# Patient Record
Sex: FEMALE | Race: WHITE | NOT HISPANIC OR LATINO | Employment: OTHER | URBAN - NONMETROPOLITAN AREA
[De-identification: names, ages, dates, MRNs, and addresses within clinical notes are randomized per-mention and may not be internally consistent; named-entity substitution may affect disease eponyms.]

---

## 2021-05-22 ENCOUNTER — APPOINTMENT (OUTPATIENT)
Dept: GENERAL RADIOLOGY | Facility: HOSPITAL | Age: 36
End: 2021-05-22

## 2021-05-22 ENCOUNTER — HOSPITAL ENCOUNTER (EMERGENCY)
Facility: HOSPITAL | Age: 36
Discharge: HOME | End: 2021-05-22
Attending: EMERGENCY MEDICINE | Admitting: EMERGENCY MEDICINE

## 2021-05-22 VITALS
BODY MASS INDEX: 53.92 KG/M2 | SYSTOLIC BLOOD PRESSURE: 148 MMHG | HEART RATE: 90 BPM | DIASTOLIC BLOOD PRESSURE: 105 MMHG | OXYGEN SATURATION: 97 % | WEIGHT: 293 LBS | RESPIRATION RATE: 18 BRPM | HEIGHT: 62 IN | TEMPERATURE: 98.2 F

## 2021-05-22 DIAGNOSIS — R05.9 COUGH: Primary | ICD-10-CM

## 2021-05-22 DIAGNOSIS — J06.9 ACUTE URI: ICD-10-CM

## 2021-05-22 LAB
ALBUMIN SERPL-MCNC: 3.9 G/DL (ref 3.5–5.2)
ALBUMIN/GLOB SERPL: 1.6 G/DL
ALP SERPL-CCNC: 67 U/L (ref 39–117)
ALT SERPL W P-5'-P-CCNC: 20 U/L (ref 1–33)
ANION GAP SERPL CALCULATED.3IONS-SCNC: 10.2 MMOL/L (ref 5–15)
AST SERPL-CCNC: 25 U/L (ref 1–32)
BASOPHILS # BLD AUTO: 0.03 10*3/MM3 (ref 0–0.2)
BASOPHILS NFR BLD AUTO: 0.4 % (ref 0–1.5)
BILIRUB SERPL-MCNC: 0.4 MG/DL (ref 0–1.2)
BUN SERPL-MCNC: 6 MG/DL (ref 6–20)
BUN/CREAT SERPL: 10.7 (ref 7–25)
CALCIUM SPEC-SCNC: 9.2 MG/DL (ref 8.6–10.5)
CHLORIDE SERPL-SCNC: 102 MMOL/L (ref 98–107)
CO2 SERPL-SCNC: 25.8 MMOL/L (ref 22–29)
CREAT SERPL-MCNC: 0.56 MG/DL (ref 0.57–1)
DEPRECATED RDW RBC AUTO: 50.8 FL (ref 37–54)
EOSINOPHIL # BLD AUTO: 0.19 10*3/MM3 (ref 0–0.4)
EOSINOPHIL NFR BLD AUTO: 2.3 % (ref 0.3–6.2)
ERYTHROCYTE [DISTWIDTH] IN BLOOD BY AUTOMATED COUNT: 15 % (ref 12.3–15.4)
FLUAV AG NPH QL: NEGATIVE
FLUBV AG NPH QL IA: NEGATIVE
GFR SERPL CREATININE-BSD FRML MDRD: 122 ML/MIN/1.73
GLOBULIN UR ELPH-MCNC: 2.5 GM/DL
GLUCOSE SERPL-MCNC: 113 MG/DL (ref 65–99)
HCT VFR BLD AUTO: 41.3 % (ref 34–46.6)
HGB BLD-MCNC: 13.4 G/DL (ref 12–15.9)
IMM GRANULOCYTES # BLD AUTO: 0.03 10*3/MM3 (ref 0–0.05)
IMM GRANULOCYTES NFR BLD AUTO: 0.4 % (ref 0–0.5)
LYMPHOCYTES # BLD AUTO: 2.74 10*3/MM3 (ref 0.7–3.1)
LYMPHOCYTES NFR BLD AUTO: 32.9 % (ref 19.6–45.3)
MCH RBC QN AUTO: 29.8 PG (ref 26.6–33)
MCHC RBC AUTO-ENTMCNC: 32.4 G/DL (ref 31.5–35.7)
MCV RBC AUTO: 92 FL (ref 79–97)
MONOCYTES # BLD AUTO: 0.44 10*3/MM3 (ref 0.1–0.9)
MONOCYTES NFR BLD AUTO: 5.3 % (ref 5–12)
NEUTROPHILS NFR BLD AUTO: 4.9 10*3/MM3 (ref 1.7–7)
NEUTROPHILS NFR BLD AUTO: 58.7 % (ref 42.7–76)
NRBC BLD AUTO-RTO: 0 /100 WBC (ref 0–0.2)
PLATELET # BLD AUTO: 316 10*3/MM3 (ref 140–450)
PMV BLD AUTO: 9.6 FL (ref 6–12)
POTASSIUM SERPL-SCNC: 4.2 MMOL/L (ref 3.5–5.2)
PROT SERPL-MCNC: 6.4 G/DL (ref 6–8.5)
RBC # BLD AUTO: 4.49 10*6/MM3 (ref 3.77–5.28)
SARS-COV-2 RNA PNL SPEC NAA+PROBE: NOT DETECTED
SODIUM SERPL-SCNC: 138 MMOL/L (ref 136–145)
WBC # BLD AUTO: 8.33 10*3/MM3 (ref 3.4–10.8)

## 2021-05-22 PROCEDURE — 87804 INFLUENZA ASSAY W/OPTIC: CPT | Performed by: PHYSICIAN ASSISTANT

## 2021-05-22 PROCEDURE — 85025 COMPLETE CBC W/AUTO DIFF WBC: CPT | Performed by: PHYSICIAN ASSISTANT

## 2021-05-22 PROCEDURE — 99283 EMERGENCY DEPT VISIT LOW MDM: CPT

## 2021-05-22 PROCEDURE — 87635 SARS-COV-2 COVID-19 AMP PRB: CPT | Performed by: PHYSICIAN ASSISTANT

## 2021-05-22 PROCEDURE — 71045 X-RAY EXAM CHEST 1 VIEW: CPT

## 2021-05-22 PROCEDURE — 80053 COMPREHEN METABOLIC PANEL: CPT | Performed by: PHYSICIAN ASSISTANT

## 2021-05-22 RX ORDER — DOXYCYCLINE 100 MG/1
100 CAPSULE ORAL 2 TIMES DAILY
Qty: 20 CAPSULE | Refills: 0 | Status: SHIPPED | OUTPATIENT
Start: 2021-05-22 | End: 2021-07-02

## 2021-05-22 RX ORDER — TIZANIDINE 4 MG/1
4 TABLET ORAL NIGHTLY PRN
COMMUNITY
End: 2021-07-13 | Stop reason: SDUPTHER

## 2021-05-22 RX ORDER — FAMOTIDINE 40 MG/1
40 TABLET, FILM COATED ORAL DAILY
COMMUNITY
End: 2021-11-23

## 2021-05-22 RX ORDER — LURASIDONE HYDROCHLORIDE 40 MG/1
40 TABLET, FILM COATED ORAL DAILY
COMMUNITY
End: 2021-08-12 | Stop reason: SDUPTHER

## 2021-05-22 RX ORDER — ALBUTEROL SULFATE 90 UG/1
2 AEROSOL, METERED RESPIRATORY (INHALATION) EVERY 4 HOURS PRN
Qty: 6.7 G | Refills: 0 | Status: SHIPPED | OUTPATIENT
Start: 2021-05-22 | End: 2021-09-19

## 2021-05-22 RX ORDER — DIAZEPAM 5 MG/1
5 TABLET ORAL 2 TIMES DAILY PRN
COMMUNITY
End: 2021-08-23 | Stop reason: SDUPTHER

## 2021-05-22 RX ORDER — AMITRIPTYLINE HYDROCHLORIDE 100 MG/1
100 TABLET, FILM COATED ORAL NIGHTLY
COMMUNITY
End: 2021-08-12 | Stop reason: SDUPTHER

## 2021-05-22 RX ORDER — FUROSEMIDE 20 MG/1
20 TABLET ORAL 2 TIMES DAILY
COMMUNITY
End: 2021-10-11 | Stop reason: SDUPTHER

## 2021-05-22 RX ORDER — GUAIFENESIN 200 MG/10ML
200 LIQUID ORAL EVERY 4 HOURS PRN
Qty: 240 ML | Refills: 0 | Status: SHIPPED | OUTPATIENT
Start: 2021-05-22 | End: 2021-07-02

## 2021-05-22 RX ORDER — GABAPENTIN 800 MG/1
800 TABLET ORAL 3 TIMES DAILY
COMMUNITY
End: 2021-07-13 | Stop reason: SDUPTHER

## 2021-05-22 NOTE — ED PROVIDER NOTES
Subjective   35-year-old female who presents to the emergency department chief complaint cough, congestion for the past 10 days.  Patient states she has had productive cough with green-yellow sputum.  Patient states she has had diffuse wheezing.  Patient does have history of smoking.  Denies history of asthma, COPD, hypertension, cardiac disease.      History provided by:  Patient   used: No    URI  Presenting symptoms: congestion, cough and rhinorrhea    Presenting symptoms: no fatigue    Severity:  Moderate  Duration:  10 days  Timing:  Intermittent  Progression:  Worsening  Chronicity:  New  Relieved by:  Nothing  Worsened by:  Nothing  Ineffective treatments:  None tried  Associated symptoms: no arthralgias, no headaches, no myalgias, no neck pain, no sinus pain, no sneezing, no swollen glands and no wheezing    Risk factors: not elderly, no chronic cardiac disease, no chronic kidney disease, no chronic respiratory disease, no diabetes mellitus, no immunosuppression, no recent illness, no recent travel and no sick contacts        Review of Systems   Constitutional: Negative.  Negative for activity change, chills, diaphoresis and fatigue.   HENT: Positive for congestion and rhinorrhea. Negative for drooling, ear discharge, sinus pain and sneezing.    Eyes: Negative.  Negative for photophobia, pain, discharge and redness.   Respiratory: Positive for cough. Negative for chest tightness and wheezing.    Cardiovascular: Negative.  Negative for chest pain, palpitations and leg swelling.   Gastrointestinal: Negative.  Negative for abdominal distention, abdominal pain, anal bleeding, blood in stool and diarrhea.   Endocrine: Negative.  Negative for cold intolerance, heat intolerance, polydipsia and polyphagia.   Genitourinary: Negative.  Negative for difficulty urinating, dysuria, enuresis, flank pain, frequency, hematuria, menstrual problem and pelvic pain.   Musculoskeletal: Negative.  Negative for  arthralgias, myalgias and neck pain.   Skin: Negative.  Negative for color change, pallor, rash and wound.   Neurological: Negative.  Negative for dizziness and headaches.   Hematological: Negative.  Negative for adenopathy. Does not bruise/bleed easily.   Psychiatric/Behavioral: Negative.  Negative for agitation, behavioral problems, confusion, decreased concentration, dysphoric mood and hallucinations.   All other systems reviewed and are negative.      Past Medical History:   Diagnosis Date   • Anxiety    • Bell's palsy    • Bipolar 2 disorder (CMS/HCC)    • Depression    • GERD (gastroesophageal reflux disease)    • Restless leg syndrome        Allergies   Allergen Reactions   • Codeine Anaphylaxis   • Asa [Aspirin] Hives     Wheezing     • Morphine Itching   • Penicillins Hives     Vomiting     • Compazine [Prochlorperazine] Anxiety   • Reglan [Metoclopramide] Anxiety       Past Surgical History:   Procedure Laterality Date   • CHOLECYSTECTOMY     • DENTAL PROCEDURE         History reviewed. No pertinent family history.    Social History     Socioeconomic History   • Marital status: Single     Spouse name: Not on file   • Number of children: Not on file   • Years of education: Not on file   • Highest education level: Not on file   Tobacco Use   • Smoking status: Current Every Day Smoker     Packs/day: 0.50     Types: Cigarettes   Substance and Sexual Activity   • Alcohol use: Never   • Drug use: Never   • Sexual activity: Defer           Objective   Physical Exam  Vitals and nursing note reviewed.   Constitutional:       General: She is not in acute distress.     Appearance: Normal appearance. She is normal weight. She is not ill-appearing, toxic-appearing or diaphoretic.   HENT:      Head: Normocephalic.      Right Ear: Tympanic membrane, ear canal and external ear normal. There is no impacted cerumen.      Left Ear: Tympanic membrane, ear canal and external ear normal. There is no impacted cerumen.      Nose:  Congestion and rhinorrhea present.      Mouth/Throat:      Mouth: Mucous membranes are moist.      Pharynx: Oropharynx is clear. No oropharyngeal exudate or posterior oropharyngeal erythema.   Eyes:      General: No scleral icterus.        Right eye: No discharge.         Left eye: No discharge.      Extraocular Movements: Extraocular movements intact.      Conjunctiva/sclera: Conjunctivae normal.      Pupils: Pupils are equal, round, and reactive to light.   Neck:      Vascular: No carotid bruit.   Cardiovascular:      Rate and Rhythm: Normal rate.      Pulses: Normal pulses.      Heart sounds: Normal heart sounds. No murmur heard.   No friction rub. No gallop.    Pulmonary:      Effort: Pulmonary effort is normal. No respiratory distress.      Breath sounds: Normal breath sounds. No stridor. No wheezing, rhonchi or rales.   Abdominal:      General: Abdomen is flat. Bowel sounds are normal. There is no distension.      Palpations: Abdomen is soft. There is no mass.      Tenderness: There is no abdominal tenderness. There is no right CVA tenderness, left CVA tenderness, guarding or rebound.      Hernia: No hernia is present.   Musculoskeletal:         General: No swelling, tenderness, deformity or signs of injury.      Cervical back: Normal range of motion and neck supple. No rigidity or tenderness.      Right lower leg: No edema.      Left lower leg: No edema.   Lymphadenopathy:      Cervical: No cervical adenopathy.   Skin:     General: Skin is warm and dry.      Capillary Refill: Capillary refill takes less than 2 seconds.      Coloration: Skin is not jaundiced or pale.      Findings: No bruising, erythema, lesion or rash.   Neurological:      General: No focal deficit present.      Mental Status: She is alert and oriented to person, place, and time. Mental status is at baseline.      Cranial Nerves: No cranial nerve deficit.      Sensory: No sensory deficit.      Motor: No weakness.      Coordination:  Coordination normal.      Gait: Gait normal.      Deep Tendon Reflexes: Reflexes normal.   Psychiatric:         Mood and Affect: Mood normal.         Behavior: Behavior normal.         Thought Content: Thought content normal.         Judgment: Judgment normal.         Procedures           ED Course  ED Course as of May 22 1255   Sat May 22, 2021   1253    IMPRESSION:  Increased density left lung base, suspect overlying soft  tissue. Lateral view may be helpful..    [BH]      ED Course User Index  [] Cory Gong PA-C                                           [facility]    Final diagnoses:   Cough   Acute URI       ED Disposition  ED Disposition     ED Disposition Condition Comment    Discharge Stable           [address] [place] [address]  [phone]  Call in 1 day           Medication List      New Prescriptions    albuterol sulfate  (90 Base) MCG/ACT inhaler  Commonly known as: PROVENTIL HFA;VENTOLIN HFA;PROAIR HFA  Inhale 2 puffs Every 4 (Four) Hours As Needed for Wheezing.     doxycycline 100 MG capsule  Commonly known as: MONODOX  Take 1 capsule by mouth 2 (Two) Times a Day.     guaifenesin 100 MG/5ML liquid  Commonly known as: ROBITUSSIN  Take 10 mL by mouth Every 4 (Four) Hours As Needed for Cough or Congestion.           Where to Get Your Medications      These medications were sent to [facility] [address] [place] [address] - [address] [place] [address] - [phone]  - [phone]   [address] [facility] [address]    Phone: [phone]   · albuterol sulfate  (90 Base) MCG/ACT inhaler  · doxycycline 100 MG capsule  · guaifenesin 100 MG/5ML liquid          Cory Gong PA-C  05/22/21 1254

## 2021-07-02 ENCOUNTER — OFFICE VISIT (OUTPATIENT)
Dept: FAMILY MEDICINE CLINIC | Facility: CLINIC | Age: 36
End: 2021-07-02

## 2021-07-02 VITALS
HEART RATE: 117 BPM | DIASTOLIC BLOOD PRESSURE: 90 MMHG | OXYGEN SATURATION: 94 % | WEIGHT: 292.8 LBS | TEMPERATURE: 98 F | BODY MASS INDEX: 53.88 KG/M2 | RESPIRATION RATE: 20 BRPM | HEIGHT: 62 IN | SYSTOLIC BLOOD PRESSURE: 136 MMHG

## 2021-07-02 DIAGNOSIS — F31.62 BIPOLAR DISORDER, CURRENT EPISODE MIXED, MODERATE (HCC): ICD-10-CM

## 2021-07-02 DIAGNOSIS — K21.9 GASTROESOPHAGEAL REFLUX DISEASE WITHOUT ESOPHAGITIS: ICD-10-CM

## 2021-07-02 DIAGNOSIS — G89.29 CHRONIC BILATERAL LOW BACK PAIN WITH BILATERAL SCIATICA: Primary | ICD-10-CM

## 2021-07-02 DIAGNOSIS — G51.0 BELL PALSY: ICD-10-CM

## 2021-07-02 DIAGNOSIS — R53.83 FATIGUE, UNSPECIFIED TYPE: ICD-10-CM

## 2021-07-02 DIAGNOSIS — Z13.29 SCREENING FOR ENDOCRINE DISORDER: ICD-10-CM

## 2021-07-02 DIAGNOSIS — G43.009 MIGRAINE WITHOUT AURA AND WITHOUT STATUS MIGRAINOSUS, NOT INTRACTABLE: ICD-10-CM

## 2021-07-02 DIAGNOSIS — F43.10 PTSD (POST-TRAUMATIC STRESS DISORDER): ICD-10-CM

## 2021-07-02 DIAGNOSIS — M54.41 CHRONIC BILATERAL LOW BACK PAIN WITH BILATERAL SCIATICA: Primary | ICD-10-CM

## 2021-07-02 DIAGNOSIS — M54.42 CHRONIC BILATERAL LOW BACK PAIN WITH BILATERAL SCIATICA: Primary | ICD-10-CM

## 2021-07-02 DIAGNOSIS — F41.1 GAD (GENERALIZED ANXIETY DISORDER): ICD-10-CM

## 2021-07-02 PROCEDURE — 99204 OFFICE O/P NEW MOD 45 MIN: CPT | Performed by: NURSE PRACTITIONER

## 2021-07-02 RX ORDER — PROMETHAZINE HYDROCHLORIDE 25 MG/1
25 TABLET ORAL EVERY 6 HOURS PRN
COMMUNITY
End: 2021-09-08 | Stop reason: SDUPTHER

## 2021-07-02 RX ORDER — IBUPROFEN 400 MG/1
400 TABLET ORAL EVERY 6 HOURS PRN
COMMUNITY
End: 2021-08-03
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
## 2021-07-03 LAB
25(OH)D3+25(OH)D2 SERPL-MCNC: 27.5 NG/ML (ref 30–100)
FOLATE SERPL-MCNC: 7.4 NG/ML
HBA1C MFR BLD: 6.5 % (ref 4.8–5.6)
TSH SERPL DL<=0.005 MIU/L-ACNC: 1.03 UIU/ML (ref 0.45–4.5)
VIT B12 SERPL-MCNC: 423 PG/ML (ref 232–1245)

## 2021-07-06 PROBLEM — M54.41 CHRONIC BILATERAL LOW BACK PAIN WITH BILATERAL SCIATICA: Status: ACTIVE | Noted: 2021-07-06

## 2021-07-06 PROBLEM — F41.1 GAD (GENERALIZED ANXIETY DISORDER): Status: ACTIVE | Noted: 2021-07-06

## 2021-07-06 PROBLEM — G51.0 BELL PALSY: Status: ACTIVE | Noted: 2021-07-06

## 2021-07-06 PROBLEM — M54.42 CHRONIC BILATERAL LOW BACK PAIN WITH BILATERAL SCIATICA: Status: ACTIVE | Noted: 2021-07-06

## 2021-07-06 PROBLEM — F43.10 PTSD (POST-TRAUMATIC STRESS DISORDER): Status: ACTIVE | Noted: 2021-07-06

## 2021-07-06 PROBLEM — K21.9 GASTROESOPHAGEAL REFLUX DISEASE WITHOUT ESOPHAGITIS: Status: ACTIVE | Noted: 2021-07-06

## 2021-07-06 PROBLEM — F31.62 BIPOLAR DISORDER, CURRENT EPISODE MIXED, MODERATE: Status: ACTIVE | Noted: 2021-07-06

## 2021-07-06 PROBLEM — G89.29 CHRONIC BILATERAL LOW BACK PAIN WITH BILATERAL SCIATICA: Status: ACTIVE | Noted: 2021-07-06

## 2021-07-06 PROBLEM — G43.009 MIGRAINE WITHOUT AURA AND WITHOUT STATUS MIGRAINOSUS, NOT INTRACTABLE: Status: ACTIVE | Noted: 2021-07-06

## 2021-07-06 NOTE — PROGRESS NOTES
Labs show normal thyroid.  Normal B12.  Vitamin D was borderline low.  Start 2000 mg vitamin D3 daily.  Hemoglobin A1c 6.5.  This is technically consistent with diabetes.  Has she ever been told her sugar was high in the past?  I recommend that she work on decreasing sugary and starchy foods.  Please schedule her a 2 to 3-month follow-up and we will repeat this lab at that time.  Can consider medication at that time.

## 2021-07-13 DIAGNOSIS — M54.41 CHRONIC BILATERAL LOW BACK PAIN WITH BILATERAL SCIATICA: Primary | ICD-10-CM

## 2021-07-13 DIAGNOSIS — M54.42 CHRONIC BILATERAL LOW BACK PAIN WITH BILATERAL SCIATICA: Primary | ICD-10-CM

## 2021-07-13 DIAGNOSIS — G89.29 CHRONIC BILATERAL LOW BACK PAIN WITH BILATERAL SCIATICA: Primary | ICD-10-CM

## 2021-07-13 RX ORDER — GABAPENTIN 800 MG/1
800 TABLET ORAL 3 TIMES DAILY
Qty: 90 TABLET | Refills: 1 | Status: SHIPPED | OUTPATIENT
Start: 2021-07-13 | End: 2021-08-05 | Stop reason: SDUPTHER

## 2021-07-13 RX ORDER — TIZANIDINE 4 MG/1
4 TABLET ORAL NIGHTLY PRN
Qty: 30 TABLET | Refills: 1 | Status: SHIPPED | OUTPATIENT
Start: 2021-07-13 | End: 2021-08-16

## 2021-07-13 NOTE — TELEPHONE ENCOUNTER
satish shows that she got 30 day supply of gabapentin on 6/19 therefore she should not be out. Also I do not write gabapentin for four times per day. I will send 90 and she needs to take it three times a day,.

## 2021-07-13 NOTE — TELEPHONE ENCOUNTER
Caller: Lindsay Khan    Relationship: Self    Best call back number: [phone]    Medication needed:   Requested Prescriptions     Pending Prescriptions Disp Refills   • tiZANidine (ZANAFLEX) 4 MG tablet       Sig: Take 1 tablet by mouth At Night As Needed for Muscle Spasms.   • gabapentin (NEURONTIN) 800 MG tablet       Sig: Take 1 tablet by mouth 3 (Three) Times a Day.       When do you need the refill by: 07/13/2021    What additional details did the patient provide when requesting the medication:   PATIENT IS COMPLETLEY OUT OF MEDICATION    Does the patient have less than a 3 day supply:  [x] Yes  [] No    What is the patient's preferred pharmacy: [id number] - [place], [place] - [address] [facility] [phone] [facility] [phone] FX

## 2021-08-03 ENCOUNTER — TELEPHONE (OUTPATIENT)
Dept: FAMILY MEDICINE CLINIC | Facility: CLINIC | Age: 36
End: 2021-08-03

## 2021-08-03 ENCOUNTER — OFFICE VISIT (OUTPATIENT)
Dept: FAMILY MEDICINE CLINIC | Facility: CLINIC | Age: 36
End: 2021-08-03

## 2021-08-03 VITALS
RESPIRATION RATE: 18 BRPM | WEIGHT: 292 LBS | OXYGEN SATURATION: 95 % | TEMPERATURE: 98.1 F | HEIGHT: 62 IN | SYSTOLIC BLOOD PRESSURE: 122 MMHG | DIASTOLIC BLOOD PRESSURE: 88 MMHG | HEART RATE: 101 BPM | BODY MASS INDEX: 53.73 KG/M2

## 2021-08-03 DIAGNOSIS — M79.89 LEFT LEG SWELLING: Primary | ICD-10-CM

## 2021-08-03 DIAGNOSIS — M79.662 PAIN OF LEFT CALF: ICD-10-CM

## 2021-08-03 DIAGNOSIS — M25.562 ACUTE PAIN OF LEFT KNEE: ICD-10-CM

## 2021-08-03 DIAGNOSIS — I82.432 ACUTE DEEP VEIN THROMBOSIS (DVT) OF POPLITEAL VEIN OF LEFT LOWER EXTREMITY (HCC): Primary | ICD-10-CM

## 2021-08-03 PROCEDURE — 93971 EXTREMITY STUDY: CPT | Performed by: NURSE PRACTITIONER

## 2021-08-03 PROCEDURE — 99213 OFFICE O/P EST LOW 20 MIN: CPT | Performed by: NURSE PRACTITIONER

## 2021-08-03 RX ORDER — ERGOCALCIFEROL 1.25 MG/1
CAPSULE ORAL
COMMUNITY
Start: 2021-08-02 | End: 2021-08-03 | Stop reason: SDUPTHER

## 2021-08-03 RX ORDER — ERGOCALCIFEROL 1.25 MG/1
50000 CAPSULE ORAL WEEKLY
Qty: 5 CAPSULE | Refills: 1 | Status: SHIPPED | OUTPATIENT
Start: 2021-08-03 | End: 2021-08-05 | Stop reason: SDUPTHER

## 2021-08-03 RX ORDER — OMEPRAZOLE 40 MG/1
CAPSULE, DELAYED RELEASE ORAL
COMMUNITY
Start: 2021-05-19 | End: 2021-08-03 | Stop reason: SDUPTHER

## 2021-08-03 RX ORDER — APIXABAN 5 MG (74)
5 KIT ORAL TAKE AS DIRECTED
Qty: 74 TABLET | Refills: 0 | Status: SHIPPED | OUTPATIENT
Start: 2021-08-03 | End: 2021-11-09

## 2021-08-03 RX ORDER — HYDROCODONE BITARTRATE AND ACETAMINOPHEN 5; 325 MG/1; MG/1
1 TABLET ORAL EVERY 8 HOURS PRN
Qty: 10 TABLET | Refills: 0 | Status: SHIPPED | OUTPATIENT
Start: 2021-08-03 | End: 2021-08-10 | Stop reason: SDUPTHER

## 2021-08-03 RX ORDER — CETIRIZINE HYDROCHLORIDE 10 MG/1
TABLET ORAL
COMMUNITY
Start: 2021-05-17 | End: 2021-08-10

## 2021-08-03 RX ORDER — OMEPRAZOLE 40 MG/1
40 CAPSULE, DELAYED RELEASE ORAL DAILY
Qty: 90 CAPSULE | Refills: 3 | Status: SHIPPED | OUTPATIENT
Start: 2021-08-03 | End: 2021-10-11 | Stop reason: SDUPTHER

## 2021-08-03 NOTE — PROGRESS NOTES
Subjective     Chief Complaint:    Chief Complaint   Patient presents with   • Fall     now hurting in left knee, up around groin and into back.   • Mass     boil under arm.    • Headache   • Dizziness       History of Present Illness:   Here with her mom. She notes she fell twice on Friday. She fell forward and slid down the stairs. She fell ago a few hours later. She fell backwards going up the stairs. She did hit her head at that time. No LOC. She has pain in her left knee that goes up into her left lower back. Hurts when she has to stretch out her legs. She has been taking motrin, gabapentin, zanaflex for the pain. No swelling. She is having some trouble walking and getting up and down the stairs due to pain.   She has hx of recurrent boils. She currently has a boil under her right armpit x 2-3 days.   No fever or chills.   On Friday while shopping (prior to fall) she was feeling dizzy and had a headache. Dizziness has gone but headache remains. She has hx of migraines. Pain is both sides. She says she drinks lots of water. Has upcoming appt neurology.     Review of Systems  Gen- No fevers, chills  CV- No chest pain, palpitations  Resp- No cough, dyspnea  GI- No N/V/D, abd pain  Neuro-No dizziness, headaches      I have reviewed and/or updated the patient's past medical, surgical, family, social history and problem list as appropriate.     Medications:    Current Outpatient Medications:   •  albuterol sulfate  (90 Base) MCG/ACT inhaler, Inhale 2 puffs Every 4 (Four) Hours As Needed for Wheezing., Disp: 6.7 g, Rfl: 0  •  amitriptyline (ELAVIL) 100 MG tablet, Take 100 mg by mouth Every Night., Disp: , Rfl:   •  diazePAM (VALIUM) 5 MG tablet, Take 5 mg by mouth 2 (Two) Times a Day As Needed for Anxiety., Disp: , Rfl:   •  famotidine (PEPCID) 40 MG tablet, Take 40 mg by mouth Daily., Disp: , Rfl:   •  furosemide (LASIX) 20 MG tablet, Take 20 mg by mouth 2 (Two) Times a Day., Disp: , Rfl:   •  gabapentin  "(NEURONTIN) 800 MG tablet, Take 1 tablet by mouth 3 (Three) Times a Day., Disp: 90 tablet, Rfl: 1  •  ibuprofen (IBU) 400 MG tablet, Take 400 mg by mouth Every 6 (Six) Hours As Needed for Mild Pain ., Disp: , Rfl:   •  lurasidone (LATUDA) 40 MG tablet tablet, Take 40 mg by mouth Daily., Disp: , Rfl:   •  promethazine (PHENERGAN) 25 MG tablet, Take 25 mg by mouth Every 6 (Six) Hours As Needed for Nausea or Vomiting., Disp: , Rfl:   •  tiZANidine (ZANAFLEX) 4 MG tablet, Take 1 tablet by mouth At Night As Needed for Muscle Spasms., Disp: 30 tablet, Rfl: 1  •  cetirizine (zyrTEC) 10 MG tablet, , Disp: , Rfl:   •  HYDROcodone-acetaminophen (Norco) 5-325 MG per tablet, Take 1 tablet by mouth Every 8 (Eight) Hours As Needed for Severe Pain ., Disp: 10 tablet, Rfl: 0  •  omeprazole (priLOSEC) 40 MG capsule, Take 1 capsule by mouth Daily., Disp: 90 capsule, Rfl: 3  •  vitamin D (ERGOCALCIFEROL) 1.25 MG (69920 UT) capsule capsule, Take 1 capsule by mouth 1 (One) Time Per Week., Disp: 5 capsule, Rfl: 1    Allergies:  Allergies   Allergen Reactions   • Codeine Anaphylaxis   • Flexeril [Cyclobenzaprine] Other (See Comments)     \"gives me chest pain\"   • Asa [Aspirin] Hives     Wheezing     • Latex Hives   • Morphine Itching   • Penicillins Hives     Vomiting     • Compazine [Prochlorperazine] Anxiety   • Reglan [Metoclopramide] Anxiety       Objective     Vital Signs:   Vitals:    08/03/21 1338   BP: 122/88   Pulse: 101   Resp: 18   Temp: 98.1 °F (36.7 °C)   SpO2: 95%   Weight: 132 kg (292 lb)   Height: 157.5 cm (62\")   PainSc: 10-Worst pain ever       Physical Exam:    Physical Exam  Vitals and nursing note reviewed.   Constitutional:       Appearance: Normal appearance. She is well-developed.   HENT:      Head: Normocephalic and atraumatic.   Eyes:      Pupils: Pupils are equal, round, and reactive to light.   Cardiovascular:      Rate and Rhythm: Normal rate and regular rhythm.      Heart sounds: Normal heart sounds. "   Pulmonary:      Effort: Pulmonary effort is normal.      Breath sounds: Normal breath sounds.   Abdominal:      General: Bowel sounds are normal. There is no distension.      Palpations: Abdomen is soft.      Tenderness: There is no abdominal tenderness.   Musculoskeletal:         General: Swelling present.      Cervical back: Neck supple.      Comments: Left calf is red and swollen, she has limited ROM of left knee due to pain, entire knee is swollen   Skin:     General: Skin is warm and dry.      Capillary Refill: Capillary refill takes less than 2 seconds.   Neurological:      General: No focal deficit present.      Mental Status: She is alert and oriented to person, place, and time.   Psychiatric:         Mood and Affect: Mood normal.         Behavior: Behavior normal.         Body mass index is 53.41 kg/m².    Assessment / Plan     Assessment/Plan:   Problem List Items Addressed This Visit     None      Visit Diagnoses     Left leg swelling    -  Primary    Relevant Orders    US Venous Doppler Lower Extremity Left (duplex)    Acute pain of left knee        Relevant Medications    HYDROcodone-acetaminophen (Norco) 5-325 MG per tablet    Other Relevant Orders    XR Knee 3 View Left    Pain of left calf        Relevant Orders    US Venous Doppler Lower Extremity Left (duplex)        -- check stat xray and duplex to rule out clot. Short course of norco given.   -- keep f/u with neuro    Follow up:  As needed    Electronically signed by CLAIR Leal   08/03/2021 13:47 EDT      Please note that portions of this note may have been completed with a voice recognition program. Efforts were made to edit the dictations, but occasionally words are mistranscribed.

## 2021-08-03 NOTE — TELEPHONE ENCOUNTER
Please call and speak with patient and mom as patient has some mild mental delay. Her US was positive for DVT. I have sent a prescription for Eliquis to the pharmacy. She will need to take 10mg twice daily for 7 days, then 5mg twice daily for 3 months. I am also referring her to a hematologist as I am not sure why she got a blood clot unless it was related to her fall which would be rare. I want to rule out any clotting disorders. She needs to come back and see me in 1 week for reassessment. She needs to stop ibuprofen as this will increase risk of stomach  Bleed. Tylenol is safe for pain. If she develops any chest pain or shortness of breath needs to go to ED.   Please schedule 1 week f/u.

## 2021-08-03 NOTE — TELEPHONE ENCOUNTER
Patient and mother informed of all results and instructions. I booked a same day spot next Tuesday at 1 pm so you could see her.

## 2021-08-05 DIAGNOSIS — G89.29 CHRONIC BILATERAL LOW BACK PAIN WITH BILATERAL SCIATICA: ICD-10-CM

## 2021-08-05 DIAGNOSIS — M54.42 CHRONIC BILATERAL LOW BACK PAIN WITH BILATERAL SCIATICA: ICD-10-CM

## 2021-08-05 DIAGNOSIS — M54.41 CHRONIC BILATERAL LOW BACK PAIN WITH BILATERAL SCIATICA: ICD-10-CM

## 2021-08-05 NOTE — TELEPHONE ENCOUNTER
Caller: Lindsay Amezquita    Relationship: Self    Best call back number: [phone]     Medication needed:   Requested Prescriptions     Pending Prescriptions Disp Refills   • gabapentin (NEURONTIN) 800 MG tablet 90 tablet 1     Sig: Take 1 tablet by mouth 3 (Three) Times a Day.   • vitamin D (ERGOCALCIFEROL) 1.25 MG (81905 UT) capsule capsule 5 capsule 1     Sig: Take 1 capsule by mouth 1 (One) Time Per Week.       When do you need the refill by:     What additional details did the patient provide when requesting the medication: PATIENT IS OUT OF THE VITAMIN D, BUT SHE STILL HAS ONE WEEKS WORTH OF GABAPENTIN.    Does the patient have less than a 3 day supply:  [x] Yes  [] No    What is the patient's preferred pharmacy: [facility]/PHARMACY #[id number] [place], [place] - [address] [place] [address] [phone] [facility] [phone] FX     PATIENT ALSO STATES THAT NADER PRESCRIBED ELIQUIS AND WHEN SHE TAKES IT, IT HURTS HER STOMACH.  IS THIS A SIDE EFFECT OF THIS MEDICINE OR IS IT TO BE TAIKEN WITH MEALS?

## 2021-08-06 ENCOUNTER — TELEPHONE (OUTPATIENT)
Dept: FAMILY MEDICINE CLINIC | Facility: CLINIC | Age: 36
End: 2021-08-06

## 2021-08-06 ENCOUNTER — HOSPITAL ENCOUNTER (EMERGENCY)
Facility: HOSPITAL | Age: 36
Discharge: HOME | End: 2021-08-06
Attending: EMERGENCY MEDICINE | Admitting: EMERGENCY MEDICINE

## 2021-08-06 VITALS
HEART RATE: 123 BPM | DIASTOLIC BLOOD PRESSURE: 95 MMHG | TEMPERATURE: 98.6 F | BODY MASS INDEX: 51.91 KG/M2 | WEIGHT: 293 LBS | OXYGEN SATURATION: 93 % | SYSTOLIC BLOOD PRESSURE: 163 MMHG | HEIGHT: 63 IN | RESPIRATION RATE: 18 BRPM
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
## 2021-08-06 DIAGNOSIS — M25.562 ACUTE PAIN OF LEFT KNEE: Primary | ICD-10-CM

## 2021-08-06 PROCEDURE — 99282 EMERGENCY DEPT VISIT SF MDM: CPT

## 2021-08-06 RX ORDER — ERGOCALCIFEROL 1.25 MG/1
50000 CAPSULE ORAL WEEKLY
Qty: 5 CAPSULE | Refills: 1 | Status: SHIPPED | OUTPATIENT
Start: 2021-08-06 | End: 2021-08-30

## 2021-08-06 RX ORDER — GABAPENTIN 800 MG/1
800 TABLET ORAL 3 TIMES DAILY
Qty: 90 TABLET | Refills: 1 | Status: SHIPPED | OUTPATIENT
Start: 2021-08-06 | End: 2021-09-08 | Stop reason: SDUPTHER

## 2021-08-06 NOTE — ED PROVIDER NOTES
TRIAGE CHIEF COMPLAINT:     Nursing and triage notes reviewed    Chief Complaint   Patient presents with   • Fall      HPI: Lindsay Amezquita is a 36 y.o. female who presents to the emergency department complaining of pain in her left knee.  Patient states that she accidentally fell 2 times today and landed on her left knee.  She is complaining of some pain in the anterior left knee and a small amount of swelling.  She states she was diagnosed with a DVT several days previously and has been taking Eliquis since that time.  She states that she still able to walk without difficulty and denies any pain in her upper leg or thigh.  She adamantly denies any chest pain or shortness of breath.  She states she just wants to make sure the clot did not move at all.    REVIEW OF SYSTEMS: All other systems reviewed and are negative     PAST MEDICAL HISTORY:   Past Medical History:   Diagnosis Date   • Anxiety    • Bell's palsy    • Bipolar 2 disorder (CMS/HCC)    • Depression    • GERD (gastroesophageal reflux disease)    • Restless leg syndrome         FAMILY HISTORY:   History reviewed. No pertinent family history.     SOCIAL HISTORY:   Social History     Socioeconomic History   • Marital status: Single     Spouse name: Not on file   • Number of children: Not on file   • Years of education: Not on file   • Highest education level: Not on file   Tobacco Use   • Smoking status: Current Every Day Smoker     Packs/day: 0.50     Types: Cigarettes   Vaping Use   • Vaping Use: Never used   Substance and Sexual Activity   • Alcohol use: Never   • Drug use: Never   • Sexual activity: Defer        SURGICAL HISTORY:   Past Surgical History:   Procedure Laterality Date   • CHOLECYSTECTOMY     • DENTAL PROCEDURE          CURRENT MEDICATIONS:      Medication List      ASK your doctor about these medications    albuterol sulfate  (90 Base) MCG/ACT inhaler  Commonly known as: PROVENTIL HFA;VENTOLIN HFA;PROAIR HFA  Inhale 2 puffs Every 4  (Four) Hours As Needed for Wheezing.     amitriptyline 100 MG tablet  Commonly known as: ELAVIL     cetirizine 10 MG tablet  Commonly known as: zyrTEC     diazePAM 5 MG tablet  Commonly known as: VALIUM     Eliquis DVT/PE Starter Pack tablet therapy pack  Generic drug: Apixaban Starter Pack  Take two 5 mg tablets by mouth every 12 hours for 7 days. Followed by one 5 mg tablet every 12 hours. (Dispense starter pack if available)     famotidine 40 MG tablet  Commonly known as: PEPCID     furosemide 20 MG tablet  Commonly known as: LASIX     gabapentin 800 MG tablet  Commonly known as: NEURONTIN  Take 1 tablet by mouth 3 (Three) Times a Day.     HYDROcodone-acetaminophen 5-325 MG per tablet  Commonly known as: Norco  Take 1 tablet by mouth Every 8 (Eight) Hours As Needed for Severe Pain .     lurasidone 40 MG tablet tablet  Commonly known as: LATUDA     omeprazole 40 MG capsule  Commonly known as: priLOSEC  Take 1 capsule by mouth Daily.     promethazine 25 MG tablet  Commonly known as: PHENERGAN     tiZANidine 4 MG tablet  Commonly known as: ZANAFLEX  Take 1 tablet by mouth At Night As Needed for Muscle Spasms.     vitamin D 1.25 MG (01488 UT) capsule capsule  Commonly known as: ERGOCALCIFEROL  Take 1 capsule by mouth 1 (One) Time Per Week.             ALLERGIES: Codeine, Flexeril [cyclobenzaprine], Asa [aspirin], Latex, Morphine, Penicillins, Compazine [prochlorperazine], and Reglan [metoclopramide]     PHYSICAL EXAM:   VITAL SIGNS:   Vitals:    08/06/21 1656   BP: 163/95   Pulse: (!) 123   Resp: 18   Temp: 98.6 °F (37 °C)   SpO2: 93%      CONSTITUTIONAL: Awake, oriented, appears non-toxic, appears anxious  HENT: Atraumatic, normocephalic, oral mucosa pink and moist, airway patent. Nares patent without drainage. External ears normal.   EYES: Conjunctiva clear  NECK: Trachea midline, non-tender, supple   CARDIOVASCULAR: Tachycardic with a regular rhythm, No murmurs, rubs, gallops   PULMONARY/CHEST: Clear to  auscultation, no rhonchi, wheezes, or rales. Symmetrical breath sounds.  ABDOMINAL: Non-distended, soft, non-tender - no rebound or guarding.    NEUROLOGIC: Non-focal, moving all four extremities, no gross sensory or motor deficits.   EXTREMITIES: No clubbing, cyanosis.  There is minimal edema of the left lower extremity.  Mild tenderness with compression of the patella.  There is no redness of the leg.  Distal dorsalis pedis and posterior tibial pulses are intact.  SKIN: Warm, Dry, No erythema, No rash     ED COURSE / MEDICAL DECISION MAKING:   Lindsay Amezquita is a 36 y.o. female who presents to the emergency department for evaluation of left lower extremity discomfort following a fall.  Patient with recently diagnosed popliteal DVT in the same lower extremity.  This was found on chart review.  Patient states the swelling in the back of her leg is not worse and only swelling over her patella.  I advised her that it is unlikely that the blood clot had moved if only due to some trauma of her knee.  I did offer to obtain an ultrasound to confirm that there been no expansion or movement of the DVT, however patient declined if she states that I felt like this was unlikely.  Also advised that if she had chest pain or shortness of breath or worsening swelling of the posterior part of her leg this would be much more concerning to me for movement of the DVT.  Patient also declined any x-rays in the emergency department.  She states that she did not have chest pain or shortness of breath and states that she does not want any further imaging and will follow up with her primary care physician.  Patient was comfortable with this plan and discharged in good condition.    DECISION TO DISCHARGE/ADMIT: see ED care timeline     FINAL IMPRESSION:   1 --knee pain  2 --fall  3 --     Electronically signed by: Yun Abreu MD, 8/6/2021 19:57 Yun Mccarty MD  08/06/21 2005

## 2021-08-06 NOTE — TELEPHONE ENCOUNTER
Caller: Lindsay Amezquita    Relationship: Self    Best call back number:[phone]    What is the best time to reach you: ANY TIME  Who are you requesting to speak with (clinical staff, provider,  specific staff member): NADER FARRIS    Do you know the name of the person who called: SELF    What was the call regarding: PATIENT FELL TWICE TODAY ON THE LEFT LEG THAT HAS A BLOOD CLOT IN IT AND IS IN SEVERE PAIN AND DOESN'T KNOW WHAT TO DO. PLEASE ADVISE    Do you require a callback:YES

## 2021-08-07 NOTE — DISCHARGE INSTRUCTIONS
The most concerning findings for me for possible movement of your blood clot would be if you develop shortness of breath or chest pain.  There is also concerning if you have significant worsening of the swelling of your leg.  Should these develop please return to the emergency department immediately.

## 2021-08-10 ENCOUNTER — TELEPHONE (OUTPATIENT)
Dept: FAMILY MEDICINE CLINIC | Facility: CLINIC | Age: 36
End: 2021-08-10

## 2021-08-10 ENCOUNTER — OFFICE VISIT (OUTPATIENT)
Dept: FAMILY MEDICINE CLINIC | Facility: CLINIC | Age: 36
End: 2021-08-10

## 2021-08-10 VITALS
WEIGHT: 289.8 LBS | HEIGHT: 63 IN | DIASTOLIC BLOOD PRESSURE: 96 MMHG | OXYGEN SATURATION: 96 % | HEART RATE: 104 BPM | TEMPERATURE: 97.7 F | BODY MASS INDEX: 51.35 KG/M2 | SYSTOLIC BLOOD PRESSURE: 140 MMHG

## 2021-08-10 DIAGNOSIS — I82.432 ACUTE DEEP VEIN THROMBOSIS (DVT) OF POPLITEAL VEIN OF LEFT LOWER EXTREMITY (HCC): Primary | ICD-10-CM

## 2021-08-10 DIAGNOSIS — M25.562 ACUTE PAIN OF LEFT KNEE: ICD-10-CM

## 2021-08-10 DIAGNOSIS — I82.432 ACUTE DEEP VEIN THROMBOSIS (DVT) OF POPLITEAL VEIN OF LEFT LOWER EXTREMITY (HCC): ICD-10-CM

## 2021-08-10 PROCEDURE — 99213 OFFICE O/P EST LOW 20 MIN: CPT | Performed by: NURSE PRACTITIONER

## 2021-08-10 RX ORDER — HYDROCODONE BITARTRATE AND ACETAMINOPHEN 5; 325 MG/1; MG/1
1 TABLET ORAL EVERY 8 HOURS PRN
Qty: 25 TABLET | Refills: 0 | Status: CANCELLED | OUTPATIENT
Start: 2021-08-10

## 2021-08-10 RX ORDER — HYDROCODONE BITARTRATE AND ACETAMINOPHEN 5; 325 MG/1; MG/1
1 TABLET ORAL EVERY 8 HOURS PRN
Qty: 25 TABLET | Refills: 0 | Status: SHIPPED | OUTPATIENT
Start: 2021-08-10 | End: 2021-11-09 | Stop reason: SDUPTHER

## 2021-08-10 NOTE — PROGRESS NOTES
Subjective     Chief Complaint:    Chief Complaint   Patient presents with   • Pain     Pt following up from ED, complains of left knee pain. Also states she is following up on blood clot.       History of Present Illness:   Here for f/u on DVT. Last week she was diagnosed with DVT in left leg, unprovoked. She continues to have left lower leg pain worse behind her knee. She notes over the weekend she feel chasing after her dog. She went to ED and had normal exam. She continues to take Eliquis. Denies soa, chest pain, or BRBPR    Review of Systems  Gen- No fevers, chills  CV- No chest pain, palpitations  Resp- No cough, dyspnea  GI- No N/V/D, abd pain  Neuro-No dizziness, headaches      I have reviewed and/or updated the patient's past medical, surgical, family, social history and problem list as appropriate.     Medications:    Current Outpatient Medications:   •  amitriptyline (ELAVIL) 100 MG tablet, Take 100 mg by mouth Every Night., Disp: , Rfl:   •  Apixaban Starter Pack (Eliquis DVT/PE Starter Pack) tablet therapy pack, Take two 5 mg tablets by mouth every 12 hours for 7 days. Followed by one 5 mg tablet every 12 hours. (Dispense starter pack if available), Disp: 74 tablet, Rfl: 0  •  diazePAM (VALIUM) 5 MG tablet, Take 5 mg by mouth 2 (Two) Times a Day As Needed for Anxiety., Disp: , Rfl:   •  famotidine (PEPCID) 40 MG tablet, Take 40 mg by mouth Daily., Disp: , Rfl:   •  furosemide (LASIX) 20 MG tablet, Take 20 mg by mouth 2 (Two) Times a Day., Disp: , Rfl:   •  gabapentin (NEURONTIN) 800 MG tablet, Take 1 tablet by mouth 3 (Three) Times a Day., Disp: 90 tablet, Rfl: 1  •  lurasidone (LATUDA) 40 MG tablet tablet, Take 40 mg by mouth Daily., Disp: , Rfl:   •  omeprazole (priLOSEC) 40 MG capsule, Take 1 capsule by mouth Daily., Disp: 90 capsule, Rfl: 3  •  promethazine (PHENERGAN) 25 MG tablet, Take 25 mg by mouth Every 6 (Six) Hours As Needed for Nausea or Vomiting., Disp: , Rfl:   •  tiZANidine  "(ZANAFLEX) 4 MG tablet, Take 1 tablet by mouth At Night As Needed for Muscle Spasms., Disp: 30 tablet, Rfl: 1  •  vitamin D (ERGOCALCIFEROL) 1.25 MG (49373 UT) capsule capsule, Take 1 capsule by mouth 1 (One) Time Per Week., Disp: 5 capsule, Rfl: 1  •  albuterol sulfate  (90 Base) MCG/ACT inhaler, Inhale 2 puffs Every 4 (Four) Hours As Needed for Wheezing., Disp: 6.7 g, Rfl: 0  •  apixaban (ELIQUIS) 5 MG tablet tablet, Take 1 tablet by mouth Every 12 (Twelve) Hours., Disp: 60 tablet, Rfl: 1  •  HYDROcodone-acetaminophen (Norco) 5-325 MG per tablet, Take 1 tablet by mouth Every 8 (Eight) Hours As Needed for Severe Pain ., Disp: 25 tablet, Rfl: 0    Allergies:  Allergies   Allergen Reactions   • Codeine Anaphylaxis   • Flexeril [Cyclobenzaprine] Other (See Comments)     \"gives me chest pain\"   • Asa [Aspirin] Hives     Wheezing     • Latex Hives   • Morphine Itching   • Penicillins Hives     Vomiting     • Compazine [Prochlorperazine] Anxiety   • Reglan [Metoclopramide] Anxiety       Objective     Vital Signs:   Vitals:    08/10/21 1242 08/10/21 1329   BP: 140/96    Pulse: 110 104   Temp: 97.7 °F (36.5 °C)    SpO2: 91% 96%   Weight: 131 kg (289 lb 12.8 oz)    Height: 160 cm (63\")        Physical Exam:    Physical Exam  Vitals and nursing note reviewed.   Constitutional:       Appearance: She is well-developed. She is obese.   HENT:      Head: Normocephalic and atraumatic.   Eyes:      Pupils: Pupils are equal, round, and reactive to light.   Cardiovascular:      Rate and Rhythm: Normal rate and regular rhythm.      Heart sounds: Normal heart sounds.   Pulmonary:      Effort: Pulmonary effort is normal.      Breath sounds: Normal breath sounds.   Abdominal:      General: Bowel sounds are normal. There is no distension.      Palpations: Abdomen is soft.      Tenderness: There is no abdominal tenderness.   Musculoskeletal:      Cervical back: Neck supple.      Comments: Left leg remains swollen   Skin:     " General: Skin is warm and dry.      Capillary Refill: Capillary refill takes less than 2 seconds.   Neurological:      General: No focal deficit present.      Mental Status: She is alert and oriented to person, place, and time.   Psychiatric:         Mood and Affect: Mood normal.         Behavior: Behavior normal.         Body mass index is 51.34 kg/m².    Assessment / Plan     Assessment/Plan:   Problem List Items Addressed This Visit        Coag and Thromboembolic    Acute deep vein thrombosis (DVT) of popliteal vein of left lower extremity (CMS/HCC) - Primary    Relevant Medications    Apixaban Starter Pack (Eliquis DVT/PE Starter Pack) tablet therapy pack    HYDROcodone-acetaminophen (Norco) 5-325 MG per tablet    apixaban (ELIQUIS) 5 MG tablet tablet      Other Visit Diagnoses     Acute pain of left knee        Relevant Medications    HYDROcodone-acetaminophen (Norco) 5-325 MG per tablet        -- continue eliquis, keep f/u with hematology as this clot appears to be unprovoked. Continue to avoid nsaids. Additional short course of norco given for pain    Follow up:  Return in about 2 months (around 10/10/2021).      Electronically signed by CLAIR Leal   08/10/2021 13:18 EDT      Please note that portions of this note may have been completed with a voice recognition program. Efforts were made to edit the dictations, but occasionally words are mistranscribed.

## 2021-08-10 NOTE — TELEPHONE ENCOUNTER
Caller: Lindsay Amezquita    Relationship: Self    Best call back number:     [phone]    Medication needed:   Requested Prescriptions     Pending Prescriptions Disp Refills   • HYDROcodone-acetaminophen (Norco) 5-325 MG per tablet 25 tablet 0     Sig: Take 1 tablet by mouth Every 8 (Eight) Hours As Needed for Severe Pain .     PATIENT STATED SHE PAID OUT OF POCKET FOR THE MEDICATION SINCE PHARMACY STATED THEY NEEDED A PRIOR AUTHORIZATION FOR THE MEDICATION    PHARMACY STATED [facility] WOULD REIMBURSE PATIENT FOR THE OUT OF POCKET PAYMENT    [facility] STATED THEY WOULD NOT PROVIDE A REIMBURSEMENT    When do you need the refill by:     N/A     PATIENT STATED SHE DID  THE MEDICATION AS STATED ABOVE    What additional details did the patient provide when requesting the medication:     N/A    Does the patient have less than a 3 day supply:  [] Yes  [] No    N/A    What is the patient's preferred pharmacy:      N/A    PLEASE CONTACT PHARMACY WITH PRIOR AUTHORIZATION FOR THE MEDICATION LISTED ABOVE    NADER FARRIS

## 2021-08-12 NOTE — TELEPHONE ENCOUNTER
Pt called req refill on latuda and amitriptyline to [facility]. Sts she only has about 5 or 6 days worth of meds at this time.

## 2021-08-12 NOTE — TELEPHONE ENCOUNTER
A PRIOR AUTH HAS BEEN STARTED THROUGH COVER MY MEDS FOR HYDROCODONE.    CURRENTLY WAITING ON A RESPONSE FROM THE INSURANCE.

## 2021-08-12 NOTE — TELEPHONE ENCOUNTER
Forwarding to  Clinical Pool to send to Stephanie for review - Pt stated that she got these meds from Karla when I talked to her on the phone.  She has an appt coming up with Karla on 8/23 -so maybe she was meaning she would be out of meds before the appointment with her.

## 2021-08-13 RX ORDER — LURASIDONE HYDROCHLORIDE 40 MG/1
40 TABLET, FILM COATED ORAL DAILY
Qty: 30 TABLET | Refills: 0 | Status: SHIPPED | OUTPATIENT
Start: 2021-08-13 | End: 2021-08-23 | Stop reason: SDUPTHER

## 2021-08-13 RX ORDER — AMITRIPTYLINE HYDROCHLORIDE 100 MG/1
100 TABLET, FILM COATED ORAL NIGHTLY
Qty: 30 TABLET | Refills: 0 | Status: SHIPPED | OUTPATIENT
Start: 2021-08-13 | End: 2021-08-23

## 2021-08-16 RX ORDER — TIZANIDINE 4 MG/1
4 TABLET ORAL NIGHTLY PRN
Qty: 30 TABLET | Refills: 1 | Status: SHIPPED | OUTPATIENT
Start: 2021-08-16 | End: 2021-09-29 | Stop reason: SDUPTHER

## 2021-08-19 NOTE — TELEPHONE ENCOUNTER
Received fax that PA was denied. The following rules must be met for approval:    The member has had a trial and failure, or contraindication to, at least 1 non-opioid pain medication (such as acetaminophen, NSAIDS.)    Please advise?

## 2021-08-22 ENCOUNTER — HOSPITAL ENCOUNTER (EMERGENCY)
Facility: HOSPITAL | Age: 36
Discharge: HOME | End: 2021-08-22
Attending: EMERGENCY MEDICINE | Admitting: EMERGENCY MEDICINE

## 2021-08-22 VITALS
SYSTOLIC BLOOD PRESSURE: 128 MMHG | TEMPERATURE: 98.2 F | HEART RATE: 81 BPM | RESPIRATION RATE: 16 BRPM | OXYGEN SATURATION: 94 % | WEIGHT: 282.4 LBS | DIASTOLIC BLOOD PRESSURE: 76 MMHG | BODY MASS INDEX: 50.04 KG/M2 | HEIGHT: 63 IN

## 2021-08-22 DIAGNOSIS — G43.809 OTHER MIGRAINE WITHOUT STATUS MIGRAINOSUS, NOT INTRACTABLE: Primary | ICD-10-CM

## 2021-08-22 PROCEDURE — 25010000002 DROPERIDOL PER 5 MG: Performed by: PHYSICIAN ASSISTANT

## 2021-08-22 PROCEDURE — 25010000002 DIPHENHYDRAMINE PER 50 MG: Performed by: PHYSICIAN ASSISTANT

## 2021-08-22 PROCEDURE — 96375 TX/PRO/DX INJ NEW DRUG ADDON: CPT

## 2021-08-22 PROCEDURE — 96374 THER/PROPH/DIAG INJ IV PUSH: CPT

## 2021-08-22 PROCEDURE — 99282 EMERGENCY DEPT VISIT SF MDM: CPT

## 2021-08-22 RX ORDER — SODIUM CHLORIDE 0.9 % (FLUSH) 0.9 %
10 SYRINGE (ML) INJECTION AS NEEDED
Status: DISCONTINUED | OUTPATIENT
Start: 2021-08-22 | End: 2021-08-22 | Stop reason: HOSPADM

## 2021-08-22 RX ORDER — DIPHENHYDRAMINE HYDROCHLORIDE 50 MG/ML
25 INJECTION INTRAMUSCULAR; INTRAVENOUS ONCE
Status: COMPLETED | OUTPATIENT
Start: 2021-08-22 | End: 2021-08-22

## 2021-08-22 RX ORDER — DROPERIDOL 2.5 MG/ML
1.25 INJECTION, SOLUTION INTRAMUSCULAR; INTRAVENOUS ONCE
Status: COMPLETED | OUTPATIENT
Start: 2021-08-22 | End: 2021-08-22

## 2021-08-22 RX ADMIN — SODIUM CHLORIDE 1000 ML: 9 INJECTION, SOLUTION INTRAVENOUS at 13:46

## 2021-08-22 RX ADMIN — DROPERIDOL 1.25 MG: 2.5 INJECTION, SOLUTION INTRAMUSCULAR; INTRAVENOUS at 13:46

## 2021-08-22 RX ADMIN — DIPHENHYDRAMINE HYDROCHLORIDE 25 MG: 50 INJECTION INTRAMUSCULAR; INTRAVENOUS at 13:46

## 2021-08-22 NOTE — ED PROVIDER NOTES
"Subjective   Chief Complaint: Headache  History of Present Illness: 36-year-old female comes in for evaluation of above complaint.  She states for 3 days she has had a headache consistent with past migraines to the top of her head.  Worse with bright lights, she is had nausea vomiting.  This feels similar to past migraines.  She tried Excedrin without relief.  This was gradual in onset.  Onset: Gradual in onset 3 days ago  Timing: Ongoing  Exacerbating / Alleviating factors: Bright lights and loud sounds make the headache worse, nothing makes it better including Excedrin  Associated symptoms: Nausea and vomiting      Nurses Notes reviewed and agree, including vitals, allergies, social history and prior medical history.          Review of Systems   Constitutional: Negative.    Eyes: Negative.    Respiratory: Negative.    Cardiovascular: Negative.    Gastrointestinal: Positive for nausea and vomiting.   Genitourinary: Negative.    Musculoskeletal: Negative.    Skin: Negative.    Neurological: Positive for headaches.   Psychiatric/Behavioral: Negative.        Past Medical History:   Diagnosis Date   • Anxiety    • Bell's palsy    • Bipolar 2 disorder (CMS/HCC)    • Blood clot in vein    • Depression    • GERD (gastroesophageal reflux disease)    • Restless leg syndrome        Allergies   Allergen Reactions   • Codeine Anaphylaxis   • Flexeril [Cyclobenzaprine] Other (See Comments)     \"gives me chest pain\"   • Asa [Aspirin] Hives     Wheezing     • Latex Hives   • Morphine Itching   • Penicillins Hives     Vomiting     • Compazine [Prochlorperazine] Anxiety   • Reglan [Metoclopramide] Anxiety       Past Surgical History:   Procedure Laterality Date   • CHOLECYSTECTOMY     • DENTAL PROCEDURE         History reviewed. No pertinent family history.    Social History     Socioeconomic History   • Marital status: Single     Spouse name: Not on file   • Number of children: Not on file   • Years of education: Not on file   • " Highest education level: Not on file   Tobacco Use   • Smoking status: Current Every Day Smoker     Packs/day: 0.50     Types: Cigarettes   Vaping Use   • Vaping Use: Never used   Substance and Sexual Activity   • Alcohol use: Never   • Drug use: Never   • Sexual activity: Defer           Objective   Physical Exam  Vitals and nursing note reviewed.   Constitutional:       General: She is not in acute distress.     Appearance: Normal appearance. She is obese. She is not ill-appearing, toxic-appearing or diaphoretic.   HENT:      Head: Normocephalic and atraumatic.   Eyes:      Extraocular Movements: Extraocular movements intact.   Cardiovascular:      Rate and Rhythm: Normal rate.   Pulmonary:      Effort: Pulmonary effort is normal.   Musculoskeletal:         General: Normal range of motion.      Cervical back: Normal range of motion.   Skin:     General: Skin is warm.   Neurological:      General: No focal deficit present.      Mental Status: She is alert and oriented to person, place, and time. Mental status is at baseline.   Psychiatric:         Mood and Affect: Mood normal.         Behavior: Behavior normal.         Procedures           ED Course                                           MDM    Final diagnoses:   Other migraine without status migrainosus, not intractable       ED Disposition  ED Disposition     ED Disposition Condition Comment    Discharge Stable           Stephanie Burdick, APRN  [address] [place] [address]  [phone]      As needed         Medication List      No changes were made to your prescriptions during this visit.          Cristian Frazier PA-C  08/22/21 3196

## 2021-08-23 ENCOUNTER — OFFICE VISIT (OUTPATIENT)
Dept: FAMILY MEDICINE CLINIC | Facility: CLINIC | Age: 36
End: 2021-08-23

## 2021-08-23 ENCOUNTER — TELEPHONE (OUTPATIENT)
Dept: FAMILY MEDICINE CLINIC | Facility: CLINIC | Age: 36
End: 2021-08-23

## 2021-08-23 ENCOUNTER — OFFICE VISIT (OUTPATIENT)
Dept: BEHAVIORAL HEALTH | Facility: CLINIC | Age: 36
End: 2021-08-23

## 2021-08-23 VITALS
HEIGHT: 63 IN | WEIGHT: 282 LBS | DIASTOLIC BLOOD PRESSURE: 80 MMHG | BODY MASS INDEX: 49.96 KG/M2 | SYSTOLIC BLOOD PRESSURE: 130 MMHG

## 2021-08-23 VITALS
OXYGEN SATURATION: 97 % | DIASTOLIC BLOOD PRESSURE: 80 MMHG | BODY MASS INDEX: 49.96 KG/M2 | WEIGHT: 282 LBS | HEIGHT: 63 IN | HEART RATE: 120 BPM | SYSTOLIC BLOOD PRESSURE: 130 MMHG

## 2021-08-23 DIAGNOSIS — N39.44 NOCTURNAL ENURESIS: ICD-10-CM

## 2021-08-23 DIAGNOSIS — F43.10 PTSD (POST-TRAUMATIC STRESS DISORDER): ICD-10-CM

## 2021-08-23 DIAGNOSIS — F41.1 GAD (GENERALIZED ANXIETY DISORDER): ICD-10-CM

## 2021-08-23 DIAGNOSIS — G43.809 OTHER MIGRAINE WITHOUT STATUS MIGRAINOSUS, NOT INTRACTABLE: Primary | ICD-10-CM

## 2021-08-23 DIAGNOSIS — F51.04 PSYCHOPHYSIOLOGICAL INSOMNIA: ICD-10-CM

## 2021-08-23 DIAGNOSIS — F31.62 BIPOLAR DISORDER, CURRENT EPISODE MIXED, MODERATE (HCC): Primary | ICD-10-CM

## 2021-08-23 PROCEDURE — 90792 PSYCH DIAG EVAL W/MED SRVCS: CPT | Performed by: NURSE PRACTITIONER

## 2021-08-23 PROCEDURE — 99213 OFFICE O/P EST LOW 20 MIN: CPT | Performed by: NURSE PRACTITIONER

## 2021-08-23 PROCEDURE — 96372 THER/PROPH/DIAG INJ SC/IM: CPT | Performed by: NURSE PRACTITIONER

## 2021-08-23 RX ORDER — SUMATRIPTAN 50 MG/1
TABLET, FILM COATED ORAL
Qty: 15 TABLET | Refills: 1 | Status: SHIPPED | OUTPATIENT
Start: 2021-08-23 | End: 2021-11-09

## 2021-08-23 RX ORDER — ONDANSETRON 4 MG/1
4 TABLET, ORALLY DISINTEGRATING ORAL EVERY 8 HOURS PRN
Qty: 15 TABLET | Refills: 0 | Status: SHIPPED | OUTPATIENT
Start: 2021-08-23 | End: 2021-08-31

## 2021-08-23 RX ORDER — AMITRIPTYLINE HYDROCHLORIDE 150 MG/1
150 TABLET, FILM COATED ORAL NIGHTLY
Qty: 30 TABLET | Refills: 1 | Status: SHIPPED | OUTPATIENT
Start: 2021-08-23 | End: 2021-10-18

## 2021-08-23 RX ORDER — DEXAMETHASONE SODIUM PHOSPHATE 4 MG/ML
6 INJECTION, SOLUTION INTRA-ARTICULAR; INTRALESIONAL; INTRAMUSCULAR; INTRAVENOUS; SOFT TISSUE ONCE
Status: DISCONTINUED | OUTPATIENT
Start: 2021-08-23 | End: 2021-08-23

## 2021-08-23 RX ORDER — DEXAMETHASONE SODIUM PHOSPHATE 4 MG/ML
5 INJECTION, SOLUTION INTRA-ARTICULAR; INTRALESIONAL; INTRAMUSCULAR; INTRAVENOUS; SOFT TISSUE ONCE
Status: COMPLETED | OUTPATIENT
Start: 2021-08-23 | End: 2021-08-23

## 2021-08-23 RX ORDER — LURASIDONE HYDROCHLORIDE 40 MG/1
40 TABLET, FILM COATED ORAL DAILY
Qty: 30 TABLET | Refills: 0 | Status: SHIPPED | OUTPATIENT
Start: 2021-08-23 | End: 2021-09-29

## 2021-08-23 RX ORDER — IMIPRAMINE HCL 25 MG
25 TABLET ORAL NIGHTLY
Qty: 30 TABLET | Refills: 1 | Status: SHIPPED | OUTPATIENT
Start: 2021-08-23 | End: 2021-09-08

## 2021-08-23 RX ORDER — DIAZEPAM 5 MG/1
5 TABLET ORAL 2 TIMES DAILY PRN
Qty: 60 TABLET | Refills: 0 | Status: SHIPPED | OUTPATIENT
Start: 2021-08-23 | End: 2021-09-29 | Stop reason: SDUPTHER

## 2021-08-23 RX ADMIN — DEXAMETHASONE SODIUM PHOSPHATE 5 MG: 4 INJECTION, SOLUTION INTRA-ARTICULAR; INTRALESIONAL; INTRAMUSCULAR; INTRAVENOUS; SOFT TISSUE at 15:53

## 2021-08-23 NOTE — PROGRESS NOTES
Subjective     Chief Complaint:    Chief Complaint   Patient presents with   • Headache     Pt following up from ER on migraines.       History of Present Illness:   Here for f/u on ED visit. She had migraine and was seen in the ED at [facility]. Given benadryl and it helped some but now returning. Her headaches aches both temples and radiates up to her head. LIght and sound make it worse. She has some blurry vision. She is nausated. She took phenergen and it is not helping. She is allergic to reglan and compazine.   She is vomiting due to the headaches.   She has hx of migraines. Has had them she was 12 years old. She has taken imitrex before and it helped some. Tolerated well.   She is currently on eliquis for DVT  She takes elavil nightly.   Never had brain MRI before    Review of Systems  Gen- No fevers, chills  CV- No chest pain, palpitations  Resp- No cough, dyspnea  GI- No N/V/D, abd pain  Neuro-No dizziness, headaches      I have reviewed and/or updated the patient's past medical, surgical, family, social history and problem list as appropriate.     Medications:    Current Outpatient Medications:   •  albuterol sulfate  (90 Base) MCG/ACT inhaler, Inhale 2 puffs Every 4 (Four) Hours As Needed for Wheezing., Disp: 6.7 g, Rfl: 0  •  amitriptyline (ELAVIL) 150 MG tablet, Take 1 tablet by mouth Every Night., Disp: 30 tablet, Rfl: 1  •  apixaban (ELIQUIS) 5 MG tablet tablet, Take 1 tablet by mouth Every 12 (Twelve) Hours., Disp: 60 tablet, Rfl: 1  •  Apixaban Starter Pack (Eliquis DVT/PE Starter Pack) tablet therapy pack, Take two 5 mg tablets by mouth every 12 hours for 7 days. Followed by one 5 mg tablet every 12 hours. (Dispense starter pack if available), Disp: 74 tablet, Rfl: 0  •  diazePAM (VALIUM) 5 MG tablet, Take 1 tablet by mouth 2 (Two) Times a Day As Needed for Anxiety., Disp: 60 tablet, Rfl: 0  •  famotidine (PEPCID) 40 MG tablet, Take 40 mg by mouth Daily., Disp: , Rfl:   •  furosemide (LASIX)  "20 MG tablet, Take 20 mg by mouth 2 (Two) Times a Day., Disp: , Rfl:   •  gabapentin (NEURONTIN) 800 MG tablet, Take 1 tablet by mouth 3 (Three) Times a Day., Disp: 90 tablet, Rfl: 1  •  HYDROcodone-acetaminophen (Norco) 5-325 MG per tablet, Take 1 tablet by mouth Every 8 (Eight) Hours As Needed for Severe Pain ., Disp: 25 tablet, Rfl: 0  •  imipramine (TOFRANIL) 25 MG tablet, Take 1 tablet by mouth Every Night., Disp: 30 tablet, Rfl: 1  •  lurasidone (LATUDA) 40 MG tablet tablet, Take 1 tablet by mouth Daily., Disp: 30 tablet, Rfl: 0  •  omeprazole (priLOSEC) 40 MG capsule, Take 1 capsule by mouth Daily., Disp: 90 capsule, Rfl: 3  •  promethazine (PHENERGAN) 25 MG tablet, Take 25 mg by mouth Every 6 (Six) Hours As Needed for Nausea or Vomiting., Disp: , Rfl:   •  tiZANidine (ZANAFLEX) 4 MG tablet, TAKE 1 TABLET BY MOUTH AT NIGHT AS NEEDED FOR MUSCLE SPASMS., Disp: 30 tablet, Rfl: 1  •  vitamin D (ERGOCALCIFEROL) 1.25 MG (18173 UT) capsule capsule, Take 1 capsule by mouth 1 (One) Time Per Week., Disp: 5 capsule, Rfl: 1  •  ondansetron ODT (Zofran ODT) 4 MG disintegrating tablet, Place 1 tablet on the tongue Every 8 (Eight) Hours As Needed for Nausea or Vomiting., Disp: 15 tablet, Rfl: 0  •  SUMAtriptan (Imitrex) 50 MG tablet, Take one tablet at onset of headache. May repeat dose one time in 2 hours if headache not relieved., Disp: 15 tablet, Rfl: 1    Current Facility-Administered Medications:   •  dexamethasone (DECADRON) injection 6 mg, 6 mg, Intramuscular, Once, Stephanie Burdick APRN    Allergies:  Allergies   Allergen Reactions   • Codeine Anaphylaxis   • Flexeril [Cyclobenzaprine] Other (See Comments)     \"gives me chest pain\"   • Asa [Aspirin] Hives     Wheezing     • Latex Hives   • Morphine Itching   • Penicillins Hives     Vomiting     • Compazine [Prochlorperazine] Anxiety   • Reglan [Metoclopramide] Anxiety       Objective     Vital Signs:   Vitals:    08/23/21 1521   BP: 130/80   Pulse: 120   SpO2: 97% " "  Weight: 128 kg (282 lb)   Height: 160 cm (63\")       Physical Exam:    Physical Exam  Vitals and nursing note reviewed.   Constitutional:       Appearance: She is well-developed. She is obese.   HENT:      Head: Normocephalic and atraumatic.   Eyes:      Pupils: Pupils are equal, round, and reactive to light.   Cardiovascular:      Rate and Rhythm: Normal rate and regular rhythm.      Heart sounds: Normal heart sounds.   Pulmonary:      Effort: Pulmonary effort is normal.      Breath sounds: Normal breath sounds.   Abdominal:      General: Bowel sounds are normal. There is no distension.      Palpations: Abdomen is soft.      Tenderness: There is no abdominal tenderness.   Musculoskeletal:      Cervical back: Neck supple.   Skin:     General: Skin is warm and dry.      Capillary Refill: Capillary refill takes less than 2 seconds.   Neurological:      General: No focal deficit present.      Mental Status: She is alert and oriented to person, place, and time.   Psychiatric:         Mood and Affect: Mood normal.         Behavior: Behavior normal.         Body mass index is 49.95 kg/m².    Assessment / Plan     Assessment/Plan:   Problem List Items Addressed This Visit     None      Visit Diagnoses     Other migraine without status migrainosus, not intractable    -  Primary    Relevant Medications    SUMAtriptan (Imitrex) 50 MG tablet    dexamethasone (DECADRON) injection 6 mg (Start on 8/23/2021  3:43 PM)    ondansetron ODT (Zofran ODT) 4 MG disintegrating tablet    Other Relevant Orders    MRI Brain Without Contrast        -- dex in office to break headache cycle. Start imitrex. Zofran prn. Check brain MRI. Has upcoming appt with neuro    Follow up:  As needed    Electronically signed by CLAIR Leal   08/23/2021 15:36 EDT      Please note that portions of this note may have been completed with a voice recognition program. Efforts were made to edit the dictations, but occasionally words are " mistranscribed.

## 2021-08-24 ENCOUNTER — PRIOR AUTHORIZATION (OUTPATIENT)
Dept: BEHAVIORAL HEALTH | Facility: CLINIC | Age: 36
End: 2021-08-24

## 2021-08-26 ENCOUNTER — TELEMEDICINE (OUTPATIENT)
Dept: FAMILY MEDICINE CLINIC | Facility: CLINIC | Age: 36
End: 2021-08-26

## 2021-08-26 DIAGNOSIS — R19.7 VOMITING AND DIARRHEA: Primary | ICD-10-CM

## 2021-08-26 DIAGNOSIS — R11.10 VOMITING AND DIARRHEA: Primary | ICD-10-CM

## 2021-08-26 DIAGNOSIS — I82.432 ACUTE DEEP VEIN THROMBOSIS (DVT) OF POPLITEAL VEIN OF LEFT LOWER EXTREMITY (HCC): ICD-10-CM

## 2021-08-26 PROCEDURE — 99213 OFFICE O/P EST LOW 20 MIN: CPT

## 2021-08-26 NOTE — PROGRESS NOTES
Video/Telehealth Note      Patient Name: Lindsay Amezquita  : 1985   MRN: [id number]     Chief Complaint:    Chief Complaint   Patient presents with   • Vomiting   • Diarrhea       History of Present Illness: Lindsay Amezquita is a 36 y.o. female who presents for a telephone/video visit due to the SARS-CoV-2 pandemic. You have chosen to receive care through a telehealth visit.  Do you consent to use a video/audio connection for your medical care today? Verbalized Yes     Patient reports a sudden onset vomiting and diarrhea that started last night around midnight.  3 episodes of vomiting, but none today.  Diarrhea x 7-8 since last night and continues today.  Stool is completely liquid, yellow/brown/green in color.  Is very thirsty and is unable to tolerate PO food and liquids, but was able to last night.  States she feels weak and dizzy, especially when she is in a sitting or standing position.  Denies falls.  Her vision will become blurry and will immediately resolve once she sits back down.  Reports that her face feels flushed/hot.  Pt seen in the ER earlier this week for a migraine.  Followed-up with PCP the following day and was given Imitrex as needed at home.  Migraines improve, none today.  Does report a mild headache but thinks it is from lack of sleep.  Also saw behavioral health provider this week.  Started Tofranil for nocturnal enuresis.  Reports that she hasn't wet the bed since taking it.    During video visit, asked patient to feel her pulse and to check her skin turgor.  States that her pulse felt fast, but normally has a faster heart rate.  And her skin went back immediately after pinching it.  Skin isn't excessively dry.  Denies change in skin color.   Mother in video also confirms this.      Subjective     Review of System: Review of Systems   Constitutional: Positive for appetite change and fatigue. Negative for chills and fever.   HENT: Negative for ear pain, sinus pain and sore throat.    Eyes:  Positive for visual disturbance.   Respiratory: Negative for cough and shortness of breath.    Cardiovascular: Negative for chest pain.   Gastrointestinal: Positive for abdominal pain, diarrhea, nausea and vomiting. Negative for blood in stool.   Genitourinary: Negative for dysuria.   Skin: Negative for color change.   Neurological: Positive for dizziness, light-headedness and headaches.   Psychiatric/Behavioral: Positive for sleep disturbance.      I have reviewed the ROS documented by my clinical staff, updated appropriately and I agree. CLAIR Obrien    Past Medical History:   Past Medical History:   Diagnosis Date   • Anxiety    • Bell's palsy    • Bipolar 2 disorder (CMS/HCC)    • Blood clot in vein    • Depression    • GERD (gastroesophageal reflux disease)    • Restless leg syndrome        Past Surgical History:   Past Surgical History:   Procedure Laterality Date   • CHOLECYSTECTOMY     • DENTAL PROCEDURE         Family History: History reviewed. No pertinent family history.    Social History:   Social History     Socioeconomic History   • Marital status: Single     Spouse name: Not on file   • Number of children: Not on file   • Years of education: Not on file   • Highest education level: Not on file   Tobacco Use   • Smoking status: Current Every Day Smoker     Packs/day: 0.50     Types: Cigarettes   Vaping Use   • Vaping Use: Never used   Substance and Sexual Activity   • Alcohol use: Never   • Drug use: Never   • Sexual activity: Defer       Medications:     Current Outpatient Medications:   •  albuterol sulfate  (90 Base) MCG/ACT inhaler, Inhale 2 puffs Every 4 (Four) Hours As Needed for Wheezing., Disp: 6.7 g, Rfl: 0  •  amitriptyline (ELAVIL) 150 MG tablet, Take 1 tablet by mouth Every Night., Disp: 30 tablet, Rfl: 1  •  apixaban (ELIQUIS) 5 MG tablet tablet, Take 1 tablet by mouth Every 12 (Twelve) Hours., Disp: 60 tablet, Rfl: 1  •  Apixaban Starter Pack (Eliquis DVT/PE Starter Pack)  "tablet therapy pack, Take two 5 mg tablets by mouth every 12 hours for 7 days. Followed by one 5 mg tablet every 12 hours. (Dispense starter pack if available), Disp: 74 tablet, Rfl: 0  •  diazePAM (VALIUM) 5 MG tablet, Take 1 tablet by mouth 2 (Two) Times a Day As Needed for Anxiety., Disp: 60 tablet, Rfl: 0  •  famotidine (PEPCID) 40 MG tablet, Take 40 mg by mouth Daily., Disp: , Rfl:   •  furosemide (LASIX) 20 MG tablet, Take 20 mg by mouth 2 (Two) Times a Day., Disp: , Rfl:   •  gabapentin (NEURONTIN) 800 MG tablet, Take 1 tablet by mouth 3 (Three) Times a Day., Disp: 90 tablet, Rfl: 1  •  HYDROcodone-acetaminophen (Norco) 5-325 MG per tablet, Take 1 tablet by mouth Every 8 (Eight) Hours As Needed for Severe Pain ., Disp: 25 tablet, Rfl: 0  •  imipramine (TOFRANIL) 25 MG tablet, Take 1 tablet by mouth Every Night., Disp: 30 tablet, Rfl: 1  •  lurasidone (LATUDA) 40 MG tablet tablet, Take 1 tablet by mouth Daily., Disp: 30 tablet, Rfl: 0  •  omeprazole (priLOSEC) 40 MG capsule, Take 1 capsule by mouth Daily., Disp: 90 capsule, Rfl: 3  •  ondansetron ODT (Zofran ODT) 4 MG disintegrating tablet, Place 1 tablet on the tongue Every 8 (Eight) Hours As Needed for Nausea or Vomiting., Disp: 15 tablet, Rfl: 0  •  promethazine (PHENERGAN) 25 MG tablet, Take 25 mg by mouth Every 6 (Six) Hours As Needed for Nausea or Vomiting., Disp: , Rfl:   •  SUMAtriptan (Imitrex) 50 MG tablet, Take one tablet at onset of headache. May repeat dose one time in 2 hours if headache not relieved., Disp: 15 tablet, Rfl: 1  •  tiZANidine (ZANAFLEX) 4 MG tablet, TAKE 1 TABLET BY MOUTH AT NIGHT AS NEEDED FOR MUSCLE SPASMS., Disp: 30 tablet, Rfl: 1  •  vitamin D (ERGOCALCIFEROL) 1.25 MG (82765 UT) capsule capsule, Take 1 capsule by mouth 1 (One) Time Per Week., Disp: 5 capsule, Rfl: 1    Allergies:   Allergies   Allergen Reactions   • Codeine Anaphylaxis   • Flexeril [Cyclobenzaprine] Other (See Comments)     \"gives me chest pain\"   • Asa " [Aspirin] Hives     Wheezing     • Latex Hives   • Morphine Itching   • Penicillins Hives     Vomiting     • Compazine [Prochlorperazine] Anxiety   • Reglan [Metoclopramide] Anxiety       Objective     Physical Exam:   Vital Signs: There were no vitals filed for this visit.  There is no height or weight on file to calculate BMI.     Physical Exam  Nursing note reviewed.   Constitutional:       General: She is not in acute distress.     Appearance: Normal appearance.   Neurological:      General: No focal deficit present.      Mental Status: She is alert and oriented to person, place, and time.   Psychiatric:         Mood and Affect: Mood normal.         Behavior: Behavior normal.         Assessment / Plan      Assessment/Plan:   Diagnoses and all orders for this visit:    1. Vomiting and diarrhea (Primary)         1. She has Zofran and Phenergan at home.  Was only taking the Zofran and not able to tolerate PO liquids.  She was unsure if she could take the Phenergan with her Zofran. Advised her that she could take both medications together.  2. Instructed her to take both medications with the smallest amount of water possible and wait at least 15-20 minutes for the medicine to take effect.  Then she can try clear liquids.  If able to tolerate liquids, can then try solid food but recommend bland foods and nothing spicy or greasy.  3. Educated her that if she is unable to take the Zofran and Phenergan or after taking she cannot tolerate clear liquids, that she can call the clinic or send a [employer] message to the provider.  Offered the potential use of Phenergan suppositories, but pt was reluctant.  Otherwise, she can go to the ER if she feels that she isn't getting better.  4. She is to go to the ER if she develops chest pain, SOA, injury from a fall due to the dizziness, worsening vomiting and diarrhea, and unable to tolerate any PO intake.    Plan of care was reviewed with patient at the conclusion of today's visit.  Education was provided regarding diagnoses, management, prescribed or recommended OTC products, and the importance of compliance with follow-up appointments. The patient was counseled regarding the risks, benefits, and possible side-effects of treatment. I advised the patient to keep me informed of any acute changes in their status including new, worsening, or persistent symptoms. Patient expresses understanding and agreement with the management plan.    Follow Up:   Return in about 3 years (around 8/26/2024), or if no improvement, for Recheck, Follow-up with CLAIR Brown.    I spent approximately 25 minutes providing clinical care for this patient; including review of patient's chart and provider documentation, face to face time spent with patient in examination room (obtaining history, performing physical exam, discussing diagnosis and management options), placing orders, and completing patient documentation.     CLAIR Obrien  [facility] [place] [facility]

## 2021-08-27 LAB — DRUGS UR: NORMAL

## 2021-08-27 RX ORDER — APIXABAN 5 MG/1
TABLET, FILM COATED ORAL
Qty: 74 TABLET | Refills: 0 | OUTPATIENT
Start: 2021-08-27

## 2021-08-28 ENCOUNTER — TELEPHONE (OUTPATIENT)
Dept: INTERNAL MEDICINE | Facility: CLINIC | Age: 36
End: 2021-08-28

## 2021-08-28 NOTE — TELEPHONE ENCOUNTER
Patient called on call provider today with worsenign symptoms from telehealth visit 2 days ago.  Vomiting has improved, but still nauseous.  Diarrhea has gotten worse.  Has been drinking gatorade and 2 diet cokes today.  She reports nothing is working. Denies fever, body aches rhinorrhea, congestion.  Denies any sick contacts.  She has been taking phenergan and zofran without much improvement in symptoms.  She reports the diarrhea is making her bottom sore.  Patient's speech is slurred and difficult to understand at times.  She was advised to proceed to the ED as there is concern for dehydration.  May be concern for COVID as well, though denies any other symptoms.  Patient would likely benefit from IV fluids if nothing else.

## 2021-08-30 ENCOUNTER — TELEPHONE (OUTPATIENT)
Dept: FAMILY MEDICINE CLINIC | Facility: CLINIC | Age: 36
End: 2021-08-30

## 2021-08-30 ENCOUNTER — APPOINTMENT (OUTPATIENT)
Dept: CT IMAGING | Facility: HOSPITAL | Age: 36
End: 2021-08-30

## 2021-08-30 ENCOUNTER — TELEMEDICINE (OUTPATIENT)
Dept: FAMILY MEDICINE CLINIC | Facility: CLINIC | Age: 36
End: 2021-08-30

## 2021-08-30 ENCOUNTER — HOSPITAL ENCOUNTER (EMERGENCY)
Facility: HOSPITAL | Age: 36
Discharge: HOME | End: 2021-08-30
Attending: EMERGENCY MEDICINE | Admitting: EMERGENCY MEDICINE

## 2021-08-30 VITALS
TEMPERATURE: 99.1 F | HEART RATE: 111 BPM | OXYGEN SATURATION: 94 % | SYSTOLIC BLOOD PRESSURE: 130 MMHG | BODY MASS INDEX: 49.68 KG/M2 | HEIGHT: 63 IN | DIASTOLIC BLOOD PRESSURE: 97 MMHG | RESPIRATION RATE: 18 BRPM | WEIGHT: 280.4 LBS

## 2021-08-30 DIAGNOSIS — R19.7 VOMITING AND DIARRHEA: Primary | ICD-10-CM

## 2021-08-30 DIAGNOSIS — R11.2 INTRACTABLE NAUSEA AND VOMITING: ICD-10-CM

## 2021-08-30 DIAGNOSIS — R11.10 VOMITING AND DIARRHEA: Primary | ICD-10-CM

## 2021-08-30 DIAGNOSIS — R19.7 DIARRHEA, UNSPECIFIED TYPE: ICD-10-CM

## 2021-08-30 DIAGNOSIS — R10.31 RLQ ABDOMINAL PAIN: Primary | ICD-10-CM

## 2021-08-30 LAB
ALBUMIN SERPL-MCNC: 4.2 G/DL (ref 3.5–5.2)
ALBUMIN/GLOB SERPL: 1.4 G/DL
ALP SERPL-CCNC: 100 U/L (ref 39–117)
ALT SERPL W P-5'-P-CCNC: 28 U/L (ref 1–33)
ANION GAP SERPL CALCULATED.3IONS-SCNC: 9.1 MMOL/L (ref 5–15)
AST SERPL-CCNC: 30 U/L (ref 1–32)
BASOPHILS # BLD AUTO: 0.04 10*3/MM3 (ref 0–0.2)
BASOPHILS NFR BLD AUTO: 0.4 % (ref 0–1.5)
BILIRUB SERPL-MCNC: 0.4 MG/DL (ref 0–1.2)
BILIRUB UR QL STRIP: NEGATIVE
BUN SERPL-MCNC: 4 MG/DL (ref 6–20)
BUN/CREAT SERPL: 6.7 (ref 7–25)
CALCIUM SPEC-SCNC: 9.5 MG/DL (ref 8.6–10.5)
CHLORIDE SERPL-SCNC: 99 MMOL/L (ref 98–107)
CLARITY UR: CLEAR
CO2 SERPL-SCNC: 29.9 MMOL/L (ref 22–29)
COLOR UR: YELLOW
CREAT SERPL-MCNC: 0.6 MG/DL (ref 0.57–1)
DEPRECATED RDW RBC AUTO: 42.9 FL (ref 37–54)
EOSINOPHIL # BLD AUTO: 0.12 10*3/MM3 (ref 0–0.4)
EOSINOPHIL NFR BLD AUTO: 1.3 % (ref 0.3–6.2)
ERYTHROCYTE [DISTWIDTH] IN BLOOD BY AUTOMATED COUNT: 13.2 % (ref 12.3–15.4)
GFR SERPL CREATININE-BSD FRML MDRD: 112 ML/MIN/1.73
GLOBULIN UR ELPH-MCNC: 3 GM/DL
GLUCOSE SERPL-MCNC: 94 MG/DL (ref 65–99)
GLUCOSE UR STRIP-MCNC: NEGATIVE MG/DL
HCT VFR BLD AUTO: 46.2 % (ref 34–46.6)
HGB BLD-MCNC: 15.2 G/DL (ref 12–15.9)
HGB UR QL STRIP.AUTO: NEGATIVE
HOLD SPECIMEN: NORMAL
HOLD SPECIMEN: NORMAL
IMM GRANULOCYTES # BLD AUTO: 0.03 10*3/MM3 (ref 0–0.05)
IMM GRANULOCYTES NFR BLD AUTO: 0.3 % (ref 0–0.5)
KETONES UR QL STRIP: NEGATIVE
LEUKOCYTE ESTERASE UR QL STRIP.AUTO: NEGATIVE
LIPASE SERPL-CCNC: 34 U/L (ref 13–60)
LYMPHOCYTES # BLD AUTO: 3.26 10*3/MM3 (ref 0.7–3.1)
LYMPHOCYTES NFR BLD AUTO: 35.9 % (ref 19.6–45.3)
MCH RBC QN AUTO: 29 PG (ref 26.6–33)
MCHC RBC AUTO-ENTMCNC: 32.9 G/DL (ref 31.5–35.7)
MCV RBC AUTO: 88.2 FL (ref 79–97)
MONOCYTES # BLD AUTO: 0.56 10*3/MM3 (ref 0.1–0.9)
MONOCYTES NFR BLD AUTO: 6.2 % (ref 5–12)
NEUTROPHILS NFR BLD AUTO: 5.06 10*3/MM3 (ref 1.7–7)
NEUTROPHILS NFR BLD AUTO: 55.9 % (ref 42.7–76)
NITRITE UR QL STRIP: NEGATIVE
NRBC BLD AUTO-RTO: 0 /100 WBC (ref 0–0.2)
PH UR STRIP.AUTO: 5.5 [PH] (ref 5–8)
PLATELET # BLD AUTO: 326 10*3/MM3 (ref 140–450)
PMV BLD AUTO: 10 FL (ref 6–12)
POTASSIUM SERPL-SCNC: 4 MMOL/L (ref 3.5–5.2)
PROT SERPL-MCNC: 7.2 G/DL (ref 6–8.5)
PROT UR QL STRIP: NEGATIVE
RBC # BLD AUTO: 5.24 10*6/MM3 (ref 3.77–5.28)
SODIUM SERPL-SCNC: 138 MMOL/L (ref 136–145)
SP GR UR STRIP: <=1.005 (ref 1–1.03)
UROBILINOGEN UR QL STRIP: NORMAL
WBC # BLD AUTO: 9.07 10*3/MM3 (ref 3.4–10.8)
WHOLE BLOOD HOLD SPECIMEN: NORMAL
WHOLE BLOOD HOLD SPECIMEN: NORMAL

## 2021-08-30 PROCEDURE — 99283 EMERGENCY DEPT VISIT LOW MDM: CPT

## 2021-08-30 PROCEDURE — 96361 HYDRATE IV INFUSION ADD-ON: CPT

## 2021-08-30 PROCEDURE — 81003 URINALYSIS AUTO W/O SCOPE: CPT | Performed by: EMERGENCY MEDICINE

## 2021-08-30 PROCEDURE — 25010000002 HYDROMORPHONE 1 MG/ML SOLUTION: Performed by: EMERGENCY MEDICINE

## 2021-08-30 PROCEDURE — 80053 COMPREHEN METABOLIC PANEL: CPT | Performed by: EMERGENCY MEDICINE

## 2021-08-30 PROCEDURE — 74177 CT ABD & PELVIS W/CONTRAST: CPT

## 2021-08-30 PROCEDURE — 83690 ASSAY OF LIPASE: CPT | Performed by: EMERGENCY MEDICINE

## 2021-08-30 PROCEDURE — 25010000002 ONDANSETRON PER 1 MG: Performed by: EMERGENCY MEDICINE

## 2021-08-30 PROCEDURE — 96374 THER/PROPH/DIAG INJ IV PUSH: CPT

## 2021-08-30 PROCEDURE — 25010000002 IOPAMIDOL 61 % SOLUTION: Performed by: EMERGENCY MEDICINE

## 2021-08-30 PROCEDURE — 96375 TX/PRO/DX INJ NEW DRUG ADDON: CPT

## 2021-08-30 PROCEDURE — 85025 COMPLETE CBC W/AUTO DIFF WBC: CPT | Performed by: EMERGENCY MEDICINE

## 2021-08-30 PROCEDURE — 99214 OFFICE O/P EST MOD 30 MIN: CPT | Performed by: NURSE PRACTITIONER

## 2021-08-30 RX ORDER — ERGOCALCIFEROL 1.25 MG/1
50000 CAPSULE ORAL WEEKLY
Qty: 4 CAPSULE | Refills: 2 | Status: SHIPPED | OUTPATIENT
Start: 2021-08-30 | End: 2022-01-07 | Stop reason: SDUPTHER

## 2021-08-30 RX ORDER — ONDANSETRON 2 MG/ML
4 INJECTION INTRAMUSCULAR; INTRAVENOUS ONCE
Status: COMPLETED | OUTPATIENT
Start: 2021-08-30 | End: 2021-08-30

## 2021-08-30 RX ORDER — SODIUM CHLORIDE 0.9 % (FLUSH) 0.9 %
10 SYRINGE (ML) INJECTION AS NEEDED
Status: DISCONTINUED | OUTPATIENT
Start: 2021-08-30 | End: 2021-08-30 | Stop reason: HOSPADM

## 2021-08-30 RX ADMIN — HYDROMORPHONE HYDROCHLORIDE 1 MG: 1 INJECTION, SOLUTION INTRAMUSCULAR; INTRAVENOUS; SUBCUTANEOUS at 16:07

## 2021-08-30 RX ADMIN — ONDANSETRON 4 MG: 2 INJECTION INTRAMUSCULAR; INTRAVENOUS at 16:06

## 2021-08-30 RX ADMIN — SODIUM CHLORIDE 1000 ML: 9 INJECTION, SOLUTION INTRAVENOUS at 16:01

## 2021-08-30 RX ADMIN — IOPAMIDOL 100 ML: 612 INJECTION, SOLUTION INTRAVENOUS at 17:24

## 2021-08-30 NOTE — PROGRESS NOTES
Subjective     You have chosen to receive care through a telehealth visit.  Do you consent to use a video/audio connection for your medical care today? Yes    I did not complete this video visit with the patient using [employer]. [employer] video was used.     Chief Complaint: nausea    History of Present Illness:   Reports 5-6 days of intractable N/V/D. Did telehealth visit with Gennaro last week. Has been taking zofran and phenergan. She has been trying to drink fluids as directed. She also reached out to on call MD this weekend.   Notes right side abdominal pain. Lower. She has intractable vomiting and nausea. She has vomited 5 times today. She is having diarrhea. She is not able to hold anything down. Her mouth is white. Face feels flushed. She feels clammy and sweaty.     I also spoke with her mom. Her mom notes she herself also started having diarrhea and abdominal pain. Lindsay has previously told me she had a covid vaccine but this is not in her records or immunization history.   She was recently started on tofranil.     She has skipped her period (not uncommon for her). She is a lesbian. Denies any male sexual activity.       Review of Systems  CV- No chest pain, palpitations  Resp- No cough, dyspnea  Neuro-No dizziness, headaches      I have reviewed and/or updated the patient's past medical, surgical, family, social history and problem list as appropriate.     Medications:    Current Outpatient Medications:   •  albuterol sulfate  (90 Base) MCG/ACT inhaler, Inhale 2 puffs Every 4 (Four) Hours As Needed for Wheezing., Disp: 6.7 g, Rfl: 0  •  amitriptyline (ELAVIL) 150 MG tablet, Take 1 tablet by mouth Every Night., Disp: 30 tablet, Rfl: 1  •  apixaban (ELIQUIS) 5 MG tablet tablet, Take 1 tablet by mouth Every 12 (Twelve) Hours., Disp: 60 tablet, Rfl: 1  •  Apixaban Starter Pack (Eliquis DVT/PE Starter Pack) tablet therapy pack, Take two 5 mg tablets by mouth every 12 hours for 7 days. Followed by one 5  "mg tablet every 12 hours. (Dispense starter pack if available), Disp: 74 tablet, Rfl: 0  •  diazePAM (VALIUM) 5 MG tablet, Take 1 tablet by mouth 2 (Two) Times a Day As Needed for Anxiety., Disp: 60 tablet, Rfl: 0  •  famotidine (PEPCID) 40 MG tablet, Take 40 mg by mouth Daily., Disp: , Rfl:   •  furosemide (LASIX) 20 MG tablet, Take 20 mg by mouth 2 (Two) Times a Day., Disp: , Rfl:   •  gabapentin (NEURONTIN) 800 MG tablet, Take 1 tablet by mouth 3 (Three) Times a Day., Disp: 90 tablet, Rfl: 1  •  HYDROcodone-acetaminophen (Norco) 5-325 MG per tablet, Take 1 tablet by mouth Every 8 (Eight) Hours As Needed for Severe Pain ., Disp: 25 tablet, Rfl: 0  •  imipramine (TOFRANIL) 25 MG tablet, Take 1 tablet by mouth Every Night., Disp: 30 tablet, Rfl: 1  •  lurasidone (LATUDA) 40 MG tablet tablet, Take 1 tablet by mouth Daily., Disp: 30 tablet, Rfl: 0  •  omeprazole (priLOSEC) 40 MG capsule, Take 1 capsule by mouth Daily., Disp: 90 capsule, Rfl: 3  •  ondansetron ODT (Zofran ODT) 4 MG disintegrating tablet, Place 1 tablet on the tongue Every 8 (Eight) Hours As Needed for Nausea or Vomiting., Disp: 15 tablet, Rfl: 0  •  promethazine (PHENERGAN) 25 MG tablet, Take 25 mg by mouth Every 6 (Six) Hours As Needed for Nausea or Vomiting., Disp: , Rfl:   •  SUMAtriptan (Imitrex) 50 MG tablet, Take one tablet at onset of headache. May repeat dose one time in 2 hours if headache not relieved., Disp: 15 tablet, Rfl: 1  •  tiZANidine (ZANAFLEX) 4 MG tablet, TAKE 1 TABLET BY MOUTH AT NIGHT AS NEEDED FOR MUSCLE SPASMS., Disp: 30 tablet, Rfl: 1  •  vitamin D (ERGOCALCIFEROL) 1.25 MG (33886 UT) capsule capsule, Take 1 capsule by mouth 1 (One) Time Per Week., Disp: 5 capsule, Rfl: 1    Allergies:  Allergies   Allergen Reactions   • Codeine Anaphylaxis   • Flexeril [Cyclobenzaprine] Other (See Comments)     \"gives me chest pain\"   • Asa [Aspirin] Hives     Wheezing     • Latex Hives   • Morphine Itching   • Penicillins Hives     Vomiting   "   • Compazine [Prochlorperazine] Anxiety   • Reglan [Metoclopramide] Anxiety       Objective     Vital Signs: There were no vitals filed for this visit.    Physical Exam:  Gen-no acute distress, video visit  Resp- normal WOB, on RA  Neuro-A&Ox3, face symmetrical, speech clear  Skin-no overt rashes noted  Psych-appropriate mood, cooperative         There is no height or weight on file to calculate BMI.    Assessment / Plan     Assessment/Plan:   Problem List Items Addressed This Visit     None      Visit Diagnoses     RLQ abdominal pain    -  Primary    Intractable nausea and vomiting        Diarrhea, unspecified type            -- ddx include appendicitis, colitis, AGE, COVID infection. Due to intractability I recommend she been seen in the ED for labs, CT A/P and fluids. Would also benefit from covid testing given mother has similar symptoms. She is agreeable. I called and discussed case with ED nurse.   -- encouraged her to stop tofranil as this is a new medication. Can restart at later date    Follow up:  As needed    Total Time of Encounter 25 minutes    Electronically signed by CLAIR Leal   08/30/2021 14:07 EDT      Please note that portions of this note may have been completed with a voice recognition program. Efforts were made to edit the dictations, but occasionally words are mistranscribed.

## 2021-08-31 ENCOUNTER — APPOINTMENT (OUTPATIENT)
Dept: CT IMAGING | Facility: HOSPITAL | Age: 36
End: 2021-08-31

## 2021-08-31 ENCOUNTER — HOSPITAL ENCOUNTER (EMERGENCY)
Facility: HOSPITAL | Age: 36
Discharge: HOME | End: 2021-08-31
Attending: EMERGENCY MEDICINE | Admitting: EMERGENCY MEDICINE

## 2021-08-31 VITALS
RESPIRATION RATE: 20 BRPM | HEART RATE: 119 BPM | WEIGHT: 282 LBS | OXYGEN SATURATION: 95 % | TEMPERATURE: 98.6 F | SYSTOLIC BLOOD PRESSURE: 95 MMHG | BODY MASS INDEX: 49.96 KG/M2 | HEIGHT: 63 IN | DIASTOLIC BLOOD PRESSURE: 69 MMHG

## 2021-08-31 DIAGNOSIS — R10.9 ABDOMINAL PAIN, UNSPECIFIED ABDOMINAL LOCATION: Primary | ICD-10-CM

## 2021-08-31 DIAGNOSIS — R11.2 NON-INTRACTABLE VOMITING WITH NAUSEA, UNSPECIFIED VOMITING TYPE: ICD-10-CM

## 2021-08-31 DIAGNOSIS — G43.809 OTHER MIGRAINE WITHOUT STATUS MIGRAINOSUS, NOT INTRACTABLE: ICD-10-CM

## 2021-08-31 LAB
B-HCG UR QL: NEGATIVE
BACTERIA UR QL AUTO: ABNORMAL /HPF
BILIRUB UR QL STRIP: NEGATIVE
CLARITY UR: CLEAR
COLOR UR: YELLOW
GLUCOSE UR STRIP-MCNC: NEGATIVE MG/DL
HGB UR QL STRIP.AUTO: ABNORMAL
HYALINE CASTS UR QL AUTO: ABNORMAL /LPF
KETONES UR QL STRIP: NEGATIVE
LEUKOCYTE ESTERASE UR QL STRIP.AUTO: NEGATIVE
NITRITE UR QL STRIP: NEGATIVE
PH UR STRIP.AUTO: 7 [PH] (ref 5–8)
PROT UR QL STRIP: NEGATIVE
RBC # UR: ABNORMAL /HPF
REF LAB TEST METHOD: ABNORMAL
SP GR UR STRIP: 1.01 (ref 1–1.03)
SQUAMOUS #/AREA URNS HPF: ABNORMAL /HPF
UROBILINOGEN UR QL STRIP: ABNORMAL
WBC UR QL AUTO: ABNORMAL /HPF

## 2021-08-31 PROCEDURE — 81025 URINE PREGNANCY TEST: CPT | Performed by: EMERGENCY MEDICINE

## 2021-08-31 PROCEDURE — 81001 URINALYSIS AUTO W/SCOPE: CPT | Performed by: EMERGENCY MEDICINE

## 2021-08-31 PROCEDURE — 99283 EMERGENCY DEPT VISIT LOW MDM: CPT

## 2021-08-31 PROCEDURE — 74176 CT ABD & PELVIS W/O CONTRAST: CPT

## 2021-08-31 RX ORDER — ONDANSETRON 4 MG/1
4 TABLET, ORALLY DISINTEGRATING ORAL EVERY 8 HOURS PRN
Qty: 15 TABLET | Refills: 0 | Status: SHIPPED | OUTPATIENT
Start: 2021-08-31 | End: 2021-09-19

## 2021-08-31 NOTE — TELEPHONE ENCOUNTER
A PRIOR AUTH HAS BEEN STARTED THROUGH [employer] FOR DIAZEPAM 5 MG.    CURRENTLY WAITING ON A RESPONSE FROM THE INSURANCE.

## 2021-08-31 NOTE — TELEPHONE ENCOUNTER
Spoke with Maral @ [facility] [place] denied due to qty limit. Patient advised she can get coupon at [employer] for $7.96.

## 2021-09-01 ENCOUNTER — TELEPHONE (OUTPATIENT)
Dept: FAMILY MEDICINE CLINIC | Facility: CLINIC | Age: 36
End: 2021-09-01

## 2021-09-01 NOTE — TELEPHONE ENCOUNTER
Caller: Lindsay Amezquita    Relationship: Self    Best call back number: [phone]    What orders are you requesting (i.e. lab or imaging): INJECTION FOR MIGRAINE    In what timeframe would the patient need to come in: ASAP    Where will you receive your lab/imaging services: OFFICE    Additional notes: PATIENT STATES SHE HAS BEEN TO THE ER TWICE DUE TO THE HEADACHE SHE HAS FROM THE VIRUS BUT STATES THEY ARENT DOING ANYTHING TO HELP THE HEADACHE. PATIENT WANTING TO GET AN INJECTION FROM [facility]

## 2021-09-01 NOTE — ED PROVIDER NOTES
"Subjective   36year-old female presents with abdominal pain, she was here last night with similar symptoms.  She states that she feels achy and nauseated as well, Zofran helps but her symptoms are not completely getting better, her labs and CT scan abdomen pelvis with contrast were normal yesterday.  She comes in today because she is still not getting better.      History provided by:  Patient   used: No        Review of Systems   Gastrointestinal: Positive for abdominal pain and nausea.   All other systems reviewed and are negative.      Past Medical History:   Diagnosis Date   • Anxiety    • Bell's palsy    • Bipolar 2 disorder (CMS/HCC)    • Blood clot in vein    • Depression    • GERD (gastroesophageal reflux disease)    • Restless leg syndrome        Allergies   Allergen Reactions   • Codeine Anaphylaxis   • Flexeril [Cyclobenzaprine] Other (See Comments)     \"gives me chest pain\"   • Asa [Aspirin] Hives     Wheezing     • Latex Hives   • Morphine Itching   • Penicillins Hives     Vomiting     • Compazine [Prochlorperazine] Anxiety   • Reglan [Metoclopramide] Anxiety       Past Surgical History:   Procedure Laterality Date   • CHOLECYSTECTOMY     • DENTAL PROCEDURE         History reviewed. No pertinent family history.    Social History     Socioeconomic History   • Marital status: Single     Spouse name: Not on file   • Number of children: Not on file   • Years of education: Not on file   • Highest education level: Not on file   Tobacco Use   • Smoking status: Current Every Day Smoker     Packs/day: 0.50     Types: Cigarettes   Vaping Use   • Vaping Use: Never used   Substance and Sexual Activity   • Alcohol use: Never   • Drug use: Never   • Sexual activity: Defer           Objective   Physical Exam  Vitals and nursing note reviewed.   Constitutional:       Appearance: She is well-developed.   Cardiovascular:      Rate and Rhythm: Normal rate and regular rhythm.   Pulmonary:      Effort: " Pulmonary effort is normal.      Breath sounds: Normal breath sounds.   Abdominal:      General: Bowel sounds are normal.      Palpations: Abdomen is soft.   Musculoskeletal:         General: Normal range of motion.      Cervical back: Normal range of motion and neck supple.   Skin:     General: Skin is warm and dry.   Neurological:      Mental Status: She is alert and oriented to person, place, and time.      Deep Tendon Reflexes: Reflexes are normal and symmetric.         Procedures           ED Course                                           MDM  Number of Diagnoses or Management Options  Abdominal pain, unspecified abdominal location: new and requires workup  Non-intractable vomiting with nausea, unspecified vomiting type: new and requires workup     Amount and/or Complexity of Data Reviewed  Clinical lab tests: reviewed  Decide to obtain previous medical records or to obtain history from someone other than the patient: yes    Risk of Complications, Morbidity, and/or Mortality  Presenting problems: minimal  Diagnostic procedures: minimal  Management options: minimal    Patient Progress  Patient progress: stable      Final diagnoses:   Abdominal pain, unspecified abdominal location   Non-intractable vomiting with nausea, unspecified vomiting type       ED Disposition  ED Disposition     ED Disposition Condition Comment    Discharge Stable           Stephanie Burdick, APRN  [address]  [facility] [address]  [phone]    Schedule an appointment as soon as possible for a visit       [facility] Emergency Department  [address] [facility] [facility] [address] [phone]    If symptoms worsen         Medication List      No changes were made to your prescriptions during this visit.          Markel Baxter Jr., PARO  08/31/21 4604

## 2021-09-03 NOTE — TELEPHONE ENCOUNTER
Caller: Lindsay Amezquita    Relationship: Self    Best call back number: [phone]    What is the best time to reach you: ANYTIME    Who are you requesting to speak with (clinical staff, provider,  specific staff member): CLINICAL STAFF    Do you know the name of the person who called: MARGARET    What was the call regarding: ORDERS    Do you require a callback:YES

## 2021-09-03 NOTE — TELEPHONE ENCOUNTER
I am not going to do an injection for migraine.  If she wants to come into and further discuss her migraines we can do that.  She needs to make sure she is drinking adequate water.  Has upcoming appointment with neurology.

## 2021-09-08 ENCOUNTER — OFFICE VISIT (OUTPATIENT)
Dept: FAMILY MEDICINE CLINIC | Facility: CLINIC | Age: 36
End: 2021-09-08

## 2021-09-08 VITALS
HEIGHT: 63 IN | BODY MASS INDEX: 49.26 KG/M2 | DIASTOLIC BLOOD PRESSURE: 70 MMHG | WEIGHT: 278 LBS | SYSTOLIC BLOOD PRESSURE: 110 MMHG | TEMPERATURE: 98.1 F

## 2021-09-08 DIAGNOSIS — M54.41 CHRONIC BILATERAL LOW BACK PAIN WITH BILATERAL SCIATICA: ICD-10-CM

## 2021-09-08 DIAGNOSIS — M54.42 CHRONIC BILATERAL LOW BACK PAIN WITH BILATERAL SCIATICA: ICD-10-CM

## 2021-09-08 DIAGNOSIS — E28.2 PCOS (POLYCYSTIC OVARIAN SYNDROME): ICD-10-CM

## 2021-09-08 DIAGNOSIS — G89.29 CHRONIC BILATERAL LOW BACK PAIN WITH BILATERAL SCIATICA: ICD-10-CM

## 2021-09-08 DIAGNOSIS — R10.84 GENERALIZED ABDOMINAL PAIN: Primary | ICD-10-CM

## 2021-09-08 DIAGNOSIS — K52.9 AGE (ACUTE GASTROENTERITIS): ICD-10-CM

## 2021-09-08 PROCEDURE — 99214 OFFICE O/P EST MOD 30 MIN: CPT | Performed by: NURSE PRACTITIONER

## 2021-09-08 RX ORDER — PROMETHAZINE HYDROCHLORIDE 25 MG/1
25 TABLET ORAL EVERY 6 HOURS PRN
Qty: 30 TABLET | Refills: 0 | Status: SHIPPED | OUTPATIENT
Start: 2021-09-08 | End: 2021-12-04 | Stop reason: SDUPTHER

## 2021-09-08 RX ORDER — GABAPENTIN 800 MG/1
800 TABLET ORAL 3 TIMES DAILY
Qty: 90 TABLET | Refills: 1 | Status: SHIPPED | OUTPATIENT
Start: 2021-09-08 | End: 2021-12-29 | Stop reason: SDUPTHER

## 2021-09-08 NOTE — PROGRESS NOTES
Subjective     Chief Complaint:    Chief Complaint   Patient presents with   • Follow-up     ER follow up; abdominal pain with diarrhea       History of Present Illness:   Here for f/u on abdominal pain. She has had abdominal pain, nausea and vomiting. She was seen at [facility] ED twice. She was then seen at [facility].   She is feeling some better. She is drinking fluids. She was constipated for 4 days but that resolved yesterday. She thinks the zofran made her worse. She was given bentyl at [facility] and that helped.   She is concerned that she has not had a period in 1.5 months. She has hx of PCOS. Last PAP was in Jan 2021. She was seen in [place].   She is having some hemorrhoidal pain and bleeding. Epsom bath helped  She is having some recurrent headaches. Has upcoming appt with neuro. On imitrex and elavil.   Request refill on gabapentin that she takes for chronic back pain    Review of Systems  Gen- No fevers, chills  CV- No chest pain, palpitations  Resp- No cough, dyspnea  GI- No N/V/D, abd pain  Neuro-No dizziness, headaches      I have reviewed and/or updated the patient's past medical, surgical, family, social history and problem list as appropriate.     Medications:    Current Outpatient Medications:   •  albuterol sulfate  (90 Base) MCG/ACT inhaler, Inhale 2 puffs Every 4 (Four) Hours As Needed for Wheezing., Disp: 6.7 g, Rfl: 0  •  amitriptyline (ELAVIL) 150 MG tablet, Take 1 tablet by mouth Every Night., Disp: 30 tablet, Rfl: 1  •  apixaban (ELIQUIS) 5 MG tablet tablet, Take 1 tablet by mouth Every 12 (Twelve) Hours., Disp: 60 tablet, Rfl: 1  •  Apixaban Starter Pack (Eliquis DVT/PE Starter Pack) tablet therapy pack, Take two 5 mg tablets by mouth every 12 hours for 7 days. Followed by one 5 mg tablet every 12 hours. (Dispense starter pack if available), Disp: 74 tablet, Rfl: 0  •  diazePAM (VALIUM) 5 MG tablet, Take 1 tablet by mouth 2 (Two) Times a Day As Needed for Anxiety., Disp: 60 tablet, Rfl: 0  •   famotidine (PEPCID) 40 MG tablet, Take 40 mg by mouth Daily., Disp: , Rfl:   •  furosemide (LASIX) 20 MG tablet, Take 20 mg by mouth 2 (Two) Times a Day., Disp: , Rfl:   •  gabapentin (NEURONTIN) 800 MG tablet, Take 1 tablet by mouth 3 (Three) Times a Day., Disp: 90 tablet, Rfl: 1  •  HYDROcodone-acetaminophen (Norco) 5-325 MG per tablet, Take 1 tablet by mouth Every 8 (Eight) Hours As Needed for Severe Pain ., Disp: 25 tablet, Rfl: 0  •  lurasidone (LATUDA) 40 MG tablet tablet, Take 1 tablet by mouth Daily., Disp: 30 tablet, Rfl: 0  •  omeprazole (priLOSEC) 40 MG capsule, Take 1 capsule by mouth Daily., Disp: 90 capsule, Rfl: 3  •  ondansetron ODT (ZOFRAN-ODT) 4 MG disintegrating tablet, PLACE 1 TABLET ON THE TONGUE EVERY 8 (EIGHT) HOURS AS NEEDED FOR NAUSEA OR VOMITING., Disp: 15 tablet, Rfl: 0  •  promethazine (PHENERGAN) 25 MG tablet, Take 1 tablet by mouth Every 6 (Six) Hours As Needed for Nausea or Vomiting., Disp: 30 tablet, Rfl: 0  •  SUMAtriptan (Imitrex) 50 MG tablet, Take one tablet at onset of headache. May repeat dose one time in 2 hours if headache not relieved., Disp: 15 tablet, Rfl: 1  •  tiZANidine (ZANAFLEX) 4 MG tablet, TAKE 1 TABLET BY MOUTH AT NIGHT AS NEEDED FOR MUSCLE SPASMS., Disp: 30 tablet, Rfl: 1  •  vitamin D (ERGOCALCIFEROL) 1.25 MG (28662 UT) capsule capsule, TAKE 1 CAPSULE BY MOUTH 1 (ONE) TIME PER WEEK., Disp: 4 capsule, Rfl: 2  •  cetirizine (zyrTEC) 10 MG tablet, Take 1 tablet by mouth Daily., Disp: 30 tablet, Rfl: 5  •  fluticasone (Flonase) 50 MCG/ACT nasal spray, 2 sprays into the nostril(s) as directed by provider Daily., Disp: 18.2 mL, Rfl: 0  •  hydrOXYzine (ATARAX) 10 MG tablet, Take 1-2 tablets by mouth 1-2 times per day as needed for anxiety, Disp: 30 tablet, Rfl: 0  •  silver sulfadiazine (Silvadene) 1 % cream, Apply 1 application topically to the appropriate area as directed Daily., Disp: 25 g, Rfl: 0    Allergies:  Allergies   Allergen Reactions   • Codeine Anaphylaxis  "  • Flexeril [Cyclobenzaprine] Other (See Comments)     \"gives me chest pain\"   • Asa [Aspirin] Hives     Wheezing     • Latex Hives   • Morphine Itching   • Penicillins Hives     Vomiting     • Compazine [Prochlorperazine] Anxiety   • Reglan [Metoclopramide] Anxiety       Objective     Vital Signs:   Vitals:    09/08/21 1401   BP: 110/70   Temp: 98.1 °F (36.7 °C)   Weight: 126 kg (278 lb)   Height: 160 cm (63\")       Physical Exam:    Physical Exam  Vitals and nursing note reviewed.   Constitutional:       Appearance: She is well-developed. She is obese.   HENT:      Head: Normocephalic and atraumatic.   Eyes:      Pupils: Pupils are equal, round, and reactive to light.   Cardiovascular:      Rate and Rhythm: Normal rate and regular rhythm.      Heart sounds: Normal heart sounds.   Pulmonary:      Effort: Pulmonary effort is normal.      Breath sounds: Normal breath sounds.   Abdominal:      General: Bowel sounds are normal. There is no distension.      Palpations: Abdomen is soft.      Tenderness: There is no abdominal tenderness.   Musculoskeletal:      Cervical back: Neck supple.   Skin:     General: Skin is warm and dry.      Capillary Refill: Capillary refill takes less than 2 seconds.   Neurological:      General: No focal deficit present.      Mental Status: She is alert and oriented to person, place, and time.   Psychiatric:         Mood and Affect: Mood is anxious.         Behavior: Behavior normal.         Body mass index is 49.25 kg/m².    Assessment / Plan     Assessment/Plan:   Problem List Items Addressed This Visit        Musculoskeletal and Injuries    Chronic bilateral low back pain with bilateral sciatica    Relevant Medications    gabapentin (NEURONTIN) 800 MG tablet      Other Visit Diagnoses     Generalized abdominal pain    -  Primary    Relevant Medications    promethazine (PHENERGAN) 25 MG tablet    AGE (acute gastroenteritis)        Relevant Medications    promethazine (PHENERGAN) 25 MG " tablet    PCOS (polycystic ovarian syndrome)            -- AGE improving, continue bentyl prn.   -- stop imipramine. She is already on high dose elavil and I do think these two together contributed to some of her symptoms  -- gabapentin refilled  -- discussed missed periods is common in PCOS. Her pap is UTD (data deficit)     Follow up:  As scheduled    Electronically signed by CLAIR Leal   09/08/2021 14:20 EDT      Please note that portions of this note may have been completed with a voice recognition program. Efforts were made to edit the dictations, but occasionally words are mistranscribed.

## 2021-09-10 ENCOUNTER — TELEPHONE (OUTPATIENT)
Dept: FAMILY MEDICINE CLINIC | Facility: CLINIC | Age: 36
End: 2021-09-10

## 2021-09-10 NOTE — TELEPHONE ENCOUNTER
LEORA IS CALLING TO ASK IF NADER FARRIS IF SHE THINKS LEORA HAS CROHN'S DISEASE.  PLEASE CALL LEORA [phone].      THE NURSE HOTLINE AT [facility] THINKS LEORA HAS IT.

## 2021-09-14 ENCOUNTER — TELEPHONE (OUTPATIENT)
Dept: ONCOLOGY | Facility: CLINIC | Age: 36
End: 2021-09-14

## 2021-09-14 NOTE — TELEPHONE ENCOUNTER
Caller: LEORA COTA    Relationship to patient: PATIENT    Best call back number: [phone]    Chief complaint: CANCEL APPT FOR 9/15    Type of visit: NEW PATIENT    Requested date: SOMETIME IN OCT    If rescheduling, when is the original appointment: 9/15/21    Additional notes: PLEASE CALL TO RESCHEDULE.

## 2021-09-15 ENCOUNTER — TELEMEDICINE (OUTPATIENT)
Dept: FAMILY MEDICINE CLINIC | Facility: CLINIC | Age: 36
End: 2021-09-15

## 2021-09-15 DIAGNOSIS — J01.90 ACUTE NON-RECURRENT SINUSITIS, UNSPECIFIED LOCATION: ICD-10-CM

## 2021-09-15 DIAGNOSIS — T30.0 BURN: Primary | ICD-10-CM

## 2021-09-15 PROCEDURE — 99213 OFFICE O/P EST LOW 20 MIN: CPT | Performed by: NURSE PRACTITIONER

## 2021-09-15 RX ORDER — CETIRIZINE HYDROCHLORIDE 10 MG/1
10 TABLET ORAL DAILY
Qty: 30 TABLET | Refills: 5 | Status: SHIPPED | OUTPATIENT
Start: 2021-09-15 | End: 2021-11-09

## 2021-09-15 RX ORDER — FLUTICASONE PROPIONATE 50 MCG
2 SPRAY, SUSPENSION (ML) NASAL DAILY
Qty: 18.2 ML | Refills: 0 | Status: SHIPPED | OUTPATIENT
Start: 2021-09-15 | End: 2021-10-07
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
## 2021-09-17 ENCOUNTER — TELEPHONE (OUTPATIENT)
Dept: FAMILY MEDICINE CLINIC | Facility: CLINIC | Age: 36
End: 2021-09-17

## 2021-09-17 RX ORDER — HYDROXYZINE HYDROCHLORIDE 10 MG/1
TABLET, FILM COATED ORAL
Qty: 30 TABLET | Refills: 0 | Status: SHIPPED | OUTPATIENT
Start: 2021-09-17 | End: 2021-09-29 | Stop reason: SDUPTHER

## 2021-09-17 NOTE — TELEPHONE ENCOUNTER
Patient feels like she is having a nervous breakdown. She is not wanting to eat, sleeps a lot, doesn't want to drink anything, and her nerves are really bad. Her dad had a stroke yesterday and is very sick. Says she doesn't know what to do. Sending to Stephanie also since Karla is not in clinic today.

## 2021-09-17 NOTE — TELEPHONE ENCOUNTER
Please give her information number for [facility]. She should call them and speak with someone about her symptoms. I will try to send something for her nerves.

## 2021-09-20 ENCOUNTER — HOSPITAL ENCOUNTER (OUTPATIENT)
Dept: MRI IMAGING | Facility: HOSPITAL | Age: 36
Discharge: HOME | End: 2021-09-20

## 2021-09-20 NOTE — TELEPHONE ENCOUNTER
Spoke with patient states she is doing some better as far as her nerves, she did not call or go to the [facility]. Has an acute visit with Stephanie tomorrow.

## 2021-09-21 ENCOUNTER — TELEMEDICINE (OUTPATIENT)
Dept: FAMILY MEDICINE CLINIC | Facility: CLINIC | Age: 36
End: 2021-09-21

## 2021-09-21 DIAGNOSIS — F41.9 ANXIETY: ICD-10-CM

## 2021-09-21 DIAGNOSIS — J01.10 ACUTE NON-RECURRENT FRONTAL SINUSITIS: Primary | ICD-10-CM

## 2021-09-21 PROCEDURE — 99213 OFFICE O/P EST LOW 20 MIN: CPT | Performed by: NURSE PRACTITIONER

## 2021-09-21 RX ORDER — SACCHAROMYCES BOULARDII 250 MG
250 CAPSULE ORAL 2 TIMES DAILY
Qty: 30 CAPSULE | Refills: 0 | Status: SHIPPED | OUTPATIENT
Start: 2021-09-21 | End: 2021-11-09

## 2021-09-21 RX ORDER — CEFDINIR 300 MG/1
300 CAPSULE ORAL 2 TIMES DAILY
Qty: 14 CAPSULE | Refills: 0 | Status: SHIPPED | OUTPATIENT
Start: 2021-09-21 | End: 2021-09-28

## 2021-09-21 NOTE — PROGRESS NOTES
Subjective     Chief Complaint:  Sinus congestion    History of Present Illness:   She notes continued headache, sinus congestion, pain around her sinuses. Not short of breath. Coughing up green sputum. No fever. + fatigue. Symptoms have sacha going on for over a week.   Needs refill of acid reflux medication.   Hydroxyzine is helping anxiety.       Review of Systems  Gen- No fevers, chills  CV- No chest pain, palpitations  Resp- No cough, dyspnea  GI- No N/V/D, abd pain  Neuro-No dizziness, headaches      I have reviewed and/or updated the patient's past medical, surgical, family, social history and problem list as appropriate.     Medications:    Current Outpatient Medications:   •  amitriptyline (ELAVIL) 150 MG tablet, Take 1 tablet by mouth Every Night., Disp: 30 tablet, Rfl: 1  •  apixaban (ELIQUIS) 5 MG tablet tablet, Take 1 tablet by mouth Every 12 (Twelve) Hours., Disp: 60 tablet, Rfl: 1  •  Apixaban Starter Pack (Eliquis DVT/PE Starter Pack) tablet therapy pack, Take two 5 mg tablets by mouth every 12 hours for 7 days. Followed by one 5 mg tablet every 12 hours. (Dispense starter pack if available), Disp: 74 tablet, Rfl: 0  •  cefdinir (OMNICEF) 300 MG capsule, Take 1 capsule by mouth 2 (Two) Times a Day for 7 days., Disp: 14 capsule, Rfl: 0  •  cetirizine (zyrTEC) 10 MG tablet, Take 1 tablet by mouth Daily., Disp: 30 tablet, Rfl: 5  •  diazePAM (VALIUM) 5 MG tablet, Take 1 tablet by mouth 2 (Two) Times a Day As Needed for Anxiety., Disp: 60 tablet, Rfl: 0  •  famotidine (PEPCID) 40 MG tablet, Take 40 mg by mouth Daily., Disp: , Rfl:   •  fluticasone (Flonase) 50 MCG/ACT nasal spray, 2 sprays into the nostril(s) as directed by provider Daily., Disp: 18.2 mL, Rfl: 0  •  furosemide (LASIX) 20 MG tablet, Take 20 mg by mouth 2 (Two) Times a Day., Disp: , Rfl:   •  gabapentin (NEURONTIN) 800 MG tablet, Take 1 tablet by mouth 3 (Three) Times a Day., Disp: 90 tablet, Rfl: 1  •  HYDROcodone-acetaminophen  "(Norco) 5-325 MG per tablet, Take 1 tablet by mouth Every 8 (Eight) Hours As Needed for Severe Pain ., Disp: 25 tablet, Rfl: 0  •  hydrOXYzine (ATARAX) 10 MG tablet, Take 1-2 tablets by mouth 1-2 times per day as needed for anxiety, Disp: 30 tablet, Rfl: 0  •  lurasidone (LATUDA) 40 MG tablet tablet, Take 1 tablet by mouth Daily., Disp: 30 tablet, Rfl: 0  •  omeprazole (priLOSEC) 40 MG capsule, Take 1 capsule by mouth Daily., Disp: 90 capsule, Rfl: 3  •  promethazine (PHENERGAN) 25 MG tablet, Take 1 tablet by mouth Every 6 (Six) Hours As Needed for Nausea or Vomiting., Disp: 30 tablet, Rfl: 0  •  saccharomyces boulardii (Florastor) 250 MG capsule, Take 1 capsule by mouth 2 (Two) Times a Day., Disp: 30 capsule, Rfl: 0  •  silver sulfadiazine (Silvadene) 1 % cream, Apply 1 application topically to the appropriate area as directed Daily., Disp: 25 g, Rfl: 0  •  SUMAtriptan (Imitrex) 50 MG tablet, Take one tablet at onset of headache. May repeat dose one time in 2 hours if headache not relieved., Disp: 15 tablet, Rfl: 1  •  tiZANidine (ZANAFLEX) 4 MG tablet, TAKE 1 TABLET BY MOUTH AT NIGHT AS NEEDED FOR MUSCLE SPASMS., Disp: 30 tablet, Rfl: 1  •  vitamin D (ERGOCALCIFEROL) 1.25 MG (94167 UT) capsule capsule, TAKE 1 CAPSULE BY MOUTH 1 (ONE) TIME PER WEEK., Disp: 4 capsule, Rfl: 2    Allergies:  Allergies   Allergen Reactions   • Codeine Anaphylaxis   • Flexeril [Cyclobenzaprine] Other (See Comments)     \"gives me chest pain\"   • Asa [Aspirin] Hives     Wheezing     • Latex Hives   • Morphine Itching   • Penicillins Hives     Vomiting     • Compazine [Prochlorperazine] Anxiety   • Reglan [Metoclopramide] Anxiety       Objective     Vital Signs: There were no vitals filed for this visit.    Physical Exam:  Gen-no acute distress, video visit  Resp- normal WOB, on RA  Neuro-A&Ox3, face symmetrical, speech clear  Skin-no overt rashes noted  Psych-appropriate mood, cooperative     There is no height or weight on file to " calculate BMI.    Assessment / Plan     Assessment/Plan:   Problem List Items Addressed This Visit     None      Visit Diagnoses     Acute non-recurrent frontal sinusitis    -  Primary    Relevant Medications    cefdinir (OMNICEF) 300 MG capsule    Anxiety            -- omnicef for sinusitis  -- ok to continue hydroxyzine    Follow up:  As scheduled    Total Time of Encounter 15 minutes    Electronically signed by CLAIR Leal   09/21/2021 14:48 EDT      Please note that portions of this note may have been completed with a voice recognition program. Efforts were made to edit the dictations, but occasionally words are mistranscribed.

## 2021-09-22 ENCOUNTER — TELEPHONE (OUTPATIENT)
Dept: FAMILY MEDICINE CLINIC | Facility: CLINIC | Age: 36
End: 2021-09-22

## 2021-09-22 NOTE — TELEPHONE ENCOUNTER
Caller: Lindsay Amezquita    Relationship: Self    Best call back number: [phone]    What is the best time to reach you: AS SOON AS POSSIBLE     Who are you requesting to speak with (clinical staff, provider,  specific staff member): NADER FARRIS OR NURSE         What was the call regarding: THE PATIENT STATES THAT SHE HAD A VIRTUAL APPOINTMENT YESTERDAY WITH NADER THE PATIENT STATES THAT SHE HAS A FEVER OF 99.2 TODAY AND SHE HAS A REALLY BAD HEADACHE THE PATIENT WOULD LIKE TO KNOW IF SHE CAN TAKE 2 IBUPROFEN TO BRING DOWN HER FEVER PLEASE CALL PATIENT TO DISUCSS    Do you require a callback: YES

## 2021-09-22 NOTE — TELEPHONE ENCOUNTER
Caller: Lindsay Amezquita    Relationship: Self    Best call back number: [phone]     What is the best time to reach you: ANYTIME     Who are you requesting to speak with (clinical staff, provider,  specific staff member): NADER FARRIS OR CLINICAL STAFF     What was the call regarding: PATIENT WOULD LIKE TO IF SHE MAY TAKE IBUPROFEN INSTEAD OF TYLENOL FOR HER FEVER SHE HAD DEVELOPED. PATIENT STATES SHE DOES NOT HAVE THE MONEY TO BUY THE TYLENOL PCP TOLD HER TO PURCHASE. PATIENT STATES WHEN SHE CALLED EARLIER HER FEVER WAS 99.2 AND HER FEVER IS NOW 99.9.    Do you require a callback: YES

## 2021-09-22 NOTE — TELEPHONE ENCOUNTER
Hub staff attempted to follow warm transfer process and was unsuccessful     Caller: Lindsay Amezquita    Relationship to patient: Self    Best call back number: [phone]    Patient is needing: PATIENT IS CALL ING TO RESCHEDULE HER APPOINTMENT  WITH SUHA FANG ON 09/27/2021 PLEASE CALL PATIENT TO HELP HER RESCHEDULE

## 2021-09-23 NOTE — TELEPHONE ENCOUNTER
I recommend she go to [facility] to be checked for COVID and other issues

## 2021-09-24 ENCOUNTER — TELEPHONE (OUTPATIENT)
Dept: FAMILY MEDICINE CLINIC | Facility: CLINIC | Age: 36
End: 2021-09-24

## 2021-09-24 NOTE — TELEPHONE ENCOUNTER
Caller: Lindsay Amezquita    Relationship: Self    Best call back number: [phone]    What is the best time to reach you: ANYTIME    Who are you requesting to speak with (clinical staff, provider,  specific staff member): CLINICAL STAFF    hat was the call regarding: PATIENT STATES THAT ANTIBIOTIC CEFDINIR IS NOT WORKING. PATIENT STATES THAT SHE IS WORSE    Do you require a callback:YES

## 2021-09-29 ENCOUNTER — TELEPHONE (OUTPATIENT)
Dept: FAMILY MEDICINE CLINIC | Facility: CLINIC | Age: 36
End: 2021-09-29

## 2021-09-29 ENCOUNTER — TELEMEDICINE (OUTPATIENT)
Dept: BEHAVIORAL HEALTH | Facility: CLINIC | Age: 36
End: 2021-09-29

## 2021-09-29 DIAGNOSIS — F41.1 GAD (GENERALIZED ANXIETY DISORDER): ICD-10-CM

## 2021-09-29 DIAGNOSIS — F31.62 BIPOLAR DISORDER, CURRENT EPISODE MIXED, MODERATE (HCC): Primary | ICD-10-CM

## 2021-09-29 PROCEDURE — 99213 OFFICE O/P EST LOW 20 MIN: CPT | Performed by: NURSE PRACTITIONER

## 2021-09-29 RX ORDER — TIZANIDINE 4 MG/1
4 TABLET ORAL NIGHTLY PRN
Qty: 30 TABLET | Refills: 1 | Status: SHIPPED | OUTPATIENT
Start: 2021-09-29 | End: 2021-10-25

## 2021-09-29 RX ORDER — DIAZEPAM 5 MG/1
5 TABLET ORAL 2 TIMES DAILY PRN
Qty: 60 TABLET | Refills: 0 | Status: SHIPPED | OUTPATIENT
Start: 2021-09-29 | End: 2021-11-03

## 2021-09-29 RX ORDER — LURASIDONE HYDROCHLORIDE 120 MG/1
120 TABLET, FILM COATED ORAL DAILY
Qty: 30 TABLET | Refills: 3 | Status: SHIPPED | OUTPATIENT
Start: 2021-09-29 | End: 2021-11-03

## 2021-09-29 RX ORDER — HYDROXYZINE HYDROCHLORIDE 10 MG/1
TABLET, FILM COATED ORAL
Qty: 30 TABLET | Refills: 0 | Status: SHIPPED | OUTPATIENT
Start: 2021-09-29 | End: 2021-11-09

## 2021-09-29 NOTE — TELEPHONE ENCOUNTER
Caller: Lindsay Amezquita    Relationship: Self      Medication requested (name and dosage): tiZANidine (ZANAFLEX) 4 MG tablet    Pharmacy where request should be sent: [address] [phone]    Additional details provided by patient: patient is completely out of medication    Best call back number: [phone]    Does the patient have less than a 3 day supply:  [x] Yes  [] No    Kim Mac Rep   09/29/21 12:23 EDT

## 2021-09-29 NOTE — TELEPHONE ENCOUNTER
I spoke to patient and she is suppose to be coming to the walk-in clinic today to be seen by Gennaro.

## 2021-09-29 NOTE — TELEPHONE ENCOUNTER
Patient is stating that she is NOT getting any better and her provider is out of the office and she would like to get a video visit with a provider.   Patient was also given the option of going to the [facility] at the [facility] in [place] and she stated that last week she went and she was refused by them and they would not let her be seen.   Patient would like like to see someone or have something called in to assist with her ongoing issues.

## 2021-10-01 ENCOUNTER — PRIOR AUTHORIZATION (OUTPATIENT)
Dept: FAMILY MEDICINE CLINIC | Facility: CLINIC | Age: 36
End: 2021-10-01

## 2021-10-01 NOTE — TELEPHONE ENCOUNTER
A prior auth has been started through cover my meds for diazepam    Waiting on a response from the insurance.

## 2021-10-05 ENCOUNTER — TELEPHONE (OUTPATIENT)
Dept: FAMILY MEDICINE CLINIC | Facility: CLINIC | Age: 36
End: 2021-10-05

## 2021-10-05 NOTE — TELEPHONE ENCOUNTER
Caller: Lindsay Amezquita    Relationship: Self    Best call back number: [phone]    What is the best time to reach you: ANHTIME    Who are you requesting to speak with (clinical staff, provider,  specific staff member): NURSE    What was the call regarding: PATIENT STATES THAT SHE HAD A FLU VACCINE YESTERDAY AND WOKE UP THIS MORNING FEELING FEVERISH, BODY ACHES, CHILLS. PLEASE ADVISE    Do you require a callback: YES

## 2021-10-07 DIAGNOSIS — J01.90 ACUTE NON-RECURRENT SINUSITIS, UNSPECIFIED LOCATION: ICD-10-CM

## 2021-10-07 RX ORDER — FLUTICASONE PROPIONATE 50 MCG
SPRAY, SUSPENSION (ML) NASAL
Qty: 16 ML | Refills: 1 | Status: SHIPPED | OUTPATIENT
Start: 2021-10-07 | End: 2021-11-02

## 2021-10-11 ENCOUNTER — OFFICE VISIT (OUTPATIENT)
Dept: FAMILY MEDICINE CLINIC | Facility: CLINIC | Age: 36
End: 2021-10-11

## 2021-10-11 VITALS
HEART RATE: 122 BPM | DIASTOLIC BLOOD PRESSURE: 82 MMHG | BODY MASS INDEX: 48.87 KG/M2 | TEMPERATURE: 98.2 F | WEIGHT: 275.8 LBS | HEIGHT: 63 IN | OXYGEN SATURATION: 97 % | SYSTOLIC BLOOD PRESSURE: 126 MMHG

## 2021-10-11 DIAGNOSIS — E28.2 PCOS (POLYCYSTIC OVARIAN SYNDROME): ICD-10-CM

## 2021-10-11 DIAGNOSIS — E11.43 TYPE 2 DIABETES MELLITUS WITH DIABETIC AUTONOMIC NEUROPATHY, WITH LONG-TERM CURRENT USE OF INSULIN (HCC): ICD-10-CM

## 2021-10-11 DIAGNOSIS — Z79.4 TYPE 2 DIABETES MELLITUS WITH DIABETIC AUTONOMIC NEUROPATHY, WITH LONG-TERM CURRENT USE OF INSULIN (HCC): ICD-10-CM

## 2021-10-11 DIAGNOSIS — R60.9 SWELLING: ICD-10-CM

## 2021-10-11 DIAGNOSIS — K21.9 GASTROESOPHAGEAL REFLUX DISEASE WITHOUT ESOPHAGITIS: Primary | ICD-10-CM

## 2021-10-11 DIAGNOSIS — G43.809 OTHER MIGRAINE WITHOUT STATUS MIGRAINOSUS, NOT INTRACTABLE: ICD-10-CM

## 2021-10-11 DIAGNOSIS — F41.1 GAD (GENERALIZED ANXIETY DISORDER): ICD-10-CM

## 2021-10-11 LAB
EXPIRATION DATE: ABNORMAL
HBA1C MFR BLD: 6.1 %
Lab: ABNORMAL

## 2021-10-11 PROCEDURE — 83036 HEMOGLOBIN GLYCOSYLATED A1C: CPT | Performed by: NURSE PRACTITIONER

## 2021-10-11 PROCEDURE — 99214 OFFICE O/P EST MOD 30 MIN: CPT | Performed by: NURSE PRACTITIONER

## 2021-10-11 PROCEDURE — 3044F HG A1C LEVEL LT 7.0%: CPT | Performed by: NURSE PRACTITIONER

## 2021-10-11 RX ORDER — OMEPRAZOLE 40 MG/1
40 CAPSULE, DELAYED RELEASE ORAL DAILY
Qty: 90 CAPSULE | Refills: 3 | Status: SHIPPED | OUTPATIENT
Start: 2021-10-11 | End: 2021-11-23 | Stop reason: SDUPTHER

## 2021-10-11 RX ORDER — LIDOCAINE 5 %
ADHESIVE PATCH, MEDICATED TOPICAL
COMMUNITY
Start: 2021-09-13 | End: 2022-01-11

## 2021-10-11 RX ORDER — DIAZEPAM 5 MG/1
5 TABLET ORAL 2 TIMES DAILY PRN
Qty: 60 TABLET | Refills: 0 | OUTPATIENT
Start: 2021-10-11

## 2021-10-11 RX ORDER — FUROSEMIDE 20 MG/1
20 TABLET ORAL DAILY
Qty: 30 TABLET | Refills: 5 | Status: SHIPPED | OUTPATIENT
Start: 2021-10-11 | End: 2021-11-23 | Stop reason: SDUPTHER

## 2021-10-11 RX ORDER — HYDROCODONE BITARTRATE AND ACETAMINOPHEN 7.5; 325 MG/1; MG/1
1 TABLET ORAL DAILY PRN
COMMUNITY
Start: 2021-09-18 | End: 2022-01-11

## 2021-10-11 NOTE — PROGRESS NOTES
A1c 6.1.  This is consistent with prediabetes.  No medication needed at this time.  Work on decreasing sugar and starchy foods.  Increase exercise.  Work on weight loss

## 2021-10-11 NOTE — PROGRESS NOTES
.aw        Subjective     Chief Complaint:    Chief Complaint   Patient presents with   • Follow-up     pre menapausal,blood work       History of Present Illness:   Presents today with concerns of premenopause  Took Flu vaccine recently and had side effects and didn't feel good afterwards. Had diarrhea   Been having hot flashes at night, bed sheets are wet when wakes up in am. Been using a fan at night, has helped. Has not had a period in months and has hx of PCOS   Hx of migraines, going on Oct 21 for MRI. Takes Imitrex and helps with pain  Hx of blood clot in left leg, taking eliquis as prescribed. Having pain in popliteal area when legs crossed. No pain when leg straight. .Still has not seen hem/onc. Keeps rescheduling due to illness  She has not had a period for several months. Not sexually active. Has hx of PCOS. Saw GYN in [place] in Feb.       Review of Systems  Gen- No fevers, chills  CV- No chest pain, palpitations  Resp- No cough, dyspnea  GI- No N/V/D, abd pain  Neuro-No dizziness, headaches      I have reviewed and/or updated the patient's past medical, surgical, family, social history and problem list as appropriate.     Medications:    Current Outpatient Medications:   •  apixaban (ELIQUIS) 5 MG tablet tablet, Take 1 tablet by mouth Every 12 (Twelve) Hours., Disp: 60 tablet, Rfl: 1  •  cetirizine (zyrTEC) 10 MG tablet, Take 1 tablet by mouth Daily., Disp: 30 tablet, Rfl: 5  •  diazePAM (VALIUM) 5 MG tablet, Take 1 tablet by mouth 2 (Two) Times a Day As Needed for Anxiety., Disp: 60 tablet, Rfl: 0  •  famotidine (PEPCID) 40 MG tablet, Take 40 mg by mouth Daily., Disp: , Rfl:   •  fluticasone (FLONASE) 50 MCG/ACT nasal spray, INSTILL 2 SPRAYS INTO THE NOSTRIL(S) AS DIRECTED BY PROVIDER DAILY., Disp: 16 mL, Rfl: 1  •  furosemide (LASIX) 20 MG tablet, Take 1 tablet by mouth Daily., Disp: 30 tablet, Rfl: 5  •  gabapentin (NEURONTIN) 800 MG tablet, Take 1 tablet by mouth 3 (Three) Times a Day., Disp: 90  tablet, Rfl: 1  •  HYDROcodone-acetaminophen (NORCO) 7.5-325 MG per tablet, Take 1 tablet by mouth Daily As Needed., Disp: , Rfl:   •  hydrOXYzine (ATARAX) 10 MG tablet, Take 1-2 tablets by mouth 1-2 times per day as needed for anxiety, Disp: 30 tablet, Rfl: 0  •  Lidoderm 5 %, APPLY 1 PATCH TO SKIN ONCE DAILY **MAY WEAR UP TO 12 HOURS**, Disp: , Rfl:   •  Lurasidone HCl (Latuda) 120 MG tablet tablet, Take 1 tablet by mouth Daily. Take 120 mg orally daily with a meal., Disp: 30 tablet, Rfl: 3  •  omeprazole (priLOSEC) 40 MG capsule, Take 1 capsule by mouth Daily., Disp: 90 capsule, Rfl: 3  •  promethazine (PHENERGAN) 25 MG tablet, Take 1 tablet by mouth Every 6 (Six) Hours As Needed for Nausea or Vomiting., Disp: 30 tablet, Rfl: 0  •  SUMAtriptan (Imitrex) 50 MG tablet, Take one tablet at onset of headache. May repeat dose one time in 2 hours if headache not relieved., Disp: 15 tablet, Rfl: 1  •  vitamin D (ERGOCALCIFEROL) 1.25 MG (55292 UT) capsule capsule, TAKE 1 CAPSULE BY MOUTH 1 (ONE) TIME PER WEEK., Disp: 4 capsule, Rfl: 2  •  amitriptyline (ELAVIL) 150 MG tablet, TAKE 1 TABLET BY MOUTH EVERY DAY AT NIGHT, Disp: 30 tablet, Rfl: 1  •  Apixaban Starter Pack (Eliquis DVT/PE Starter Pack) tablet therapy pack, Take two 5 mg tablets by mouth every 12 hours for 7 days. Followed by one 5 mg tablet every 12 hours. (Dispense starter pack if available), Disp: 74 tablet, Rfl: 0  •  dicyclomine (Bentyl) 10 MG capsule, Take 1 capsule by mouth 4 (Four) Times a Day Before Meals & at Bedtime., Disp: 30 capsule, Rfl: 0  •  HYDROcodone-acetaminophen (Norco) 5-325 MG per tablet, Take 1 tablet by mouth Every 8 (Eight) Hours As Needed for Severe Pain ., Disp: 25 tablet, Rfl: 0  •  saccharomyces boulardii (Florastor) 250 MG capsule, Take 1 capsule by mouth 2 (Two) Times a Day., Disp: 30 capsule, Rfl: 0  •  silver sulfadiazine (Silvadene) 1 % cream, Apply 1 application topically to the appropriate area as directed Daily., Disp: 25 g,  "Rfl: 0  •  tiZANidine (ZANAFLEX) 4 MG tablet, TAKE 1 TABLET BY MOUTH AT NIGHT AS NEEDED FOR MUSCLE SPASMS., Disp: 30 tablet, Rfl: 1    Allergies:  Allergies   Allergen Reactions   • Codeine Anaphylaxis   • Flexeril [Cyclobenzaprine] Other (See Comments)     \"gives me chest pain\"   • Asa [Aspirin] Hives     Wheezing     • Latex Hives   • Morphine Itching   • Penicillins Hives     Vomiting     • Compazine [Prochlorperazine] Anxiety   • Reglan [Metoclopramide] Anxiety       Objective     Vital Signs:   Vitals:    10/11/21 1507   BP: 126/82   Pulse: (!) 122   Temp: 98.2 °F (36.8 °C)   SpO2: 97%   Weight: 125 kg (275 lb 12.8 oz)   Height: 160 cm (63\")   PainSc:   8       Physical Exam:    Physical Exam  Vitals and nursing note reviewed.   Constitutional:       Appearance: She is well-developed. She is obese.   HENT:      Head: Normocephalic and atraumatic.   Eyes:      Pupils: Pupils are equal, round, and reactive to light.   Cardiovascular:      Rate and Rhythm: Regular rhythm. Tachycardia present.      Pulses: Normal pulses.      Heart sounds: Normal heart sounds.   Pulmonary:      Effort: Pulmonary effort is normal.      Breath sounds: Normal breath sounds.   Abdominal:      General: Bowel sounds are normal. There is no distension.      Palpations: Abdomen is soft.      Tenderness: There is no abdominal tenderness.   Musculoskeletal:      Cervical back: Neck supple.   Skin:     General: Skin is warm and dry.      Capillary Refill: Capillary refill takes less than 2 seconds.   Neurological:      General: No focal deficit present.      Mental Status: She is alert and oriented to person, place, and time.   Psychiatric:         Behavior: Behavior normal.         Body mass index is 48.86 kg/m².    Assessment / Plan     Assessment/Plan:   Problem List Items Addressed This Visit        Gastrointestinal Abdominal     Gastroesophageal reflux disease without esophagitis - Primary    Relevant Medications    famotidine (PEPCID) " 40 MG tablet    omeprazole (priLOSEC) 40 MG capsule    dicyclomine (Bentyl) 10 MG capsule      Other Visit Diagnoses     Swelling        Relevant Medications    furosemide (LASIX) 20 MG tablet    Type 2 diabetes mellitus with diabetic autonomic neuropathy, with long-term current use of insulin ([facility])        Relevant Orders    POC Glycosylated Hemoglobin (Hb A1C) (Completed)    PCOS (polycystic ovarian syndrome)        Other migraine without status migrainosus, not intractable        Relevant Medications    HYDROcodone-acetaminophen (NORCO) 7.5-325 MG per tablet        -- A1c down to 6.1. Discussed PCOS. She [facility] [place] [facility]. Can consider addition of metformin  -- continue imitrex and elavil for migraines, has f/u with neuro and MRI  -- continue GI meds, her GI symptoms are somewhat chronic, due suspect some IBS and due to mood    Follow up:  3 months    Electronically signed by CLAIR Leal   10/11/2021 15:25 EDT      Please note that portions of this note may have been completed with a voice recognition program. Efforts were made to edit the dictations, but occasionally words are mistranscribed.

## 2021-10-14 ENCOUNTER — TELEPHONE (OUTPATIENT)
Dept: FAMILY MEDICINE CLINIC | Facility: CLINIC | Age: 36
End: 2021-10-14

## 2021-10-14 NOTE — TELEPHONE ENCOUNTER
PATIENT STATES THAT SHE IS STILL HAVING STOMACH ISSUES AND WANTED TO KNOW WHAT [facility] SUGGESTED OR GET ANOTHER APPOINTMENT.

## 2021-10-15 DIAGNOSIS — R10.9 CHRONIC ABDOMINAL PAIN: Primary | ICD-10-CM

## 2021-10-15 DIAGNOSIS — G89.29 CHRONIC ABDOMINAL PAIN: Primary | ICD-10-CM

## 2021-10-15 DIAGNOSIS — R11.2 NAUSEA AND VOMITING, INTRACTABILITY OF VOMITING NOT SPECIFIED, UNSPECIFIED VOMITING TYPE: ICD-10-CM

## 2021-10-15 RX ORDER — DICYCLOMINE HYDROCHLORIDE 10 MG/1
10 CAPSULE ORAL
Qty: 30 CAPSULE | Refills: 0 | Status: SHIPPED | OUTPATIENT
Start: 2021-10-15 | End: 2021-11-23

## 2021-10-15 NOTE — TELEPHONE ENCOUNTER
I sent a script for bentyl. I also think she should see GI since these issues seem to be more frequent. Referral for GI placed

## 2021-10-17 DIAGNOSIS — N39.44 NOCTURNAL ENURESIS: ICD-10-CM

## 2021-10-17 DIAGNOSIS — F51.04 PSYCHOPHYSIOLOGICAL INSOMNIA: ICD-10-CM

## 2021-10-18 RX ORDER — IMIPRAMINE HCL 25 MG
TABLET ORAL
Qty: 30 TABLET | Refills: 1 | OUTPATIENT
Start: 2021-10-18

## 2021-10-18 RX ORDER — AMITRIPTYLINE HYDROCHLORIDE 150 MG/1
TABLET, FILM COATED ORAL
Qty: 30 TABLET | Refills: 1 | Status: SHIPPED | OUTPATIENT
Start: 2021-10-18 | End: 2021-12-15

## 2021-10-18 NOTE — TELEPHONE ENCOUNTER
Rx Refill Note  Requested Prescriptions     Pending Prescriptions Disp Refills   • imipramine (TOFRANIL) 25 MG tablet [Pharmacy Med Name: IMIPRAMINE HCL 25 MG TABLET] 30 tablet 1     Sig: TAKE 1 TABLET BY MOUTH EVERY DAY AT NIGHT   • amitriptyline (ELAVIL) 150 MG tablet [Pharmacy Med Name: AMITRIPTYLINE  MG TAB] 30 tablet 1     Sig: TAKE 1 TABLET BY MOUTH EVERY DAY AT NIGHT      Last office visit with prescribing clinician: 8/23/2021      Next office visit with prescribing clinician: 11/3/2021            Neelam Perry LPN  10/18/21, 13:32 EDT

## 2021-10-20 ENCOUNTER — TELEMEDICINE (OUTPATIENT)
Dept: FAMILY MEDICINE CLINIC | Facility: CLINIC | Age: 36
End: 2021-10-20

## 2021-10-20 DIAGNOSIS — R05.9 COUGH WITH FEVER: Primary | ICD-10-CM

## 2021-10-20 DIAGNOSIS — R11.2 INTRACTABLE VOMITING WITH NAUSEA, UNSPECIFIED VOMITING TYPE: ICD-10-CM

## 2021-10-20 DIAGNOSIS — R50.9 COUGH WITH FEVER: Primary | ICD-10-CM

## 2021-10-20 DIAGNOSIS — R19.7 DIARRHEA OF PRESUMED INFECTIOUS ORIGIN: ICD-10-CM

## 2021-10-20 PROCEDURE — 99213 OFFICE O/P EST LOW 20 MIN: CPT | Performed by: FAMILY MEDICINE

## 2021-10-20 NOTE — PROGRESS NOTES
"TELEHEALTH/VIDEO VISIT  DOS 10/20/21    LEORA COTA   83    You have chosen to receive care through a telehealth visit.  Do you consent to use a video/audio connection for your medical care today? Yes    She c/o cough, n/v/d    Vomiting   This is a new problem. The current episode started in the past 7 days (Monday night). The problem occurs 5 to 10 times per day. The problem has been waxing and waning. The emesis has an appearance of stomach contents. The maximum temperature recorded prior to her arrival was 101 - 101.9 F. The fever has been present for 1 to 2 days. Associated symptoms include abdominal pain (cramping prior to diarrhea), chills, coughing, diarrhea, dizziness, a fever, headaches, myalgias and sweats. Pertinent negatives include no chest pain. Risk factors: no known sick exposures. She has tried acetaminophen for the symptoms. The treatment provided mild relief.     Concerned about \"rattling in lungs\". No CP or hemoptysis. No blood in stool.    had COVID vaccine    Phenergan and zofran helping minimally.    Just began period after none since April.    On eliquis for h/o DVT, left leg    Trouble keeping fluids down.    Patient's past medical hx, surgical hx, family hx, social hx reviewed on chart. Medication and allergy lists reviewed on chart. Information updated as indicated.      Review of Systems   Constitutional: Positive for appetite change, chills, fatigue and fever.   HENT: Negative for rhinorrhea and sore throat.    Eyes: Negative for pain, discharge and redness.   Respiratory: Positive for cough, shortness of breath (mild) and wheezing (possible).    Cardiovascular: Negative for chest pain.   Gastrointestinal: Positive for abdominal pain (cramping prior to diarrhea), diarrhea and vomiting.   Genitourinary: Negative for dysuria and hematuria.   Musculoskeletal: Positive for myalgias.   Skin: Negative for rash.   Neurological: Positive for dizziness, weakness and headaches. Negative " for syncope.   Psychiatric/Behavioral: Positive for sleep disturbance. Negative for confusion.         Physical Exam  Constitutional:       Appearance: She is well-developed. She is morbidly obese. She is ill-appearing.   HENT:      Head: Normocephalic and atraumatic.   Eyes:      General: No scleral icterus.     Conjunctiva/sclera: Conjunctivae normal.   Pulmonary:      Effort: Pulmonary effort is normal.   Skin:     Coloration: Skin is not jaundiced or pale.   Neurological:      Mental Status: She is alert and oriented to person, place, and time.   Psychiatric:         Mood and Affect: Mood is anxious (mildly).         Behavior: Behavior normal. Behavior is cooperative.         Cognition and Memory: Cognition normal.           Diagnoses and all orders for this visit:    Cough with fever  -     COVID-19 PCR, [facility], NP SWAB IN [facility] VIRAL TRANSPORT MEDIA/ORAL SWISH 24-30 HR TAT - Swab, Nasopharynx; Future  -     POCT Influenza A/B; Future    Intractable vomiting with nausea, unspecified vomiting type    Diarrhea of presumed infectious origin      She is amenable to flu/covid r/o as above.   May need CXR if cough persists. Avoid abx at this time as most likely viral and abx will likely exacerbate GI symptoms.  Symptomatic mgnt reviewed.  Use zofran and/or phenergan as needed. Oral rehydration method reviewed. Imodium as needed. Tylenol as needed.    Patient will f/u per routine with PCP for mgnt of chronic illness.  Patient was encouraged to keep me informed of any acute changes, lack of improvement, or any new concerning symptoms.  Pt is aware of reasons to seek emergent care.  Patient voiced understanding of all instructions and denied further questions.      Total face to face time with patient was 8 minutes.

## 2021-10-21 ENCOUNTER — TELEPHONE (OUTPATIENT)
Dept: FAMILY MEDICINE CLINIC | Facility: CLINIC | Age: 36
End: 2021-10-21

## 2021-10-21 ENCOUNTER — HOSPITAL ENCOUNTER (OUTPATIENT)
Dept: MRI IMAGING | Facility: HOSPITAL | Age: 36
End: 2021-10-21

## 2021-10-22 ENCOUNTER — TELEPHONE (OUTPATIENT)
Dept: FAMILY MEDICINE CLINIC | Facility: CLINIC | Age: 36
End: 2021-10-22

## 2021-10-22 NOTE — TELEPHONE ENCOUNTER
Have patient take one-time dose of 600 mg ibuprofen.  If she is saturating a pad in less than an hour she needs to go to the emergency room.

## 2021-10-22 NOTE — TELEPHONE ENCOUNTER
Patient says her menstrual cycle just started & it has been very painful & she is bleeding heavily with big clots. She has tried heating pads, etc. She says she is very weak/lethargic. She feels this might be due to the eloquis. She hasn't been able to get her pain medication due to the cost.

## 2021-10-25 RX ORDER — TIZANIDINE 4 MG/1
4 TABLET ORAL NIGHTLY PRN
Qty: 30 TABLET | Refills: 1 | Status: SHIPPED | OUTPATIENT
Start: 2021-10-25 | End: 2021-11-08 | Stop reason: SDUPTHER

## 2021-11-02 DIAGNOSIS — J01.90 ACUTE NON-RECURRENT SINUSITIS, UNSPECIFIED LOCATION: ICD-10-CM

## 2021-11-02 RX ORDER — FLUTICASONE PROPIONATE 50 MCG
SPRAY, SUSPENSION (ML) NASAL
Qty: 16 ML | Refills: 1 | Status: SHIPPED | OUTPATIENT
Start: 2021-11-02 | End: 2021-11-23

## 2021-11-03 ENCOUNTER — TELEMEDICINE (OUTPATIENT)
Dept: BEHAVIORAL HEALTH | Facility: CLINIC | Age: 36
End: 2021-11-03

## 2021-11-03 DIAGNOSIS — F41.1 GAD (GENERALIZED ANXIETY DISORDER): ICD-10-CM

## 2021-11-03 DIAGNOSIS — F31.62 BIPOLAR DISORDER, CURRENT EPISODE MIXED, MODERATE (HCC): Primary | ICD-10-CM

## 2021-11-03 PROCEDURE — 99213 OFFICE O/P EST LOW 20 MIN: CPT | Performed by: NURSE PRACTITIONER

## 2021-11-03 RX ORDER — DIAZEPAM 5 MG/1
5 TABLET ORAL DAILY PRN
Qty: 30 TABLET | Refills: 0 | Status: SHIPPED | OUTPATIENT
Start: 2021-11-03 | End: 2021-12-01 | Stop reason: SDUPTHER

## 2021-11-03 NOTE — PROGRESS NOTES
Patient Name: Lindsay Amezquita  MRN: [id number]   :  1985     Chief Complaint:      ICD-10-CM ICD-9-CM   1. Bipolar disorder, current episode mixed, moderate ([facility])  F31.62 296.62       History of Present Illness: Lindsay Amezquita is a 36 y.o. female is here today for medication management follow up.  Pt states the Latuda is not helping and is making her extremely nauseous even when taking it with food.   The following portions of the patient's history were reviewed and updated as appropriate: allergies, current medications, past family history, past medical history, past social history, past surgical history and problem list.    Review of Systems;;  Review of Systems   Constitutional: Negative for activity change, appetite change, fatigue, unexpected weight gain and unexpected weight loss.   Respiratory: Negative for shortness of breath and wheezing.    Gastrointestinal: Negative for constipation, diarrhea, nausea and vomiting.   Musculoskeletal: Negative for gait problem.   Skin: Negative for dry skin and rash.   Neurological: Negative for dizziness, speech difficulty, weakness, light-headedness, headache, memory problem and confusion.   Psychiatric/Behavioral: Positive for sleep disturbance, depressed mood and stress. Negative for agitation, behavioral problems, decreased concentration, dysphoric mood, hallucinations, self-injury, suicidal ideas and negative for hyperactivity. The patient is nervous/anxious.        Physical Exam;;  Physical Exam  Vitals and nursing note reviewed.   Constitutional:       General: She is not in acute distress.     Appearance: She is well-developed. She is not diaphoretic.   HENT:      Head: Normocephalic and atraumatic.   Eyes:      Conjunctiva/sclera: Conjunctivae normal.   Cardiovascular:      Rate and Rhythm: Normal rate.   Pulmonary:      Effort: Pulmonary effort is normal. No respiratory distress.   Musculoskeletal:         General: Normal range of motion.      Cervical back:  Full passive range of motion without pain and normal range of motion.   Skin:     General: Skin is warm and dry.   Neurological:      Mental Status: She is alert and oriented to person, place, and time.   Psychiatric:         Mood and Affect: Mood is anxious and depressed. Affect is not labile, blunt, angry or inappropriate.         Speech: Speech is not rapid and pressured or tangential.         Behavior: Behavior normal. Behavior is not agitated, slowed, aggressive, withdrawn, hyperactive or combative. Behavior is cooperative.         Thought Content: Thought content normal. Thought content is not paranoid or delusional. Thought content does not include homicidal or suicidal ideation. Thought content does not include homicidal or suicidal plan.         Judgment: Judgment normal.       There were no vitals taken for this visit.  There is no height or weight on file to calculate BMI.    Current Medications;;    Current Outpatient Medications:   •  acetaminophen (TYLENOL) 500 MG tablet, Take 500 mg by mouth Every 6 (Six) Hours As Needed for Mild Pain ., Disp: , Rfl:   •  amitriptyline (ELAVIL) 150 MG tablet, TAKE 1 TABLET BY MOUTH EVERY DAY AT NIGHT, Disp: 30 tablet, Rfl: 1  •  apixaban (ELIQUIS) 5 MG tablet tablet, Take 1 tablet by mouth Every 12 (Twelve) Hours., Disp: 60 tablet, Rfl: 1  •  Cariprazine HCl (Vraylar) 3 MG capsule capsule, Take 1 capsule by mouth Daily., Disp: 30 capsule, Rfl: 2  •  diazePAM (VALIUM) 5 MG tablet, Take 1 tablet by mouth Daily As Needed for Anxiety., Disp: 30 tablet, Rfl: 0  •  dicyclomine (Bentyl) 10 MG capsule, Take 1 capsule by mouth 4 (Four) Times a Day Before Meals & at Bedtime., Disp: 30 capsule, Rfl: 0  •  doxycycline (VIBRAMYCIN) 100 MG capsule, Take 1 capsule by mouth 2 (Two) Times a Day for 10 days., Disp: 20 capsule, Rfl: 0  •  famotidine (PEPCID) 40 MG tablet, Take 40 mg by mouth Daily., Disp: , Rfl:   •  fluconazole (Diflucan) 150 MG tablet, Take 1 tablet by mouth now,  repeat in 3 days if needed, Disp: 2 tablet, Rfl: 0  •  fluticasone (FLONASE) 50 MCG/ACT nasal spray, INSTILL 2 SPRAYS INTO THE NOSTRIL(S) AS DIRECTED BY PROVIDER DAILY., Disp: 16 mL, Rfl: 1  •  furosemide (LASIX) 20 MG tablet, Take 1 tablet by mouth Daily., Disp: 30 tablet, Rfl: 5  •  gabapentin (NEURONTIN) 800 MG tablet, Take 1 tablet by mouth 3 (Three) Times a Day., Disp: 90 tablet, Rfl: 1  •  HYDROcodone-acetaminophen (NORCO) 7.5-325 MG per tablet, Take 1 tablet by mouth Daily As Needed., Disp: , Rfl:   •  levocetirizine (XYZAL) 5 MG tablet, Take 1 tablet by mouth Every Evening., Disp: 30 tablet, Rfl: 5  •  Lidoderm 5 %, APPLY 1 PATCH TO SKIN ONCE DAILY **MAY WEAR UP TO 12 HOURS**, Disp: , Rfl:   •  Lurasidone HCl (Latuda) 120 MG tablet tablet, Take 1 tablet by mouth Daily. Take 120 mg orally daily with a meal., Disp: 30 tablet, Rfl: 3  •  nystatin (MYCOSTATIN) 100,000 unit/mL suspension, Swish and swallow 5 mL 4 (Four) Times a Day for 7 days., Disp: 210 mL, Rfl: 0  •  omeprazole (priLOSEC) 40 MG capsule, Take 1 capsule by mouth Daily., Disp: 90 capsule, Rfl: 3  •  promethazine (PHENERGAN) 25 MG tablet, Take 1 tablet by mouth Every 6 (Six) Hours As Needed for Nausea or Vomiting., Disp: 30 tablet, Rfl: 0  •  Rimegepant Sulfate (Nurtec) 75 MG tablet dispersible tablet, Take 1 tablet by mouth 1 (One) Time As Needed (migraine)., Disp: 9 tablet, Rfl: 1  •  silver sulfadiazine (Silvadene) 1 % cream, Apply 1 application topically to the appropriate area as directed Daily., Disp: 25 g, Rfl: 0  •  sucralfate (Carafate) 1 g tablet, Take 1 tablet by mouth 4 (Four) Times a Day., Disp: 60 tablet, Rfl: 0  •  tiZANidine (ZANAFLEX) 4 MG tablet, Take 1 tablet by mouth At Night As Needed for Muscle Spasms., Disp: 30 tablet, Rfl: 1  •  vitamin D (ERGOCALCIFEROL) 1.25 MG (54024 UT) capsule capsule, TAKE 1 CAPSULE BY MOUTH 1 (ONE) TIME PER WEEK., Disp: 4 capsule, Rfl: 2    Lab Results:   Office Visit on 10/11/2021   Component Date  Value Ref Range Status   • Hemoglobin A1C 10/11/2021 6.1  % Final   • Lot Number 10/11/2021 [id number]   Final   • Expiration Date 10/11/2021 05-   Final   Admission on 08/31/2021, Discharged on 08/31/2021   Component Date Value Ref Range Status   • HCG, Urine QL 08/31/2021 Negative  Negative Final   • Color, UA 08/31/2021 Yellow  Yellow, Straw Final   • Appearance, UA 08/31/2021 Clear  Clear Final   • pH, UA 08/31/2021 7.0  5.0 - 8.0 Final   • Specific Gravity, UA 08/31/2021 1.010  1.005 - 1.030 Final   • Glucose, UA 08/31/2021 Negative  Negative Final   • Ketones, UA 08/31/2021 Negative  Negative Final   • Bilirubin, UA 08/31/2021 Negative  Negative Final   • Blood, UA 08/31/2021 Moderate (2+)* Negative Final   • Protein, UA 08/31/2021 Negative  Negative Final   • Leuk Esterase, UA 08/31/2021 Negative  Negative Final   • Nitrite, UA 08/31/2021 Negative  Negative Final   • Urobilinogen, UA 08/31/2021 0.2 E.U./dL  0.2 - 1.0 E.U./dL Final   • RBC, UA 08/31/2021 21-30* None Seen /HPF Final   • WBC, UA 08/31/2021 0-2* None Seen /HPF Final   • Bacteria, UA 08/31/2021 Trace* None Seen /HPF Final   • Squamous Epithelial Cells, UA 08/31/2021 3-6* None Seen, 0-2 /HPF Final   • Hyaline Casts, UA 08/31/2021 None Seen  None Seen /LPF Final   • Methodology 08/31/2021 Manual Light Microscopy   Final   Admission on 08/30/2021, Discharged on 08/30/2021   Component Date Value Ref Range Status   • Glucose 08/30/2021 94  65 - 99 mg/dL Final   • BUN 08/30/2021 4* 6 - 20 mg/dL Final   • Creatinine 08/30/2021 0.60  0.57 - 1.00 mg/dL Final   • Sodium 08/30/2021 138  136 - 145 mmol/L Final   • Potassium 08/30/2021 4.0  3.5 - 5.2 mmol/L Final   • Chloride 08/30/2021 99  98 - 107 mmol/L Final   • CO2 08/30/2021 29.9* 22.0 - 29.0 mmol/L Final   • Calcium 08/30/2021 9.5  8.6 - 10.5 mg/dL Final   • Total Protein 08/30/2021 7.2  6.0 - 8.5 g/dL Final   • Albumin 08/30/2021 4.20  3.50 - 5.20 g/dL Final   • ALT (SGPT) 08/30/2021 28  1 - 33  U/L Final   • AST (SGOT) 08/30/2021 30  1 - 32 U/L Final   • Alkaline Phosphatase 08/30/2021 100  39 - 117 U/L Final   • Total Bilirubin 08/30/2021 0.4  0.0 - 1.2 mg/dL Final   • eGFR Non  Amer 08/30/2021 112  >60 mL/min/1.73 Final   • Globulin 08/30/2021 3.0  gm/dL Final   • A/G Ratio 08/30/2021 1.4  g/dL Final   • BUN/Creatinine Ratio 08/30/2021 6.7* 7.0 - 25.0 Final   • Anion Gap 08/30/2021 9.1  5.0 - 15.0 mmol/L Final   • Lipase 08/30/2021 34  13 - 60 U/L Final   • Color, UA 08/30/2021 Yellow  Yellow, Straw Final   • Appearance, UA 08/30/2021 Clear  Clear Final   • pH, UA 08/30/2021 5.5  5.0 - 8.0 Final   • Specific Gravity, UA 08/30/2021 <=1.005  1.005 - 1.030 Final   • Glucose, UA 08/30/2021 Negative  Negative Final   • Ketones, UA 08/30/2021 Negative  Negative Final   • Bilirubin, UA 08/30/2021 Negative  Negative Final   • Blood, UA 08/30/2021 Negative  Negative Final   • Protein, UA 08/30/2021 Negative  Negative Final   • Leuk Esterase, UA 08/30/2021 Negative  Negative Final   • Nitrite, UA 08/30/2021 Negative  Negative Final   • Urobilinogen, UA 08/30/2021 0.2 E.U./dL  0.2 - 1.0 E.U./dL Final   • Extra Tube 08/30/2021 Hold for add-ons.   Final    Auto resulted.   • Extra Tube 08/30/2021 hold for add-on   Final    Auto resulted   • Extra Tube 08/30/2021 Hold for add-ons.   Final    Auto resulted.   • Extra Tube 08/30/2021 hold for add-on   Final    Auto resulted   • WBC 08/30/2021 9.07  3.40 - 10.80 10*3/mm3 Final   • RBC 08/30/2021 5.24  3.77 - 5.28 10*6/mm3 Final   • Hemoglobin 08/30/2021 15.2  12.0 - 15.9 g/dL Final   • Hematocrit 08/30/2021 46.2  34.0 - 46.6 % Final   • MCV 08/30/2021 88.2  79.0 - 97.0 fL Final   • MCH 08/30/2021 29.0  26.6 - 33.0 pg Final   • MCHC 08/30/2021 32.9  31.5 - 35.7 g/dL Final   • RDW 08/30/2021 13.2  12.3 - 15.4 % Final   • RDW-SD 08/30/2021 42.9  37.0 - 54.0 fl Final   • MPV 08/30/2021 10.0  6.0 - 12.0 fL Final   • Platelets 08/30/2021 326  140 - 450 10*3/mm3 Final    • Neutrophil % 08/30/2021 55.9  42.7 - 76.0 % Final   • Lymphocyte % 08/30/2021 35.9  19.6 - 45.3 % Final   • Monocyte % 08/30/2021 6.2  5.0 - 12.0 % Final   • Eosinophil % 08/30/2021 1.3  0.3 - 6.2 % Final   • Basophil % 08/30/2021 0.4  0.0 - 1.5 % Final   • Immature Grans % 08/30/2021 0.3  0.0 - 0.5 % Final   • Neutrophils, Absolute 08/30/2021 5.06  1.70 - 7.00 10*3/mm3 Final   • Lymphocytes, Absolute 08/30/2021 3.26* 0.70 - 3.10 10*3/mm3 Final   • Monocytes, Absolute 08/30/2021 0.56  0.10 - 0.90 10*3/mm3 Final   • Eosinophils, Absolute 08/30/2021 0.12  0.00 - 0.40 10*3/mm3 Final   • Basophils, Absolute 08/30/2021 0.04  0.00 - 0.20 10*3/mm3 Final   • Immature Grans, Absolute 08/30/2021 0.03  0.00 - 0.05 10*3/mm3 Final   • nRBC 08/30/2021 0.0  0.0 - 0.2 /100 WBC Final   Office Visit on 08/23/2021   Component Date Value Ref Range Status   • Report Summary 08/23/2021 FINAL   Final    Comment: ====================================================================  [facility] COMP DRUG ANALYSIS,UR  ====================================================================  Test                             Result       Flag       Units  Drug Present    Desmethyldiazepam              695                     ng/mg creat    Oxazepam                       1176                    ng/mg creat    Temazepam                      670                     ng/mg creat     Desmethyldiazepam, oxazepam, and temazepam are benzodiazepine     drugs, but may also be present as common metabolites of other     benzodiazepine drugs, including diazepam.    Hydrocodone                    718                     ng/mg creat    Hydromorphone                  369                     ng/mg creat    Dihydrocodeine                 497                     ng/mg creat    Norhydrocodone                 444                     ng/mg creat     Sources of hydrocodone include scheduled prescription     medications. Hydromo                           rphone,  dihydrocodeine and norhydrocodone are     expected metabolites of hydrocodone. Hydromorphone and     dihydrocodeine are also available as scheduled prescription     medications.    Amitriptyline                  PRESENT    Nortriptyline                  PRESENT     Nortriptyline may be administered as a prescription drug; it is     also an expected metabolite of amitriptyline.    Lurasidone                     PRESENT    Acetaminophen                  PRESENT    Diphenhydramine                PRESENT  ====================================================================  Test                      Result    Flag   Units      Ref Range    Creatinine              123              mg/dL      >=20  ====================================================================  Declared Medications:   Medication list was not provided.  ====================================================================  For clinical consultation, please call [phone].  ===================================                           =================================         Mental Status Exam:   Hygiene:   good  Cooperation:  Evasive error cooperative  Eye Contact:  Fair  Psychomotor Behavior:  Appropriate  Mood:anxious and depressed  Affect:  Restricted  Hopelessness: Denies  Speech:  Normal  Thought Process:  Goal directed  Thought Content:  Normal  Suicidal:  None  Homicidal:  None  Hallucinations:  None  Delusion:  None  Memory:  Intact  Orientation:  Person, Place, Time and Situation  Reliability:  good  Insight:  Good  Judgement:  Good  Impulse Control:  Good  Physical/Medical Issues:  No     PHQ-9 Depression Screening  Little interest or pleasure in doing things?     Feeling down, depressed, or hopeless?     Trouble falling or staying asleep, or sleeping too much?     Feeling tired or having little energy?     Poor appetite or overeating?     Feeling bad about yourself - or that you are a failure or have let yourself or your family down?      Trouble concentrating on things, such as reading the newspaper or watching television?     Moving or speaking so slowly that other people could have noticed? Or the opposite - being so fidgety or restless that you have been moving around a lot more than usual?     Thoughts that you would be better off dead, or of hurting yourself in some way?     PHQ-9 Total Score     If you checked off any problems, how difficult have these problems made it for you to do your work, take care of things at home, or get along with other people?          Assessment/Plan:  Diagnoses and all orders for this visit:    1. Bipolar disorder, current episode mixed, moderate ([facility]) ([facility])  -     Cariprazine HCl (Vraylar) 3 MG capsule capsule; Take 1 capsule by mouth Daily.  Dispense: 30 capsule; Refill: 2      Wean from Latuda. Start Vraylar. My Chart video appt start time 2:15 pm end time 2:26 pm    A psychological evaluation was conducted in order to assess past and current level of functioning. Areas assessed included, but were not limited to: perception of social support, perception of ability to face and deal with challenges in life (positive functioning), anxiety symptoms, depressive symptoms, perspective on beliefs/belief system, coping skills for stress, intelligence level,  Therapeutic rapport was established. Interventions conducted today were geared towards incorporating medication management along with support for continued therapy. Education was also provided as to the med management with this provider and what to expect in subsequent sessions.    We discussed risks, benefits,goals and side effects of the above medication and the patient was agreeable with the plan.Patient was educated on the importance of compliance with treatment and follow-up appointments. Patient is aware to contact the [facility] with any worsening of symptoms. To call for questions or concerns and return early if necessary. Patent is agreeable to go  to the Emergency Department or call 911 should they begin SI/HI.     Treatment Plan:   Discussed risks, benefits, and alternatives of medication. Encouraged healthy habits (eating, exercise and sleep). Call if any questions or problems arise. Medication reconciled. Controlled substance monitoring report reviewed. Provided psychoeducation.. Discussed coping strategies and current stressors. Set appropriate boundaries and limits for patient's well-being. Use distraction techniques to improve symptoms. Access support networks.      Return in about 4 weeks (around 12/1/2021) for Follow Up 30 min.    Karla Devine, APRN

## 2021-11-03 NOTE — TELEPHONE ENCOUNTER
Rx Refill Note  Requested Prescriptions     Pending Prescriptions Disp Refills   • diazePAM (VALIUM) 5 MG tablet [Pharmacy Med Name: DIAZEPAM 5 MG TABLET] 60 tablet 0     Sig: TAKE 1 TABLET BY MOUTH 2 (TWO) TIMES A DAY AS NEEDED FOR ANXIETY.      Last office visit with prescribing clinician: 11/3/2021      Next office visit with prescribing clinician: 12/8/2021            Steph Ivey MA  11/03/21, 16:35 EDT

## 2021-11-04 ENCOUNTER — TELEMEDICINE (OUTPATIENT)
Dept: FAMILY MEDICINE CLINIC | Facility: CLINIC | Age: 36
End: 2021-11-04

## 2021-11-04 DIAGNOSIS — J01.01 ACUTE RECURRENT MAXILLARY SINUSITIS: Primary | ICD-10-CM

## 2021-11-04 DIAGNOSIS — J01.01 ACUTE RECURRENT MAXILLARY SINUSITIS: ICD-10-CM

## 2021-11-04 PROCEDURE — 99212 OFFICE O/P EST SF 10 MIN: CPT

## 2021-11-04 RX ORDER — DOXYCYCLINE HYCLATE 100 MG/1
100 CAPSULE ORAL 2 TIMES DAILY
Qty: 20 CAPSULE | Refills: 0 | Status: SHIPPED | OUTPATIENT
Start: 2021-11-04 | End: 2021-11-14

## 2021-11-04 RX ORDER — DEXAMETHASONE 0.5 MG/1
TABLET ORAL
Qty: 21 TABLET | Refills: 0 | Status: SHIPPED | OUTPATIENT
Start: 2021-11-04 | End: 2021-11-09

## 2021-11-04 RX ORDER — BENZONATATE 100 MG/1
100 CAPSULE ORAL 3 TIMES DAILY PRN
Qty: 21 CAPSULE | Refills: 0 | Status: SHIPPED | OUTPATIENT
Start: 2021-11-04 | End: 2021-11-11

## 2021-11-04 RX ORDER — BENZONATATE 100 MG/1
100 CAPSULE ORAL 3 TIMES DAILY PRN
Qty: 21 CAPSULE | Refills: 0 | Status: SHIPPED | OUTPATIENT
Start: 2021-11-04 | End: 2021-11-04 | Stop reason: SDUPTHER

## 2021-11-04 NOTE — PROGRESS NOTES
Video/Telehealth Note      Patient Name: Lindsay Amezquita  : 1985   MRN: [id number]     Chief Complaint:    Chief Complaint   Patient presents with   • Sinusitis   • Sore Throat   • Fever       History of Present Illness: Lindsay Amezquita is a 36 y.o. female who presents for a telephone/video visit due to the SARS-CoV-2 pandemic. You have chosen to receive care through a telehealth visit.  Do you consent to use a video/audio connection for your medical care today? Yes    Patient complains of a sore throat for the past 5 to 6 days, diarrhea after every meal, and a fever up to 101.  She has been experiencing right-sided sinus pain, worse underneath her eye.  She will cough to clear her throat and is coughing up green-colored sputum.  Her face feels swollen to the right side and her neck feels tender.  Will have some postnasal drainage when she is lying down.  Has noticed some right ear pain and nausea.  Is also having a headache.  She denies chills, vomiting, abdominal pain, shortness of breath, chest pain, and chest congestion.  She has been able to tolerate p.o. fluids, stating that she has been drinking a lot of water and body armor.  No trouble with urination or dysuria.  Has been taking Tylenol 1000 mg every 4 hours to help with the fever and pain.  She continues to have her sense of taste and smell.  She is a smoker.  Nobody else in the home is sick and she has not had any recent ill contacts.  Notes that she has not left the house since .  Does report that she will typically get bronchitis and sinusitis around this time each year.    Subjective     Review of System: Review of Systems   Constitutional: Positive for fever. Negative for chills and fatigue.   HENT: Positive for congestion, ear pain, postnasal drip, sinus pressure, sinus pain and sore throat. Negative for rhinorrhea.    Eyes: Negative for pain.   Respiratory: Positive for cough. Negative for shortness of breath.    Cardiovascular:  Negative for chest pain.   Gastrointestinal: Positive for diarrhea and nausea. Negative for abdominal pain and vomiting.   Genitourinary: Negative for dysuria and flank pain.   Musculoskeletal: Negative for arthralgias and myalgias.   Neurological: Positive for headaches.      I have reviewed the ROS documented by my clinical staff, updated appropriately and I agree. CLAIR Obrien    Past Medical History:   Past Medical History:   Diagnosis Date   • Anxiety    • Bell's palsy    • Bipolar 2 disorder (HCC)    • Blood clot in vein    • Depression    • GERD (gastroesophageal reflux disease)    • Restless leg syndrome        Past Surgical History:   Past Surgical History:   Procedure Laterality Date   • CHOLECYSTECTOMY     • DENTAL PROCEDURE         Family History: History reviewed. No pertinent family history.    Social History:   Social History     Socioeconomic History   • Marital status: Single   Tobacco Use   • Smoking status: Current Every Day Smoker     Packs/day: 0.25     Types: Cigarettes   • Smokeless tobacco: Never Used   • Tobacco comment: also vaping   Vaping Use   • Vaping Use: Never used   Substance and Sexual Activity   • Alcohol use: Never   • Drug use: Never   • Sexual activity: Defer       Medications:     Current Outpatient Medications:   •  amitriptyline (ELAVIL) 150 MG tablet, TAKE 1 TABLET BY MOUTH EVERY DAY AT NIGHT, Disp: 30 tablet, Rfl: 1  •  apixaban (ELIQUIS) 5 MG tablet tablet, Take 1 tablet by mouth Every 12 (Twelve) Hours., Disp: 60 tablet, Rfl: 1  •  Apixaban Starter Pack (Eliquis DVT/PE Starter Pack) tablet therapy pack, Take two 5 mg tablets by mouth every 12 hours for 7 days. Followed by one 5 mg tablet every 12 hours. (Dispense starter pack if available), Disp: 74 tablet, Rfl: 0  •  benzonatate (Tessalon Perles) 100 MG capsule, Take 1 capsule by mouth 3 (Three) Times a Day As Needed for Cough for up to 7 days., Disp: 21 capsule, Rfl: 0  •  Cariprazine HCl (Vraylar) 3 MG capsule  capsule, Take 1 capsule by mouth Daily., Disp: 30 capsule, Rfl: 2  •  cetirizine (zyrTEC) 10 MG tablet, Take 1 tablet by mouth Daily., Disp: 30 tablet, Rfl: 5  •  dexamethasone (DECADRON) 0.5 MG tablet, 6 po x1d; then 5 po x 1d; then 4 po x 1d; then 3 po x 1d; then 2 po x 1d; then 1 po x1d; then STOP, Disp: 21 tablet, Rfl: 0  •  diazePAM (VALIUM) 5 MG tablet, Take 1 tablet by mouth Daily As Needed for Anxiety., Disp: 30 tablet, Rfl: 0  •  dicyclomine (Bentyl) 10 MG capsule, Take 1 capsule by mouth 4 (Four) Times a Day Before Meals & at Bedtime., Disp: 30 capsule, Rfl: 0  •  doxycycline (VIBRAMYCIN) 100 MG capsule, Take 1 capsule by mouth 2 (Two) Times a Day for 10 days., Disp: 20 capsule, Rfl: 0  •  famotidine (PEPCID) 40 MG tablet, Take 40 mg by mouth Daily., Disp: , Rfl:   •  fluticasone (FLONASE) 50 MCG/ACT nasal spray, INSTILL 2 SPRAYS INTO THE NOSTRIL(S) AS DIRECTED BY PROVIDER DAILY., Disp: 16 mL, Rfl: 1  •  furosemide (LASIX) 20 MG tablet, Take 1 tablet by mouth Daily., Disp: 30 tablet, Rfl: 5  •  gabapentin (NEURONTIN) 800 MG tablet, Take 1 tablet by mouth 3 (Three) Times a Day., Disp: 90 tablet, Rfl: 1  •  HYDROcodone-acetaminophen (Norco) 5-325 MG per tablet, Take 1 tablet by mouth Every 8 (Eight) Hours As Needed for Severe Pain ., Disp: 25 tablet, Rfl: 0  •  HYDROcodone-acetaminophen (NORCO) 7.5-325 MG per tablet, Take 1 tablet by mouth Daily As Needed., Disp: , Rfl:   •  hydrOXYzine (ATARAX) 10 MG tablet, Take 1-2 tablets by mouth 1-2 times per day as needed for anxiety, Disp: 30 tablet, Rfl: 0  •  Lidoderm 5 %, APPLY 1 PATCH TO SKIN ONCE DAILY **MAY WEAR UP TO 12 HOURS**, Disp: , Rfl:   •  omeprazole (priLOSEC) 40 MG capsule, Take 1 capsule by mouth Daily., Disp: 90 capsule, Rfl: 3  •  promethazine (PHENERGAN) 25 MG tablet, Take 1 tablet by mouth Every 6 (Six) Hours As Needed for Nausea or Vomiting., Disp: 30 tablet, Rfl: 0  •  saccharomyces boulardii (Florastor) 250 MG capsule, Take 1 capsule by mouth  "2 (Two) Times a Day., Disp: 30 capsule, Rfl: 0  •  silver sulfadiazine (Silvadene) 1 % cream, Apply 1 application topically to the appropriate area as directed Daily., Disp: 25 g, Rfl: 0  •  SUMAtriptan (Imitrex) 50 MG tablet, Take one tablet at onset of headache. May repeat dose one time in 2 hours if headache not relieved., Disp: 15 tablet, Rfl: 1  •  tiZANidine (ZANAFLEX) 4 MG tablet, TAKE 1 TABLET BY MOUTH AT NIGHT AS NEEDED FOR MUSCLE SPASMS., Disp: 30 tablet, Rfl: 1  •  vitamin D (ERGOCALCIFEROL) 1.25 MG (34632 UT) capsule capsule, TAKE 1 CAPSULE BY MOUTH 1 (ONE) TIME PER WEEK., Disp: 4 capsule, Rfl: 2    Allergies:   Allergies   Allergen Reactions   • Codeine Anaphylaxis   • Flexeril [Cyclobenzaprine] Other (See Comments)     \"gives me chest pain\"   • Asa [Aspirin] Hives     Wheezing     • Latex Hives   • Morphine Itching   • Penicillins Hives     Vomiting     • Compazine [Prochlorperazine] Anxiety   • Reglan [Metoclopramide] Anxiety       Objective     Physical Exam:   Vital Signs: There were no vitals filed for this visit.  There is no height or weight on file to calculate BMI.     Physical Exam  Vitals and nursing note reviewed.   Constitutional:       General: She is not in acute distress.     Appearance: She is obese.   Pulmonary:      Effort: Pulmonary effort is normal.   Neurological:      Mental Status: She is alert and oriented to person, place, and time.   Psychiatric:         Mood and Affect: Mood normal.         Behavior: Behavior normal.         Assessment / Plan      Assessment/Plan:   Diagnoses and all orders for this visit:    1. Acute recurrent maxillary sinusitis (Primary)  -     benzonatate (Tessalon Perles) 100 MG capsule; Take 1 capsule by mouth 3 (Three) Times a Day As Needed for Cough for up to 7 days.  Dispense: 21 capsule; Refill: 0  -     dexamethasone (DECADRON) 0.5 MG tablet; 6 po x1d; then 5 po x 1d; then 4 po x 1d; then 3 po x 1d; then 2 po x 1d; then 1 po x1d; then STOP  " Dispense: 21 tablet; Refill: 0  -     doxycycline (VIBRAMYCIN) 100 MG capsule; Take 1 capsule by mouth 2 (Two) Times a Day for 10 days.  Dispense: 20 capsule; Refill: 0         1. We will treat for acute sinus infection.  Her most recent antibiotic with cefdinir.  We will trial doxycycline for 10 days.  We will also treat with oral dexamethasone.  Patient requested Tessalon Perles to help with cough.  2. Advised patient to notify provider in 2 days if she is not having any improvement in her symptoms.  May require reevaluation and further testing.    Plan of care was reviewed with patient at the conclusion of today's visit. Education was provided regarding diagnoses, management, prescribed or recommended OTC products, and the importance of compliance with follow-up appointments. The patient was counseled regarding the risks, benefits, and possible side-effects of treatment. I advised the patient to keep me informed of any acute changes in their status including new, worsening, or persistent symptoms. Patient expresses understanding and agreement with the management plan.    Follow Up:   Return if symptoms worsen or fail to improve.    I spent approximately 10 minutes providing clinical care for this patient; including review of patient's chart and provider documentation, face to face time spent with patient in examination room (obtaining history, performing physical exam, discussing diagnosis and management options), placing orders, and completing patient documentation.     CLAIR Obrien  [facility]

## 2021-11-04 NOTE — TELEPHONE ENCOUNTER
Caller: Lindsay Amezquita    Relationship: Self      Requested Prescriptions:   Requested Prescriptions     Pending Prescriptions Disp Refills   • benzonatate (Tessalon Perles) 100 MG capsule 21 capsule 0     Sig: Take 1 capsule by mouth 3 (Three) Times a Day As Needed for Cough for up to 7 days.        Pharmacy where request should be sent: [facility]/pharmacy #[id number] - [place], [place] - [address] [phone] [facility] [phone]     Additional details provided by patient: PATIENT STATES THAT SHE SPOKE WITH THE PHARMACY AND THEY DON'T HAVE A PRESCRIPTION FOR HER     Best call back number: [phone]    Does the patient have less than a 3 day supply:  [x] Yes  [] No    Kim Campbell Rep   11/04/21 14:20 EDT

## 2021-11-05 ENCOUNTER — TELEPHONE (OUTPATIENT)
Dept: FAMILY MEDICINE CLINIC | Facility: CLINIC | Age: 36
End: 2021-11-05

## 2021-11-05 RX ORDER — FLUCONAZOLE 150 MG/1
TABLET ORAL
Qty: 2 TABLET | Refills: 0 | Status: SHIPPED | OUTPATIENT
Start: 2021-11-05 | End: 2021-11-23

## 2021-11-05 NOTE — TELEPHONE ENCOUNTER
Caller: Lindsay Amezquita    Relationship: Self    Best call back number: [phone]    What medication are you requesting:     What are your current symptoms: LINDSAY NOW SAYS SHE HAS A YEAST INFECTION    How long have you been experiencing symptoms: 1 DAY    Have you had these symptoms before:    [] Yes  [] No    Have you been treated for these symptoms before:   [] Yes  [] No    If a prescription is needed, what is your preferred pharmacy and phone number:    [facility]/pharmacy #[id number] - [place] - [address] [phone] [facility] [phone] [facility]     Additional notes:

## 2021-11-08 ENCOUNTER — TELEPHONE (OUTPATIENT)
Dept: PEDIATRICS | Facility: OTHER | Age: 36
End: 2021-11-08

## 2021-11-08 RX ORDER — TIZANIDINE 4 MG/1
4 TABLET ORAL NIGHTLY PRN
Qty: 30 TABLET | Refills: 1 | Status: SHIPPED | OUTPATIENT
Start: 2021-11-08 | End: 2022-01-03

## 2021-11-08 NOTE — TELEPHONE ENCOUNTER
Caller: Lindsay Amezquita    Relationship: Self    Best call back number: [phone]     What is the best time to reach you: ANYTIME TODAY    Who are you requesting to speak with (clinical staff, provider,  specific staff member): PCP OR MA    Do you know the name of the person who called:     What was the call regarding:PATIENT CALLED IN STATED SHE WAS RECENTLY DIAGNOSED WITH BRONCHITIS BUT PATIENT SAYS HER THROAT IS GETTING WORSE BY THE DAY CAUSING THE PATIENT TO HAVE ISSUES TAKING MEDICATIONS SHE WOULD LIKE A CALLBACK TO DISCUSS THESE ISSUES    Do you require a callback: YES

## 2021-11-08 NOTE — TELEPHONE ENCOUNTER
Caller: Lindsay Amezquita    Relationship: Self    Requested Prescriptions:   Requested Prescriptions     Pending Prescriptions Disp Refills   • tiZANidine (ZANAFLEX) 4 MG tablet 30 tablet 1     Sig: Take 1 tablet by mouth At Night As Needed for Muscle Spasms.        Pharmacy where request should be sent: [facility]/pharmacy #[id number] - [place], [place] - [address] [phone] [facility] [phone]     Additional details provided by patient: THIS IS OUT OF REFILLS    Best call back number: [phone]     Does the patient have less than a 3 day supply:  [] Yes  [x] No    Edda Khalil, PCT   11/08/21 12:52 EST

## 2021-11-09 ENCOUNTER — OFFICE VISIT (OUTPATIENT)
Dept: FAMILY MEDICINE CLINIC | Facility: CLINIC | Age: 36
End: 2021-11-09

## 2021-11-09 ENCOUNTER — TELEPHONE (OUTPATIENT)
Dept: FAMILY MEDICINE CLINIC | Facility: CLINIC | Age: 36
End: 2021-11-09

## 2021-11-09 VITALS
HEIGHT: 63 IN | SYSTOLIC BLOOD PRESSURE: 124 MMHG | TEMPERATURE: 98.2 F | BODY MASS INDEX: 47.84 KG/M2 | DIASTOLIC BLOOD PRESSURE: 86 MMHG | HEART RATE: 110 BPM | OXYGEN SATURATION: 95 % | WEIGHT: 270 LBS

## 2021-11-09 DIAGNOSIS — B37.0 THRUSH: Primary | ICD-10-CM

## 2021-11-09 DIAGNOSIS — G43.809 OTHER MIGRAINE WITHOUT STATUS MIGRAINOSUS, NOT INTRACTABLE: ICD-10-CM

## 2021-11-09 DIAGNOSIS — F31.62 BIPOLAR DISORDER, CURRENT EPISODE MIXED, MODERATE (HCC): Primary | ICD-10-CM

## 2021-11-09 DIAGNOSIS — J30.9 ALLERGIC RHINITIS, UNSPECIFIED SEASONALITY, UNSPECIFIED TRIGGER: ICD-10-CM

## 2021-11-09 PROCEDURE — 99214 OFFICE O/P EST MOD 30 MIN: CPT | Performed by: NURSE PRACTITIONER

## 2021-11-09 RX ORDER — LEVOCETIRIZINE DIHYDROCHLORIDE 5 MG/1
5 TABLET, FILM COATED ORAL EVERY EVENING
Qty: 30 TABLET | Refills: 5 | Status: SHIPPED | OUTPATIENT
Start: 2021-11-09 | End: 2022-04-08 | Stop reason: SDUPTHER

## 2021-11-09 RX ORDER — LURASIDONE HYDROCHLORIDE 120 MG/1
120 TABLET, FILM COATED ORAL DAILY
Qty: 30 TABLET | Refills: 3 | Status: SHIPPED | OUTPATIENT
Start: 2021-11-09 | End: 2022-01-24 | Stop reason: SDUPTHER

## 2021-11-09 RX ORDER — ACETAMINOPHEN 500 MG
500 TABLET ORAL EVERY 6 HOURS PRN
COMMUNITY
End: 2021-11-23

## 2021-11-09 RX ORDER — RIMEGEPANT SULFATE 75 MG/75MG
1 TABLET, ORALLY DISINTEGRATING ORAL ONCE AS NEEDED
Qty: 9 TABLET | Refills: 1 | Status: SHIPPED | OUTPATIENT
Start: 2021-11-09 | End: 2022-01-19

## 2021-11-09 NOTE — TELEPHONE ENCOUNTER
Patient called & said the Vraylar isn't working & would like to be switched back to Latuda, which she said worked a lot better. Please advise.

## 2021-11-09 NOTE — PROGRESS NOTES
Subjective     Chief Complaint:    Chief Complaint   Patient presents with   • Follow-up   • Thrush       History of Present Illness:   Was treated for bronchitis and sinusitits with steroids and doxy. Still having sinus headache and pressure. She is still on anbx. Is done with steroids.   She has got vaginal yeast infection and start diflucan yesterday. She notes sore throat and feels like she has yeast in her mouth. She feels like her tongue looks white.   Feels feverish but not sure.   Both ears hurt.   She is using flonase and zyrtec.   She has migraine disorder. Imitrex makes her left arm go numb. Would like nurtec.       Review of Systems  Gen- No fevers, chills  CV- No chest pain, palpitations  GI- No N/V/D, abd pain  Neuro-No dizziness, headaches      I have reviewed and/or updated the patient's past medical, surgical, family, social history and problem list as appropriate.     Medications:    Current Outpatient Medications:   •  acetaminophen (TYLENOL) 500 MG tablet, Take 500 mg by mouth Every 6 (Six) Hours As Needed for Mild Pain ., Disp: , Rfl:   •  amitriptyline (ELAVIL) 150 MG tablet, TAKE 1 TABLET BY MOUTH EVERY DAY AT NIGHT, Disp: 30 tablet, Rfl: 1  •  apixaban (ELIQUIS) 5 MG tablet tablet, Take 1 tablet by mouth Every 12 (Twelve) Hours., Disp: 60 tablet, Rfl: 1  •  benzonatate (Tessalon Perles) 100 MG capsule, Take 1 capsule by mouth 3 (Three) Times a Day As Needed for Cough for up to 7 days., Disp: 21 capsule, Rfl: 0  •  Cariprazine HCl (Vraylar) 3 MG capsule capsule, Take 1 capsule by mouth Daily., Disp: 30 capsule, Rfl: 2  •  diazePAM (VALIUM) 5 MG tablet, Take 1 tablet by mouth Daily As Needed for Anxiety., Disp: 30 tablet, Rfl: 0  •  dicyclomine (Bentyl) 10 MG capsule, Take 1 capsule by mouth 4 (Four) Times a Day Before Meals & at Bedtime., Disp: 30 capsule, Rfl: 0  •  doxycycline (VIBRAMYCIN) 100 MG capsule, Take 1 capsule by mouth 2 (Two) Times a Day for 10 days., Disp: 20 capsule, Rfl:  0  •  famotidine (PEPCID) 40 MG tablet, Take 40 mg by mouth Daily., Disp: , Rfl:   •  fluconazole (Diflucan) 150 MG tablet, Take 1 tablet by mouth now, repeat in 3 days if needed, Disp: 2 tablet, Rfl: 0  •  fluticasone (FLONASE) 50 MCG/ACT nasal spray, INSTILL 2 SPRAYS INTO THE NOSTRIL(S) AS DIRECTED BY PROVIDER DAILY., Disp: 16 mL, Rfl: 1  •  furosemide (LASIX) 20 MG tablet, Take 1 tablet by mouth Daily., Disp: 30 tablet, Rfl: 5  •  gabapentin (NEURONTIN) 800 MG tablet, Take 1 tablet by mouth 3 (Three) Times a Day., Disp: 90 tablet, Rfl: 1  •  HYDROcodone-acetaminophen (NORCO) 7.5-325 MG per tablet, Take 1 tablet by mouth Daily As Needed., Disp: , Rfl:   •  Lidoderm 5 %, APPLY 1 PATCH TO SKIN ONCE DAILY **MAY WEAR UP TO 12 HOURS**, Disp: , Rfl:   •  omeprazole (priLOSEC) 40 MG capsule, Take 1 capsule by mouth Daily., Disp: 90 capsule, Rfl: 3  •  promethazine (PHENERGAN) 25 MG tablet, Take 1 tablet by mouth Every 6 (Six) Hours As Needed for Nausea or Vomiting., Disp: 30 tablet, Rfl: 0  •  silver sulfadiazine (Silvadene) 1 % cream, Apply 1 application topically to the appropriate area as directed Daily., Disp: 25 g, Rfl: 0  •  tiZANidine (ZANAFLEX) 4 MG tablet, Take 1 tablet by mouth At Night As Needed for Muscle Spasms., Disp: 30 tablet, Rfl: 1  •  vitamin D (ERGOCALCIFEROL) 1.25 MG (68299 UT) capsule capsule, TAKE 1 CAPSULE BY MOUTH 1 (ONE) TIME PER WEEK., Disp: 4 capsule, Rfl: 2  •  levocetirizine (XYZAL) 5 MG tablet, Take 1 tablet by mouth Every Evening., Disp: 30 tablet, Rfl: 5  •  Lurasidone HCl (Latuda) 120 MG tablet tablet, Take 1 tablet by mouth Daily. Take 120 mg orally daily with a meal., Disp: 30 tablet, Rfl: 3  •  nystatin (MYCOSTATIN) 100,000 unit/mL suspension, Swish and swallow 5 mL 4 (Four) Times a Day for 7 days., Disp: 210 mL, Rfl: 0  •  Rimegepant Sulfate (Nurtec) 75 MG tablet dispersible tablet, Take 1 tablet by mouth 1 (One) Time As Needed (migraine)., Disp: 9 tablet, Rfl:  "1    Allergies:  Allergies   Allergen Reactions   • Codeine Anaphylaxis   • Flexeril [Cyclobenzaprine] Other (See Comments)     \"gives me chest pain\"   • Asa [Aspirin] Hives     Wheezing     • Latex Hives   • Morphine Itching   • Penicillins Hives     Vomiting     • Compazine [Prochlorperazine] Anxiety   • Reglan [Metoclopramide] Anxiety       Objective     Vital Signs:   Vitals:    11/09/21 1435   BP: 124/86   Pulse: 110   Temp: 98.2 °F (36.8 °C)   SpO2: 95%   Weight: 122 kg (270 lb)   Height: 160 cm (63\")   PainSc: 0-No pain     Body mass index is 47.83 kg/m².    Physical Exam:    Physical Exam  Constitutional:       Appearance: She is well-developed.   HENT:      Head: Normocephalic and atraumatic.      Right Ear: Tympanic membrane, ear canal and external ear normal.      Left Ear: Tympanic membrane, ear canal and external ear normal.      Nose: Nose normal.      Mouth/Throat:      Pharynx: Uvula midline.      Comments: Tongue with white coating, cheeks with white coating  Eyes:      Pupils: Pupils are equal, round, and reactive to light.   Cardiovascular:      Rate and Rhythm: Normal rate and regular rhythm.      Heart sounds: Normal heart sounds. No murmur heard.  No friction rub. No gallop.    Pulmonary:      Effort: Pulmonary effort is normal.      Breath sounds: Normal breath sounds.   Abdominal:      General: Bowel sounds are normal.      Palpations: Abdomen is soft.      Tenderness: There is no abdominal tenderness.   Musculoskeletal:      Cervical back: Neck supple.   Lymphadenopathy:      Head:      Right side of head: No submental, submandibular, tonsillar, preauricular or posterior auricular adenopathy.      Left side of head: No submental, submandibular, tonsillar, preauricular or posterior auricular adenopathy.      Cervical: No cervical adenopathy.   Skin:     General: Skin is warm and dry.   Neurological:      Mental Status: She is alert and oriented to person, place, and time.   Psychiatric:       "   Behavior: Behavior normal.         Assessment / Plan     Assessment/Plan:   Problem List Items Addressed This Visit     None      Visit Diagnoses     Thrush    -  Primary    Relevant Medications    nystatin (MYCOSTATIN) 100,000 unit/mL suspension    Other migraine without status migrainosus, not intractable        Relevant Medications    acetaminophen (TYLENOL) 500 MG tablet    Rimegepant Sulfate (Nurtec) 75 MG tablet dispersible tablet    Allergic rhinitis, unspecified seasonality, unspecified trigger        Relevant Medications    levocetirizine (XYZAL) 5 MG tablet        -- nsytatin for oral thrush  -- nurtec for migraines, stop imitrex, keep f/u with neuro  -- allergies uncontrolled, stop zyrtec and start xyzal    Follow up:  As needed    Electronically signed by CLAIR Leal   11/09/2021 14:45 EST      Please note that portions of this note may have been completed with a voice recognition program. Efforts were made to edit the dictations, but occasionally words are mistranscribed.

## 2021-11-10 DIAGNOSIS — G43.809 OTHER MIGRAINE WITHOUT STATUS MIGRAINOSUS, NOT INTRACTABLE: ICD-10-CM

## 2021-11-10 RX ORDER — SUMATRIPTAN 50 MG/1
TABLET, FILM COATED ORAL
Qty: 9 TABLET | Refills: 3 | OUTPATIENT
Start: 2021-11-10

## 2021-11-11 ENCOUNTER — TELEPHONE (OUTPATIENT)
Dept: FAMILY MEDICINE CLINIC | Facility: CLINIC | Age: 36
End: 2021-11-11

## 2021-11-11 ENCOUNTER — TELEPHONE (OUTPATIENT)
Dept: ONCOLOGY | Facility: CLINIC | Age: 36
End: 2021-11-11

## 2021-11-11 NOTE — TELEPHONE ENCOUNTER
Caller: Lindsay Amezquita    Relationship to patient: Self    Best call back number: [phone]     What is the call regarding:  PATIENT STATES THAT SHE SAW NADER YESTERDAY FOR A SINUS INFECTION AND YEAST INFECTION IN HER MOUTH AND WHEN SHE SWALLOWS, EATS, OR LAYS DOWN, SHE FEELS LIKE SHE IS SHORT OF BREATH.

## 2021-11-11 NOTE — TELEPHONE ENCOUNTER
Caller: Lindsay Amezquita    Relationship to patient: Self    Best call back number: [phone]    Chief complaint: DOMINIQUE.    Type of visit: NEW PATIENT    Requested date: [facility] IN [place] IN AFTERNOON, PLEASE.    If rescheduling, when is the original appointment: 10/27/2021    Additional notes: TRIED TO DOMINIQUE. BUT NOTHING AVAILABLE IN HUB TIMEFRAME.

## 2021-11-12 ENCOUNTER — TELEPHONE (OUTPATIENT)
Dept: FAMILY MEDICINE CLINIC | Facility: CLINIC | Age: 36
End: 2021-11-12

## 2021-11-12 ENCOUNTER — HOSPITAL ENCOUNTER (OUTPATIENT)
Dept: MRI IMAGING | Facility: HOSPITAL | Age: 36
Discharge: HOME | End: 2021-11-12
Admitting: NURSE PRACTITIONER

## 2021-11-12 ENCOUNTER — PRIOR AUTHORIZATION (OUTPATIENT)
Dept: FAMILY MEDICINE CLINIC | Facility: CLINIC | Age: 36
End: 2021-11-12

## 2021-11-12 DIAGNOSIS — G43.809 OTHER MIGRAINE WITHOUT STATUS MIGRAINOSUS, NOT INTRACTABLE: ICD-10-CM

## 2021-11-12 PROCEDURE — 70551 MRI BRAIN STEM W/O DYE: CPT

## 2021-11-12 RX ORDER — SUCRALFATE 1 G/1
1 TABLET ORAL 4 TIMES DAILY
Qty: 60 TABLET | Refills: 0 | Status: SHIPPED | OUTPATIENT
Start: 2021-11-12 | End: 2021-11-23

## 2021-11-12 NOTE — TELEPHONE ENCOUNTER
Likely due to doxycycline. Please have her stop doxycycline. I sent Carafate to the pharmacy. She can take before meals for a week or 2 and see if that helps

## 2021-11-12 NOTE — TELEPHONE ENCOUNTER
PATIENT CALLED AND WANTED TO LET PCP KNOW THAT SHE IS HAVING MAJOR PAIN AT TOP OF STOMACH UNDERNEATH BREAST BONE.    PATIENT IS REQUESTING A CALL BACK TO ADVISE ON WHAT SHE NEEDS TO DO.  HUB OFFERED TO SCHEDULE APPOINTMENT BUT PATIENT STATED IT COULDN'T BE TODAY DUE TO HAVING AN MRI SCHEDULED.  PATIENT STATED SHE WILL WAIT ON PHONE CALL FROM PCP.    CALL BACK NUMBER [phone]

## 2021-11-12 NOTE — TELEPHONE ENCOUNTER
Patient called crying. She said the pain is now radiating under breast into area where her gallbladder use to be. She would like to know what to do and would like to have someone call her back.

## 2021-11-14 DIAGNOSIS — I82.432 ACUTE DEEP VEIN THROMBOSIS (DVT) OF POPLITEAL VEIN OF LEFT LOWER EXTREMITY (HCC): ICD-10-CM

## 2021-11-15 ENCOUNTER — TELEPHONE (OUTPATIENT)
Dept: FAMILY MEDICINE CLINIC | Facility: CLINIC | Age: 36
End: 2021-11-15

## 2021-11-15 RX ORDER — APIXABAN 5 MG/1
TABLET, FILM COATED ORAL
Qty: 60 TABLET | Refills: 1 | Status: SHIPPED | OUTPATIENT
Start: 2021-11-15 | End: 2022-01-10

## 2021-11-15 NOTE — TELEPHONE ENCOUNTER
Caller: Lindsay Amezquita    Relationship to patient: Self    Best call back number: [phone]    Patient is needing: PATIENT STATED THAT THE TOP OF HER STOMACH IS PAINFUL AND MAKING HER VOMIT PATIENT IS CONCERNED AND WOULD LIKE TO SEE WHAT SHE WOULD NEED TO DO    PLEASE ADVISE

## 2021-11-16 ENCOUNTER — TELEPHONE (OUTPATIENT)
Dept: FAMILY MEDICINE CLINIC | Facility: CLINIC | Age: 36
End: 2021-11-16

## 2021-11-16 NOTE — TELEPHONE ENCOUNTER
I added Carafate last week.  She needs to continue Carafate, Bentyl, Prilosec.  Does she have any Zofran.  If pain continues or worsens she needs to go to the ER

## 2021-11-17 ENCOUNTER — TELEMEDICINE (OUTPATIENT)
Dept: FAMILY MEDICINE CLINIC | Facility: CLINIC | Age: 36
End: 2021-11-17

## 2021-11-17 DIAGNOSIS — F31.62 BIPOLAR DISORDER, CURRENT EPISODE MIXED, MODERATE (HCC): Primary | ICD-10-CM

## 2021-11-17 PROCEDURE — 99212 OFFICE O/P EST SF 10 MIN: CPT

## 2021-11-17 NOTE — PROGRESS NOTES
Video/Telehealth Note      Patient Name: Lindsay Amezquita  : 1985   MRN: [id number]     Chief Complaint:  No chief complaint on file.      History of Present Illness: Lindsay Amezquita is a 36 y.o. female who presents for a telephone/video visit due to the SARS-CoV-2 pandemic. You have chosen to receive care through a telehealth visit.  Do you consent to use a video/audio connection for your medical care today? Yes    Patient is by herself during the video visit.  She reports that she woke up today feeling dizzy, cannot move her head, nauseous, feels like her stomach is in knots, weakness, and a temperature up to 99.3.  She reports that she is unable to get her Latuda refilled until  because of an insurance problem.  She states that earlier this month she was advised to discontinue the Latuda and start Vraylar.  After taking Vraylar for couple days she started having negative side effects and inform the provider.  She was then informed to stop the Vraylar and restart her Latuda.  However, patient had already thrown away her Latuda and did not have any tablets at home to begin.  She is unsure of exactly how many days this has been, but thinks it is approximately been 1 to 2 weeks.  She is very anxious and feels overwhelmed.  Also reports that she recently started a new job earlier today.    Subjective     Review of System: Review of Systems   Constitutional: Positive for fatigue.   Gastrointestinal: Positive for abdominal pain and nausea.   Neurological: Positive for weakness and headache.   Psychiatric/Behavioral: Positive for positive for hyperactivity and stress. Negative for self-injury, sleep disturbance and suicidal ideas. The patient is nervous/anxious.        I have reviewed the ROS documented by my clinical staff, updated appropriately and I agree. CLAIR Obrien    Past Medical History:   Past Medical History:   Diagnosis Date   • Anxiety    • Bell's palsy    • Bipolar 2 disorder (HCC)    •  Blood clot in vein    • Depression    • GERD (gastroesophageal reflux disease)    • Restless leg syndrome        Past Surgical History:   Past Surgical History:   Procedure Laterality Date   • CHOLECYSTECTOMY     • DENTAL PROCEDURE         Family History: History reviewed. No pertinent family history.    Social History:   Social History     Socioeconomic History   • Marital status: Single   Tobacco Use   • Smoking status: Current Every Day Smoker     Packs/day: 0.25     Types: Cigarettes   • Smokeless tobacco: Never Used   • Tobacco comment: also vaping   Vaping Use   • Vaping Use: Never used   Substance and Sexual Activity   • Alcohol use: Never   • Drug use: Never   • Sexual activity: Defer       Medications:     Current Outpatient Medications:   •  acetaminophen (TYLENOL) 500 MG tablet, Take 500 mg by mouth Every 6 (Six) Hours As Needed for Mild Pain ., Disp: , Rfl:   •  amitriptyline (ELAVIL) 150 MG tablet, TAKE 1 TABLET BY MOUTH EVERY DAY AT NIGHT, Disp: 30 tablet, Rfl: 1  •  Cariprazine HCl (Vraylar) 3 MG capsule capsule, Take 1 capsule by mouth Daily., Disp: 30 capsule, Rfl: 2  •  diazePAM (VALIUM) 5 MG tablet, Take 1 tablet by mouth Daily As Needed for Anxiety., Disp: 30 tablet, Rfl: 0  •  dicyclomine (Bentyl) 10 MG capsule, Take 1 capsule by mouth 4 (Four) Times a Day Before Meals & at Bedtime., Disp: 30 capsule, Rfl: 0  •  Eliquis 5 MG tablet tablet, TAKE 1 TABLET BY MOUTH EVERY 12 HOURS, Disp: 60 tablet, Rfl: 1  •  famotidine (PEPCID) 40 MG tablet, Take 40 mg by mouth Daily., Disp: , Rfl:   •  fluconazole (Diflucan) 150 MG tablet, Take 1 tablet by mouth now, repeat in 3 days if needed, Disp: 2 tablet, Rfl: 0  •  fluticasone (FLONASE) 50 MCG/ACT nasal spray, INSTILL 2 SPRAYS INTO THE NOSTRIL(S) AS DIRECTED BY PROVIDER DAILY., Disp: 16 mL, Rfl: 1  •  furosemide (LASIX) 20 MG tablet, Take 1 tablet by mouth Daily., Disp: 30 tablet, Rfl: 5  •  gabapentin (NEURONTIN) 800 MG tablet, Take 1 tablet by mouth 3  "(Three) Times a Day., Disp: 90 tablet, Rfl: 1  •  HYDROcodone-acetaminophen (NORCO) 7.5-325 MG per tablet, Take 1 tablet by mouth Daily As Needed., Disp: , Rfl:   •  levocetirizine (XYZAL) 5 MG tablet, Take 1 tablet by mouth Every Evening., Disp: 30 tablet, Rfl: 5  •  Lidoderm 5 %, APPLY 1 PATCH TO SKIN ONCE DAILY **MAY WEAR UP TO 12 HOURS**, Disp: , Rfl:   •  Lurasidone HCl (Latuda) 120 MG tablet tablet, Take 1 tablet by mouth Daily. Take 120 mg orally daily with a meal., Disp: 30 tablet, Rfl: 3  •  omeprazole (priLOSEC) 40 MG capsule, Take 1 capsule by mouth Daily., Disp: 90 capsule, Rfl: 3  •  promethazine (PHENERGAN) 25 MG tablet, Take 1 tablet by mouth Every 6 (Six) Hours As Needed for Nausea or Vomiting., Disp: 30 tablet, Rfl: 0  •  Rimegepant Sulfate (Nurtec) 75 MG tablet dispersible tablet, Take 1 tablet by mouth 1 (One) Time As Needed (migraine)., Disp: 9 tablet, Rfl: 1  •  silver sulfadiazine (Silvadene) 1 % cream, Apply 1 application topically to the appropriate area as directed Daily., Disp: 25 g, Rfl: 0  •  sucralfate (Carafate) 1 g tablet, Take 1 tablet by mouth 4 (Four) Times a Day., Disp: 60 tablet, Rfl: 0  •  tiZANidine (ZANAFLEX) 4 MG tablet, Take 1 tablet by mouth At Night As Needed for Muscle Spasms., Disp: 30 tablet, Rfl: 1  •  vitamin D (ERGOCALCIFEROL) 1.25 MG (29081 UT) capsule capsule, TAKE 1 CAPSULE BY MOUTH 1 (ONE) TIME PER WEEK., Disp: 4 capsule, Rfl: 2    Allergies:   Allergies   Allergen Reactions   • Codeine Anaphylaxis   • Flexeril [Cyclobenzaprine] Other (See Comments)     \"gives me chest pain\"   • Asa [Aspirin] Hives     Wheezing     • Latex Hives   • Morphine Itching   • Penicillins Hives     Vomiting     • Compazine [Prochlorperazine] Anxiety   • Reglan [Metoclopramide] Anxiety       Objective     Physical Exam:   Vital Signs: There were no vitals filed for this visit.  There is no height or weight on file to calculate BMI.     Physical Exam  Vitals and nursing note reviewed. "   Constitutional:       General: She is not in acute distress.  Pulmonary:      Effort: Pulmonary effort is normal.   Neurological:      Mental Status: She is alert and oriented to person, place, and time. Mental status is at baseline.   Psychiatric:         Mood and Affect: Mood is anxious.         Behavior: Behavior is hyperactive.         Assessment / Plan      Assessment/Plan:   Diagnoses and all orders for this visit:    1. Bipolar disorder, current episode mixed, moderate (HCC) (Primary)         1. Patient was instructed to come to the clinic to  2 packs of the Latuda 40 mg samples.  Advised her that she would take 3 tablets to equal her usual 120 mg dose.  She understands.  This would allow her to have doses for 3 days and a lot for enough time to  her prescription on November 19.    Plan of care was reviewed with patient at the conclusion of today's visit. Education was provided regarding diagnoses, management, prescribed or recommended OTC products, and the importance of compliance with follow-up appointments. The patient was counseled regarding the risks, benefits, and possible side-effects of treatment. I advised the patient to keep me informed of any acute changes in their status including new, worsening, or persistent symptoms. Patient expresses understanding and agreement with the management plan.    Follow Up:   Return if symptoms worsen or fail to improve.    I spent approximately 10 minutes providing clinical care for this patient; including review of patient's chart and provider documentation, face to face time spent with patient in examination room (obtaining history, performing physical exam, discussing diagnosis and management options), placing orders, and completing patient documentation.     CLAIR Obrien  [facility] [place] [facility] [place]

## 2021-11-22 ENCOUNTER — TELEPHONE (OUTPATIENT)
Dept: INTERNAL MEDICINE | Facility: CLINIC | Age: 36
End: 2021-11-22

## 2021-11-22 NOTE — TELEPHONE ENCOUNTER
Received phone call from patient on November 21, 2021 at 0840.  Patient states that she is weak with vomiting, shortness of breath, chest tightness and fever.  I explained to her that I cannot call in anything for her over the phone and with current symptoms I am concerned that she needs to be seen today and should not wait until tomorrow.  I encouraged her to seek care at urgent care or emergency room.

## 2021-11-23 ENCOUNTER — TELEMEDICINE (OUTPATIENT)
Dept: FAMILY MEDICINE CLINIC | Facility: CLINIC | Age: 36
End: 2021-11-23

## 2021-11-23 DIAGNOSIS — R60.9 SWELLING: ICD-10-CM

## 2021-11-23 DIAGNOSIS — F41.1 GAD (GENERALIZED ANXIETY DISORDER): Primary | ICD-10-CM

## 2021-11-23 DIAGNOSIS — K21.9 GASTROESOPHAGEAL REFLUX DISEASE WITHOUT ESOPHAGITIS: ICD-10-CM

## 2021-11-23 PROCEDURE — 99213 OFFICE O/P EST LOW 20 MIN: CPT

## 2021-11-23 RX ORDER — OMEPRAZOLE 40 MG/1
40 CAPSULE, DELAYED RELEASE ORAL DAILY
Qty: 90 CAPSULE | Refills: 3 | Status: SHIPPED | OUTPATIENT
Start: 2021-11-23 | End: 2022-01-11

## 2021-11-23 RX ORDER — CYANOCOBALAMIN (VITAMIN B-12) 500 MCG
500 TABLET ORAL DAILY
Qty: 30 TABLET | Refills: 5 | Status: SHIPPED | OUTPATIENT
Start: 2021-11-23 | End: 2022-06-06

## 2021-11-23 RX ORDER — HYDROCODONE BITARTRATE AND ACETAMINOPHEN 7.5; 325 MG/1; MG/1
1 TABLET ORAL DAILY PRN
OUTPATIENT
Start: 2021-11-23

## 2021-11-23 RX ORDER — HYDROXYZINE HYDROCHLORIDE 25 MG/1
25 TABLET, FILM COATED ORAL 3 TIMES DAILY PRN
Qty: 30 TABLET | Refills: 5 | Status: SHIPPED | OUTPATIENT
Start: 2021-11-23 | End: 2022-03-28 | Stop reason: SDUPTHER

## 2021-11-23 RX ORDER — FUROSEMIDE 20 MG/1
20 TABLET ORAL DAILY
Qty: 30 TABLET | Refills: 5 | Status: SHIPPED | OUTPATIENT
Start: 2021-11-23 | End: 2022-11-17

## 2021-11-23 NOTE — PROGRESS NOTES
Video/Telehealth Note      Patient Name: Lindsay Amezquita  : 1985   MRN: [id number]     Chief Complaint:  No chief complaint on file.      History of Present Illness: Lindsay Amezquita is a 36 y.o. female who presents for a telephone/video visit due to the SARS-CoV-2 pandemic. You have chosen to receive care through a telehealth visit.  Do you consent to use a video/audio connection for your medical care today? Yes    She is by herself during the video visit.  Since her previous visit last week she was able to get her prescription refilled for the Latuda yesterday.  She was very grateful for the samples that she received in office to get her through the weekend.  She states that she separates her medicines into 2 purses, 1 for daytime and 1 for nighttime medicines.  The bag that contains her daytime medicines was left in her parents vehicle which was repossessed and she is unable to get her purse back.  This bag contained her Valium, hydroxyzine, Lasix, and omeprazole.  She notes that she is having frequent panic attacks, is having a cough, feels nervous and anxious, has nausea but no vomiting, and feels feverish.  States that yesterday afternoon she had a temperature of 103.2 and a temperature of 102.3 last night.  Today it was 100.1.  She states that she would like to see CLAIR Acosta, before December.  Would also like a prescription for vitamin B12.  And is checking on the PA for the Nurtec.    Subjective     Review of System: Review of Systems   Constitutional: Positive for fever.   Respiratory: Positive for cough.    Psychiatric/Behavioral: Positive for sleep disturbance and positive for hyperactivity. The patient is nervous/anxious.        I have reviewed the ROS documented by my clinical staff, updated appropriately and I agree. CLAIR Obrien    Past Medical History:   Past Medical History:   Diagnosis Date   • Anxiety    • Bell's palsy    • Bipolar 2 disorder (HCC)    • Blood clot in vein    •  Depression    • GERD (gastroesophageal reflux disease)    • Restless leg syndrome        Past Surgical History:   Past Surgical History:   Procedure Laterality Date   • CHOLECYSTECTOMY     • DENTAL PROCEDURE         Family History: History reviewed. No pertinent family history.    Social History:   Social History     Socioeconomic History   • Marital status: Single   Tobacco Use   • Smoking status: Current Every Day Smoker     Packs/day: 0.25     Types: Cigarettes   • Smokeless tobacco: Never Used   • Tobacco comment: also vaping   Vaping Use   • Vaping Use: Never used   Substance and Sexual Activity   • Alcohol use: Never   • Drug use: Never   • Sexual activity: Defer       Medications:     Current Outpatient Medications:   •  amitriptyline (ELAVIL) 150 MG tablet, TAKE 1 TABLET BY MOUTH EVERY DAY AT NIGHT, Disp: 30 tablet, Rfl: 1  •  cyanocobalamin 500 MCG tablet, Take 1 tablet by mouth Daily., Disp: 30 tablet, Rfl: 5  •  diazePAM (VALIUM) 5 MG tablet, Take 1 tablet by mouth Daily As Needed for Anxiety., Disp: 30 tablet, Rfl: 0  •  Eliquis 5 MG tablet tablet, TAKE 1 TABLET BY MOUTH EVERY 12 HOURS, Disp: 60 tablet, Rfl: 1  •  furosemide (LASIX) 20 MG tablet, Take 1 tablet by mouth Daily., Disp: 30 tablet, Rfl: 5  •  gabapentin (NEURONTIN) 800 MG tablet, Take 1 tablet by mouth 3 (Three) Times a Day., Disp: 90 tablet, Rfl: 1  •  HYDROcodone-acetaminophen (NORCO) 7.5-325 MG per tablet, Take 1 tablet by mouth Daily As Needed., Disp: , Rfl:   •  hydrOXYzine (ATARAX) 25 MG tablet, Take 1 tablet by mouth 3 (Three) Times a Day As Needed for Anxiety., Disp: 30 tablet, Rfl: 5  •  levocetirizine (XYZAL) 5 MG tablet, Take 1 tablet by mouth Every Evening., Disp: 30 tablet, Rfl: 5  •  Lidoderm 5 %, APPLY 1 PATCH TO SKIN ONCE DAILY **MAY WEAR UP TO 12 HOURS**, Disp: , Rfl:   •  Lurasidone HCl (Latuda) 120 MG tablet tablet, Take 1 tablet by mouth Daily. Take 120 mg orally daily with a meal., Disp: 30 tablet, Rfl: 3  •  omeprazole  "(priLOSEC) 40 MG capsule, Take 1 capsule by mouth Daily., Disp: 90 capsule, Rfl: 3  •  promethazine (PHENERGAN) 25 MG tablet, Take 1 tablet by mouth Every 6 (Six) Hours As Needed for Nausea or Vomiting., Disp: 30 tablet, Rfl: 0  •  Rimegepant Sulfate (Nurtec) 75 MG tablet dispersible tablet, Take 1 tablet by mouth 1 (One) Time As Needed (migraine)., Disp: 9 tablet, Rfl: 1  •  tiZANidine (ZANAFLEX) 4 MG tablet, Take 1 tablet by mouth At Night As Needed for Muscle Spasms., Disp: 30 tablet, Rfl: 1  •  vitamin D (ERGOCALCIFEROL) 1.25 MG (58759 UT) capsule capsule, TAKE 1 CAPSULE BY MOUTH 1 (ONE) TIME PER WEEK., Disp: 4 capsule, Rfl: 2    Allergies:   Allergies   Allergen Reactions   • Codeine Anaphylaxis   • Flexeril [Cyclobenzaprine] Other (See Comments)     \"gives me chest pain\"   • Asa [Aspirin] Hives     Wheezing     • Latex Hives   • Morphine Itching   • Penicillins Hives     Vomiting     • Compazine [Prochlorperazine] Anxiety   • Reglan [Metoclopramide] Anxiety       Objective     Physical Exam:   Vital Signs: There were no vitals filed for this visit.  There is no height or weight on file to calculate BMI.     Physical Exam  Nursing note reviewed.   Constitutional:       General: She is not in acute distress.     Appearance: She is obese.   Pulmonary:      Effort: Pulmonary effort is normal.   Neurological:      Mental Status: She is alert.   Psychiatric:         Mood and Affect: Mood is anxious.         Behavior: Behavior is hyperactive. Behavior is cooperative.         Assessment / Plan      Assessment/Plan:   Diagnoses and all orders for this visit:    1. KENY (generalized anxiety disorder) (Primary)  -     hydrOXYzine (ATARAX) 25 MG tablet; Take 1 tablet by mouth 3 (Three) Times a Day As Needed for Anxiety.  Dispense: 30 tablet; Refill: 5  -     cyanocobalamin 500 MCG tablet; Take 1 tablet by mouth Daily.  Dispense: 30 tablet; Refill: 5    2. Swelling  -     furosemide (LASIX) 20 MG tablet; Take 1 tablet by " mouth Daily.  Dispense: 30 tablet; Refill: 5    3. Gastroesophageal reflux disease without esophagitis  -     omeprazole (priLOSEC) 40 MG capsule; Take 1 capsule by mouth Daily.  Dispense: 90 capsule; Refill: 3         1. Medications renewed except for Valium, message sent to CLAIR Acosta to make her aware of the patient's situation.  2. There is a PA in place for the Nurtec.    Plan of care was reviewed with patient at the conclusion of today's visit. Education was provided regarding diagnoses, management, prescribed or recommended OTC products, and the importance of compliance with follow-up appointments. The patient was counseled regarding the risks, benefits, and possible side-effects of treatment. I advised the patient to keep me informed of any acute changes in their status including new, worsening, or persistent symptoms. Patient expresses understanding and agreement with the management plan.    Follow Up:   Return if symptoms worsen or fail to improve.    I spent approximately 20 minutes providing clinical care for this patient; including review of patient's chart and provider documentation, face to face time spent with patient in examination room (obtaining history, performing physical exam, discussing diagnosis and management options), placing orders, and completing patient documentation.     CLAIR Obrien  [facility] [place] [facility]

## 2021-11-23 NOTE — TELEPHONE ENCOUNTER
Caller: Lindsay Amezquita    Relationship: Self    Best call back number:[phone]    Requested Prescriptions:   Requested Prescriptions     Pending Prescriptions Disp Refills   • HYDROcodone-acetaminophen (NORCO) 7.5-325 MG per tablet       Sig: Take 1 tablet by mouth Daily As Needed.        Pharmacy where request should be sent: [facility]Y #[id number] - [facility] [address] [phone] [facility] [phone] FX     Additional details provided by patient:   PATIENT IS COMPLETLEY OUT OF MEDICATION       Does the patient have less than a 3 day supply:  [x] Yes  [] No    Kim Corcoran Rep   11/23/21 10:08 EST

## 2021-11-24 ENCOUNTER — TELEPHONE (OUTPATIENT)
Dept: FAMILY MEDICINE CLINIC | Facility: CLINIC | Age: 36
End: 2021-11-24

## 2021-11-26 DIAGNOSIS — J01.90 ACUTE NON-RECURRENT SINUSITIS, UNSPECIFIED LOCATION: ICD-10-CM

## 2021-11-28 ENCOUNTER — TELEPHONE (OUTPATIENT)
Dept: FAMILY MEDICINE CLINIC | Facility: CLINIC | Age: 36
End: 2021-11-28

## 2021-11-28 NOTE — TELEPHONE ENCOUNTER
Pt contacted on-call service reporting severe migraine. Began approx 10:30 last night. Woke her up off and on through night. Was rx'd nurtec last week but she was not able to get it from pharmacy. Tried cool shower last evening, no help. Took tylenol approx 20 min ago, no help. Is going to take some Excedrin to see if it will help. Is currently at work. No new focal neurological symptoms.    Advised to try excedrin, hydrate with electrolyte beverage, apply ice if able. Informed that if no significant improvement she may have to leave work, go home, take her tizanidine, phenergan and simply rest. Reasons to seek emergent care reviewed. She voiced understanding and denied further questions.

## 2021-11-30 ENCOUNTER — TELEMEDICINE (OUTPATIENT)
Dept: FAMILY MEDICINE CLINIC | Facility: CLINIC | Age: 36
End: 2021-11-30

## 2021-11-30 DIAGNOSIS — G43.831 INTRACTABLE MENSTRUAL MIGRAINE WITH STATUS MIGRAINOSUS: Primary | ICD-10-CM

## 2021-11-30 PROCEDURE — 99213 OFFICE O/P EST LOW 20 MIN: CPT | Performed by: NURSE PRACTITIONER

## 2021-11-30 RX ORDER — METHYLPREDNISOLONE 4 MG/1
TABLET ORAL
Qty: 21 TABLET | Refills: 0 | OUTPATIENT
Start: 2021-11-30 | End: 2021-12-04

## 2021-11-30 NOTE — PROGRESS NOTES
Subjective     Chief Complaint:  F/u migraines    History of Present Illness:   She started having migraine on Sunday. Hurts in the back of her neck in her skull and radiates up to her neck. She is icing her neck and it is not helping. She has been taking excedrin as well.   She has tried pamprin, motrin, tylenol.   She is taking elavil.   She is on gabapentin.   She had side effects from imitrex. Made her left arm tingle. Makes her very drowsy. Nurtec.     She has been referred to neuro but rescheduled her appt 3-4 times.     Went to [facility] and got 2 migraine cocktail and it did not help much      Review of Systems  Gen- No fevers, chills  CV- No chest pain, palpitations  Resp- No cough, dyspnea  GI- No N/V/D, abd pain  Neuro-No dizziness, headaches      I have reviewed and/or updated the patient's past medical, surgical, family, social history and problem list as appropriate.     Medications:    Current Outpatient Medications:   •  amitriptyline (ELAVIL) 150 MG tablet, TAKE 1 TABLET BY MOUTH EVERY DAY AT NIGHT, Disp: 30 tablet, Rfl: 1  •  cyanocobalamin 500 MCG tablet, Take 1 tablet by mouth Daily., Disp: 30 tablet, Rfl: 5  •  diazePAM (VALIUM) 5 MG tablet, Take 1 tablet by mouth Daily As Needed for Anxiety., Disp: 30 tablet, Rfl: 0  •  Eliquis 5 MG tablet tablet, TAKE 1 TABLET BY MOUTH EVERY 12 HOURS, Disp: 60 tablet, Rfl: 1  •  furosemide (LASIX) 20 MG tablet, Take 1 tablet by mouth Daily., Disp: 30 tablet, Rfl: 5  •  gabapentin (NEURONTIN) 800 MG tablet, Take 1 tablet by mouth 3 (Three) Times a Day., Disp: 90 tablet, Rfl: 1  •  HYDROcodone-acetaminophen (NORCO) 7.5-325 MG per tablet, Take 1 tablet by mouth Daily As Needed., Disp: , Rfl:   •  hydrOXYzine (ATARAX) 25 MG tablet, Take 1 tablet by mouth 3 (Three) Times a Day As Needed for Anxiety., Disp: 30 tablet, Rfl: 5  •  levocetirizine (XYZAL) 5 MG tablet, Take 1 tablet by mouth Every Evening., Disp: 30 tablet, Rfl: 5  •  Lidoderm 5 %, APPLY 1  "PATCH TO SKIN ONCE DAILY **MAY WEAR UP TO 12 HOURS**, Disp: , Rfl:   •  Lurasidone HCl (Latuda) 120 MG tablet tablet, Take 1 tablet by mouth Daily. Take 120 mg orally daily with a meal., Disp: 30 tablet, Rfl: 3  •  methylPREDNISolone (MEDROL) 4 MG dose pack, Take as directed on package instructions., Disp: 21 tablet, Rfl: 0  •  omeprazole (priLOSEC) 40 MG capsule, Take 1 capsule by mouth Daily., Disp: 90 capsule, Rfl: 3  •  promethazine (PHENERGAN) 25 MG tablet, Take 1 tablet by mouth Every 6 (Six) Hours As Needed for Nausea or Vomiting., Disp: 30 tablet, Rfl: 0  •  Rimegepant Sulfate (Nurtec) 75 MG tablet dispersible tablet, Take 1 tablet by mouth 1 (One) Time As Needed (migraine)., Disp: 9 tablet, Rfl: 1  •  tiZANidine (ZANAFLEX) 4 MG tablet, Take 1 tablet by mouth At Night As Needed for Muscle Spasms., Disp: 30 tablet, Rfl: 1  •  vitamin D (ERGOCALCIFEROL) 1.25 MG (75134 UT) capsule capsule, TAKE 1 CAPSULE BY MOUTH 1 (ONE) TIME PER WEEK., Disp: 4 capsule, Rfl: 2    Allergies:  Allergies   Allergen Reactions   • Codeine Anaphylaxis   • Flexeril [Cyclobenzaprine] Other (See Comments)     \"gives me chest pain\"   • Asa [Aspirin] Hives     Wheezing     • Latex Hives   • Morphine Itching   • Penicillins Hives     Vomiting     • Compazine [Prochlorperazine] Anxiety   • Reglan [Metoclopramide] Anxiety       Objective     Vital Signs: There were no vitals filed for this visit.  There is no height or weight on file to calculate BMI.    Physical Exam:  Gen-no acute distress, video visit  Resp- normal WOB, on RA  Neuro-A&Ox3, face symmetrical, speech clear  Skin-no overt rashes noted  Psych-appropriate mood, cooperative         Assessment / Plan     Assessment/Plan:   Problem List Items Addressed This Visit     None      Visit Diagnoses     Intractable menstrual migraine with status migrainosus    -  Primary    Relevant Medications    methylPREDNISolone (MEDROL) 4 MG dose pack        -- medrol to help break headache cycle. "   -- magnesium at bedtime  -- keep f/u with neuro  -- she declines aimovig     Follow up:  As scheduled    Total Time of Encounter 15 minutes    Electronically signed by CLAIR Leal   11/30/2021 15:07 EST      Please note that portions of this note may have been completed with a voice recognition program. Efforts were made to edit the dictations, but occasionally words are mistranscribed.

## 2021-12-01 ENCOUNTER — TELEMEDICINE (OUTPATIENT)
Dept: BEHAVIORAL HEALTH | Facility: CLINIC | Age: 36
End: 2021-12-01

## 2021-12-01 VITALS — BODY MASS INDEX: 47.84 KG/M2 | HEIGHT: 63 IN | WEIGHT: 270 LBS

## 2021-12-01 DIAGNOSIS — F41.1 GAD (GENERALIZED ANXIETY DISORDER): ICD-10-CM

## 2021-12-01 DIAGNOSIS — F31.62 BIPOLAR DISORDER, CURRENT EPISODE MIXED, MODERATE (HCC): Primary | ICD-10-CM

## 2021-12-01 DIAGNOSIS — F43.10 PTSD (POST-TRAUMATIC STRESS DISORDER): ICD-10-CM

## 2021-12-01 DIAGNOSIS — F51.04 PSYCHOPHYSIOLOGICAL INSOMNIA: ICD-10-CM

## 2021-12-01 PROCEDURE — 99213 OFFICE O/P EST LOW 20 MIN: CPT | Performed by: NURSE PRACTITIONER

## 2021-12-01 RX ORDER — FLUTICASONE PROPIONATE 50 MCG
SPRAY, SUSPENSION (ML) NASAL
Qty: 16 ML | Refills: 1 | OUTPATIENT
Start: 2021-12-01

## 2021-12-01 RX ORDER — DIAZEPAM 5 MG/1
5 TABLET ORAL DAILY PRN
Qty: 30 TABLET | Refills: 0 | Status: SHIPPED | OUTPATIENT
Start: 2021-12-01 | End: 2022-01-03 | Stop reason: SDUPTHER

## 2021-12-01 NOTE — PROGRESS NOTES
Patient Name: Lindsay Amezquita  MRN: [id number]   :  1985     Chief Complaint:      ICD-10-CM ICD-9-CM   1. Bipolar disorder, current episode mixed, moderate (HCC)  F31.62 296.62   2. KENY (generalized anxiety disorder)  F41.1 300.02   3. Psychophysiological insomnia  F51.04 307.42   4. PTSD (post-traumatic stress disorder)  F43.10 309.81       History of Present Illness: Lindsay Amezquita is a 36 y.o. female is here today for medication management follow up. Pt states she has been doing extremely well. Has a job at [employer]. Manager is very supportive. Now pt states Wilfredo is doing well and she is very pleased. The following portions of the patient's history were reviewed and updated as appropriate: allergies, current medications, past family history, past medical history, past social history, past surgical history and problem list.    Review of Systems;;  Review of Systems   Constitutional: Negative for activity change, appetite change, fatigue, unexpected weight gain and unexpected weight loss.   Respiratory: Negative for shortness of breath and wheezing.    Gastrointestinal: Negative for constipation, diarrhea, nausea and vomiting.   Musculoskeletal: Negative for gait problem.   Skin: Negative for dry skin and rash.   Neurological: Negative for dizziness, speech difficulty, weakness, light-headedness, headache, memory problem and confusion.   Psychiatric/Behavioral: Positive for sleep disturbance, depressed mood and stress. Negative for agitation, behavioral problems, decreased concentration, dysphoric mood, hallucinations, self-injury, suicidal ideas and negative for hyperactivity. The patient is nervous/anxious.        Physical Exam;;  Physical Exam  Vitals and nursing note reviewed.   Constitutional:       General: She is not in acute distress.     Appearance: She is well-developed. She is not diaphoretic.   HENT:      Head: Normocephalic and atraumatic.   Eyes:      Conjunctiva/sclera: Conjunctivae normal.  "  Cardiovascular:      Rate and Rhythm: Normal rate.   Pulmonary:      Effort: Pulmonary effort is normal. No respiratory distress.   Musculoskeletal:         General: Normal range of motion.      Cervical back: Full passive range of motion without pain and normal range of motion.   Skin:     General: Skin is warm and dry.   Neurological:      Mental Status: She is alert and oriented to person, place, and time.   Psychiatric:         Mood and Affect: Mood is anxious and depressed. Affect is not labile, blunt, angry or inappropriate.         Speech: Speech is not rapid and pressured or tangential.         Behavior: Behavior normal. Behavior is not agitated, slowed, aggressive, withdrawn, hyperactive or combative. Behavior is cooperative.         Thought Content: Thought content normal. Thought content is not paranoid or delusional. Thought content does not include homicidal or suicidal ideation. Thought content does not include homicidal or suicidal plan.         Judgment: Judgment normal.       Height 160 cm (63\"), weight 122 kg (270 lb), last menstrual period 12/01/2021.  Body mass index is 47.83 kg/m².    Current Medications;;    Current Outpatient Medications:   •  amitriptyline (ELAVIL) 150 MG tablet, TAKE 1 TABLET BY MOUTH EVERY DAY AT NIGHT, Disp: 30 tablet, Rfl: 1  •  cyanocobalamin 500 MCG tablet, Take 1 tablet by mouth Daily., Disp: 30 tablet, Rfl: 5  •  diazePAM (VALIUM) 5 MG tablet, Take 1 tablet by mouth Daily As Needed for Anxiety., Disp: 30 tablet, Rfl: 0  •  Eliquis 5 MG tablet tablet, TAKE 1 TABLET BY MOUTH EVERY 12 HOURS, Disp: 60 tablet, Rfl: 1  •  furosemide (LASIX) 20 MG tablet, Take 1 tablet by mouth Daily., Disp: 30 tablet, Rfl: 5  •  gabapentin (NEURONTIN) 800 MG tablet, Take 1 tablet by mouth 3 (Three) Times a Day., Disp: 90 tablet, Rfl: 1  •  HYDROcodone-acetaminophen (NORCO) 7.5-325 MG per tablet, Take 1 tablet by mouth Daily As Needed., Disp: , Rfl:   •  hydrOXYzine (ATARAX) 25 MG tablet, " Take 1 tablet by mouth 3 (Three) Times a Day As Needed for Anxiety., Disp: 30 tablet, Rfl: 5  •  levocetirizine (XYZAL) 5 MG tablet, Take 1 tablet by mouth Every Evening., Disp: 30 tablet, Rfl: 5  •  Lidoderm 5 %, APPLY 1 PATCH TO SKIN ONCE DAILY **MAY WEAR UP TO 12 HOURS**, Disp: , Rfl:   •  Lurasidone HCl (Latuda) 120 MG tablet tablet, Take 1 tablet by mouth Daily. Take 120 mg orally daily with a meal., Disp: 30 tablet, Rfl: 3  •  omeprazole (priLOSEC) 40 MG capsule, Take 1 capsule by mouth Daily., Disp: 90 capsule, Rfl: 3  •  Rimegepant Sulfate (Nurtec) 75 MG tablet dispersible tablet, Take 1 tablet by mouth 1 (One) Time As Needed (migraine)., Disp: 9 tablet, Rfl: 1  •  tiZANidine (ZANAFLEX) 4 MG tablet, Take 1 tablet by mouth At Night As Needed for Muscle Spasms., Disp: 30 tablet, Rfl: 1  •  vitamin D (ERGOCALCIFEROL) 1.25 MG (81301 UT) capsule capsule, TAKE 1 CAPSULE BY MOUTH 1 (ONE) TIME PER WEEK., Disp: 4 capsule, Rfl: 2  •  cefdinir (OMNICEF) 300 MG capsule, Take 1 capsule by mouth 2 (Two) Times a Day for 10 days., Disp: 20 capsule, Rfl: 0  •  fluticasone (Flonase) 50 MCG/ACT nasal spray, 2 sprays into the nostril(s) as directed by provider Daily., Disp: 18.2 mL, Rfl: 5  •  glucose blood test strip, Check blood sugar once daily, Disp: 25 each, Rfl: 12  •  glucose monitor monitoring kit, Check blood sugar once daily, Disp: 1 each, Rfl: 0  •  Lancets (onetouch ultrasoft) lancets, Check blood sugar once daily, Disp: 25 each, Rfl: 12  •  Lancets Misc. (ONE TOUCH SURESOFT) misc, Check blood sugar once daily, Disp: 1 each, Rfl: 0  •  oxymetazoline (AFRIN) 0.05 % nasal spray, 2 sprays into the nostril(s) as directed by provider 2 (Two) Times a Day for 3 days., Disp: 2 mL, Rfl: 0  •  promethazine (PHENERGAN) 25 MG tablet, Take 1 tablet by mouth Every 6 (Six) Hours As Needed for Nausea or Vomiting., Disp: 12 tablet, Rfl: 0  •  promethazine-dextromethorphan (PROMETHAZINE-DM) 6.25-15 MG/5ML syrup, Take 5 mL by mouth 4  (Four) Times a Day As Needed for Cough., Disp: 118 mL, Rfl: 1    Lab Results:   Office Visit on 10/11/2021   Component Date Value Ref Range Status   • Hemoglobin A1C 10/11/2021 6.1  % Final   • Lot Number 10/11/2021 [id number]   Final   • Expiration Date 10/11/2021 05-   Final       Mental Status Exam:   Hygiene:   good  Cooperation:  Evasive error cooperative  Eye Contact:  Fair  Psychomotor Behavior:  Appropriate  Mood:anxious and depressed  Affect:  Restricted  Hopelessness: Denies  Speech:  Normal  Thought Process:  Goal directed  Thought Content:  Normal  Suicidal:  None  Homicidal:  None  Hallucinations:  None  Delusion:  None  Memory:  Intact  Orientation:  Person, Place, Time and Situation  Reliability:  good  Insight:  Good  Judgement:  Good  Impulse Control:  Good  Physical/Medical Issues:  No     PHQ-9 Depression Screening  Little interest or pleasure in doing things?     Feeling down, depressed, or hopeless?     Trouble falling or staying asleep, or sleeping too much?     Feeling tired or having little energy?     Poor appetite or overeating?     Feeling bad about yourself - or that you are a failure or have let yourself or your family down?     Trouble concentrating on things, such as reading the newspaper or watching television?     Moving or speaking so slowly that other people could have noticed? Or the opposite - being so fidgety or restless that you have been moving around a lot more than usual?     Thoughts that you would be better off dead, or of hurting yourself in some way?     PHQ-9 Total Score     If you checked off any problems, how difficult have these problems made it for you to do your work, take care of things at home, or get along with other people?          Assessment/Plan:  Diagnoses and all orders for this visit:    1. Bipolar disorder, current episode mixed, moderate (HCC) (Primary)    2. KENY (generalized anxiety disorder)  -     diazePAM (VALIUM) 5 MG tablet; Take 1 tablet  by mouth Daily As Needed for Anxiety.  Dispense: 30 tablet; Refill: 0    3. Psychophysiological insomnia    4. PTSD (post-traumatic stress disorder)      Pt stayed on Latuda. Seems to be very happy and working hard at her job. [employer] video appt start time 3:30 pm end time 3:41 pm.    A psychological evaluation was conducted in order to assess past and current level of functioning. Areas assessed included, but were not limited to: perception of social support, perception of ability to face and deal with challenges in life (positive functioning), anxiety symptoms, depressive symptoms, perspective on beliefs/belief system, coping skills for stress, intelligence level,  Therapeutic rapport was established. Interventions conducted today were geared towards incorporating medication management along with support for continued therapy. Education was also provided as to the med management with this provider and what to expect in subsequent sessions.    We discussed risks, benefits,goals and side effects of the above medication and the patient was agreeable with the plan.Patient was educated on the importance of compliance with treatment and follow-up appointments. Patient is aware to contact the [facility] with any worsening of symptoms. To call for questions or concerns and return early if necessary. Patent is agreeable to go to the Emergency Department or call 911 should they begin SI/HI.     Treatment Plan:   Discussed risks, benefits, and alternatives of medication. Encouraged healthy habits (eating, exercise and sleep). Call if any questions or problems arise. Medication reconciled. Controlled substance monitoring report reviewed. Provided psychoeducation.. Discussed coping strategies and current stressors. Set appropriate boundaries and limits for patient's well-being. Use distraction techniques to improve symptoms. Access support networks.      Return in about 2 months (around 2/1/2022) for Video visit, Follow Up  30 min.    Karla Devine, APRN

## 2021-12-04 ENCOUNTER — HOSPITAL ENCOUNTER (EMERGENCY)
Facility: HOSPITAL | Age: 36
Discharge: HOME | End: 2021-12-04
Attending: EMERGENCY MEDICINE | Admitting: EMERGENCY MEDICINE

## 2021-12-04 VITALS
WEIGHT: 275 LBS | SYSTOLIC BLOOD PRESSURE: 156 MMHG | DIASTOLIC BLOOD PRESSURE: 98 MMHG | TEMPERATURE: 98.8 F | RESPIRATION RATE: 20 BRPM | BODY MASS INDEX: 50.61 KG/M2 | OXYGEN SATURATION: 95 % | HEIGHT: 62 IN | HEART RATE: 108 BPM

## 2021-12-04 DIAGNOSIS — J02.9 VIRAL PHARYNGITIS: Primary | ICD-10-CM

## 2021-12-04 DIAGNOSIS — K52.9 AGE (ACUTE GASTROENTERITIS): ICD-10-CM

## 2021-12-04 DIAGNOSIS — R10.84 GENERALIZED ABDOMINAL PAIN: ICD-10-CM

## 2021-12-04 DIAGNOSIS — R11.2 NON-INTRACTABLE VOMITING WITH NAUSEA, UNSPECIFIED VOMITING TYPE: ICD-10-CM

## 2021-12-04 LAB — S PYO AG THROAT QL: NEGATIVE

## 2021-12-04 PROCEDURE — 99283 EMERGENCY DEPT VISIT LOW MDM: CPT

## 2021-12-04 PROCEDURE — 63710000001 PROMETHAZINE PER 25 MG: Performed by: EMERGENCY MEDICINE

## 2021-12-04 PROCEDURE — 87081 CULTURE SCREEN ONLY: CPT | Performed by: EMERGENCY MEDICINE

## 2021-12-04 PROCEDURE — 87880 STREP A ASSAY W/OPTIC: CPT | Performed by: EMERGENCY MEDICINE

## 2021-12-04 RX ORDER — PROMETHAZINE HYDROCHLORIDE 25 MG/1
25 TABLET ORAL ONCE
Status: COMPLETED | OUTPATIENT
Start: 2021-12-04 | End: 2021-12-04

## 2021-12-04 RX ORDER — ACETAMINOPHEN 325 MG/1
975 TABLET ORAL ONCE
Status: COMPLETED | OUTPATIENT
Start: 2021-12-04 | End: 2021-12-04

## 2021-12-04 RX ORDER — PROMETHAZINE HYDROCHLORIDE 25 MG/1
25 TABLET ORAL EVERY 6 HOURS PRN
Qty: 12 TABLET | Refills: 0 | Status: SHIPPED | OUTPATIENT
Start: 2021-12-04 | End: 2022-02-08 | Stop reason: SDUPTHER

## 2021-12-04 RX ADMIN — ACETAMINOPHEN 975 MG: 325 TABLET ORAL at 12:51

## 2021-12-04 RX ADMIN — PROMETHAZINE HYDROCHLORIDE 25 MG: 25 TABLET ORAL at 12:51

## 2021-12-04 NOTE — DISCHARGE INSTRUCTIONS
Your strep testing was negative here.  We will contact you if your throat culture comes back positive and we need to call in an antibiotic.  We did send a prescription for Phenergan to your pharmacy.  Do warm salt water gargles, take over-the-counter medications as needed for pain.

## 2021-12-04 NOTE — ED PROVIDER NOTES
"Subjective   History of Present Illness    Chief Complaint: Sore throat vomiting  History of Present Illness: 36-year-old female presents above complaint x1 day.  Had Covid test outpatient yesterday which was negative.  Denies sick contacts travel or fever.  Onset: 1 day  Duration: Persistent  Exacerbating / Alleviating factors: Worse with p.o. intake  Associated symptoms: None      Nurses Notes reviewed and agree, including vitals, allergies, social history and prior medical history.     REVIEW OF SYSTEMS: All systems reviewed and not pertinent unless noted.    Positive for: Vomiting, sore throat    Negative for: Fever cough hemoptysis syncope chest pain palpitations nasal drainage  Review of Systems    Past Medical History:   Diagnosis Date   • Anxiety    • Bell's palsy    • Bipolar 2 disorder (HCC)    • Blood clot in vein    • Depression    • GERD (gastroesophageal reflux disease)    • Restless leg syndrome        Allergies   Allergen Reactions   • Codeine Anaphylaxis   • Flexeril [Cyclobenzaprine] Other (See Comments)     \"gives me chest pain\"   • Asa [Aspirin] Hives     Wheezing     • Latex Hives   • Morphine Itching   • Penicillins Hives     Vomiting     • Compazine [Prochlorperazine] Anxiety   • Reglan [Metoclopramide] Anxiety       Past Surgical History:   Procedure Laterality Date   • CHOLECYSTECTOMY     • DENTAL PROCEDURE         History reviewed. No pertinent family history.    Social History     Socioeconomic History   • Marital status: Single   Tobacco Use   • Smoking status: Current Every Day Smoker     Packs/day: 1.00     Types: Cigarettes   • Smokeless tobacco: Never Used   • Tobacco comment: also vaping   Vaping Use   • Vaping Use: Former   Substance and Sexual Activity   • Alcohol use: Never   • Drug use: Never   • Sexual activity: Defer           Objective   Physical Exam    CONSTITUTIONAL: Well developed, nontoxic obese 36-year-old  female,  in no acute distress.  VITAL SIGNS: per " nursing, reviewed and noted  SKIN: exposed skin with no rashes, ulcerations or petechiae.  EYES: perrla. EOMI.  ENT: Mild hoarse voice.  Minimal posterior pharyngeal erythema without exudate.  No thrush.  TM clear bilaterally.  Normal nares.  No trismus.  RESPIRATORY:  No increased work of breathing. No retractions.   CARDIOVASCULAR:  regular rate and rhythm, no murmurs.  Good Peripheral pulses. Good cap refill to extremities.   MUSCULOSKELETAL:  No tenderness. Full ROM. Strength and tone grossly normal.  no spasms. no neck or back tenderness or spasm.   NEUROLOGIC: Alert, oriented x 3. No gross deficits. GCS 15.   PSYCH: appropriate affect.             ED Course  ED Course as of 12/04/21 1308   Sat Dec 04, 2021   1305 Strep A Ag: Negative [PF]      ED Course User Index  [PF] Vidal Jensen, DO                                                 MDM  36-year-old female presents with sore throat and vomiting.  No indications for emergent IV fluids.  Received oral Phenergan, Tylenol.  Strep negative.  Throat culture pending.  Discharged home with prescription for Phenergan.  Advised supportive care warm salt water gargles, outpatient follow-up return precautions discussed.  I will defer repeat Covid testing since she was just tested yesterday.  Patient declined flu testing.  Final diagnoses:   Viral pharyngitis   Non-intractable vomiting with nausea, unspecified vomiting type       ED Disposition  ED Disposition     ED Disposition Condition Comment    Discharge Stable           Stephanie Burdick, APRN  [address] [place] [address] [phone]               Medication List      Stop    methylPREDNISolone 4 MG dose pack  Commonly known as: MEDROL           Where to Get Your Medications      These medications were sent to [facility]/pharmacy #[id number] - [place], [place] - [address] - [phone]  - [phone]   [address] [address]    Phone: [phone]   · promethazine 25 MG tablet          Mikey  Vidal HEARD DO  12/04/21 1305     show

## 2021-12-06 LAB — BACTERIA SPEC AEROBE CULT: NORMAL

## 2021-12-07 ENCOUNTER — OFFICE VISIT (OUTPATIENT)
Dept: FAMILY MEDICINE CLINIC | Facility: CLINIC | Age: 36
End: 2021-12-07

## 2021-12-07 VITALS
OXYGEN SATURATION: 96 % | TEMPERATURE: 97.6 F | WEIGHT: 273 LBS | HEIGHT: 62 IN | HEART RATE: 108 BPM | BODY MASS INDEX: 50.24 KG/M2 | SYSTOLIC BLOOD PRESSURE: 139 MMHG | DIASTOLIC BLOOD PRESSURE: 85 MMHG

## 2021-12-07 DIAGNOSIS — J40 BRONCHITIS: ICD-10-CM

## 2021-12-07 DIAGNOSIS — J06.9 VIRAL URI WITH COUGH: Primary | ICD-10-CM

## 2021-12-07 PROCEDURE — 99213 OFFICE O/P EST LOW 20 MIN: CPT | Performed by: NURSE PRACTITIONER

## 2021-12-07 PROCEDURE — 96372 THER/PROPH/DIAG INJ SC/IM: CPT | Performed by: NURSE PRACTITIONER

## 2021-12-07 RX ORDER — FLUTICASONE PROPIONATE 50 MCG
2 SPRAY, SUSPENSION (ML) NASAL DAILY
Qty: 18.2 ML | Refills: 5 | Status: SHIPPED | OUTPATIENT
Start: 2021-12-07 | End: 2022-04-07

## 2021-12-07 RX ORDER — DEXAMETHASONE SODIUM PHOSPHATE 10 MG/ML
5 INJECTION INTRAMUSCULAR; INTRAVENOUS ONCE
Status: COMPLETED | OUTPATIENT
Start: 2021-12-07 | End: 2021-12-07

## 2021-12-07 RX ADMIN — DEXAMETHASONE SODIUM PHOSPHATE 5 MG: 10 INJECTION INTRAMUSCULAR; INTRAVENOUS at 15:24

## 2021-12-07 NOTE — PROGRESS NOTES
Subjective     Chief Complaint:    Chief Complaint   Patient presents with   • Cough     Pt states she know she has bronchititis.  Went to ER on Sat. Neg for Strep, flu covid       History of Present Illness:   Cough, green sputum, feels hot and sweaty, no change in taste or smell, she has vomiting x 3, lots of diarrhea, mild abdominal pain with diarrhea cramps but otherwise none, she has a headache and took excedrin migraines. Went to ER on Sat, negative for strep, flu, covid. She feels like her mouth is dry.   She has been using motrin, tylenol, mucinex    Review of Systems  As above    I have reviewed and/or updated the patient's past medical, surgical, family, social history and problem list as appropriate.     Medications:    Current Outpatient Medications:   •  amitriptyline (ELAVIL) 150 MG tablet, TAKE 1 TABLET BY MOUTH EVERY DAY AT NIGHT, Disp: 30 tablet, Rfl: 1  •  cyanocobalamin 500 MCG tablet, Take 1 tablet by mouth Daily., Disp: 30 tablet, Rfl: 5  •  diazePAM (VALIUM) 5 MG tablet, Take 1 tablet by mouth Daily As Needed for Anxiety., Disp: 30 tablet, Rfl: 0  •  Eliquis 5 MG tablet tablet, TAKE 1 TABLET BY MOUTH EVERY 12 HOURS, Disp: 60 tablet, Rfl: 1  •  furosemide (LASIX) 20 MG tablet, Take 1 tablet by mouth Daily., Disp: 30 tablet, Rfl: 5  •  gabapentin (NEURONTIN) 800 MG tablet, Take 1 tablet by mouth 3 (Three) Times a Day., Disp: 90 tablet, Rfl: 1  •  HYDROcodone-acetaminophen (NORCO) 7.5-325 MG per tablet, Take 1 tablet by mouth Daily As Needed., Disp: , Rfl:   •  hydrOXYzine (ATARAX) 25 MG tablet, Take 1 tablet by mouth 3 (Three) Times a Day As Needed for Anxiety., Disp: 30 tablet, Rfl: 5  •  levocetirizine (XYZAL) 5 MG tablet, Take 1 tablet by mouth Every Evening., Disp: 30 tablet, Rfl: 5  •  Lidoderm 5 %, APPLY 1 PATCH TO SKIN ONCE DAILY **MAY WEAR UP TO 12 HOURS**, Disp: , Rfl:   •  Lurasidone HCl (Latuda) 120 MG tablet tablet, Take 1 tablet by mouth Daily. Take 120 mg orally daily with a  "meal., Disp: 30 tablet, Rfl: 3  •  omeprazole (priLOSEC) 40 MG capsule, Take 1 capsule by mouth Daily., Disp: 90 capsule, Rfl: 3  •  promethazine (PHENERGAN) 25 MG tablet, Take 1 tablet by mouth Every 6 (Six) Hours As Needed for Nausea or Vomiting., Disp: 12 tablet, Rfl: 0  •  Rimegepant Sulfate (Nurtec) 75 MG tablet dispersible tablet, Take 1 tablet by mouth 1 (One) Time As Needed (migraine)., Disp: 9 tablet, Rfl: 1  •  tiZANidine (ZANAFLEX) 4 MG tablet, Take 1 tablet by mouth At Night As Needed for Muscle Spasms., Disp: 30 tablet, Rfl: 1  •  vitamin D (ERGOCALCIFEROL) 1.25 MG (91182 UT) capsule capsule, TAKE 1 CAPSULE BY MOUTH 1 (ONE) TIME PER WEEK., Disp: 4 capsule, Rfl: 2  •  fluticasone (Flonase) 50 MCG/ACT nasal spray, 2 sprays into the nostril(s) as directed by provider Daily., Disp: 18.2 mL, Rfl: 5    Current Facility-Administered Medications:   •  dexamethasone (DECADRON) injection 5 mg, 5 mg, Intramuscular, Once, Stephanie Burdick APRN    Allergies:  Allergies   Allergen Reactions   • Codeine Anaphylaxis   • Flexeril [Cyclobenzaprine] Other (See Comments)     \"gives me chest pain\"   • Asa [Aspirin] Hives     Wheezing     • Latex Hives   • Morphine Itching   • Penicillins Hives     Vomiting     • Compazine [Prochlorperazine] Anxiety   • Reglan [Metoclopramide] Anxiety       Objective     Vital Signs:   Vitals:    12/07/21 1440   BP: 139/85   Pulse: 108   Temp: 97.6 °F (36.4 °C)   SpO2: 96%   Weight: 124 kg (273 lb)   Height: 157.5 cm (62\")   PainSc: 0-No pain     Body mass index is 49.93 kg/m².    Physical Exam:    Physical Exam  Constitutional:       Appearance: She is well-developed. She is obese.   HENT:      Head: Normocephalic and atraumatic.      Right Ear: Tympanic membrane, ear canal and external ear normal.      Left Ear: Tympanic membrane, ear canal and external ear normal.      Nose: Nose normal.      Mouth/Throat:      Pharynx: Uvula midline.   Eyes:      Pupils: Pupils are equal, round, and " reactive to light.   Cardiovascular:      Rate and Rhythm: Normal rate and regular rhythm.      Heart sounds: Normal heart sounds. No murmur heard.  No friction rub. No gallop.    Pulmonary:      Effort: Pulmonary effort is normal.      Breath sounds: Normal breath sounds.   Abdominal:      General: Bowel sounds are normal.      Palpations: Abdomen is soft.      Tenderness: There is no abdominal tenderness.   Musculoskeletal:      Cervical back: Neck supple.   Lymphadenopathy:      Head:      Right side of head: No submental, submandibular, tonsillar, preauricular or posterior auricular adenopathy.      Left side of head: No submental, submandibular, tonsillar, preauricular or posterior auricular adenopathy.      Cervical: No cervical adenopathy.   Skin:     General: Skin is warm and dry.      Capillary Refill: Capillary refill takes less than 2 seconds.   Neurological:      General: No focal deficit present.      Mental Status: She is alert and oriented to person, place, and time.   Psychiatric:         Mood and Affect: Mood normal.         Behavior: Behavior normal.         Assessment / Plan     Assessment/Plan:   Problem List Items Addressed This Visit     None      Visit Diagnoses     Viral URI with cough    -  Primary    Relevant Medications    fluticasone (Flonase) 50 MCG/ACT nasal spray    dexamethasone (DECADRON) injection 5 mg (Start on 12/7/2021  3:14 PM)    Bronchitis        Relevant Medications    fluticasone (Flonase) 50 MCG/ACT nasal spray        -- supportive care discussed, dex x 1 to help with bronchitis, flonase    Follow up:  As needed    Electronically signed by CLAIR Leal   12/07/2021 15:05 EST      Please note that portions of this note may have been completed with a voice recognition program. Efforts were made to edit the dictations, but occasionally words are mistranscribed.

## 2021-12-08 ENCOUNTER — TELEMEDICINE (OUTPATIENT)
Dept: FAMILY MEDICINE CLINIC | Facility: CLINIC | Age: 36
End: 2021-12-08

## 2021-12-08 DIAGNOSIS — J06.9 VIRAL URI WITH COUGH: Primary | ICD-10-CM

## 2021-12-08 PROCEDURE — 99212 OFFICE O/P EST SF 10 MIN: CPT

## 2021-12-08 RX ORDER — METHYLPREDNISOLONE 4 MG/1
TABLET ORAL
Qty: 21 EACH | Refills: 0 | Status: SHIPPED | OUTPATIENT
Start: 2021-12-08 | End: 2021-12-08 | Stop reason: SDUPTHER

## 2021-12-08 RX ORDER — DEXTROMETHORPHAN HYDROBROMIDE AND PROMETHAZINE HYDROCHLORIDE 15; 6.25 MG/5ML; MG/5ML
5 SYRUP ORAL 4 TIMES DAILY PRN
Qty: 118 ML | Refills: 1 | Status: SHIPPED | OUTPATIENT
Start: 2021-12-08 | End: 2022-01-07 | Stop reason: SDUPTHER

## 2021-12-08 RX ORDER — OXYMETAZOLINE HYDROCHLORIDE 0.05 G/100ML
2 SPRAY NASAL 2 TIMES DAILY
Qty: 2 ML | Refills: 0 | Status: SHIPPED | OUTPATIENT
Start: 2021-12-08 | End: 2021-12-11

## 2021-12-08 NOTE — PROGRESS NOTES
Video/Telehealth Note      Patient Name: Lindsay Amezquita  : 1985   MRN: [id number]     Chief Complaint:    Chief Complaint   Patient presents with   • Cough       History of Present Illness: Lindsay Amezquita is a 36 y.o. female who presents for a telephone/video visit due to the SARS-CoV-2 pandemic. You have chosen to receive care through a telehealth visit.  Do you consent to use a video/audio connection for your medical care today? Yes    Patient reports worsening symptoms since being seen by her primary provider a few days prior.  She reports that she got 4 hours of sleep last night due to coughing.  The cough is worse today than yesterday, it is making her have nausea and vomiting and difficulty keeping anything down.  Has had intermittent fevers but did not report any specific temperature.  She has recently had a negative strep swab.  States that she went through a Covid drive-through and was also negative.  She is having a lot of sinus pressure to the frontal region.  She is requesting an antibiotic however patient has recently been on multiple antibiotics in November for upper respiratory symptoms.    Subjective     Review of System: Review of Systems   Constitutional: Positive for fatigue and fever. Negative for chills.   HENT: Positive for congestion, postnasal drip, sinus pressure and sore throat. Negative for ear pain and rhinorrhea.    Respiratory: Positive for cough. Negative for shortness of breath.    Cardiovascular: Negative for chest pain.   Gastrointestinal: Positive for nausea and vomiting. Negative for abdominal pain and diarrhea.   Musculoskeletal: Negative for arthralgias and myalgias.   Neurological: Negative for headache.       I have reviewed the ROS documented by my clinical staff, updated appropriately and I agree. CLAIR Obrien    Past Medical History:   Past Medical History:   Diagnosis Date   • Anxiety    • Bell's palsy    • Bipolar 2 disorder (HCC)    • Blood clot in vein    •  Depression    • GERD (gastroesophageal reflux disease)    • Restless leg syndrome        Past Surgical History:   Past Surgical History:   Procedure Laterality Date   • CHOLECYSTECTOMY     • DENTAL PROCEDURE         Family History: History reviewed. No pertinent family history.    Social History:   Social History     Socioeconomic History   • Marital status: Single   Tobacco Use   • Smoking status: Current Every Day Smoker     Packs/day: 1.00     Years: 26.00     Pack years: 26.00     Types: Cigarettes   • Smokeless tobacco: Never Used   • Tobacco comment: also vaping   Vaping Use   • Vaping Use: Former   Substance and Sexual Activity   • Alcohol use: Never   • Drug use: Never   • Sexual activity: Defer       Medications:     Current Outpatient Medications:   •  amitriptyline (ELAVIL) 150 MG tablet, TAKE 1 TABLET BY MOUTH EVERY DAY AT NIGHT, Disp: 30 tablet, Rfl: 1  •  cyanocobalamin 500 MCG tablet, Take 1 tablet by mouth Daily., Disp: 30 tablet, Rfl: 5  •  diazePAM (VALIUM) 5 MG tablet, Take 1 tablet by mouth Daily As Needed for Anxiety., Disp: 30 tablet, Rfl: 0  •  Eliquis 5 MG tablet tablet, TAKE 1 TABLET BY MOUTH EVERY 12 HOURS, Disp: 60 tablet, Rfl: 1  •  fluticasone (Flonase) 50 MCG/ACT nasal spray, 2 sprays into the nostril(s) as directed by provider Daily., Disp: 18.2 mL, Rfl: 5  •  furosemide (LASIX) 20 MG tablet, Take 1 tablet by mouth Daily., Disp: 30 tablet, Rfl: 5  •  gabapentin (NEURONTIN) 800 MG tablet, Take 1 tablet by mouth 3 (Three) Times a Day., Disp: 90 tablet, Rfl: 1  •  HYDROcodone-acetaminophen (NORCO) 7.5-325 MG per tablet, Take 1 tablet by mouth Daily As Needed., Disp: , Rfl:   •  hydrOXYzine (ATARAX) 25 MG tablet, Take 1 tablet by mouth 3 (Three) Times a Day As Needed for Anxiety., Disp: 30 tablet, Rfl: 5  •  levocetirizine (XYZAL) 5 MG tablet, Take 1 tablet by mouth Every Evening., Disp: 30 tablet, Rfl: 5  •  Lidoderm 5 %, APPLY 1 PATCH TO SKIN ONCE DAILY **MAY WEAR UP TO 12 HOURS**, Disp:  ", Rfl:   •  Lurasidone HCl (Latuda) 120 MG tablet tablet, Take 1 tablet by mouth Daily. Take 120 mg orally daily with a meal., Disp: 30 tablet, Rfl: 3  •  omeprazole (priLOSEC) 40 MG capsule, Take 1 capsule by mouth Daily., Disp: 90 capsule, Rfl: 3  •  oxymetazoline (AFRIN) 0.05 % nasal spray, 2 sprays into the nostril(s) as directed by provider 2 (Two) Times a Day for 3 days., Disp: 2 mL, Rfl: 0  •  promethazine (PHENERGAN) 25 MG tablet, Take 1 tablet by mouth Every 6 (Six) Hours As Needed for Nausea or Vomiting., Disp: 12 tablet, Rfl: 0  •  promethazine-dextromethorphan (PROMETHAZINE-DM) 6.25-15 MG/5ML syrup, Take 5 mL by mouth 4 (Four) Times a Day As Needed for Cough., Disp: 118 mL, Rfl: 1  •  Rimegepant Sulfate (Nurtec) 75 MG tablet dispersible tablet, Take 1 tablet by mouth 1 (One) Time As Needed (migraine)., Disp: 9 tablet, Rfl: 1  •  tiZANidine (ZANAFLEX) 4 MG tablet, Take 1 tablet by mouth At Night As Needed for Muscle Spasms., Disp: 30 tablet, Rfl: 1  •  vitamin D (ERGOCALCIFEROL) 1.25 MG (57089 UT) capsule capsule, TAKE 1 CAPSULE BY MOUTH 1 (ONE) TIME PER WEEK., Disp: 4 capsule, Rfl: 2    Allergies:   Allergies   Allergen Reactions   • Codeine Anaphylaxis   • Flexeril [Cyclobenzaprine] Other (See Comments)     \"gives me chest pain\"   • Asa [Aspirin] Hives     Wheezing     • Latex Hives   • Morphine Itching   • Penicillins Hives     Vomiting     • Compazine [Prochlorperazine] Anxiety   • Reglan [Metoclopramide] Anxiety       Objective     Physical Exam:   Vital Signs: There were no vitals filed for this visit.  There is no height or weight on file to calculate BMI.     Physical Exam  Constitutional:       Appearance: Normal appearance.   HENT:      Head: Normocephalic and atraumatic.   Neurological:      Mental Status: She is alert and oriented to person, place, and time.   Psychiatric:         Mood and Affect: Affect normal. Mood is anxious.         Behavior: Behavior normal.         Assessment / Plan  "     Assessment/Plan:   Diagnoses and all orders for this visit:    1. Viral URI with cough (Primary)  -     promethazine-dextromethorphan (PROMETHAZINE-DM) 6.25-15 MG/5ML syrup; Take 5 mL by mouth 4 (Four) Times a Day As Needed for Cough.  Dispense: 118 mL; Refill: 1  -     Discontinue: methylPREDNISolone (MEDROL) 4 MG dose pack; Take as directed on package instructions.  Dispense: 21 each; Refill: 0  -     oxymetazoline (AFRIN) 0.05 % nasal spray; 2 sprays into the nostril(s) as directed by provider 2 (Two) Times a Day for 3 days.  Dispense: 2 mL; Refill: 0         1. We will order promethazine cough syrup to help with cough and nausea.  Advised her that each dose of the syrup contains 6.25 mg of promethazine.  She can take 1/2 tablet of her oral promethazine as well to help with nausea if needed.  2. She is already taking a Medrol Dosepak at this time.  We will not had any more steroids at this time, however will trial oxymetazoline to see if this helps with her nasal congestion and other symptoms.    Plan of care was reviewed with patient at the conclusion of today's visit. Education was provided regarding diagnoses, management, prescribed or recommended OTC products, and the importance of compliance with follow-up appointments. The patient was counseled regarding the risks, benefits, and possible side-effects of treatment. I advised the patient to keep me informed of any acute changes in their status including new, worsening, or persistent symptoms. Patient expresses understanding and agreement with the management plan.    Follow Up:   Return if symptoms worsen or fail to improve.    I spent approximately 15 minutes providing clinical care for this patient; including review of patient's chart and provider documentation, face to face time spent with patient in examination room (obtaining history, performing physical exam, discussing diagnosis and management options), placing orders, and completing patient  documentation.     CLAIR Obrien  [facility]

## 2021-12-09 ENCOUNTER — TELEPHONE (OUTPATIENT)
Dept: FAMILY MEDICINE CLINIC | Facility: CLINIC | Age: 36
End: 2021-12-09

## 2021-12-09 NOTE — TELEPHONE ENCOUNTER
Caller: Lindsay Amezquita    Relationship: Self    Best call back number:     Equipment requested: ULTRA ONE TOUCH METER, STRIPS, LANCETS, PENS    Reason for the request: PATIENT IS PRE DIABETIC AND WOULD LIKE TO TEST HER SUGAR    Prescribing Provider: NADER FARRIS    [facility]

## 2021-12-09 NOTE — TELEPHONE ENCOUNTER
Caller: Lindsay Amezquita    Relationship: Self    Best call back number: [phone]    What medication are you requesting: augmentin    What are your current symptoms: diarrhea, vomiting, fever, throat sore, bad headache.     How long have you been experiencing symptoms:     Have you had these symptoms before:    [x] Yes  [] No    Have you been treated for these symptoms before:   [x] Yes  [] No    If a prescription is needed, what is your preferred pharmacy and phone number: [id number] - [place], [place] - [address] [phone] [facility] [phone] [facility]     Additional notes:  LINDSAY SAYS THE ANTI BIOTIC PRESCRIBED ISN'T WORKING.  SHE IS AUGMENTIN WHICH DOES WORK.

## 2021-12-10 ENCOUNTER — TELEMEDICINE (OUTPATIENT)
Dept: FAMILY MEDICINE CLINIC | Facility: CLINIC | Age: 36
End: 2021-12-10

## 2021-12-10 ENCOUNTER — TELEPHONE (OUTPATIENT)
Dept: INTERNAL MEDICINE | Facility: CLINIC | Age: 36
End: 2021-12-10

## 2021-12-10 DIAGNOSIS — R73.03 PRE-DIABETES: Primary | ICD-10-CM

## 2021-12-10 DIAGNOSIS — H92.03 EAR PAIN, BILATERAL: Primary | ICD-10-CM

## 2021-12-10 PROCEDURE — 99212 OFFICE O/P EST SF 10 MIN: CPT

## 2021-12-10 RX ORDER — BLOOD-GLUCOSE METER
KIT MISCELLANEOUS
Qty: 1 EACH | Refills: 0 | Status: SHIPPED | OUTPATIENT
Start: 2021-12-10 | End: 2022-11-17 | Stop reason: SDUPTHER

## 2021-12-10 RX ORDER — CEFDINIR 300 MG/1
300 CAPSULE ORAL 2 TIMES DAILY
Qty: 20 CAPSULE | Refills: 0 | Status: SHIPPED | OUTPATIENT
Start: 2021-12-10 | End: 2021-12-20

## 2021-12-10 RX ORDER — LANCETS 21 GAUGE
EACH MISCELLANEOUS
Qty: 1 EACH | Refills: 0 | Status: SHIPPED | OUTPATIENT
Start: 2021-12-10

## 2021-12-10 RX ORDER — LANCETS
EACH MISCELLANEOUS
Qty: 25 EACH | Refills: 12 | Status: SHIPPED | OUTPATIENT
Start: 2021-12-10 | End: 2021-12-16

## 2021-12-10 NOTE — TELEPHONE ENCOUNTER
Sent to [facility] in [place].  I am not sure if insurance will pay for this given she does not have diabetes.

## 2021-12-10 NOTE — TELEPHONE ENCOUNTER
Received phone call from patient via on-call service at 1835 last night.  Patient was complaining of cough, vomiting and chills.  She denied shortness of breath or wheezing.  She stated she was seen several times in past 1 week for these complaints.  She was already on antibiotics recently in addition to Medrol Dosepak and promethazine cough syrup.  She complains that she was sent back to work however is unable to work at this time due to above complaints.  She wondered if she could get something else to help treat her worsening cough and vomiting.  I explained to her that on-call physician could not give her anything at night and I recommended if her symptoms were worse or if she was having shortness of breath or difficulty breathing she needed to seek care at urgent care or emergency room tonight or contact your office in morning for further care or treatment.

## 2021-12-10 NOTE — PROGRESS NOTES
Video/Telehealth Note      Patient Name: Lindsay Amezquita  : 1985   MRN: [id number]     Chief Complaint:    Chief Complaint   Patient presents with   • Cough   • Earache       History of Present Illness: Lindsay Amezquita is a 36 y.o. female who presents for a telephone/video visit due to the SARS-CoV-2 pandemic. You have chosen to receive care through a telehealth visit.  Do you consent to use a video/audio connection for your medical care today? Yes    Patient is by herself during the video visit.  This is her third visit regarding these symptoms.  She states that her sore throat and cough are getting worse.  She now has bilateral ear pain.  She has completed doxycycline at the beginning of November for a sinus infection.  She has gone to the emergency department within the last week for the symptoms and was treated for viral illness.  She has taken a round of methylprednisolone.  She is tried a nasal decongestant and Mucinex DM.  None of these seem to be making her symptoms improve.  She attempted to go to work yesterday and her coughing induced 1 episode of vomiting.    Subjective     Review of System: Review of Systems   Constitutional: Negative for chills, fatigue and fever.   HENT: Positive for ear pain, postnasal drip and sore throat. Negative for congestion, rhinorrhea and sinus pressure.    Respiratory: Positive for cough. Negative for shortness of breath.    Cardiovascular: Negative for chest pain.   Gastrointestinal: Positive for nausea and vomiting. Negative for diarrhea.   Musculoskeletal: Negative for back pain.   Neurological: Negative for headache.   Psychiatric/Behavioral: The patient is nervous/anxious.        I have reviewed the ROS documented by my clinical staff, updated appropriately and I agree. CLAIR Obrien    Past Medical History:   Past Medical History:   Diagnosis Date   • Anxiety    • Bell's palsy    • Bipolar 2 disorder (HCC)    • Blood clot in vein    • Depression    • GERD  (gastroesophageal reflux disease)    • Restless leg syndrome        Past Surgical History:   Past Surgical History:   Procedure Laterality Date   • CHOLECYSTECTOMY     • DENTAL PROCEDURE         Family History: History reviewed. No pertinent family history.    Social History:   Social History     Socioeconomic History   • Marital status: Single   Tobacco Use   • Smoking status: Current Every Day Smoker     Packs/day: 1.00     Years: 26.00     Pack years: 26.00     Types: Cigarettes   • Smokeless tobacco: Never Used   • Tobacco comment: also vaping   Vaping Use   • Vaping Use: Former   Substance and Sexual Activity   • Alcohol use: Never   • Drug use: Never   • Sexual activity: Defer       Medications:     Current Outpatient Medications:   •  amitriptyline (ELAVIL) 150 MG tablet, TAKE 1 TABLET BY MOUTH EVERY DAY AT NIGHT, Disp: 30 tablet, Rfl: 1  •  cefdinir (OMNICEF) 300 MG capsule, Take 1 capsule by mouth 2 (Two) Times a Day for 10 days., Disp: 20 capsule, Rfl: 0  •  cyanocobalamin 500 MCG tablet, Take 1 tablet by mouth Daily., Disp: 30 tablet, Rfl: 5  •  diazePAM (VALIUM) 5 MG tablet, Take 1 tablet by mouth Daily As Needed for Anxiety., Disp: 30 tablet, Rfl: 0  •  Eliquis 5 MG tablet tablet, TAKE 1 TABLET BY MOUTH EVERY 12 HOURS, Disp: 60 tablet, Rfl: 1  •  fluticasone (Flonase) 50 MCG/ACT nasal spray, 2 sprays into the nostril(s) as directed by provider Daily., Disp: 18.2 mL, Rfl: 5  •  furosemide (LASIX) 20 MG tablet, Take 1 tablet by mouth Daily., Disp: 30 tablet, Rfl: 5  •  gabapentin (NEURONTIN) 800 MG tablet, Take 1 tablet by mouth 3 (Three) Times a Day., Disp: 90 tablet, Rfl: 1  •  HYDROcodone-acetaminophen (NORCO) 7.5-325 MG per tablet, Take 1 tablet by mouth Daily As Needed., Disp: , Rfl:   •  hydrOXYzine (ATARAX) 25 MG tablet, Take 1 tablet by mouth 3 (Three) Times a Day As Needed for Anxiety., Disp: 30 tablet, Rfl: 5  •  levocetirizine (XYZAL) 5 MG tablet, Take 1 tablet by mouth Every Evening., Disp: 30  "tablet, Rfl: 5  •  Lidoderm 5 %, APPLY 1 PATCH TO SKIN ONCE DAILY **MAY WEAR UP TO 12 HOURS**, Disp: , Rfl:   •  Lurasidone HCl (Latuda) 120 MG tablet tablet, Take 1 tablet by mouth Daily. Take 120 mg orally daily with a meal., Disp: 30 tablet, Rfl: 3  •  omeprazole (priLOSEC) 40 MG capsule, Take 1 capsule by mouth Daily., Disp: 90 capsule, Rfl: 3  •  oxymetazoline (AFRIN) 0.05 % nasal spray, 2 sprays into the nostril(s) as directed by provider 2 (Two) Times a Day for 3 days., Disp: 2 mL, Rfl: 0  •  promethazine (PHENERGAN) 25 MG tablet, Take 1 tablet by mouth Every 6 (Six) Hours As Needed for Nausea or Vomiting., Disp: 12 tablet, Rfl: 0  •  promethazine-dextromethorphan (PROMETHAZINE-DM) 6.25-15 MG/5ML syrup, Take 5 mL by mouth 4 (Four) Times a Day As Needed for Cough., Disp: 118 mL, Rfl: 1  •  Rimegepant Sulfate (Nurtec) 75 MG tablet dispersible tablet, Take 1 tablet by mouth 1 (One) Time As Needed (migraine)., Disp: 9 tablet, Rfl: 1  •  tiZANidine (ZANAFLEX) 4 MG tablet, Take 1 tablet by mouth At Night As Needed for Muscle Spasms., Disp: 30 tablet, Rfl: 1  •  vitamin D (ERGOCALCIFEROL) 1.25 MG (59605 UT) capsule capsule, TAKE 1 CAPSULE BY MOUTH 1 (ONE) TIME PER WEEK., Disp: 4 capsule, Rfl: 2    Allergies:   Allergies   Allergen Reactions   • Codeine Anaphylaxis   • Flexeril [Cyclobenzaprine] Other (See Comments)     \"gives me chest pain\"   • Asa [Aspirin] Hives     Wheezing     • Latex Hives   • Morphine Itching   • Penicillins Hives     Vomiting     • Compazine [Prochlorperazine] Anxiety   • Reglan [Metoclopramide] Anxiety       Objective     Physical Exam:   Vital Signs: There were no vitals filed for this visit.  There is no height or weight on file to calculate BMI.     Physical Exam  Vitals and nursing note reviewed.   Constitutional:       Appearance: Normal appearance.   Pulmonary:      Effort: Pulmonary effort is normal.   Neurological:      Mental Status: She is alert and oriented to person, place, and " time.   Psychiatric:         Mood and Affect: Mood is anxious.         Behavior: Behavior normal.         Assessment / Plan      Assessment/Plan:   Diagnoses and all orders for this visit:    1. Ear pain, bilateral (Primary)  -     cefdinir (OMNICEF) 300 MG capsule; Take 1 capsule by mouth 2 (Two) Times a Day for 10 days.  Dispense: 20 capsule; Refill: 0         1. We will treat for underlying bacterial infection.  She has an allergy to penicillin but has taken cefdinir earlier this year.  We will treat with cefdinir 300 mg for 10 days for possible otitis media.  2. Advised patient to take an over the counter probiotic to help prevent diarrhea from taking antibiotics closely together.  She reports that she has some in her possession.    Plan of care was reviewed with patient at the conclusion of today's visit. Education was provided regarding diagnoses, management, prescribed or recommended OTC products, and the importance of compliance with follow-up appointments. The patient was counseled regarding the risks, benefits, and possible side-effects of treatment. I advised the patient to keep me informed of any acute changes in their status including new, worsening, or persistent symptoms. Patient expresses understanding and agreement with the management plan.    Follow Up:   Return if symptoms worsen or fail to improve.    I spent approximately 15 minutes providing clinical care for this patient; including review of patient's chart and provider documentation, face to face time spent with patient in examination room (obtaining history, performing physical exam, discussing diagnosis and management options), placing orders, and completing patient documentation.     CLAIR Obrien  [facility] [place] [facility]

## 2021-12-14 ENCOUNTER — TELEPHONE (OUTPATIENT)
Dept: FAMILY MEDICINE CLINIC | Facility: CLINIC | Age: 36
End: 2021-12-14

## 2021-12-14 DIAGNOSIS — B37.0 THRUSH: ICD-10-CM

## 2021-12-14 NOTE — TELEPHONE ENCOUNTER
Is she referring to the Cefdinir for her infection? If so, she should've started that the day it was prescribed last week.

## 2021-12-14 NOTE — TELEPHONE ENCOUNTER
Caller: Lindsay Amezquita    Relationship to patient: Self    Best call back number: [phone]    Patient is needing:PATIENT STATED THAT SHE WAS SEEN IN OFFICE ON Friday AND IF SHE SHOULD START TAKING THE CADISTA MEDICATION     PLEASE ADVISE

## 2021-12-15 DIAGNOSIS — F51.04 PSYCHOPHYSIOLOGICAL INSOMNIA: ICD-10-CM

## 2021-12-15 RX ORDER — AMITRIPTYLINE HYDROCHLORIDE 150 MG/1
TABLET, FILM COATED ORAL
Qty: 30 TABLET | Refills: 1 | Status: SHIPPED | OUTPATIENT
Start: 2021-12-15 | End: 2022-01-10 | Stop reason: SDUPTHER

## 2021-12-15 NOTE — TELEPHONE ENCOUNTER
Rx Refill Note  Requested Prescriptions     Pending Prescriptions Disp Refills   • amitriptyline (ELAVIL) 150 MG tablet [Pharmacy Med Name: AMITRIPTYLINE  MG TAB] 30 tablet 1     Sig: TAKE 1 TABLET BY MOUTH EVERY DAY AT NIGHT      Last office visit with prescribing clinician: 12/1/2021      Next office visit with prescribing clinician: 1/24/2022            Steph Ivey MA  12/15/21, 07:45 EST

## 2021-12-16 ENCOUNTER — OFFICE VISIT (OUTPATIENT)
Dept: FAMILY MEDICINE CLINIC | Facility: CLINIC | Age: 36
End: 2021-12-16

## 2021-12-16 VITALS
HEIGHT: 62 IN | HEART RATE: 97 BPM | OXYGEN SATURATION: 99 % | SYSTOLIC BLOOD PRESSURE: 130 MMHG | BODY MASS INDEX: 51.94 KG/M2 | WEIGHT: 282.25 LBS | DIASTOLIC BLOOD PRESSURE: 76 MMHG

## 2021-12-16 DIAGNOSIS — Z79.4 TYPE 2 DIABETES MELLITUS WITH DIABETIC AUTONOMIC NEUROPATHY, WITH LONG-TERM CURRENT USE OF INSULIN (HCC): Primary | ICD-10-CM

## 2021-12-16 DIAGNOSIS — R00.2 PALPITATIONS: ICD-10-CM

## 2021-12-16 DIAGNOSIS — R61 DIAPHORESIS: ICD-10-CM

## 2021-12-16 DIAGNOSIS — E11.43 TYPE 2 DIABETES MELLITUS WITH DIABETIC AUTONOMIC NEUROPATHY, WITH LONG-TERM CURRENT USE OF INSULIN (HCC): Primary | ICD-10-CM

## 2021-12-16 LAB
EXPIRATION DATE: 0
HBA1C MFR BLD: 6.5 %
Lab: 0

## 2021-12-16 PROCEDURE — 3044F HG A1C LEVEL LT 7.0%: CPT

## 2021-12-16 PROCEDURE — 99213 OFFICE O/P EST LOW 20 MIN: CPT

## 2021-12-16 PROCEDURE — 83036 HEMOGLOBIN GLYCOSYLATED A1C: CPT

## 2021-12-16 RX ORDER — LANCETS 28 GAUGE
1 EACH MISCELLANEOUS 4 TIMES DAILY
Qty: 400 EACH | Refills: 3 | Status: SHIPPED | OUTPATIENT
Start: 2021-12-16 | End: 2023-01-04

## 2021-12-16 NOTE — PROGRESS NOTES
Acute Office Visit      Patient Name: Lindsay Amezquita  : 1985   MRN: [id number]     Chief Complaint:    Chief Complaint   Patient presents with   • Excessive Sweating     Complaints of excessive sweating, fatigue, and dry mouth. Patient states that when she has the excessive sweating she states that she feels like she is going to pass out. patient states that she has been going on the freezer at work to cool down. she states that her sugar dropped to 66 last night and her manager had to get her food and a drink to bring her sugar back up. she states that when she left work at 8 pm last night it was up to 99.   • Fatigue   • Dry Mouth       History of Present Illness: Lindsay Amezquita is a 36 y.o. female who is here today for episodes of excessive sweating and fatigue.  She states that for the past 4 to 5 days she has had multiple episodes a day of feeling excessively sweaty, shaky, weak, feeling as if she is in a pass out, and that her heart is racing.  When these episodes happen while she was at work and a fellow employee who had a glucometer checked the patient's blood sugar and was found to be 67.  The patient reports that she normally is in the  level when she checks her glucose at home.  At a different episode she checked her glucose and had a reading of 212.  This worried her and she drank water to help lower her level until she had a reading of 110.  This morning she checked her blood glucose and it was 102.  She is concerned that she is either having some sort of cardiac issue or she is starting to go into menopause.  Notes that her last menstrual cycle was on , but states that she has some chronic issue that makes her periods irregular at times.  She also is taking a second round of steroids.  She was recently treated for an upper respiratory infection with antibiotics and steroids, however she completed 1 round of steroids and then quickly started another round.  She states that she  has 4 days remaining on this current course.  Otherwise, her cough has significantly improved with the Phenergan syrup and she is wanting to discontinue the steroids if possible.    Subjective     Review of System: Review of Systems   Constitutional: Positive for diaphoresis and fatigue.   Eyes: Negative for visual disturbance.   Respiratory: Negative for cough and shortness of breath.    Cardiovascular: Positive for palpitations. Negative for chest pain.   Gastrointestinal: Positive for nausea. Negative for abdominal pain, diarrhea and vomiting.   Endocrine: Positive for polydipsia.   Genitourinary: Negative.    Musculoskeletal: Negative.    Skin: Negative.    Neurological: Positive for light-headedness and headaches. Negative for dizziness and numbness.   Psychiatric/Behavioral: The patient is nervous/anxious.       I have reviewed the ROS documented by my clinical staff, updated appropriately and I agree. CLAIR Obrien    Past Medical History:   Past Medical History:   Diagnosis Date   • Anxiety    • Bell's palsy    • Bipolar 2 disorder (HCC)    • Blood clot in vein    • Depression    • GERD (gastroesophageal reflux disease)    • Restless leg syndrome        Past Surgical History:   Past Surgical History:   Procedure Laterality Date   • CHOLECYSTECTOMY     • DENTAL PROCEDURE         Family History: History reviewed. No pertinent family history.    Social History:   Social History     Socioeconomic History   • Marital status: Single   Tobacco Use   • Smoking status: Current Every Day Smoker     Packs/day: 1.00     Years: 26.00     Pack years: 26.00     Types: Cigarettes   • Smokeless tobacco: Never Used   • Tobacco comment: also vaping   Vaping Use   • Vaping Use: Former   Substance and Sexual Activity   • Alcohol use: Never   • Drug use: Never   • Sexual activity: Defer       Medications:     Current Outpatient Medications:   •  amitriptyline (ELAVIL) 150 MG tablet, TAKE 1 TABLET BY MOUTH EVERY DAY AT NIGHT,  Disp: 30 tablet, Rfl: 1  •  cefdinir (OMNICEF) 300 MG capsule, Take 1 capsule by mouth 2 (Two) Times a Day for 10 days., Disp: 20 capsule, Rfl: 0  •  cyanocobalamin 500 MCG tablet, Take 1 tablet by mouth Daily., Disp: 30 tablet, Rfl: 5  •  diazePAM (VALIUM) 5 MG tablet, Take 1 tablet by mouth Daily As Needed for Anxiety., Disp: 30 tablet, Rfl: 0  •  Eliquis 5 MG tablet tablet, TAKE 1 TABLET BY MOUTH EVERY 12 HOURS, Disp: 60 tablet, Rfl: 1  •  fluticasone (Flonase) 50 MCG/ACT nasal spray, 2 sprays into the nostril(s) as directed by provider Daily., Disp: 18.2 mL, Rfl: 5  •  furosemide (LASIX) 20 MG tablet, Take 1 tablet by mouth Daily., Disp: 30 tablet, Rfl: 5  •  gabapentin (NEURONTIN) 800 MG tablet, Take 1 tablet by mouth 3 (Three) Times a Day., Disp: 90 tablet, Rfl: 1  •  glucose blood test strip, Check blood sugar once daily, Disp: 25 each, Rfl: 12  •  glucose monitor monitoring kit, Check blood sugar once daily, Disp: 1 each, Rfl: 0  •  HYDROcodone-acetaminophen (NORCO) 7.5-325 MG per tablet, Take 1 tablet by mouth Daily As Needed., Disp: , Rfl:   •  hydrOXYzine (ATARAX) 25 MG tablet, Take 1 tablet by mouth 3 (Three) Times a Day As Needed for Anxiety., Disp: 30 tablet, Rfl: 5  •  Lancets Misc. (ONE TOUCH SURESOFT) misc, Check blood sugar once daily, Disp: 1 each, Rfl: 0  •  levocetirizine (XYZAL) 5 MG tablet, Take 1 tablet by mouth Every Evening., Disp: 30 tablet, Rfl: 5  •  Lidoderm 5 %, APPLY 1 PATCH TO SKIN ONCE DAILY **MAY WEAR UP TO 12 HOURS**, Disp: , Rfl:   •  Lurasidone HCl (Latuda) 120 MG tablet tablet, Take 1 tablet by mouth Daily. Take 120 mg orally daily with a meal., Disp: 30 tablet, Rfl: 3  •  nystatin (MYCOSTATIN) 100,000 unit/mL suspension, SWISH AND SWALLOW 5 ML 4 (FOUR) TIMES A DAY FOR 7 DAYS., Disp: 210 mL, Rfl: 0  •  omeprazole (priLOSEC) 40 MG capsule, Take 1 capsule by mouth Daily., Disp: 90 capsule, Rfl: 3  •  promethazine (PHENERGAN) 25 MG tablet, Take 1 tablet by mouth Every 6 (Six)  "Hours As Needed for Nausea or Vomiting., Disp: 12 tablet, Rfl: 0  •  promethazine-dextromethorphan (PROMETHAZINE-DM) 6.25-15 MG/5ML syrup, Take 5 mL by mouth 4 (Four) Times a Day As Needed for Cough., Disp: 118 mL, Rfl: 1  •  Rimegepant Sulfate (Nurtec) 75 MG tablet dispersible tablet, Take 1 tablet by mouth 1 (One) Time As Needed (migraine)., Disp: 9 tablet, Rfl: 1  •  tiZANidine (ZANAFLEX) 4 MG tablet, Take 1 tablet by mouth At Night As Needed for Muscle Spasms., Disp: 30 tablet, Rfl: 1  •  vitamin D (ERGOCALCIFEROL) 1.25 MG (87132 UT) capsule capsule, TAKE 1 CAPSULE BY MOUTH 1 (ONE) TIME PER WEEK., Disp: 4 capsule, Rfl: 2  •  Lancets (freestyle) lancets, 1 each by Other route 4 (Four) Times a Day., Disp: 400 each, Rfl: 3    Allergies:   Allergies   Allergen Reactions   • Codeine Anaphylaxis   • Flexeril [Cyclobenzaprine] Other (See Comments)     \"gives me chest pain\"   • Asa [Aspirin] Hives     Wheezing     • Latex Hives   • Morphine Itching   • Penicillins Hives     Vomiting     • Compazine [Prochlorperazine] Anxiety   • Reglan [Metoclopramide] Anxiety       Objective     Physical Exam:   Vital Signs:   Vitals:    12/16/21 1521   BP: 130/76   Pulse: 97   SpO2: 99%   Weight: 128 kg (282 lb 4 oz)   Height: 157.5 cm (62.01\")     Body mass index is 51.61 kg/m².     Physical Exam  Vitals and nursing note reviewed.   Constitutional:       Appearance: Normal appearance. She is obese.   HENT:      Head: Normocephalic and atraumatic.      Right Ear: Tympanic membrane, ear canal and external ear normal.      Left Ear: Tympanic membrane, ear canal and external ear normal.      Nose: Nose normal.      Mouth/Throat:      Mouth: Mucous membranes are moist.      Pharynx: Oropharynx is clear.   Eyes:      Extraocular Movements: Extraocular movements intact.      Conjunctiva/sclera: Conjunctivae normal.      Pupils: Pupils are equal, round, and reactive to light.   Neck:      Vascular: No carotid bruit.   Cardiovascular:      " Rate and Rhythm: Normal rate and regular rhythm.      Pulses: Normal pulses.      Heart sounds: Normal heart sounds.   Pulmonary:      Effort: Pulmonary effort is normal.      Breath sounds: Normal breath sounds.   Abdominal:      General: Abdomen is flat. Bowel sounds are normal. There is no distension.      Palpations: Abdomen is soft.      Tenderness: There is no abdominal tenderness.   Musculoskeletal:      Cervical back: Neck supple. No tenderness.      Right lower leg: No edema.      Left lower leg: No edema.   Lymphadenopathy:      Cervical: No cervical adenopathy.   Skin:     General: Skin is warm and dry.      Capillary Refill: Capillary refill takes less than 2 seconds.   Neurological:      General: No focal deficit present.      Mental Status: She is alert and oriented to person, place, and time.   Psychiatric:         Mood and Affect: Mood normal.         Behavior: Behavior normal.       ECG 12 Lead    Date/Time: 12/17/2021 8:55 AM  Performed by: Gennaro Tang APRN  Authorized by: Gennaro Tang APRN   Comparison: not compared with previous ECG   Rhythm: sinus rhythm  Rate: normal  BPM: 96  Conduction: conduction normal  ST Segments: ST segments normal  T Waves: T waves normal  QRS axis: normal  Other: no other findings    Clinical impression: normal ECG              Assessment / Plan      Assessment/Plan:   Diagnoses and all orders for this visit:    1. Type 2 diabetes mellitus with diabetic autonomic neuropathy, with long-term current use of insulin (HCC) (Primary)  -     Lancets (freestyle) lancets; 1 each by Other route 4 (Four) Times a Day.  Dispense: 400 each; Refill: 3  -     POC Glycosylated Hemoglobin (Hb A1C)  -     Comprehensive Metabolic Panel; Future    2. Palpitations  -     CBC auto differential; Future  -     ECG 12 Lead  -     Comprehensive Metabolic Panel; Future    3. Diaphoresis  -     Estrogens, Total; Future  -     FSH & LH; Future  -     Prolactin; Future  -     TSH Rfx On  Abnormal To Free T4; Future         1. She requested an EKG and this was completed.  Also checked and A1c which was 6.5, her previous result was 6.1  2. She is requesting to have some of her hormone levels checked, advised patient that this would need to be collected between a certain time of the day.  She is agreeable to return to the clinic between 8 AM and 10 PM to obtain these labs.  We will also check her thyroid level and also told her to fast in order to see what her glucose level is with this blood draw.  3. Unlikely that there is adrenal suppression due to the current steroid use.  She is most likely experiencing fluctuations in her blood glucose related to using the steroids and this could be exacerbating some of her symptoms.  Advised her to stop the steroids at this time.      Follow Up:   Return if symptoms worsen or fail to improve.    I spent approximately 20 minutes providing clinical care for this patient; including review of patient's chart and provider documentation, face to face time spent with patient in examination room (obtaining history, performing physical exam, discussing diagnosis and management options), placing orders, and completing patient documentation.     CLAIR Obrien  [facility]   Answers for HPI/ROS submitted by the patient on 12/16/2021  Please describe your symptoms.: I need to be seeing by a heart doctor n I am sweating alot  Have you had these symptoms before?: No  How long have you been having these symptoms?: 1-4 days  What is the primary reason for your visit?: Other

## 2021-12-17 PROCEDURE — 93000 ELECTROCARDIOGRAM COMPLETE: CPT

## 2021-12-21 ENCOUNTER — TELEPHONE (OUTPATIENT)
Dept: FAMILY MEDICINE CLINIC | Facility: CLINIC | Age: 36
End: 2021-12-21

## 2021-12-21 RX ORDER — FLUCONAZOLE 150 MG/1
TABLET ORAL
Qty: 2 TABLET | Refills: 0 | Status: SHIPPED | OUTPATIENT
Start: 2021-12-21 | End: 2022-05-09 | Stop reason: SDUPTHER

## 2021-12-21 RX ORDER — SUCRALFATE 1 G/1
TABLET ORAL
Qty: 60 TABLET | Refills: 0 | OUTPATIENT
Start: 2021-12-21

## 2021-12-21 NOTE — TELEPHONE ENCOUNTER
Caller: Lindsay Amezquita    Relationship: Self    Best call back number: [phone]    What medication are you requesting: DIFLUCAN     What are your current symptoms: YEAST INFECTION     How long have you been experiencing symptoms: N/A    Have you had these symptoms before:    [x] Yes  [] No    Have you been treated for these symptoms before:   [x] Yes  [] No    If a prescription is needed, what is your preferred pharmacy and phone number: [id number] - [place], [place] - [address] [phone] [facility] [phone]      Additional notes: PATIENT IS EXPERIENCING A YEAST INFECTION AND WOULD LIKE TO SEE ABOUT GETTING SOMETHING CALLED INTO PHARMACY     PLEASE ADVISE

## 2021-12-22 ENCOUNTER — TELEMEDICINE (OUTPATIENT)
Dept: FAMILY MEDICINE CLINIC | Facility: CLINIC | Age: 36
End: 2021-12-22

## 2021-12-22 ENCOUNTER — TELEPHONE (OUTPATIENT)
Dept: ONCOLOGY | Facility: OTHER | Age: 36
End: 2021-12-22

## 2021-12-22 DIAGNOSIS — K62.5 BRBPR (BRIGHT RED BLOOD PER RECTUM): Primary | ICD-10-CM

## 2021-12-22 DIAGNOSIS — R19.7 DIARRHEA, UNSPECIFIED TYPE: ICD-10-CM

## 2021-12-22 PROCEDURE — 99213 OFFICE O/P EST LOW 20 MIN: CPT | Performed by: NURSE PRACTITIONER

## 2021-12-22 RX ORDER — HYDROCORTISONE 25 MG/G
CREAM TOPICAL 2 TIMES DAILY
Qty: 28 G | Refills: 0 | Status: SHIPPED | OUTPATIENT
Start: 2021-12-22 | End: 2022-12-20

## 2021-12-22 NOTE — PROGRESS NOTES
Subjective     You have chosen to receive care through a telephone visit. Do you consent to use a telephone visit for your medical care today? Yes  She was unable to connect via [employer] or [employer].      Chief Complaint:  diarrhea    History of Present Illness:   She is having a lot of diarrhea. She is having rectal pain. She is noticing some blood in her stool. She notes diarrhea has been present since she was given anbx. She denies hemorrhoids. Has some rectal pain with with BM, then will have throbbing. She notices BRB on toilet paper after having BM. Some in the toilet. No blood in between bowel movements.     She feels like she starting to get sick again. Her throat is sore. She is not sleeping well. She is anxious.   + nausea  Right breast is bruised. This is getting smaller. She denies any injury. No pain. No bruising anywhere else.     She has some abdominal pain.       Review of Systems  Gen- No fevers, chills  CV- No chest pain, palpitations  Resp- No cough, dyspnea  Neuro-No dizziness, headaches      I have reviewed and/or updated the patient's past medical, surgical, family, social history and problem list as appropriate.     Medications:    Current Outpatient Medications:   •  amitriptyline (ELAVIL) 150 MG tablet, TAKE 1 TABLET BY MOUTH EVERY DAY AT NIGHT, Disp: 30 tablet, Rfl: 1  •  cyanocobalamin 500 MCG tablet, Take 1 tablet by mouth Daily., Disp: 30 tablet, Rfl: 5  •  diazePAM (VALIUM) 5 MG tablet, Take 1 tablet by mouth Daily As Needed for Anxiety., Disp: 30 tablet, Rfl: 0  •  Eliquis 5 MG tablet tablet, TAKE 1 TABLET BY MOUTH EVERY 12 HOURS, Disp: 60 tablet, Rfl: 1  •  fluconazole (Diflucan) 150 MG tablet, Take 1 tablet by mouth now, may repeat in 3 days if needed, Disp: 2 tablet, Rfl: 0  •  fluticasone (Flonase) 50 MCG/ACT nasal spray, 2 sprays into the nostril(s) as directed by provider Daily., Disp: 18.2 mL, Rfl: 5  •  furosemide (LASIX) 20 MG tablet, Take 1 tablet by mouth Daily., Disp:  30 tablet, Rfl: 5  •  gabapentin (NEURONTIN) 800 MG tablet, Take 1 tablet by mouth 3 (Three) Times a Day., Disp: 90 tablet, Rfl: 1  •  glucose blood test strip, Check blood sugar once daily, Disp: 25 each, Rfl: 12  •  glucose monitor monitoring kit, Check blood sugar once daily, Disp: 1 each, Rfl: 0  •  HYDROcodone-acetaminophen (NORCO) 7.5-325 MG per tablet, Take 1 tablet by mouth Daily As Needed., Disp: , Rfl:   •  Hydrocortisone, Perianal, (Proctozone-HC) 2.5 % rectal cream, Insert  into the rectum 2 (Two) Times a Day., Disp: 28 g, Rfl: 0  •  hydrOXYzine (ATARAX) 25 MG tablet, Take 1 tablet by mouth 3 (Three) Times a Day As Needed for Anxiety., Disp: 30 tablet, Rfl: 5  •  Lancets (freestyle) lancets, 1 each by Other route 4 (Four) Times a Day., Disp: 400 each, Rfl: 3  •  Lancets Misc. (ONE TOUCH SURESOFT) misc, Check blood sugar once daily, Disp: 1 each, Rfl: 0  •  levocetirizine (XYZAL) 5 MG tablet, Take 1 tablet by mouth Every Evening., Disp: 30 tablet, Rfl: 5  •  Lidoderm 5 %, APPLY 1 PATCH TO SKIN ONCE DAILY **MAY WEAR UP TO 12 HOURS**, Disp: , Rfl:   •  Lurasidone HCl (Latuda) 120 MG tablet tablet, Take 1 tablet by mouth Daily. Take 120 mg orally daily with a meal., Disp: 30 tablet, Rfl: 3  •  omeprazole (priLOSEC) 40 MG capsule, Take 1 capsule by mouth Daily., Disp: 90 capsule, Rfl: 3  •  promethazine (PHENERGAN) 25 MG tablet, Take 1 tablet by mouth Every 6 (Six) Hours As Needed for Nausea or Vomiting., Disp: 12 tablet, Rfl: 0  •  promethazine-dextromethorphan (PROMETHAZINE-DM) 6.25-15 MG/5ML syrup, Take 5 mL by mouth 4 (Four) Times a Day As Needed for Cough., Disp: 118 mL, Rfl: 1  •  Rimegepant Sulfate (Nurtec) 75 MG tablet dispersible tablet, Take 1 tablet by mouth 1 (One) Time As Needed (migraine)., Disp: 9 tablet, Rfl: 1  •  tiZANidine (ZANAFLEX) 4 MG tablet, Take 1 tablet by mouth At Night As Needed for Muscle Spasms., Disp: 30 tablet, Rfl: 1  •  vitamin D (ERGOCALCIFEROL) 1.25 MG (63377 UT) capsule  "capsule, TAKE 1 CAPSULE BY MOUTH 1 (ONE) TIME PER WEEK., Disp: 4 capsule, Rfl: 2    Allergies:  Allergies   Allergen Reactions   • Codeine Anaphylaxis   • Flexeril [Cyclobenzaprine] Other (See Comments)     \"gives me chest pain\"   • Asa [Aspirin] Hives     Wheezing     • Latex Hives   • Morphine Itching   • Penicillins Hives     Vomiting     • Compazine [Prochlorperazine] Anxiety   • Reglan [Metoclopramide] Anxiety       Objective     Vital Signs: There were no vitals filed for this visit.  There is no height or weight on file to calculate BMI.    Physical Exam:  Gen-no acute distress, telephone exam  Resp- normal WOB, able to speak in full sentences without distress  Neuro-A&Ox3, speech clear  Psych-anxious, cooperative         Assessment / Plan     Assessment/Plan:   Problem List Items Addressed This Visit     None      Visit Diagnoses     BRBPR (bright red blood per rectum)    -  Primary    Relevant Medications    Hydrocortisone, Perianal, (Proctozone-HC) 2.5 % rectal cream    Other Relevant Orders    Gastrointestinal Panel, PCR - Stool, Per Rectum    Diarrhea, unspecified type        Relevant Orders    Gastrointestinal Panel, PCR - Stool, Per Rectum        --Sounds like hemorrhoids due to diarrhea.  Proctoscopy zone.  Supportive care discussed.  Check GI panel.  Encouraged her to come to clinic to get labs done that were ordered last week by Gennaro.    Follow up:  Return if symptoms worsen or fail to improve.    Total time of encounter was 15 minutes    Electronically signed by CLAIR Leal   12/22/2021 17:05 EST      Please note that portions of this note may have been completed with a voice recognition program. Efforts were made to edit the dictations, but occasionally words are mistranscribed.  "

## 2021-12-29 ENCOUNTER — TELEMEDICINE (OUTPATIENT)
Dept: FAMILY MEDICINE CLINIC | Facility: CLINIC | Age: 36
End: 2021-12-29

## 2021-12-29 DIAGNOSIS — M54.41 CHRONIC BILATERAL LOW BACK PAIN WITH BILATERAL SCIATICA: ICD-10-CM

## 2021-12-29 DIAGNOSIS — G89.29 CHRONIC BILATERAL LOW BACK PAIN WITH BILATERAL SCIATICA: ICD-10-CM

## 2021-12-29 DIAGNOSIS — M54.42 CHRONIC BILATERAL LOW BACK PAIN WITH BILATERAL SCIATICA: ICD-10-CM

## 2021-12-29 DIAGNOSIS — R61 EXCESSIVE SWEATING: Primary | ICD-10-CM

## 2021-12-29 PROCEDURE — 99213 OFFICE O/P EST LOW 20 MIN: CPT | Performed by: NURSE PRACTITIONER

## 2021-12-29 RX ORDER — GABAPENTIN 800 MG/1
800 TABLET ORAL 3 TIMES DAILY
Qty: 90 TABLET | Refills: 2 | Status: SHIPPED | OUTPATIENT
Start: 2021-12-29 | End: 2022-04-08

## 2021-12-29 NOTE — PROGRESS NOTES
Subjective     You have chosen to receive care through a telehealth visit.  Do you consent to use a video/audio connection for your medical care today? Yes. [employer] used.     Chief Complaint:  Labs      History of Present Illness:   She presents today with concerns about her labs. She was seen a few weeks ago by Gennaro. She reported sweating. She continues to report sweating, easily bruising and skin flushing. Worse when she is at work. 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 Has not had a job in several years. She has hx of PCOS. Her mom started menopause around this age. She continues to try to loose weight. She is on elquis for DVT. She needs refill on gababpentin. Takes for chronic back pain. Also follows with pain management.     Review of Systems  Gen- No fevers, chills  CV- No chest pain, palpitations  Resp- No cough, dyspnea  GI- No N/V/D, abd pain  Neuro-No dizziness, headaches      I have reviewed and/or updated the patient's past medical, surgical, family, social history and problem list as appropriate.     Medications:    Current Outpatient Medications:   •  amitriptyline (ELAVIL) 150 MG tablet, TAKE 1 TABLET BY MOUTH EVERY DAY AT NIGHT, Disp: 30 tablet, Rfl: 1  •  cyanocobalamin 500 MCG tablet, Take 1 tablet by mouth Daily., Disp: 30 tablet, Rfl: 5  •  diazePAM (VALIUM) 5 MG tablet, Take 1 tablet by mouth Daily As Needed for Anxiety., Disp: 30 tablet, Rfl: 0  •  Eliquis 5 MG tablet tablet, TAKE 1 TABLET BY MOUTH EVERY 12 HOURS, Disp: 60 tablet, Rfl: 1  •  fluconazole (Diflucan) 150 MG tablet, Take 1 tablet by mouth now, may repeat in 3 days if needed, Disp: 2 tablet, Rfl: 0  •  fluticasone (Flonase) 50 MCG/ACT nasal spray, 2 sprays into the nostril(s) as directed by provider Daily., Disp: 18.2 mL, Rfl: 5  •  furosemide (LASIX) 20 MG tablet, Take 1 tablet by mouth Daily., Disp: 30 tablet, Rfl: 5  •  gabapentin (NEURONTIN) 800 MG tablet, Take 1 tablet by mouth 3 (Three) Times a Day., Disp: 90 tablet,  Rfl: 2  •  glucose blood test strip, Check blood sugar once daily, Disp: 25 each, Rfl: 12  •  glucose monitor monitoring kit, Check blood sugar once daily, Disp: 1 each, Rfl: 0  •  HYDROcodone-acetaminophen (NORCO) 7.5-325 MG per tablet, Take 1 tablet by mouth Daily As Needed., Disp: , Rfl:   •  Hydrocortisone, Perianal, (Proctozone-HC) 2.5 % rectal cream, Insert  into the rectum 2 (Two) Times a Day., Disp: 28 g, Rfl: 0  •  hydrOXYzine (ATARAX) 25 MG tablet, Take 1 tablet by mouth 3 (Three) Times a Day As Needed for Anxiety., Disp: 30 tablet, Rfl: 5  •  Lancets (freestyle) lancets, 1 each by Other route 4 (Four) Times a Day., Disp: 400 each, Rfl: 3  •  Lancets Misc. (ONE TOUCH SURESOFT) misc, Check blood sugar once daily, Disp: 1 each, Rfl: 0  •  levocetirizine (XYZAL) 5 MG tablet, Take 1 tablet by mouth Every Evening., Disp: 30 tablet, Rfl: 5  •  Lidoderm 5 %, APPLY 1 PATCH TO SKIN ONCE DAILY **MAY WEAR UP TO 12 HOURS**, Disp: , Rfl:   •  Lurasidone HCl (Latuda) 120 MG tablet tablet, Take 1 tablet by mouth Daily. Take 120 mg orally daily with a meal., Disp: 30 tablet, Rfl: 3  •  omeprazole (priLOSEC) 40 MG capsule, Take 1 capsule by mouth Daily., Disp: 90 capsule, Rfl: 3  •  promethazine (PHENERGAN) 25 MG tablet, Take 1 tablet by mouth Every 6 (Six) Hours As Needed for Nausea or Vomiting., Disp: 12 tablet, Rfl: 0  •  promethazine-dextromethorphan (PROMETHAZINE-DM) 6.25-15 MG/5ML syrup, Take 5 mL by mouth 4 (Four) Times a Day As Needed for Cough., Disp: 118 mL, Rfl: 1  •  Rimegepant Sulfate (Nurtec) 75 MG tablet dispersible tablet, Take 1 tablet by mouth 1 (One) Time As Needed (migraine)., Disp: 9 tablet, Rfl: 1  •  tiZANidine (ZANAFLEX) 4 MG tablet, Take 1 tablet by mouth At Night As Needed for Muscle Spasms., Disp: 30 tablet, Rfl: 1  •  vitamin D (ERGOCALCIFEROL) 1.25 MG (23669 UT) capsule capsule, TAKE 1 CAPSULE BY MOUTH 1 (ONE) TIME PER WEEK., Disp: 4 capsule, Rfl: 2    Allergies:  Allergies   Allergen Reactions  "  • Codeine Anaphylaxis   • Flexeril [Cyclobenzaprine] Other (See Comments)     \"gives me chest pain\"   • Asa [Aspirin] Hives     Wheezing     • Latex Hives   • Morphine Itching   • Penicillins Hives     Vomiting     • Compazine [Prochlorperazine] Anxiety   • Reglan [Metoclopramide] Anxiety       Objective     Vital Signs: There were no vitals filed for this visit.  There is no height or weight on file to calculate BMI.    Physical Exam:  Gen-no acute distress, video visit  Resp- normal WOB, on RA  Neuro-A&Ox3, face symmetrical, speech clear  Skin-no overt rashes noted  Psych-appropriate mood, cooperative         Assessment / Plan     Assessment/Plan:   Problem List Items Addressed This Visit        Musculoskeletal and Injuries    Chronic bilateral low back pain with bilateral sciatica    Relevant Medications    gabapentin (NEURONTIN) 800 MG tablet      Other Visit Diagnoses     Excessive sweating    -  Primary        -- labs reviewed with patient and reassuring she is not in menopause. Her sweating is multifactorial and due to metabolic syndrome, physical deconditioning, and work environment. Continue to work on weight loss.   -- discussed easily bruising is side effect of eliquis, plt normal with last labs  -- gabapentin refilled    Follow up:  As needed    Total Time of Encounter 15 minutes    Electronically signed by CLAIR Leal   12/29/2021 14:19 EST      Please note that portions of this note may have been completed with a voice recognition program. Efforts were made to edit the dictations, but occasionally words are mistranscribed.  "

## 2022-01-03 DIAGNOSIS — F41.1 GAD (GENERALIZED ANXIETY DISORDER): ICD-10-CM

## 2022-01-03 RX ORDER — DIAZEPAM 5 MG/1
5 TABLET ORAL DAILY PRN
Qty: 30 TABLET | Refills: 0 | Status: SHIPPED | OUTPATIENT
Start: 2022-01-03 | End: 2022-01-24 | Stop reason: SDUPTHER

## 2022-01-03 RX ORDER — DIAZEPAM 5 MG/1
5 TABLET ORAL DAILY PRN
Qty: 30 TABLET | Refills: 0 | Status: CANCELLED | OUTPATIENT
Start: 2022-01-03

## 2022-01-03 RX ORDER — TIZANIDINE 4 MG/1
4 TABLET ORAL NIGHTLY PRN
Qty: 30 TABLET | Refills: 1 | Status: SHIPPED | OUTPATIENT
Start: 2022-01-03 | End: 2022-02-23 | Stop reason: SDUPTHER

## 2022-01-03 NOTE — TELEPHONE ENCOUNTER
Rx Refill Note  Requested Prescriptions      No prescriptions requested or ordered in this encounter      Last office visit with prescribing clinician: 12/1/2021      Next office visit with prescribing clinician: 1/24/2022            Steph Ivey MA  01/03/22, 08:03 EST

## 2022-01-07 ENCOUNTER — TELEMEDICINE (OUTPATIENT)
Dept: FAMILY MEDICINE CLINIC | Facility: CLINIC | Age: 37
End: 2022-01-07

## 2022-01-07 VITALS — WEIGHT: 275 LBS | BODY MASS INDEX: 63.64 KG/M2 | HEIGHT: 55 IN

## 2022-01-07 DIAGNOSIS — J06.9 VIRAL URI WITH COUGH: ICD-10-CM

## 2022-01-07 PROCEDURE — 99213 OFFICE O/P EST LOW 20 MIN: CPT | Performed by: NURSE PRACTITIONER

## 2022-01-07 RX ORDER — DEXTROMETHORPHAN HYDROBROMIDE AND PROMETHAZINE HYDROCHLORIDE 15; 6.25 MG/5ML; MG/5ML
5 SYRUP ORAL 4 TIMES DAILY PRN
Qty: 118 ML | Refills: 1 | Status: SHIPPED | OUTPATIENT
Start: 2022-01-07 | End: 2022-06-06

## 2022-01-07 RX ORDER — ERGOCALCIFEROL 1.25 MG/1
50000 CAPSULE ORAL WEEKLY
Qty: 4 CAPSULE | Refills: 2 | Status: SHIPPED | OUTPATIENT
Start: 2022-01-07 | End: 2022-06-06

## 2022-01-07 NOTE — PROGRESS NOTES
Subjective     Chief Complaint:    Chief Complaint   Patient presents with   • URI   • Fever     99.9 per patient   • Cough   • Nausea     You have chosen to receive care through a telehealth visit.  Do you consent to use a video/audio connection for your medical care today? Yes. Doximity was used for visit.     History of Present Illness:   She notes continued excessive sweating. She has headache and took nurtec. She does not feel well today. Her BP was 160/100 this morning. She notes a little cough, with some green sputum. She is having nasal congestion. She says her job told her she could work as long as she does not have a fever. She has not had a covid test.     Review of Systems  Gen- No fevers, chills  CV- No chest pain, palpitations  Neuro-No dizziness, + headaches      I have reviewed and/or updated the patient's past medical, surgical, family, social history and problem list as appropriate.     Medications:    Current Outpatient Medications:   •  amitriptyline (ELAVIL) 150 MG tablet, TAKE 1 TABLET BY MOUTH EVERY DAY AT NIGHT, Disp: 30 tablet, Rfl: 1  •  diazePAM (VALIUM) 5 MG tablet, Take 1 tablet by mouth Daily As Needed for Anxiety., Disp: 30 tablet, Rfl: 0  •  Eliquis 5 MG tablet tablet, TAKE 1 TABLET BY MOUTH EVERY 12 HOURS, Disp: 60 tablet, Rfl: 1  •  fluticasone (Flonase) 50 MCG/ACT nasal spray, 2 sprays into the nostril(s) as directed by provider Daily., Disp: 18.2 mL, Rfl: 5  •  furosemide (LASIX) 20 MG tablet, Take 1 tablet by mouth Daily., Disp: 30 tablet, Rfl: 5  •  gabapentin (NEURONTIN) 800 MG tablet, Take 1 tablet by mouth 3 (Three) Times a Day., Disp: 90 tablet, Rfl: 2  •  Hydrocortisone, Perianal, (Proctozone-HC) 2.5 % rectal cream, Insert  into the rectum 2 (Two) Times a Day., Disp: 28 g, Rfl: 0  •  hydrOXYzine (ATARAX) 25 MG tablet, Take 1 tablet by mouth 3 (Three) Times a Day As Needed for Anxiety., Disp: 30 tablet, Rfl: 5  •  levocetirizine (XYZAL) 5 MG tablet, Take 1 tablet by  "mouth Every Evening., Disp: 30 tablet, Rfl: 5  •  Lurasidone HCl (Latuda) 120 MG tablet tablet, Take 1 tablet by mouth Daily. Take 120 mg orally daily with a meal., Disp: 30 tablet, Rfl: 3  •  omeprazole (priLOSEC) 40 MG capsule, Take 1 capsule by mouth Daily., Disp: 90 capsule, Rfl: 3  •  promethazine (PHENERGAN) 25 MG tablet, Take 1 tablet by mouth Every 6 (Six) Hours As Needed for Nausea or Vomiting., Disp: 12 tablet, Rfl: 0  •  promethazine-dextromethorphan (PROMETHAZINE-DM) 6.25-15 MG/5ML syrup, Take 5 mL by mouth 4 (Four) Times a Day As Needed for Cough., Disp: 118 mL, Rfl: 1  •  Rimegepant Sulfate (Nurtec) 75 MG tablet dispersible tablet, Take 1 tablet by mouth 1 (One) Time As Needed (migraine)., Disp: 9 tablet, Rfl: 1  •  tiZANidine (ZANAFLEX) 4 MG tablet, TAKE 1 TABLET BY MOUTH AT NIGHT AS NEEDED FOR MUSCLE SPASMS., Disp: 30 tablet, Rfl: 1  •  vitamin D (ERGOCALCIFEROL) 1.25 MG (26029 UT) capsule capsule, Take 1 capsule by mouth 1 (One) Time Per Week., Disp: 4 capsule, Rfl: 2  •  cyanocobalamin 500 MCG tablet, Take 1 tablet by mouth Daily., Disp: 30 tablet, Rfl: 5  •  fluconazole (Diflucan) 150 MG tablet, Take 1 tablet by mouth now, may repeat in 3 days if needed, Disp: 2 tablet, Rfl: 0  •  glucose blood test strip, Check blood sugar once daily, Disp: 25 each, Rfl: 12  •  glucose monitor monitoring kit, Check blood sugar once daily, Disp: 1 each, Rfl: 0  •  HYDROcodone-acetaminophen (NORCO) 7.5-325 MG per tablet, Take 1 tablet by mouth Daily As Needed., Disp: , Rfl:   •  Lancets (freestyle) lancets, 1 each by Other route 4 (Four) Times a Day., Disp: 400 each, Rfl: 3  •  Lancets Misc. (ONE TOUCH SURESOFT) misc, Check blood sugar once daily, Disp: 1 each, Rfl: 0  •  Lidoderm 5 %, APPLY 1 PATCH TO SKIN ONCE DAILY **MAY WEAR UP TO 12 HOURS**, Disp: , Rfl:     Allergies:  Allergies   Allergen Reactions   • Codeine Anaphylaxis   • Flexeril [Cyclobenzaprine] Other (See Comments)     \"gives me chest pain\"   • Asa " "[Aspirin] Hives     Wheezing     • Latex Hives   • Morphine Itching   • Penicillins Hives     Vomiting     • Compazine [Prochlorperazine] Anxiety   • Reglan [Metoclopramide] Anxiety       Objective     Vital Signs:   Vitals:    01/07/22 1102   Weight: 125 kg (275 lb)   Height: 62 cm (24.41\")     Body mass index is 324.41 kg/m².    Physical Exam:  Gen-no acute distress, video visit  Resp- normal WOB, on RA  Neuro-A&Ox3, face symmetrical, speech clear  Skin-no overt rashes noted  Psych-appropriate mood, cooperative           Assessment / Plan     Assessment/Plan:   Problem List Items Addressed This Visit     None      Visit Diagnoses     Viral URI with cough        Relevant Medications    promethazine-dextromethorphan (PROMETHAZINE-DM) 6.25-15 MG/5ML syrup        -- encouraged her to get covid testing at , supportive care discussed, she needs to be seen in clinic next week for repeat BP check. If sweating remains an issue I will send her to derm    Follow up:  As needed    Total Time of Encounter 17 minutes    Electronically signed by CLAIR Leal   01/07/2022 11:23 EST      Please note that portions of this note may have been completed with a voice recognition program. Efforts were made to edit the dictations, but occasionally words are mistranscribed.  "

## 2022-01-10 DIAGNOSIS — I82.432 ACUTE DEEP VEIN THROMBOSIS (DVT) OF POPLITEAL VEIN OF LEFT LOWER EXTREMITY: ICD-10-CM

## 2022-01-10 DIAGNOSIS — F51.04 PSYCHOPHYSIOLOGICAL INSOMNIA: ICD-10-CM

## 2022-01-10 RX ORDER — APIXABAN 5 MG/1
TABLET, FILM COATED ORAL
Qty: 60 TABLET | Refills: 1 | Status: SHIPPED | OUTPATIENT
Start: 2022-01-10 | End: 2022-03-16

## 2022-01-10 RX ORDER — AMITRIPTYLINE HYDROCHLORIDE 150 MG/1
150 TABLET, FILM COATED ORAL
Qty: 90 TABLET | Refills: 1 | Status: SHIPPED | OUTPATIENT
Start: 2022-01-10 | End: 2022-01-24 | Stop reason: SDUPTHER

## 2022-01-10 NOTE — TELEPHONE ENCOUNTER
Rx Refill Note  Requested Prescriptions     Signed Prescriptions Disp Refills   • amitriptyline (ELAVIL) 150 MG tablet 90 tablet 1     Sig: Take 1 tablet by mouth every night at bedtime.     Authorizing Provider: SUHA PEREZ     Ordering User: DENNIS BOUCHER      Last office visit with prescribing clinician: 8/23/2021      Next office visit with prescribing clinician: 1/24/2022            Dennis Boucher MA  01/10/22, 10:09 EST

## 2022-01-11 ENCOUNTER — OFFICE VISIT (OUTPATIENT)
Dept: FAMILY MEDICINE CLINIC | Facility: CLINIC | Age: 37
End: 2022-01-11

## 2022-01-11 ENCOUNTER — OFFICE VISIT (OUTPATIENT)
Dept: GASTROENTEROLOGY | Facility: CLINIC | Age: 37
End: 2022-01-11

## 2022-01-11 VITALS
TEMPERATURE: 97.8 F | HEART RATE: 109 BPM | DIASTOLIC BLOOD PRESSURE: 95 MMHG | SYSTOLIC BLOOD PRESSURE: 145 MMHG | BODY MASS INDEX: 47.31 KG/M2 | WEIGHT: 267 LBS | HEIGHT: 63 IN

## 2022-01-11 VITALS
WEIGHT: 264.2 LBS | HEART RATE: 110 BPM | OXYGEN SATURATION: 98 % | SYSTOLIC BLOOD PRESSURE: 132 MMHG | TEMPERATURE: 98 F | HEIGHT: 63 IN | DIASTOLIC BLOOD PRESSURE: 96 MMHG | BODY MASS INDEX: 46.81 KG/M2

## 2022-01-11 DIAGNOSIS — R11.2 INTRACTABLE VOMITING WITH NAUSEA, UNSPECIFIED VOMITING TYPE: ICD-10-CM

## 2022-01-11 DIAGNOSIS — K21.9 GASTROESOPHAGEAL REFLUX DISEASE, UNSPECIFIED WHETHER ESOPHAGITIS PRESENT: Primary | ICD-10-CM

## 2022-01-11 DIAGNOSIS — J32.1 CHRONIC FRONTAL SINUSITIS: Primary | ICD-10-CM

## 2022-01-11 DIAGNOSIS — R51.9 ACUTE INTRACTABLE HEADACHE, UNSPECIFIED HEADACHE TYPE: ICD-10-CM

## 2022-01-11 DIAGNOSIS — E66.01 CLASS 3 SEVERE OBESITY WITH BODY MASS INDEX (BMI) OF 45.0 TO 49.9 IN ADULT, UNSPECIFIED OBESITY TYPE, UNSPECIFIED WHETHER SERIOUS COMORBIDITY PRESENT: ICD-10-CM

## 2022-01-11 PROCEDURE — 99203 OFFICE O/P NEW LOW 30 MIN: CPT | Performed by: PHYSICIAN ASSISTANT

## 2022-01-11 PROCEDURE — 99213 OFFICE O/P EST LOW 20 MIN: CPT

## 2022-01-11 PROCEDURE — 96372 THER/PROPH/DIAG INJ SC/IM: CPT

## 2022-01-11 RX ORDER — PANTOPRAZOLE SODIUM 40 MG/1
40 TABLET, DELAYED RELEASE ORAL DAILY
Qty: 30 TABLET | Refills: 5 | Status: SHIPPED | OUTPATIENT
Start: 2022-01-11 | End: 2022-07-06

## 2022-01-11 RX ORDER — CLINDAMYCIN HYDROCHLORIDE 150 MG/1
150 CAPSULE ORAL
Qty: 42 CAPSULE | Refills: 0 | Status: SHIPPED | OUTPATIENT
Start: 2022-01-11 | End: 2022-01-25

## 2022-01-11 RX ORDER — FLUCONAZOLE 150 MG/1
150 TABLET ORAL ONCE
Qty: 3 TABLET | Refills: 0 | Status: SHIPPED | OUTPATIENT
Start: 2022-01-11 | End: 2022-01-11

## 2022-01-11 RX ORDER — KETOROLAC TROMETHAMINE 30 MG/ML
60 INJECTION, SOLUTION INTRAMUSCULAR; INTRAVENOUS ONCE
Status: COMPLETED | OUTPATIENT
Start: 2022-01-11 | End: 2022-01-11

## 2022-01-11 RX ADMIN — KETOROLAC TROMETHAMINE 60 MG: 30 INJECTION, SOLUTION INTRAMUSCULAR; INTRAVENOUS at 15:13

## 2022-01-11 NOTE — PATIENT INSTRUCTIONS
She was instructed not to lie down immediately after eating (wait at least 3 hours after meals), elevate the head of the bed at night, avoid spicy foods, avoid mints, avoid caffeine, avoid nicotine and work on getting to a healthy weight. She will start taking pantoprazole 40 mg once daily 30 minutes before a meal for treatment of GERD.     Stop soda use, stop ibuprofen, stop smoking. Drink water. Recommend EGD.       Food Choices for Gastroesophageal Reflux Disease, Adult  When you have gastroesophageal reflux disease (GERD), the foods you eat and your eating habits are very important. Choosing the right foods can help ease your discomfort. Think about working with a food expert (dietitian) to help you make good choices.  What are tips for following this plan?  Reading food labels  · Look for foods that are low in saturated fat. Foods that may help with your symptoms include:  ? Foods that have less than 5% of daily value (DV) of fat.  ? Foods that have 0 grams of trans fat.  Cooking  · Do not hopkins your food.  · Cook your food by baking, steaming, grilling, or broiling. These are all methods that do not need a lot of fat for cooking.  · To add flavor, try to use herbs that are low in spice and acidity.  Meal planning    · Choose healthy foods that are low in fat, such as:  ? Fruits and vegetables.  ? Whole grains.  ? Low-fat dairy products.  ? Lean meats, fish, and poultry.  · Eat small meals often instead of eating 3 large meals each day. Eat your meals slowly in a place where you are relaxed. Avoid bending over or lying down until 2-3 hours after eating.  · Limit high-fat foods such as fatty meats or fried foods.  · Limit your intake of fatty foods, such as oils, butter, and shortening.  · Avoid the following as told by your doctor:  ? Foods that cause symptoms. These may be different for different people. Keep a food diary to keep track of foods that cause symptoms.  ? Alcohol.  ? Drinking a lot of liquid with  meals.  ? Eating meals during the 2-3 hours before bed.    Lifestyle  · Stay at a healthy weight. Ask your doctor what weight is healthy for you. If you need to lose weight, work with your doctor to do so safely.  · Exercise for at least 30 minutes on 5 or more days each week, or as told by your doctor.  · Wear loose-fitting clothes.  · Do not smoke or use any products that contain nicotine or tobacco. If you need help quitting, ask your doctor.  · Sleep with the head of your bed higher than your feet. Use a wedge under the mattress or blocks under the bed frame to raise the head of the bed.  · Chew sugar-free gum after meals.  What foods should eat?    Eat a healthy, well-balanced diet of fruits, vegetables, whole grains, low-fat dairy products, lean meats, fish, and poultry. Each person is different.  Foods that may cause symptoms in one person may not cause any symptoms in another person. Work with your doctor to find foods that are safe for you.  The items listed above may not be a complete list of what you can eat and drink. Contact a food expert for more options.  What foods should I avoid?  Limiting some of these foods may help in managing the symptoms of GERD. Everyone is different. Talk with a food expert or your doctor to help you find the exact foods to avoid, if any.  Fruits  Any fruits prepared with added fat. Any fruits that cause symptoms. For some people, this may include citrus fruits, such as oranges, grapefruit, pineapple, and sparkle.  Vegetables  Deep-fried vegetables. French fries. Any vegetables prepared with added fat. Any vegetables that cause symptoms. For some people, this may include tomatoes and tomato products, chili peppers, onions and garlic, and horseradish.  Grains  Pastries or quick breads with added fat.  Meats and other proteins  High-fat meats, such as fatty beef or pork, hot dogs, ribs, ham, sausage, salami, and ortega. Fried meat or protein, including fried fish and fried  chicken. Nuts and nut butters, in large amounts.  Dairy  Whole milk and chocolate milk. Sour cream. Cream. Ice cream. Cream cheese. Milkshakes.  Fats and oils  Butter. Margarine. Shortening. Ghee.  Beverages  Coffee and tea, with or without caffeine. Carbonated beverages. Sodas. Energy drinks. Fruit juice made with acidic fruits, such as orange or grapefruit. Tomato juice. Alcoholic drinks.  Sweets and desserts  Chocolate and cocoa. Donuts.  Seasonings and condiments  Pepper. Peppermint and spearmint. Added salt. Any condiments, herbs, or seasonings that cause symptoms. For some people, this may include wen, hot sauce, or vinegar-based salad dressings.  The items listed above may not be a complete list of what you should not eat and drink. Contact a food expert for more options.  Questions to ask your doctor  Diet and lifestyle changes are often the first steps that are taken to manage symptoms of GERD. If diet and lifestyle changes do not help, talk with your doctor about taking medicines.  Where to find more information  · [employer]: aboutgerd.org  Summary  · When you have GERD, food and lifestyle choices are very important in easing your symptoms.  · Eat small meals often instead of 3 large meals a day. Eat your meals slowly and in a place where you are relaxed.  · Avoid bending over or lying down until 2-3 hours after eating.  · Limit high-fat foods such as fatty meats or fried foods.  This information is not intended to replace advice given to you by your health care provider. Make sure you discuss any questions you have with your health care provider.  Document Revised: 06/28/2021 Document Reviewed: 06/28/2021  Elsevier Patient Education © 2021 [employer]

## 2022-01-11 NOTE — PROGRESS NOTES
Acute Office Visit      Patient Name: Lindsay Amezquita  : 1985   MRN: [id number]     Chief Complaint:    Chief Complaint   Patient presents with   • Chest congestion     x1 week   • Sinusitis       History of Present Illness: Lindsay Amezquita is a 36 y.o. female who is here today for for possible sinus infection and chest congestion.  Patient states that this started approximately 1 week ago.  She has intermittently been on a variety of antibiotics since November for persistent upper respiratory infections.  Currently she is having a sinus headache, chest congestion, a dry throat, hoarse voice, sore throat, postnasal drainage, is able to produce green drainage, and right ear pain.  Has even noticed some vomiting and urinary frequency.  She still using her Flonase and allergy medication as prescribed.    She is also asking for another referral to a different GI specialist in [place].  This is for a second opinion.     Subjective     Review of System: Review of Systems   Constitutional: Negative for chills, fatigue and fever.   HENT: Positive for congestion, ear pain, postnasal drip, rhinorrhea, sinus pressure, sinus pain and sore throat.    Eyes: Negative for visual disturbance.   Respiratory: Positive for cough. Negative for shortness of breath.    Cardiovascular: Negative for chest pain and palpitations.   Gastrointestinal: Positive for nausea. Negative for abdominal pain, diarrhea and vomiting.   Genitourinary: Positive for frequency. Negative for dysuria.   Musculoskeletal: Negative for arthralgias and myalgias.   Neurological: Positive for headaches.   Psychiatric/Behavioral: The patient is nervous/anxious.       I have reviewed the ROS documented by my clinical staff, updated appropriately and I agree. CLAIR Obrien    Past Medical History:   Past Medical History:   Diagnosis Date   • Anxiety    • Back pain    • Bell's palsy    • Bipolar 2 disorder (HCC)    • Blood clot in vein    • Depression    •  Diabetes mellitus (HCC) November    Pre diabete   • Frequent headaches    • GERD (gastroesophageal reflux disease)    • Migraine    • Panic    • PTSD (post-traumatic stress disorder)    • Restless leg syndrome    • Sinus problem    • Snores    • Tattoos    • Vitamin B12 deficiency        Past Surgical History:   Past Surgical History:   Procedure Laterality Date   • CHOLECYSTECTOMY     • TOOTH EXTRACTION         Family History:   Family History   Problem Relation Age of Onset   • Irritable bowel syndrome Mother    • Irritable bowel syndrome Father    • Colon cancer Maternal Grandfather        Social History:   Social History     Socioeconomic History   • Marital status: Single   Tobacco Use   • Smoking status: Current Every Day Smoker     Packs/day: 0.25     Years: 29.00     Pack years: 7.25     Types: Cigarettes     Start date: 1995     Last attempt to quit: 2001     Years since quittin.0   • Smokeless tobacco: Never Used   • Tobacco comment: also vaping   Vaping Use   • Vaping Use: Former   Substance and Sexual Activity   • Alcohol use: Never   • Drug use: Never   • Sexual activity: Not Currently     Partners: Female       Medications:     Current Outpatient Medications:   •  amitriptyline (ELAVIL) 150 MG tablet, Take 1 tablet by mouth every night at bedtime., Disp: 90 tablet, Rfl: 1  •  cyanocobalamin 500 MCG tablet, Take 1 tablet by mouth Daily., Disp: 30 tablet, Rfl: 5  •  diazePAM (VALIUM) 5 MG tablet, Take 1 tablet by mouth Daily As Needed for Anxiety., Disp: 30 tablet, Rfl: 0  •  Eliquis 5 MG tablet tablet, TAKE 1 TABLET BY MOUTH EVERY 12 HOURS, Disp: 60 tablet, Rfl: 1  •  fluconazole (Diflucan) 150 MG tablet, Take 1 tablet by mouth now, may repeat in 3 days if needed, Disp: 2 tablet, Rfl: 0  •  fluticasone (Flonase) 50 MCG/ACT nasal spray, 2 sprays into the nostril(s) as directed by provider Daily., Disp: 18.2 mL, Rfl: 5  •  furosemide (LASIX) 20 MG tablet, Take 1 tablet by mouth Daily., Disp:  30 tablet, Rfl: 5  •  gabapentin (NEURONTIN) 800 MG tablet, Take 1 tablet by mouth 3 (Three) Times a Day., Disp: 90 tablet, Rfl: 2  •  glucose blood test strip, Check blood sugar once daily, Disp: 25 each, Rfl: 12  •  glucose monitor monitoring kit, Check blood sugar once daily, Disp: 1 each, Rfl: 0  •  Hydrocortisone, Perianal, (Proctozone-HC) 2.5 % rectal cream, Insert  into the rectum 2 (Two) Times a Day., Disp: 28 g, Rfl: 0  •  hydrOXYzine (ATARAX) 25 MG tablet, Take 1 tablet by mouth 3 (Three) Times a Day As Needed for Anxiety., Disp: 30 tablet, Rfl: 5  •  Lancets (freestyle) lancets, 1 each by Other route 4 (Four) Times a Day., Disp: 400 each, Rfl: 3  •  Lancets Misc. (ONE TOUCH SURESOFT) misc, Check blood sugar once daily, Disp: 1 each, Rfl: 0  •  levocetirizine (XYZAL) 5 MG tablet, Take 1 tablet by mouth Every Evening., Disp: 30 tablet, Rfl: 5  •  Lurasidone HCl (Latuda) 120 MG tablet tablet, Take 1 tablet by mouth Daily. Take 120 mg orally daily with a meal., Disp: 30 tablet, Rfl: 3  •  pantoprazole (PROTONIX) 40 MG EC tablet, Take 1 tablet by mouth Daily., Disp: 30 tablet, Rfl: 5  •  promethazine (PHENERGAN) 25 MG tablet, Take 1 tablet by mouth Every 6 (Six) Hours As Needed for Nausea or Vomiting., Disp: 12 tablet, Rfl: 0  •  promethazine-dextromethorphan (PROMETHAZINE-DM) 6.25-15 MG/5ML syrup, Take 5 mL by mouth 4 (Four) Times a Day As Needed for Cough., Disp: 118 mL, Rfl: 1  •  Rimegepant Sulfate (Nurtec) 75 MG tablet dispersible tablet, Take 1 tablet by mouth 1 (One) Time As Needed (migraine)., Disp: 9 tablet, Rfl: 1  •  tiZANidine (ZANAFLEX) 4 MG tablet, TAKE 1 TABLET BY MOUTH AT NIGHT AS NEEDED FOR MUSCLE SPASMS., Disp: 30 tablet, Rfl: 1  •  vitamin D (ERGOCALCIFEROL) 1.25 MG (59107 UT) capsule capsule, Take 1 capsule by mouth 1 (One) Time Per Week., Disp: 4 capsule, Rfl: 2  •  clindamycin (CLEOCIN) 150 MG capsule, Take 1 capsule by mouth 3 (Three) Times a Day With Meals for 14 days., Disp: 42  "capsule, Rfl: 0  No current facility-administered medications for this visit.    Allergies:   Allergies   Allergen Reactions   • Codeine Anaphylaxis   • Flexeril [Cyclobenzaprine] Other (See Comments)     \"gives me chest pain\"   • Asa [Aspirin] Hives     Wheezing     • Latex Hives   • Morphine Itching   • Penicillins Hives     Vomiting     • Compazine [Prochlorperazine] Anxiety   • Reglan [Metoclopramide] Anxiety       Objective     Physical Exam:   Vital Signs:   Vitals:    01/11/22 1407   BP: 132/96   Pulse: 110   Temp: 98 °F (36.7 °C)   SpO2: 98%   Weight: 120 kg (264 lb 3.2 oz)   Height: 160 cm (63\")     Body mass index is 46.8 kg/m².     Physical Exam  Vitals and nursing note reviewed.   Constitutional:       Appearance: Normal appearance.   HENT:      Head: Normocephalic and atraumatic.      Right Ear: Ear canal and external ear normal. A middle ear effusion is present. Tympanic membrane is erythematous.      Left Ear: Ear canal and external ear normal. A middle ear effusion is present.      Nose:      Right Sinus: Frontal sinus tenderness present.      Left Sinus: Frontal sinus tenderness present.      Mouth/Throat:      Mouth: Mucous membranes are moist.      Pharynx: Oropharynx is clear. Posterior oropharyngeal erythema present.   Eyes:      Extraocular Movements: Extraocular movements intact.      Conjunctiva/sclera: Conjunctivae normal.      Pupils: Pupils are equal, round, and reactive to light.   Neck:      Vascular: No carotid bruit.   Cardiovascular:      Rate and Rhythm: Normal rate and regular rhythm.      Pulses: Normal pulses.      Heart sounds: Normal heart sounds.   Pulmonary:      Effort: Pulmonary effort is normal.      Breath sounds: Normal breath sounds.   Abdominal:      General: Abdomen is flat. Bowel sounds are normal. There is no distension.      Palpations: Abdomen is soft.      Tenderness: There is no abdominal tenderness.   Musculoskeletal:      Cervical back: Neck supple. No " tenderness.      Right lower leg: No edema.      Left lower leg: No edema.   Lymphadenopathy:      Cervical: No cervical adenopathy.   Skin:     General: Skin is warm and dry.      Capillary Refill: Capillary refill takes less than 2 seconds.   Neurological:      General: No focal deficit present.      Mental Status: She is alert and oriented to person, place, and time.   Psychiatric:         Mood and Affect: Mood normal.         Behavior: Behavior normal.         Assessment / Plan      Assessment/Plan:   Diagnoses and all orders for this visit:    1. Chronic frontal sinusitis (Primary)  -     clindamycin (CLEOCIN) 150 MG capsule; Take 1 capsule by mouth 3 (Three) Times a Day With Meals for 14 days.  Dispense: 42 capsule; Refill: 0  -     fluconazole (Diflucan) 150 MG tablet; Take 1 tablet by mouth 1 (One) Time for 1 dose.  Dispense: 3 tablet; Refill: 0  -     CT Sinus Without Contrast; Future    2. Acute intractable headache, unspecified headache type  -     ketorolac (TORADOL) injection 60 mg         1. Patient has been on a variety of antibiotics.  Will trial clindamycin 3 times a day for 2 weeks followed by an outpatient CT scan of her sinuses.  She already has an established ENT in [place] and understands to make an appointment with them.  2. She has been on a couple courses of oral steroids.  We will not prescribe any at this time.  3. Fluconazole provided to help prevent yeast infection from antibiotics.  4. She requested something for pain to help with her headache during the visit, Toradol injection given.  5. Her face, chest, bilateral arms appear flushed.  She states that she is already seen her primary provider for this issue and is in the process of waiting on a dermatology referral.      Follow Up:   Return if symptoms worsen or fail to improve.    I spent approximately 20 minutes providing clinical care for this patient; including review of patient's chart and provider documentation, face to face  time spent with patient in examination room (obtaining history, performing physical exam, discussing diagnosis and management options), placing orders, and completing patient documentation.     CLAIR Obrien  [facility] [facility] [place]

## 2022-01-17 ENCOUNTER — TELEPHONE (OUTPATIENT)
Dept: INTERNAL MEDICINE | Facility: CLINIC | Age: 37
End: 2022-01-17

## 2022-01-17 ENCOUNTER — TELEPHONE (OUTPATIENT)
Dept: FAMILY MEDICINE CLINIC | Facility: CLINIC | Age: 37
End: 2022-01-17

## 2022-01-17 ENCOUNTER — HOSPITAL ENCOUNTER (EMERGENCY)
Facility: HOSPITAL | Age: 37
Discharge: HOME | End: 2022-01-17
Attending: EMERGENCY MEDICINE

## 2022-01-17 ENCOUNTER — LAB (OUTPATIENT)
Dept: LAB | Facility: HOSPITAL | Age: 37
End: 2022-01-17

## 2022-01-17 ENCOUNTER — HOSPITAL ENCOUNTER (OUTPATIENT)
Dept: GENERAL RADIOLOGY | Facility: HOSPITAL | Age: 37
Discharge: HOME | End: 2022-01-17

## 2022-01-17 VITALS
WEIGHT: 271 LBS | HEART RATE: 114 BPM | HEIGHT: 62 IN | DIASTOLIC BLOOD PRESSURE: 103 MMHG | SYSTOLIC BLOOD PRESSURE: 158 MMHG | BODY MASS INDEX: 49.87 KG/M2 | RESPIRATION RATE: 18 BRPM | OXYGEN SATURATION: 95 % | TEMPERATURE: 98.2 F

## 2022-01-17 DIAGNOSIS — K62.5 BRBPR (BRIGHT RED BLOOD PER RECTUM): ICD-10-CM

## 2022-01-17 DIAGNOSIS — R50.9 FEVER, UNSPECIFIED FEVER CAUSE: ICD-10-CM

## 2022-01-17 DIAGNOSIS — R19.7 DIARRHEA, UNSPECIFIED TYPE: ICD-10-CM

## 2022-01-17 DIAGNOSIS — R50.9 FEVER, UNSPECIFIED FEVER CAUSE: Primary | ICD-10-CM

## 2022-01-17 DIAGNOSIS — R05.9 COUGH: ICD-10-CM

## 2022-01-17 PROCEDURE — 71046 X-RAY EXAM CHEST 2 VIEWS: CPT

## 2022-01-17 PROCEDURE — 99211 OFF/OP EST MAY X REQ PHY/QHP: CPT | Performed by: EMERGENCY MEDICINE

## 2022-01-17 PROCEDURE — 0202U NFCT DS 22 TRGT SARS-COV-2: CPT

## 2022-01-17 NOTE — ED NOTES
During triage of patient, she states that she has orders for covid test and CXR.  She was unsure if she needed to be seen in ED or go directly to lab/xray.  I offered patient to be seen in ED and she stated she would prefer to have labs and xray performed as ordered.     Carmen Duran, RN  01/17/22 0210

## 2022-01-17 NOTE — TELEPHONE ENCOUNTER
Contacted by patient on Saturday January 15, 2022 at approximately noon.  Patient was seen earlier in the week on Monday or Tuesday at a walk-in clinic and diagnosed with bronchitis.  She was provided with clarithromycin.  Patient states that she currently has a temperature of 100.1 with worsening cough and shortness of breath.  She has green mucus production from both her nose and lungs.  She states her COVID test was negative earlier in the week.  She is concerned that she is developing pneumonia due to worsening cough and current fever.  I recommend that she seek care at urgent care or emergency room for chest x-ray.

## 2022-01-18 ENCOUNTER — OFFICE VISIT (OUTPATIENT)
Dept: FAMILY MEDICINE CLINIC | Facility: CLINIC | Age: 37
End: 2022-01-18

## 2022-01-18 ENCOUNTER — TELEPHONE (OUTPATIENT)
Dept: FAMILY MEDICINE CLINIC | Facility: CLINIC | Age: 37
End: 2022-01-18

## 2022-01-18 VITALS
HEART RATE: 115 BPM | HEIGHT: 62 IN | WEIGHT: 268.2 LBS | SYSTOLIC BLOOD PRESSURE: 110 MMHG | BODY MASS INDEX: 49.35 KG/M2 | OXYGEN SATURATION: 95 % | TEMPERATURE: 97.5 F | DIASTOLIC BLOOD PRESSURE: 82 MMHG

## 2022-01-18 DIAGNOSIS — J45.40 MODERATE PERSISTENT REACTIVE AIRWAY DISEASE WITHOUT COMPLICATION: Primary | ICD-10-CM

## 2022-01-18 DIAGNOSIS — R05.3 CHRONIC COUGH: ICD-10-CM

## 2022-01-18 DIAGNOSIS — G43.809 OTHER MIGRAINE WITHOUT STATUS MIGRAINOSUS, NOT INTRACTABLE: ICD-10-CM

## 2022-01-18 PROCEDURE — 99214 OFFICE O/P EST MOD 30 MIN: CPT | Performed by: NURSE PRACTITIONER

## 2022-01-18 RX ORDER — CETIRIZINE HYDROCHLORIDE 10 MG/1
TABLET ORAL
COMMUNITY
Start: 2022-01-11 | End: 2022-01-18

## 2022-01-18 RX ORDER — DEXAMETHASONE 4 MG/1
2 TABLET ORAL 2 TIMES DAILY
Qty: 12 G | Refills: 5 | Status: SHIPPED | OUTPATIENT
Start: 2022-01-18 | End: 2022-08-23

## 2022-01-18 RX ORDER — OMEPRAZOLE 40 MG/1
CAPSULE, DELAYED RELEASE ORAL
COMMUNITY
Start: 2022-01-16 | End: 2022-04-08

## 2022-01-18 NOTE — PROGRESS NOTES
Subjective     Chief Complaint:    Chief Complaint   Patient presents with   • Excessive Sweating     Seen in ED last night.  Respiratory Virus Panel Negative for all,  Chest Xray normal       History of Present Illness:   She has chronic cough and frequent URI symptoms. She has been on several anbx. She had resp panel yesterday that was negative. CXR is negative. She is a heavy smoker. She is currently cutting way back on cigarettes.   She has frequent cough, sweating, headache.   She has tried tylenol and motrin.   She has been thirsty.   She has nasal congestion. She is having to sleep with several pillows.   She has been on several different anbx and symptoms relapse.     Review of Systems  Gen- No fevers, chills  CV- No chest pain, palpitations  Resp- No cough, dyspnea  GI- No N/V/D, abd pain  Neuro-No dizziness, headaches      I have reviewed and/or updated the patient's past medical, surgical, family, social history and problem list as appropriate.     Medications:    Current Outpatient Medications:   •  amitriptyline (ELAVIL) 150 MG tablet, Take 1 tablet by mouth every night at bedtime., Disp: 90 tablet, Rfl: 1  •  clindamycin (CLEOCIN) 150 MG capsule, Take 1 capsule by mouth 3 (Three) Times a Day With Meals for 14 days., Disp: 42 capsule, Rfl: 0  •  cyanocobalamin 500 MCG tablet, Take 1 tablet by mouth Daily., Disp: 30 tablet, Rfl: 5  •  diazePAM (VALIUM) 5 MG tablet, Take 1 tablet by mouth Daily As Needed for Anxiety., Disp: 30 tablet, Rfl: 0  •  Eliquis 5 MG tablet tablet, TAKE 1 TABLET BY MOUTH EVERY 12 HOURS, Disp: 60 tablet, Rfl: 1  •  fluconazole (Diflucan) 150 MG tablet, Take 1 tablet by mouth now, may repeat in 3 days if needed, Disp: 2 tablet, Rfl: 0  •  fluticasone (Flonase) 50 MCG/ACT nasal spray, 2 sprays into the nostril(s) as directed by provider Daily., Disp: 18.2 mL, Rfl: 5  •  furosemide (LASIX) 20 MG tablet, Take 1 tablet by mouth Daily., Disp: 30 tablet, Rfl: 5  •  gabapentin  (NEURONTIN) 800 MG tablet, Take 1 tablet by mouth 3 (Three) Times a Day., Disp: 90 tablet, Rfl: 2  •  glucose blood test strip, Check blood sugar once daily, Disp: 25 each, Rfl: 12  •  glucose monitor monitoring kit, Check blood sugar once daily, Disp: 1 each, Rfl: 0  •  Hydrocortisone, Perianal, (Proctozone-HC) 2.5 % rectal cream, Insert  into the rectum 2 (Two) Times a Day., Disp: 28 g, Rfl: 0  •  hydrOXYzine (ATARAX) 25 MG tablet, Take 1 tablet by mouth 3 (Three) Times a Day As Needed for Anxiety., Disp: 30 tablet, Rfl: 5  •  Lancets (freestyle) lancets, 1 each by Other route 4 (Four) Times a Day., Disp: 400 each, Rfl: 3  •  Lancets Misc. (ONE TOUCH SURESOFT) misc, Check blood sugar once daily, Disp: 1 each, Rfl: 0  •  levocetirizine (XYZAL) 5 MG tablet, Take 1 tablet by mouth Every Evening., Disp: 30 tablet, Rfl: 5  •  Lurasidone HCl (Latuda) 120 MG tablet tablet, Take 1 tablet by mouth Daily. Take 120 mg orally daily with a meal., Disp: 30 tablet, Rfl: 3  •  omeprazole (priLOSEC) 40 MG capsule, , Disp: , Rfl:   •  pantoprazole (PROTONIX) 40 MG EC tablet, Take 1 tablet by mouth Daily., Disp: 30 tablet, Rfl: 5  •  promethazine (PHENERGAN) 25 MG tablet, Take 1 tablet by mouth Every 6 (Six) Hours As Needed for Nausea or Vomiting., Disp: 12 tablet, Rfl: 0  •  promethazine-dextromethorphan (PROMETHAZINE-DM) 6.25-15 MG/5ML syrup, Take 5 mL by mouth 4 (Four) Times a Day As Needed for Cough., Disp: 118 mL, Rfl: 1  •  Rimegepant Sulfate (Nurtec) 75 MG tablet dispersible tablet, Take 1 tablet by mouth 1 (One) Time As Needed (migraine)., Disp: 9 tablet, Rfl: 1  •  tiZANidine (ZANAFLEX) 4 MG tablet, TAKE 1 TABLET BY MOUTH AT NIGHT AS NEEDED FOR MUSCLE SPASMS., Disp: 30 tablet, Rfl: 1  •  vitamin D (ERGOCALCIFEROL) 1.25 MG (23403 UT) capsule capsule, Take 1 capsule by mouth 1 (One) Time Per Week., Disp: 4 capsule, Rfl: 2  •  fluticasone (Flovent HFA) 110 MCG/ACT inhaler, Inhale 2 puffs 2 (Two) Times a Day., Disp: 12 g, Rfl:  "5    Allergies:  Allergies   Allergen Reactions   • Codeine Anaphylaxis   • Flexeril [Cyclobenzaprine] Other (See Comments)     \"gives me chest pain\"   • Asa [Aspirin] Hives     Wheezing     • Latex Hives   • Morphine Itching   • Penicillins Hives     Vomiting     • Compazine [Prochlorperazine] Anxiety   • Reglan [Metoclopramide] Anxiety       Objective     Vital Signs:   Vitals:    01/18/22 1350   BP: 110/82   Pulse: 115   Temp: 97.5 °F (36.4 °C)   SpO2: 95%   Weight: 122 kg (268 lb 3.2 oz)   Height: 157.5 cm (62\")   PainSc: 0-No pain     Body mass index is 49.05 kg/m².    Physical Exam:    Physical Exam  Constitutional:       Appearance: She is well-developed. She is obese.   HENT:      Head: Normocephalic and atraumatic.      Right Ear: Tympanic membrane, ear canal and external ear normal.      Left Ear: Tympanic membrane, ear canal and external ear normal.      Nose: Nose normal.      Mouth/Throat:      Pharynx: Uvula midline.   Eyes:      Pupils: Pupils are equal, round, and reactive to light.   Cardiovascular:      Rate and Rhythm: Normal rate and regular rhythm.      Heart sounds: Normal heart sounds. No murmur heard.  No friction rub. No gallop.    Pulmonary:      Effort: Pulmonary effort is normal.      Breath sounds: Normal breath sounds.   Abdominal:      General: Bowel sounds are normal.      Palpations: Abdomen is soft.      Tenderness: There is no abdominal tenderness.   Musculoskeletal:      Cervical back: Neck supple.   Lymphadenopathy:      Head:      Right side of head: No submental, submandibular, tonsillar, preauricular or posterior auricular adenopathy.      Left side of head: No submental, submandibular, tonsillar, preauricular or posterior auricular adenopathy.      Cervical: No cervical adenopathy.   Skin:     General: Skin is warm and dry.      Capillary Refill: Capillary refill takes less than 2 seconds.   Neurological:      General: No focal deficit present.      Mental Status: She is " alert and oriented to person, place, and time.   Psychiatric:         Mood and Affect: Mood normal.         Behavior: Behavior normal.         Assessment / Plan     Assessment/Plan:   Problem List Items Addressed This Visit     None      Visit Diagnoses     Moderate persistent reactive airway disease without complication    -  Primary    Relevant Medications    fluticasone (Flovent HFA) 110 MCG/ACT inhaler    Other Relevant Orders    Full Pulmonary Function Test With Bronchodilator    Chronic cough        Relevant Orders    Full Pulmonary Function Test With Bronchodilator        -- concern she is developing RAD +/- COPD. Will start flovent. Check PFT's. Instructed on use. Continue xyzal and flonase    Follow up:  Return in about 4 weeks (around 2/15/2022).      Electronically signed by CLAIR Leal   01/18/2022 14:30 EST      Please note that portions of this note may have been completed with a voice recognition program. Efforts were made to edit the dictations, but occasionally words are mistranscribed.

## 2022-01-18 NOTE — TELEPHONE ENCOUNTER
Caller: Lindsay Amezquita    Relationship: Self    Best call back number: [phone]    Caller requesting test results:     What test was performed: covid, chest x-ray    When was the test performed: 01/17/22    Where was the test performed: [facility]– [place]    Additional notes:

## 2022-01-18 NOTE — TELEPHONE ENCOUNTER
Caller: Lindsay Amezquita    Relationship: Self    Best call back number: [phone]    What is the best time to reach you: ANY TIME    Who are you requesting to speak with (clinical staff, provider,  specific staff member): NURSE    Do you know the name of the person who called: SELF    What was the call regarding: PATIENT CHECKING ON PA FOR NURTEC, ALSO IF NOT COMPLETED MAY SHE HAVE MORE SAMPLES AND HER FATHER SUNSHINE WATERS JR CAN  TOMORROW. PLEASE ADVISE    Do you require a callback: YES

## 2022-01-19 RX ORDER — RIMEGEPANT SULFATE 75 MG/75MG
1 TABLET, ORALLY DISINTEGRATING ORAL ONCE AS NEEDED
Qty: 6 TABLET | Refills: 0 | COMMUNITY
Start: 2022-01-19 | End: 2022-01-19

## 2022-01-20 ENCOUNTER — TELEPHONE (OUTPATIENT)
Dept: FAMILY MEDICINE CLINIC | Facility: CLINIC | Age: 37
End: 2022-01-20

## 2022-01-20 NOTE — TELEPHONE ENCOUNTER
When are they wanting a covid test? She just had one. Maybe it would count. If not then maybe we can explain to her that it was to be done prior to because of the time frame. I do think she needs the PFTs as she has had issues off and on for months. She needs to use the Flovent. She is more than welcome to go to [facility]. But they will also require a covid test before PFT

## 2022-01-20 NOTE — TELEPHONE ENCOUNTER
Caller: Lindsay Amezquita    Relationship: Self    Best call back number:[phone]     What was the call regarding: PATIENT CALLED STATING THAT SHE IS NOT GOING TO GET THE PULMONARY TEST DONE BECAUSE SHE IS NOT GOING TO GET ANOTHER COVID TEST. IF SHE HAS TO SHE WILL GO TO [facility] TO FIND OUT WHAT IS GOING ON.      Do you require a callback:YES

## 2022-01-20 NOTE — TELEPHONE ENCOUNTER
I am not sure what other options are available for Ms Amezquita.  She will be required to have a COVID test in order to have PFT and she is not willing to do so.

## 2022-01-24 ENCOUNTER — TELEMEDICINE (OUTPATIENT)
Dept: BEHAVIORAL HEALTH | Facility: CLINIC | Age: 37
End: 2022-01-24

## 2022-01-24 DIAGNOSIS — F51.04 PSYCHOPHYSIOLOGICAL INSOMNIA: Primary | ICD-10-CM

## 2022-01-24 DIAGNOSIS — F41.1 GAD (GENERALIZED ANXIETY DISORDER): ICD-10-CM

## 2022-01-24 DIAGNOSIS — F31.62 BIPOLAR DISORDER, CURRENT EPISODE MIXED, MODERATE: ICD-10-CM

## 2022-01-24 DIAGNOSIS — F43.10 PTSD (POST-TRAUMATIC STRESS DISORDER): ICD-10-CM

## 2022-01-24 PROCEDURE — 99213 OFFICE O/P EST LOW 20 MIN: CPT | Performed by: NURSE PRACTITIONER

## 2022-01-24 RX ORDER — DIAZEPAM 5 MG/1
5 TABLET ORAL 2 TIMES DAILY PRN
Qty: 60 TABLET | Refills: 0 | Status: SHIPPED | OUTPATIENT
Start: 2022-01-24 | End: 2022-02-28

## 2022-01-24 RX ORDER — AMITRIPTYLINE HYDROCHLORIDE 150 MG/1
150 TABLET, FILM COATED ORAL
Qty: 90 TABLET | Refills: 1 | Status: SHIPPED | OUTPATIENT
Start: 2022-01-24 | End: 2022-03-28 | Stop reason: SDUPTHER

## 2022-01-24 RX ORDER — LURASIDONE HYDROCHLORIDE 120 MG/1
120 TABLET, FILM COATED ORAL DAILY
Qty: 30 TABLET | Refills: 3 | Status: SHIPPED | OUTPATIENT
Start: 2022-01-24 | End: 2022-03-28 | Stop reason: SDUPTHER

## 2022-01-24 RX ORDER — DESVENLAFAXINE SUCCINATE 50 MG/1
50 TABLET, EXTENDED RELEASE ORAL DAILY
Qty: 30 TABLET | Refills: 3 | Status: SHIPPED | OUTPATIENT
Start: 2022-01-24 | End: 2022-03-28 | Stop reason: SDUPTHER

## 2022-01-24 NOTE — PROGRESS NOTES
Patient Name: Lindsay Amezquita  MRN: [id number]   :  1985     Chief Complaint:      ICD-10-CM ICD-9-CM   1. Psychophysiological insomnia  F51.04 307.42   2. KENY (generalized anxiety disorder)  F41.1 300.02   3. Bipolar disorder, current episode mixed, moderate (HCC)  F31.62 296.62   4. PTSD (post-traumatic stress disorder)  F43.10 309.81       History of Present Illness: Lindsay Amezquita is a 36 y.o. female is here today for medication management follow up. Pt states her panic attacks have been getting worse. Had to quit job as it was emotionally too much to handle. Pt mom states she has also been very depressed. Getting angry and irritable with everyone     The following portions of the patient's history were reviewed and updated as appropriate: allergies, current medications, past family history, past medical history, past social history, past surgical history and problem list.    Review of Systems;;  Review of Systems   Constitutional: Negative for activity change, appetite change, fatigue, unexpected weight gain and unexpected weight loss.   Respiratory: Negative for shortness of breath and wheezing.    Gastrointestinal: Negative for constipation, diarrhea, nausea and vomiting.   Musculoskeletal: Negative for gait problem.   Skin: Negative for dry skin and rash.   Neurological: Negative for dizziness, speech difficulty, weakness, light-headedness, headache, memory problem and confusion.   Psychiatric/Behavioral: Positive for depressed mood. Negative for agitation, behavioral problems, decreased concentration, dysphoric mood, hallucinations, self-injury, sleep disturbance, suicidal ideas, negative for hyperactivity and stress. The patient is nervous/anxious.        Physical Exam;;  Physical Exam  Vitals and nursing note reviewed.   Constitutional:       General: She is not in acute distress.     Appearance: She is well-developed. She is not diaphoretic.   HENT:      Head: Normocephalic and atraumatic.   Eyes:       Conjunctiva/sclera: Conjunctivae normal.   Cardiovascular:      Rate and Rhythm: Normal rate.   Pulmonary:      Effort: Pulmonary effort is normal. No respiratory distress.   Musculoskeletal:         General: Normal range of motion.      Cervical back: Full passive range of motion without pain and normal range of motion.   Skin:     General: Skin is warm and dry.   Neurological:      Mental Status: She is alert and oriented to person, place, and time.   Psychiatric:         Mood and Affect: Mood is anxious and depressed. Affect is not labile, blunt, angry or inappropriate.         Speech: Speech is not rapid and pressured or tangential.         Behavior: Behavior normal. Behavior is not agitated, slowed, aggressive, withdrawn, hyperactive or combative. Behavior is cooperative.         Thought Content: Thought content normal. Thought content is not paranoid or delusional. Thought content does not include homicidal or suicidal ideation. Thought content does not include homicidal or suicidal plan.         Judgment: Judgment normal.       not currently breastfeeding.  There is no height or weight on file to calculate BMI.    Current Medications;;    Current Outpatient Medications:   •  amitriptyline (ELAVIL) 150 MG tablet, Take 1 tablet by mouth every night at bedtime., Disp: 90 tablet, Rfl: 1  •  clindamycin (CLEOCIN) 150 MG capsule, Take 1 capsule by mouth 3 (Three) Times a Day With Meals for 14 days., Disp: 42 capsule, Rfl: 0  •  cyanocobalamin 500 MCG tablet, Take 1 tablet by mouth Daily., Disp: 30 tablet, Rfl: 5  •  diazePAM (VALIUM) 5 MG tablet, Take 1 tablet by mouth Daily As Needed for Anxiety., Disp: 30 tablet, Rfl: 0  •  Eliquis 5 MG tablet tablet, TAKE 1 TABLET BY MOUTH EVERY 12 HOURS, Disp: 60 tablet, Rfl: 1  •  fluconazole (Diflucan) 150 MG tablet, Take 1 tablet by mouth now, may repeat in 3 days if needed, Disp: 2 tablet, Rfl: 0  •  fluticasone (Flonase) 50 MCG/ACT nasal spray, 2 sprays into the nostril(s)  as directed by provider Daily., Disp: 18.2 mL, Rfl: 5  •  fluticasone (Flovent HFA) 110 MCG/ACT inhaler, Inhale 2 puffs 2 (Two) Times a Day., Disp: 12 g, Rfl: 5  •  furosemide (LASIX) 20 MG tablet, Take 1 tablet by mouth Daily., Disp: 30 tablet, Rfl: 5  •  gabapentin (NEURONTIN) 800 MG tablet, Take 1 tablet by mouth 3 (Three) Times a Day., Disp: 90 tablet, Rfl: 2  •  glucose blood test strip, Check blood sugar once daily, Disp: 25 each, Rfl: 12  •  glucose monitor monitoring kit, Check blood sugar once daily, Disp: 1 each, Rfl: 0  •  Hydrocortisone, Perianal, (Proctozone-HC) 2.5 % rectal cream, Insert  into the rectum 2 (Two) Times a Day., Disp: 28 g, Rfl: 0  •  hydrOXYzine (ATARAX) 25 MG tablet, Take 1 tablet by mouth 3 (Three) Times a Day As Needed for Anxiety., Disp: 30 tablet, Rfl: 5  •  Lancets (freestyle) lancets, 1 each by Other route 4 (Four) Times a Day., Disp: 400 each, Rfl: 3  •  Lancets Misc. (ONE TOUCH SURESOFT) misc, Check blood sugar once daily, Disp: 1 each, Rfl: 0  •  levocetirizine (XYZAL) 5 MG tablet, Take 1 tablet by mouth Every Evening., Disp: 30 tablet, Rfl: 5  •  Lurasidone HCl (Latuda) 120 MG tablet tablet, Take 1 tablet by mouth Daily. Take 120 mg orally daily with a meal., Disp: 30 tablet, Rfl: 3  •  omeprazole (priLOSEC) 40 MG capsule, , Disp: , Rfl:   •  pantoprazole (PROTONIX) 40 MG EC tablet, Take 1 tablet by mouth Daily., Disp: 30 tablet, Rfl: 5  •  promethazine (PHENERGAN) 25 MG tablet, Take 1 tablet by mouth Every 6 (Six) Hours As Needed for Nausea or Vomiting., Disp: 12 tablet, Rfl: 0  •  promethazine-dextromethorphan (PROMETHAZINE-DM) 6.25-15 MG/5ML syrup, Take 5 mL by mouth 4 (Four) Times a Day As Needed for Cough., Disp: 118 mL, Rfl: 1  •  tiZANidine (ZANAFLEX) 4 MG tablet, TAKE 1 TABLET BY MOUTH AT NIGHT AS NEEDED FOR MUSCLE SPASMS., Disp: 30 tablet, Rfl: 1  •  vitamin D (ERGOCALCIFEROL) 1.25 MG (30843 UT) capsule capsule, Take 1 capsule by mouth 1 (One) Time Per Week., Disp: 4  capsule, Rfl: 2    Lab Results:   Lab on 01/17/2022   Component Date Value Ref Range Status   • ADENOVIRUS, PCR 01/17/2022 Not Detected  Not Detected Final   • Coronavirus 229E 01/17/2022 Not Detected  Not Detected Final   • Coronavirus HKU1 01/17/2022 Not Detected  Not Detected Final   • Coronavirus NL63 01/17/2022 Not Detected  Not Detected Final   • Coronavirus OC43 01/17/2022 Not Detected  Not Detected Final   • COVID19 01/17/2022 Not Detected  Not Detected - Ref. Range Final   • Human Metapneumovirus 01/17/2022 Not Detected  Not Detected Final   • Human Rhinovirus/Enterovirus 01/17/2022 Not Detected  Not Detected Final   • Influenza A PCR 01/17/2022 Not Detected  Not Detected Final   • Influenza B PCR 01/17/2022 Not Detected  Not Detected Final   • Parainfluenza Virus 1 01/17/2022 Not Detected  Not Detected Final   • Parainfluenza Virus 2 01/17/2022 Not Detected  Not Detected Final   • Parainfluenza Virus 3 01/17/2022 Not Detected  Not Detected Final   • Parainfluenza Virus 4 01/17/2022 Not Detected  Not Detected Final   • RSV, PCR 01/17/2022 Not Detected  Not Detected Final   • Bordetella pertussis pcr 01/17/2022 Not Detected  Not Detected Final   • Bordetella parapertussis PCR 01/17/2022 Not Detected  Not Detected Final   • Chlamydophila pneumoniae PCR 01/17/2022 Not Detected  Not Detected Final   • Mycoplasma pneumo by PCR 01/17/2022 Not Detected  Not Detected Final   Results Encounter on 12/16/2021   Component Date Value Ref Range Status   • Estrogen 12/23/2021 269  pg/mL Final    Comment:                          Prepubertal             < 40                           Female Cycle:                             1-10 Days         16 - 328                             11-20 Days        34 - 501                             21-30 Days        48 - 350                             Post-Menopausal   40 - 244     • LH 12/23/2021 9.2  mIU/mL Final    Comment:                     Adult Female:                         Follicular phase      2.4 -  12.6                        Ovulation phase      14.0 -  95.6                        Luteal phase          1.0 -  11.4                        Postmenopausal        7.7 -  58.5     • FSH 12/23/2021 7.4  mIU/mL Final    Comment:                     Adult Female:                        Follicular phase      3.5 -  12.5                        Ovulation phase       4.7 -  21.5                        Luteal phase          1.7 -   7.7                        Postmenopausal       25.8 - 134.8     • Prolactin 12/23/2021 20.9  4.8 - 23.3 ng/mL Final   • TSH 12/23/2021 0.721  0.270 - 4.200 uIU/mL Final   • Glucose 12/23/2021 116* 65 - 99 mg/dL Final   • BUN 12/23/2021 4* 6 - 20 mg/dL Final   • Creatinine 12/23/2021 0.61  0.57 - 1.00 mg/dL Final   • eGFR Non  Am 12/23/2021 110  >60 mL/min/1.73 Final    Comment: GFR Normal >60  Chronic Kidney Disease <60  Kidney Failure <15     • eGFR  Am 12/23/2021 134  >60 mL/min/1.73 Final   • BUN/Creatinine Ratio 12/23/2021 6.6* 7.0 - 25.0 Final   • Sodium 12/23/2021 137  136 - 145 mmol/L Final   • Potassium 12/23/2021 3.9  3.5 - 5.2 mmol/L Final   • Chloride 12/23/2021 101  98 - 107 mmol/L Final   • Total CO2 12/23/2021 25.0  22.0 - 29.0 mmol/L Final   • Calcium 12/23/2021 9.0  8.6 - 10.5 mg/dL Final   • Total Protein 12/23/2021 6.1  6.0 - 8.5 g/dL Final   • Albumin 12/23/2021 4.20  3.50 - 5.20 g/dL Final   • Globulin 12/23/2021 1.9  gm/dL Final   • A/G Ratio 12/23/2021 2.2  g/dL Final   • Total Bilirubin 12/23/2021 0.3  0.0 - 1.2 mg/dL Final   • Alkaline Phosphatase 12/23/2021 76  39 - 117 U/L Final   • AST (SGOT) 12/23/2021 19  1 - 32 U/L Final   • ALT (SGPT) 12/23/2021 31  1 - 33 U/L Final   • WBC 12/23/2021 8.36  3.40 - 10.80 10*3/mm3 Final   • RBC 12/23/2021 4.50  3.77 - 5.28 10*6/mm3 Final   • Hemoglobin 12/23/2021 13.3  12.0 - 15.9 g/dL Final   • Hematocrit 12/23/2021 39.6  34.0 - 46.6 % Final   • MCV 12/23/2021 88.0  79.0 - 97.0 fL Final   •  MCH 12/23/2021 29.6  26.6 - 33.0 pg Final   • MCHC 12/23/2021 33.6  31.5 - 35.7 g/dL Final   • RDW 12/23/2021 13.8  12.3 - 15.4 % Final   • Platelets 12/23/2021 286  140 - 450 10*3/mm3 Final   • Neutrophil Rel % 12/23/2021 61.8  42.7 - 76.0 % Final   • Lymphocyte Rel % 12/23/2021 30.6  19.6 - 45.3 % Final   • Monocyte Rel % 12/23/2021 4.9* 5.0 - 12.0 % Final   • Eosinophil Rel % 12/23/2021 1.8  0.3 - 6.2 % Final   • Basophil Rel % 12/23/2021 0.4  0.0 - 1.5 % Final   • Neutrophils Absolute 12/23/2021 5.17  1.70 - 7.00 10*3/mm3 Final   • Lymphocytes Absolute 12/23/2021 2.56  0.70 - 3.10 10*3/mm3 Final   • Monocytes Absolute 12/23/2021 0.41  0.10 - 0.90 10*3/mm3 Final   • Eosinophils Absolute 12/23/2021 0.15  0.00 - 0.40 10*3/mm3 Final   • Basophils Absolute 12/23/2021 0.03  0.00 - 0.20 10*3/mm3 Final   • Immature Granulocyte Rel % 12/23/2021 0.5  0.0 - 0.5 % Final   • Immature Grans Absolute 12/23/2021 0.04  0.00 - 0.05 10*3/mm3 Final   • nRBC 12/23/2021 0.0  0.0 - 0.2 /100 WBC Final   Office Visit on 12/16/2021   Component Date Value Ref Range Status   • Hemoglobin A1C 12/16/2021 6.5  % Final   • Lot Number 12/16/2021 0   Final   • Expiration Date 12/16/2021 0   Final   Admission on 12/04/2021, Discharged on 12/04/2021   Component Date Value Ref Range Status   • Strep A Ag 12/04/2021 Negative  Negative Final   • Throat Culture, Beta Strep 12/04/2021 No Beta Hemolytic Streptococcus Isolated   Final       Mental Status Exam:   Hygiene:   good  Cooperation:  Cooperative  Eye Contact:  Good  Psychomotor Behavior:  Restless  Mood:anxious and depressed  Affect:  Appropriate  Hopelessness: Denies  Speech:  Normal  Thought Process:  Goal directed  Thought Content:  Normal  Suicidal:  None  Homicidal:  None  Hallucinations:  None  Delusion:  None  Memory:  Intact  Orientation:  Person, Place, Time and Situation  Reliability:  fair  Insight:  Fair  Judgement:  Fair  Impulse Control:  Fair  Physical/Medical Issues:  No      PHQ-9 Depression Screening  Little interest or pleasure in doing things?     Feeling down, depressed, or hopeless?     Trouble falling or staying asleep, or sleeping too much?     Feeling tired or having little energy?     Poor appetite or overeating?     Feeling bad about yourself - or that you are a failure or have let yourself or your family down?     Trouble concentrating on things, such as reading the newspaper or watching television?     Moving or speaking so slowly that other people could have noticed? Or the opposite - being so fidgety or restless that you have been moving around a lot more than usual?     Thoughts that you would be better off dead, or of hurting yourself in some way?     PHQ-9 Total Score     If you checked off any problems, how difficult have these problems made it for you to do your work, take care of things at home, or get along with other people?          Assessment/Plan:  Diagnoses and all orders for this visit:    1. Psychophysiological insomnia (Primary)  -     amitriptyline (ELAVIL) 150 MG tablet; Take 1 tablet by mouth every night at bedtime.  Dispense: 90 tablet; Refill: 1    2. KENY (generalized anxiety disorder)  -     diazePAM (VALIUM) 5 MG tablet; Take 1 tablet by mouth 2 (Two) Times a Day As Needed for Anxiety.  Dispense: 60 tablet; Refill: 0    3. Bipolar disorder, current episode mixed, moderate (HCC)  -     desvenlafaxine (Pristiq) 50 MG 24 hr tablet; Take 1 tablet by mouth Daily.  Dispense: 30 tablet; Refill: 3  -     Lurasidone HCl (Latuda) 120 MG tablet tablet; Take 1 tablet by mouth Daily. Take 120 mg orally daily with a meal.  Dispense: 30 tablet; Refill: 3    4. PTSD (post-traumatic stress disorder)      Will start pristiq for depression. Will also temporarily increase valium to BID only as needed. Not to be taken every day. exozet video appt start time 3:40 pm  End time 3:51 pm.     A psychological evaluation was conducted in order to assess past and current  level of functioning. Areas assessed included, but were not limited to: perception of social support, perception of ability to face and deal with challenges in life (positive functioning), anxiety symptoms, depressive symptoms, perspective on beliefs/belief system, coping skills for stress, intelligence level,  Therapeutic rapport was established. Interventions conducted today were geared towards incorporating medication management along with support for continued therapy. Education was also provided as to the med management with this provider and what to expect in subsequent sessions.    We discussed risks, benefits,goals and side effects of the above medication and the patient was agreeable with the plan.Patient was educated on the importance of compliance with treatment and follow-up appointments. Patient is aware to contact the [facility] with any worsening of symptoms. To call for questions or concerns and return early if necessary. Patent is agreeable to go to the Emergency Department or call 911 should they begin SI/HI.     Treatment Plan:   Discussed risks, benefits, and alternatives of medication. Encouraged healthy habits (eating, exercise and sleep). Call if any questions or problems arise. Medication reconciled. Controlled substance monitoring report reviewed. Provided psychoeducation.. Discussed coping strategies and current stressors. Set appropriate boundaries and limits for patient's well-being. Use distraction techniques to improve symptoms. Access support networks.      No follow-ups on file.    CLAIR Ruiz

## 2022-01-26 ENCOUNTER — APPOINTMENT (OUTPATIENT)
Dept: CT IMAGING | Facility: HOSPITAL | Age: 37
End: 2022-01-26

## 2022-01-27 ENCOUNTER — OFFICE VISIT (OUTPATIENT)
Dept: NEUROLOGY | Facility: CLINIC | Age: 37
End: 2022-01-27

## 2022-01-27 VITALS
OXYGEN SATURATION: 97 % | TEMPERATURE: 97.3 F | HEIGHT: 62 IN | HEART RATE: 89 BPM | DIASTOLIC BLOOD PRESSURE: 82 MMHG | WEIGHT: 272 LBS | SYSTOLIC BLOOD PRESSURE: 124 MMHG | BODY MASS INDEX: 50.05 KG/M2

## 2022-01-27 DIAGNOSIS — G43.001 MIGRAINE WITHOUT AURA AND WITH STATUS MIGRAINOSUS, NOT INTRACTABLE: Primary | ICD-10-CM

## 2022-01-27 PROCEDURE — 99213 OFFICE O/P EST LOW 20 MIN: CPT | Performed by: NURSE PRACTITIONER

## 2022-01-27 RX ORDER — RIMEGEPANT SULFATE 75 MG/75MG
TABLET, ORALLY DISINTEGRATING ORAL
COMMUNITY
End: 2022-01-27 | Stop reason: DRUGHIGH

## 2022-01-27 RX ORDER — RIMEGEPANT SULFATE 75 MG/75MG
75 TABLET, ORALLY DISINTEGRATING ORAL EVERY OTHER DAY
Qty: 16 TABLET | Refills: 5 | Status: SHIPPED | OUTPATIENT
Start: 2022-01-27 | End: 2022-03-08 | Stop reason: SDUPTHER

## 2022-01-27 RX ORDER — RIMEGEPANT SULFATE 75 MG/75MG
75 TABLET, ORALLY DISINTEGRATING ORAL AS NEEDED
Qty: 4 TABLET | Refills: 0 | COMMUNITY
Start: 2022-01-27 | End: 2022-06-06

## 2022-01-28 ENCOUNTER — APPOINTMENT (OUTPATIENT)
Dept: PULMONOLOGY | Facility: HOSPITAL | Age: 37
End: 2022-01-28

## 2022-02-02 ENCOUNTER — APPOINTMENT (OUTPATIENT)
Dept: PULMONOLOGY | Facility: HOSPITAL | Age: 37
End: 2022-02-02

## 2022-02-02 ENCOUNTER — APPOINTMENT (OUTPATIENT)
Dept: CT IMAGING | Facility: HOSPITAL | Age: 37
End: 2022-02-02

## 2022-02-03 ENCOUNTER — TELEPHONE (OUTPATIENT)
Dept: NEUROLOGY | Facility: CLINIC | Age: 37
End: 2022-02-03

## 2022-02-03 DIAGNOSIS — G43.001 MIGRAINE WITHOUT AURA AND WITH STATUS MIGRAINOSUS, NOT INTRACTABLE: ICD-10-CM

## 2022-02-03 NOTE — TELEPHONE ENCOUNTER
Pharmacy Name:  [facility]    Pharmacy representative name: AMILCAR    Pharmacy representative phone number: [phone] OPT 1    What medication are you calling in regards to: NURTEC    What question does the pharmacy have: CHECKING ON PRIOR AUTH    PLEASE REVIEW AND ADVISE

## 2022-02-04 NOTE — TELEPHONE ENCOUNTER
I HAVE SUBMITTED A PA TO [facility] AND I HAVE STILL NOT RECEIVED ANYTHING BACK YET. I WILL LET LEORA KNOW WHENEVER I RECEIVE SOMETHING.   OK FOR HUB TO RELAY MESSAGE

## 2022-02-04 NOTE — TELEPHONE ENCOUNTER
Provider:  TERESA SYLVESTER   Caller:  LEORA   Relationship to Patient:  PT   Pharmacy:    Phone Number:  [phone]  Reason for Call:  PT CALLING BACK VERY UPSET  SHE STATES SHE RECEIVED A LETTER FROM [facility] DENYING NURTEC  RX DUE TO PRE AUTH REQUIRED ..   When was the patient last seen: 01/27/2022

## 2022-02-08 ENCOUNTER — TELEPHONE (OUTPATIENT)
Dept: FAMILY MEDICINE CLINIC | Facility: CLINIC | Age: 37
End: 2022-02-08

## 2022-02-08 DIAGNOSIS — R10.84 GENERALIZED ABDOMINAL PAIN: ICD-10-CM

## 2022-02-08 DIAGNOSIS — K52.9 AGE (ACUTE GASTROENTERITIS): ICD-10-CM

## 2022-02-08 RX ORDER — PROMETHAZINE HYDROCHLORIDE 25 MG/1
25 TABLET ORAL EVERY 6 HOURS PRN
Qty: 30 TABLET | Refills: 0 | Status: SHIPPED | OUTPATIENT
Start: 2022-02-08 | End: 2022-06-06 | Stop reason: SDUPTHER

## 2022-02-08 NOTE — TELEPHONE ENCOUNTER
Incoming Refill Request      Medication requested (name and dose): promethazine (PHENERGAN) 25 MG tablet    Pharmacy where request should be sent: [facility]    Additional details provided by patient: PATIENT IS OUT OF THE MEDICATION    Best call back number: [phone]     Does the patient have less than a 3 day supply:  [x] Yes  [] No    Kim Pelayo Rep  02/08/22, 14:16 EST

## 2022-02-12 ENCOUNTER — APPOINTMENT (OUTPATIENT)
Dept: GENERAL RADIOLOGY | Facility: HOSPITAL | Age: 37
End: 2022-02-12

## 2022-02-12 ENCOUNTER — HOSPITAL ENCOUNTER (EMERGENCY)
Facility: HOSPITAL | Age: 37
Discharge: HOME | End: 2022-02-12
Attending: FAMILY MEDICINE | Admitting: FAMILY MEDICINE

## 2022-02-12 VITALS
WEIGHT: 272 LBS | TEMPERATURE: 99.1 F | RESPIRATION RATE: 18 BRPM | DIASTOLIC BLOOD PRESSURE: 102 MMHG | HEART RATE: 99 BPM | OXYGEN SATURATION: 95 % | SYSTOLIC BLOOD PRESSURE: 146 MMHG | HEIGHT: 62 IN | BODY MASS INDEX: 50.05 KG/M2

## 2022-02-12 DIAGNOSIS — R05.9 COUGH: Primary | ICD-10-CM

## 2022-02-12 DIAGNOSIS — R09.81 NASAL SINUS CONGESTION: ICD-10-CM

## 2022-02-12 LAB
B PARAPERT DNA SPEC QL NAA+PROBE: NOT DETECTED
B PERT DNA SPEC QL NAA+PROBE: NOT DETECTED
B-HCG UR QL: NEGATIVE
BILIRUB UR QL STRIP: NEGATIVE
C PNEUM DNA NPH QL NAA+NON-PROBE: NOT DETECTED
CLARITY UR: ABNORMAL
COLOR UR: YELLOW
FLUAV SUBTYP SPEC NAA+PROBE: NOT DETECTED
FLUBV RNA ISLT QL NAA+PROBE: NOT DETECTED
GLUCOSE UR STRIP-MCNC: NEGATIVE MG/DL
HADV DNA SPEC NAA+PROBE: NOT DETECTED
HCOV 229E RNA SPEC QL NAA+PROBE: NOT DETECTED
HCOV HKU1 RNA SPEC QL NAA+PROBE: NOT DETECTED
HCOV NL63 RNA SPEC QL NAA+PROBE: NOT DETECTED
HCOV OC43 RNA SPEC QL NAA+PROBE: NOT DETECTED
HGB UR QL STRIP.AUTO: NEGATIVE
HMPV RNA NPH QL NAA+NON-PROBE: NOT DETECTED
HPIV1 RNA ISLT QL NAA+PROBE: NOT DETECTED
HPIV2 RNA SPEC QL NAA+PROBE: NOT DETECTED
HPIV3 RNA NPH QL NAA+PROBE: NOT DETECTED
HPIV4 P GENE NPH QL NAA+PROBE: NOT DETECTED
KETONES UR QL STRIP: NEGATIVE
LEUKOCYTE ESTERASE UR QL STRIP.AUTO: NEGATIVE
M PNEUMO IGG SER IA-ACNC: NOT DETECTED
NITRITE UR QL STRIP: NEGATIVE
PH UR STRIP.AUTO: 7 [PH] (ref 5–8)
PROT UR QL STRIP: NEGATIVE
RHINOVIRUS RNA SPEC NAA+PROBE: NOT DETECTED
RSV RNA NPH QL NAA+NON-PROBE: NOT DETECTED
S PYO AG THROAT QL: NEGATIVE
SARS-COV-2 RNA NPH QL NAA+NON-PROBE: NOT DETECTED
SP GR UR STRIP: 1.01 (ref 1–1.03)
UROBILINOGEN UR QL STRIP: ABNORMAL

## 2022-02-12 PROCEDURE — 99283 EMERGENCY DEPT VISIT LOW MDM: CPT

## 2022-02-12 PROCEDURE — 81003 URINALYSIS AUTO W/O SCOPE: CPT | Performed by: PHYSICIAN ASSISTANT

## 2022-02-12 PROCEDURE — 81025 URINE PREGNANCY TEST: CPT | Performed by: PHYSICIAN ASSISTANT

## 2022-02-12 PROCEDURE — 71045 X-RAY EXAM CHEST 1 VIEW: CPT

## 2022-02-12 PROCEDURE — 63710000001 ONDANSETRON ODT 4 MG TABLET DISPERSIBLE: Performed by: PHYSICIAN ASSISTANT

## 2022-02-12 PROCEDURE — 0202U NFCT DS 22 TRGT SARS-COV-2: CPT | Performed by: PHYSICIAN ASSISTANT

## 2022-02-12 PROCEDURE — 87880 STREP A ASSAY W/OPTIC: CPT | Performed by: PHYSICIAN ASSISTANT

## 2022-02-12 RX ORDER — ONDANSETRON 4 MG/1
4 TABLET, ORALLY DISINTEGRATING ORAL ONCE
Status: COMPLETED | OUTPATIENT
Start: 2022-02-12 | End: 2022-02-12

## 2022-02-12 RX ORDER — ONDANSETRON 4 MG/1
4 TABLET, ORALLY DISINTEGRATING ORAL EVERY 8 HOURS PRN
Qty: 12 TABLET | Refills: 0 | Status: SHIPPED | OUTPATIENT
Start: 2022-02-12 | End: 2022-06-06

## 2022-02-12 RX ADMIN — ONDANSETRON 4 MG: 4 TABLET, ORALLY DISINTEGRATING ORAL at 21:44

## 2022-02-13 NOTE — ED PROVIDER NOTES
"Subjective   History of Present Illness   Patient is a 36-year-old female presenting to the ER with complaints of not feeling well since yesterday.  Patient reports cough, congestion, sore throat, and fatigue.  Patient has not been vaccinated for COVID but denies any known recent sick contacts.  Patient states that she believes she has bronchitis or sinus infection due to the weather changes.  Patient states that her fiancé proposed to her yesterday and then that night she began feeling bad.  She denies fevers, chest pain, shortness of breath, and additional symptoms or complaints at this time.    Review of Systems   Constitutional: Positive for fatigue.   HENT: Positive for congestion and sore throat.    Respiratory: Positive for cough.    All other systems reviewed and are negative.      Past Medical History:   Diagnosis Date   • Anxiety    • Back pain    • Bell's palsy    • Bipolar 2 disorder (HCC)    • Blood clot in vein    • Depression    • Diabetes mellitus (HCC) November    Pre diabete   • Frequent headaches    • GERD (gastroesophageal reflux disease)    • Migraine    • Panic    • PTSD (post-traumatic stress disorder)    • Restless leg syndrome    • Sinus problem    • Snores    • Tattoos    • Vitamin B12 deficiency        Allergies   Allergen Reactions   • Codeine Anaphylaxis   • Flexeril [Cyclobenzaprine] Other (See Comments)     \"gives me chest pain\"   • Asa [Aspirin] Hives     Wheezing     • Latex Hives   • Morphine Itching   • Penicillins Hives     Vomiting     • Triptans Other (See Comments)     Chest tightness, left arm numbness   • Compazine [Prochlorperazine] Anxiety   • Reglan [Metoclopramide] Anxiety       Past Surgical History:   Procedure Laterality Date   • CHOLECYSTECTOMY     • TOOTH EXTRACTION         Family History   Problem Relation Age of Onset   • Irritable bowel syndrome Mother    • Irritable bowel syndrome Father    • Colon cancer Maternal Grandfather        Social History "     Socioeconomic History   • Marital status: Single   Tobacco Use   • Smoking status: Current Every Day Smoker     Packs/day: 0.50     Years: 29.00     Pack years: 14.50     Types: Cigarettes     Start date: 1995     Last attempt to quit: 2001     Years since quittin.1   • Smokeless tobacco: Never Used   Vaping Use   • Vaping Use: Former   Substance and Sexual Activity   • Alcohol use: Never   • Drug use: Never   • Sexual activity: Not Currently     Partners: Female           Objective   Physical Exam  Vitals and nursing note reviewed.   Constitutional:       General: She is not in acute distress.     Appearance: She is not toxic-appearing.   HENT:      Head: Normocephalic and atraumatic.      Right Ear: External ear normal.      Left Ear: External ear normal.      Nose: Nose normal.      Mouth/Throat:      Mouth: Mucous membranes are moist.      Pharynx: Posterior oropharyngeal erythema present. No oropharyngeal exudate.   Eyes:      Extraocular Movements: Extraocular movements intact.      Conjunctiva/sclera: Conjunctivae normal.   Cardiovascular:      Rate and Rhythm: Tachycardia present.      Heart sounds: Normal heart sounds.   Pulmonary:      Effort: Pulmonary effort is normal.      Breath sounds: Normal breath sounds.   Abdominal:      General: There is no distension.      Palpations: Abdomen is soft.      Tenderness: There is no abdominal tenderness.   Musculoskeletal:         General: Normal range of motion.      Cervical back: Normal range of motion and neck supple.   Skin:     General: Skin is warm and dry.   Neurological:      General: No focal deficit present.      Mental Status: She is alert and oriented to person, place, and time.   Psychiatric:         Mood and Affect: Mood normal.         Behavior: Behavior normal.         Procedures           ED Course  ED Course as of 22 2244   Sat 2022   2140 HCG, Urine QL: Negative [AP]   2140 Strep A Ag: Negative [AP]   2140 Color,  UA: Yellow [AP]   2140 Appearance, UA(!): Turbid [AP]   2140 pH, UA: 7.0 [AP]   2140 Specific Gravity, UA: 1.012 [AP]   2140 Glucose: Negative [AP]   2140 Ketones, UA: Negative [AP]   2140 Bilirubin, UA: Negative [AP]   2140 Blood, UA: Negative [AP]   2140 Protein, UA: Negative [AP]   2140 Leukocytes, UA: Negative [AP]   2140 Nitrite, UA: Negative [AP]   2140 Urobilinogen, UA: 0.2 E.U./dL [AP]   2222 RVP negative. [AP]      ED Course User Index  [AP] Peace Kelly, BEN                                                 MDM   Patient was evaluated in the ER for cough, malaise, and congestion x1 day.  Patient is tachycardic upon arrival but otherwise vitals are within normal limits.  Strep test was negative.  RVP was negative.  Chest x-ray was reviewed by myself and ED attending and there were no obvious acute cardiopulmonary abnormalities.  Urinalysis is not indicative of UTI.  Patient was advised to continue Claritin and Xyzal at home as prescribed by her PCP.  She was advised to take Benadryl over-the-counter as needed per directions on the package.  She was advised to drink plenty of water to stay hydrated.  She was advised to follow-up with her PCP if symptoms persist.  Precautions were given for return to the ER for any new or worsening symptoms.    Final diagnoses:   Cough   Nasal sinus congestion       ED Disposition  ED Disposition     ED Disposition Condition Comment    Discharge Stable           Stephanie Burdick, APRN  [address] [place] [address] [phone]    Schedule an appointment as soon as possible for a visit   For further outpatient evaluation of today's complaint    [facility]  [address] [address] [phone]  Go to   As needed, If symptoms worsen         Medication List      New Prescriptions    ondansetron ODT 4 MG disintegrating tablet  Commonly known as: ZOFRAN-ODT  Place 1 tablet on the tongue Every 8 (Eight) Hours  As Needed for Nausea or Vomiting.           Where to Get Your Medications      These medications were sent to [facility]pharm[facility] #[id number] - [place], [place] - [address] - [phone]  - [phone]   [address] [address]    Phone: [phone]   · ondansetron ODT 4 MG disintegrating tablet          Peace Kelly PA-C  02/12/22 [id number]

## 2022-02-13 NOTE — DISCHARGE INSTRUCTIONS
Continue home medications for allergies. Take Benadryl over-the-counter as needed per directions on the package. Plenty of water stay hydrated. Follow-up with your PCP if symptoms persist. Return to the ER for new or worsening symptoms.

## 2022-02-14 LAB — BACTERIA SPEC AEROBE CULT: NORMAL

## 2022-02-15 NOTE — TELEPHONE ENCOUNTER
Provider: CHUN  Caller: PATIENT  Relationship to Patient: SELF  Pharmacy: N/A  Phone Number: [phone]  Reason for Call: PATIENT TELEPHONED REGARDING HER PRIOR AUTH; ADVISED STILL HAVE NOT RECEIVED; DOES CLINIC HAVE SAMPLES THAT PATIENT CAN SWING BY & GET?    PLEASE CALL & ADVISE.    THANK YOU.

## 2022-02-17 ENCOUNTER — APPOINTMENT (OUTPATIENT)
Dept: CT IMAGING | Facility: HOSPITAL | Age: 37
End: 2022-02-17

## 2022-02-17 ENCOUNTER — HOSPITAL ENCOUNTER (EMERGENCY)
Facility: HOSPITAL | Age: 37
Discharge: HOME | End: 2022-02-17
Attending: EMERGENCY MEDICINE | Admitting: FAMILY MEDICINE

## 2022-02-17 ENCOUNTER — TELEPHONE (OUTPATIENT)
Dept: FAMILY MEDICINE CLINIC | Facility: CLINIC | Age: 37
End: 2022-02-17

## 2022-02-17 ENCOUNTER — OFFICE VISIT (OUTPATIENT)
Dept: FAMILY MEDICINE CLINIC | Facility: CLINIC | Age: 37
End: 2022-02-17

## 2022-02-17 VITALS
TEMPERATURE: 98.5 F | WEIGHT: 272.2 LBS | OXYGEN SATURATION: 98 % | BODY MASS INDEX: 50.09 KG/M2 | DIASTOLIC BLOOD PRESSURE: 84 MMHG | HEART RATE: 108 BPM | HEIGHT: 62 IN | SYSTOLIC BLOOD PRESSURE: 120 MMHG

## 2022-02-17 VITALS
DIASTOLIC BLOOD PRESSURE: 102 MMHG | RESPIRATION RATE: 18 BRPM | OXYGEN SATURATION: 95 % | SYSTOLIC BLOOD PRESSURE: 123 MMHG | HEART RATE: 101 BPM | HEIGHT: 62 IN | BODY MASS INDEX: 50.05 KG/M2 | WEIGHT: 272 LBS | TEMPERATURE: 98.6 F

## 2022-02-17 DIAGNOSIS — M51.16 LUMBAR DISC DISEASE WITH RADICULOPATHY: Primary | ICD-10-CM

## 2022-02-17 DIAGNOSIS — S33.5XXA LUMBAR SPRAIN, INITIAL ENCOUNTER: Primary | ICD-10-CM

## 2022-02-17 PROCEDURE — 72131 CT LUMBAR SPINE W/O DYE: CPT

## 2022-02-17 PROCEDURE — 99212 OFFICE O/P EST SF 10 MIN: CPT

## 2022-02-17 PROCEDURE — 99282 EMERGENCY DEPT VISIT SF MDM: CPT

## 2022-02-17 NOTE — TELEPHONE ENCOUNTER
Patient called back saying she is in pain and can hardly walk. I advised her to go to the ER, or maybe to walk in clinic to see Gennaro. She says she will do that.

## 2022-02-17 NOTE — TELEPHONE ENCOUNTER
Caller: Lindsay Amezquita    Relationship: Self    Best call back number: [phone]    What is the medical concern/diagnosis:PAIN LOWER BACK AND LEGS    What specialty or service is being requested: PAIN MANAGEMENT    What is the provider, practice or medical service name: [facility]    What is the office location: [place]    What is the office phone number:    Any additional details:

## 2022-02-17 NOTE — TELEPHONE ENCOUNTER
Pt called again wanting to talk to someone. She is in a lot of pain with her legs and arms. I told her to go to the ER if the pain was bad.

## 2022-02-17 NOTE — PROGRESS NOTES
Acute Office Visit      Patient Name: Lindsay Amezquita  : 1985   MRN: [id number]     Chief Complaint:    Chief Complaint   Patient presents with   • Leg Pain     started last night around midnight   • Back Pain       History of Present Illness: Lindsay Amezquita is a 36 y.o. female who is here today for low back pain and leg pain.  Patient states that this started last night when she got out of the bathtub.  Initially was affecting only the left leg but then started bothering the right leg as well.  She reports tenderness across her entire lower back and has a numbing sensation that is running down the outside and top of her left leg.  She even notices that her left foot appears to be swollen and discolored.  She does have numbness and tingling in her legs in general.  She takes tizanidine for history of low back pain but states that it has not been effective today.  She is also taken ibuprofen 800 mg at noon, as well as her prescribed Neurontin.  She does state that she sees a pain and fine clinic that was providing her with hydrocodone 7.5 mg tablets, but states that she is being weaned off of those and her pain is becoming more more uncontrollable at home.  No recent CT lumbar spine noticed in medical record.    Subjective     Review of System: Review of Systems   Musculoskeletal: Positive for arthralgias and back pain.   Neurological: Positive for weakness and numbness.      I have reviewed the ROS documented by my clinical staff, updated appropriately and I agree. CLAIR Obrien    Past Medical History:   Past Medical History:   Diagnosis Date   • Anxiety    • Back pain    • Bell's palsy    • Bipolar 2 disorder (HCC)    • Blood clot in vein    • Depression    • Diabetes mellitus (HCC) November    Pre diabete   • Frequent headaches    • GERD (gastroesophageal reflux disease)    • Migraine    • Panic    • PTSD (post-traumatic stress disorder)    • Restless leg syndrome    • Sinus problem    • Snores    •  Tattoos    • Vitamin B12 deficiency        Past Surgical History:   Past Surgical History:   Procedure Laterality Date   • CHOLECYSTECTOMY     • TOOTH EXTRACTION         Family History:   Family History   Problem Relation Age of Onset   • Irritable bowel syndrome Mother    • Irritable bowel syndrome Father    • Colon cancer Maternal Grandfather        Social History:   Social History     Socioeconomic History   • Marital status: Single   Tobacco Use   • Smoking status: Current Every Day Smoker     Packs/day: 0.50     Years: 29.00     Pack years: 14.50     Types: Cigarettes     Start date: 1995     Last attempt to quit: 2001     Years since quittin.1   • Smokeless tobacco: Never Used   Vaping Use   • Vaping Use: Former   Substance and Sexual Activity   • Alcohol use: Never   • Drug use: Never   • Sexual activity: Not Currently     Partners: Female       Medications:     Current Outpatient Medications:   •  amitriptyline (ELAVIL) 150 MG tablet, Take 1 tablet by mouth every night at bedtime., Disp: 90 tablet, Rfl: 1  •  cyanocobalamin 500 MCG tablet, Take 1 tablet by mouth Daily., Disp: 30 tablet, Rfl: 5  •  desvenlafaxine (Pristiq) 50 MG 24 hr tablet, Take 1 tablet by mouth Daily., Disp: 30 tablet, Rfl: 3  •  diazePAM (VALIUM) 5 MG tablet, Take 1 tablet by mouth 2 (Two) Times a Day As Needed for Anxiety., Disp: 60 tablet, Rfl: 0  •  Eliquis 5 MG tablet tablet, TAKE 1 TABLET BY MOUTH EVERY 12 HOURS, Disp: 60 tablet, Rfl: 1  •  fluconazole (Diflucan) 150 MG tablet, Take 1 tablet by mouth now, may repeat in 3 days if needed, Disp: 2 tablet, Rfl: 0  •  fluticasone (Flonase) 50 MCG/ACT nasal spray, 2 sprays into the nostril(s) as directed by provider Daily., Disp: 18.2 mL, Rfl: 5  •  fluticasone (Flovent HFA) 110 MCG/ACT inhaler, Inhale 2 puffs 2 (Two) Times a Day., Disp: 12 g, Rfl: 5  •  furosemide (LASIX) 20 MG tablet, Take 1 tablet by mouth Daily., Disp: 30 tablet, Rfl: 5  •  gabapentin (NEURONTIN) 800 MG  tablet, Take 1 tablet by mouth 3 (Three) Times a Day., Disp: 90 tablet, Rfl: 2  •  glucose blood test strip, Check blood sugar once daily, Disp: 25 each, Rfl: 12  •  glucose monitor monitoring kit, Check blood sugar once daily, Disp: 1 each, Rfl: 0  •  Hydrocortisone, Perianal, (Proctozone-HC) 2.5 % rectal cream, Insert  into the rectum 2 (Two) Times a Day., Disp: 28 g, Rfl: 0  •  hydrOXYzine (ATARAX) 25 MG tablet, Take 1 tablet by mouth 3 (Three) Times a Day As Needed for Anxiety., Disp: 30 tablet, Rfl: 5  •  Lancets (freestyle) lancets, 1 each by Other route 4 (Four) Times a Day., Disp: 400 each, Rfl: 3  •  Lancets Misc. (ONE TOUCH SURESOFT) misc, Check blood sugar once daily, Disp: 1 each, Rfl: 0  •  levocetirizine (XYZAL) 5 MG tablet, Take 1 tablet by mouth Every Evening., Disp: 30 tablet, Rfl: 5  •  Lurasidone HCl (Latuda) 120 MG tablet tablet, Take 1 tablet by mouth Daily. Take 120 mg orally daily with a meal., Disp: 30 tablet, Rfl: 3  •  omeprazole (priLOSEC) 40 MG capsule, , Disp: , Rfl:   •  ondansetron ODT (ZOFRAN-ODT) 4 MG disintegrating tablet, Place 1 tablet on the tongue Every 8 (Eight) Hours As Needed for Nausea or Vomiting., Disp: 12 tablet, Rfl: 0  •  pantoprazole (PROTONIX) 40 MG EC tablet, Take 1 tablet by mouth Daily., Disp: 30 tablet, Rfl: 5  •  promethazine (PHENERGAN) 25 MG tablet, Take 1 tablet by mouth Every 6 (Six) Hours As Needed for Nausea or Vomiting., Disp: 30 tablet, Rfl: 0  •  promethazine-dextromethorphan (PROMETHAZINE-DM) 6.25-15 MG/5ML syrup, Take 5 mL by mouth 4 (Four) Times a Day As Needed for Cough., Disp: 118 mL, Rfl: 1  •  Rimegepant Sulfate (Nurtec) 75 MG tablet dispersible tablet, Take 1 tablet by mouth Every Other Day. Take Monday,Wednesday,Friday, and Saturday., Disp: 16 tablet, Rfl: 5  •  Rimegepant Sulfate (Nurtec) 75 MG tablet dispersible tablet, Take 1 tablet by mouth As Needed (as needed for migraine)., Disp: 4 tablet, Rfl: 0  •  tiZANidine (ZANAFLEX) 4 MG tablet,  "TAKE 1 TABLET BY MOUTH AT NIGHT AS NEEDED FOR MUSCLE SPASMS., Disp: 30 tablet, Rfl: 1  •  vitamin D (ERGOCALCIFEROL) 1.25 MG (44324 UT) capsule capsule, Take 1 capsule by mouth 1 (One) Time Per Week., Disp: 4 capsule, Rfl: 2    Allergies:   Allergies   Allergen Reactions   • Codeine Anaphylaxis   • Flexeril [Cyclobenzaprine] Other (See Comments)     \"gives me chest pain\"   • Asa [Aspirin] Hives     Wheezing     • Latex Hives   • Morphine Itching   • Penicillins Hives     Vomiting     • Triptans Other (See Comments)     Chest tightness, left arm numbness   • Compazine [Prochlorperazine] Anxiety   • Reglan [Metoclopramide] Anxiety       Objective     Physical Exam:   Vital Signs:   Vitals:    02/17/22 1653   BP: 120/84   Pulse: 108   Temp: 98.5 °F (36.9 °C)   SpO2: 98%   Weight: 123 kg (272 lb 3.2 oz)   Height: 157.5 cm (62\")     Body mass index is 49.79 kg/m².     Physical Exam  Vitals and nursing note reviewed.   Constitutional:       General: She is not in acute distress.     Appearance: She is obese. She is not ill-appearing.   HENT:      Head: Normocephalic and atraumatic.   Pulmonary:      Effort: Pulmonary effort is normal.   Musculoskeletal:      Lumbar back: Tenderness present. Positive right straight leg raise test and positive left straight leg raise test.      Right hip: Tenderness present. Decreased range of motion.      Left hip: Tenderness present. Decreased range of motion.   Skin:     Capillary Refill: Capillary refill takes less than 2 seconds.   Neurological:      Mental Status: She is alert and oriented to person, place, and time.   Psychiatric:         Mood and Affect: Mood normal.         Behavior: Behavior normal.         Assessment / Plan      Assessment/Plan:   Diagnoses and all orders for this visit:    1. Lumbar disc disease with radiculopathy (Primary)         This patient is well-known by the clinic staff and at the walk-in clinic.  Her assessment today shows that she has severely " decreased range of motion in her bilateral hips due to pain and has bilateral positive straight leg raise.  Discussed treatment options with patient and suggest that she go to the emergency department for a further work-up, to include a CT lumbar scan and possibly IV pain medication.  Would only be able to provide patient with Toradol in walk-in clinic and obtain plain radiology films.  She is agreeable to the plan.  She was transported to the emergency department via wheelchair.      Follow Up:   Return Emergency department.    I spent approximately 10 minutes providing clinical care for this patient; including review of patient's chart and provider documentation, face to face time spent with patient in examination room (obtaining history, performing physical exam, discussing diagnosis and management options), placing orders, and completing patient documentation.     CLAIR Obrien  [facility] [place] [facility] [place]

## 2022-02-18 ENCOUNTER — TELEPHONE (OUTPATIENT)
Dept: FAMILY MEDICINE CLINIC | Facility: CLINIC | Age: 37
End: 2022-02-18

## 2022-02-18 DIAGNOSIS — G89.29 CHRONIC BILATERAL LOW BACK PAIN, UNSPECIFIED WHETHER SCIATICA PRESENT: Primary | ICD-10-CM

## 2022-02-18 DIAGNOSIS — M54.50 CHRONIC BILATERAL LOW BACK PAIN, UNSPECIFIED WHETHER SCIATICA PRESENT: Primary | ICD-10-CM

## 2022-02-18 NOTE — TELEPHONE ENCOUNTER
Caller: Lindsay Amezquita    Relationship: Self    Best call back number:     What is the best time to reach you: ANYTIME    Who are you requesting to speak with (clinical staff, provider,  specific staff member): CLINICAL STAFF    Do you know the name of the person who called: LINDSAY    What was the call regarding: PATIENT WENT TO [facility] IN [place] AND SHE IS NEEDING AN MRI OF HER SPINE    Do you require a callback: YES

## 2022-02-21 ENCOUNTER — OFFICE VISIT (OUTPATIENT)
Dept: FAMILY MEDICINE CLINIC | Facility: CLINIC | Age: 37
End: 2022-02-21

## 2022-02-21 VITALS
OXYGEN SATURATION: 93 % | DIASTOLIC BLOOD PRESSURE: 84 MMHG | HEART RATE: 105 BPM | TEMPERATURE: 99.5 F | BODY MASS INDEX: 50.05 KG/M2 | HEIGHT: 62 IN | SYSTOLIC BLOOD PRESSURE: 125 MMHG | WEIGHT: 272 LBS

## 2022-02-21 DIAGNOSIS — J06.9 URI WITH COUGH AND CONGESTION: Primary | ICD-10-CM

## 2022-02-21 LAB
EXPIRATION DATE: NORMAL
FLUAV AG UPPER RESP QL IA.RAPID: NOT DETECTED
FLUBV AG UPPER RESP QL IA.RAPID: NOT DETECTED
INTERNAL CONTROL: NORMAL
Lab: NORMAL
SARS-COV-2 AG UPPER RESP QL IA.RAPID: NOT DETECTED

## 2022-02-21 PROCEDURE — 87428 SARSCOV & INF VIR A&B AG IA: CPT | Performed by: NURSE PRACTITIONER

## 2022-02-21 PROCEDURE — 99213 OFFICE O/P EST LOW 20 MIN: CPT | Performed by: NURSE PRACTITIONER

## 2022-02-21 RX ORDER — ALBUTEROL SULFATE 90 UG/1
2 AEROSOL, METERED RESPIRATORY (INHALATION) EVERY 4 HOURS PRN
Qty: 18 G | Refills: 0 | Status: SHIPPED | OUTPATIENT
Start: 2022-02-21 | End: 2022-08-12 | Stop reason: SDUPTHER

## 2022-02-23 ENCOUNTER — HOSPITAL ENCOUNTER (EMERGENCY)
Facility: HOSPITAL | Age: 37
Discharge: HOME | End: 2022-02-23
Attending: EMERGENCY MEDICINE | Admitting: EMERGENCY MEDICINE

## 2022-02-23 ENCOUNTER — APPOINTMENT (OUTPATIENT)
Dept: CT IMAGING | Facility: HOSPITAL | Age: 37
End: 2022-02-23

## 2022-02-23 ENCOUNTER — APPOINTMENT (OUTPATIENT)
Dept: GENERAL RADIOLOGY | Facility: HOSPITAL | Age: 37
End: 2022-02-23

## 2022-02-23 VITALS
SYSTOLIC BLOOD PRESSURE: 132 MMHG | RESPIRATION RATE: 18 BRPM | WEIGHT: 272 LBS | HEIGHT: 62 IN | TEMPERATURE: 97.9 F | OXYGEN SATURATION: 95 % | DIASTOLIC BLOOD PRESSURE: 92 MMHG | BODY MASS INDEX: 50.05 KG/M2 | HEART RATE: 103 BPM

## 2022-02-23 DIAGNOSIS — R07.89 CHEST WALL PAIN: Primary | ICD-10-CM

## 2022-02-23 PROCEDURE — 71046 X-RAY EXAM CHEST 2 VIEWS: CPT

## 2022-02-23 PROCEDURE — 99282 EMERGENCY DEPT VISIT SF MDM: CPT

## 2022-02-23 RX ORDER — TIZANIDINE 4 MG/1
4 TABLET ORAL NIGHTLY PRN
Qty: 12 TABLET | Refills: 0 | Status: SHIPPED | OUTPATIENT
Start: 2022-02-23 | End: 2022-03-23 | Stop reason: SDUPTHER

## 2022-02-24 ENCOUNTER — TELEPHONE (OUTPATIENT)
Dept: FAMILY MEDICINE CLINIC | Facility: CLINIC | Age: 37
End: 2022-02-24

## 2022-02-24 NOTE — TELEPHONE ENCOUNTER
Pharmacy Name:  [facility]      Pharmacy representative name: PAN    Pharmacy representative phone number: [phone]    What medication are you calling in regards to: NURTEC    What question does the pharmacy have: SHE STATES PRIOR AUTH WAS DENIED ON 1-31-22, SHE WAS WONDERING IF AN APPEAL WAS GOING TO BE PLACED ?     PLEASE CONTACT BACK WITH DECISION PLEASE.

## 2022-02-25 ENCOUNTER — APPOINTMENT (OUTPATIENT)
Dept: GENERAL RADIOLOGY | Facility: HOSPITAL | Age: 37
End: 2022-02-25

## 2022-02-25 ENCOUNTER — HOSPITAL ENCOUNTER (EMERGENCY)
Facility: HOSPITAL | Age: 37
Discharge: HOME | End: 2022-02-26
Attending: EMERGENCY MEDICINE | Admitting: EMERGENCY MEDICINE

## 2022-02-25 DIAGNOSIS — J40 BRONCHITIS: ICD-10-CM

## 2022-02-25 DIAGNOSIS — R05.9 COUGH: Primary | ICD-10-CM

## 2022-02-25 LAB
ALBUMIN SERPL-MCNC: 4 G/DL (ref 3.5–5.2)
ALBUMIN/GLOB SERPL: 1.5 G/DL
ALP SERPL-CCNC: 67 U/L (ref 39–117)
ALT SERPL W P-5'-P-CCNC: 22 U/L (ref 1–33)
ANION GAP SERPL CALCULATED.3IONS-SCNC: 11.2 MMOL/L (ref 5–15)
AST SERPL-CCNC: 18 U/L (ref 1–32)
BASOPHILS # BLD AUTO: 0.03 10*3/MM3 (ref 0–0.2)
BASOPHILS NFR BLD AUTO: 0.3 % (ref 0–1.5)
BILIRUB SERPL-MCNC: 0.2 MG/DL (ref 0–1.2)
BUN SERPL-MCNC: 6 MG/DL (ref 6–20)
BUN/CREAT SERPL: 9.8 (ref 7–25)
CALCIUM SPEC-SCNC: 9.3 MG/DL (ref 8.6–10.5)
CHLORIDE SERPL-SCNC: 102 MMOL/L (ref 98–107)
CO2 SERPL-SCNC: 25.8 MMOL/L (ref 22–29)
CREAT SERPL-MCNC: 0.61 MG/DL (ref 0.57–1)
DEPRECATED RDW RBC AUTO: 42.5 FL (ref 37–54)
EOSINOPHIL # BLD AUTO: 0.15 10*3/MM3 (ref 0–0.4)
EOSINOPHIL NFR BLD AUTO: 1.7 % (ref 0.3–6.2)
ERYTHROCYTE [DISTWIDTH] IN BLOOD BY AUTOMATED COUNT: 13.5 % (ref 12.3–15.4)
GFR SERPL CREATININE-BSD FRML MDRD: 110 ML/MIN/1.73
GLOBULIN UR ELPH-MCNC: 2.7 GM/DL
GLUCOSE SERPL-MCNC: 122 MG/DL (ref 65–99)
HCT VFR BLD AUTO: 38.7 % (ref 34–46.6)
HGB BLD-MCNC: 13.3 G/DL (ref 12–15.9)
IMM GRANULOCYTES # BLD AUTO: 0.04 10*3/MM3 (ref 0–0.05)
IMM GRANULOCYTES NFR BLD AUTO: 0.4 % (ref 0–0.5)
LYMPHOCYTES # BLD AUTO: 3.65 10*3/MM3 (ref 0.7–3.1)
LYMPHOCYTES NFR BLD AUTO: 40.3 % (ref 19.6–45.3)
MAGNESIUM SERPL-MCNC: 2 MG/DL (ref 1.6–2.6)
MCH RBC QN AUTO: 29.7 PG (ref 26.6–33)
MCHC RBC AUTO-ENTMCNC: 34.4 G/DL (ref 31.5–35.7)
MCV RBC AUTO: 86.4 FL (ref 79–97)
MONOCYTES # BLD AUTO: 0.43 10*3/MM3 (ref 0.1–0.9)
MONOCYTES NFR BLD AUTO: 4.7 % (ref 5–12)
NEUTROPHILS NFR BLD AUTO: 4.76 10*3/MM3 (ref 1.7–7)
NEUTROPHILS NFR BLD AUTO: 52.6 % (ref 42.7–76)
NRBC BLD AUTO-RTO: 0 /100 WBC (ref 0–0.2)
PLATELET # BLD AUTO: 287 10*3/MM3 (ref 140–450)
PMV BLD AUTO: 10.1 FL (ref 6–12)
POTASSIUM SERPL-SCNC: 3.8 MMOL/L (ref 3.5–5.2)
PROT SERPL-MCNC: 6.7 G/DL (ref 6–8.5)
RBC # BLD AUTO: 4.48 10*6/MM3 (ref 3.77–5.28)
SODIUM SERPL-SCNC: 139 MMOL/L (ref 136–145)
WBC NRBC COR # BLD: 9.06 10*3/MM3 (ref 3.4–10.8)

## 2022-02-25 PROCEDURE — 71045 X-RAY EXAM CHEST 1 VIEW: CPT

## 2022-02-25 PROCEDURE — 25010000002 DEXAMETHASONE SODIUM PHOSPHATE 10 MG/ML SOLUTION: Performed by: NURSE PRACTITIONER

## 2022-02-25 PROCEDURE — 96374 THER/PROPH/DIAG INJ IV PUSH: CPT

## 2022-02-25 PROCEDURE — 80053 COMPREHEN METABOLIC PANEL: CPT | Performed by: NURSE PRACTITIONER

## 2022-02-25 PROCEDURE — 83735 ASSAY OF MAGNESIUM: CPT | Performed by: NURSE PRACTITIONER

## 2022-02-25 PROCEDURE — 99283 EMERGENCY DEPT VISIT LOW MDM: CPT

## 2022-02-25 PROCEDURE — 94640 AIRWAY INHALATION TREATMENT: CPT

## 2022-02-25 PROCEDURE — 25010000002 KETOROLAC TROMETHAMINE PER 15 MG: Performed by: NURSE PRACTITIONER

## 2022-02-25 PROCEDURE — 96375 TX/PRO/DX INJ NEW DRUG ADDON: CPT

## 2022-02-25 PROCEDURE — 25010000002 DIPHENHYDRAMINE PER 50 MG: Performed by: NURSE PRACTITIONER

## 2022-02-25 PROCEDURE — 85025 COMPLETE CBC W/AUTO DIFF WBC: CPT | Performed by: NURSE PRACTITIONER

## 2022-02-25 RX ORDER — DEXAMETHASONE SODIUM PHOSPHATE 10 MG/ML
10 INJECTION, SOLUTION INTRAMUSCULAR; INTRAVENOUS ONCE
Status: COMPLETED | OUTPATIENT
Start: 2022-02-25 | End: 2022-02-25

## 2022-02-25 RX ORDER — ALBUTEROL SULFATE 90 UG/1
2 AEROSOL, METERED RESPIRATORY (INHALATION) ONCE
Status: COMPLETED | OUTPATIENT
Start: 2022-02-25 | End: 2022-02-25

## 2022-02-25 RX ORDER — RIMEGEPANT SULFATE 75 MG/75MG
75 TABLET, ORALLY DISINTEGRATING ORAL AS NEEDED
Qty: 4 TABLET | Refills: 0 | COMMUNITY
Start: 2022-02-25 | End: 2022-06-06

## 2022-02-25 RX ORDER — DIPHENHYDRAMINE HYDROCHLORIDE 50 MG/ML
25 INJECTION INTRAMUSCULAR; INTRAVENOUS ONCE
Status: COMPLETED | OUTPATIENT
Start: 2022-02-25 | End: 2022-02-25

## 2022-02-25 RX ORDER — KETOROLAC TROMETHAMINE 30 MG/ML
15 INJECTION, SOLUTION INTRAMUSCULAR; INTRAVENOUS ONCE
Status: COMPLETED | OUTPATIENT
Start: 2022-02-25 | End: 2022-02-25

## 2022-02-25 RX ADMIN — DIPHENHYDRAMINE HYDROCHLORIDE 25 MG: 50 INJECTION INTRAMUSCULAR; INTRAVENOUS at 22:35

## 2022-02-25 RX ADMIN — KETOROLAC TROMETHAMINE 15 MG: 30 INJECTION, SOLUTION INTRAMUSCULAR; INTRAVENOUS at 22:35

## 2022-02-25 RX ADMIN — SODIUM CHLORIDE 1000 ML: 9 INJECTION, SOLUTION INTRAVENOUS at 22:36

## 2022-02-25 RX ADMIN — ALBUTEROL SULFATE 2 PUFF: 90 AEROSOL, METERED RESPIRATORY (INHALATION) at 22:36

## 2022-02-25 RX ADMIN — DEXAMETHASONE SODIUM PHOSPHATE 10 MG: 10 INJECTION INTRAMUSCULAR; INTRAVENOUS at 22:35

## 2022-02-25 NOTE — TELEPHONE ENCOUNTER
Caller: Lindsay Amezquita    Relationship: Self    Best call back number: [phone]    What is the best time to reach you: ANY     Who are you requesting to speak with (clinical staff, provider,  specific staff member):  GENET     Do you know the name of the person who called:  GENET     What was the call regarding:  PRE AUTH UPDATE FOR [facility] AND IF SHE COULD PICKUP SAMPLES      Do you require a callback:  YES    PT ADVISED SHE CAN  SAMPLES WILL BE THERE TODAY BEFORE CLOSE STILL NEEDS UPDATE MARY PRE AUTH PLEASE

## 2022-02-25 NOTE — TELEPHONE ENCOUNTER
I think we can resubmit after 30 days, will try again next week and send to [facility]

## 2022-02-25 NOTE — TELEPHONE ENCOUNTER
ATTEMPTED TO CONTACT PT.. TO RELAY [facility] [place] [place].. OK FOR HUB TO RELAY MESSAGE

## 2022-02-26 VITALS
SYSTOLIC BLOOD PRESSURE: 132 MMHG | TEMPERATURE: 98.2 F | DIASTOLIC BLOOD PRESSURE: 87 MMHG | HEIGHT: 62 IN | OXYGEN SATURATION: 100 % | RESPIRATION RATE: 20 BRPM | WEIGHT: 272 LBS | BODY MASS INDEX: 50.05 KG/M2 | HEART RATE: 87 BPM

## 2022-02-26 RX ORDER — PREDNISONE 20 MG/1
20 TABLET ORAL 2 TIMES DAILY
Qty: 10 TABLET | Refills: 0 | Status: SHIPPED | OUTPATIENT
Start: 2022-02-26 | End: 2022-06-06

## 2022-02-26 RX ORDER — AZITHROMYCIN 250 MG/1
TABLET, FILM COATED ORAL
Qty: 6 TABLET | Refills: 0 | Status: SHIPPED | OUTPATIENT
Start: 2022-02-26 | End: 2022-06-06

## 2022-02-28 ENCOUNTER — PRIOR AUTHORIZATION (OUTPATIENT)
Dept: BEHAVIORAL HEALTH | Facility: CLINIC | Age: 37
End: 2022-02-28

## 2022-02-28 ENCOUNTER — TELEMEDICINE (OUTPATIENT)
Dept: BEHAVIORAL HEALTH | Facility: CLINIC | Age: 37
End: 2022-02-28

## 2022-02-28 ENCOUNTER — TELEPHONE (OUTPATIENT)
Dept: FAMILY MEDICINE CLINIC | Facility: CLINIC | Age: 37
End: 2022-02-28

## 2022-02-28 DIAGNOSIS — F51.04 PSYCHOPHYSIOLOGICAL INSOMNIA: ICD-10-CM

## 2022-02-28 DIAGNOSIS — F31.62 BIPOLAR DISORDER, CURRENT EPISODE MIXED, MODERATE: Primary | ICD-10-CM

## 2022-02-28 DIAGNOSIS — F41.1 GAD (GENERALIZED ANXIETY DISORDER): ICD-10-CM

## 2022-02-28 DIAGNOSIS — F43.10 PTSD (POST-TRAUMATIC STRESS DISORDER): ICD-10-CM

## 2022-02-28 PROCEDURE — 99213 OFFICE O/P EST LOW 20 MIN: CPT | Performed by: NURSE PRACTITIONER

## 2022-02-28 RX ORDER — ALPRAZOLAM 0.5 MG/1
0.5 TABLET ORAL 2 TIMES DAILY PRN
Qty: 60 TABLET | Refills: 0 | Status: SHIPPED | OUTPATIENT
Start: 2022-02-28 | End: 2022-03-28 | Stop reason: SDUPTHER

## 2022-02-28 NOTE — TELEPHONE ENCOUNTER
I can refer her to a new pain management doctor however I will not prescribe any hydrocodone.  Does she still want a new referral

## 2022-02-28 NOTE — TELEPHONE ENCOUNTER
Caller: Lindsay Amezquita    Relationship: Self    Best call back number: [phone]    What medication are you requesting: HYDROCODONE     What are your current symptoms: PAIN LOWER BACK / BOT LEGS    How long have you been experiencing symptoms:   3 DAYS    Have you had these symptoms before:    [x] Yes  [] No    Have you been treated for these symptoms before:   [x] Yes  [] No    If a prescription is needed, what is your preferred pharmacy and phone number:        [facility]/pharmacy #[id number] - [place], [place] - [address] [phone] [facility] [phone]       Additional notes: PATIENT WAS PRESCRIBED HYDROCODONE 7.5-325 AND IS NOT EFFECTIVE    PATIENT ALSO REQUESTING A REFERRAL FOR A NEW PAIN MANAGEMENT PROVIDER    [facility]

## 2022-03-01 DIAGNOSIS — M51.16 LUMBAR DISC DISEASE WITH RADICULOPATHY: ICD-10-CM

## 2022-03-01 DIAGNOSIS — M54.50 CHRONIC BILATERAL LOW BACK PAIN, UNSPECIFIED WHETHER SCIATICA PRESENT: Primary | ICD-10-CM

## 2022-03-01 DIAGNOSIS — G89.29 CHRONIC BILATERAL LOW BACK PAIN, UNSPECIFIED WHETHER SCIATICA PRESENT: Primary | ICD-10-CM

## 2022-03-08 RX ORDER — RIMEGEPANT SULFATE 75 MG/75MG
75 TABLET, ORALLY DISINTEGRATING ORAL EVERY OTHER DAY
Qty: 16 TABLET | Refills: 5 | Status: SHIPPED | OUTPATIENT
Start: 2022-03-08 | End: 2022-07-21 | Stop reason: SDUPTHER

## 2022-03-10 DIAGNOSIS — J06.9 URI WITH COUGH AND CONGESTION: ICD-10-CM

## 2022-03-12 ENCOUNTER — APPOINTMENT (OUTPATIENT)
Dept: GENERAL RADIOLOGY | Facility: HOSPITAL | Age: 37
End: 2022-03-12

## 2022-03-12 ENCOUNTER — HOSPITAL ENCOUNTER (EMERGENCY)
Facility: HOSPITAL | Age: 37
Discharge: HOME | End: 2022-03-12
Attending: EMERGENCY MEDICINE | Admitting: EMERGENCY MEDICINE

## 2022-03-12 VITALS
SYSTOLIC BLOOD PRESSURE: 136 MMHG | OXYGEN SATURATION: 95 % | BODY MASS INDEX: 51.05 KG/M2 | RESPIRATION RATE: 16 BRPM | TEMPERATURE: 98.3 F | WEIGHT: 277.4 LBS | HEART RATE: 95 BPM | HEIGHT: 62 IN | DIASTOLIC BLOOD PRESSURE: 84 MMHG

## 2022-03-12 DIAGNOSIS — M54.42 LEFT-SIDED LOW BACK PAIN WITH LEFT-SIDED SCIATICA, UNSPECIFIED CHRONICITY: Primary | ICD-10-CM

## 2022-03-12 PROCEDURE — 72100 X-RAY EXAM L-S SPINE 2/3 VWS: CPT

## 2022-03-12 PROCEDURE — 25010000002 METHYLPREDNISOLONE PER 125 MG: Performed by: NURSE PRACTITIONER

## 2022-03-12 PROCEDURE — 96372 THER/PROPH/DIAG INJ SC/IM: CPT

## 2022-03-12 PROCEDURE — 99282 EMERGENCY DEPT VISIT SF MDM: CPT

## 2022-03-12 RX ORDER — TRAMADOL HYDROCHLORIDE 50 MG/1
50 TABLET ORAL EVERY 8 HOURS PRN
Qty: 12 TABLET | Refills: 0 | Status: SHIPPED | OUTPATIENT
Start: 2022-03-12 | End: 2022-06-06

## 2022-03-12 RX ORDER — METHYLPREDNISOLONE SODIUM SUCCINATE 125 MG/2ML
125 INJECTION, POWDER, LYOPHILIZED, FOR SOLUTION INTRAMUSCULAR; INTRAVENOUS ONCE
Status: COMPLETED | OUTPATIENT
Start: 2022-03-12 | End: 2022-03-12

## 2022-03-12 RX ADMIN — METHYLPREDNISOLONE SODIUM SUCCINATE 125 MG: 125 INJECTION, POWDER, FOR SOLUTION INTRAMUSCULAR; INTRAVENOUS at 15:12

## 2022-03-12 NOTE — ED PROVIDER NOTES
"Subjective   Chief Complaint: Low back pain    History of Present Illness: This is a 36-year-old female patient comes into the emergency room today complaining of low back pain.  Patient states she has chronic back pain that was exacerbated by having to walk through [employer] and not using the handicap buggy yesterday.  Patient states after she had to walk through [employer] she started having pain in her back went home was unable to move or bear weight due to the pain.  Patient states it is centered on her left low back and radiates down her left leg    Nurses Notes reviewed and agree, including vitals, allergies, social history and prior medical history.                Review of Systems   Musculoskeletal: Positive for back pain.   All other systems reviewed and are negative.      Past Medical History:   Diagnosis Date   • Anxiety    • Back pain    • Bell's palsy    • Bipolar 2 disorder (HCC)    • Blood clot in vein    • Depression    • Diabetes mellitus (HCC) November    Pre diabete   • Frequent headaches    • GERD (gastroesophageal reflux disease)    • Migraine    • Panic    • PTSD (post-traumatic stress disorder)    • Restless leg syndrome    • Sinus problem    • Snores    • Tattoos    • Vitamin B12 deficiency        Allergies   Allergen Reactions   • Codeine Anaphylaxis   • Flexeril [Cyclobenzaprine] Other (See Comments)     \"gives me chest pain\"   • Asa [Aspirin] Hives     Wheezing     • Latex Hives   • Morphine Itching   • Penicillins Hives     Vomiting     • Triptans Other (See Comments)     Chest tightness, left arm numbness   • Compazine [Prochlorperazine] Anxiety   • Reglan [Metoclopramide] Anxiety       Past Surgical History:   Procedure Laterality Date   • CHOLECYSTECTOMY     • TOOTH EXTRACTION         Family History   Problem Relation Age of Onset   • Irritable bowel syndrome Mother    • Irritable bowel syndrome Father    • Colon cancer Maternal Grandfather        Social History     Socioeconomic History "   • Marital status: Single   Tobacco Use   • Smoking status: Current Every Day Smoker     Packs/day: 0.50     Years: 29.00     Pack years: 14.50     Types: Cigarettes     Start date: 1995     Last attempt to quit: 2001     Years since quittin.2   • Smokeless tobacco: Never Used   Vaping Use   • Vaping Use: Former   Substance and Sexual Activity   • Alcohol use: Never   • Drug use: Never   • Sexual activity: Not Currently     Partners: Female           Objective   Physical Exam  Vitals and nursing note reviewed.   Constitutional:       Appearance: Normal appearance.   HENT:      Head: Normocephalic and atraumatic.   Eyes:      Extraocular Movements: Extraocular movements intact.      Pupils: Pupils are equal, round, and reactive to light.   Cardiovascular:      Rate and Rhythm: Normal rate and regular rhythm.      Pulses: Normal pulses.      Heart sounds: Normal heart sounds.   Pulmonary:      Effort: Pulmonary effort is normal.      Breath sounds: Normal breath sounds.   Abdominal:      General: Abdomen is flat. Bowel sounds are normal.      Palpations: Abdomen is soft.   Musculoskeletal:      Cervical back: Normal range of motion and neck supple.      Comments: Mild to moderate tenderness to palpation left lumbar and sacral area   Skin:     General: Skin is warm and dry.      Capillary Refill: Capillary refill takes less than 2 seconds.   Neurological:      General: No focal deficit present.      Mental Status: She is alert and oriented to person, place, and time. Mental status is at baseline.      GCS: GCS eye subscore is 4. GCS verbal subscore is 5. GCS motor subscore is 6.      Sensory: Sensation is intact.      Motor: Motor function is intact.      Gait: Gait is intact.   Psychiatric:         Attention and Perception: Attention and perception normal.         Mood and Affect: Mood and affect normal.         Speech: Speech normal.         Behavior: Behavior normal. Behavior is cooperative.          Procedures           ED Course                                                 MDM    Final diagnoses:   Left-sided low back pain with left-sided sciatica, unspecified chronicity       ED Disposition  ED Disposition     ED Disposition   Discharge    Condition   Stable    Comment   --             Stephanie Burdick, APRN  [address] [place] [address] [phone]    In 1 week  Follow-up         Medication List      New Prescriptions    traMADol 50 MG tablet  Commonly known as: ULTRAM  Take 1 tablet by mouth Every 8 (Eight) Hours As Needed for Moderate Pain  for up to 12 doses.           Where to Get Your Medications      These medications were sent to [facility]/pharmacy #[id number] - [place], [place] - [address] - [phone]  - [phone]   [address] [address]    Phone: [phone]   · traMADol 50 MG tablet          Jovan Freire, APRN  03/12/22 1531       Jovan Freire, APRN  03/12/22 1542

## 2022-03-14 ENCOUNTER — HOSPITAL ENCOUNTER (EMERGENCY)
Facility: HOSPITAL | Age: 37
Discharge: HOME | End: 2022-03-14
Attending: EMERGENCY MEDICINE | Admitting: EMERGENCY MEDICINE

## 2022-03-14 ENCOUNTER — APPOINTMENT (OUTPATIENT)
Dept: CT IMAGING | Facility: HOSPITAL | Age: 37
End: 2022-03-14

## 2022-03-14 VITALS
HEART RATE: 122 BPM | DIASTOLIC BLOOD PRESSURE: 97 MMHG | OXYGEN SATURATION: 94 % | WEIGHT: 273 LBS | BODY MASS INDEX: 50.24 KG/M2 | RESPIRATION RATE: 20 BRPM | SYSTOLIC BLOOD PRESSURE: 150 MMHG | TEMPERATURE: 98.2 F | HEIGHT: 62 IN

## 2022-03-14 DIAGNOSIS — S30.0XXA LUMBAR CONTUSION, INITIAL ENCOUNTER: Primary | ICD-10-CM

## 2022-03-14 PROCEDURE — 99283 EMERGENCY DEPT VISIT LOW MDM: CPT

## 2022-03-14 PROCEDURE — 72131 CT LUMBAR SPINE W/O DYE: CPT

## 2022-03-14 PROCEDURE — 72128 CT CHEST SPINE W/O DYE: CPT

## 2022-03-14 RX ORDER — OXYCODONE HYDROCHLORIDE AND ACETAMINOPHEN 5; 325 MG/1; MG/1
1 TABLET ORAL ONCE
Status: COMPLETED | OUTPATIENT
Start: 2022-03-14 | End: 2022-03-14

## 2022-03-14 RX ADMIN — OXYCODONE HYDROCHLORIDE AND ACETAMINOPHEN 1 TABLET: 5; 325 TABLET ORAL at 20:10

## 2022-03-14 NOTE — ED PROVIDER NOTES
"Subjective   36-year-old female presents with back pain, with she states that she hurt her back while moving furniture at home a few days ago, she was seen at an outside hospital where she was treated and released, and then she was seen at [facility] emergency department where she received an x-ray, was treated with IM shots for pain, and sent home on Ultram.  She states every time she takes the Ultram it makes her feel numb and tingly and makes her heart race, and its not helping the pain.  She continues to have pain in her low to mid back especially with movement.      History provided by:  Patient   used: No        Review of Systems   Musculoskeletal: Positive for back pain.   All other systems reviewed and are negative.      Past Medical History:   Diagnosis Date   • Anxiety    • Back pain    • Bell's palsy    • Bipolar 2 disorder (HCC)    • Blood clot in vein    • Depression    • Diabetes mellitus (HCC) November    Pre diabete   • Frequent headaches    • GERD (gastroesophageal reflux disease)    • Migraine    • Panic    • PTSD (post-traumatic stress disorder)    • Restless leg syndrome    • Sinus problem    • Snores    • Tattoos    • Vitamin B12 deficiency        Allergies   Allergen Reactions   • Codeine Anaphylaxis   • Flexeril [Cyclobenzaprine] Other (See Comments)     \"gives me chest pain\"   • Asa [Aspirin] Hives     Wheezing     • Latex Hives   • Morphine Itching   • Penicillins Hives     Vomiting     • Triptans Other (See Comments)     Chest tightness, left arm numbness   • Compazine [Prochlorperazine] Anxiety   • Reglan [Metoclopramide] Anxiety       Past Surgical History:   Procedure Laterality Date   • CHOLECYSTECTOMY     • TOOTH EXTRACTION         Family History   Problem Relation Age of Onset   • Irritable bowel syndrome Mother    • Irritable bowel syndrome Father    • Colon cancer Maternal Grandfather        Social History     Socioeconomic History   • Marital status: " Single   Tobacco Use   • Smoking status: Current Every Day Smoker     Packs/day: 0.50     Years: 29.00     Pack years: 14.50     Types: Cigarettes     Start date: 1995     Last attempt to quit: 2001     Years since quittin.2   • Smokeless tobacco: Never Used   Vaping Use   • Vaping Use: Former   Substance and Sexual Activity   • Alcohol use: Never   • Drug use: Never   • Sexual activity: Not Currently     Partners: Female           Objective   Physical Exam  Vitals and nursing note reviewed.   Constitutional:       Appearance: She is well-developed.   HENT:      Mouth/Throat:      Mouth: Mucous membranes are moist.   Eyes:      Extraocular Movements: Extraocular movements intact.   Pulmonary:      Effort: Pulmonary effort is normal.   Musculoskeletal:      Cervical back: Normal range of motion and neck supple.        Back:       Comments: Tenderness to palpation mid to low back area   Skin:     General: Skin is warm and dry.   Neurological:      Mental Status: She is alert and oriented to person, place, and time.      Deep Tendon Reflexes: Reflexes are normal and symmetric.         Procedures           ED Course                                                 MDM  Number of Diagnoses or Management Options  Lumbar contusion, initial encounter: new and requires workup     Amount and/or Complexity of Data Reviewed  Tests in the radiology section of CPT®: reviewed    Risk of Complications, Morbidity, and/or Mortality  Presenting problems: minimal  Diagnostic procedures: minimal  Management options: minimal    Patient Progress  Patient progress: stable      Final diagnoses:   Lumbar contusion, initial encounter       ED Disposition  ED Disposition     ED Disposition   Discharge    Condition   Stable    Comment   --             [facility] Emergency Department  [address] [address] [phone]    If symptoms worsen         Medication List      No changes were made to  your prescriptions during this visit.          Markel Baxter Jr., PAMounikaC  03/14/22 2730

## 2022-03-16 DIAGNOSIS — I82.432 ACUTE DEEP VEIN THROMBOSIS (DVT) OF POPLITEAL VEIN OF LEFT LOWER EXTREMITY: ICD-10-CM

## 2022-03-16 RX ORDER — APIXABAN 5 MG/1
TABLET, FILM COATED ORAL
Qty: 60 TABLET | Refills: 1 | Status: SHIPPED | OUTPATIENT
Start: 2022-03-16 | End: 2022-06-20

## 2022-03-21 ENCOUNTER — TELEPHONE (OUTPATIENT)
Dept: NEUROLOGY | Facility: CLINIC | Age: 37
End: 2022-03-21

## 2022-03-21 RX ORDER — TIZANIDINE 4 MG/1
4 TABLET ORAL NIGHTLY PRN
Qty: 30 TABLET | Refills: 1 | OUTPATIENT
Start: 2022-03-21

## 2022-03-21 NOTE — TELEPHONE ENCOUNTER
ATTEMPTED TO CONTACT LEORA TO LET HER KNOW I RECEIVED A FAX FROM [facility]... THEY ARE TRYING TO GET IN CONTACT WITH HER ABOUT HER NURTEC.. SHE CAN GIVE THEM A CALL BACK [phone]

## 2022-03-23 ENCOUNTER — TELEPHONE (OUTPATIENT)
Dept: NEUROLOGY | Facility: CLINIC | Age: 37
End: 2022-03-23

## 2022-03-23 ENCOUNTER — TELEPHONE (OUTPATIENT)
Dept: FAMILY MEDICINE CLINIC | Facility: CLINIC | Age: 37
End: 2022-03-23

## 2022-03-23 DIAGNOSIS — M54.41 CHRONIC BILATERAL LOW BACK PAIN WITH BILATERAL SCIATICA: ICD-10-CM

## 2022-03-23 DIAGNOSIS — G89.29 CHRONIC BILATERAL LOW BACK PAIN WITH BILATERAL SCIATICA: ICD-10-CM

## 2022-03-23 DIAGNOSIS — M54.42 CHRONIC BILATERAL LOW BACK PAIN WITH BILATERAL SCIATICA: ICD-10-CM

## 2022-03-23 RX ORDER — GABAPENTIN 800 MG/1
800 TABLET ORAL 3 TIMES DAILY
Qty: 90 TABLET | Refills: 2 | Status: CANCELLED | OUTPATIENT
Start: 2022-03-23

## 2022-03-23 NOTE — TELEPHONE ENCOUNTER
Caller: Lindsay Amezquita    Relationship: Self    Best call back number:[phone]  What medications are you currently taking:   Current Outpatient Medications on File Prior to Visit   Medication Sig Dispense Refill   • albuterol sulfate  (90 Base) MCG/ACT inhaler Inhale 2 puffs Every 4 (Four) Hours As Needed for Wheezing. 18 g 0   • ALPRAZolam (Xanax) 0.5 MG tablet Take 1 tablet by mouth 2 (Two) Times a Day As Needed for Anxiety. 60 tablet 0   • amitriptyline (ELAVIL) 150 MG tablet Take 1 tablet by mouth every night at bedtime. 90 tablet 1   • azithromycin (ZITHROMAX) 250 MG tablet Take 2 tablets on first day then 1 tablet daily for 4 days 6 tablet 0   • cyanocobalamin 500 MCG tablet Take 1 tablet by mouth Daily. 30 tablet 5   • desvenlafaxine (Pristiq) 50 MG 24 hr tablet Take 1 tablet by mouth Daily. 30 tablet 3   • Eliquis 5 MG tablet tablet TAKE 1 TABLET BY MOUTH EVERY 12 HOURS 60 tablet 1   • fluconazole (Diflucan) 150 MG tablet Take 1 tablet by mouth now, may repeat in 3 days if needed 2 tablet 0   • fluticasone (Flonase) 50 MCG/ACT nasal spray 2 sprays into the nostril(s) as directed by provider Daily. 18.2 mL 5   • fluticasone (Flovent HFA) 110 MCG/ACT inhaler Inhale 2 puffs 2 (Two) Times a Day. 12 g 5   • furosemide (LASIX) 20 MG tablet Take 1 tablet by mouth Daily. 30 tablet 5   • gabapentin (NEURONTIN) 800 MG tablet Take 1 tablet by mouth 3 (Three) Times a Day. 90 tablet 2   • glucose blood test strip Check blood sugar once daily 25 each 12   • glucose monitor monitoring kit Check blood sugar once daily 1 each 0   • Hydrocortisone, Perianal, (Proctozone-HC) 2.5 % rectal cream Insert  into the rectum 2 (Two) Times a Day. 28 g 0   • hydrOXYzine (ATARAX) 25 MG tablet Take 1 tablet by mouth 3 (Three) Times a Day As Needed for Anxiety. 30 tablet 5   • Lancets (freestyle) lancets 1 each by Other route 4 (Four) Times a Day. 400 each 3   • Lancets Misc. (ONE TOUCH SURESOFT) misc Check blood sugar once daily  1 each 0   • levocetirizine (XYZAL) 5 MG tablet Take 1 tablet by mouth Every Evening. 30 tablet 5   • Lurasidone HCl (Latuda) 120 MG tablet tablet Take 1 tablet by mouth Daily. Take 120 mg orally daily with a meal. 30 tablet 3   • omeprazole (priLOSEC) 40 MG capsule      • ondansetron ODT (ZOFRAN-ODT) 4 MG disintegrating tablet Place 1 tablet on the tongue Every 8 (Eight) Hours As Needed for Nausea or Vomiting. 12 tablet 0   • pantoprazole (PROTONIX) 40 MG EC tablet Take 1 tablet by mouth Daily. 30 tablet 5   • predniSONE (DELTASONE) 20 MG tablet Take 1 tablet by mouth 2 (Two) Times a Day. 10 tablet 0   • promethazine (PHENERGAN) 25 MG tablet Take 1 tablet by mouth Every 6 (Six) Hours As Needed for Nausea or Vomiting. 30 tablet 0   • promethazine-dextromethorphan (PROMETHAZINE-DM) 6.25-15 MG/5ML syrup Take 5 mL by mouth 4 (Four) Times a Day As Needed for Cough. 118 mL 1   • Rimegepant Sulfate (Nurtec) 75 MG tablet dispersible tablet Take 1 tablet by mouth As Needed (as needed for migraine). 4 tablet 0   • Rimegepant Sulfate (Nurtec) 75 MG tablet dispersible tablet Take 1 tablet by mouth As Needed (AS NEEDED FOR MIGRAINES). 4 tablet 0   • Rimegepant Sulfate (Nurtec) 75 MG tablet dispersible tablet Take 1 tablet by mouth Every Other Day. Take Monday,Wednesday,Friday, and Saturday. 16 tablet 5   • tiZANidine (ZANAFLEX) 4 MG tablet Take 1 tablet by mouth At Night As Needed for Muscle Spasms. 12 tablet 0   • traMADol (ULTRAM) 50 MG tablet Take 1 tablet by mouth Every 8 (Eight) Hours As Needed for Moderate Pain  for up to 12 doses. 12 tablet 0   • vitamin D (ERGOCALCIFEROL) 1.25 MG (14134 UT) capsule capsule Take 1 capsule by mouth 1 (One) Time Per Week. 4 capsule 2     No current facility-administered medications on file prior to visit.          When did you start taking these medications: N/A    Which medication are you concerned about: NURTEC    Who prescribed you this medication: TERESA MCCOLLUM    What are your  concerns: NEEDS PA    How long have you had these concerns: N/A

## 2022-03-23 NOTE — TELEPHONE ENCOUNTER
Caller: Lindsay Amezquita    Relationship: Self    Best call back number: [phone]    Requested Prescriptions:   Requested Prescriptions     Pending Prescriptions Disp Refills   • tiZANidine (ZANAFLEX) 4 MG tablet 12 tablet 0     Sig: Take 1 tablet by mouth At Night As Needed for Muscle Spasms.   • gabapentin (NEURONTIN) 800 MG tablet 90 tablet 2     Sig: Take 1 tablet by mouth 3 (Three) Times a Day.        Pharmacy where request should be sent: [facility]/PHARMACY #[id number] - [place], [place] - [address] [place] [address] [phone] [facility] [phone] FX     Additional details provided by patient:  OUT OF TIZANIDINE, GABAPENTIN DUE 4/6 SHE THINKS.      Does the patient have less than a 3 day supply:  [x] Yes  [] No    Christine Dior   03/23/22 15:45 EDT

## 2022-03-23 NOTE — TELEPHONE ENCOUNTER
I am not treating her pain and won't. If a pain specialist who went to specialized training and has medical degree for pain does not think she is appropriate for pain meds, its not appropriate for me to write them. Needs to stick with specialist recommendations.

## 2022-03-23 NOTE — TELEPHONE ENCOUNTER
PATIENT STATES PAIN CLINIC WILL NOT GIVE HER MEDICATION, ONLY SHOTS AND MEDICAID WILL NOT PAY FOR THOSE.  WHAT TO DO ABOUT PAIN?    PLEASE CALL [phone]

## 2022-03-24 NOTE — TELEPHONE ENCOUNTER
I CALLED AND SPOKE WITH PATIENT.  SHE HAS BEEN ADVISED REGARDING NADER RECOMMENDATION AND VERBALIZED UNDERSTANDING.    PER THE PATIENT: SHE HAS ATTEMPTED TO CONTACT THE PAIN CLINIC TO SCHEDULE AN APPOINTMENT AND WAS ADVISED THAT SHE HAS BEEN DISCHARGED FROM THE PAIN CLINIC.    PATIENT STATES THAT SHE HAS BEEN OUT OF TOWN SINCE Sunday DUE TO HER FIANCE'S FATHER PASSING AWAY.    PLEASE ADVISE REGARDING PAIN MANAGEMENT REFERRAL.

## 2022-03-24 NOTE — TELEPHONE ENCOUNTER
Rx Refill Note  Requested Prescriptions     Pending Prescriptions Disp Refills   • tiZANidine (ZANAFLEX) 4 MG tablet 12 tablet 0     Sig: Take 1 tablet by mouth At Night As Needed for Muscle Spasms.   • gabapentin (NEURONTIN) 800 MG tablet 90 tablet 2     Sig: Take 1 tablet by mouth 3 (Three) Times a Day.      Last office visit with prescribing clinician: 2/21/2022      Next office visit with prescribing clinician: 3/23/2022    OK TO FILL?    UDS AND CSA ARE UP TO DATE    Rossy Narvaez MA  03/24/22, 15:52 EDT

## 2022-03-24 NOTE — TELEPHONE ENCOUNTER
ATTEMPTED TO CONTACT LEORA TO LET HER KNOW I RECEIVED A FAX FROM [facility]... THEY ARE TRYING TO GET IN CONTACT WITH HER ABOUT HER NURTEC.. SHE CAN GIVE THEM A CALL BACK -[phone]  OK FOR HUB TO RELAY MESSAGE

## 2022-03-25 DIAGNOSIS — G89.29 CHRONIC BILATERAL LOW BACK PAIN WITH BILATERAL SCIATICA: Primary | ICD-10-CM

## 2022-03-25 DIAGNOSIS — M54.42 CHRONIC BILATERAL LOW BACK PAIN WITH BILATERAL SCIATICA: Primary | ICD-10-CM

## 2022-03-25 DIAGNOSIS — M54.41 CHRONIC BILATERAL LOW BACK PAIN WITH BILATERAL SCIATICA: Primary | ICD-10-CM

## 2022-03-25 RX ORDER — TIZANIDINE 4 MG/1
4 TABLET ORAL NIGHTLY PRN
Qty: 12 TABLET | Refills: 0 | Status: SHIPPED | OUTPATIENT
Start: 2022-03-25 | End: 2022-04-18

## 2022-03-25 NOTE — TELEPHONE ENCOUNTER
Patient has been notified.    She states that she would prefer to be seen in [place] or [place], but does not have a preference on provider.

## 2022-03-26 ENCOUNTER — HOSPITAL ENCOUNTER (EMERGENCY)
Facility: HOSPITAL | Age: 37
Discharge: LEFT BEFORE DISCHARGE | End: 2022-03-26
Attending: EMERGENCY MEDICINE

## 2022-03-26 VITALS
DIASTOLIC BLOOD PRESSURE: 76 MMHG | RESPIRATION RATE: 18 BRPM | HEART RATE: 121 BPM | WEIGHT: 275 LBS | TEMPERATURE: 98.2 F | SYSTOLIC BLOOD PRESSURE: 158 MMHG | OXYGEN SATURATION: 91 % | HEIGHT: 62 IN | BODY MASS INDEX: 50.61 KG/M2

## 2022-03-26 DIAGNOSIS — M54.42 CHRONIC BILATERAL LOW BACK PAIN WITH BILATERAL SCIATICA: ICD-10-CM

## 2022-03-26 DIAGNOSIS — G89.29 CHRONIC BILATERAL LOW BACK PAIN WITH BILATERAL SCIATICA: ICD-10-CM

## 2022-03-26 DIAGNOSIS — M54.41 CHRONIC BILATERAL LOW BACK PAIN WITH BILATERAL SCIATICA: ICD-10-CM

## 2022-03-26 PROCEDURE — 99211 OFF/OP EST MAY X REQ PHY/QHP: CPT | Performed by: EMERGENCY MEDICINE

## 2022-03-28 ENCOUNTER — TELEMEDICINE (OUTPATIENT)
Dept: BEHAVIORAL HEALTH | Facility: CLINIC | Age: 37
End: 2022-03-28

## 2022-03-28 DIAGNOSIS — F43.10 PTSD (POST-TRAUMATIC STRESS DISORDER): ICD-10-CM

## 2022-03-28 DIAGNOSIS — F51.04 PSYCHOPHYSIOLOGICAL INSOMNIA: ICD-10-CM

## 2022-03-28 DIAGNOSIS — F31.62 BIPOLAR DISORDER, CURRENT EPISODE MIXED, MODERATE: Primary | ICD-10-CM

## 2022-03-28 DIAGNOSIS — F41.1 GAD (GENERALIZED ANXIETY DISORDER): ICD-10-CM

## 2022-03-28 PROCEDURE — 99214 OFFICE O/P EST MOD 30 MIN: CPT | Performed by: NURSE PRACTITIONER

## 2022-03-28 RX ORDER — AMITRIPTYLINE HYDROCHLORIDE 150 MG/1
150 TABLET, FILM COATED ORAL
Qty: 90 TABLET | Refills: 1 | Status: SHIPPED | OUTPATIENT
Start: 2022-03-28 | End: 2022-08-17 | Stop reason: SDUPTHER

## 2022-03-28 RX ORDER — HYDROXYZINE HYDROCHLORIDE 25 MG/1
25 TABLET, FILM COATED ORAL 3 TIMES DAILY PRN
Qty: 30 TABLET | Refills: 5 | Status: SHIPPED | OUTPATIENT
Start: 2022-03-28 | End: 2022-08-17 | Stop reason: SDUPTHER

## 2022-03-28 RX ORDER — DESVENLAFAXINE SUCCINATE 50 MG/1
50 TABLET, EXTENDED RELEASE ORAL DAILY
Qty: 30 TABLET | Refills: 6 | Status: SHIPPED | OUTPATIENT
Start: 2022-03-28 | End: 2022-07-06 | Stop reason: DRUGHIGH

## 2022-03-28 RX ORDER — GABAPENTIN 800 MG/1
TABLET ORAL
Qty: 90 TABLET | Refills: 2 | OUTPATIENT
Start: 2022-03-28

## 2022-03-28 RX ORDER — ALPRAZOLAM 0.5 MG/1
0.5 TABLET ORAL 2 TIMES DAILY PRN
Qty: 60 TABLET | Refills: 2 | Status: SHIPPED | OUTPATIENT
Start: 2022-03-28 | End: 2022-06-16 | Stop reason: SDUPTHER

## 2022-03-28 RX ORDER — LURASIDONE HYDROCHLORIDE 120 MG/1
120 TABLET, FILM COATED ORAL DAILY
Qty: 30 TABLET | Refills: 6 | Status: SHIPPED | OUTPATIENT
Start: 2022-03-28 | End: 2022-08-17 | Stop reason: SDUPTHER

## 2022-04-04 ENCOUNTER — HOSPITAL ENCOUNTER (EMERGENCY)
Facility: HOSPITAL | Age: 37
Discharge: LEFT BEFORE DISCHARGE | End: 2022-04-04

## 2022-04-04 VITALS
TEMPERATURE: 97.8 F | HEART RATE: 102 BPM | WEIGHT: 275 LBS | BODY MASS INDEX: 48.73 KG/M2 | HEIGHT: 63 IN | SYSTOLIC BLOOD PRESSURE: 124 MMHG | DIASTOLIC BLOOD PRESSURE: 87 MMHG | RESPIRATION RATE: 16 BRPM | OXYGEN SATURATION: 95 %

## 2022-04-04 PROCEDURE — 99211 OFF/OP EST MAY X REQ PHY/QHP: CPT

## 2022-04-05 DIAGNOSIS — G43.001 MIGRAINE WITHOUT AURA AND WITH STATUS MIGRAINOSUS, NOT INTRACTABLE: ICD-10-CM

## 2022-04-05 RX ORDER — RIMEGEPANT SULFATE 75 MG/75MG
TABLET, ORALLY DISINTEGRATING ORAL
Qty: 16 TABLET | Refills: 5 | OUTPATIENT
Start: 2022-04-05

## 2022-04-06 DIAGNOSIS — J06.9 VIRAL URI WITH COUGH: ICD-10-CM

## 2022-04-07 DIAGNOSIS — G89.29 CHRONIC BILATERAL LOW BACK PAIN WITH BILATERAL SCIATICA: ICD-10-CM

## 2022-04-07 DIAGNOSIS — J30.9 ALLERGIC RHINITIS, UNSPECIFIED SEASONALITY, UNSPECIFIED TRIGGER: ICD-10-CM

## 2022-04-07 DIAGNOSIS — M54.42 CHRONIC BILATERAL LOW BACK PAIN WITH BILATERAL SCIATICA: ICD-10-CM

## 2022-04-07 DIAGNOSIS — M54.41 CHRONIC BILATERAL LOW BACK PAIN WITH BILATERAL SCIATICA: ICD-10-CM

## 2022-04-07 RX ORDER — FLUTICASONE PROPIONATE 50 MCG
SPRAY, SUSPENSION (ML) NASAL
Qty: 48 ML | Refills: 1 | Status: SHIPPED | OUTPATIENT
Start: 2022-04-07 | End: 2022-11-17

## 2022-04-07 NOTE — TELEPHONE ENCOUNTER
Rx Refill Note  Requested Prescriptions     Pending Prescriptions Disp Refills   • gabapentin (NEURONTIN) 800 MG tablet [Pharmacy Med Name: GABAPENTIN 800 MG TABLET] 90 tablet 2     Sig: TAKE 1 TABLET BY MOUTH THREE TIMES A DAY      Last office visit with prescribing clinician: 2/21/2022      Next office visit with prescribing clinician: Visit date not found            Brandy Coker MA  04/07/22, 09:41 EDT

## 2022-04-08 RX ORDER — OMEPRAZOLE 40 MG/1
CAPSULE, DELAYED RELEASE ORAL
Status: CANCELLED | OUTPATIENT
Start: 2022-04-08

## 2022-04-08 RX ORDER — GABAPENTIN 800 MG/1
TABLET ORAL
Qty: 90 TABLET | Refills: 2 | Status: SHIPPED | OUTPATIENT
Start: 2022-04-08 | End: 2022-06-27 | Stop reason: SDUPTHER

## 2022-04-08 RX ORDER — LEVOCETIRIZINE DIHYDROCHLORIDE 5 MG/1
5 TABLET, FILM COATED ORAL EVERY EVENING
Qty: 30 TABLET | Refills: 5 | Status: SHIPPED | OUTPATIENT
Start: 2022-04-08 | End: 2022-08-12 | Stop reason: SDUPTHER

## 2022-04-08 NOTE — TELEPHONE ENCOUNTER
Caller: Lindsay Amezquita    Relationship: Self    Best call back number: [phone]    Requested Prescriptions:   Requested Prescriptions     Pending Prescriptions Disp Refills   • gabapentin (NEURONTIN) 800 MG tablet [Pharmacy Med Name: GABAPENTIN 800 MG TABLET] 90 tablet 2     Sig: TAKE 1 TABLET BY MOUTH THREE TIMES A DAY   • omeprazole (priLOSEC) 40 MG capsule     • levocetirizine (XYZAL) 5 MG tablet 30 tablet 5     Sig: Take 1 tablet by mouth Every Evening.        Pharmacy where request should be sent: [facility]/PHARMACY #[id number] - [place], [place] - [address] [phone] [facility] [phone]      Additional details provided by patient: PATIENT IS OUT OF THE MEDICATION     Does the patient have less than a 3 day supply:  [x] Yes  [] No    Kim Campbell Rep   04/08/22 13:25 EDT

## 2022-04-15 ENCOUNTER — TELEPHONE (OUTPATIENT)
Dept: FAMILY MEDICINE CLINIC | Facility: CLINIC | Age: 37
End: 2022-04-15

## 2022-04-15 NOTE — TELEPHONE ENCOUNTER
Caller: Lindsay Amezquita    Relationship: Self    Best call back number: [phone]    What medication are you requesting: PCP DISCRETION    What are your current symptoms: BLISTERS ON INSIDE OF MOUTH AFTER TAKING AUGMENTIN     How long have you been experiencing symptoms: 3 DAYS    Have you had these symptoms before:    [] Yes  [x] No    Have you been treated for these symptoms before:   [] Yes  [x] No    If a prescription is needed, what is your preferred pharmacy and phone number:      [facility]/pharmacy #[id number] - [place], [place] - [address] [facility] [phone] [facility] [phone] FX        Additional notes:

## 2022-04-18 RX ORDER — TIZANIDINE 4 MG/1
4 TABLET ORAL NIGHTLY PRN
Qty: 30 TABLET | Refills: 1 | Status: SHIPPED | OUTPATIENT
Start: 2022-04-18 | End: 2022-06-20

## 2022-04-25 RX ORDER — TIZANIDINE 4 MG/1
4 TABLET ORAL NIGHTLY PRN
Qty: 30 TABLET | Refills: 1 | OUTPATIENT
Start: 2022-04-25

## 2022-04-25 NOTE — TELEPHONE ENCOUNTER
Caller: Lindsay Amezquita    Relationship: Self    Best call back number: [phone]    Requested Prescriptions:   Requested Prescriptions     Pending Prescriptions Disp Refills   • tiZANidine (ZANAFLEX) 4 MG tablet 30 tablet 1     Sig: Take 1 tablet by mouth At Night As Needed for Muscle Spasms.        Pharmacy where request should be sent: [facility]/PHARMACY #[id number] - [place], [place] - [address] [phone] [facility] [phone] FX     Additional details provided by patient: PATIENT STATES THAT SHE HAS BEEN OUT OF THIS FOR TWO DAYS.    Does the patient have less than a 3 day supply:  [x] Yes  [] No    Kim Garza Rep   04/25/22 16:25 EDT

## 2022-05-04 ENCOUNTER — CLINICAL SUPPORT (OUTPATIENT)
Dept: FAMILY MEDICINE CLINIC | Facility: CLINIC | Age: 37
End: 2022-05-04

## 2022-05-04 ENCOUNTER — TELEPHONE (OUTPATIENT)
Dept: FAMILY MEDICINE CLINIC | Facility: CLINIC | Age: 37
End: 2022-05-04

## 2022-05-04 ENCOUNTER — HOSPITAL ENCOUNTER (EMERGENCY)
Facility: HOSPITAL | Age: 37
Discharge: LEFT BEFORE DISCHARGE | End: 2022-05-04
Attending: EMERGENCY MEDICINE

## 2022-05-04 VITALS
HEART RATE: 121 BPM | HEIGHT: 62 IN | TEMPERATURE: 98.3 F | DIASTOLIC BLOOD PRESSURE: 67 MMHG | RESPIRATION RATE: 18 BRPM | BODY MASS INDEX: 50.61 KG/M2 | OXYGEN SATURATION: 90 % | SYSTOLIC BLOOD PRESSURE: 162 MMHG | WEIGHT: 275 LBS

## 2022-05-04 DIAGNOSIS — M54.50 LOW BACK PAIN, UNSPECIFIED BACK PAIN LATERALITY, UNSPECIFIED CHRONICITY, UNSPECIFIED WHETHER SCIATICA PRESENT: Primary | ICD-10-CM

## 2022-05-04 LAB
BILIRUB BLD-MCNC: NEGATIVE MG/DL
CLARITY, POC: CLEAR
COLOR UR: ABNORMAL
EXPIRATION DATE: ABNORMAL
GLUCOSE UR STRIP-MCNC: NEGATIVE MG/DL
KETONES UR QL: NEGATIVE
LEUKOCYTE EST, POC: ABNORMAL
Lab: ABNORMAL
NITRITE UR-MCNC: NEGATIVE MG/ML
PH UR: 6 [PH] (ref 5–8)
PROT UR STRIP-MCNC: NEGATIVE MG/DL
RBC # UR STRIP: ABNORMAL /UL
SP GR UR: 1.01 (ref 1–1.03)
UROBILINOGEN UR QL: NORMAL

## 2022-05-04 PROCEDURE — 81003 URINALYSIS AUTO W/O SCOPE: CPT | Performed by: NURSE PRACTITIONER

## 2022-05-04 PROCEDURE — 99211 OFF/OP EST MAY X REQ PHY/QHP: CPT | Performed by: EMERGENCY MEDICINE

## 2022-05-04 RX ORDER — CEFDINIR 300 MG/1
300 CAPSULE ORAL 2 TIMES DAILY
Qty: 14 CAPSULE | Refills: 0 | Status: SHIPPED | OUTPATIENT
Start: 2022-05-04 | End: 2022-05-09

## 2022-05-04 NOTE — TELEPHONE ENCOUNTER
Caller: Lindsay Amezquita    Relationship: Self    Best call back number: [phone]    What is the best time to reach you: ANYTIME     Who are you requesting to speak with (clinical staff, provider,  specific staff member): CLINICAL STAFF     What was the call regarding: ENT STATES THAT SHE WOKE UP WITH LOWER BACK PAIN AROUND WHERE HER KIDNEYS ARE. SHE SAYS THAT SHE IS NAUSEAS, POSSIBLY RUNNING A FEVER,  AND IN CONSTANT PAIN. SHE BELIEVES THAT SHE MAY HAVE KIDNEY STONES AND WOULD LIKE TO KNOW WHAT SHE SHOULD DO.      Do you require a callback: YES

## 2022-05-04 NOTE — TELEPHONE ENCOUNTER
PATIENT DOES NOT WANT TO WANT IN THE HOSPITAL EMERGENCY ROOM AND IS LEAVING, SHE WANTS TO KNOW IF THE OFFICE CAN PHONE HER IN SOMETHING TO   [facility]   TELEPHONE  [phone]    PLEASE CALL AND ADVISE

## 2022-05-04 NOTE — ED PROVIDER NOTES
"Subjective   Patient not seen          Review of Systems    Past Medical History:   Diagnosis Date   • Anxiety    • Back pain    • Bell's palsy    • Bipolar 2 disorder (HCC)    • Blood clot in vein    • Depression    • Diabetes mellitus (HCC) November    Pre diabete   • Frequent headaches    • GERD (gastroesophageal reflux disease)    • Migraine    • Panic    • PTSD (post-traumatic stress disorder)    • Restless leg syndrome    • Sinus problem    • Snores    • Tattoos    • Vitamin B12 deficiency        Allergies   Allergen Reactions   • Codeine Anaphylaxis   • Flexeril [Cyclobenzaprine] Other (See Comments)     \"gives me chest pain\"   • Asa [Aspirin] Hives     Wheezing     • Latex Hives   • Morphine Itching   • Penicillins Hives     Vomiting     • Triptans Other (See Comments)     Chest tightness, left arm numbness   • Compazine [Prochlorperazine] Anxiety   • Reglan [Metoclopramide] Anxiety       Past Surgical History:   Procedure Laterality Date   • CHOLECYSTECTOMY     • TOOTH EXTRACTION         Family History   Problem Relation Age of Onset   • Irritable bowel syndrome Mother    • Irritable bowel syndrome Father    • Colon cancer Maternal Grandfather        Social History     Socioeconomic History   • Marital status: Single   Tobacco Use   • Smoking status: Current Every Day Smoker     Packs/day: 0.50     Years: 29.00     Pack years: 14.50     Types: Cigarettes     Start date: 1/1/1995   • Smokeless tobacco: Never Used   Vaping Use   • Vaping Use: Former   Substance and Sexual Activity   • Alcohol use: Never   • Drug use: Never   • Sexual activity: Not Currently     Partners: Female           Objective   Physical Exam    Procedures           ED Course                                                 MDM    Final diagnoses:   None       ED Disposition  ED Disposition     ED Disposition   LWBS after Triage    Condition   --    Comment   Mult calls for patient 1720 and 1821- no show with both calls             No " follow-up provider specified.       Medication List      New Prescriptions    cefdinir 300 MG capsule  Commonly known as: OMNICEF  Take 1 capsule by mouth 2 (Two) Times a Day for 7 days.           Where to Get Your Medications      These medications were sent to [facility]/pharmacy #[id number] - [place], [place] - [address] [facility] - [phone]  - [phone]   [address] [facility] [address]    Phone: [phone]   · cefdinir 300 MG capsule          Jazmyne Sheehan, CLAIR  05/04/22 1930       Jazmyne Sheehan, CLAIR  05/04/22 1930       Jazmyne Sheehan, APRN  05/04/22 1931

## 2022-05-06 ENCOUNTER — TELEPHONE (OUTPATIENT)
Dept: FAMILY MEDICINE CLINIC | Facility: CLINIC | Age: 37
End: 2022-05-06

## 2022-05-06 ENCOUNTER — TELEMEDICINE (OUTPATIENT)
Dept: FAMILY MEDICINE CLINIC | Facility: CLINIC | Age: 37
End: 2022-05-06

## 2022-05-06 ENCOUNTER — HOSPITAL ENCOUNTER (OUTPATIENT)
Dept: CT IMAGING | Facility: HOSPITAL | Age: 37
Discharge: HOME | End: 2022-05-06

## 2022-05-06 ENCOUNTER — LAB (OUTPATIENT)
Dept: LAB | Facility: HOSPITAL | Age: 37
End: 2022-05-06

## 2022-05-06 DIAGNOSIS — N30.00 ACUTE CYSTITIS WITHOUT HEMATURIA: ICD-10-CM

## 2022-05-06 DIAGNOSIS — R19.7 DIARRHEA, UNSPECIFIED TYPE: ICD-10-CM

## 2022-05-06 DIAGNOSIS — R10.9 FLANK PAIN: ICD-10-CM

## 2022-05-06 DIAGNOSIS — R00.0 TACHYCARDIA: ICD-10-CM

## 2022-05-06 DIAGNOSIS — R10.84 GENERALIZED ABDOMINAL PAIN: Primary | ICD-10-CM

## 2022-05-06 DIAGNOSIS — R10.84 GENERALIZED ABDOMINAL PAIN: ICD-10-CM

## 2022-05-06 LAB
ALBUMIN SERPL-MCNC: 4.5 G/DL (ref 3.5–5.2)
ALBUMIN/GLOB SERPL: 1.4 G/DL
ALP SERPL-CCNC: 82 U/L (ref 39–117)
ALT SERPL W P-5'-P-CCNC: 30 U/L (ref 1–33)
ANION GAP SERPL CALCULATED.3IONS-SCNC: 13.5 MMOL/L (ref 5–15)
AST SERPL-CCNC: 27 U/L (ref 1–32)
BASOPHILS # BLD AUTO: 0.03 10*3/MM3 (ref 0–0.2)
BASOPHILS NFR BLD AUTO: 0.2 % (ref 0–1.5)
BILIRUB SERPL-MCNC: 0.7 MG/DL (ref 0–1.2)
BUN SERPL-MCNC: 3 MG/DL (ref 6–20)
BUN/CREAT SERPL: 4.6 (ref 7–25)
CALCIUM SPEC-SCNC: 9.5 MG/DL (ref 8.6–10.5)
CHLORIDE SERPL-SCNC: 100 MMOL/L (ref 98–107)
CO2 SERPL-SCNC: 23.5 MMOL/L (ref 22–29)
CREAT SERPL-MCNC: 0.65 MG/DL (ref 0.57–1)
CRP SERPL-MCNC: 5.58 MG/DL (ref 0–0.5)
DEPRECATED RDW RBC AUTO: 42.2 FL (ref 37–54)
EGFRCR SERPLBLD CKD-EPI 2021: 116.5 ML/MIN/1.73
EOSINOPHIL # BLD AUTO: 0.11 10*3/MM3 (ref 0–0.4)
EOSINOPHIL NFR BLD AUTO: 0.8 % (ref 0.3–6.2)
ERYTHROCYTE [DISTWIDTH] IN BLOOD BY AUTOMATED COUNT: 13.3 % (ref 12.3–15.4)
GLOBULIN UR ELPH-MCNC: 3.2 GM/DL
GLUCOSE SERPL-MCNC: 119 MG/DL (ref 65–99)
HCT VFR BLD AUTO: 47.1 % (ref 34–46.6)
HGB BLD-MCNC: 15.8 G/DL (ref 12–15.9)
IMM GRANULOCYTES # BLD AUTO: 0.04 10*3/MM3 (ref 0–0.05)
IMM GRANULOCYTES NFR BLD AUTO: 0.3 % (ref 0–0.5)
LYMPHOCYTES # BLD AUTO: 3.52 10*3/MM3 (ref 0.7–3.1)
LYMPHOCYTES NFR BLD AUTO: 26.1 % (ref 19.6–45.3)
MCH RBC QN AUTO: 29.4 PG (ref 26.6–33)
MCHC RBC AUTO-ENTMCNC: 33.5 G/DL (ref 31.5–35.7)
MCV RBC AUTO: 87.7 FL (ref 79–97)
MONOCYTES # BLD AUTO: 0.66 10*3/MM3 (ref 0.1–0.9)
MONOCYTES NFR BLD AUTO: 4.9 % (ref 5–12)
NEUTROPHILS NFR BLD AUTO: 67.7 % (ref 42.7–76)
NEUTROPHILS NFR BLD AUTO: 9.15 10*3/MM3 (ref 1.7–7)
NRBC BLD AUTO-RTO: 0 /100 WBC (ref 0–0.2)
PLATELET # BLD AUTO: 310 10*3/MM3 (ref 140–450)
PMV BLD AUTO: 11 FL (ref 6–12)
POTASSIUM SERPL-SCNC: 3.7 MMOL/L (ref 3.5–5.2)
PROCALCITONIN SERPL-MCNC: 0.22 NG/ML (ref 0–0.25)
PROT SERPL-MCNC: 7.7 G/DL (ref 6–8.5)
RBC # BLD AUTO: 5.37 10*6/MM3 (ref 3.77–5.28)
SODIUM SERPL-SCNC: 137 MMOL/L (ref 136–145)
WBC NRBC COR # BLD: 13.51 10*3/MM3 (ref 3.4–10.8)

## 2022-05-06 PROCEDURE — 84145 PROCALCITONIN (PCT): CPT

## 2022-05-06 PROCEDURE — 99214 OFFICE O/P EST MOD 30 MIN: CPT | Performed by: NURSE PRACTITIONER

## 2022-05-06 PROCEDURE — 80053 COMPREHEN METABOLIC PANEL: CPT

## 2022-05-06 PROCEDURE — 74176 CT ABD & PELVIS W/O CONTRAST: CPT

## 2022-05-06 PROCEDURE — 36415 COLL VENOUS BLD VENIPUNCTURE: CPT

## 2022-05-06 PROCEDURE — 85025 COMPLETE CBC W/AUTO DIFF WBC: CPT

## 2022-05-06 PROCEDURE — 86140 C-REACTIVE PROTEIN: CPT

## 2022-05-06 NOTE — TELEPHONE ENCOUNTER
"Patient called and said she thinks she is having an allergic reaction between her antibiotic that we just prescribed for her, and her Xanax. She says last night her heart started \"pounding\", has a headache, is very thirsty, and her body is hot (not feverish), but her insides feel cold. I told her to go to the ER if she feels she needs to. Please advise.     *AFTER I PUT MESSAGE IN, I SAW THAT THE PATIENT MADE A MYCHART VIDEO VISIT. SHE IS GOING TO ADDRESS THESE ISSUES IN HER APPT-JG*  "

## 2022-05-06 NOTE — TELEPHONE ENCOUNTER
Caller: Lindsay Amezquita    Relationship: Self    Best call back number:  [phone]    What medication are you requesting:  SOMETHING FOR PAIN     What are your current symptoms:  PAIN IN KIDNEYS AND BACK     If a prescription is needed, what is your preferred pharmacy and phone number:   [facility]      Additional notes: PATIENT STATES SHE JUST HAD THE CT SCAN AND THE LABS AND THEY TOLD HER THE DOCTOR SHOULD HAVE THE RESULTS BACK IN THE NEXT HOUR    SHE IS IN SO MUCH PAIN SHE IS WILLING TO COME IN AND GET A SHOT OR HAVE SOMETHING CALLED IN

## 2022-05-06 NOTE — PROGRESS NOTES
Subjective       You have chosen to receive care through a telehealth visit.  Do you consent to use a video/audio connection for your medical care today? Yes, Doximity was used    Chief Complaint:  Back pain    History of Present Illness:   Reports acute flank pain, vomiting, abdominal pain, breaking out in a cold sweat.  She is having diarrhea.  Has been taking antibiotics for presumed UTI  Feels like her heart is pounding out of her chest,  Review of Systems  Gen- No fevers, chills  CV- No chest pain, palpitations  Resp- No cough, dyspnea  Neuro-No dizziness, headaches      I have reviewed and/or updated the patient's past medical, surgical, family, social history and problem list as appropriate.     Medications:    Current Outpatient Medications:   •  albuterol sulfate  (90 Base) MCG/ACT inhaler, Inhale 2 puffs Every 4 (Four) Hours As Needed for Wheezing., Disp: 18 g, Rfl: 0  •  ALPRAZolam (Xanax) 0.5 MG tablet, Take 1 tablet by mouth 2 (Two) Times a Day As Needed for Anxiety., Disp: 60 tablet, Rfl: 2  •  amitriptyline (ELAVIL) 150 MG tablet, Take 1 tablet by mouth every night at bedtime., Disp: 90 tablet, Rfl: 1  •  azithromycin (ZITHROMAX) 250 MG tablet, Take 2 tablets on first day then 1 tablet daily for 4 days, Disp: 6 tablet, Rfl: 0  •  cyanocobalamin 500 MCG tablet, Take 1 tablet by mouth Daily., Disp: 30 tablet, Rfl: 5  •  desvenlafaxine (Pristiq) 50 MG 24 hr tablet, Take 1 tablet by mouth Daily., Disp: 30 tablet, Rfl: 6  •  Diclofenac Sodium (VOLTAREN) 1 % gel gel, Apply 4 g topically to the appropriate area as directed 4 (Four) Times a Day As Needed (knee pain)., Disp: 100 g, Rfl: 5  •  Eliquis 5 MG tablet tablet, TAKE 1 TABLET BY MOUTH EVERY 12 HOURS, Disp: 60 tablet, Rfl: 1  •  fluconazole (Diflucan) 150 MG tablet, Take 1 tablet by mouth now, may repeat in 3 days if needed, Disp: 2 tablet, Rfl: 0  •  fluticasone (FLONASE) 50 MCG/ACT nasal spray, SPRAY 2 SPRAYS INTO THE NOSTRIL AS DIRECTED  BY PROVIDER DAILY., Disp: 48 mL, Rfl: 1  •  fluticasone (Flovent HFA) 110 MCG/ACT inhaler, Inhale 2 puffs 2 (Two) Times a Day., Disp: 12 g, Rfl: 5  •  furosemide (LASIX) 20 MG tablet, Take 1 tablet by mouth Daily., Disp: 30 tablet, Rfl: 5  •  gabapentin (NEURONTIN) 800 MG tablet, TAKE 1 TABLET BY MOUTH THREE TIMES A DAY, Disp: 90 tablet, Rfl: 2  •  glucose blood test strip, Check blood sugar once daily, Disp: 25 each, Rfl: 12  •  glucose monitor monitoring kit, Check blood sugar once daily, Disp: 1 each, Rfl: 0  •  Hydrocortisone, Perianal, (Proctozone-HC) 2.5 % rectal cream, Insert  into the rectum 2 (Two) Times a Day., Disp: 28 g, Rfl: 0  •  hydrOXYzine (ATARAX) 25 MG tablet, Take 1 tablet by mouth 3 (Three) Times a Day As Needed for Anxiety., Disp: 30 tablet, Rfl: 5  •  Lancets (freestyle) lancets, 1 each by Other route 4 (Four) Times a Day., Disp: 400 each, Rfl: 3  •  Lancets Misc. (ONE TOUCH SURESOFT) misc, Check blood sugar once daily, Disp: 1 each, Rfl: 0  •  levocetirizine (XYZAL) 5 MG tablet, Take 1 tablet by mouth Every Evening., Disp: 30 tablet, Rfl: 5  •  Lurasidone HCl (Latuda) 120 MG tablet tablet, Take 1 tablet by mouth Daily. Take 120 mg orally daily with a meal., Disp: 30 tablet, Rfl: 6  •  meclizine (ANTIVERT) 25 MG tablet, Take 1 tablet by mouth 3 (Three) Times a Day As Needed for Dizziness., Disp: 10 tablet, Rfl: 0  •  ondansetron ODT (ZOFRAN-ODT) 4 MG disintegrating tablet, Place 1 tablet on the tongue Every 8 (Eight) Hours As Needed for Nausea or Vomiting., Disp: 12 tablet, Rfl: 0  •  pantoprazole (PROTONIX) 40 MG EC tablet, Take 1 tablet by mouth Daily., Disp: 30 tablet, Rfl: 5  •  predniSONE (DELTASONE) 20 MG tablet, Take 1 tablet by mouth 2 (Two) Times a Day., Disp: 10 tablet, Rfl: 0  •  promethazine (PHENERGAN) 25 MG tablet, Take 1 tablet by mouth Every 6 (Six) Hours As Needed for Nausea or Vomiting., Disp: 30 tablet, Rfl: 0  •  promethazine-dextromethorphan (PROMETHAZINE-DM) 6.25-15 MG/5ML  "syrup, Take 5 mL by mouth 4 (Four) Times a Day As Needed for Cough., Disp: 118 mL, Rfl: 1  •  Rimegepant Sulfate (Nurtec) 75 MG tablet dispersible tablet, Take 1 tablet by mouth As Needed (as needed for migraine)., Disp: 4 tablet, Rfl: 0  •  Rimegepant Sulfate (Nurtec) 75 MG tablet dispersible tablet, Take 1 tablet by mouth As Needed (AS NEEDED FOR MIGRAINES)., Disp: 4 tablet, Rfl: 0  •  Rimegepant Sulfate (Nurtec) 75 MG tablet dispersible tablet, Take 1 tablet by mouth Every Other Day. Take Monday,Wednesday,Friday, and Saturday., Disp: 16 tablet, Rfl: 5  •  tiZANidine (ZANAFLEX) 4 MG tablet, TAKE 1 TABLET BY MOUTH AT NIGHT AS NEEDED FOR MUSCLE SPASMS., Disp: 30 tablet, Rfl: 1  •  traMADol (ULTRAM) 50 MG tablet, Take 1 tablet by mouth Every 8 (Eight) Hours As Needed for Moderate Pain  for up to 12 doses., Disp: 12 tablet, Rfl: 0  •  vitamin D (ERGOCALCIFEROL) 1.25 MG (14387 UT) capsule capsule, Take 1 capsule by mouth 1 (One) Time Per Week., Disp: 4 capsule, Rfl: 2    Allergies:  Allergies   Allergen Reactions   • Codeine Anaphylaxis   • Flexeril [Cyclobenzaprine] Other (See Comments)     \"gives me chest pain\"   • Asa [Aspirin] Hives     Wheezing     • Latex Hives   • Morphine Itching   • Penicillins Hives     Vomiting     • Triptans Other (See Comments)     Chest tightness, left arm numbness   • Compazine [Prochlorperazine] Anxiety   • Reglan [Metoclopramide] Anxiety       Objective     Vital Signs: There were no vitals filed for this visit.  There is no height or weight on file to calculate BMI.    Physical Exam:  Gen-no acute distress, telephone exam  Resp- normal WOB, able to speak in full sentences without distress  Neuro-A&Ox3, speech clear  Psych-appropriate mood, cooperative         Assessment / Plan     Assessment/Plan:   Problem List Items Addressed This Visit    None     Visit Diagnoses     Generalized abdominal pain    -  Primary    Relevant Orders    CT Abdomen Pelvis Stone Protocol (Completed)    CBC w " AUTO Differential (Completed)    Comprehensive Metabolic Panel (Completed)    Procalcitonin (Completed)    C-reactive Protein (Completed)    Flank pain        Relevant Orders    CT Abdomen Pelvis Stone Protocol (Completed)    CBC w AUTO Differential (Completed)    Comprehensive Metabolic Panel (Completed)    Procalcitonin (Completed)    C-reactive Protein (Completed)    Diarrhea, unspecified type        Relevant Orders    CT Abdomen Pelvis Stone Protocol (Completed)    CBC w AUTO Differential (Completed)    Comprehensive Metabolic Panel (Completed)    Procalcitonin (Completed)    C-reactive Protein (Completed)    Tachycardia        Relevant Orders    CT Abdomen Pelvis Stone Protocol (Completed)    CBC w AUTO Differential (Completed)    Comprehensive Metabolic Panel (Completed)    Procalcitonin (Completed)    C-reactive Protein (Completed)    Acute cystitis without hematuria        Relevant Orders    CT Abdomen Pelvis Stone Protocol (Completed)    CBC w AUTO Differential (Completed)    Comprehensive Metabolic Panel (Completed)    Procalcitonin (Completed)    C-reactive Protein (Completed)        --Stat orders as above    Follow up:  Pending work-up    Total Time of Encounter 15 minutes    Electronically signed by CLAIR Leal   05/06/2022 09:09 EDT      Please note that portions of this note may have been completed with a voice recognition program. Efforts were made to edit the dictations, but occasionally words are mistranscribed.

## 2022-05-06 NOTE — TELEPHONE ENCOUNTER
Patient has called office stating that she has taken her morning medication and has gotten sick at her stomach.    Patient is also wanting to check and see if her lab results are back.  Patient is stating that she is in a lot of pain.    Verified current phone number for patient.

## 2022-05-09 ENCOUNTER — TELEPHONE (OUTPATIENT)
Dept: FAMILY MEDICINE CLINIC | Facility: CLINIC | Age: 37
End: 2022-05-09

## 2022-05-09 ENCOUNTER — TELEPHONE (OUTPATIENT)
Dept: INTERNAL MEDICINE | Facility: CLINIC | Age: 37
End: 2022-05-09

## 2022-05-09 RX ORDER — DOXYCYCLINE HYCLATE 100 MG/1
100 CAPSULE ORAL 2 TIMES DAILY
Qty: 14 CAPSULE | Refills: 0 | Status: SHIPPED | OUTPATIENT
Start: 2022-05-09 | End: 2022-05-16

## 2022-05-09 RX ORDER — FLUCONAZOLE 150 MG/1
TABLET ORAL
Qty: 2 TABLET | Refills: 0 | Status: SHIPPED | OUTPATIENT
Start: 2022-05-09 | End: 2022-06-06

## 2022-05-09 NOTE — TELEPHONE ENCOUNTER
Patient is returning a phone call from office, seeking guidance on what she should do concerning her pain.    Patient is requesting for staff to give her a call back at current phone number.

## 2022-05-09 NOTE — TELEPHONE ENCOUNTER
Received phone call from patient on May 6, 2022 at 2210.  Patient stated she was concerned about current antibiotic she was taking.  She was worried it may cause a possible allergic reaction with all other medication she was taking.  She was having sweating, palpitations and alternating hot and cold.  She apparently had CT and labs done earlier that day.  She stated that her PCP had just contacted her earlier with CT results but labs were pending.  Her antibiotic had just been changed a day or 2 earlier.  She wondered if this antibiotic would interact with all of her other medications.  I reassured her that our computer always cross checked all medications and would let us know if it would interact with all medications.  In addition I reassured her that the pharmacist also cross checked antibiotic with all medications given.  We discussed possible anxiety and she did state that she had a history of anxiety disorder.  A friend or family member in the background insisted that she should take her anxiety medication as she felt this was a panic attack.  I also encouraged her to take her anxiety medication and reassured her that her primary care doctor would not provide this antibiotic if it interacted with current medications.  I told her if she had any allergic reaction she should seek care at emergency room.  
35

## 2022-05-09 NOTE — TELEPHONE ENCOUNTER
Patient called back and said she has only took @ 3 pills of her antibiotics, but has quit taking them. She is still having LBP and is now itching in her private area, and burning when she urinates (this started last night 5/8/22). She took her anxiety med over the weekend but it didn't help. Please advise.

## 2022-05-09 NOTE — PROGRESS NOTES
Please call and check on patient. Her labs were ok, does look like she has infection but procal was decent. How is she feeling? Was she able to conitnue the anbx over the weekend?

## 2022-05-11 ENCOUNTER — TELEPHONE (OUTPATIENT)
Dept: FAMILY MEDICINE CLINIC | Facility: CLINIC | Age: 37
End: 2022-05-11

## 2022-05-11 NOTE — TELEPHONE ENCOUNTER
Caller: Lindsay Amezquita    Relationship to patient: Self    Best call back number: [phone]    Patient is needing: PATIENT STATED THAT SHE HAS BEEN TAKING MEDICATION FOR A COUPLE DAYS NOW AND SEEMS TO THINK IT IS NOT WORKING BECAUSE SHE IS STILL EXPERIENCING PAIN WITH URINATION AND WANTED TO KNOW WHAT PROVIDER WOULD WANT TO DO    PLEASE ADVISE

## 2022-05-15 ENCOUNTER — HOSPITAL ENCOUNTER (EMERGENCY)
Facility: HOSPITAL | Age: 37
Discharge: HOME | End: 2022-05-16
Attending: FAMILY MEDICINE | Admitting: FAMILY MEDICINE

## 2022-05-15 DIAGNOSIS — M54.6 ACUTE RIGHT-SIDED THORACIC BACK PAIN: Primary | ICD-10-CM

## 2022-05-15 DIAGNOSIS — R42 VERTIGO: ICD-10-CM

## 2022-05-15 PROCEDURE — 99283 EMERGENCY DEPT VISIT LOW MDM: CPT

## 2022-05-16 VITALS
SYSTOLIC BLOOD PRESSURE: 124 MMHG | WEIGHT: 280 LBS | BODY MASS INDEX: 51.53 KG/M2 | HEIGHT: 62 IN | DIASTOLIC BLOOD PRESSURE: 86 MMHG | HEART RATE: 88 BPM | RESPIRATION RATE: 16 BRPM | OXYGEN SATURATION: 96 % | TEMPERATURE: 98.4 F

## 2022-05-16 LAB
BILIRUB UR QL STRIP: NEGATIVE
CLARITY UR: CLEAR
COLOR UR: YELLOW
GLUCOSE UR STRIP-MCNC: NEGATIVE MG/DL
HGB UR QL STRIP.AUTO: NEGATIVE
KETONES UR QL STRIP: NEGATIVE
LEUKOCYTE ESTERASE UR QL STRIP.AUTO: NEGATIVE
NITRITE UR QL STRIP: NEGATIVE
PH UR STRIP.AUTO: 7.5 [PH] (ref 5–8)
PROT UR QL STRIP: NEGATIVE
SP GR UR STRIP: <=1.005 (ref 1–1.03)
UROBILINOGEN UR QL STRIP: NORMAL

## 2022-05-16 PROCEDURE — 81003 URINALYSIS AUTO W/O SCOPE: CPT | Performed by: PHYSICIAN ASSISTANT

## 2022-05-16 PROCEDURE — 63710000001 ONDANSETRON PER 8 MG: Performed by: FAMILY MEDICINE

## 2022-05-16 RX ORDER — ONDANSETRON 4 MG/1
4 TABLET, FILM COATED ORAL ONCE
Status: COMPLETED | OUTPATIENT
Start: 2022-05-16 | End: 2022-05-16

## 2022-05-16 RX ORDER — MECLIZINE HYDROCHLORIDE 25 MG/1
25 TABLET ORAL 3 TIMES DAILY PRN
Qty: 10 TABLET | Refills: 0 | Status: SHIPPED | OUTPATIENT
Start: 2022-05-16 | End: 2022-06-06

## 2022-05-16 RX ORDER — HYDROCODONE BITARTRATE AND ACETAMINOPHEN 10; 325 MG/1; MG/1
1 TABLET ORAL ONCE
Status: COMPLETED | OUTPATIENT
Start: 2022-05-16 | End: 2022-05-16

## 2022-05-16 RX ADMIN — HYDROCODONE BITARTRATE AND ACETAMINOPHEN 1 TABLET: 10; 325 TABLET ORAL at 00:33

## 2022-05-16 RX ADMIN — ONDANSETRON HYDROCHLORIDE 4 MG: 4 TABLET, FILM COATED ORAL at 00:33

## 2022-05-16 NOTE — ED PROVIDER NOTES
"Subjective   36-year-old female presents with right-sided back pain, she just recently completed antibiotics for urinary tract infection and wanted to make sure that cleared up.  She is also been having congestion and fullness in her ears and feels dizzy when she moves her head.      History provided by:  Patient   used: No        Review of Systems   Musculoskeletal: Positive for back pain.   Neurological: Positive for dizziness.   All other systems reviewed and are negative.      Past Medical History:   Diagnosis Date   • Anxiety    • Back pain    • Bell's palsy    • Bipolar 2 disorder ([facility])    • Blood clot in vein    • Depression    • Diabetes mellitus (HCC) November    Pre diabete   • Frequent headaches    • GERD (gastroesophageal reflux disease)    • Migraine    • Panic    • PTSD (post-traumatic stress disorder)    • Restless leg syndrome    • Sinus problem    • Snores    • Tattoos    • Vitamin B12 deficiency        Allergies   Allergen Reactions   • Codeine Anaphylaxis   • Flexeril [Cyclobenzaprine] Other (See Comments)     \"gives me chest pain\"   • Asa [Aspirin] Hives     Wheezing     • Latex Hives   • Morphine Itching   • Penicillins Hives     Vomiting     • Triptans Other (See Comments)     Chest tightness, left arm numbness   • Compazine [Prochlorperazine] Anxiety   • Reglan [Metoclopramide] Anxiety       Past Surgical History:   Procedure Laterality Date   • CHOLECYSTECTOMY     • TOOTH EXTRACTION         Family History   Problem Relation Age of Onset   • Irritable bowel syndrome Mother    • Irritable bowel syndrome Father    • Colon cancer Maternal Grandfather        Social History     Socioeconomic History   • Marital status: Single   Tobacco Use   • Smoking status: Current Every Day Smoker     Packs/day: 0.50     Years: 29.00     Pack years: 14.50     Types: Cigarettes     Start date: 1/1/1995   • Smokeless tobacco: Never Used   Vaping Use   • Vaping Use: Former   Substance and Sexual " Activity   • Alcohol use: Never   • Drug use: Never   • Sexual activity: Not Currently     Partners: Female           Objective   Physical Exam  Vitals and nursing note reviewed.   Constitutional:       Appearance: She is well-developed.   HENT:      Head: Normocephalic and atraumatic.   Cardiovascular:      Rate and Rhythm: Normal rate and regular rhythm.   Pulmonary:      Effort: Pulmonary effort is normal.      Breath sounds: Normal breath sounds.   Abdominal:      General: Bowel sounds are normal.      Palpations: Abdomen is soft.   Musculoskeletal:         General: Normal range of motion.      Cervical back: Normal range of motion and neck supple.   Skin:     General: Skin is warm and dry.   Neurological:      Mental Status: She is alert and oriented to person, place, and time.      Deep Tendon Reflexes: Reflexes are normal and symmetric.         Procedures           ED Course                                                 MDM  Number of Diagnoses or Management Options  Acute right-sided thoracic back pain: new and requires workup  Vertigo: new and requires workup     Amount and/or Complexity of Data Reviewed  Clinical lab tests: reviewed    Risk of Complications, Morbidity, and/or Mortality  Presenting problems: low  Diagnostic procedures: low  Management options: low    Patient Progress  Patient progress: stable      Final diagnoses:   Acute right-sided thoracic back pain   Vertigo       ED Disposition  ED Disposition     ED Disposition   Discharge    Condition   Stable    Comment   --             [facility] Emergency Department  [address] [address] [phone]    If symptoms worsen         Medication List      New Prescriptions    meclizine 25 MG tablet  Commonly known as: ANTIVERT  Take 1 tablet by mouth 3 (Three) Times a Day As Needed for Dizziness.           Where to Get Your Medications      These medications were sent to [facility]/pharmacy #[id number] - [place], [place] -  [address] - [phone]  - [phone]  [address] [address]    Phone: [phone]   · meclizine 25 MG tablet          Markel Baxter Jr., PAMounikaC  05/16/22 0026

## 2022-05-17 ENCOUNTER — TELEPHONE (OUTPATIENT)
Dept: FAMILY MEDICINE CLINIC | Facility: CLINIC | Age: 37
End: 2022-05-17

## 2022-05-17 NOTE — TELEPHONE ENCOUNTER
Caller: Lindsay Amezquita    Relationship: Self    Best call back number: [phone]    What is the best time to reach you: ANY TIME    Who are you requesting to speak with (clinical staff, provider,  specific staff member): NADER FARRIS    Do you know the name of the person who called: SELF    What was the call regarding: PATIENT STATES THAT SHE THINKS HER OXYGEN LEVEL IS DROPPING WHILE SLEEPING, SOMETIMES IT FEELS LIKE SHE CAN'T GET A FULL BREATH IN WHILE LYING DOWN.    Do you require a callback: YES         Sheath removed intact.

## 2022-05-19 ENCOUNTER — TELEPHONE (OUTPATIENT)
Dept: FAMILY MEDICINE CLINIC | Facility: CLINIC | Age: 37
End: 2022-05-19

## 2022-05-19 NOTE — TELEPHONE ENCOUNTER
Advised patient via Mess[employer] that sylvia is out of the office today, and that she  would be returning tomorrow.

## 2022-05-19 NOTE — TELEPHONE ENCOUNTER
Caller: Lindsay Amezquita    Relationship: Self    Best call back number: [phone]     What medication are you requesting: SOMETHING FOR ARTHRITIC PAIN    What are your current symptoms: SWOLLEN KNEES, RETAINING WATER, PAIN    How long have you been experiencing symptoms: 2 DAYS    Have you had these symptoms before:    [x] Yes  [] No    Have you been treated for these symptoms before:   [x] Yes  [] No    If a prescription is needed, what is your preferred pharmacy and phone number: [facility]Y #[id number] - [place], [place] - [address] [phone] [facility] [phone]      Additional notes: PATIENT STATED THAT TYLENOL ARTHRITIS IS NOT WORKING

## 2022-05-23 DIAGNOSIS — G47.19 EXCESSIVE DAYTIME SLEEPINESS: Primary | ICD-10-CM

## 2022-06-01 ENCOUNTER — TELEMEDICINE (OUTPATIENT)
Dept: BEHAVIORAL HEALTH | Facility: CLINIC | Age: 37
End: 2022-06-01

## 2022-06-01 DIAGNOSIS — F41.1 GENERALIZED ANXIETY DISORDER: Primary | ICD-10-CM

## 2022-06-01 DIAGNOSIS — F43.10 POST TRAUMATIC STRESS DISORDER (PTSD): ICD-10-CM

## 2022-06-01 DIAGNOSIS — F31.30 BIPOLAR I DISORDER, MOST RECENT EPISODE DEPRESSED: ICD-10-CM

## 2022-06-01 PROCEDURE — 99213 OFFICE O/P EST LOW 20 MIN: CPT
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
## 2022-06-03 ENCOUNTER — TELEPHONE (OUTPATIENT)
Dept: FAMILY MEDICINE CLINIC | Facility: CLINIC | Age: 37
End: 2022-06-03

## 2022-06-03 NOTE — TELEPHONE ENCOUNTER
We only have 2 providers in office today and both providers have no openings available. I called patient and advised her of this and suggested that she make a virtual [facility] visit through her MyChart with the [facility] or go to her nearest [facility] or ER.

## 2022-06-03 NOTE — TELEPHONE ENCOUNTER
Caller: Lindsay Amezquita    Relationship to patient: Self    Best call back number: [phone]    Chief complaint: SORE THROAT, MENTAL ISSUES, HEADACHES    Type of visit: VIDEO VISIT     Requested date: ASAP    If rescheduling, when is the original appointment:N/A    Additional notes: PATIENT WOULD LIKE TO SEE PROVIDER TODAY    PLEASE ADVISE PATIENT

## 2022-06-05 ENCOUNTER — TELEMEDICINE (OUTPATIENT)
Dept: FAMILY MEDICINE CLINIC | Facility: TELEHEALTH | Age: 37
End: 2022-06-05

## 2022-06-05 DIAGNOSIS — R11.2 NAUSEA AND VOMITING, UNSPECIFIED VOMITING TYPE: ICD-10-CM

## 2022-06-05 DIAGNOSIS — R42 VERTIGO: Primary | ICD-10-CM

## 2022-06-05 PROCEDURE — 99211 OFF/OP EST MAY X REQ PHY/QHP: CPT | Performed by: NURSE PRACTITIONER

## 2022-06-06 ENCOUNTER — OFFICE VISIT (OUTPATIENT)
Dept: FAMILY MEDICINE CLINIC | Facility: CLINIC | Age: 37
End: 2022-06-06

## 2022-06-06 VITALS
BODY MASS INDEX: 52.44 KG/M2 | OXYGEN SATURATION: 92 % | HEART RATE: 108 BPM | WEIGHT: 285 LBS | DIASTOLIC BLOOD PRESSURE: 90 MMHG | SYSTOLIC BLOOD PRESSURE: 130 MMHG | HEIGHT: 62 IN | TEMPERATURE: 98 F

## 2022-06-06 DIAGNOSIS — R10.84 GENERALIZED ABDOMINAL PAIN: ICD-10-CM

## 2022-06-06 DIAGNOSIS — R19.7 DIARRHEA, UNSPECIFIED TYPE: Primary | ICD-10-CM

## 2022-06-06 DIAGNOSIS — K52.9 AGE (ACUTE GASTROENTERITIS): ICD-10-CM

## 2022-06-06 PROCEDURE — 99213 OFFICE O/P EST LOW 20 MIN: CPT | Performed by: NURSE PRACTITIONER

## 2022-06-06 PROCEDURE — 87428 SARSCOV & INF VIR A&B AG IA: CPT | Performed by: NURSE PRACTITIONER

## 2022-06-06 RX ORDER — FAMOTIDINE 20 MG/1
20 TABLET, FILM COATED ORAL 2 TIMES DAILY
Qty: 28 TABLET | Refills: 0 | Status: SHIPPED | OUTPATIENT
Start: 2022-06-06 | End: 2022-06-20

## 2022-06-06 RX ORDER — PROMETHAZINE HYDROCHLORIDE 25 MG/1
25 TABLET ORAL EVERY 6 HOURS PRN
Qty: 30 TABLET | Refills: 0 | Status: SHIPPED | OUTPATIENT
Start: 2022-06-06 | End: 2022-07-25 | Stop reason: SDUPTHER

## 2022-06-06 NOTE — PROGRESS NOTES
You have chosen to receive care through a telehealth visit.  Do you consent to use a video/audio connection for your medical care today? Yes     CHIEF COMPLAINT  Chief Complaint   Patient presents with   • Nausea         HPI  Lindsay Amezquita is a 36 y.o. female  presents with complaint of vertigo, nausea vomiting, sore throat and pale. Reports her symptoms started 3-4- days ago. Reports she is not eating well. Reports tonight the room is spinning when she stands. Reports she has taken liquid phenergan for her nausea. No fever or chills. Reports she has vomited 3 times tonight. Denies any weakness.     Review of Systems   Constitutional: Negative for chills, fatigue and fever.   HENT: Positive for sore throat. Negative for congestion, ear discharge, ear pain, sinus pressure and sinus pain.    Respiratory: Negative for cough, chest tightness, shortness of breath and wheezing.    Cardiovascular: Negative for chest pain.   Gastrointestinal: Positive for nausea and vomiting. Negative for abdominal pain and diarrhea.   Musculoskeletal: Negative for back pain and myalgias.   Neurological: Positive for dizziness. Negative for speech difficulty, weakness, numbness and headaches.   Psychiatric/Behavioral: Negative.        Past Medical History:   Diagnosis Date   • Anxiety    • Back pain    • Bell's palsy    • Bipolar 2 disorder (HCC)    • Blood clot in vein    • Depression    • Diabetes mellitus (HCC) November    Pre diabete   • Frequent headaches    • GERD (gastroesophageal reflux disease)    • Migraine    • Panic    • PTSD (post-traumatic stress disorder)    • Restless leg syndrome    • Sinus problem    • Snores    • Tattoos    • Vitamin B12 deficiency        Family History   Problem Relation Age of Onset   • Irritable bowel syndrome Mother    • Irritable bowel syndrome Father    • Colon cancer Maternal Grandfather        Social History     Socioeconomic History   • Marital status: Single   Tobacco Use   • Smoking status:  Current Every Day Smoker     Packs/day: 0.50     Years: 29.00     Pack years: 14.50     Types: Cigarettes     Start date: 1/1/1995   • Smokeless tobacco: Never Used   Vaping Use   • Vaping Use: Former   Substance and Sexual Activity   • Alcohol use: Never   • Drug use: Never   • Sexual activity: Not Currently     Partners: Female       Lindsay Amezquita  reports that she has been smoking cigarettes. She started smoking about 27 years ago. She has a 14.50 pack-year smoking history. She has never used smokeless tobacco.. I have educated her on the risk of diseases from using tobacco products such as cancer, COPD and heart disease.     I spent 2 minutes counseling the patient.              LMP 06/04/2022   Breastfeeding No     PHYSICAL EXAM      Results for orders placed or performed during the hospital encounter of 05/15/22   Urinalysis With Culture If Indicated - Urine, Clean Catch    Specimen: Urine, Clean Catch   Result Value Ref Range    Color, UA Yellow Yellow, Straw    Appearance, UA Clear Clear    pH, UA 7.5 5.0 - 8.0    Specific Gravity, UA <=1.005 1.005 - 1.030    Glucose, UA Negative Negative    Ketones, UA Negative Negative    Bilirubin, UA Negative Negative    Blood, UA Negative Negative    Protein, UA Negative Negative    Leuk Esterase, UA Negative Negative    Nitrite, UA Negative Negative    Urobilinogen, UA 0.2 E.U./dL 0.2 - 1.0 E.U./dL       Diagnoses and all orders for this visit:    1. Vertigo (Primary)    advised patient to go to ER for in person evaluation. Reports she will have family to take her to [facility]. Reports she lives with parents. Advised patient to transport via EMS but reports family will take her. Understands the risk and consequences of not going to ER and transport via EMS        CLAIR Longoria  06/05/2022  23:27 EDT    The use of a video visit has been reviewed with the patient and verbal informed consent has been obtained. Myself and Lindsay Amezquita participated in this  visit. The patient is located in [address] [place] [address] [address] [place], [place] [id number].    I am located in [place], [place]. Mychart and Zoom were utilized. I spent 5 minutes in the patient's chart for this visit.

## 2022-06-06 NOTE — PROGRESS NOTES
You have chosen to receive care through a telehealth visit.  Do you consent to use a video/audio connection for your medical care today? Yes     CHIEF COMPLAINT  Chief Complaint   Patient presents with   • Nausea         HPI  Lindsay Amezquita is a 36 y.o. female  presents with complaint of vertigo, nausea vomiting, sore throat and pale. Reports her symptoms started 3-4- days ago. Reports she is not eating well. Reports tonight the room is spinning when she stands. Reports she has taken liquid phenergan for her nausea. No fever or chills. Reports she has vomited 3 times tonight. Denies any weakness.     Review of Systems   Constitutional: Negative for chills, fatigue and fever.   HENT: Positive for sore throat. Negative for congestion, ear discharge, ear pain, sinus pressure and sinus pain.    Respiratory: Negative for cough, chest tightness, shortness of breath and wheezing.    Cardiovascular: Negative for chest pain.   Gastrointestinal: Positive for nausea and vomiting. Negative for abdominal pain and diarrhea.   Musculoskeletal: Negative for back pain and myalgias.   Skin: Positive for pallor.   Neurological: Positive for dizziness. Negative for speech difficulty, weakness, numbness and headaches.   Psychiatric/Behavioral: Negative.        Past Medical History:   Diagnosis Date   • Anxiety    • Back pain    • Bell's palsy    • Bipolar 2 disorder (HCC)    • Blood clot in vein    • Depression    • Diabetes mellitus (HCC) November    Pre diabete   • Frequent headaches    • GERD (gastroesophageal reflux disease)    • Migraine    • Panic    • PTSD (post-traumatic stress disorder)    • Restless leg syndrome    • Sinus problem    • Snores    • Tattoos    • Vitamin B12 deficiency        Family History   Problem Relation Age of Onset   • Irritable bowel syndrome Mother    • Irritable bowel syndrome Father    • Colon cancer Maternal Grandfather        Social History     Socioeconomic History   • Marital status: Single   Tobacco  Use   • Smoking status: Current Every Day Smoker     Packs/day: 0.50     Years: 29.00     Pack years: 14.50     Types: Cigarettes     Start date: 1/1/1995   • Smokeless tobacco: Never Used   Vaping Use   • Vaping Use: Former   Substance and Sexual Activity   • Alcohol use: Never   • Drug use: Never   • Sexual activity: Not Currently     Partners: Female       Lindsay Amezquita  reports that she has been smoking cigarettes. She started smoking about 27 years ago. She has a 14.50 pack-year smoking history. She has never used smokeless tobacco.. I have educated her on the risk of diseases from using tobacco products such as cancer, COPD and heart disease.     I advised her to quit and she is not willing to quit.    I spent 3  minutes counseling the patient.              LMP 06/04/2022   Breastfeeding No     PHYSICAL EXAM  Physical Exam   Constitutional: She is oriented to person, place, and time. She appears well-developed and well-nourished. No distress.   HENT:   Head: Normocephalic and atraumatic.   Right Ear: Hearing normal.   Left Ear: Hearing normal.   Nose: No congestion. Right sinus exhibits no maxillary sinus tenderness. Left sinus exhibits no maxillary sinus tenderness.   Mouth/Throat: Mouth/Lips are normal.  Patient directed exam  Throat with redness   Eyes: Conjunctivae and lids are normal.   Pulmonary/Chest: Effort normal.  No respiratory distress.  Neurological: She is alert and oriented to person, place, and time. She has normal strength.   Smile equal no drooping   Speech with slight slurring(patient reports this is normal for her since she had Roscoe Palsy in the past)   Psychiatric: Her speech is normal and behavior is normal. Her affect is flattened.       Results for orders placed or performed during the hospital encounter of 05/15/22   Urinalysis With Culture If Indicated - Urine, Clean Catch    Specimen: Urine, Clean Catch   Result Value Ref Range    Color, UA Yellow Yellow, Straw    Appearance, UA  Clear Clear    pH, UA 7.5 5.0 - 8.0    Specific Gravity, UA <=1.005 1.005 - 1.030    Glucose, UA Negative Negative    Ketones, UA Negative Negative    Bilirubin, UA Negative Negative    Blood, UA Negative Negative    Protein, UA Negative Negative    Leuk Esterase, UA Negative Negative    Nitrite, UA Negative Negative    Urobilinogen, UA 0.2 E.U./dL 0.2 - 1.0 E.U./dL       Diagnoses and all orders for this visit:    1. Vertigo (Primary)    2. Nausea and vomiting, unspecified vomiting type    Advised patient to go to ER for in person evaluation. Reports she will have family take her to [facility]. Advised to go to ER via EMS refuses EMS and states she lives with parents and they will take her. Understands the risk of not going to ER and not going by EMS to rule out stroke.            CLAIR Longoria  06/05/2022  01:11 EDT    The use of a video visit has been reviewed with the patient and verbal informed consent has been obtained. Myself and Lindsay Amezquita participated in this visit. The patient is located in [address] [place] [address].    I am located in [place], [place]. Mychart and Zoom were utilized. I spent 5 minutes in the patient's chart for this visit.

## 2022-06-06 NOTE — PROGRESS NOTES
Subjective     Chief Complaint:    Chief Complaint   Patient presents with   • Vomiting   • Diarrhea   • Cough   • Dizziness   • Headache       History of Present Illness:   Notes vomiting and diarrhea x 2-3 days. She has not been able to eat due to diarrhea. She vomited this morning around 0300. She is having epigastric pain. She feels nausea. She is dizzy. Having vertido.   Right ear pain has been hurting x 2 weeks.   She has had migraines for 3 days, hurts on both temples. She has taken nurtec and excedrin with motrin and it has not helped.   No fever  No sick contacts       Review of Systems  Gen- No fevers, chills  CV- No chest pain, palpitations  Resp- No cough, dyspnea  GI- No N/V/D, abd pain  Neuro-No dizziness, headaches      I have reviewed and/or updated the patient's past medical, surgical, family, social history and problem list as appropriate.     Medications:    Current Outpatient Medications:   •  albuterol sulfate  (90 Base) MCG/ACT inhaler, Inhale 2 puffs Every 4 (Four) Hours As Needed for Wheezing., Disp: 18 g, Rfl: 0  •  ALPRAZolam (Xanax) 0.5 MG tablet, Take 1 tablet by mouth 2 (Two) Times a Day As Needed for Anxiety., Disp: 60 tablet, Rfl: 2  •  amitriptyline (ELAVIL) 150 MG tablet, Take 1 tablet by mouth every night at bedtime., Disp: 90 tablet, Rfl: 1  •  Eliquis 5 MG tablet tablet, TAKE 1 TABLET BY MOUTH EVERY 12 HOURS, Disp: 60 tablet, Rfl: 1  •  fluticasone (FLONASE) 50 MCG/ACT nasal spray, SPRAY 2 SPRAYS INTO THE NOSTRIL AS DIRECTED BY PROVIDER DAILY., Disp: 48 mL, Rfl: 1  •  fluticasone (Flovent HFA) 110 MCG/ACT inhaler, Inhale 2 puffs 2 (Two) Times a Day., Disp: 12 g, Rfl: 5  •  furosemide (LASIX) 20 MG tablet, Take 1 tablet by mouth Daily., Disp: 30 tablet, Rfl: 5  •  gabapentin (NEURONTIN) 800 MG tablet, TAKE 1 TABLET BY MOUTH THREE TIMES A DAY, Disp: 90 tablet, Rfl: 2  •  glucose blood test strip, Check blood sugar once daily, Disp: 25 each, Rfl: 12  •  glucose monitor  "monitoring kit, Check blood sugar once daily, Disp: 1 each, Rfl: 0  •  Hydrocortisone, Perianal, (Proctozone-HC) 2.5 % rectal cream, Insert  into the rectum 2 (Two) Times a Day., Disp: 28 g, Rfl: 0  •  hydrOXYzine (ATARAX) 25 MG tablet, Take 1 tablet by mouth 3 (Three) Times a Day As Needed for Anxiety., Disp: 30 tablet, Rfl: 5  •  Lancets (freestyle) lancets, 1 each by Other route 4 (Four) Times a Day., Disp: 400 each, Rfl: 3  •  Lancets Misc. (ONE TOUCH SURESOFT) misc, Check blood sugar once daily, Disp: 1 each, Rfl: 0  •  levocetirizine (XYZAL) 5 MG tablet, Take 1 tablet by mouth Every Evening., Disp: 30 tablet, Rfl: 5  •  Lurasidone HCl (Latuda) 120 MG tablet tablet, Take 1 tablet by mouth Daily. Take 120 mg orally daily with a meal., Disp: 30 tablet, Rfl: 6  •  pantoprazole (PROTONIX) 40 MG EC tablet, Take 1 tablet by mouth Daily., Disp: 30 tablet, Rfl: 5  •  promethazine (PHENERGAN) 25 MG tablet, Take 1 tablet by mouth Every 6 (Six) Hours As Needed for Nausea or Vomiting., Disp: 30 tablet, Rfl: 0  •  Rimegepant Sulfate (Nurtec) 75 MG tablet dispersible tablet, Take 1 tablet by mouth Every Other Day. Take Monday,Wednesday,Friday, and Saturday., Disp: 16 tablet, Rfl: 5  •  tiZANidine (ZANAFLEX) 4 MG tablet, TAKE 1 TABLET BY MOUTH AT NIGHT AS NEEDED FOR MUSCLE SPASMS., Disp: 30 tablet, Rfl: 1  •  desvenlafaxine (Pristiq) 50 MG 24 hr tablet, Take 1 tablet by mouth Daily., Disp: 30 tablet, Rfl: 6  •  Diclofenac Sodium (VOLTAREN) 1 % gel gel, Apply 4 g topically to the appropriate area as directed 4 (Four) Times a Day As Needed (knee pain)., Disp: 100 g, Rfl: 5  •  famotidine (Pepcid) 20 MG tablet, Take 1 tablet by mouth 2 (Two) Times a Day for 14 days., Disp: 28 tablet, Rfl: 0    Allergies:  Allergies   Allergen Reactions   • Codeine Anaphylaxis   • Flexeril [Cyclobenzaprine] Other (See Comments)     \"gives me chest pain\"   • Asa [Aspirin] Hives     Wheezing     • Latex Hives   • Morphine Itching   • Penicillins " "Hives     Vomiting     • Triptans Other (See Comments)     Chest tightness, left arm numbness   • Compazine [Prochlorperazine] Anxiety   • Reglan [Metoclopramide] Anxiety       Objective     Vital Signs:   Vitals:    06/06/22 1544   BP: 130/90   Pulse: 108   Temp: 98 °F (36.7 °C)   SpO2: 92%   Weight: 129 kg (285 lb)   Height: 157.5 cm (62.01\")   PainSc:   7     Body mass index is 52.11 kg/m².    Physical Exam:    Physical Exam  Constitutional:       Appearance: She is well-developed. She is obese.   HENT:      Head: Normocephalic and atraumatic.      Right Ear: Tympanic membrane, ear canal and external ear normal.      Left Ear: Tympanic membrane, ear canal and external ear normal.      Nose: Nose normal.      Mouth/Throat:      Pharynx: Uvula midline.   Eyes:      Pupils: Pupils are equal, round, and reactive to light.   Cardiovascular:      Rate and Rhythm: Normal rate and regular rhythm.      Heart sounds: Normal heart sounds. No murmur heard.    No friction rub. No gallop.   Pulmonary:      Effort: Pulmonary effort is normal.      Breath sounds: Normal breath sounds.   Abdominal:      General: Bowel sounds are normal.      Palpations: Abdomen is soft.      Tenderness: There is no abdominal tenderness.   Musculoskeletal:      Cervical back: Neck supple.   Lymphadenopathy:      Head:      Right side of head: No submental, submandibular, tonsillar, preauricular or posterior auricular adenopathy.      Left side of head: No submental, submandibular, tonsillar, preauricular or posterior auricular adenopathy.      Cervical: No cervical adenopathy.   Skin:     General: Skin is warm and dry.      Capillary Refill: Capillary refill takes less than 2 seconds.   Neurological:      General: No focal deficit present.      Mental Status: She is alert and oriented to person, place, and time.   Psychiatric:         Mood and Affect: Mood normal.         Behavior: Behavior normal.         Assessment / Plan     Assessment/Plan: "   Problem List Items Addressed This Visit    None     Visit Diagnoses     Diarrhea, unspecified type    -  Primary    Relevant Orders    Gastrointestinal Panel, PCR - Stool, Per Rectum    Generalized abdominal pain        Relevant Medications    promethazine (PHENERGAN) 25 MG tablet    AGE (acute gastroenteritis)        Relevant Medications    promethazine (PHENERGAN) 25 MG tablet    famotidine (Pepcid) 20 MG tablet    Other Relevant Orders    Gastrointestinal Panel, PCR - Stool, Per Rectum        -- symptoms viral, check GI panel due to diarrhea, supportive care with pepcid and prometh discussed     Follow up:  As needed    Electronically signed by CLAIR Leal   06/06/2022 16:04 EDT      Please note that portions of this note may have been completed with a voice recognition program. Efforts were made to edit the dictations, but occasionally words are mistranscribed.

## 2022-06-10 ENCOUNTER — TELEPHONE (OUTPATIENT)
Dept: FAMILY MEDICINE CLINIC | Facility: CLINIC | Age: 37
End: 2022-06-10

## 2022-06-10 NOTE — TELEPHONE ENCOUNTER
Caller: Lindsay Amezquita    Relationship: Self    Best call back number: [phone]    What medication are you requesting: DIFLUCAN     What are your current symptoms: ITCHING, BURNING, IRRITATION     How long have you been experiencing symptoms: 4 DAYS     Have you had these symptoms before:    [x] Yes  [] No    Have you been treated for these symptoms before:   [x] Yes  [] No    If a prescription is needed, what is your preferred pharmacy and phone number: [facility]/PHARMACY #[id number] - [place], [place] - [address] [phone] [facility] [phone]      Additional notes: PATIENT STATES THE YEAST INFECTION STARTED AFTER SHE STARTED THE NEW MEDICATION PCP STARTED HER ON.

## 2022-06-13 RX ORDER — FLUCONAZOLE 150 MG/1
150 TABLET ORAL ONCE
Qty: 1 TABLET | Refills: 0 | Status: SHIPPED | OUTPATIENT
Start: 2022-06-13 | End: 2022-06-13

## 2022-06-15 ENCOUNTER — TELEPHONE (OUTPATIENT)
Dept: FAMILY MEDICINE CLINIC | Facility: CLINIC | Age: 37
End: 2022-06-15

## 2022-06-15 NOTE — TELEPHONE ENCOUNTER
Patient has been feeling depressed and anxious. She had a severe panic attack, and is also having vivid nightmares. This started @ 1 wk ago. She states that her therapist told her to let us know.

## 2022-06-16 DIAGNOSIS — F41.1 GAD (GENERALIZED ANXIETY DISORDER): ICD-10-CM

## 2022-06-16 NOTE — TELEPHONE ENCOUNTER
Rx Refill Note  Requested Prescriptions     Pending Prescriptions Disp Refills   • ALPRAZolam (Xanax) 0.5 MG tablet 60 tablet 2     Sig: Take 1 tablet by mouth 2 (Two) Times a Day As Needed for Anxiety.      Last office visit with prescribing clinician: 6/6/2022      Next office visit with prescribing clinician: Visit date not found            Reyna Rodrigues MA  06/16/22, 15:41 EDT

## 2022-06-19 DIAGNOSIS — I82.432 ACUTE DEEP VEIN THROMBOSIS (DVT) OF POPLITEAL VEIN OF LEFT LOWER EXTREMITY: ICD-10-CM

## 2022-06-20 DIAGNOSIS — F41.1 GAD (GENERALIZED ANXIETY DISORDER): ICD-10-CM

## 2022-06-20 RX ORDER — APIXABAN 5 MG/1
TABLET, FILM COATED ORAL
Qty: 60 TABLET | Refills: 1 | Status: SHIPPED | OUTPATIENT
Start: 2022-06-20 | End: 2022-08-25 | Stop reason: SDUPTHER

## 2022-06-20 RX ORDER — ALPRAZOLAM 0.5 MG/1
0.5 TABLET ORAL 2 TIMES DAILY PRN
Qty: 60 TABLET | Refills: 0 | Status: SHIPPED | OUTPATIENT
Start: 2022-06-20 | End: 2022-07-14 | Stop reason: SDUPTHER

## 2022-06-20 RX ORDER — TIZANIDINE 4 MG/1
4 TABLET ORAL NIGHTLY PRN
Qty: 30 TABLET | Refills: 1 | Status: SHIPPED | OUTPATIENT
Start: 2022-06-20 | End: 2022-07-01 | Stop reason: SDUPTHER

## 2022-06-20 RX ORDER — ALPRAZOLAM 0.5 MG/1
0.5 TABLET ORAL 2 TIMES DAILY PRN
Qty: 60 TABLET | Refills: 0 | OUTPATIENT
Start: 2022-06-20 | End: 2023-06-20

## 2022-06-20 RX ORDER — TIZANIDINE 4 MG/1
4 TABLET ORAL NIGHTLY PRN
Qty: 30 TABLET | Refills: 1 | OUTPATIENT
Start: 2022-06-20

## 2022-06-20 NOTE — TELEPHONE ENCOUNTER
Caller: Lindsay Amezquita    Relationship: Self    Best call back number: [phone]    Requested Prescriptions:   Requested Prescriptions     Pending Prescriptions Disp Refills   • ALPRAZolam (Xanax) 0.5 MG tablet 60 tablet 0     Sig: Take 1 tablet by mouth 2 (Two) Times a Day As Needed for Anxiety.   • tiZANidine (ZANAFLEX) 4 MG tablet 30 tablet 1     Sig: Take 1 tablet by mouth At Night As Needed for Muscle Spasms.        Pharmacy where request should be sent: [facility]/PHARMACY #[id number] - [place], [place] - [address] [facility] [phone] [facility] [phone]      Additional details provided by patient: PATIENT STATED THAT SHE HAS COUPLE DAYS LEFT OF MEDICATION    Does the patient have less than a 3 day supply:  [x] Yes  [] No    Kim Johnson Rep   06/20/22 16:46 EDT

## 2022-06-23 ENCOUNTER — TELEMEDICINE (OUTPATIENT)
Dept: FAMILY MEDICINE CLINIC | Facility: CLINIC | Age: 37
End: 2022-06-23

## 2022-06-23 DIAGNOSIS — K58.2 IRRITABLE BOWEL SYNDROME WITH BOTH CONSTIPATION AND DIARRHEA: ICD-10-CM

## 2022-06-23 DIAGNOSIS — R19.7 DIARRHEA, UNSPECIFIED TYPE: ICD-10-CM

## 2022-06-23 DIAGNOSIS — R11.2 NAUSEA AND VOMITING, UNSPECIFIED VOMITING TYPE: Primary | ICD-10-CM

## 2022-06-23 DIAGNOSIS — E66.01 MORBID OBESITY: ICD-10-CM

## 2022-06-23 PROCEDURE — 99213 OFFICE O/P EST LOW 20 MIN: CPT | Performed by: NURSE PRACTITIONER

## 2022-06-23 RX ORDER — LOPERAMIDE HYDROCHLORIDE 2 MG/1
4 TABLET ORAL 4 TIMES DAILY PRN
Qty: 10 TABLET | Refills: 0 | Status: SHIPPED | OUTPATIENT
Start: 2022-06-23 | End: 2022-07-21

## 2022-06-23 RX ORDER — ONDANSETRON HYDROCHLORIDE 8 MG/1
8 TABLET, FILM COATED ORAL EVERY 8 HOURS PRN
Qty: 15 TABLET | Refills: 1 | Status: SHIPPED | OUTPATIENT
Start: 2022-06-23 | End: 2022-07-21

## 2022-06-23 NOTE — PROGRESS NOTES
Office Note     Patient Name: Lindsay Amezquita  Patient : 1985    Chief Complaint:  Nausea, diarrhea, sinus congestion, fever    Subjective     History of Present Illness:  Lindsay Amezquita is a 36 y.o. who is seen today for complaints of sinus pain/pressure, fever, consistent nausea, vomiting, diarrhea.  Patient states that she has had nausea and diarrhea consistently since before her last appointment with Stephanie Burdick on 2022.  Patient reports additional symptoms of sinus pain, sinus congestion, headache, cough, dry throat, fever 101.2 degrees this morning.  Patient states that her last appointment and that Gennaro asked her to bring in a stool sample but patient reports her diarrhea is so severe that she cannot leave the house out of fear of incontinence.  Patient states that she has been taking her current prescription nasal spray, Benadryl, allergy medicines over-the-counter for sinus symptom management.  Reports that she has Phenergan to take for nausea but it makes her sleepy, has not taken anything over-the-counter for diarrhea.  Patient reports that she is nauseated every time she eats but is able to drink and stay hydrated.  Patient has a history of IBS and anxiety but states that she does not think that her IBS is causing the diarrhea.    Review of Systems   Constitutional: Positive for chills, fatigue and fever.   HENT: Positive for congestion, sinus pressure and sinus pain.    Respiratory: Positive for cough. Negative for shortness of breath.    Gastrointestinal: Positive for diarrhea, nausea and vomiting. Negative for abdominal distention and abdominal pain.   Musculoskeletal: Negative for myalgias.   Neurological: Positive for headaches.   All other systems reviewed and are negative.      I have reviewed and/or updated the patient's past medical, surgical, family, social history and problem list as appropriate.    Current Outpatient Medications on File Prior to Visit   Medication Sig  Dispense Refill   • albuterol sulfate  (90 Base) MCG/ACT inhaler Inhale 2 puffs Every 4 (Four) Hours As Needed for Wheezing. 18 g 0   • ALPRAZolam (Xanax) 0.5 MG tablet Take 1 tablet by mouth 2 (Two) Times a Day As Needed for Anxiety. 60 tablet 0   • amitriptyline (ELAVIL) 150 MG tablet Take 1 tablet by mouth every night at bedtime. 90 tablet 1   • desvenlafaxine (Pristiq) 50 MG 24 hr tablet Take 1 tablet by mouth Daily. 30 tablet 6   • Diclofenac Sodium (VOLTAREN) 1 % gel gel Apply 4 g topically to the appropriate area as directed 4 (Four) Times a Day As Needed (knee pain). 100 g 5   • Eliquis 5 MG tablet tablet TAKE 1 TABLET BY MOUTH EVERY 12 HOURS 60 tablet 1   • fluticasone (FLONASE) 50 MCG/ACT nasal spray SPRAY 2 SPRAYS INTO THE NOSTRIL AS DIRECTED BY PROVIDER DAILY. 48 mL 1   • fluticasone (Flovent HFA) 110 MCG/ACT inhaler Inhale 2 puffs 2 (Two) Times a Day. 12 g 5   • furosemide (LASIX) 20 MG tablet Take 1 tablet by mouth Daily. 30 tablet 5   • gabapentin (NEURONTIN) 800 MG tablet TAKE 1 TABLET BY MOUTH THREE TIMES A DAY 90 tablet 2   • glucose blood test strip Check blood sugar once daily 25 each 12   • glucose monitor monitoring kit Check blood sugar once daily 1 each 0   • Hydrocortisone, Perianal, (Proctozone-HC) 2.5 % rectal cream Insert  into the rectum 2 (Two) Times a Day. 28 g 0   • hydrOXYzine (ATARAX) 25 MG tablet Take 1 tablet by mouth 3 (Three) Times a Day As Needed for Anxiety. 30 tablet 5   • Lancets (freestyle) lancets 1 each by Other route 4 (Four) Times a Day. 400 each 3   • Lancets Misc. (ONE TOUCH SURESOFT) misc Check blood sugar once daily 1 each 0   • levocetirizine (XYZAL) 5 MG tablet Take 1 tablet by mouth Every Evening. 30 tablet 5   • Lurasidone HCl (Latuda) 120 MG tablet tablet Take 1 tablet by mouth Daily. Take 120 mg orally daily with a meal. 30 tablet 6   • pantoprazole (PROTONIX) 40 MG EC tablet Take 1 tablet by mouth Daily. 30 tablet 5   • promethazine (PHENERGAN) 25 MG  "tablet Take 1 tablet by mouth Every 6 (Six) Hours As Needed for Nausea or Vomiting. 30 tablet 0   • Rimegepant Sulfate (Nurtec) 75 MG tablet dispersible tablet Take 1 tablet by mouth Every Other Day. Take Monday,Wednesday,Friday, and Saturday. 16 tablet 5   • tiZANidine (ZANAFLEX) 4 MG tablet TAKE 1 TABLET BY MOUTH AT NIGHT AS NEEDED FOR MUSCLE SPASMS. 30 tablet 1     No current facility-administered medications on file prior to visit.       Allergies   Allergen Reactions   • Codeine Anaphylaxis   • Flexeril [Cyclobenzaprine] Other (See Comments)     \"gives me chest pain\"   • Asa [Aspirin] Hives     Wheezing     • Latex Hives   • Morphine Itching   • Penicillins Hives     Vomiting     • Triptans Other (See Comments)     Chest tightness, left arm numbness   • Compazine [Prochlorperazine] Anxiety   • Reglan [Metoclopramide] Anxiety       Objective     There were no vitals taken for this visit.    Class 3 Severe Obesity (BMI >=40). Obesity-related health conditions include the following: GERD. Obesity is unchanged. BMI is is above average; BMI management plan is completed. We discussed portion control, increasing exercise and joining a fitness center or start home based exercise program.    Physical Exam  Constitutional:       Appearance: She is obese.   HENT:      Head: Normocephalic.   Pulmonary:      Effort: Pulmonary effort is normal.   Neurological:      General: No focal deficit present.      Mental Status: She is alert and oriented to person, place, and time.   Psychiatric:         Mood and Affect: Mood normal.         Behavior: Behavior normal.         Thought Content: Thought content normal.         Judgment: Judgment normal.          The following data was reviewed by: CLAIR Wagoner on 06/23/2022:  Common labs    Common Labsle 12/23/21 12/23/21 2/25/22 2/25/22 5/6/22 5/6/22    1102 1102 2235 2235 1026 1026   Glucose 116 (A)   122 (A)  119 (A)   BUN 4 (A)   6  3 (A)   Creatinine 0.61   0.61  0.65 "   eGFR Non African Am 110   110     eGFR African Am 134        Sodium 137   139  137   Potassium 3.9   3.8  3.7   Chloride 101   102  100   Calcium 9.0   9.3  9.5   Total Protein 6.1        Albumin 4.20   4.00  4.50   Total Bilirubin 0.3   0.2  0.7   Alkaline Phosphatase 76   67  82   AST (SGOT) 19   18  27   ALT (SGPT) 31   22  30   WBC  8.36 9.06  13.51 (A)    Hemoglobin  13.3 13.3  15.8    Hematocrit  39.6 38.7  47.1 (A)    Platelets  286 287  310    (A) Abnormal value       Comments are available for some flowsheets but are not being displayed.           Data reviewed: last office visit note with CLAIR Leal on 6/13/2022           Assessment / Plan      Diagnoses and all orders for this visit:    1. Nausea and vomiting, unspecified vomiting type (Primary)  -     ondansetron (Zofran) 8 MG tablet; Take 1 tablet by mouth Every 8 (Eight) Hours As Needed for Nausea or Vomiting.  Dispense: 15 tablet; Refill: 1  -     XR Abdomen KUB; Future    2. Diarrhea, unspecified type  -     loperamide (Imodium A-D) 2 MG tablet; Take 2 tablets by mouth 4 (Four) Times a Day As Needed for Diarrhea.  Dispense: 10 tablet; Refill: 0  -     XR Abdomen KUB; Future    3. Irritable bowel syndrome with both constipation and diarrhea    4. Morbid obesity (HCC)  Assessment & Plan:  Patient's (There is no height or weight on file to calculate BMI.) indicates that they are morbidly obese (BMI > 40 or > 35 with obesity - related health condition) with health conditions that include GERD . Weight is unchanged. BMI is is above average; BMI management plan is completed. We discussed portion control, increasing exercise and joining a fitness center or start home based exercise program.          Follow Up  Return in about 4 days (around 6/27/2022) for Patient has appointment with Stephanie on Monday.    Patient was given instructions and counseling regarding her condition or for health maintenance advice. Please see specific information  pulled into the AVS if appropriate.

## 2022-06-23 NOTE — ASSESSMENT & PLAN NOTE
Patient's (There is no height or weight on file to calculate BMI.) indicates that they are morbidly obese (BMI > 40 or > 35 with obesity - related health condition) with health conditions that include GERD . Weight is unchanged. BMI is is above average; BMI management plan is completed. We discussed portion control, increasing exercise and joining a fitness center or start home based exercise program.

## 2022-06-27 DIAGNOSIS — G89.29 CHRONIC BILATERAL LOW BACK PAIN WITH BILATERAL SCIATICA: ICD-10-CM

## 2022-06-27 DIAGNOSIS — M54.41 CHRONIC BILATERAL LOW BACK PAIN WITH BILATERAL SCIATICA: ICD-10-CM

## 2022-06-27 DIAGNOSIS — M54.42 CHRONIC BILATERAL LOW BACK PAIN WITH BILATERAL SCIATICA: ICD-10-CM

## 2022-06-27 RX ORDER — GABAPENTIN 800 MG/1
800 TABLET ORAL 3 TIMES DAILY
Qty: 90 TABLET | Refills: 2 | Status: CANCELLED | OUTPATIENT
Start: 2022-06-27

## 2022-06-28 RX ORDER — GABAPENTIN 800 MG/1
800 TABLET ORAL 3 TIMES DAILY
Qty: 90 TABLET | Refills: 2 | Status: SHIPPED | OUTPATIENT
Start: 2022-06-28 | End: 2022-07-25 | Stop reason: SDUPTHER

## 2022-06-28 NOTE — TELEPHONE ENCOUNTER
Rx Refill Note  Requested Prescriptions     Pending Prescriptions Disp Refills   • gabapentin (NEURONTIN) 800 MG tablet 90 tablet 2     Sig: Take 1 tablet by mouth 3 (Three) Times a Day.      Last office visit with prescribing clinician: 6/6/2022      Next office visit with prescribing clinician: Visit date not found     Ok to fill?    uds and csa are still currently up to date.  Rossy Narvaez MA  06/28/22, 09:27 EDT

## 2022-07-01 ENCOUNTER — TELEPHONE (OUTPATIENT)
Dept: FAMILY MEDICINE CLINIC | Facility: CLINIC | Age: 37
End: 2022-07-01

## 2022-07-01 RX ORDER — TIZANIDINE 4 MG/1
4 TABLET ORAL EVERY 8 HOURS PRN
Qty: 90 TABLET | Refills: 1 | Status: SHIPPED | OUTPATIENT
Start: 2022-07-01 | End: 2022-08-12 | Stop reason: SDUPTHER

## 2022-07-01 NOTE — TELEPHONE ENCOUNTER
Caller: Lindsay Amezquita    Relationship: Self    Best call back number: [phone]    What is the best time to reach you: ANY TIME     Who are you requesting to speak with (clinical staff, provider,  specific staff member): NADER FARRIS OR HER NURSE     Do you know the name of the person who called:     What was the call regarding: tiZANidine (ZANAFLEX) 4 MG tablet - PATIENT CALLED TO REQUEST A DOSE INCREASE. PLEASE ADVISE     [facility]/pharmacy #[id number] - [place] - [address] [facility] [phone] [facility] [phone] FX    Do you require a callback: YES

## 2022-07-01 NOTE — TELEPHONE ENCOUNTER
Caller: Lindsay Amezquita    Relationship: Self    Best call back number: [phone]    What is the best time to reach you: ANYTIME     Who are you requesting to speak with (clinical staff, provider,  specific staff member): CLINICAL STAFF    What was the call regarding: PATIENT WOULD LIKE TO KNOW IF SHE CAN TAKE THE tiZANidine (ZANAFLEX) 4 MG tablet TWICE DAILY. ONE IN THE MORNING AND ONE AT NIGHT.    Do you require a callback: YES

## 2022-07-05 ENCOUNTER — TELEPHONE (OUTPATIENT)
Dept: FAMILY MEDICINE CLINIC | Facility: CLINIC | Age: 37
End: 2022-07-05

## 2022-07-05 NOTE — TELEPHONE ENCOUNTER
Caller: Lindsay Amezquita    Relationship: Self    Best call back number:     What is the best time to reach you: ANYTIME    Who are you requesting to speak with (clinical staff, provider,  specific staff member): CLINICAL    Do you know the name of the person who called: LINSDAY    What was the call regarding: PATIENT WAS ASKING WHO SHE NEEDED TO SEE ABOUT GETTING BACK ON THE BIRTH CONTROL SHOT AND I ALSO UPDATED HER PHARMACY    Do you require a callback: YES

## 2022-07-06 ENCOUNTER — TELEMEDICINE (OUTPATIENT)
Dept: BEHAVIORAL HEALTH | Facility: CLINIC | Age: 37
End: 2022-07-06

## 2022-07-06 DIAGNOSIS — F51.04 PSYCHOPHYSIOLOGICAL INSOMNIA: ICD-10-CM

## 2022-07-06 DIAGNOSIS — F31.30 BIPOLAR I DISORDER, MOST RECENT EPISODE DEPRESSED: ICD-10-CM

## 2022-07-06 DIAGNOSIS — F43.10 POST TRAUMATIC STRESS DISORDER (PTSD): ICD-10-CM

## 2022-07-06 DIAGNOSIS — F41.1 GENERALIZED ANXIETY DISORDER: Primary | ICD-10-CM

## 2022-07-06 DIAGNOSIS — K21.9 GASTROESOPHAGEAL REFLUX DISEASE, UNSPECIFIED WHETHER ESOPHAGITIS PRESENT: ICD-10-CM

## 2022-07-06 PROCEDURE — 99214 OFFICE O/P EST MOD 30 MIN: CPT

## 2022-07-06 RX ORDER — SAW/PYGEUM/BETA/HERB/D3/B6/ZN 30 MG-25MG
10 CAPSULE ORAL NIGHTLY
Qty: 30 TABLET | Refills: 1 | Status: SHIPPED | OUTPATIENT
Start: 2022-07-06 | End: 2022-08-17 | Stop reason: SDUPTHER

## 2022-07-06 RX ORDER — DESVENLAFAXINE 100 MG/1
100 TABLET, EXTENDED RELEASE ORAL DAILY
Qty: 30 TABLET | Refills: 0 | Status: SHIPPED | OUTPATIENT
Start: 2022-07-06 | End: 2022-08-03

## 2022-07-06 RX ORDER — PANTOPRAZOLE SODIUM 40 MG/1
TABLET, DELAYED RELEASE ORAL
Qty: 90 TABLET | Refills: 1 | Status: SHIPPED | OUTPATIENT
Start: 2022-07-06 | End: 2022-07-21

## 2022-07-06 NOTE — TELEPHONE ENCOUNTER
She needs to go see GYN due to needs for PAP and PCOS. She is established in [place], does she want to continue to go there
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
## 2022-07-07 ENCOUNTER — HOSPITAL ENCOUNTER (EMERGENCY)
Facility: HOSPITAL | Age: 37
Discharge: HOME | End: 2022-07-07
Attending: FAMILY MEDICINE | Admitting: FAMILY MEDICINE

## 2022-07-07 ENCOUNTER — APPOINTMENT (OUTPATIENT)
Dept: CT IMAGING | Facility: HOSPITAL | Age: 37
End: 2022-07-07

## 2022-07-07 VITALS
SYSTOLIC BLOOD PRESSURE: 113 MMHG | DIASTOLIC BLOOD PRESSURE: 88 MMHG | HEART RATE: 88 BPM | OXYGEN SATURATION: 93 % | HEIGHT: 62 IN | TEMPERATURE: 97.9 F | BODY MASS INDEX: 50.61 KG/M2 | WEIGHT: 275 LBS | RESPIRATION RATE: 20 BRPM

## 2022-07-07 DIAGNOSIS — F43.9 STRESS: ICD-10-CM

## 2022-07-07 DIAGNOSIS — G43.909 MIGRAINE WITHOUT STATUS MIGRAINOSUS, NOT INTRACTABLE, UNSPECIFIED MIGRAINE TYPE: Primary | ICD-10-CM

## 2022-07-07 LAB
ALBUMIN SERPL-MCNC: 4.1 G/DL (ref 3.5–5.2)
ALBUMIN/GLOB SERPL: 1.9 G/DL
ALP SERPL-CCNC: 74 U/L (ref 39–117)
ALT SERPL W P-5'-P-CCNC: 27 U/L (ref 1–33)
ANION GAP SERPL CALCULATED.3IONS-SCNC: 13.5 MMOL/L (ref 5–15)
AST SERPL-CCNC: 24 U/L (ref 1–32)
B-HCG UR QL: NEGATIVE
BACTERIA UR QL AUTO: ABNORMAL /HPF
BASOPHILS # BLD AUTO: 0.04 10*3/MM3 (ref 0–0.2)
BASOPHILS NFR BLD AUTO: 0.4 % (ref 0–1.5)
BILIRUB SERPL-MCNC: 0.2 MG/DL (ref 0–1.2)
BILIRUB UR QL STRIP: NEGATIVE
BUN SERPL-MCNC: 7 MG/DL (ref 6–20)
BUN/CREAT SERPL: 11.7 (ref 7–25)
CALCIUM SPEC-SCNC: 9.4 MG/DL (ref 8.6–10.5)
CHLORIDE SERPL-SCNC: 100 MMOL/L (ref 98–107)
CLARITY UR: CLEAR
CO2 SERPL-SCNC: 25.5 MMOL/L (ref 22–29)
COLOR UR: YELLOW
CREAT SERPL-MCNC: 0.6 MG/DL (ref 0.57–1)
CRP SERPL-MCNC: 0.96 MG/DL (ref 0–0.5)
DEPRECATED RDW RBC AUTO: 43.9 FL (ref 37–54)
EGFRCR SERPLBLD CKD-EPI 2021: 118.7 ML/MIN/1.73
EOSINOPHIL # BLD AUTO: 0.18 10*3/MM3 (ref 0–0.4)
EOSINOPHIL NFR BLD AUTO: 1.9 % (ref 0.3–6.2)
ERYTHROCYTE [DISTWIDTH] IN BLOOD BY AUTOMATED COUNT: 13.7 % (ref 12.3–15.4)
FLUAV RNA RESP QL NAA+PROBE: NOT DETECTED
FLUBV RNA RESP QL NAA+PROBE: NOT DETECTED
GLOBULIN UR ELPH-MCNC: 2.2 GM/DL
GLUCOSE SERPL-MCNC: 128 MG/DL (ref 65–99)
GLUCOSE UR STRIP-MCNC: NEGATIVE MG/DL
HCT VFR BLD AUTO: 37.9 % (ref 34–46.6)
HGB BLD-MCNC: 13.1 G/DL (ref 12–15.9)
HGB UR QL STRIP.AUTO: ABNORMAL
HYALINE CASTS UR QL AUTO: ABNORMAL /LPF
IMM GRANULOCYTES # BLD AUTO: 0.06 10*3/MM3 (ref 0–0.05)
IMM GRANULOCYTES NFR BLD AUTO: 0.6 % (ref 0–0.5)
KETONES UR QL STRIP: NEGATIVE
LEUKOCYTE ESTERASE UR QL STRIP.AUTO: ABNORMAL
LIPASE SERPL-CCNC: 29 U/L (ref 13–60)
LYMPHOCYTES # BLD AUTO: 3.57 10*3/MM3 (ref 0.7–3.1)
LYMPHOCYTES NFR BLD AUTO: 37.5 % (ref 19.6–45.3)
MCH RBC QN AUTO: 30.3 PG (ref 26.6–33)
MCHC RBC AUTO-ENTMCNC: 34.6 G/DL (ref 31.5–35.7)
MCV RBC AUTO: 87.5 FL (ref 79–97)
MONOCYTES # BLD AUTO: 0.4 10*3/MM3 (ref 0.1–0.9)
MONOCYTES NFR BLD AUTO: 4.2 % (ref 5–12)
NEUTROPHILS NFR BLD AUTO: 5.28 10*3/MM3 (ref 1.7–7)
NEUTROPHILS NFR BLD AUTO: 55.4 % (ref 42.7–76)
NITRITE UR QL STRIP: NEGATIVE
NRBC BLD AUTO-RTO: 0 /100 WBC (ref 0–0.2)
PH UR STRIP.AUTO: 6 [PH] (ref 5–8)
PLATELET # BLD AUTO: 304 10*3/MM3 (ref 140–450)
PMV BLD AUTO: 9.9 FL (ref 6–12)
POTASSIUM SERPL-SCNC: 3.7 MMOL/L (ref 3.5–5.2)
PROCALCITONIN SERPL-MCNC: 0.04 NG/ML (ref 0–0.25)
PROT SERPL-MCNC: 6.3 G/DL (ref 6–8.5)
PROT UR QL STRIP: NEGATIVE
RBC # BLD AUTO: 4.33 10*6/MM3 (ref 3.77–5.28)
RBC # UR STRIP: ABNORMAL /HPF
REF LAB TEST METHOD: ABNORMAL
SARS-COV-2 RNA RESP QL NAA+PROBE: NOT DETECTED
SODIUM SERPL-SCNC: 139 MMOL/L (ref 136–145)
SP GR UR STRIP: 1.01 (ref 1–1.03)
SQUAMOUS #/AREA URNS HPF: ABNORMAL /HPF
UROBILINOGEN UR QL STRIP: ABNORMAL
WBC # UR STRIP: ABNORMAL /HPF
WBC NRBC COR # BLD: 9.53 10*3/MM3 (ref 3.4–10.8)

## 2022-07-07 PROCEDURE — 86140 C-REACTIVE PROTEIN: CPT | Performed by: FAMILY MEDICINE

## 2022-07-07 PROCEDURE — 87636 SARSCOV2 & INF A&B AMP PRB: CPT | Performed by: FAMILY MEDICINE

## 2022-07-07 PROCEDURE — 96375 TX/PRO/DX INJ NEW DRUG ADDON: CPT

## 2022-07-07 PROCEDURE — 83690 ASSAY OF LIPASE: CPT | Performed by: FAMILY MEDICINE

## 2022-07-07 PROCEDURE — 25010000002 DIPHENHYDRAMINE PER 50 MG: Performed by: FAMILY MEDICINE

## 2022-07-07 PROCEDURE — 81025 URINE PREGNANCY TEST: CPT | Performed by: FAMILY MEDICINE

## 2022-07-07 PROCEDURE — 25010000002 DEXAMETHASONE PER 1 MG: Performed by: FAMILY MEDICINE

## 2022-07-07 PROCEDURE — 25010000002 ONDANSETRON PER 1 MG: Performed by: FAMILY MEDICINE

## 2022-07-07 PROCEDURE — 80053 COMPREHEN METABOLIC PANEL: CPT | Performed by: FAMILY MEDICINE

## 2022-07-07 PROCEDURE — 70450 CT HEAD/BRAIN W/O DYE: CPT

## 2022-07-07 PROCEDURE — 84145 PROCALCITONIN (PCT): CPT | Performed by: FAMILY MEDICINE

## 2022-07-07 PROCEDURE — 81001 URINALYSIS AUTO W/SCOPE: CPT | Performed by: FAMILY MEDICINE

## 2022-07-07 PROCEDURE — 96374 THER/PROPH/DIAG INJ IV PUSH: CPT

## 2022-07-07 PROCEDURE — 99283 EMERGENCY DEPT VISIT LOW MDM: CPT

## 2022-07-07 PROCEDURE — 85025 COMPLETE CBC W/AUTO DIFF WBC: CPT | Performed by: FAMILY MEDICINE

## 2022-07-07 RX ORDER — PROCHLORPERAZINE EDISYLATE 5 MG/ML
5 INJECTION INTRAMUSCULAR; INTRAVENOUS ONCE
Status: DISCONTINUED | OUTPATIENT
Start: 2022-07-07 | End: 2022-07-07

## 2022-07-07 RX ORDER — BUTALBITAL, ACETAMINOPHEN AND CAFFEINE 50; 325; 40 MG/1; MG/1; MG/1
1 TABLET ORAL ONCE
Status: COMPLETED | OUTPATIENT
Start: 2022-07-07 | End: 2022-07-07

## 2022-07-07 RX ORDER — DIPHENHYDRAMINE HYDROCHLORIDE 50 MG/ML
12.5 INJECTION INTRAMUSCULAR; INTRAVENOUS ONCE
Status: COMPLETED | OUTPATIENT
Start: 2022-07-07 | End: 2022-07-07

## 2022-07-07 RX ORDER — ONDANSETRON 2 MG/ML
4 INJECTION INTRAMUSCULAR; INTRAVENOUS ONCE
Status: COMPLETED | OUTPATIENT
Start: 2022-07-07 | End: 2022-07-07

## 2022-07-07 RX ORDER — SODIUM CHLORIDE 0.9 % (FLUSH) 0.9 %
10 SYRINGE (ML) INJECTION AS NEEDED
Status: DISCONTINUED | OUTPATIENT
Start: 2022-07-07 | End: 2022-07-07 | Stop reason: HOSPADM

## 2022-07-07 RX ADMIN — SODIUM CHLORIDE 1000 ML: 9 INJECTION, SOLUTION INTRAVENOUS at 04:33

## 2022-07-07 RX ADMIN — BUTALBITAL, ACETAMINOPHEN, AND CAFFEINE 1 TABLET: 50; 325; 40 TABLET ORAL at 06:32

## 2022-07-07 RX ADMIN — DIPHENHYDRAMINE HYDROCHLORIDE 12.5 MG: 50 INJECTION INTRAMUSCULAR; INTRAVENOUS at 04:33

## 2022-07-07 RX ADMIN — ONDANSETRON 4 MG: 2 INJECTION INTRAMUSCULAR; INTRAVENOUS at 04:33
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
## 2022-07-11 ENCOUNTER — TELEPHONE (OUTPATIENT)
Dept: FAMILY MEDICINE CLINIC | Facility: CLINIC | Age: 37
End: 2022-07-11

## 2022-07-11 NOTE — TELEPHONE ENCOUNTER
Caller: Lindsay Amezquita    Relationship: Self    Best call back number: [phone]     What is the best time to reach you: ANY    Who are you requesting to speak with (clinical staff, provider,  specific staff member): CLINICAL/DR    What was the call regarding: PATIENT WANTS ADVICE ONE WHAT TO DO. PATIENT WAS ADMITTED TO THE [facility] ON WED WITH STRESS INDUCED MIGRAINE. DOCTORS GAVE HER ZOFRAN, PATIENT STATES SHE IS ALLERGIC TO ZOFRAN AND IT MAKES HER SEVERELY CONSTIPATED. TO ALEIVE HER CONSTIPATION SHE DRANK APPROXIMATELY A GALLON OF APPLE JUICE AND THEN THREW IT UP. NOW IS NAUSEOUS AND HAS DIARRHEA.     Do you require a callback: YES PLEASE.

## 2022-07-14 DIAGNOSIS — F41.1 GAD (GENERALIZED ANXIETY DISORDER): ICD-10-CM

## 2022-07-14 RX ORDER — ALPRAZOLAM 0.5 MG/1
0.5 TABLET ORAL 2 TIMES DAILY PRN
Qty: 60 TABLET | Refills: 0 | Status: SHIPPED | OUTPATIENT
Start: 2022-07-14 | End: 2022-08-17 | Stop reason: SDUPTHER

## 2022-07-21 ENCOUNTER — OFFICE VISIT (OUTPATIENT)
Dept: NEUROLOGY | Facility: CLINIC | Age: 37
End: 2022-07-21

## 2022-07-21 VITALS
TEMPERATURE: 98 F | BODY MASS INDEX: 51.16 KG/M2 | HEART RATE: 104 BPM | SYSTOLIC BLOOD PRESSURE: 122 MMHG | DIASTOLIC BLOOD PRESSURE: 70 MMHG | OXYGEN SATURATION: 96 % | WEIGHT: 278 LBS | HEIGHT: 62 IN

## 2022-07-21 DIAGNOSIS — G43.001 MIGRAINE WITHOUT AURA AND WITH STATUS MIGRAINOSUS, NOT INTRACTABLE: Primary | ICD-10-CM

## 2022-07-21 PROCEDURE — 99213 OFFICE O/P EST LOW 20 MIN: CPT | Performed by: NURSE PRACTITIONER

## 2022-07-21 RX ORDER — RIMEGEPANT SULFATE 75 MG/75MG
75 TABLET, ORALLY DISINTEGRATING ORAL EVERY OTHER DAY
Qty: 16 TABLET | Refills: 11 | Status: SHIPPED | OUTPATIENT
Start: 2022-07-21

## 2022-07-21 RX ORDER — BUTALBITAL, ACETAMINOPHEN AND CAFFEINE 50; 325; 40 MG/1; MG/1; MG/1
1 TABLET ORAL EVERY 6 HOURS PRN
Qty: 20 TABLET | Refills: 0 | Status: SHIPPED | OUTPATIENT
Start: 2022-07-21

## 2022-07-21 NOTE — PROGRESS NOTES
Follow Up Neurology Office Visit      Patient Name: Lindsay Amezquita    Referring Physician: No ref. provider found    Chief Complaint:    Chief Complaint   Patient presents with   • Follow-up     Pt in office to follow up on migraines       History of Present Illness: Lindsay Amezquita is a very pleasant 37 y.o. female who is here to follow up with Neurology for migraine headaches. She is accompanied by her mother today.   'Nurtec is doing wonders'  She estimates 4-5 MHD per month. Rare intermittent headaches.   One ER visit     The following portions of the patient's history were reviewed and updated as appropriate: allergies, current medications, past family history, past medical history, past social history, past surgical history and problem list.    Subjective     Review of Systems:   Review of Systems   Neurological: Positive for headache.   All other systems reviewed and are negative.    Medications:     Current Outpatient Medications:   •  albuterol sulfate  (90 Base) MCG/ACT inhaler, Inhale 2 puffs Every 4 (Four) Hours As Needed for Wheezing., Disp: 18 g, Rfl: 0  •  ALPRAZolam (Xanax) 0.5 MG tablet, Take 1 tablet by mouth 2 (Two) Times a Day As Needed for Anxiety., Disp: 60 tablet, Rfl: 0  •  amitriptyline (ELAVIL) 150 MG tablet, Take 1 tablet by mouth every night at bedtime., Disp: 90 tablet, Rfl: 1  •  desvenlafaxine (Pristiq) 100 MG 24 hr tablet, Take 1 tablet by mouth Daily., Disp: 30 tablet, Rfl: 0  •  Diclofenac Sodium (VOLTAREN) 1 % gel gel, Apply 4 g topically to the appropriate area as directed 4 (Four) Times a Day As Needed (knee pain)., Disp: 100 g, Rfl: 5  •  Eliquis 5 MG tablet tablet, TAKE 1 TABLET BY MOUTH EVERY 12 HOURS, Disp: 60 tablet, Rfl: 1  •  fluticasone (FLONASE) 50 MCG/ACT nasal spray, SPRAY 2 SPRAYS INTO THE NOSTRIL AS DIRECTED BY PROVIDER DAILY., Disp: 48 mL, Rfl: 1  •  fluticasone (Flovent HFA) 110 MCG/ACT inhaler, Inhale 2 puffs 2 (Two) Times a Day., Disp: 12 g, Rfl: 5  •   "furosemide (LASIX) 20 MG tablet, Take 1 tablet by mouth Daily., Disp: 30 tablet, Rfl: 5  •  gabapentin (NEURONTIN) 800 MG tablet, Take 1 tablet by mouth 3 (Three) Times a Day., Disp: 90 tablet, Rfl: 2  •  glucose blood test strip, Check blood sugar once daily, Disp: 25 each, Rfl: 12  •  glucose monitor monitoring kit, Check blood sugar once daily, Disp: 1 each, Rfl: 0  •  Hydrocortisone, Perianal, (Proctozone-HC) 2.5 % rectal cream, Insert  into the rectum 2 (Two) Times a Day., Disp: 28 g, Rfl: 0  •  hydrOXYzine (ATARAX) 25 MG tablet, Take 1 tablet by mouth 3 (Three) Times a Day As Needed for Anxiety., Disp: 30 tablet, Rfl: 5  •  Lancets (freestyle) lancets, 1 each by Other route 4 (Four) Times a Day., Disp: 400 each, Rfl: 3  •  Lancets Misc. (ONE TOUCH SURESOFT) misc, Check blood sugar once daily, Disp: 1 each, Rfl: 0  •  levocetirizine (XYZAL) 5 MG tablet, Take 1 tablet by mouth Every Evening., Disp: 30 tablet, Rfl: 5  •  Lurasidone HCl (Latuda) 120 MG tablet tablet, Take 1 tablet by mouth Daily. Take 120 mg orally daily with a meal., Disp: 30 tablet, Rfl: 6  •  Melatonin ER 10 MG tablet controlled-release, Take 1 tablet by mouth Every Night., Disp: 30 tablet, Rfl: 1  •  promethazine (PHENERGAN) 25 MG tablet, Take 1 tablet by mouth Every 6 (Six) Hours As Needed for Nausea or Vomiting., Disp: 30 tablet, Rfl: 0  •  Rimegepant Sulfate (Nurtec) 75 MG tablet dispersible tablet, Take 1 tablet by mouth Every Other Day. Take Monday,Wednesday,Friday, and Saturday., Disp: 16 tablet, Rfl: 11  •  tiZANidine (ZANAFLEX) 4 MG tablet, Take 1 tablet by mouth Every 8 (Eight) Hours As Needed for Muscle Spasms., Disp: 90 tablet, Rfl: 1  •  butalbital-acetaminophen-caffeine (Esgic) -40 MG per tablet, Take 1 tablet by mouth Every 6 (Six) Hours As Needed for Migraine., Disp: 20 tablet, Rfl: 0    Allergies:   Allergies   Allergen Reactions   • Codeine Anaphylaxis   • Flexeril [Cyclobenzaprine] Other (See Comments)     \"gives me " "chest pain\"   • Asa [Aspirin] Hives     Wheezing     • Latex Hives   • Morphine Itching   • Oxycontin [Oxycodone] GI Intolerance   • Penicillins Hives     Vomiting     • Triptans Other (See Comments)     Chest tightness, left arm numbness   • Zofran [Ondansetron] GI Intolerance   • Compazine [Prochlorperazine] Anxiety   • Reglan [Metoclopramide] Anxiety     Objective     Physical Exam:  Vital Signs:   Vitals:    07/21/22 1423   BP: 122/70   BP Location: Right arm   Patient Position: Sitting   Cuff Size: Adult   Pulse: 104   Temp: 98 °F (36.7 °C)   SpO2: 96%   Weight: 126 kg (278 lb)   Height: 157.5 cm (62\")   PainSc:   7   PainLoc: Head     Physical Exam  Vitals and nursing note reviewed. Exam conducted with a chaperone present (mother).   Pulmonary:      Effort: Pulmonary effort is normal.   Skin:     General: Skin is warm.   Neurological:      Mental Status: She is oriented to person, place, and time. Mental status is at baseline.      Gait: Gait is intact.      Deep Tendon Reflexes: Strength normal.   Psychiatric:         Mood and Affect: Mood normal.         Speech: Speech normal.         Behavior: Behavior normal.       Neurologic Exam     Mental Status   Oriented to person, place, and time.   Attention: normal. Concentration: normal.   Speech: speech is normal   Level of consciousness: alert  Normal comprehension.     Cranial Nerves   Cranial nerves II through XII intact.     CN II   Visual acuity: normal with correction    Motor Exam   Muscle bulk: normal  Overall muscle tone: normal    Strength   Strength 5/5 throughout.     Gait, Coordination, and Reflexes     Gait  Gait: normal    Tremor   Resting tremor: absent    Results Review:   I reviewed the patient's new clinical results.  I have reviewed the patient's other medical records to include, labs, radiology and referrals.     Assessment / Plan      Assessment/Plan:   Diagnoses and all orders for this visit:    1. Migraine without aura and with status " migrainosus, not intractable (Primary)  -     butalbital-acetaminophen-caffeine (Esgic) -40 MG per tablet; Take 1 tablet by mouth Every 6 (Six) Hours As Needed for Migraine.  Dispense: 20 tablet; Refill: 0  -     Rimegepant Sulfate (Nurtec) 75 MG tablet dispersible tablet; Take 1 tablet by mouth Every Other Day. Take Monday,Wednesday,Friday, and Saturday.  Dispense: 16 tablet; Refill: 11      Follow Up:   Return in about 6 months (around 1/21/2023) for Next scheduled follow up.     Sirena Cheek [facility]   AS THE PROVIDER, I PERSONALLY WORE PPE DURING ENTIRE FACE TO FACE ENCOUNTER IN CLINIC WITH THE PATIENT. PATIENT ALSO WORE PPE DURING ENTIRE FACE TO FACE ENCOUNTER EXCEPT FOR A MAX OF 30 SECONDS DURING NEUROLOGICAL EVALUATION OF CRANIAL NERVES AND THEN MASK WAS PLACED BACK OVER PATIENT FACE FOR REMAINDER OF VISIT. I WASHED MY HANDS BEFORE AND AFTER VISIT.    Please note that portions of this note may have been completed with a voice recognition program. Efforts were made to edit the dictations, but occasionally words are mistranscribed.

## 2022-07-21 NOTE — PATIENT INSTRUCTIONS
If you get a severe migraine attack in the future, try taking a tablet of Benadryl, one pill of nausea medicine (phenergan), and a Fioricet (medicine I sent today). Often this is enough to keep people from needing to go to ER.

## 2022-07-25 DIAGNOSIS — R10.84 GENERALIZED ABDOMINAL PAIN: ICD-10-CM

## 2022-07-25 DIAGNOSIS — M54.42 CHRONIC BILATERAL LOW BACK PAIN WITH BILATERAL SCIATICA: ICD-10-CM

## 2022-07-25 DIAGNOSIS — K52.9 AGE (ACUTE GASTROENTERITIS): ICD-10-CM

## 2022-07-25 DIAGNOSIS — G89.29 CHRONIC BILATERAL LOW BACK PAIN WITH BILATERAL SCIATICA: ICD-10-CM

## 2022-07-25 DIAGNOSIS — M54.41 CHRONIC BILATERAL LOW BACK PAIN WITH BILATERAL SCIATICA: ICD-10-CM

## 2022-07-25 DIAGNOSIS — F41.1 GAD (GENERALIZED ANXIETY DISORDER): ICD-10-CM

## 2022-07-25 RX ORDER — HYDROXYZINE HYDROCHLORIDE 25 MG/1
25 TABLET, FILM COATED ORAL 3 TIMES DAILY PRN
Qty: 30 TABLET | Refills: 5 | Status: CANCELLED | OUTPATIENT
Start: 2022-07-25

## 2022-07-25 RX ORDER — PROMETHAZINE HYDROCHLORIDE 25 MG/1
25 TABLET ORAL EVERY 6 HOURS PRN
Qty: 30 TABLET | Refills: 0 | Status: SHIPPED | OUTPATIENT
Start: 2022-07-25 | End: 2022-09-27

## 2022-07-25 NOTE — TELEPHONE ENCOUNTER
Rx Refill Note  Requested Prescriptions     Pending Prescriptions Disp Refills   • gabapentin (NEURONTIN) 800 MG tablet 90 tablet 2     Sig: Take 1 tablet by mouth 3 (Three) Times a Day.     Signed Prescriptions Disp Refills   • promethazine (PHENERGAN) 25 MG tablet 30 tablet 0     Sig: Take 1 tablet by mouth Every 6 (Six) Hours As Needed for Nausea or Vomiting.     Authorizing Provider: NADER FARRIS     Ordering User: BRANDY JIMENEZ      Last office visit with prescribing clinician: 6/6/2022      Next office visit with prescribing clinician: Visit date not found            Brandy Jimenez MA  07/25/22, 09:57 EDT

## 2022-07-25 NOTE — TELEPHONE ENCOUNTER
Caller: Lindsay Amezquita    Relationship: Self    Best call back number: [phone]    Requested Prescriptions:   Requested Prescriptions     Pending Prescriptions Disp Refills   • gabapentin (NEURONTIN) 800 MG tablet 90 tablet 2     Sig: Take 1 tablet by mouth 3 (Three) Times a Day.   • promethazine (PHENERGAN) 25 MG tablet 30 tablet 0     Sig: Take 1 tablet by mouth Every 6 (Six) Hours As Needed for Nausea or Vomiting.   • hydrOXYzine (ATARAX) 25 MG tablet 30 tablet 5     Sig: Take 1 tablet by mouth 3 (Three) Times a Day As Needed for Anxiety.        Pharmacy where request should be sent: [id number] - [place], [place] - [address] - [phone] [facility] [phone] FX     Additional details provided by patient: PLEASE REFILL.    Does the patient have less than a 3 day supply:  [x] Yes  [] No 1 DOSE LEFT    Kim Azul Rep   07/25/22 09:13 EDT     PATIENT STATES SHE CALLED AND SCHEDULED AN APPOINTMENT WITH THE [facility] FOR 8.8.22 AT 2PM.

## 2022-07-26 RX ORDER — GABAPENTIN 800 MG/1
800 TABLET ORAL 3 TIMES DAILY
Qty: 90 TABLET | Refills: 0 | Status: SHIPPED | OUTPATIENT
Start: 2022-07-26 | End: 2022-10-26 | Stop reason: SDUPTHER

## 2022-07-27 ENCOUNTER — TELEPHONE (OUTPATIENT)
Dept: FAMILY MEDICINE CLINIC | Facility: CLINIC | Age: 37
End: 2022-07-27

## 2022-07-31 ENCOUNTER — APPOINTMENT (OUTPATIENT)
Dept: GENERAL RADIOLOGY | Facility: HOSPITAL | Age: 37
End: 2022-07-31

## 2022-07-31 ENCOUNTER — HOSPITAL ENCOUNTER (EMERGENCY)
Facility: HOSPITAL | Age: 37
Discharge: HOME | End: 2022-07-31
Attending: EMERGENCY MEDICINE | Admitting: EMERGENCY MEDICINE

## 2022-07-31 VITALS
RESPIRATION RATE: 18 BRPM | HEART RATE: 92 BPM | DIASTOLIC BLOOD PRESSURE: 94 MMHG | HEIGHT: 63 IN | SYSTOLIC BLOOD PRESSURE: 136 MMHG | TEMPERATURE: 98.8 F | BODY MASS INDEX: 51.03 KG/M2 | OXYGEN SATURATION: 95 % | WEIGHT: 288 LBS

## 2022-07-31 DIAGNOSIS — R05.9 COUGH: Primary | ICD-10-CM

## 2022-07-31 DIAGNOSIS — R09.81 SINUS CONGESTION: ICD-10-CM

## 2022-07-31 DIAGNOSIS — J06.9 UPPER RESPIRATORY TRACT INFECTION, UNSPECIFIED TYPE: ICD-10-CM

## 2022-07-31 LAB
B PARAPERT DNA SPEC QL NAA+PROBE: NOT DETECTED
B PERT DNA SPEC QL NAA+PROBE: NOT DETECTED
B-HCG UR QL: NEGATIVE
C PNEUM DNA NPH QL NAA+NON-PROBE: NOT DETECTED
FLUAV SUBTYP SPEC NAA+PROBE: NOT DETECTED
FLUBV RNA ISLT QL NAA+PROBE: NOT DETECTED
HADV DNA SPEC NAA+PROBE: NOT DETECTED
HCOV 229E RNA SPEC QL NAA+PROBE: NOT DETECTED
HCOV HKU1 RNA SPEC QL NAA+PROBE: NOT DETECTED
HCOV NL63 RNA SPEC QL NAA+PROBE: NOT DETECTED
HCOV OC43 RNA SPEC QL NAA+PROBE: NOT DETECTED
HMPV RNA NPH QL NAA+NON-PROBE: NOT DETECTED
HPIV1 RNA ISLT QL NAA+PROBE: NOT DETECTED
HPIV2 RNA SPEC QL NAA+PROBE: NOT DETECTED
HPIV3 RNA NPH QL NAA+PROBE: NOT DETECTED
HPIV4 P GENE NPH QL NAA+PROBE: NOT DETECTED
M PNEUMO IGG SER IA-ACNC: NOT DETECTED
RHINOVIRUS RNA SPEC NAA+PROBE: NOT DETECTED
RSV RNA NPH QL NAA+NON-PROBE: NOT DETECTED
S PYO AG THROAT QL: NEGATIVE
SARS-COV-2 RNA NPH QL NAA+NON-PROBE: NOT DETECTED

## 2022-07-31 PROCEDURE — 87880 STREP A ASSAY W/OPTIC: CPT | Performed by: PHYSICIAN ASSISTANT

## 2022-07-31 PROCEDURE — 71045 X-RAY EXAM CHEST 1 VIEW: CPT

## 2022-07-31 PROCEDURE — 63710000001 DEXAMETHASONE PER 0.25 MG: Performed by: PHYSICIAN ASSISTANT

## 2022-07-31 PROCEDURE — 0202U NFCT DS 22 TRGT SARS-COV-2: CPT | Performed by: PHYSICIAN ASSISTANT

## 2022-07-31 PROCEDURE — 99283 EMERGENCY DEPT VISIT LOW MDM: CPT

## 2022-07-31 PROCEDURE — 81025 URINE PREGNANCY TEST: CPT | Performed by: PHYSICIAN ASSISTANT

## 2022-07-31 PROCEDURE — 87081 CULTURE SCREEN ONLY: CPT | Performed by: PHYSICIAN ASSISTANT

## 2022-07-31 RX ORDER — OXYMETAZOLINE HYDROCHLORIDE 0.05 G/100ML
2 SPRAY NASAL 2 TIMES DAILY
Qty: 2 ML | Refills: 0 | Status: SHIPPED | OUTPATIENT
Start: 2022-07-31 | End: 2022-08-03

## 2022-07-31 RX ADMIN — DEXAMETHASONE 6 MG: 4 TABLET ORAL at 10:08

## 2022-08-02 LAB — BACTERIA SPEC AEROBE CULT: NORMAL

## 2022-08-03 DIAGNOSIS — F31.30 BIPOLAR I DISORDER, MOST RECENT EPISODE DEPRESSED: ICD-10-CM

## 2022-08-03 DIAGNOSIS — F41.1 GENERALIZED ANXIETY DISORDER: ICD-10-CM

## 2022-08-03 RX ORDER — DESVENLAFAXINE 100 MG/1
100 TABLET, EXTENDED RELEASE ORAL DAILY
Qty: 30 TABLET | Refills: 0 | Status: SHIPPED | OUTPATIENT
Start: 2022-08-03 | End: 2022-08-17 | Stop reason: SDUPTHER

## 2022-08-09 ENCOUNTER — TELEPHONE (OUTPATIENT)
Dept: FAMILY MEDICINE CLINIC | Facility: CLINIC | Age: 37
End: 2022-08-09

## 2022-08-09 NOTE — TELEPHONE ENCOUNTER
Caller: Lindsay Amezquita    Relationship: Self    Best call back number: [phone]    What are your current symptoms: YEAST INFECTION IN MOUTH.  FINISHED THE ANTI BIOTIC FROM [facility]MM[facility]-HASN'T WORKED-WAS FOR BRONCHITIS AND SINUSITIS.    How long have you been experiencing symptoms: 3 WEEKS     Have you had these symptoms before:    [x] Yes  [] No    Have you been treated for these symptoms before:   [] Yes  [] No    If a prescription is needed, what is your preferred pharmacy and phone number: [facility]/PHARMACY #[id number] - [place], [place] - [address] - [phone] [facility] [phone]      Additional notes:  DO YOU WANT TO SEE LINDSAY?   SHE SAYS SHE IS NOT FEELING WELL.

## 2022-08-12 ENCOUNTER — TELEMEDICINE (OUTPATIENT)
Dept: FAMILY MEDICINE CLINIC | Facility: CLINIC | Age: 37
End: 2022-08-12

## 2022-08-12 DIAGNOSIS — G89.29 CHRONIC BILATERAL LOW BACK PAIN WITH BILATERAL SCIATICA: ICD-10-CM

## 2022-08-12 DIAGNOSIS — M54.41 CHRONIC BILATERAL LOW BACK PAIN WITH BILATERAL SCIATICA: ICD-10-CM

## 2022-08-12 DIAGNOSIS — J30.9 ALLERGIC RHINITIS, UNSPECIFIED SEASONALITY, UNSPECIFIED TRIGGER: ICD-10-CM

## 2022-08-12 DIAGNOSIS — J06.9 URI WITH COUGH AND CONGESTION: ICD-10-CM

## 2022-08-12 DIAGNOSIS — J32.9 RECURRENT SINUSITIS: Primary | ICD-10-CM

## 2022-08-12 DIAGNOSIS — M54.42 CHRONIC BILATERAL LOW BACK PAIN WITH BILATERAL SCIATICA: ICD-10-CM

## 2022-08-12 PROCEDURE — 99213 OFFICE O/P EST LOW 20 MIN: CPT | Performed by: NURSE PRACTITIONER

## 2022-08-12 RX ORDER — TIZANIDINE 4 MG/1
4 TABLET ORAL EVERY 8 HOURS PRN
Qty: 90 TABLET | Refills: 1 | Status: SHIPPED | OUTPATIENT
Start: 2022-08-12 | End: 2022-09-30

## 2022-08-12 RX ORDER — GABAPENTIN 800 MG/1
800 TABLET ORAL 3 TIMES DAILY
Qty: 90 TABLET | Refills: 0 | Status: CANCELLED | OUTPATIENT
Start: 2022-08-12

## 2022-08-12 RX ORDER — ALBUTEROL SULFATE 90 UG/1
2 AEROSOL, METERED RESPIRATORY (INHALATION) EVERY 4 HOURS PRN
Qty: 18 G | Refills: 0 | Status: SHIPPED | OUTPATIENT
Start: 2022-08-12 | End: 2022-09-12

## 2022-08-12 RX ORDER — LEVOCETIRIZINE DIHYDROCHLORIDE 5 MG/1
5 TABLET, FILM COATED ORAL EVERY EVENING
Qty: 30 TABLET | Refills: 5 | Status: SHIPPED | OUTPATIENT
Start: 2022-08-12 | End: 2023-02-14

## 2022-08-12 NOTE — PROGRESS NOTES
"      Subjective     Chief Complaint:  Cough, sinusitis     History of Present Illness:   She notes she is \"sicker than a dog\". She has headache, having green sinus drainage, at home covid test negative. She is not soa. Low grade fever  She complete anbx from urgent care.   Feels this is due to weather change from coming back from [place].   She has diarrhea as well   She is taking allergy medication  She is using nasal spray    Lindsay is well known to me. She has chronic sinus symptoms. She is non complaint with her medical care at time. She has taken large amount of anbx over the past 6-9 months.       Review of Systems  Gen- No fevers, chills  CV- No chest pain, palpitations  Resp- No cough, dyspnea  GI- No N/V/D, abd pain  Neuro-No dizziness, headaches      I have reviewed and/or updated the patient's past medical, surgical, family, social history and problem list as appropriate.     Medications:    Current Outpatient Medications:   •  albuterol sulfate  (90 Base) MCG/ACT inhaler, Inhale 2 puffs Every 4 (Four) Hours As Needed for Wheezing., Disp: 18 g, Rfl: 0  •  ALPRAZolam (Xanax) 0.5 MG tablet, Take 1 tablet by mouth 2 (Two) Times a Day As Needed for Anxiety., Disp: 60 tablet, Rfl: 0  •  amitriptyline (ELAVIL) 150 MG tablet, Take 1 tablet by mouth every night at bedtime., Disp: 90 tablet, Rfl: 1  •  butalbital-acetaminophen-caffeine (Esgic) -40 MG per tablet, Take 1 tablet by mouth Every 6 (Six) Hours As Needed for Migraine., Disp: 20 tablet, Rfl: 0  •  desvenlafaxine (PRISTIQ) 100 MG 24 hr tablet, TAKE 1 TABLET BY MOUTH DAILY, Disp: 30 tablet, Rfl: 0  •  Diclofenac Sodium (VOLTAREN) 1 % gel gel, Apply 4 g topically to the appropriate area as directed 4 (Four) Times a Day As Needed (knee pain)., Disp: 100 g, Rfl: 5  •  Eliquis 5 MG tablet tablet, TAKE 1 TABLET BY MOUTH EVERY 12 HOURS, Disp: 60 tablet, Rfl: 1  •  fluticasone (FLONASE) 50 MCG/ACT nasal spray, SPRAY 2 SPRAYS INTO THE NOSTRIL AS DIRECTED BY " "PROVIDER DAILY., Disp: 48 mL, Rfl: 1  •  fluticasone (Flovent HFA) 110 MCG/ACT inhaler, Inhale 2 puffs 2 (Two) Times a Day., Disp: 12 g, Rfl: 5  •  furosemide (LASIX) 20 MG tablet, Take 1 tablet by mouth Daily., Disp: 30 tablet, Rfl: 5  •  gabapentin (NEURONTIN) 800 MG tablet, Take 1 tablet by mouth 3 (Three) Times a Day., Disp: 90 tablet, Rfl: 0  •  glucose blood test strip, Check blood sugar once daily, Disp: 25 each, Rfl: 12  •  glucose monitor monitoring kit, Check blood sugar once daily, Disp: 1 each, Rfl: 0  •  Hydrocortisone, Perianal, (Proctozone-HC) 2.5 % rectal cream, Insert  into the rectum 2 (Two) Times a Day., Disp: 28 g, Rfl: 0  •  hydrOXYzine (ATARAX) 25 MG tablet, Take 1 tablet by mouth 3 (Three) Times a Day As Needed for Anxiety., Disp: 30 tablet, Rfl: 5  •  Lancets (freestyle) lancets, 1 each by Other route 4 (Four) Times a Day., Disp: 400 each, Rfl: 3  •  Lancets Misc. (ONE TOUCH SURESOFT) misc, Check blood sugar once daily, Disp: 1 each, Rfl: 0  •  levocetirizine (XYZAL) 5 MG tablet, Take 1 tablet by mouth Every Evening., Disp: 30 tablet, Rfl: 5  •  Lurasidone HCl (Latuda) 120 MG tablet tablet, Take 1 tablet by mouth Daily. Take 120 mg orally daily with a meal., Disp: 30 tablet, Rfl: 6  •  Melatonin ER 10 MG tablet controlled-release, Take 1 tablet by mouth Every Night., Disp: 30 tablet, Rfl: 1  •  promethazine (PHENERGAN) 25 MG tablet, Take 1 tablet by mouth Every 6 (Six) Hours As Needed for Nausea or Vomiting., Disp: 30 tablet, Rfl: 0  •  Rimegepant Sulfate (Nurtec) 75 MG tablet dispersible tablet, Take 1 tablet by mouth Every Other Day. Take Monday,Wednesday,Friday, and Saturday., Disp: 16 tablet, Rfl: 11  •  tiZANidine (ZANAFLEX) 4 MG tablet, Take 1 tablet by mouth Every 8 (Eight) Hours As Needed for Muscle Spasms., Disp: 90 tablet, Rfl: 1    Allergies:  Allergies   Allergen Reactions   • Codeine Anaphylaxis   • Flexeril [Cyclobenzaprine] Other (See Comments)     \"gives me chest pain\"   • Asa " [Aspirin] Hives     Wheezing     • Latex Hives   • Morphine Itching   • Oxycontin [Oxycodone] GI Intolerance   • Penicillins Hives     Vomiting     • Triptans Other (See Comments)     Chest tightness, left arm numbness   • Zofran [Ondansetron] GI Intolerance   • Compazine [Prochlorperazine] Anxiety   • Reglan [Metoclopramide] Anxiety       Objective     Vital Signs: There were no vitals filed for this visit.  There is no height or weight on file to calculate BMI.    Physical Exam:  Gen-no acute distress, video visit  Resp- normal WOB, on RA  Neuro-A&Ox3, face symmetrical, speech clear  Skin-no overt rashes noted  Psych-appropriate mood, cooperative           Assessment / Plan     Assessment/Plan:   Problem List Items Addressed This Visit    None     Visit Diagnoses     Recurrent sinusitis    -  Primary    URI with cough and congestion        Allergic rhinitis, unspecified seasonality, unspecified trigger            -- discussed symptoms are likely viral. She just complete course of anbx. She had CT head in July that showed clear sinuses. I do not feel anbx are warranted at this time. I think she needs allergy testing. She also likely has RAD vs COPD. She continues flovent. I have refilled her albuterol. I do feel her mood is playing a part into her frequent illness. IgA deficiency should be consider and this is all the more reason for her to see allergy/asthma. Referral placed.     Follow up:  Needs in office visit to address her chronic problems    Total Time of Encounter 15 minutes    Electronically signed by CLAIR Leal   08/12/2022 17:21 EDT      Please note that portions of this note may have been completed with a voice recognition program. Efforts were made to edit the dictations, but occasionally words are mistranscribed.

## 2022-08-12 NOTE — TELEPHONE ENCOUNTER
Caller: Lindsay Amezquita    Relationship: Self    Best call back number: [phone]    Requested Prescriptions:   Requested Prescriptions     Pending Prescriptions Disp Refills   • gabapentin (NEURONTIN) 800 MG tablet 90 tablet 0     Sig: Take 1 tablet by mouth 3 (Three) Times a Day.   • tiZANidine (ZANAFLEX) 4 MG tablet 90 tablet 1     Sig: Take 1 tablet by mouth Every 8 (Eight) Hours As Needed for Muscle Spasms.      Pharmacy where request should be sent: [facility]/PHARMACY #[id number] - [address] [phone] [place] [phone]      Additional details provided by patient:     N/A    Does the patient have less than a 3 day supply:  [] Yes  [x] No    Kim Brown Rep   08/12/22 14:23 EDT

## 2022-08-12 NOTE — TELEPHONE ENCOUNTER
Rx Refill Note  Requested Prescriptions     Pending Prescriptions Disp Refills   • gabapentin (NEURONTIN) 800 MG tablet 90 tablet 0     Sig: Take 1 tablet by mouth 3 (Three) Times a Day.   • tiZANidine (ZANAFLEX) 4 MG tablet 90 tablet 1     Sig: Take 1 tablet by mouth Every 8 (Eight) Hours As Needed for Muscle Spasms.      Last office visit with prescribing clinician: 6/6/2022      Next office visit with prescribing clinician: 8/12/2022            Reyna Rodrigues MA  08/12/22, 14:45 EDT

## 2022-08-13 ENCOUNTER — TELEMEDICINE (OUTPATIENT)
Dept: FAMILY MEDICINE CLINIC | Facility: TELEHEALTH | Age: 37
End: 2022-08-13

## 2022-08-13 DIAGNOSIS — J01.00 ACUTE NON-RECURRENT MAXILLARY SINUSITIS: Primary | ICD-10-CM

## 2022-08-13 PROCEDURE — 99213 OFFICE O/P EST LOW 20 MIN: CPT | Performed by: NURSE PRACTITIONER

## 2022-08-13 RX ORDER — DOXYCYCLINE HYCLATE 100 MG/1
100 CAPSULE ORAL 2 TIMES DAILY
Qty: 20 CAPSULE | Refills: 0 | Status: SHIPPED | OUTPATIENT
Start: 2022-08-13 | End: 2022-08-23

## 2022-08-13 RX ORDER — FLUTICASONE PROPIONATE 50 MCG
2 SPRAY, SUSPENSION (ML) NASAL DAILY
Qty: 18.2 ML | Refills: 0 | Status: SHIPPED | OUTPATIENT
Start: 2022-08-13 | End: 2022-11-17 | Stop reason: SDUPTHER

## 2022-08-13 NOTE — PROGRESS NOTES
You have chosen to receive care through a telehealth visit.  Do you consent to use a video/audio connection for your medical care today? Yes     CHIEF COMPLAINT  Chief Complaint   Patient presents with   • head congestion         HPI  Lindsay Amezquita is a 37 y.o. female  presents with complaint of head congestion, sinus pain and pressure, green sputum, cough x 5 days.  She saw her PCP and was told it was viral and she was not given any medications. She states that she needs something for this infection.    Review of Systems   HENT: Positive for congestion, postnasal drip, sinus pressure and sinus pain.    Neurological: Positive for headaches.   All other systems reviewed and are negative.      Past Medical History:   Diagnosis Date   • Anxiety    • Back pain    • Bell's palsy    • Bipolar 2 disorder (HCC)    • Blood clot in vein    • Depression    • Diabetes mellitus (HCC) November    Pre diabete   • Frequent headaches    • GERD (gastroesophageal reflux disease)    • Migraine    • Panic    • PTSD (post-traumatic stress disorder)    • Restless leg syndrome    • Sinus problem    • Snores    • Tattoos    • Vitamin B12 deficiency        Family History   Problem Relation Age of Onset   • Irritable bowel syndrome Mother    • Irritable bowel syndrome Father    • Colon cancer Maternal Grandfather        Social History     Socioeconomic History   • Marital status: Single   Tobacco Use   • Smoking status: Current Every Day Smoker     Packs/day: 0.50     Years: 29.00     Pack years: 14.50     Types: Cigarettes     Start date: 1/1/1995   • Smokeless tobacco: Never Used   Vaping Use   • Vaping Use: Former   Substance and Sexual Activity   • Alcohol use: Never   • Drug use: Never   • Sexual activity: Not Currently     Partners: Female       Lindsay Amezquita  reports that she has been smoking cigarettes. She started smoking about 27 years ago. She has a 14.50 pack-year smoking history. She has never used smokeless tobacco.. I have educated  her on the risk of diseases from using tobacco products such as cancer, COPD and heart disease.     I advised her to quit and she is not willing to quit.    I spent 3  minutes counseling the patient.              There were no vitals taken for this visit.    PHYSICAL EXAM  Physical Exam   Constitutional: She appears well-developed and well-nourished.   HENT:   Head: Normocephalic.   Eyes: Pupils are equal, round, and reactive to light. Conjunctivae and EOM are normal.   Pulmonary/Chest: Effort normal.   Musculoskeletal: Normal range of motion.   Neurological: She is alert.   Psychiatric: She has a normal mood and affect.       Results for orders placed or performed during the hospital encounter of 07/31/22   Respiratory Panel PCR w/COVID-19(SARS-CoV-2) [facility] In-House, NP Swab in UTM/VTM, 3-4 HR TAT - Swab, Nasopharynx    Specimen: Nasopharynx; Swab   Result Value Ref Range    ADENOVIRUS, PCR Not Detected Not Detected    Coronavirus 229E Not Detected Not Detected    Coronavirus HKU1 Not Detected Not Detected    Coronavirus NL63 Not Detected Not Detected    Coronavirus OC43 Not Detected Not Detected    COVID19 Not Detected Not Detected - Ref. Range    Human Metapneumovirus Not Detected Not Detected    Human Rhinovirus/Enterovirus Not Detected Not Detected    Influenza A PCR Not Detected Not Detected    Influenza B PCR Not Detected Not Detected    Parainfluenza Virus 1 Not Detected Not Detected    Parainfluenza Virus 2 Not Detected Not Detected    Parainfluenza Virus 3 Not Detected Not Detected    Parainfluenza Virus 4 Not Detected Not Detected    RSV, PCR Not Detected Not Detected    Bordetella pertussis pcr Not Detected Not Detected    Bordetella parapertussis PCR Not Detected Not Detected    Chlamydophila pneumoniae PCR Not Detected Not Detected    Mycoplasma pneumo by PCR Not Detected Not Detected   Rapid Strep A Screen - Swab, Throat    Specimen: Throat; Swab   Result Value Ref Range    Strep  A Ag Negative Negative   Beta Strep Culture, Throat - Swab, Throat    Specimen: Throat; Swab   Result Value Ref Range    Throat Culture, Beta Strep No Beta Hemolytic Streptococcus Isolated    Pregnancy, Urine - Urine, Clean Catch    Specimen: Urine, Clean Catch   Result Value Ref Range    HCG, Urine QL Negative Negative       Diagnoses and all orders for this visit:    1. Acute non-recurrent maxillary sinusitis (Primary)    Other orders  -     doxycycline (VIBRAMYCIN) 100 MG capsule; Take 1 capsule by mouth 2 (Two) Times a Day for 10 days.  Dispense: 20 capsule; Refill: 0  -     fluticasone (Flonase) 50 MCG/ACT nasal spray; 2 sprays into the nostril(s) as directed by provider Daily.  Dispense: 18.2 mL; Refill: 0          FOLLOW-UP  As discussed during visit with PCP/[facility] if no improvement or Urgent Care/Emergency Department if worsening of symptoms    Instructed pt to view video on sinus massage to assist with draining sinuses and relieving pain.      Patient verbalizes understanding of medication dosage, comfort measures, instructions for treatment and follow-up.    CLAIR Marshall  08/13/2022  18:34 EDT    The use of a video visit has been reviewed with the patient and verbal informed consent has been obtained. Myself and Lindsay Amezquita participated in this visit. The patient is located in [address] [place] [address].    I am located in [place], [place]. Mychart and Zoom were utilized. I spent 20 minutes in the patient's chart for this visit.

## 2022-08-17 ENCOUNTER — TELEMEDICINE (OUTPATIENT)
Dept: FAMILY MEDICINE CLINIC | Facility: TELEHEALTH | Age: 37
End: 2022-08-17

## 2022-08-17 ENCOUNTER — PRIOR AUTHORIZATION (OUTPATIENT)
Dept: FAMILY MEDICINE CLINIC | Facility: CLINIC | Age: 37
End: 2022-08-17

## 2022-08-17 ENCOUNTER — TELEPHONE (OUTPATIENT)
Dept: FAMILY MEDICINE CLINIC | Facility: CLINIC | Age: 37
End: 2022-08-17

## 2022-08-17 ENCOUNTER — TELEMEDICINE (OUTPATIENT)
Dept: BEHAVIORAL HEALTH | Facility: CLINIC | Age: 37
End: 2022-08-17

## 2022-08-17 DIAGNOSIS — F31.30 BIPOLAR I DISORDER, MOST RECENT EPISODE DEPRESSED: ICD-10-CM

## 2022-08-17 DIAGNOSIS — F43.10 POST TRAUMATIC STRESS DISORDER (PTSD): ICD-10-CM

## 2022-08-17 DIAGNOSIS — F41.1 GENERALIZED ANXIETY DISORDER: Primary | ICD-10-CM

## 2022-08-17 DIAGNOSIS — F31.62 BIPOLAR DISORDER, CURRENT EPISODE MIXED, MODERATE: ICD-10-CM

## 2022-08-17 DIAGNOSIS — F41.1 GAD (GENERALIZED ANXIETY DISORDER): ICD-10-CM

## 2022-08-17 DIAGNOSIS — F51.04 PSYCHOPHYSIOLOGICAL INSOMNIA: ICD-10-CM

## 2022-08-17 DIAGNOSIS — Z20.822 ENCOUNTER BY TELEHEALTH FOR SUSPECTED COVID-19: Primary | ICD-10-CM

## 2022-08-17 DIAGNOSIS — J06.9 UPPER RESPIRATORY TRACT INFECTION, UNSPECIFIED TYPE: ICD-10-CM

## 2022-08-17 PROCEDURE — 99213 OFFICE O/P EST LOW 20 MIN: CPT | Performed by: NURSE PRACTITIONER

## 2022-08-17 PROCEDURE — 99214 OFFICE O/P EST MOD 30 MIN: CPT

## 2022-08-17 RX ORDER — DESVENLAFAXINE 100 MG/1
100 TABLET, EXTENDED RELEASE ORAL DAILY
Qty: 30 TABLET | Refills: 2 | Status: SHIPPED | OUTPATIENT
Start: 2022-08-17 | End: 2022-10-05 | Stop reason: SDUPTHER

## 2022-08-17 RX ORDER — SAW/PYGEUM/BETA/HERB/D3/B6/ZN 30 MG-25MG
10 CAPSULE ORAL NIGHTLY
Qty: 30 TABLET | Refills: 2 | Status: SHIPPED | OUTPATIENT
Start: 2022-08-17 | End: 2022-11-17

## 2022-08-17 RX ORDER — ALPRAZOLAM 0.5 MG/1
0.5 TABLET ORAL 2 TIMES DAILY PRN
Qty: 60 TABLET | Refills: 2 | Status: SHIPPED | OUTPATIENT
Start: 2022-08-17 | End: 2022-11-02 | Stop reason: SDUPTHER

## 2022-08-17 RX ORDER — HYDROXYZINE HYDROCHLORIDE 25 MG/1
25 TABLET, FILM COATED ORAL 3 TIMES DAILY PRN
Qty: 30 TABLET | Refills: 2 | Status: SHIPPED | OUTPATIENT
Start: 2022-08-17 | End: 2022-08-29 | Stop reason: SDUPTHER

## 2022-08-17 RX ORDER — AMITRIPTYLINE HYDROCHLORIDE 150 MG/1
150 TABLET, FILM COATED ORAL
Qty: 30 TABLET | Refills: 2 | Status: SHIPPED | OUTPATIENT
Start: 2022-08-17 | End: 2022-11-21 | Stop reason: SDUPTHER

## 2022-08-17 RX ORDER — LURASIDONE HYDROCHLORIDE 120 MG/1
120 TABLET, FILM COATED ORAL DAILY
Qty: 30 TABLET | Refills: 2 | Status: SHIPPED | OUTPATIENT
Start: 2022-08-17 | End: 2022-11-01 | Stop reason: SDUPTHER

## 2022-08-17 NOTE — PROGRESS NOTES
CHIEF COMPLAINT  Chief Complaint   Patient presents with   • Fever   • URI   • Sore Throat   • Diarrhea   • Headache         HPI  Lindsay Amezquita is a 37 y.o. female  presents with complaint of fever, congestion, cough, diarrhea, headache, sore throat and fever. She has green nasal discharge and phlegm. She is taking  mg po every 12 hours. She is currently on Doxycycline for URI infection but she reports it is not helping. She has taken 2 negative home Covid 19 test.     Review of Systems   Constitutional: Positive for activity change, fatigue and fever.   HENT: Positive for congestion, rhinorrhea and sore throat.    Respiratory: Positive for cough. Negative for shortness of breath, wheezing and stridor.    Cardiovascular: Negative.    Gastrointestinal: Positive for diarrhea. Negative for abdominal pain, nausea and vomiting.   Musculoskeletal: Negative.    Skin: Negative.    Neurological: Positive for headaches.   Hematological: Negative.    Psychiatric/Behavioral: Negative.        Past Medical History:   Diagnosis Date   • Anxiety    • Back pain    • Bell's palsy    • Bipolar 2 disorder (HCC)    • Blood clot in vein    • Depression    • Diabetes mellitus (HCC) November    Pre diabete   • Frequent headaches    • GERD (gastroesophageal reflux disease)    • Migraine    • Panic    • PTSD (post-traumatic stress disorder)    • Restless leg syndrome    • Sinus problem    • Snores    • Tattoos    • Vitamin B12 deficiency        Family History   Problem Relation Age of Onset   • Irritable bowel syndrome Mother    • Irritable bowel syndrome Father    • Colon cancer Maternal Grandfather        Social History     Socioeconomic History   • Marital status: Single   Tobacco Use   • Smoking status: Current Every Day Smoker     Packs/day: 0.50     Years: 29.00     Pack years: 14.50     Types: Cigarettes     Start date: 1/1/1995   • Smokeless tobacco: Never Used   Vaping Use   • Vaping Use: Former   Substance and Sexual Activity    • Alcohol use: Never   • Drug use: Never   • Sexual activity: Not Currently     Partners: Female         There were no vitals taken for this visit.    PHYSICAL EXAM  Physical Exam   Constitutional: She is oriented to person, place, and time. She appears well-developed and well-nourished. She does not have a sickly appearance. She does not appear ill. No distress.   HENT:   Head: Normocephalic and atraumatic.   Right Ear: Hearing and external ear normal.   Left Ear: Hearing and external ear normal.   Nose: Nose normal.   Mouth/Throat: Mouth/Lips are normal.  Pulmonary/Chest: Effort normal.  No respiratory distress. She no audible wheeze...  Neurological: She is alert and oriented to person, place, and time.   Psychiatric: She has a normal mood and affect.   Vitals reviewed.      Results for orders placed or performed during the hospital encounter of 07/31/22   Respiratory Panel PCR w/COVID-19(SARS-CoV-2) [facility] In-House, NP Swab in UTM/VTM, 3-4 HR TAT - Swab, Nasopharynx    Specimen: Nasopharynx; Swab   Result Value Ref Range    ADENOVIRUS, PCR Not Detected Not Detected    Coronavirus 229E Not Detected Not Detected    Coronavirus HKU1 Not Detected Not Detected    Coronavirus NL63 Not Detected Not Detected    Coronavirus OC43 Not Detected Not Detected    COVID19 Not Detected Not Detected - Ref. Range    Human Metapneumovirus Not Detected Not Detected    Human Rhinovirus/Enterovirus Not Detected Not Detected    Influenza A PCR Not Detected Not Detected    Influenza B PCR Not Detected Not Detected    Parainfluenza Virus 1 Not Detected Not Detected    Parainfluenza Virus 2 Not Detected Not Detected    Parainfluenza Virus 3 Not Detected Not Detected    Parainfluenza Virus 4 Not Detected Not Detected    RSV, PCR Not Detected Not Detected    Bordetella pertussis pcr Not Detected Not Detected    Bordetella parapertussis PCR Not Detected Not Detected    Chlamydophila pneumoniae PCR Not Detected Not  Detected    Mycoplasma pneumo by PCR Not Detected Not Detected   Rapid Strep A Screen - Swab, Throat    Specimen: Throat; Swab   Result Value Ref Range    Strep A Ag Negative Negative   Beta Strep Culture, Throat - Swab, Throat    Specimen: Throat; Swab   Result Value Ref Range    Throat Culture, Beta Strep No Beta Hemolytic Streptococcus Isolated    Pregnancy, Urine - Urine, Clean Catch    Specimen: Urine, Clean Catch   Result Value Ref Range    HCG, Urine QL Negative Negative       Diagnoses and all orders for this visit:    1. Encounter by telehealth for suspected COVID-19 (Primary)  -     COVID-19 PCR, [facility], NP SWAB IN [facility]SP[facility]/ORAL SWISH 24-30 HR TAT - Swab, Nasopharynx; Future    2. Upper respiratory tract infection, unspecified type    Continue fluids and rest.    mg. Every 8 hours prn pain or fever, take with food.   Warm salt water gargles prn sore throat.   Warm tea prn sore throat.   IF no improvement in 3-5 days go to your PCP.   For worsening s/s go to the ED    The use of a video visit has been reviewed with the patient and verbal informd consent has een obtained. Myself and Lindsay Amezquita participated in this visit. The patient is located in [address] [place] [address] [address] [place] [address]. I am located in [place], [place]. Mychart and Zoom were utilized. I spent 15  minutes in the patient's chart for this visit.           Monserrat Spencer, APRN  08/17/2022  18:00 EDT

## 2022-08-17 NOTE — PATIENT INSTRUCTIONS
10 Things You Can Do to Manage Your COVID-19 Symptoms at Home  If you have possible or confirmed COVID-19:  Stay home except to get medical care.  Monitor your symptoms carefully. If your symptoms get worse, call your healthcare provider immediately.  Get rest and stay hydrated.  If you have a medical appointment, call the healthcare provider ahead of time and tell them that you have or may have COVID-19.  For medical emergencies, call 911 and notify the dispatch personnel that you have or may have COVID-19.  Cover your cough and sneezes with a tissue or use the inside of your elbow.  Wash your hands often with soap and water for at least 20 seconds or clean your hands with an alcohol-based hand  that contains at least 60% alcohol.  As much as possible, stay in a specific room and away from other people in your home. Also, you should use a separate bathroom, if available. If you need to be around other people in or outside of the home, wear a mask.  Avoid sharing personal items with other people in your household, like dishes, towels, and bedding.  Clean all surfaces that are touched often, like counters, tabletops, and doorknobs. Use household cleaning sprays or wipes according to the label instructions.  cdc.gov/coronavirus  07/16/2021  This information is not intended to replace advice given to you by your health care provider. Make sure you discuss any questions you have with your health care provider.  Document Revised: 11/01/2021 Document Reviewed: 11/01/2021  [employer] Patient Education © 2021 [employer]

## 2022-08-17 NOTE — PROGRESS NOTES
This provider is located at [facility] ,[address], [address] in [place], [place],using a secure MyChart Video Visit through "[employer] Patient is being seen remotely via telehealth at their home address in [place], and stated they are in a secure environment for this session. The patient's condition being diagnosed/treated is appropriate for telemedicine. The provider identified herself as well as her credentials.   The patient, and/or patients guardian, consent to be seen remotely, and when consent is given they understand that the consent allows for patient identifiable information to be sent to a third party as needed.   They may refuse to be seen remotely at any time. The electronic data is encrypted and password protected, and the patient and/or guardian has been advised of the potential risks to privacy not withstanding such measures.    Video Visit    Patient Name: Lindsay Amezquita  : 1985   MRN: [id number]   Care Team: Patient Care Team:  Stephanie Burdick APRN as PCP - General (Nurse Practitioner)        Chief Complaint:    Chief Complaint   Patient presents with   • Anxiety   • Depression   • Med Management   • PTSD   • Sleeping Problem       History of Present Illness: Lindsay Amezquita is a 37 y.o. female who presents via video visit for a medication management follow up. Patient states that everything with medication is going well. She is sleeping really good and her mood and anxiety feel stable. She is continuing to see her therapist once a week and she says that, that is going well. Patient did not have any concerns at this time.     Subjective   Review of Systems:    Review of Systems   All other systems reviewed and are negative.      Current Medications:   Current Outpatient Medications   Medication Sig Dispense Refill   • ALPRAZolam (Xanax) 0.5 MG tablet Take 1 tablet by mouth 2 (Two) Times a Day As Needed for Anxiety. 60 tablet 2   • amitriptyline  (ELAVIL) 150 MG tablet Take 1 tablet by mouth every night at bedtime. 30 tablet 2   • desvenlafaxine (PRISTIQ) 100 MG 24 hr tablet Take 1 tablet by mouth Daily. 30 tablet 2   • hydrOXYzine (ATARAX) 25 MG tablet Take 1 tablet by mouth 3 (Three) Times a Day As Needed for Anxiety. 30 tablet 2   • Lurasidone HCl (Latuda) 120 MG tablet tablet Take 1 tablet by mouth Daily. Take 120 mg orally daily with a meal. 30 tablet 2   • Melatonin ER 10 MG tablet controlled-release Take 1 tablet by mouth Every Night. 30 tablet 2   • albuterol sulfate  (90 Base) MCG/ACT inhaler Inhale 2 puffs Every 4 (Four) Hours As Needed for Wheezing. 18 g 0   • butalbital-acetaminophen-caffeine (Esgic) -40 MG per tablet Take 1 tablet by mouth Every 6 (Six) Hours As Needed for Migraine. 20 tablet 0   • Diclofenac Sodium (VOLTAREN) 1 % gel gel Apply 4 g topically to the appropriate area as directed 4 (Four) Times a Day As Needed (knee pain). 100 g 5   • doxycycline (VIBRAMYCIN) 100 MG capsule Take 1 capsule by mouth 2 (Two) Times a Day for 10 days. 20 capsule 0   • Eliquis 5 MG tablet tablet TAKE 1 TABLET BY MOUTH EVERY 12 HOURS 60 tablet 1   • fluticasone (FLONASE) 50 MCG/ACT nasal spray SPRAY 2 SPRAYS INTO THE NOSTRIL AS DIRECTED BY PROVIDER DAILY. 48 mL 1   • fluticasone (Flonase) 50 MCG/ACT nasal spray 2 sprays into the nostril(s) as directed by provider Daily. 18.2 mL 0   • fluticasone (Flovent HFA) 110 MCG/ACT inhaler Inhale 2 puffs 2 (Two) Times a Day. 12 g 5   • furosemide (LASIX) 20 MG tablet Take 1 tablet by mouth Daily. 30 tablet 5   • gabapentin (NEURONTIN) 800 MG tablet Take 1 tablet by mouth 3 (Three) Times a Day. 90 tablet 0   • glucose blood test strip Check blood sugar once daily 25 each 12   • glucose monitor monitoring kit Check blood sugar once daily 1 each 0   • Hydrocortisone, Perianal, (Proctozone-HC) 2.5 % rectal cream Insert  into the rectum 2 (Two) Times a Day. 28 g 0   • Lancets (freestyle) lancets 1 each by  Other route 4 (Four) Times a Day. 400 each 3   • Lancets Misc. (ONE TOUCH SURESOFT) misc Check blood sugar once daily 1 each 0   • levocetirizine (XYZAL) 5 MG tablet Take 1 tablet by mouth Every Evening. 30 tablet 5   • promethazine (PHENERGAN) 25 MG tablet Take 1 tablet by mouth Every 6 (Six) Hours As Needed for Nausea or Vomiting. 30 tablet 0   • Rimegepant Sulfate (Nurtec) 75 MG tablet dispersible tablet Take 1 tablet by mouth Every Other Day. Take Monday,Wednesday,Friday, and Saturday. 16 tablet 11   • tiZANidine (ZANAFLEX) 4 MG tablet Take 1 tablet by mouth Every 8 (Eight) Hours As Needed for Muscle Spasms. 90 tablet 1     No current facility-administered medications for this visit.       Mental Status Exam:   Hygiene:   good  Cooperation:  Cooperative  Eye Contact:  Good  Psychomotor Behavior:  Appropriate  Affect:  Appropriate  Mood: normal  Speech:  Normal  Thought Process:  Goal directed and Linear  Thought Content:  Normal  Suicidal:  None  Homicidal:  None  Hallucinations:  None  Delusion:  None  Memory:  Intact  Orientation:  Person, Place, Time and Situation  Reliability:  good  Insight:  Fair  Judgement:  Fair  Impulse Control:  Fair  Physical/Medical Issues:  Yes see chart     Objective   Vital Signs:   There were no vitals taken for this visit.      Assessment / Plan    Diagnoses and all orders for this visit:    1. Generalized anxiety disorder (Primary)  -     desvenlafaxine (PRISTIQ) 100 MG 24 hr tablet; Take 1 tablet by mouth Daily.  Dispense: 30 tablet; Refill: 2    2. Bipolar I disorder, most recent episode depressed (HCC)  -     desvenlafaxine (PRISTIQ) 100 MG 24 hr tablet; Take 1 tablet by mouth Daily.  Dispense: 30 tablet; Refill: 2    3. Post traumatic stress disorder (PTSD)  -     amitriptyline (ELAVIL) 150 MG tablet; Take 1 tablet by mouth every night at bedtime.  Dispense: 30 tablet; Refill: 2  -     hydrOXYzine (ATARAX) 25 MG tablet; Take 1 tablet by mouth 3 (Three) Times a Day As Needed  for Anxiety.  Dispense: 30 tablet; Refill: 2  -     Lurasidone HCl (Latuda) 120 MG tablet tablet; Take 1 tablet by mouth Daily. Take 120 mg orally daily with a meal.  Dispense: 30 tablet; Refill: 2    4. Psychophysiological insomnia  -     amitriptyline (ELAVIL) 150 MG tablet; Take 1 tablet by mouth every night at bedtime.  Dispense: 30 tablet; Refill: 2  -     Melatonin ER 10 MG tablet controlled-release; Take 1 tablet by mouth Every Night.  Dispense: 30 tablet; Refill: 2    5. KENY (generalized anxiety disorder)  -     ALPRAZolam (Xanax) 0.5 MG tablet; Take 1 tablet by mouth 2 (Two) Times a Day As Needed for Anxiety.  Dispense: 60 tablet; Refill: 2  -     hydrOXYzine (ATARAX) 25 MG tablet; Take 1 tablet by mouth 3 (Three) Times a Day As Needed for Anxiety.  Dispense: 30 tablet; Refill: 2    6. Bipolar disorder, current episode mixed, moderate (HCC)  -     Lurasidone HCl (Latuda) 120 MG tablet tablet; Take 1 tablet by mouth Daily. Take 120 mg orally daily with a meal.  Dispense: 30 tablet; Refill: 2    Will continue medication as ordered.     A psychological evaluation was conducted in order to assess past and current level of functioning. Areas assessed included, but were not limited to: perception of social support, perception of ability to face and deal with challenges in life (positive functioning), anxiety symptoms, depressive symptoms, perspective on beliefs/belief system, coping skills for stress, intelligence level,  Therapeutic rapport was established. Interventions conducted today were geared towards incorporating medication management along with support for continued therapy. Education was also provided as to the med management with this provider and what to expect in subsequent sessions.      We discussed risks, benefits, goals and side effects of the above medication and the patient was agreeable with the plan. Patient was educated on the importance of compliance with treatment and follow-up  appointments. Patient is aware to contact the [facility] with any worsening of symptoms. To call for questions or concerns and return early if necessary. Patent is agreeable to go to the Emergency Department or call 911 should they begin SI/HI.    MEDS ORDERED DURING VISIT:  New Medications Ordered This Visit   Medications   • ALPRAZolam (Xanax) 0.5 MG tablet     Sig: Take 1 tablet by mouth 2 (Two) Times a Day As Needed for Anxiety.     Dispense:  60 tablet     Refill:  2   • amitriptyline (ELAVIL) 150 MG tablet     Sig: Take 1 tablet by mouth every night at bedtime.     Dispense:  30 tablet     Refill:  2   • desvenlafaxine (PRISTIQ) 100 MG 24 hr tablet     Sig: Take 1 tablet by mouth Daily.     Dispense:  30 tablet     Refill:  2   • hydrOXYzine (ATARAX) 25 MG tablet     Sig: Take 1 tablet by mouth 3 (Three) Times a Day As Needed for Anxiety.     Dispense:  30 tablet     Refill:  2   • Lurasidone HCl (Latuda) 120 MG tablet tablet     Sig: Take 1 tablet by mouth Daily. Take 120 mg orally daily with a meal.     Dispense:  30 tablet     Refill:  2   • Melatonin ER 10 MG tablet controlled-release     Sig: Take 1 tablet by mouth Every Night.     Dispense:  30 tablet     Refill:  2         Follow Up   Return in about 3 months (around 11/17/2022).  Patient was given instructions and counseling regarding her condition or for health maintenance advice. Please see specific information pulled into the AVS if appropriate.     TREATMENT PLAN/GOALS: Continue supportive psychotherapy efforts and medications as indicated. Treatment and medication options discussed during today's visit. Patient acknowledged and verbally consented to continue with current treatment plan and was educated on the importance of compliance with treatment and follow-up appointments.    MEDICATION ISSUES:  Discussed medication options and treatment plan of prescribed medication as well as the risks, benefits, and side effects including potential  falls, possible impaired driving and metabolic adversities among others. Patient is agreeable to call the office with any worsening of symptoms or onset of side effects. Patient is agreeable to call 911 or go to the nearest ER should he/she begin having SI/HI.        [facility]  [address] [place] [address]  [phone]    August 17, 2022 14:25 EDT

## 2022-08-17 NOTE — TELEPHONE ENCOUNTER
Patient says she was did a telehealth appt with us on 8/12/22. She got worse & feverish that night, and then did another  telehealth appt the next day with  Urgent Care. They gave her an antibiotic, but she says it's not helping and she feels she is getting worse. She has 101.2 fever, taking ibuprofen, cough, sore throat, chest tight, sinus pain. Has had symptoms for @ 1 1/2 weeks. She didn't know if she could get a stronger antibiotic/xray, etc. Please advise.

## 2022-08-17 NOTE — TELEPHONE ENCOUNTER
Caller: Lindsay Amezquita    Relationship: Self    Best call back number: [phone]    What is the best time to reach you: ANYTIME    Who are you requesting to speak with (clinical staff, provider,  specific staff member): CLINICAL STAFF    Do you know the name of the person who called: THE PATIENT LINDSAY     What was the call regarding: STATUS OF PRIOR AUTHORIZATION ALPRAZolam (Xanax) 0.5 MG tablet. THE PATIENT REPORTS SHE WILL BE OUT OF MEDICATION AFTER THIS EVENING (08/17/2022) AND IS REQUESTING A CALL BACK WITH STATUS OF PRIOR AUTHORIZATION PLEASE.       Do you require a callback: YES, PLEASE CALL AND ADVISE

## 2022-08-17 NOTE — TELEPHONE ENCOUNTER
Needs to come in the office.  Located within [facility] has several openings tomorrow

## 2022-08-17 NOTE — TELEPHONE ENCOUNTER
A PRIOR AUTH HAS BEEN STARTED THROUGH [facility] FOR ALPRAZOLAM 0.5 MG.    WAITING ON A RESPONSE FROM THE INSURANCE.    KEY: [id number]

## 2022-08-19 ENCOUNTER — TELEPHONE (OUTPATIENT)
Dept: FAMILY MEDICINE CLINIC | Facility: CLINIC | Age: 37
End: 2022-08-19

## 2022-08-19 NOTE — TELEPHONE ENCOUNTER
Patient has called office, stating that   Xanax .5mg is needing a prior authorization prior to being filled. Patient would like to be called for a follow on authorization status.    Verified current phone number on file for patient.

## 2022-08-19 NOTE — TELEPHONE ENCOUNTER
PRIOR AUTH WAS STARTED ON 8/17/22.    I HAVE CHECKED ON THE STATUS OF THIS PRIOR AUTH.    CURRENTLY WAITING ON A RESPONSE FROM THE INSURANCE.

## 2022-08-22 ENCOUNTER — TELEPHONE (OUTPATIENT)
Dept: FAMILY MEDICINE CLINIC | Facility: CLINIC | Age: 37
End: 2022-08-22

## 2022-08-22 NOTE — TELEPHONE ENCOUNTER
Caller: Lindsay Amezquita    Relationship: Self    Best call back number: [phone]    What is the best time to reach you: ANYTIME     Who are you requesting to speak with (clinical staff, provider,  specific staff member): CLINICAL STAFF  Do you know the name of the person who called: THE PATIENT LINDSAY    What was the call regarding: THE PATIENT IS REQUESTING A CALL BACK FOR ADVICE OF WHAT SHE CAN DO DIARRHEA CRAMPS (THE PATIENT DECLINED AN APPOINTMENT AT THIS TIME).    Do you require a callback: YES, PLEASE CALL AND ADVISE

## 2022-08-23 DIAGNOSIS — J45.40 MODERATE PERSISTENT REACTIVE AIRWAY DISEASE WITHOUT COMPLICATION: ICD-10-CM

## 2022-08-23 RX ORDER — DEXAMETHASONE 4 MG/1
TABLET ORAL
Qty: 12 EACH | Refills: 0 | Status: SHIPPED | OUTPATIENT
Start: 2022-08-23 | End: 2022-11-17

## 2022-08-25 DIAGNOSIS — I82.432 ACUTE DEEP VEIN THROMBOSIS (DVT) OF POPLITEAL VEIN OF LEFT LOWER EXTREMITY: ICD-10-CM

## 2022-08-26 DIAGNOSIS — F41.1 GAD (GENERALIZED ANXIETY DISORDER): ICD-10-CM

## 2022-08-26 DIAGNOSIS — F43.10 POST TRAUMATIC STRESS DISORDER (PTSD): ICD-10-CM

## 2022-08-26 RX ORDER — HYDROXYZINE HYDROCHLORIDE 25 MG/1
TABLET, FILM COATED ORAL
Qty: 90 TABLET | OUTPATIENT
Start: 2022-08-26

## 2022-08-29 RX ORDER — HYDROXYZINE HYDROCHLORIDE 25 MG/1
25 TABLET, FILM COATED ORAL 3 TIMES DAILY PRN
Qty: 30 TABLET | Refills: 2 | Status: SHIPPED | OUTPATIENT
Start: 2022-08-29 | End: 2022-11-01 | Stop reason: SDUPTHER

## 2022-09-10 DIAGNOSIS — J06.9 URI WITH COUGH AND CONGESTION: ICD-10-CM

## 2022-09-12 ENCOUNTER — TELEPHONE (OUTPATIENT)
Dept: FAMILY MEDICINE CLINIC | Facility: CLINIC | Age: 37
End: 2022-09-12

## 2022-09-23 ENCOUNTER — TELEMEDICINE (OUTPATIENT)
Dept: FAMILY MEDICINE CLINIC | Facility: TELEHEALTH | Age: 37
End: 2022-09-23

## 2022-09-23 DIAGNOSIS — J01.00 ACUTE NON-RECURRENT MAXILLARY SINUSITIS: Primary | ICD-10-CM

## 2022-09-23 PROCEDURE — 99213 OFFICE O/P EST LOW 20 MIN: CPT | Performed by: NURSE PRACTITIONER

## 2022-09-23 RX ORDER — GUAIFENESIN 600 MG/1
600 TABLET, EXTENDED RELEASE ORAL 2 TIMES DAILY
Qty: 20 TABLET | Refills: 0 | Status: SHIPPED | OUTPATIENT
Start: 2022-09-23 | End: 2022-10-03

## 2022-09-23 RX ORDER — DOXYCYCLINE HYCLATE 100 MG/1
100 CAPSULE ORAL 2 TIMES DAILY
Qty: 20 CAPSULE | Refills: 0 | Status: SHIPPED | OUTPATIENT
Start: 2022-09-23 | End: 2022-10-03

## 2022-09-24 NOTE — PROGRESS NOTES
You have chosen to receive care through a telehealth visit.  Do you consent to use a video/audio connection for your medical care today? Yes     CHIEF COMPLAINT  Chief Complaint   Patient presents with   • Back Pain   • Sinusitis         HPI  Lindsay Amezquita is a 37 y.o. female  presents with complaint of sinus pain and pressure with sore throat that started today.  Pt denies exposure to COVID.     Review of Systems   HENT: Positive for sinus pressure, sinus pain and sore throat.    All other systems reviewed and are negative.      Past Medical History:   Diagnosis Date   • Anxiety    • Back pain    • Bell's palsy    • Bipolar 2 disorder (HCC)    • Blood clot in vein    • Depression    • Diabetes mellitus (HCC) November    Pre diabete   • Frequent headaches    • GERD (gastroesophageal reflux disease)    • Migraine    • Panic    • PTSD (post-traumatic stress disorder)    • Restless leg syndrome    • Sinus problem    • Snores    • Tattoos    • Vitamin B12 deficiency        Family History   Problem Relation Age of Onset   • Irritable bowel syndrome Mother    • Irritable bowel syndrome Father    • Colon cancer Maternal Grandfather        Social History     Socioeconomic History   • Marital status: Single   Tobacco Use   • Smoking status: Current Every Day Smoker     Packs/day: 0.50     Years: 29.00     Pack years: 14.50     Types: Cigarettes     Start date: 1/1/1995   • Smokeless tobacco: Never Used   Vaping Use   • Vaping Use: Former   Substance and Sexual Activity   • Alcohol use: Never   • Drug use: Never   • Sexual activity: Not Currently     Partners: Female       Lindsay Amezquita  reports that she has been smoking cigarettes. She started smoking about 27 years ago. She has a 14.50 pack-year smoking history. She has never used smokeless tobacco.. I have educated her on the risk of diseases from using tobacco products such as cancer, COPD and heart disease.     I advised her to quit and she is not willing to quit.    I spent  3  minutes counseling the patient.              There were no vitals taken for this visit.    PHYSICAL EXAM  Physical Exam   Constitutional: She appears well-developed and well-nourished.   HENT:   Head: Normocephalic.   Pulmonary/Chest: Effort normal.   Musculoskeletal: Normal range of motion.   Neurological: She is alert.   Psychiatric: She has a normal mood and affect.       Results for orders placed or performed during the hospital encounter of 07/31/22   Respiratory Panel PCR w/COVID-19(SARS-CoV-2) MAY/BRITT/NINFA/PAD/COR/MAD/ALLEY In-House, NP Swab in UTM/VTM, 3-4 HR TAT - Swab, Nasopharynx    Specimen: Nasopharynx; Swab   Result Value Ref Range    ADENOVIRUS, PCR Not Detected Not Detected    Coronavirus 229E Not Detected Not Detected    Coronavirus HKU1 Not Detected Not Detected    Coronavirus NL63 Not Detected Not Detected    Coronavirus OC43 Not Detected Not Detected    COVID19 Not Detected Not Detected - Ref. Range    Human Metapneumovirus Not Detected Not Detected    Human Rhinovirus/Enterovirus Not Detected Not Detected    Influenza A PCR Not Detected Not Detected    Influenza B PCR Not Detected Not Detected    Parainfluenza Virus 1 Not Detected Not Detected    Parainfluenza Virus 2 Not Detected Not Detected    Parainfluenza Virus 3 Not Detected Not Detected    Parainfluenza Virus 4 Not Detected Not Detected    RSV, PCR Not Detected Not Detected    Bordetella pertussis pcr Not Detected Not Detected    Bordetella parapertussis PCR Not Detected Not Detected    Chlamydophila pneumoniae PCR Not Detected Not Detected    Mycoplasma pneumo by PCR Not Detected Not Detected   Rapid Strep A Screen - Swab, Throat    Specimen: Throat; Swab   Result Value Ref Range    Strep A Ag Negative Negative   Beta Strep Culture, Throat - Swab, Throat    Specimen: Throat; Swab   Result Value Ref Range    Throat Culture, Beta Strep No Beta Hemolytic Streptococcus Isolated    Pregnancy, Urine - Urine, Clean Catch    Specimen: Urine,  Clean Catch   Result Value Ref Range    HCG, Urine QL Negative Negative       Diagnoses and all orders for this visit:    1. Acute non-recurrent maxillary sinusitis (Primary)  -     doxycycline (VIBRAMYCIN) 100 MG capsule; Take 1 capsule by mouth 2 (Two) Times a Day for 10 days.  Dispense: 20 capsule; Refill: 0  -     guaiFENesin (Mucinex) 600 MG 12 hr tablet; Take 1 tablet by mouth 2 (Two) Times a Day for 10 days.  Dispense: 20 tablet; Refill: 0          FOLLOW-UP  As discussed during visit with PCP/[facility] if no improvement or Urgent Care/Emergency Department if worsening of symptoms    Patient verbalizes understanding of medication dosage, comfort measures, instructions for treatment and follow-up.    Suzanna Wills, CLAIR  09/23/2022  21:35 EDT    The use of a video visit has been reviewed with the patient and verbal informed consent has been obtained. Myself and Lindsay Amezquita participated in this visit. The patient is located in [address] [place] [address].    I am located in [place], [place]. Mychart and Zoom were utilized. I spent 20 minutes in the patient's chart for this visit.

## 2022-09-27 ENCOUNTER — HOSPITAL ENCOUNTER (EMERGENCY)
Facility: HOSPITAL | Age: 37
Discharge: HOME | End: 2022-09-27
Attending: EMERGENCY MEDICINE | Admitting: EMERGENCY MEDICINE

## 2022-09-27 VITALS
WEIGHT: 248 LBS | HEART RATE: 102 BPM | DIASTOLIC BLOOD PRESSURE: 88 MMHG | SYSTOLIC BLOOD PRESSURE: 167 MMHG | OXYGEN SATURATION: 99 % | BODY MASS INDEX: 45.64 KG/M2 | HEIGHT: 62 IN | TEMPERATURE: 97.9 F | RESPIRATION RATE: 18 BRPM

## 2022-09-27 DIAGNOSIS — R11.2 NAUSEA AND VOMITING, UNSPECIFIED VOMITING TYPE: ICD-10-CM

## 2022-09-27 DIAGNOSIS — B34.9 ACUTE VIRAL SYNDROME: Primary | ICD-10-CM

## 2022-09-27 PROCEDURE — 63710000001 PROMETHAZINE PER 25 MG: Performed by: EMERGENCY MEDICINE

## 2022-09-27 PROCEDURE — 99283 EMERGENCY DEPT VISIT LOW MDM: CPT

## 2022-09-27 RX ORDER — PROMETHAZINE HYDROCHLORIDE 25 MG/1
25 TABLET ORAL EVERY 6 HOURS PRN
Qty: 12 TABLET | Refills: 0 | Status: SHIPPED | OUTPATIENT
Start: 2022-09-27 | End: 2022-09-30

## 2022-09-27 RX ORDER — GUAIFENESIN/DEXTROMETHORPHAN 100-10MG/5
10 SYRUP ORAL ONCE
Status: COMPLETED | OUTPATIENT
Start: 2022-09-27 | End: 2022-09-27

## 2022-09-27 RX ORDER — PROMETHAZINE HYDROCHLORIDE 25 MG/1
25 TABLET ORAL ONCE
Status: COMPLETED | OUTPATIENT
Start: 2022-09-27 | End: 2022-09-27

## 2022-09-27 RX ADMIN — PROMETHAZINE HYDROCHLORIDE 25 MG: 25 TABLET ORAL at 04:31

## 2022-09-27 RX ADMIN — GUAIFENESIN AND DEXTROMETHORPHAN 10 ML: 100; 10 SYRUP ORAL at 05:14

## 2022-09-29 ENCOUNTER — TELEPHONE (OUTPATIENT)
Dept: FAMILY MEDICINE CLINIC | Facility: CLINIC | Age: 37
End: 2022-09-29

## 2022-09-29 NOTE — TELEPHONE ENCOUNTER
Caller: Lindsay Amezquita    Relationship: Self    Best call back number [phone]    What is the best time to reach you: ANYTIME    Who are you requesting to speak with (clinical staff, provider,  specific staff member): CLINICAL STAFF    Do you know the name of the person who called:  NA    What was the call regarding: [facility] 09/28 WITH VOMITING AND DIARRHEA.  SHE HAS BEEN PRESCRIBED PHENERGAN AND IS NOT EFFECTIVE    Do you require a callback: YES

## 2022-09-30 RX ORDER — PROMETHAZINE HYDROCHLORIDE 25 MG/1
25 SUPPOSITORY RECTAL EVERY 6 HOURS PRN
Qty: 6 SUPPOSITORY | Refills: 0 | Status: SHIPPED | OUTPATIENT
Start: 2022-09-30 | End: 2022-10-02

## 2022-09-30 RX ORDER — TIZANIDINE 4 MG/1
TABLET ORAL
Qty: 90 TABLET | Refills: 1 | Status: SHIPPED | OUTPATIENT
Start: 2022-09-30 | End: 2022-10-26 | Stop reason: SDUPTHER

## 2022-09-30 NOTE — TELEPHONE ENCOUNTER
Caller: Lindsay Amezquita    Relationship: Self    Best call back number: [phone]    What medications are you currently taking:   Current Outpatient Medications on File Prior to Visit   Medication Sig Dispense Refill   • ALPRAZolam (Xanax) 0.5 MG tablet Take 1 tablet by mouth 2 (Two) Times a Day As Needed for Anxiety. 60 tablet 2   • amitriptyline (ELAVIL) 150 MG tablet Take 1 tablet by mouth every night at bedtime. 30 tablet 2   • apixaban (Eliquis) 5 MG tablet tablet Take 1 tablet by mouth Every 12 (Twelve) Hours. 60 tablet 1   • butalbital-acetaminophen-caffeine (Esgic) -40 MG per tablet Take 1 tablet by mouth Every 6 (Six) Hours As Needed for Migraine. 20 tablet 0   • desvenlafaxine (PRISTIQ) 100 MG 24 hr tablet Take 1 tablet by mouth Daily. 30 tablet 2   • Diclofenac Sodium (VOLTAREN) 1 % gel gel Apply 4 g topically to the appropriate area as directed 4 (Four) Times a Day As Needed (knee pain). 100 g 5   • doxycycline (VIBRAMYCIN) 100 MG capsule Take 1 capsule by mouth 2 (Two) Times a Day for 10 days. 20 capsule 0   • Flovent  MCG/ACT inhaler INHALE 2 PUFFS TWICE A DAY 12 each 0   • fluticasone (FLONASE) 50 MCG/ACT nasal spray SPRAY 2 SPRAYS INTO THE NOSTRIL AS DIRECTED BY PROVIDER DAILY. 48 mL 1   • fluticasone (Flonase) 50 MCG/ACT nasal spray 2 sprays into the nostril(s) as directed by provider Daily. 18.2 mL 0   • furosemide (LASIX) 20 MG tablet Take 1 tablet by mouth Daily. 30 tablet 5   • gabapentin (NEURONTIN) 800 MG tablet Take 1 tablet by mouth 3 (Three) Times a Day. 90 tablet 0   • glucose blood test strip Check blood sugar once daily 25 each 12   • glucose monitor monitoring kit Check blood sugar once daily 1 each 0   • guaiFENesin (Mucinex) 600 MG 12 hr tablet Take 1 tablet by mouth 2 (Two) Times a Day for 10 days. 20 tablet 0   • Hydrocortisone, Perianal, (Proctozone-HC) 2.5 % rectal cream Insert  into the rectum 2 (Two) Times a Day. 28 g 0   • hydrOXYzine (ATARAX) 25 MG tablet Take 1  tablet by mouth 3 (Three) Times a Day As Needed for Anxiety. 30 tablet 2   • Lancets (freestyle) lancets 1 each by Other route 4 (Four) Times a Day. 400 each 3   • Lancets Misc. (ONE TOUCH SURESOFT) misc Check blood sugar once daily 1 each 0   • levocetirizine (XYZAL) 5 MG tablet Take 1 tablet by mouth Every Evening. 30 tablet 5   • Lurasidone HCl (Latuda) 120 MG tablet tablet Take 1 tablet by mouth Daily. Take 120 mg orally daily with a meal. 30 tablet 2   • Melatonin ER 10 MG tablet controlled-release Take 1 tablet by mouth Every Night. 30 tablet 2   • ProAir  (90 Base) MCG/ACT inhaler TAKE 2 PUFFS BY MOUTH EVERY 4 HOURS AS NEEDED FOR WHEEZE 18 g 2   • promethazine (PHENERGAN) 25 MG tablet Take 1 tablet by mouth Every 6 (Six) Hours As Needed for Nausea or Vomiting. 12 tablet 0   • Rimegepant Sulfate (Nurtec) 75 MG tablet dispersible tablet Take 1 tablet by mouth Every Other Day. Take Monday,Wednesday,Friday, and Saturday. 16 tablet 11   • tiZANidine (ZANAFLEX) 4 MG tablet Take 1 tablet by mouth Every 8 (Eight) Hours As Needed for Muscle Spasms. 90 tablet 1     No current facility-administered medications on file prior to visit.      What are your concerns: PT CALLED TO REQUEST THE LIQUID FROM OF THE PHENERGAN, STATED THAT SHE IS VERY SICK AND KEEPS THROWING UP EVERYTHING THAT SHE PUTS IN STOMACH      [facility]/pharmacy #[id number] - [place], [place] - [address] [place] [address] [phone] [facility] [phone] FX

## 2022-10-02 ENCOUNTER — TELEMEDICINE (OUTPATIENT)
Dept: FAMILY MEDICINE CLINIC | Facility: TELEHEALTH | Age: 37
End: 2022-10-02

## 2022-10-02 DIAGNOSIS — K52.9 GASTROENTERITIS: Primary | ICD-10-CM

## 2022-10-02 PROCEDURE — 99213 OFFICE O/P EST LOW 20 MIN: CPT | Performed by: NURSE PRACTITIONER

## 2022-10-02 RX ORDER — PROMETHAZINE HYDROCHLORIDE 12.5 MG/1
12.5 TABLET ORAL EVERY 6 HOURS PRN
Qty: 12 TABLET | Refills: 0 | Status: SHIPPED | OUTPATIENT
Start: 2022-10-02 | End: 2022-10-05

## 2022-10-02 NOTE — PROGRESS NOTES
You have chosen to receive care through a telehealth visit.  Do you consent to use a video/audio connection for your medical care today? Yes     CHIEF COMPLAINT  Chief Complaint   Patient presents with   • Nausea         HPI  Lindsay Amezquita is a 37 y.o. female  presents with complaint of nausea.  She states that she has had a virus and has been to the hospital and was told it was gastroenteritis.  She states that she was feeling better, but started with nausea this morning and has had 2 episodes of diarrhea.  She denies any vomiting at this time.  Zofran is not helping her.    Review of Systems   Gastrointestinal: Positive for nausea.   All other systems reviewed and are negative.      Past Medical History:   Diagnosis Date   • Anxiety    • Back pain    • Bell's palsy    • Bipolar 2 disorder (HCC)    • Blood clot in vein    • Depression    • Diabetes mellitus (HCC) November    Pre diabete   • Frequent headaches    • GERD (gastroesophageal reflux disease)    • Migraine    • Panic    • PTSD (post-traumatic stress disorder)    • Restless leg syndrome    • Sinus problem    • Snores    • Tattoos    • Vitamin B12 deficiency        Family History   Problem Relation Age of Onset   • Irritable bowel syndrome Mother    • Irritable bowel syndrome Father    • Colon cancer Maternal Grandfather        Social History     Socioeconomic History   • Marital status: Single   Tobacco Use   • Smoking status: Current Every Day Smoker     Packs/day: 0.50     Years: 29.00     Pack years: 14.50     Types: Cigarettes     Start date: 1/1/1995   • Smokeless tobacco: Never Used   Vaping Use   • Vaping Use: Former   Substance and Sexual Activity   • Alcohol use: Never   • Drug use: Never   • Sexual activity: Not Currently     Partners: Female       Lindsay Amezquita  reports that she has been smoking cigarettes. She started smoking about 27 years ago. She has a 14.50 pack-year smoking history. She has never used smokeless tobacco.. I have educated her on  the risk of diseases from using tobacco products such as cancer, COPD and heart disease.     I advised her to quit and she is not willing to quit.    I spent 3  minutes counseling the patient.              There were no vitals taken for this visit.    PHYSICAL EXAM  Physical Exam   Constitutional: She appears well-developed and well-nourished.   HENT:   Head: Normocephalic.   Pulmonary/Chest: Effort normal.   Musculoskeletal: Normal range of motion.   Neurological: She is alert.   Psychiatric: She has a normal mood and affect.       Results for orders placed or performed during the hospital encounter of 07/31/22   Respiratory Panel PCR w/COVID-19(SARS-CoV-2) MAY/[facility]/NINFA/[facility] In-House, NP Swab in UTM/VTM, 3-4 HR TAT - Swab, Nasopharynx    Specimen: Nasopharynx; Swab   Result Value Ref Range    ADENOVIRUS, PCR Not Detected Not Detected    Coronavirus 229E Not Detected Not Detected    Coronavirus HKU1 Not Detected Not Detected    Coronavirus NL63 Not Detected Not Detected    Coronavirus OC43 Not Detected Not Detected    COVID19 Not Detected Not Detected - Ref. Range    Human Metapneumovirus Not Detected Not Detected    Human Rhinovirus/Enterovirus Not Detected Not Detected    Influenza A PCR Not Detected Not Detected    Influenza B PCR Not Detected Not Detected    Parainfluenza Virus 1 Not Detected Not Detected    Parainfluenza Virus 2 Not Detected Not Detected    Parainfluenza Virus 3 Not Detected Not Detected    Parainfluenza Virus 4 Not Detected Not Detected    RSV, PCR Not Detected Not Detected    Bordetella pertussis pcr Not Detected Not Detected    Bordetella parapertussis PCR Not Detected Not Detected    Chlamydophila pneumoniae PCR Not Detected Not Detected    Mycoplasma pneumo by PCR Not Detected Not Detected   Rapid Strep A Screen - Swab, Throat    Specimen: Throat; Swab   Result Value Ref Range    Strep A Ag Negative Negative   Beta Strep Culture, Throat - Swab, Throat    Specimen: Throat; Swab    Result Value Ref Range    Throat Culture, Beta Strep No Beta Hemolytic Streptococcus Isolated    Pregnancy, Urine - Urine, Clean Catch    Specimen: Urine, Clean Catch   Result Value Ref Range    HCG, Urine QL Negative Negative       Diagnoses and all orders for this visit:    1. Gastroenteritis (Primary)  -     promethazine (PHENERGAN) 12.5 MG tablet; Take 1 tablet by mouth Every 6 (Six) Hours As Needed for Nausea or Vomiting for up to 3 days.  Dispense: 12 tablet; Refill: 0          FOLLOW-UP  As discussed during visit with PCP/[facility] if no improvement or Urgent Care/Emergency Department if worsening of symptoms    Instructed pt to take sips of gatorade zero or powerade zero for the next 12 hours, then start bland diet such as a BRAT diet.    Patient verbalizes understanding of medication dosage, comfort measures, instructions for treatment and follow-up.    CLAIR Marshall  10/02/2022  06:14 EDT    The use of a video visit has been reviewed with the patient and verbal informed consent has been obtained. Myself and Lindsay Amezquita participated in this visit. The patient is located in [address] [place] [address] [address] [place] [address].    I am located in [place], [place]. Mychart and Zoom were utilized. I spent 20 minutes in the patient's chart for this visit.

## 2022-10-03 ENCOUNTER — TELEPHONE (OUTPATIENT)
Dept: FAMILY MEDICINE CLINIC | Facility: CLINIC | Age: 37
End: 2022-10-03

## 2022-10-03 NOTE — TELEPHONE ENCOUNTER
Caller: KIERAN INMAN    Relationship: Other    Best call back number: [phone]    What medication are you requesting: promethazine (PHENERGAN) IN A LIQUID FORM    What are your current symptoms: VOMITING, DIARRHEA AND UNABLE TO TAKE BEHAVIORAL HEALTH MEDICATIONS    How long have you been experiencing symptoms: 2 WEEKS    Have you had these symptoms before:    [] Yes  [] No    Have you been treated for these symptoms before:   [] Yes  [] No    If a prescription is needed, what is your preferred pharmacy and phone number: [facility]/PHARMACY #[id number] - [address] [phone] [place] [phone]      Additional notes:PATIENT HAS REPORTED THAT SHE IS UNABLE TO KEEP THE TABLETS DOWN AND UNABLE TO KEEP THE SUPPOSITORIES INSERTED. PATIENT WOULD LIKE TO KNOW IF SHE COULD GET THIS MEDICATION IN A LIQUID FORM.

## 2022-10-05 DIAGNOSIS — F41.1 GENERALIZED ANXIETY DISORDER: ICD-10-CM

## 2022-10-05 DIAGNOSIS — R73.03 PRE-DIABETES: ICD-10-CM

## 2022-10-05 DIAGNOSIS — F31.30 BIPOLAR I DISORDER, MOST RECENT EPISODE DEPRESSED: ICD-10-CM

## 2022-10-05 RX ORDER — DESVENLAFAXINE 100 MG/1
100 TABLET, EXTENDED RELEASE ORAL DAILY
Qty: 90 TABLET | Refills: 0 | Status: SHIPPED | OUTPATIENT
Start: 2022-10-05 | End: 2022-11-01 | Stop reason: SDUPTHER

## 2022-10-05 NOTE — TELEPHONE ENCOUNTER
Rx Refill Note  Requested Prescriptions     Pending Prescriptions Disp Refills   • glucose blood (FREESTYLE LITE) test strip       Sig: TEST BLOOD SUGAR ONCE A DAY      Last office visit with prescribing clinician: 6/6/2022      Next office visit with prescribing clinician: Visit date not found            Reyna Rodrigues MA  10/05/22, 08:50 EDT

## 2022-10-08 NOTE — ED PROVIDER NOTES
"Subjective   History of Present Illness    Chief Complaint: Sinus pressure cough  History of Present Illness: 37-year-old female presents above complaint, started on doxycycline by outlying clinic, here with diarrhea and vomiting recent negative COVID test.  Onset: See above timeline  Duration: Persistent  Exacerbating / Alleviating factors: None  Associated symptoms: None      Nurses Notes reviewed and agree, including vitals, allergies, social history and prior medical history.     REVIEW OF SYSTEMS: All systems reviewed and not pertinent unless noted.    Positive for: Recent cough congestion sinus pressure, no nausea vomiting diarrhea    Negative for: Fever hemoptysis syncope GI bleeding palpitations urinary symptoms rash abdominal distention  Review of Systems    Past Medical History:   Diagnosis Date   • Anxiety    • Back pain    • Bell's palsy    • Bipolar 2 disorder ([facility])    • Blood clot in vein    • Depression    • Diabetes mellitus (HCC) November    Pre diabete   • Frequent headaches    • GERD (gastroesophageal reflux disease)    • Migraine    • Panic    • PTSD (post-traumatic stress disorder)    • Restless leg syndrome    • Sinus problem    • Snores    • Tattoos    • Vitamin B12 deficiency        Allergies   Allergen Reactions   • Codeine Anaphylaxis   • Flexeril [Cyclobenzaprine] Other (See Comments)     \"gives me chest pain\"   • Asa [Aspirin] Hives     Wheezing     • Latex Hives   • Morphine Itching   • Oxycontin [Oxycodone] GI Intolerance   • Penicillins Hives     Vomiting     • Triptans Other (See Comments)     Chest tightness, left arm numbness   • Zofran [Ondansetron] GI Intolerance   • Compazine [Prochlorperazine] Anxiety   • Reglan [Metoclopramide] Anxiety       Past Surgical History:   Procedure Laterality Date   • CHOLECYSTECTOMY     • TOOTH EXTRACTION         Family History   Problem Relation Age of Onset   • Irritable bowel syndrome Mother    • Irritable bowel syndrome Father    • Colon cancer " "Maternal Grandfather        Social History     Socioeconomic History   • Marital status: Single   Tobacco Use   • Smoking status: Every Day     Packs/day: 0.50     Years: 29.00     Pack years: 14.50     Types: Cigarettes     Start date: 1/1/1995   • Smokeless tobacco: Never   Vaping Use   • Vaping Use: Former   Substance and Sexual Activity   • Alcohol use: Never   • Drug use: Never   • Sexual activity: Not Currently     Partners: Female           Objective   Physical Exam  /88   Pulse 102   Temp 97.9 °F (36.6 °C) (Oral)   Resp 18   Ht 157.5 cm (62\")   Wt 112 kg (248 lb)   SpO2 99%   BMI 45.36 kg/m²     CONSTITUTIONAL: Well developed, nontoxic 37-year-old female,  in no acute distress.  VITAL SIGNS: per nursing, reviewed and noted  SKIN: exposed skin with no rashes, ulcerations or petechiae  EYES: Grossly EOMI, no icterus  ENT: Normal voice.  TM nares clear bilaterally.  No posterior pharyngeal edema or exudate   RESPIRATORY:  No increased work of breathing. No retractions.   CARDIOVASCULAR:  regular rate and rhythm, no murmurs.  Good Peripheral pulses. Good cap refill to extremities.   GI: Abdomen soft, nontender, normal bowel sounds. No hernia. No ascites.  MUSCULOSKELETAL:  No tenderness. Full ROM. Strength and tone grossly normal.  no spasms. no neck or back tenderness or spasm.   NEUROLOGIC: Alert, oriented x 3. No gross deficits. GCS 15.   PSYCH: appropriate affect.  : no bladder tenderness or distention, no CVA tenderness      Procedures     No attending physician procedures were performed on this patient.      ED Course                                           MDM  Patient presented for above complaint, nontoxic, provided Phenergan and Robitussin-DM.  None, advised supportive care recommendations, outpatient follow-up return precautions discussed  Final diagnoses:   Acute viral syndrome   Nausea and vomiting, unspecified vomiting type       ED Disposition  ED Disposition     ED Disposition "   Discharge    Condition   Stable    Comment   --             Stephanie Burdick, APRN  [address]  [place] [address]  [phone]          [facility] Emergency Department  [address] [place] [id number] [phone]    As needed, If symptoms worsen         Medication List      ASK your doctor about these medications    doxycycline 100 MG capsule  Commonly known as: VIBRAMYCIN  Take 1 capsule by mouth 2 (Two) Times a Day for 10 days.  Ask about: Should I take this medication?     guaiFENesin 600 MG 12 hr tablet  Commonly known as: Mucinex  Take 1 tablet by mouth 2 (Two) Times a Day for 10 days.  Ask about: Should I take this medication?             Vidal Jensen, DO  10/08/22 [id number]

## 2022-10-10 ENCOUNTER — TELEPHONE (OUTPATIENT)
Dept: NEUROLOGY | Facility: CLINIC | Age: 37
End: 2022-10-10

## 2022-10-10 DIAGNOSIS — M54.42 CHRONIC BILATERAL LOW BACK PAIN WITH BILATERAL SCIATICA: ICD-10-CM

## 2022-10-10 DIAGNOSIS — G51.0 BELL PALSY: Primary | ICD-10-CM

## 2022-10-10 DIAGNOSIS — G89.29 CHRONIC BILATERAL LOW BACK PAIN WITH BILATERAL SCIATICA: ICD-10-CM

## 2022-10-10 DIAGNOSIS — M54.41 CHRONIC BILATERAL LOW BACK PAIN WITH BILATERAL SCIATICA: ICD-10-CM

## 2022-10-10 NOTE — TELEPHONE ENCOUNTER
Caller: Lindsay Amezquita    Relationship: Self    Best call back number: [phone]    Who are you requesting to speak with (clinical staff, provider,  specific staff member):   TERESA MCCOLLUM    What was the call regarding:   PT SYMPTOMS  FOR ABOUT 4 DAYS, THE PT HAS BEEN ABLE TO ONLY GET ABOUT 3 HOURS SLEEP. PT IS CONCERNED SHE MAY BE HAVING ANOTHER BELL PALSY EPISODE.    PT STATES HER RIGHT EYE IS DROOPY BUT THE REST OF HER FACE ISN'T.    PT IS FEELING NUMBNESS AND TINGLING ALL OVER HER BODY. PT STATES WHEN SHE SITS FOR ABOUT 4 OR 5 HOURS HER FEET GET THE NUMBNESS AND THE TINGLING.    PT STATED ABOUT 2 AND 1/2 WEEKS AGO SHE HAD THE STOMACH VIRUS PRETTY BAD.    UNABLE TO SPEAK W/CLINICAL STAFF.    Do you require a callback: YES, PLEASE.

## 2022-10-10 NOTE — TELEPHONE ENCOUNTER
Patient states that she is on day 4 of no sleep, not eating as much, not drinking as much thought it was because she was sick but its not thinks it has something to do with her bi-polar, ocd and ptsd. She says she is at the end of her rope. Needs a call back as to what to do.

## 2022-10-10 NOTE — TELEPHONE ENCOUNTER
Caller: Lindsay Amezquita    Relationship: Self    Best call back number: [phone]    Requested Prescriptions:   Requested Prescriptions     Pending Prescriptions Disp Refills   • gabapentin (NEURONTIN) 800 MG tablet 90 tablet 0     Sig: Take 1 tablet by mouth 3 (Three) Times a Day.        Pharmacy where request should be sent: [facility]/PHARMACY #[id number] - [address] [phone] [facility] [phone]      Additional details provided by patient: NO MEDICATION ON HAND     Does the patient have less than a 3 day supply:  [x] Yes  [] No    Kim Rushing Rep   10/10/22 12:54 EDT

## 2022-10-10 NOTE — TELEPHONE ENCOUNTER
Rx Refill Note  Requested Prescriptions     Pending Prescriptions Disp Refills   • gabapentin (NEURONTIN) 800 MG tablet 90 tablet 0     Sig: Take 1 tablet by mouth 3 (Three) Times a Day.      Last office visit with prescribing clinician: 6/6/2022      Next office visit with prescribing clinician: Visit date not found            Lay Strickland MA  10/10/22, 17:33 EDT

## 2022-10-11 RX ORDER — GABAPENTIN 800 MG/1
800 TABLET ORAL 3 TIMES DAILY
Qty: 90 TABLET | Refills: 0 | OUTPATIENT
Start: 2022-10-11

## 2022-10-11 RX ORDER — PREDNISONE 10 MG/1
TABLET ORAL
Qty: 21 TABLET | Refills: 0 | Status: SHIPPED | OUTPATIENT
Start: 2022-10-11 | End: 2022-10-22 | Stop reason: HOSPADM

## 2022-10-11 NOTE — TELEPHONE ENCOUNTER
Refill denied. Per satish this was last filled on 9/20 for a 30 day supply. She also has not been seen in office since Feb. I have to see her more often to manage her care. She has had several tele visits with several providers for acute things but not chronic issues. It looks like she has an appt with Dr. Nagy so I am assuming she is transitioning her care to her him. When she is due for her next refill she can call back and I will send her 2 weeks until she is seen by someone in person.

## 2022-10-12 NOTE — TELEPHONE ENCOUNTER
PATIENT STATED SHE DID NOT WANT ANY MEDICATION AT THIS TIME. SHE JUST WANTED TO LET YOU KNOW THAT SHE HAS NOT HAD ANY BELLS PALSY EPISODES AND SHE WENT TO THE ER AND THEY TOLD HER THEY COULDN'T DO ANYTHING UNLESS SHE HAD A FULL BLOWN EPISODE.

## 2022-10-14 ENCOUNTER — HOSPITAL ENCOUNTER (EMERGENCY)
Facility: HOSPITAL | Age: 37
Discharge: LEFT BEFORE DISCHARGE | End: 2022-10-14

## 2022-10-14 PROCEDURE — 99211 OFF/OP EST MAY X REQ PHY/QHP: CPT

## 2022-10-15 ENCOUNTER — APPOINTMENT (OUTPATIENT)
Dept: GENERAL RADIOLOGY | Facility: HOSPITAL | Age: 37
End: 2022-10-15

## 2022-10-15 ENCOUNTER — HOSPITAL ENCOUNTER (EMERGENCY)
Facility: HOSPITAL | Age: 37
Discharge: HOME | End: 2022-10-15
Attending: FAMILY MEDICINE | Admitting: FAMILY MEDICINE

## 2022-10-15 VITALS
RESPIRATION RATE: 20 BRPM | OXYGEN SATURATION: 92 % | TEMPERATURE: 98.2 F | WEIGHT: 285 LBS | HEIGHT: 62 IN | BODY MASS INDEX: 52.44 KG/M2 | SYSTOLIC BLOOD PRESSURE: 150 MMHG | HEART RATE: 119 BPM | DIASTOLIC BLOOD PRESSURE: 100 MMHG

## 2022-10-15 DIAGNOSIS — S62.347A CLOSED NONDISPLACED FRACTURE OF BASE OF FIFTH METACARPAL BONE OF LEFT HAND, INITIAL ENCOUNTER: Primary | ICD-10-CM

## 2022-10-15 PROCEDURE — 73110 X-RAY EXAM OF WRIST: CPT

## 2022-10-15 PROCEDURE — 99283 EMERGENCY DEPT VISIT LOW MDM: CPT

## 2022-10-15 PROCEDURE — 73130 X-RAY EXAM OF HAND: CPT

## 2022-10-15 RX ORDER — HYDROCODONE BITARTRATE AND ACETAMINOPHEN 5; 325 MG/1; MG/1
1 TABLET ORAL EVERY 6 HOURS PRN
Qty: 10 TABLET | Refills: 0 | Status: SHIPPED | OUTPATIENT
Start: 2022-10-15 | End: 2022-10-22 | Stop reason: HOSPADM

## 2022-10-15 RX ORDER — HYDROCODONE BITARTRATE AND ACETAMINOPHEN 5; 325 MG/1; MG/1
1 TABLET ORAL ONCE
Status: COMPLETED | OUTPATIENT
Start: 2022-10-15 | End: 2022-10-15

## 2022-10-15 RX ADMIN — HYDROCODONE BITARTRATE AND ACETAMINOPHEN 1 TABLET: 5; 325 TABLET ORAL at 22:33

## 2022-10-16 ENCOUNTER — HOSPITAL ENCOUNTER (EMERGENCY)
Facility: HOSPITAL | Age: 37
Discharge: HOME | End: 2022-10-16
Attending: EMERGENCY MEDICINE | Admitting: EMERGENCY MEDICINE

## 2022-10-16 VITALS
SYSTOLIC BLOOD PRESSURE: 130 MMHG | HEIGHT: 62 IN | RESPIRATION RATE: 18 BRPM | DIASTOLIC BLOOD PRESSURE: 94 MMHG | BODY MASS INDEX: 52.44 KG/M2 | HEART RATE: 118 BPM | WEIGHT: 285 LBS | TEMPERATURE: 98.9 F | OXYGEN SATURATION: 94 %

## 2022-10-16 DIAGNOSIS — S60.052A CONTUSION OF LEFT LITTLE FINGER WITHOUT DAMAGE TO NAIL, INITIAL ENCOUNTER: Primary | ICD-10-CM

## 2022-10-16 PROCEDURE — 99282 EMERGENCY DEPT VISIT SF MDM: CPT

## 2022-10-16 NOTE — DISCHARGE INSTRUCTIONS
Until your official radiology report is released tomorrow, wear the splint for comfort. Take the pain medication as needed. If there is a fracture, call Dr. Tipton on Monday to schedule a follow-up appointment unless you have your own orthopedic provider.

## 2022-10-16 NOTE — ED PROVIDER NOTES
"Subjective   History of Present Illness  Patient is a 37-year-old female who complains of a left hand injury.  She states that she had a fall yesterday and injured her left hand, primarily the pinky side.  She notes some pain with wrist movement but is able to move her wrist.  No difficulty with moving her thumb, pointer, middle, and ring fingers.  She has pain with pinky movement.  No complaints to the left elbow or shoulder.  Notes swelling and bruising.  Took medication today at noon to help with pain.      Review of Systems   Musculoskeletal: Positive for arthralgias and joint swelling.   Skin: Negative for wound.   All other systems reviewed and are negative.      Past Medical History:   Diagnosis Date   • Anxiety    • Back pain    • Bell's palsy    • Bipolar 2 disorder ([facility])    • Blood clot in vein    • Depression    • Diabetes mellitus (HCC) November    Pre diabete   • Frequent headaches    • GERD (gastroesophageal reflux disease)    • Migraine    • Panic    • PTSD (post-traumatic stress disorder)    • Restless leg syndrome    • Sinus problem    • Snores    • Tattoos    • Vitamin B12 deficiency        Allergies   Allergen Reactions   • Codeine Anaphylaxis   • Flexeril [Cyclobenzaprine] Other (See Comments)     \"gives me chest pain\"   • Asa [Aspirin] Hives     Wheezing     • Latex Hives   • Morphine Itching   • Oxycontin [Oxycodone] GI Intolerance   • Penicillins Hives     Vomiting     • Triptans Other (See Comments)     Chest tightness, left arm numbness   • Zofran [Ondansetron] GI Intolerance   • Compazine [Prochlorperazine] Anxiety   • Reglan [Metoclopramide] Anxiety       Past Surgical History:   Procedure Laterality Date   • CHOLECYSTECTOMY     • TOOTH EXTRACTION         Family History   Problem Relation Age of Onset   • Irritable bowel syndrome Mother    • Irritable bowel syndrome Father    • Colon cancer Maternal Grandfather        Social History     Socioeconomic History   • Marital status: Single "   Tobacco Use   • Smoking status: Every Day     Packs/day: 0.50     Years: 29.00     Pack years: 14.50     Types: Cigarettes     Start date: 1/1/1995   • Smokeless tobacco: Never   Vaping Use   • Vaping Use: Former   Substance and Sexual Activity   • Alcohol use: Never   • Drug use: Never   • Sexual activity: Not Currently     Partners: Female           Objective   Physical Exam  Vitals and nursing note reviewed.   Constitutional:       Appearance: Normal appearance. She is obese.   HENT:      Head: Normocephalic and atraumatic.   Cardiovascular:      Pulses: Normal pulses.   Pulmonary:      Effort: Pulmonary effort is normal.   Musculoskeletal:      Left hand: Swelling and tenderness present. Decreased range of motion. Normal pulse.   Skin:     General: Skin is warm and dry.      Capillary Refill: Capillary refill takes less than 2 seconds.   Neurological:      General: No focal deficit present.      Mental Status: She is alert and oriented to person, place, and time.   Psychiatric:         Mood and Affect: Mood normal.         Behavior: Behavior normal.         Procedures           ED Course                                           MDM  Number of Diagnoses or Management Options  Closed nondisplaced fracture of base of fifth metacarpal bone of left hand, initial encounter  Diagnosis management comments: Patient is a 37-year-old female here today with a nondisplaced fracture to her left fifth metacarpal.  She was provided a dose of Norco for pain prior to discharge.  Official radiology report still pending.  Advised the patient to wear the splint for comfort and to wait for her official radiology report as the fracture may be questionable.  Instructed her to notify the orthopedic provider if it is a fracture to have follow-up and further management of this injury.  Provided her with prescription for pain medication to use as needed.  She can follow-up with her primary provider as needed.  Can return to the ER for  any new or worsening symptoms.       Amount and/or Complexity of Data Reviewed  Tests in the radiology section of CPT®: ordered and reviewed  Tests in the medicine section of CPT®: ordered and reviewed  Discussion of test results with the performing providers: yes  Discuss the patient with other providers: yes    Patient Progress  Patient progress: stable      Final diagnoses:   Closed nondisplaced fracture of base of fifth metacarpal bone of left hand, initial encounter       ED Disposition  ED Disposition     ED Disposition   Discharge    Condition   Stable    Comment   --             Stephanie Burdick, APRN  [address]  [place]  [place] [id number] [phone]    Schedule an appointment as soon as possible for a visit   As needed    Kanu Tipton MD  [address] [facility] [address]  [place] [id number] [phone]    Call on 10/17/2022           Medication List      New Prescriptions    HYDROcodone-acetaminophen 5-325 MG per tablet  Commonly known as: NORCO  Take 1 tablet by mouth Every 6 (Six) Hours As Needed for Moderate Pain or Severe Pain.           Where to Get Your Medications      These medications were sent to [facility]/pharmacy #[id number] - [place], [place] - [address] [place] [address] [phone] [place] [phone] [address] [address]    Phone: [phone]   · HYDROcodone-acetaminophen 5-325 MG per tablet          Gennaro Tang, CLAIR  10/15/22 9514

## 2022-10-17 ENCOUNTER — TELEPHONE (OUTPATIENT)
Dept: FAMILY MEDICINE CLINIC | Facility: CLINIC | Age: 37
End: 2022-10-17

## 2022-10-17 NOTE — ED PROVIDER NOTES
Subjective  History of Present Illness:    Chief Complaint: Left hand pain  History of Present Illness: This is a 37-year-old female patient comes into the ED today complaining of left hand pain.  Patient states she was seen in the ER yesterday was told that she possibly could have a fracture on her left hand.  Patient comes back today stating that the cast splint that was placed was too tight so she took it off and her medication was not strong enough.  X-rays reviewed from 10- states there is no fracture.      Nurses Notes reviewed and agree, including vitals, allergies, social history and prior medical history.       Allergies:    Codeine, Flexeril [cyclobenzaprine], Asa [aspirin], Latex, Morphine, Oxycontin [oxycodone], Penicillins, Triptans, Zofran [ondansetron], Compazine [prochlorperazine], and Reglan [metoclopramide]      Past Surgical History:   Procedure Laterality Date   • CHOLECYSTECTOMY     • TOOTH EXTRACTION           Social History     Socioeconomic History   • Marital status: Single   Tobacco Use   • Smoking status: Every Day     Packs/day: 0.50     Years: 29.00     Pack years: 14.50     Types: Cigarettes     Start date: 1/1/1995   • Smokeless tobacco: Never   Vaping Use   • Vaping Use: Former   Substance and Sexual Activity   • Alcohol use: Never   • Drug use: Never   • Sexual activity: Not Currently     Partners: Female         Family History   Problem Relation Age of Onset   • Irritable bowel syndrome Mother    • Irritable bowel syndrome Father    • Colon cancer Maternal Grandfather        REVIEW OF SYSTEMS: All systems reviewed and not pertinent unless noted.    Review of Systems   Musculoskeletal: Positive for joint swelling and myalgias.       Objective    Physical Exam  Vitals and nursing note reviewed.   Constitutional:       Appearance: Normal appearance.   HENT:      Head: Normocephalic and atraumatic.   Eyes:      Extraocular Movements: Extraocular movements intact.      Pupils:  Pupils are equal, round, and reactive to light.   Cardiovascular:      Rate and Rhythm: Normal rate and regular rhythm.      Pulses: Normal pulses.      Heart sounds: Normal heart sounds.   Pulmonary:      Effort: Pulmonary effort is normal.      Breath sounds: Normal breath sounds.   Abdominal:      General: Abdomen is flat. Bowel sounds are normal.      Palpations: Abdomen is soft.   Musculoskeletal:      Cervical back: Normal range of motion and neck supple.   Skin:     Capillary Refill: Capillary refill takes less than 2 seconds.   Neurological:      General: No focal deficit present.      Mental Status: She is alert and oriented to person, place, and time. Mental status is at baseline.      GCS: GCS eye subscore is 4. GCS verbal subscore is 5. GCS motor subscore is 6.      Sensory: Sensation is intact.      Motor: Motor function is intact.      Gait: Gait is intact.   Psychiatric:         Attention and Perception: Attention and perception normal.         Mood and Affect: Mood and affect normal.         Speech: Speech normal.         Behavior: Behavior normal. Behavior is cooperative.           Procedures    ED Course:         Lab Results (last 24 hours)     ** No results found for the last 24 hours. **           XR Wrist 3+ View Left    Result Date: 10/16/2022  PROCEDURE: XR WRIST 3+ VW LEFT-  History: Pain status post injury.  COMPARISON: None.  FINDINGS:  A 3 view exam demonstrates no acute fracture or dislocation. The joint spaces are preserved. Soft tissue swelling.      Impression: No acute fracture.     This report was signed and finalized on 10/16/2022 7:46 AM by hSameka Orta MD.    XR Hand 3+ View Left    Result Date: 10/16/2022  PROCEDURE: XR HAND 3+ VW LEFT-  History: Pain status post injury.  COMPARISON: None.  FINDINGS:  A 3 view exam demonstrates no acute fracture or dislocation. The joint spaces are preserved. Soft tissues are.      Impression: No acute fracture.     This report was signed  and finalized on 10/16/2022 7:46 AM by Shameka Orta MD.         MDM      Final diagnoses:   Contusion of left little finger without damage to nail, initial encounter        Jovan Freire APRN  10/16/22 2246       Jovan Freire APRN  10/16/22 2246

## 2022-10-17 NOTE — TELEPHONE ENCOUNTER
"Caller: Lindsay Amezquita    Relationship to patient: Self    Best call back number: [phone]    Chief complaint: CHEST TIGHTNESS, DIZZINESS, COUGHING UP GREEN PHLEGM,     Patient directed to call 911 or go to their nearest emergency room.     Patient verbalized understanding: [x] Yes  [] No  If no, why?    Additional notes: PATIENT WAS ADVISED TO CONTACT 911 OR GO TO THE EMERGENCY ROOM IMMEDIATELY. PATIENT SAID \"OKAY\" THEN DISCONNECTED THE CALL.   "

## 2022-10-19 ENCOUNTER — APPOINTMENT (OUTPATIENT)
Dept: GENERAL RADIOLOGY | Facility: HOSPITAL | Age: 37
End: 2022-10-19

## 2022-10-19 ENCOUNTER — APPOINTMENT (OUTPATIENT)
Dept: CT IMAGING | Facility: HOSPITAL | Age: 37
End: 2022-10-19

## 2022-10-19 ENCOUNTER — HOSPITAL ENCOUNTER (INPATIENT)
Facility: HOSPITAL | Age: 37
LOS: 3 days | Discharge: HOME | End: 2022-10-22
Attending: EMERGENCY MEDICINE | Admitting: STUDENT IN AN ORGANIZED HEALTH CARE EDUCATION/TRAINING PROGRAM

## 2022-10-19 DIAGNOSIS — J21.0 ACUTE BRONCHIOLITIS DUE TO RESPIRATORY SYNCYTIAL VIRUS (RSV): ICD-10-CM

## 2022-10-19 DIAGNOSIS — J96.01 ACUTE RESPIRATORY FAILURE WITH HYPOXIA AND HYPERCAPNIA: Primary | ICD-10-CM

## 2022-10-19 DIAGNOSIS — J96.02 ACUTE RESPIRATORY FAILURE WITH HYPOXIA AND HYPERCAPNIA: Primary | ICD-10-CM

## 2022-10-19 LAB
A-A DO2: 137.4 MMHG
ALBUMIN SERPL-MCNC: 3.8 G/DL (ref 3.5–5.2)
ALBUMIN/GLOB SERPL: 1.4 G/DL
ALP SERPL-CCNC: 71 U/L (ref 39–117)
ALT SERPL W P-5'-P-CCNC: 27 U/L (ref 1–33)
ANION GAP SERPL CALCULATED.3IONS-SCNC: 10.5 MMOL/L (ref 5–15)
ARTERIAL PATENCY WRIST A: POSITIVE
AST SERPL-CCNC: 22 U/L (ref 1–32)
ATMOSPHERIC PRESS: 735 MMHG
B PARAPERT DNA SPEC QL NAA+PROBE: NOT DETECTED
B PERT DNA SPEC QL NAA+PROBE: NOT DETECTED
BASE EXCESS BLDA CALC-SCNC: 4.7 MMOL/L (ref 0–2)
BASOPHILS # BLD AUTO: 0.02 10*3/MM3 (ref 0–0.2)
BASOPHILS NFR BLD AUTO: 0.3 % (ref 0–1.5)
BDY SITE: ABNORMAL
BILIRUB SERPL-MCNC: 0.2 MG/DL (ref 0–1.2)
BUN SERPL-MCNC: 5 MG/DL (ref 6–20)
BUN/CREAT SERPL: 6.9 (ref 7–25)
C PNEUM DNA NPH QL NAA+NON-PROBE: NOT DETECTED
CALCIUM SPEC-SCNC: 8.4 MG/DL (ref 8.6–10.5)
CHLORIDE SERPL-SCNC: 97 MMOL/L (ref 98–107)
CO2 SERPL-SCNC: 30.5 MMOL/L (ref 22–29)
COHGB MFR BLD: 1.7 % (ref 0–2)
CREAT SERPL-MCNC: 0.72 MG/DL (ref 0.57–1)
D DIMER PPP FEU-MCNC: 0.46 MCGFEU/ML (ref 0–0.57)
D-LACTATE SERPL-SCNC: 1.9 MMOL/L (ref 0.5–2)
D-LACTATE SERPL-SCNC: 2.9 MMOL/L (ref 0.5–2)
DEPRECATED RDW RBC AUTO: 44.9 FL (ref 37–54)
EGFRCR SERPLBLD CKD-EPI 2021: 109.9 ML/MIN/1.73
EOSINOPHIL # BLD AUTO: 0.07 10*3/MM3 (ref 0–0.4)
EOSINOPHIL NFR BLD AUTO: 1.1 % (ref 0.3–6.2)
ERYTHROCYTE [DISTWIDTH] IN BLOOD BY AUTOMATED COUNT: 13.7 % (ref 12.3–15.4)
FLUAV SUBTYP SPEC NAA+PROBE: NOT DETECTED
FLUBV RNA ISLT QL NAA+PROBE: NOT DETECTED
GAS FLOW AIRWAY: 5 LPM
GLOBULIN UR ELPH-MCNC: 2.7 GM/DL
GLUCOSE SERPL-MCNC: 148 MG/DL (ref 65–99)
HADV DNA SPEC NAA+PROBE: NOT DETECTED
HCO3 BLDA-SCNC: 32.2 MMOL/L (ref 22–28)
HCOV 229E RNA SPEC QL NAA+PROBE: NOT DETECTED
HCOV HKU1 RNA SPEC QL NAA+PROBE: NOT DETECTED
HCOV NL63 RNA SPEC QL NAA+PROBE: NOT DETECTED
HCOV OC43 RNA SPEC QL NAA+PROBE: NOT DETECTED
HCT VFR BLD AUTO: 38.7 % (ref 34–46.6)
HCT VFR BLD CALC: 39.6 %
HGB BLD-MCNC: 12.9 G/DL (ref 12–15.9)
HMPV RNA NPH QL NAA+NON-PROBE: NOT DETECTED
HOLD SPECIMEN: NORMAL
HOLD SPECIMEN: NORMAL
HPIV1 RNA ISLT QL NAA+PROBE: NOT DETECTED
HPIV2 RNA SPEC QL NAA+PROBE: NOT DETECTED
HPIV3 RNA NPH QL NAA+PROBE: NOT DETECTED
HPIV4 P GENE NPH QL NAA+PROBE: NOT DETECTED
IMM GRANULOCYTES # BLD AUTO: 0.02 10*3/MM3 (ref 0–0.05)
IMM GRANULOCYTES NFR BLD AUTO: 0.3 % (ref 0–0.5)
INHALED O2 CONCENTRATION: 40 %
LYMPHOCYTES # BLD AUTO: 2 10*3/MM3 (ref 0.7–3.1)
LYMPHOCYTES NFR BLD AUTO: 32.3 % (ref 19.6–45.3)
Lab: ABNORMAL
M PNEUMO IGG SER IA-ACNC: NOT DETECTED
MCH RBC QN AUTO: 29.4 PG (ref 26.6–33)
MCHC RBC AUTO-ENTMCNC: 33.3 G/DL (ref 31.5–35.7)
MCV RBC AUTO: 88.2 FL (ref 79–97)
METHGB BLD QL: 0.3 % (ref 0–1.5)
MODALITY: ABNORMAL
MONOCYTES # BLD AUTO: 0.64 10*3/MM3 (ref 0.1–0.9)
MONOCYTES NFR BLD AUTO: 10.3 % (ref 5–12)
NEUTROPHILS NFR BLD AUTO: 3.45 10*3/MM3 (ref 1.7–7)
NEUTROPHILS NFR BLD AUTO: 55.7 % (ref 42.7–76)
NOTE: ABNORMAL
NRBC BLD AUTO-RTO: 0 /100 WBC (ref 0–0.2)
NT-PROBNP SERPL-MCNC: 34.4 PG/ML (ref 0–450)
OXYHGB MFR BLDV: 92.3 % (ref 94–99)
PCO2 BLDA: 60.5 MM HG (ref 35–45)
PCO2 TEMP ADJ BLD: ABNORMAL MM[HG]
PH BLDA: 7.33 PH UNITS (ref 7.35–7.45)
PH, TEMP CORRECTED: ABNORMAL
PLATELET # BLD AUTO: 248 10*3/MM3 (ref 140–450)
PMV BLD AUTO: 10.2 FL (ref 6–12)
PO2 BLDA: 71.3 MM HG (ref 75–100)
PO2 TEMP ADJ BLD: ABNORMAL MM[HG]
POTASSIUM SERPL-SCNC: 3.2 MMOL/L (ref 3.5–5.2)
PROCALCITONIN SERPL-MCNC: 0.13 NG/ML (ref 0–0.25)
PROT SERPL-MCNC: 6.5 G/DL (ref 6–8.5)
RBC # BLD AUTO: 4.39 10*6/MM3 (ref 3.77–5.28)
RHINOVIRUS RNA SPEC NAA+PROBE: NOT DETECTED
RSV RNA NPH QL NAA+NON-PROBE: DETECTED
SAO2 % BLDCOA: 94.2 % (ref 94–100)
SARS-COV-2 RNA NPH QL NAA+NON-PROBE: NOT DETECTED
SODIUM SERPL-SCNC: 138 MMOL/L (ref 136–145)
TROPONIN T SERPL-MCNC: <0.01 NG/ML (ref 0–0.03)
VENTILATOR MODE: ABNORMAL
WBC NRBC COR # BLD: 6.2 10*3/MM3 (ref 3.4–10.8)
WHOLE BLOOD HOLD COAG: NORMAL
WHOLE BLOOD HOLD SPECIMEN: NORMAL

## 2022-10-19 PROCEDURE — 25010000002 ENOXAPARIN PER 10 MG: Performed by: STUDENT IN AN ORGANIZED HEALTH CARE EDUCATION/TRAINING PROGRAM

## 2022-10-19 PROCEDURE — 71045 X-RAY EXAM CHEST 1 VIEW: CPT

## 2022-10-19 PROCEDURE — 83050 HGB METHEMOGLOBIN QUAN: CPT

## 2022-10-19 PROCEDURE — 82805 BLOOD GASES W/O2 SATURATION: CPT

## 2022-10-19 PROCEDURE — 82375 ASSAY CARBOXYHB QUANT: CPT

## 2022-10-19 PROCEDURE — 85025 COMPLETE CBC W/AUTO DIFF WBC: CPT | Performed by: EMERGENCY MEDICINE

## 2022-10-19 PROCEDURE — 94761 N-INVAS EAR/PLS OXIMETRY MLT: CPT

## 2022-10-19 PROCEDURE — 25010000002 MAGNESIUM SULFATE 2 GM/50ML SOLUTION: Performed by: EMERGENCY MEDICINE

## 2022-10-19 PROCEDURE — 83605 ASSAY OF LACTIC ACID: CPT | Performed by: EMERGENCY MEDICINE

## 2022-10-19 PROCEDURE — 25010000002 METHYLPREDNISOLONE PER 125 MG: Performed by: EMERGENCY MEDICINE

## 2022-10-19 PROCEDURE — 36600 WITHDRAWAL OF ARTERIAL BLOOD: CPT

## 2022-10-19 PROCEDURE — 71275 CT ANGIOGRAPHY CHEST: CPT

## 2022-10-19 PROCEDURE — 0202U NFCT DS 22 TRGT SARS-COV-2: CPT | Performed by: EMERGENCY MEDICINE

## 2022-10-19 PROCEDURE — 99223 1ST HOSP IP/OBS HIGH 75: CPT | Performed by: STUDENT IN AN ORGANIZED HEALTH CARE EDUCATION/TRAINING PROGRAM

## 2022-10-19 PROCEDURE — 80053 COMPREHEN METABOLIC PANEL: CPT | Performed by: EMERGENCY MEDICINE

## 2022-10-19 PROCEDURE — 83880 ASSAY OF NATRIURETIC PEPTIDE: CPT | Performed by: EMERGENCY MEDICINE

## 2022-10-19 PROCEDURE — 94640 AIRWAY INHALATION TREATMENT: CPT

## 2022-10-19 PROCEDURE — 84484 ASSAY OF TROPONIN QUANT: CPT | Performed by: EMERGENCY MEDICINE

## 2022-10-19 PROCEDURE — 93005 ELECTROCARDIOGRAM TRACING: CPT | Performed by: EMERGENCY MEDICINE

## 2022-10-19 PROCEDURE — 99284 EMERGENCY DEPT VISIT MOD MDM: CPT

## 2022-10-19 PROCEDURE — 25010000002 IOPAMIDOL 61 % SOLUTION: Performed by: EMERGENCY MEDICINE

## 2022-10-19 PROCEDURE — 85379 FIBRIN DEGRADATION QUANT: CPT | Performed by: EMERGENCY MEDICINE

## 2022-10-19 PROCEDURE — 84145 PROCALCITONIN (PCT): CPT | Performed by: EMERGENCY MEDICINE

## 2022-10-19 PROCEDURE — 94799 UNLISTED PULMONARY SVC/PX: CPT

## 2022-10-19 RX ORDER — OXYMETAZOLINE HYDROCHLORIDE 0.05 G/100ML
2 SPRAY NASAL 2 TIMES DAILY
Status: DISCONTINUED | OUTPATIENT
Start: 2022-10-19 | End: 2022-10-22 | Stop reason: HOSPADM

## 2022-10-19 RX ORDER — ACETAMINOPHEN 325 MG/1
650 TABLET ORAL EVERY 4 HOURS PRN
Status: DISCONTINUED | OUTPATIENT
Start: 2022-10-19 | End: 2022-10-22 | Stop reason: HOSPADM

## 2022-10-19 RX ORDER — ENOXAPARIN SODIUM 100 MG/ML
40 INJECTION SUBCUTANEOUS EVERY 12 HOURS
Status: DISCONTINUED | OUTPATIENT
Start: 2022-10-19 | End: 2022-10-22 | Stop reason: HOSPADM

## 2022-10-19 RX ORDER — METHYLPREDNISOLONE SODIUM SUCCINATE 125 MG/2ML
125 INJECTION, POWDER, LYOPHILIZED, FOR SOLUTION INTRAMUSCULAR; INTRAVENOUS ONCE
Status: COMPLETED | OUTPATIENT
Start: 2022-10-19 | End: 2022-10-19

## 2022-10-19 RX ORDER — MAGNESIUM SULFATE HEPTAHYDRATE 40 MG/ML
2 INJECTION, SOLUTION INTRAVENOUS ONCE
Status: COMPLETED | OUTPATIENT
Start: 2022-10-19 | End: 2022-10-19

## 2022-10-19 RX ORDER — ONDANSETRON 2 MG/ML
4 INJECTION INTRAMUSCULAR; INTRAVENOUS EVERY 6 HOURS PRN
Status: DISCONTINUED | OUTPATIENT
Start: 2022-10-19 | End: 2022-10-22 | Stop reason: HOSPADM

## 2022-10-19 RX ORDER — HYDROXYZINE HYDROCHLORIDE 25 MG/1
25 TABLET, FILM COATED ORAL 3 TIMES DAILY PRN
Status: DISCONTINUED | OUTPATIENT
Start: 2022-10-19 | End: 2022-10-22 | Stop reason: HOSPADM

## 2022-10-19 RX ORDER — ALPRAZOLAM 0.5 MG/1
0.5 TABLET ORAL 2 TIMES DAILY PRN
Status: DISCONTINUED | OUTPATIENT
Start: 2022-10-19 | End: 2022-10-21

## 2022-10-19 RX ORDER — SODIUM CHLORIDE 0.9 % (FLUSH) 0.9 %
10 SYRINGE (ML) INJECTION AS NEEDED
Status: DISCONTINUED | OUTPATIENT
Start: 2022-10-19 | End: 2022-10-22 | Stop reason: HOSPADM

## 2022-10-19 RX ORDER — METHYLPREDNISOLONE SODIUM SUCCINATE 40 MG/ML
40 INJECTION, POWDER, LYOPHILIZED, FOR SOLUTION INTRAMUSCULAR; INTRAVENOUS EVERY 12 HOURS
Status: DISCONTINUED | OUTPATIENT
Start: 2022-10-20 | End: 2022-10-19

## 2022-10-19 RX ORDER — ENOXAPARIN SODIUM 100 MG/ML
40 INJECTION SUBCUTANEOUS DAILY
Status: DISCONTINUED | OUTPATIENT
Start: 2022-10-19 | End: 2022-10-19 | Stop reason: DRUGHIGH

## 2022-10-19 RX ORDER — GABAPENTIN 400 MG/1
800 CAPSULE ORAL EVERY 8 HOURS SCHEDULED
Status: DISCONTINUED | OUTPATIENT
Start: 2022-10-19 | End: 2022-10-22 | Stop reason: HOSPADM

## 2022-10-19 RX ORDER — HYDROCODONE BITARTRATE AND ACETAMINOPHEN 7.5; 325 MG/1; MG/1
1 TABLET ORAL EVERY 6 HOURS PRN
COMMUNITY

## 2022-10-19 RX ORDER — SODIUM CHLORIDE FOR INHALATION 3 %
3 VIAL, NEBULIZER (ML) INHALATION ONCE
Status: COMPLETED | OUTPATIENT
Start: 2022-10-19 | End: 2022-10-19

## 2022-10-19 RX ORDER — ACETAMINOPHEN 160 MG/5ML
650 SOLUTION ORAL EVERY 4 HOURS PRN
Status: DISCONTINUED | OUTPATIENT
Start: 2022-10-19 | End: 2022-10-22 | Stop reason: HOSPADM

## 2022-10-19 RX ORDER — GUAIFENESIN 600 MG/1
600 TABLET, EXTENDED RELEASE ORAL EVERY 12 HOURS SCHEDULED
Status: DISCONTINUED | OUTPATIENT
Start: 2022-10-19 | End: 2022-10-22 | Stop reason: HOSPADM

## 2022-10-19 RX ORDER — ACETAMINOPHEN 650 MG/1
650 SUPPOSITORY RECTAL EVERY 4 HOURS PRN
Status: DISCONTINUED | OUTPATIENT
Start: 2022-10-19 | End: 2022-10-22 | Stop reason: HOSPADM

## 2022-10-19 RX ORDER — DEXMEDETOMIDINE HYDROCHLORIDE 4 UG/ML
.2-1.5 INJECTION, SOLUTION INTRAVENOUS
Status: DISCONTINUED | OUTPATIENT
Start: 2022-10-19 | End: 2022-10-22 | Stop reason: HOSPADM

## 2022-10-19 RX ORDER — SODIUM CHLORIDE 0.9 % (FLUSH) 0.9 %
10 SYRINGE (ML) INJECTION EVERY 12 HOURS SCHEDULED
Status: DISCONTINUED | OUTPATIENT
Start: 2022-10-19 | End: 2022-10-22 | Stop reason: HOSPADM

## 2022-10-19 RX ORDER — HYDROCODONE BITARTRATE AND ACETAMINOPHEN 7.5; 325 MG/1; MG/1
1 TABLET ORAL EVERY 6 HOURS PRN
Status: DISCONTINUED | OUTPATIENT
Start: 2022-10-19 | End: 2022-10-22 | Stop reason: HOSPADM

## 2022-10-19 RX ORDER — TIZANIDINE 4 MG/1
4 TABLET ORAL EVERY 8 HOURS PRN
Status: DISCONTINUED | OUTPATIENT
Start: 2022-10-19 | End: 2022-10-22 | Stop reason: HOSPADM

## 2022-10-19 RX ORDER — AMITRIPTYLINE HYDROCHLORIDE 50 MG/1
150 TABLET, FILM COATED ORAL NIGHTLY
Status: DISCONTINUED | OUTPATIENT
Start: 2022-10-19 | End: 2022-10-22 | Stop reason: HOSPADM

## 2022-10-19 RX ORDER — METHYLPREDNISOLONE SODIUM SUCCINATE 40 MG/ML
40 INJECTION, POWDER, LYOPHILIZED, FOR SOLUTION INTRAMUSCULAR; INTRAVENOUS EVERY 24 HOURS
Status: COMPLETED | OUTPATIENT
Start: 2022-10-20 | End: 2022-10-20

## 2022-10-19 RX ORDER — BUTALBITAL, ACETAMINOPHEN AND CAFFEINE 50; 325; 40 MG/1; MG/1; MG/1
1 TABLET ORAL EVERY 6 HOURS PRN
Status: DISCONTINUED | OUTPATIENT
Start: 2022-10-19 | End: 2022-10-22 | Stop reason: HOSPADM

## 2022-10-19 RX ADMIN — ENOXAPARIN SODIUM 40 MG: 40 INJECTION SUBCUTANEOUS at 17:32

## 2022-10-19 RX ADMIN — DEXMEDETOMIDINE HYDROCHLORIDE 1.5 MCG/KG/HR: 400 INJECTION INTRAVENOUS at 22:35

## 2022-10-19 RX ADMIN — ALPRAZOLAM 0.5 MG: 0.5 TABLET ORAL at 17:31

## 2022-10-19 RX ADMIN — BUTALBITAL, ACETAMINOPHEN AND CAFFEINE 1 TABLET: 50; 325; 40 TABLET ORAL at 19:51

## 2022-10-19 RX ADMIN — IOPAMIDOL 100 ML: 612 INJECTION, SOLUTION INTRAVENOUS at 13:29

## 2022-10-19 RX ADMIN — NICOTINE POLACRILEX 2 MG: 2 GUM, CHEWING BUCCAL at 17:31

## 2022-10-19 RX ADMIN — METHYLPREDNISOLONE SODIUM SUCCINATE 125 MG: 125 INJECTION, POWDER, FOR SOLUTION INTRAMUSCULAR; INTRAVENOUS at 13:14

## 2022-10-19 RX ADMIN — SODIUM CHLORIDE 1000 ML: 9 INJECTION, SOLUTION INTRAVENOUS at 13:45

## 2022-10-19 RX ADMIN — MAGNESIUM SULFATE HEPTAHYDRATE 2 G: 40 INJECTION, SOLUTION INTRAVENOUS at 13:14

## 2022-10-19 RX ADMIN — GABAPENTIN 800 MG: 400 CAPSULE ORAL at 21:04

## 2022-10-19 RX ADMIN — Medication 10 ML: at 20:39

## 2022-10-19 RX ADMIN — HYDROXYZINE HYDROCHLORIDE 25 MG: 25 TABLET ORAL at 21:04

## 2022-10-19 RX ADMIN — AMITRIPTYLINE HYDROCHLORIDE 150 MG: 50 TABLET, FILM COATED ORAL at 21:04

## 2022-10-19 RX ADMIN — Medication 3 ML: at 14:52

## 2022-10-19 RX ADMIN — DEXMEDETOMIDINE HYDROCHLORIDE 0.2 MCG/KG/HR: 400 INJECTION INTRAVENOUS at 17:32

## 2022-10-19 RX ADMIN — GUAIFENESIN 600 MG: 600 TABLET ORAL at 20:39

## 2022-10-19 RX ADMIN — HYDROCODONE BITARTRATE AND ACETAMINOPHEN 1 TABLET: 7.5; 325 TABLET ORAL at 17:31

## 2022-10-19 RX ADMIN — OXYMETAZOLINE HYDROCHLORIDE 2 SPRAY: 0.05 SPRAY NASAL at 20:39

## 2022-10-20 LAB
A-A DO2: ABNORMAL
ANION GAP SERPL CALCULATED.3IONS-SCNC: 10.8 MMOL/L (ref 5–15)
ARTERIAL PATENCY WRIST A: POSITIVE
ATMOSPHERIC PRESS: 733 MMHG
BASE EXCESS BLDA CALC-SCNC: 2.7 MMOL/L (ref 0–2)
BASOPHILS # BLD AUTO: 0.02 10*3/MM3 (ref 0–0.2)
BASOPHILS NFR BLD AUTO: 0.3 % (ref 0–1.5)
BDY SITE: ABNORMAL
BUN SERPL-MCNC: 6 MG/DL (ref 6–20)
BUN/CREAT SERPL: 10.5 (ref 7–25)
CALCIUM SPEC-SCNC: 8.9 MG/DL (ref 8.6–10.5)
CHLORIDE SERPL-SCNC: 99 MMOL/L (ref 98–107)
CO2 SERPL-SCNC: 26.2 MMOL/L (ref 22–29)
COHGB MFR BLD: <0.7 % (ref 0–2)
CREAT SERPL-MCNC: 0.57 MG/DL (ref 0.57–1)
DEPRECATED RDW RBC AUTO: 43 FL (ref 37–54)
EGFRCR SERPLBLD CKD-EPI 2021: 119.5 ML/MIN/1.73
EOSINOPHIL # BLD AUTO: 0 10*3/MM3 (ref 0–0.4)
EOSINOPHIL NFR BLD AUTO: 0 % (ref 0.3–6.2)
ERYTHROCYTE [DISTWIDTH] IN BLOOD BY AUTOMATED COUNT: 13.3 % (ref 12.3–15.4)
GAS FLOW AIRWAY: 8 LPM
GLUCOSE BLDC GLUCOMTR-MCNC: 201 MG/DL (ref 70–130)
GLUCOSE BLDC GLUCOMTR-MCNC: 253 MG/DL (ref 70–130)
GLUCOSE SERPL-MCNC: 192 MG/DL (ref 65–99)
HBA1C MFR BLD: 6.5 % (ref 4.8–5.6)
HCO3 BLDA-SCNC: 31.1 MMOL/L (ref 22–28)
HCT VFR BLD AUTO: 41.3 % (ref 34–46.6)
HCT VFR BLD CALC: 42.3 %
HGB BLD-MCNC: 13.8 G/DL (ref 12–15.9)
IMM GRANULOCYTES # BLD AUTO: 0.03 10*3/MM3 (ref 0–0.05)
IMM GRANULOCYTES NFR BLD AUTO: 0.5 % (ref 0–0.5)
LYMPHOCYTES # BLD AUTO: 1.38 10*3/MM3 (ref 0.7–3.1)
LYMPHOCYTES NFR BLD AUTO: 23.1 % (ref 19.6–45.3)
Lab: ABNORMAL
MCH RBC QN AUTO: 29.4 PG (ref 26.6–33)
MCHC RBC AUTO-ENTMCNC: 33.4 G/DL (ref 31.5–35.7)
MCV RBC AUTO: 88.1 FL (ref 79–97)
METHGB BLD QL: 0.2 % (ref 0–1.5)
MODALITY: ABNORMAL
MONOCYTES # BLD AUTO: 0.25 10*3/MM3 (ref 0.1–0.9)
MONOCYTES NFR BLD AUTO: 4.2 % (ref 5–12)
NEUTROPHILS NFR BLD AUTO: 4.29 10*3/MM3 (ref 1.7–7)
NEUTROPHILS NFR BLD AUTO: 71.9 % (ref 42.7–76)
NOTE: ABNORMAL
NRBC BLD AUTO-RTO: 0 /100 WBC (ref 0–0.2)
OXYHGB MFR BLDV: 93.2 % (ref 94–99)
PCO2 BLDA: 64.2 MM HG (ref 35–45)
PCO2 TEMP ADJ BLD: ABNORMAL MM[HG]
PH BLDA: 7.29 PH UNITS (ref 7.35–7.45)
PH, TEMP CORRECTED: ABNORMAL
PLATELET # BLD AUTO: 228 10*3/MM3 (ref 140–450)
PMV BLD AUTO: 10.9 FL (ref 6–12)
PO2 BLDA: 73.5 MM HG (ref 75–100)
PO2 TEMP ADJ BLD: ABNORMAL MM[HG]
POTASSIUM SERPL-SCNC: 4.6 MMOL/L (ref 3.5–5.2)
RBC # BLD AUTO: 4.69 10*6/MM3 (ref 3.77–5.28)
SAO2 % BLDCOA: 94 % (ref 94–100)
SODIUM SERPL-SCNC: 136 MMOL/L (ref 136–145)
VENTILATOR MODE: ABNORMAL
WBC NRBC COR # BLD: 5.97 10*3/MM3 (ref 3.4–10.8)

## 2022-10-20 PROCEDURE — 25010000002 LORAZEPAM PER 2 MG: Performed by: INTERNAL MEDICINE

## 2022-10-20 PROCEDURE — 36600 WITHDRAWAL OF ARTERIAL BLOOD: CPT

## 2022-10-20 PROCEDURE — 85025 COMPLETE CBC W/AUTO DIFF WBC: CPT | Performed by: STUDENT IN AN ORGANIZED HEALTH CARE EDUCATION/TRAINING PROGRAM

## 2022-10-20 PROCEDURE — 99223 1ST HOSP IP/OBS HIGH 75: CPT | Performed by: INTERNAL MEDICINE

## 2022-10-20 PROCEDURE — 80048 BASIC METABOLIC PNL TOTAL CA: CPT | Performed by: STUDENT IN AN ORGANIZED HEALTH CARE EDUCATION/TRAINING PROGRAM

## 2022-10-20 PROCEDURE — 94799 UNLISTED PULMONARY SVC/PX: CPT

## 2022-10-20 PROCEDURE — 25010000002 ENOXAPARIN PER 10 MG: Performed by: STUDENT IN AN ORGANIZED HEALTH CARE EDUCATION/TRAINING PROGRAM

## 2022-10-20 PROCEDURE — 82962 GLUCOSE BLOOD TEST: CPT

## 2022-10-20 PROCEDURE — 63710000001 INSULIN ASPART PER 5 UNITS: Performed by: STUDENT IN AN ORGANIZED HEALTH CARE EDUCATION/TRAINING PROGRAM

## 2022-10-20 PROCEDURE — 25010000002 METHYLPREDNISOLONE PER 40 MG: Performed by: STUDENT IN AN ORGANIZED HEALTH CARE EDUCATION/TRAINING PROGRAM

## 2022-10-20 PROCEDURE — 83050 HGB METHEMOGLOBIN QUAN: CPT

## 2022-10-20 PROCEDURE — 82805 BLOOD GASES W/O2 SATURATION: CPT

## 2022-10-20 PROCEDURE — 82375 ASSAY CARBOXYHB QUANT: CPT

## 2022-10-20 PROCEDURE — 83036 HEMOGLOBIN GLYCOSYLATED A1C: CPT | Performed by: STUDENT IN AN ORGANIZED HEALTH CARE EDUCATION/TRAINING PROGRAM

## 2022-10-20 PROCEDURE — 99232 SBSQ HOSP IP/OBS MODERATE 35: CPT | Performed by: STUDENT IN AN ORGANIZED HEALTH CARE EDUCATION/TRAINING PROGRAM

## 2022-10-20 PROCEDURE — 25010000002 FUROSEMIDE PER 20 MG: Performed by: INTERNAL MEDICINE

## 2022-10-20 PROCEDURE — 94660 CPAP INITIATION&MGMT: CPT

## 2022-10-20 PROCEDURE — 25010000002 ONDANSETRON PER 1 MG: Performed by: STUDENT IN AN ORGANIZED HEALTH CARE EDUCATION/TRAINING PROGRAM

## 2022-10-20 RX ORDER — LORAZEPAM 2 MG/ML
0.5 INJECTION INTRAMUSCULAR EVERY 4 HOURS PRN
Status: DISCONTINUED | OUTPATIENT
Start: 2022-10-20 | End: 2022-10-22 | Stop reason: HOSPADM

## 2022-10-20 RX ORDER — DEXTROSE MONOHYDRATE 25 G/50ML
25 INJECTION, SOLUTION INTRAVENOUS
Status: DISCONTINUED | OUTPATIENT
Start: 2022-10-20 | End: 2022-10-22 | Stop reason: HOSPADM

## 2022-10-20 RX ORDER — FUROSEMIDE 10 MG/ML
40 INJECTION INTRAMUSCULAR; INTRAVENOUS ONCE
Status: COMPLETED | OUTPATIENT
Start: 2022-10-20 | End: 2022-10-20

## 2022-10-20 RX ORDER — ALBUTEROL SULFATE 2.5 MG/3ML
2.5 SOLUTION RESPIRATORY (INHALATION) EVERY 6 HOURS PRN
Status: DISCONTINUED | OUTPATIENT
Start: 2022-10-20 | End: 2022-10-22 | Stop reason: HOSPADM

## 2022-10-20 RX ORDER — BUDESONIDE 0.25 MG/2ML
0.25 INHALANT ORAL
Status: DISCONTINUED | OUTPATIENT
Start: 2022-10-20 | End: 2022-10-21

## 2022-10-20 RX ORDER — INSULIN ASPART 100 [IU]/ML
0-7 INJECTION, SOLUTION INTRAVENOUS; SUBCUTANEOUS
Status: DISCONTINUED | OUTPATIENT
Start: 2022-10-20 | End: 2022-10-22 | Stop reason: HOSPADM

## 2022-10-20 RX ORDER — NICOTINE POLACRILEX 4 MG
15 LOZENGE BUCCAL
Status: DISCONTINUED | OUTPATIENT
Start: 2022-10-20 | End: 2022-10-22 | Stop reason: HOSPADM

## 2022-10-20 RX ORDER — METHYLPREDNISOLONE SODIUM SUCCINATE 40 MG/ML
40 INJECTION, POWDER, LYOPHILIZED, FOR SOLUTION INTRAMUSCULAR; INTRAVENOUS EVERY 24 HOURS
Status: DISCONTINUED | OUTPATIENT
Start: 2022-10-21 | End: 2022-10-21

## 2022-10-20 RX ADMIN — GUAIFENESIN 600 MG: 600 TABLET ORAL at 09:46

## 2022-10-20 RX ADMIN — DEXMEDETOMIDINE HYDROCHLORIDE 1 MCG/KG/HR: 400 INJECTION INTRAVENOUS at 08:39

## 2022-10-20 RX ADMIN — Medication 10 ML: at 13:15

## 2022-10-20 RX ADMIN — GABAPENTIN 800 MG: 400 CAPSULE ORAL at 21:24

## 2022-10-20 RX ADMIN — DEXMEDETOMIDINE HYDROCHLORIDE 1 MCG/KG/HR: 400 INJECTION INTRAVENOUS at 11:48

## 2022-10-20 RX ADMIN — HYDROXYZINE HYDROCHLORIDE 25 MG: 25 TABLET ORAL at 20:03

## 2022-10-20 RX ADMIN — DEXMEDETOMIDINE HYDROCHLORIDE 1.5 MCG/KG/HR: 400 INJECTION INTRAVENOUS at 17:50

## 2022-10-20 RX ADMIN — ENOXAPARIN SODIUM 40 MG: 40 INJECTION SUBCUTANEOUS at 17:10

## 2022-10-20 RX ADMIN — Medication 10 ML: at 20:03

## 2022-10-20 RX ADMIN — DEXMEDETOMIDINE HYDROCHLORIDE 1.5 MCG/KG/HR: 400 INJECTION INTRAVENOUS at 22:17

## 2022-10-20 RX ADMIN — ENOXAPARIN SODIUM 40 MG: 40 INJECTION SUBCUTANEOUS at 05:09

## 2022-10-20 RX ADMIN — GABAPENTIN 800 MG: 400 CAPSULE ORAL at 14:02

## 2022-10-20 RX ADMIN — LORAZEPAM 0.5 MG: 2 INJECTION INTRAMUSCULAR; INTRAVENOUS at 09:47

## 2022-10-20 RX ADMIN — FUROSEMIDE 40 MG: 10 INJECTION, SOLUTION INTRAMUSCULAR; INTRAVENOUS at 22:21

## 2022-10-20 RX ADMIN — DEXMEDETOMIDINE HYDROCHLORIDE 1.5 MCG/KG/HR: 400 INJECTION INTRAVENOUS at 00:43

## 2022-10-20 RX ADMIN — GABAPENTIN 800 MG: 400 CAPSULE ORAL at 05:09

## 2022-10-20 RX ADMIN — DEXMEDETOMIDINE HYDROCHLORIDE 1.5 MCG/KG/HR: 400 INJECTION INTRAVENOUS at 20:04

## 2022-10-20 RX ADMIN — LORAZEPAM 0.5 MG: 2 INJECTION INTRAMUSCULAR; INTRAVENOUS at 20:03

## 2022-10-20 RX ADMIN — DEXMEDETOMIDINE HYDROCHLORIDE 0.8 MCG/KG/HR: 400 INJECTION INTRAVENOUS at 14:54

## 2022-10-20 RX ADMIN — OXYMETAZOLINE HYDROCHLORIDE 2 SPRAY: 0.05 SPRAY NASAL at 20:03

## 2022-10-20 RX ADMIN — ONDANSETRON 4 MG: 2 INJECTION INTRAMUSCULAR; INTRAVENOUS at 14:03

## 2022-10-20 RX ADMIN — LORAZEPAM 0.5 MG: 2 INJECTION INTRAMUSCULAR; INTRAVENOUS at 00:45

## 2022-10-20 RX ADMIN — ALPRAZOLAM 0.5 MG: 0.5 TABLET ORAL at 20:03

## 2022-10-20 RX ADMIN — GUAIFENESIN 600 MG: 600 TABLET ORAL at 20:03

## 2022-10-20 RX ADMIN — INSULIN ASPART 3 UNITS: 100 INJECTION, SOLUTION INTRAVENOUS; SUBCUTANEOUS at 17:05

## 2022-10-20 RX ADMIN — ALPRAZOLAM 0.5 MG: 0.5 TABLET ORAL at 09:46

## 2022-10-20 RX ADMIN — DEXMEDETOMIDINE HYDROCHLORIDE 1.5 MCG/KG/HR: 400 INJECTION INTRAVENOUS at 02:49

## 2022-10-20 RX ADMIN — OXYMETAZOLINE HYDROCHLORIDE 2 SPRAY: 0.05 SPRAY NASAL at 09:52

## 2022-10-20 RX ADMIN — BUDESONIDE 0.25 MG: 0.25 INHALANT RESPIRATORY (INHALATION) at 19:28

## 2022-10-20 RX ADMIN — AMITRIPTYLINE HYDROCHLORIDE 150 MG: 50 TABLET, FILM COATED ORAL at 20:03

## 2022-10-20 RX ADMIN — INSULIN ASPART 4 UNITS: 100 INJECTION, SOLUTION INTRAVENOUS; SUBCUTANEOUS at 13:08

## 2022-10-20 RX ADMIN — METHYLPREDNISOLONE SODIUM SUCCINATE 40 MG: 40 INJECTION, POWDER, FOR SOLUTION INTRAMUSCULAR; INTRAVENOUS at 05:10

## 2022-10-20 RX ADMIN — LORAZEPAM 0.5 MG: 2 INJECTION INTRAMUSCULAR; INTRAVENOUS at 05:10

## 2022-10-20 RX ADMIN — LORAZEPAM 0.5 MG: 2 INJECTION INTRAMUSCULAR; INTRAVENOUS at 13:56

## 2022-10-20 RX ADMIN — DEXMEDETOMIDINE HYDROCHLORIDE 1.5 MCG/KG/HR: 400 INJECTION INTRAVENOUS at 05:08

## 2022-10-21 LAB
A-A DO2: 6 MMHG
ANION GAP SERPL CALCULATED.3IONS-SCNC: 7.2 MMOL/L (ref 5–15)
ARTERIAL PATENCY WRIST A: POSITIVE
ATMOSPHERIC PRESS: 733 MMHG
BASE EXCESS BLDA CALC-SCNC: 6.7 MMOL/L (ref 0–2)
BASOPHILS # BLD AUTO: 0.02 10*3/MM3 (ref 0–0.2)
BASOPHILS NFR BLD AUTO: 0.2 % (ref 0–1.5)
BDY SITE: ABNORMAL
BUN SERPL-MCNC: 10 MG/DL (ref 6–20)
BUN/CREAT SERPL: 17.2 (ref 7–25)
CALCIUM SPEC-SCNC: 8.5 MG/DL (ref 8.6–10.5)
CHLORIDE SERPL-SCNC: 100 MMOL/L (ref 98–107)
CO2 SERPL-SCNC: 31.8 MMOL/L (ref 22–29)
COHGB MFR BLD: 0.7 % (ref 0–2)
CREAT SERPL-MCNC: 0.58 MG/DL (ref 0.57–1)
DEPRECATED RDW RBC AUTO: 44.5 FL (ref 37–54)
EGFRCR SERPLBLD CKD-EPI 2021: 119 ML/MIN/1.73
EOSINOPHIL # BLD AUTO: 0 10*3/MM3 (ref 0–0.4)
EOSINOPHIL NFR BLD AUTO: 0 % (ref 0.3–6.2)
ERYTHROCYTE [DISTWIDTH] IN BLOOD BY AUTOMATED COUNT: 13.4 % (ref 12.3–15.4)
GAS FLOW AIRWAY: 5 LPM
GLUCOSE BLDC GLUCOMTR-MCNC: 166 MG/DL (ref 70–130)
GLUCOSE BLDC GLUCOMTR-MCNC: 192 MG/DL (ref 70–130)
GLUCOSE BLDC GLUCOMTR-MCNC: 210 MG/DL (ref 70–130)
GLUCOSE SERPL-MCNC: 174 MG/DL (ref 65–99)
HCO3 BLDA-SCNC: 34.5 MMOL/L (ref 22–28)
HCT VFR BLD AUTO: 40.4 % (ref 34–46.6)
HCT VFR BLD CALC: 40.5 %
HGB BLD-MCNC: 13.3 G/DL (ref 12–15.9)
IMM GRANULOCYTES # BLD AUTO: 0.04 10*3/MM3 (ref 0–0.05)
IMM GRANULOCYTES NFR BLD AUTO: 0.4 % (ref 0–0.5)
LYMPHOCYTES # BLD AUTO: 3.48 10*3/MM3 (ref 0.7–3.1)
LYMPHOCYTES NFR BLD AUTO: 34.5 % (ref 19.6–45.3)
Lab: ABNORMAL
MCH RBC QN AUTO: 29.6 PG (ref 26.6–33)
MCHC RBC AUTO-ENTMCNC: 32.9 G/DL (ref 31.5–35.7)
MCV RBC AUTO: 90 FL (ref 79–97)
METHGB BLD QL: 0.3 % (ref 0–1.5)
MODALITY: ABNORMAL
MONOCYTES # BLD AUTO: 0.74 10*3/MM3 (ref 0.1–0.9)
MONOCYTES NFR BLD AUTO: 7.3 % (ref 5–12)
NEUTROPHILS NFR BLD AUTO: 5.8 10*3/MM3 (ref 1.7–7)
NEUTROPHILS NFR BLD AUTO: 57.6 % (ref 42.7–76)
NOTE: ABNORMAL
NRBC BLD AUTO-RTO: 0 /100 WBC (ref 0–0.2)
OXYHGB MFR BLDV: 92.5 % (ref 94–99)
PCO2 BLDA: 63.4 MM HG (ref 35–45)
PCO2 TEMP ADJ BLD: ABNORMAL MM[HG]
PH BLDA: 7.34 PH UNITS (ref 7.35–7.45)
PH, TEMP CORRECTED: ABNORMAL
PLATELET # BLD AUTO: 278 10*3/MM3 (ref 140–450)
PMV BLD AUTO: 10.5 FL (ref 6–12)
PO2 BLDA: 66.6 MM HG (ref 75–100)
PO2 TEMP ADJ BLD: ABNORMAL MM[HG]
POTASSIUM SERPL-SCNC: 4.1 MMOL/L (ref 3.5–5.2)
RBC # BLD AUTO: 4.49 10*6/MM3 (ref 3.77–5.28)
SAO2 % BLDCOA: 93.4 % (ref 94–100)
SODIUM SERPL-SCNC: 139 MMOL/L (ref 136–145)
VENTILATOR MODE: ABNORMAL
WBC NRBC COR # BLD: 10.08 10*3/MM3 (ref 3.4–10.8)

## 2022-10-21 PROCEDURE — 94799 UNLISTED PULMONARY SVC/PX: CPT

## 2022-10-21 PROCEDURE — 83050 HGB METHEMOGLOBIN QUAN: CPT

## 2022-10-21 PROCEDURE — 99232 SBSQ HOSP IP/OBS MODERATE 35: CPT | Performed by: STUDENT IN AN ORGANIZED HEALTH CARE EDUCATION/TRAINING PROGRAM

## 2022-10-21 PROCEDURE — 25010000002 LORAZEPAM PER 2 MG: Performed by: INTERNAL MEDICINE

## 2022-10-21 PROCEDURE — 94761 N-INVAS EAR/PLS OXIMETRY MLT: CPT

## 2022-10-21 PROCEDURE — 36600 WITHDRAWAL OF ARTERIAL BLOOD: CPT

## 2022-10-21 PROCEDURE — 82805 BLOOD GASES W/O2 SATURATION: CPT

## 2022-10-21 PROCEDURE — 99233 SBSQ HOSP IP/OBS HIGH 50: CPT | Performed by: INTERNAL MEDICINE

## 2022-10-21 PROCEDURE — 94660 CPAP INITIATION&MGMT: CPT

## 2022-10-21 PROCEDURE — 63710000001 INSULIN ASPART PER 5 UNITS: Performed by: STUDENT IN AN ORGANIZED HEALTH CARE EDUCATION/TRAINING PROGRAM

## 2022-10-21 PROCEDURE — 25010000002 ENOXAPARIN PER 10 MG: Performed by: STUDENT IN AN ORGANIZED HEALTH CARE EDUCATION/TRAINING PROGRAM

## 2022-10-21 PROCEDURE — 82375 ASSAY CARBOXYHB QUANT: CPT

## 2022-10-21 PROCEDURE — 25010000002 METHYLPREDNISOLONE PER 40 MG: Performed by: INTERNAL MEDICINE

## 2022-10-21 PROCEDURE — 82962 GLUCOSE BLOOD TEST: CPT

## 2022-10-21 PROCEDURE — 85025 COMPLETE CBC W/AUTO DIFF WBC: CPT | Performed by: STUDENT IN AN ORGANIZED HEALTH CARE EDUCATION/TRAINING PROGRAM

## 2022-10-21 PROCEDURE — 80048 BASIC METABOLIC PNL TOTAL CA: CPT | Performed by: STUDENT IN AN ORGANIZED HEALTH CARE EDUCATION/TRAINING PROGRAM

## 2022-10-21 PROCEDURE — 25010000002 METHYLPREDNISOLONE PER 40 MG: Performed by: STUDENT IN AN ORGANIZED HEALTH CARE EDUCATION/TRAINING PROGRAM

## 2022-10-21 RX ORDER — BUDESONIDE 0.25 MG/2ML
0.5 INHALANT ORAL
Status: DISCONTINUED | OUTPATIENT
Start: 2022-10-21 | End: 2022-10-22 | Stop reason: HOSPADM

## 2022-10-21 RX ORDER — BENZONATATE 100 MG/1
100 CAPSULE ORAL 3 TIMES DAILY PRN
Status: DISCONTINUED | OUTPATIENT
Start: 2022-10-21 | End: 2022-10-22 | Stop reason: HOSPADM

## 2022-10-21 RX ORDER — METHYLPREDNISOLONE SODIUM SUCCINATE 40 MG/ML
40 INJECTION, POWDER, LYOPHILIZED, FOR SOLUTION INTRAMUSCULAR; INTRAVENOUS EVERY 12 HOURS
Status: DISCONTINUED | OUTPATIENT
Start: 2022-10-21 | End: 2022-10-22 | Stop reason: HOSPADM

## 2022-10-21 RX ORDER — ALPRAZOLAM 0.25 MG/1
0.25 TABLET ORAL 2 TIMES DAILY PRN
Status: DISCONTINUED | OUTPATIENT
Start: 2022-10-21 | End: 2022-10-22 | Stop reason: HOSPADM

## 2022-10-21 RX ORDER — LURASIDONE HYDROCHLORIDE 120 MG/1
120 TABLET, FILM COATED ORAL
Status: DISCONTINUED | OUTPATIENT
Start: 2022-10-21 | End: 2022-10-22 | Stop reason: HOSPADM

## 2022-10-21 RX ORDER — METHYLPREDNISOLONE SODIUM SUCCINATE 40 MG/ML
40 INJECTION, POWDER, LYOPHILIZED, FOR SOLUTION INTRAMUSCULAR; INTRAVENOUS DAILY
Status: DISCONTINUED | OUTPATIENT
Start: 2022-10-23 | End: 2022-10-22 | Stop reason: HOSPADM

## 2022-10-21 RX ADMIN — HYDROCODONE BITARTRATE AND ACETAMINOPHEN 1 TABLET: 7.5; 325 TABLET ORAL at 12:34

## 2022-10-21 RX ADMIN — GUAIFENESIN 600 MG: 600 TABLET ORAL at 20:43

## 2022-10-21 RX ADMIN — Medication 10 ML: at 10:44

## 2022-10-21 RX ADMIN — DEXMEDETOMIDINE HYDROCHLORIDE 1.5 MCG/KG/HR: 400 INJECTION INTRAVENOUS at 00:18

## 2022-10-21 RX ADMIN — GABAPENTIN 800 MG: 400 CAPSULE ORAL at 06:29

## 2022-10-21 RX ADMIN — Medication 10 ML: at 22:02

## 2022-10-21 RX ADMIN — GUAIFENESIN 600 MG: 600 TABLET ORAL at 09:49

## 2022-10-21 RX ADMIN — ALPRAZOLAM 0.25 MG: 0.25 TABLET ORAL at 19:34

## 2022-10-21 RX ADMIN — HYDROXYZINE HYDROCHLORIDE 25 MG: 25 TABLET ORAL at 06:30

## 2022-10-21 RX ADMIN — BENZONATATE 100 MG: 100 CAPSULE ORAL at 11:08

## 2022-10-21 RX ADMIN — ACETAMINOPHEN 650 MG: 325 TABLET, FILM COATED ORAL at 21:53

## 2022-10-21 RX ADMIN — GABAPENTIN 800 MG: 400 CAPSULE ORAL at 15:20

## 2022-10-21 RX ADMIN — DEXMEDETOMIDINE HYDROCHLORIDE 1.5 MCG/KG/HR: 400 INJECTION INTRAVENOUS at 04:39

## 2022-10-21 RX ADMIN — DEXMEDETOMIDINE HYDROCHLORIDE 1.5 MCG/KG/HR: 400 INJECTION INTRAVENOUS at 02:16

## 2022-10-21 RX ADMIN — ENOXAPARIN SODIUM 40 MG: 40 INJECTION SUBCUTANEOUS at 17:23

## 2022-10-21 RX ADMIN — ENOXAPARIN SODIUM 40 MG: 40 INJECTION SUBCUTANEOUS at 06:28

## 2022-10-21 RX ADMIN — METHYLPREDNISOLONE SODIUM SUCCINATE 40 MG: 40 INJECTION, POWDER, FOR SOLUTION INTRAMUSCULAR; INTRAVENOUS at 18:15

## 2022-10-21 RX ADMIN — INSULIN ASPART 4 UNITS: 100 INJECTION, SOLUTION INTRAVENOUS; SUBCUTANEOUS at 10:56

## 2022-10-21 RX ADMIN — GABAPENTIN 800 MG: 400 CAPSULE ORAL at 20:43

## 2022-10-21 RX ADMIN — BUDESONIDE 0.25 MG: 0.25 INHALANT RESPIRATORY (INHALATION) at 07:22

## 2022-10-21 RX ADMIN — LORAZEPAM 0.5 MG: 2 INJECTION INTRAMUSCULAR; INTRAVENOUS at 02:59

## 2022-10-21 RX ADMIN — ACETAMINOPHEN 650 MG: 325 TABLET, FILM COATED ORAL at 11:08

## 2022-10-21 RX ADMIN — METHYLPREDNISOLONE SODIUM SUCCINATE 40 MG: 40 INJECTION, POWDER, FOR SOLUTION INTRAMUSCULAR; INTRAVENOUS at 06:28

## 2022-10-21 RX ADMIN — LORAZEPAM 0.5 MG: 2 INJECTION INTRAMUSCULAR; INTRAVENOUS at 09:50

## 2022-10-21 RX ADMIN — TIZANIDINE 4 MG: 4 TABLET ORAL at 15:20

## 2022-10-21 RX ADMIN — DEXMEDETOMIDINE HYDROCHLORIDE 1.5 MCG/KG/HR: 400 INJECTION INTRAVENOUS at 06:28

## 2022-10-21 RX ADMIN — LORAZEPAM 0.5 MG: 2 INJECTION INTRAMUSCULAR; INTRAVENOUS at 21:53

## 2022-10-21 RX ADMIN — ALPRAZOLAM 0.5 MG: 0.5 TABLET ORAL at 06:29

## 2022-10-21 RX ADMIN — BUDESONIDE 0.5 MG: 0.25 INHALANT RESPIRATORY (INHALATION) at 19:44

## 2022-10-21 RX ADMIN — INSULIN ASPART 2 UNITS: 100 INJECTION, SOLUTION INTRAVENOUS; SUBCUTANEOUS at 17:23

## 2022-10-21 RX ADMIN — NICOTINE POLACRILEX 2 MG: 2 GUM, CHEWING BUCCAL at 09:49

## 2022-10-21 RX ADMIN — INSULIN ASPART 2 UNITS: 100 INJECTION, SOLUTION INTRAVENOUS; SUBCUTANEOUS at 06:29

## 2022-10-21 RX ADMIN — DEXMEDETOMIDINE HYDROCHLORIDE 1 MCG/KG/HR: 400 INJECTION INTRAVENOUS at 08:42

## 2022-10-21 RX ADMIN — AMITRIPTYLINE HYDROCHLORIDE 150 MG: 50 TABLET, FILM COATED ORAL at 20:43

## 2022-10-21 RX ADMIN — LURASIDONE HYDROCHLORIDE 120 MG: 120 TABLET, FILM COATED ORAL at 13:44

## 2022-10-22 ENCOUNTER — READMISSION MANAGEMENT (OUTPATIENT)
Dept: CALL CENTER | Facility: HOSPITAL | Age: 37
End: 2022-10-22

## 2022-10-22 VITALS
HEART RATE: 118 BPM | SYSTOLIC BLOOD PRESSURE: 150 MMHG | WEIGHT: 286.6 LBS | HEIGHT: 62 IN | RESPIRATION RATE: 18 BRPM | DIASTOLIC BLOOD PRESSURE: 93 MMHG | OXYGEN SATURATION: 97 % | TEMPERATURE: 98.2 F | BODY MASS INDEX: 52.74 KG/M2

## 2022-10-22 PROBLEM — J96.02 ACUTE RESPIRATORY FAILURE WITH HYPOXIA AND HYPERCAPNIA: Status: RESOLVED | Noted: 2022-10-19 | Resolved: 2022-10-22

## 2022-10-22 PROBLEM — J96.01 ACUTE RESPIRATORY FAILURE WITH HYPOXIA AND HYPERCAPNIA: Status: RESOLVED | Noted: 2022-10-19 | Resolved: 2022-10-22

## 2022-10-22 LAB
ANION GAP SERPL CALCULATED.3IONS-SCNC: 6.6 MMOL/L (ref 5–15)
BASOPHILS # BLD AUTO: 0.02 10*3/MM3 (ref 0–0.2)
BASOPHILS NFR BLD AUTO: 0.2 % (ref 0–1.5)
BUN SERPL-MCNC: 9 MG/DL (ref 6–20)
BUN/CREAT SERPL: 20 (ref 7–25)
CALCIUM SPEC-SCNC: 8.9 MG/DL (ref 8.6–10.5)
CHLORIDE SERPL-SCNC: 98 MMOL/L (ref 98–107)
CO2 SERPL-SCNC: 33.4 MMOL/L (ref 22–29)
CREAT SERPL-MCNC: 0.45 MG/DL (ref 0.57–1)
DEPRECATED RDW RBC AUTO: 41.9 FL (ref 37–54)
EGFRCR SERPLBLD CKD-EPI 2021: 126.5 ML/MIN/1.73
EOSINOPHIL # BLD AUTO: 0 10*3/MM3 (ref 0–0.4)
EOSINOPHIL NFR BLD AUTO: 0 % (ref 0.3–6.2)
ERYTHROCYTE [DISTWIDTH] IN BLOOD BY AUTOMATED COUNT: 13 % (ref 12.3–15.4)
GLUCOSE BLDC GLUCOMTR-MCNC: 140 MG/DL (ref 70–130)
GLUCOSE BLDC GLUCOMTR-MCNC: 236 MG/DL (ref 70–130)
GLUCOSE SERPL-MCNC: 152 MG/DL (ref 65–99)
HCT VFR BLD AUTO: 38 % (ref 34–46.6)
HGB BLD-MCNC: 12.7 G/DL (ref 12–15.9)
IMM GRANULOCYTES # BLD AUTO: 0.05 10*3/MM3 (ref 0–0.05)
IMM GRANULOCYTES NFR BLD AUTO: 0.5 % (ref 0–0.5)
LYMPHOCYTES # BLD AUTO: 2.32 10*3/MM3 (ref 0.7–3.1)
LYMPHOCYTES NFR BLD AUTO: 23.6 % (ref 19.6–45.3)
MCH RBC QN AUTO: 29.4 PG (ref 26.6–33)
MCHC RBC AUTO-ENTMCNC: 33.4 G/DL (ref 31.5–35.7)
MCV RBC AUTO: 88 FL (ref 79–97)
MONOCYTES # BLD AUTO: 0.56 10*3/MM3 (ref 0.1–0.9)
MONOCYTES NFR BLD AUTO: 5.7 % (ref 5–12)
NEUTROPHILS NFR BLD AUTO: 6.89 10*3/MM3 (ref 1.7–7)
NEUTROPHILS NFR BLD AUTO: 70 % (ref 42.7–76)
NRBC BLD AUTO-RTO: 0 /100 WBC (ref 0–0.2)
PLATELET # BLD AUTO: 271 10*3/MM3 (ref 140–450)
PMV BLD AUTO: 10.4 FL (ref 6–12)
POTASSIUM SERPL-SCNC: 3.9 MMOL/L (ref 3.5–5.2)
RBC # BLD AUTO: 4.32 10*6/MM3 (ref 3.77–5.28)
SODIUM SERPL-SCNC: 138 MMOL/L (ref 136–145)
WBC NRBC COR # BLD: 9.84 10*3/MM3 (ref 3.4–10.8)

## 2022-10-22 PROCEDURE — 25010000002 METHYLPREDNISOLONE PER 40 MG: Performed by: INTERNAL MEDICINE

## 2022-10-22 PROCEDURE — 94761 N-INVAS EAR/PLS OXIMETRY MLT: CPT

## 2022-10-22 PROCEDURE — 94760 N-INVAS EAR/PLS OXIMETRY 1: CPT

## 2022-10-22 PROCEDURE — 25010000002 ENOXAPARIN PER 10 MG: Performed by: STUDENT IN AN ORGANIZED HEALTH CARE EDUCATION/TRAINING PROGRAM

## 2022-10-22 PROCEDURE — 94799 UNLISTED PULMONARY SVC/PX: CPT

## 2022-10-22 PROCEDURE — 94618 PULMONARY STRESS TESTING: CPT

## 2022-10-22 PROCEDURE — 63710000001 PROMETHAZINE PER 12.5 MG: Performed by: INTERNAL MEDICINE

## 2022-10-22 PROCEDURE — 99239 HOSP IP/OBS DSCHRG MGMT >30: CPT | Performed by: STUDENT IN AN ORGANIZED HEALTH CARE EDUCATION/TRAINING PROGRAM

## 2022-10-22 PROCEDURE — 80048 BASIC METABOLIC PNL TOTAL CA: CPT | Performed by: STUDENT IN AN ORGANIZED HEALTH CARE EDUCATION/TRAINING PROGRAM

## 2022-10-22 PROCEDURE — 63710000001 INSULIN ASPART PER 5 UNITS: Performed by: STUDENT IN AN ORGANIZED HEALTH CARE EDUCATION/TRAINING PROGRAM

## 2022-10-22 PROCEDURE — 85025 COMPLETE CBC W/AUTO DIFF WBC: CPT | Performed by: STUDENT IN AN ORGANIZED HEALTH CARE EDUCATION/TRAINING PROGRAM

## 2022-10-22 PROCEDURE — 82962 GLUCOSE BLOOD TEST: CPT

## 2022-10-22 PROCEDURE — 94762 N-INVAS EAR/PLS OXIMTRY CONT: CPT

## 2022-10-22 PROCEDURE — 25010000002 LORAZEPAM PER 2 MG: Performed by: INTERNAL MEDICINE

## 2022-10-22 RX ORDER — PREDNISONE 20 MG/1
40 TABLET ORAL DAILY
Qty: 10 TABLET | Refills: 0 | Status: SHIPPED | OUTPATIENT
Start: 2022-10-22 | End: 2022-10-28 | Stop reason: SDUPTHER

## 2022-10-22 RX ORDER — PROMETHAZINE HYDROCHLORIDE 12.5 MG/1
6.25 TABLET ORAL EVERY 6 HOURS PRN
Status: DISCONTINUED | OUTPATIENT
Start: 2022-10-22 | End: 2022-10-22 | Stop reason: HOSPADM

## 2022-10-22 RX ADMIN — BUDESONIDE 0.5 MG: 0.25 INHALANT RESPIRATORY (INHALATION) at 07:09

## 2022-10-22 RX ADMIN — LURASIDONE HYDROCHLORIDE 120 MG: 120 TABLET, FILM COATED ORAL at 08:56

## 2022-10-22 RX ADMIN — INSULIN ASPART 2 UNITS: 100 INJECTION, SOLUTION INTRAVENOUS; SUBCUTANEOUS at 12:06

## 2022-10-22 RX ADMIN — PROMETHAZINE HYDROCHLORIDE 6.25 MG: 12.5 TABLET ORAL at 03:49

## 2022-10-22 RX ADMIN — LORAZEPAM 0.5 MG: 2 INJECTION INTRAMUSCULAR; INTRAVENOUS at 03:49

## 2022-10-22 RX ADMIN — GABAPENTIN 800 MG: 400 CAPSULE ORAL at 06:36

## 2022-10-22 RX ADMIN — Medication 10 ML: at 09:31

## 2022-10-22 RX ADMIN — OXYMETAZOLINE HYDROCHLORIDE 2 SPRAY: 0.05 SPRAY NASAL at 09:32

## 2022-10-22 RX ADMIN — ALPRAZOLAM 0.25 MG: 0.25 TABLET ORAL at 12:44

## 2022-10-22 RX ADMIN — GUAIFENESIN 600 MG: 600 TABLET ORAL at 09:06

## 2022-10-22 RX ADMIN — ENOXAPARIN SODIUM 40 MG: 40 INJECTION SUBCUTANEOUS at 06:36

## 2022-10-22 RX ADMIN — METHYLPREDNISOLONE SODIUM SUCCINATE 40 MG: 40 INJECTION, POWDER, FOR SOLUTION INTRAMUSCULAR; INTRAVENOUS at 06:36

## 2022-10-22 NOTE — OUTREACH NOTE
Prep Survey    Flowsheet Row Responses   [facility] patient discharged from? [place]   Is LACE score < 7 ? No   Emergency Room discharge w/ pulse ox? No   Eligibility [facility]   Date of Admission 10/19/22   Date of Discharge 10/22/22   Discharge Disposition Home or Self Care   Discharge diagnosis Acute respiratory failure with hypoxia and hypercapnia    Does the patient have one of the following disease processes/diagnoses(primary or secondary)? Other   Does the patient have Home health ordered? No   Is there a DME ordered? No   Prep survey completed? Yes          GISELA HORTON - Registered Nurse

## 2022-10-24 ENCOUNTER — TRANSITIONAL CARE MANAGEMENT TELEPHONE ENCOUNTER (OUTPATIENT)
Dept: CALL CENTER | Facility: HOSPITAL | Age: 37
End: 2022-10-24

## 2022-10-24 DIAGNOSIS — M54.41 CHRONIC BILATERAL LOW BACK PAIN WITH BILATERAL SCIATICA: ICD-10-CM

## 2022-10-24 DIAGNOSIS — G89.29 CHRONIC BILATERAL LOW BACK PAIN WITH BILATERAL SCIATICA: ICD-10-CM

## 2022-10-24 DIAGNOSIS — M54.42 CHRONIC BILATERAL LOW BACK PAIN WITH BILATERAL SCIATICA: ICD-10-CM

## 2022-10-24 RX ORDER — GABAPENTIN 800 MG/1
800 TABLET ORAL 3 TIMES DAILY
Qty: 90 TABLET | Refills: 0 | Status: CANCELLED | OUTPATIENT
Start: 2022-10-24

## 2022-10-24 NOTE — OUTREACH NOTE
Call Center TCM Note    Flowsheet Row Responses   [facility] patient discharged from? [facility]   Does the patient have one of the following disease processes/diagnoses(primary or secondary)? Other   TCM attempt successful? Yes   Call start time 1416   Call end time 1421   Discharge diagnosis Acute respiratory failure with hypoxia and hypercapnia    Meds reviewed with patient/caregiver? Yes   Is the patient having any side effects they believe may be caused by any medication additions or changes? No   Does the patient have all medications ordered at discharge? Yes   Is the patient taking all medications as directed (includes completed medication regime)? Yes   Comments Pt states she has a new PCP .    Does the patient have an appointment with their PCP within 7 days of discharge? Yes   Has home health visited the patient within 72 hours of discharge? N/A   Psychosocial issues? No   Did the patient receive a copy of their discharge instructions? Yes   Nursing interventions Reviewed instructions with patient   What is the patient's perception of their health status since discharge? Same   Is the patient/caregiver able to teach back signs and symptoms related to disease process for when to call PCP? Yes   Is the patient/caregiver able to teach back signs and symptoms related to disease process for when to call 911? Yes   Is the patient/caregiver able to teach back the hierarchy of who to call/visit for symptoms/problems? PCP, Specialist, Home health nurse, Urgent Care, ED, 911 Yes   TCM call completed? Yes   Wrap up additional comments Pt reports that she will be going to a different PCP on 10/26/22. Pt reports she is not any better. Educated Pt of s/s that need urgent medical treatment. Pt V/u.    Call end time 1421          Krystal Francisco RN    10/24/2022, 14:24 EDT

## 2022-10-24 NOTE — TELEPHONE ENCOUNTER
Caller: Lindsay Amezquita    Relationship: Self    Best call back number: [phone]    Requested Prescriptions:   Requested Prescriptions     Pending Prescriptions Disp Refills   • gabapentin (NEURONTIN) 800 MG tablet 90 tablet 0     Sig: Take 1 tablet by mouth 3 (Three) Times a Day.        Pharmacy where request should be sent: [facility]/PHARMACY #[id number] - [address] [phone] [facility] [phone]      Additional details provided by patient: PATIENT HAS BEEN OUT FOR 3 DAYS BECAUSE SHE WAS IN THE HOSPITAL FOR RSV FROM 10/18/22 - 10/22/22. PATIENT IS ALSO REQUESTING A STRONG COUGH SYRUP TO HELP TREAT HER COUGH AND A OXYGEN MONITOR FOR HER FINGER. PLEASE ADVISE      Does the patient have less than a 3 day supply:  [x] Yes  [] No    Kim Miller Rep   10/24/22 10:08 EDT

## 2022-10-26 DIAGNOSIS — G89.29 CHRONIC BILATERAL LOW BACK PAIN WITH BILATERAL SCIATICA: ICD-10-CM

## 2022-10-26 DIAGNOSIS — M54.42 CHRONIC BILATERAL LOW BACK PAIN WITH BILATERAL SCIATICA: ICD-10-CM

## 2022-10-26 DIAGNOSIS — M54.41 CHRONIC BILATERAL LOW BACK PAIN WITH BILATERAL SCIATICA: ICD-10-CM

## 2022-10-26 RX ORDER — TIZANIDINE 4 MG/1
TABLET ORAL
Qty: 90 TABLET | Refills: 1 | OUTPATIENT
Start: 2022-10-26

## 2022-10-26 RX ORDER — GABAPENTIN 800 MG/1
800 TABLET ORAL 3 TIMES DAILY
Qty: 90 TABLET | Refills: 0 | Status: SHIPPED | OUTPATIENT
Start: 2022-10-26 | End: 2022-11-22 | Stop reason: SDUPTHER

## 2022-10-26 RX ORDER — GABAPENTIN 800 MG/1
800 TABLET ORAL 3 TIMES DAILY
Qty: 90 TABLET | Refills: 0 | Status: CANCELLED | OUTPATIENT
Start: 2022-10-26

## 2022-10-26 RX ORDER — TIZANIDINE 4 MG/1
4 TABLET ORAL EVERY 8 HOURS PRN
Qty: 90 TABLET | Refills: 1 | Status: SHIPPED | OUTPATIENT
Start: 2022-10-26 | End: 2022-11-29

## 2022-10-27 ENCOUNTER — TELEPHONE (OUTPATIENT)
Dept: FAMILY MEDICINE CLINIC | Facility: CLINIC | Age: 37
End: 2022-10-27

## 2022-10-27 ENCOUNTER — OFFICE VISIT (OUTPATIENT)
Dept: FAMILY MEDICINE CLINIC | Facility: CLINIC | Age: 37
End: 2022-10-27

## 2022-10-27 VITALS
TEMPERATURE: 98.1 F | OXYGEN SATURATION: 95 % | HEART RATE: 84 BPM | SYSTOLIC BLOOD PRESSURE: 126 MMHG | DIASTOLIC BLOOD PRESSURE: 80 MMHG | HEIGHT: 62 IN | BODY MASS INDEX: 52.63 KG/M2 | WEIGHT: 286 LBS

## 2022-10-28 ENCOUNTER — TELEMEDICINE (OUTPATIENT)
Dept: FAMILY MEDICINE CLINIC | Facility: TELEHEALTH | Age: 37
End: 2022-10-28

## 2022-10-28 DIAGNOSIS — R05.9 PAIN AGGRAVATED BY COUGHING: Primary | ICD-10-CM

## 2022-10-28 DIAGNOSIS — R52 PAIN AGGRAVATED BY COUGHING: Primary | ICD-10-CM

## 2022-10-28 PROCEDURE — 99213 OFFICE O/P EST LOW 20 MIN: CPT | Performed by: NURSE PRACTITIONER

## 2022-10-28 RX ORDER — PREDNISONE 20 MG/1
40 TABLET ORAL DAILY
Qty: 6 TABLET | Refills: 0 | Status: SHIPPED | OUTPATIENT
Start: 2022-10-28 | End: 2022-10-31

## 2022-10-28 NOTE — PROGRESS NOTES
Subjective   Lindsay Amezquita is a 37 y.o. female.     History of Present Illness  She was in the hospital for a few days with RSV (10/16-10/19) and now she is having upper abdominal pain. She woke up with it yesterday. She went to her doctor's office yesterday but was not seen after waiting 2 hours. The pain is at the top of her stomach and wraps around the sides of her abdomen. She has experienced this type of pain before but never this bad.  Coughing makes the pain worse. Truncal movement makes the pain worse. Taking a deep breath does not make the pain worse. She describes the pain as a squeezing pain. Her breathing is better since getting out of the hospital. She just finished a course of prednisone yesterday. She has not used any inhalers. She is on norco 7.5-325mg for chronic pain but it does not help with this type of pain.  She was on 10L of oxygen in the hospital but now does not require any and her breathing is much better.       The following portions of the patient's history were reviewed and updated as appropriate: allergies, current medications, past family history, past medical history, past social history, past surgical history, and problem list.    Review of Systems   Constitutional: Negative for fever.   Respiratory: Positive for cough, shortness of breath (improving since hospital discharge) and wheezing. Negative for chest tightness.    Cardiovascular: Negative for chest pain.   Gastrointestinal: Positive for abdominal pain (upper). Negative for diarrhea, nausea and vomiting.   Musculoskeletal: Positive for back pain.   Neurological: Negative for dizziness and light-headedness.       Objective   Physical Exam  Constitutional:       General: She is not in acute distress.     Appearance: She is well-developed. She is not diaphoretic.   Pulmonary:      Effort: Pulmonary effort is normal.   Abdominal:          Comments: Points here as area of pain when coughing   Neurological:      Mental Status: She is  alert and oriented to person, place, and time.   Psychiatric:         Behavior: Behavior normal.           Assessment & Plan   Diagnoses and all orders for this visit:    1. Pain aggravated by coughing (Primary)          She is on eliquis so do not want to give her NSAIDS as this would increase risk of bleeding. She is taking lortab 7.5 mg already. Will give her 3 more days of prednisone and recommend she follow up with her provider.       The use of a video visit has been reviewed with the patient and verbal informed consent has been obtained. Myself and Lindsay Amezquita participated in this visit. The patient is located in [place], [place]. I am located in [place], [place]. Mychart and Zoom were utilized. I spent 29 minutes in the patient's chart for this visit.

## 2022-11-01 DIAGNOSIS — F31.30 BIPOLAR I DISORDER, MOST RECENT EPISODE DEPRESSED: ICD-10-CM

## 2022-11-01 DIAGNOSIS — F41.1 GENERALIZED ANXIETY DISORDER: ICD-10-CM

## 2022-11-01 DIAGNOSIS — F43.10 POST TRAUMATIC STRESS DISORDER (PTSD): ICD-10-CM

## 2022-11-01 DIAGNOSIS — J30.9 ALLERGIC RHINITIS, UNSPECIFIED SEASONALITY, UNSPECIFIED TRIGGER: ICD-10-CM

## 2022-11-01 DIAGNOSIS — F41.1 GAD (GENERALIZED ANXIETY DISORDER): ICD-10-CM

## 2022-11-01 DIAGNOSIS — F31.62 BIPOLAR DISORDER, CURRENT EPISODE MIXED, MODERATE: ICD-10-CM

## 2022-11-01 RX ORDER — LURASIDONE HYDROCHLORIDE 120 MG/1
120 TABLET, FILM COATED ORAL DAILY
Qty: 30 TABLET | Refills: 0 | Status: SHIPPED | OUTPATIENT
Start: 2022-11-01 | End: 2022-11-21 | Stop reason: SDUPTHER

## 2022-11-01 RX ORDER — DESVENLAFAXINE 100 MG/1
100 TABLET, EXTENDED RELEASE ORAL DAILY
Qty: 90 TABLET | Refills: 0 | Status: SHIPPED | OUTPATIENT
Start: 2022-11-01 | End: 2022-11-21 | Stop reason: SDUPTHER

## 2022-11-01 RX ORDER — LEVOCETIRIZINE DIHYDROCHLORIDE 5 MG/1
5 TABLET, FILM COATED ORAL EVERY EVENING
Qty: 30 TABLET | Refills: 5 | OUTPATIENT
Start: 2022-11-01

## 2022-11-01 RX ORDER — HYDROXYZINE HYDROCHLORIDE 25 MG/1
25 TABLET, FILM COATED ORAL 3 TIMES DAILY PRN
Qty: 30 TABLET | Refills: 0 | Status: SHIPPED | OUTPATIENT
Start: 2022-11-01 | End: 2022-11-21 | Stop reason: SDUPTHER
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
## 2022-11-02 ENCOUNTER — TELEPHONE (OUTPATIENT)
Dept: INTERNAL MEDICINE | Facility: CLINIC | Age: 37
End: 2022-11-02

## 2022-11-02 DIAGNOSIS — F41.1 GAD (GENERALIZED ANXIETY DISORDER): ICD-10-CM

## 2022-11-02 LAB — DRUGS UR: NORMAL

## 2022-11-02 RX ORDER — ALPRAZOLAM 0.5 MG/1
0.5 TABLET ORAL 3 TIMES DAILY PRN
Qty: 90 TABLET | Refills: 0 | Status: SHIPPED | OUTPATIENT
Start: 2022-11-02 | End: 2022-12-05 | Stop reason: SDUPTHER
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
## 2022-11-05 ENCOUNTER — READMISSION MANAGEMENT (OUTPATIENT)
Dept: CALL CENTER | Facility: HOSPITAL | Age: 37
End: 2022-11-05

## 2022-11-05 NOTE — OUTREACH NOTE
Medical Week 3 Survey    Flowsheet Row Responses   [facility] patient discharged from? [facility]   Does the patient have one of the following disease processes/diagnoses(primary or secondary)? Other   Week 3 attempt successful? No   Unsuccessful attempts Attempt 1   Revoke Readmitted          MORGAN GARCIA - Registered Nurse

## 2022-11-06 ENCOUNTER — HOSPITAL ENCOUNTER (EMERGENCY)
Facility: HOSPITAL | Age: 37
Discharge: LEFT BEFORE DISCHARGE | End: 2022-11-06

## 2022-11-06 VITALS
HEIGHT: 62 IN | DIASTOLIC BLOOD PRESSURE: 84 MMHG | WEIGHT: 280.2 LBS | SYSTOLIC BLOOD PRESSURE: 125 MMHG | TEMPERATURE: 97.4 F | BODY MASS INDEX: 51.56 KG/M2 | HEART RATE: 116 BPM | RESPIRATION RATE: 26 BRPM | OXYGEN SATURATION: 96 %

## 2022-11-06 PROCEDURE — 99211 OFF/OP EST MAY X REQ PHY/QHP: CPT

## 2022-11-08 ENCOUNTER — TELEPHONE (OUTPATIENT)
Dept: INTERNAL MEDICINE | Facility: CLINIC | Age: 37
End: 2022-11-08

## 2022-11-08 ENCOUNTER — HOSPITAL ENCOUNTER (EMERGENCY)
Facility: HOSPITAL | Age: 37
Discharge: HOME | End: 2022-11-09
Attending: FAMILY MEDICINE | Admitting: FAMILY MEDICINE

## 2022-11-08 DIAGNOSIS — J18.9 PNEUMONIA OF LEFT LOWER LOBE DUE TO INFECTIOUS ORGANISM: Primary | ICD-10-CM

## 2022-11-08 LAB
ALBUMIN SERPL-MCNC: 4.4 G/DL (ref 3.5–5.2)
ALBUMIN/GLOB SERPL: 1.2 G/DL
ALP SERPL-CCNC: 98 U/L (ref 39–117)
ALT SERPL W P-5'-P-CCNC: 21 U/L (ref 1–33)
ANION GAP SERPL CALCULATED.3IONS-SCNC: 12.9 MMOL/L (ref 5–15)
AST SERPL-CCNC: 25 U/L (ref 1–32)
B-HCG UR QL: NEGATIVE
BASOPHILS # BLD AUTO: 0.04 10*3/MM3 (ref 0–0.2)
BASOPHILS NFR BLD AUTO: 0.3 % (ref 0–1.5)
BILIRUB SERPL-MCNC: 0.6 MG/DL (ref 0–1.2)
BUN SERPL-MCNC: 4 MG/DL (ref 6–20)
BUN/CREAT SERPL: 7.5 (ref 7–25)
CALCIUM SPEC-SCNC: 9.2 MG/DL (ref 8.6–10.5)
CHLORIDE SERPL-SCNC: 98 MMOL/L (ref 98–107)
CO2 SERPL-SCNC: 26.1 MMOL/L (ref 22–29)
CREAT SERPL-MCNC: 0.53 MG/DL (ref 0.57–1)
D DIMER PPP FEU-MCNC: 0.88 MCGFEU/ML (ref 0–0.57)
DEPRECATED RDW RBC AUTO: 43.8 FL (ref 37–54)
EGFRCR SERPLBLD CKD-EPI 2021: 121.6 ML/MIN/1.73
EOSINOPHIL # BLD AUTO: 0.12 10*3/MM3 (ref 0–0.4)
EOSINOPHIL NFR BLD AUTO: 1 % (ref 0.3–6.2)
ERYTHROCYTE [DISTWIDTH] IN BLOOD BY AUTOMATED COUNT: 13.9 % (ref 12.3–15.4)
GLOBULIN UR ELPH-MCNC: 3.7 GM/DL
GLUCOSE SERPL-MCNC: 104 MG/DL (ref 65–99)
HCT VFR BLD AUTO: 42.2 % (ref 34–46.6)
HGB BLD-MCNC: 14.2 G/DL (ref 12–15.9)
IMM GRANULOCYTES # BLD AUTO: 0.05 10*3/MM3 (ref 0–0.05)
IMM GRANULOCYTES NFR BLD AUTO: 0.4 % (ref 0–0.5)
LYMPHOCYTES # BLD AUTO: 3.73 10*3/MM3 (ref 0.7–3.1)
LYMPHOCYTES NFR BLD AUTO: 29.6 % (ref 19.6–45.3)
MCH RBC QN AUTO: 29.3 PG (ref 26.6–33)
MCHC RBC AUTO-ENTMCNC: 33.6 G/DL (ref 31.5–35.7)
MCV RBC AUTO: 87.2 FL (ref 79–97)
MONOCYTES # BLD AUTO: 0.76 10*3/MM3 (ref 0.1–0.9)
MONOCYTES NFR BLD AUTO: 6 % (ref 5–12)
NEUTROPHILS NFR BLD AUTO: 62.7 % (ref 42.7–76)
NEUTROPHILS NFR BLD AUTO: 7.91 10*3/MM3 (ref 1.7–7)
NRBC BLD AUTO-RTO: 0 /100 WBC (ref 0–0.2)
NT-PROBNP SERPL-MCNC: <5 PG/ML (ref 0–450)
PLATELET # BLD AUTO: 375 10*3/MM3 (ref 140–450)
PMV BLD AUTO: 9.7 FL (ref 6–12)
POTASSIUM SERPL-SCNC: 4.3 MMOL/L (ref 3.5–5.2)
PROT SERPL-MCNC: 8.1 G/DL (ref 6–8.5)
RBC # BLD AUTO: 4.84 10*6/MM3 (ref 3.77–5.28)
SODIUM SERPL-SCNC: 137 MMOL/L (ref 136–145)
TROPONIN T SERPL-MCNC: <0.01 NG/ML (ref 0–0.03)
WBC NRBC COR # BLD: 12.61 10*3/MM3 (ref 3.4–10.8)

## 2022-11-08 PROCEDURE — 80053 COMPREHEN METABOLIC PANEL: CPT | Performed by: PHYSICIAN ASSISTANT

## 2022-11-08 PROCEDURE — 85025 COMPLETE CBC W/AUTO DIFF WBC: CPT | Performed by: PHYSICIAN ASSISTANT

## 2022-11-08 PROCEDURE — 96375 TX/PRO/DX INJ NEW DRUG ADDON: CPT

## 2022-11-08 PROCEDURE — 85379 FIBRIN DEGRADATION QUANT: CPT | Performed by: PHYSICIAN ASSISTANT

## 2022-11-08 PROCEDURE — 0202U NFCT DS 22 TRGT SARS-COV-2: CPT | Performed by: PHYSICIAN ASSISTANT

## 2022-11-08 PROCEDURE — 81025 URINE PREGNANCY TEST: CPT | Performed by: PHYSICIAN ASSISTANT

## 2022-11-08 PROCEDURE — 93005 ELECTROCARDIOGRAM TRACING: CPT | Performed by: PHYSICIAN ASSISTANT

## 2022-11-08 PROCEDURE — 84145 PROCALCITONIN (PCT): CPT | Performed by: PHYSICIAN ASSISTANT

## 2022-11-08 PROCEDURE — 99284 EMERGENCY DEPT VISIT MOD MDM: CPT

## 2022-11-08 PROCEDURE — 96365 THER/PROPH/DIAG IV INF INIT: CPT

## 2022-11-08 PROCEDURE — 84484 ASSAY OF TROPONIN QUANT: CPT | Performed by: PHYSICIAN ASSISTANT

## 2022-11-08 PROCEDURE — 83880 ASSAY OF NATRIURETIC PEPTIDE: CPT | Performed by: PHYSICIAN ASSISTANT

## 2022-11-08 RX ORDER — SODIUM CHLORIDE 0.9 % (FLUSH) 0.9 %
10 SYRINGE (ML) INJECTION AS NEEDED
Status: DISCONTINUED | OUTPATIENT
Start: 2022-11-08 | End: 2022-11-09 | Stop reason: HOSPADM

## 2022-11-08 RX ORDER — DEXAMETHASONE SODIUM PHOSPHATE 10 MG/ML
10 INJECTION, SOLUTION INTRAMUSCULAR; INTRAVENOUS ONCE
Status: COMPLETED | OUTPATIENT
Start: 2022-11-08 | End: 2022-11-09

## 2022-11-08 RX ADMIN — SODIUM CHLORIDE 500 ML: 9 INJECTION, SOLUTION INTRAVENOUS at 23:17

## 2022-11-09 ENCOUNTER — APPOINTMENT (OUTPATIENT)
Dept: CT IMAGING | Facility: HOSPITAL | Age: 37
End: 2022-11-09

## 2022-11-09 VITALS
RESPIRATION RATE: 18 BRPM | SYSTOLIC BLOOD PRESSURE: 152 MMHG | HEIGHT: 62 IN | BODY MASS INDEX: 51.53 KG/M2 | OXYGEN SATURATION: 89 % | HEART RATE: 117 BPM | TEMPERATURE: 98.3 F | DIASTOLIC BLOOD PRESSURE: 78 MMHG | WEIGHT: 280 LBS

## 2022-11-09 LAB
B PARAPERT DNA SPEC QL NAA+PROBE: NOT DETECTED
B PERT DNA SPEC QL NAA+PROBE: NOT DETECTED
C PNEUM DNA NPH QL NAA+NON-PROBE: NOT DETECTED
FLUAV SUBTYP SPEC NAA+PROBE: NOT DETECTED
FLUBV RNA ISLT QL NAA+PROBE: NOT DETECTED
HADV DNA SPEC NAA+PROBE: NOT DETECTED
HCOV 229E RNA SPEC QL NAA+PROBE: NOT DETECTED
HCOV HKU1 RNA SPEC QL NAA+PROBE: NOT DETECTED
HCOV NL63 RNA SPEC QL NAA+PROBE: NOT DETECTED
HCOV OC43 RNA SPEC QL NAA+PROBE: NOT DETECTED
HMPV RNA NPH QL NAA+NON-PROBE: NOT DETECTED
HPIV1 RNA ISLT QL NAA+PROBE: NOT DETECTED
HPIV2 RNA SPEC QL NAA+PROBE: NOT DETECTED
HPIV3 RNA NPH QL NAA+PROBE: NOT DETECTED
HPIV4 P GENE NPH QL NAA+PROBE: NOT DETECTED
M PNEUMO IGG SER IA-ACNC: NOT DETECTED
PROCALCITONIN SERPL-MCNC: 0.05 NG/ML (ref 0–0.25)
RHINOVIRUS RNA SPEC NAA+PROBE: NOT DETECTED
RSV RNA NPH QL NAA+NON-PROBE: NOT DETECTED
SARS-COV-2 RNA NPH QL NAA+NON-PROBE: NOT DETECTED

## 2022-11-09 PROCEDURE — 25010000002 IOPAMIDOL 61 % SOLUTION: Performed by: FAMILY MEDICINE

## 2022-11-09 PROCEDURE — 25010000002 CEFTRIAXONE SODIUM-DEXTROSE 1-3.74 GM-%(50ML) RECONSTITUTED SOLUTION: Performed by: FAMILY MEDICINE

## 2022-11-09 PROCEDURE — 25010000002 DEXAMETHASONE SODIUM PHOSPHATE 10 MG/ML SOLUTION: Performed by: PHYSICIAN ASSISTANT

## 2022-11-09 PROCEDURE — 71275 CT ANGIOGRAPHY CHEST: CPT

## 2022-11-09 RX ORDER — CEFUROXIME AXETIL 500 MG/1
500 TABLET ORAL 2 TIMES DAILY
Qty: 10 TABLET | Refills: 0 | Status: SHIPPED | OUTPATIENT
Start: 2022-11-09 | End: 2022-11-14

## 2022-11-09 RX ORDER — DOXYCYCLINE 100 MG/1
100 CAPSULE ORAL ONCE
Status: COMPLETED | OUTPATIENT
Start: 2022-11-09 | End: 2022-11-09

## 2022-11-09 RX ORDER — CEFTRIAXONE 1 G/50ML
1 INJECTION, SOLUTION INTRAVENOUS ONCE
Status: COMPLETED | OUTPATIENT
Start: 2022-11-09 | End: 2022-11-09

## 2022-11-09 RX ORDER — DOXYCYCLINE 100 MG/1
100 CAPSULE ORAL 2 TIMES DAILY
Qty: 10 CAPSULE | Refills: 0 | Status: SHIPPED | OUTPATIENT
Start: 2022-11-09 | End: 2022-11-14

## 2022-11-09 RX ADMIN — DOXYCYCLINE 100 MG: 100 CAPSULE ORAL at 01:57

## 2022-11-09 RX ADMIN — IOPAMIDOL 100 ML: 612 INJECTION, SOLUTION INTRAVENOUS at 00:16

## 2022-11-09 RX ADMIN — CEFTRIAXONE 1 G: 1 INJECTION, SOLUTION INTRAVENOUS at 01:58

## 2022-11-09 RX ADMIN — SODIUM CHLORIDE 500 ML: 9 INJECTION, SOLUTION INTRAVENOUS at 00:24

## 2022-11-09 RX ADMIN — DEXAMETHASONE SODIUM PHOSPHATE 10 MG: 10 INJECTION INTRAMUSCULAR; INTRAVENOUS at 00:24

## 2022-11-09 NOTE — DISCHARGE INSTRUCTIONS
Take your medications as prescribed   Use the incentive spirometry every hour while awake  After 2 days of antibiotics if no improvement return to the ER immediately

## 2022-11-09 NOTE — ED PROVIDER NOTES
"Subjective   History of Present Illness  Patient is a 37-year-old female with a history of Bell's palsy, bipolar 2 disorder, blood clot, diabetes, GERD, migraines, PTSD, restless leg syndrome and tobacco use presenting to the ER for evaluation of hemoptysis.  Patient states that she recently had RSV bronchitis and was hospitalized.  She states that she has continued to have a productive cough with green sputum but coughed up some phlegm with blood today.  She states that she sometimes has pain in her bilateral lower chest. She states she does continue to smoke tobacco.  She states that when she was hospitalized 3 weeks ago with RSV they sent her home on steroids, does not believe she was on any antibiotics.  She states that she had been on anticoagulation for previous blood clot in her leg but they stopped this 3 months ago.  She denies any recent fever, dizziness, syncopal episodes, significant shortness of breath, abdominal pain, leg pain or swelling, or any other symptoms.        Review of Systems   Constitutional: Negative for chills and fever.   HENT: Negative.    Eyes: Negative.    Respiratory: Positive for cough. Negative for shortness of breath.    Cardiovascular: Negative.    Gastrointestinal: Negative.    Genitourinary: Negative.    Musculoskeletal: Negative.    Skin: Negative.    Neurological: Negative.    Psychiatric/Behavioral: Negative.        Past Medical History:   Diagnosis Date   • Anxiety    • Back pain    • Bell's palsy    • Bipolar 2 disorder (HCC)    • Blood clot in vein    • Depression    • Diabetes mellitus (HCC) November    Pre diabete   • Frequent headaches    • GERD (gastroesophageal reflux disease)    • Migraine    • Panic    • PTSD (post-traumatic stress disorder)    • Restless leg syndrome    • Sinus problem    • Snores    • Tattoos    • Vitamin B12 deficiency        Allergies   Allergen Reactions   • Codeine Anaphylaxis   • Flexeril [Cyclobenzaprine] Other (See Comments)     \"gives me " "chest pain\"   • Asa [Aspirin] Hives     Wheezing     • Latex Hives   • Morphine Itching   • Oxycontin [Oxycodone] GI Intolerance   • Penicillins Hives     Vomiting     • Triptans Other (See Comments)     Chest tightness, left arm numbness   • Zofran [Ondansetron] GI Intolerance   • Compazine [Prochlorperazine] Anxiety   • Reglan [Metoclopramide] Anxiety       Past Surgical History:   Procedure Laterality Date   • CHOLECYSTECTOMY     • TOOTH EXTRACTION         Family History   Problem Relation Age of Onset   • Irritable bowel syndrome Mother    • Irritable bowel syndrome Father    • Colon cancer Maternal Grandfather        Social History     Socioeconomic History   • Marital status: Single   Tobacco Use   • Smoking status: Every Day     Packs/day: 0.50     Years: 29.00     Pack years: 14.50     Types: Cigarettes     Start date: 1/1/1995   • Smokeless tobacco: Never   Vaping Use   • Vaping Use: Former   Substance and Sexual Activity   • Alcohol use: Never   • Drug use: Never   • Sexual activity: Not Currently     Partners: Female           Objective   Physical Exam  Vitals and nursing note reviewed.     /78   Pulse 117   Temp 98.3 °F (36.8 °C) (Oral)   Resp 18   Ht 157.5 cm (62\")   Wt 127 kg (280 lb)   LMP 10/15/2022 (Exact Date)   SpO2 (!) 89%   BMI 51.21 kg/m²     GEN: No acute distress, sitting upright on the stretcher.  Awake and alert.  Does not appear septic or toxic.  She is answering questions appropriately.  Head: Normocephalic, atraumatic  Eyes: EOM intact, mask in place per protocol    ENT: Posterior pharynx normal in appearance, oral mucosa is moist,, tongue midline  Chest: Nontender to palpation  Cardiovascular: Tachycardic, regular rhythm  Lungs: Breathing even and nonlabored, clear to auscultation bilaterally  Abdomen: Soft, nontender, nondistended, no peritoneal signs, no guarding  Extremities: No edema, normal appearance, full range of motion, no calf tenderness.  Distal pulses are " intact  Neuro: GCS 15  Psych: Mood and affect are appropriate    Procedures           ED Course  ED Course as of 11/11/22 1107   Tue Nov 08, 2022   2315 EKG interpretation time is 23 57 sinus tachycardia 121 bpm QRS duration is 90 QT is 346 QTC is 418 there is no acute ST elevation there is Q waves noted anteriorly there is no acute depression this is an abnormal EKG. [MH]   2324 WBC(!): 12.61 [LA]   2324 Hemoglobin: 14.2 [LA]   2324 HCG, Urine QL: Negative [LA]   2324 D-Dimer, Quant(!!): 0.88 [LA]   2347 proBNP: <5.0 [LA]   2347 Troponin T: <0.010 [LA]   2347 Glucose(!): 104 [LA]   2347 Creatinine(!): 0.53 [LA]   2347 Sodium: 137 [LA]   2347 Potassium: 4.3 [LA]   2347 Chloride: 98 [LA]   2347 CO2: 26.1 [LA]   2347 Calcium: 9.2 [LA]   2347 Total Protein: 8.1 [LA]   2347 Albumin: 4.40 [LA]   2347 ALT (SGPT): 21 [LA]   2347 AST (SGOT): 25 [LA]   2347 Total Bilirubin: 0.6 [LA]   2347 A/G Ratio: 1.2 [LA]   2347 BUN/Creatinine Ratio: 7.5 [LA]   2348 Anion Gap: 12.9 [LA]   2348 eGFR: 121.6 [LA]   Wed Nov 09, 2022   0008 Procalcitonin: 0.05 [LA]   0008 ADENOVIRUS, PCR: Not Detected [LA]   0008 Coronavirus 229E: Not Detected [LA]   0008 Coronavirus HKU1: Not Detected [LA]   0008 Coronavirus NL63: Not Detected [LA]   0008 Coronavirus OC43: Not Detected [LA]   0008 COVID19: Not Detected [LA]   0008 Human Metapneumovirus: Not Detected [LA]   0008 Human Rhinovirus/Enterovirus: Not Detected [LA]   0008 Influenza A PCR: Not Detected [LA]   0008 Influenza B PCR: Not Detected [LA]   0008 Parainfluenza Virus 1: Not Detected [LA]   0008 Parainfluenza Virus 2: Not Detected [LA]   0008 Parainfluenza Virus 3: Not Detected [LA]   0008 Parainfluenza Virus 4: Not Detected [LA]   0008 RSV, PCR: Not Detected [LA]   0008 Bordetella pertussis pcr: Not Detected [LA]   0008 Bordetella parapertussis PCR: Not Detected [LA]   0008 Chlamydophila pneumoniae PCR: Not Detected [LA]   0008 Mycoplasma pneumo by PCR: Not Detected [LA]   0008 Patient back  from CT. she still tachycardic in the 120s, given additional 500 cc fluid bolus.  Patient care handed to Dr. Branch awaiting CT PE results.  Oxygen status and blood pressure stable at this time. [LA]      ED Course User Index  [LA] Rosalba Cummings PA-C  [MH] Sarina, Christine Duncan DO                                           MDM  Number of Diagnoses or Management Options  Diagnosis management comments: On arrival, patient is tachycardic 125, saturating 93% on room air.  She is afebrile at this time.  Differential could include bronchitis, URI, pneumonia, pulmonary embolism, pulmonary edema, and other concerns.  Will obtain respiratory panel, basic labs, troponin, D-dimer, chest x-ray if UPT is negative.    Patient's labs reveal mild leukocytosis, no significant electrolyte abnormalities.  There is no elevation of troponin.  She does have a positive D-dimer at 0.88.  Her UPT is negative.  Given these findings, we will go ahead and obtain CT PE protocol instead of chest x-ray.       Amount and/or Complexity of Data Reviewed  Clinical lab tests: reviewed  Tests in the medicine section of CPT®: reviewed        Final diagnoses:   Pneumonia of left lower lobe due to infectious organism       ED Disposition  ED Disposition     ED Disposition   Discharge    Condition   Stable    Comment   --             Stephanie Burdick, APRN  [address] [place] [address] [phone]    Schedule an appointment as soon as possible for a visit            Medication List      New Prescriptions    cefuroxime 500 MG tablet  Commonly known as: CEFTIN  Take 1 tablet by mouth 2 (Two) Times a Day for 5 days.     doxycycline 100 MG capsule  Commonly known as: MONODOX  Take 1 capsule by mouth 2 (Two) Times a Day for 5 days.           Where to Get Your Medications      These medications were sent to [facility]/pharmacy #[id number] - [place], [place] - [address] [place] [address] [phone]  - [phone]   [address] [facility] [address]     Phone: [phone]   · cefuroxime 500 MG tablet  · doxycycline 100 MG capsule          Rosalba Cummings PA-C  11/11/22 7405

## 2022-11-14 RX ORDER — DESVENLAFAXINE SUCCINATE 50 MG/1
TABLET, EXTENDED RELEASE ORAL
Qty: 90 TABLET | Refills: 1 | OUTPATIENT
Start: 2022-11-14

## 2022-11-17 ENCOUNTER — HOSPITAL ENCOUNTER (OUTPATIENT)
Dept: GENERAL RADIOLOGY | Facility: HOSPITAL | Age: 37
Discharge: HOME | End: 2022-11-17
Admitting: NURSE PRACTITIONER

## 2022-11-17 ENCOUNTER — OFFICE VISIT (OUTPATIENT)
Dept: INTERNAL MEDICINE | Facility: CLINIC | Age: 37
End: 2022-11-17

## 2022-11-17 VITALS
HEIGHT: 62 IN | HEART RATE: 109 BPM | BODY MASS INDEX: 51.89 KG/M2 | WEIGHT: 282 LBS | DIASTOLIC BLOOD PRESSURE: 85 MMHG | SYSTOLIC BLOOD PRESSURE: 123 MMHG | TEMPERATURE: 97.5 F | OXYGEN SATURATION: 94 %

## 2022-11-17 DIAGNOSIS — I82.432 ACUTE DEEP VEIN THROMBOSIS (DVT) OF POPLITEAL VEIN OF LEFT LOWER EXTREMITY: ICD-10-CM

## 2022-11-17 DIAGNOSIS — E55.9 VITAMIN D DEFICIENCY: ICD-10-CM

## 2022-11-17 DIAGNOSIS — G89.29 OTHER CHRONIC PAIN: ICD-10-CM

## 2022-11-17 DIAGNOSIS — F41.1 GAD (GENERALIZED ANXIETY DISORDER): ICD-10-CM

## 2022-11-17 DIAGNOSIS — R53.1 WEAKNESS: ICD-10-CM

## 2022-11-17 DIAGNOSIS — Z72.0 TOBACCO ABUSE: ICD-10-CM

## 2022-11-17 DIAGNOSIS — E53.8 VITAMIN B12 DEFICIENCY: ICD-10-CM

## 2022-11-17 DIAGNOSIS — E11.65 TYPE 2 DIABETES MELLITUS WITH HYPERGLYCEMIA, WITHOUT LONG-TERM CURRENT USE OF INSULIN: ICD-10-CM

## 2022-11-17 DIAGNOSIS — R53.82 CHRONIC FATIGUE: Primary | ICD-10-CM

## 2022-11-17 DIAGNOSIS — J18.9 PNEUMONIA OF LEFT LUNG DUE TO INFECTIOUS ORGANISM, UNSPECIFIED PART OF LUNG: ICD-10-CM

## 2022-11-17 DIAGNOSIS — F31.62 BIPOLAR DISORDER, CURRENT EPISODE MIXED, MODERATE: ICD-10-CM

## 2022-11-17 DIAGNOSIS — Z11.59 ENCOUNTER FOR HEPATITIS C SCREENING TEST FOR LOW RISK PATIENT: ICD-10-CM

## 2022-11-17 PROBLEM — R91.1 LUNG NODULE: Status: ACTIVE | Noted: 2022-11-17

## 2022-11-17 LAB
EXPIRATION DATE: NORMAL
HBA1C MFR BLD: 7.3 %
Lab: NORMAL

## 2022-11-17 PROCEDURE — 99215 OFFICE O/P EST HI 40 MIN: CPT | Performed by: NURSE PRACTITIONER

## 2022-11-17 PROCEDURE — 71046 X-RAY EXAM CHEST 2 VIEWS: CPT

## 2022-11-17 PROCEDURE — 83036 HEMOGLOBIN GLYCOSYLATED A1C: CPT | Performed by: NURSE PRACTITIONER

## 2022-11-17 RX ORDER — BLOOD-GLUCOSE METER
KIT MISCELLANEOUS
Qty: 1 EACH | Refills: 0 | Status: SHIPPED | OUTPATIENT
Start: 2022-11-17 | End: 2023-01-04

## 2022-11-17 RX ORDER — ALBUTEROL SULFATE 90 UG/1
2 AEROSOL, METERED RESPIRATORY (INHALATION) EVERY 4 HOURS PRN
Qty: 8 G | Refills: 3 | Status: SHIPPED | OUTPATIENT
Start: 2022-11-17 | End: 2023-03-08

## 2022-11-17 RX ORDER — BUDESONIDE, GLYCOPYRROLATE, AND FORMOTEROL FUMARATE 160; 9; 4.8 UG/1; UG/1; UG/1
2 AEROSOL, METERED RESPIRATORY (INHALATION) 2 TIMES DAILY
Qty: 5.9 G | Refills: 0 | COMMUNITY
Start: 2022-11-17 | End: 2022-12-15 | Stop reason: SDUPTHER
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
## 2022-11-18 ENCOUNTER — TELEPHONE (OUTPATIENT)
Dept: INTERNAL MEDICINE | Facility: CLINIC | Age: 37
End: 2022-11-18

## 2022-11-18 DIAGNOSIS — E11.65 TYPE 2 DIABETES MELLITUS WITH HYPERGLYCEMIA, WITHOUT LONG-TERM CURRENT USE OF INSULIN: ICD-10-CM

## 2022-11-18 DIAGNOSIS — E55.9 VITAMIN D DEFICIENCY: Primary | ICD-10-CM

## 2022-11-18 DIAGNOSIS — E78.00 HYPERCHOLESTEROLEMIA: ICD-10-CM

## 2022-11-18 LAB
25(OH)D3+25(OH)D2 SERPL-MCNC: 13.7 NG/ML (ref 30–100)
ALBUMIN SERPL-MCNC: 4.3 G/DL (ref 3.5–5.2)
ALBUMIN/GLOB SERPL: 1.8 G/DL
ALP SERPL-CCNC: 141 U/L (ref 39–117)
ALT SERPL-CCNC: 29 U/L (ref 1–33)
AST SERPL-CCNC: 29 U/L (ref 1–32)
BASOPHILS # BLD AUTO: 0.03 10*3/MM3 (ref 0–0.2)
BASOPHILS NFR BLD AUTO: 0.3 % (ref 0–1.5)
BILIRUB SERPL-MCNC: <0.2 MG/DL (ref 0–1.2)
BUN SERPL-MCNC: 4 MG/DL (ref 6–20)
BUN/CREAT SERPL: 7.8 (ref 7–25)
CALCIUM SERPL-MCNC: 9.6 MG/DL (ref 8.6–10.5)
CHLORIDE SERPL-SCNC: 101 MMOL/L (ref 98–107)
CHOLEST SERPL-MCNC: 222 MG/DL (ref 0–200)
CO2 SERPL-SCNC: 25.9 MMOL/L (ref 22–29)
CREAT SERPL-MCNC: 0.51 MG/DL (ref 0.57–1)
EGFRCR SERPLBLD CKD-EPI 2021: 122.7 ML/MIN/1.73
EOSINOPHIL # BLD AUTO: 0.2 10*3/MM3 (ref 0–0.4)
EOSINOPHIL NFR BLD AUTO: 2 % (ref 0.3–6.2)
ERYTHROCYTE [DISTWIDTH] IN BLOOD BY AUTOMATED COUNT: 13.4 % (ref 12.3–15.4)
GLOBULIN SER CALC-MCNC: 2.4 GM/DL
GLUCOSE SERPL-MCNC: 179 MG/DL (ref 65–99)
HCT VFR BLD AUTO: 42.6 % (ref 34–46.6)
HCV AB S/CO SERPL IA: <0.1 S/CO RATIO (ref 0–0.9)
HDLC SERPL-MCNC: 46 MG/DL (ref 40–60)
HGB BLD-MCNC: 14.1 G/DL (ref 12–15.9)
IMM GRANULOCYTES # BLD AUTO: 0.04 10*3/MM3 (ref 0–0.05)
IMM GRANULOCYTES NFR BLD AUTO: 0.4 % (ref 0–0.5)
LDLC SERPL CALC-MCNC: 107 MG/DL (ref 0–100)
LYMPHOCYTES # BLD AUTO: 3.95 10*3/MM3 (ref 0.7–3.1)
LYMPHOCYTES NFR BLD AUTO: 39.5 % (ref 19.6–45.3)
MCH RBC QN AUTO: 29 PG (ref 26.6–33)
MCHC RBC AUTO-ENTMCNC: 33.1 G/DL (ref 31.5–35.7)
MCV RBC AUTO: 87.7 FL (ref 79–97)
MONOCYTES # BLD AUTO: 0.55 10*3/MM3 (ref 0.1–0.9)
MONOCYTES NFR BLD AUTO: 5.5 % (ref 5–12)
NEUTROPHILS # BLD AUTO: 5.24 10*3/MM3 (ref 1.7–7)
NEUTROPHILS NFR BLD AUTO: 52.3 % (ref 42.7–76)
NRBC BLD AUTO-RTO: 0 /100 WBC (ref 0–0.2)
PLATELET # BLD AUTO: 389 10*3/MM3 (ref 140–450)
POTASSIUM SERPL-SCNC: 4.3 MMOL/L (ref 3.5–5.2)
PROT SERPL-MCNC: 6.7 G/DL (ref 6–8.5)
RBC # BLD AUTO: 4.86 10*6/MM3 (ref 3.77–5.28)
SODIUM SERPL-SCNC: 140 MMOL/L (ref 136–145)
TRIGL SERPL-MCNC: 402 MG/DL (ref 0–150)
TSH SERPL DL<=0.005 MIU/L-ACNC: 3.34 UIU/ML (ref 0.27–4.2)
VIT B12 SERPL-MCNC: 258 PG/ML (ref 211–946)
VLDLC SERPL CALC-MCNC: 69 MG/DL (ref 5–40)
WBC # BLD AUTO: 10.01 10*3/MM3 (ref 3.4–10.8)

## 2022-11-18 RX ORDER — ATORVASTATIN CALCIUM 40 MG/1
40 TABLET, FILM COATED ORAL NIGHTLY
Qty: 90 TABLET | Refills: 3 | Status: SHIPPED | OUTPATIENT
Start: 2022-11-18

## 2022-11-18 RX ORDER — ERGOCALCIFEROL 1.25 MG/1
50000 CAPSULE ORAL WEEKLY
Qty: 6 CAPSULE | Refills: 0 | Status: SHIPPED | OUTPATIENT
Start: 2022-11-18 | End: 2022-12-28

## 2022-11-18 NOTE — TELEPHONE ENCOUNTER
Called pt to explain the X-ray improved, no further pneumonia seen.  No need for further antibiotics. Left vm for pt to return call.

## 2022-11-18 NOTE — TELEPHONE ENCOUNTER
Patient said that pharmacist told her that Metformin would interact with some of her meds.  He suggested Jardiance instead.  Please return call and let her know what you would like her to do.

## 2022-11-19 ENCOUNTER — TELEPHONE (OUTPATIENT)
Dept: INTERNAL MEDICINE | Facility: CLINIC | Age: 37
End: 2022-11-19

## 2022-11-19 NOTE — TELEPHONE ENCOUNTER
Patient's mother called stating that Lindsay has started a new medication that Charley has her on and that she thinks it is not mixing well with her other medications that she takes. She stated that the patient has been feeling bad and throwing up.     Patients mother asked that we send a note back and will call back on Monday to speak with Charley. I advise patient to seek treatment at Urgent Care or the ER if symptoms worsen as they did not want to come into the office today.

## 2022-11-21 ENCOUNTER — TELEPHONE (OUTPATIENT)
Dept: INTERNAL MEDICINE | Facility: CLINIC | Age: 37
End: 2022-11-21

## 2022-11-21 ENCOUNTER — TELEMEDICINE (OUTPATIENT)
Dept: BEHAVIORAL HEALTH | Facility: CLINIC | Age: 37
End: 2022-11-21

## 2022-11-21 DIAGNOSIS — F51.04 PSYCHOPHYSIOLOGICAL INSOMNIA: ICD-10-CM

## 2022-11-21 DIAGNOSIS — E11.65 TYPE 2 DIABETES MELLITUS WITH HYPERGLYCEMIA, WITHOUT LONG-TERM CURRENT USE OF INSULIN: Primary | ICD-10-CM

## 2022-11-21 DIAGNOSIS — F31.30 BIPOLAR I DISORDER, MOST RECENT EPISODE DEPRESSED: ICD-10-CM

## 2022-11-21 DIAGNOSIS — F41.1 GENERALIZED ANXIETY DISORDER: Primary | ICD-10-CM

## 2022-11-21 DIAGNOSIS — F43.10 POST TRAUMATIC STRESS DISORDER (PTSD): ICD-10-CM

## 2022-11-21 PROCEDURE — 99214 OFFICE O/P EST MOD 30 MIN: CPT

## 2022-11-21 RX ORDER — LURASIDONE HYDROCHLORIDE 120 MG/1
120 TABLET, FILM COATED ORAL DAILY
Qty: 30 TABLET | Refills: 2 | Status: SHIPPED | OUTPATIENT
Start: 2022-11-21 | End: 2022-12-14 | Stop reason: DRUGHIGH

## 2022-11-21 RX ORDER — AMITRIPTYLINE HYDROCHLORIDE 150 MG/1
150 TABLET, FILM COATED ORAL
Qty: 30 TABLET | Refills: 2 | Status: SHIPPED | OUTPATIENT
Start: 2022-11-21 | End: 2023-01-30 | Stop reason: SDUPTHER

## 2022-11-21 RX ORDER — HYDROXYZINE HYDROCHLORIDE 25 MG/1
25 TABLET, FILM COATED ORAL 3 TIMES DAILY PRN
Qty: 30 TABLET | Refills: 2 | Status: SHIPPED | OUTPATIENT
Start: 2022-11-21 | End: 2023-01-30 | Stop reason: SDUPTHER

## 2022-11-21 RX ORDER — DESVENLAFAXINE SUCCINATE 50 MG/1
50 TABLET, EXTENDED RELEASE ORAL DAILY
Qty: 90 TABLET | Refills: 0 | Status: SHIPPED | OUTPATIENT
Start: 2022-11-21 | End: 2023-01-30 | Stop reason: SDUPTHER

## 2022-11-21 RX ORDER — DESVENLAFAXINE 100 MG/1
100 TABLET, EXTENDED RELEASE ORAL DAILY
Qty: 90 TABLET | Refills: 0 | Status: SHIPPED | OUTPATIENT
Start: 2022-11-21 | End: 2023-01-30 | Stop reason: SDUPTHER

## 2022-11-21 NOTE — TELEPHONE ENCOUNTER
I called and spoke with patient. Advised Charley is out of the office on Monday. Patient states she took the first dose of metformin this weekend, states she ate when she took this and also took a phenergan. Patient states she started vomiting a couple hours after this. Patient states she took the second dose Saturday afternoon and the same thing happened. Patient has not taken medication since. Please advise?

## 2022-11-21 NOTE — TELEPHONE ENCOUNTER
Patient wants to know what an alternate medication to Metformin would be.  States she cannot tolerate Metformin.  Please advise.

## 2022-11-21 NOTE — TELEPHONE ENCOUNTER
Caller: DoeYaniv    Relationship: Mother    Best call back number: [phone]    What medications are you currently taking:   Current Outpatient Medications on File Prior to Visit   Medication Sig Dispense Refill   • albuterol sulfate  (90 Base) MCG/ACT inhaler Inhale 2 puffs Every 4 (Four) Hours As Needed for Wheezing or Shortness of Air. 8 g 3   • ALPRAZolam (Xanax) 0.5 MG tablet Take 1 tablet by mouth 3 (Three) Times a Day As Needed for Anxiety. 90 tablet 0   • amitriptyline (ELAVIL) 150 MG tablet Take 1 tablet by mouth every night at bedtime. 30 tablet 2   • atorvastatin (Lipitor) 40 MG tablet Take 1 tablet by mouth Every Night. 90 tablet 3   • Budeson-Glycopyrrol-Formoterol (Breztri Aerosphere) 160-9-4.8 MCG/ACT aerosol inhaler Inhale 2 puffs 2 (Two) Times a Day. 5.9 g 0   • butalbital-acetaminophen-caffeine (Esgic) -40 MG per tablet Take 1 tablet by mouth Every 6 (Six) Hours As Needed for Migraine. 20 tablet 0   • desvenlafaxine (PRISTIQ) 100 MG 24 hr tablet Take 1 tablet by mouth Daily. 90 tablet 0   • desvenlafaxine (Pristiq) 50 MG 24 hr tablet Take 1 tablet by mouth Daily. 90 tablet 0   • Diclofenac Sodium (VOLTAREN) 1 % gel gel Apply 4 g topically to the appropriate area as directed 4 (Four) Times a Day As Needed (knee pain). 100 g 5   • gabapentin (NEURONTIN) 800 MG tablet Take 1 tablet by mouth 3 (Three) Times a Day. 90 tablet 0   • glucose blood (FREESTYLE LITE) test strip TEST BLOOD SUGAR ONCE A DAY 30 each 5   • glucose monitor monitoring kit Check blood sugar once daily 1 each 0   • HYDROcodone-acetaminophen (NORCO) 7.5-325 MG per tablet Take 1 tablet by mouth Every 6 (Six) Hours As Needed for Moderate Pain.     • Hydrocortisone, Perianal, (Proctozone-HC) 2.5 % rectal cream Insert  into the rectum 2 (Two) Times a Day. 28 g 0   • hydrOXYzine (ATARAX) 25 MG tablet Take 1 tablet by mouth 3 (Three) Times a Day As Needed for Anxiety. 30 tablet 2   • Lancets (freestyle) lancets 1 each  by Other route 4 (Four) Times a Day. 400 each 3   • Lancets Mis. (ONE TOUCH SURESOFT) misc Check blood sugar once daily 1 each 0   • levocetirizine (XYZAL) 5 MG tablet Take 1 tablet by mouth Every Evening. 30 tablet 5   • Lurasidone HCl (Latuda) 120 MG tablet tablet Take 1 tablet by mouth Daily. Take 120 mg orally daily with a meal. 30 tablet 2   • metFORMIN (Glucophage) 500 MG tablet Take 1 tablet by mouth 2 (Two) Times a Day With Meals. 60 tablet 0   • Rimegepant Sulfate (Nurtec) 75 MG tablet dispersible tablet Take 1 tablet by mouth Every Other Day. Take Monday,Wednesday,Friday, and Saturday. 16 tablet 11   • tiZANidine (ZANAFLEX) 4 MG tablet Take 1 tablet by mouth Every 8 (Eight) Hours As Needed for Muscle Spasms. 90 tablet 1   • vitamin D (ERGOCALCIFEROL) 1.25 MG (17141 UT) capsule capsule Take 1 capsule by mouth 1 (One) Time Per Week. 6 capsule 0   • [DISCONTINUED] amitriptyline (ELAVIL) 150 MG tablet Take 1 tablet by mouth every night at bedtime. 30 tablet 2   • [DISCONTINUED] desvenlafaxine (PRISTIQ) 100 MG 24 hr tablet Take 1 tablet by mouth Daily. 90 tablet 0   • [DISCONTINUED] hydrOXYzine (ATARAX) 25 MG tablet Take 1 tablet by mouth 3 (Three) Times a Day As Needed for Anxiety. 30 tablet 0   • [DISCONTINUED] Lurasidone HCl (Latuda) 120 MG tablet tablet Take 1 tablet by mouth Daily. Take 120 mg orally daily with a meal. 30 tablet 0     No current facility-administered medications on file prior to visit.          When did you start taking these medications:   11/17/2022    Which medication are you concerned about:  metFORMIN (Glucophage) 500 MG tablet    Who prescribed you this medication:   FELICIANO SELF     What are your concerns:   PATIENT'S (MOTHER) ALYSSA STATED THAT PATIENT WILL EAT BEFORE MEDICATION AND THEN AFTER TAKING THE MEDICATION SHE THROWS UP EVERY TIME AND WOULD LIKE  CALL BACK REGARDING THIS CONCERN
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
## 2022-11-22 ENCOUNTER — TELEPHONE (OUTPATIENT)
Dept: INTERNAL MEDICINE | Facility: CLINIC | Age: 37
End: 2022-11-22

## 2022-11-22 DIAGNOSIS — G89.29 CHRONIC BILATERAL LOW BACK PAIN WITH BILATERAL SCIATICA: ICD-10-CM

## 2022-11-22 DIAGNOSIS — M54.42 CHRONIC BILATERAL LOW BACK PAIN WITH BILATERAL SCIATICA: ICD-10-CM

## 2022-11-22 DIAGNOSIS — M54.41 CHRONIC BILATERAL LOW BACK PAIN WITH BILATERAL SCIATICA: ICD-10-CM

## 2022-11-22 RX ORDER — GABAPENTIN 800 MG/1
800 TABLET ORAL 3 TIMES DAILY
Qty: 42 TABLET | Refills: 0 | Status: SHIPPED | OUTPATIENT
Start: 2022-11-22 | End: 2023-03-26

## 2022-11-22 NOTE — TELEPHONE ENCOUNTER
Caller: Lindsay Amezquita    Relationship: Self    Best call back number: [phone] A DETAILED MESSAGE IS ACCEPTABLE    What is the best time to reach you: ANY    Who are you requesting to speak with (clinical staff, provider,  specific staff member): FELICIANO/CLINICAL    What was the call regarding: THE PATIENT STATES THAT SHE WILL RUN OUT OF HER gabapentin (NEURONTIN) 800 MG tablet ON 11.24.22 AND SHE IS UNABLE TO CONTACT THE [facility] AND WOULD LIKE TO KNOW WHAT SHE CAN DO TO OBTAIN THIS REFILL.     Do you require a callback: YES, PLEASE CALL BACK AS SOON AS POSSIBLE TO ADVISE.     THANK YOU.

## 2022-11-26 ENCOUNTER — DOCUMENTATION (OUTPATIENT)
Dept: FAMILY MEDICINE CLINIC | Facility: CLINIC | Age: 37
End: 2022-11-26

## 2022-11-26 RX ORDER — FLUCONAZOLE 100 MG/1
100 TABLET ORAL DAILY
Qty: 5 TABLET | Refills: 0 | Status: SHIPPED | OUTPATIENT
Start: 2022-11-26 | End: 2022-12-01

## 2022-11-28 ENCOUNTER — TELEPHONE (OUTPATIENT)
Dept: INTERNAL MEDICINE | Facility: CLINIC | Age: 37
End: 2022-11-28

## 2022-11-28 NOTE — TELEPHONE ENCOUNTER
Spoke with patient, advised patient needs to be seen.  Patient states she has a pending appt 12/10/22 and will wait until then.

## 2022-11-28 NOTE — TELEPHONE ENCOUNTER
PATIENT CALLED Saturday AND DOCTOR CALLED IN YEAST PILLS.  NOW PASSING THICK, WHITE MUCUS.  SHE THINKS SHE MAY HAVE A BLADDER INFECTION OR UTI.  WHAT TO DO?    PLEASE CALL [phone]

## 2022-11-28 NOTE — TELEPHONE ENCOUNTER
PATIENT IS WANTING TO KNOW WHAT SHE CAN DO IN THE MEANTIME UNTIL SHE CAN SEE FELICIANO.  PLEASE ADVISE.

## 2022-11-29 RX ORDER — TIZANIDINE 4 MG/1
TABLET ORAL
Qty: 90 TABLET | Refills: 1 | Status: SHIPPED | OUTPATIENT
Start: 2022-11-29 | End: 2022-12-15 | Stop reason: SDUPTHER

## 2022-11-30 ENCOUNTER — TELEPHONE (OUTPATIENT)
Dept: INTERNAL MEDICINE | Facility: CLINIC | Age: 37
End: 2022-11-30

## 2022-11-30 NOTE — TELEPHONE ENCOUNTER
Caller: Lindsay Amezquita     Relationship: [unfilled]     Best call back number:[phone]    What is your medical concern? PT STATED THAT SHE HAS BEEN GETTING A LOT OF BOILS, ONE BOIL JUST POPPED UNDER HER BREATH, PT WILL LIKE TO KNOW IF THIS IS A PART OF BEING DIABETIC.

## 2022-12-05 ENCOUNTER — TELEPHONE (OUTPATIENT)
Dept: INTERNAL MEDICINE | Facility: CLINIC | Age: 37
End: 2022-12-05

## 2022-12-05 DIAGNOSIS — F41.1 GAD (GENERALIZED ANXIETY DISORDER): ICD-10-CM

## 2022-12-05 RX ORDER — ALPRAZOLAM 0.5 MG/1
0.5 TABLET ORAL 3 TIMES DAILY PRN
Qty: 90 TABLET | Refills: 0 | Status: SHIPPED | OUTPATIENT
Start: 2022-12-05 | End: 2023-01-03 | Stop reason: SDUPTHER

## 2022-12-05 NOTE — TELEPHONE ENCOUNTER
Spoke with patient, states she will only see PCP.  No avail appts until 12/13.  Patient states she will wait until pending appt.

## 2022-12-05 NOTE — TELEPHONE ENCOUNTER
Caller: Lindsay Amezquita    Relationship: Self    Best call back number:     [phone]    What is the best time to reach you:     ANY TIME TODAY    Who are you requesting to speak with (clinical staff, provider,  specific staff member):     FELICIANO CARDOZA OR NURSE    What was the call regarding:     PATIENT HAS TWO BOILS THAT ARE CAUSING PAIN    PATIENT REQUESTED A CALL BACK WITH RECOMMENDATIONS ON HOW TO TREAT THE BOILS    Do you require a callback:     YES    FELICIANO CARODZA

## 2022-12-05 NOTE — TELEPHONE ENCOUNTER
Incoming Refill Request      Medication requested (name and dose): XANAX 0.5 mg    Pharmacy where request should be sent: [facility] [place]    Additional details provided by patient: OUT OF MEDICATION    Best call back number: [phone]    Does the patient have less than a 3 day supply:  [x] Yes  [] No    Kim Mueller Rep  12/05/22, 09:26 EST

## 2022-12-06 ENCOUNTER — TELEPHONE (OUTPATIENT)
Dept: INTERNAL MEDICINE | Facility: CLINIC | Age: 37
End: 2022-12-06

## 2022-12-06 ENCOUNTER — PRIOR AUTHORIZATION (OUTPATIENT)
Dept: BEHAVIORAL HEALTH | Facility: CLINIC | Age: 37
End: 2022-12-06

## 2022-12-07 ENCOUNTER — TELEPHONE (OUTPATIENT)
Dept: INTERNAL MEDICINE | Facility: CLINIC | Age: 37
End: 2022-12-07

## 2022-12-07 DIAGNOSIS — R11.0 NAUSEA: Primary | ICD-10-CM

## 2022-12-07 NOTE — TELEPHONE ENCOUNTER
Caller: Lindsay Amezquita    Relationship: Self    Best call back number:  [phone]     Who are you requesting to speak with (clinical staff, provider,  specific staff member): CLINICAL     What was the call regarding: THE BOIL ON HER STOMACH BUSTED THIS MORNING AND IT IS OOZING OUT   PLEASE CALL AND ADVISE

## 2022-12-07 NOTE — TELEPHONE ENCOUNTER
Patient states she believes Latuda 120 MG is the cause of her nausea and upset stomach. Patient states she has been taking it as directed and with food. Patient is seeking advice on if it is too high of a dose and if she should decrease dosage. Phone number verified.

## 2022-12-07 NOTE — TELEPHONE ENCOUNTER
I talked to the pt this morning. I had the pt send in a pic also. I told her to shower with warm water and let antibacterial soap run over. Pt denied fever and vomiting. Stated the boil is getting smaller. Said the reddness is going down. I told her to draw a [place] around it to make sure. And she has an appt with Charley on Sat.

## 2022-12-08 RX ORDER — ONDANSETRON 4 MG/1
4 TABLET, FILM COATED ORAL EVERY 12 HOURS PRN
Qty: 60 TABLET | Refills: 1 | Status: SHIPPED | OUTPATIENT
Start: 2022-12-08 | End: 2022-12-20

## 2022-12-09 NOTE — TELEPHONE ENCOUNTER
Rx Refill Note  Requested Prescriptions     Pending Prescriptions Disp Refills   • metFORMIN (GLUCOPHAGE) 500 MG tablet [Pharmacy Med Name: METFORMIN  MG TABLET] 60 tablet 0     Sig: TAKE 1 TABLET BY MOUTH TWICE A DAY WITH MEALS      Last office visit with prescribing clinician: 11/17/2022   Last telemedicine visit with prescribing clinician: 12/10/2022   Next office visit with prescribing clinician: 12/10/2022                         Would you like a call back once the refill request has been completed: [] Yes [] No    If the office needs to give you a call back, can they leave a voicemail: [] Yes [] No    Bere Spain MA  12/09/22, 11:21 EST

## 2022-12-13 ENCOUNTER — TELEPHONE (OUTPATIENT)
Dept: INTERNAL MEDICINE | Facility: CLINIC | Age: 37
End: 2022-12-13
Payer: MEDICAID

## 2022-12-13 NOTE — TELEPHONE ENCOUNTER
Patient states Latuda 120 MG is no longer working and is requesting a change in medicine. She states the medication is making her more upset and would like a change asap. Patient was scheduled in February but requested a sooner appointment. Rescheduled for 1.30.23 but would like a sooner appointment if possible.

## 2022-12-14 ENCOUNTER — TELEMEDICINE (OUTPATIENT)
Dept: BEHAVIORAL HEALTH | Facility: CLINIC | Age: 37
End: 2022-12-14

## 2022-12-14 DIAGNOSIS — F31.30 BIPOLAR I DISORDER, MOST RECENT EPISODE DEPRESSED: Primary | ICD-10-CM

## 2022-12-14 DIAGNOSIS — F41.1 GENERALIZED ANXIETY DISORDER: ICD-10-CM

## 2022-12-14 PROCEDURE — 99214 OFFICE O/P EST MOD 30 MIN: CPT

## 2022-12-14 RX ORDER — LAMOTRIGINE 25 MG/1
50 TABLET ORAL NIGHTLY
Qty: 60 TABLET | Refills: 1 | Status: SHIPPED | OUTPATIENT
Start: 2022-12-14 | End: 2022-12-19 | Stop reason: SDUPTHER

## 2022-12-14 RX ORDER — LURASIDONE HYDROCHLORIDE 80 MG/1
80 TABLET, FILM COATED ORAL ONCE
Qty: 30 TABLET | Refills: 1 | Status: SHIPPED | OUTPATIENT
Start: 2022-12-14 | End: 2023-01-25 | Stop reason: DRUGHIGH

## 2022-12-14 NOTE — PROGRESS NOTES
This provider is located at The [facility], Behavior[facility] ,[address], [address] [facility] [place] [facility]"" in [place], [place],using a secure MyChart Video Visit through [employer]. Patient is being seen remotely via telehealth at their home address in [place], and stated they are in a secure environment for this session. The patient's condition being diagnosed/treated is appropriate for telemedicine. The provider identified herself as well as her credentials.   The patient, and/or patients guardian, consent to be seen remotely, and when consent is given they understand that the consent allows for patient identifiable information to be sent to a third party as needed.   They may refuse to be seen remotely at any time. The electronic data is encrypted and password protected, and the patient and/or guardian has been advised of the potential risks to privacy not withstanding such measures.    Video Visit    Patient Name: Lindsay Amezquita  : 1985   MRN: [id number]   Care Team: Patient Care Team:  Charley Robles APRN as PCP - General (Family Medicine)  Donna Mcqueen APRN as Nurse Practitioner (Behavioral Health)        Chief Complaint:    Chief Complaint   Patient presents with   • Anxiety   • Bipolar   • Med Management       History of Present Illness: Lindsay Amezquita is a 37 y.o. female who presents via video visit for a medication management follow up. Patient  feels that her Latuda is not working anymore. She states she feel okay during the day but her mood at night is not doing well. She gets more anxious at night and feels like she doesn't want to sleep because of her nerves. Patient also expressed thinking she had a bleeding ulcer.     Subjective   Review of Systems:    Review of Systems   Psychiatric/Behavioral: Positive for stress. The patient is nervous/anxious.    All other systems reviewed and are negative.      Current Medications:   Current Outpatient Medications   Medication Sig Dispense  Refill   • albuterol sulfate  (90 Base) MCG/ACT inhaler Inhale 2 puffs Every 4 (Four) Hours As Needed for Wheezing or Shortness of Air. 8 g 3   • ALPRAZolam (Xanax) 0.5 MG tablet Take 1 tablet by mouth 3 (Three) Times a Day As Needed for Anxiety. 90 tablet 0   • amitriptyline (ELAVIL) 150 MG tablet Take 1 tablet by mouth every night at bedtime. 30 tablet 2   • atorvastatin (Lipitor) 40 MG tablet Take 1 tablet by mouth Every Night. 90 tablet 3   • Budeson-Glycopyrrol-Formoterol (Breztri Aerosphere) 160-9-4.8 MCG/ACT aerosol inhaler Inhale 2 puffs 2 (Two) Times a Day. 5.9 g 0   • butalbital-acetaminophen-caffeine (Esgic) -40 MG per tablet Take 1 tablet by mouth Every 6 (Six) Hours As Needed for Migraine. 20 tablet 0   • desvenlafaxine (PRISTIQ) 100 MG 24 hr tablet Take 1 tablet by mouth Daily. 90 tablet 0   • desvenlafaxine (Pristiq) 50 MG 24 hr tablet Take 1 tablet by mouth Daily. 90 tablet 0   • Diclofenac Sodium (VOLTAREN) 1 % gel gel Apply 4 g topically to the appropriate area as directed 4 (Four) Times a Day As Needed (knee pain). 100 g 5   • empagliflozin (JARDIANCE) 10 MG tablet tablet Take 1 tablet by mouth Daily. 30 tablet 0   • gabapentin (NEURONTIN) 800 MG tablet Take 1 tablet by mouth 3 (Three) Times a Day for 14 days. Further refills should come from pain management provider 42 tablet 0   • glucose blood (FREESTYLE LITE) test strip TEST BLOOD SUGAR ONCE A DAY 30 each 5   • glucose monitor monitoring kit Check blood sugar once daily 1 each 0   • HYDROcodone-acetaminophen (NORCO) 7.5-325 MG per tablet Take 1 tablet by mouth Every 6 (Six) Hours As Needed for Moderate Pain.     • Hydrocortisone, Perianal, (Proctozone-HC) 2.5 % rectal cream Insert  into the rectum 2 (Two) Times a Day. 28 g 0   • hydrOXYzine (ATARAX) 25 MG tablet Take 1 tablet by mouth 3 (Three) Times a Day As Needed for Anxiety. 30 tablet 2   • lamoTRIgine (LaMICtal) 25 MG tablet Take 2 tablets by mouth Every Night. 60 tablet 1    • Lancets (freestyle) lancets 1 each by Other route 4 (Four) Times a Day. 400 each 3   • Lancets Misc. (ONE TOUCH SURESOFT) misc Check blood sugar once daily 1 each 0   • levocetirizine (XYZAL) 5 MG tablet Take 1 tablet by mouth Every Evening. 30 tablet 5   • Lurasidone HCl (Latuda) 80 MG tablet tablet Take 1 tablet by mouth 1 (One) Time for 1 dose. 30 tablet 1   • ondansetron (Zofran) 4 MG tablet Take 1 tablet by mouth Every 12 (Twelve) Hours As Needed for Nausea or Vomiting. 60 tablet 1   • Rimegepant Sulfate (Nurtec) 75 MG tablet dispersible tablet Take 1 tablet by mouth Every Other Day. Take Monday,Wednesday,Friday, and Saturday. 16 tablet 11   • tiZANidine (ZANAFLEX) 4 MG tablet TAKE 1 TABLET BY MOUTH EVERY 8 HOURS AS NEEDED FOR MUSCLE SPASMS. 90 tablet 1   • vitamin D (ERGOCALCIFEROL) 1.25 MG (25534 UT) capsule capsule Take 1 capsule by mouth 1 (One) Time Per Week. 6 capsule 0     No current facility-administered medications for this visit.       Mental Status Exam:   Hygiene:   unable to assess  Cooperation:  Cooperative  Eye Contact:  Good  Psychomotor Behavior:  unable to assess  Affect:  Appropriate  Mood: anxious  Speech:  Normal  Thought Process:  Goal directed and Linear  Thought Content:  Normal and Mood congruent  Suicidal:  None  Homicidal:  None  Hallucinations:  None  Delusion:  None  Memory:  Intact  Orientation:  Person, Place, Time and Situation  Reliability:  good  Insight:  Good  Judgement:  Good  Impulse Control:  Good  Physical/Medical Issues:  Yes see chart     Objective   Vital Signs:   There were no vitals taken for this visit.      Assessment / Plan    Diagnoses and all orders for this visit:    1. Bipolar I disorder, most recent episode depressed (HCC) (Primary)  -     Lurasidone HCl (Latuda) 80 MG tablet tablet; Take 1 tablet by mouth 1 (One) Time for 1 dose.  Dispense: 30 tablet; Refill: 1  -     lamoTRIgine (LaMICtal) 25 MG tablet; Take 2 tablets by mouth Every Night.  Dispense: 60  tablet; Refill: 1    2. Generalized anxiety disorder  -     Lurasidone HCl (Latuda) 80 MG tablet tablet; Take 1 tablet by mouth 1 (One) Time for 1 dose.  Dispense: 30 tablet; Refill: 1  -     lamoTRIgine (LaMICtal) 25 MG tablet; Take 2 tablets by mouth Every Night.  Dispense: 60 tablet; Refill: 1      Patient wanting to come off Latuda. Will decrease for now and add Lamictal nightly. Encouraged patient to reach out to PCP or go to ED/urgent care to address bleeding ulcer concerns.     A psychological evaluation was conducted in order to assess past and current level of functioning. Areas assessed included, but were not limited to: perception of social support, perception of ability to face and deal with challenges in life (positive functioning), anxiety symptoms, depressive symptoms, perspective on beliefs/belief system, coping skills for stress, intelligence level,  Therapeutic rapport was established. Interventions conducted today were geared towards incorporating medication management along with support for continued therapy. Education was also provided as to the med management with this provider and what to expect in subsequent sessions.      We discussed risks, benefits, goals and side effects of the above medication and the patient was agreeable with the plan. Patient was educated on the importance of compliance with treatment and follow-up appointments. Patient is aware to contact the [facility] with any worsening of symptoms. To call for questions or concerns and return early if necessary. Patent is agreeable to go to the Emergency Department or call 911 should they begin SI/HI.    MEDS ORDERED DURING VISIT:  New Medications Ordered This Visit   Medications   • Lurasidone HCl (Latuda) 80 MG tablet tablet     Sig: Take 1 tablet by mouth 1 (One) Time for 1 dose.     Dispense:  30 tablet     Refill:  1   • lamoTRIgine (LaMICtal) 25 MG tablet     Sig: Take 2 tablets by mouth Every Night.     Dispense:  60  tablet     Refill:  1         Follow Up   Return in about 7 weeks (around 2/1/2023).  Patient was given instructions and counseling regarding her condition or for health maintenance advice. Please see specific information pulled into the AVS if appropriate.     TREATMENT PLAN/GOALS: Continue supportive psychotherapy efforts and medications as indicated. Treatment and medication options discussed during today's visit. Patient acknowledged and verbally consented to continue with current treatment plan and was educated on the importance of compliance with treatment and follow-up appointments.    MEDICATION ISSUES:  Discussed medication options and treatment plan of prescribed medication as well as the risks, benefits, and side effects including potential falls, possible impaired driving and metabolic adversities among others. Patient is agreeable to call the office with any worsening of symptoms or onset of side effects. Patient is agreeable to call 911 or go to the nearest ER should he/she begin having SI/HI.    CLAIR [facility] [address] [place] [address]  [phone]    December 14, 2022 13:23 EST

## 2022-12-15 RX ORDER — TIZANIDINE 4 MG/1
4 TABLET ORAL EVERY 8 HOURS PRN
Qty: 90 TABLET | Refills: 1 | Status: SHIPPED | OUTPATIENT
Start: 2022-12-15 | End: 2023-02-04 | Stop reason: SDUPTHER

## 2022-12-15 RX ORDER — BUDESONIDE, GLYCOPYRROLATE, AND FORMOTEROL FUMARATE 160; 9; 4.8 UG/1; UG/1; UG/1
2 AEROSOL, METERED RESPIRATORY (INHALATION) 2 TIMES DAILY
Qty: 5.9 G | Refills: 0 | COMMUNITY
Start: 2022-12-15 | End: 2022-12-21 | Stop reason: SDUPTHER

## 2022-12-15 NOTE — TELEPHONE ENCOUNTER
Caller: Lindsay Amezquita ADELA    Relationship: Self    Best call back number: [phone]     Requested Prescriptions:   Requested Prescriptions     Pending Prescriptions Disp Refills   • Budeson-Glycopyrrol-Formoterol (Breztri Aerosphere) 160-9-4.8 MCG/ACT aerosol inhaler 5.9 g 0     Sig: Inhale 2 puffs 2 (Two) Times a Day.   • tiZANidine (ZANAFLEX) 4 MG tablet 90 tablet 1     Sig: Take 1 tablet by mouth Every 8 (Eight) Hours As Needed for Muscle Spasms.        Pharmacy where request should be sent: [facility]/PHARMACY #[id number] - [address] [phone] [place] [phone] [place]         Does the patient have less than a 3 day supply:  [x] Yes  [] No    Would you like a call back once the refill request has been completed: [x] Yes [] No    If the office needs to give you a call back, can they leave a voicemail: [x] Yes [] No    Kim Burt Rep   12/15/22 08:42 EST

## 2022-12-16 ENCOUNTER — TELEPHONE (OUTPATIENT)
Dept: INTERNAL MEDICINE | Facility: CLINIC | Age: 37
End: 2022-12-16

## 2022-12-16 NOTE — TELEPHONE ENCOUNTER
Left message per verbal release, informing patient that I do not have any availability prior to her 1/4 appointment. I instructed her to call us first thing of a morning to see if we have any opening to put her in at.    Joseluishart message sent

## 2022-12-16 NOTE — TELEPHONE ENCOUNTER
Provider: NANI     Caller: LEORA COTA    Pharmacy: [facility]     Phone Number: [phone]    Reason for Call: PATIENT STATED THAT THE PRILOSEC IS NOT HELPING AND SHE IS MISERABLE.  PATIENT HAS NOT BEEN ABLE TO SLEEP.  PATIENT'S THROAT IS SORE AND BURNING

## 2022-12-16 NOTE — TELEPHONE ENCOUNTER
Bere,  We are completley booked as a practice until January. Please advise patient if a appt can be made sooner

## 2022-12-19 ENCOUNTER — TELEPHONE (OUTPATIENT)
Dept: INTERNAL MEDICINE | Facility: CLINIC | Age: 37
End: 2022-12-19

## 2022-12-19 DIAGNOSIS — F41.1 GENERALIZED ANXIETY DISORDER: ICD-10-CM

## 2022-12-19 DIAGNOSIS — F31.30 BIPOLAR I DISORDER, MOST RECENT EPISODE DEPRESSED: ICD-10-CM

## 2022-12-19 RX ORDER — LAMOTRIGINE 25 MG/1
25-50 TABLET ORAL NIGHTLY
Qty: 60 TABLET | Refills: 1 | Status: SHIPPED | OUTPATIENT
Start: 2022-12-19 | End: 2023-01-30

## 2022-12-19 NOTE — TELEPHONE ENCOUNTER
Caller: Lindsay Amezquita    Relationship: Self    Best call back number: [phone]    What was the call regarding: PATIENT STATES THAT THE BOIL ON HER LEG BUSTED. IT IS A BLOOD BOIL AND NOW HAS A HOLE IN IT. PATIENT STATES THAT SHE IS COMING WITH HER MOTHER TOMORROW FOR HER APPOINTMENT. PLEASE ADVISE    Do you require a callback: YES IF ANY QUESTIONS

## 2022-12-20 ENCOUNTER — OFFICE VISIT (OUTPATIENT)
Dept: INTERNAL MEDICINE | Facility: CLINIC | Age: 37
End: 2022-12-20

## 2022-12-20 VITALS
OXYGEN SATURATION: 98 % | DIASTOLIC BLOOD PRESSURE: 80 MMHG | RESPIRATION RATE: 14 BRPM | TEMPERATURE: 98.2 F | HEART RATE: 120 BPM | BODY MASS INDEX: 50.24 KG/M2 | HEIGHT: 62 IN | SYSTOLIC BLOOD PRESSURE: 132 MMHG | WEIGHT: 273 LBS

## 2022-12-20 DIAGNOSIS — L02.31 CUTANEOUS ABSCESS OF BUTTOCK: Primary | ICD-10-CM

## 2022-12-20 DIAGNOSIS — E11.65 TYPE 2 DIABETES MELLITUS WITH HYPERGLYCEMIA, WITHOUT LONG-TERM CURRENT USE OF INSULIN: ICD-10-CM

## 2022-12-20 PROCEDURE — 99213 OFFICE O/P EST LOW 20 MIN: CPT | Performed by: NURSE PRACTITIONER

## 2022-12-20 RX ORDER — DOXYCYCLINE HYCLATE 100 MG/1
100 CAPSULE ORAL 2 TIMES DAILY
Qty: 14 CAPSULE | Refills: 0 | Status: SHIPPED | OUTPATIENT
Start: 2022-12-20 | End: 2022-12-27

## 2022-12-20 RX ORDER — CHOLECALCIFEROL (VITAMIN D3) 125 MCG
1000 CAPSULE ORAL DAILY
COMMUNITY

## 2022-12-20 NOTE — PROGRESS NOTES
"  Office Visit      Patient Name: Lindsay Amezquita  : 1985   MRN: [id number]   Care Team: Patient Care Team:  Charley Robles APRN as PCP - General (Family Medicine)  Donna Mcqueen APRN as Nurse Practitioner (Behavioral Health)    Chief Complaint  Abscess (Boil on right leg busted. )    Subjective     Subjective      Lindsay Amezquita presents to [facility] for abscess of right buttock.   Noticed symptoms about 2 weeks ago.   Endorsed raised blister that eventually busted about 4 days ago. It did drain sanguinous drainage. She has had this problem before and gets reoccurring boils under her arms and brests.   She is type II diabetic and placed on jardiance recently. Last a1c was 7.3.   Denies fever, chills, further drainage, pain at the site, polyuria, polydipsia, polyphagia, and vision changes.   She does have surrounding erythema of the boil.   She has tried OTC antibacterial soap.   Working on dietary changes for diabetes as well, trying to come off of caffeinated sugary beverages and successfully losing weight, has lost about 9 pounds in 1 month.     Objective     Objective   Vital Signs:   /80 (BP Location: Left arm, Patient Position: Sitting, Cuff Size: Adult)   Pulse 120   Temp 98.2 °F (36.8 °C) (Infrared)   Resp 14   Ht 157.5 cm (62\")   Wt 124 kg (273 lb)   SpO2 98%   BMI 49.93 kg/m²     Physical Exam  Vitals and nursing note reviewed.   Constitutional:       General: She is not in acute distress.     Appearance: Normal appearance. She is obese. She is not toxic-appearing.   Neck:      Vascular: No carotid bruit.   Cardiovascular:      Rate and Rhythm: Normal rate and regular rhythm.      Heart sounds: Normal heart sounds. No murmur heard.  Pulmonary:      Effort: Pulmonary effort is normal. No respiratory distress.      Breath sounds: Normal breath sounds. No wheezing.   Musculoskeletal:      Cervical back: Neck supple. No tenderness.   Skin:     " General: Skin is warm and dry.      Findings: Abscess (about 6mm indurated abscess with surrounding erythema, no drainage or warmth) present. No rash.          Neurological:      General: No focal deficit present.      Mental Status: She is alert.   Psychiatric:         Mood and Affect: Mood normal.         Behavior: Behavior normal.          Assessment / Plan      Assessment & Plan   Problem List Items Addressed This Visit    None  Visit Diagnoses     Cutaneous abscess of buttock    -  Primary    Relevant Orders    Culture, Routine - Swab, Buttock, Right thigh    Wound cultured today.  I suspect she has underlying hydradenitis suppurativa bed due to the history and appearance.  Will initiate doxycycline twice daily, mupirocin twice daily x7 days, clean with antibacterial soap and keep open to air.  Strict return precautions discussed.  She has follow-up with me in 2 weeks, advised her to keep this.    Type 2 diabetes mellitus with hyperglycemia, without long-term current use of insulin ([facility])        Congratulated on weight loss thus far.  Continue diabetic diet and Jardiance as prescribed.  Recommend increasing exercise and keep scheduled follow-up.         Follow Up   Return for Keep scheduled follow-up.  Patient was given instructions and counseling regarding her condition or for health maintenance advice. Please see specific information pulled into the AVS if appropriate.     CLAIR Lindsay  [facility] [place] [facility]

## 2022-12-20 NOTE — TELEPHONE ENCOUNTER
Left message per verbal release informing patient that we could see her with her mother. Attempted to call to see if they could be here by 3.

## 2022-12-21 RX ORDER — BUDESONIDE, GLYCOPYRROLATE, AND FORMOTEROL FUMARATE 160; 9; 4.8 UG/1; UG/1; UG/1
2 AEROSOL, METERED RESPIRATORY (INHALATION) 2 TIMES DAILY
Qty: 5.9 G | Refills: 3 | Status: SHIPPED | OUTPATIENT
Start: 2022-12-21 | End: 2023-01-05

## 2022-12-22 DIAGNOSIS — E11.65 TYPE 2 DIABETES MELLITUS WITH HYPERGLYCEMIA, WITHOUT LONG-TERM CURRENT USE OF INSULIN: ICD-10-CM

## 2022-12-22 NOTE — TELEPHONE ENCOUNTER
Caller: Lindsay Amezquita ADELA    Relationship: Self    Best call back number: [phone]    Requested Prescriptions:   Requested Prescriptions     Pending Prescriptions Disp Refills   • empagliflozin (JARDIANCE) 10 MG tablet tablet 30 tablet 0     Sig: Take 1 tablet by mouth Daily.        Pharmacy where request should be sent: [facility]/PHARMACY #[id number] [address] [phone] [facility] [phone]      Additional details provided by patient: PATIENT ALSO NEEDS DIFLUCAN SENT OVER TO THE PHARMACY AS WELL.     Does the patient have less than a 3 day supply:  [x] Yes  [] No    Would you like a call back once the refill request has been completed: [x] Yes [] No    If the office needs to give you a call back, can they leave a voicemail: [x] Yes [] No    Kim Alas Rep   12/22/22 08:18 EST

## 2022-12-22 NOTE — TELEPHONE ENCOUNTER
Rx Refill Note  Requested Prescriptions     Pending Prescriptions Disp Refills   • empagliflozin (JARDIANCE) 10 MG tablet tablet 30 tablet 0     Sig: Take 1 tablet by mouth Daily.      Last office visit with prescribing clinician: 12/20/2022   Next office visit with prescribing clinician: 1/4/2023 11/17/2022             {TIP  Is Refill Pharmacy     Yinka Logan MA  12/22/22, 09:22 EST

## 2022-12-27 ENCOUNTER — TELEPHONE (OUTPATIENT)
Dept: INTERNAL MEDICINE | Facility: CLINIC | Age: 37
End: 2022-12-27

## 2022-12-27 LAB
BACTERIA SPEC CULT: ABNORMAL
BACTERIA SPEC CULT: ABNORMAL
MICROORGANISM/AGENT SPEC: ABNORMAL
OTHER ANTIBIOTIC SUSC ISLT: ABNORMAL

## 2022-12-27 NOTE — TELEPHONE ENCOUNTER
Provider: NANI    Caller: LEORA OCTA     Phone Number: [phone]    Reason for Call: SEVERE HEARTBURN, PATIENT IS HAVING REALLY BAD SWEATS, WAKE UP GASPING FOR AIR

## 2022-12-28 ENCOUNTER — TELEPHONE (OUTPATIENT)
Dept: INTERNAL MEDICINE | Facility: CLINIC | Age: 37
End: 2022-12-28

## 2022-12-28 DIAGNOSIS — E55.9 VITAMIN D DEFICIENCY: ICD-10-CM

## 2022-12-28 RX ORDER — ERGOCALCIFEROL 1.25 MG/1
CAPSULE ORAL
Qty: 6 CAPSULE | Refills: 0 | Status: SHIPPED | OUTPATIENT
Start: 2022-12-28

## 2022-12-28 NOTE — TELEPHONE ENCOUNTER
Caller: Lindsay Amezquita    Relationship: Self    Best call back number: [phone]    What is the best time to reach you: ANYTIME    Who are you requesting to speak with (clinical staff, provider,  specific staff member): JOHN     What was the call regarding: PATIENT STATES THAT SHE WOULD LIKE TO SPEAK WITH JOHN REGARDING HER GASTRIC ISSUES    Do you require a callback: YES

## 2023-01-03 DIAGNOSIS — F41.1 GAD (GENERALIZED ANXIETY DISORDER): ICD-10-CM

## 2023-01-03 RX ORDER — ALPRAZOLAM 0.5 MG/1
0.5 TABLET ORAL 3 TIMES DAILY PRN
Qty: 90 TABLET | Refills: 0 | Status: SHIPPED | OUTPATIENT
Start: 2023-01-03 | End: 2023-01-30

## 2023-01-04 ENCOUNTER — OFFICE VISIT (OUTPATIENT)
Dept: INTERNAL MEDICINE | Facility: CLINIC | Age: 38
End: 2023-01-04
Payer: MEDICAID

## 2023-01-04 VITALS
OXYGEN SATURATION: 97 % | WEIGHT: 274.4 LBS | DIASTOLIC BLOOD PRESSURE: 80 MMHG | BODY MASS INDEX: 48.62 KG/M2 | HEIGHT: 63 IN | HEART RATE: 106 BPM | SYSTOLIC BLOOD PRESSURE: 136 MMHG

## 2023-01-04 DIAGNOSIS — E78.00 HYPERCHOLESTEROLEMIA: ICD-10-CM

## 2023-01-04 DIAGNOSIS — F31.62 BIPOLAR DISORDER, CURRENT EPISODE MIXED, MODERATE: ICD-10-CM

## 2023-01-04 DIAGNOSIS — F41.1 GAD (GENERALIZED ANXIETY DISORDER): ICD-10-CM

## 2023-01-04 DIAGNOSIS — E11.65 TYPE 2 DIABETES MELLITUS WITH HYPERGLYCEMIA, WITHOUT LONG-TERM CURRENT USE OF INSULIN: ICD-10-CM

## 2023-01-04 DIAGNOSIS — R11.0 FUNCTIONAL NAUSEA: ICD-10-CM

## 2023-01-04 DIAGNOSIS — Z00.00 ANNUAL PHYSICAL EXAM: Primary | ICD-10-CM

## 2023-01-04 DIAGNOSIS — Z12.4 CERVICAL CANCER SCREENING: ICD-10-CM

## 2023-01-04 DIAGNOSIS — R68.89 HEAT INTOLERANCE: ICD-10-CM

## 2023-01-04 LAB
POC CREATININE URINE: 300
POC MICROALBUMIN URINE: 10

## 2023-01-04 PROCEDURE — 82044 UR ALBUMIN SEMIQUANTITATIVE: CPT | Performed by: NURSE PRACTITIONER

## 2023-01-04 PROCEDURE — 99395 PREV VISIT EST AGE 18-39: CPT | Performed by: NURSE PRACTITIONER

## 2023-01-04 RX ORDER — PROMETHAZINE HYDROCHLORIDE 12.5 MG/1
12.5 TABLET ORAL EVERY 6 HOURS PRN
Qty: 10 TABLET | Refills: 0 | Status: SHIPPED | OUTPATIENT
Start: 2023-01-04 | End: 2023-03-07 | Stop reason: SDUPTHER

## 2023-01-04 RX ORDER — ONDANSETRON 4 MG/1
TABLET, FILM COATED ORAL
COMMUNITY
Start: 2023-01-04 | End: 2023-02-10

## 2023-01-04 NOTE — PROGRESS NOTES
Subjective   Lindsay Amezquita is a 37 y.o. female and is here for a comprehensive physical exam. The patient reports no problems.    HPI: Here today for annual physical with no acute complaints.  She is willing to have her Pap smear done today, this will be her first 1 and is a little nervous.  Mother is here accompanying to her to the visit today.  Not currently sexually active, last partner was a female but she has had intercourse with males in the past.  She is seeing behavioral health for management of her mood, currently titrating some medications and has been worse lately.  Denies any suicidal ideations.  Recently diagnosed with type II diabetic and taking Jardiance, she did not tolerate metformin.  She is not monitoring her blood glucose at home.  She did is trying hard to work on changing her diet.  She has lost some weight, about 8 pounds over the last month and a half.  She denies polyuria, polydipsia, polyphagia, and vision changes.  Her eye exam is UTD.  She did have an ulcer on the right buttocks and was recently treated appropriately with doxycycline.  It has since healed and she has no further ulcers.  She is compliant with her atorvastatin and denies dark urine and myalgias.  Unfortunately insurance would not cover breast treat for COPD, needing other option for maintenance therapy.  She is really working hard to stop smoking, has decreased amount of cigarettes daily.  Uses albuterol as needed.  She is struggling with some nausea, ondansetron does not help.  She is requesting promethazine.  It is when she wakes up in the morning and takes her medications, she frequently takes her medications on an empty stomach.    Health Habits:  Eye exam within last 2 years? Yes, eye exam in December.   Dental exam every 6 months? No, medicare is now paying for dentures so plans to do that.   Exercise habits: walking her dogs, to the mailbox, and up and down stairs.   Healthy diet? Trying hard to follow a diabetic  diet.     The ASCVD Risk score (Stiven PATEL, et al., 2019) failed to calculate for the following reasons:    The 2019 ASCVD risk score is only valid for ages 40 to 79    Do you take any herbs or supplements that were not prescribed by a doctor? Yes- probiotic and airborne with vitamin b12.   Are you taking calcium supplements? No  Are you taking aspirin daily? No     History:  LMP: No LMP recorded (approximate).  Menopause: No  Last pap date: never  Family history of breast or ovarian cancer: yes- mother with ovarian and grandmother with ovarian cancer. No genetic testing performed per mother.        OB History   No obstetric history on file.      reports that she is not currently sexually active and has had partner(s) who are female.  The following portions of the patient's history were reviewed and updated as appropriate: She  has a past medical history of Anxiety, Back pain, Bell's palsy, Bipolar 2 disorder (HCC), Blood clot in vein, Depression, Diabetes mellitus (HCC) (November), Frequent headaches, GERD (gastroesophageal reflux disease), Migraine, Panic, PTSD (post-traumatic stress disorder), Restless leg syndrome, Sinus problem, Snores, Tattoos, and Vitamin B12 deficiency.  She does not have any pertinent problems on file.  She  has a past surgical history that includes Cholecystectomy and Tooth extraction.  Her family history includes Colon cancer in her maternal grandfather; Irritable bowel syndrome in her father and mother.  She  reports that she has been smoking cigarettes. She started smoking about 28 years ago. She has a 14.50 pack-year smoking history. She has never used smokeless tobacco. She reports that she does not drink alcohol and does not use drugs.  Current Outpatient Medications   Medication Sig Dispense Refill   • albuterol sulfate  (90 Base) MCG/ACT inhaler Inhale 2 puffs Every 4 (Four) Hours As Needed for Wheezing or Shortness of Air. 8 g 3   • ALPRAZolam (Xanax) 0.5 MG tablet Take 1  tablet by mouth 3 (Three) Times a Day As Needed for Anxiety. 90 tablet 0   • amitriptyline (ELAVIL) 150 MG tablet Take 1 tablet by mouth every night at bedtime. 30 tablet 2   • atorvastatin (Lipitor) 40 MG tablet Take 1 tablet by mouth Every Night. 90 tablet 3   • butalbital-acetaminophen-caffeine (Esgic) -40 MG per tablet Take 1 tablet by mouth Every 6 (Six) Hours As Needed for Migraine. 20 tablet 0   • desvenlafaxine (PRISTIQ) 100 MG 24 hr tablet Take 1 tablet by mouth Daily. 90 tablet 0   • desvenlafaxine (Pristiq) 50 MG 24 hr tablet Take 1 tablet by mouth Daily. 90 tablet 0   • empagliflozin (JARDIANCE) 10 MG tablet tablet Take 1 tablet by mouth Daily. 30 tablet 0   • gabapentin (NEURONTIN) 800 MG tablet Take 1 tablet by mouth 3 (Three) Times a Day for 14 days. Further refills should come from pain management provider 42 tablet 0   • HYDROcodone-acetaminophen (NORCO) 7.5-325 MG per tablet Take 1 tablet by mouth Every 6 (Six) Hours As Needed for Moderate Pain.     • hydrOXYzine (ATARAX) 25 MG tablet Take 1 tablet by mouth 3 (Three) Times a Day As Needed for Anxiety. 30 tablet 2   • lamoTRIgine (LaMICtal) 25 MG tablet Take 1-2 tablets by mouth Every Night. Take 1 tablet by mouth at night for 14 days, then take 2 tablets by mouth nightly. 60 tablet 1   • Lancets Misc. (ONE TOUCH SURESOFT) misc Check blood sugar once daily 1 each 0   • levocetirizine (XYZAL) 5 MG tablet Take 1 tablet by mouth Every Evening. 30 tablet 5   • Probiotic Product (PROBIOTIC PO) Take  by mouth.     • Rimegepant Sulfate (Nurtec) 75 MG tablet dispersible tablet Take 1 tablet by mouth Every Other Day. Take Monday,Wednesday,Friday, and Saturday. 16 tablet 11   • tiZANidine (ZANAFLEX) 4 MG tablet Take 1 tablet by mouth Every 8 (Eight) Hours As Needed for Muscle Spasms. 90 tablet 1   • vitamin B-12 (CYANOCOBALAMIN) 500 MCG tablet Take 1,000 mcg by mouth Daily. 1250mcg     • vitamin D (ERGOCALCIFEROL) 1.25 MG (59613 UT) capsule capsule TAKE  1 CAPSULE BY MOUTH 1 TIME PER WEEK. 6 capsule 0   • Lurasidone HCl (Latuda) 80 MG tablet tablet Take 1 tablet by mouth 1 (One) Time for 1 dose. 30 tablet 1   • ondansetron (ZOFRAN) 4 MG tablet      • promethazine (PHENERGAN) 12.5 MG tablet Take 1 tablet by mouth Every 6 (Six) Hours As Needed for Nausea or Vomiting. 10 tablet 0   • Umeclidinium-Vilanterol (Anoro Ellipta) 62.5-25 MCG/ACT aerosol powder  inhaler Inhale 1 puff Daily. 60 each 12     No current facility-administered medications for this visit.       Review of Systems  Do you have pain that bothers you in your daily life? yes    Review of Systems   Constitutional: Positive for fatigue. Negative for appetite change and fever.   HENT: Negative for congestion, sore throat and trouble swallowing.    Eyes: Negative for blurred vision and visual disturbance.   Respiratory: Negative for cough, shortness of breath and wheezing.    Cardiovascular: Negative for chest pain, palpitations and leg swelling.   Gastrointestinal: Positive for nausea. Negative for abdominal pain, blood in stool, constipation and diarrhea.   Endocrine: Negative for polydipsia, polyphagia and polyuria.   Genitourinary: Negative for dysuria.   Musculoskeletal: Positive for arthralgias and back pain. Negative for myalgias.   Skin: Negative for rash.   Neurological: Negative for dizziness, weakness, light-headedness and headache.   Psychiatric/Behavioral: Positive for depressed mood. Negative for sleep disturbance. The patient is nervous/anxious.      Objective   /80   Pulse 106   Ht 160 cm (63\")   Wt 124 kg (274 lb 6.4 oz)   LMP  (Approximate) Comment: Nov.  SpO2 97%   BMI 48.61 kg/m²     Physical Exam  Vitals and nursing note reviewed. Exam conducted with a chaperone present (Bere Spain CMA).   Constitutional:       General: She is not in acute distress.     Appearance: Normal appearance. She is obese.   HENT:      Right Ear: Tympanic membrane and ear canal normal.      Left  Ear: Tympanic membrane and ear canal normal.      Nose: Nose normal.      Mouth/Throat:      Mouth: Mucous membranes are moist.      Pharynx: Oropharynx is clear. No posterior oropharyngeal erythema.   Eyes:      Extraocular Movements: Extraocular movements intact.      Pupils: Pupils are equal, round, and reactive to light.   Neck:      Thyroid: No thyroid mass or thyromegaly.      Vascular: No carotid bruit.   Cardiovascular:      Rate and Rhythm: Normal rate and regular rhythm.      Pulses: Normal pulses.      Heart sounds: Normal heart sounds. No murmur heard.  Pulmonary:      Effort: Pulmonary effort is normal.      Breath sounds: Normal breath sounds. No wheezing.   Chest:      Chest wall: No mass or tenderness.   Breasts:     Franklin Score is 5.      Right: Normal. No bleeding, inverted nipple, nipple discharge or skin change.      Left: Normal. No bleeding, inverted nipple, nipple discharge or skin change.   Abdominal:      General: Bowel sounds are normal. There is no distension.      Palpations: Abdomen is soft. There is no mass.      Tenderness: There is no abdominal tenderness.   Genitourinary:     Exam position: Lithotomy position.      Pubic Area: No rash.       Franklin stage (genital): 5.      Labia:         Right: No rash or lesion.         Left: No rash or lesion.       Vagina: No vaginal discharge, tenderness, bleeding or lesions.      Cervix: No friability, lesion, erythema or cervical bleeding.      Uterus: Normal. Not enlarged.       Adnexa: Right adnexa normal and left adnexa normal.        Right: No tenderness.          Left: No tenderness.     Musculoskeletal:         General: No deformity. Normal range of motion.      Cervical back: Normal range of motion and neck supple. No muscular tenderness.   Lymphadenopathy:      Head:      Right side of head: No submandibular, tonsillar, preauricular or posterior auricular adenopathy.      Left side of head: No submandibular, tonsillar, preauricular or  posterior auricular adenopathy.      Cervical: No cervical adenopathy.      Upper Body:      Right upper body: No supraclavicular, axillary or pectoral adenopathy.      Left upper body: No supraclavicular, axillary or pectoral adenopathy.   Skin:     General: Skin is warm and dry.      Capillary Refill: Capillary refill takes less than 2 seconds.      Findings: No bruising or rash.      Comments: Scarring from previous ulcerations on breast, consistent with hydradenitis suppurativa   Neurological:      Mental Status: She is alert and oriented to person, place, and time.      Gait: Gait normal.      Deep Tendon Reflexes: Reflexes normal.   Psychiatric:         Mood and Affect: Mood is anxious.         Behavior: Behavior normal.        Assessment & Plan   Healthy female exam.    Diagnosis Plan   1. Annual physical exam  POCT microalbumin    Health maintenance discussed during visit today and information provided in AVS.  Screening Pap smear performed, repeat in 3 years if normal.  Encouraged monthly self breast exams.      2. KENY (generalized anxiety disorder)   managed by psych, keep scheduled follow-up and continue current medication regimen.      3. Type 2 diabetes mellitus with hyperglycemia, without long-term current use of insulin (HCC)  TSH Rfx On Abnormal To Free T4    Microalbumin / Creatinine Urine Ratio - Urine, Clean Catch    - Follow diabetic diet by decreasing carbohydrates, sugar, and processed foods.   - Exercise encouraged as tolerated-- discussed low impact walking 30 minutes/day 5 days/week.   - Continue taking all medication as prescribed. Monitor blood glucose as discussed. Fasting blood sugars should be <130 and 2 hours after meal blood sugar should be <180.   - Annual eye exam encouraged  - Check feet regularly for calluses or ulcers.  Foot exam to be performed next visit.  Wear protective foot wear/no bare feet  - Risk discussion regarding uncontrolled diabetes.   - HgB A1C, CBC, CMP, and lipid  panel are UTD.  Microalbumin performed today.. Will escalate therapy as needed based upon labs.       4. Hypercholesterolemia  TSH Rfx On Abnormal To Free T4    Microalbumin / Creatinine Urine Ratio - Urine, Clean Catch  Lipid panel UTD.  Continue statin and low-cholesterol diet.      5. Bipolar disorder, current episode mixed, moderate (HCC)  TSH Rfx On Abnormal To Free T4    Microalbumin / Creatinine Urine Ratio - Urine, Clean Catch      6. Heat intolerance  TSH Rfx On Abnormal To Free T4    On previous labs TSH trending up, recheck today.      7. Cervical cancer screening  LIQUID-BASED PAP SMEAR, [facility] ([facility])      8. Functional nausea   will provide promethazine as needed, advised on risk of long-term use and discouraged.  Instead recommend eating with morning medications to combat nausea.  Follow-up in 2 months.      2. Patient Counseling:  --Nutrition: Stressed importance of moderation in sodium/caffeine intake, saturated fat and cholesterol, caloric balance, sufficient intake of fresh fruits, vegetables, fiber, calcium, iron, and 1 g folate supplementation if of childbearing age.   --Discussed the issue of calcium supplement, and the daily use of baby aspirin if applicable.             --Mammogram recommended every 2 years from age 40-49 and yearly beginning at age 50.  --Exercise: Stressed the importance of regular exercise.   --Substance Abuse: Discussed cessation/primary prevention of tobacco (if applicable), alcohol, or other drug use (if applicable); driving or other dangerous activities under the influence; availability of treatment for abuse.    --Sexuality: Discussed sexually transmitted diseases, partner selection, use of condoms, avoidance of unintended pregnancy  and contraceptive alternatives.   --Injury prevention: Discussed safety belts, safety helmets, smoke detector, smoking near bedding or upholstery.   --Dental health: Discussed importance of regular tooth brushing, flossing, and  dental visits every 6 months.  --Immunizations reviewed.  --Discussed benefits of screening colonoscopy (if applicable).  --After hours service discussed with patient  3. Discussed the patient's BMI with her.  The BMI is above average; BMI management plan is completed  4. Return in about 2 months (around 3/4/2023).     Charley Robles, CLAIR  01/04/2023  14:26 EST

## 2023-01-05 ENCOUNTER — TELEMEDICINE (OUTPATIENT)
Dept: FAMILY MEDICINE CLINIC | Facility: TELEHEALTH | Age: 38
End: 2023-01-05
Payer: MEDICAID

## 2023-01-05 ENCOUNTER — TELEPHONE (OUTPATIENT)
Dept: INTERNAL MEDICINE | Facility: CLINIC | Age: 38
End: 2023-01-05
Payer: MEDICAID

## 2023-01-05 DIAGNOSIS — H65.92 LEFT OTITIS MEDIA WITH EFFUSION: Primary | ICD-10-CM

## 2023-01-05 LAB — TSH SERPL DL<=0.005 MIU/L-ACNC: 2.78 UIU/ML (ref 0.27–4.2)

## 2023-01-05 PROCEDURE — 99213 OFFICE O/P EST LOW 20 MIN: CPT | Performed by: NURSE PRACTITIONER

## 2023-01-05 RX ORDER — UMECLIDINIUM BROMIDE AND VILANTEROL TRIFENATATE 62.5; 25 UG/1; UG/1
1 POWDER RESPIRATORY (INHALATION)
Qty: 60 EACH | Refills: 12 | Status: SHIPPED | OUTPATIENT
Start: 2023-01-05

## 2023-01-05 RX ORDER — IBUPROFEN 800 MG/1
800 TABLET ORAL EVERY 6 HOURS PRN
Qty: 20 TABLET | Refills: 0 | Status: SHIPPED | OUTPATIENT
Start: 2023-01-05 | End: 2023-01-10

## 2023-01-05 RX ORDER — AZITHROMYCIN 250 MG/1
TABLET, FILM COATED ORAL
Qty: 6 TABLET | Refills: 0 | Status: SHIPPED | OUTPATIENT
Start: 2023-01-05 | End: 2023-03-10

## 2023-01-05 RX ORDER — PSEUDOEPHEDRINE HCL 30 MG
30 TABLET ORAL EVERY 4 HOURS PRN
Qty: 30 TABLET | Refills: 0 | Status: SHIPPED | OUTPATIENT
Start: 2023-01-05 | End: 2023-01-12

## 2023-01-05 NOTE — PROGRESS NOTES
You have chosen to receive care through a telehealth visit.  Do you consent to use a video/audio connection for your medical care today? Yes     CHIEF COMPLAINT  Chief Complaint   Patient presents with   • Earache         HPI  Lindsay Amezquita is a 37 y.o. female  presents with complaint of left ear pain.  She states that she was seen by her PCP yesterday and was told she had fluid in her ear.  She states that she woke up today and is extreme pain with left ear.    Review of Systems   HENT: Positive for ear pain (left).    All other systems reviewed and are negative.      Past Medical History:   Diagnosis Date   • Anxiety    • Back pain    • Bell's palsy    • Bipolar 2 disorder (HCC)    • Blood clot in vein    • Depression    • Diabetes mellitus (HCC) November    Pre diabete   • Frequent headaches    • GERD (gastroesophageal reflux disease)    • Migraine    • Panic    • PTSD (post-traumatic stress disorder)    • Restless leg syndrome    • Sinus problem    • Snores    • Tattoos    • Vitamin B12 deficiency        Family History   Problem Relation Age of Onset   • Irritable bowel syndrome Mother    • Irritable bowel syndrome Father    • Colon cancer Maternal Grandfather        Social History     Socioeconomic History   • Marital status: Single   Tobacco Use   • Smoking status: Every Day     Packs/day: 0.50     Years: 29.00     Pack years: 14.50     Types: Cigarettes     Start date: 1/1/1995   • Smokeless tobacco: Never   Vaping Use   • Vaping Use: Former   Substance and Sexual Activity   • Alcohol use: Never   • Drug use: Never   • Sexual activity: Not Currently     Partners: Female       Lindsay Amezquita  reports that she has been smoking cigarettes. She started smoking about 28 years ago. She has a 14.50 pack-year smoking history. She has never used smokeless tobacco.. I have educated her on the risk of diseases from using tobacco products such as cancer, COPD and heart disease.     I advised her to quit and she is not  willing to quit.    I spent 3  minutes counseling the patient.              There were no vitals taken for this visit.    PHYSICAL EXAM  Physical Exam   Constitutional: She appears well-developed and well-nourished.   HENT:   Head: Normocephalic.   Eyes: Pupils are equal, round, and reactive to light.   Pulmonary/Chest: Effort normal.   Musculoskeletal: Normal range of motion.   Neurological: She is alert.   Psychiatric: She has a normal mood and affect.       Results for orders placed or performed in visit on 01/04/23   TSH Rfx On Abnormal To Free T4    Specimen: Blood   Result Value Ref Range    TSH 2.780 0.270 - 4.200 uIU/mL   POCT microalbumin    Specimen: Urine   Result Value Ref Range    Microalbumin, Urine 10     Creatinine, Urine 300        Diagnoses and all orders for this visit:    1. Left otitis media with effusion (Primary)  -     azithromycin (Zithromax Z-Janusz) 250 MG tablet; Take 2 tablets by mouth on day 1, then 1 tablet daily on days 2-5  Dispense: 6 tablet; Refill: 0  -     pseudoephedrine (Sudafed) 30 MG tablet; Take 1 tablet by mouth Every 4 (Four) Hours As Needed for Congestion for up to 7 days.  Dispense: 30 tablet; Refill: 0  -     ibuprofen (ADVIL,MOTRIN) 800 MG tablet; Take 1 tablet by mouth Every 6 (Six) Hours As Needed for Mild Pain for up to 5 days.  Dispense: 20 tablet; Refill: 0          FOLLOW-UP  As discussed during visit with PCP/[facility], [facility] if no improvement or Urgent Care/Emergency Department if worsening of symptoms    Patient verbalizes understanding of medication dosage, comfort measures, instructions for treatment and follow-up.    Suzanna Wills, CLAIR  01/05/2023  17:46 EST    The use of a video visit has been reviewed with the patient and verbal informed consent has been obtained. Myself and Lindsay Amezquita participated in this visit. The patient is located in [address] [place] [address] [address] [place], [place] [id number].    I am located in [place], [place]. Mychart and Twilio were utilized. I spent  20 minutes in the patient's chart for this visit.

## 2023-01-05 NOTE — TELEPHONE ENCOUNTER
Caller: Lindsay Amezquita     Relationship:     Best call back number: [phone]    What is your medical concern?     PATIENT WOKE UP 30 MINUTES AGO AND IT IS THROBBING AND BURNING  AND SHE IS DIZZY    WHAT DOES SHE NEED TO DO ABOUT IT?    Is your provider already aware of this issue?     PATIENT IN OFFICE YESTERDAY AND NANI SAID THE LEFT EAR HAS FLUID

## 2023-01-06 ENCOUNTER — TELEPHONE (OUTPATIENT)
Dept: INTERNAL MEDICINE | Facility: CLINIC | Age: 38
End: 2023-01-06
Payer: MEDICAID

## 2023-01-06 DIAGNOSIS — E11.65 TYPE 2 DIABETES MELLITUS WITH HYPERGLYCEMIA, WITHOUT LONG-TERM CURRENT USE OF INSULIN: Primary | ICD-10-CM

## 2023-01-06 RX ORDER — LANCETS 30 GAUGE
EACH MISCELLANEOUS
Qty: 100 EACH | Refills: 3 | Status: SHIPPED | OUTPATIENT
Start: 2023-01-06

## 2023-01-06 RX ORDER — BLOOD-GLUCOSE METER
KIT MISCELLANEOUS
Qty: 1 EACH | Refills: 0 | Status: SHIPPED | OUTPATIENT
Start: 2023-01-06 | End: 2023-03-23 | Stop reason: SDUPTHER

## 2023-01-06 NOTE — TELEPHONE ENCOUNTER
Caller: Lindsay Amezquita    Relationship: Self    Best call back number: [phone]    What medication are you requesting: BLOOD TEST KIT FOR SUGAR THAT GOES IN ARM    What are your current symptoms: NEEDS SOMETHING TO TEST HER SUGAR EVERY DAY, STATED DOES NOT HAVE KIT      If a prescription is needed, what is your preferred pharmacy and phone number: [facility]/pharmacy #[id number] - [place], [place] - [address] [facility] [address] [phone] [facility] [phone] FX

## 2023-01-06 NOTE — TELEPHONE ENCOUNTER
Please advise.  The dexcom systems are usually not approved unless patient is insulin dependant

## 2023-01-10 ENCOUNTER — TELEPHONE (OUTPATIENT)
Dept: INTERNAL MEDICINE | Facility: CLINIC | Age: 38
End: 2023-01-10

## 2023-01-10 LAB — REF LAB TEST METHOD: NORMAL

## 2023-01-13 ENCOUNTER — TELEPHONE (OUTPATIENT)
Dept: NEUROLOGY | Facility: CLINIC | Age: 38
End: 2023-01-13
Payer: MEDICAID

## 2023-01-13 ENCOUNTER — TELEPHONE (OUTPATIENT)
Dept: INTERNAL MEDICINE | Facility: CLINIC | Age: 38
End: 2023-01-13
Payer: MEDICAID

## 2023-01-13 RX ORDER — TIZANIDINE 4 MG/1
TABLET ORAL
Qty: 90 TABLET | Refills: 1 | OUTPATIENT
Start: 2023-01-13

## 2023-01-13 NOTE — TELEPHONE ENCOUNTER
Caller: Lindsay Amezquita    Relationship: Self    Best call back number: [phone]    Who are you requesting to speak with (clinical staff, provider,  specific staff member):   NORMA    What was the call regarding:   MRIs BETWEEN 2010 AND 2015  PT STATED TERESA ASKED TO HAVE THOSE MRIs. PT HAS SIGNED A RELEASE FOR THE OFFICE TO GET THOSE.    I DIDN'T THOSE IN THE PT'S CHART.    PT'S NEXT APPT IS 5-15-23.

## 2023-01-13 NOTE — TELEPHONE ENCOUNTER
"Contacted patient, sounded like I had just woken her. She stated she \"got everything taken care of yesterday\"  "

## 2023-01-13 NOTE — TELEPHONE ENCOUNTER
Rx Refill Note  Requested Prescriptions     Pending Prescriptions Disp Refills   • tiZANidine (ZANAFLEX) 4 MG tablet [Pharmacy Med Name: TIZANIDINE HCL 4 MG TABLET] 90 tablet 1     Sig: TAKE 1 TABLET BY MOUTH EVERY 8 HOURS AS NEEDED FOR MUSCLE SPASMS.      Last office visit with prescribing clinician: 1/4/2023   Last telemedicine visit with prescribing clinician: 3/7/2023   Next office visit with prescribing clinician: 3/7/2023     Bere Spain MA  01/13/23, 16:19 EST

## 2023-01-13 NOTE — TELEPHONE ENCOUNTER
Spoke with patient informed her that she has refills at her pharmacy. I called the pharmacy to confirm
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
## 2023-01-17 DIAGNOSIS — E55.9 VITAMIN D DEFICIENCY: ICD-10-CM

## 2023-01-17 DIAGNOSIS — E11.65 TYPE 2 DIABETES MELLITUS WITH HYPERGLYCEMIA, WITHOUT LONG-TERM CURRENT USE OF INSULIN: ICD-10-CM

## 2023-01-17 RX ORDER — ERGOCALCIFEROL 1.25 MG/1
CAPSULE ORAL
Qty: 4 CAPSULE | Refills: 1 | OUTPATIENT
Start: 2023-01-17

## 2023-01-17 RX ORDER — EMPAGLIFLOZIN 10 MG/1
TABLET, FILM COATED ORAL
Qty: 30 TABLET | Refills: 3 | Status: SHIPPED | OUTPATIENT
Start: 2023-01-17

## 2023-01-17 NOTE — TELEPHONE ENCOUNTER
Rx Refill Note  Requested Prescriptions     Pending Prescriptions Disp Refills   • Jardiance 10 MG tablet tablet [Pharmacy Med Name: JARDIANCE 10 MG TABLET] 30 tablet 0     Sig: TAKE 1 TABLET BY MOUTH EVERY DAY   • vitamin D (ERGOCALCIFEROL) 1.25 MG (89611 UT) capsule capsule [Pharmacy Med Name: VITAMIN D2 1.25MG(50,000 UNIT)] 4 capsule 1     Sig: TAKE 1 CAPSULE BY MOUTH 1 TIME PER WEEK      Last office visit with prescribing clinician: 1/4/2023   Last telemedicine visit with prescribing clinician:   Next office visit with prescribing clinician: 3/7/2023                         Would you like a call back once the refill request has been completed: [] Yes [] No    If the office needs to give you a call back, can they leave a voicemail: [] Yes [] No    Raj Rogers MA  01/17/23, 10:15 EST

## 2023-01-18 ENCOUNTER — TELEPHONE (OUTPATIENT)
Dept: INTERNAL MEDICINE | Facility: CLINIC | Age: 38
End: 2023-01-18
Payer: MEDICAID

## 2023-01-18 ENCOUNTER — TELEPHONE (OUTPATIENT)
Dept: NEUROLOGY | Facility: CLINIC | Age: 38
End: 2023-01-18
Payer: MEDICAID

## 2023-01-18 NOTE — TELEPHONE ENCOUNTER
Caller: Lindsay Amezquita    Relationship: Self    Best call back number: [phone]    What was the call regarding: PT CALLING TO NOTIFY THE OFFICE THAT HER ANTH MEDICAID INSURANCE IS REQUIRING A PRIOR AUTHORIZATION FOR FURTHER CONSIDERATION OF COVERAGE OF NURTEC MEDICATION. PT CALLING TO REQUEST THAT OFFICE INITIATE PRIOR AUTHORIZATION AND CONTACT HER WITH ANY STATUS UPDATES.    Do you require a callback: YES, PLEASE.    PLEASE REVIEW AND ADVISE.

## 2023-01-18 NOTE — TELEPHONE ENCOUNTER
Spoke with patient, advised no referral needed.  Provided number for [facility] at [phone]

## 2023-01-18 NOTE — TELEPHONE ENCOUNTER
Caller: Lindsay Amezquita    Relationship: Self    Best call back number: [phone]     What is the medical concern/diagnosis: HEAVY PERIODS WITH CLOTS    What specialty or service is being requested: OB/GYN SURGEON  What is the provider, practice or medical service name:  [facility]    What is the office location: [place]    What is the office phone number: NONE  Any additional details: CALL WITH APPOINTMENT INFORMATION

## 2023-01-24 ENCOUNTER — TELEPHONE (OUTPATIENT)
Dept: INTERNAL MEDICINE | Facility: CLINIC | Age: 38
End: 2023-01-24
Payer: MEDICAID
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
## 2023-01-24 NOTE — TELEPHONE ENCOUNTER
Caller: Lindsay Amezquita    Relationship to patient: Self    Best call back number: [phone]    What is the call regarding:  PATIENT IS CALLING TO SEE IF FELICIANO WOULD CALL HER IN MEDICATION FOR A YEAST INFECTION UNDER HER BREAST TO XI [facility].
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
## 2023-01-24 NOTE — TELEPHONE ENCOUNTER
Has not been to bed in 72 hours and is in manic mode.  She has been crying uncontrollable and meds are not working.  She needs something to help her.  She would like to speak with you and would like her mom to be a part of her next visit.

## 2023-01-25 RX ORDER — LURASIDONE HYDROCHLORIDE 120 MG/1
120 TABLET, FILM COATED ORAL DAILY
Qty: 30 TABLET | Refills: 0 | Status: SHIPPED | OUTPATIENT
Start: 2023-01-25 | End: 2023-01-30 | Stop reason: SDUPTHER

## 2023-01-25 NOTE — TELEPHONE ENCOUNTER
"Attempted to contact patient, phone says \"changed, disconnected or no longer in service\"    Patient will need an appointment.   "

## 2023-01-26 ENCOUNTER — PRIOR AUTHORIZATION (OUTPATIENT)
Dept: BEHAVIORAL HEALTH | Facility: CLINIC | Age: 38
End: 2023-01-26
Payer: MEDICAID

## 2023-01-26 ENCOUNTER — PATIENT MESSAGE (OUTPATIENT)
Dept: INTERNAL MEDICINE | Facility: CLINIC | Age: 38
End: 2023-01-26
Payer: MEDICAID

## 2023-01-26 NOTE — TELEPHONE ENCOUNTER
"Attempted to contact patient, phone says \"changed, disconnected or no longer in service\"    inEartht message sent to patient informing her that she will need to make an appointment to be evaluated for yeast infection prior to medication being sent in.   "

## 2023-01-30 ENCOUNTER — TELEMEDICINE (OUTPATIENT)
Dept: BEHAVIORAL HEALTH | Facility: CLINIC | Age: 38
End: 2023-01-30
Payer: MEDICAID

## 2023-01-30 DIAGNOSIS — F51.04 PSYCHOPHYSIOLOGICAL INSOMNIA: ICD-10-CM

## 2023-01-30 DIAGNOSIS — F31.30 BIPOLAR I DISORDER, MOST RECENT EPISODE DEPRESSED: Primary | ICD-10-CM

## 2023-01-30 DIAGNOSIS — F43.10 POST TRAUMATIC STRESS DISORDER (PTSD): ICD-10-CM

## 2023-01-30 DIAGNOSIS — F41.1 GENERALIZED ANXIETY DISORDER: ICD-10-CM

## 2023-01-30 PROCEDURE — 99214 OFFICE O/P EST MOD 30 MIN: CPT

## 2023-01-30 RX ORDER — DESVENLAFAXINE SUCCINATE 50 MG/1
50 TABLET, EXTENDED RELEASE ORAL DAILY
Qty: 90 TABLET | Refills: 0 | Status: SHIPPED | OUTPATIENT
Start: 2023-01-30 | End: 2023-03-06 | Stop reason: SDUPTHER

## 2023-01-30 RX ORDER — LURASIDONE HYDROCHLORIDE 120 MG/1
120 TABLET, FILM COATED ORAL DAILY
Qty: 30 TABLET | Refills: 0 | Status: SHIPPED | OUTPATIENT
Start: 2023-01-30 | End: 2023-03-06 | Stop reason: SDUPTHER

## 2023-01-30 RX ORDER — HYDROXYZINE HYDROCHLORIDE 25 MG/1
25 TABLET, FILM COATED ORAL 3 TIMES DAILY PRN
Qty: 30 TABLET | Refills: 0 | Status: SHIPPED | OUTPATIENT
Start: 2023-01-30 | End: 2023-02-19

## 2023-01-30 RX ORDER — ALPRAZOLAM 0.5 MG/1
0.5 TABLET ORAL 3 TIMES DAILY PRN
Qty: 90 TABLET | Refills: 0 | Status: SHIPPED | OUTPATIENT
Start: 2023-01-30 | End: 2023-03-06 | Stop reason: SDUPTHER

## 2023-01-30 RX ORDER — AMITRIPTYLINE HYDROCHLORIDE 150 MG/1
150 TABLET, FILM COATED ORAL
Qty: 30 TABLET | Refills: 0 | Status: SHIPPED | OUTPATIENT
Start: 2023-01-30 | End: 2023-03-06 | Stop reason: SDUPTHER

## 2023-01-30 RX ORDER — DESVENLAFAXINE 100 MG/1
100 TABLET, EXTENDED RELEASE ORAL DAILY
Qty: 90 TABLET | Refills: 0 | Status: SHIPPED | OUTPATIENT
Start: 2023-01-30 | End: 2023-03-06 | Stop reason: SDUPTHER

## 2023-01-30 RX ORDER — LAMOTRIGINE 25 MG/1
50 TABLET ORAL NIGHTLY
Qty: 120 TABLET | Refills: 0 | Status: SHIPPED | OUTPATIENT
Start: 2023-01-30 | End: 2023-02-07 | Stop reason: DRUGHIGH

## 2023-01-30 NOTE — TELEPHONE ENCOUNTER
he request has been approved. The authorization is effective for a maximum of 1 fills from 01/26/2023 to 02/25/2023, as long as the member is enrolled in their current health plan. The request was reviewed and approved by a licensed clinical pharmacist. A written notification letter will follow with additional details.
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
## 2023-01-31 ENCOUNTER — TELEMEDICINE (OUTPATIENT)
Dept: FAMILY MEDICINE CLINIC | Facility: TELEHEALTH | Age: 38
End: 2023-01-31
Payer: MEDICAID

## 2023-01-31 DIAGNOSIS — J01.00 ACUTE NON-RECURRENT MAXILLARY SINUSITIS: Primary | ICD-10-CM

## 2023-01-31 PROCEDURE — 99213 OFFICE O/P EST LOW 20 MIN: CPT | Performed by: NURSE PRACTITIONER

## 2023-01-31 RX ORDER — DOXYCYCLINE HYCLATE 100 MG/1
100 CAPSULE ORAL 2 TIMES DAILY
Qty: 20 CAPSULE | Refills: 0 | Status: SHIPPED | OUTPATIENT
Start: 2023-01-31 | End: 2023-03-01

## 2023-01-31 RX ORDER — DEXTROMETHORPHAN HYDROBROMIDE AND PROMETHAZINE HYDROCHLORIDE 15; 6.25 MG/5ML; MG/5ML
5 SYRUP ORAL NIGHTLY PRN
Qty: 100 ML | Refills: 0 | Status: SHIPPED | OUTPATIENT
Start: 2023-01-31

## 2023-01-31 RX ORDER — FLUTICASONE PROPIONATE 50 MCG
2 SPRAY, SUSPENSION (ML) NASAL DAILY
Qty: 18.2 ML | Refills: 0 | Status: SHIPPED | OUTPATIENT
Start: 2023-01-31 | End: 2023-03-10

## 2023-02-01 NOTE — PROGRESS NOTES
You have chosen to receive care through a telehealth visit.  Do you consent to use a video/audio connection for your medical care today? Yes     CHIEF COMPLAINT  Chief Complaint   Patient presents with   • Sinusitis         HPI  Lindsay Amezquita is a 37 y.o. female  presents with complaint of sinus pain and pressure with postnasal drip and cough she states that her nasal mucus is dark green.  She also complains of a sore throat that is red and has blisters in the back of her throat.    Review of Systems   HENT: Positive for congestion, sinus pressure, sinus pain and sore throat.    Respiratory: Positive for cough.    All other systems reviewed and are negative.      Past Medical History:   Diagnosis Date   • Anxiety    • Back pain    • Bell's palsy    • Bipolar 2 disorder (HCC)    • Blood clot in vein    • Depression    • Diabetes mellitus (HCC) November    Pre diabete   • Frequent headaches    • GERD (gastroesophageal reflux disease)    • Migraine    • Panic    • PTSD (post-traumatic stress disorder)    • Restless leg syndrome    • Sinus problem    • Snores    • Tattoos    • Vitamin B12 deficiency        Family History   Problem Relation Age of Onset   • Irritable bowel syndrome Mother    • Irritable bowel syndrome Father    • Colon cancer Maternal Grandfather        Social History     Socioeconomic History   • Marital status: Single   Tobacco Use   • Smoking status: Every Day     Packs/day: 0.50     Years: 29.00     Pack years: 14.50     Types: Cigarettes     Start date: 1/1/1995   • Smokeless tobacco: Never   Vaping Use   • Vaping Use: Former   Substance and Sexual Activity   • Alcohol use: Never   • Drug use: Never   • Sexual activity: Not Currently     Partners: Female       Lindsay Amezquita  reports that she has been smoking cigarettes. She started smoking about 28 years ago. She has a 14.50 pack-year smoking history. She has never used smokeless tobacco.. I have educated her on the risk of diseases from using  tobacco products such as cancer, COPD and heart disease.     I advised her to quit and she is not willing to quit.    I spent 3  minutes counseling the patient.              There were no vitals taken for this visit.    PHYSICAL EXAM  Physical Exam   Constitutional: She appears well-developed and well-nourished.   HENT:   Head: Normocephalic.   Eyes: Pupils are equal, round, and reactive to light.   Pulmonary/Chest: Effort normal.   Musculoskeletal: Normal range of motion.   Neurological: She is alert.   Psychiatric: She has a normal mood and affect.       Results for orders placed or performed in visit on 23   TSH Rfx On Abnormal To Free T4    Specimen: Blood   Result Value Ref Range    TSH 2.780 0.270 - 4.200 uIU/mL   POCT microalbumin    Specimen: Urine   Result Value Ref Range    Microalbumin, Urine 10     Creatinine, Urine 300    LIQUID-BASED PAP SMEAR, [facility] ([facility])    Specimen: Cervix; ThinPrep Vial   Result Value Ref Range    Reference Lab Report       [facility]  [address] [place]  [address]  Phone: [phone] or [phone]  Fax: [phone]  Cory Foreman M.D., Medical Director    PATIENT NAME                           LABORATORY NO.  [id number]  LEORA COTA                        [id number]  [id number]                         AGE              SEX  SSN           CLIENT REF #  [facility]               1985  F    [id number]   [id number]    [address] [place] [address] [address]              REQUESTING M.D.     ATTENDING [place] [address]                 FELICIANO CARDOZA  DATE COLLECTED      DATE RECEIVED      DATE REPORTED  2023          2023         01/10/2023    ThinPrep Pap with [facility]    DIAGNOSIS:  Negative for intraepithelial lesion or malignancy    Multiple factors can influence accuracy of Pap tests; therefore, screening at  regular intervals is necessary for early cancer detection.      SPECIMEN  ADEQUACY:  SATISFACTORY FOR  EVALUATION  Transformation zone is absent or insufficient.  Data is  conflicting on the significance of ECC/TZ elements, an  annual repeat Pap smear is suggested.  Sparse cellularity, minimal number of squamous cells available for evaluation.  SOURCE OF SPECIMEN:  CERVICAL  SLIDES:  1  CLINICAL HISTORY:  Cervical cancer screening    HPV  HR-HPV POOL: Negative    The Aptima HPV assay is an in vitro nucleic acid amplification test for the  qualitative detection of E6/E7 viral messenger RNA from 14 high risk types of  HPV in cervical specimens. The high risk HPV types detected include: 16, 18,  31, 33, 35, 39, 45, 51, 52, 56, 58, 59, 66, 68    CYTOTECHNOLOGIST:      HARJINDER ARMANDO(ASCP)    CPT CODES:  42970, 42379         Diagnoses and all orders for this visit:    1. Acute non-recurrent maxillary sinusitis (Primary)  -     doxycycline (VIBRAMYCIN) 100 MG capsule; Take 1 capsule by mouth 2 (Two) Times a Day.  Dispense: 20 capsule; Refill: 0  -     fluticasone (FLONASE) 50 MCG/ACT nasal spray; 2 sprays into the nostril(s) as directed by provider Daily.  Dispense: 18.2 mL; Refill: 0  -     promethazine-dextromethorphan (PROMETHAZINE-DM) 6.25-15 MG/5ML syrup; Take 5 mL by mouth At Night As Needed for Cough.  Dispense: 100 mL; Refill: 0          FOLLOW-UP  As discussed during visit with PCP/[facility] if no improvement or Urgent Care/Emergency Department if worsening of symptoms    Instructed patient to use Chloraseptic throat lozenges and also instructed to use caution when eating while using Chloraseptic due to the numbing effects of the medication.  Also instructed patient to use warm salt water gargles as needed for relief of sore throat pain    Patient verbalizes understanding of medication dosage, comfort measures, instructions for treatment and follow-up.    Suzanna Wills, CLAIR  01/31/2023  00:21 EST    The use of a video visit has been reviewed with the patient and verbal informed consent  has been obtained. Myself and Lindsay Amezquita participated in this visit. The patient is located in [address] [place] [address] [address] [place], [place] [id number].    I am located in [place], [place]. Mychart and Twilio were utilized. I spent 20 minutes in the patient's chart for this visit.

## 2023-02-02 ENCOUNTER — TELEPHONE (OUTPATIENT)
Dept: INTERNAL MEDICINE | Facility: CLINIC | Age: 38
End: 2023-02-02

## 2023-02-02 NOTE — TELEPHONE ENCOUNTER
PATIENT STATES SHE IS VERY SICK.  HAS HAD NAUSEA FOR 2 WEEKS.  WENT TO URGENT CARE.  CAN'T KEEP ANYTHING DOWN.  WHAT TO DO?    PLEASE CALL [phone]

## 2023-02-02 NOTE — TELEPHONE ENCOUNTER
"Called patient, who told me that she has been very nauseous after taking her morning medications X the past 4 days, vomited 4 X yesterday, and that she has a cough and stuffy nose.  She also stated that she was dx with bronchitis at  on 1/30/23.  I offered to check for same day appointment today, but she declined, requesting an appointment on 2/8/23, the same day as her mom's next appointment so that she wouldn't \"have to make so many trips.\"  Appointment scheduled for 2/8/23 @ 1130.  Suggested that patient go to the ED or UC is symptoms worsen and/or she doesn't think she can make it until her appointment. Patient agreeable, states that she has some phenergan to take for nausea.  "

## 2023-02-04 ENCOUNTER — HOSPITAL ENCOUNTER (EMERGENCY)
Facility: HOSPITAL | Age: 38
Discharge: HOME | End: 2023-02-04
Attending: EMERGENCY MEDICINE | Admitting: EMERGENCY MEDICINE
Payer: MEDICAID

## 2023-02-04 ENCOUNTER — APPOINTMENT (OUTPATIENT)
Dept: GENERAL RADIOLOGY | Facility: HOSPITAL | Age: 38
End: 2023-02-04
Payer: MEDICAID

## 2023-02-04 VITALS
HEIGHT: 62 IN | WEIGHT: 278 LBS | DIASTOLIC BLOOD PRESSURE: 111 MMHG | HEART RATE: 104 BPM | SYSTOLIC BLOOD PRESSURE: 159 MMHG | OXYGEN SATURATION: 96 % | TEMPERATURE: 98.2 F | BODY MASS INDEX: 51.16 KG/M2 | RESPIRATION RATE: 18 BRPM

## 2023-02-04 DIAGNOSIS — S93.402A SPRAIN OF LEFT ANKLE, UNSPECIFIED LIGAMENT, INITIAL ENCOUNTER: Primary | ICD-10-CM

## 2023-02-04 PROCEDURE — 73610 X-RAY EXAM OF ANKLE: CPT

## 2023-02-04 PROCEDURE — 99283 EMERGENCY DEPT VISIT LOW MDM: CPT

## 2023-02-04 RX ORDER — HYDROCODONE BITARTRATE AND ACETAMINOPHEN 10; 325 MG/1; MG/1
1 TABLET ORAL ONCE
Status: COMPLETED | OUTPATIENT
Start: 2023-02-04 | End: 2023-02-04

## 2023-02-04 RX ADMIN — HYDROCODONE BITARTRATE AND ACETAMINOPHEN 1 TABLET: 10; 325 TABLET ORAL at 00:38

## 2023-02-04 NOTE — TELEPHONE ENCOUNTER
Incoming Refill Request      Medication requested (name and dose): ZANAFLEX 4 MG    Pharmacy where request should be sent: [facility] [place]    Additional details provided by patient: NA    Best call back number: [phone]    Does the patient have less than a 3 day supply:  [x] Yes  [] No    Kim Mueller Rep  02/04/23, 11:19 EST

## 2023-02-05 DIAGNOSIS — E55.9 VITAMIN D DEFICIENCY: ICD-10-CM

## 2023-02-05 NOTE — ED PROVIDER NOTES
Subjective  History of Present Illness:    Chief Complaint: Left ankle pain  History of Present Illness: 37-year-old female slip and fall, reports left lateral ankle pain swelling and bruising  Onset: Today just prior   Duration: Persistent  Exacerbating / Alleviating factors: Worse with weightbearing  Associated symptoms: None      Nurses Notes reviewed and agree, including vitals, allergies, social history and prior medical history.     REVIEW OF SYSTEMS: All systems reviewed and not pertinent unless noted.  Review of Systems      Positive for: Left ankle pain swelling and bruising status post fall, fell down steps    Negative for: Punctures lacerations deformity    Past Medical History:   Diagnosis Date   • Anxiety    • Back pain    • Bell's palsy    • Bipolar 2 disorder ([facility])    • Blood clot in vein    • Depression    • Diabetes mellitus (HCC) November    Pre diabete   • Frequent headaches    • GERD (gastroesophageal reflux disease)    • Migraine    • Panic    • PTSD (post-traumatic stress disorder)    • Restless leg syndrome    • Sinus problem    • Snores    • Tattoos    • Vitamin B12 deficiency        Allergies:    Codeine, Flexeril [cyclobenzaprine], Asa [aspirin], Latex, Morphine, Oxycontin [oxycodone], Penicillins, Triptans, Zofran [ondansetron], Compazine [prochlorperazine], and Reglan [metoclopramide]      Past Surgical History:   Procedure Laterality Date   • CHOLECYSTECTOMY     • TOOTH EXTRACTION           Social History     Socioeconomic History   • Marital status: Single   Tobacco Use   • Smoking status: Every Day     Packs/day: 4.00     Years: 29.00     Pack years: 116.00     Types: Cigarettes     Start date: 1/1/1995   • Smokeless tobacco: Never   Vaping Use   • Vaping Use: Former   Substance and Sexual Activity   • Alcohol use: Never   • Drug use: Never   • Sexual activity: Not Currently     Partners: Female         Family History   Problem Relation Age of Onset   • Irritable bowel syndrome Mother   "  • Irritable bowel syndrome Father    • Colon cancer Maternal Grandfather        Objective  Physical Exam:  BP (!) 159/111 (BP Location: Left arm, Patient Position: Sitting)   Pulse 104   Temp 98.2 °F (36.8 °C) (Oral)   Resp 18   Ht 157.5 cm (62\")   Wt 126 kg (278 lb)   SpO2 96%   BMI 50.85 kg/m²      Physical Exam    CONSTITUTIONAL: Well developed, nontoxic 37-year-old  female,  in mild to moderate discomfort.  VITAL SIGNS: per nursing, reviewed and noted  SKIN: exposed skin with no rashes, ulcerations or petechiae.  Ill-defined left lateral ankle contusion and soft tissue swelling to dorsal lateral aspect left foot  EYES: Grossly EOMI, no icterus  ENT: Normal voice.  Patient maintained wearing a mask throughout patient encounter due to coronavirus pandemic  RESPIRATORY:  No increased work of breathing. No retractions.   CARDIOVASCULAR:  Good Peripheral pulses. Good cap refill to extremities.   MUSCULOSKELETAL: Age-appropriate bulk and tone, moves all 4 extremities.  Tenderness palpation of the left ankle diffusely.  Neurovascularly intact distally.  No knee tenderness to palpation  NEUROLOGIC: Alert, oriented x 3. No gross deficits. GCS 15.       Procedures    ED Course:             Lab Results (last 24 hours)     ** No results found for the last 24 hours. **           XR Ankle 3+ View Left    Result Date: 2/4/2023  LEFT ANKLE  HISTORY: Lateral ankle pain, fall.  FINDINGS:  A three view exam demonstrates no acute fracture or dislocation. The joint spaces appear normal. Slight soft tissue swelling surrounding the ankle. Small dorsal midfoot osteophyte. Tiny plantar calcaneal spur.      Impression: No acute fracture.     This report was signed and finalized on 2/4/2023 8:21 AM by Dr Lacho Pizarro DO.         [facility]    Initial impression of presenting illness: 37-year-old female presents with left ankle pain and swelling status post fall    DDX: includes but is not limited to: Sprain, strain, " fracture    Patient arrives hypertensive afebrile no tachycardia oxygen saturations 96% room air with vitals interpreted by myself.     Pertinent features from physical exam: Left lateral ankle tenderness palpation soft tissue swelling.    Initial diagnostic plan: Plain films and Norco    Results from initial plan were reviewed and interpreted by me revealing left ankle 3 view x-ray interpreted by me reveals no fractures no subluxations or dislocations no soft tissue foreign body  Interventions / Re-evaluation: Provided Ortho boot Ortho referral    Results/clinical rationale were discussed with patient and family    Consultations/Discussion of results with other physicians: None    Disposition plan: Discharged home in stable condition and declined crutches, continue home medications regarding pain  -----    Final diagnoses:   Sprain of left ankle, unspecified ligament, initial encounter        Vidal Jensen, DO  02/05/23 1345

## 2023-02-06 ENCOUNTER — TELEPHONE (OUTPATIENT)
Dept: INTERNAL MEDICINE | Facility: CLINIC | Age: 38
End: 2023-02-06
Payer: MEDICAID

## 2023-02-06 RX ORDER — ERGOCALCIFEROL 1.25 MG/1
CAPSULE ORAL
Qty: 4 CAPSULE | Refills: 1 | OUTPATIENT
Start: 2023-02-06

## 2023-02-06 NOTE — TELEPHONE ENCOUNTER
Rx Refill Note  Requested Prescriptions     Pending Prescriptions Disp Refills   • tiZANidine (ZANAFLEX) 4 MG tablet 90 tablet 1     Sig: Take 1 tablet by mouth Every 8 (Eight) Hours As Needed for Muscle Spasms.      Last office visit with prescribing clinician: 1/4/2023   Last telemedicine visit with prescribing clinician:   Next office visit with prescribing clinician: 2/8/23    Called pharmacy to confirm prescription. Pharmacy said there is a prescription on hold    {    Nikki Thibodeaux MA  02/06/23, 12:16 EST

## 2023-02-06 NOTE — TELEPHONE ENCOUNTER
Rx Refill Note  Requested Prescriptions     Pending Prescriptions Disp Refills   • vitamin D (ERGOCALCIFEROL) 1.25 MG (87710 UT) capsule capsule [Pharmacy Med Name: VITAMIN D2 1.25MG(50,000 UNIT)] 4 capsule 1     Sig: TAKE 1 CAPSULE BY MOUTH 1 TIME PER WEEK      Last office visit with prescribing clinician: 1/4/2023   Last telemedicine visit with prescribing clinician:   Next office visit with prescribing clinician: 2/6/2023     Vitamin d lab on 11/17/22 ABNORMAL  Please advise      Nikki Thibodeaux MA  02/06/23, 15:31 EST

## 2023-02-07 ENCOUNTER — TELEPHONE (OUTPATIENT)
Dept: INTERNAL MEDICINE | Facility: CLINIC | Age: 38
End: 2023-02-07
Payer: MEDICAID

## 2023-02-07 ENCOUNTER — TELEPHONE (OUTPATIENT)
Dept: BEHAVIORAL HEALTH | Facility: CLINIC | Age: 38
End: 2023-02-07
Payer: MEDICAID

## 2023-02-07 DIAGNOSIS — F31.30 BIPOLAR I DISORDER, MOST RECENT EPISODE DEPRESSED: Primary | ICD-10-CM

## 2023-02-07 DIAGNOSIS — F43.10 PTSD (POST-TRAUMATIC STRESS DISORDER): Primary | ICD-10-CM

## 2023-02-07 DIAGNOSIS — N39.44 NOCTURNAL ENURESIS: ICD-10-CM

## 2023-02-07 DIAGNOSIS — F51.5 NIGHTMARES: ICD-10-CM

## 2023-02-07 RX ORDER — LAMOTRIGINE 100 MG/1
100 TABLET ORAL DAILY
Qty: 30 TABLET | Refills: 2 | Status: SHIPPED | OUTPATIENT
Start: 2023-02-07 | End: 2023-03-06 | Stop reason: SDUPTHER

## 2023-02-07 RX ORDER — PRAZOSIN HYDROCHLORIDE 5 MG/1
5 CAPSULE ORAL NIGHTLY
Qty: 30 CAPSULE | Refills: 2 | Status: SHIPPED | OUTPATIENT
Start: 2023-02-07 | End: 2023-03-06 | Stop reason: SINTOL

## 2023-02-07 NOTE — TELEPHONE ENCOUNTER
XANAX 0.5, LATUDA 120MG , LAMICTAL 25MG SHE STATED THAT THEY WERE NOT WORKING SHE HAS NOT SLEETED  IN  48 HOURS. HER HEART IS RACING, AND BP IS UP.

## 2023-02-09 RX ORDER — TIZANIDINE 4 MG/1
4 TABLET ORAL EVERY 8 HOURS PRN
Qty: 90 TABLET | Refills: 0 | Status: SHIPPED | OUTPATIENT
Start: 2023-02-09

## 2023-02-09 RX ORDER — TIZANIDINE 4 MG/1
TABLET ORAL
Qty: 90 TABLET | Refills: 1 | OUTPATIENT
Start: 2023-02-09

## 2023-02-09 NOTE — TELEPHONE ENCOUNTER
Called pt to inform her that any more Zanaflex refills needed to come from pain management, but when pt answered she did not sound well I inquired if she was okay. The pt stated she was not okay and intended to go to hospital because she has been vomiting since yesterday, also is having a headache even though she has taken her Nurtec, having lower R side pain. I told her definitely to seek out emergency services.  So I didn't get to inform the pt of  her PCP's decision regarding the medication.

## 2023-02-09 NOTE — TELEPHONE ENCOUNTER
Rx Refill Note  Requested Prescriptions     Pending Prescriptions Disp Refills   • tiZANidine (ZANAFLEX) 4 MG tablet [Pharmacy Med Name: TIZANIDINE HCL 4 MG TABLET] 90 tablet 1     Sig: TAKE 1 TABLET BY MOUTH EVERY 8 HOURS AS NEEDED FOR MUSCLE SPASMS.      Last office visit with prescribing clinician: 1/4/2023   Last telemedicine visit with prescribing clinician:   Next office visit with prescribing clinician: 2/9/2023   SHASHANK Thibodeaux MA  02/09/23, 14:22 EST

## 2023-02-10 ENCOUNTER — APPOINTMENT (OUTPATIENT)
Dept: GENERAL RADIOLOGY | Facility: HOSPITAL | Age: 38
End: 2023-02-10
Payer: MEDICAID

## 2023-02-10 ENCOUNTER — APPOINTMENT (OUTPATIENT)
Dept: CT IMAGING | Facility: HOSPITAL | Age: 38
End: 2023-02-10
Payer: MEDICAID

## 2023-02-10 ENCOUNTER — TELEPHONE (OUTPATIENT)
Dept: INTERNAL MEDICINE | Facility: CLINIC | Age: 38
End: 2023-02-10

## 2023-02-10 ENCOUNTER — HOSPITAL ENCOUNTER (EMERGENCY)
Facility: HOSPITAL | Age: 38
Discharge: HOME | End: 2023-02-10
Attending: EMERGENCY MEDICINE | Admitting: EMERGENCY MEDICINE
Payer: MEDICAID

## 2023-02-10 VITALS
DIASTOLIC BLOOD PRESSURE: 64 MMHG | TEMPERATURE: 97.8 F | SYSTOLIC BLOOD PRESSURE: 104 MMHG | BODY MASS INDEX: 51.16 KG/M2 | HEART RATE: 85 BPM | WEIGHT: 278 LBS | RESPIRATION RATE: 18 BRPM | HEIGHT: 62 IN | OXYGEN SATURATION: 98 %

## 2023-02-10 DIAGNOSIS — R19.7 NAUSEA VOMITING AND DIARRHEA: Primary | ICD-10-CM

## 2023-02-10 DIAGNOSIS — R11.2 NAUSEA VOMITING AND DIARRHEA: Primary | ICD-10-CM

## 2023-02-10 LAB
ALBUMIN SERPL-MCNC: 3.7 G/DL (ref 3.5–5.2)
ALBUMIN/GLOB SERPL: 1.5 G/DL
ALP SERPL-CCNC: 90 U/L (ref 39–117)
ALT SERPL W P-5'-P-CCNC: 21 U/L (ref 1–33)
ANION GAP SERPL CALCULATED.3IONS-SCNC: 14.9 MMOL/L (ref 5–15)
AST SERPL-CCNC: 18 U/L (ref 1–32)
BASOPHILS # BLD AUTO: 0.02 10*3/MM3 (ref 0–0.2)
BASOPHILS NFR BLD AUTO: 0.2 % (ref 0–1.5)
BILIRUB SERPL-MCNC: 0.2 MG/DL (ref 0–1.2)
BILIRUB UR QL STRIP: NEGATIVE
BUN SERPL-MCNC: 4 MG/DL (ref 6–20)
BUN/CREAT SERPL: 6.7 (ref 7–25)
CALCIUM SPEC-SCNC: 9.1 MG/DL (ref 8.6–10.5)
CHLORIDE SERPL-SCNC: 97 MMOL/L (ref 98–107)
CLARITY UR: CLEAR
CO2 SERPL-SCNC: 26.1 MMOL/L (ref 22–29)
COLOR UR: YELLOW
CREAT SERPL-MCNC: 0.6 MG/DL (ref 0.57–1)
DEPRECATED RDW RBC AUTO: 45.7 FL (ref 37–54)
EGFRCR SERPLBLD CKD-EPI 2021: 118 ML/MIN/1.73
EOSINOPHIL # BLD AUTO: 0.26 10*3/MM3 (ref 0–0.4)
EOSINOPHIL NFR BLD AUTO: 2.7 % (ref 0.3–6.2)
ERYTHROCYTE [DISTWIDTH] IN BLOOD BY AUTOMATED COUNT: 14.4 % (ref 12.3–15.4)
FLUAV SUBTYP SPEC NAA+PROBE: NOT DETECTED
FLUBV RNA ISLT QL NAA+PROBE: NOT DETECTED
GLOBULIN UR ELPH-MCNC: 2.5 GM/DL
GLUCOSE SERPL-MCNC: 204 MG/DL (ref 65–99)
GLUCOSE UR STRIP-MCNC: ABNORMAL MG/DL
HCT VFR BLD AUTO: 41.4 % (ref 34–46.6)
HGB BLD-MCNC: 14.1 G/DL (ref 12–15.9)
HGB UR QL STRIP.AUTO: NEGATIVE
IMM GRANULOCYTES # BLD AUTO: 0.03 10*3/MM3 (ref 0–0.05)
IMM GRANULOCYTES NFR BLD AUTO: 0.3 % (ref 0–0.5)
KETONES UR QL STRIP: NEGATIVE
LEUKOCYTE ESTERASE UR QL STRIP.AUTO: NEGATIVE
LIPASE SERPL-CCNC: 56 U/L (ref 13–60)
LYMPHOCYTES # BLD AUTO: 3.14 10*3/MM3 (ref 0.7–3.1)
LYMPHOCYTES NFR BLD AUTO: 33.2 % (ref 19.6–45.3)
MCH RBC QN AUTO: 29.4 PG (ref 26.6–33)
MCHC RBC AUTO-ENTMCNC: 34.1 G/DL (ref 31.5–35.7)
MCV RBC AUTO: 86.4 FL (ref 79–97)
MONOCYTES # BLD AUTO: 0.38 10*3/MM3 (ref 0.1–0.9)
MONOCYTES NFR BLD AUTO: 4 % (ref 5–12)
NEUTROPHILS NFR BLD AUTO: 5.63 10*3/MM3 (ref 1.7–7)
NEUTROPHILS NFR BLD AUTO: 59.6 % (ref 42.7–76)
NITRITE UR QL STRIP: NEGATIVE
NRBC BLD AUTO-RTO: 0 /100 WBC (ref 0–0.2)
NT-PROBNP SERPL-MCNC: <5 PG/ML (ref 0–450)
PH UR STRIP.AUTO: 6.5 [PH] (ref 5–8)
PLATELET # BLD AUTO: 284 10*3/MM3 (ref 140–450)
PMV BLD AUTO: 10.1 FL (ref 6–12)
POTASSIUM SERPL-SCNC: 3.2 MMOL/L (ref 3.5–5.2)
PROT SERPL-MCNC: 6.2 G/DL (ref 6–8.5)
PROT UR QL STRIP: NEGATIVE
RBC # BLD AUTO: 4.79 10*6/MM3 (ref 3.77–5.28)
SARS-COV-2 RNA RESP QL NAA+PROBE: NOT DETECTED
SODIUM SERPL-SCNC: 138 MMOL/L (ref 136–145)
SP GR UR STRIP: 1.01 (ref 1–1.03)
UROBILINOGEN UR QL STRIP: ABNORMAL
WBC NRBC COR # BLD: 9.46 10*3/MM3 (ref 3.4–10.8)

## 2023-02-10 PROCEDURE — 74176 CT ABD & PELVIS W/O CONTRAST: CPT

## 2023-02-10 PROCEDURE — 93005 ELECTROCARDIOGRAM TRACING: CPT | Performed by: EMERGENCY MEDICINE

## 2023-02-10 PROCEDURE — 81003 URINALYSIS AUTO W/O SCOPE: CPT | Performed by: EMERGENCY MEDICINE

## 2023-02-10 PROCEDURE — 83690 ASSAY OF LIPASE: CPT | Performed by: EMERGENCY MEDICINE

## 2023-02-10 PROCEDURE — 96374 THER/PROPH/DIAG INJ IV PUSH: CPT

## 2023-02-10 PROCEDURE — 80053 COMPREHEN METABOLIC PANEL: CPT | Performed by: EMERGENCY MEDICINE

## 2023-02-10 PROCEDURE — 25010000002 HALOPERIDOL LACTATE PER 5 MG: Performed by: EMERGENCY MEDICINE

## 2023-02-10 PROCEDURE — 87636 SARSCOV2 & INF A&B AMP PRB: CPT | Performed by: EMERGENCY MEDICINE

## 2023-02-10 PROCEDURE — 83880 ASSAY OF NATRIURETIC PEPTIDE: CPT | Performed by: EMERGENCY MEDICINE

## 2023-02-10 PROCEDURE — 85025 COMPLETE CBC W/AUTO DIFF WBC: CPT | Performed by: EMERGENCY MEDICINE

## 2023-02-10 PROCEDURE — 71045 X-RAY EXAM CHEST 1 VIEW: CPT

## 2023-02-10 PROCEDURE — 99283 EMERGENCY DEPT VISIT LOW MDM: CPT

## 2023-02-10 RX ORDER — HALOPERIDOL 5 MG/ML
5 INJECTION INTRAMUSCULAR ONCE
Status: COMPLETED | OUTPATIENT
Start: 2023-02-10 | End: 2023-02-10

## 2023-02-10 RX ORDER — SODIUM CHLORIDE 0.9 % (FLUSH) 0.9 %
10 SYRINGE (ML) INJECTION AS NEEDED
Status: DISCONTINUED | OUTPATIENT
Start: 2023-02-10 | End: 2023-02-10 | Stop reason: HOSPADM

## 2023-02-10 RX ORDER — ONDANSETRON 4 MG/1
4 TABLET, ORALLY DISINTEGRATING ORAL 4 TIMES DAILY PRN
Qty: 20 TABLET | Refills: 0 | Status: SHIPPED | OUTPATIENT
Start: 2023-02-10

## 2023-02-10 RX ADMIN — HALOPERIDOL LACTATE 5 MG: 5 INJECTION, SOLUTION INTRAMUSCULAR at 01:42

## 2023-02-10 RX ADMIN — LIDOCAINE HYDROCHLORIDE 150 MG: 10 INJECTION, SOLUTION EPIDURAL; INFILTRATION; INTRACAUDAL; PERINEURAL at 02:24

## 2023-02-10 RX ADMIN — SODIUM CHLORIDE 1000 ML: 9 INJECTION, SOLUTION INTRAVENOUS at 01:42

## 2023-02-10 NOTE — TELEPHONE ENCOUNTER
I can not order a bipap, done through pulmonary. Would be happy to give her a referral or she can talk with her pulmonologist. If her oxygen is low she needs to go back to the ER but from what I saw it did drop but came back up and the lowest was 87%. Please reassure her she also had a normal chest x-ray.

## 2023-02-10 NOTE — ED NOTES
Pt's SPO2 while discharge pending decreased to 85% on room air.  MD notified and 2L oxygen via NC placed--pt's SPO2 rebounded to 96%.

## 2023-02-10 NOTE — ED PROVIDER NOTES
"Subjective   History of Present Illness  37-year-old female with abdominal pain.  Patient is complaining of right lower quadrant abdominal pain and right flank pain.  Pain started yesterday.  Got worse today.  Patient then developed nausea vomiting and diarrhea today.  The pain does not radiate.  Multiple episodes of vomiting, constant nausea and multiple loose watery bowel movements.  No fever or chills.  No prior treatments or limiting factors.  No other complaints at this time.        Review of Systems   Gastrointestinal: Positive for abdominal pain, diarrhea, nausea and vomiting.   All other systems reviewed and are negative.      Past Medical History:   Diagnosis Date   • Anxiety    • Back pain    • Bell's palsy    • Bipolar 2 disorder ([facility])    • Blood clot in vein    • Depression    • Diabetes mellitus ([facility]) November    Pre diabete   • Frequent headaches    • GERD (gastroesophageal reflux disease)    • Migraine    • Panic    • PTSD (post-traumatic stress disorder)    • Restless leg syndrome    • Sinus problem    • Snores    • Tattoos    • Vitamin B12 deficiency        Allergies   Allergen Reactions   • Codeine Anaphylaxis   • Flexeril [Cyclobenzaprine] Other (See Comments)     \"gives me chest pain\"   • Asa [Aspirin] Hives     Wheezing     • Latex Hives   • Morphine Itching   • Oxycontin [Oxycodone] GI Intolerance   • Penicillins Hives     Vomiting     • Triptans Other (See Comments)     Chest tightness, left arm numbness   • Zofran [Ondansetron] GI Intolerance   • Compazine [Prochlorperazine] Anxiety   • Reglan [Metoclopramide] Anxiety       Past Surgical History:   Procedure Laterality Date   • CHOLECYSTECTOMY     • TOOTH EXTRACTION         Family History   Problem Relation Age of Onset   • Irritable bowel syndrome Mother    • Irritable bowel syndrome Father    • Colon cancer Maternal Grandfather        Social History     Socioeconomic History   • Marital status: Single   Tobacco Use   • Smoking status: Every " Day     Packs/day: 4.00     Years: 29.00     Pack years: 116.00     Types: Cigarettes     Start date: 1/1/1995   • Smokeless tobacco: Never   Vaping Use   • Vaping Use: Former   Substance and Sexual Activity   • Alcohol use: Never   • Drug use: Never   • Sexual activity: Not Currently     Partners: Female           Objective   Physical Exam  Vitals and nursing note reviewed.   Constitutional:       Appearance: She is well-developed. She is obese.   HENT:      Head: Normocephalic and atraumatic.   Cardiovascular:      Rate and Rhythm: Regular rhythm. Tachycardia present.   Abdominal:      Palpations: Abdomen is soft.      Tenderness: There is abdominal tenderness in the right lower quadrant. There is no guarding.   Neurological:      Mental Status: She is alert.         Procedures           ED Course  ED Course as of 02/10/23 0634   Fri Feb 10, 2023   0222 EKG interpreted by me.  Sinus rhythm.  Rate of 99.  Incomplete right bundle branch block.  Nonspecific T wave changes.  Abnormal EKG [CG]   0309 Patient having some transient hypoxia with sleep, but is supposed to be wearing a BiPAP. [CG]      ED Course User Index  [CG] Jose Jimenez, DO                                           MDM  Chief complaint: [place] pain    Initial impression of presenting illness: 37-year-old female with right lower quadrant abdominal pain and associated nausea vomiting and diarrhea    Comorbidities requiring consideration and/or management: Diabetes, GERD, PTSD, anxiety/depression,    Differential diagnosis includes but not limited to: Appendicitis, colitis, diverticulitis, viral gastroenteritis, cholelithiasis, acute cholecystitis, bowel obstruction, other    Patient arrives hemodynamically stable, afebrile, without respiratory distress with initial vitals interpreted by myself.  Pertinent initial vitals include mild tachycardia heart rate 114, /94, respiratory rate of 18.    Pertinent features from physical exam: Obese, right  lower quadrant tenderness to palpation, abdomen is soft, no guarding, no rigidity    Initial diagnostic plan: CBC, CMP, lipase, urinalysis, CT abdomen pelvis    Results from initial plan were reviewed and interpreted by myself and include: COVID and flu negative, CMP reassuring with glucose of 240, BUN 4 creatinine 0.6 sodium 138 potassium 3.2, white blood cell count normal, hemoglobin normal, lipase normal.  CT and pelvis negative for acute process.      Diagnostic information from other sources includes: Review of previous visits, prior labs, prior imaging, available notes from prior evaluations or visits with specialists, medication list, allergies, past medical history, past surgical history    Interventions in the ED included: IV fluid rehydration, IV lidocaine for pain, IV Haldol for nausea secondary to multiple allergies to other antiemetics.    Reevaluation: Sleeping.  Symptoms have improved.    Results/clinical rationale were discussed with patient and mother at bedside    Consultations and discussions of results with other physicians: N/AA    Discussion of results/management/plan: Work-up is reassuring.  Suspect a viral gastroenteritis.  Patient did have some transient hypoxemia but chest x-ray was negative for acute process.  She was denying any chest pain or shortness of breath.  Likely has some obesity hypoventilation syndrome and chronic baseline hypoxia.  She did not appear in any acute distress.  Patient is supposed to be on a BiPAP while sleeping.  No acute process today.  Appropriate for discharge.  Has follow-up with PCP for today    Disposition plan: Discharge home      Final diagnoses:   Nausea vomiting and diarrhea       ED Disposition  ED Disposition     ED Disposition   Discharge    Condition   Stable    Comment   --             Charley Robles N, APRN  [address] [address] [phone]               Medication List      New Prescriptions    ondansetron ODT 4 MG  disintegrating tablet  Commonly known as: ZOFRAN-ODT  Place 1 tablet on the tongue 4 (Four) Times a Day As Needed for Nausea.           Where to Get Your Medications      These medications were sent to [facility]/pharmacy #[id number] - [place], [place] - [address] [facility] [address] [facility] [address] - [phone]  - [phone] [address] [address]    Phone: [phone]   · ondansetron ODT 4 MG disintegrating tablet          Jose Jimenez,   02/10/23 0634

## 2023-02-10 NOTE — ED NOTES
This RN was collecting a D Dimer on pt when she asked when she was leaving. Informed pt that MD order additional blood tests on her due to her inability to maintain O2 above 90% while sleeping. Pt informed this RN that she is supposed to be on a BiPap but has not received it yet. Pt is to see PCP tomorrow. MD notified and labs were canceled. PT's primary RN was notified

## 2023-02-10 NOTE — TELEPHONE ENCOUNTER
Caller: Lindsay Aemzquita    Relationship: Self    Best call back number: [phone]    Equipment requested: BI PAP MACHINE  Reason for the request: PT STATED THAT SHE WAS AT THE [facility] ER LAST NIGHT STATED THAT HER OXYGEN LEVEL WAS LOW IN 80'S, WAS TOLD THAT SHE HAD A GI INFECTION AND WAS GIVEN ZOFRAN FOR IT.

## 2023-02-13 ENCOUNTER — TELEMEDICINE (OUTPATIENT)
Dept: FAMILY MEDICINE CLINIC | Facility: TELEHEALTH | Age: 38
End: 2023-02-13
Payer: MEDICAID

## 2023-02-13 ENCOUNTER — TELEPHONE (OUTPATIENT)
Dept: INTERNAL MEDICINE | Facility: CLINIC | Age: 38
End: 2023-02-13
Payer: MEDICAID

## 2023-02-13 DIAGNOSIS — L29.9 PRURITUS: Primary | ICD-10-CM

## 2023-02-13 PROCEDURE — 99213 OFFICE O/P EST LOW 20 MIN: CPT | Performed by: NURSE PRACTITIONER

## 2023-02-13 NOTE — TELEPHONE ENCOUNTER
Caller: Lindsay Amezquita    Relationship: Self    Best call back number: [phone]    What is the best time to reach you: ANYTIME    Who are you requesting to speak with (clinical staff, provider,  specific staff member): PROVIDER    What was the call regarding: PATIENT STATES THAT SHE IS ITCHING ALL OVER HER BODY. IT'S RED AND BURNS AFTER SHE SCRATCHES. PATIENT STATES THAT BENADRYL IS NOT HELPING. PLEASE ADVISE    Do you require a callback: YES

## 2023-02-14 ENCOUNTER — PRIOR AUTHORIZATION (OUTPATIENT)
Dept: BEHAVIORAL HEALTH | Facility: CLINIC | Age: 38
End: 2023-02-14
Payer: MEDICAID

## 2023-02-14 ENCOUNTER — OFFICE VISIT (OUTPATIENT)
Dept: INTERNAL MEDICINE | Facility: CLINIC | Age: 38
End: 2023-02-14
Payer: MEDICAID

## 2023-02-14 VITALS
WEIGHT: 275 LBS | HEART RATE: 110 BPM | BODY MASS INDEX: 50.61 KG/M2 | SYSTOLIC BLOOD PRESSURE: 128 MMHG | HEIGHT: 62 IN | OXYGEN SATURATION: 96 % | RESPIRATION RATE: 15 BRPM | DIASTOLIC BLOOD PRESSURE: 86 MMHG

## 2023-02-14 DIAGNOSIS — T78.40XA ALLERGIC REACTION TO DRUG, INITIAL ENCOUNTER: Primary | ICD-10-CM

## 2023-02-14 PROCEDURE — 96372 THER/PROPH/DIAG INJ SC/IM: CPT | Performed by: NURSE PRACTITIONER

## 2023-02-14 PROCEDURE — 99213 OFFICE O/P EST LOW 20 MIN: CPT | Performed by: NURSE PRACTITIONER

## 2023-02-14 RX ORDER — METHYLPREDNISOLONE ACETATE 40 MG/ML
80 INJECTION, SUSPENSION INTRA-ARTICULAR; INTRALESIONAL; INTRAMUSCULAR; SOFT TISSUE ONCE
Status: COMPLETED | OUTPATIENT
Start: 2023-02-14 | End: 2023-02-14

## 2023-02-14 RX ORDER — CETIRIZINE HYDROCHLORIDE 10 MG/1
10 TABLET ORAL DAILY
Qty: 30 TABLET | Refills: 2 | Status: SHIPPED | OUTPATIENT
Start: 2023-02-14 | End: 2023-03-21

## 2023-02-14 RX ADMIN — METHYLPREDNISOLONE ACETATE 80 MG: 40 INJECTION, SUSPENSION INTRA-ARTICULAR; INTRALESIONAL; INTRAMUSCULAR; SOFT TISSUE at 13:56

## 2023-02-14 NOTE — PROGRESS NOTES
"  Office Visit      Patient Name: Lindsay Amezquita  : 1985   MRN: [id number]   Care Team: Patient Care Team:  Charley Robles APRN as PCP - General (Family Medicine)  Donna Mcqueen APRN as Nurse Practitioner (Behavioral Health)    Chief Complaint  Allergic Reaction (Vomiting today, rash from head to toe. Wednesday was in the hospital and ongoing since. No changes in body wash or laundry. Has taking Benadryl. Cold compress and nothing helps. Causeing headaches and shortness of breath  )    Subjective     Subjective      Lindsay Amezquita presents to [facility] for rash.   Symptoms started about 5 days ago. Went to the ER for nausea and vomiting the night prior to symptoms starting. She was given haldol for nausea and sent home. At this point she was extremely lethargic and doesn't remember much. She woke up the next AM with extreme pruritis and rash.   Rash is located on bilateral upper arms, lower back, buttock, and bilateral lower extremities.   The rash is macular and erythematous.   Denies recent travel, new lotion/soap/or other medication other than haldol.   She has tried benadryl and hydroxyzine for the problem but was not helpful.   She also continues to have some headaches, vomiting, and nausea.   When she is talking in excess she is having to stop and take several deep breaths, this is not common for her.     Objective     Objective   Vital Signs:   /86   Pulse 110   Resp 15   Ht 157.5 cm (62.01\")   Wt 125 kg (275 lb)   SpO2 96%   BMI 50.29 kg/m²     Physical Exam  Vitals and nursing note reviewed.   Constitutional:       General: She is not in acute distress.     Appearance: Normal appearance. She is not toxic-appearing.   Eyes:      Pupils: Pupils are equal, round, and reactive to light.   Cardiovascular:      Rate and Rhythm: Normal rate and regular rhythm.      Heart sounds: Normal heart sounds. No murmur heard.  Pulmonary:      Effort: Pulmonary " effort is normal. No respiratory distress.      Breath sounds: Normal breath sounds. No wheezing.   Abdominal:      General: Bowel sounds are normal. There is no distension.      Palpations: Abdomen is soft.      Tenderness: There is no abdominal tenderness.   Skin:     General: Skin is warm and dry.      Findings: Rash (Diffuse macular erythematous rash over bilateral arms, chest, and back) present.   Neurological:      General: No focal deficit present.      Mental Status: She is alert.   Psychiatric:         Mood and Affect: Mood is anxious.         Behavior: Behavior normal.          Assessment / Plan      Assessment & Plan   Problem List Items Addressed This Visit    None  Visit Diagnoses     Allergic reaction to drug, initial encounter    -  Primary    Relevant Medications    methylPREDNISolone acetate (DEPO-medrol) injection 80 mg (Completed)    Suspect acute allergic reaction from Haldol, added to allergy list.  One-time dose of Depo-Medrol given in office today, if not helpful will add taper will need to be careful due to tachycardia and hypertension.  Advised on side effects of the medication and when to seek help.  Continue as needed Benadryl, add cetirizine nightly, and avoid offending agents.  Close follow-up if not improving.         Follow Up   Return if symptoms worsen or fail to improve.  Patient was given instructions and counseling regarding her condition or for health maintenance advice. Please see specific information pulled into the AVS if appropriate.     CLAIR Lindsay  [facility]

## 2023-02-14 NOTE — PROGRESS NOTES
Subjective   Lindsay Amezquita is a 37 y.o. female.     History of Present Illness  She has been itching for 3 days all over body. She has used no new products. She says there is no rash, its just itching. She has never had this happen. She was diagnosed with a viral gi bug on Wednesday in the ER. The only new medication she has been taking is zofran, but she has only taken two dose of that and has tolerated it before.  She did recently have two medications that dosages were increased.       The following portions of the patient's history were reviewed and updated as appropriate: allergies, current medications, past family history, past medical history, past social history, past surgical history, and problem list.    Review of Systems   Constitutional: Negative for fever.   HENT: Negative for congestion.    Respiratory: Negative for cough.    Gastrointestinal: Positive for diarrhea and vomiting.   Musculoskeletal: Negative for myalgias.   Skin: Negative for rash.        itching   Neurological: Negative for headache.       Objective   Physical Exam  Constitutional:       General: She is not in acute distress.     Appearance: She is well-developed. She is not diaphoretic.   Pulmonary:      Effort: Pulmonary effort is normal.   Neurological:      Mental Status: She is alert and oriented to person, place, and time.   Psychiatric:         Behavior: Behavior normal.           Assessment & Plan   Diagnoses and all orders for this visit:    1. Pruritus (Primary)             May be related to a viral illness, but if not better tomorrow I recommend she call for an appointment with primary care. She needs lab work to further investigate. I offered to prescribed vistaril but the patient declined.    The use of a video visit has been reviewed with the patient and verbal informed consent has been obtained. Myself and Lindsay Amezquita participated in this visit. The patient is located in [place], [place]. I am located in [place], [place]. [facility] and  [employer] Video Client were utilized. I spent 13 minutes in the patient's chart for this visit.

## 2023-02-15 ENCOUNTER — TELEPHONE (OUTPATIENT)
Dept: INTERNAL MEDICINE | Facility: CLINIC | Age: 38
End: 2023-02-15
Payer: MEDICAID

## 2023-02-15 NOTE — TELEPHONE ENCOUNTER
Caller: Lindsay Amezquita    Relationship: Self    Best call back number:[phone]   Who are you requesting to speak with (clinical staff, provider,  specific staff member): CLINICAL  What was the call regarding: FEVER 99.7 AND ITCHING,  PLEASE ADVSE  WAS GIVEN A SHOT YESTERDAY, DECLINED APPOINTMENT  Do you require a callback: YES

## 2023-02-19 ENCOUNTER — TELEMEDICINE (OUTPATIENT)
Dept: FAMILY MEDICINE CLINIC | Facility: TELEHEALTH | Age: 38
End: 2023-02-19
Payer: MEDICAID

## 2023-02-19 DIAGNOSIS — R21 RASH: Primary | ICD-10-CM

## 2023-02-19 DIAGNOSIS — L29.9 PRURITUS: ICD-10-CM

## 2023-02-19 PROCEDURE — 99213 OFFICE O/P EST LOW 20 MIN: CPT | Performed by: NURSE PRACTITIONER

## 2023-02-19 RX ORDER — PREDNISONE 10 MG/1
TABLET ORAL DAILY
Qty: 21 EACH | Refills: 0 | Status: SHIPPED | OUTPATIENT
Start: 2023-02-19 | End: 2023-02-25

## 2023-02-19 RX ORDER — HYDROXYZINE HYDROCHLORIDE 25 MG/1
25 TABLET, FILM COATED ORAL 3 TIMES DAILY PRN
Qty: 30 TABLET | Refills: 0 | Status: SHIPPED | OUTPATIENT
Start: 2023-02-19

## 2023-02-19 NOTE — PROGRESS NOTES
"Subjective   Chief Complaint   Patient presents with   • Rash     Pruritis         Lindsay Amezquita is a 37 y.o. female.     History of Present Illness  Patient reports a full body pruritic rash for 2 weeks.  She was seen by her PCP about 5 days ago for the symptoms.  Her PCP thought she was having an allergic reaction to Haldol.  She was prescribed Zyrtec and given a Medrol injection in the office.  Patient states the steroid did work only for the first day and the next day the rash returned.  She has been dealing with symptoms and treating with Benadryl and Zyrtec since then with no relief.  Patient states she itches all over like symptoms are under the skin and are not relieved by scratching.  She is now getting secondary abrasions from scratching.  Patient denies fever, wheezing, shortness of breath, swelling of the lips/tongue/throat.  Patient states that she thinks the rash may be due to Lamictal or one of her blood pressure medicines.  The dosages of these medicines were recently increased.  Rash  This is a new problem. Episode onset: 2 weeks. The problem has been waxing and waning since onset. The rash is diffuse. The rash is characterized by redness, itchiness, swelling and burning. It is unknown if there was an exposure to a precipitant. Pertinent negatives include no anorexia, congestion, cough, diarrhea, eye pain, facial edema, fatigue, fever, joint pain, nail changes, rhinorrhea, shortness of breath, sore throat or vomiting. Treatments tried: zyrtec, Medrol Injection.        Allergies   Allergen Reactions   • Codeine Anaphylaxis   • Flexeril [Cyclobenzaprine] Other (See Comments)     \"gives me chest pain\"   • Haldol [Haloperidol] Rash   • Asa [Aspirin] Hives     Wheezing     • Latex Hives   • Morphine Itching   • Oxycontin [Oxycodone] GI Intolerance   • Penicillins Hives     Vomiting     • Triptans Other (See Comments)     Chest tightness, left arm numbness   • Zofran [Ondansetron] GI Intolerance   • " Compazine [Prochlorperazine] Anxiety   • Reglan [Metoclopramide] Anxiety       Past Medical History:   Diagnosis Date   • Allergic    • Anxiety    • Back pain    • Bell's palsy    • Bipolar 2 disorder (HCC)    • Blood clot in vein    • Deep vein thrombosis (HCC) Last year   • Depression    • Diabetes mellitus (HCC) November    Pre diabete   • Frequent headaches    • GERD (gastroesophageal reflux disease)    • Irritable bowel syndrome Two years ago   • Migraine    • Panic    • PTSD (post-traumatic stress disorder)    • Restless leg syndrome    • Sinus problem    • Snores    • Tattoos    • Vitamin B12 deficiency        Past Surgical History:   Procedure Laterality Date   • CHOLECYSTECTOMY     • TOOTH EXTRACTION         Social History     Socioeconomic History   • Marital status: Single   Tobacco Use   • Smoking status: Every Day     Packs/day: 1.50     Years: 29.00     Pack years: 43.50     Types: Cigarettes     Start date: 1/1/1995   • Smokeless tobacco: Never   Vaping Use   • Vaping Use: Former   Substance and Sexual Activity   • Alcohol use: Not Currently   • Drug use: Not Currently   • Sexual activity: Not Currently     Partners: Female     Birth control/protection: None, Same-sex partner       Family History   Problem Relation Age of Onset   • Irritable bowel syndrome Mother    • Heart disease Mother    • Miscarriages / Stillbirths Mother    • Irritable bowel syndrome Father    • Alcohol abuse Father    • Diabetes Father    • Hyperlipidemia Father    • Colon cancer Maternal Grandfather          Current Outpatient Medications:   •  albuterol sulfate  (90 Base) MCG/ACT inhaler, Inhale 2 puffs Every 4 (Four) Hours As Needed for Wheezing or Shortness of Air., Disp: 8 g, Rfl: 3  •  ALPRAZolam (Xanax) 0.5 MG tablet, Take 1 tablet by mouth 3 (Three) Times a Day As Needed for Anxiety., Disp: 90 tablet, Rfl: 0  •  amitriptyline (ELAVIL) 150 MG tablet, Take 1 tablet by mouth every night at bedtime., Disp: 30 tablet,  Rfl: 0  •  atorvastatin (Lipitor) 40 MG tablet, Take 1 tablet by mouth Every Night., Disp: 90 tablet, Rfl: 3  •  azithromycin (Zithromax Z-Janusz) 250 MG tablet, Take 2 tablets by mouth on day 1, then 1 tablet daily on days 2-5, Disp: 6 tablet, Rfl: 0  •  butalbital-acetaminophen-caffeine (Esgic) -40 MG per tablet, Take 1 tablet by mouth Every 6 (Six) Hours As Needed for Migraine., Disp: 20 tablet, Rfl: 0  •  cetirizine (zyrTEC) 10 MG tablet, Take 1 tablet by mouth Daily., Disp: 30 tablet, Rfl: 2  •  desvenlafaxine (PRISTIQ) 100 MG 24 hr tablet, Take 1 tablet by mouth Daily., Disp: 90 tablet, Rfl: 0  •  desvenlafaxine (Pristiq) 50 MG 24 hr tablet, Take 1 tablet by mouth Daily., Disp: 90 tablet, Rfl: 0  •  doxycycline (VIBRAMYCIN) 100 MG capsule, Take 1 capsule by mouth 2 (Two) Times a Day., Disp: 20 capsule, Rfl: 0  •  fluticasone (FLONASE) 50 MCG/ACT nasal spray, 2 sprays into the nostril(s) as directed by provider Daily., Disp: 18.2 mL, Rfl: 0  •  gabapentin (NEURONTIN) 800 MG tablet, Take 1 tablet by mouth 3 (Three) Times a Day for 14 days. Further refills should come from pain management provider, Disp: 42 tablet, Rfl: 0  •  glucose blood test strip, Use as instructed, Disp: 50 each, Rfl: 12  •  glucose monitor monitoring kit, Check glucose daily, Disp: 1 each, Rfl: 0  •  HYDROcodone-acetaminophen (NORCO) 7.5-325 MG per tablet, Take 1 tablet by mouth Every 6 (Six) Hours As Needed for Moderate Pain., Disp: , Rfl:   •  hydrOXYzine (ATARAX) 25 MG tablet, Take 1 tablet by mouth 3 (Three) Times a Day As Needed for Itching, Allergies or Anxiety., Disp: 30 tablet, Rfl: 0  •  Jardiance 10 MG tablet tablet, TAKE 1 TABLET BY MOUTH EVERY DAY, Disp: 30 tablet, Rfl: 3  •  lamoTRIgine (LaMICtal) 100 MG tablet, Take 1 tablet by mouth Daily., Disp: 30 tablet, Rfl: 2  •  Lancets Misc. (ONE TOUCH SURESOFT) misc, Check blood sugar once daily, Disp: 1 each, Rfl: 0  •  Lancets misc, Check fasting glucose daily and 1 hour after  largest meal of day., Disp: 100 each, Rfl: 3  •  Lurasidone HCl (Latuda) 120 MG tablet tablet, Take 1 tablet by mouth Daily., Disp: 30 tablet, Rfl: 0  •  ondansetron ODT (ZOFRAN-ODT) 4 MG disintegrating tablet, Place 1 tablet on the tongue 4 (Four) Times a Day As Needed for Nausea., Disp: 20 tablet, Rfl: 0  •  prazosin (MINIPRESS) 5 MG capsule, Take 1 capsule by mouth Every Night., Disp: 30 capsule, Rfl: 2  •  predniSONE (DELTASONE) 10 MG (21) dose pack, Take  by mouth Daily for 6 days., Disp: 21 each, Rfl: 0  •  Probiotic Product (PROBIOTIC PO), Take  by mouth., Disp: , Rfl:   •  promethazine (PHENERGAN) 12.5 MG tablet, Take 1 tablet by mouth Every 6 (Six) Hours As Needed for Nausea or Vomiting., Disp: 10 tablet, Rfl: 0  •  promethazine-dextromethorphan (PROMETHAZINE-DM) 6.25-15 MG/5ML syrup, Take 5 mL by mouth At Night As Needed for Cough., Disp: 100 mL, Rfl: 0  •  Rimegepant Sulfate (Nurtec) 75 MG tablet dispersible tablet, Take 1 tablet by mouth Every Other Day. Take Monday,Wednesday,Friday, and Saturday., Disp: 16 tablet, Rfl: 11  •  tiZANidine (ZANAFLEX) 4 MG tablet, Take 1 tablet by mouth Every 8 (Eight) Hours As Needed for Muscle Spasms. Further refills should come from pain management., Disp: 90 tablet, Rfl: 0  •  Umeclidinium-Vilanterol (Anoro Ellipta) 62.5-25 MCG/ACT aerosol powder  inhaler, Inhale 1 puff Daily., Disp: 60 each, Rfl: 12  •  vitamin B-12 (CYANOCOBALAMIN) 500 MCG tablet, Take 1,000 mcg by mouth Daily. 1250mcg, Disp: , Rfl:   •  vitamin D (ERGOCALCIFEROL) 1.25 MG (34516 UT) capsule capsule, TAKE 1 CAPSULE BY MOUTH 1 TIME PER WEEK., Disp: 6 capsule, Rfl: 0      Review of Systems   Constitutional: Negative for chills, diaphoresis, fatigue and fever.   HENT: Negative for congestion, facial swelling, rhinorrhea and sore throat.    Eyes: Negative for pain.   Respiratory: Negative for cough, chest tightness, shortness of breath and wheezing.    Cardiovascular: Negative for chest pain.    Gastrointestinal: Negative for anorexia, diarrhea and vomiting.   Musculoskeletal: Negative for joint pain.   Skin: Positive for rash. Negative for nail changes.   Neurological: Negative for headache.        There were no vitals filed for this visit.    Objective   Physical Exam  Constitutional:       General: She is not in acute distress.     Appearance: Normal appearance. She is not ill-appearing, toxic-appearing or diaphoretic.   HENT:      Head: Normocephalic.      Mouth/Throat:      Lips: Pink.      Mouth: Mucous membranes are moist.   Pulmonary:      Effort: Pulmonary effort is normal.   Skin:     Comments: Rash was not very visible on video.  Was able to see a small single abrasion on her left arm which she states was secondary to scratching.  Patient reports the rash feels rough with small fine bumps like sandpaper all over.   Neurological:      Mental Status: She is alert and oriented to person, place, and time.   Psychiatric:         Mood and Affect: Mood normal.         Behavior: Behavior normal.          Procedures     Assessment & Plan   Diagnoses and all orders for this visit:    1. Rash (Primary)  -     hydrOXYzine (ATARAX) 25 MG tablet; Take 1 tablet by mouth 3 (Three) Times a Day As Needed for Itching, Allergies or Anxiety.  Dispense: 30 tablet; Refill: 0  -     predniSONE (DELTASONE) 10 MG (21) dose pack; Take  by mouth Daily for 6 days.  Dispense: 21 each; Refill: 0    2. Pruritus  -     hydrOXYzine (ATARAX) 25 MG tablet; Take 1 tablet by mouth 3 (Three) Times a Day As Needed for Itching, Allergies or Anxiety.  Dispense: 30 tablet; Refill: 0  -     predniSONE (DELTASONE) 10 MG (21) dose pack; Take  by mouth Daily for 6 days.  Dispense: 21 each; Refill: 0      Recommend scheduling a follow-up with your PCP, possible referral to dermatology.  If you develop any swelling of the lips/tongue/throat, shortness of breath or wheezing you should go to the ER as soon as possible for further  evaluation.    If symptoms worsen or do not improve follow up with your PCP or visit your nearest Urgent Care Center or ER.      PLAN: Discussed dosing, side effects, recommended other symptomatic care.  Patient should follow up with primary care provider, Urgent Care or ER if symptoms worsen, fail to resolve or other symptoms need attention. Patient/family agree to the above.         CLAIR Alcantar     The use of a video visit has been reviewed with the patient and verbal informed consent has been obtained. Myself and Lindsay Amezquita participated in this visit. The patient is located at [address] [place] [address]. I am located in [place], [place]. Mychart and Zoom were utilized.        This visit was performed via Telehealth.  This patient has been instructed to follow-up with their primary care provider if their symptoms worsen or the treatment provided does not resolve their illness.

## 2023-02-19 NOTE — PATIENT INSTRUCTIONS
Recommend scheduling a follow-up with your PCP, possible referral to dermatology.  If you develop any swelling of the lips/tongue/throat, shortness of breath or wheezing you should go to the ER as soon as possible for further evaluation.    If symptoms worsen or do not improve follow up with your PCP or visit your nearest Urgent Care Center or ER.

## 2023-03-01 DIAGNOSIS — N60.81 SEBACEOUS CYST OF SKIN OF RIGHT BREAST: Primary | ICD-10-CM

## 2023-03-01 RX ORDER — GUAIFENESIN 600 MG/1
1200 TABLET, EXTENDED RELEASE ORAL 2 TIMES DAILY
Qty: 60 TABLET | Refills: 1 | Status: SHIPPED | OUTPATIENT
Start: 2023-03-01

## 2023-03-01 RX ORDER — DOXYCYCLINE HYCLATE 100 MG/1
100 CAPSULE ORAL 2 TIMES DAILY
Qty: 14 CAPSULE | Refills: 0 | Status: SHIPPED | OUTPATIENT
Start: 2023-03-01 | End: 2023-03-08

## 2023-03-02 ENCOUNTER — TELEPHONE (OUTPATIENT)
Dept: INTERNAL MEDICINE | Facility: CLINIC | Age: 38
End: 2023-03-02
Payer: MEDICAID

## 2023-03-02 NOTE — TELEPHONE ENCOUNTER
"Patient called, says cyst under right breast is having pain and \"can't move R arm\", needs to know how to proceed?  "

## 2023-03-03 ENCOUNTER — TELEPHONE (OUTPATIENT)
Dept: INTERNAL MEDICINE | Facility: CLINIC | Age: 38
End: 2023-03-03
Payer: MEDICAID

## 2023-03-03 NOTE — TELEPHONE ENCOUNTER
Caller: Lindsay Amezquita    Relationship: Self    Best call back number:      [phone]     What is the best time to reach you:     ASAP    Who are you requesting to speak with (clinical staff, provider,  specific staff member):     FELICIANO CARDOZA OR NURSE    What was the call regarding:     PATIENT STATED SHE WENT TO URGENT CARE YESTERDAY FOR THE CYST ON HER BREAST    PATIENT STATED URGENT CARE SAID THE CYST WAS GETTING READY TO BURST    PATIENT STATED TODAY IT HAS GROWN TO BE LARGER THAN A GOLF BALL, AND THE SKIN AROUND THE CYST IS BRUISED    Do you require a callback:     YES

## 2023-03-03 NOTE — TELEPHONE ENCOUNTER
Rx Refill Note  Requested Prescriptions     Pending Prescriptions Disp Refills   • gabapentin (NEURONTIN) 800 MG tablet [Pharmacy Med Name: GABAPENTIN 800 MG TABLET] 90 tablet 2     Sig: TAKE 1 TABLET BY MOUTH THREE TIMES A DAY      Last office visit with prescribing clinician: 2/21/2022      Next office visit with prescribing clinician: Visit date not found            Brandy Coker MA  03/28/22, 07:17 EDT   Topical Ketoconazole Counseling: Patient counseled that this medication may cause skin irritation or allergic reactions.  In the event of skin irritation, the patient was advised to reduce the amount of the drug applied or use it less frequently.   The patient verbalized understanding of the proper use and possible adverse effects of ketoconazole.  All of the patient's questions and concerns were addressed.

## 2023-03-04 ENCOUNTER — TELEPHONE (OUTPATIENT)
Dept: INTERNAL MEDICINE | Facility: CLINIC | Age: 38
End: 2023-03-04

## 2023-03-04 NOTE — TELEPHONE ENCOUNTER
Patient called to notify Charley Robles that the cyst she is treating busted in the early morning of 03/04/2023. She said redness has gone down and liquid coming out was clear. She plans to continue antibiotic and will see Charley as scheduled on Friday 03/10 unless advise differently.

## 2023-03-06 ENCOUNTER — TELEMEDICINE (OUTPATIENT)
Dept: BEHAVIORAL HEALTH | Facility: CLINIC | Age: 38
End: 2023-03-06
Payer: MEDICAID

## 2023-03-06 DIAGNOSIS — F31.30 BIPOLAR I DISORDER, MOST RECENT EPISODE DEPRESSED: Primary | ICD-10-CM

## 2023-03-06 DIAGNOSIS — F41.1 GENERALIZED ANXIETY DISORDER: ICD-10-CM

## 2023-03-06 DIAGNOSIS — F43.10 POST TRAUMATIC STRESS DISORDER (PTSD): ICD-10-CM

## 2023-03-06 DIAGNOSIS — F51.04 PSYCHOPHYSIOLOGICAL INSOMNIA: ICD-10-CM

## 2023-03-06 PROCEDURE — 99214 OFFICE O/P EST MOD 30 MIN: CPT

## 2023-03-06 RX ORDER — ALPRAZOLAM 0.5 MG/1
0.5 TABLET ORAL 2 TIMES DAILY PRN
Qty: 60 TABLET | Refills: 1 | Status: SHIPPED | OUTPATIENT
Start: 2023-03-06 | End: 2023-03-07 | Stop reason: SDUPTHER

## 2023-03-06 RX ORDER — LURASIDONE HYDROCHLORIDE 120 MG/1
120 TABLET, FILM COATED ORAL DAILY
Qty: 30 TABLET | Refills: 1 | Status: SHIPPED | OUTPATIENT
Start: 2023-03-06

## 2023-03-06 RX ORDER — LAMOTRIGINE 100 MG/1
100 TABLET ORAL DAILY
Qty: 30 TABLET | Refills: 1 | Status: SHIPPED | OUTPATIENT
Start: 2023-03-06 | End: 2023-03-21 | Stop reason: SINTOL

## 2023-03-06 RX ORDER — ALPRAZOLAM 0.5 MG/1
0.5 TABLET ORAL 3 TIMES DAILY PRN
Qty: 90 TABLET | Refills: 1 | Status: SHIPPED | OUTPATIENT
Start: 2023-03-06 | End: 2023-03-06 | Stop reason: SDUPTHER

## 2023-03-06 RX ORDER — DESVENLAFAXINE 100 MG/1
100 TABLET, EXTENDED RELEASE ORAL DAILY
Qty: 90 TABLET | Refills: 1 | Status: SHIPPED | OUTPATIENT
Start: 2023-03-06 | End: 2023-03-21

## 2023-03-06 RX ORDER — ALPRAZOLAM 0.5 MG/1
0.5 TABLET ORAL 3 TIMES DAILY PRN
Qty: 60 TABLET | Refills: 1 | Status: SHIPPED | OUTPATIENT
Start: 2023-03-06 | End: 2023-03-06 | Stop reason: SDUPTHER

## 2023-03-06 RX ORDER — AMITRIPTYLINE HYDROCHLORIDE 150 MG/1
150 TABLET, FILM COATED ORAL
Qty: 30 TABLET | Refills: 1 | Status: SHIPPED | OUTPATIENT
Start: 2023-03-06

## 2023-03-06 RX ORDER — DESVENLAFAXINE SUCCINATE 50 MG/1
50 TABLET, EXTENDED RELEASE ORAL DAILY
Qty: 90 TABLET | Refills: 1 | Status: SHIPPED | OUTPATIENT
Start: 2023-03-06 | End: 2023-03-21

## 2023-03-06 RX ORDER — TIZANIDINE 4 MG/1
TABLET ORAL
Qty: 90 TABLET | Refills: 1 | OUTPATIENT
Start: 2023-03-06

## 2023-03-06 NOTE — PROGRESS NOTES
This provider is located at [facility] ,[address], [address] [facility] in [place], [place],using a secure MyChart Video Visit through [employer]. Patient is being seen remotely via telehealth at their home address in [place], and stated they are in a secure environment for this session. The patient's condition being diagnosed/treated is appropriate for telemedicine. The provider identified herself as well as her credentials.   The patient, and/or patients guardian, consent to be seen remotely, and when consent is given they understand that the consent allows for patient identifiable information to be sent to a third party as needed.   They may refuse to be seen remotely at any time. The electronic data is encrypted and password protected, and the patient and/or guardian has been advised of the potential risks to privacy not withstanding such measures.    Video Visit    Patient Name: Lindsay Amezquita  : 1985   MRN: [id number]   Care Team: Patient Care Team:  Charley Robles APRN as PCP - General (Family Medicine)  Donna Mcqueen APRN as Nurse Practitioner (Behavioral Health)        Chief Complaint:    Chief Complaint   Patient presents with   • Bipolar   • Anxiety   • PTSD   • Sleeping Problem   • Med Management       History of Present Illness: Lindsay Amezquita is a 37 y.o. female who presents via video visit for a medication management follow up. Pateint reports that she think she had an allergic reaction to the Prazosin and possibly the Lamictal so she has stopped taking those. Patient then goes on to states she wasn't sure if it was an allergic reaction or not. She does state that she is feeling much better and since her mom has started feeling better, her mood has improved as well. She feels that her medications are working well but does wonder about starting the Lamictal again to stabilize her mood. She denies SI/HI today.     The following portion of the  patient's history were reviewed and updated appropriately: allergies, current and past medications, family history, medical history and social history.  Subjective   Review of Systems:    Review of Systems   All other systems reviewed and are negative.      Current Medications:   Current Outpatient Medications   Medication Sig Dispense Refill   • ALPRAZolam (Xanax) 0.5 MG tablet Take 1 tablet by mouth 3 (Three) Times a Day As Needed for Anxiety. 90 tablet 1   • amitriptyline (ELAVIL) 150 MG tablet Take 1 tablet by mouth every night at bedtime. 30 tablet 1   • desvenlafaxine (PRISTIQ) 100 MG 24 hr tablet Take 1 tablet by mouth Daily. 90 tablet 1   • desvenlafaxine (Pristiq) 50 MG 24 hr tablet Take 1 tablet by mouth Daily. 90 tablet 1   • lamoTRIgine (LaMICtal) 100 MG tablet Take 1 tablet by mouth Daily. 30 tablet 1   • Lurasidone HCl (Latuda) 120 MG tablet tablet Take 1 tablet by mouth Daily. 30 tablet 1   • albuterol sulfate  (90 Base) MCG/ACT inhaler Inhale 2 puffs Every 4 (Four) Hours As Needed for Wheezing or Shortness of Air. 8 g 3   • atorvastatin (Lipitor) 40 MG tablet Take 1 tablet by mouth Every Night. 90 tablet 3   • azithromycin (Zithromax Z-Janusz) 250 MG tablet Take 2 tablets by mouth on day 1, then 1 tablet daily on days 2-5 6 tablet 0   • butalbital-acetaminophen-caffeine (Esgic) -40 MG per tablet Take 1 tablet by mouth Every 6 (Six) Hours As Needed for Migraine. 20 tablet 0   • cetirizine (zyrTEC) 10 MG tablet Take 1 tablet by mouth Daily. 30 tablet 2   • doxycycline (VIBRAMYCIN) 100 MG capsule Take 1 capsule by mouth 2 (Two) Times a Day for 7 days. 14 capsule 0   • fluticasone (FLONASE) 50 MCG/ACT nasal spray 2 sprays into the nostril(s) as directed by provider Daily. 18.2 mL 0   • gabapentin (NEURONTIN) 800 MG tablet Take 1 tablet by mouth 3 (Three) Times a Day for 14 days. Further refills should come from pain management provider 42 tablet 0   • glucose blood test strip Use as instructed  50 each 12   • glucose monitor monitoring kit Check glucose daily 1 each 0   • guaiFENesin (Mucinex) 600 MG 12 hr tablet Take 2 tablets by mouth 2 (Two) Times a Day. 60 tablet 1   • HYDROcodone-acetaminophen (NORCO) 7.5-325 MG per tablet Take 1 tablet by mouth Every 6 (Six) Hours As Needed for Moderate Pain.     • hydrOXYzine (ATARAX) 25 MG tablet Take 1 tablet by mouth 3 (Three) Times a Day As Needed for Itching, Allergies or Anxiety. 30 tablet 0   • Jardiance 10 MG tablet tablet TAKE 1 TABLET BY MOUTH EVERY DAY 30 tablet 3   • Lancets Misc. (ONE TOUCH SURESOFT) misc Check blood sugar once daily 1 each 0   • Lancets misc Check fasting glucose daily and 1 hour after largest meal of day. 100 each 3   • ondansetron ODT (ZOFRAN-ODT) 4 MG disintegrating tablet Place 1 tablet on the tongue 4 (Four) Times a Day As Needed for Nausea. 20 tablet 0   • Probiotic Product (PROBIOTIC PO) Take  by mouth.     • promethazine (PHENERGAN) 12.5 MG tablet Take 1 tablet by mouth Every 6 (Six) Hours As Needed for Nausea or Vomiting. 10 tablet 0   • promethazine-dextromethorphan (PROMETHAZINE-DM) 6.25-15 MG/5ML syrup Take 5 mL by mouth At Night As Needed for Cough. 100 mL 0   • Rimegepant Sulfate (Nurtec) 75 MG tablet dispersible tablet Take 1 tablet by mouth Every Other Day. Take Monday,Wednesday,Friday, and Saturday. 16 tablet 11   • tiZANidine (ZANAFLEX) 4 MG tablet Take 1 tablet by mouth Every 8 (Eight) Hours As Needed for Muscle Spasms. Further refills should come from pain management. 90 tablet 0   • Umeclidinium-Vilanterol (Anoro Ellipta) 62.5-25 MCG/ACT aerosol powder  inhaler Inhale 1 puff Daily. 60 each 12   • vitamin B-12 (CYANOCOBALAMIN) 500 MCG tablet Take 1,000 mcg by mouth Daily. 1250mcg     • vitamin D (ERGOCALCIFEROL) 1.25 MG (85991 UT) capsule capsule TAKE 1 CAPSULE BY MOUTH 1 TIME PER WEEK. 6 capsule 0     No current facility-administered medications for this visit.       Mental Status Exam:   Hygiene:   unable to  assess  Cooperation:  Cooperative  Eye Contact:  Good  Psychomotor Behavior:  unable to assess  Affect:  Appropriate  Mood: normal  Speech:  Normal  Thought Process:  Goal directed and Linear  Thought Content:  Normal and Mood congruent  Suicidal:  None  Homicidal:  None  Hallucinations:  None  Delusion:  None  Memory:  Intact  Orientation:  Person, Place, Time and Situation  Reliability:  good  Insight:  Good  Judgement:  Good  Impulse Control:  Good  Physical/Medical Issues:  Yes see chart     Objective   Vital Signs:   There were no vitals taken for this visit.      Assessment / Plan    Diagnoses and all orders for this visit:    1. Bipolar I disorder, most recent episode depressed (HCC) (Primary)  -     desvenlafaxine (PRISTIQ) 100 MG 24 hr tablet; Take 1 tablet by mouth Daily.  Dispense: 90 tablet; Refill: 1  -     desvenlafaxine (Pristiq) 50 MG 24 hr tablet; Take 1 tablet by mouth Daily.  Dispense: 90 tablet; Refill: 1  -     lamoTRIgine (LaMICtal) 100 MG tablet; Take 1 tablet by mouth Daily.  Dispense: 30 tablet; Refill: 1  -     Lurasidone HCl (Latuda) 120 MG tablet tablet; Take 1 tablet by mouth Daily.  Dispense: 30 tablet; Refill: 1    2. Generalized anxiety disorder  -     ALPRAZolam (Xanax) 0.5 MG tablet; Take 1 tablet by mouth 3 (Three) Times a Day As Needed for Anxiety.  Dispense: 90 tablet; Refill: 1  -     desvenlafaxine (PRISTIQ) 100 MG 24 hr tablet; Take 1 tablet by mouth Daily.  Dispense: 90 tablet; Refill: 1  -     desvenlafaxine (Pristiq) 50 MG 24 hr tablet; Take 1 tablet by mouth Daily.  Dispense: 90 tablet; Refill: 1    3. Post traumatic stress disorder (PTSD)  -     amitriptyline (ELAVIL) 150 MG tablet; Take 1 tablet by mouth every night at bedtime.  Dispense: 30 tablet; Refill: 1    4. Psychophysiological insomnia  -     amitriptyline (ELAVIL) 150 MG tablet; Take 1 tablet by mouth every night at bedtime.  Dispense: 30 tablet; Refill: 1      Patient will re-start Lamictal slowly and work way  back up to 100 mg BID and will monitor for side effects. Continue all other medication as ordered.     A psychological evaluation was conducted in order to assess past and current level of functioning. Areas assessed included, but were not limited to: perception of social support, perception of ability to face and deal with challenges in life (positive functioning), anxiety symptoms, depressive symptoms, perspective on beliefs/belief system, coping skills for stress, intelligence level,  Therapeutic rapport was established. Interventions conducted today were geared towards incorporating medication management along with support for continued therapy. Education was also provided as to the med management with this provider and what to expect in subsequent sessions.      We discussed risks, benefits, goals and side effects of the above medication and the patient was agreeable with the plan. Patient was educated on the importance of compliance with treatment and follow-up appointments. Patient is aware to contact the [facility] with any worsening of symptoms. To call for questions or concerns and return early if necessary. Patent is agreeable to go to the Emergency Department or call 911 should they begin SI/HI.        MEDS ORDERED DURING VISIT:  New Medications Ordered This Visit   Medications   • ALPRAZolam (Xanax) 0.5 MG tablet     Sig: Take 1 tablet by mouth 3 (Three) Times a Day As Needed for Anxiety.     Dispense:  90 tablet     Refill:  1   • amitriptyline (ELAVIL) 150 MG tablet     Sig: Take 1 tablet by mouth every night at bedtime.     Dispense:  30 tablet     Refill:  1   • desvenlafaxine (PRISTIQ) 100 MG 24 hr tablet     Sig: Take 1 tablet by mouth Daily.     Dispense:  90 tablet     Refill:  1   • desvenlafaxine (Pristiq) 50 MG 24 hr tablet     Sig: Take 1 tablet by mouth Daily.     Dispense:  90 tablet     Refill:  1   • lamoTRIgine (LaMICtal) 100 MG tablet     Sig: Take 1 tablet by mouth Daily.      Dispense:  30 tablet     Refill:  1   • Lurasidone HCl (Latuda) 120 MG tablet tablet     Sig: Take 1 tablet by mouth Daily.     Dispense:  30 tablet     Refill:  1         Follow Up   Return in about 8 weeks (around 5/1/2023).  Patient was given instructions and counseling regarding her condition or for health maintenance advice. Please see specific information pulled into the AVS if appropriate.     TREATMENT PLAN/GOALS: Continue supportive psychotherapy efforts and medications as indicated. Treatment and medication options discussed during today's visit. Patient acknowledged and verbally consented to continue with current treatment plan and was educated on the importance of compliance with treatment and follow-up appointments.    MEDICATION ISSUES:  Discussed medication options and treatment plan of prescribed medication as well as the risks, benefits, and side effects including potential falls, possible impaired driving and metabolic adversities among others. Patient is agreeable to call the office with any worsening of symptoms or onset of side effects. Patient is agreeable to call 911 or go to the nearest ER should he/she begin having SI/HI.        [facility] [address] [place] [address]  [phone]    March 6, 2023 11:51 EST

## 2023-03-07 ENCOUNTER — TELEPHONE (OUTPATIENT)
Dept: INTERNAL MEDICINE | Facility: CLINIC | Age: 38
End: 2023-03-07
Payer: MEDICAID

## 2023-03-07 ENCOUNTER — PRIOR AUTHORIZATION (OUTPATIENT)
Dept: INTERNAL MEDICINE | Facility: CLINIC | Age: 38
End: 2023-03-07
Payer: MEDICAID

## 2023-03-07 DIAGNOSIS — F41.1 GENERALIZED ANXIETY DISORDER: ICD-10-CM

## 2023-03-07 RX ORDER — ALPRAZOLAM 0.5 MG/1
0.5 TABLET ORAL 3 TIMES DAILY PRN
Qty: 90 TABLET | Refills: 1 | Status: SHIPPED | OUTPATIENT
Start: 2023-03-07 | End: 2023-03-07 | Stop reason: SDUPTHER

## 2023-03-07 RX ORDER — ALPRAZOLAM 0.5 MG/1
0.5 TABLET ORAL 3 TIMES DAILY PRN
Qty: 90 TABLET | Refills: 1 | Status: SHIPPED | OUTPATIENT
Start: 2023-03-07 | End: 2024-03-06

## 2023-03-07 NOTE — TELEPHONE ENCOUNTER
Key: [id number]    Outcome  Approved today    The request has been approved. The authorization is effective for a maximum of 6 fills from 03/07/2023 to 09/06/2023, as long as the member is enrolled in their current health plan. The request was approved as submitted. A written notification letter will follow with additional details. To ensure expedient processing, please note that PDF attachments are preferred.  Drug  ALPRAZolam 0.5MG tablets  Form  [employer] [place] Medicaid ePA Form 2017 [place]CP[place]

## 2023-03-07 NOTE — TELEPHONE ENCOUNTER
Caller: Alirio Lindsay ADELA    Relationship: Self    Best call back number:    [phone]          Requested Prescriptions:   Requested Prescriptions     Pending Prescriptions Disp Refills   • promethazine (PHENERGAN) 12.5 MG tablet 10 tablet 0     Sig: Take 1 tablet by mouth Every 6 (Six) Hours As Needed for Nausea or Vomiting.        Pharmacy where request should be sent: [facility]/PHARMACY #[id number] - [address] [phone] [place] [phone] FX     Does the patient have less than a 3 day supply:  [x] Yes  [] No    Would you like a call back once the refill request has been completed: [x] Yes [] No    If the office needs to give you a call back, can they leave a voicemail: [x] Yes [] No    Kim Burt Rep   03/07/23 15:17 EST

## 2023-03-07 NOTE — TELEPHONE ENCOUNTER
The pharmacy did get the xanax yesterday but the pharmacy did not fill because she has to put take 2 instead of 3.  Please call pharmacy and clarify so they will fill.

## 2023-03-08 RX ORDER — ALBUTEROL SULFATE 90 UG/1
AEROSOL, METERED RESPIRATORY (INHALATION)
Qty: 18 G | Refills: 0 | Status: SHIPPED | OUTPATIENT
Start: 2023-03-08

## 2023-03-08 RX ORDER — PROMETHAZINE HYDROCHLORIDE 12.5 MG/1
12.5 TABLET ORAL EVERY 6 HOURS PRN
Qty: 10 TABLET | Refills: 0 | Status: SHIPPED | OUTPATIENT
Start: 2023-03-08 | End: 2023-04-06 | Stop reason: SDUPTHER

## 2023-03-08 NOTE — TELEPHONE ENCOUNTER
Rx Refill Note  Requested Prescriptions     Pending Prescriptions Disp Refills   • promethazine (PHENERGAN) 12.5 MG tablet 10 tablet 0     Sig: Take 1 tablet by mouth Every 6 (Six) Hours As Needed for Nausea or Vomiting.      Last office visit with prescribing clinician: 2/14/2023   Last telemedicine visit with prescribing clinician: 3/8/2023   Next office visit with prescribing clinician: 3/10/2023     Bere Spain MA  03/08/23, 13:24 EST

## 2023-03-10 ENCOUNTER — TELEMEDICINE (OUTPATIENT)
Dept: FAMILY MEDICINE CLINIC | Facility: TELEHEALTH | Age: 38
End: 2023-03-10
Payer: MEDICAID

## 2023-03-10 ENCOUNTER — TELEPHONE (OUTPATIENT)
Dept: INTERNAL MEDICINE | Facility: CLINIC | Age: 38
End: 2023-03-10
Payer: MEDICAID

## 2023-03-10 DIAGNOSIS — J01.00 ACUTE NON-RECURRENT MAXILLARY SINUSITIS: Primary | ICD-10-CM

## 2023-03-10 PROCEDURE — 1160F RVW MEDS BY RX/DR IN RCRD: CPT | Performed by: NURSE PRACTITIONER

## 2023-03-10 PROCEDURE — 1159F MED LIST DOCD IN RCRD: CPT | Performed by: NURSE PRACTITIONER

## 2023-03-10 PROCEDURE — 99213 OFFICE O/P EST LOW 20 MIN: CPT | Performed by: NURSE PRACTITIONER

## 2023-03-10 RX ORDER — AZITHROMYCIN 250 MG/1
TABLET, FILM COATED ORAL
Qty: 6 TABLET | Refills: 0 | Status: SHIPPED | OUTPATIENT
Start: 2023-03-10 | End: 2023-03-21

## 2023-03-10 RX ORDER — FLUTICASONE PROPIONATE 50 MCG
2 SPRAY, SUSPENSION (ML) NASAL DAILY
Qty: 16 G | Refills: 0 | Status: SHIPPED | OUTPATIENT
Start: 2023-03-10 | End: 2023-03-26

## 2023-03-10 RX ORDER — BENZONATATE 100 MG/1
CAPSULE ORAL
Qty: 30 CAPSULE | Refills: 0 | Status: SHIPPED | OUTPATIENT
Start: 2023-03-10 | End: 2023-03-21

## 2023-03-10 NOTE — TELEPHONE ENCOUNTER
Pt is stressed out and believes she is in manic mode and is nauseated. Pt has taken her medication and it is not helping. Please advise.

## 2023-03-11 DIAGNOSIS — F43.10 POST TRAUMATIC STRESS DISORDER (PTSD): ICD-10-CM

## 2023-03-11 DIAGNOSIS — F51.04 PSYCHOPHYSIOLOGICAL INSOMNIA: ICD-10-CM

## 2023-03-11 NOTE — PATIENT INSTRUCTIONS
Sinusitis, Adult  Sinusitis is inflammation of your sinuses. Sinuses are hollow spaces in the bones around your face. Your sinuses are located:  Around your eyes.  In the middle of your forehead.  Behind your nose.  In your cheekbones.  Mucus normally drains out of your sinuses. When your nasal tissues become inflamed or swollen, mucus can become trapped or blocked. This allows bacteria, viruses, and fungi to grow, which leads to infection. Most infections of the sinuses are caused by a virus.  Sinusitis can develop quickly. It can last for up to 4 weeks (acute) or for more than 12 weeks (chronic). Sinusitis often develops after a cold.  What are the causes?  This condition is caused by anything that creates swelling in the sinuses or stops mucus from draining. This includes:  Allergies.  Asthma.  Infection from bacteria or viruses.  Deformities or blockages in your nose or sinuses.  Abnormal growths in the nose (nasal polyps).  Pollutants, such as chemicals or irritants in the air.  Infection from fungi (rare).  What increases the risk?  You are more likely to develop this condition if you:  Have a weak body defense system (immune system).  Do a lot of swimming or diving.  Overuse nasal sprays.  Smoke.  What are the signs or symptoms?  The main symptoms of this condition are pain and a feeling of pressure around the affected sinuses. Other symptoms include:  Stuffy nose or congestion.  Thick drainage from your nose.  Swelling and warmth over the affected sinuses.  Headache.  Upper toothache.  A cough that may get worse at night.  Extra mucus that collects in the throat or the back of the nose (postnasal drip).  Decreased sense of smell and taste.  Fatigue.  A fever.  Sore throat.  Bad breath.  How is this diagnosed?  This condition is diagnosed based on:  Your symptoms.  Your medical history.  A physical exam.  Tests to find out if your condition is acute or chronic. This may include:  Checking your nose for nasal  polyps.  Viewing your sinuses using a device that has a light (endoscope).  Testing for allergies or bacteria.  Imaging tests, such as an MRI or CT scan.  In rare cases, a bone biopsy may be done to rule out more serious types of fungal sinus disease.  How is this treated?  Treatment for sinusitis depends on the cause and whether your condition is chronic or acute.  If caused by a virus, your symptoms should go away on their own within 10 days. You may be given medicines to relieve symptoms. They include:  Medicines that shrink swollen nasal passages (topical intranasal decongestants).  Medicines that treat allergies (antihistamines).  A spray that eases inflammation of the nostrils (topical intranasal corticosteroids).  Rinses that help get rid of thick mucus in your nose (nasal saline washes).  If caused by bacteria, your health care provider may recommend waiting to see if your symptoms improve. Most bacterial infections will get better without antibiotic medicine. You may be given antibiotics if you have:  A severe infection.  A weak immune system.  If caused by narrow nasal passages or nasal polyps, you may need to have surgery.  Follow these instructions at home:  Medicines  Take, use, or apply over-the-counter and prescription medicines only as told by your health care provider. These may include nasal sprays.  If you were prescribed an antibiotic medicine, take it as told by your health care provider. Do not stop taking the antibiotic even if you start to feel better.  Hydrate and humidify    Drink enough fluid to keep your urine pale yellow. Staying hydrated will help to thin your mucus.  Use a cool mist humidifier to keep the humidity level in your home above 50%.  Inhale steam for 10-15 minutes, 3-4 times a day, or as told by your health care provider. You can do this in the bathroom while a hot shower is running.  Limit your exposure to cool or dry air.  Rest  Rest as much as possible.  Sleep with your  head raised (elevated).  Make sure you get enough sleep each night.  General instructions    Apply a warm, moist washcloth to your face 3-4 times a day or as told by your health care provider. This will help with discomfort.  Wash your hands often with soap and water to reduce your exposure to germs. If soap and water are not available, use hand .  Do not smoke. Avoid being around people who are smoking (secondhand smoke).  Keep all follow-up visits as told by your health care provider. This is important.  Contact a health care provider if:  You have a fever.  Your symptoms get worse.  Your symptoms do not improve within 10 days.  Get help right away if:  You have a severe headache.  You have persistent vomiting.  You have severe pain or swelling around your face or eyes.  You have vision problems.  You develop confusion.  Your neck is stiff.  You have trouble breathing.  Summary  Sinusitis is soreness and inflammation of your sinuses. Sinuses are hollow spaces in the bones around your face.  This condition is caused by nasal tissues that become inflamed or swollen. The swelling traps or blocks the flow of mucus. This allows bacteria, viruses, and fungi to grow, which leads to infection.  If you were prescribed an antibiotic medicine, take it as told by your health care provider. Do not stop taking the antibiotic even if you start to feel better.  Keep all follow-up visits as told by your health care provider. This is important.  This information is not intended to replace advice given to you by your health care provider. Make sure you discuss any questions you have with your health care provider.  Document Revised: 05/20/2019 Document Reviewed: 05/20/2019  [employer] Patient Education © 2022 [employer]     fall/lacerations

## 2023-03-13 DIAGNOSIS — J30.9 ALLERGIC RHINITIS, UNSPECIFIED SEASONALITY, UNSPECIFIED TRIGGER: ICD-10-CM

## 2023-03-13 RX ORDER — AMITRIPTYLINE HYDROCHLORIDE 150 MG/1
TABLET, FILM COATED ORAL
Qty: 90 TABLET | Refills: 1 | OUTPATIENT
Start: 2023-03-13

## 2023-03-13 RX ORDER — LEVOCETIRIZINE DIHYDROCHLORIDE 5 MG/1
TABLET, FILM COATED ORAL
Qty: 90 TABLET | Refills: 1 | OUTPATIENT
Start: 2023-03-13

## 2023-03-15 ENCOUNTER — TELEPHONE (OUTPATIENT)
Dept: INTERNAL MEDICINE | Facility: CLINIC | Age: 38
End: 2023-03-15
Payer: MEDICAID

## 2023-03-15 NOTE — TELEPHONE ENCOUNTER
Spoke with patient and informed her that we will need to discuss this further at her next appointment. This may not be an option due to insurance.

## 2023-03-21 ENCOUNTER — OFFICE VISIT (OUTPATIENT)
Dept: INTERNAL MEDICINE | Facility: CLINIC | Age: 38
End: 2023-03-21
Payer: MEDICAID

## 2023-03-21 VITALS
SYSTOLIC BLOOD PRESSURE: 128 MMHG | TEMPERATURE: 97.8 F | BODY MASS INDEX: 49.47 KG/M2 | OXYGEN SATURATION: 96 % | DIASTOLIC BLOOD PRESSURE: 88 MMHG | WEIGHT: 268.8 LBS | HEIGHT: 62 IN | HEART RATE: 114 BPM

## 2023-03-21 DIAGNOSIS — E11.65 TYPE 2 DIABETES MELLITUS WITH HYPERGLYCEMIA, WITHOUT LONG-TERM CURRENT USE OF INSULIN: ICD-10-CM

## 2023-03-21 DIAGNOSIS — L73.2 HIDRADENITIS SUPPURATIVA: Primary | ICD-10-CM

## 2023-03-21 DIAGNOSIS — R10.2 ACUTE SUPRAPUBIC PAIN: ICD-10-CM

## 2023-03-21 PROCEDURE — 1160F RVW MEDS BY RX/DR IN RCRD: CPT | Performed by: NURSE PRACTITIONER

## 2023-03-21 PROCEDURE — 99214 OFFICE O/P EST MOD 30 MIN: CPT | Performed by: NURSE PRACTITIONER

## 2023-03-21 PROCEDURE — 1159F MED LIST DOCD IN RCRD: CPT | Performed by: NURSE PRACTITIONER

## 2023-03-21 RX ORDER — LEVOCETIRIZINE DIHYDROCHLORIDE 5 MG/1
5 TABLET, FILM COATED ORAL EVERY EVENING
COMMUNITY
End: 2023-03-22 | Stop reason: SDUPTHER

## 2023-03-21 RX ORDER — CLINDAMYCIN PHOSPHATE 10 MG/G
1 GEL TOPICAL 2 TIMES DAILY
Qty: 60 G | Refills: 1 | Status: SHIPPED | OUTPATIENT
Start: 2023-03-21

## 2023-03-21 NOTE — PROGRESS NOTES
"  Office Visit      Patient Name: Lindsay Amezquita  : 1985   MRN: [id number]   Care Team: Patient Care Team:  Charley Robles APRN as PCP - General (Family Medicine)  Donna Mcqueen APRN as Nurse Practitioner (Behavioral Health)    Chief Complaint  Cyst (Right side under breast)    Subjective     Subjective      Lindsay Amezquita presents to [facility] for follow-up of cyst and abdominal pain.  Treated with doxycycline for sebaceous cyst under the right breast, she did finish the full medication regimen and used warm compresses.  Cyst opened up naturally and drained.  It did have some foul-smelling contents.  It has healed up but she does have concerns of other cystic lesions and worried they are going to get bigger.  She is using warm compresses on these lesions.  She denies any drainage from them currently but does have pain with palpation.  They are located in the groin and under the breast.  She also has vague complaints that are chronic in nature of abdominal pain.  Symptoms are located in the right suprapubic region.  She denies dysuria, hematuria, urgency, and frequency.  She does have heavy periods when she actually has been, they are not regular and she can go many months without 1.  She has not had any ultrasound of her ovaries.  She did have a CT tap in February that was essentially normal with no acute pathology.  Symptoms have not really changed since then.  She does have nausea at times with vomiting.  No fever, chills, constipation, or blood in the stool.    Objective     Objective   Vital Signs:   /88   Pulse 114   Temp 97.8 °F (36.6 °C)   Ht 157.5 cm (62.01\")   Wt 122 kg (268 lb 12.8 oz)   SpO2 96%   BMI 49.15 kg/m²     Physical Exam  Vitals and nursing note reviewed.   Constitutional:       General: She is not in acute distress.     Appearance: Normal appearance. She is obese. She is not toxic-appearing.   Eyes:      Pupils: Pupils are equal, " round, and reactive to light.   Neck:      Vascular: No carotid bruit.   Cardiovascular:      Rate and Rhythm: Regular rhythm. Tachycardia present.      Heart sounds: Normal heart sounds. No murmur heard.  Pulmonary:      Effort: Pulmonary effort is normal. No respiratory distress.      Breath sounds: Normal breath sounds. No wheezing.   Abdominal:      General: Bowel sounds are normal. There is no distension.      Palpations: Abdomen is soft.      Tenderness: There is abdominal tenderness (Right suprapubic region).   Musculoskeletal:      Cervical back: Neck supple. No tenderness.   Skin:     General: Skin is warm and dry.      Findings: Lesion (cyst under the right breast, tender to touch but this is not firm or draining.  I do not endorse any other cystic lesions of concern in the areas she is showing me.  There is no erythema, warmthdoes tenderness with palpation of skin of the left groin) present. No rash.   Neurological:      General: No focal deficit present.      Mental Status: She is alert.   Psychiatric:         Mood and Affect: Mood is anxious.         Behavior: Behavior normal.       Assessment / Plan      Assessment & Plan   Problem List Items Addressed This Visit    None  Visit Diagnoses     Hidradenitis suppurativa    -  Primary    Relevant Medications    clindamycin (Clindagel) 1 % gel    I see healing cyst but no active cyst, healing quite well.  Given Clindagel to use as needed twice daily.  If becoming a more frequent problem will refer to dermatology and consider chronic doxycycline.  I do not feel antibiotics are necessary at this time she has been on several regimens lately which is likely contributing to her abdominal pain.    Type 2 diabetes mellitus with hyperglycemia, without long-term current use of insulin ([facility])        Has not been monitoring glucose at home but is compliant with Jardiance.  She will follow-up in a month for A1c check.  Continue Jardiance at current dose, diabetic diet,  and moderate intensity exercise as tolerated.  Foot exam to be performed at next visit.    Acute suprapubic pain        Relevant Orders    US Non-ob Transvaginal    This is a chronic problem that waxes and wanes.  I suspect underlying PCOS and differential includes uterine fibroids.  We will perform TVUS to rule out.  CTAP from 1 month ago reviewed with her today.  Recommend heat, NSAIDs as needed, and ER with worsening symptoms.        Follow Up   Return in about 4 weeks (around 4/18/2023) for Next scheduled follow up.  Patient was given instructions and counseling regarding her condition or for health maintenance advice. Please see specific information pulled into the AVS if appropriate.     CLAIR Lindsay  [facility]  Answers for HPI/ROS submitted by the patient on 3/15/2023  Please describe your symptoms.: Its for a regular check up for the boil that was under right breadth and to talk to her about somethingd  Have you had these symptoms before?: Yes  How long have you been having these symptoms?: 5-7 days  What is the primary reason for your visit?: Other

## 2023-03-22 NOTE — TELEPHONE ENCOUNTER
Caller: Lindsay Amezquita    Relationship: Self    Best call back number: [phone]  Requested Prescriptions:   Requested Prescriptions     Pending Prescriptions Disp Refills   • levocetirizine (XYZAL) 5 MG tablet       Sig: Take 1 tablet by mouth Every Evening.        Pharmacy where request should be sent: [facility]/PHARMACY #[id number] [address] [phone] [facility] [phone]      Last office visit with prescribing clinician: 3/21/2023   Last telemedicine visit with prescribing clinician: 4/21/2023   Next office visit with prescribing clinician: 4/21/2023     Additional details provided by patient: OUT OF MEDICATION     Does the patient have less than a 3 day supply:  [x] Yes  [] No    Would you like a call back once the refill request has been completed: [x] Yes [] No    If the office needs to give you a call back, can they leave a voicemail: [x] Yes [] No    Kim Ramos Rep   03/22/23 17:11 EDT

## 2023-03-23 ENCOUNTER — TELEPHONE (OUTPATIENT)
Dept: INTERNAL MEDICINE | Facility: CLINIC | Age: 38
End: 2023-03-23

## 2023-03-23 DIAGNOSIS — E11.65 TYPE 2 DIABETES MELLITUS WITH HYPERGLYCEMIA, WITHOUT LONG-TERM CURRENT USE OF INSULIN: ICD-10-CM

## 2023-03-23 RX ORDER — LEVOCETIRIZINE DIHYDROCHLORIDE 5 MG/1
5 TABLET, FILM COATED ORAL EVERY EVENING
Qty: 90 TABLET | Refills: 3 | Status: SHIPPED | OUTPATIENT
Start: 2023-03-23

## 2023-03-23 NOTE — TELEPHONE ENCOUNTER
Caller: MARANDA    Relationship: GUCCI Cho call back number: [phone]    Requested Prescriptions:   Requested Prescriptions     Pending Prescriptions Disp Refills   • glucose monitor monitoring kit 1 each 0     Sig: Check glucose daily        Pharmacy where request should be sent: [facility]ACY #[id number] - [place], [place] - [address] [facility] [phone] [facility] [phone]      Last office visit with prescribing clinician: 3/21/2023   Last telemedicine visit with prescribing clinician: 4/21/2023   Next office visit with prescribing clinician: 4/21/2023     Additional details provided by patient: AGENT FROM [employer] INSURANCE STATED THAT PATIENT WOULD NEED NEW FREE STYLE LITE METER AND SUPPLIES SENT TO PHARMACY         Does the patient have less than a 3 day supply:  [x] Yes  [] No    Would you like a call back once the refill request has been completed: [x] Yes [] No    If the office needs to give you a call back, can they leave a voicemail: [x] Yes [] No    Kim Johnson Rep   03/23/23 16:42 EDT

## 2023-03-23 NOTE — TELEPHONE ENCOUNTER
Caller: Lindsay Amezquita    Relationship: Self    Best call back number: [phone]    What is the best time to reach you: ANYTIME    Who are you requesting to speak with (clinical staff, provider,  specific staff member): PROVIDER    What was the call regarding: WOULD LIKE TO DISCUSS INCREASING MEDICATION TIZANIDINE     Do you require a callback: YES

## 2023-03-23 NOTE — TELEPHONE ENCOUNTER
Rx Refill Note  Requested Prescriptions     Pending Prescriptions Disp Refills   • levocetirizine (XYZAL) 5 MG tablet       Sig: Take 1 tablet by mouth Every Evening.      Last office visit with prescribing clinician: 3/21/2023   Last telemedicine visit with prescribing clinician:   Next office visit with prescribing clinician: 4/21/2023  Last labs 02/2023  \    Roxane Mcpherson MA  03/23/23, 12:02 EDT

## 2023-03-24 ENCOUNTER — TELEPHONE (OUTPATIENT)
Dept: INTERNAL MEDICINE | Facility: CLINIC | Age: 38
End: 2023-03-24

## 2023-03-24 RX ORDER — BLOOD-GLUCOSE METER
KIT MISCELLANEOUS
Qty: 1 EACH | Refills: 0 | Status: SHIPPED | OUTPATIENT
Start: 2023-03-24

## 2023-03-24 NOTE — TELEPHONE ENCOUNTER
Caller: Lindsay Amezquita    Relationship: Self    Best call back number: [phone]    What is the best time to reach you: ANYTIME    Who are you requesting to speak with (clinical staff, provider,  specific staff member): CLINICAL STAFF    What was the call regarding: PATIENT IS FOLLOWING UP ON MESSGAE THAT SHE LEFT YESTERDAY REGARDING AN INCREASE IN MEDICATION    Do you require a callback: YES

## 2023-03-24 NOTE — TELEPHONE ENCOUNTER
Caller: Lindsay Amezquita    Relationship: Self    Best call back number: [phone]    What medication are you requesting: A HIGHER DOSAGE IN THE TIZANIDINE 4MG     What are your current symptoms: SEVERE CRAMPS IN MID BACK DOWN TO LEGS, FEET AND TOES    How long have you been experiencing symptoms: 3/23/23    Have you had these symptoms before:    [x] Yes  [] No    Have you been treated for these symptoms before:   [x] Yes  [] No    If a prescription is needed, what is your preferred pharmacy and phone number: [facility]/PHARMACY #[id number] - [address] [phone] [facility] [phone]      Additional notes: PATIENT SAID THAT SHE IS CURRENTLY 3 A DAY AND IS STILL IN A LOT OF PAIN PLEASE ADVISE PATIENT IF THIS CAN BE DONE  PLEASE CALL PATIENT WHEN THIS HAS BEEN SENT IN

## 2023-03-26 ENCOUNTER — TELEMEDICINE (OUTPATIENT)
Dept: FAMILY MEDICINE CLINIC | Facility: TELEHEALTH | Age: 38
End: 2023-03-26
Payer: MEDICAID

## 2023-03-26 DIAGNOSIS — N93.9 EPISODE OF HEAVY VAGINAL BLEEDING: ICD-10-CM

## 2023-03-26 DIAGNOSIS — R10.30 LOWER ABDOMINAL PAIN: Primary | ICD-10-CM

## 2023-03-26 PROCEDURE — 1159F MED LIST DOCD IN RCRD: CPT | Performed by: NURSE PRACTITIONER

## 2023-03-26 PROCEDURE — 99213 OFFICE O/P EST LOW 20 MIN: CPT | Performed by: NURSE PRACTITIONER

## 2023-03-26 PROCEDURE — 1160F RVW MEDS BY RX/DR IN RCRD: CPT | Performed by: NURSE PRACTITIONER

## 2023-03-26 RX ORDER — BLOOD-GLUCOSE METER
KIT MISCELLANEOUS
COMMUNITY
Start: 2023-03-24

## 2023-03-27 NOTE — PROGRESS NOTES
You have chosen to receive care through a telehealth visit.  Do you consent to use a video/audio connection for your medical care today? Yes     CHIEF COMPLAINT  Chief Complaint   Patient presents with   • Abdominal Pain         HPI  Lindsay Amezquita is a 37 y.o. female  presents with complaint of heavy vaginal bleeding with large clots and bad abdominal pain. Reports symptoms for 3 days, Reports the pain is radiating to the back. Reports she is so uncomfortable that she has tried IBU, pain medication and hot baths. No fever. + chills. + nausea. No vomiting. Reports not sexually active and denies pregnancy.     Review of Systems   Constitutional: Positive for chills. Negative for fatigue and fever.   HENT: Negative for congestion, ear discharge, ear pain, sinus pressure, sinus pain and sore throat.    Respiratory: Negative for cough, chest tightness, shortness of breath and wheezing.    Cardiovascular: Negative for chest pain.   Gastrointestinal: Positive for abdominal pain and nausea. Negative for diarrhea and vomiting.   Genitourinary: Positive for menstrual problem and vaginal bleeding. Negative for dysuria and frequency.   Musculoskeletal: Positive for back pain. Negative for myalgias.   Neurological: Negative for dizziness and headaches.   Psychiatric/Behavioral: Negative.        Past Medical History:   Diagnosis Date   • Allergic    • Anxiety    • Back pain    • Bell's palsy    • Bipolar 2 disorder (HCC)    • Blood clot in vein    • Deep vein thrombosis (HCC) Last year   • Depression    • Diabetes mellitus (HCC) November    Pre diabete   • Frequent headaches    • GERD (gastroesophageal reflux disease)    • Irritable bowel syndrome Two years ago   • Migraine    • Panic    • PTSD (post-traumatic stress disorder)    • Restless leg syndrome    • Sinus problem    • Snores    • Tattoos    • Vitamin B12 deficiency        Family History   Problem Relation Age of Onset   • Irritable bowel syndrome Mother    • Heart  disease Mother    • Miscarriages / Stillbirths Mother    • Irritable bowel syndrome Father    • Alcohol abuse Father    • Diabetes Father    • Hyperlipidemia Father    • Colon cancer Maternal Grandfather        Social History     Socioeconomic History   • Marital status: Single   Tobacco Use   • Smoking status: Every Day     Packs/day: 1.50     Years: 29.00     Pack years: 43.50     Types: Cigarettes     Start date: 1/1/1995   • Smokeless tobacco: Never   Vaping Use   • Vaping Use: Former   Substance and Sexual Activity   • Alcohol use: Not Currently   • Drug use: Not Currently   • Sexual activity: Not Currently     Partners: Female     Birth control/protection: None, Same-sex partner       Lindsay Amezquita  reports that she has been smoking cigarettes. She started smoking about 28 years ago. She has a 43.50 pack-year smoking history. She has never used smokeless tobacco.. I have educated her on the risk of diseases from using tobacco products such as cancer, COPD and heart disease.     I advised her to quit and she is cutting back and trying to quit    I spent 1 minutes counseling the patient.              LMP 03/24/2023   Breastfeeding No     PHYSICAL EXAM  Physical Exam   Constitutional: She is oriented to person, place, and time. She appears well-developed and well-nourished. No distress.   HENT:   Head: Normocephalic and atraumatic.   Right Ear: Hearing normal.   Left Ear: Hearing normal.   Nose: No congestion.   Mouth/Throat: Mouth/Lips are normal.  Eyes: Conjunctivae and lids are normal.   Pulmonary/Chest: Effort normal.  No respiratory distress.  Abdominal: There is abdominal tenderness in the right lower quadrant and left lower quadrant.   Neurological: She is alert and oriented to person, place, and time.   Psychiatric: She has a normal mood and affect. Her speech is normal and behavior is normal.       Results for orders placed or performed during the hospital encounter of 02/10/23   COVID-19 and FLU A/B  PCR - Swab, Nasopharynx    Specimen: Nasopharynx; Swab   Result Value Ref Range    COVID19 Not Detected Not Detected - Ref. Range    Influenza A PCR Not Detected Not Detected    Influenza B PCR Not Detected Not Detected   Comprehensive Metabolic Panel    Specimen: Blood   Result Value Ref Range    Glucose 204 (H) 65 - 99 mg/dL    BUN 4 (L) 6 - 20 mg/dL    Creatinine 0.60 0.57 - 1.00 mg/dL    Sodium 138 136 - 145 mmol/L    Potassium 3.2 (L) 3.5 - 5.2 mmol/L    Chloride 97 (L) 98 - 107 mmol/L    CO2 26.1 22.0 - 29.0 mmol/L    Calcium 9.1 8.6 - 10.5 mg/dL    Total Protein 6.2 6.0 - 8.5 g/dL    Albumin 3.7 3.5 - 5.2 g/dL    ALT (SGPT) 21 1 - 33 U/L    AST (SGOT) 18 1 - 32 U/L    Alkaline Phosphatase 90 39 - 117 U/L    Total Bilirubin 0.2 0.0 - 1.2 mg/dL    Globulin 2.5 gm/dL    A/G Ratio 1.5 g/dL    BUN/Creatinine Ratio 6.7 (L) 7.0 - 25.0    Anion Gap 14.9 5.0 - 15.0 mmol/L    eGFR 118.0 >60.0 mL/min/1.73   Lipase    Specimen: Blood   Result Value Ref Range    Lipase 56 13 - 60 U/L   Urinalysis With Microscopic If Indicated (No Culture) - Urine, Clean Catch    Specimen: Urine, Clean Catch   Result Value Ref Range    Color, UA Yellow Yellow, Straw    Appearance, UA Clear Clear    pH, UA 6.5 5.0 - 8.0    Specific Gravity, UA 1.007 1.005 - 1.030    Glucose, UA >=1000 mg/dL (3+) (A) Negative    Ketones, UA Negative Negative    Bilirubin, UA Negative Negative    Blood, UA Negative Negative    Protein, UA Negative Negative    Leuk Esterase, UA Negative Negative    Nitrite, UA Negative Negative    Urobilinogen, UA 0.2 E.U./dL 0.2 - 1.0 E.U./dL   CBC Auto Differential    Specimen: Blood   Result Value Ref Range    WBC 9.46 3.40 - 10.80 10*3/mm3    RBC 4.79 3.77 - 5.28 10*6/mm3    Hemoglobin 14.1 12.0 - 15.9 g/dL    Hematocrit 41.4 34.0 - 46.6 %    MCV 86.4 79.0 - 97.0 fL    MCH 29.4 26.6 - 33.0 pg    MCHC 34.1 31.5 - 35.7 g/dL    RDW 14.4 12.3 - 15.4 %    RDW-SD 45.7 37.0 - 54.0 fl    MPV 10.1 6.0 - 12.0 fL    Platelets 284 140  - 450 10*3/mm3    Neutrophil % 59.6 42.7 - 76.0 %    Lymphocyte % 33.2 19.6 - 45.3 %    Monocyte % 4.0 (L) 5.0 - 12.0 %    Eosinophil % 2.7 0.3 - 6.2 %    Basophil % 0.2 0.0 - 1.5 %    Immature Grans % 0.3 0.0 - 0.5 %    Neutrophils, Absolute 5.63 1.70 - 7.00 10*3/mm3    Lymphocytes, Absolute 3.14 (H) 0.70 - 3.10 10*3/mm3    Monocytes, Absolute 0.38 0.10 - 0.90 10*3/mm3    Eosinophils, Absolute 0.26 0.00 - 0.40 10*3/mm3    Basophils, Absolute 0.02 0.00 - 0.20 10*3/mm3    Immature Grans, Absolute 0.03 0.00 - 0.05 10*3/mm3    nRBC 0.0 0.0 - 0.2 /100 WBC   BNP    Specimen: Blood   Result Value Ref Range    proBNP <5.0 0.0 - 450.0 pg/mL       Diagnoses and all orders for this visit:    1. Lower abdominal pain (Primary)    advised patient to go to ER for in person evaluation. Reports she will have significant other to take her to ER. Understands she needs vitals, possible labs and an exam, Reports she will go to [facility] ER. Understands the risk of not going to ER.     Court Felipe, APRN  03/26/2023  20:34 EDT    The use of a video visit has been reviewed with the patient and verbal informed consent has been obtained. Myself and Lindsay Amezquita participated in this visit. The patient is located in [address] [place] [address] [address] [place], [place] [id number].    I am located in [place], [place]. Mychart and Twilio were utilized. I spent 5 minutes in the patient's chart for this visit.

## 2023-03-31 RX ORDER — TIZANIDINE 4 MG/1
TABLET ORAL
Qty: 90 TABLET | Refills: 1 | OUTPATIENT
Start: 2023-03-31

## 2023-04-03 ENCOUNTER — TELEPHONE (OUTPATIENT)
Dept: INTERNAL MEDICINE | Facility: CLINIC | Age: 38
End: 2023-04-03
Payer: MEDICAID

## 2023-04-03 NOTE — TELEPHONE ENCOUNTER
Caller: Lindsay Amezquita    Relationship: Self    Best call back number: [phone]    What is the best time to reach you: ANYTIME    Who are you requesting to speak with (clinical staff, provider,  specific staff member): PROVIDER    What was the call regarding: PATIENT STATES THAT SHE HAS HAD DIARRHEA FOR 2 WEEKS AND NOW A RUNNY NOSE, COUGHING UP GREEN MUCUS. PLEASE ADVISE    Do you require a callback: YES

## 2023-04-05 DIAGNOSIS — F41.1 GENERALIZED ANXIETY DISORDER: ICD-10-CM

## 2023-04-05 RX ORDER — ALPRAZOLAM 0.5 MG/1
0.5 TABLET ORAL 3 TIMES DAILY PRN
Qty: 90 TABLET | Refills: 1 | OUTPATIENT
Start: 2023-04-05 | End: 2024-04-04

## 2023-04-05 NOTE — TELEPHONE ENCOUNTER
Rx Refill Note  Requested Prescriptions     Pending Prescriptions Disp Refills   • ALPRAZolam (Xanax) 0.5 MG tablet 90 tablet 1     Sig: Take 1 tablet by mouth 3 (Three) Times a Day As Needed for Anxiety.      Last office visit with prescribing clinician: Visit date not found   Last telemedicine visit with prescribing clinician: 4/21/2023   Next office visit with prescribing clinician: 5/16/2023                         Would you like a call back once the refill request has been completed: [] Yes [] No    If the office needs to give you a call back, can they leave a voicemail: [] Yes [] No    Dee Reagan MA  04/05/23, 08:28 EDT

## 2023-04-05 NOTE — TELEPHONE ENCOUNTER
Incoming Refill Request      Medication requested (name and dose): ALPRAZOLAM 0.5     Pharm[facility] where [facility] be sent[facility]    Additional details provided by patient: OUT AS OF TODAY    Best call back number: [phone]    Does the patient have less than a 3 day supply:  [x] Yes  [] No    Anneliese Brooks  04/05/23, 08:13 EDT

## 2023-04-06 ENCOUNTER — TELEPHONE (OUTPATIENT)
Dept: INTERNAL MEDICINE | Facility: CLINIC | Age: 38
End: 2023-04-06

## 2023-04-06 ENCOUNTER — TELEMEDICINE (OUTPATIENT)
Dept: FAMILY MEDICINE CLINIC | Facility: TELEHEALTH | Age: 38
End: 2023-04-06
Payer: MEDICAID

## 2023-04-06 DIAGNOSIS — R51.9 ACUTE NONINTRACTABLE HEADACHE, UNSPECIFIED HEADACHE TYPE: ICD-10-CM

## 2023-04-06 DIAGNOSIS — R19.7 DIARRHEA, UNSPECIFIED TYPE: ICD-10-CM

## 2023-04-06 DIAGNOSIS — R11.0 NAUSEA: Primary | ICD-10-CM

## 2023-04-06 PROCEDURE — 99213 OFFICE O/P EST LOW 20 MIN: CPT | Performed by: NURSE PRACTITIONER

## 2023-04-06 RX ORDER — PROMETHAZINE HYDROCHLORIDE 12.5 MG/1
12.5 TABLET ORAL EVERY 6 HOURS PRN
Qty: 10 TABLET | Refills: 0 | Status: SHIPPED | OUTPATIENT
Start: 2023-04-06 | End: 2023-04-22

## 2023-04-06 NOTE — PROGRESS NOTES
CHIEF COMPLAINT  Chief Complaint   Patient presents with   • Nausea   • Headache   • Nasal Congestion   • Diarrhea         HPI  Lindsay Amezquita is a 37 y.o. female  presents with complaint of 1 day h/o nausea and diarrhea with headache pain, intermittent abdominal pain  and nasal congestion. She reports feeling like she could vomiting but has not done so. She has taken Phenergan 25 mg  which did help but she is now out. She has tried Tums for abdominal pain which did nothing. She reports mild abdominal tenderness when pressing on her stomach. She has not been out of the house and denies exposure to Covid 19 or the flu. She reports no one else is sick in her household.     Review of Systems   Constitutional: Positive for activity change and fatigue. Negative for fever.   HENT: Positive for congestion (nasal congestion).    Cardiovascular: Negative.    Gastrointestinal: Positive for abdominal pain, diarrhea, nausea and vomiting.   Musculoskeletal: Negative.    Skin: Negative.    Neurological: Positive for headaches.   Hematological: Negative.    Psychiatric/Behavioral: Negative.        Past Medical History:   Diagnosis Date   • Allergic    • Anxiety    • Back pain    • Bell's palsy    • Bipolar 2 disorder    • Blood clot in vein    • Deep vein thrombosis Last year   • Depression    • Diabetes mellitus November    Pre diabete   • Frequent headaches    • GERD (gastroesophageal reflux disease)    • Irritable bowel syndrome Two years ago   • Migraine    • Panic    • PTSD (post-traumatic stress disorder)    • Restless leg syndrome    • Sinus problem    • Snores    • Tattoos    • Vitamin B12 deficiency        Family History   Problem Relation Age of Onset   • Irritable bowel syndrome Mother    • Heart disease Mother    • Miscarriages / Stillbirths Mother    • Irritable bowel syndrome Father    • Alcohol abuse Father    • Diabetes Father    • Hyperlipidemia Father    • Colon cancer Maternal Grandfather        Social History      Socioeconomic History   • Marital status: Single   Tobacco Use   • Smoking status: Every Day     Packs/day: 1.50     Years: 29.00     Pack years: 43.50     Types: Cigarettes     Start date: 1/1/1995   • Smokeless tobacco: Never   Vaping Use   • Vaping Use: Former   Substance and Sexual Activity   • Alcohol use: Not Currently   • Drug use: Not Currently   • Sexual activity: Not Currently     Partners: Female     Birth control/protection: None, Same-sex partner         LMP 03/24/2023     PHYSICAL EXAM  Physical Exam   Constitutional: She appears well-developed and well-nourished. She does not have a sickly appearance. She does not appear ill. No distress.   HENT:   Head: Normocephalic and atraumatic.   Pulmonary/Chest: Effort normal. No stridor. No tachypnea.  No respiratory distress.  Abdominal: She exhibits distension (mild ). There is generalized abdominal tenderness.   Neurological: She is alert.   Psychiatric: She has a normal mood and affect.   Vitals reviewed.      Results for orders placed or performed during the hospital encounter of 02/10/23   COVID-19 and FLU A/B PCR - Swab, Nasopharynx    Specimen: Nasopharynx; Swab   Result Value Ref Range    COVID19 Not Detected Not Detected - Ref. Range    Influenza A PCR Not Detected Not Detected    Influenza B PCR Not Detected Not Detected   Comprehensive Metabolic Panel    Specimen: Blood   Result Value Ref Range    Glucose 204 (H) 65 - 99 mg/dL    BUN 4 (L) 6 - 20 mg/dL    Creatinine 0.60 0.57 - 1.00 mg/dL    Sodium 138 136 - 145 mmol/L    Potassium 3.2 (L) 3.5 - 5.2 mmol/L    Chloride 97 (L) 98 - 107 mmol/L    CO2 26.1 22.0 - 29.0 mmol/L    Calcium 9.1 8.6 - 10.5 mg/dL    Total Protein 6.2 6.0 - 8.5 g/dL    Albumin 3.7 3.5 - 5.2 g/dL    ALT (SGPT) 21 1 - 33 U/L    AST (SGOT) 18 1 - 32 U/L    Alkaline Phosphatase 90 39 - 117 U/L    Total Bilirubin 0.2 0.0 - 1.2 mg/dL    Globulin 2.5 gm/dL    A/G Ratio 1.5 g/dL    BUN/Creatinine Ratio 6.7 (L) 7.0 - 25.0    Anion  Gap 14.9 5.0 - 15.0 mmol/L    eGFR 118.0 >60.0 mL/min/1.73   Lipase    Specimen: Blood   Result Value Ref Range    Lipase 56 13 - 60 U/L   Urinalysis With Microscopic If Indicated (No Culture) - Urine, Clean Catch    Specimen: Urine, Clean Catch   Result Value Ref Range    Color, UA Yellow Yellow, Straw    Appearance, UA Clear Clear    pH, UA 6.5 5.0 - 8.0    Specific Gravity, UA 1.007 1.005 - 1.030    Glucose, UA >=1000 mg/dL (3+) (A) Negative    Ketones, UA Negative Negative    Bilirubin, UA Negative Negative    Blood, UA Negative Negative    Protein, UA Negative Negative    Leuk Esterase, UA Negative Negative    Nitrite, UA Negative Negative    Urobilinogen, UA 0.2 E.U./dL 0.2 - 1.0 E.U./dL   CBC Auto Differential    Specimen: Blood   Result Value Ref Range    WBC 9.46 3.40 - 10.80 10*3/mm3    RBC 4.79 3.77 - 5.28 10*6/mm3    Hemoglobin 14.1 12.0 - 15.9 g/dL    Hematocrit 41.4 34.0 - 46.6 %    MCV 86.4 79.0 - 97.0 fL    MCH 29.4 26.6 - 33.0 pg    MCHC 34.1 31.5 - 35.7 g/dL    RDW 14.4 12.3 - 15.4 %    RDW-SD 45.7 37.0 - 54.0 fl    MPV 10.1 6.0 - 12.0 fL    Platelets 284 140 - 450 10*3/mm3    Neutrophil % 59.6 42.7 - 76.0 %    Lymphocyte % 33.2 19.6 - 45.3 %    Monocyte % 4.0 (L) 5.0 - 12.0 %    Eosinophil % 2.7 0.3 - 6.2 %    Basophil % 0.2 0.0 - 1.5 %    Immature Grans % 0.3 0.0 - 0.5 %    Neutrophils, Absolute 5.63 1.70 - 7.00 10*3/mm3    Lymphocytes, Absolute 3.14 (H) 0.70 - 3.10 10*3/mm3    Monocytes, Absolute 0.38 0.10 - 0.90 10*3/mm3    Eosinophils, Absolute 0.26 0.00 - 0.40 10*3/mm3    Basophils, Absolute 0.02 0.00 - 0.20 10*3/mm3    Immature Grans, Absolute 0.03 0.00 - 0.05 10*3/mm3    nRBC 0.0 0.0 - 0.2 /100 WBC   BNP    Specimen: Blood   Result Value Ref Range    proBNP <5.0 0.0 - 450.0 pg/mL       Diagnoses and all orders for this visit:    1. Nausea (Primary)  -     promethazine (PHENERGAN) 12.5 MG tablet; Take 1 tablet by mouth Every 6 (Six) Hours As Needed for Nausea or Vomiting.  Dispense: 10  tablet; Refill: 0    2. Diarrhea, unspecified type    3. Acute nonintractable headache, unspecified headache type    Advised  patient to go to [facility]  ED for worsening S/s.   Advised clear liquids and no milk or dairy.   Follow up with PCP if no improvement in 1-2  days.   For persistent diarrhea recommend evaluation in person at [facility] ED.       The use of a video visit has been reviewed with the patient and verbal informd consent has een obtained. Myself and Lindsay Amezquita participated in this visit. The patient is located in [address] [place] [address]. I am located in [facility].  Mychart and Zoom were utilized. I spent 15  minutes in the patient's chart for this visit.           Monserrat Spencer, CLAIR  04/06/2023  18:43 EDT

## 2023-04-06 NOTE — TELEPHONE ENCOUNTER
Caller: Lindsay Amezquita    Relationship: Self    Best call back number: [phone]    What medication are you requesting: HIGHER DOSAGE OF PROMETHAZINE    What are your current symptoms: EXTREME NAUSEA AND VOMITING    How long have you been experiencing symptoms: 04.03.23  Have you had these symptoms before:    [x] Yes  [] No    Have you been treated for these symptoms before:   [x] Yes  [] No    If a prescription is needed, what is your preferred pharmacy and phone number: [facility] #[id number] [address] [phone] [place] [phone]      Additional notes: PATIENT WOULD LIKE A HIGHER DOSE OF PROMETHAZINE BECAUSE THE 12.5MG IS NOT WORKING. PLEASE ADVISE.

## 2023-04-07 ENCOUNTER — TELEPHONE (OUTPATIENT)
Dept: INTERNAL MEDICINE | Facility: CLINIC | Age: 38
End: 2023-04-07

## 2023-04-10 ENCOUNTER — TELEPHONE (OUTPATIENT)
Dept: NEUROLOGY | Facility: CLINIC | Age: 38
End: 2023-04-10
Payer: MEDICAID

## 2023-04-10 ENCOUNTER — TELEPHONE (OUTPATIENT)
Dept: INTERNAL MEDICINE | Facility: CLINIC | Age: 38
End: 2023-04-10
Payer: MEDICAID

## 2023-04-10 DIAGNOSIS — G43.001 MIGRAINE WITHOUT AURA AND WITH STATUS MIGRAINOSUS, NOT INTRACTABLE: ICD-10-CM

## 2023-04-10 NOTE — TELEPHONE ENCOUNTER
GUCCI CALLING TO ADVISE [facility] PA DENIED BECAUSE REQUIREMENT HAS NOT BEEN MET - NO NOTED IMPROVEMENT ON PA       IF THERE HAS BEEN IMPROVEMENT SUBMIT NEW PA AND INCLUDE THE IMPROVEMENT    THANK YOU

## 2023-04-10 NOTE — TELEPHONE ENCOUNTER
Caller: LEORA  Relationship to Patient: SELF  Phone Number: [phone]    Reason for Call: PATIENT WANTED TO CHECK ON STATUS OF PRIOR AUTH FOR NURTEC. PLEASE REVIEW AND ADVISE     I SEE A DENIAL LETTER FROM 4-6-23 IN MEDIA

## 2023-04-10 NOTE — TELEPHONE ENCOUNTER
Provider: YASMANY  Caller: LEORA  Phone Number: [phone]  Reason for Call: PT CALLED AND STATES THAT INSURANCE SAID NEURTEC WAS DENIED BECAUSE PROIVDER DID NOT TELL THEM THAT MEDICATION IS HELPING.  PT STATES SHE NEEDS THIS MEDICATION AND IT HAS HELPED HER SO MUCH, PT HAS APPT ON 02/15/23.    PLEASE REVIEW AND ADVISE.  THANK YOU

## 2023-04-10 NOTE — TELEPHONE ENCOUNTER
HER APPT IS NOT SCHEDULED UNTIL MAY.     DO YOU WANT TO PROVIDE SAMPLES UNTIL WE CAN SEE HER IN MAY?

## 2023-04-10 NOTE — TELEPHONE ENCOUNTER
auth was denied. Denial scanned into media.     She is scheduled to see you in May.. Please advise.

## 2023-04-10 NOTE — TELEPHONE ENCOUNTER
Caller: Lindsay Amezquita    Relationship: Self    Best call back number: [phone]    What is the best time to reach you: ANYTIME    Who are you requesting to speak with (clinical staff, provider,  specific staff member): FELICIANO CARDOZA    Do you know the name of the person who called: LINDSAY AMEZQUITA     What was the call regarding:     PATIENT ADVISED SHE HAS BEEN EXPERIENCING THE FOLLOWING SYMPTOMS SINCE LAST Thursday, FEVER, VERTIGO CONCERNS, DIZZINESS, SICK TO STOMACH.    PATIENT WOULD LIKE TO ASK FELICIANO WHAT SHE SHOULD DO.     Do you require a callback: YES

## 2023-04-10 NOTE — TELEPHONE ENCOUNTER
Caller: Lindsay Amezquita    Relationship: Self    Best call back number: [phone]    Requested Prescriptions:   Requested Prescriptions     Pending Prescriptions Disp Refills   • butalbital-acetaminophen-caffeine (Esgic) -40 MG per tablet 20 tablet 0     Sig: Take 1 tablet by mouth Every 6 (Six) Hours As Needed for Migraine.        Pharmacy where request should be sent: [facility] #[id number] - [place], [place] - [address] [facility] [phone] [facility] [phone]      Last office visit with prescribing clinician: Visit date not found   Last telemedicine visit with prescribing clinician: 5/15/2023   Next office visit with prescribing clinician: 5/15/2023     Additional details provided by patient: PT HAS ABOUT FOUR TABLETS LEFT. SHE USES THEM SPARINGLY     SHE HAS A MIGRAINE FOR THE PAST FOUR DAY, SHE DID MENTION SHE MAY MAKE AN E.D TRIP DUE TO PHOTOSENSITIVY AND NOISE SENSITIVITY     PATIENT ALSO WANTED TO KNOW WHAT EXACTLY IS IN THIS MEDICATION AS WELL, OR WOULD SHE NEED TO TALK TO THE PHARMACY    Does the patient have less than a 3 day supply:  [] Yes  [x] No    Would you like a call back once the refill request has been completed: [] Yes [x] No    If the office needs to give you a call back, can they leave a voicemail: [] Yes [x] No    Kim Niño Rep   04/10/23 09:38 EDT

## 2023-04-11 NOTE — TELEPHONE ENCOUNTER
Lindsay Amezquita (Self) [phone] (Mobile)       Reason for Call: PT CALLED TO CHECK ON REFILL REQUEST. ADVISED PT CURRENTLY WAITING ON APPROVAL

## 2023-04-11 NOTE — TELEPHONE ENCOUNTER
Attempted to contact patient to let her know that we will provide her with samples until she is seen by Alison. However, We are currently out of samples and will get more in on Thursday.     She was last seen in July of 2022. We need new documentation.     No answer when calling patient. Okay for hub staff to relay message to patient.

## 2023-04-12 ENCOUNTER — TELEPHONE (OUTPATIENT)
Dept: NEUROLOGY | Facility: CLINIC | Age: 38
End: 2023-04-12
Payer: MEDICAID

## 2023-04-12 RX ORDER — RIMEGEPANT SULFATE 75 MG/75MG
75 TABLET, ORALLY DISINTEGRATING ORAL DAILY
Qty: 6 TABLET | Refills: 0 | COMMUNITY
Start: 2023-04-12

## 2023-04-12 RX ORDER — BUTALBITAL, ACETAMINOPHEN AND CAFFEINE 50; 325; 40 MG/1; MG/1; MG/1
1 TABLET ORAL EVERY 12 HOURS PRN
Qty: 20 TABLET | Refills: 0 | Status: SHIPPED | OUTPATIENT
Start: 2023-04-12

## 2023-04-12 NOTE — TELEPHONE ENCOUNTER
CALLED PT TO LET HER KNOW THAT THE SAMPLES ARE IN THE OFFICE. SHE HAS UNTIL Friday TO PICK THOSE UP AS WE ONLY HOLD THEM FOR 3 DAYS.

## 2023-04-12 NOTE — TELEPHONE ENCOUNTER
----- Message from CLAIR Busch sent at 4/12/2023  3:57 PM EDT -----  Please let patient know I refilled the Fioricet but she can not take the Fioricet within 4 hours of taking the Alprazolam due to increased risk of respiratory depression. Thank you.

## 2023-04-13 NOTE — TELEPHONE ENCOUNTER
Patient notified not to take Fioricet within 4 hours of taking Alprazolam. Patient stated that she was aware.    Patient also asked about her nurtec approval. I said we dont have an approval back but we have samples here at the office for her. Patient stated she got an approval through her mychart for nurtec but whatever.     Call was disconnected.

## 2023-04-13 NOTE — TELEPHONE ENCOUNTER
Patient called back hub staff stated the patient would like to speak with the practice manager that I lied to her about her Nurtec.     I spoke with the patient again and explained the approval that came through her mychart about her Nurtec is the samples we put in for her yesterday.     Front office staff tried calling her yesterday as well to notify her of those samples.       Update Note:  Earlene Briscoe Carlin Saucedo NNP  2022  [id number]

## 2023-04-14 NOTE — TELEPHONE ENCOUNTER
TIMO WITH PATIENT'S INS PHONED, PATIENT NEEDS A PRIOR AUTH FOR butalbital-acetaminophen-caffeine (Esgic) -40 MG per tablet.    TIMO ASKED THAT WE SUBMIT THIS AS AN URGERT PA.    PLEASE ADVISE, CALL BACK NUMBER FOR TIMO WITH INS [phone].

## 2023-04-17 ENCOUNTER — TELEPHONE (OUTPATIENT)
Dept: NEUROLOGY | Facility: CLINIC | Age: 38
End: 2023-04-17
Payer: MEDICAID

## 2023-04-17 ENCOUNTER — TELEPHONE (OUTPATIENT)
Dept: INTERNAL MEDICINE | Facility: CLINIC | Age: 38
End: 2023-04-17
Payer: MEDICAID

## 2023-04-17 DIAGNOSIS — F31.30 BIPOLAR I DISORDER, MOST RECENT EPISODE DEPRESSED: ICD-10-CM

## 2023-04-17 NOTE — TELEPHONE ENCOUNTER
Caller: Lindsay Amezquita    Relationship: Self    Best call back number: [phone]    Who are you requesting to speak with (clinical staff, provider,  specific staff member): MAO     What was the call regarding: CONCERNS ABOUT MEDICATIONS     Do you require a callback: YES

## 2023-04-17 NOTE — TELEPHONE ENCOUNTER
Pharmacy Name:  [facility]    Pharmacy representative name: NORMA  [phone]    TIMO WITH PATIENT'S INS PHONED, PATIENT NEEDS A PRIOR AUTH FOR butalbital-acetaminophen-caffeine (Esgic) -40 MG per tablet.     TIMO ASKED THAT WE SUBMIT THIS AS AN URGERT PA.     What question does the pharmacy have:   IT IS IMPORTANT TO DO AS A URGENT REQUEST.    PA PHONE NUMBER [phone]    Who is the provider that prescribed the medication: NORMA

## 2023-04-17 NOTE — TELEPHONE ENCOUNTER
Provider: NORMA JADE APRN    Caller: LEORA    Relationship to Patient: SELF    Phone Number: [phone]    Reason for Call: CALLING TO SEE IF THE PA WAS APPROVED. CALLED S[facility]W MAGALI AT CLINIC.   ADVISED IT WAS DENIED AND THEY HAVE SAMPLES AT THE OFFICE.  ADVISED PT THEY HAVE SAMPLES AT THE OFFICE AND THE PA WAS DENIED.  PT STATED THE SAMPLE PACK ARE NOT ENOUGH UNTIL HER APPT.   I LET HER KNOW SHE NEEDS SEEN BEFORE THE INSURANCE WILL REFILL.  PT V/U

## 2023-04-17 NOTE — TELEPHONE ENCOUNTER
[facility] CALLED THIS MORNING STATING [facility].     I EXPLAINED TO SUHA (THE HUB WORKER) THAT I SPOKE WITH THE PATIENT ON Thursday AND EXPLAINED TO HER. THAT HER INSURANCE DENIED HER MEDICATION AND WOULD NEED TO BE SEEN FIRST.  PATIENT IS SCHEDULED FOR MAY 15TH TO SEE NORMA.     BUT WE DO HAVE SAMPLES HERE IN THE OFFICE FOR HER TO . ALSO TOLD THE PATIENT THAT ON Thursday.

## 2023-04-18 NOTE — TELEPHONE ENCOUNTER
Left message for patient to call back.   Need more information on what is going on with patient/issues with her medication.

## 2023-04-19 DIAGNOSIS — F31.30 BIPOLAR I DISORDER, MOST RECENT EPISODE DEPRESSED: ICD-10-CM

## 2023-04-19 RX ORDER — LURASIDONE HYDROCHLORIDE 120 MG/1
120 TABLET, FILM COATED ORAL DAILY
Qty: 30 TABLET | Refills: 1 | Status: SHIPPED | OUTPATIENT
Start: 2023-04-19

## 2023-04-19 RX ORDER — LURASIDONE HYDROCHLORIDE 120 MG/1
120 TABLET, FILM COATED ORAL DAILY
Qty: 30 TABLET | Refills: 1 | Status: CANCELLED | OUTPATIENT
Start: 2023-04-19

## 2023-04-19 NOTE — TELEPHONE ENCOUNTER
Spoke with patient, explains that she has been very overwhelmed lately. Said she was unable to refill prescription. Called pharmacy and they received the order for Latuda in March but did not receive a refill.    Rx Refill Note  Requested Prescriptions     Pending Prescriptions Disp Refills   • Lurasidone HCl (Latuda) 120 MG tablet tablet 30 tablet 1     Sig: Take 1 tablet by mouth Daily.      Last office visit with prescribing clinician: 3/21/2023   Last telemedicine visit with prescribing clinician: 5/24/2023   Next office visit with prescribing clinician: 5/24/2023                         Would you like a call back once the refill request has been completed: [] Yes [] No    If the office needs to give you a call back, can they leave a voicemail: [] Yes [] No    Dee Reagan MA  04/19/23, 07:37 EDT

## 2023-04-20 ENCOUNTER — TELEPHONE (OUTPATIENT)
Dept: INTERNAL MEDICINE | Facility: CLINIC | Age: 38
End: 2023-04-20
Payer: MEDICAID

## 2023-04-20 NOTE — TELEPHONE ENCOUNTER
Spoke with patient yesterday 4/20/2023. Prescription of Latuda is too soon to refill at the pharmacy at this time, an additional refill was sent.     Patient will have to be seen by Augusta to have any changes done to her medications done at this time. Cannot change prescriptions when appointment was refused by patient.

## 2023-04-20 NOTE — TELEPHONE ENCOUNTER
Patient states she was informed by pharmacy that Latuda refill can not be completed due to it being too soon. Patient states she feels that medications is not working properly and she is consistently drowsy but can not sleep and has panic attacks frequently. Patient requests genesight. This writer attempted to schedule same day appointment with Augusta to discuss medications but patient declined stating she was too drowsy to drive. Also offered telehealth but patient declined and stated she would just like a call back. Phone number verified.

## 2023-04-22 ENCOUNTER — TELEMEDICINE (OUTPATIENT)
Dept: FAMILY MEDICINE CLINIC | Facility: TELEHEALTH | Age: 38
End: 2023-04-22
Payer: MEDICAID

## 2023-04-22 DIAGNOSIS — R51.9 NONINTRACTABLE HEADACHE, UNSPECIFIED CHRONICITY PATTERN, UNSPECIFIED HEADACHE TYPE: Primary | ICD-10-CM

## 2023-04-22 PROCEDURE — 1159F MED LIST DOCD IN RCRD: CPT | Performed by: NURSE PRACTITIONER

## 2023-04-22 PROCEDURE — 1160F RVW MEDS BY RX/DR IN RCRD: CPT | Performed by: NURSE PRACTITIONER

## 2023-04-22 PROCEDURE — 99213 OFFICE O/P EST LOW 20 MIN: CPT | Performed by: NURSE PRACTITIONER

## 2023-04-22 RX ORDER — AMITRIPTYLINE HYDROCHLORIDE 150 MG/1
150 TABLET, FILM COATED ORAL DAILY
COMMUNITY
Start: 2023-04-03

## 2023-04-22 RX ORDER — TIZANIDINE 4 MG/1
4 TABLET ORAL 3 TIMES DAILY
COMMUNITY
Start: 2023-04-05

## 2023-04-22 RX ORDER — DESVENLAFAXINE 100 MG/1
1 TABLET, EXTENDED RELEASE ORAL DAILY
COMMUNITY
Start: 2023-04-16

## 2023-04-22 RX ORDER — DESVENLAFAXINE SUCCINATE 50 MG/1
50 TABLET, EXTENDED RELEASE ORAL DAILY
COMMUNITY
Start: 2023-02-10

## 2023-04-22 RX ORDER — PROMETHAZINE HYDROCHLORIDE 12.5 MG/1
12.5 TABLET ORAL EVERY 6 HOURS PRN
Qty: 12 TABLET | Refills: 0 | Status: SHIPPED | OUTPATIENT
Start: 2023-04-22 | End: 2023-04-25

## 2023-04-22 RX ORDER — DIPHENHYDRAMINE HCL 50 MG
50 CAPSULE ORAL
COMMUNITY

## 2023-04-22 RX ORDER — ATORVASTATIN CALCIUM 40 MG/1
40 TABLET, FILM COATED ORAL DAILY
COMMUNITY
Start: 2023-02-09

## 2023-04-22 RX ORDER — GABAPENTIN 800 MG/1
800 TABLET ORAL 3 TIMES DAILY
COMMUNITY
Start: 2023-03-27

## 2023-04-22 RX ORDER — IBUPROFEN 800 MG/1
800 TABLET ORAL
COMMUNITY
Start: 2023-01-05

## 2023-04-22 RX ORDER — LANCETS 30 GAUGE
EACH MISCELLANEOUS
COMMUNITY
Start: 2023-01-06

## 2023-04-22 RX ORDER — LAMOTRIGINE 100 MG/1
100 TABLET ORAL DAILY
COMMUNITY
Start: 2023-02-07

## 2023-04-22 RX ORDER — CETIRIZINE HYDROCHLORIDE 10 MG/1
10 TABLET ORAL DAILY
COMMUNITY
Start: 2023-04-08

## 2023-04-22 RX ORDER — FLUTICASONE PROPIONATE 50 MCG
2 SPRAY, SUSPENSION (ML) NASAL DAILY
COMMUNITY
Start: 2023-03-27

## 2023-04-23 NOTE — PROGRESS NOTES
You have chosen to receive care through a telehealth visit.  Do you consent to use a video/audio connection for your medical care today? Yes     CHIEF COMPLAINT  Chief Complaint   Patient presents with   • Migraine         HPI  Lindsay Amezquita is a 37 y.o. female  presents with complaint of migraine. Reports she took a nertec that has brought her headache down to a 7 on pain scale of 10. Vomited x 2 this evening. Reports no fever or chills. + nausea. + vomiting. Reports she is having some blurred vision. Mild photophobia. Reports her symptoms started this am. Reports she need something for nausea. + sweating. Reports her symptoms worsen since she is about to start her period.     Review of Systems   Constitutional: Positive for diaphoresis. Negative for chills, fatigue and fever.   HENT: Negative for congestion, ear discharge, ear pain, sinus pressure, sinus pain and sore throat.    Eyes: Positive for photophobia and visual disturbance.        Mild photophobia and mild blurred vision at times with migraine   Respiratory: Negative for cough, chest tightness, shortness of breath and wheezing.    Cardiovascular: Negative for chest pain.   Gastrointestinal: Positive for nausea and vomiting. Negative for abdominal pain and diarrhea.   Musculoskeletal: Negative for back pain and myalgias.   Neurological: Positive for headaches. Negative for dizziness.   Psychiatric/Behavioral: Negative.        Past Medical History:   Diagnosis Date   • Allergic    • Anxiety    • Back pain    • Bell's palsy    • Bipolar 2 disorder    • Blood clot in vein    • Deep vein thrombosis Last year   • Depression    • Diabetes mellitus November    Pre diabete   • Frequent headaches    • GERD (gastroesophageal reflux disease)    • Irritable bowel syndrome Two years ago   • Migraine    • Panic    • PTSD (post-traumatic stress disorder)    • Restless leg syndrome    • Sinus problem    • Snores    • Tattoos    • Vitamin B12 deficiency        Family  History   Problem Relation Age of Onset   • Irritable bowel syndrome Mother    • Heart disease Mother    • Miscarriages / Stillbirths Mother    • Irritable bowel syndrome Father    • Alcohol abuse Father    • Diabetes Father    • Hyperlipidemia Father    • Colon cancer Maternal Grandfather        Social History     Socioeconomic History   • Marital status: Single   Tobacco Use   • Smoking status: Every Day     Packs/day: 1.50     Years: 29.00     Pack years: 43.50     Types: Cigarettes     Start date: 1/1/1995   • Smokeless tobacco: Never   Vaping Use   • Vaping Use: Former   Substance and Sexual Activity   • Alcohol use: Not Currently   • Drug use: Not Currently   • Sexual activity: Not Currently     Partners: Female     Birth control/protection: None, Same-sex partner       Lindsay Amezquita  reports that she has been smoking cigarettes. She started smoking about 28 years ago. She has a 43.50 pack-year smoking history. She has never used smokeless tobacco.. I have educated her on the risk of diseases from using tobacco products such as cancer, COPD and heart disease.     I advised her to quit and she is not willing to quit.    I spent 1 minutes counseling the patient.              LMP 03/24/2023   Breastfeeding No     PHYSICAL EXAM  Virtual Visit Physical Exam    Results for orders placed or performed during the hospital encounter of 02/10/23   COVID-19 and FLU A/B PCR - Swab, Nasopharynx    Specimen: Nasopharynx; Swab   Result Value Ref Range    COVID19 Not Detected Not Detected - Ref. Range    Influenza A PCR Not Detected Not Detected    Influenza B PCR Not Detected Not Detected   Comprehensive Metabolic Panel    Specimen: Blood   Result Value Ref Range    Glucose 204 (H) 65 - 99 mg/dL    BUN 4 (L) 6 - 20 mg/dL    Creatinine 0.60 0.57 - 1.00 mg/dL    Sodium 138 136 - 145 mmol/L    Potassium 3.2 (L) 3.5 - 5.2 mmol/L    Chloride 97 (L) 98 - 107 mmol/L    CO2 26.1 22.0 - 29.0 mmol/L    Calcium 9.1 8.6 - 10.5 mg/dL     Total Protein 6.2 6.0 - 8.5 g/dL    Albumin 3.7 3.5 - 5.2 g/dL    ALT (SGPT) 21 1 - 33 U/L    AST (SGOT) 18 1 - 32 U/L    Alkaline Phosphatase 90 39 - 117 U/L    Total Bilirubin 0.2 0.0 - 1.2 mg/dL    Globulin 2.5 gm/dL    A/G Ratio 1.5 g/dL    BUN/Creatinine Ratio 6.7 (L) 7.0 - 25.0    Anion Gap 14.9 5.0 - 15.0 mmol/L    eGFR 118.0 >60.0 mL/min/1.73   Lipase    Specimen: Blood   Result Value Ref Range    Lipase 56 13 - 60 U/L   Urinalysis With Microscopic If Indicated (No Culture) - Urine, Clean Catch    Specimen: Urine, Clean Catch   Result Value Ref Range    Color, UA Yellow Yellow, Straw    Appearance, UA Clear Clear    pH, UA 6.5 5.0 - 8.0    Specific Gravity, UA 1.007 1.005 - 1.030    Glucose, UA >=1000 mg/dL (3+) (A) Negative    Ketones, UA Negative Negative    Bilirubin, UA Negative Negative    Blood, UA Negative Negative    Protein, UA Negative Negative    Leuk Esterase, UA Negative Negative    Nitrite, UA Negative Negative    Urobilinogen, UA 0.2 E.U./dL 0.2 - 1.0 E.U./dL   CBC Auto Differential    Specimen: Blood   Result Value Ref Range    WBC 9.46 3.40 - 10.80 10*3/mm3    RBC 4.79 3.77 - 5.28 10*6/mm3    Hemoglobin 14.1 12.0 - 15.9 g/dL    Hematocrit 41.4 34.0 - 46.6 %    MCV 86.4 79.0 - 97.0 fL    MCH 29.4 26.6 - 33.0 pg    MCHC 34.1 31.5 - 35.7 g/dL    RDW 14.4 12.3 - 15.4 %    RDW-SD 45.7 37.0 - 54.0 fl    MPV 10.1 6.0 - 12.0 fL    Platelets 284 140 - 450 10*3/mm3    Neutrophil % 59.6 42.7 - 76.0 %    Lymphocyte % 33.2 19.6 - 45.3 %    Monocyte % 4.0 (L) 5.0 - 12.0 %    Eosinophil % 2.7 0.3 - 6.2 %    Basophil % 0.2 0.0 - 1.5 %    Immature Grans % 0.3 0.0 - 0.5 %    Neutrophils, Absolute 5.63 1.70 - 7.00 10*3/mm3    Lymphocytes, Absolute 3.14 (H) 0.70 - 3.10 10*3/mm3    Monocytes, Absolute 0.38 0.10 - 0.90 10*3/mm3    Eosinophils, Absolute 0.26 0.00 - 0.40 10*3/mm3    Basophils, Absolute 0.02 0.00 - 0.20 10*3/mm3    Immature Grans, Absolute 0.03 0.00 - 0.05 10*3/mm3    nRBC 0.0 0.0 - 0.2 /100 WBC    BNP    Specimen: Blood   Result Value Ref Range    proBNP <5.0 0.0 - 450.0 pg/mL       Diagnoses and all orders for this visit:    1. Nonintractable headache, unspecified chronicity pattern, unspecified headache type (Primary)    Other orders  -     promethazine (PHENERGAN) 12.5 MG tablet; Take 1 tablet by mouth Every 6 (Six) Hours As Needed for Nausea or Vomiting for up to 3 days.  Dispense: 12 tablet; Refill: 0    rest  Fluids  Patient declines to go to ER for treatment with IV medications for migraine at this time. Reports if photophobia and blurred vision worsens she will go to ER. Understands the risk of not going to ER.   Call on 4-24-23 and get follow up appt with neurologist and PCP             FOLLOW-UP  As discussed during visit with PCP/[facility] if no improvement or Urgent Care/Emergency Department if worsening of symptoms    Patient verbalizes understanding of medication dosage, comfort measures, instructions for treatment and follow-up.    Court Felipe, CLAIR  04/22/2023  21:01 EDT    The use of a video visit has been reviewed with the patient and verbal informed consent has been obtained. Myself and Lindsay Amezquita participated in this visit. The patient is located in [address] [place] [address].    I am located in [place], [place]. Mychart and Twilio were utilized. I spent 5 minutes in the patient's chart for this visit.

## 2023-04-24 ENCOUNTER — TELEPHONE (OUTPATIENT)
Dept: INTERNAL MEDICINE | Facility: CLINIC | Age: 38
End: 2023-04-24
Payer: MEDICAID

## 2023-04-24 NOTE — TELEPHONE ENCOUNTER
Unable to make anymore medication change until patient is seen by provider.     Patient was previously informed on 4/20/2023. When offered for patient to be seen by provider, she refused.

## 2023-04-24 NOTE — TELEPHONE ENCOUNTER
Patient states she is over stressed.  Her dad is having surgery and she is asking to have Halima give her a call when she gets a chance.  Please advise.  Phone number verified.

## 2023-04-30 ENCOUNTER — HOSPITAL ENCOUNTER (EMERGENCY)
Facility: HOSPITAL | Age: 38
Discharge: HOME | End: 2023-04-30
Attending: EMERGENCY MEDICINE | Admitting: EMERGENCY MEDICINE
Payer: MEDICAID

## 2023-04-30 VITALS
BODY MASS INDEX: 50.25 KG/M2 | SYSTOLIC BLOOD PRESSURE: 127 MMHG | HEART RATE: 101 BPM | OXYGEN SATURATION: 96 % | TEMPERATURE: 98.3 F | RESPIRATION RATE: 22 BRPM | HEIGHT: 63 IN | DIASTOLIC BLOOD PRESSURE: 94 MMHG | WEIGHT: 283.6 LBS

## 2023-04-30 DIAGNOSIS — M54.42 ACUTE LEFT-SIDED LOW BACK PAIN WITH LEFT-SIDED SCIATICA: Primary | ICD-10-CM

## 2023-04-30 PROCEDURE — 96372 THER/PROPH/DIAG INJ SC/IM: CPT

## 2023-04-30 PROCEDURE — 25010000002 ORPHENADRINE CITRATE PER 60 MG: Performed by: EMERGENCY MEDICINE

## 2023-04-30 PROCEDURE — 63710000001 PREDNISONE PER 1 MG: Performed by: EMERGENCY MEDICINE

## 2023-04-30 PROCEDURE — 99283 EMERGENCY DEPT VISIT LOW MDM: CPT

## 2023-04-30 RX ORDER — PREDNISONE 20 MG/1
40 TABLET ORAL DAILY
Qty: 10 TABLET | Refills: 0 | Status: SHIPPED | OUTPATIENT
Start: 2023-04-30 | End: 2023-05-05

## 2023-04-30 RX ORDER — LIDOCAINE 50 MG/G
1 PATCH TOPICAL EVERY 24 HOURS
Qty: 15 PATCH | Refills: 0 | Status: SHIPPED | OUTPATIENT
Start: 2023-04-30

## 2023-04-30 RX ORDER — LIDOCAINE 50 MG/G
1 PATCH TOPICAL
Status: DISCONTINUED | OUTPATIENT
Start: 2023-04-30 | End: 2023-04-30 | Stop reason: HOSPADM

## 2023-04-30 RX ORDER — PREDNISONE 20 MG/1
40 TABLET ORAL ONCE
Status: COMPLETED | OUTPATIENT
Start: 2023-04-30 | End: 2023-04-30

## 2023-04-30 RX ORDER — HYDROCODONE BITARTRATE AND ACETAMINOPHEN 5; 325 MG/1; MG/1
1 TABLET ORAL ONCE
Status: COMPLETED | OUTPATIENT
Start: 2023-04-30 | End: 2023-04-30

## 2023-04-30 RX ORDER — ORPHENADRINE CITRATE 30 MG/ML
60 INJECTION INTRAMUSCULAR; INTRAVENOUS ONCE
Status: COMPLETED | OUTPATIENT
Start: 2023-04-30 | End: 2023-04-30

## 2023-04-30 RX ADMIN — LIDOCAINE 1 PATCH: 50 PATCH CUTANEOUS at 18:24

## 2023-04-30 RX ADMIN — ORPHENADRINE CITRATE 60 MG: 60 INJECTION INTRAMUSCULAR; INTRAVENOUS at 18:25

## 2023-04-30 RX ADMIN — HYDROCODONE BITARTRATE AND ACETAMINOPHEN 1 TABLET: 5; 325 TABLET ORAL at 18:25

## 2023-04-30 RX ADMIN — PREDNISONE 40 MG: 20 TABLET ORAL at 18:25

## 2023-04-30 NOTE — ED PROVIDER NOTES
Subjective  History of Present Illness:    Chief Complaint:   Chief Complaint   Patient presents with   • Back Pain   • Back Injury      History of Present Illness: Lindsay Amezquita is a 37 y.o. female who presents to the emergency department complaining of back pain.  Patient states yesterday was carrying laundry and other heavy objects and aggravated her chronic back pain.  She has pain in her left low back.  It does radiate down her left lower extremity.  No bowel or bladder incontinence or saddle anesthesias.  She took tizanidine without relief.  Onset: Yesterday  Duration: Ongoing  Exacerbating / Alleviating factors: With any movement or palpation of the left low back  Associated symptoms: None, specifically no bowel or bladder incontinence or saddle anesthesias      Nurses Notes reviewed and agree, including vitals, allergies, social history and prior medical history.     Review of Systems   Constitutional: Negative.    HENT: Negative.    Eyes: Negative.    Respiratory: Negative.    Cardiovascular: Negative.    Gastrointestinal: Negative.    Genitourinary: Negative.    Musculoskeletal: Positive for back pain.   Skin: Negative.    Neurological: Negative.    Psychiatric/Behavioral: Negative.        Past Medical History:   Diagnosis Date   • Allergic    • Anxiety    • Back pain    • Bell's palsy    • Bipolar 2 disorder    • Blood clot in vein    • Deep vein thrombosis Last year   • Depression    • Diabetes mellitus November    Pre diabete   • Frequent headaches    • GERD (gastroesophageal reflux disease)    • Irritable bowel syndrome Two years ago   • Migraine    • Panic    • PTSD (post-traumatic stress disorder)    • Restless leg syndrome    • Sinus problem    • Snores    • Tattoos    • Vitamin B12 deficiency        Allergies:    Aspirin, Codeine, Cyclobenzaprine, Haldol [haloperidol], Latex, Penicillins, Morphine, Ondansetron, Oxycodone-acetaminophen, Oxycontin [oxycodone], Triptans, Compazine [prochlorperazine],  "Lamictal [lamotrigine], and Reglan [metoclopramide]      Past Surgical History:   Procedure Laterality Date   • CHOLECYSTECTOMY     • TOOTH EXTRACTION           Social History     Socioeconomic History   • Marital status: Single   Tobacco Use   • Smoking status: Every Day     Packs/day: 1.50     Years: 29.00     Pack years: 43.50     Types: Cigarettes     Start date: 1/1/1995   • Smokeless tobacco: Never   Vaping Use   • Vaping Use: Former   Substance and Sexual Activity   • Alcohol use: Not Currently   • Drug use: Not Currently   • Sexual activity: Not Currently     Partners: Female     Birth control/protection: None, Same-sex partner         Family History   Problem Relation Age of Onset   • Irritable bowel syndrome Mother    • Heart disease Mother    • Miscarriages / Stillbirths Mother    • Irritable bowel syndrome Father    • Alcohol abuse Father    • Diabetes Father    • Hyperlipidemia Father    • Colon cancer Maternal Grandfather        Objective  Physical Exam:  /94 (BP Location: Left arm, Patient Position: Sitting)   Pulse 101   Temp 98.3 °F (36.8 °C) (Oral)   Resp 22   Ht 160 cm (63\")   Wt 129 kg (283 lb 9.6 oz)   LMP 04/23/2023   SpO2 96%   BMI 50.24 kg/m²      Physical Exam  Vitals and nursing note reviewed.   Constitutional:       General: She is not in acute distress.     Appearance: Normal appearance. She is obese. She is not ill-appearing, toxic-appearing or diaphoretic.   HENT:      Head: Normocephalic and atraumatic.      Nose: Nose normal.   Eyes:      Extraocular Movements: Extraocular movements intact.   Cardiovascular:      Rate and Rhythm: Normal rate and regular rhythm.   Pulmonary:      Effort: Pulmonary effort is normal.      Breath sounds: Normal breath sounds.   Musculoskeletal:      Cervical back: Normal range of motion.      Lumbar back: Spasms and tenderness present.        Back:       Comments: Tenderness to palpation left low back with palpable muscle spasms. "   Neurological:      Mental Status: She is alert.           Procedures    ED Course:         Lab Results (last 24 hours)     ** No results found for the last 24 hours. **           No radiology results from the last 24 hrs       MDM    Lindsay Amezquita is a 37 y.o. female who presents to the emergency department for evaluation of low back pain    Differential diagnosis includes, strain, herniated disc among other etiologies.      Chart review if available included outside testing, previous visits, prior labs, prior imaging, available notes from prior evaluations or visits with specialists, medication list, allergies, past medical history, past surgical history when applicable.    Patient was treated with prednisone, hydrocodone, Norflex, Lidoderm    She states feeling immensely better    Plan for disposition is discharged home.  Patient/family comfortable with and understanding of the plan.      Final diagnoses:   Acute left-sided low back pain with left-sided sciatica        Cristian Frazier PA-C  04/30/23 1852

## 2023-05-01 DIAGNOSIS — F41.1 GENERALIZED ANXIETY DISORDER: ICD-10-CM

## 2023-05-01 RX ORDER — ALPRAZOLAM 0.5 MG/1
0.5 TABLET ORAL 3 TIMES DAILY PRN
Qty: 90 TABLET | Refills: 0 | Status: SHIPPED | OUTPATIENT
Start: 2023-05-01 | End: 2024-04-30

## 2023-05-01 NOTE — TELEPHONE ENCOUNTER
Rx Refill Note  Requested Prescriptions     Pending Prescriptions Disp Refills   • ALPRAZolam (Xanax) 0.5 MG tablet 90 tablet 1     Sig: Take 1 tablet by mouth 3 (Three) Times a Day As Needed for Anxiety.      Last office visit with prescribing clinician: Visit date not found   Last telemedicine visit with prescribing clinician: 03/06/2023  Next office visit with prescribing clinician: 5/16/2023   Last lab  Last UDS

## 2023-05-01 NOTE — TELEPHONE ENCOUNTER
Incoming Refill Request      Medication requested (name and dose): xanax    Pharmacy where request should be sent: [facility]     Additional details provided by patient: n/a    Best call back number: [phone]    Does the patient have less than a 3 day supply:  [] Yes  [x] No    Evelyn Aguilera  05/01/23, 08:03 EDT

## 2023-05-03 ENCOUNTER — TELEPHONE (OUTPATIENT)
Dept: INTERNAL MEDICINE | Facility: CLINIC | Age: 38
End: 2023-05-03
Payer: MEDICAID

## 2023-05-03 NOTE — TELEPHONE ENCOUNTER
Patient states pharmacy gave her generic version of Latuda and patient does not feel like it is working as well. Please advise and call patient back to discuss. Phone number verified.

## 2023-05-04 DIAGNOSIS — F43.10 PTSD (POST-TRAUMATIC STRESS DISORDER): ICD-10-CM

## 2023-05-04 DIAGNOSIS — E11.65 TYPE 2 DIABETES MELLITUS WITH HYPERGLYCEMIA, WITHOUT LONG-TERM CURRENT USE OF INSULIN: ICD-10-CM

## 2023-05-04 DIAGNOSIS — N39.44 NOCTURNAL ENURESIS: ICD-10-CM

## 2023-05-04 DIAGNOSIS — F51.5 NIGHTMARES: ICD-10-CM

## 2023-05-04 RX ORDER — EMPAGLIFLOZIN 10 MG/1
TABLET, FILM COATED ORAL
Qty: 30 TABLET | Refills: 3 | Status: SHIPPED | OUTPATIENT
Start: 2023-05-04

## 2023-05-04 RX ORDER — CETIRIZINE HYDROCHLORIDE 10 MG/1
TABLET ORAL
Qty: 30 TABLET | Refills: 2 | Status: SHIPPED | OUTPATIENT
Start: 2023-05-04

## 2023-05-04 RX ORDER — PRAZOSIN HYDROCHLORIDE 5 MG/1
CAPSULE ORAL
Qty: 90 CAPSULE | OUTPATIENT
Start: 2023-05-04

## 2023-05-04 NOTE — TELEPHONE ENCOUNTER
Left voice message for patient:    -Patient can continue taking Latuda even if its Generic until her appointment on 5/16/2023, have had many conversations with patient that there will be no changes in prescriptions until her appointment and she is seen.    -Since patient stated she had a reaction to Pazosin she was discontinued off that medication in March.    If patient calls back please inform her to continue to take her medication that she is prescribed- how its prescribed. Any changes will be made at her follow up.    Thank you.

## 2023-05-12 ENCOUNTER — TELEPHONE (OUTPATIENT)
Dept: INTERNAL MEDICINE | Facility: CLINIC | Age: 38
End: 2023-05-12
Payer: MEDICAID

## 2023-05-12 RX ORDER — PROMETHAZINE HYDROCHLORIDE 25 MG/1
25 TABLET ORAL EVERY 6 HOURS PRN
Qty: 45 TABLET | Refills: 3 | Status: SHIPPED | OUTPATIENT
Start: 2023-05-12

## 2023-05-12 NOTE — TELEPHONE ENCOUNTER
Caller: AlirioLindsay barron    Relationship: Self    Best call back number:  [phone]    Requested Prescriptions:   Requested Prescriptions      No prescriptions requested or ordered in this encounter      PHENEGRAN 25 MG    Pharmacy where request should be sent: [id number] - [place], [place] - [address] [facility] [address] AT [facility] [address] - [phone] [facility] [phone] FX     Last office visit with prescribing clinician: 3/21/2023   Last telemedicine visit with prescribing clinician: 5/4/2023   Next office visit with prescribing clinician: 5/24/2023       Does the patient have less than a 3 day supply:    [x] Yes  [] No    Would you like a call back once the refill request has been completed: [] Yes [x] No    If the office needs to give you a call back, can they leave a voicemail: [] Yes [x] No    Ashlie Vazquez, PCT   05/12/23 08:29 EDT

## 2023-05-18 RX ORDER — DESVENLAFAXINE 100 MG/1
TABLET, EXTENDED RELEASE ORAL DAILY
Qty: 30 TABLET | Refills: 1 | Status: SHIPPED | OUTPATIENT
Start: 2023-05-18

## 2023-05-18 NOTE — TELEPHONE ENCOUNTER
Wants to know why Halima discontinued her Pristiq.She stated she called pharmacy for a refill and they told her it was discontinued. Her new number is   [phone]. Please advise

## 2023-05-20 ENCOUNTER — TELEMEDICINE (OUTPATIENT)
Dept: FAMILY MEDICINE CLINIC | Facility: TELEHEALTH | Age: 38
End: 2023-05-20
Payer: MEDICAID

## 2023-05-20 ENCOUNTER — TELEPHONE (OUTPATIENT)
Dept: INTERNAL MEDICINE | Facility: CLINIC | Age: 38
End: 2023-05-20
Payer: MEDICAID

## 2023-05-20 DIAGNOSIS — K21.9 GASTROESOPHAGEAL REFLUX DISEASE WITHOUT ESOPHAGITIS: Primary | ICD-10-CM

## 2023-05-20 RX ORDER — OMEPRAZOLE 40 MG/1
40 CAPSULE, DELAYED RELEASE ORAL DAILY
Qty: 14 CAPSULE | Refills: 0 | Status: SHIPPED | OUTPATIENT
Start: 2023-05-20 | End: 2023-06-03

## 2023-05-20 NOTE — TELEPHONE ENCOUNTER
Patient states that she has had real bad indigestion and has not slept.  She states she is really hot and freezing at the same time and would like a call from the nurse as soon as possible.  Please advise.

## 2023-05-20 NOTE — PROGRESS NOTES
You have chosen to receive care through a telehealth visit.  Do you consent to use a video/audio connection for your medical care today? Yes     CHIEF COMPLAINT  No chief complaint on file.        HPI  Lindsay Amezquita is a 37 y.o. female  presents with complaint of acid reflux symptoms with burning in throat and heartburn.  Was recently taken off of omeprazole, but she doesn't know why.  Would like a refill of omeprazole.     Review of Systems  See HPI    Past Medical History:   Diagnosis Date    Allergic     Anxiety     Back pain     Bell's palsy     Bipolar 2 disorder     Blood clot in vein     Deep vein thrombosis Last year    Depression     Diabetes mellitus November    Pre diabete    Frequent headaches     GERD (gastroesophageal reflux disease)     Irritable bowel syndrome Two years ago    Migraine     Panic     PTSD (post-traumatic stress disorder)     Restless leg syndrome     Sinus problem     Snores     Tattoos     Vitamin B12 deficiency        Family History   Problem Relation Age of Onset    Irritable bowel syndrome Mother     Heart disease Mother     Miscarriages / Stillbirths Mother     Irritable bowel syndrome Father     Alcohol abuse Father     Diabetes Father     Hyperlipidemia Father     Colon cancer Maternal Grandfather        Social History     Socioeconomic History    Marital status: Single   Tobacco Use    Smoking status: Every Day     Packs/day: 1.50     Years: 29.00     Pack years: 43.50     Types: Cigarettes     Start date: 1/1/1995    Smokeless tobacco: Never   Vaping Use    Vaping Use: Former   Substance and Sexual Activity    Alcohol use: Not Currently    Drug use: Not Currently    Sexual activity: Not Currently     Partners: Female     Birth control/protection: None, Same-sex partner       Lindsay Amezquita  reports that she has been smoking cigarettes. She started smoking about 28 years ago. She has a 43.50 pack-year smoking history. She has never used smokeless tobacco..              LMP  04/23/2023     PHYSICAL EXAM  Physical Exam   Constitutional: She is oriented to person, place, and time. She appears well-developed and well-nourished. She does not have a sickly appearance. She does not appear ill.   HENT:   Head: Normocephalic and atraumatic.   Pulmonary/Chest: Effort normal.  No respiratory distress.  Neurological: She is alert and oriented to person, place, and time.         Diagnoses and all orders for this visit:    1. Gastroesophageal reflux disease without esophagitis (Primary)  -     omeprazole (priLOSEC) 40 MG capsule; Take 1 capsule by mouth Daily for 14 days.  Dispense: 14 capsule; Refill: 0    --take medications as prescribed  --increase fluids, rest as needed, tylenol or ibuprofen for pain  --f/u in 3-5 days if no improvement  --keep f/u with PCP on Tuesday, 5/23/23        FOLLOW-UP  As discussed during visit with PCP/[facility] if no improvement or Urgent Care/Emergency Department if worsening of symptoms    Patient verbalizes understanding of medication dosage, comfort measures, instructions for treatment and follow-up.    Rosalba Tucker, CLAIR  05/20/2023  12:41 EDT    The use of a video visit has been reviewed with the patient and verbal informed consent has been obtained. Myself and Lindsay Amezquita participated in this visit. The patient is located in [address] [place] [address].    I am located in [place], [place]. Mychart and Twilio were utilized. I spent 7 minutes in the patient's chart for this visit.

## 2023-05-21 ENCOUNTER — APPOINTMENT (OUTPATIENT)
Dept: GENERAL RADIOLOGY | Facility: HOSPITAL | Age: 38
End: 2023-05-21
Payer: MEDICAID

## 2023-05-21 ENCOUNTER — HOSPITAL ENCOUNTER (EMERGENCY)
Facility: HOSPITAL | Age: 38
Discharge: HOME | End: 2023-05-21
Attending: EMERGENCY MEDICINE | Admitting: EMERGENCY MEDICINE
Payer: MEDICAID

## 2023-05-21 VITALS
OXYGEN SATURATION: 94 % | SYSTOLIC BLOOD PRESSURE: 128 MMHG | HEIGHT: 63 IN | WEIGHT: 285 LBS | HEART RATE: 97 BPM | DIASTOLIC BLOOD PRESSURE: 85 MMHG | RESPIRATION RATE: 18 BRPM | TEMPERATURE: 98 F | BODY MASS INDEX: 50.5 KG/M2

## 2023-05-21 DIAGNOSIS — R11.2 NAUSEA AND VOMITING, UNSPECIFIED VOMITING TYPE: Primary | ICD-10-CM

## 2023-05-21 LAB
ALBUMIN SERPL-MCNC: 3.7 G/DL (ref 3.5–5.2)
ALBUMIN/GLOB SERPL: 1.4 G/DL
ALP SERPL-CCNC: 99 U/L (ref 39–117)
ALT SERPL W P-5'-P-CCNC: 19 U/L (ref 1–33)
ANION GAP SERPL CALCULATED.3IONS-SCNC: 15 MMOL/L (ref 5–15)
AST SERPL-CCNC: 17 U/L (ref 1–32)
BASOPHILS # BLD AUTO: 0.03 10*3/MM3 (ref 0–0.2)
BASOPHILS NFR BLD AUTO: 0.3 % (ref 0–1.5)
BILIRUB SERPL-MCNC: 0.3 MG/DL (ref 0–1.2)
BILIRUB UR QL STRIP: NEGATIVE
BUN SERPL-MCNC: 3 MG/DL (ref 6–20)
BUN/CREAT SERPL: 5.4 (ref 7–25)
CALCIUM SPEC-SCNC: 9.2 MG/DL (ref 8.6–10.5)
CHLORIDE SERPL-SCNC: 103 MMOL/L (ref 98–107)
CLARITY UR: CLEAR
CO2 SERPL-SCNC: 22 MMOL/L (ref 22–29)
COLOR UR: YELLOW
CREAT SERPL-MCNC: 0.56 MG/DL (ref 0.57–1)
DEPRECATED RDW RBC AUTO: 47.8 FL (ref 37–54)
EGFRCR SERPLBLD CKD-EPI 2021: 120 ML/MIN/1.73
EOSINOPHIL # BLD AUTO: 0.2 10*3/MM3 (ref 0–0.4)
EOSINOPHIL NFR BLD AUTO: 2.1 % (ref 0.3–6.2)
ERYTHROCYTE [DISTWIDTH] IN BLOOD BY AUTOMATED COUNT: 14.9 % (ref 12.3–15.4)
GLOBULIN UR ELPH-MCNC: 2.7 GM/DL
GLUCOSE SERPL-MCNC: 117 MG/DL (ref 65–99)
GLUCOSE UR STRIP-MCNC: ABNORMAL MG/DL
HCT VFR BLD AUTO: 44.1 % (ref 34–46.6)
HGB BLD-MCNC: 14.8 G/DL (ref 12–15.9)
HGB UR QL STRIP.AUTO: NEGATIVE
IMM GRANULOCYTES # BLD AUTO: 0.03 10*3/MM3 (ref 0–0.05)
IMM GRANULOCYTES NFR BLD AUTO: 0.3 % (ref 0–0.5)
KETONES UR QL STRIP: NEGATIVE
LEUKOCYTE ESTERASE UR QL STRIP.AUTO: NEGATIVE
LIPASE SERPL-CCNC: 22 U/L (ref 13–60)
LYMPHOCYTES # BLD AUTO: 3.94 10*3/MM3 (ref 0.7–3.1)
LYMPHOCYTES NFR BLD AUTO: 41.6 % (ref 19.6–45.3)
MCH RBC QN AUTO: 29.5 PG (ref 26.6–33)
MCHC RBC AUTO-ENTMCNC: 33.6 G/DL (ref 31.5–35.7)
MCV RBC AUTO: 88 FL (ref 79–97)
MONOCYTES # BLD AUTO: 0.49 10*3/MM3 (ref 0.1–0.9)
MONOCYTES NFR BLD AUTO: 5.2 % (ref 5–12)
NEUTROPHILS NFR BLD AUTO: 4.78 10*3/MM3 (ref 1.7–7)
NEUTROPHILS NFR BLD AUTO: 50.5 % (ref 42.7–76)
NITRITE UR QL STRIP: NEGATIVE
NRBC BLD AUTO-RTO: 0 /100 WBC (ref 0–0.2)
PH UR STRIP.AUTO: 6 [PH] (ref 5–8)
PLATELET # BLD AUTO: 240 10*3/MM3 (ref 140–450)
PMV BLD AUTO: 10.2 FL (ref 6–12)
POTASSIUM SERPL-SCNC: 3.6 MMOL/L (ref 3.5–5.2)
PROT SERPL-MCNC: 6.4 G/DL (ref 6–8.5)
PROT UR QL STRIP: NEGATIVE
RBC # BLD AUTO: 5.01 10*6/MM3 (ref 3.77–5.28)
SODIUM SERPL-SCNC: 140 MMOL/L (ref 136–145)
SP GR UR STRIP: 1.02 (ref 1–1.03)
TROPONIN T SERPL HS-MCNC: <6 NG/L
UROBILINOGEN UR QL STRIP: ABNORMAL
WBC NRBC COR # BLD: 9.47 10*3/MM3 (ref 3.4–10.8)

## 2023-05-21 PROCEDURE — 93005 ELECTROCARDIOGRAM TRACING: CPT | Performed by: EMERGENCY MEDICINE

## 2023-05-21 PROCEDURE — 81003 URINALYSIS AUTO W/O SCOPE: CPT | Performed by: EMERGENCY MEDICINE

## 2023-05-21 PROCEDURE — 96374 THER/PROPH/DIAG INJ IV PUSH: CPT

## 2023-05-21 PROCEDURE — 25010000002 PROMETHAZINE PER 50 MG: Performed by: EMERGENCY MEDICINE

## 2023-05-21 PROCEDURE — 63710000001 ONDANSETRON ODT 4 MG TABLET DISPERSIBLE: Performed by: EMERGENCY MEDICINE

## 2023-05-21 PROCEDURE — 71045 X-RAY EXAM CHEST 1 VIEW: CPT

## 2023-05-21 PROCEDURE — 99283 EMERGENCY DEPT VISIT LOW MDM: CPT

## 2023-05-21 PROCEDURE — 36415 COLL VENOUS BLD VENIPUNCTURE: CPT

## 2023-05-21 PROCEDURE — 80053 COMPREHEN METABOLIC PANEL: CPT | Performed by: EMERGENCY MEDICINE

## 2023-05-21 PROCEDURE — 83690 ASSAY OF LIPASE: CPT | Performed by: EMERGENCY MEDICINE

## 2023-05-21 PROCEDURE — 85025 COMPLETE CBC W/AUTO DIFF WBC: CPT | Performed by: EMERGENCY MEDICINE

## 2023-05-21 PROCEDURE — 84484 ASSAY OF TROPONIN QUANT: CPT | Performed by: EMERGENCY MEDICINE

## 2023-05-21 RX ORDER — ONDANSETRON 4 MG/1
4 TABLET, ORALLY DISINTEGRATING ORAL ONCE
Status: COMPLETED | OUTPATIENT
Start: 2023-05-21 | End: 2023-05-21

## 2023-05-21 RX ORDER — LIDOCAINE HYDROCHLORIDE 20 MG/ML
15 SOLUTION OROPHARYNGEAL ONCE
Status: COMPLETED | OUTPATIENT
Start: 2023-05-21 | End: 2023-05-21

## 2023-05-21 RX ORDER — ALUMINA, MAGNESIA, AND SIMETHICONE 2400; 2400; 240 MG/30ML; MG/30ML; MG/30ML
15 SUSPENSION ORAL ONCE
Status: COMPLETED | OUTPATIENT
Start: 2023-05-21 | End: 2023-05-21

## 2023-05-21 RX ADMIN — ONDANSETRON 4 MG: 4 TABLET, ORALLY DISINTEGRATING ORAL at 07:04

## 2023-05-21 RX ADMIN — SODIUM CHLORIDE 1000 ML: 9 INJECTION, SOLUTION INTRAVENOUS at 05:35

## 2023-05-21 RX ADMIN — PROMETHAZINE HYDROCHLORIDE 12.5 MG: 25 INJECTION INTRAMUSCULAR; INTRAVENOUS at 05:37

## 2023-05-21 RX ADMIN — LIDOCAINE HYDROCHLORIDE 15 ML: 20 SOLUTION OROPHARYNGEAL at 05:33

## 2023-05-21 RX ADMIN — ALUMINUM HYDROXIDE, MAGNESIUM HYDROXIDE, AND DIMETHICONE 15 ML: 400; 400; 40 SUSPENSION ORAL at 05:32

## 2023-05-21 NOTE — ED PROVIDER NOTES
TRIAGE CHIEF COMPLAINT:     Nursing and triage notes reviewed    Chief Complaint   Patient presents with   • Vomiting      HPI: Lindsay Amezquita is a 37 y.o. female who presents to the emergency department complaining of nausea, vomiting, epigastric pain, burning in her chest.  Symptoms have been ongoing for the past several hours constantly.  She has also been coughing.  She has a history of acid reflux and feels that this is similar.  She denies shortness of breath.  She denies fevers or chills.  She denies diarrhea.  She states her symptoms worsen after she laid down.    REVIEW OF SYSTEMS: All other systems reviewed and are negative     PAST MEDICAL HISTORY:   Past Medical History:   Diagnosis Date   • Allergic    • Anxiety    • Back pain    • Bell's palsy    • Bipolar 2 disorder    • Blood clot in vein    • Deep vein thrombosis Last year   • Depression    • Diabetes mellitus November    Pre diabete   • Frequent headaches    • GERD (gastroesophageal reflux disease)    • Irritable bowel syndrome Two years ago   • Migraine    • Panic    • PTSD (post-traumatic stress disorder)    • Restless leg syndrome    • Sinus problem    • Snores    • Tattoos    • Vitamin B12 deficiency         FAMILY HISTORY:   Family History   Problem Relation Age of Onset   • Irritable bowel syndrome Mother    • Heart disease Mother    • Miscarriages / Stillbirths Mother    • Irritable bowel syndrome Father    • Alcohol abuse Father    • Diabetes Father    • Hyperlipidemia Father    • Colon cancer Maternal Grandfather         SOCIAL HISTORY:   Social History     Socioeconomic History   • Marital status: Single   Tobacco Use   • Smoking status: Every Day     Packs/day: 1.50     Years: 29.00     Pack years: 43.50     Types: Cigarettes     Start date: 1/1/1995   • Smokeless tobacco: Never   Vaping Use   • Vaping Use: Former   Substance and Sexual Activity   • Alcohol use: Not Currently   • Drug use: Not Currently   • Sexual activity: Not Currently      Partners: Female     Birth control/protection: None, Same-sex partner        SURGICAL HISTORY:   Past Surgical History:   Procedure Laterality Date   • CHOLECYSTECTOMY     • TOOTH EXTRACTION          CURRENT MEDICATIONS:      Medication List      CONTINUE taking these medications    FreeStyle Freedom Lite w/Device kit     glucose monitor monitoring kit  Check glucose daily     * Lancets misc  Check fasting glucose daily and 1 hour after largest meal of day.     * Lancets misc     ONE TOUCH SURESOFT misc  Check blood sugar once daily         * This list has 2 medication(s) that are the same as other medications prescribed for you. Read the directions carefully, and ask your doctor or other care provider to review them with you.            ASK your doctor about these medications    ALPRAZolam 0.5 MG tablet  Commonly known as: Xanax  Take 1 tablet by mouth 3 (Three) Times a Day As Needed for Anxiety.     * amitriptyline 150 MG tablet  Commonly known as: ELAVIL  Take 1 tablet by mouth every night at bedtime.     * amitriptyline 150 MG tablet  Commonly known as: ELAVIL     Anoro Ellipta 62.5-25 MCG/ACT aerosol powder  inhaler  Generic drug: Umeclidinium-Vilanterol  Inhale 1 puff Daily.     * atorvastatin 40 MG tablet  Commonly known as: Lipitor  Take 1 tablet by mouth Every Night.     * atorvastatin 40 MG tablet  Commonly known as: LIPITOR     butalbital-acetaminophen-caffeine -40 MG per tablet  Commonly known as: Esgic  Take 1 tablet by mouth Every 12 (Twelve) Hours As Needed for Migraine.     cetirizine 10 MG tablet  Commonly known as: zyrTEC  TAKE 1 TABLET BY MOUTH EVERY DAY     clindamycin 1 % gel  Commonly known as: Clindagel  Apply 1 application topically to the appropriate area as directed 2 (Two) Times a Day.     * desvenlafaxine 50 MG 24 hr tablet  Commonly known as: PRISTIQ     * desvenlafaxine 100 MG 24 hr tablet  Commonly known as: PRISTIQ  TAKE 1 TABLET BY MOUTH DAILY     diphenhydrAMINE 50 MG  capsule  Commonly known as: BENADRYL     * fluticasone 50 MCG/ACT nasal spray  Commonly known as: FLONASE  2 sprays into the nostril(s) as directed by provider Daily for 10 days.     * fluticasone 50 MCG/ACT nasal spray  Commonly known as: FLONASE     * gabapentin 800 MG tablet  Commonly known as: NEURONTIN  Take 1 tablet by mouth 3 (Three) Times a Day for 14 days. Further refills should come from pain management provider     * gabapentin 800 MG tablet  Commonly known as: NEURONTIN     glucose blood test strip  Use as instructed     guaiFENesin 600 MG 12 hr tablet  Commonly known as: Mucinex  Take 2 tablets by mouth 2 (Two) Times a Day.     HYDROcodone-acetaminophen 7.5-325 MG per tablet  Commonly known as: NORCO     hydrOXYzine 25 MG tablet  Commonly known as: ATARAX  Take 1 tablet by mouth 3 (Three) Times a Day As Needed for Itching, Allergies or Anxiety.     ibuprofen 800 MG tablet  Commonly known as: ADVIL,MOTRIN     Jardiance 10 MG tablet tablet  Generic drug: empagliflozin  TAKE 1 TABLET BY MOUTH EVERY DAY     lamoTRIgine 100 MG tablet  Commonly known as: LaMICtal     levocetirizine 5 MG tablet  Commonly known as: XYZAL  Take 1 tablet by mouth Every Evening.     lidocaine 5 %  Commonly known as: LIDODERM  Place 1 patch on the skin as directed by provider Daily. Remove & Discard patch within 12 hours or as directed by MD     Lurasidone HCl 120 MG tablet tablet  Commonly known as: Latuda  Take 1 tablet by mouth Daily.     * Nurtec 75 MG tablet dispersible tablet  Generic drug: Rimegepant Sulfate  Take 1 tablet by mouth Every Other Day. Take Monday,Wednesday,Friday, and Saturday.     * Nurtec 75 MG tablet dispersible tablet  Generic drug: Rimegepant Sulfate  Take 1 tablet by mouth Daily.     omeprazole 40 MG capsule  Commonly known as: priLOSEC  Take 1 capsule by mouth Daily for 14 days.     PROBIOTIC PO     promethazine 25 MG tablet  Commonly known as: PHENERGAN  Take 1 tablet by mouth Every 6 (Six) Hours As  Needed for Nausea or Vomiting.     * tiZANidine 4 MG tablet  Commonly known as: ZANAFLEX  Take 1 tablet by mouth Every 8 (Eight) Hours As Needed for Muscle Spasms. Further refills should come from pain management.     * tiZANidine 4 MG tablet  Commonly known as: ZANAFLEX     Ventolin  (90 Base) MCG/ACT inhaler  Generic drug: albuterol sulfate HFA  INHALE 2 PUFFS EVERY 4 HOURS AS NEEDED FOR WHEEZING OR SHORTNESS OF AIR     vitamin B-12 500 MCG tablet  Commonly known as: CYANOCOBALAMIN     vitamin D 1.25 MG (22917 UT) capsule capsule  Commonly known as: ERGOCALCIFEROL  TAKE 1 CAPSULE BY MOUTH 1 TIME PER WEEK.         * This list has 14 medication(s) that are the same as other medications prescribed for you. Read the directions carefully, and ask your doctor or other care provider to review them with you.                 ALLERGIES: Aspirin, Codeine, Cyclobenzaprine, Haldol [haloperidol], Latex, Penicillins, Morphine, Ondansetron, Oxycodone-acetaminophen, Oxycontin [oxycodone], Triptans, Compazine [prochlorperazine], Lamictal [lamotrigine], and Reglan [metoclopramide]     PHYSICAL EXAM:   VITAL SIGNS:   Vitals:    05/21/23 0413   BP: 133/88   Pulse: 111   Resp: 18   Temp: 98.1 °F (36.7 °C)   SpO2: 94%      CONSTITUTIONAL: Awake, oriented, appears nontoxic   HENT: Atraumatic, normocephalic, oral mucosa pink and moist, airway patent. Nares patent without drainage. External ears normal.   EYES: Conjunctivae clear   NECK: Trachea midline   CARDIOVASCULAR: Normal heart rate, Normal rhythm, No murmurs, rubs, gallops   PULMONARY/CHEST: Clear to auscultation, no rhonchi, wheezes, or rales. Symmetrical breath sounds.  ABDOMINAL: Nondistended, soft, mild epigastric tenderness- no rebound or guarding.  NEUROLOGIC: Nonfocal, moving all four extremities, no gross sensory or motor deficits.   EXTREMITIES: No clubbing, cyanosis, or edema   SKIN: Warm, Dry, No erythema, No rash     ED COURSE / MEDICAL DECISION MAKING:   Lindsay CHAPMAN  Alirio is a 37 y.o. female who presents to the emergency department for evaluation of abdominal discomfort, nausea, vomiting.  Patient is nondistressed on arrival in the emergency department.  Vital signs show mild tachycardia but otherwise are stable.  There is mild epigastric abdominal tenderness on examination.    Differential diagnosis includes gastritis, GERD, reflux, intra-abdominal infection, ACS among other etiologies.    An EKG, chest x-ray, CBC, CMP, cardiac enzymes, lipase, urinalysis was ordered for further evaluation of the patient's presentation.    Diagnostic information from other sources: Family    Interventions: IV fluids, Phenergan, GI cocktail, Zofran    EKG interpreted by me reveals sinus tachycardia with rate of 101 bpm.  There is low QRS voltage in chest leads.  There are few nonspecific findings.  This is an atypical appearing EKG.    Narrative: Patient presents with abdominal discomfort, nausea, vomiting.  Patient nondistressed on arrival.  EKG is nonischemic.  Labs are largely reassuring including cardiac enzymes.  White blood cell count within the normal range.  Patient has a heart score of 1, this does place her in a low risk category.    Patient resting comfortably, she is requesting to go home.  She will be discharged with close outpatient follow-up.    DECISION TO DISCHARGE/ADMIT: see ED care timeline     FINAL IMPRESSION:   1 --nausea vomiting  2 --   3 --     Electronically signed by: Yun Abreu MD, 5/21/2023 05:35 EDT       Channing Cottrell MD  05/21/23 0858

## 2023-05-23 ENCOUNTER — TELEPHONE (OUTPATIENT)
Dept: NEUROLOGY | Facility: CLINIC | Age: 38
End: 2023-05-23

## 2023-05-23 ENCOUNTER — TELEPHONE (OUTPATIENT)
Dept: INTERNAL MEDICINE | Facility: CLINIC | Age: 38
End: 2023-05-23
Payer: MEDICAID

## 2023-05-23 ENCOUNTER — OFFICE VISIT (OUTPATIENT)
Dept: NEUROLOGY | Facility: CLINIC | Age: 38
End: 2023-05-23
Payer: MEDICAID

## 2023-05-23 VITALS
TEMPERATURE: 97.8 F | HEART RATE: 111 BPM | OXYGEN SATURATION: 94 % | HEIGHT: 63 IN | WEIGHT: 285 LBS | DIASTOLIC BLOOD PRESSURE: 80 MMHG | SYSTOLIC BLOOD PRESSURE: 128 MMHG | BODY MASS INDEX: 50.5 KG/M2

## 2023-05-23 DIAGNOSIS — Z87.828 HISTORY OF TRAUMATIC HEAD INJURY: ICD-10-CM

## 2023-05-23 DIAGNOSIS — R29.898 WEAKNESS OF RIGHT HAND: ICD-10-CM

## 2023-05-23 DIAGNOSIS — H55.00 NYSTAGMUS OF LEFT EYE: ICD-10-CM

## 2023-05-23 DIAGNOSIS — G51.0 BELL PALSY: ICD-10-CM

## 2023-05-23 DIAGNOSIS — G43.001 MIGRAINE WITHOUT AURA AND WITH STATUS MIGRAINOSUS, NOT INTRACTABLE: Primary | ICD-10-CM

## 2023-05-23 RX ORDER — RIMEGEPANT SULFATE 75 MG/75MG
75 TABLET, ORALLY DISINTEGRATING ORAL ONCE AS NEEDED
Qty: 6 TABLET | Refills: 0 | COMMUNITY
Start: 2023-05-23

## 2023-05-23 RX ORDER — RIMEGEPANT SULFATE 75 MG/75MG
75 TABLET, ORALLY DISINTEGRATING ORAL EVERY OTHER DAY
Qty: 16 TABLET | Refills: 6 | Status: SHIPPED | OUTPATIENT
Start: 2023-05-23
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
## 2023-05-23 NOTE — TELEPHONE ENCOUNTER
Attempted to contact patient by phone.  No answer; Spoke with patients mother (on TRISH) patient was on the phone, but her mother said she would relay the message.  She felt they may have gotten too hot and that may have contributed to her symptoms.

## 2023-05-23 NOTE — TELEPHONE ENCOUNTER
Caller: Lindsay Amezquita    Relationship: Self    Best call back number:  [phone]     What specialty or service is being requested  REFERRAL FOR A NEW NEUROLOGIST     What is the provider, practice or medical service name: [facility]   WITH [facility] IN [place]      Any additional details:  PATIENT IS UPSET BECAUSE THE NEUROLOGIST WILL NOT LET HER MOM GO BACK WTIH HER TO HER  APPOINTMENT  AND SHE WANTS A NEW REFERRAL TO SEE SOMEONE ELSE

## 2023-05-23 NOTE — TELEPHONE ENCOUNTER
Caller: Lindsay Amezquita    Relationship: Self    Best call back number: [phone]    What was the call regarding: THE INS  FOR THE PT SAID HER NURTEC IS REQUIRING ANOTHER PA.

## 2023-05-23 NOTE — TELEPHONE ENCOUNTER
Caller: Lindsay Amezquita    Relationship: Self    Best call back number: [phone]    Who are you requesting to speak with (clinical staff, provider,  specific staff member): CLINICAL    What was the call regarding: PT SAID WHEN THE EYE TEST WAS DONE WITH THE LIGHTS ON AND OFF TRIGGERED A MIGRAINE AND THE PT WANTS TO KNOW IF THAT IS NORMAL AND WHAT SHE CAN DO TO HELP RELIEVE IT. SHE ALREADY AS TAKEN HER NURTEC BUT IT HAS NOT HELPED.     PLEASE REVIEW AND ADVISED

## 2023-05-23 NOTE — TELEPHONE ENCOUNTER
Patient called to let you know that cyst under breast ruptured. She has an appointment tomorrow and will discuss with you further. She gets these each time she has her period. Patient advised to go to [facility] or ER. Patient states understanding.

## 2023-05-25 ENCOUNTER — OFFICE VISIT (OUTPATIENT)
Dept: INTERNAL MEDICINE | Facility: CLINIC | Age: 38
End: 2023-05-25
Payer: MEDICAID

## 2023-05-25 ENCOUNTER — PATIENT OUTREACH (OUTPATIENT)
Dept: CASE MANAGEMENT | Facility: OTHER | Age: 38
End: 2023-05-25
Payer: MEDICAID

## 2023-05-25 DIAGNOSIS — N61.0 CELLULITIS OF RIGHT BREAST: ICD-10-CM

## 2023-05-25 DIAGNOSIS — N64.4 BREAST PAIN, RIGHT: ICD-10-CM

## 2023-05-25 DIAGNOSIS — K21.9 GASTROESOPHAGEAL REFLUX DISEASE WITHOUT ESOPHAGITIS: Primary | ICD-10-CM

## 2023-05-25 DIAGNOSIS — E11.65 TYPE 2 DIABETES MELLITUS WITH HYPERGLYCEMIA, WITHOUT LONG-TERM CURRENT USE OF INSULIN: ICD-10-CM

## 2023-05-25 DIAGNOSIS — E66.01 CLASS 3 SEVERE OBESITY DUE TO EXCESS CALORIES WITH SERIOUS COMORBIDITY AND BODY MASS INDEX (BMI) OF 50.0 TO 59.9 IN ADULT: ICD-10-CM

## 2023-05-25 DIAGNOSIS — N76.0 ACUTE VAGINITIS: Primary | ICD-10-CM

## 2023-05-25 LAB
EXPIRATION DATE: NORMAL
HBA1C MFR BLD: 6.3 %
Lab: NORMAL

## 2023-05-25 RX ORDER — LEVOCETIRIZINE DIHYDROCHLORIDE 5 MG/1
5 TABLET, FILM COATED ORAL EVERY EVENING
Qty: 90 TABLET | Refills: 3 | Status: SHIPPED | OUTPATIENT
Start: 2023-05-25

## 2023-05-25 RX ORDER — FLUCONAZOLE 150 MG/1
150 TABLET ORAL ONCE
Qty: 2 TABLET | Refills: 0 | Status: SHIPPED | OUTPATIENT
Start: 2023-05-25 | End: 2023-05-25

## 2023-05-25 RX ORDER — OMEPRAZOLE 40 MG/1
40 CAPSULE, DELAYED RELEASE ORAL DAILY
Qty: 90 CAPSULE | Refills: 3 | Status: SHIPPED | OUTPATIENT
Start: 2023-05-25

## 2023-05-25 RX ORDER — DOXYCYCLINE HYCLATE 100 MG/1
100 CAPSULE ORAL 2 TIMES DAILY
Qty: 14 CAPSULE | Refills: 0 | Status: SHIPPED | OUTPATIENT
Start: 2023-05-25 | End: 2023-06-01

## 2023-05-25 NOTE — OUTREACH NOTE
"AMBULATORY CASE MANAGEMENT NOTE    Name and Relationship of Patient/Support Person: Lindsay Amezquita M - Self    Patient Outreach    AMB CM outreach with Patient. Patient had an ED visit with [facility] on 05/21/23. Patient was treated and discharged to home with primary care follow up as instructed with no medication changes. She was scheduled for follow up with PCP on 05/24/23 and canceled the appointment.    Patient reported that she \"felt worse\" and her IBS was acting up acting and kept her awake most of the night. She stated the ED gave her Zofran and then this always happens. Patient requested a same day appointment with her PCP.    Care Coordination    Care coordination with Primary Care Provider. GIA CM spoke with Charley FERNÁNDEZ who was agreeable to adding Patient on today's schedule. Same Day appointment scheduled for 05/25/23 at 11:45 AM. Patient voiced acceptance of appointment as scheduled.    GIA MONTOYA will provide information on CCM for MA to share at same day appointment.    Kacie YOST  Ambulatory Case Management    5/25/2023, 09:47 EDT  "

## 2023-05-25 NOTE — PROGRESS NOTES
Office Visit      Patient Name: Lindsay Amezquita  : 1985   MRN: [id number]   Care Team: Patient Care Team:  Charley Robles APRN as PCP - General (Family Medicine)  Donna Mcqueen APRN as Nurse Practitioner (Behavioral Health)  Kacie Aaron, RN as Ambulatory  (Population Health)    Chief Complaint  Vomiting (Unable to keep anything down/IBS is bothering her. ) and Vaginitis (Yeast infection/Urinary frequency)    Subjective     Subjective      Lindsay Amezquita presents to [facility] for IBS symptoms and yeast concerns.   IBS- has had issues with coughing which triggered to cough and vomit. Went to the ER.   Given GI cocktail, promethazine, and ondansetron.   She started getting severe constipation so yesterday took 8 fiber gummies. Now stomach is cramping.   Denies blood in stool, unexpected weight loss, and fever.  Yeast- problem started 4 days ago.   Endorses lower abdominal cramping, white thick vaginal discharge, vaginal itching.  Denies dysuria, hematuria, urinary urgency, urinary frequency, and fever.  She is on jardiance for her DM.   Type 2 diabetes- she is taking her Jardiance as prescribed, she denies any side effects to the medication.  She is also on atorvastatin nightly.  Denies polyuria, polydipsia, polyphagia, vision changes, foot ulcer/callus, dark urine, and myalgia.  She is struggling to follow a diabetic diet and admits to drinking Mountain Dew daily.  She does not get any structured exercise.  Her vision exam is UTD.  Lab Results   Component Value Date    HGBA1C 6.3 2023   She continues to have reoccurring cellulitis of the right breast.  She had a boil that she apparently drained.  Now she has surrounding erythema and warmth.  It feels much better now but it the area drained however she is concerned with the redness.  Doxycycline did clear this up prior.  She has not had an ultrasound or mammogram.  Objective     Objective  "  Vital Signs:   /86   Pulse 107   Temp 98.2 °F (36.8 °C)   Ht 160 cm (63\")   Wt 129 kg (285 lb)   SpO2 96%   BMI 50.49 kg/m²     Physical Exam  Vitals and nursing note reviewed.   Constitutional:       Appearance: Normal appearance. She is obese. She is not ill-appearing.   Cardiovascular:      Rate and Rhythm: Normal rate and regular rhythm.      Heart sounds: Normal heart sounds. No murmur heard.  Pulmonary:      Effort: Pulmonary effort is normal.      Breath sounds: Normal breath sounds. No wheezing.   Abdominal:      General: Abdomen is flat. Bowel sounds are normal. There is no distension.      Palpations: Abdomen is soft.      Tenderness: There is abdominal tenderness (Suprapubic). There is no right CVA tenderness or left CVA tenderness.      Hernia: No hernia is present.   Skin:     General: Skin is warm and dry.      Findings: No rash.   Neurological:      General: No focal deficit present.      Mental Status: She is alert.   Psychiatric:         Mood and Affect: Mood normal.         Behavior: Behavior normal.          Assessment / Plan      Assessment & Plan   Problem List Items Addressed This Visit    None  Visit Diagnoses     Acute vaginitis    -  Primary    One time dose diflucan. Avoid douching, diabetic diet, and can repeat dose in 72 hours if not improving.     Type 2 diabetes mellitus with hyperglycemia, without long-term current use of insulin        Relevant Orders    POC Glycosylated Hemoglobin (Hb A1C) (Completed)    - Follow diabetic diet by decreasing carbohydrates, sugar, and processed foods.   - Exercise encouraged as tolerated-- discussed low impact walking 30 minutes/day 5 days/week.   - Continue taking all medication as prescribed. Monitor blood glucose as discussed. Fasting blood sugars should be <130 and 2 hours after meal blood sugar should be <180.   - Annual eye exam encouraged  - Check feet regularly for calluses or ulcers. Wear protective foot wear/no bare feet  - Risk " discussion regarding uncontrolled diabetes.     Cellulitis of right breast        Relevant Orders    US breast right complete    Treat with doxycycline- warned of sun sensitivity. US to further assess. Strict return precuations.     Breast pain, right        Relevant Orders    US breast right complete    Class 3 severe obesity due to excess calories with serious comorbidity and body mass index (BMI) of 50.0 to 59.9 in adult            Class 3 Severe Obesity (BMI >=40). Obesity-related health conditions include the following: diabetes mellitus. Obesity is unchanged. BMI is is above average; BMI management plan is completed. We discussed portion control and increasing exercise.      Follow Up   Return for keep scheduled follow-up.  Patient was given instructions and counseling regarding her condition or for health maintenance advice. Please see specific information pulled into the AVS if appropriate.     CLAIR Lindsay  [facility] - [place]

## 2023-05-26 VITALS
TEMPERATURE: 98.2 F | HEIGHT: 63 IN | OXYGEN SATURATION: 96 % | HEART RATE: 107 BPM | SYSTOLIC BLOOD PRESSURE: 118 MMHG | DIASTOLIC BLOOD PRESSURE: 86 MMHG | BODY MASS INDEX: 50.5 KG/M2 | WEIGHT: 285 LBS

## 2023-05-30 DIAGNOSIS — F31.30 BIPOLAR I DISORDER, MOST RECENT EPISODE DEPRESSED: ICD-10-CM

## 2023-05-30 DIAGNOSIS — F41.1 GENERALIZED ANXIETY DISORDER: ICD-10-CM

## 2023-05-30 RX ORDER — ALPRAZOLAM 0.5 MG/1
0.5 TABLET ORAL 3 TIMES DAILY PRN
Qty: 90 TABLET | Refills: 0 | Status: SHIPPED | OUTPATIENT
Start: 2023-05-30 | End: 2024-05-29

## 2023-05-30 RX ORDER — LURASIDONE HYDROCHLORIDE 120 MG/1
120 TABLET, FILM COATED ORAL DAILY
Qty: 30 TABLET | Refills: 1 | Status: SHIPPED | OUTPATIENT
Start: 2023-05-30

## 2023-05-30 NOTE — TELEPHONE ENCOUNTER
Wants prescription sent to [facility].    Rx Refill Note  Requested Prescriptions     Refused Prescriptions Disp Refills   • ALPRAZolam (Xanax) 0.5 MG tablet 90 tablet 0     Sig: Take 1 tablet by mouth 3 (Three) Times a Day As Needed for Anxiety.   • Lurasidone HCl (Latuda) 120 MG tablet tablet 30 tablet 1     Sig: Take 1 tablet by mouth Daily.      Last office visit with prescribing clinician: Visit date not found   Last telemedicine visit with prescribing clinician: Visit date not found   Next office visit with prescribing clinician: 8/1/2023                         Would you like a call back once the refill request has been completed: [] Yes [] No    If the office needs to give you a call back, can they leave a voicemail: [] Yes [] No    Dee Reagan MA  05/30/23, 14:15 EDT

## 2023-05-30 NOTE — TELEPHONE ENCOUNTER
Incoming Refill Request      Medication requested (name and dose): LATUDA 120 MG  XANAX 0.5 MG    [facility]arm[facility] where [facility]    Additional details provided by patient: NA    Best call back number: [phone]    Does the patient have less than a 3 day supply:  [] Yes  [x] No    Kim Mueller Rep  05/30/23, 12:08 EDT

## 2023-05-31 ENCOUNTER — TELEPHONE (OUTPATIENT)
Dept: INTERNAL MEDICINE | Facility: CLINIC | Age: 38
End: 2023-05-31

## 2023-05-31 NOTE — TELEPHONE ENCOUNTER
Caller: Lindsay Amezquita     Relationship: SELF    Best call back number: [phone]    What is your medical concern? SHE IS HAVING SEVERAL MEDICAL ISSUES AT THE MOMENT. ON THE 26TH SHE STARTED BECOMING UN CONSTIPATED. EVERY SINCE THEN SHE HAS BEEN VOMITTING AT LEAST THREE TIMES A DAY, HEADACHE, STUFFY NOSE, HAS DIARRHEA NOW.     SHE IS FEELING WEAK ALL OVER. SHE STATES SHE HAS TAKEN THE PHENERGAN AND ANTIBIOTIC AS WELL PROBIOTIC AND HER DAILY MEDICATION. SOMETIMES IT COMES BACK UP.    How long has this issue been going on? 05/25/23    Is your provider already aware of this issue? YES    Have you been treated for this issue? YES     [id number] - [place], [place] - [address] - [phone]  - [phone] FX

## 2023-06-03 ENCOUNTER — TELEPHONE (OUTPATIENT)
Dept: INTERNAL MEDICINE | Facility: CLINIC | Age: 38
End: 2023-06-03

## 2023-06-03 NOTE — TELEPHONE ENCOUNTER
PT CALLED THE OFFICE TO NOTIFY HER PCP THAT SHE HAS FINISHED THE ANTIBIOTIC PRESCRIBED AND NOW HER SYMPTOMS HAVE RETURNED.

## 2023-06-06 ENCOUNTER — TELEPHONE (OUTPATIENT)
Dept: INTERNAL MEDICINE | Facility: CLINIC | Age: 38
End: 2023-06-06

## 2023-06-06 ENCOUNTER — HOSPITAL ENCOUNTER (EMERGENCY)
Facility: HOSPITAL | Age: 38
Discharge: HOME | End: 2023-06-07
Attending: EMERGENCY MEDICINE
Payer: MEDICAID

## 2023-06-06 ENCOUNTER — APPOINTMENT (OUTPATIENT)
Dept: ULTRASOUND IMAGING | Facility: HOSPITAL | Age: 38
End: 2023-06-06
Payer: MEDICAID

## 2023-06-06 VITALS
OXYGEN SATURATION: 94 % | DIASTOLIC BLOOD PRESSURE: 96 MMHG | WEIGHT: 266 LBS | HEART RATE: 107 BPM | TEMPERATURE: 98.1 F | BODY MASS INDEX: 48.95 KG/M2 | RESPIRATION RATE: 16 BRPM | SYSTOLIC BLOOD PRESSURE: 130 MMHG | HEIGHT: 62 IN

## 2023-06-06 DIAGNOSIS — M79.89 LEG SWELLING: ICD-10-CM

## 2023-06-06 DIAGNOSIS — M79.604 BILATERAL LEG PAIN: Primary | ICD-10-CM

## 2023-06-06 DIAGNOSIS — M79.605 BILATERAL LEG PAIN: Primary | ICD-10-CM

## 2023-06-06 PROCEDURE — 99283 EMERGENCY DEPT VISIT LOW MDM: CPT

## 2023-06-06 PROCEDURE — 93970 EXTREMITY STUDY: CPT

## 2023-06-06 RX ORDER — OXYCODONE HYDROCHLORIDE 5 MG/1
5 TABLET ORAL ONCE
Status: COMPLETED | OUTPATIENT
Start: 2023-06-06 | End: 2023-06-06

## 2023-06-06 RX ADMIN — OXYCODONE 5 MG: 5 TABLET ORAL at 23:25

## 2023-06-06 NOTE — TELEPHONE ENCOUNTER
Attempted to contact, no vm set up. Sent [employer] message advising patient to keep 06/08 appointment to discuss.

## 2023-06-06 NOTE — TELEPHONE ENCOUNTER
Caller: Lindsay Amezquita    Relationship: Self    Best call back number: [phone]     What is the best time to reach you: ANYTIME    Who are you requesting to speak with (clinical staff, provider,  specific staff member): CLINICAL STAFF    Do you know the name of the person who called: PATIENT    What was the call regarding: PATIENT WOULD LIKE A CALL BACK TO DISCUSS A SHOT FOR CERVICAL CANCER. PLEASE ADVISE.    Is it okay if the provider responds through MyChart: NO

## 2023-06-07 NOTE — ED PROVIDER NOTES
Subjective  History of Present Illness:    Chief Complaint: Bilateral leg swelling history of blood clots and pain  History of Present Illness: 37-year-old female presents with bilateral leg swelling, with history of DVTs, she states she took a long car ride to [place], it was an 8-hour ride there and back, she began to have pain, this started 2 to 3 days ago and is gotten worse.  No relief with her Lortab 7.5 mg, diazepam, or gabapentin.  She was taken off Eliquis after using it 6 months from a previous blood clot  Onset: Sudden onset  Duration: 3 days ago  Exacerbating / Alleviating factors: History of blood clots, pain worse with movement  Associated symptoms: Pain and swelling      Nurses Notes reviewed and agree, including vitals, allergies, social history and prior medical history.     REVIEW OF SYSTEMS: All systems reviewed and not pertinent unless noted.    Review of Systems   Cardiovascular:  Positive for leg swelling.   Musculoskeletal:         Leg pain   All other systems reviewed and are negative.    Past Medical History:   Diagnosis Date    Allergic     Anxiety     Asthma Beings of copd with asthma    Back pain     Bell's palsy     Bipolar 2 disorder     Blood clot in vein     COPD (chronic obstructive pulmonary disease) Six months ago    Deep vein thrombosis Last year    Depression     Diabetes mellitus November    Pre diabete    Frequent headaches     GERD (gastroesophageal reflux disease)     Irritable bowel syndrome Two years ago    Kidney stone Two years ago    Migraine     Obesity All of my life    Panic     PTSD (post-traumatic stress disorder)     Pulmonary embolism Not in lungs    Behind left knee cap    Restless leg syndrome     Sinus problem     Snores     Tattoos     Urinary tract infection Have them on and off    Visual impairment Since i was little    Vitamin B12 deficiency        Allergies:    Aspirin, Codeine, Cyclobenzaprine, Haldol [haloperidol], Latex, Penicillins, Morphine,  "Ondansetron, Oxycodone-acetaminophen, Oxycontin [oxycodone], Triptans, Compazine [prochlorperazine], Lamictal [lamotrigine], and Reglan [metoclopramide]      Past Surgical History:   Procedure Laterality Date    CHOLECYSTECTOMY      TOOTH EXTRACTION           Social History     Socioeconomic History    Marital status: Single   Tobacco Use    Smoking status: Every Day     Packs/day: 2.00     Years: 29.00     Pack years: 58.00     Types: Cigarettes     Start date: 1/1/1995    Smokeless tobacco: Never   Vaping Use    Vaping Use: Former   Substance and Sexual Activity    Alcohol use: Not Currently    Drug use: Not Currently    Sexual activity: Not Currently     Partners: Female     Birth control/protection: None, Same-sex partner         Family History   Problem Relation Age of Onset    Irritable bowel syndrome Mother     Heart disease Mother     Miscarriages / Stillbirths Mother     Irritable bowel syndrome Father     Alcohol abuse Father     Diabetes Father     Hyperlipidemia Father     Colon cancer Maternal Grandfather        Objective  Physical Exam:  /96 (BP Location: Left arm, Patient Position: Sitting)   Pulse 107   Temp 98.1 °F (36.7 °C) (Oral)   Resp 16   Ht 157.5 cm (62\")   Wt 121 kg (266 lb)   SpO2 94%   BMI 48.65 kg/m²      Physical Exam  Vitals and nursing note reviewed.   Constitutional:       Appearance: She is well-developed.   HENT:      Head: Normocephalic and atraumatic.   Cardiovascular:      Rate and Rhythm: Normal rate and regular rhythm.   Pulmonary:      Effort: Pulmonary effort is normal.      Breath sounds: Normal breath sounds.   Musculoskeletal:         General: Swelling and tenderness present.      Cervical back: Normal range of motion and neck supple.   Skin:     General: Skin is warm and dry.   Neurological:      Mental Status: She is alert and oriented to person, place, and time.      Deep Tendon Reflexes: Reflexes are normal and symmetric.         Procedures    ED " Course:         Lab Results (last 24 hours)       ** No results found for the last 24 hours. **             US Venous Doppler Lower Extremity Bilateral (duplex)    Result Date: 6/7/2023  FINAL REPORT TECHNIQUE: null CLINICAL HISTORY: swelling in leg h/o dvt. COMPARISON: null FINDINGS: Venous duplex ultrasound bilateral lower extremity Comparison: None Findings: The visualized deep veins are fully compressible with normal Doppler color flow and spectral tracings. No popliteal cyst.     Impression: IMPRESSION: 1. Negative for bilateral lower extremity deep vein thrombosis. Authenticated and Electronically Signed by Carlota Yap MD on 06/07/2023 01:20:44 AM        Medical Decision Making  37-year-old female with leg pain.  Well-developed, well-nourished lady in no distress with exam as above.  Her vital signs are normal.  She is neurovascular intact.  Given her swelling will obtain ultrasound to rule out DVT.  Will treat symptomatically.  Disposition pending.    DDx: Chronic pain, musculoskeletal pain, spasm, DVT    Ultrasound is negative per radiology.  Will discharge home with outpatient follow-up as scheduled.    Problems Addressed:  Bilateral leg pain: complicated acute illness or injury  Leg swelling: complicated acute illness or injury    Risk  Prescription drug management.          Final diagnoses:   Bilateral leg pain   Leg swelling          Channing Cottrell MD  06/07/23 0140

## 2023-06-08 ENCOUNTER — OFFICE VISIT (OUTPATIENT)
Dept: INTERNAL MEDICINE | Facility: CLINIC | Age: 38
End: 2023-06-08
Payer: MEDICAID

## 2023-06-08 VITALS
OXYGEN SATURATION: 95 % | BODY MASS INDEX: 48.4 KG/M2 | HEIGHT: 62 IN | TEMPERATURE: 97.1 F | HEART RATE: 114 BPM | SYSTOLIC BLOOD PRESSURE: 110 MMHG | DIASTOLIC BLOOD PRESSURE: 72 MMHG | WEIGHT: 263 LBS

## 2023-06-08 DIAGNOSIS — L73.2 HIDRADENITIS SUPPURATIVA: Primary | ICD-10-CM

## 2023-06-08 PROCEDURE — 1160F RVW MEDS BY RX/DR IN RCRD: CPT | Performed by: NURSE PRACTITIONER

## 2023-06-08 PROCEDURE — 99213 OFFICE O/P EST LOW 20 MIN: CPT | Performed by: NURSE PRACTITIONER

## 2023-06-08 PROCEDURE — 3044F HG A1C LEVEL LT 7.0%: CPT | Performed by: NURSE PRACTITIONER

## 2023-06-08 PROCEDURE — 1159F MED LIST DOCD IN RCRD: CPT | Performed by: NURSE PRACTITIONER

## 2023-06-08 RX ORDER — CHLORHEXIDINE GLUCONATE 4 G/100ML
SOLUTION TOPICAL DAILY
Qty: 473 ML | Refills: 3 | Status: SHIPPED | OUTPATIENT
Start: 2023-06-08

## 2023-06-08 RX ORDER — CETIRIZINE HYDROCHLORIDE 10 MG/1
TABLET ORAL
COMMUNITY
Start: 2023-06-06 | End: 2023-06-08

## 2023-06-08 RX ORDER — DOXYCYCLINE HYCLATE 100 MG/1
100 CAPSULE ORAL DAILY
Qty: 30 CAPSULE | Refills: 0 | Status: SHIPPED | OUTPATIENT
Start: 2023-06-08 | End: 2023-07-08

## 2023-06-08 NOTE — PROGRESS NOTES
"  Office Visit      Patient Name: Lindsay Amezquita  : 1985   MRN: [id number]   Care Team: Patient Care Team:  Charley Robles APRN as PCP - General (Family Medicine)  Donna Mcqueen APRN as Nurse Practitioner (Behavioral Health)    Chief Complaint  Cyst (Under arm and breast )    Subjective     Subjective      Lindsay Amezquita presents to [facility] for right breast cyst follow up.     Reports a cyst under her right breast that she was seen for here about four weeks ago. She has completed her course of antibiotics since then. States her right breast is  to touch but cyst is overall improved.   Now she reports a new cyst in her right armpit that is tender, erythema,  warm, and swollen.   She has not tried anything for her cyst.   Diagnosed with hidradenitis suppurativa. Has never seen dermatology but would be willing to.   Doxycycline historically has helped.     Objective     Objective   Vital Signs:   /72   Pulse 114   Temp 97.1 °F (36.2 °C)   Ht 157.5 cm (62\")   Wt 119 kg (263 lb)   SpO2 95%   BMI 48.10 kg/m²     Physical Exam  Vitals and nursing note reviewed.   Constitutional:       Appearance: Normal appearance.   Cardiovascular:      Rate and Rhythm: Normal rate and regular rhythm.      Heart sounds: Normal heart sounds.   Pulmonary:      Effort: Pulmonary effort is normal.      Breath sounds: Normal breath sounds.   Abdominal:      General: Bowel sounds are normal.      Palpations: Abdomen is soft. There is no mass.      Tenderness: There is abdominal tenderness in the right upper quadrant, right lower quadrant and left upper quadrant. There is no guarding.   Skin:     General: Skin is warm and dry.      Findings: Abscess (right underarm with tunneling and scarring) and erythema (mild right breast, cyst has resolved) present.   Neurological:      General: No focal deficit present.      Mental Status: She is alert.   Psychiatric:         Mood " and Affect: Mood normal.        Assessment / Plan      Assessment & Plan   Problem List Items Addressed This Visit    None  Visit Diagnoses       Hidradenitis suppurativa    -  Primary    Relevant Medications    chlorhexidine (HIBICLENS) 4 % external liquid    doxycycline (VIBRAMYCIN) 100 MG capsule    Other Relevant Orders    Ambulatory Referral to Dermatology    Referral to dermatology placed. Hibiclens daily, begin daily doxycycline with food, healthy diet encouraged, avoid picking at any cysts, and encouraged to have breast US performed.             Follow Up   Return if symptoms worsen or fail to improve.  Patient was given instructions and counseling regarding her condition or for health maintenance advice. Please see specific information pulled into the AVS if appropriate.     CLAIR Lindsay  [facility] - [place]

## 2023-06-09 ENCOUNTER — APPOINTMENT (OUTPATIENT)
Dept: GENERAL RADIOLOGY | Facility: HOSPITAL | Age: 38
End: 2023-06-09
Payer: MEDICAID

## 2023-06-09 ENCOUNTER — TELEPHONE (OUTPATIENT)
Dept: INTERNAL MEDICINE | Facility: CLINIC | Age: 38
End: 2023-06-09
Payer: MEDICAID

## 2023-06-09 ENCOUNTER — HOSPITAL ENCOUNTER (EMERGENCY)
Facility: HOSPITAL | Age: 38
Discharge: LEFT BEFORE DISCHARGE | End: 2023-06-09
Attending: STUDENT IN AN ORGANIZED HEALTH CARE EDUCATION/TRAINING PROGRAM
Payer: MEDICAID

## 2023-06-09 ENCOUNTER — APPOINTMENT (OUTPATIENT)
Dept: CT IMAGING | Facility: HOSPITAL | Age: 38
End: 2023-06-09
Payer: MEDICAID

## 2023-06-09 VITALS
TEMPERATURE: 98 F | HEIGHT: 62 IN | HEART RATE: 101 BPM | DIASTOLIC BLOOD PRESSURE: 96 MMHG | SYSTOLIC BLOOD PRESSURE: 144 MMHG | OXYGEN SATURATION: 95 % | BODY MASS INDEX: 48.4 KG/M2 | WEIGHT: 263 LBS | RESPIRATION RATE: 18 BRPM

## 2023-06-09 DIAGNOSIS — K21.9 GASTROESOPHAGEAL REFLUX DISEASE, UNSPECIFIED WHETHER ESOPHAGITIS PRESENT: Primary | ICD-10-CM

## 2023-06-09 DIAGNOSIS — R07.9 CHEST PAIN, UNSPECIFIED TYPE: Primary | ICD-10-CM

## 2023-06-09 LAB
ALBUMIN SERPL-MCNC: 3.9 G/DL (ref 3.5–5.2)
ALBUMIN/GLOB SERPL: 1.3 G/DL
ALP SERPL-CCNC: 105 U/L (ref 39–117)
ALT SERPL W P-5'-P-CCNC: 35 U/L (ref 1–33)
ANION GAP SERPL CALCULATED.3IONS-SCNC: 13.1 MMOL/L (ref 5–15)
AST SERPL-CCNC: 48 U/L (ref 1–32)
B-HCG UR QL: NEGATIVE
BASOPHILS # BLD AUTO: 0.03 10*3/MM3 (ref 0–0.2)
BASOPHILS NFR BLD AUTO: 0.2 % (ref 0–1.5)
BILIRUB SERPL-MCNC: 0.5 MG/DL (ref 0–1.2)
BUN SERPL-MCNC: 4 MG/DL (ref 6–20)
BUN/CREAT SERPL: 7.4 (ref 7–25)
CALCIUM SPEC-SCNC: 9.4 MG/DL (ref 8.6–10.5)
CHLORIDE SERPL-SCNC: 96 MMOL/L (ref 98–107)
CO2 SERPL-SCNC: 22.9 MMOL/L (ref 22–29)
CREAT SERPL-MCNC: 0.54 MG/DL (ref 0.57–1)
D DIMER PPP FEU-MCNC: 1.76 MCGFEU/ML (ref 0–0.5)
DEPRECATED RDW RBC AUTO: 45.6 FL (ref 37–54)
EGFRCR SERPLBLD CKD-EPI 2021: 121 ML/MIN/1.73
EOSINOPHIL # BLD AUTO: 0.3 10*3/MM3 (ref 0–0.4)
EOSINOPHIL NFR BLD AUTO: 2.3 % (ref 0.3–6.2)
ERYTHROCYTE [DISTWIDTH] IN BLOOD BY AUTOMATED COUNT: 14.2 % (ref 12.3–15.4)
GEN 5 2HR TROPONIN T REFLEX: <6 NG/L
GLOBULIN UR ELPH-MCNC: 2.9 GM/DL
GLUCOSE SERPL-MCNC: 161 MG/DL (ref 65–99)
HCT VFR BLD AUTO: 44 % (ref 34–46.6)
HGB BLD-MCNC: 14.9 G/DL (ref 12–15.9)
HOLD SPECIMEN: NORMAL
HOLD SPECIMEN: NORMAL
IMM GRANULOCYTES # BLD AUTO: 0.04 10*3/MM3 (ref 0–0.05)
IMM GRANULOCYTES NFR BLD AUTO: 0.3 % (ref 0–0.5)
LYMPHOCYTES # BLD AUTO: 3.09 10*3/MM3 (ref 0.7–3.1)
LYMPHOCYTES NFR BLD AUTO: 23.4 % (ref 19.6–45.3)
MCH RBC QN AUTO: 30.2 PG (ref 26.6–33)
MCHC RBC AUTO-ENTMCNC: 33.9 G/DL (ref 31.5–35.7)
MCV RBC AUTO: 89.2 FL (ref 79–97)
MONOCYTES # BLD AUTO: 0.67 10*3/MM3 (ref 0.1–0.9)
MONOCYTES NFR BLD AUTO: 5.1 % (ref 5–12)
NEUTROPHILS NFR BLD AUTO: 68.7 % (ref 42.7–76)
NEUTROPHILS NFR BLD AUTO: 9.08 10*3/MM3 (ref 1.7–7)
NRBC BLD AUTO-RTO: 0 /100 WBC (ref 0–0.2)
PLATELET # BLD AUTO: 314 10*3/MM3 (ref 140–450)
PMV BLD AUTO: 10.5 FL (ref 6–12)
POTASSIUM SERPL-SCNC: 4.2 MMOL/L (ref 3.5–5.2)
PROT SERPL-MCNC: 6.8 G/DL (ref 6–8.5)
RBC # BLD AUTO: 4.93 10*6/MM3 (ref 3.77–5.28)
SODIUM SERPL-SCNC: 132 MMOL/L (ref 136–145)
TROPONIN T DELTA: NORMAL
TROPONIN T SERPL HS-MCNC: <6 NG/L
WBC NRBC COR # BLD: 13.21 10*3/MM3 (ref 3.4–10.8)
WHOLE BLOOD HOLD COAG: NORMAL
WHOLE BLOOD HOLD SPECIMEN: NORMAL

## 2023-06-09 PROCEDURE — 71045 X-RAY EXAM CHEST 1 VIEW: CPT

## 2023-06-09 PROCEDURE — 93005 ELECTROCARDIOGRAM TRACING: CPT | Performed by: STUDENT IN AN ORGANIZED HEALTH CARE EDUCATION/TRAINING PROGRAM

## 2023-06-09 PROCEDURE — 84484 ASSAY OF TROPONIN QUANT: CPT | Performed by: STUDENT IN AN ORGANIZED HEALTH CARE EDUCATION/TRAINING PROGRAM

## 2023-06-09 PROCEDURE — 71275 CT ANGIOGRAPHY CHEST: CPT

## 2023-06-09 PROCEDURE — 85025 COMPLETE CBC W/AUTO DIFF WBC: CPT

## 2023-06-09 PROCEDURE — 99284 EMERGENCY DEPT VISIT MOD MDM: CPT

## 2023-06-09 PROCEDURE — 93005 ELECTROCARDIOGRAM TRACING: CPT

## 2023-06-09 PROCEDURE — 81025 URINE PREGNANCY TEST: CPT | Performed by: PHYSICIAN ASSISTANT

## 2023-06-09 PROCEDURE — 36415 COLL VENOUS BLD VENIPUNCTURE: CPT

## 2023-06-09 PROCEDURE — 84484 ASSAY OF TROPONIN QUANT: CPT

## 2023-06-09 PROCEDURE — 80053 COMPREHEN METABOLIC PANEL: CPT

## 2023-06-09 PROCEDURE — 85379 FIBRIN DEGRADATION QUANT: CPT | Performed by: PHYSICIAN ASSISTANT

## 2023-06-09 PROCEDURE — 25510000001 IOPAMIDOL 61 % SOLUTION: Performed by: EMERGENCY MEDICINE

## 2023-06-09 RX ORDER — ACETAMINOPHEN 325 MG/1
650 TABLET ORAL EVERY 6 HOURS PRN
Status: DISCONTINUED | OUTPATIENT
Start: 2023-06-09 | End: 2023-06-10 | Stop reason: HOSPADM

## 2023-06-09 RX ORDER — SODIUM CHLORIDE 0.9 % (FLUSH) 0.9 %
10 SYRINGE (ML) INJECTION AS NEEDED
Status: DISCONTINUED | OUTPATIENT
Start: 2023-06-09 | End: 2023-06-10 | Stop reason: HOSPADM

## 2023-06-09 RX ADMIN — IOPAMIDOL 100 ML: 612 INJECTION, SOLUTION INTRAVENOUS at 22:36

## 2023-06-09 RX ADMIN — ACETAMINOPHEN 650 MG: 325 TABLET, FILM COATED ORAL at 21:35

## 2023-06-09 NOTE — TELEPHONE ENCOUNTER
Caller: Linsday Amezquita    Relationship: Self    Best call back number: [phone]    What medication are you requesting:     SOMETHING STRONGER THAN PRILOSEC    What are your current symptoms:     STOMACH PAIN    If a prescription is needed, what is your preferred pharmacy and phone number:      Additional notes:    WOULD LIKE SOMETHING OTHER THAN PRILOSEC FOR STOMACH PAIN

## 2023-06-10 ENCOUNTER — TELEPHONE (OUTPATIENT)
Dept: INTERNAL MEDICINE | Facility: CLINIC | Age: 38
End: 2023-06-10
Payer: MEDICAID

## 2023-06-10 NOTE — TELEPHONE ENCOUNTER
The patient went to the ED last night for chest pain. The patient is c/o of head hurts after CAT scan from ED, throat sore, vision changes. The patient says her vision is cloudy. Patient's speech is slurred. The patient says she as not treated very well in the ED and does not want to go back. I advised patient to seek out another ED for treatment for the symptoms she is having

## 2023-06-10 NOTE — ED PROVIDER NOTES
Subjective  History of Present Illness:    Chief Complaint: Chest pain  History of Present Illness: Patient is a 37-year-old female with history of anxiety, asthma, Bell's palsy, bipolar 2 disorder, deep vein thrombosis, diabetes, COPD, frequent headaches, migraines, kidney stones, PTSD, pulmonary embolism and restless leg syndrome presenting to the ER for evaluation of chest tightness.  Patient does smoke tobacco.  She states that she began feeling tightness in her chest yesterday.  She states today she felt as if there was tingling in her left arm.  She had associated shortness of breath as well.  She states she is afraid she is having a heart attack.  She denies any recent fever, chills, productive cough, hemoptysis, dizziness, syncope, abdominal pain, emesis, diarrhea.  Per chart review she was seen on 6/6/2023 for leg swelling and had ultrasound that revealed no signs of DVT per radiology read.  She has reported history of COPD but does not use oxygen at home, does use inhalers as needed.  Onset: Yesterday  Duration: Persistent   exacerbating / Alleviating factors: None  Associated symptoms: None      Nurses Notes reviewed and agree, including vitals, allergies, social history and prior medical history.     REVIEW OF SYSTEMS: All systems reviewed and not pertinent unless noted.  Review of Systems      Positive for: Chest pain, shortness of breath    Negative for: Fever, chills, cough, hemoptysis, dizziness, syncope, abdominal pain, emesis, diarrhea    Past Medical History:   Diagnosis Date   • Allergic    • Anxiety    • Asthma Beings of copd with asthma   • Back pain    • Bell's palsy    • Bipolar 2 disorder    • Blood clot in vein    • COPD (chronic obstructive pulmonary disease) Six months ago   • Deep vein thrombosis Last year   • Depression    • Diabetes mellitus November    Pre diabete   • Frequent headaches    • GERD (gastroesophageal reflux disease)    • Irritable bowel syndrome Two years ago   • Kidney  "stone Two years ago   • Migraine    • Obesity All of my life   • Panic    • PTSD (post-traumatic stress disorder)    • Pulmonary embolism Not in lungs    Behind left knee cap   • Restless leg syndrome    • Sinus problem    • Snores    • Tattoos    • Urinary tract infection Have them on and off   • Visual impairment Since i was little   • Vitamin B12 deficiency        Allergies:    Aspirin, Codeine, Cyclobenzaprine, Haldol [haloperidol], Latex, Penicillins, Morphine, Ondansetron, Oxycodone-acetaminophen, Oxycontin [oxycodone], Triptans, Compazine [prochlorperazine], Lamictal [lamotrigine], and Reglan [metoclopramide]      Past Surgical History:   Procedure Laterality Date   • CHOLECYSTECTOMY     • TOOTH EXTRACTION           Social History     Socioeconomic History   • Marital status: Single   Tobacco Use   • Smoking status: Every Day     Packs/day: 2.00     Years: 29.00     Pack years: 58.00     Types: Cigarettes     Start date: 1/1/1995   • Smokeless tobacco: Never   Vaping Use   • Vaping Use: Former   Substance and Sexual Activity   • Alcohol use: Not Currently   • Drug use: Not Currently   • Sexual activity: Not Currently     Partners: Female     Birth control/protection: None, Same-sex partner         Family History   Problem Relation Age of Onset   • Irritable bowel syndrome Mother    • Heart disease Mother    • Miscarriages / Stillbirths Mother    • Irritable bowel syndrome Father    • Alcohol abuse Father    • Diabetes Father    • Hyperlipidemia Father    • Colon cancer Maternal Grandfather        Objective  Physical Exam:  /96   Pulse 101   Temp 98 °F (36.7 °C) (Oral)   Resp 18   Ht 157.5 cm (62\")   Wt 119 kg (263 lb)   LMP 05/21/2023   SpO2 95%   BMI 48.10 kg/m²      Physical Exam    CONSTITUTIONAL: Well developed, no acute distress, nontoxic in appearance.  She is awake, alert, speaking in full sentences.  VITAL SIGNS: per nursing, reviewed and noted  SKIN: exposed skin with no rashes, " ulcerations or petechiae  EYES: Grossly EOMI, no icterus  ENT: Normal voice.  No facial asymmetry, nares patent  RESPIRATORY:  No increased work of breathing. No retractions.  Lung sounds are clear bilaterally  CARDIOVASCULAR: Tachycardic, regular rhythm, distal pulses intact  GI: Abdomen soft, nontender to palpation  MUSCULOSKELETAL:  No tenderness. Full ROM. Strength and tone grossly normal.    NEUROLOGIC: Alert, oriented x 3. No gross deficits. GCS 15.   PSYCH: appropriate affect.      Procedures    ED Course:        ED Course as of 06/10/23 0033   Fri Jun 09, 2023   1826 KG interpreted by me, normal sinus rhythm no concerning ST changes noted, rate of 96 [JE]      ED Course User Index  [JE] Meek Daniels MD       Lab Results (last 24 hours)     Procedure Component Value Units Date/Time    CBC & Differential [[id number]]  (Abnormal) Collected: 06/09/23 1826    Specimen: Blood Updated: 06/09/23 1834    Narrative:      The following orders were created for panel order CBC & Differential.  Procedure                               Abnormality         Status                     ---------                               -----------         ------                     CBC Auto Differential[[id number]]        Abnormal            Final result                 Please view results for these tests on the individual orders.    Comprehensive Metabolic Panel [[id number]]  (Abnormal) Collected: 06/09/23 1826    Specimen: Blood Updated: 06/09/23 1852     Glucose 161 mg/dL      BUN 4 mg/dL      Creatinine 0.54 mg/dL      Sodium 132 mmol/L      Potassium 4.2 mmol/L      Comment: Slight hemolysis detected by analyzer. Results may be affected.        Chloride 96 mmol/L      CO2 22.9 mmol/L      Calcium 9.4 mg/dL      Total Protein 6.8 g/dL      Albumin 3.9 g/dL      ALT (SGPT) 35 U/L      AST (SGOT) 48 U/L      Alkaline Phosphatase 105 U/L      Total Bilirubin 0.5 mg/dL      Globulin 2.9 gm/dL      A/G Ratio 1.3 g/dL       BUN/Creatinine Ratio 7.4     Anion Gap 13.1 mmol/L      eGFR 121.0 mL/min/1.73     Narrative:      GFR Normal >60  Chronic Kidney Disease <60  Kidney Failure <15      High Sensitivity Troponin T [[id number]]  (Normal) Collected: 06/09/23 1826    Specimen: Blood Updated: 06/09/23 1854     HS Troponin T <6 ng/L     Narrative:      High Sensitive Troponin T Reference Range:  <10.0 ng/L- Negative Female for AMI  <15.0 ng/L- Negative Male for AMI  >=10 - Abnormal Female indicating possible myocardial injury.  >=15 - Abnormal Male indicating possible myocardial injury.   Clinicians would have to utilize clinical acumen, EKG, Troponin, and serial changes to determine if it is an Acute Myocardial Infarction or myocardial injury due to an underlying chronic condition.         CBC Auto Differential [[id number]]  (Abnormal) Collected: 06/09/23 1826    Specimen: Blood Updated: 06/09/23 1834     WBC 13.21 10*3/mm3      RBC 4.93 10*6/mm3      Hemoglobin 14.9 g/dL      Hematocrit 44.0 %      MCV 89.2 fL      MCH 30.2 pg      MCHC 33.9 g/dL      RDW 14.2 %      RDW-SD 45.6 fl      MPV 10.5 fL      Platelets 314 10*3/mm3      Neutrophil % 68.7 %      Lymphocyte % 23.4 %      Monocyte % 5.1 %      Eosinophil % 2.3 %      Basophil % 0.2 %      Immature Grans % 0.3 %      Neutrophils, Absolute 9.08 10*3/mm3      Lymphocytes, Absolute 3.09 10*3/mm3      Monocytes, Absolute 0.67 10*3/mm3      Eosinophils, Absolute 0.30 10*3/mm3      Basophils, Absolute 0.03 10*3/mm3      Immature Grans, Absolute 0.04 10*3/mm3      nRBC 0.0 /100 WBC     D-dimer, Quantitative [[id number]]  (Abnormal) Collected: 06/09/23 1826    Specimen: Blood Updated: 06/09/23 1937     D-Dimer, Quantitative 1.76 MCGFEU/mL     Narrative:      According to the assay 's published package insert, a normal (<0.50 MCGFEU/mL) D-dimer result in conjunction with a non-high clinical probability assessment, excludes deep vein thrombosis (DVT) and pulmonary embolism (PE)  "with high sensitivity.    D-dimer values increase with age and this can make VTE exclusion of an older population difficult. To address this, the [employer], based on best available evidence and recent guidelines, recommends that clinicians use age-adjusted D-dimer thresholds in patients greater than 50 years of age with: a) a low probability of PE who do not meet all Pulmonary Embolism Rule Out Criteria, or b) in those with intermediate probability of PE.   The formula for an age-adjusted D-dimer cut-off is \"age/100\".  For example, a 60 year old patient would have an age-adjusted cut-off of 0.60 MCGFEU/mL and an 80 year old 0.80 MCGFEU/mL.    Pregnancy, Urine - Urine, Clean Catch [[id number]]  (Normal) Collected: 06/09/23 2018    Specimen: Urine, Clean Catch Updated: 06/09/23 2031     HCG, Urine QL Negative    High Sensitivity Troponin T 2Hr [[id number]] Collected: 06/09/23 2031    Specimen: Blood Updated: 06/09/23 2052     HS Troponin T <6 ng/L      Troponin T Delta --     Comment: Unable to calculate.       Narrative:      High Sensitive Troponin T Reference Range:  <10.0 ng/L- Negative Female for AMI  <15.0 ng/L- Negative Male for AMI  >=10 - Abnormal Female indicating possible myocardial injury.  >=15 - Abnormal Male indicating possible myocardial injury.   Clinicians would have to utilize clinical acumen, EKG, Troponin, and serial changes to determine if it is an Acute Myocardial Infarction or myocardial injury due to an underlying chronic condition.                CT Angiogram Chest Pulmonary Embolism    Result Date: 6/9/2023  FINAL REPORT TECHNIQUE: The patient was injected with IV contrast.  Axial images were obtained through the chest in a PE protocol.  3-D reconstruction images were also performed.  Individualized dose reduction techniques using automated exposure control or adjustment of the MA and/or KV according to patient's size were employed. CLINICAL HISTORY: Hypoxia, tachycardia, " positive D-dimer FINDINGS: Mediastinal vasculature is adequately opacified.  No pulmonary artery filling defects are identified to suggest PE.   There is no aortic dissection. There is no axillary adenopathy.  There is no hilar or mediastinal adenopathy.  The heart size is normal. There is scarring of the left lung base.  There is no pericardial or pleural effusion. Limited images of the upper abdomen demonstrate a nodule in the left adrenal gland measuring 2.0 cm which demonstrates a mean attenuation value of 6 Hounsfield units consistent with an adenoma.  There appears to be some partially visualized inflammatory reaction near the head of the pancreas, possibly acute pancreatitis.     Impression: 1.  No pulmonary embolus or dissection.  2.  Left adrenal adenoma.  3.  Possible acute pancreatitis. Authenticated and Electronically Signed by Lacho Leslie MD on 06/09/2023 11:15:21 PM         MDM    Initial impression of presenting illness: Patient is a 37-year-old female presenting to the ER for evaluation of chest tightness and shortness of breath.    DDX: includes but is not limited to: COPD exacerbation, URI, bronchitis, cardiac dysrhythmia, pulmonary embolism, and other concerns.    Patient arrives in stable condition with vitals interpreted by myself.     Pertinent features from physical exam: Tachycardic, clear lung sounds, normotensive, no significant hypoxia    Initial diagnostic plan: We will obtain basic labs, troponin, D-dimer, chest x-ray, EKG and UPT.    Results from initial plan were reviewed and interpreted by me revealing overall stable work-up.  Initial troponin was nonelevated, repeat serial troponin was not elevated.  D-dimer was positive at 1.76.  Her white blood cell, 13.2, hemoglobin was normal.  UPT was negative.  Patient did have sodium of 132 and chloride of 96 with mild elevation of AST and ALT with a normal bilirubin.  Chest x-ray without acute cardiopulmonary normality.    HEART Score  for Major Cardiac Events - MDCalc  Calculated on Juan Alberto 10 2023 12:32 AM  2 points -> Low Score (0-3 points) Risk of MACE of 0.9-1.7%.    Diagnostic information from other sources: Chart review    Interventions / Re-evaluation: Patient remained stable throughout visit.  She ended up leaving AGAINST MEDICAL ADVICE prior to her CT scan resulting.    Results/clinical rationale were unable to be discussed with the patient given that she is not out AGAINST MEDICAL ADVICE    Consultations/Discussion of results with other physicians: Discharge    Disposition plan: Chest pain  -----    Final diagnoses:   Chest pain, unspecified type        Rosalba Duran PA-C  06/10/23 0033

## 2023-06-12 NOTE — PROGRESS NOTES
56 yr old with Takotsubo cardiomyopathy with EF 25%, recently discharged on entresto and increased dose of metoprolol comes with severe hypotension at BP 60/40 which has been persistent since previous discharge      LANEY appears to be likely pre renal with severe symptomatic hypotension nd volume depletion    UA with few WBC and  2+ Blood and hyaline cast   Urine microscopy with few granular casts and no florid muddy brown casts noted      Plan/Recommendation:   -no need for further IVF, diruesis or RRT at this time, ATN is improving and is making urine  -Keep MAP > 65  -Keep hemoglobin > 7  -Strict ins and outs  -Avoid nephrotoxic agents if possible and renally dose medications  -Avoid drastic hemodynamic changes if possible  -Daily RFP   You have chosen to receive care through a telehealth visit.  Do you consent to use a video/audio connection for your medical care today? Yes     CHIEF COMPLAINT  No chief complaint on file.        HPI  Lindsay Amezquita is a 37 y.o. female  presents with complaint of coughing up green and brown secretions. Reports her throat is very sore. Reports she is having nasal congestion. Reports she has had symptoms for appx 11 days. Denies any fever or chills. + nausea No vomiting. Reports she has a negative COVID test. Reports she finished an antibiotic(Doxy) for a boil under her left breast. Reports she has taken albuterol and mucinex for her symptoms. Pt reports she has early COPD (not seen in history). Reports in the past zpack worked really well.     Review of Systems   Constitutional: Negative for chills, fatigue and fever.   HENT: Positive for congestion, sinus pressure, sinus pain and sore throat. Negative for ear discharge and ear pain.    Respiratory: Positive for cough. Negative for chest tightness, shortness of breath and wheezing.    Cardiovascular: Negative for chest pain.   Gastrointestinal: Positive for nausea. Negative for abdominal pain, diarrhea and vomiting.   Musculoskeletal: Negative for back pain and myalgias.   Neurological: Negative for dizziness and headaches.   Psychiatric/Behavioral: Negative.        Past Medical History:   Diagnosis Date   • Allergic    • Anxiety    • Back pain    • Bell's palsy    • Bipolar 2 disorder (HCC)    • Blood clot in vein    • Deep vein thrombosis (HCC) Last year   • Depression    • Diabetes mellitus (HCC) November    Pre diabete   • Frequent headaches    • GERD (gastroesophageal reflux disease)    • Irritable bowel syndrome Two years ago   • Migraine    • Panic    • PTSD (post-traumatic stress disorder)    • Restless leg syndrome    • Sinus problem    • Snores    • Tattoos    • Vitamin B12 deficiency        Family History   Problem Relation Age of Onset   • Irritable bowel  syndrome Mother    • Heart disease Mother    • Miscarriages / Stillbirths Mother    • Irritable bowel syndrome Father    • Alcohol abuse Father    • Diabetes Father    • Hyperlipidemia Father    • Colon cancer Maternal Grandfather        Social History     Socioeconomic History   • Marital status: Single   Tobacco Use   • Smoking status: Every Day     Packs/day: 1.50     Years: 29.00     Pack years: 43.50     Types: Cigarettes     Start date: 1/1/1995   • Smokeless tobacco: Never   Vaping Use   • Vaping Use: Former   Substance and Sexual Activity   • Alcohol use: Not Currently   • Drug use: Not Currently   • Sexual activity: Not Currently     Partners: Female     Birth control/protection: None, Same-sex partner       Lindsay Amezquita  reports that she has been smoking cigarettes. She started smoking about 28 years ago. She has a 43.50 pack-year smoking history. She has never used smokeless tobacco.. I have educated her on the risk of diseases from using tobacco products such as cancer, COPD and heart disease.     I advised her to quit and she is willing to quit. We have discussed the following method/s for tobacco cessation:  Counseling.  Together we have set a quit date for 2.  She will follow up with PCP in 2 months or sooner to check on her progress. Only smoking 2 cigarettes a day.     I spent 1 minutes counseling the patient.              Breastfeeding No     PHYSICAL EXAM  Physical Exam   Constitutional: She is oriented to person, place, and time. She appears well-developed and well-nourished. No distress.   HENT:   Head: Normocephalic and atraumatic.   Right Ear: Hearing normal.   Left Ear: Hearing normal.   Nose: Congestion present. Right sinus exhibits maxillary sinus tenderness. Left sinus exhibits maxillary sinus tenderness.   Mouth/Throat: Mouth/Lips are normal.  Throat with redness,  No white patches.   Patient directed exam   Eyes: Conjunctivae and lids are normal.   Pulmonary/Chest: Effort normal.  No  respiratory distress.  Lymphadenopathy:        Right cervical: Anterior cervical adenopathy present.        Left cervical: Anterior cervical adenopathy present.   + anterior cervical lymph nodes    Neurological: She is alert and oriented to person, place, and time.   Skin: No rash noted.        Psychiatric: She has a normal mood and affect. Her speech is normal and behavior is normal.       Results for orders placed or performed during the hospital encounter of 02/10/23   COVID-19 and FLU A/B PCR - Swab, Nasopharynx    Specimen: Nasopharynx; Swab   Result Value Ref Range    COVID19 Not Detected Not Detected - Ref. Range    Influenza A PCR Not Detected Not Detected    Influenza B PCR Not Detected Not Detected   Comprehensive Metabolic Panel    Specimen: Blood   Result Value Ref Range    Glucose 204 (H) 65 - 99 mg/dL    BUN 4 (L) 6 - 20 mg/dL    Creatinine 0.60 0.57 - 1.00 mg/dL    Sodium 138 136 - 145 mmol/L    Potassium 3.2 (L) 3.5 - 5.2 mmol/L    Chloride 97 (L) 98 - 107 mmol/L    CO2 26.1 22.0 - 29.0 mmol/L    Calcium 9.1 8.6 - 10.5 mg/dL    Total Protein 6.2 6.0 - 8.5 g/dL    Albumin 3.7 3.5 - 5.2 g/dL    ALT (SGPT) 21 1 - 33 U/L    AST (SGOT) 18 1 - 32 U/L    Alkaline Phosphatase 90 39 - 117 U/L    Total Bilirubin 0.2 0.0 - 1.2 mg/dL    Globulin 2.5 gm/dL    A/G Ratio 1.5 g/dL    BUN/Creatinine Ratio 6.7 (L) 7.0 - 25.0    Anion Gap 14.9 5.0 - 15.0 mmol/L    eGFR 118.0 >60.0 mL/min/1.73   Lipase    Specimen: Blood   Result Value Ref Range    Lipase 56 13 - 60 U/L   Urinalysis With Microscopic If Indicated (No Culture) - Urine, Clean Catch    Specimen: Urine, Clean Catch   Result Value Ref Range    Color, UA Yellow Yellow, Straw    Appearance, UA Clear Clear    pH, UA 6.5 5.0 - 8.0    Specific Gravity, UA 1.007 1.005 - 1.030    Glucose, UA >=1000 mg/dL (3+) (A) Negative    Ketones, UA Negative Negative    Bilirubin, UA Negative Negative    Blood, UA Negative Negative    Protein, UA Negative Negative    Leuk  Esterase, UA Negative Negative    Nitrite, UA Negative Negative    Urobilinogen, UA 0.2 E.U./dL 0.2 - 1.0 E.U./dL   CBC Auto Differential    Specimen: Blood   Result Value Ref Range    WBC 9.46 3.40 - 10.80 10*3/mm3    RBC 4.79 3.77 - 5.28 10*6/mm3    Hemoglobin 14.1 12.0 - 15.9 g/dL    Hematocrit 41.4 34.0 - 46.6 %    MCV 86.4 79.0 - 97.0 fL    MCH 29.4 26.6 - 33.0 pg    MCHC 34.1 31.5 - 35.7 g/dL    RDW 14.4 12.3 - 15.4 %    RDW-SD 45.7 37.0 - 54.0 fl    MPV 10.1 6.0 - 12.0 fL    Platelets 284 140 - 450 10*3/mm3    Neutrophil % 59.6 42.7 - 76.0 %    Lymphocyte % 33.2 19.6 - 45.3 %    Monocyte % 4.0 (L) 5.0 - 12.0 %    Eosinophil % 2.7 0.3 - 6.2 %    Basophil % 0.2 0.0 - 1.5 %    Immature Grans % 0.3 0.0 - 0.5 %    Neutrophils, Absolute 5.63 1.70 - 7.00 10*3/mm3    Lymphocytes, Absolute 3.14 (H) 0.70 - 3.10 10*3/mm3    Monocytes, Absolute 0.38 0.10 - 0.90 10*3/mm3    Eosinophils, Absolute 0.26 0.00 - 0.40 10*3/mm3    Basophils, Absolute 0.02 0.00 - 0.20 10*3/mm3    Immature Grans, Absolute 0.03 0.00 - 0.05 10*3/mm3    nRBC 0.0 0.0 - 0.2 /100 WBC   BNP    Specimen: Blood   Result Value Ref Range    proBNP <5.0 0.0 - 450.0 pg/mL       Diagnoses and all orders for this visit:    1. Acute non-recurrent maxillary sinusitis (Primary)    Other orders  -     fluticasone (FLONASE) 50 MCG/ACT nasal spray; 2 sprays into the nostril(s) as directed by provider Daily for 10 days.  Dispense: 16 g; Refill: 0  -     azithromycin (Zithromax Z-Janusz) 250 MG tablet; Take 2 tablets by mouth on day 1, then 1 tablet daily on days 2-5  Dispense: 6 tablet; Refill: 0  -     benzonatate (Tessalon Perles) 100 MG capsule; Take 1 or 2 perles tid prn cough  Dispense: 30 capsule; Refill: 0    rest  Fluids  PCP if symptoms continue   Er for worsening symptoms such as high fever, chest pain or SOA        FOLLOW-UP  As discussed during visit with PCP/[facility] if no improvement or Urgent Care/Emergency Department if worsening of symptoms    Patient  verbalizes understanding of medication dosage, comfort measures, instructions for treatment and follow-up.    Court Felipe, APRN  03/10/2023  22:52 EST    The use of a video visit has been reviewed with the patient and verbal informed consent has been obtained. Myself and Lindsay Amezquita participated in this visit. The patient is located in [address] [place] [address].    I am located in [place], [place]. Mychart and Twilio were utilized. I spent 5 minutes in the patient's chart for this visit.

## 2023-06-12 NOTE — ED PROVIDER NOTES
"Attempted to call patient and mother at numbers listed in chart unfortunately neither their voicemail box is or set up and unable to leave message at this time.  Was calling to make them aware of \"streak-like bibasilar densities, correlate for atelectasis versus infiltrate.  No significant effusion.\"  By radiology read on chest x-ray from most recent visit.  Review of chart shows no cough or respiratory symptoms, would suspect atelectasis, will try again to contact spine and able to at this time.     Cristian Frazier PA-C  06/12/23 2080    "

## 2023-06-14 ENCOUNTER — TELEPHONE (OUTPATIENT)
Dept: INTERNAL MEDICINE | Facility: CLINIC | Age: 38
End: 2023-06-14
Payer: MEDICAID

## 2023-06-14 NOTE — TELEPHONE ENCOUNTER
Caller: Lindsay Amezquita    Relationship: Self    Best call back number: [phone]   est call back number: [phone]       What is the best time to reach you: ANYTIME    Who are you requesting to speak with (clinical staff, provider,  specific staff member): FELICIANO    Do you know the name of the person who called: PATIENT    What was the call regarding: BODY WASH CLEARING UP  CYSTS UNDER ARMPIT AND BREAST GONE/SHRINKING  STILL TAKING ANTIBIOTIC  NOW CONSTIPATED, WHAT CAN I DO ABOUT IBS/C?  PLEASE ADVISE      Is it okay if the provider responds through MyChart: PHONE

## 2023-06-14 NOTE — TELEPHONE ENCOUNTER
Spoke with patient, states she has had no BM for 8 days.  Has used OTC meds, apple juice, prune juice, etc with no results.  Advised to proceed to ER.

## 2023-06-15 ENCOUNTER — TELEPHONE (OUTPATIENT)
Dept: NEUROLOGY | Facility: CLINIC | Age: 38
End: 2023-06-15

## 2023-06-15 NOTE — TELEPHONE ENCOUNTER
Caller: Lindsay Amezquita    Relationship: Self    Best call back number: [phone]    What was the call regarding: PT CALLED TO REQUEST THAT THE ORDER/REFERRAL FOR HER MRI BRAIN SCAN BE DIRECTED TO ONE OF THE DIAGNOSTIC CENTERS IN [place] THAT IS ABLE TO COMPLETE AN OPEN-MRI AS PT IS CLAUSTROPHOBIC AND CANNOT COMPLETE MRI IN SMALL-TUNNELED MACHINE.    Do you require a callback: YES, PLEASE CALL ONCE ORDER/REFERRAL HAS BEEN SENT.    PLEASE REVIEW AND ADVISE.

## 2023-06-15 NOTE — TELEPHONE ENCOUNTER
Relayed message to Yair.  She will send referral to [facility] once it is approved through insurance.  Attempted to contact patient at both numbers listed.  No answer; voice mail has not been set up.

## 2023-06-16 NOTE — TELEPHONE ENCOUNTER
CALLED [facility] TO GET THE PRECERT ON MRI. I HAD TO FAX IN OFFICE NOTE AND REFERRAL. THEY WILL DO PRECERT AND THEN FAX BACK.

## 2023-07-21 ENCOUNTER — TELEPHONE (OUTPATIENT)
Dept: BEHAVIORAL HEALTH | Facility: CLINIC | Age: 38
End: 2023-07-21
Payer: MEDICAID

## 2023-07-21 NOTE — TELEPHONE ENCOUNTER
Patient states that her medications are not working.  She does not have a car and wants a video visit as soon as possible.  There are no available appointments until October.  Patient does have a visit scheduled for 8/1 but does not think she can wait until then.  Please advise.

## 2023-07-25 ENCOUNTER — TELEPHONE (OUTPATIENT)
Dept: NEUROLOGY | Facility: CLINIC | Age: 38
End: 2023-07-25

## 2023-07-25 ENCOUNTER — TELEPHONE (OUTPATIENT)
Dept: INTERNAL MEDICINE | Facility: CLINIC | Age: 38
End: 2023-07-25
Payer: MEDICAID

## 2023-07-25 DIAGNOSIS — F43.10 POST TRAUMATIC STRESS DISORDER (PTSD): ICD-10-CM

## 2023-07-25 DIAGNOSIS — F41.1 GENERALIZED ANXIETY DISORDER: ICD-10-CM

## 2023-07-25 DIAGNOSIS — F51.04 PSYCHOPHYSIOLOGICAL INSOMNIA: ICD-10-CM

## 2023-07-25 DIAGNOSIS — F31.30 BIPOLAR I DISORDER, MOST RECENT EPISODE DEPRESSED: ICD-10-CM

## 2023-07-25 RX ORDER — AMITRIPTYLINE HYDROCHLORIDE 150 MG/1
150 TABLET ORAL
Qty: 30 TABLET | Refills: 0 | Status: SHIPPED | OUTPATIENT
Start: 2023-07-25

## 2023-07-25 RX ORDER — OMEPRAZOLE 40 MG/1
40 CAPSULE, DELAYED RELEASE ORAL 2 TIMES DAILY
Qty: 180 CAPSULE | Refills: 1 | Status: SHIPPED | OUTPATIENT
Start: 2023-07-25

## 2023-07-25 RX ORDER — LURASIDONE HYDROCHLORIDE 120 MG/1
120 TABLET, FILM COATED ORAL DAILY
COMMUNITY
End: 2023-07-25 | Stop reason: SDUPTHER

## 2023-07-25 RX ORDER — ALPRAZOLAM 0.5 MG/1
0.5 TABLET ORAL 3 TIMES DAILY PRN
Qty: 90 TABLET | Refills: 0 | Status: SHIPPED | OUTPATIENT
Start: 2023-07-25 | End: 2024-07-24

## 2023-07-25 RX ORDER — LURASIDONE HYDROCHLORIDE 120 MG/1
120 TABLET, FILM COATED ORAL DAILY
Qty: 30 TABLET | Refills: 0 | Status: SHIPPED | OUTPATIENT
Start: 2023-07-25

## 2023-07-25 RX ORDER — DESVENLAFAXINE SUCCINATE 50 MG/1
50 TABLET, EXTENDED RELEASE ORAL DAILY
Qty: 30 TABLET | Refills: 0 | Status: SHIPPED | OUTPATIENT
Start: 2023-07-25

## 2023-07-25 RX ORDER — DESVENLAFAXINE 100 MG/1
100 TABLET, EXTENDED RELEASE ORAL DAILY
Qty: 30 TABLET | Refills: 0 | Status: SHIPPED | OUTPATIENT
Start: 2023-07-25

## 2023-07-25 RX ORDER — DESVENLAFAXINE SUCCINATE 50 MG/1
50 TABLET, EXTENDED RELEASE ORAL DAILY
COMMUNITY
End: 2023-07-25 | Stop reason: SDUPTHER

## 2023-07-25 NOTE — TELEPHONE ENCOUNTER
Patient's chart shows she was discontinued from Pristiq 50mg in March/April.    Patient requesting both 100mg and 50mg Pristiq.    Rx Refill Note  Requested Prescriptions     Pending Prescriptions Disp Refills    desvenlafaxine (PRISTIQ) 100 MG 24 hr tablet 30 tablet 0     Sig: Take  by mouth Daily.    desvenlafaxine (PRISTIQ) 50 MG 24 hr tablet 30 tablet 0     Sig: Take 1 tablet by mouth Daily.      Last office visit with prescribing clinician: Visit date not found   Last telemedicine visit with prescribing clinician: Visit date not found   Next office visit with prescribing clinician: 8/1/2023                         Would you like a call back once the refill request has been completed: [] Yes [] No    If the office needs to give you a call back, can they leave a voicemail: [] Yes [] No    Dee Reagan MA  07/25/23, 11:46 EDT

## 2023-07-25 NOTE — TELEPHONE ENCOUNTER
Patient report N/V with Qulipta.  She has requested to go back on Nurtec.  I do not see that [facility].  Please advise.

## 2023-07-25 NOTE — TELEPHONE ENCOUNTER
Caller: LEORA COTA        Relationship:     Best call back number [phone]    What medications are you currently taking:   Current Outpatient Medications on File Prior to Visit   Medication Sig Dispense Refill    ALPRAZolam (Xanax) 0.5 MG tablet Take 1 tablet by mouth 3 (Three) Times a Day As Needed for Anxiety. 90 tablet 0    amitriptyline (ELAVIL) 150 MG tablet Take 1 tablet by mouth every night at bedtime. 30 tablet 0    Atogepant (Qulipta) 60 MG tablet Take 1 tablet by mouth Daily. 30 tablet 5    atorvastatin (Lipitor) 40 MG tablet Take 1 tablet by mouth Every Night. 90 tablet 3    Blood Glucose Monitoring Suppl (FreeStyle Freedom Lite) w/Device kit       butalbital-acetaminophen-caffeine (Esgic) -40 MG per tablet Take 1 tablet by mouth Every 12 (Twelve) Hours As Needed for Migraine. 20 tablet 0    chlorhexidine (HIBICLENS) 4 % external liquid Apply  topically to the appropriate area as directed Daily. 473 mL 3    desvenlafaxine (PRISTIQ) 100 MG 24 hr tablet TAKE 1 TABLET BY MOUTH DAILY 30 tablet 1    diphenhydrAMINE (BENADRYL) 50 MG capsule Take 1 capsule by mouth.      doxycycline (VIBRAMYCIN) 100 MG capsule Take 1 capsule by mouth Daily for 30 days. 30 capsule 0    fluticasone (FLONASE) 50 MCG/ACT nasal spray 2 sprays into the nostril(s) as directed by provider Daily. 48 g 0    gabapentin (NEURONTIN) 800 MG tablet Take 1 tablet by mouth 3 (Three) Times a Day.      glucose blood test strip Use as instructed 50 each 12    glucose monitor monitoring kit Check glucose daily 1 each 0    HYDROcodone-acetaminophen (NORCO) 7.5-325 MG per tablet Take 1 tablet by mouth Every 6 (Six) Hours As Needed for Moderate Pain.      hydrOXYzine (ATARAX) 25 MG tablet Take 1 tablet by mouth 3 (Three) Times a Day As Needed for Itching, Allergies or Anxiety. 90 tablet 1    ibuprofen (ADVIL,MOTRIN) 800 MG tablet Take 1 tablet by mouth.      Jardiance 10 MG tablet tablet TAKE 1 TABLET BY MOUTH EVERY DAY 30 tablet 3     Lancets misc Check fasting glucose daily and 1 hour after largest meal of day. 100 each 3    levocetirizine (XYZAL) 5 MG tablet Take 1 tablet by mouth Every Evening. 90 tablet 3    Lurasidone HCl (Latuda) 120 MG tablet tablet Take 1 tablet by mouth Daily. 30 tablet 0    omeprazole (priLOSEC) 40 MG capsule Take 1 capsule by mouth Daily. 90 capsule 3    promethazine (PHENERGAN) 25 MG tablet Take 1 tablet by mouth Every 6 (Six) Hours As Needed for Nausea or Vomiting. 45 tablet 3    Rimegepant Sulfate (Nurtec) 75 MG tablet dispersible tablet Take 1 tablet by mouth Every Other Day. Take Monday,Wednesday,Friday, and Saturday. 16 tablet 6    Rimegepant Sulfate (Nurtec) 75 MG tablet dispersible tablet Take 1 tablet by mouth 1 (One) Time As Needed (HA) for up to 1 dose. 6 tablet 0    tiZANidine (ZANAFLEX) 4 MG tablet Take 1 tablet by mouth Every 8 (Eight) Hours As Needed for Muscle Spasms. Further refills should come from pain management. 90 tablet 0    Umeclidinium-Vilanterol (Anoro Ellipta) 62.5-25 MCG/ACT aerosol powder  inhaler Inhale 1 puff Daily. 60 each 12    Ventolin  (90 Base) MCG/ACT inhaler INHALE 2 PUFFS EVERY 4 HOURS AS NEEDED FOR WHEEZING OR SHORTNESS OF AIR 18 g 0    [DISCONTINUED] ALPRAZolam (Xanax) 0.5 MG tablet Take 1 tablet by mouth 3 (Three) Times a Day As Needed for Anxiety. 90 tablet 0    [DISCONTINUED] amitriptyline (ELAVIL) 150 MG tablet Take 1 tablet by mouth every night at bedtime. 30 tablet 1    [DISCONTINUED] Lurasidone HCl (Latuda) 120 MG tablet tablet Take 1 tablet by mouth Daily. 30 tablet 1    [DISCONTINUED] Lurasidone HCl (Latuda) 120 MG tablet tablet Take 1 tablet by mouth Daily.       No current facility-administered medications on file prior to visit.          Which medication are you concerned about:     omeprazole (priLOSEC) 40 MG capsule     What are your concerns:     PHARMACY NEEDS SOMETHING STATING IT IS OK TO TAKE TWICE A DAY.  PATIENT STATES SHE TAKES TWICE A DAY      ALSO  NEEDS REFILL OF     levocetirizine (XYZAL) 5 MG tablet

## 2023-07-25 NOTE — TELEPHONE ENCOUNTER
Incoming Refill Request      Medication requested (name and dose): PRISTIQ 150MG    Pharm[facility] where [facility] should be sent: [facility]    Additional details provided by patient: PATIENT STATES SHE'S ON 150MG  ONE 100MG TAB AND ONE 50MG TAB    Best call back number: [phone]    Does the patient have less than a 3 day supply:  [x] Yes  [] No    Anneliese Brooks  07/25/23, 11:35 EDT

## 2023-07-25 NOTE — TELEPHONE ENCOUNTER
Can't take the generic Latuda. It makes her sick. Needs refills on medications that Halima rxs (xanax/elavil?)

## 2023-07-25 NOTE — TELEPHONE ENCOUNTER
Caller: Lindsay Amezquita    Relationship: Self    Best call back number: [phone]      What medications are you currently taking:   Current Outpatient Medications on File Prior to Visit   Medication Sig Dispense Refill    ALPRAZolam (Xanax) 0.5 MG tablet Take 1 tablet by mouth 3 (Three) Times a Day As Needed for Anxiety. 90 tablet 0    amitriptyline (ELAVIL) 150 MG tablet Take 1 tablet by mouth every night at bedtime. 30 tablet 0    Atogepant (Qulipta) 60 MG tablet Take 1 tablet by mouth Daily. 30 tablet 5    atorvastatin (Lipitor) 40 MG tablet Take 1 tablet by mouth Every Night. 90 tablet 3    Blood Glucose Monitoring Suppl (FreeStyle Freedom Lite) w/Device kit       butalbital-acetaminophen-caffeine (Esgic) -40 MG per tablet Take 1 tablet by mouth Every 12 (Twelve) Hours As Needed for Migraine. 20 tablet 0    chlorhexidine (HIBICLENS) 4 % external liquid Apply  topically to the appropriate area as directed Daily. 473 mL 3    desvenlafaxine (PRISTIQ) 100 MG 24 hr tablet TAKE 1 TABLET BY MOUTH DAILY 30 tablet 1    desvenlafaxine (PRISTIQ) 50 MG 24 hr tablet Take 1 tablet by mouth Daily.      diphenhydrAMINE (BENADRYL) 50 MG capsule Take 1 capsule by mouth.      doxycycline (VIBRAMYCIN) 100 MG capsule Take 1 capsule by mouth Daily for 30 days. 30 capsule 0    fluticasone (FLONASE) 50 MCG/ACT nasal spray 2 sprays into the nostril(s) as directed by provider Daily. 48 g 0    gabapentin (NEURONTIN) 800 MG tablet Take 1 tablet by mouth 3 (Three) Times a Day.      glucose blood test strip Use as instructed 50 each 12    glucose monitor monitoring kit Check glucose daily 1 each 0    HYDROcodone-acetaminophen (NORCO) 7.5-325 MG per tablet Take 1 tablet by mouth Every 6 (Six) Hours As Needed for Moderate Pain.      hydrOXYzine (ATARAX) 25 MG tablet Take 1 tablet by mouth 3 (Three) Times a Day As Needed for Itching, Allergies or Anxiety. 90 tablet 1    ibuprofen (ADVIL,MOTRIN) 800 MG tablet Take 1 tablet by mouth.       Jardiance 10 MG tablet tablet TAKE 1 TABLET BY MOUTH EVERY DAY 30 tablet 3    Lancets misc Check fasting glucose daily and 1 hour after largest meal of day. 100 each 3    levocetirizine (XYZAL) 5 MG tablet Take 1 tablet by mouth Every Evening. 90 tablet 3    Lurasidone HCl (Latuda) 120 MG tablet tablet Take 1 tablet by mouth Daily. 30 tablet 0    omeprazole (priLOSEC) 40 MG capsule Take 1 capsule by mouth Daily. 90 capsule 3    promethazine (PHENERGAN) 25 MG tablet Take 1 tablet by mouth Every 6 (Six) Hours As Needed for Nausea or Vomiting. 45 tablet 3    Rimegepant Sulfate (Nurtec) 75 MG tablet dispersible tablet Take 1 tablet by mouth Every Other Day. Take Monday,Wednesday,Friday, and Saturday. 16 tablet 6    Rimegepant Sulfate (Nurtec) 75 MG tablet dispersible tablet Take 1 tablet by mouth 1 (One) Time As Needed (HA) for up to 1 dose. 6 tablet 0    tiZANidine (ZANAFLEX) 4 MG tablet Take 1 tablet by mouth Every 8 (Eight) Hours As Needed for Muscle Spasms. Further refills should come from pain management. 90 tablet 0    Umeclidinium-Vilanterol (Anoro Ellipta) 62.5-25 MCG/ACT aerosol powder  inhaler Inhale 1 puff Daily. 60 each 12    Ventolin  (90 Base) MCG/ACT inhaler INHALE 2 PUFFS EVERY 4 HOURS AS NEEDED FOR WHEEZING OR SHORTNESS OF AIR 18 g 0    [DISCONTINUED] ALPRAZolam (Xanax) 0.5 MG tablet Take 1 tablet by mouth 3 (Three) Times a Day As Needed for Anxiety. 90 tablet 0    [DISCONTINUED] amitriptyline (ELAVIL) 150 MG tablet Take 1 tablet by mouth every night at bedtime. 30 tablet 1    [DISCONTINUED] Lurasidone HCl (Latuda) 120 MG tablet tablet Take 1 tablet by mouth Daily. 30 tablet 1    [DISCONTINUED] Lurasidone HCl (Latuda) 120 MG tablet tablet Take 1 tablet by mouth Daily.       No current facility-administered medications on file prior to visit.      When did you start taking these medications: 3 WEEKS AGO    Which medication are you concerned about: RUBIA    Who prescribed you this medication:  CLAIR CRAIG    What are your concerns: PATIENT HAS NOTICED THAT EVERY TIME SHE TAKES THE QULIPTA SHE VOMITS. SHE DOES NOT WANT TO CONTINUE USING IT AND WOULD PREFER TO GO BACK ON HER NURTEC. PLEASE ADVISE.    How long have you had these concerns: SINCE SECOND DAY OF TAKING IT

## 2023-07-25 NOTE — TELEPHONE ENCOUNTER
Patient requesting name brand of Latuda    Rx Refill Note  Requested Prescriptions     Pending Prescriptions Disp Refills    ALPRAZolam (Xanax) 0.5 MG tablet 90 tablet 0     Sig: Take 1 tablet by mouth 3 (Three) Times a Day As Needed for Anxiety.    amitriptyline (ELAVIL) 150 MG tablet 30 tablet 0     Sig: Take 1 tablet by mouth every night at bedtime.    Lurasidone HCl (Latuda) 120 MG tablet tablet 30 tablet 0     Sig: Take 1 tablet by mouth Daily.      Last office visit with prescribing clinician: Visit date not found   Last telemedicine visit with prescribing clinician: Visit date not found   Next office visit with prescribing clinician: 8/1/2023                         Would you like a call back once the refill request has been completed: [] Yes [] No    If the office needs to give you a call back, can they leave a voicemail: [] Yes [] No    Dee Reagan MA  07/25/23, 11:11 EDT

## 2023-07-26 ENCOUNTER — TELEPHONE (OUTPATIENT)
Dept: INTERNAL MEDICINE | Facility: CLINIC | Age: 38
End: 2023-07-26

## 2023-07-27 NOTE — TELEPHONE ENCOUNTER
Left pt message to give all information when calling so we can assist her faster    HUB, Admin MA's, and front office ok to give message to patient and take message from patient   
PATIENT HAS PERSONAL QUESTIONS FOR THE NURSE.     PLEASE CALL [phone]   
verbalizes understanding/demonstrates understanding of teaching
verbalizes understanding/demonstrates understanding of teaching/good return demonstration/needs met

## 2023-07-31 ENCOUNTER — TELEPHONE (OUTPATIENT)
Dept: INTERNAL MEDICINE | Facility: CLINIC | Age: 38
End: 2023-07-31
Payer: MEDICAID

## 2023-08-11 ENCOUNTER — TELEPHONE (OUTPATIENT)
Dept: INTERNAL MEDICINE | Facility: CLINIC | Age: 38
End: 2023-08-11
Payer: MEDICAID

## 2023-08-11 NOTE — TELEPHONE ENCOUNTER
Patient notified no appointments here today. Patient advised to go to Urgent care. Patient states understanding.

## 2023-08-11 NOTE — TELEPHONE ENCOUNTER
Caller: Lindsay Amezquita    Relationship to patient: Self    Best call back number: [phone]     Chief complaint: BROKE OUT IN HIVES ALL OVER BODY    Patient directed to call 911 or go to their nearest emergency room.     Patient verbalized understanding: [x] Yes  [] No  If no, why?

## 2023-08-14 ENCOUNTER — TELEPHONE (OUTPATIENT)
Dept: INTERNAL MEDICINE | Facility: CLINIC | Age: 38
End: 2023-08-14
Payer: MEDICAID

## 2023-08-14 NOTE — TELEPHONE ENCOUNTER
Caller: Lindsay Amezquita    Relationship: Self    Best call back number: [phone]     What are your current symptoms: YEAST INFECTION     How long have you been experiencing symptoms:   A COUPLE OF MONTHS NOW     Have you had these symptoms before:    [x] Yes  [] No    Have you been treated for these symptoms before:   [x] Yes  [] No    If a prescription is needed, what is your preferred pharmacy and phone number:    [id number] - [place], [place] - [address] - [phone]  - [phone]  [phone]     Additional notes:  PATIENT WOULD LIKE FOR MEDICATION TO BE CALLED INTO THE PHARMACY TO HELP WITH A ONGOING YEAST INFECTION

## 2023-08-15 ENCOUNTER — TELEMEDICINE (OUTPATIENT)
Dept: FAMILY MEDICINE CLINIC | Facility: TELEHEALTH | Age: 38
End: 2023-08-15
Payer: MEDICAID

## 2023-08-15 DIAGNOSIS — R21 RASH: Primary | ICD-10-CM

## 2023-08-15 DIAGNOSIS — R22.0 MILD TONGUE SWELLING: ICD-10-CM

## 2023-08-15 PROCEDURE — 1160F RVW MEDS BY RX/DR IN RCRD: CPT | Performed by: NURSE PRACTITIONER

## 2023-08-15 PROCEDURE — 99213 OFFICE O/P EST LOW 20 MIN: CPT | Performed by: NURSE PRACTITIONER

## 2023-08-15 PROCEDURE — 1159F MED LIST DOCD IN RCRD: CPT | Performed by: NURSE PRACTITIONER

## 2023-08-15 RX ORDER — HYDROCODONE BITARTRATE AND ACETAMINOPHEN 10; 325 MG/1; MG/1
1 TABLET ORAL 3 TIMES DAILY
COMMUNITY
Start: 2023-08-07

## 2023-08-15 NOTE — PROGRESS NOTES
You have chosen to receive care through a telehealth visit.  Do you consent to use a video/audio connection for your medical care today? Yes     CHIEF COMPLAINT  No chief complaint on file.        HPI  Lindsay Amezquita is a 38 y.o. female  presents with complaint of rash and tongue swollen. Reports rash started last night. She had a similar symptoms 4 days ago resolved after going to  and then returned. Reports no SOA, no trouble breathing or CP. Reports she took a benadryl dose at 10:40 pm tonight. Reports no fever or chills. No nausea or vomiting. Reports it feels like her tongue is swelling some. Denies any new medications, detergents or lotions. Reports the rash is itching.     Review of Systems   Constitutional:  Negative for chills, fatigue and fever.   HENT:  Negative for congestion, drooling, ear discharge, ear pain, facial swelling, sinus pressure, sinus pain, sore throat, trouble swallowing and voice change.         Tongue feels swollen   Respiratory:  Negative for cough, chest tightness, shortness of breath and wheezing.    Cardiovascular:  Negative for chest pain.   Gastrointestinal:  Negative for abdominal pain, diarrhea, nausea and vomiting.   Musculoskeletal:  Negative for back pain and myalgias.   Skin:  Positive for rash.        Rash on lower body, abdomen, hips and legs.    Neurological:  Negative for dizziness and headaches.   Psychiatric/Behavioral: Negative.       Past Medical History:   Diagnosis Date    Allergic     Anxiety     Asthma Beings of copd with asthma    Back pain     Bell's palsy     Bipolar 2 disorder     Blood clot in vein     COPD (chronic obstructive pulmonary disease) Six months ago    Deep vein thrombosis Last year    Depression     Diabetes mellitus November    Pre diabete    Frequent headaches     GERD (gastroesophageal reflux disease)     Irritable bowel syndrome Two years ago    Kidney stone Two years ago    Migraine     Obesity All of my life    Panic     PTSD  (post-traumatic stress disorder)     Pulmonary embolism Not in lungs    Behind left knee cap    Restless leg syndrome     Sinus problem     Snores     Tattoos     Urinary tract infection Have them on and off    Visual impairment Since i was little    Vitamin B12 deficiency        Family History   Problem Relation Age of Onset    Irritable bowel syndrome Mother     Heart disease Mother     Miscarriages / Stillbirths Mother     Irritable bowel syndrome Father     Alcohol abuse Father     Diabetes Father     Hyperlipidemia Father     Colon cancer Maternal Grandfather        Social History     Socioeconomic History    Marital status: Single   Tobacco Use    Smoking status: Every Day     Packs/day: 2.00     Years: 29.00     Pack years: 58.00     Types: Cigarettes     Start date: 1/1/1995    Smokeless tobacco: Never   Vaping Use    Vaping Use: Former   Substance and Sexual Activity    Alcohol use: Not Currently    Drug use: Not Currently    Sexual activity: Not Currently     Partners: Female     Birth control/protection: None, Same-sex partner       Lindsay Amezquita  reports that she has been smoking cigarettes. She started smoking about 28 years ago. She has a 58.00 pack-year smoking history. She has never used smokeless tobacco.. I have educated her on the risk of diseases from using tobacco products such as cancer, COPD, and heart disease.         I spent  1  minutes counseling the patient.              LMP 07/26/2023   Breastfeeding No     PHYSICAL EXAM  Physical Exam   Constitutional: She is oriented to person, place, and time. She appears well-developed and well-nourished. No distress.   HENT:   Head: Normocephalic and atraumatic.   Right Ear: Hearing normal.   Left Ear: Hearing normal.   Nose: No congestion.   Mouth/Throat: Oropharynx is clear and moist.   Patient directed exam  Tongue with mild swelling   No facial or lip swelling   No drooling noted.      Eyes: Conjunctivae and lids are normal. Right eye exhibits  no edema. Left eye exhibits no edema.   Pulmonary/Chest: Effort normal.  No respiratory distress.  Neurological: She is alert and oriented to person, place, and time.   Skin: Rash noted.   Rash to abdomen, female genitalia, low back, bilateral legs and hips. Area red    Psychiatric: She has a normal mood and affect. Her speech is normal and behavior is normal.     Results for orders placed or performed during the hospital encounter of 06/09/23   Comprehensive Metabolic Panel    Specimen: Blood   Result Value Ref Range    Glucose 161 (H) 65 - 99 mg/dL    BUN 4 (L) 6 - 20 mg/dL    Creatinine 0.54 (L) 0.57 - 1.00 mg/dL    Sodium 132 (L) 136 - 145 mmol/L    Potassium 4.2 3.5 - 5.2 mmol/L    Chloride 96 (L) 98 - 107 mmol/L    CO2 22.9 22.0 - 29.0 mmol/L    Calcium 9.4 8.6 - 10.5 mg/dL    Total Protein 6.8 6.0 - 8.5 g/dL    Albumin 3.9 3.5 - 5.2 g/dL    ALT (SGPT) 35 (H) 1 - 33 U/L    AST (SGOT) 48 (H) 1 - 32 U/L    Alkaline Phosphatase 105 39 - 117 U/L    Total Bilirubin 0.5 0.0 - 1.2 mg/dL    Globulin 2.9 gm/dL    A/G Ratio 1.3 g/dL    BUN/Creatinine Ratio 7.4 7.0 - 25.0    Anion Gap 13.1 5.0 - 15.0 mmol/L    eGFR 121.0 >60.0 mL/min/1.73   High Sensitivity Troponin T    Specimen: Blood   Result Value Ref Range    HS Troponin T <6 <10 ng/L   CBC Auto Differential    Specimen: Blood   Result Value Ref Range    WBC 13.21 (H) 3.40 - 10.80 10*3/mm3    RBC 4.93 3.77 - 5.28 10*6/mm3    Hemoglobin 14.9 12.0 - 15.9 g/dL    Hematocrit 44.0 34.0 - 46.6 %    MCV 89.2 79.0 - 97.0 fL    MCH 30.2 26.6 - 33.0 pg    MCHC 33.9 31.5 - 35.7 g/dL    RDW 14.2 12.3 - 15.4 %    RDW-SD 45.6 37.0 - 54.0 fl    MPV 10.5 6.0 - 12.0 fL    Platelets 314 140 - 450 10*3/mm3    Neutrophil % 68.7 42.7 - 76.0 %    Lymphocyte % 23.4 19.6 - 45.3 %    Monocyte % 5.1 5.0 - 12.0 %    Eosinophil % 2.3 0.3 - 6.2 %    Basophil % 0.2 0.0 - 1.5 %    Immature Grans % 0.3 0.0 - 0.5 %    Neutrophils, Absolute 9.08 (H) 1.70 - 7.00 10*3/mm3    Lymphocytes, Absolute 3.09  0.70 - 3.10 10*3/mm3    Monocytes, Absolute 0.67 0.10 - 0.90 10*3/mm3    Eosinophils, Absolute 0.30 0.00 - 0.40 10*3/mm3    Basophils, Absolute 0.03 0.00 - 0.20 10*3/mm3    Immature Grans, Absolute 0.04 0.00 - 0.05 10*3/mm3    nRBC 0.0 0.0 - 0.2 /100 WBC   High Sensitivity Troponin T 2Hr    Specimen: Blood   Result Value Ref Range    HS Troponin T <6 <10 ng/L    Troponin T Delta     D-dimer, Quantitative    Specimen: Blood   Result Value Ref Range    D-Dimer, Quantitative 1.76 (H) 0.00 - 0.50 MCGFEU/mL   Pregnancy, Urine - Urine, Clean Catch    Specimen: Urine, Clean Catch   Result Value Ref Range    HCG, Urine QL Negative Negative   Green Top (Gel)   Result Value Ref Range    Extra Tube Hold for add-ons.    Lavender Top   Result Value Ref Range    Extra Tube hold for add-on    Gold Top - SST   Result Value Ref Range    Extra Tube Hold for add-ons.    Light Blue Top   Result Value Ref Range    Extra Tube Hold for add-ons.        Diagnoses and all orders for this visit:    1. Rash (Primary)    2. Mild tongue swelling    Advised patient to go to ER for in person evaluation. Patient understands the risk of not going to the ER including continued swelling of tongue causing difficulty breathing. Patient reports she went to  on 8/11/23 for same symptoms.  Pt reports she will have her parents to transport her to [facility] for evaluation. Reports she is with them at this time. Declines to be transported by EMS. Understands risk of not going by EMS. Continues to decline. Reports hospital is 10 min from their house. Agrees if anything worsens she will call 911.     Attempted to call patient regarding hydrocodone. Was able to review [facility] report and patient has been taking this medication for over a year. Note not a new medication. (Patient is allergic to codeine).       Court Felipe, APRN  08/15/2023  00:34 EDT    The use of a video visit has been reviewed with the patient and verbal informed consent has been  obtained. Myself and Lindsay Amezquita participated in this visit. The patient is located in [address] [place] [address] [address] [place], [place] [id number].    I am located in [place], [place]. Mychart and Twilio were utilized. I spent 5 minutes in the patient's chart for this visit.

## 2023-08-15 NOTE — TELEPHONE ENCOUNTER
Patient went to [facility] last night she is itching on back, legs and vaginal area. She was allergic to 3 of the medications she was on. I have pulled in the records from ER visit for you to review. She does not need yeast medication. She will see you tomorrow to discuss further.

## 2023-08-16 ENCOUNTER — OFFICE VISIT (OUTPATIENT)
Dept: INTERNAL MEDICINE | Facility: CLINIC | Age: 38
End: 2023-08-16
Payer: MEDICAID

## 2023-08-16 VITALS
WEIGHT: 254 LBS | BODY MASS INDEX: 46.74 KG/M2 | HEIGHT: 62 IN | HEART RATE: 77 BPM | TEMPERATURE: 97.7 F | DIASTOLIC BLOOD PRESSURE: 84 MMHG | OXYGEN SATURATION: 97 % | SYSTOLIC BLOOD PRESSURE: 124 MMHG

## 2023-08-16 DIAGNOSIS — T78.40XD ALLERGIC REACTION TO DRUG, SUBSEQUENT ENCOUNTER: Primary | ICD-10-CM

## 2023-08-16 DIAGNOSIS — N76.0 ACUTE VAGINITIS: ICD-10-CM

## 2023-08-16 DIAGNOSIS — N89.8 VAGINAL ITCHING: ICD-10-CM

## 2023-08-16 RX ORDER — DIPHENHYDRAMINE HCL 2 %
25 CREAM (GRAM) TOPICAL
COMMUNITY
Start: 2023-08-15 | End: 2023-08-25

## 2023-08-16 RX ORDER — PREDNISONE 50 MG/1
50 TABLET ORAL DAILY
Qty: 5 TABLET | Refills: 0 | COMMUNITY
Start: 2023-08-15 | End: 2023-08-20

## 2023-08-16 RX ORDER — FAMOTIDINE 20 MG/1
20 TABLET, FILM COATED ORAL
COMMUNITY
Start: 2023-08-15 | End: 2023-08-20

## 2023-08-16 RX ORDER — FLUCONAZOLE 150 MG/1
150 TABLET ORAL ONCE
Qty: 2 TABLET | Refills: 0 | Status: SHIPPED | OUTPATIENT
Start: 2023-08-16 | End: 2023-08-16

## 2023-08-16 NOTE — PROGRESS NOTES
"  Office Visit      Patient Name: Lindsay Amezquita  : 1985   MRN: [id number]   Care Team: Patient Care Team:  Charley Robles APRN as PCP - General (Family Medicine)  Donna Mcqueen APRN as Nurse Practitioner (Behavioral Health)    Chief Complaint  Vaginal Itching (Has a huge rash that has caused this. Went to the ER- they said it was an allergic reaction. )    Subjective     Subjective      Lindsay Amezquita presents to [facility] for vaginal itching .   Seen in the ER last night due to urticaria. She was treated with prednisone and has been taking as prescribed. She has an erythematous pruritic rash on the trunk and left sided facial swelling.   Denies shortness of breath, chest pain, difficulty swallowing, unilateral weakness, speech changes, or new sexual partner.   Vagina feels swollen, painful, and pruritic.    Objective     Objective   Vital Signs:   /84   Pulse 77   Temp 97.7 øF (36.5 øC)   Ht 157.5 cm (62.01\")   Wt 115 kg (254 lb)   SpO2 97%   BMI 46.45 kg/mý     Physical Exam  Vitals and nursing note reviewed. Exam conducted with a chaperone present (Nikki Thibodeaux CMA).   Constitutional:       General: She is not in acute distress.     Appearance: Normal appearance. She is not toxic-appearing.   HENT:      Head:      Comments: Left sided facial swelling    Eyes:      Extraocular Movements: Extraocular movements intact.      Pupils: Pupils are equal, round, and reactive to light.   Cardiovascular:      Rate and Rhythm: Normal rate and regular rhythm.      Heart sounds: Normal heart sounds. No murmur heard.  Pulmonary:      Effort: Pulmonary effort is normal. No respiratory distress.      Breath sounds: Normal breath sounds. No wheezing.   Genitourinary:     Labia:         Right: Tenderness and lesion (erythemaous) present.       Vagina: Vaginal discharge (white , thick,and clumpy) present.   Skin:     General: Skin is warm and dry.      Findings: Rash " (erythematous papules diffuse over trunk and back) present.   Neurological:      General: No focal deficit present.      Mental Status: She is alert and oriented to person, place, and time.      Motor: No weakness.      Coordination: Coordination normal.   Psychiatric:         Mood and Affect: Mood normal.         Behavior: Behavior normal.        Assessment / Plan      Assessment & Plan   Problem List Items Addressed This Visit    None  Visit Diagnoses       Allergic reaction to drug, subsequent encounter    -  Primary    Relevant Medications    predniSONE (DELTASONE) 50 MG tablet    Continue prednisone as prescribed, benadryl as needed, and discussed red flags to seek immediate care with.     Vaginal itching        Relevant Orders    NuSwab VG, Candida 6sp - Swab, Vagina    Acute vaginitis        Relevant Orders    NuSwab VG, Candida 6sp - Swab, Vagina    Nuswab obtained, high suspicion for yeast vaginitis. Treat with one time diflucan and push fluids. Return if not improving.           Follow Up   Return in about 1 week (around 8/23/2023) for Next scheduled follow up.  Patient was given instructions and counseling regarding her condition or for health maintenance advice. Please see specific information pulled into the AVS if appropriate.     CLAIR Lindsay  [facility]

## 2023-08-17 ENCOUNTER — TELEPHONE (OUTPATIENT)
Dept: INTERNAL MEDICINE | Facility: CLINIC | Age: 38
End: 2023-08-17

## 2023-08-17 NOTE — TELEPHONE ENCOUNTER
Caller: Lindsay Amezquita    Relationship: Self    Best call back number: [phone]     What is the best time to reach you: ANY    Who are you requesting to speak with (clinical staff, provider,  specific staff member): CLINICAL STAFF      What was the call regarding: PATIENT HAS HAD RSV BEFORE AND WOULD LIKE TO KNOW IF IT IS A SAFE OPTION OR IF IT WILL PUT HER MORE AT RISK TO GET THE RVS SHOT   PATIENT IS ALSO WANTING TO KNOW ABOUT HER SWAB FROM 8/16  PLEASE CALL AND ADVISE PATIENT

## 2023-08-17 NOTE — TELEPHONE ENCOUNTER
I called and spoke to the pt and told her she just had the swab done yesterday evening that the labs just picked it up this morning and it takes time to to process. And she should keep her appt on the 22nd to discuss results and that babar will answer any questions she may have about vaccines.

## 2023-08-21 LAB
A VAGINAE DNA VAG QL NAA+PROBE: ABNORMAL SCORE
BVAB2 DNA VAG QL NAA+PROBE: ABNORMAL SCORE
C ALBICANS DNA VAG QL NAA+PROBE: POSITIVE
C GLABRATA DNA VAG QL NAA+PROBE: POSITIVE
C KRUSEI DNA VAG QL NAA+PROBE: NEGATIVE
C LUSITANIAE DNA VAG QL NAA+PROBE: NEGATIVE
CANDIDA DNA VAG QL NAA+PROBE: NEGATIVE
MEGA1 DNA VAG QL NAA+PROBE: ABNORMAL SCORE
T VAGINALIS DNA VAG QL NAA+PROBE: NEGATIVE

## 2023-08-22 ENCOUNTER — TELEPHONE (OUTPATIENT)
Dept: INTERNAL MEDICINE | Facility: CLINIC | Age: 38
End: 2023-08-22

## 2023-08-22 DIAGNOSIS — B37.9 CANDIDA GLABRATA INFECTION: Primary | ICD-10-CM

## 2023-08-22 DIAGNOSIS — F41.1 GENERALIZED ANXIETY DISORDER: ICD-10-CM

## 2023-08-22 RX ORDER — ALPRAZOLAM 0.5 MG/1
0.5 TABLET ORAL 3 TIMES DAILY PRN
Qty: 90 TABLET | Refills: 0 | Status: SHIPPED | OUTPATIENT
Start: 2023-08-22 | End: 2023-08-23 | Stop reason: SDUPTHER

## 2023-08-22 RX ORDER — BORIC ACID
600 POWDER (GRAM) MISCELLANEOUS NIGHTLY
Qty: 14 SUPPOSITORY | Refills: 0 | Status: SHIPPED | OUTPATIENT
Start: 2023-08-22 | End: 2023-09-05

## 2023-08-22 NOTE — TELEPHONE ENCOUNTER
Spoke with pharmacy, does not need a prior authorization. Good until 9/6/2023. Too soon for patient to pick medication up at this time.

## 2023-08-22 NOTE — TELEPHONE ENCOUNTER
Caller: Lindsay Amezquita    Relationship to patient: Self    Best call back number: [phone]    Chief complaint: ONE WEEK FOLLOWUP    Type of visit: FOLLOW UP    Additional notes:    PATIENT IS VERY BEHIND ON SCHOOL WORK.  IS IT VERY IMPORTANT FOR HER TO COME TO TODAY'S APPOINTMENT?    PLEASE CALL

## 2023-08-22 NOTE — TELEPHONE ENCOUNTER
Patient states that she needs a PA on her Xanax.  She runs out today and needs this done as soon as possible.  Please advise.

## 2023-08-22 NOTE — TELEPHONE ENCOUNTER
Incoming Refill Request      Medication requested (name and dose): Alprazolam 0.5mg    [facility]    Additional details provided by patient: 2-3 days left    Best call back number: verified    Does the patient have less than a 3 day supply:  [x] Yes  [] No    See Bravo  08/22/23, 08:22 EDT

## 2023-08-23 ENCOUNTER — PRIOR AUTHORIZATION (OUTPATIENT)
Dept: BEHAVIORAL HEALTH | Facility: CLINIC | Age: 38
End: 2023-08-23
Payer: MEDICAID

## 2023-08-23 ENCOUNTER — TELEPHONE (OUTPATIENT)
Dept: NEUROLOGY | Facility: CLINIC | Age: 38
End: 2023-08-23

## 2023-08-23 DIAGNOSIS — E78.00 HYPERCHOLESTEROLEMIA: ICD-10-CM

## 2023-08-23 DIAGNOSIS — E11.65 TYPE 2 DIABETES MELLITUS WITH HYPERGLYCEMIA, WITHOUT LONG-TERM CURRENT USE OF INSULIN: ICD-10-CM

## 2023-08-23 RX ORDER — ATORVASTATIN CALCIUM 40 MG/1
TABLET, FILM COATED ORAL
Qty: 90 TABLET | Refills: 3 | Status: SHIPPED | OUTPATIENT
Start: 2023-08-23

## 2023-08-23 NOTE — TELEPHONE ENCOUNTER
The prior authorization request for ALPRAZOLAM 0.5 MG TABLET has been approved.    Effective from 08/23/2023 to 02/22/2024.    Key: [id number]

## 2023-08-23 NOTE — TELEPHONE ENCOUNTER
Patient advised per Jeannine Askew to go to ER for Evaluation and Treatment.  Patient verbalized understanding.

## 2023-08-23 NOTE — TELEPHONE ENCOUNTER
Provider: MARCI  Caller: LEORA  Phone Number: [phone]   Reason for Call:PT CALLED AND STATES LAST NIGHT 08/22/23 THE TOP OF HEAD AND DOWN TO EARS WAS COMPLETLEY NUMB AND TINGLY. PT STATES THAT SHE HAS HAD A MIGRAINES FOR THE LAST 4 DAYS AND NOTHING IS WORKING. PT STATES TO PLEASE CALL BACK AS SOON AS POSSIBLE AS SHE IS UNSURE ON WHAT TO DO.    PLEASE REVIEW AND ADVISE.  THANK YOU

## 2023-08-25 ENCOUNTER — TELEPHONE (OUTPATIENT)
Dept: INTERNAL MEDICINE | Facility: CLINIC | Age: 38
End: 2023-08-25
Payer: MEDICAID

## 2023-08-25 NOTE — TELEPHONE ENCOUNTER
Caller: Lindsay Amezquita     Relationship:     Best call back number:  [phone]    What is your medical concern?     PATIENT IS ON MONTHLY CYCLE  SHE HAS TAKEN MIDOL   SHE IS DOUBLING OVER IN PAIN   GABAPENTIN, MIDOL AND HYDROCODONE ARE NOT WORKING    PLEASE CALL

## 2023-08-26 ENCOUNTER — TELEMEDICINE (OUTPATIENT)
Dept: FAMILY MEDICINE CLINIC | Facility: TELEHEALTH | Age: 38
End: 2023-08-26
Payer: MEDICAID

## 2023-08-26 DIAGNOSIS — J01.40 ACUTE PANSINUSITIS, RECURRENCE NOT SPECIFIED: Primary | ICD-10-CM

## 2023-08-26 RX ORDER — DOXYCYCLINE 100 MG/1
100 TABLET ORAL 2 TIMES DAILY
Qty: 20 TABLET | Refills: 0 | Status: SHIPPED | OUTPATIENT
Start: 2023-08-26 | End: 2023-09-05

## 2023-08-26 RX ORDER — DEXTROMETHORPHAN HYDROBROMIDE AND PROMETHAZINE HYDROCHLORIDE 15; 6.25 MG/5ML; MG/5ML
5 SYRUP ORAL 4 TIMES DAILY PRN
Qty: 118 ML | Refills: 0 | Status: SHIPPED | OUTPATIENT
Start: 2023-08-26

## 2023-08-26 NOTE — PROGRESS NOTES
Subjective   Lindsay Amezquita is a 38 y.o. female.     History of Present Illness  Her symptoms started a week ago with a headache, sore throat, fever of 101, nasal congestion, productive painful cough, and fatigue. Denies sick contacts. She did a home covid test which was negative. Denies recent antibiotic use. Denies pregnancy.     The following portions of the patient's history were reviewed and updated as appropriate: allergies, current medications, past family history, past medical history, past social history, past surgical history, and problem list.    Review of Systems   Constitutional:  Positive for fatigue and fever.   HENT:  Positive for congestion, ear pain, postnasal drip, sinus pressure, sore throat, swollen glands and voice change. Negative for nosebleeds.    Respiratory:  Positive for cough and chest tightness. Negative for shortness of breath and wheezing.    Gastrointestinal:  Positive for nausea (started today) and vomiting (started today). Negative for diarrhea.   Musculoskeletal:  Negative for myalgias.   Neurological:  Positive for headache.     Objective   Physical Exam  Constitutional:       General: She is not in acute distress.     Appearance: She is well-developed. She is not diaphoretic.   HENT:      Nose:      Right Sinus: Maxillary sinus tenderness and frontal sinus tenderness present.      Left Sinus: Maxillary sinus tenderness and frontal sinus tenderness present.      Comments: R>L  Pulmonary:      Effort: Pulmonary effort is normal.   Neurological:      Mental Status: She is alert and oriented to person, place, and time.   Psychiatric:         Behavior: Behavior normal.         Assessment & Plan   Diagnoses and all orders for this visit:    1. Acute pansinusitis, recurrence not specified (Primary)  -     doxycycline (ADOXA) 100 MG tablet; Take 1 tablet by mouth 2 (Two) Times a Day for 10 days.  Dispense: 20 tablet; Refill: 0  -     promethazine-dextromethorphan (PROMETHAZINE-DM)  6.25-15 MG/5ML syrup; Take 5 mL by mouth 4 (Four) Times a Day As Needed for Cough.  Dispense: 118 mL; Refill: 0                 The use of a video visit has been reviewed with the patient and verbal informed consent has been obtained. Myself and Lindsay Amezquita participated in this visit. The patient is located in  [place], [place] at her home . I am located in [place], [place]. [employer] and [employer] were utilized. I spent 12 minutes in the patient's chart for this visit.

## 2023-08-28 ENCOUNTER — PRIOR AUTHORIZATION (OUTPATIENT)
Dept: BEHAVIORAL HEALTH | Facility: CLINIC | Age: 38
End: 2023-08-28
Payer: MEDICAID

## 2023-08-28 NOTE — TELEPHONE ENCOUNTER
The prior authorization request for ALPRAZOLAM 0.5 MG TABLET has been approved.    Effective from 08/23/2023 to 02/22/2024.    Key: [id number]

## 2023-08-30 ENCOUNTER — TELEPHONE (OUTPATIENT)
Dept: INTERNAL MEDICINE | Facility: CLINIC | Age: 38
End: 2023-08-30
Payer: MEDICAID

## 2023-09-08 ENCOUNTER — TELEPHONE (OUTPATIENT)
Dept: INTERNAL MEDICINE | Facility: CLINIC | Age: 38
End: 2023-09-08
Payer: MEDICAID

## 2023-09-08 NOTE — TELEPHONE ENCOUNTER
Patient states she has had a bad headache, deep cough, stuffy head and is nauseous.  None of her medication is working to help and she would like Charley to work her in today or give her advice on what to do.  Please advise.  Phone number verified.  She refused any other providers.

## 2023-09-11 ENCOUNTER — TELEPHONE (OUTPATIENT)
Dept: INTERNAL MEDICINE | Facility: CLINIC | Age: 38
End: 2023-09-11
Payer: MEDICAID

## 2023-09-11 NOTE — TELEPHONE ENCOUNTER
Has a sore throat and a fever. She has repeatedly drank after his sister with strep. Will go to  Urgent Care for eval. as we have no appts.

## 2023-09-11 NOTE — TELEPHONE ENCOUNTER
Caller: Lindsay Amezquita    Relationship: Self    Best call back number:     What is the best time to reach you: ANYTIME    Who are you requesting to speak with (clinical staff, provider,  specific staff member): CLINICAL STAFF    Do you know the name of the person who called: LINDSAY    What was the call regarding: PATIENT WOULD LIKE TO KNOW IF STREP THROAT IS CONTAGIOUS; SHE HAS DRANK AFTER SOMEONE WHO HAS BEEN DIAGNOSED AND HAS A DRY THROAT    Is it okay if the provider responds through MyChart: PHONE CALL

## 2023-09-13 DIAGNOSIS — L73.2 HIDRADENITIS SUPPURATIVA: ICD-10-CM

## 2023-09-14 ENCOUNTER — TELEPHONE (OUTPATIENT)
Dept: INTERNAL MEDICINE | Facility: CLINIC | Age: 38
End: 2023-09-14

## 2023-09-14 RX ORDER — ANTISEPTIC SURGICAL SCRUB 0.04 MG/ML
SOLUTION TOPICAL
Qty: 473 ML | Refills: 3 | OUTPATIENT
Start: 2023-09-14

## 2023-09-14 NOTE — TELEPHONE ENCOUNTER
She stated that she feels awful and is dizzy and vomiting. Suggested  Urgent Care at this point in the day.

## 2023-09-14 NOTE — TELEPHONE ENCOUNTER
Caller: Lindsay Amezquita     Relationship:     Best call back number: [phone]    What is your medical concern?     PATIENT IS THERE SOMETHING SHE CAN DO FOR VERTIGO IN RIGHT EAR    SHE WENT TO [facility] EMERGENCY ROOM AND DID NOT STAY BECAUSE THEY WERE FULL

## 2023-09-15 ENCOUNTER — HOSPITAL ENCOUNTER (EMERGENCY)
Facility: HOSPITAL | Age: 38
Discharge: HOME | End: 2023-09-15
Attending: EMERGENCY MEDICINE
Payer: MEDICAID

## 2023-09-15 VITALS
HEIGHT: 63 IN | HEART RATE: 102 BPM | OXYGEN SATURATION: 98 % | TEMPERATURE: 97.7 F | SYSTOLIC BLOOD PRESSURE: 125 MMHG | DIASTOLIC BLOOD PRESSURE: 83 MMHG | BODY MASS INDEX: 46.14 KG/M2 | RESPIRATION RATE: 20 BRPM | WEIGHT: 260.4 LBS

## 2023-09-15 DIAGNOSIS — H66.91 ACUTE RIGHT OTITIS MEDIA: Primary | ICD-10-CM

## 2023-09-15 DIAGNOSIS — R42 DIZZINESS: ICD-10-CM

## 2023-09-15 PROCEDURE — 63710000001 PREDNISONE PER 1 MG: Performed by: EMERGENCY MEDICINE

## 2023-09-15 PROCEDURE — 99283 EMERGENCY DEPT VISIT LOW MDM: CPT

## 2023-09-15 RX ORDER — AZITHROMYCIN 250 MG/1
500 TABLET, FILM COATED ORAL ONCE
Status: COMPLETED | OUTPATIENT
Start: 2023-09-15 | End: 2023-09-15

## 2023-09-15 RX ORDER — PREDNISONE 20 MG/1
40 TABLET ORAL ONCE
Status: COMPLETED | OUTPATIENT
Start: 2023-09-15 | End: 2023-09-15

## 2023-09-15 RX ORDER — MECLIZINE HYDROCHLORIDE 25 MG/1
25 TABLET ORAL 3 TIMES DAILY PRN
Qty: 30 TABLET | Refills: 0 | Status: SHIPPED | OUTPATIENT
Start: 2023-09-15

## 2023-09-15 RX ORDER — MECLIZINE HYDROCHLORIDE 25 MG/1
25 TABLET ORAL ONCE
Status: COMPLETED | OUTPATIENT
Start: 2023-09-15 | End: 2023-09-15

## 2023-09-15 RX ORDER — AZITHROMYCIN 250 MG/1
250 TABLET, FILM COATED ORAL DAILY
Qty: 4 TABLET | Refills: 0 | Status: SHIPPED | OUTPATIENT
Start: 2023-09-15

## 2023-09-15 RX ORDER — PREDNISONE 20 MG/1
TABLET ORAL
Qty: 10 TABLET | Refills: 0 | Status: SHIPPED | OUTPATIENT
Start: 2023-09-15

## 2023-09-15 RX ADMIN — MECLIZINE HYDROCHLORIDE 25 MG: 25 TABLET ORAL at 02:00

## 2023-09-15 RX ADMIN — PREDNISONE 40 MG: 20 TABLET ORAL at 02:00

## 2023-09-15 RX ADMIN — AZITHROMYCIN MONOHYDRATE 500 MG: 250 TABLET ORAL at 02:00

## 2023-09-15 NOTE — Clinical Note
[facility] EMERGENCY DEPARTMENT  [address] [address]  Phone: [phone]    Lindsay Amezquita was seen and treated in our emergency department on 9/15/2023.  She may return to school on 09/18/2023.          Thank you for choosing [facility].    Vidal Jensen, DO

## 2023-09-15 NOTE — ED PROVIDER NOTES
Subjective  History of Present Illness:    Chief Complaint: Dizziness, ear pain, diarrhea cough    History of Present Illness: 38-year-old female here primarily for right ear pain dizziness, associated diarrhea and cough for a week    Nurses Notes reviewed and agree, including vitals, allergies, social history and prior medical history.     REVIEW OF SYSTEMS: All systems reviewed and not pertinent unless noted.  Review of Systems      Positive for: Dizziness near pain    Negative for: Fever ear drainage bleeding    Past Medical History:   Diagnosis Date    Allergic     Anxiety     Asthma Beings of copd with asthma    Back pain     Bell's palsy     Bipolar 2 disorder     Blood clot in vein     COPD (chronic obstructive pulmonary disease) Six months ago    Deep vein thrombosis Last year    Depression     Diabetes mellitus November    Pre diabete    Frequent headaches     GERD (gastroesophageal reflux disease)     Irritable bowel syndrome Two years ago    Kidney stone Two years ago    Migraine     Obesity All of my life    Panic     PTSD (post-traumatic stress disorder)     Pulmonary embolism Not in lungs    Behind left knee cap    Restless leg syndrome     Sinus problem     Snores     Tattoos     Urinary tract infection Have them on and off    Visual impairment Since i was little    Vitamin B12 deficiency        Allergies:    Aspirin, Codeine, Cyclobenzaprine, Haldol [haloperidol], Latex, Penicillins, Morphine, Ondansetron, Oxycodone-acetaminophen, Oxycontin [oxycodone], Triptans, Compazine [prochlorperazine], Lamictal [lamotrigine], and Reglan [metoclopramide]      Past Surgical History:   Procedure Laterality Date    CHOLECYSTECTOMY      TOOTH EXTRACTION           Social History     Socioeconomic History    Marital status: Single   Tobacco Use    Smoking status: Every Day     Packs/day: 2.00     Years: 29.00     Pack years: 58.00     Types: Cigarettes     Start date: 1/1/1995    Smokeless tobacco: Never   Vaping Use  "   Vaping Use: Former   Substance and Sexual Activity    Alcohol use: Not Currently    Drug use: Not Currently    Sexual activity: Not Currently     Partners: Female     Birth control/protection: None, Same-sex partner         Family History   Problem Relation Age of Onset    Irritable bowel syndrome Mother     Heart disease Mother     Miscarriages / Stillbirths Mother     Irritable bowel syndrome Father     Alcohol abuse Father     Diabetes Father     Hyperlipidemia Father     Colon cancer Maternal Grandfather        Objective  Physical Exam:  /83 (BP Location: Left arm, Patient Position: Sitting)   Pulse 102   Temp 97.7 °F (36.5 °C) (Oral)   Resp 20   Ht 160 cm (63\")   Wt 118 kg (260 lb 6.4 oz)   LMP 09/03/2023   SpO2 98%   BMI 46.13 kg/m²      Physical Exam    CONSTITUTIONAL: Well developed, nontoxic 38-year-old female,  in no acute distress.  VITAL SIGNS: per nursing, reviewed and noted  SKIN: exposed skin with no rashes, ulcerations or petechiae  EYES: Grossly EOMI, no icterus  ENT: Normal voice.  Moist mucous membranes dull left TM, bulging dull and erythematous right TM.  Canals clear.  Boggy turbinates.  No posterior pharyngeal erythema or exudate  RESPIRATORY:  No increased work of breathing. No retractions.  Chest clear  CARDIOVASCULAR:   Extremities pink and warm.  Good cap refill to extremities.   Procedures    ED Course:    Lab Results (last 24 hours)       ** No results found for the last 24 hours. **             No radiology results from the last 24 hrs     MDM           Medical Decision Making:    Initial impression of presenting illness: 38-year-old female here primarily for right ear pain dizziness, associated diarrhea and cough for a week    DDX: includes but is not limited to: Otitis media, external otitis         Patient arrives normotensive afebrile sats 98% room air with vitals interpreted by myself.     Pertinent features from physical exam: ormal voice.  Moist mucous membranes " dull left TM, bulging dull and erythematous right TM.  Canals clear.  Boggy turbinates.  No posterior pharyngeal erythema or exudate.    Initial diagnostic plan: Defer diagnostics we will treat empirically with meclizine prednisone and azithromycin as patient has penicillin allergy    Results/clinical rationale were discussed with patient    Consultations/Discussion of results with other physicians: None    Disposition plan: patient was discharged in home stable condition.  Counseled on supportive care, outpatient follow-up. Return precaution discussed.  Patient/family was understanding and agreeable with plan    -----      Final diagnoses:   Acute right otitis media   Dizziness            Vidal Jensen, DO  09/15/23 0159

## 2023-09-16 ENCOUNTER — TELEPHONE (OUTPATIENT)
Dept: INTERNAL MEDICINE | Facility: CLINIC | Age: 38
End: 2023-09-16
Payer: MEDICAID

## 2023-09-16 NOTE — TELEPHONE ENCOUNTER
Pt was seen in the ER Thursday/Friday with w R ear infection. Pt wants to know if something can be called in for her ear pain and fever or what else she can do to keep ear from being in pain and keep fever down.

## 2023-09-18 DIAGNOSIS — F41.1 GENERALIZED ANXIETY DISORDER: ICD-10-CM

## 2023-09-18 RX ORDER — ALPRAZOLAM 0.5 MG/1
0.5 TABLET ORAL 3 TIMES DAILY PRN
Qty: 90 TABLET | Refills: 0 | Status: SHIPPED | OUTPATIENT
Start: 2023-09-18 | End: 2023-10-18

## 2023-09-18 RX ORDER — LURASIDONE HYDROCHLORIDE 120 MG/1
120 TABLET, FILM COATED ORAL DAILY
Qty: 30 TABLET | Refills: 0 | Status: SHIPPED | OUTPATIENT
Start: 2023-09-18

## 2023-09-18 NOTE — TELEPHONE ENCOUNTER
Incoming Refill Request      Medication requested (name and dose): xanax and latuda    [facility]    Additional details provided by patient: patient would like to increase xanax due to stress    Best call back number: [phone]    Does the patient have less than a 3 day supply:  [x] Yes  [] No    Evelyn Aguilera  09/18/23, 08:02 EDT

## 2023-09-18 NOTE — TELEPHONE ENCOUNTER
Said she is on Prednisone and a Zpak. Taking 800 mg of ibuprofen. I told her that she can alternate with Extra Strength Tylenol and do warm compresses to ear. ENT number given also if needed.

## 2023-09-18 NOTE — TELEPHONE ENCOUNTER
Rx Refill Note  Requested Prescriptions     Pending Prescriptions Disp Refills    ALPRAZolam (Xanax) 0.5 MG tablet 90 tablet 0     Sig: Take 1 tablet by mouth 3 (Three) Times a Day As Needed for Anxiety for up to 30 days.    Lurasidone HCl (Latuda) 120 MG tablet tablet 30 tablet 0     Sig: Take 1 tablet by mouth Daily.      Last office visit with prescribing clinician: Visit date not found   Last telemedicine visit with prescribing clinician: Visit date not found   Next office visit with prescribing clinician: 10/3/2023                         Would you like a call back once the refill request has been completed: [] Yes [] No    If the office needs to give you a call back, can they leave a voicemail: [] Yes [] No    Dee Reagan MA  09/18/23, 09:58 EDT

## 2023-09-21 ENCOUNTER — TELEPHONE (OUTPATIENT)
Dept: NEUROLOGY | Facility: CLINIC | Age: 38
End: 2023-09-21

## 2023-09-21 ENCOUNTER — TELEPHONE (OUTPATIENT)
Dept: INTERNAL MEDICINE | Facility: CLINIC | Age: 38
End: 2023-09-21

## 2023-09-21 ENCOUNTER — HOSPITAL ENCOUNTER (EMERGENCY)
Facility: HOSPITAL | Age: 38
Discharge: LEFT BEFORE DISCHARGE | End: 2023-09-21
Payer: MEDICAID

## 2023-09-21 VITALS
HEIGHT: 62 IN | DIASTOLIC BLOOD PRESSURE: 77 MMHG | TEMPERATURE: 97.9 F | BODY MASS INDEX: 48.44 KG/M2 | OXYGEN SATURATION: 97 % | SYSTOLIC BLOOD PRESSURE: 113 MMHG | RESPIRATION RATE: 18 BRPM | WEIGHT: 263.2 LBS | HEART RATE: 98 BPM

## 2023-09-21 PROCEDURE — 99211 OFF/OP EST MAY X REQ PHY/QHP: CPT

## 2023-09-21 NOTE — TELEPHONE ENCOUNTER
According to previous messages patient has had sensitivity or contraindication to a multiple migraine abortive medication.  She is currently taking Nurtec every other day and Excedrin for abortive.  I am unsure what to suggest for Ms Amezquita. Please advise any suggestions

## 2023-09-21 NOTE — TELEPHONE ENCOUNTER
Caller: Alirio, Lindsay ADELA    Relationship: Self    Best call back number: [phone]     What is the best time to reach you: ASAP    Who are you requesting to speak with (clinical staff, provider,  specific staff member): FELICIANO CRAIG      What was the call regarding: PATIENT CALLED STATING SHE FELL TWICE YESTERDAY AND HURT BOTH OF HER KNEES AS SHE WAS TRYING TO GO UPSTAIRS AT HER SISTERS HOUSE. TODAY SHE SLIPPED ON A TOWEL AND HIT THE CORNER OF THE WALL WITH THE LEFT SIDE OF HER FACE AND LANDED ON HER LEFT SIDE. SHE HAS FACE, KNEE, AND BACK PAIN.    SHE TOOK HYDROcodone-acetaminophen (NORCO)  MG per tablet  WITH HER OTHER MEDICATIONS DUE TO THE FALL.

## 2023-09-21 NOTE — TELEPHONE ENCOUNTER
Caller: Lindsay Amezquita    Relationship: Self    Best call back number: [phone]     What is the best time to reach you: ANY    Who are you requesting to speak with (clinical staff, provider,  specific staff member): PROVIDER    What was the call regarding: PATIENT TELEPHONED TO ADVISE PROVIDER SHE HAS BEEN WAKING UP ALL WEEK WITH A HEADACHE DAILY. SHE IS TAKING EXCEDRIN & THE MIGRAINES ARE MESSING WITH HER VISION.    PLEASE CALL & ADVISE

## 2023-09-21 NOTE — TELEPHONE ENCOUNTER
Patient has not taken Fioricet.  She is taking Nurtec qod.  (She is due to take it tomorrow) she was recently seen in ER for Ear infection and due to dizziness/loss of balance she feels this may still be part of her issue.  She was instructed to contact ENT for appointment however they do not accept her insurance.  She left a message with her PCP as well, but feels it would be best to seek treatment at the ER. She will contact our office if anything changes or she decides to schedule a sooner appointment.

## 2023-09-21 NOTE — TELEPHONE ENCOUNTER
Spoke with Lindsay, advised to use Ice, rest and elevation. Patient was on her way to the ED. I advised that most urgent cares have the ability to do x-rays if that's what she thought she needed. Patient will try urgent care before ED.

## 2023-09-23 DIAGNOSIS — L73.2 HIDRADENITIS SUPPURATIVA: ICD-10-CM

## 2023-09-23 RX ORDER — DOXYCYCLINE HYCLATE 100 MG/1
100 CAPSULE ORAL 2 TIMES DAILY
Qty: 14 CAPSULE | Refills: 0 | Status: SHIPPED | OUTPATIENT
Start: 2023-09-23 | End: 2023-09-30

## 2023-09-23 RX ORDER — CHLORHEXIDINE GLUCONATE 4 G/100ML
SOLUTION TOPICAL DAILY PRN
Qty: 473 ML | Refills: 3 | Status: SHIPPED | OUTPATIENT
Start: 2023-09-23

## 2023-09-23 RX ORDER — CHLORHEXIDINE GLUCONATE 4 G/100ML
SOLUTION TOPICAL DAILY
Qty: 473 ML | Refills: 3 | Status: SHIPPED | OUTPATIENT
Start: 2023-09-23

## 2023-09-26 ENCOUNTER — TELEPHONE (OUTPATIENT)
Dept: INTERNAL MEDICINE | Facility: CLINIC | Age: 38
End: 2023-09-26

## 2023-09-26 ENCOUNTER — TELEPHONE (OUTPATIENT)
Dept: NEUROLOGY | Facility: CLINIC | Age: 38
End: 2023-09-26

## 2023-09-26 RX ORDER — FLUCONAZOLE 150 MG/1
150 TABLET ORAL ONCE
Qty: 1 TABLET | Refills: 0 | Status: SHIPPED | OUTPATIENT
Start: 2023-09-26 | End: 2023-09-26

## 2023-09-26 NOTE — TELEPHONE ENCOUNTER
Provider: ROYA COVARRUBIAS    Caller: PATIENT    Relationship to Patient: SELF    Pharmacy: [facility]    Phone Number: [phone]    Reason for Call: PT IS CALLING REGARDING FREQUENT MIGRAINES.  PT WAS SEEN IN ED IN [place] AT [facility] FOR MIGRAINE LAST NIGHT.  PT STILL HAS MIGRAINE TODAY AND IS THINKING ABOUT GOING TO ED IN [place] TODAY.  PT STATES THE NURTEC HELPS, BUT NOT A LOT. THIS IS THE THIRD MIGRAINE PT HAS HAD THIS MONTH.  PT SAW A COMMERCIAL ON T.V. FOR AN IV INFUSION THAT IS SUPPOSED TO PREVENT MIGRAINES.  SHE DID NOT KNOW THE NAME OF IT, BUT WOULD LIKE TO TRY IT.      ADVISED PT IF SHE IS HAVING A SEVERE MIGRAINE THEN SHE WOULD NEED TO GO TO ED.     PLEASE REVIEW AND ADVISE     THANK YOU

## 2023-09-26 NOTE — TELEPHONE ENCOUNTER
"Relay     \"Charley sent in a script of Diflucan to [facility] pharmacy for patient.\"                "

## 2023-09-26 NOTE — TELEPHONE ENCOUNTER
Patient has moved appointment up to 10/10/2023; she would like for you to sent Jennifer to [facility] in [place].  I let her know that this will likely require a PA so she can  a sample in the mean time.  She does not want to try Botox.

## 2023-09-26 NOTE — TELEPHONE ENCOUNTER
Patient called and states she has another yeast infection from antibiotics and would like medication called into [facility].

## 2023-09-27 DIAGNOSIS — G43.909 ACUTE MIGRAINE: Primary | ICD-10-CM

## 2023-09-27 RX ORDER — DIHYDROERGOTAMINE MESYLATE 4 MG/ML
0.72 SPRAY, METERED NASAL ONCE AS NEEDED
Qty: 8 ML | Refills: 1 | Status: SHIPPED | OUTPATIENT
Start: 2023-09-27

## 2023-09-27 NOTE — TELEPHONE ENCOUNTER
Provider: ROYA COVARRUBIAS    Caller: PATIENT    Relationship to Patient: SELF    Phone Number: [phone]    Reason for Call: PATIENT STATES SHE THINKS HER HEADACHES MAY BE DUE TO THE SEASON'S CHANGING. SHE IS GETTING NEW SCRIPT TODAY. JUST WANTED INFORM PROVIDER. PLEASE BE AWARE, THANK YOU.

## 2023-09-28 ENCOUNTER — TELEPHONE (OUTPATIENT)
Dept: INTERNAL MEDICINE | Facility: CLINIC | Age: 38
End: 2023-09-28

## 2023-09-28 RX ORDER — NYSTATIN 100000 [USP'U]/G
POWDER TOPICAL 2 TIMES DAILY
Qty: 60 G | Refills: 3 | Status: SHIPPED | OUTPATIENT
Start: 2023-09-28

## 2023-09-28 NOTE — TELEPHONE ENCOUNTER
Patient took the diflucan and it did not help. States she also has yeast under her breasts and in other skin folds. States that her blood sugars have been running between 90 and 100.

## 2023-09-28 NOTE — TELEPHONE ENCOUNTER
THE CYST SHE CALLED ABOUT FRIDAY BUSTED.  THERE IS ANOTHER ONE COMING UP BESIDE IT.  SHE IS DOING SEPTIC WASH AND TAKING ANTIBIOTICS.  WHAT TO DO?  SHE ALSO HAS A YEAST INFECTION FROM THE ANTIBIOTICS.  PATIENT STATES MESSAGE IS URGENT.      PHARMACY:  [facility]    PLEASE CALL [phone]

## 2023-09-28 NOTE — TELEPHONE ENCOUNTER
I would not recommend further diflucan at this time. Can try nystatin powder in flolds, I will send.

## 2023-09-29 ENCOUNTER — TELEPHONE (OUTPATIENT)
Dept: INTERNAL MEDICINE | Facility: CLINIC | Age: 38
End: 2023-09-29
Payer: MEDICAID

## 2023-09-29 NOTE — TELEPHONE ENCOUNTER
Returned patients call, she did not see the message that was sent this morning. Advised of rx sent and to call and schedule an appointment if no better.

## 2023-09-30 DIAGNOSIS — E11.65 TYPE 2 DIABETES MELLITUS WITH HYPERGLYCEMIA, WITHOUT LONG-TERM CURRENT USE OF INSULIN: ICD-10-CM

## 2023-10-02 ENCOUNTER — TELEPHONE (OUTPATIENT)
Dept: INTERNAL MEDICINE | Facility: CLINIC | Age: 38
End: 2023-10-02
Payer: MEDICAID

## 2023-10-02 RX ORDER — EMPAGLIFLOZIN 10 MG/1
TABLET, FILM COATED ORAL
Qty: 30 TABLET | Refills: 3 | Status: SHIPPED | OUTPATIENT
Start: 2023-10-02

## 2023-10-02 NOTE — TELEPHONE ENCOUNTER
Patient states that she is having trouble concentrating, emotional, doesn't want to be around people, but is not suicidal.  She is thinking of going to the ER for evaluation but wanted to talk to Halima.  Patient is asking for a return call as soon as possible.  Phone number verified.

## 2023-10-03 ENCOUNTER — TELEPHONE (OUTPATIENT)
Dept: INTERNAL MEDICINE | Facility: CLINIC | Age: 38
End: 2023-10-03
Payer: MEDICAID

## 2023-10-03 ENCOUNTER — OFFICE VISIT (OUTPATIENT)
Dept: BEHAVIORAL HEALTH | Facility: CLINIC | Age: 38
End: 2023-10-03
Payer: MEDICAID

## 2023-10-03 VITALS — BODY MASS INDEX: 46.07 KG/M2 | HEIGHT: 63 IN | WEIGHT: 260 LBS

## 2023-10-03 DIAGNOSIS — Z51.81 ENCOUNTER FOR THERAPEUTIC DRUG MONITORING: ICD-10-CM

## 2023-10-03 DIAGNOSIS — F51.04 PSYCHOPHYSIOLOGICAL INSOMNIA: ICD-10-CM

## 2023-10-03 DIAGNOSIS — F31.30 BIPOLAR I DISORDER, MOST RECENT EPISODE DEPRESSED: Primary | ICD-10-CM

## 2023-10-03 DIAGNOSIS — F43.10 POST TRAUMATIC STRESS DISORDER (PTSD): ICD-10-CM

## 2023-10-03 DIAGNOSIS — F41.1 GENERALIZED ANXIETY DISORDER: ICD-10-CM

## 2023-10-03 RX ORDER — OXCARBAZEPINE 150 MG/1
75-150 TABLET, FILM COATED ORAL NIGHTLY
Qty: 30 TABLET | Refills: 1 | Status: SHIPPED | OUTPATIENT
Start: 2023-10-03

## 2023-10-03 RX ORDER — LORATADINE 10 MG/1
10 TABLET ORAL DAILY
Qty: 30 TABLET | Refills: 5 | Status: SHIPPED | OUTPATIENT
Start: 2023-10-03

## 2023-10-03 RX ORDER — HYDROXYZINE 50 MG/1
50 TABLET, FILM COATED ORAL 3 TIMES DAILY PRN
Qty: 90 TABLET | Refills: 1 | Status: SHIPPED | OUTPATIENT
Start: 2023-10-03

## 2023-10-03 RX ORDER — AMITRIPTYLINE HYDROCHLORIDE 150 MG/1
150 TABLET ORAL
Qty: 30 TABLET | Refills: 1 | Status: SHIPPED | OUTPATIENT
Start: 2023-10-03

## 2023-10-03 RX ORDER — DESVENLAFAXINE 100 MG/1
100 TABLET, EXTENDED RELEASE ORAL DAILY
Qty: 30 TABLET | Refills: 1 | Status: SHIPPED | OUTPATIENT
Start: 2023-10-03

## 2023-10-03 RX ORDER — LURASIDONE HYDROCHLORIDE 120 MG/1
120 TABLET, FILM COATED ORAL DAILY
Qty: 30 TABLET | Refills: 1 | Status: SHIPPED | OUTPATIENT
Start: 2023-10-03

## 2023-10-03 RX ORDER — ALPRAZOLAM 1 MG/1
1 TABLET ORAL 3 TIMES DAILY PRN
Qty: 90 TABLET | Refills: 0 | Status: SHIPPED | OUTPATIENT
Start: 2023-10-03 | End: 2024-10-02

## 2023-10-03 NOTE — TELEPHONE ENCOUNTER
Patient presented to office to request a change of allergy medication. Patient states that the xyzal 5mg is not working and patient is still experiencing allergy symptoms. Patient requests [facility] to [facility].

## 2023-10-03 NOTE — PROGRESS NOTES
Follow Up Office Visit    Patient Name: Lindsay Amezquita  : 1985   MRN: [id number]   Care Team: Patient Care Team:  Charley Robles APRN as PCP - General (Family Medicine)  Donna Mcqueen APRN as Nurse Practitioner (Behavioral Health)         Chief Complaint:    Chief Complaint   Patient presents with    Bipolar    Anxiety    PTSD    Sleeping Problem    Med Management       History of Present Illness: Lindsay Amezquita is a 38 y.o. female who is here today for a medication management follow up. Patient reports that her medicine is not working. She has been getting more and more depressed. She feels that she doesn't want to leave her house and she cries often. She also feels that her anxiety is becoming more unmanageable and she is constantly on edge. She denies SI/HI today and reports she continues to meet with her therapist at least once a week.     The following portion of the patient's history were reviewed and updated appropriately: allergies, current and past medications, family history, medical history and social history.  Subjective   Review of Systems:    Review of Systems   Psychiatric/Behavioral:  Positive for agitation, sleep disturbance, depressed mood and stress. The patient is nervous/anxious.    All other systems reviewed and are negative.    Current Medications:   Current Outpatient Medications   Medication Sig Dispense Refill    amitriptyline (ELAVIL) 150 MG tablet Take 1 tablet by mouth every night at bedtime. 30 tablet 1    desvenlafaxine (PRISTIQ) 100 MG 24 hr tablet Take 1 tablet by mouth Daily. 30 tablet 1    Lurasidone HCl (Latuda) 120 MG tablet tablet Take 1 tablet by mouth Daily. 30 tablet 1    ALPRAZolam (Xanax) 1 MG tablet Take 1 tablet by mouth 3 (Three) Times a Day As Needed for Sleep. 90 tablet 0    Atogepant (Qulipta) 60 MG tablet Take 1 tablet by mouth Daily. 30 tablet 5    atorvastatin (LIPITOR) 40 MG tablet TAKE 1 TABLET BY MOUTH EVERY DAY AT NIGHT 90 tablet 3     azithromycin (Zithromax) 250 MG tablet Take 1 tablet by mouth Daily. 4 tablet 0    Blood Glucose Monitoring Suppl (FreeStyle Freedom Lite) w/Device kit       butalbital-acetaminophen-caffeine (Esgic) -40 MG per tablet Take 1 tablet by mouth Every 12 (Twelve) Hours As Needed for Migraine. 20 tablet 0    chlorhexidine (HIBICLENS) 4 % external liquid Apply  topically to the appropriate area as directed Daily As Needed for Wound Care. 473 mL 3    chlorhexidine (HIBICLENS) 4 % external liquid Apply  topically to the appropriate area as directed Daily. 473 mL 3    Dihydroergotamine Mesylate HFA (Trudhesa) 0.725 MG/ACT aerosol solution 0.725 Act into the nostril(s) as directed by provider 1 (One) Time As Needed (migraine) for up to 1 dose. May repeat in 1 hour 8 mL 1    diphenhydrAMINE (BENADRYL) 50 MG capsule Take 1 capsule by mouth.      fluticasone (FLONASE) 50 MCG/ACT nasal spray 2 sprays into the nostril(s) as directed by provider Daily. 48 g 0    gabapentin (NEURONTIN) 800 MG tablet Take 1 tablet by mouth 3 (Three) Times a Day.      glucose blood test strip Use as instructed 50 each 12    glucose monitor monitoring kit Check glucose daily 1 each 0    HYDROcodone-acetaminophen (NORCO)  MG per tablet Take 1 tablet by mouth 3 (Three) Times a Day.      HYDROcodone-acetaminophen (NORCO) 7.5-325 MG per tablet Take 1 tablet by mouth Every 6 (Six) Hours As Needed for Moderate Pain.      hydrOXYzine (ATARAX) 50 MG tablet Take 1 tablet by mouth 3 (Three) Times a Day As Needed (anxiety and/or sleep). 90 tablet 1    ibuprofen (ADVIL,MOTRIN) 800 MG tablet Take 1 tablet by mouth.      Jardiance 10 MG tablet tablet TAKE 1 TABLET BY MOUTH EVERY DAY 30 tablet 3    Lancets misc Check fasting glucose daily and 1 hour after largest meal of day. 100 each 3    loratadine (Claritin) 10 MG tablet Take 1 tablet by mouth Daily. 30 tablet 5    meclizine (ANTIVERT) 25 MG tablet Take 1 tablet by mouth 3 (Three) Times a Day As Needed  for Dizziness. 30 tablet 0    nystatin (MYCOSTATIN) 679442 UNIT/GM powder Apply  topically to the appropriate area as directed 2 (Two) Times a Day. 60 g 3    omeprazole (priLOSEC) 40 MG capsule Take 1 capsule by mouth 2 (Two) Times a Day. 180 capsule 1    OXcarbazepine (Trileptal) 150 MG tablet Take 0.5-1 tablets by mouth Every Night. TAKE 1/2 TABLET BY MOUTH NIGHTLY FOR TWO WEEKS, THEN TAKE ONE TABLET BY MOUTH NIGHTLY UNTIL FOLLOW UP. 30 tablet 1    predniSONE (DELTASONE) 20 MG tablet Take 2 tablets daily. 10 tablet 0    promethazine (PHENERGAN) 25 MG tablet Take 1 tablet by mouth Every 6 (Six) Hours As Needed for Nausea or Vomiting. 45 tablet 3    promethazine-dextromethorphan (PROMETHAZINE-DM) 6.25-15 MG/5ML syrup Take 5 mL by mouth 4 (Four) Times a Day As Needed for Cough. 118 mL 0    Rimegepant Sulfate (Nurtec) 75 MG tablet dispersible tablet Take 1 tablet by mouth Every Other Day. Take Monday,Wednesday,Friday, and Saturday. 16 tablet 6    Rimegepant Sulfate (Nurtec) 75 MG tablet dispersible tablet Take 1 tablet by mouth 1 (One) Time As Needed (HA) for up to 1 dose. 6 tablet 0    tiZANidine (ZANAFLEX) 4 MG tablet Take 1 tablet by mouth Every 8 (Eight) Hours As Needed for Muscle Spasms. Further refills should come from pain management. 90 tablet 0    Umeclidinium-Vilanterol (Anoro Ellipta) 62.5-25 MCG/ACT aerosol powder  inhaler Inhale 1 puff Daily. 60 each 12    Ventolin  (90 Base) MCG/ACT inhaler INHALE 2 PUFFS EVERY 4 HOURS AS NEEDED FOR WHEEZING OR SHORTNESS OF AIR 18 g 0     No current facility-administered medications for this visit.       Mental Status Exam:   Hygiene:   good  Cooperation:  Cooperative  Eye Contact:  Good  Psychomotor Behavior:  Appropriate  Affect:  Appropriate  Mood: sad, depressed, anxious, panicky, and irritable  Speech:  Pressured and Rapid  Thought Process:  Goal directed and Linear  Thought Content:  Mood congruent  Suicidal:  None  Homicidal:  None  Hallucinations:   "None  Delusion:  None  Memory:  Intact  Orientation:  Person, Place, Time, and Situation  Reliability:  fair  Insight:  Fair  Judgement:  Fair  Impulse Control:  Fair  Physical/Medical Issues:  Yes see chart      Objective   Vital Signs:   Ht 160 cm (63\")   Wt 118 kg (260 lb)   BMI 46.06 kg/m²       Assessment / Plan    Diagnoses and all orders for this visit:    1. Bipolar I disorder, most recent episode depressed (Primary)  -     OXcarbazepine (Trileptal) 150 MG tablet; Take 0.5-1 tablets by mouth Every Night. TAKE 1/2 TABLET BY MOUTH NIGHTLY FOR TWO WEEKS, THEN TAKE ONE TABLET BY MOUTH NIGHTLY UNTIL FOLLOW UP.  Dispense: 30 tablet; Refill: 1  -     Lurasidone HCl (Latuda) 120 MG tablet tablet; Take 1 tablet by mouth Daily.  Dispense: 30 tablet; Refill: 1    2. Encounter for therapeutic drug monitoring  -     Compliance Drug Analysis, Ur - Urine, Clean Catch    3. Psychophysiological insomnia  -     ALPRAZolam (Xanax) 1 MG tablet; Take 1 tablet by mouth 3 (Three) Times a Day As Needed for Sleep.  Dispense: 90 tablet; Refill: 0  -     amitriptyline (ELAVIL) 150 MG tablet; Take 1 tablet by mouth every night at bedtime.  Dispense: 30 tablet; Refill: 1    4. Post traumatic stress disorder (PTSD)  -     ALPRAZolam (Xanax) 1 MG tablet; Take 1 tablet by mouth 3 (Three) Times a Day As Needed for Sleep.  Dispense: 90 tablet; Refill: 0  -     amitriptyline (ELAVIL) 150 MG tablet; Take 1 tablet by mouth every night at bedtime.  Dispense: 30 tablet; Refill: 1  -     desvenlafaxine (PRISTIQ) 100 MG 24 hr tablet; Take 1 tablet by mouth Daily.  Dispense: 30 tablet; Refill: 1  -     hydrOXYzine (ATARAX) 50 MG tablet; Take 1 tablet by mouth 3 (Three) Times a Day As Needed (anxiety and/or sleep).  Dispense: 90 tablet; Refill: 1    5. Generalized anxiety disorder  -     ALPRAZolam (Xanax) 1 MG tablet; Take 1 tablet by mouth 3 (Three) Times a Day As Needed for Sleep.  Dispense: 90 tablet; Refill: 0  -     amitriptyline (ELAVIL) 150 " MG tablet; Take 1 tablet by mouth every night at bedtime.  Dispense: 30 tablet; Refill: 1  -     desvenlafaxine (PRISTIQ) 100 MG 24 hr tablet; Take 1 tablet by mouth Daily.  Dispense: 30 tablet; Refill: 1  -     hydrOXYzine (ATARAX) 50 MG tablet; Take 1 tablet by mouth 3 (Three) Times a Day As Needed (anxiety and/or sleep).  Dispense: 90 tablet; Refill: 1       Will discontinue Pristiq 50 mg (Continue 100 mg), will increase Xanax to 1 mg and Hydroxyzine to 50 mg. Will add Trileptal nightly. Continue all other medication as ordered. Obtained yearly urine drug screen and controlled substance agreement signed.     A psychological evaluation was conducted in order to assess past and current level of functioning. Areas assessed included, but were not limited to: perception of social support, perception of ability to face and deal with challenges in life (positive functioning), anxiety symptoms, depressive symptoms, perspective on beliefs/belief system, coping skills for stress, intelligence level,  Therapeutic rapport was established. Interventions conducted today were geared towards incorporating medication management along with support for continued therapy. Education was also provided as to the med management with this provider and what to expect in subsequent sessions.      We discussed risks, benefits, goals and side effects of the above medication and the patient was agreeable with the plan. Patient was educated on the importance of compliance with treatment and follow-up appointments. Patient is aware to contact the [facility] with any worsening of symptoms. To call for questions or concerns and return early if necessary. Patent is agreeable to go to the Emergency Department or call 911 should they begin SI/HI.      PHQ-2/PHQ-9: Depression Screening  Little Interest or Pleasure in Doing Things: 3-->nearly every day  Feeling Down, Depressed or Hopeless: 3-->nearly every day  PHQ-2 Total Score: 6  Trouble  Falling or Staying Asleep, or Sleeping Too Much: 3-->nearly every day  Feeling Tired or Having Little Energy: 3-->nearly every day  Poor Appetite or Overeatin-->several days  Feeling Bad about Yourself - or that You are a Failure or Have Let Yourself or Your Family Down: 3-->nearly every day  Trouble Concentrating on Things, Such as Reading the Newspaper or Watching Television: 3-->nearly every day  Moving or Speaking So Slowly that Other People Could Have Noticed? Or the Opposite - Being So Fidgety: 3-->nearly every day  Thoughts that You Would be Better Off Dead or of Hurting Yourself in Some Way: 0-->not at all  PHQ-9: Brief Depression Severity Measure Score: 22  If You Checked Off Any Problems, How Difficult Have These Problems Made It For You to Do Your Work, Take Care of Things at Home, or Get Along with Other People?: extremely difficult      PHQ-9 Score:   PHQ-9 Total Score: 22    Depression Screening:  Patient screened positive for depression based on a PHQ-9 score of 22 on 10/3/2023. Follow-up recommendations include: Prescribed antidepressant medication treatment and Suicide Risk Assessment performed.      Over the last two weeks, how often have you been bothered by the following problems?  Feeling nervous, anxious or on edge: Nearly every day  Not being able to stop or control worrying: Nearly every day  Worrying too much about different things: Nearly every day  Trouble Relaxing: Nearly every day  Being so restless that it is hard to sit still: Nearly every day  Becoming easily annoyed or irritable: Nearly every day  Feeling afraid as if something awful might happen: Nearly every day  KENY 7 Total Score: 21  If you checked any problems, how difficult have these problems made it for you to do your work, take care of things at home, or get along with other people: Extremely difficult        Screening for Adults With ADHD - (1-6)  1. How often do you have trouble wrapping up the final details of a  project, once the challenging parts have been done?: Very Often  2. How often do you have difficulty getting things in order when you have to do a task that requires organization?: Very Often  3. How often do you have problems remembering appointments or obligations : Very Often  4. When you have a task that requires a lot of thought, how often do you avoid or delay getting started ?: Very Often  5. How often do you fidget or squirm with your hands or feet when you have to sit down for a long time?: Very Often  6. How often do you feel overly active and compelled to do things, like you were driven by a motor?: Very Often  7. How often do you make careless mistakes when you have to work on a boring or difficult project?: Often  8. How often do have difficulty keeping your attention when you are doing boring or repetitive work?: Often  9. How often do you have difficulty concentrating on what people say to you, even when they are speaking to you: Very Often  10.How often do you misplace or have difficulty finding things at home or at work?: Very Often  11.How often are you distracted by activity or noise around you?: Very Often  12.How often do you leave your seat in meetings or other situations in which you are expected to remain seated?: Very Often  13.How often do you feel restless or fidgety?: Very Often  14.How often do you have difficulty unwinding and relaxing when you have time to yourself?: Very Often  15.How often do you find yourself talking too much when you are in social situations?: Very Often  16.When you’re in a conversation, how often do you find yourself finishing the sentences of the people you are talking to, before they can finish them themselves?: Very Often  17.How often do you have difficulty waiting your turn in situations when turn taking is required?: Very Often  18.How often do you interrupt others when they are busy?: Very Often        MEDS ORDERED DURING VISIT:  New Medications Ordered  This Visit   Medications    OXcarbazepine (Trileptal) 150 MG tablet     Sig: Take 0.5-1 tablets by mouth Every Night. TAKE 1/2 TABLET BY MOUTH NIGHTLY FOR TWO WEEKS, THEN TAKE ONE TABLET BY MOUTH NIGHTLY UNTIL FOLLOW UP.     Dispense:  30 tablet     Refill:  1    ALPRAZolam (Xanax) 1 MG tablet     Sig: Take 1 tablet by mouth 3 (Three) Times a Day As Needed for Sleep.     Dispense:  90 tablet     Refill:  0    amitriptyline (ELAVIL) 150 MG tablet     Sig: Take 1 tablet by mouth every night at bedtime.     Dispense:  30 tablet     Refill:  1    desvenlafaxine (PRISTIQ) 100 MG 24 hr tablet     Sig: Take 1 tablet by mouth Daily.     Dispense:  30 tablet     Refill:  1     ZERO refills remain on this prescription. Your patient is requesting advance approval of refills for this medication to PREVENT ANY MISSED DOSES    hydrOXYzine (ATARAX) 50 MG tablet     Sig: Take 1 tablet by mouth 3 (Three) Times a Day As Needed (anxiety and/or sleep).     Dispense:  90 tablet     Refill:  1    Lurasidone HCl (Latuda) 120 MG tablet tablet     Sig: Take 1 tablet by mouth Daily.     Dispense:  30 tablet     Refill:  1         Follow Up   Return in about 8 weeks (around 11/28/2023).  Patient was given instructions and counseling regarding her condition or for health maintenance advice. Please see specific information pulled into the AVS if appropriate.     TREATMENT PLAN/GOALS: Continue supportive psychotherapy efforts and medications as indicated. Treatment and medication options discussed during today's visit. Patient acknowledged and verbally consented to continue with current treatment plan and was educated on the importance of compliance with treatment and follow-up appointments.    MEDICATION ISSUES:  Discussed medication options and treatment plan of prescribed medication as well as the risks, benefits, and side effects including potential falls, possible impaired driving and metabolic adversities among others. Patient is agreeable  to call the office with any worsening of symptoms or onset of side effects. Patient is agreeable to call 911 or go to the nearest ER should he/she begin having SI/HI.        [facility]  [address] [place] [id number]  [phone]    October 4, 2023 08:23 EDT

## 2023-10-05 ENCOUNTER — TELEPHONE (OUTPATIENT)
Dept: INTERNAL MEDICINE | Facility: CLINIC | Age: 38
End: 2023-10-05
Payer: MEDICAID

## 2023-10-10 ENCOUNTER — TELEPHONE (OUTPATIENT)
Dept: INTERNAL MEDICINE | Facility: CLINIC | Age: 38
End: 2023-10-10
Payer: MEDICAID

## 2023-10-10 NOTE — TELEPHONE ENCOUNTER
Caller: Lindsay Amezquita    Relationship: Self    Best call back number: [phone]     What is the best time to reach you: ANYTIME, OK TO LEAVE VOICEMAIL.    Who are you requesting to speak with (clinical staff, provider,  specific staff member): CLINICAL STAFF    Do you know the name of the person who called: PATIENT    What was the call regarding: PATIENT WOULD LIKE A CALL BACK TO DISCUSS HER REFERRAL TO PAIN CLINIC AND ISSUES WITH MEDICATION. PLEASE ADVISE.    Is it okay if the provider responds through MyChart: NO CALL ONLY

## 2023-10-11 LAB — DRUGS UR: NORMAL

## 2023-10-11 NOTE — TELEPHONE ENCOUNTER
Patient having difficulty getting pain medications switched to [facility] on [address] [place], [place]

## 2023-10-12 NOTE — TELEPHONE ENCOUNTER
Caller: Lindsay Amezquita    Relationship: Self    Best call back number: [phone]      What was the call regarding: PATIENT CALLED TO CHECK THE STATUS OF HER REQUEST.

## 2023-10-12 NOTE — TELEPHONE ENCOUNTER
David from pain clinic returned my call 10/11/2023 stating the patient and mother had changed pharmacy 3 times in 48 hours. They are not allowed to send the Rx more than 2 times. Last Rx was sent to [facility] in [place].    Patient called back to inform her of the above.

## 2023-10-18 ENCOUNTER — TELEPHONE (OUTPATIENT)
Dept: NEUROLOGY | Facility: CLINIC | Age: 38
End: 2023-10-18

## 2023-10-18 ENCOUNTER — TELEPHONE (OUTPATIENT)
Dept: INTERNAL MEDICINE | Facility: CLINIC | Age: 38
End: 2023-10-18
Payer: MEDICAID

## 2023-10-18 NOTE — TELEPHONE ENCOUNTER
Called and spoke to patient to let her know that her prescription for Trudhesa has been ordered at [facility] and they will let her know when it is in stock. I informed her that she can come by the office to get a sample.

## 2023-10-18 NOTE — TELEPHONE ENCOUNTER
Caller: Lindsay Amezquita    Relationship: Self    Best call back number: [phone]    What was the call regarding: PT STATES SHE SPOKE W/ HER MEDICAID , TERESA, AND WAS ADVISED THAT SHE WOULD BE WORKING TO GET PT'S ORDERED MRI BRAIN SCAN APPROVED.    PT COMPLAINS OF MORE FREQUENT HEADACHES THAT HAVE NOT PROGRESSED TO MIGRAINES. HEADACHES ARE NOW A DAILY OCCURRENCE; THIS HAS BEEN GOING ON FOR A COUPLE OF WEEKS DUE TO INCREASED STRESS DUE TO MOVING HOMES. PT HAS NOT YET STARTED USE OF THE TRUDHESA AS SHE WAS NOT AWARE AN RX HAD BEEN SENT TO HER PHARMACY. [facility] TO [facility]URE [facility] PRESCRIPTION.    Do you require a callback: IF ANY ADDITIONAL QUESTIONS.    Is it okay if the provider responds through [employer]?: YES    PLEASE REVIEW AND ADVISE.

## 2023-10-18 NOTE — TELEPHONE ENCOUNTER
Caller: Lindsay Amezquita    Relationship to patient: Self    Best call back number: [phone]    Chief complaint:  JUST NOT FEELING GOOD; VOMITING    Type of visit: SAME DAY    Requested date:ASAP     If rescheduling, when is the original appointment:N/A    Additional notes: PATIENT STATED THAT SHE IS NOT FEELING GOOD AND WOULD LIKE TO BE SEEN  BY PCP ASAP    PLEASE ADVISE PATIENT

## 2023-10-19 DIAGNOSIS — Z87.828 HISTORY OF TRAUMATIC HEAD INJURY: ICD-10-CM

## 2023-10-19 DIAGNOSIS — G43.001 MIGRAINE WITHOUT AURA AND WITH STATUS MIGRAINOSUS, NOT INTRACTABLE: Primary | ICD-10-CM

## 2023-10-19 DIAGNOSIS — H55.00 NYSTAGMUS OF LEFT EYE: ICD-10-CM

## 2023-10-19 NOTE — TELEPHONE ENCOUNTER
Caller: Lindsay Amezquita  Relationship to Patient: SELF  Phone Number:   Telephone Information:   Mobile [phone]         Reason for Call:  PT STATES HER MEDICAID MANAGER TERESA GAVE HER THE FOLLOWING INFORMATION TO GET MRI APPROVED:    -MRI CLAIM AND OV NOTES RE-FAXED EXPLAINING WHY IT IS NEEDED.    PLEASE CALL TERESA WITH ANY QUESTION -[phone]    PT IS UNAWARE OF FAX NUMBER

## 2023-10-19 NOTE — TELEPHONE ENCOUNTER
Left vm that Charley is out and we have no other appts. today. Might want to go to Urgent Care if no better. Call for any questions.

## 2023-10-26 DIAGNOSIS — F43.10 POST TRAUMATIC STRESS DISORDER (PTSD): ICD-10-CM

## 2023-10-26 DIAGNOSIS — F51.04 PSYCHOPHYSIOLOGICAL INSOMNIA: ICD-10-CM

## 2023-10-26 DIAGNOSIS — F41.1 GENERALIZED ANXIETY DISORDER: ICD-10-CM

## 2023-10-26 DIAGNOSIS — F31.30 BIPOLAR I DISORDER, MOST RECENT EPISODE DEPRESSED: ICD-10-CM

## 2023-10-26 RX ORDER — OXCARBAZEPINE 150 MG/1
75-150 TABLET, FILM COATED ORAL NIGHTLY
Qty: 30 TABLET | Refills: 1 | OUTPATIENT
Start: 2023-10-26

## 2023-10-26 RX ORDER — ALPRAZOLAM 1 MG/1
1 TABLET ORAL 3 TIMES DAILY PRN
Qty: 90 TABLET | Refills: 0 | Status: SHIPPED | OUTPATIENT
Start: 2023-10-26 | End: 2024-10-25

## 2023-10-26 NOTE — TELEPHONE ENCOUNTER
Patient wants to know if she can start taking her Trileptal in the AM instead of at night. (Also requesting refills of Xanax, Trileptal should have refill at pharmacy)    Rx Refill Note  Requested Prescriptions     Pending Prescriptions Disp Refills    ALPRAZolam (Xanax) 1 MG tablet 90 tablet 0     Sig: Take 1 tablet by mouth 3 (Three) Times a Day As Needed for Sleep.     Refused Prescriptions Disp Refills    OXcarbazepine (Trileptal) 150 MG tablet 30 tablet 1     Sig: Take 0.5-1 tablets by mouth Every Night. TAKE 1/2 TABLET BY MOUTH NIGHTLY FOR TWO WEEKS, THEN TAKE ONE TABLET BY MOUTH NIGHTLY UNTIL FOLLOW UP.      Last office visit with prescribing clinician: 10/3/2023   Last telemedicine visit with prescribing clinician: Visit date not found   Next office visit with prescribing clinician: 12/14/2023                         Would you like a call back once the refill request has been completed: [] Yes [] No    If the office needs to give you a call back, can they leave a voicemail: [] Yes [] No    Dee Reagan MA  10/26/23, 08:37 EDT

## 2023-10-26 NOTE — TELEPHONE ENCOUNTER
Incoming Refill Request      Medication requested (name and dose): Oxcarbazepine 150mg, and Xanax    [facility]    Additional details provided by patient: Patient would like to know if she can start taking the Oxcarbazepine during the day instead of at night.    Best call back number:  verified    Does the patient have less than a 3 day supply:  [] Yes  [x] No    See Bravo  10/26/23, 08:17 EDT

## 2023-10-30 DIAGNOSIS — F31.30 BIPOLAR I DISORDER, MOST RECENT EPISODE DEPRESSED: ICD-10-CM

## 2023-10-30 RX ORDER — OXCARBAZEPINE 150 MG/1
75-150 TABLET, FILM COATED ORAL NIGHTLY
Qty: 30 TABLET | Refills: 1 | Status: SHIPPED | OUTPATIENT
Start: 2023-10-30

## 2023-10-30 NOTE — TELEPHONE ENCOUNTER
Incoming Refill Request      Medication requested (name and dose): OXCARBAZEPINE (TRILEPTAL) 150 MG     Pharmacy [facility]    Additional details provided by patient: N/A    Best call back number: [phone]    Does the patient have less than a 3 day supply:  [x] Yes  [] No    Kim Casanova Rep  10/30/23, 12:17 EDT

## 2023-10-31 ENCOUNTER — HOSPITAL ENCOUNTER (EMERGENCY)
Facility: HOSPITAL | Age: 38
Discharge: HOME | End: 2023-10-31
Attending: EMERGENCY MEDICINE | Admitting: EMERGENCY MEDICINE
Payer: MEDICAID

## 2023-10-31 VITALS
HEART RATE: 86 BPM | OXYGEN SATURATION: 95 % | RESPIRATION RATE: 18 BRPM | HEIGHT: 63 IN | TEMPERATURE: 98 F | BODY MASS INDEX: 44.16 KG/M2 | WEIGHT: 249.2 LBS | SYSTOLIC BLOOD PRESSURE: 139 MMHG | DIASTOLIC BLOOD PRESSURE: 95 MMHG

## 2023-10-31 DIAGNOSIS — M54.30 SCIATICA, UNSPECIFIED LATERALITY: Primary | ICD-10-CM

## 2023-10-31 PROCEDURE — 96372 THER/PROPH/DIAG INJ SC/IM: CPT

## 2023-10-31 PROCEDURE — 25010000002 KETOROLAC TROMETHAMINE PER 15 MG: Performed by: NURSE PRACTITIONER

## 2023-10-31 PROCEDURE — 99282 EMERGENCY DEPT VISIT SF MDM: CPT

## 2023-10-31 PROCEDURE — 25010000002 DEXAMETHASONE SODIUM PHOSPHATE 10 MG/ML SOLUTION: Performed by: EMERGENCY MEDICINE

## 2023-10-31 RX ORDER — DEXAMETHASONE SODIUM PHOSPHATE 10 MG/ML
10 INJECTION, SOLUTION INTRAMUSCULAR; INTRAVENOUS ONCE
Status: COMPLETED | OUTPATIENT
Start: 2023-10-31 | End: 2023-10-31

## 2023-10-31 RX ORDER — KETOROLAC TROMETHAMINE 30 MG/ML
30 INJECTION, SOLUTION INTRAMUSCULAR; INTRAVENOUS ONCE
Status: COMPLETED | OUTPATIENT
Start: 2023-10-31 | End: 2023-10-31

## 2023-10-31 RX ADMIN — KETOROLAC TROMETHAMINE 30 MG: 30 INJECTION, SOLUTION INTRAMUSCULAR; INTRAVENOUS at 22:38

## 2023-10-31 RX ADMIN — DEXAMETHASONE SODIUM PHOSPHATE 10 MG: 10 INJECTION INTRAMUSCULAR; INTRAVENOUS at 22:38

## 2023-11-01 NOTE — ED PROVIDER NOTES
Subjective:  History of Present Illness:    Patient is a 38-year-old female with history of sciatica.  Reports that she ran out at home pain medication.  Was recently helping family member move furniture.  Reports pain down right butt cheek into lower extremity.  Denies loss of bowel or bladder.  Denies OTC medication or home remedy.  Denies alleviating exacerbating factors.    Nurses Notes reviewed and agree, including vitals, allergies, social history and prior medical history.     REVIEW OF SYSTEMS: All systems reviewed and not pertinent unless noted.  Review of Systems   Musculoskeletal:  Positive for back pain.   All other systems reviewed and are negative.      Past Medical History:   Diagnosis Date    Allergic     Anxiety     Asthma Beings of copd with asthma    Back pain     Bell's palsy     Bipolar 2 disorder     Blood clot in vein     COPD (chronic obstructive pulmonary disease) Six months ago    Deep vein thrombosis Last year    Depression     Diabetes mellitus November    Pre diabete    Frequent headaches     GERD (gastroesophageal reflux disease)     Irritable bowel syndrome Two years ago    Kidney stone Two years ago    Migraine     Obesity All of my life    Panic     PTSD (post-traumatic stress disorder)     Pulmonary embolism Not in lungs    Behind left knee cap    Restless leg syndrome     Sinus problem     Snores     Tattoos     Urinary tract infection Have them on and off    Visual impairment Since i was little    Vitamin B12 deficiency        Allergies:    Aspirin, Codeine, Cyclobenzaprine, Haldol [haloperidol], Latex, Penicillins, Morphine, Ondansetron, Oxycodone-acetaminophen, Oxycontin [oxycodone], Triptans, Compazine [prochlorperazine], Lamictal [lamotrigine], and Reglan [metoclopramide]      Past Surgical History:   Procedure Laterality Date    CHOLECYSTECTOMY      TOOTH EXTRACTION           Social History     Socioeconomic History    Marital status: Single   Tobacco Use    Smoking status:  "Every Day     Packs/day: 2.00     Years: 29.00     Additional pack years: 0.00     Total pack years: 58.00     Types: Cigarettes     Start date: 1/1/1995     Passive exposure: Never    Smokeless tobacco: Never   Vaping Use    Vaping Use: Former   Substance and Sexual Activity    Alcohol use: Not Currently    Drug use: Not Currently    Sexual activity: Not Currently     Partners: Female     Birth control/protection: None, Same-sex partner         Family History   Problem Relation Age of Onset    Irritable bowel syndrome Mother     Heart disease Mother     Miscarriages / Stillbirths Mother     Irritable bowel syndrome Father     Alcohol abuse Father     Diabetes Father     Hyperlipidemia Father     Colon cancer Maternal Grandfather        Objective  Physical Exam:  /95 (BP Location: Left arm, Patient Position: Sitting)   Pulse 86   Temp 98 °F (36.7 °C) (Oral)   Resp 18   Ht 160 cm (63\")   Wt 113 kg (249 lb 3.2 oz)   LMP 10/06/2023 (Exact Date)   SpO2 95%   BMI 44.14 kg/m²      Physical Exam  Vitals and nursing note reviewed.   Constitutional:       Appearance: Normal appearance. She is normal weight.   HENT:      Head: Normocephalic and atraumatic.      Nose: Nose normal.      Mouth/Throat:      Mouth: Mucous membranes are moist.      Pharynx: Oropharynx is clear.   Eyes:      Extraocular Movements: Extraocular movements intact.      Conjunctiva/sclera: Conjunctivae normal.      Pupils: Pupils are equal, round, and reactive to light.   Cardiovascular:      Rate and Rhythm: Normal rate and regular rhythm.   Pulmonary:      Effort: Pulmonary effort is normal.      Breath sounds: Normal breath sounds.   Abdominal:      General: Abdomen is flat. Bowel sounds are normal.   Musculoskeletal:         General: Normal range of motion.      Cervical back: Normal range of motion and neck supple.   Skin:     General: Skin is warm.      Capillary Refill: Capillary refill takes less than 2 seconds.   Neurological:      " General: No focal deficit present.      Mental Status: She is alert and oriented to person, place, and time. Mental status is at baseline.   Psychiatric:         Mood and Affect: Mood normal.         Behavior: Behavior normal.         Thought Content: Thought content normal.         Judgment: Judgment normal.         Procedures    ED Course:         Lab Results (last 24 hours)       ** No results found for the last 24 hours. **             No radiology results from the last 24 hrs       MDM      Initial impression of presenting illness: Patient is a 38-year-old female with history of sciatica.  Reports that she ran out at home pain medication.  Was recently helping family member move furniture.  Reports pain down right butt cheek into lower extremity.  Denies loss of bowel or bladder.  Denies OTC medication or home remedy.  Denies alleviating exacerbating factors.    DDX: includes but is not limited to: Sciatica, low back strain, low back sprain, or other    Patient arrives stable with vitals interpreted by myself.     Pertinent features from physical exam: Physical examination essentially unremarkable..    Initial diagnostic plan: N/A    Results from initial plan were reviewed and interpreted by me revealing N/A    Diagnostic information from other sources: Chart review    Interventions / Re-evaluation: Vital signs stable throughout encounter.  Patient received 10 of dexamethasone.  30 of Toradol.    Results/clinical rationale were discussed with patient    Consultations/Discussion of results with other physicians: N/A    Disposition plan: Patient is hemodynamically stable nontoxic-appearing appropriate for discharge.  Patient to follow-up with PCP in 1 week.  Follow-up with ER for new or worsening symptoms.  -----        Final diagnoses:   Sciatica, unspecified laterality          Cristian Lerner, APRN  10/31/23 2243

## 2023-11-04 ENCOUNTER — DOCUMENTATION (OUTPATIENT)
Dept: INTERNAL MEDICINE | Facility: CLINIC | Age: 38
End: 2023-11-04
Payer: MEDICAID

## 2023-11-04 RX ORDER — FLUCONAZOLE 150 MG/1
150 TABLET ORAL
Qty: 2 TABLET | Refills: 0 | Status: SHIPPED | OUTPATIENT
Start: 2023-11-04 | End: 2023-11-10

## 2023-11-04 RX ORDER — DOXYCYCLINE HYCLATE 100 MG/1
100 CAPSULE ORAL 2 TIMES DAILY
Qty: 20 CAPSULE | Refills: 0 | Status: SHIPPED | OUTPATIENT
Start: 2023-11-04 | End: 2023-11-14

## 2023-11-04 RX ORDER — ONDANSETRON 4 MG/1
4 TABLET, ORALLY DISINTEGRATING ORAL EVERY 8 HOURS PRN
Qty: 30 TABLET | Refills: 0 | Status: SHIPPED | OUTPATIENT
Start: 2023-11-04 | End: 2023-11-14

## 2023-11-05 NOTE — PROGRESS NOTES
Notified by on-call service on 10/22/23 that patient called with reports of nasal congestion, headache, fatigue, rupture of cyst beneath her breast.  When called, patient reported subjective fever, chills, malaise, nausea in addition to those symptoms.  Drainage from cyst described as yellow, thick.  Continued to drain several hours after rupturing.    She is diabetic. Taking medications as prescribed.  Glucose has been high since she has not been feeling well.     Sending prescription for doxycycline, diflucan for reported yeast infection with all antibiotics, and ondansetron for nausea.  She has phenergan already prescribed. To eat bland, low carb foods.  Drink plenty of water.  Rest.  If symptoms worsen, to go to ED or [facility] for more immediate evaluation.

## 2023-11-06 ENCOUNTER — TELEPHONE (OUTPATIENT)
Dept: INTERNAL MEDICINE | Facility: CLINIC | Age: 38
End: 2023-11-06

## 2023-11-06 NOTE — TELEPHONE ENCOUNTER
Caller: Lindsay Amezquita    Relationship: Self    Best call back number:     [phone]       What medication are you requesting: SOMETHING FOR SYMPTOMS    What are your current symptoms: BED WETTING    How long have you been experiencing symptoms: ALL HERE LIFE    Have you had these symptoms before:    [x] Yes  [] No    Have you been treated for these symptoms before:   [x] Yes  [] No    If a prescription is needed, what is your preferred pharmacy and phone number: [facility]/PHARMACY #[id number] - [place], [place] - [address] [place] [address] [phone] [facility] [phone]

## 2023-11-07 ENCOUNTER — TELEPHONE (OUTPATIENT)
Dept: INTERNAL MEDICINE | Facility: CLINIC | Age: 38
End: 2023-11-07
Payer: MEDICAID

## 2023-11-07 NOTE — TELEPHONE ENCOUNTER
"Pt called and stated she moved from [facility] and has \"over done it.\" She states she is having headaches, leg swelling, retaining fluid, not sleeping, drinking lots of water at night, and very tried and drained.She has a hospital f/u on Thursday but doesn't know if she can wait that long to be seen. Please advise.   "

## 2023-11-09 NOTE — TELEPHONE ENCOUNTER
----- Message from Josie Zaragoza Pt still in ED

## 2023-11-13 ENCOUNTER — TELEPHONE (OUTPATIENT)
Dept: INTERNAL MEDICINE | Facility: CLINIC | Age: 38
End: 2023-11-13
Payer: MEDICAID

## 2023-11-13 NOTE — TELEPHONE ENCOUNTER
Feels about the same with the exception of that she has a fever. Up to 101.2. Neg. covid test yest. Advised Urgent Care or same day appt. tomm. If she can get in. May have something else going on.

## 2023-11-13 NOTE — TELEPHONE ENCOUNTER
Caller: Lindsay Amezquita    Relationship: Self    Best call back number: [phone]    What medication are you requesting: STRONGER ANTIBIOTIC    What are your current symptoms: HEADACHE, FEVER & CONGESTED & NAUSEA    How long have you been experiencing symptoms: 5 DAYS    Have you had these symptoms before:  [] Yes  [x] No    Have you been treated for these symptoms before:  [] Yes  [x] No    If a prescription is needed, what is your preferred pharmacy and phone number: [facility]/PHARMACY #[id number] - [address] [phone] [facility] [phone]

## 2023-11-14 DIAGNOSIS — F31.30 BIPOLAR I DISORDER, MOST RECENT EPISODE DEPRESSED: ICD-10-CM

## 2023-11-14 RX ORDER — OXCARBAZEPINE 150 MG/1
150 TABLET, FILM COATED ORAL 2 TIMES DAILY
Qty: 60 TABLET | Refills: 0 | Status: SHIPPED | OUTPATIENT
Start: 2023-11-14

## 2023-11-17 ENCOUNTER — TELEPHONE (OUTPATIENT)
Dept: INTERNAL MEDICINE | Facility: CLINIC | Age: 38
End: 2023-11-17

## 2023-11-17 NOTE — TELEPHONE ENCOUNTER
Caller: Lindsay Amezquita    Relationship: Self    Best call back number: [phone]     What is the medical concern/diagnosis:  PAIN TREATMENT     What specialty or service is being requested: PAIN TREATMENT    What is the provider, practice or medical service name: ANYONE OTHER THAN THE OFFICE ON [facility]        Any additional details: THE PATIENT STATES THAT THEY ARE NOT NICE TO HER AND AND THEY WONT DO A VIDEO CALL WITH HER.  ALSO WONT SCHEDULE HER THE SAME DAY HER MOM COMES.    PLEASE CALL PATIENT TO DISCUSS

## 2023-11-17 NOTE — TELEPHONE ENCOUNTER
PATIENT CALLED BACK AND WOULD LIKE THE REFERRAL TO GO TO  [facility]    FAX -  [phone]  PHONE - [phone]

## 2023-11-21 ENCOUNTER — HOSPITAL ENCOUNTER (EMERGENCY)
Facility: HOSPITAL | Age: 38
Discharge: HOME | End: 2023-11-21
Attending: EMERGENCY MEDICINE | Admitting: EMERGENCY MEDICINE
Payer: MEDICAID

## 2023-11-21 ENCOUNTER — TELEPHONE (OUTPATIENT)
Dept: INTERNAL MEDICINE | Facility: CLINIC | Age: 38
End: 2023-11-21

## 2023-11-21 VITALS
BODY MASS INDEX: 44.12 KG/M2 | SYSTOLIC BLOOD PRESSURE: 130 MMHG | OXYGEN SATURATION: 94 % | RESPIRATION RATE: 18 BRPM | DIASTOLIC BLOOD PRESSURE: 75 MMHG | WEIGHT: 249 LBS | HEART RATE: 100 BPM | TEMPERATURE: 97.9 F | HEIGHT: 63 IN

## 2023-11-21 DIAGNOSIS — M54.41 CHRONIC BILATERAL LOW BACK PAIN WITH BILATERAL SCIATICA: Primary | ICD-10-CM

## 2023-11-21 DIAGNOSIS — G89.29 CHRONIC BILATERAL LOW BACK PAIN WITH BILATERAL SCIATICA: Primary | ICD-10-CM

## 2023-11-21 DIAGNOSIS — M54.50 RIGHT LOW BACK PAIN, UNSPECIFIED CHRONICITY, UNSPECIFIED WHETHER SCIATICA PRESENT: Primary | ICD-10-CM

## 2023-11-21 DIAGNOSIS — M54.42 CHRONIC BILATERAL LOW BACK PAIN WITH BILATERAL SCIATICA: Primary | ICD-10-CM

## 2023-11-21 LAB
BILIRUB UR QL STRIP: NEGATIVE
CLARITY UR: CLEAR
COLOR UR: YELLOW
GLUCOSE UR STRIP-MCNC: ABNORMAL MG/DL
HGB UR QL STRIP.AUTO: NEGATIVE
KETONES UR QL STRIP: NEGATIVE
LEUKOCYTE ESTERASE UR QL STRIP.AUTO: NEGATIVE
NITRITE UR QL STRIP: NEGATIVE
PH UR STRIP.AUTO: 6.5 [PH] (ref 5–8)
PROT UR QL STRIP: NEGATIVE
SP GR UR STRIP: 1.01 (ref 1–1.03)
UROBILINOGEN UR QL STRIP: ABNORMAL

## 2023-11-21 PROCEDURE — 96372 THER/PROPH/DIAG INJ SC/IM: CPT

## 2023-11-21 PROCEDURE — 99283 EMERGENCY DEPT VISIT LOW MDM: CPT

## 2023-11-21 PROCEDURE — 25010000002 KETOROLAC TROMETHAMINE PER 15 MG: Performed by: PHYSICIAN ASSISTANT

## 2023-11-21 PROCEDURE — 81003 URINALYSIS AUTO W/O SCOPE: CPT | Performed by: PHYSICIAN ASSISTANT

## 2023-11-21 PROCEDURE — 63710000001 DEXAMETHASONE PER 0.25 MG: Performed by: PHYSICIAN ASSISTANT

## 2023-11-21 RX ORDER — KETOROLAC TROMETHAMINE 30 MG/ML
30 INJECTION, SOLUTION INTRAMUSCULAR; INTRAVENOUS ONCE
Status: COMPLETED | OUTPATIENT
Start: 2023-11-21 | End: 2023-11-21

## 2023-11-21 RX ORDER — LIDOCAINE 50 MG/G
1 PATCH TOPICAL EVERY 24 HOURS
Qty: 6 PATCH | Refills: 0 | Status: SHIPPED | OUTPATIENT
Start: 2023-11-21 | End: 2023-11-27

## 2023-11-21 RX ORDER — DIAZEPAM 5 MG/1
5 TABLET ORAL ONCE
Status: COMPLETED | OUTPATIENT
Start: 2023-11-21 | End: 2023-11-21

## 2023-11-21 RX ADMIN — DIAZEPAM 5 MG: 5 TABLET ORAL at 20:11

## 2023-11-21 RX ADMIN — DEXAMETHASONE 10 MG: 4 TABLET ORAL at 20:11

## 2023-11-21 RX ADMIN — KETOROLAC TROMETHAMINE 30 MG: 30 INJECTION, SOLUTION INTRAMUSCULAR at 20:11

## 2023-11-21 NOTE — TELEPHONE ENCOUNTER
Caller: Lindsay Amezquita    Relationship: Self    Best call back number: [phone]     What is the medical concern/diagnosis: DETERIORATING DISCS-FIBROMYALGIA    What specialty or service is being requested: PAIN MANAGEMENT    What is the provider, practice or medical service name: [facility]    What is the office location: [address] [place] [address]    What is the office phone number: [phone]    Any additional details: PATIENT CALLED IN REQUESTING TO BE REFERRED TO THE [facility]OVE [facility] CL[facility] SHE FURTHER STATES SHE NEVER LEFT AN ENCOUNTER REQUESTING TO GO TO A DIFFERENT CLINIC AND SOMEONE IS USING HER INFORMATION

## 2023-11-22 NOTE — ED PROVIDER NOTES
"Subjective  History of Present Illness:    Chief Complaint: Back Pain  History of Present Illness: Patient is a 38-year-old female with history of anxiety, asthma, Bell's palsy, bipolar disorder, COPD, DVT, diabetes, depression, GERD, kidney stones, migraines, obesity, restless leg syndrome, and urinary tract infection presenting to the ER for evaluation of back pain.  Patient states that for the past 3 days she has been moving heavy furniture at her sister's house and feels like she \"overdid it\".  She states that she has pain from her neck all the way down to her back, although most of the pain is localized to the right lower back.  She states she has taken ibuprofen, gabapentin and Zanaflex today without much relief. She has also taken a Norco without much relief.  She denies any fever, chills, chest pain, cough, shortness of breath, dysuria, hematuria, groin paresthesias, inability urinate, bowel incontinence.  Onset: Few days  Duration: Persistent  Exacerbating / Alleviating factors: None  Associated symptoms: None      Nurses Notes reviewed and agree, including vitals, allergies, social history and prior medical history.     REVIEW OF SYSTEMS: All systems reviewed and not pertinent unless noted.  Review of Systems      Positive for: Back pain    Negative for: Fever, chills, chest pain, shortness of breath, abdominal pain, dysuria, hematuria    Past Medical History:   Diagnosis Date    Allergic     Anxiety     Asthma Beings of copd with asthma    Back pain     Bell's palsy     Bipolar 2 disorder     Blood clot in vein     COPD (chronic obstructive pulmonary disease) Six months ago    Deep vein thrombosis Last year    Depression     Diabetes mellitus November    Pre diabete    Frequent headaches     GERD (gastroesophageal reflux disease)     Irritable bowel syndrome Two years ago    Kidney stone Two years ago    Migraine     Obesity All of my life    Panic     PTSD (post-traumatic stress disorder)     Pulmonary " "embolism Not in lungs    Behind left knee cap    Restless leg syndrome     Sinus problem     Snores     Tattoos     Urinary tract infection Have them on and off    Visual impairment Since i was little    Vitamin B12 deficiency        Allergies:    Aspirin, Codeine, Cyclobenzaprine, Haldol [haloperidol], Latex, Penicillins, Morphine, Ondansetron, Oxycodone-acetaminophen, Oxycontin [oxycodone], Triptans, Compazine [prochlorperazine], Lamictal [lamotrigine], and Reglan [metoclopramide]      Past Surgical History:   Procedure Laterality Date    CHOLECYSTECTOMY      TOOTH EXTRACTION           Social History     Socioeconomic History    Marital status: Single   Tobacco Use    Smoking status: Every Day     Packs/day: 2.00     Years: 29.00     Additional pack years: 0.00     Total pack years: 58.00     Types: Cigarettes     Start date: 1/1/1995     Passive exposure: Never    Smokeless tobacco: Never   Vaping Use    Vaping Use: Former   Substance and Sexual Activity    Alcohol use: Not Currently    Drug use: Not Currently    Sexual activity: Not Currently     Partners: Female     Birth control/protection: None, Same-sex partner         Family History   Problem Relation Age of Onset    Irritable bowel syndrome Mother     Heart disease Mother     Miscarriages / Stillbirths Mother     Irritable bowel syndrome Father     Alcohol abuse Father     Diabetes Father     Hyperlipidemia Father     Colon cancer Maternal Grandfather        Objective  Physical Exam:  /87   Pulse 102   Temp 97.9 °F (36.6 °C) (Oral)   Resp 18   Ht 160 cm (63\")   Wt 113 kg (249 lb)   LMP 11/14/2023 (Approximate)   SpO2 95%   BMI 44.11 kg/m²      Physical Exam    CONSTITUTIONAL: Well developed, no acute distress, nontoxic in appearance.  She is awake, alert, speaking in full sentences.  VITAL SIGNS: per nursing, reviewed and noted  SKIN: exposed skin with no rashes, ulcerations or petechiae  EYES: Grossly EOMI, no icterus  ENT: Normal voice.  " There is patent, no facial asymmetry  RESPIRATORY:  No increased work of breathing. No retractions.  Lung sounds are clear to auscultation bilaterally  CARDIOVASCULAR: Tachycardic, regular rhythm, distal pulses intact  GI: Abdomen soft, nontender  MUSCULOSKELETAL: No obvious lesions noted on back.  Patient has diffuse tenderness over the cervical, thoracic and lumbar spine without obvious deformity or step-offs appreciated.  Moving all extremities without difficulty  NEUROLOGIC: Alert, oriented x 3. No gross deficits. GCS 15.   PSYCH: appropriate affect.    Procedures    ED Course:             Lab Results (last 24 hours)       Procedure Component Value Units Date/Time    Urinalysis With Microscopic If Indicated (No Culture) - Urine, Clean Catch [[id number]]  (Abnormal) Collected: 11/21/23 2002    Specimen: Urine, Clean Catch Updated: 11/21/23 2014     Color, UA Yellow     Appearance, UA Clear     pH, UA 6.5     Specific Gravity, UA 1.012     Glucose, UA >=1000 mg/dL (3+)     Ketones, UA Negative     Bilirubin, UA Negative     Blood, UA Negative     Protein, UA Negative     Leuk Esterase, UA Negative     Nitrite, UA Negative     Urobilinogen, UA 0.2 E.U./dL    Narrative:      Urine microscopic not indicated.             No radiology results from the last 24 hrs       MDM     Amount and/or Complexity of Data Reviewed  Clinical lab tests: reviewed        Initial impression of presenting illness: Patient is a 38-year-old female presenting to the ER for evaluation of back pain    DDX: includes but is not limited to: Muscle strain or spasm, spondylolisthesis, and other concerns she has no red flag symptoms concerning for cauda equina or aortic dissection.    Patient arrives in overall stable condition with vitals interpreted by myself.     Pertinent features from physical exam: Patient does have elevated heart rate and blood pressure.  She is afebrile, no acute distress..    Initial diagnostic plan: We will obtain  urinalysis, give Valium and Toradol    Results from initial plan were reviewed and interpreted by me revealing overall stable work-up.  Urine had glucose without obvious signs of blood or infection.    Diagnostic information from other sources: Chart review    Interventions / Re-evaluation: Patient remained stable, blood pressure and heart rate improved significantly, she was sleeping comfortably in the stretcher    Results/clinical rationale were discussed with patient.  Given she is diabetic do not want to place on steroids for long periods of time.  We will have her continue her home medications, will call in lidocaine patches to use.  Discussed follow-up with her primary care provider and pain management.  Discussed very strict return precautions to the ER.  She verbalized understanding and was in agreement with this plan of care    Consultations/Discussion of results with other physicians: Dr. Jensen    Disposition plan: Discharge  -----    Final diagnoses:   Right low back pain, unspecified chronicity, unspecified whether sciatica present          Rosalba Duran PA-C  11/21/23 2124       Rosalba Duran PA-C  11/21/23 2125

## 2023-11-22 NOTE — DISCHARGE INSTRUCTIONS
Take your home pain medications as directed.  You may use lidocaine patches, warm compresses and massage.  Try to follow-up with your primary care provider as early as possible for reevaluation.  Return to the ER for any change, worsening symptoms, or any additional concerns including but not limited to inability to urinate, bowel incontinence, groin paresthesias, fever greater than 100.4, chest pain, shortness of breath.

## 2023-11-26 DIAGNOSIS — F41.1 GENERALIZED ANXIETY DISORDER: ICD-10-CM

## 2023-11-26 DIAGNOSIS — F43.10 POST TRAUMATIC STRESS DISORDER (PTSD): ICD-10-CM

## 2023-11-26 RX ORDER — HYDROXYZINE 50 MG/1
50 TABLET, FILM COATED ORAL 3 TIMES DAILY PRN
Qty: 90 TABLET | Refills: 1 | Status: SHIPPED | OUTPATIENT
Start: 2023-11-26

## 2023-11-27 DIAGNOSIS — F51.04 PSYCHOPHYSIOLOGICAL INSOMNIA: ICD-10-CM

## 2023-11-27 DIAGNOSIS — E78.00 HYPERCHOLESTEROLEMIA: ICD-10-CM

## 2023-11-27 DIAGNOSIS — F31.30 BIPOLAR I DISORDER, MOST RECENT EPISODE DEPRESSED: ICD-10-CM

## 2023-11-27 DIAGNOSIS — G43.001 MIGRAINE WITHOUT AURA AND WITH STATUS MIGRAINOSUS, NOT INTRACTABLE: ICD-10-CM

## 2023-11-27 DIAGNOSIS — F41.1 GENERALIZED ANXIETY DISORDER: ICD-10-CM

## 2023-11-27 DIAGNOSIS — F43.10 POST TRAUMATIC STRESS DISORDER (PTSD): ICD-10-CM

## 2023-11-27 DIAGNOSIS — E11.65 TYPE 2 DIABETES MELLITUS WITH HYPERGLYCEMIA, WITHOUT LONG-TERM CURRENT USE OF INSULIN: ICD-10-CM

## 2023-11-27 RX ORDER — LURASIDONE HYDROCHLORIDE 120 MG/1
120 TABLET, FILM COATED ORAL DAILY
Qty: 30 TABLET | Refills: 0 | Status: SHIPPED | OUTPATIENT
Start: 2023-11-27

## 2023-11-27 RX ORDER — ALPRAZOLAM 1 MG/1
1 TABLET ORAL 3 TIMES DAILY PRN
Qty: 90 TABLET | Refills: 0 | Status: CANCELLED | OUTPATIENT
Start: 2023-11-27 | End: 2024-11-26

## 2023-11-27 RX ORDER — RIMEGEPANT SULFATE 75 MG/75MG
75 TABLET, ORALLY DISINTEGRATING ORAL EVERY OTHER DAY
Qty: 16 TABLET | Refills: 0 | Status: SHIPPED | OUTPATIENT
Start: 2023-11-27

## 2023-11-27 NOTE — TELEPHONE ENCOUNTER
Caller: Lindsay Amezquita    Relationship: Self    Best call back number: [phone]     Requested Prescriptions:   Requested Prescriptions     Pending Prescriptions Disp Refills    atorvastatin (LIPITOR) 40 MG tablet 90 tablet 3     Sig: Take 1 tablet by mouth every night at bedtime.    empagliflozin (Jardiance) 10 MG tablet tablet 30 tablet 3     Sig: Take 1 tablet by mouth Daily.    amitriptyline (ELAVIL) 150 MG tablet 30 tablet 1     Sig: Take 1 tablet by mouth every night at bedtime.    fluticasone (FLONASE) 50 MCG/ACT nasal spray 48 g 0     Si sprays into the nostril(s) as directed by provider Daily.    glucose blood test strip 50 each 12     Sig: Use as instructed    ibuprofen (ADVIL,MOTRIN) 800 MG tablet       Sig: Take 1 tablet by mouth.    Lancets misc 100 each 3     Sig: Check fasting glucose daily and 1 hour after largest meal of day.    tiZANidine (ZANAFLEX) 4 MG tablet 90 tablet 0     Sig: Take 1 tablet by mouth Every 8 (Eight) Hours As Needed for Muscle Spasms. Further refills should come from pain management.    Umeclidinium-Vilanterol (Anoro Ellipta) 62.5-25 MCG/ACT aerosol powder  inhaler 60 each 12     Sig: Inhale 1 puff Daily.        Pharmacy where request should be sent: [facility]/PHARMACY #[id number] [place] [phone] [place] [phone] [facility]     Last office visit with prescribing clinician: 2023   Last telemedicine visit with prescribing clinician: Visit date not found   Next office visit with prescribing clinician: Visit date not found     Additional details provided by patient:     Does the patient have less than a 3 day supply:  [x] Yes  [] No    Would you like a call back once the refill request has been completed: [x] Yes [] No    If the office needs to give you a call back, can they leave a voicemail: [x] Yes [] No    Kim Pelayo Rep   23 10:58 EST

## 2023-11-27 NOTE — TELEPHONE ENCOUNTER
Incoming Refill Request      Medication requested (name and dose): LATUDA 120 MG  XANAX 1MG    Pharmacy where request should be sent: [facility]    Additional details provided by patient: N/A    Best call back number: [phone]     Does the patient have less than a 3 day supply:  [x] Yes  [] No    Anneliese Brooks  11/27/23, 09:33 EST

## 2023-11-27 NOTE — TELEPHONE ENCOUNTER
Rx Refill Note  Requested Prescriptions     Pending Prescriptions Disp Refills    ALPRAZolam (Xanax) 1 MG tablet 90 tablet 0     Sig: Take 1 tablet by mouth 3 (Three) Times a Day As Needed for Sleep.     Signed Prescriptions Disp Refills    Lurasidone HCl (Latuda) 120 MG tablet tablet 30 tablet 0     Sig: Take 1 tablet by mouth Daily.     Authorizing Provider: DAVID HINOJOSA     Ordering User: DEE REAGAN      Last office visit with prescribing clinician: 10/3/2023   Last telemedicine visit with prescribing clinician: Visit date not found   Next office visit with prescribing clinician: 12/14/2023                         Would you like a call back once the refill request has been completed: [] Yes [] No    If the office needs to give you a call back, can they leave a voicemail: [] Yes [] No    Dee Reagan MA  11/27/23, 10:57 EST

## 2023-11-28 RX ORDER — IBUPROFEN 800 MG/1
800 TABLET ORAL
OUTPATIENT
Start: 2023-11-28

## 2023-11-28 RX ORDER — ALPRAZOLAM 1 MG/1
1 TABLET ORAL 3 TIMES DAILY PRN
Qty: 90 TABLET | Refills: 0 | Status: SHIPPED | OUTPATIENT
Start: 2023-11-28 | End: 2024-11-27

## 2023-11-28 RX ORDER — FLUTICASONE PROPIONATE 50 MCG
2 SPRAY, SUSPENSION (ML) NASAL DAILY
Qty: 48 G | Refills: 0 | Status: SHIPPED | OUTPATIENT
Start: 2023-11-28

## 2023-11-28 RX ORDER — TIZANIDINE 4 MG/1
4 TABLET ORAL EVERY 8 HOURS PRN
Qty: 90 TABLET | Refills: 0 | Status: SHIPPED | OUTPATIENT
Start: 2023-11-28

## 2023-11-28 RX ORDER — UMECLIDINIUM BROMIDE AND VILANTEROL TRIFENATATE 62.5; 25 UG/1; UG/1
1 POWDER RESPIRATORY (INHALATION)
Qty: 60 EACH | Refills: 12 | Status: SHIPPED | OUTPATIENT
Start: 2023-11-28

## 2023-11-28 RX ORDER — LANCETS 30 GAUGE
EACH MISCELLANEOUS
Qty: 100 EACH | Refills: 3 | Status: SHIPPED | OUTPATIENT
Start: 2023-11-28

## 2023-11-28 RX ORDER — ATORVASTATIN CALCIUM 40 MG/1
40 TABLET, FILM COATED ORAL
Qty: 90 TABLET | Refills: 3 | Status: SHIPPED | OUTPATIENT
Start: 2023-11-28

## 2023-11-28 RX ORDER — AMITRIPTYLINE HYDROCHLORIDE 150 MG/1
150 TABLET ORAL
Qty: 30 TABLET | Refills: 1 | Status: SHIPPED | OUTPATIENT
Start: 2023-11-28

## 2023-12-04 ENCOUNTER — TELEPHONE (OUTPATIENT)
Dept: INTERNAL MEDICINE | Facility: CLINIC | Age: 38
End: 2023-12-04

## 2023-12-04 NOTE — TELEPHONE ENCOUNTER
Patient calling would like to know where to go to get a legal medical marijuana card stated that none of the medications are working stated that she has been up for 72 hours straight patient is needing a call back at [phone]

## 2023-12-04 NOTE — TELEPHONE ENCOUNTER
Caller: [facility]    Relationship:     Best call back number: [phone]     What is the best time to reach you: ANY    Who are you requesting to speak with (clinical staff, provider,  specific staff member): NURSE    Do you know the name of the person who called: TERESA    What was the call regarding: REFERRAL TO [facility].  FAX # [phone].  PHONE # [phone].     Is it okay if the provider responds through MyChart: CALL IF NEEDED

## 2023-12-07 ENCOUNTER — TELEPHONE (OUTPATIENT)
Dept: INTERNAL MEDICINE | Facility: CLINIC | Age: 38
End: 2023-12-07

## 2023-12-07 NOTE — TELEPHONE ENCOUNTER
Caller: Lindsay Amezquita    Relationship: Self    Best call back number: [phone]    What medication are you requesting: PAIN MEDICATION    What are your current symptoms: PAIN IN HIP    How long have you been experiencing symptoms: STARTED LAST NIGHT    Have you had these symptoms before:    [x] Yes  [] No    Have you been treated for these symptoms before:   [x] Yes  [] No    If a prescription is needed, what is your preferred pharmacy and phone number: [id number] - [address] [phone] [facility] [phone]      Additional notes:

## 2023-12-08 NOTE — TELEPHONE ENCOUNTER
She see's pain management, I can not prescribe pain medications. Recommend stretching, heat, extra strength (arthritis tylenol).

## 2023-12-12 ENCOUNTER — TELEPHONE (OUTPATIENT)
Dept: INTERNAL MEDICINE | Facility: CLINIC | Age: 38
End: 2023-12-12
Payer: MEDICAID

## 2023-12-12 NOTE — TELEPHONE ENCOUNTER
Caller: Lindsay Amezquita    Relationship: Self    Best call back number: [phone]     What was the call regarding: PATIENT CALLED ASKING HOW TO GO ABOUT GETTING A MEDICAL MARIJUANA CARD.

## 2023-12-13 ENCOUNTER — TELEMEDICINE (OUTPATIENT)
Dept: FAMILY MEDICINE CLINIC | Facility: TELEHEALTH | Age: 38
End: 2023-12-13
Payer: MEDICAID

## 2023-12-13 DIAGNOSIS — J40 BRONCHITIS: Primary | ICD-10-CM

## 2023-12-13 RX ORDER — PREDNISONE 20 MG/1
20 TABLET ORAL 2 TIMES DAILY
Qty: 10 TABLET | Refills: 0 | Status: SHIPPED | OUTPATIENT
Start: 2023-12-13 | End: 2023-12-18

## 2023-12-13 RX ORDER — AZITHROMYCIN 250 MG/1
TABLET, FILM COATED ORAL
Qty: 6 TABLET | Refills: 0 | Status: SHIPPED | OUTPATIENT
Start: 2023-12-13

## 2023-12-13 RX ORDER — ONDANSETRON 8 MG/1
8 TABLET, ORALLY DISINTEGRATING ORAL EVERY 8 HOURS PRN
Qty: 15 TABLET | Refills: 0 | Status: SHIPPED | OUTPATIENT
Start: 2023-12-13 | End: 2023-12-18

## 2023-12-14 ENCOUNTER — TELEMEDICINE (OUTPATIENT)
Dept: BEHAVIORAL HEALTH | Facility: CLINIC | Age: 38
End: 2023-12-14
Payer: MEDICAID

## 2023-12-14 DIAGNOSIS — F41.1 GENERALIZED ANXIETY DISORDER: ICD-10-CM

## 2023-12-14 DIAGNOSIS — F31.30 BIPOLAR I DISORDER, MOST RECENT EPISODE DEPRESSED: Primary | ICD-10-CM

## 2023-12-14 DIAGNOSIS — F43.10 POST TRAUMATIC STRESS DISORDER (PTSD): ICD-10-CM

## 2023-12-14 DIAGNOSIS — F51.04 PSYCHOPHYSIOLOGICAL INSOMNIA: ICD-10-CM

## 2023-12-14 PROCEDURE — 99214 OFFICE O/P EST MOD 30 MIN: CPT

## 2023-12-14 PROCEDURE — 1160F RVW MEDS BY RX/DR IN RCRD: CPT

## 2023-12-14 PROCEDURE — 1159F MED LIST DOCD IN RCRD: CPT

## 2023-12-14 RX ORDER — LURASIDONE HYDROCHLORIDE 120 MG/1
120 TABLET, FILM COATED ORAL DAILY
Qty: 30 TABLET | Refills: 0 | Status: SHIPPED | OUTPATIENT
Start: 2023-12-14

## 2023-12-14 RX ORDER — ALPRAZOLAM 1 MG/1
1 TABLET ORAL 3 TIMES DAILY PRN
Qty: 90 TABLET | Refills: 1 | Status: SHIPPED | OUTPATIENT
Start: 2023-12-14 | End: 2024-12-13

## 2023-12-14 RX ORDER — DESVENLAFAXINE 100 MG/1
100 TABLET, EXTENDED RELEASE ORAL DAILY
Qty: 30 TABLET | Refills: 1 | Status: SHIPPED | OUTPATIENT
Start: 2023-12-14

## 2023-12-14 RX ORDER — HYDROXYZINE 50 MG/1
50 TABLET, FILM COATED ORAL 3 TIMES DAILY PRN
Qty: 90 TABLET | Refills: 1 | Status: SHIPPED | OUTPATIENT
Start: 2023-12-14

## 2023-12-14 RX ORDER — OXCARBAZEPINE 300 MG/1
300 TABLET, FILM COATED ORAL 2 TIMES DAILY
Qty: 60 TABLET | Refills: 1 | Status: SHIPPED | OUTPATIENT
Start: 2023-12-14

## 2023-12-14 NOTE — PROGRESS NOTES
This provider is located at [facility], Behavioral [facility] ,[address], [address] [place] in [place], [place],using a secure MyChart Video Visit through [employer]. Patient is being seen remotely via telehealth at their home address in [place], and stated they are in a secure environment for this session. The patient's condition being diagnosed/treated is appropriate for telemedicine. The provider identified herself as well as her credentials.   The patient, and/or patients guardian, consent to be seen remotely, and when consent is given they understand that the consent allows for patient identifiable information to be sent to a third party as needed.   They may refuse to be seen remotely at any time. The electronic data is encrypted and password protected, and the patient and/or guardian has been advised of the potential risks to privacy not withstanding such measures.    Video Visit    Patient Name: Lindsay Amezquita  : 1985   MRN: [id number]   Care Team: Patient Care Team:  Charley Robles APRN as PCP - General (Family Medicine)  Donna Mcqueen APRN as Nurse Practitioner (Behavioral Health)         Chief Complaint:    Chief Complaint   Patient presents with    Bipolar    Anxiety    PTSD    Sleeping Problem    Med Management       History of Present Illness: Lindsay Amezquita is a 38 y.o. female who presents via video visit for a medication management follow up. Patient reports that she has been struggling lately and has been feeling more and more depressed. She endorse some situational stressors that she feels have been impacting her mood. She denies SI/HI today.     The following portion of the patient's history were reviewed and updated appropriately: allergies, current and past medications, family history, medical history and social history.  Subjective   Review of Systems:    Review of Systems   Psychiatric/Behavioral:  Positive for depressed mood and stress.    All other systems  reviewed and are negative.      Current Medications:   Current Outpatient Medications   Medication Sig Dispense Refill    ALPRAZolam (Xanax) 1 MG tablet Take 1 tablet by mouth 3 (Three) Times a Day As Needed for Sleep. 90 tablet 1    desvenlafaxine (PRISTIQ) 100 MG 24 hr tablet Take 1 tablet by mouth Daily. 30 tablet 1    hydrOXYzine (ATARAX) 50 MG tablet Take 1 tablet by mouth 3 (Three) Times a Day As Needed (anxiety and/or sleep). 90 tablet 1    Lurasidone HCl (Latuda) 120 MG tablet tablet Take 1 tablet by mouth Daily. 30 tablet 0    amitriptyline (ELAVIL) 150 MG tablet Take 1 tablet by mouth every night at bedtime. 30 tablet 1    Atogepant (Qulipta) 60 MG tablet Take 1 tablet by mouth Daily. 30 tablet 5    atorvastatin (LIPITOR) 40 MG tablet Take 1 tablet by mouth every night at bedtime. 90 tablet 3    azithromycin (Zithromax Z-Janusz) 250 MG tablet Take 2 tablets by mouth on day 1, then 1 tablet daily on days 2-5 6 tablet 0    Blood Glucose Monitoring Suppl (FreeStyle Freedom Lite) w/Device kit       butalbital-acetaminophen-caffeine (Esgic) -40 MG per tablet Take 1 tablet by mouth Every 12 (Twelve) Hours As Needed for Migraine. 20 tablet 0    chlorhexidine (HIBICLENS) 4 % external liquid Apply  topically to the appropriate area as directed Daily As Needed for Wound Care. 473 mL 3    chlorhexidine (HIBICLENS) 4 % external liquid Apply  topically to the appropriate area as directed Daily. 473 mL 3    Dihydroergotamine Mesylate HFA (Trudhesa) 0.725 MG/ACT aerosol solution 0.725 Act into the nostril(s) as directed by provider 1 (One) Time As Needed (migraine) for up to 1 dose. May repeat in 1 hour 8 mL 1    diphenhydrAMINE (BENADRYL) 50 MG capsule Take 1 capsule by mouth.      empagliflozin (Jardiance) 10 MG tablet tablet Take 1 tablet by mouth Daily. 30 tablet 3    fluticasone (FLONASE) 50 MCG/ACT nasal spray 2 sprays into the nostril(s) as directed by provider Daily. 48 g 0    gabapentin (NEURONTIN) 800 MG  tablet Take 1 tablet by mouth 3 (Three) Times a Day.      glucose blood test strip Use as instructed 50 each 12    glucose monitor monitoring kit Check glucose daily 1 each 0    ibuprofen (ADVIL,MOTRIN) 800 MG tablet Take 1 tablet by mouth.      Lancets misc Check fasting glucose daily and 1 hour after largest meal of day. 100 each 3    loratadine (Claritin) 10 MG tablet Take 1 tablet by mouth Daily. 30 tablet 5    meclizine (ANTIVERT) 25 MG tablet Take 1 tablet by mouth 3 (Three) Times a Day As Needed for Dizziness. 30 tablet 0    naloxone (NARCAN) 4 MG/0.1ML nasal spray 1 spray into the nostril(s) as directed by provider As Needed.      nystatin (MYCOSTATIN) 100,000 unit/mL suspension Take 5 mL by mouth 4 (Four) Times a Day. 473 mL 0    nystatin (MYCOSTATIN) 736226 UNIT/GM powder Apply  topically to the appropriate area as directed 2 (Two) Times a Day. 60 g 3    omeprazole (priLOSEC) 40 MG capsule Take 1 capsule by mouth 2 (Two) Times a Day. 180 capsule 1    ondansetron ODT (ZOFRAN-ODT) 8 MG disintegrating tablet Place 1 tablet on the tongue Every 8 (Eight) Hours As Needed for Nausea or Vomiting for up to 5 days. 15 tablet 0    OXcarbazepine (TRILEPTAL) 300 MG tablet Take 1 tablet by mouth 2 (Two) Times a Day. 60 tablet 1    predniSONE (DELTASONE) 20 MG tablet Take 1 tablet by mouth 2 (Two) Times a Day for 5 days. 10 tablet 0    promethazine (PHENERGAN) 25 MG tablet Take 1 tablet by mouth Every 6 (Six) Hours As Needed for Nausea or Vomiting. 45 tablet 3    promethazine-dextromethorphan (PROMETHAZINE-DM) 6.25-15 MG/5ML syrup Take 5 mL by mouth At Night As Needed for Cough. 118 mL 0    Rimegepant Sulfate (Nurtec) 75 MG tablet dispersible tablet Take 1 tablet by mouth 1 (One) Time As Needed (HA) for up to 1 dose. 6 tablet 0    Rimegepant Sulfate (Nurtec) 75 MG tablet dispersible tablet Take 1 tablet by mouth Every Other Day. Take Monday,Wednesday,Friday, and Saturday. 16 tablet 0    tiZANidine (ZANAFLEX) 4 MG tablet  Take 1 tablet by mouth Every 8 (Eight) Hours As Needed for Muscle Spasms. Further refills should come from pain management. 90 tablet 0    Umeclidinium-Vilanterol (Anoro Ellipta) 62.5-25 MCG/ACT aerosol powder  inhaler Inhale 1 puff Daily. 60 each 12    Ventolin  (90 Base) MCG/ACT inhaler INHALE 2 PUFFS EVERY 4 HOURS AS NEEDED FOR WHEEZING OR SHORTNESS OF AIR 18 g 0     No current facility-administered medications for this visit.       Mental Status Exam:   Hygiene:    unable to assess   Cooperation:  Cooperative  Eye Contact:  Good  Psychomotor Behavior:   unable to assess  Affect:  Appropriate  Mood: depressed  Speech:  Normal  Thought Process:  Goal directed and Linear  Thought Content:  Normal and Mood congruent  Suicidal:  None  Homicidal:  None  Hallucinations:  None  Delusion:  None  Memory:  Intact  Orientation:  Person, Place, Time, and Situation  Reliability:  fair  Insight:  Fair  Judgement:  Fair  Impulse Control:  Fair  Physical/Medical Issues:  Yes see chart       Objective   Vital Signs:   There were no vitals taken for this visit.      Assessment / Plan    Diagnoses and all orders for this visit:    1. Bipolar I disorder, most recent episode depressed (Primary)  -     OXcarbazepine (TRILEPTAL) 300 MG tablet; Take 1 tablet by mouth 2 (Two) Times a Day.  Dispense: 60 tablet; Refill: 1  -     Lurasidone HCl (Latuda) 120 MG tablet tablet; Take 1 tablet by mouth Daily.  Dispense: 30 tablet; Refill: 0    2. Psychophysiological insomnia  -     ALPRAZolam (Xanax) 1 MG tablet; Take 1 tablet by mouth 3 (Three) Times a Day As Needed for Sleep.  Dispense: 90 tablet; Refill: 1    3. Post traumatic stress disorder (PTSD)  -     ALPRAZolam (Xanax) 1 MG tablet; Take 1 tablet by mouth 3 (Three) Times a Day As Needed for Sleep.  Dispense: 90 tablet; Refill: 1  -     hydrOXYzine (ATARAX) 50 MG tablet; Take 1 tablet by mouth 3 (Three) Times a Day As Needed (anxiety and/or sleep).  Dispense: 90 tablet; Refill:  1  -     desvenlafaxine (PRISTIQ) 100 MG 24 hr tablet; Take 1 tablet by mouth Daily.  Dispense: 30 tablet; Refill: 1    4. Generalized anxiety disorder  -     ALPRAZolam (Xanax) 1 MG tablet; Take 1 tablet by mouth 3 (Three) Times a Day As Needed for Sleep.  Dispense: 90 tablet; Refill: 1  -     hydrOXYzine (ATARAX) 50 MG tablet; Take 1 tablet by mouth 3 (Three) Times a Day As Needed (anxiety and/or sleep).  Dispense: 90 tablet; Refill: 1  -     desvenlafaxine (PRISTIQ) 100 MG 24 hr tablet; Take 1 tablet by mouth Daily.  Dispense: 30 tablet; Refill: 1    Will increase Trileptal and continue all other medication as ordered.     A psychological evaluation was conducted in order to assess past and current level of functioning. Areas assessed included, but were not limited to: perception of social support, perception of ability to face and deal with challenges in life (positive functioning), anxiety symptoms, depressive symptoms, perspective on beliefs/belief system, coping skills for stress, intelligence level,  Therapeutic rapport was established. Interventions conducted today were geared towards incorporating medication management along with support for continued therapy. Education was also provided as to the med management with this provider and what to expect in subsequent sessions.      We discussed risks, benefits, goals and side effects of the above medication and the patient was agreeable with the plan. Patient was educated on the importance of compliance with treatment and follow-up appointments. Patient is aware to contact the [facility] with any worsening of symptoms. To call for questions or concerns and return early if necessary. Patent is agreeable to go to the Emergency Department or call 911 should they begin SI/HI.        MEDS ORDERED DURING VISIT:  New Medications Ordered This Visit   Medications    OXcarbazepine (TRILEPTAL) 300 MG tablet     Sig: Take 1 tablet by mouth 2 (Two) Times a Day.      Dispense:  60 tablet     Refill:  1    ALPRAZolam (Xanax) 1 MG tablet     Sig: Take 1 tablet by mouth 3 (Three) Times a Day As Needed for Sleep.     Dispense:  90 tablet     Refill:  1    Lurasidone HCl (Latuda) 120 MG tablet tablet     Sig: Take 1 tablet by mouth Daily.     Dispense:  30 tablet     Refill:  0    hydrOXYzine (ATARAX) 50 MG tablet     Sig: Take 1 tablet by mouth 3 (Three) Times a Day As Needed (anxiety and/or sleep).     Dispense:  90 tablet     Refill:  1    desvenlafaxine (PRISTIQ) 100 MG 24 hr tablet     Sig: Take 1 tablet by mouth Daily.     Dispense:  30 tablet     Refill:  1         Follow Up   Return in about 6 weeks (around 1/25/2024).  Patient was given instructions and counseling regarding her condition or for health maintenance advice. Please see specific information pulled into the AVS if appropriate.     TREATMENT PLAN/GOALS: Continue supportive psychotherapy efforts and medications as indicated. Treatment and medication options discussed during today's visit. Patient acknowledged and verbally consented to continue with current treatment plan and was educated on the importance of compliance with treatment and follow-up appointments.    MEDICATION ISSUES:  Discussed medication options and treatment plan of prescribed medication as well as the risks, benefits, and side effects including potential falls, possible impaired driving and metabolic adversities among others. Patient is agreeable to call the office with any worsening of symptoms or onset of side effects. Patient is agreeable to call 911 or go to the nearest ER should he/she begin having SI/HI.        [facility]  [address] [place] [id number] [phone]    December 18, 2023 09:52 EST  December 18, 2023 09:52 EST

## 2023-12-14 NOTE — PROGRESS NOTES
You have chosen to receive care through a telehealth visit.  Do you consent to use a video/audio connection for your medical care today? Yes     CHIEF COMPLAINT  Chief Complaint   Patient presents with    Cough    Nasal Congestion    Nausea    URI         HPI  Lindsay Amezquita is a 38 y.o. female  presents with complaint of chest congestion   With productive sputum that is green.  She has some nasal congestion and is nauseated.  Symptoms started 5 days ago.  Review of Systems   HENT:  Positive for congestion and postnasal drip.    Respiratory:  Positive for cough.    Gastrointestinal:  Positive for nausea.   All other systems reviewed and are negative.      Past Medical History:   Diagnosis Date    Allergic     Anxiety     Asthma Beings of copd with asthma    Back pain     Bell's palsy     Bipolar 2 disorder     Blood clot in vein     COPD (chronic obstructive pulmonary disease) Six months ago    Deep vein thrombosis Last year    Depression     Diabetes mellitus November    Pre diabete    Frequent headaches     GERD (gastroesophageal reflux disease)     Irritable bowel syndrome Two years ago    Kidney stone Two years ago    Migraine     Obesity All of my life    Panic     PTSD (post-traumatic stress disorder)     Pulmonary embolism Not in lungs    Behind left knee cap    Restless leg syndrome     Sinus problem     Snores     Tattoos     Urinary tract infection Have them on and off    Visual impairment Since i was little    Vitamin B12 deficiency        Family History   Problem Relation Age of Onset    Irritable bowel syndrome Mother     Heart disease Mother     Miscarriages / Stillbirths Mother     Irritable bowel syndrome Father     Alcohol abuse Father     Diabetes Father     Hyperlipidemia Father     Colon cancer Maternal Grandfather        Social History     Socioeconomic History    Marital status: Single   Tobacco Use    Smoking status: Every Day     Packs/day: 2.00     Years: 29.00     Additional pack years: 0.00      Total pack years: 58.00     Types: Cigarettes     Start date: 1/1/1995     Passive exposure: Never    Smokeless tobacco: Never   Vaping Use    Vaping Use: Former   Substance and Sexual Activity    Alcohol use: Not Currently    Drug use: Not Currently    Sexual activity: Not Currently     Partners: Female     Birth control/protection: None, Same-sex partner       Lindsay Amezquita  reports that she has been smoking cigarettes. She started smoking about 28 years ago. She has a 58.00 pack-year smoking history. She has never been exposed to tobacco smoke. She has never used smokeless tobacco.. I have educated her on the risk of diseases from using tobacco products such as cancer, COPD, and heart disease.     I advised her to quit and she is not willing to quit.    I spent  1  minutes counseling the patient.              LMP 11/14/2023 (Approximate)     PHYSICAL EXAM  Physical Exam   Constitutional: She appears well-developed and well-nourished.   HENT:   Head: Normocephalic.   Eyes: Pupils are equal, round, and reactive to light.   Pulmonary/Chest: Effort normal.   Musculoskeletal: Normal range of motion.   Neurological: She is alert.   Psychiatric: She has a normal mood and affect.       Results for orders placed or performed during the hospital encounter of 11/21/23   Urinalysis With Microscopic If Indicated (No Culture) - Urine, Clean Catch    Specimen: Urine, Clean Catch   Result Value Ref Range    Color, UA Yellow Yellow, Straw    Appearance, UA Clear Clear    pH, UA 6.5 5.0 - 8.0    Specific Gravity, UA 1.012 1.005 - 1.030    Glucose, UA >=1000 mg/dL (3+) (A) Negative    Ketones, UA Negative Negative    Bilirubin, UA Negative Negative    Blood, UA Negative Negative    Protein, UA Negative Negative    Leuk Esterase, UA Negative Negative    Nitrite, UA Negative Negative    Urobilinogen, UA 0.2 E.U./dL 0.2 - 1.0 E.U./dL     10  Diagnoses and all orders for this visit:    1. Bronchitis (Primary)  -     azithromycin  (Zithromax Z-Janusz) 250 MG tablet; Take 2 tablets by mouth on day 1, then 1 tablet daily on days 2-5  Dispense: 6 tablet; Refill: 0  -     predniSONE (DELTASONE) 20 MG tablet; Take 1 tablet by mouth 2 (Two) Times a Day for 5 days.  Dispense: 10 tablet; Refill: 0  -     ondansetron ODT (ZOFRAN-ODT) 8 MG disintegrating tablet; Place 1 tablet on the tongue Every 8 (Eight) Hours As Needed for Nausea or Vomiting for up to 5 days.  Dispense: 15 tablet; Refill: 0          FOLLOW-UP  As discussed during visit with PCP/[facility] if no improvement or Urgent Care/Emergency Department if worsening of symptoms    Patient verbalizes understanding of medication dosage, comfort measures, instructions for treatment and follow-up.    Suzanna Wills, APRN  12/13/2023  22:36 EST    The use of a video visit has been reviewed with the patient and verbal informed consent has been obtained. Myself and Lindsay Amezquita participated in this visit. The patient is located in [address] [place] [address] [address] [place], [place] [id number].    I am located in [place], [place]. Mychart and Twilio were utilized. I spent 10 minutes in the patient's chart for this visit.

## 2023-12-15 ENCOUNTER — TELEPHONE (OUTPATIENT)
Dept: INTERNAL MEDICINE | Facility: CLINIC | Age: 38
End: 2023-12-15
Payer: MEDICAID

## 2023-12-15 NOTE — TELEPHONE ENCOUNTER
Caller: Lindsay Amezquita    Relationship: Self    Best call back number: [phone]     What medication are you requesting: ANTIBIOTIC, ANTISEPTIC BODY WASH, AND YEAST INFECTION PILLS.    What are your current symptoms: CYST UNDER RIGHT BREAST      Have you had these symptoms before:    [x] Yes  [] No    Have you been treated for these symptoms before:   [x] Yes  [] No    If a prescription is needed, what is your preferred pharmacy and phone number: [facility]/PHARMACY #[id number] - [address] [phone] [facility] [phone]      Additional notes: PLEASE CALL PATIENT WHEN MEDICATION HAS BEEN APPROVED OR DENIED.

## 2023-12-18 ENCOUNTER — TELEPHONE (OUTPATIENT)
Dept: BEHAVIORAL HEALTH | Facility: CLINIC | Age: 38
End: 2023-12-18
Payer: MEDICAID

## 2023-12-18 NOTE — TELEPHONE ENCOUNTER
PT CALLED AND SAID THAT SHE NEEDED TO SEE YOU ON A MYCHART VISIT THIS WEEK. SHE HAD A TRAUMATIC EVENT THIS WEEKEND. PLEASE GIVE PT A CALL.

## 2023-12-18 NOTE — TELEPHONE ENCOUNTER
Spoke with patient earlier today. She is struggling due to her dog getting into a fight with another dog. She is struggling to get out of her house and struggling to let her dog outside. Spoke with provider no medication changes at this time can help with her symptoms.    Left her a voice mail. No medication changes. Continue medication as prescribed and go to the ER if she sees fit.

## 2023-12-20 ENCOUNTER — HOSPITAL ENCOUNTER (EMERGENCY)
Facility: HOSPITAL | Age: 38
Discharge: LEFT BEFORE DISCHARGE | End: 2023-12-20
Payer: MEDICAID

## 2023-12-20 VITALS
HEIGHT: 62 IN | TEMPERATURE: 98 F | WEIGHT: 261 LBS | HEART RATE: 106 BPM | DIASTOLIC BLOOD PRESSURE: 118 MMHG | SYSTOLIC BLOOD PRESSURE: 154 MMHG | BODY MASS INDEX: 48.03 KG/M2 | OXYGEN SATURATION: 92 % | RESPIRATION RATE: 17 BRPM

## 2023-12-20 DIAGNOSIS — F51.04 PSYCHOPHYSIOLOGICAL INSOMNIA: ICD-10-CM

## 2023-12-20 DIAGNOSIS — F43.10 POST TRAUMATIC STRESS DISORDER (PTSD): ICD-10-CM

## 2023-12-20 DIAGNOSIS — F41.1 GENERALIZED ANXIETY DISORDER: ICD-10-CM

## 2023-12-20 PROCEDURE — 99211 OFF/OP EST MAY X REQ PHY/QHP: CPT

## 2023-12-20 RX ORDER — AMITRIPTYLINE HYDROCHLORIDE 150 MG/1
150 TABLET ORAL
Qty: 30 TABLET | Refills: 0 | Status: SHIPPED | OUTPATIENT
Start: 2023-12-20

## 2023-12-21 ENCOUNTER — HOSPITAL ENCOUNTER (EMERGENCY)
Facility: HOSPITAL | Age: 38
Discharge: HOME | End: 2023-12-21
Attending: EMERGENCY MEDICINE
Payer: MEDICAID

## 2023-12-21 ENCOUNTER — APPOINTMENT (OUTPATIENT)
Dept: CT IMAGING | Facility: HOSPITAL | Age: 38
End: 2023-12-21
Payer: MEDICAID

## 2023-12-21 VITALS
SYSTOLIC BLOOD PRESSURE: 125 MMHG | TEMPERATURE: 98.1 F | WEIGHT: 240.6 LBS | RESPIRATION RATE: 20 BRPM | HEIGHT: 62 IN | OXYGEN SATURATION: 93 % | BODY MASS INDEX: 44.27 KG/M2 | HEART RATE: 106 BPM | DIASTOLIC BLOOD PRESSURE: 86 MMHG

## 2023-12-21 DIAGNOSIS — B37.31 YEAST VAGINITIS: ICD-10-CM

## 2023-12-21 DIAGNOSIS — R10.31 RIGHT LOWER QUADRANT ABDOMINAL PAIN: Primary | ICD-10-CM

## 2023-12-21 DIAGNOSIS — R19.7 DIARRHEA, UNSPECIFIED TYPE: ICD-10-CM

## 2023-12-21 LAB
ALBUMIN SERPL-MCNC: 4.5 G/DL (ref 3.5–5.2)
ALBUMIN/GLOB SERPL: 1.7 G/DL
ALP SERPL-CCNC: 78 U/L (ref 39–117)
ALT SERPL W P-5'-P-CCNC: 14 U/L (ref 1–33)
ANION GAP SERPL CALCULATED.3IONS-SCNC: 10.9 MMOL/L (ref 5–15)
AST SERPL-CCNC: 17 U/L (ref 1–32)
BACTERIA UR QL AUTO: ABNORMAL /HPF
BASOPHILS # BLD AUTO: 0.04 10*3/MM3 (ref 0–0.2)
BASOPHILS NFR BLD AUTO: 0.4 % (ref 0–1.5)
BILIRUB SERPL-MCNC: 0.6 MG/DL (ref 0–1.2)
BILIRUB UR QL STRIP: NEGATIVE
BUN SERPL-MCNC: 5 MG/DL (ref 6–20)
BUN/CREAT SERPL: 8.5 (ref 7–25)
CALCIUM SPEC-SCNC: 9.4 MG/DL (ref 8.6–10.5)
CHLORIDE SERPL-SCNC: 99 MMOL/L (ref 98–107)
CLARITY UR: ABNORMAL
CO2 SERPL-SCNC: 25.1 MMOL/L (ref 22–29)
COLOR UR: YELLOW
CREAT SERPL-MCNC: 0.59 MG/DL (ref 0.57–1)
DEPRECATED RDW RBC AUTO: 40.4 FL (ref 37–54)
EGFRCR SERPLBLD CKD-EPI 2021: 117.7 ML/MIN/1.73
EOSINOPHIL # BLD AUTO: 0.22 10*3/MM3 (ref 0–0.4)
EOSINOPHIL NFR BLD AUTO: 2.3 % (ref 0.3–6.2)
ERYTHROCYTE [DISTWIDTH] IN BLOOD BY AUTOMATED COUNT: 13.1 % (ref 12.3–15.4)
FLUAV SUBTYP SPEC NAA+PROBE: NOT DETECTED
FLUBV RNA ISLT QL NAA+PROBE: NOT DETECTED
GLOBULIN UR ELPH-MCNC: 2.6 GM/DL
GLUCOSE SERPL-MCNC: 108 MG/DL (ref 65–99)
GLUCOSE UR STRIP-MCNC: ABNORMAL MG/DL
HCT VFR BLD AUTO: 47.4 % (ref 34–46.6)
HGB BLD-MCNC: 16.2 G/DL (ref 12–15.9)
HGB UR QL STRIP.AUTO: ABNORMAL
HOLD SPECIMEN: NORMAL
HOLD SPECIMEN: NORMAL
HYALINE CASTS UR QL AUTO: ABNORMAL /LPF
IMM GRANULOCYTES # BLD AUTO: 0.02 10*3/MM3 (ref 0–0.05)
IMM GRANULOCYTES NFR BLD AUTO: 0.2 % (ref 0–0.5)
KETONES UR QL STRIP: NEGATIVE
LEUKOCYTE ESTERASE UR QL STRIP.AUTO: NEGATIVE
LIPASE SERPL-CCNC: 26 U/L (ref 13–60)
LYMPHOCYTES # BLD AUTO: 3.9 10*3/MM3 (ref 0.7–3.1)
LYMPHOCYTES NFR BLD AUTO: 41.5 % (ref 19.6–45.3)
MCH RBC QN AUTO: 28.8 PG (ref 26.6–33)
MCHC RBC AUTO-ENTMCNC: 34.2 G/DL (ref 31.5–35.7)
MCV RBC AUTO: 84.2 FL (ref 79–97)
MONOCYTES # BLD AUTO: 0.47 10*3/MM3 (ref 0.1–0.9)
MONOCYTES NFR BLD AUTO: 5 % (ref 5–12)
NEUTROPHILS NFR BLD AUTO: 4.75 10*3/MM3 (ref 1.7–7)
NEUTROPHILS NFR BLD AUTO: 50.6 % (ref 42.7–76)
NITRITE UR QL STRIP: NEGATIVE
NRBC BLD AUTO-RTO: 0 /100 WBC (ref 0–0.2)
PH UR STRIP.AUTO: 6.5 [PH] (ref 5–8)
PLATELET # BLD AUTO: 320 10*3/MM3 (ref 140–450)
PMV BLD AUTO: 9.5 FL (ref 6–12)
POTASSIUM SERPL-SCNC: 3.5 MMOL/L (ref 3.5–5.2)
PROT SERPL-MCNC: 7.1 G/DL (ref 6–8.5)
PROT UR QL STRIP: ABNORMAL
RBC # BLD AUTO: 5.63 10*6/MM3 (ref 3.77–5.28)
RBC # UR STRIP: ABNORMAL /HPF
REF LAB TEST METHOD: ABNORMAL
SARS-COV-2 RNA RESP QL NAA+PROBE: NOT DETECTED
SODIUM SERPL-SCNC: 135 MMOL/L (ref 136–145)
SP GR UR STRIP: 1.02 (ref 1–1.03)
SQUAMOUS #/AREA URNS HPF: ABNORMAL /HPF
UROBILINOGEN UR QL STRIP: ABNORMAL
WBC # UR STRIP: ABNORMAL /HPF
WBC NRBC COR # BLD AUTO: 9.4 10*3/MM3 (ref 3.4–10.8)
WHOLE BLOOD HOLD COAG: NORMAL
WHOLE BLOOD HOLD SPECIMEN: NORMAL
YEAST URNS QL MICRO: ABNORMAL /HPF

## 2023-12-21 PROCEDURE — 85025 COMPLETE CBC W/AUTO DIFF WBC: CPT

## 2023-12-21 PROCEDURE — 80053 COMPREHEN METABOLIC PANEL: CPT

## 2023-12-21 PROCEDURE — 99285 EMERGENCY DEPT VISIT HI MDM: CPT

## 2023-12-21 PROCEDURE — 83690 ASSAY OF LIPASE: CPT

## 2023-12-21 PROCEDURE — 25010000002 ONDANSETRON PER 1 MG: Performed by: EMERGENCY MEDICINE

## 2023-12-21 PROCEDURE — 96374 THER/PROPH/DIAG INJ IV PUSH: CPT

## 2023-12-21 PROCEDURE — 74177 CT ABD & PELVIS W/CONTRAST: CPT

## 2023-12-21 PROCEDURE — 81001 URINALYSIS AUTO W/SCOPE: CPT

## 2023-12-21 PROCEDURE — 25510000001 IOPAMIDOL 61 % SOLUTION: Performed by: EMERGENCY MEDICINE

## 2023-12-21 PROCEDURE — 87636 SARSCOV2 & INF A&B AMP PRB: CPT

## 2023-12-21 RX ORDER — FLUCONAZOLE 150 MG/1
TABLET ORAL
Qty: 2 TABLET | Refills: 0 | Status: SHIPPED | OUTPATIENT
Start: 2023-12-21

## 2023-12-21 RX ORDER — SODIUM CHLORIDE 0.9 % (FLUSH) 0.9 %
10 SYRINGE (ML) INJECTION AS NEEDED
Status: DISCONTINUED | OUTPATIENT
Start: 2023-12-21 | End: 2023-12-21 | Stop reason: HOSPADM

## 2023-12-21 RX ORDER — ONDANSETRON 2 MG/ML
4 INJECTION INTRAMUSCULAR; INTRAVENOUS ONCE
Status: COMPLETED | OUTPATIENT
Start: 2023-12-21 | End: 2023-12-21

## 2023-12-21 RX ORDER — HYDROCODONE BITARTRATE AND ACETAMINOPHEN 5; 325 MG/1; MG/1
1 TABLET ORAL ONCE
Status: COMPLETED | OUTPATIENT
Start: 2023-12-21 | End: 2023-12-21

## 2023-12-21 RX ORDER — LOPERAMIDE HYDROCHLORIDE 2 MG/1
2 CAPSULE ORAL 4 TIMES DAILY PRN
Qty: 30 CAPSULE | Refills: 0 | Status: SHIPPED | OUTPATIENT
Start: 2023-12-21

## 2023-12-21 RX ORDER — ONDANSETRON 4 MG/1
4 TABLET, ORALLY DISINTEGRATING ORAL EVERY 8 HOURS PRN
Qty: 12 TABLET | Refills: 0 | Status: SHIPPED | OUTPATIENT
Start: 2023-12-21

## 2023-12-21 RX ADMIN — HYDROCODONE BITARTRATE AND ACETAMINOPHEN 1 TABLET: 5; 325 TABLET ORAL at 13:58

## 2023-12-21 RX ADMIN — IOPAMIDOL 100 ML: 612 INJECTION, SOLUTION INTRAVENOUS at 14:52

## 2023-12-21 RX ADMIN — ONDANSETRON 4 MG: 2 INJECTION INTRAMUSCULAR; INTRAVENOUS at 13:57

## 2023-12-21 NOTE — ED PROVIDER NOTES
Subjective  History of Present Illness:    Chief Complaint: Abdominal pain    History of Present Illness: Right lower quad abdominal pain diarrhea, nausea,    Nurses Notes reviewed and agree, including vitals, allergies, social history and prior medical history.     REVIEW OF SYSTEMS: All systems reviewed and not pertinent unless noted.  Review of Systems      Positive for: Right lower quadrant abdominal pain, diarrhea, genitourinary itching    Negative for: Fever flank pain urinary symptoms    Past Medical History:   Diagnosis Date    Allergic     Anxiety     Asthma Beings of copd with asthma    Back pain     Bell's palsy     Bipolar 2 disorder     Blood clot in vein     COPD (chronic obstructive pulmonary disease) Six months ago    Deep vein thrombosis Last year    Depression     Diabetes mellitus November    Pre diabete    Frequent headaches     GERD (gastroesophageal reflux disease)     Irritable bowel syndrome Two years ago    Kidney stone Two years ago    Migraine     Obesity All of my life    Panic     PTSD (post-traumatic stress disorder)     Pulmonary embolism Not in lungs    Behind left knee cap    Restless leg syndrome     Sinus problem     Snores     Tattoos     Urinary tract infection Have them on and off    Visual impairment Since i was little    Vitamin B12 deficiency        Allergies:    Aspirin, Codeine, Cyclobenzaprine, Haldol [haloperidol], Latex, Penicillins, Morphine, Ondansetron, Oxycodone-acetaminophen, Oxycontin [oxycodone], Triptans, Compazine [prochlorperazine], Lamictal [lamotrigine], and Reglan [metoclopramide]      Past Surgical History:   Procedure Laterality Date    CHOLECYSTECTOMY      TOOTH EXTRACTION           Social History     Socioeconomic History    Marital status: Single   Tobacco Use    Smoking status: Every Day     Packs/day: 2.00     Years: 29.00     Additional pack years: 0.00     Total pack years: 58.00     Types: Cigarettes     Start date: 1/1/1995     Passive exposure:  "Never    Smokeless tobacco: Never   Vaping Use    Vaping Use: Former   Substance and Sexual Activity    Alcohol use: Not Currently    Drug use: Not Currently    Sexual activity: Not Currently     Partners: Female     Birth control/protection: None, Same-sex partner         Family History   Problem Relation Age of Onset    Irritable bowel syndrome Mother     Heart disease Mother     Miscarriages / Stillbirths Mother     Irritable bowel syndrome Father     Alcohol abuse Father     Diabetes Father     Hyperlipidemia Father     Colon cancer Maternal Grandfather        Objective  Physical Exam:  /86   Pulse 106   Temp 98.1 °F (36.7 °C) (Oral)   Resp 20   Ht 157.5 cm (62\")   Wt 109 kg (240 lb 9.6 oz)   LMP 12/06/2023 (Approximate)   SpO2 93%   BMI 44.01 kg/m²      Physical Exam    CONSTITUTIONAL: Well developed, nontoxic obese 38-year-old female,  in mild discomfort.  VITAL SIGNS: per nursing, reviewed and noted  SKIN: exposed skin with no rashes, ulcerations or petechiae  EYES: Grossly EOMI, no icterus  ENT: Normal voice.  Moist mucous membranes   RESPIRATORY:  No increased work of breathing. No retractions.   CARDIOVASCULAR:   Extremities pink and warm.  Good cap refill to extremities.   GI: Abdomen without distention localizes pain right lower quadrant no surgical abdominal tenderness on exam  MUSCULOSKELETAL: Age-appropriate bulk and tone, moves all 4 extremities  NEUROLOGIC: Alert, oriented x 3. No gross deficits. GCS 15.   PSYCH: appropriate affect.  : no bladder tenderness or distention, no CVA tenderness    Procedures    ED Course:    Lab Results (last 24 hours)       Procedure Component Value Units Date/Time    CBC & Differential [[id number]]  (Abnormal) Collected: 12/21/23 1255    Specimen: Blood Updated: 12/21/23 1305    Narrative:      The following orders were created for panel order CBC & Differential.  Procedure                               Abnormality         Status                   "   ---------                               -----------         ------                     CBC Auto Differential[[id number]]        Abnormal            Final result                 Please view results for these tests on the individual orders.    Comprehensive Metabolic Panel [[id number]]  (Abnormal) Collected: 12/21/23 1255    Specimen: Blood Updated: 12/21/23 1320     Glucose 108 mg/dL      BUN 5 mg/dL      Creatinine 0.59 mg/dL      Sodium 135 mmol/L      Potassium 3.5 mmol/L      Chloride 99 mmol/L      CO2 25.1 mmol/L      Calcium 9.4 mg/dL      Total Protein 7.1 g/dL      Albumin 4.5 g/dL      ALT (SGPT) 14 U/L      AST (SGOT) 17 U/L      Alkaline Phosphatase 78 U/L      Total Bilirubin 0.6 mg/dL      Globulin 2.6 gm/dL      A/G Ratio 1.7 g/dL      BUN/Creatinine Ratio 8.5     Anion Gap 10.9 mmol/L      eGFR 117.7 mL/min/1.73     Narrative:      GFR Normal >60  Chronic Kidney Disease <60  Kidney Failure <15      Lipase [[id number]]  (Normal) Collected: 12/21/23 1255    Specimen: Blood Updated: 12/21/23 1320     Lipase 26 U/L     CBC Auto Differential [[id number]]  (Abnormal) Collected: 12/21/23 1255    Specimen: Blood Updated: 12/21/23 1305     WBC 9.40 10*3/mm3      RBC 5.63 10*6/mm3      Hemoglobin 16.2 g/dL      Hematocrit 47.4 %      MCV 84.2 fL      MCH 28.8 pg      MCHC 34.2 g/dL      RDW 13.1 %      RDW-SD 40.4 fl      MPV 9.5 fL      Platelets 320 10*3/mm3      Neutrophil % 50.6 %      Lymphocyte % 41.5 %      Monocyte % 5.0 %      Eosinophil % 2.3 %      Basophil % 0.4 %      Immature Grans % 0.2 %      Neutrophils, Absolute 4.75 10*3/mm3      Lymphocytes, Absolute 3.90 10*3/mm3      Monocytes, Absolute 0.47 10*3/mm3      Eosinophils, Absolute 0.22 10*3/mm3      Basophils, Absolute 0.04 10*3/mm3      Immature Grans, Absolute 0.02 10*3/mm3      nRBC 0.0 /100 WBC     Urinalysis With Microscopic If Indicated (No Culture) - Urine, Clean Catch [[id number]]  (Abnormal) Collected: 12/21/23 1257    Specimen: Urine,  Clean Catch Updated: 12/21/23 1304     Color, UA Yellow     Appearance, UA Cloudy     pH, UA 6.5     Specific Gravity, UA 1.021     Glucose, UA >=1000 mg/dL (3+)     Ketones, UA Negative     Bilirubin, UA Negative     Blood, UA Moderate (2+)     Protein, UA Trace     Leuk Esterase, UA Negative     Nitrite, UA Negative     Urobilinogen, UA 0.2 E.U./dL    Urinalysis, Microscopic Only - Urine, Clean Catch [[id number]]  (Abnormal) Collected: 12/21/23 1257    Specimen: Urine, Clean Catch Updated: 12/21/23 1331     RBC, UA 3-5 /HPF      WBC, UA None Seen /HPF      Bacteria, UA None Seen /HPF      Squamous Epithelial Cells, UA 7-12 /HPF      Yeast, UA Small/1+ Budding Yeast /HPF      Hyaline Casts, UA None Seen /LPF      Methodology Manual Light Microscopy    COVID-19 and FLU A/B PCR, 1 HR TAT - Swab, Nasopharynx [[id number]]  (Normal) Collected: 12/21/23 1301    Specimen: Swab from Nasopharynx Updated: 12/21/23 1338     COVID19 Not Detected     Influenza A PCR Not Detected     Influenza B PCR Not Detected    Narrative:      Fact sheet for providers: https://www.fda.gov/media/618820/download    Fact sheet for patients: https://www.fda.gov/media/824084/download    Test performed by PCR.             CT Abdomen Pelvis With Contrast    Result Date: 12/21/2023  PROCEDURE: CT ABDOMEN PELVIS W CONTRAST-  TECHNIQUE: IV contrast enhanced exam  HISTORY: rlq abdominal pain  COMPARISON: 02/10/2023.  FINDINGS:  ABDOMEN: Fatty infiltration of liver is present. Left adrenal nodule is noted probably adenoma. Remaining solid organs are normal. Patient is status post cholecystectomy.  No bowel obstruction or bowel wall thickening is seen. There is no free fluid.  PELVIS: Appendix is normal. Uterus and ovaries are unremarkable. There is no free fluid. Bladder has a normal appearance.      Impression: 1. No evidence of appendicitis or bowel obstruction. 2. No acute findings.   This study was performed with techniques to keep radiation doses as  low as reasonably achievable (ALARA). Individualized dose reduction techniques using automated exposure control or adjustment of vA and/or kV according to the patient size were employed.  This report was signed and finalized on 12/21/2023 3:08 PM by Gurvinder Naylor MD.        MDM     Amount and/or Complexity of Data Reviewed  Clinical lab tests: reviewed  Tests in the radiology section of CPT®: reviewed             Medical Decision Making:    Initial impression of presenting illness: 38-year-old female presents with right lower quadrant abdominal pain associated diarrhea, complains of some genitourinary itching.    DDX: includes but is not limited to: Appendicitis ovarian etiologies, renal colic, colitis, diverticulitis, among others         Patient arrives normotensive afebrile slight tachycardia sats 93% with vitals interpreted by myself.     Pertinent features from physical exam: Mild right lower quadrant tenderness without rebound tenderness or guarding.    Initial diagnostic plan: Abdominal labs, UA CT abdomen pelvis flu and COVID testing    Results from initial plan were reviewed and interpreted by me revealing flu and COVID-negative UA with small yeast, moderate blood no UTI no normal white count nonactionable CMP normal  Lipase.  CT abdomen pelvis without acute findings per radiologist    Diagnostic information from other sources: Family member at the bedside    Interventions / Re-evaluation: Norco and Zofran in emergency department,    Results/clinical rationale were discussed with patient    Consultations/Discussion of results with other physicians: None    Disposition plan: Patient was discharged in stable condition.  Counseled on supportive care, outpatient follow-up. Return precaution discussed.  Patient/family was understanding and agreeable with plan  Will add Imodium patient request for diarrhea, and Diflucan, Zofran.  -----      Final diagnoses:   Right lower quadrant abdominal pain   Diarrhea,  unspecified type   Yeast vaginitis     [facility] reviewed by Vidal Jensen, Vidal Shelley,   12/21/23 0614

## 2023-12-26 RX ORDER — TIZANIDINE 4 MG/1
4 TABLET ORAL EVERY 8 HOURS PRN
Qty: 90 TABLET | Refills: 0 | OUTPATIENT
Start: 2023-12-26

## 2023-12-27 ENCOUNTER — TELEPHONE (OUTPATIENT)
Dept: INTERNAL MEDICINE | Facility: CLINIC | Age: 38
End: 2023-12-27
Payer: MEDICAID

## 2023-12-27 NOTE — TELEPHONE ENCOUNTER
Caller: Lindsay Amezquita    Relationship: Self    Best call back number: [phone]     What medication are you requesting: NEW HEARTBURN MEDICATION    What are your current symptoms: HEART BURN    How long have you been experiencing symptoms:     Have you had these symptoms before:    [x] Yes  [] No    Have you been treated for these symptoms before:   [x] Yes  [] No    If a prescription is needed, what is your preferred pharmacy and phone number:      [id number] - [place], [place] - [address] [place] [address] [phone] [place] [phone]  [phone]     Additional notes:OTHER NOT WORKING

## 2023-12-28 RX ORDER — ONDANSETRON 4 MG/1
4 TABLET, ORALLY DISINTEGRATING ORAL EVERY 8 HOURS PRN
Qty: 12 TABLET | Refills: 0 | Status: CANCELLED | OUTPATIENT
Start: 2023-12-28

## 2023-12-28 RX ORDER — TIZANIDINE 4 MG/1
4 TABLET ORAL EVERY 8 HOURS PRN
Qty: 90 TABLET | Refills: 0 | Status: CANCELLED | OUTPATIENT
Start: 2023-12-28

## 2023-12-28 NOTE — TELEPHONE ENCOUNTER
Caller: Yaniv Doe    Relationship: Mother    Best call back number: [phone]     Requested Prescriptions:   Requested Prescriptions     Pending Prescriptions Disp Refills    tiZANidine (ZANAFLEX) 4 MG tablet 90 tablet 0     Sig: Take 1 tablet by mouth Every 8 (Eight) Hours As Needed for Muscle Spasms. Further refills should come from pain management.    ondansetron ODT (ZOFRAN-ODT) 4 MG disintegrating tablet 12 tablet 0     Sig: Place 1 tablet on the tongue Every 8 (Eight) Hours As Needed for Nausea.        Pharmacy where request should be sent: [id number] - [place], [place] - [address] [place] [address] [phone] [facility] [phone]      Last office visit with prescribing clinician: 8/16/2023   Last telemedicine visit with prescribing clinician: Visit date not found   Next office visit with prescribing clinician: 1/5/2024     Additional details provided by patient: PATIENT IS COMPLETELY OUT OF THIS MEDICATION.    Does the patient have less than a 3 day supply:  [x] Yes  [] No    Would you like a call back once the refill request has been completed: [x] Yes [] No    If the office needs to give you a call back, can they leave a voicemail: [x] Yes [] No    Kim Gordon Rep   12/28/23 15:38 EST

## 2023-12-29 ENCOUNTER — TELEPHONE (OUTPATIENT)
Dept: INTERNAL MEDICINE | Facility: CLINIC | Age: 38
End: 2023-12-29
Payer: MEDICAID

## 2023-12-29 NOTE — TELEPHONE ENCOUNTER
Caller: Lindsay Amezquita    Relationship: Self    Best call back number: [phone]     What is the best time to reach you: ANY    Who are you requesting to speak with (clinical staff, provider,  specific staff member): REFERRAL COORDINATOR     What was the call regarding: PATIENT CALLING TO GET THE REFERRAL COORDINATOR TO SEND THE REFERRAL OVER TO THE [facility] OF THE [facility]. PATIENT NEEDS TO BE REINSTATED.

## 2023-12-29 NOTE — TELEPHONE ENCOUNTER
Caller: Lindsay Amezquita    Relationship to patient: Self    Best call back number: [phone]    Patient is needing: PATIENT STATED THAT SHE HAS BEEN RUNNING LOW GRADE FEVER WITH DARKISH MUCUS COUGHING UP OUT OF CHEST;NOT FEELING WELL AT ALL;  PATIENT WOULD LIKE TO KNOW WHAT PCP WOULD WANT HER TO DO     PLEASE ADVISE ASAP

## 2024-01-01 ENCOUNTER — HOSPITAL ENCOUNTER (EMERGENCY)
Facility: HOSPITAL | Age: 39
Discharge: HOME | End: 2024-01-02
Attending: EMERGENCY MEDICINE | Admitting: EMERGENCY MEDICINE
Payer: MEDICAID

## 2024-01-01 ENCOUNTER — TELEPHONE (OUTPATIENT)
Dept: NEUROLOGY | Facility: CLINIC | Age: 39
End: 2024-01-01
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
## 2024-01-01 DIAGNOSIS — M54.50 LOW BACK PAIN, UNSPECIFIED BACK PAIN LATERALITY, UNSPECIFIED CHRONICITY, UNSPECIFIED WHETHER SCIATICA PRESENT: Primary | ICD-10-CM

## 2024-01-01 PROCEDURE — 99284 EMERGENCY DEPT VISIT MOD MDM: CPT

## 2024-01-02 ENCOUNTER — HOSPITAL ENCOUNTER (OUTPATIENT)
Dept: ULTRASOUND IMAGING | Facility: HOSPITAL | Age: 39
Discharge: HOME | End: 2024-01-02
Admitting: NURSE PRACTITIONER
Payer: MEDICAID

## 2024-01-02 ENCOUNTER — APPOINTMENT (OUTPATIENT)
Dept: GENERAL RADIOLOGY | Facility: HOSPITAL | Age: 39
End: 2024-01-02
Payer: MEDICAID

## 2024-01-02 ENCOUNTER — APPOINTMENT (OUTPATIENT)
Dept: CT IMAGING | Facility: HOSPITAL | Age: 39
End: 2024-01-02
Payer: MEDICAID

## 2024-01-02 VITALS
HEIGHT: 63 IN | DIASTOLIC BLOOD PRESSURE: 94 MMHG | TEMPERATURE: 97.8 F | SYSTOLIC BLOOD PRESSURE: 124 MMHG | WEIGHT: 244.6 LBS | OXYGEN SATURATION: 96 % | RESPIRATION RATE: 16 BRPM | HEART RATE: 90 BPM | BODY MASS INDEX: 43.34 KG/M2

## 2024-01-02 DIAGNOSIS — N64.4 BREAST PAIN, RIGHT: ICD-10-CM

## 2024-01-02 DIAGNOSIS — N61.0 CELLULITIS OF RIGHT BREAST: ICD-10-CM

## 2024-01-02 PROCEDURE — 70450 CT HEAD/BRAIN W/O DYE: CPT

## 2024-01-02 PROCEDURE — 76641 ULTRASOUND BREAST COMPLETE: CPT

## 2024-01-02 PROCEDURE — 72131 CT LUMBAR SPINE W/O DYE: CPT

## 2024-01-02 PROCEDURE — 25010000002 KETOROLAC TROMETHAMINE PER 15 MG: Performed by: EMERGENCY MEDICINE

## 2024-01-02 PROCEDURE — 25010000002 HYDROMORPHONE 1 MG/ML SOLUTION: Performed by: EMERGENCY MEDICINE

## 2024-01-02 PROCEDURE — 25010000002 ORPHENADRINE CITRATE PER 60 MG: Performed by: EMERGENCY MEDICINE

## 2024-01-02 PROCEDURE — 73502 X-RAY EXAM HIP UNI 2-3 VIEWS: CPT

## 2024-01-02 PROCEDURE — 96372 THER/PROPH/DIAG INJ SC/IM: CPT

## 2024-01-02 PROCEDURE — 73552 X-RAY EXAM OF FEMUR 2/>: CPT

## 2024-01-02 RX ORDER — ORPHENADRINE CITRATE 30 MG/ML
60 INJECTION INTRAMUSCULAR; INTRAVENOUS ONCE
Status: COMPLETED | OUTPATIENT
Start: 2024-01-02 | End: 2024-01-02

## 2024-01-02 RX ORDER — TIZANIDINE 4 MG/1
4 TABLET ORAL EVERY 8 HOURS PRN
Qty: 90 TABLET | Refills: 0 | Status: SHIPPED | OUTPATIENT
Start: 2024-01-02

## 2024-01-02 RX ORDER — LIDOCAINE 50 MG/G
1 PATCH TOPICAL ONCE
Status: DISCONTINUED | OUTPATIENT
Start: 2024-01-02 | End: 2024-01-02 | Stop reason: HOSPADM

## 2024-01-02 RX ORDER — TIZANIDINE 4 MG/1
4 TABLET ORAL EVERY 8 HOURS PRN
Qty: 90 TABLET | Refills: 0 | OUTPATIENT
Start: 2024-01-02

## 2024-01-02 RX ORDER — KETOROLAC TROMETHAMINE 30 MG/ML
60 INJECTION, SOLUTION INTRAMUSCULAR; INTRAVENOUS ONCE
Status: COMPLETED | OUTPATIENT
Start: 2024-01-02 | End: 2024-01-02

## 2024-01-02 RX ORDER — DIAZEPAM 2 MG/1
2 TABLET ORAL EVERY 6 HOURS PRN
Qty: 10 TABLET | Refills: 0 | Status: SHIPPED | OUTPATIENT
Start: 2024-01-02

## 2024-01-02 RX ORDER — KETOROLAC TROMETHAMINE 10 MG/1
10 TABLET, FILM COATED ORAL EVERY 6 HOURS PRN
Qty: 12 TABLET | Refills: 0 | Status: SHIPPED | OUTPATIENT
Start: 2024-01-02

## 2024-01-02 RX ADMIN — KETOROLAC TROMETHAMINE 60 MG: 30 INJECTION, SOLUTION INTRAMUSCULAR at 01:24

## 2024-01-02 RX ADMIN — ORPHENADRINE CITRATE 60 MG: 60 INJECTION INTRAMUSCULAR; INTRAVENOUS at 02:13

## 2024-01-02 RX ADMIN — HYDROMORPHONE HYDROCHLORIDE 0.25 MG: 1 INJECTION, SOLUTION INTRAMUSCULAR; INTRAVENOUS; SUBCUTANEOUS at 02:41

## 2024-01-02 NOTE — TELEPHONE ENCOUNTER
Patient states that she never went to [facility], and would like the referral to be sent to Pain treatment center of the [facility] in [place].  Please advise.

## 2024-01-02 NOTE — ED PROVIDER NOTES
TRIAGE CHIEF COMPLAINT:     Nursing and triage notes reviewed    Chief Complaint   Patient presents with    Fall      HPI: Lindsay Amezquita is a 38 y.o. female who presents to the emergency department complaining of pain following a fall.  Patient states that she fell backwards getting out of the tub after accidentally catching her foot.  She at the back of her head but denies losing consciousness.  She complains of a headache as well as pain in her left lower back.     REVIEW OF SYSTEMS: All other systems reviewed and are negative     PAST MEDICAL HISTORY:   Past Medical History:   Diagnosis Date    Allergic     Anxiety     Asthma Beings of copd with asthma    Back pain     Bell's palsy     Bipolar 2 disorder     Blood clot in vein     COPD (chronic obstructive pulmonary disease) Six months ago    Deep vein thrombosis Last year    Depression     Diabetes mellitus November    Pre diabete    Frequent headaches     GERD (gastroesophageal reflux disease)     Irritable bowel syndrome Two years ago    Kidney stone Two years ago    Migraine     Obesity All of my life    Panic     PTSD (post-traumatic stress disorder)     Pulmonary embolism Not in lungs    Behind left knee cap    Restless leg syndrome     Sinus problem     Snores     Tattoos     Urinary tract infection Have them on and off    Visual impairment Since i was little    Vitamin B12 deficiency         FAMILY HISTORY:   Family History   Problem Relation Age of Onset    Irritable bowel syndrome Mother     Heart disease Mother     Miscarriages / Stillbirths Mother     Irritable bowel syndrome Father     Alcohol abuse Father     Diabetes Father     Hyperlipidemia Father     Colon cancer Maternal Grandfather         SOCIAL HISTORY:   Social History     Socioeconomic History    Marital status: Single   Tobacco Use    Smoking status: Every Day     Packs/day: 2.00     Years: 29.00     Additional pack years: 0.00     Total pack years: 58.00     Types: Cigarettes     Start  date: 1/1/1995     Passive exposure: Never    Smokeless tobacco: Never   Vaping Use    Vaping Use: Former   Substance and Sexual Activity    Alcohol use: Not Currently    Drug use: Not Currently    Sexual activity: Not Currently     Partners: Female     Birth control/protection: None, Same-sex partner        SURGICAL HISTORY:   Past Surgical History:   Procedure Laterality Date    CHOLECYSTECTOMY      TOOTH EXTRACTION          CURRENT MEDICATIONS:      Medication List        CONTINUE taking these medications      FreeStyle Freedom Lite w/Device kit     glucose monitor monitoring kit  Check glucose daily     Lancets misc  Check fasting glucose daily and 1 hour after largest meal of day.            ASK your doctor about these medications      ALPRAZolam 1 MG tablet  Commonly known as: Xanax  Take 1 tablet by mouth 3 (Three) Times a Day As Needed for Sleep.     amitriptyline 150 MG tablet  Commonly known as: ELAVIL  Take 1 tablet by mouth every night at bedtime.     Anoro Ellipta 62.5-25 MCG/ACT aerosol powder  inhaler  Generic drug: Umeclidinium-Vilanterol  Inhale 1 puff Daily.     atorvastatin 40 MG tablet  Commonly known as: LIPITOR  Take 1 tablet by mouth every night at bedtime.     azithromycin 250 MG tablet  Commonly known as: Zithromax Z-Janusz  Take 2 tablets by mouth on day 1, then 1 tablet daily on days 2-5     butalbital-acetaminophen-caffeine -40 MG per tablet  Commonly known as: Esgic  Take 1 tablet by mouth Every 12 (Twelve) Hours As Needed for Migraine.     * chlorhexidine 4 % external liquid  Commonly known as: HIBICLENS  Apply  topically to the appropriate area as directed Daily As Needed for Wound Care.     * chlorhexidine 4 % external liquid  Commonly known as: HIBICLENS  Apply  topically to the appropriate area as directed Daily.     desvenlafaxine 100 MG 24 hr tablet  Commonly known as: PRISTIQ  Take 1 tablet by mouth Daily.     diphenhydrAMINE 50 MG capsule  Commonly known as: BENADRYL      empagliflozin 10 MG tablet tablet  Commonly known as: Jardiance  Take 1 tablet by mouth Daily.     fluconazole 150 MG tablet  Commonly known as: DIFLUCAN  Take p.o. x 1 and repeat dose in 3 days     fluticasone 50 MCG/ACT nasal spray  Commonly known as: FLONASE  2 sprays into the nostril(s) as directed by provider Daily.     gabapentin 800 MG tablet  Commonly known as: NEURONTIN     glucose blood test strip  Use as instructed     hydrOXYzine 50 MG tablet  Commonly known as: ATARAX  Take 1 tablet by mouth 3 (Three) Times a Day As Needed (anxiety and/or sleep).     ibuprofen 800 MG tablet  Commonly known as: ADVIL,MOTRIN     loperamide 2 MG capsule  Commonly known as: IMODIUM  Take 1 capsule by mouth 4 (Four) Times a Day As Needed for Diarrhea.     loratadine 10 MG tablet  Commonly known as: Claritin  Take 1 tablet by mouth Daily.     Lurasidone HCl 120 MG tablet tablet  Commonly known as: Latuda  Take 1 tablet by mouth Daily.     meclizine 25 MG tablet  Commonly known as: ANTIVERT  Take 1 tablet by mouth 3 (Three) Times a Day As Needed for Dizziness.     naloxone 4 MG/0.1ML nasal spray  Commonly known as: NARCAN     * Nurtec 75 MG dispersible tablet  Generic drug: Rimegepant Sulfate  Take 1 tablet by mouth 1 (One) Time As Needed (HA) for up to 1 dose.     * Nurtec 75 MG dispersible tablet  Generic drug: Rimegepant Sulfate  Take 1 tablet by mouth Every Other Day. Take Monday,Wednesday,Friday, and Saturday.     nystatin 100,000 unit/mL suspension  Commonly known as: MYCOSTATIN  Take 5 mL by mouth 4 (Four) Times a Day.     nystatin 678970 UNIT/GM powder  Commonly known as: MYCOSTATIN  Apply  topically to the appropriate area as directed 2 (Two) Times a Day.     omeprazole 40 MG capsule  Commonly known as: priLOSEC  Take 1 capsule by mouth 2 (Two) Times a Day.     ondansetron ODT 4 MG disintegrating tablet  Commonly known as: ZOFRAN-ODT  Place 1 tablet on the tongue Every 8 (Eight) Hours As Needed for Nausea.      OXcarbazepine 300 MG tablet  Commonly known as: TRILEPTAL  Take 1 tablet by mouth 2 (Two) Times a Day.     promethazine 25 MG tablet  Commonly known as: PHENERGAN  Take 1 tablet by mouth Every 6 (Six) Hours As Needed for Nausea or Vomiting.     promethazine-dextromethorphan 6.25-15 MG/5ML syrup  Commonly known as: PROMETHAZINE-DM  Take 5 mL by mouth At Night As Needed for Cough.     Qulipta 60 MG tablet  Generic drug: Atogepant  Take 1 tablet by mouth Daily.     tiZANidine 4 MG tablet  Commonly known as: ZANAFLEX  Take 1 tablet by mouth Every 8 (Eight) Hours As Needed for Muscle Spasms. Further refills should come from pain management.     Trudhesa 0.725 MG/ACT aerosol solution  Generic drug: Dihydroergotamine Mesylate HFA  0.725 Act into the nostril(s) as directed by provider 1 (One) Time As Needed (migraine) for up to 1 dose. May repeat in 1 hour     Ventolin  (90 Base) MCG/ACT inhaler  Generic drug: albuterol sulfate HFA  INHALE 2 PUFFS EVERY 4 HOURS AS NEEDED FOR WHEEZING OR SHORTNESS OF AIR           * This list has 4 medication(s) that are the same as other medications prescribed for you. Read the directions carefully, and ask your doctor or other care provider to review them with you.                   ALLERGIES: Aspirin, Codeine, Cyclobenzaprine, Haldol [haloperidol], Latex, Penicillins, Morphine, Ondansetron, Oxycodone-acetaminophen, Oxycontin [oxycodone], Triptans, Compazine [prochlorperazine], Lamictal [lamotrigine], and Reglan [metoclopramide]     PHYSICAL EXAM:   VITAL SIGNS:   Vitals:    01/01/24 2330   BP: (!) 130/115   Pulse: 107   Resp: 16   Temp: 97.8 °F (36.6 °C)   SpO2: 94%      CONSTITUTIONAL: Awake, oriented, appears nontoxic   HENT: Atraumatic, normocephalic, oral mucosa pink and moist, airway patent. Nares patent without drainage. External ears normal.   EYES: Conjunctivae clear  NECK: Trachea midline, no tenderness in the low back  CARDIOVASCULAR: Normal heart rate, Normal rhythm,  No murmurs, rubs, gallops   PULMONARY/CHEST: Clear to auscultation, no rhonchi, wheezes, or rales. Symmetrical breath sounds.   ABDOMINAL: Nondistended, soft, nontender - no rebound or guarding.  BACK: Mild tenderness in the left lower paraspinal musculature.  NEUROLOGIC: Nonfocal, moving all four extremities, no gross sensory or motor deficits.   EXTREMITIES: No clubbing, cyanosis, or edema.  No significant deformity or tenderness of the lower extremity.  SKIN: Warm, Dry, No erythema, No rash     ED COURSE / MEDICAL DECISION MAKING:   Lindsay Amezquita is a 38 y.o. female who presents to the emergency department for evaluation of pain following a fall.  Patient nondistressed on arrival in the emergency department.  Vital signs are stable.  Exam reveals some tenderness in the lower back and leg.    Differential diagnosis includes contusion, fracture among other etiologies.    CT scan of the head, CT scan of the lumbar spine, x-rays of the left hip and left femur was ordered for further evaluation of the patient's presentation.    Diagnostic information from other sources: Family, chart review    Interventions: Toradol    Narrative: Patient presents with pain following a fall.  CT scan of the head and lumbar spine per radiology interpretations do not reveal any obvious acute findings but do reveal some chronic changes and degenerative changes.  X-rays of the left hip and left femur per my interpretation do not reveal any obvious acute osseous abnormalities.  Discussed all results with patient.  Have treated patient's pain.  Will continue symptomatic management with return precautions.      DECISION TO DISCHARGE/ADMIT: see ED care timeline     FINAL IMPRESSION:   1 --low back pain  2 --fall  3 --     Electronically signed by: Yun Abreu MD, 1/2/2024 01:00 Yun Rios MD  01/02/24 0227

## 2024-01-02 NOTE — TELEPHONE ENCOUNTER
Caller: Lindsay Amezquita ADELA    Relationship: Self    Best call back number: [phone]     Requested Prescriptions:   Requested Prescriptions     Pending Prescriptions Disp Refills    tiZANidine (ZANAFLEX) 4 MG tablet 90 tablet 0     Sig: Take 1 tablet by mouth Every 8 (Eight) Hours As Needed for Muscle Spasms. Further refills should come from pain management.        Pharmacy where request should be sent: [id number] - [address] [phone] [place] [phone] FX     Last office visit with prescribing clinician: 8/16/2023   Last telemedicine visit with prescribing clinician: Visit date not found   Next office visit with prescribing clinician: 1/5/2024     Additional details provided by patient: PATIENT IS OUT.    Does the patient have less than a 3 day supply:  [x] Yes  [] No    Would you like a call back once the refill request has been completed: [] Yes [x] No    If the office needs to give you a call back, can they leave a voicemail: [] Yes [x] No    Cadance Dunaway, RegSched Rep   01/02/24 09:56 EST

## 2024-01-02 NOTE — TELEPHONE ENCOUNTER
Patient states she does not have a pain management doctor at this time.  She asked for the referral to be sent to Pain treatment center of the [facility] in [place].  She is asking again for the refill to be approved.  Please advise.

## 2024-01-03 NOTE — TELEPHONE ENCOUNTER
Lindsay called back, I verbally informed her that the referral was resent to the Pain management of her choice today and a temp supply for one month has been filled, she verbalized understanding.

## 2024-01-05 ENCOUNTER — OFFICE VISIT (OUTPATIENT)
Dept: INTERNAL MEDICINE | Facility: CLINIC | Age: 39
End: 2024-01-05
Payer: MEDICAID

## 2024-01-05 VITALS
OXYGEN SATURATION: 96 % | HEART RATE: 91 BPM | BODY MASS INDEX: 43.77 KG/M2 | DIASTOLIC BLOOD PRESSURE: 89 MMHG | SYSTOLIC BLOOD PRESSURE: 133 MMHG | TEMPERATURE: 97.3 F | WEIGHT: 247 LBS | HEIGHT: 63 IN

## 2024-01-05 DIAGNOSIS — E11.65 TYPE 2 DIABETES MELLITUS WITH HYPERGLYCEMIA, WITHOUT LONG-TERM CURRENT USE OF INSULIN: Primary | ICD-10-CM

## 2024-01-05 DIAGNOSIS — G47.34 SLEEP RELATED HYPOXIA: ICD-10-CM

## 2024-01-05 DIAGNOSIS — E78.00 HYPERCHOLESTEROLEMIA: ICD-10-CM

## 2024-01-05 DIAGNOSIS — M25.50 POLYARTHRALGIA: ICD-10-CM

## 2024-01-05 DIAGNOSIS — Z72.0 TOBACCO ABUSE: ICD-10-CM

## 2024-01-05 PROBLEM — K80.20 CHOLELITHIASIS: Status: ACTIVE | Noted: 2020-05-21

## 2024-01-05 PROBLEM — E27.8 ADRENAL NODULE: Status: ACTIVE | Noted: 2020-08-23

## 2024-01-05 PROBLEM — E27.9 ADRENAL NODULE: Status: ACTIVE | Noted: 2020-08-23

## 2024-01-05 PROBLEM — T78.40XA ALLERGIC REACTION: Status: ACTIVE | Noted: 2021-03-25

## 2024-01-05 PROBLEM — F45.42 PAIN DISORDER ASSOCIATED WITH PSYCHOLOGICAL FACTORS: Status: ACTIVE | Noted: 2021-08-12

## 2024-01-05 PROBLEM — M19.90 ARTHRITIS: Status: ACTIVE | Noted: 2020-05-21

## 2024-01-05 PROBLEM — G43.109 MIGRAINE AURA WITHOUT HEADACHE: Status: ACTIVE | Noted: 2020-05-21

## 2024-01-05 PROBLEM — M47.816 LUMBAR SPONDYLOSIS: Status: ACTIVE | Noted: 2021-08-12

## 2024-01-05 PROBLEM — L23.7 POISON IVY DERMATITIS: Status: ACTIVE | Noted: 2019-07-15

## 2024-01-05 PROBLEM — M54.30 SCIATICA: Status: ACTIVE | Noted: 2023-10-07

## 2024-01-05 PROBLEM — G43.909 MIGRAINE HEADACHE: Status: ACTIVE | Noted: 2018-02-20

## 2024-01-05 PROBLEM — F31.81 BIPOLAR 2 DISORDER: Status: ACTIVE | Noted: 2020-05-21

## 2024-01-05 NOTE — PROGRESS NOTES
"  Office Visit      Patient Name: Lindsay Amezquita  : 1985   MRN: [id number]   Care Team: Patient Care Team:  Charley Robles APRN as PCP - General (Family Medicine)  Donna Mcqueen APRN as Nurse Practitioner (Behavioral Health)    Chief Complaint  Follow-up (ER visit on last week. )    Subjective     Subjective      Lindsay Amezquita presents to [facility] for multiple complaints.  Chronic pain-she is not happy with her pain clinic, and has been referred to several others and plans to call them.  She continues to complain of multiple joints hurting \"all the time\".  There is no erythema, warmth, or swelling.  She had a recent fall which exacerbated the most recent pain which sent her to the ER.  Imaging showed no acute fracture.  She was previously hospitalized with RSV that led to respiratory failure.  During her hospitalization she did have a BiPAP mask with oxygen at night.  She was lost to follow-up outpatient.  She has been monitoring her oxygen at night and going down to 80 to 82%.  She think she needs a sleep study.  She never feels well rested and does suffer from migraines.  She has an MRI of the brain pending.  Continues to smoke and she is not ready to quit.  Type 2 diabetes-she monitors her blood sugar at home and consistently less than 120.  She is compliant with her Jardiance, she denies any recent UTI.  Denies polyuria, polydipsia, polyphagia, vision changes, and foot ulcer/callus.  She is due for labs today.  She has an upcoming appointment with GYN for hysterectomy consultation.  Mood is managed by behavioral health, she closely follows with them.  Worse in the winter months and has been more down lately.  Denies any suicidal ideations.  Blood pressure has been intermittently elevated at home, a nurse from [employer] told her to check her blood pressure 4 times per day.  Controlled here in the office today.  She is not on any antihypertensive " "regimen.    Objective     Objective   Vital Signs:   /89 (BP Location: Right arm)   Pulse 91   Temp 97.3 °F (36.3 °C) (Tympanic)   Ht 160 cm (63\")   Wt 112 kg (247 lb)   SpO2 96%   BMI 43.75 kg/m²     Physical Exam  Vitals and nursing note reviewed.   Constitutional:       General: She is not in acute distress.     Appearance: Normal appearance. She is obese. She is not toxic-appearing.   Eyes:      Pupils: Pupils are equal, round, and reactive to light.   Neck:      Vascular: No carotid bruit.   Cardiovascular:      Rate and Rhythm: Normal rate and regular rhythm.      Heart sounds: Normal heart sounds. No murmur heard.  Pulmonary:      Effort: Pulmonary effort is normal. No respiratory distress.      Breath sounds: Wheezing (Faint) present.   Abdominal:      General: Bowel sounds are normal. There is no distension.      Palpations: Abdomen is soft.      Tenderness: There is no abdominal tenderness.   Musculoskeletal:         General: Deformity present. No swelling or tenderness.      Cervical back: Neck supple. No tenderness.   Skin:     General: Skin is warm and dry.      Findings: No rash.   Neurological:      General: No focal deficit present.      Mental Status: She is alert and oriented to person, place, and time.      Comments: Baseline left facial droop   Psychiatric:         Mood and Affect: Mood normal. Mood is anxious and depressed.         Behavior: Behavior normal.          Assessment / Plan      Assessment & Plan   Problem List Items Addressed This Visit    None  Visit Diagnoses       Type 2 diabetes mellitus with hyperglycemia, without long-term current use of insulin    -  Primary    Relevant Orders    Hemoglobin A1c    Lipid panel    Vitamin B12    Uric acid    Lyme Disease Total Antibody With Reflex to Immunoassay    Microalbumin / Creatinine Urine Ratio - Urine, Clean Catch    [place] [facility] (IgG / M)    Update A1c with labs.  Continue Jardiance at current dose.  Diabetic diet " encouraged, annual eye exam, and always wear shoes.    Hypercholesterolemia        Relevant Orders    Hemoglobin A1c    Lipid panel    Vitamin B12    Uric acid    Lyme Disease Total Antibody With Reflex to Immunoassay    Microalbumin / Creatinine Urine Ratio - Urine, Clean Catch    [facility] (IgG / M).    Update lipid panel when fasting.  Continue atorvastatin and low-cholesterol diet.    Polyarthralgia        Relevant Orders    Hemoglobin A1c    Lipid panel    Vitamin B12    Uric acid    Lyme Disease Total Antibody With Reflex to Immunoassay    Microalbumin / Creatinine Urine Ratio - Urine, Clean Catch    [facility] (IgG / M)    Follows up with pain management, advised she will need to do this for narcotic refills.  Will obtain above lab work to rule out any other underlying causes.    Sleep related hypoxia        Relevant Orders    Hemoglobin A1c    Lipid panel    Vitamin B12    Uric acid    Lyme Disease Total Antibody With Reflex to Immunoassay    Microalbumin / Creatinine Urine Ratio - Urine, Clean Catch    Ambulatory Referral to Pulmonology    [facility] (IgG / M)    I agree she likely does have sleep apnea needs sleep study, and very least have some sleep-related hypoxia.  Referral to pulmonology placed.    Tobacco abuse        Relevant Orders    Hemoglobin A1c    Lipid panel    Vitamin B12    Uric acid    Lyme Disease Total Antibody With Reflex to Immunoassay    Microalbumin / Creatinine Urine Ratio - Urine, Clean Catch    [facility] (IgG / M)    As always encouraged her to stop smoking, she is not ready to quit.            Follow Up   Return in about 3 months (around 4/5/2024) for Next scheduled follow up.  Patient was given instructions and counseling regarding her condition or for health maintenance advice. Please see specific information pulled into the AVS if appropriate.     CLAIR Lindsay  [facility]    Answers submitted by the  patient for this visit:  Office Visit on 1/5/2024  2:00 PM with Charley Haney (Submitted on 1/4/2024)  Please describe your symptoms.: Blood pressure. Cough and bring up dark green stuff out of my chest and nose. I also need to yalk to her about having a sleep stufy done.  Have you had these symptoms before?: Yes  How long have you been having these symptoms?: 1-2 weeks  Please list any medications you are currently taking for this condition.: Z pack which i finished but didnt help. A mouth tasied to rasie my mouth out for trash but i am out of that and its back. I feel like i am getting worse in my sivkness and we need to get yo the bottom of this.  Please describe any probable cause for these symptoms. : Someyhing keeps geyying me sick. I have gallen gour tines anf my head hurts and i just seem everything i do its not working

## 2024-01-06 LAB
ALBUMIN/CREAT UR: 5 MG/G CREAT (ref 0–29)
B BURGDOR IGG+IGM SER QL IA: NEGATIVE
CHOLEST SERPL-MCNC: 112 MG/DL (ref 100–199)
CREAT UR-MCNC: 59.9 MG/DL
HBA1C MFR BLD: 7 % (ref 4.8–5.6)
HDLC SERPL-MCNC: 40 MG/DL
LDLC SERPL CALC-MCNC: 48 MG/DL (ref 0–99)
MICROALBUMIN UR-MCNC: 3.1 UG/ML
R RICKETTSI IGG SER QL IA: NORMAL
R RICKETTSI IGM SER-ACNC: NORMAL
TRIGL SERPL-MCNC: 136 MG/DL (ref 0–149)
URATE SERPL-MCNC: 4 MG/DL (ref 2.6–6.2)
VIT B12 SERPL-MCNC: 286 PG/ML (ref 232–1245)
VLDLC SERPL CALC-MCNC: 24 MG/DL (ref 5–40)

## 2024-01-09 ENCOUNTER — TELEPHONE (OUTPATIENT)
Dept: INTERNAL MEDICINE | Facility: CLINIC | Age: 39
End: 2024-01-09
Payer: MEDICAID

## 2024-01-09 LAB
ALBUMIN/CREAT UR: 5 MG/G CREAT (ref 0–29)
B BURGDOR IGG+IGM SER QL IA: NEGATIVE
CHOLEST SERPL-MCNC: 112 MG/DL (ref 100–199)
CREAT UR-MCNC: 59.9 MG/DL
HBA1C MFR BLD: 7 % (ref 4.8–5.6)
HDLC SERPL-MCNC: 40 MG/DL
LDLC SERPL CALC-MCNC: 48 MG/DL (ref 0–99)
MICROALBUMIN UR-MCNC: 3.1 UG/ML
R RICKETTSI IGG SER QL IA: NEGATIVE
R RICKETTSI IGM SER-ACNC: 0.36 INDEX (ref 0–0.89)
TRIGL SERPL-MCNC: 136 MG/DL (ref 0–149)
URATE SERPL-MCNC: 4 MG/DL (ref 2.6–6.2)
VIT B12 SERPL-MCNC: 286 PG/ML (ref 232–1245)
VLDLC SERPL CALC-MCNC: 24 MG/DL (ref 5–40)

## 2024-01-09 NOTE — TELEPHONE ENCOUNTER
Name: Lindsay Amezquita      Relationship: Self      Best Callback Number: [phone]       HUB PROVIDED THE RELAY MESSAGE FROM THE OFFICE      PATIENT: VOICED UNDERSTANDING AND HAS NO FURTHER QUESTIONS AT THIS TIME

## 2024-01-09 NOTE — TELEPHONE ENCOUNTER
----- Message from CLAIR Banuelos sent at 1/9/2024  9:20 AM EST -----  Labs are negative for any tickborne illness or gout that could be contributing to joint pain. A1C a bit up, at 7. No need for further medication at this time but need to continue jardiance and work on diabetic diet.

## 2024-01-09 NOTE — TELEPHONE ENCOUNTER
"Relay     \"Labs are negative for any tickborne illness or gout that could be contributing to joint pain. A1C a bit up, at 7. No need for further medication at this time but need to continue jardiance and work on diabetic diet.    \"                 "

## 2024-01-12 ENCOUNTER — TELEPHONE (OUTPATIENT)
Dept: INTERNAL MEDICINE | Facility: CLINIC | Age: 39
End: 2024-01-12
Payer: MEDICAID

## 2024-01-12 NOTE — TELEPHONE ENCOUNTER
Caller: Lindsay Amezquita    Relationship: Self    Best call back number: [phone]     What medication are you requesting: SOMETHING FOR SYMPTOMS     What are your current symptoms: YEAST UNDER BREASTS AND LEGS     How long have you been experiencing symptoms: 2 DAYS     Have you had these symptoms before:    [x] Yes  [] No    Have you been treated for these symptoms before:   [x] Yes  [] No    If a prescription is needed, what is your preferred pharmacy and phone number: [facility]/PHARMACY #[id number] - [address] [phone] [facility] [phone]      Additional notes:

## 2024-01-15 RX ORDER — TIZANIDINE 4 MG/1
4 TABLET ORAL EVERY 8 HOURS PRN
Qty: 90 TABLET | Refills: 0 | OUTPATIENT
Start: 2024-01-15

## 2024-01-15 NOTE — TELEPHONE ENCOUNTER
Caller: Lindsay Amezquita    Relationship: Self    Best call back number: [phone]     Requested Prescriptions:   Requested Prescriptions     Pending Prescriptions Disp Refills    tiZANidine (ZANAFLEX) 4 MG tablet 90 tablet 0     Sig: Take 1 tablet by mouth Every 8 (Eight) Hours As Needed for Muscle Spasms. Further refills should come from pain management.        Pharmacy where request should be sent: [facility]/PHARMACY #[id number] - [address] [phone] [place] [phone] FX     Last office visit with prescribing clinician: 1/5/2024   Last telemedicine visit with prescribing clinician: Visit date not found   Next office visit with prescribing clinician: 4/5/2024     Additional details provided by patient: PAIN CLINIC REFUSES TO SEE PATIENT.  2 DAYS OF MEDICATION LEFT.      Does the patient have less than a 3 day supply:  [x] Yes  [] No    Would you like a call back once the refill request has been completed: [] Yes [x] No    If the office needs to give you a call back, can they leave a voicemail: [] Yes [x] No    Christine Dior   01/15/24 15:27 EST

## 2024-01-16 ENCOUNTER — TELEPHONE (OUTPATIENT)
Dept: INTERNAL MEDICINE | Facility: CLINIC | Age: 39
End: 2024-01-16
Payer: MEDICAID

## 2024-01-16 NOTE — TELEPHONE ENCOUNTER
Last night she had a very bad night with a vivid nightmares.  She thinks that she is having a nervous breakdown and may need to be admitted somewhere.  Can you please call her asap.

## 2024-01-16 NOTE — TELEPHONE ENCOUNTER
Called patient back, asked her if she wants me to call for EMS to take her to the hospital or if she can get a ride to the [facility]. Patient's mom is taking her to the [facility] in [place]

## 2024-01-17 ENCOUNTER — APPOINTMENT (OUTPATIENT)
Dept: GENERAL RADIOLOGY | Facility: HOSPITAL | Age: 39
End: 2024-01-17
Payer: MEDICAID

## 2024-01-17 ENCOUNTER — HOSPITAL ENCOUNTER (EMERGENCY)
Facility: HOSPITAL | Age: 39
Discharge: HOME | End: 2024-01-17
Attending: STUDENT IN AN ORGANIZED HEALTH CARE EDUCATION/TRAINING PROGRAM | Admitting: STUDENT IN AN ORGANIZED HEALTH CARE EDUCATION/TRAINING PROGRAM
Payer: MEDICAID

## 2024-01-17 ENCOUNTER — APPOINTMENT (OUTPATIENT)
Dept: CT IMAGING | Facility: HOSPITAL | Age: 39
End: 2024-01-17
Payer: MEDICAID

## 2024-01-17 ENCOUNTER — OFFICE VISIT (OUTPATIENT)
Dept: OBSTETRICS AND GYNECOLOGY | Facility: CLINIC | Age: 39
End: 2024-01-17
Payer: MEDICAID

## 2024-01-17 VITALS
WEIGHT: 239 LBS | SYSTOLIC BLOOD PRESSURE: 126 MMHG | HEIGHT: 63 IN | BODY MASS INDEX: 42.35 KG/M2 | DIASTOLIC BLOOD PRESSURE: 80 MMHG

## 2024-01-17 VITALS
RESPIRATION RATE: 20 BRPM | BODY MASS INDEX: 42.35 KG/M2 | HEIGHT: 63 IN | HEART RATE: 110 BPM | DIASTOLIC BLOOD PRESSURE: 92 MMHG | WEIGHT: 239 LBS | OXYGEN SATURATION: 89 % | TEMPERATURE: 97.4 F | SYSTOLIC BLOOD PRESSURE: 132 MMHG

## 2024-01-17 DIAGNOSIS — N93.9 ABNORMAL UTERINE BLEEDING (AUB): Primary | ICD-10-CM

## 2024-01-17 DIAGNOSIS — Z12.4 SCREENING FOR MALIGNANT NEOPLASM OF CERVIX: ICD-10-CM

## 2024-01-17 DIAGNOSIS — N89.8 VAGINAL ITCHING: ICD-10-CM

## 2024-01-17 DIAGNOSIS — N92.1 MENORRHAGIA WITH IRREGULAR CYCLE: ICD-10-CM

## 2024-01-17 DIAGNOSIS — M54.50 ACUTE MIDLINE LOW BACK PAIN WITHOUT SCIATICA: ICD-10-CM

## 2024-01-17 DIAGNOSIS — M25.562 ACUTE PAIN OF BOTH KNEES: ICD-10-CM
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
## 2024-01-17 DIAGNOSIS — M25.561 ACUTE PAIN OF BOTH KNEES: ICD-10-CM

## 2024-01-17 PROCEDURE — 72125 CT NECK SPINE W/O DYE: CPT

## 2024-01-17 PROCEDURE — 1159F MED LIST DOCD IN RCRD: CPT | Performed by: OBSTETRICS & GYNECOLOGY

## 2024-01-17 PROCEDURE — 73560 X-RAY EXAM OF KNEE 1 OR 2: CPT

## 2024-01-17 PROCEDURE — 72131 CT LUMBAR SPINE W/O DYE: CPT

## 2024-01-17 PROCEDURE — 1160F RVW MEDS BY RX/DR IN RCRD: CPT | Performed by: OBSTETRICS & GYNECOLOGY

## 2024-01-17 PROCEDURE — 99284 EMERGENCY DEPT VISIT MOD MDM: CPT

## 2024-01-17 PROCEDURE — 99204 OFFICE O/P NEW MOD 45 MIN: CPT | Performed by: OBSTETRICS & GYNECOLOGY

## 2024-01-17 PROCEDURE — 70450 CT HEAD/BRAIN W/O DYE: CPT

## 2024-01-17 RX ORDER — ACETAMINOPHEN 500 MG
1000 TABLET ORAL ONCE
Status: COMPLETED | OUTPATIENT
Start: 2024-01-17 | End: 2024-01-17

## 2024-01-17 RX ORDER — NAPROXEN 500 MG/1
500 TABLET ORAL ONCE
Status: COMPLETED | OUTPATIENT
Start: 2024-01-17 | End: 2024-01-17

## 2024-01-17 RX ORDER — NAPROXEN 500 MG/1
500 TABLET ORAL 2 TIMES DAILY PRN
Qty: 14 TABLET | Refills: 0 | Status: SHIPPED | OUTPATIENT
Start: 2024-01-17

## 2024-01-17 RX ADMIN — NAPROXEN 500 MG: 500 TABLET ORAL at 01:35

## 2024-01-17 RX ADMIN — ACETAMINOPHEN 1000 MG: 500 TABLET, FILM COATED ORAL at 02:31

## 2024-01-17 NOTE — ED PROVIDER NOTES
Subjective:  History of Present Illness:    Patient's 38-year-old female with history of bipolar disorder, PTSD, panic disorder who presents today after a fall.  Reports that she was walking and slipped on ice.  Hit the back of her head, her back and landed on her knees.  Complaining of pain to all of those areas.  Ambulatory into the emergency department.  Denies any preceding medical complaints.  Denies loss of consciousness.  No obvious deformities noted.      Nurses Notes reviewed and agree, including vitals, allergies, social history and prior medical history.     REVIEW OF SYSTEMS: All systems reviewed and not pertinent unless noted.  Review of Systems   Constitutional:  Negative for activity change, appetite change, chills, fatigue and fever.   HENT:  Negative for rhinorrhea, sinus pressure and sinus pain.    Eyes:  Negative for discharge and itching.   Respiratory:  Negative for cough and shortness of breath.    Cardiovascular:  Negative for chest pain and leg swelling.   Gastrointestinal:  Negative for abdominal distention, abdominal pain, nausea and vomiting.   Endocrine: Negative for cold intolerance and heat intolerance.   Genitourinary:  Negative for decreased urine volume, difficulty urinating, flank pain, frequency, urgency, vaginal bleeding, vaginal discharge and vaginal pain.   Musculoskeletal:  Positive for arthralgias and neck pain. Negative for gait problem and neck stiffness.   Skin:  Negative for color change.   Allergic/Immunologic: Negative for environmental allergies.   Neurological:  Positive for headaches. Negative for seizures, syncope, facial asymmetry and speech difficulty.   Psychiatric/Behavioral:  Negative for self-injury and suicidal ideas.        Past Medical History:   Diagnosis Date    Allergic     Anxiety     Asthma Beings of copd with asthma    Back pain     Bell's palsy     Bipolar 2 disorder     Blood clot in vein     COPD (chronic obstructive pulmonary disease) Six months  ago    Deep vein thrombosis Last year    Depression     Diabetes mellitus November    Pre diabete    Frequent headaches     GERD (gastroesophageal reflux disease)     Hypertension     Every time i go into the [facility] room    Irritable bowel syndrome Two years ago    Kidney stone Two years ago    Migraine     Obesity All of my life    Panic     PTSD (post-traumatic stress disorder)     Pulmonary embolism Not in lungs    Behind left knee cap    Restless leg syndrome     Sinus problem     Snores     Tattoos     Urinary tract infection Have them on and off    Visual impairment Since i was little    Vitamin B12 deficiency        Allergies:    Aspirin, Codeine, Cyclobenzaprine, Haldol [haloperidol], Latex, Penicillins, Morphine, Ondansetron, Oxycodone-acetaminophen, Oxycontin [oxycodone], Triptans, Compazine [prochlorperazine], Lamictal [lamotrigine], and Reglan [metoclopramide]      Past Surgical History:   Procedure Laterality Date    CHOLECYSTECTOMY      TOOTH EXTRACTION           Social History     Socioeconomic History    Marital status: Single   Tobacco Use    Smoking status: Every Day     Packs/day: 2.00     Years: 15.00     Additional pack years: 0.00     Total pack years: 30.00     Types: Cigarettes     Start date: 7/17/1992     Passive exposure: Never    Smokeless tobacco: Never    Tobacco comments:     Not going to quite   Vaping Use    Vaping Use: Former   Substance and Sexual Activity    Alcohol use: Never    Drug use: Not Currently    Sexual activity: Not Currently     Partners: Female     Birth control/protection: Other, Same-sex partner         Family History   Problem Relation Age of Onset    Irritable bowel syndrome Mother     Heart disease Mother     Miscarriages / Stillbirths Mother     Arthritis Mother     COPD Mother     Learning disabilities Mother     Mental illness Mother     Irritable bowel syndrome Father     Alcohol abuse Father     Diabetes Father     Hyperlipidemia Father     Anxiety  "disorder Father     Learning disabilities Father     Colon cancer Maternal Grandfather     Stroke Paternal Grandfather     Stroke Paternal Grandmother        Objective  Physical Exam:  /92   Pulse 110   Temp 97.4 °F (36.3 °C) (Oral)   Resp 20   Ht 160 cm (63\")   Wt 108 kg (239 lb)   LMP 12/23/2023 (Approximate)   SpO2 (!) 89%   BMI 42.34 kg/m²      Physical Exam  Constitutional:       General: She is not in acute distress.     Appearance: Normal appearance. She is not ill-appearing.   HENT:      Head: Normocephalic.      Nose: Nose normal. No congestion or rhinorrhea.      Mouth/Throat:      Mouth: Mucous membranes are dry.      Pharynx: Oropharynx is clear. No oropharyngeal exudate or posterior oropharyngeal erythema.   Eyes:      Extraocular Movements: Extraocular movements intact.      Conjunctiva/sclera: Conjunctivae normal.      Pupils: Pupils are equal, round, and reactive to light.   Cardiovascular:      Rate and Rhythm: Normal rate and regular rhythm.      Pulses: Normal pulses.      Heart sounds: Normal heart sounds.   Pulmonary:      Effort: Pulmonary effort is normal. No respiratory distress.      Breath sounds: Normal breath sounds.   Abdominal:      General: Abdomen is flat. Bowel sounds are normal. There is no distension.      Palpations: Abdomen is soft.      Tenderness: There is no abdominal tenderness.   Musculoskeletal:         General: Tenderness and signs of injury present. No swelling or deformity. Normal range of motion.      Cervical back: Normal range of motion and neck supple. Tenderness present. No rigidity.      Right lower leg: No edema.      Left lower leg: No edema.   Skin:     General: Skin is warm and dry.      Capillary Refill: Capillary refill takes less than 2 seconds.   Neurological:      General: No focal deficit present.      Mental Status: She is alert and oriented to person, place, and time. Mental status is at baseline.      Cranial Nerves: No cranial nerve " deficit.      Sensory: No sensory deficit.      Motor: No weakness.      Coordination: Coordination normal.   Psychiatric:         Mood and Affect: Mood normal.         Behavior: Behavior normal.         Thought Content: Thought content normal.         Judgment: Judgment normal.         Procedures    ED Course:    ED Course as of 01/17/24 0317   Wed Jan 17, 2024   0119 EKG interpreted by me, sinus tachycardia with no concerning ST changes noted, rate of 104 [JE]      ED Course User Index  [JE] Meek Daniels MD       Lab Results (last 24 hours)       ** No results found for the last 24 hours. **             CT Cervical Spine Without Contrast    Result Date: 1/17/2024  FINAL REPORT TECHNIQUE: null CLINICAL HISTORY: Neck trauma, impaired ROM (Age 16-64y) COMPARISON: null FINDINGS: CT cervical spine without contrast Comparison: None Findings: The neck is in flexion. There is no listhesis. There is no fracture. Disc spaces are preserved. There is no evidence of significant central canal or neuroforaminal stenosis. Visualized soft tissues of the neck are unremarkable.     Impression: Impression: No evidence of cervical spine injury. Authenticated and Electronically Signed by Joe Galan MD on 01/17/2024 02:11:42 AM    CT Lumbar Spine Without Contrast    Result Date: 1/17/2024  FINAL REPORT TECHNIQUE: null CLINICAL HISTORY: Back trauma, no prior imaging (Age >= 16y) COMPARISON: null FINDINGS: CT lumbar spine without contrast Comparison: CT/SR - CT LUMBAR SPINE WO CONTRAST - 01/02/2024 01:05 AM EST Findings: There is stable degenerative grade 1 anterolisthesis of L4 on L5 due to severe facet osteoarthritis. There is no fracture. There is minimal L4-5 disc space narrowing. There is multilevel facet osteoarthritis. Greatest at L4-5. There appears to be moderate central canal stenosis at L4-5. There appear to be moderate bilateral neuroforaminal stenoses at L4-5 and L5-S1.     Impression: Impression: No evidence of  lumbar spine injury. Authenticated and Electronically Signed by Joe Galan MD on 01/17/2024 02:11:06 AM    CT Head Without Contrast    Result Date: 1/17/2024  FINAL REPORT TECHNIQUE: null CLINICAL HISTORY: Head trauma, abnormal mental status (Age 18-64y) COMPARISON: null FINDINGS: CT head without contrast Comparison: CT/SR - CT HEAD WO CONTRAST - 01/02/2024 01:05 AM EST Findings: There is no acute intracranial hemorrhage. Ventricles are of normal size and shape. No mass effect or midline shift is present. The gray-white matter differentiation appears normal. There is a small amount of mucus in the left maxillary sinus. No fractures are identified.     Impression: Impression: No intracranial abnormality. Authenticated and Electronically Signed by Joe Galan MD on 01/17/2024 02:05:45 AM        MDM     Amount and/or Complexity of Data Reviewed  Independent visualization of images, tracings, or specimens: yes        Initial impression of presenting illness: Fall    DDX: includes but is not limited to: Intracranial hemorrhage, C-spine fracture, femur fractures, soft tissue injuries    Patient arrives stable with vitals interpreted by myself.     Pertinent features from physical exam: Clear to auscultation, regular rate rhythm, no murmur, normal neuroexam, patient resting tenderness to bilateral knees with any obvious deformities, endorsing C-spine tenderness.    Initial diagnostic plan: CT head, C-spine, CT lumbar spine, plain film of bilateral knees    Results from initial plan were reviewed and interpreted by me revealing no concern for acute traumatic injury    Diagnostic information from other sources: Reviewed past medical records    Interventions / Re-evaluation: Given naproxen for symptom control    Results/clinical rationale were discussed with patient at bedside    Consultations/Discussion of results with other physicians: Discussed negative trauma workup in our emergency department, given naproxen for  symptom control at home and encouraged follow-up with primary care doctor to ensure resolution of symptoms.  Strict precaution for severe increase in headache, visual change, confusion    Disposition plan: Discharge  -----        Final diagnoses:   Fall, initial encounter   Acute pain of both knees   Acute midline low back pain without sciatica          Meek Daniels MD  01/17/24 4845

## 2024-01-17 NOTE — DISCHARGE INSTRUCTIONS
You were evaluated after fall.  We got CT scans of your head neck back and x-rays of your knees.  This was all negative for any acute process.  You are now stable for discharge.  Tepley symptoms at home we have sent a course of naproxen and would recommend you follow-up with your primary care doctor to ensure that you improve appropriately.  You are now stable for discharge.

## 2024-01-17 NOTE — Clinical Note
[facility] EMERGENCY DEPARTMENT  [address] [address]  Phone: [phone]    Lindsay Amezquita was seen and treated in our emergency department on 1/17/2024.  She may return to work on 01/20/2024.         Thank you for choosing [facility].    Meek Daniels MD

## 2024-01-18 ENCOUNTER — PATIENT ROUNDING (BHMG ONLY) (OUTPATIENT)
Dept: OBSTETRICS AND GYNECOLOGY | Facility: CLINIC | Age: 39
End: 2024-01-18
Payer: MEDICAID

## 2024-01-18 NOTE — PROGRESS NOTES
January 18, 2024    Hello, may I speak with Lindsay Amezquita?    My name is Romina      I am  with [facility]  [address], [address]  [place] [address] [phone].    Before we get started may I verify your date of birth? 1985    I am calling to officially welcome you to our practice and ask about your recent visit. Is this a good time to talk? yes    Tell me about your visit with us. What things went well?  Patient states that she really like Dr Ribeiro and that she is just waiting to get a ultrasound and is then getting scheduled for surgery.        We're always looking for ways to make our patients' experiences even better. Do you have recommendations on ways we may improve?  no    Overall were you satisfied with your first visit to our practice? yes       I appreciate you taking the time to speak with me today. Is there anything else I can do for you? no      Thank you, and have a great day.

## 2024-01-19 LAB
A VAGINAE DNA VAG QL NAA+PROBE: ABNORMAL SCORE
BVAB2 DNA VAG QL NAA+PROBE: ABNORMAL SCORE
C ALBICANS DNA VAG QL NAA+PROBE: NEGATIVE
C GLABRATA DNA VAG QL NAA+PROBE: POSITIVE
C TRACH DNA VAG QL NAA+PROBE: NEGATIVE
MEGA1 DNA VAG QL NAA+PROBE: ABNORMAL SCORE
N GONORRHOEA DNA VAG QL NAA+PROBE: NEGATIVE
T VAGINALIS DNA VAG QL NAA+PROBE: NEGATIVE

## 2024-01-20 LAB — REF LAB TEST METHOD: NORMAL

## 2024-01-22 DIAGNOSIS — N76.0 BV (BACTERIAL VAGINOSIS): Primary | ICD-10-CM

## 2024-01-22 DIAGNOSIS — G43.001 MIGRAINE WITHOUT AURA AND WITH STATUS MIGRAINOSUS, NOT INTRACTABLE: ICD-10-CM

## 2024-01-22 DIAGNOSIS — B37.31 VAGINAL YEAST INFECTION: ICD-10-CM

## 2024-01-22 DIAGNOSIS — B96.89 BV (BACTERIAL VAGINOSIS): Primary | ICD-10-CM

## 2024-01-22 RX ORDER — TIZANIDINE 4 MG/1
4 TABLET ORAL EVERY 8 HOURS PRN
Qty: 90 TABLET | Refills: 0 | OUTPATIENT
Start: 2024-01-22

## 2024-01-22 RX ORDER — RIMEGEPANT SULFATE 75 MG/75MG
75 TABLET, ORALLY DISINTEGRATING ORAL EVERY OTHER DAY
Qty: 16 TABLET | Refills: 0 | Status: SHIPPED | OUTPATIENT
Start: 2024-01-22

## 2024-01-22 RX ORDER — METRONIDAZOLE 500 MG/1
500 TABLET ORAL 2 TIMES DAILY
Qty: 14 TABLET | Refills: 0 | Status: SHIPPED | OUTPATIENT
Start: 2024-01-22 | End: 2024-01-29

## 2024-01-22 RX ORDER — FLUCONAZOLE 150 MG/1
TABLET ORAL
Qty: 2 TABLET | Refills: 0 | Status: SHIPPED | OUTPATIENT
Start: 2024-01-22

## 2024-01-22 NOTE — TELEPHONE ENCOUNTER
Caller: Lindsay Amezquita    Relationship: Self    Best call back number: [phone]    Requested Prescriptions:   Requested Prescriptions     Pending Prescriptions Disp Refills    Rimegepant Sulfate (Nurtec) 75 MG tablet dispersible tablet 16 tablet 0     Sig: Take 1 tablet by mouth Every Other Day. Take Monday,Wednesday,Friday, and Saturday.        Pharmacy where request should be sent: [facility]/PHARMACY #[id number] - [address] [phone] [place] [phone]      Last office visit with prescribing clinician: 5/23/2023   Last telemedicine visit with prescribing clinician: Visit date not found   Next office visit with prescribing clinician: 2/5/2024     Additional details provided by patient: COMPLETELY OUT    Does the patient have less than a 3 day supply:  [x] Yes  [] No    Would you like a call back once the refill request has been completed: [] Yes [x] No    If the office needs to give you a call back, can they leave a voicemail: [] Yes [x] No    Kim Pham Rep   01/22/24 09:12 EST

## 2024-01-23 ENCOUNTER — TELEPHONE (OUTPATIENT)
Dept: OBSTETRICS AND GYNECOLOGY | Facility: CLINIC | Age: 39
End: 2024-01-23
Payer: MEDICAID

## 2024-01-23 ENCOUNTER — TELEMEDICINE (OUTPATIENT)
Dept: FAMILY MEDICINE CLINIC | Facility: TELEHEALTH | Age: 39
End: 2024-01-23
Payer: MEDICAID

## 2024-01-23 DIAGNOSIS — J06.9 ACUTE URI: Primary | ICD-10-CM

## 2024-01-23 DIAGNOSIS — F41.9 ANXIETY: ICD-10-CM

## 2024-01-23 PROCEDURE — 1160F RVW MEDS BY RX/DR IN RCRD: CPT | Performed by: NURSE PRACTITIONER

## 2024-01-23 PROCEDURE — 1159F MED LIST DOCD IN RCRD: CPT | Performed by: NURSE PRACTITIONER

## 2024-01-23 PROCEDURE — 99213 OFFICE O/P EST LOW 20 MIN: CPT | Performed by: NURSE PRACTITIONER

## 2024-01-23 RX ORDER — BENZONATATE 200 MG/1
200 CAPSULE ORAL 3 TIMES DAILY PRN
Qty: 30 CAPSULE | Refills: 0 | Status: SHIPPED | OUTPATIENT
Start: 2024-01-23 | End: 2024-02-02

## 2024-01-23 NOTE — TELEPHONE ENCOUNTER
Patient has appointment for transvaginal ultrasound and to discuss results after. She called to see if we can call her in something for her nerves to take before her appointment.  Patient unsure if she will be able to have ultrasound if she doesn't take.

## 2024-01-23 NOTE — PROGRESS NOTES
GYN Office Visit    Subjective   Chief Complaint   Patient presents with    consult hysterectomy     Discuss hysterectomy , refuses TVS/pelvic exam     Lindsay Amezquita is a 38 y.o. year old  presenting to be seen for pain and bleeding.    She reports longstanding issues with heavy vaginal bleeding and pelvic pain.  She passes large clots and has significant pain with menses.  She does soil clothing and bed sheets with bleeding.  Bleeding is irregular and can be prolonged each month. Her bleeding and pain symptoms are debilitating and affect her ability to participate in activities of daily life. She strongly desires hysterectomy.    History of DVT roughly 5 years ago requiring Eliquis x 1 year.  Clotting workup was normal.  No current anticoagulation.    History of COPD and type 2 diabetes.  Most recent hemoglobin A1c was 7% in January.    OB Hx:  at 31 weeks, SAB x 2  Pap smear: Absent T-zone on Pap smear last year    Past Medical History:   Diagnosis Date    Allergic     Anxiety     Asthma Beings of copd with asthma    Back pain     Bell's palsy     Bipolar 2 disorder     Blood clot in vein     COPD (chronic obstructive pulmonary disease) Six months ago    Deep vein thrombosis Last year    Depression     Diabetes mellitus November    Pre diabete    Frequent headaches     GERD (gastroesophageal reflux disease)     Hypertension     Every time i go into the [facility] room    Irritable bowel syndrome Two years ago    Kidney stone Two years ago    Migraine     Obesity All of my life    Ovarian cyst Two years ago    Panic     PTSD (post-traumatic stress disorder)     Pulmonary embolism Not in lungs    Behind left knee cap    Recurrent pregnancy loss, antepartum condition or complication Every since i have been 14 yars old    Restless leg syndrome     Sinus problem     Snores     Tattoos     Urinary tract infection Have them on and off    Varicella Little    Visual impairment Since i was little    Vitamin B12  deficiency        Past Surgical History:   Procedure Laterality Date    CHOLECYSTECTOMY      TOOTH EXTRACTION      WISDOM TOOTH EXTRACTION  All my teeth       Family History   Problem Relation Age of Onset    Irritable bowel syndrome Mother     Heart disease Mother     Miscarriages / Stillbirths Mother     Arthritis Mother     COPD Mother     Learning disabilities Mother     Mental illness Mother     Irritable bowel syndrome Father     Alcohol abuse Father     Diabetes Father     Hyperlipidemia Father     Anxiety disorder Father     Learning disabilities Father     Colon cancer Maternal Grandfather     Stroke Paternal Grandfather     Stroke Paternal Grandmother         Social History     Tobacco Use    Smoking status: Every Day     Packs/day: 2.00     Years: 15.00     Additional pack years: 0.00     Total pack years: 30.00     Types: Cigarettes     Start date: 7/17/1992     Passive exposure: Never    Smokeless tobacco: Never    Tobacco comments:     Not going to quite   Vaping Use    Vaping Use: Former   Substance Use Topics    Alcohol use: Never    Drug use: Not Currently       (Not in a hospital admission)      Aspirin, Codeine, Cyclobenzaprine, Haldol [haloperidol], Latex, Penicillins, Morphine, Ondansetron, Oxycodone-acetaminophen, Oxycontin [oxycodone], Triptans, Compazine [prochlorperazine], Lamictal [lamotrigine], and Reglan [metoclopramide]    Current Outpatient Medications on File Prior to Visit   Medication Sig Dispense Refill    ALPRAZolam (Xanax) 1 MG tablet Take 1 tablet by mouth 3 (Three) Times a Day As Needed for Sleep. 90 tablet 1    amitriptyline (ELAVIL) 150 MG tablet Take 1 tablet by mouth every night at bedtime. 30 tablet 0    Atogepant (Qulipta) 60 MG tablet Take 1 tablet by mouth Daily. 30 tablet 5    atorvastatin (LIPITOR) 40 MG tablet Take 1 tablet by mouth every night at bedtime. 90 tablet 3    Blood Glucose Monitoring Suppl (FreeStyle Freedom Lite) w/Device kit        butalbital-acetaminophen-caffeine (Esgic) -40 MG per tablet Take 1 tablet by mouth Every 12 (Twelve) Hours As Needed for Migraine. 20 tablet 0    chlorhexidine (HIBICLENS) 4 % external liquid Apply  topically to the appropriate area as directed Daily. 473 mL 3    desvenlafaxine (PRISTIQ) 100 MG 24 hr tablet Take 1 tablet by mouth Daily. 30 tablet 1    diazePAM (VALIUM) 2 MG tablet Take 1 tablet by mouth Every 6 (Six) Hours As Needed for Muscle Spasms. 10 tablet 0    Dihydroergotamine Mesylate HFA (Trudhesa) 0.725 MG/ACT aerosol solution 0.725 Act into the nostril(s) as directed by provider 1 (One) Time As Needed (migraine) for up to 1 dose. May repeat in 1 hour 8 mL 1    diphenhydrAMINE (BENADRYL) 50 MG capsule Take 1 capsule by mouth.      empagliflozin (Jardiance) 10 MG tablet tablet Take 1 tablet by mouth Daily. 30 tablet 3    fluticasone (FLONASE) 50 MCG/ACT nasal spray 2 sprays into the nostril(s) as directed by provider Daily. 48 g 0    gabapentin (NEURONTIN) 800 MG tablet Take 1 tablet by mouth 3 (Three) Times a Day.      glucose blood test strip Use as instructed 50 each 12    glucose monitor monitoring kit Check glucose daily 1 each 0    hydrOXYzine (ATARAX) 50 MG tablet Take 1 tablet by mouth 3 (Three) Times a Day As Needed (anxiety and/or sleep). 90 tablet 1    ibuprofen (ADVIL,MOTRIN) 800 MG tablet Take 1 tablet by mouth.      ketorolac (TORADOL) 10 MG tablet Take 1 tablet by mouth Every 6 (Six) Hours As Needed for Severe Pain. 12 tablet 0    Lancets misc Check fasting glucose daily and 1 hour after largest meal of day. 100 each 3    loratadine (Claritin) 10 MG tablet Take 1 tablet by mouth Daily. 30 tablet 5    Lurasidone HCl (Latuda) 120 MG tablet tablet Take 1 tablet by mouth Daily. 30 tablet 0    naloxone (NARCAN) 4 MG/0.1ML nasal spray 1 spray into the nostril(s) as directed by provider As Needed.      nystatin (MYCOSTATIN) 100,000 unit/mL suspension Take 5 mL by mouth 4 (Four) Times a Day.  "473 mL 0    nystatin (MYCOSTATIN) 484822 UNIT/GM powder Apply  topically to the appropriate area as directed 2 (Two) Times a Day. 60 g 3    omeprazole (priLOSEC) 40 MG capsule Take 1 capsule by mouth 2 (Two) Times a Day. 180 capsule 1    OXcarbazepine (TRILEPTAL) 300 MG tablet Take 1 tablet by mouth 2 (Two) Times a Day. 60 tablet 1    promethazine (PHENERGAN) 25 MG tablet Take 1 tablet by mouth Every 6 (Six) Hours As Needed for Nausea or Vomiting. 45 tablet 3    promethazine-dextromethorphan (PROMETHAZINE-DM) 6.25-15 MG/5ML syrup Take 5 mL by mouth At Night As Needed for Cough. 118 mL 0    Rimegepant Sulfate (Nurtec) 75 MG tablet dispersible tablet Take 1 tablet by mouth 1 (One) Time As Needed (HA) for up to 1 dose. 6 tablet 0    tiZANidine (ZANAFLEX) 4 MG tablet Take 1 tablet by mouth Every 8 (Eight) Hours As Needed for Muscle Spasms. Further refills should come from pain management. 90 tablet 0    Umeclidinium-Vilanterol (Anoro Ellipta) 62.5-25 MCG/ACT aerosol powder  inhaler Inhale 1 puff Daily. 60 each 12    Ventolin  (90 Base) MCG/ACT inhaler INHALE 2 PUFFS EVERY 4 HOURS AS NEEDED FOR WHEEZING OR SHORTNESS OF AIR 18 g 0     No current facility-administered medications on file prior to visit.       Social History    Tobacco Use      Smoking status: Every Day        Packs/day: 2.00        Years: 15.00        Additional pack years: 0.00        Total pack years: 30.00        Types: Cigarettes        Start date: 7/17/1992        Passive exposure: Never      Smokeless tobacco: Never      Tobacco comments: Not going to quite         Objective   /80   Ht 160 cm (63\")   Wt 108 kg (239 lb)   LMP 12/26/2023 (Approximate)   BMI 42.34 kg/m²     Physical Exam:  General Appearance: alert, pleasant, appears stated age, interactive and cooperative  Breasts: Not performed.  Abdomen: no masses, no hepatomegaly, no splenomegaly, soft non-tender, no guarding and no rebound tenderness  Pelvis:  Pelvic: Clinical " staff was present for exam  External genitalia:  normal appearance of the external genitalia including Bartholin's and [place]'s glands.  :  urethral meatus normal;  Vagina:  normal pink mucosa without prolapse or lesions.  Narrow vaginal introitus.  Difficult exam.  Cervix:  normal appearance. Difficult to visualize.  Uterus:  normal size, shape and consistency.  Adnexa:  normal bimanual exam of the adnexa.  Rectal:  digital rectal exam not performed; anus visually normal appearing.         Medical Decision Making:    Assessment   Abnormal uterine bleeding  Menorrhagia with irregular cycle  Dysmenorrhea  Screening for malignancy the cervix     Plan    Orders Placed This Encounter   Procedures    NuSwab VG+ - Swab, Vagina     Order Specific Question:   Release to patient     Answer:   Routine Release [[id number]]       Medication Management: None    Procedures Performed: Pap smear    We reviewed her situation in detail today.  A Pap smear and vaginal cultures were obtained today.  She will return for pelvic ultrasound next week and ongoing discussion of treatment options.  All questions answered.    David Ribeiro MD  Obstetrics and Gynecology  [facility]

## 2024-01-24 DIAGNOSIS — F41.9 ANXIETY: Primary | ICD-10-CM

## 2024-01-24 RX ORDER — DIAZEPAM 2 MG/1
2 TABLET ORAL ONCE
Qty: 1 TABLET | Refills: 0 | Status: SHIPPED | OUTPATIENT
Start: 2024-01-24 | End: 2024-01-24

## 2024-01-24 NOTE — PROGRESS NOTES
HPI  Lindsay Amezquita is a 38 y.o. female  presents with 4 day complaint of symptoms starting yesterday including cough, sore throat, N/V, hoarseness, green drainage. She has taken phenergan for it. She is also nervous about an upcoming ultrasound and surgery.     Review of Systems    Past Medical History:   Diagnosis Date    Allergic     Anxiety     Asthma Beings of copd with asthma    Back pain     Bell's palsy     Bipolar 2 disorder     Blood clot in vein     COPD (chronic obstructive pulmonary disease) Six months ago    Deep vein thrombosis Last year    Depression     Diabetes mellitus November    Pre diabete    Frequent headaches     GERD (gastroesophageal reflux disease)     Hypertension     Every time i go into the [facility] room    Irritable bowel syndrome Two years ago    Kidney stone Two years ago    Migraine     Obesity All of my life    Ovarian cyst Two years ago    Panic     PTSD (post-traumatic stress disorder)     Pulmonary embolism Not in lungs    Behind left knee cap    Recurrent pregnancy loss, antepartum condition or complication Every since i have been 14 yars old    Restless leg syndrome     Sinus problem     Snores     Tattoos     Urinary tract infection Have them on and off    Varicella Little    Visual impairment Since i was little    Vitamin B12 deficiency        Family History   Problem Relation Age of Onset    Irritable bowel syndrome Mother     Heart disease Mother     Miscarriages / Stillbirths Mother     Arthritis Mother     COPD Mother     Learning disabilities Mother     Mental illness Mother     Irritable bowel syndrome Father     Alcohol abuse Father     Diabetes Father     Hyperlipidemia Father     Anxiety disorder Father     Learning disabilities Father     Colon cancer Maternal Grandfather     Stroke Paternal Grandfather     Stroke Paternal Grandmother        Social History     Socioeconomic History    Marital status: Single   Tobacco Use    Smoking status: Every Day     Packs/day:  2.00     Years: 15.00     Additional pack years: 0.00     Total pack years: 30.00     Types: Cigarettes     Start date: 7/17/1992     Passive exposure: Never    Smokeless tobacco: Never    Tobacco comments:     Not going to quite   Vaping Use    Vaping Use: Former   Substance and Sexual Activity    Alcohol use: Never    Drug use: Not Currently    Sexual activity: Not Currently     Partners: Female     Birth control/protection: Other, Same-sex partner         LMP 12/23/2023 (Approximate)     PHYSICAL EXAM  Physical Exam   Constitutional: She appears well-developed and well-nourished.   HENT:   Head: Normocephalic.   Nose: Nose normal.   Neck: Neck normal appearance.  Pulmonary/Chest: Effort normal.   Neurological: She is alert.   Psychiatric: She has a normal mood and affect. Her speech is normal.       Diagnoses and all orders for this visit:    1. Acute URI (Primary)  -     benzonatate (TESSALON) 200 MG capsule; Take 1 capsule by mouth 3 (Three) Times a Day As Needed for Cough for up to 10 days.  Dispense: 30 capsule; Refill: 0    2. Anxiety    -Speak with GYN office about anxiety related to upcoming procedure.   -Take flonase daily for drainage.      FOLLOW-UP  As discussed during visit with [facility], if symptoms worsen or fail to improve, follow-up with PCP/Urgent Care/Emergency Department.    Patient verbalizes understanding of medications, instructions for treatment and follow-up.    Adelaida Willams, APRN  01/23/2024  20:56 EST    The use of a video visit has been reviewed with the patient and verbal informed consent has been obtained. Myself and Lindsay Amezquita participated in this visit. The patient is located in [place], [place], and I am located in [place], [place]. [employer] and [employer] Video Client were utilized.

## 2024-01-27 ENCOUNTER — TELEMEDICINE (OUTPATIENT)
Dept: FAMILY MEDICINE CLINIC | Facility: TELEHEALTH | Age: 39
End: 2024-01-27
Payer: MEDICAID

## 2024-01-27 VITALS — WEIGHT: 239 LBS | BODY MASS INDEX: 42.35 KG/M2 | HEIGHT: 63 IN

## 2024-01-27 DIAGNOSIS — J32.9 VIRAL SINUSITIS: Primary | ICD-10-CM

## 2024-01-27 DIAGNOSIS — B97.89 VIRAL SINUSITIS: Primary | ICD-10-CM

## 2024-01-27 RX ORDER — GUAIFENESIN 600 MG/1
600 TABLET, EXTENDED RELEASE ORAL 2 TIMES DAILY
Qty: 28 TABLET | Refills: 0 | Status: SHIPPED | OUTPATIENT
Start: 2024-01-27 | End: 2024-02-10

## 2024-01-27 RX ORDER — DIAZEPAM 2 MG/1
TABLET ORAL
COMMUNITY
Start: 2024-01-24

## 2024-01-27 NOTE — PROGRESS NOTES
You have chosen to receive care through a telehealth visit.  Do you consent to use a video/audio connection for your medical care today? Yes     HPI  Lindsay Amezquita is a 38 y.o. female  presents with complaint of sinus problem. She reports that her nose is stuffy and she keeps coughing up dark green stuff. She also reports that sometimes she gets sick to her stomach. She does have nausea  medication on hand. She also reports a super bad headache  She does have migraine medication on hand. She has been taking ibuprofen for her headache. Additionally she reports that she does not think believe that she is am running a fever . She also reports that she is having a lot of anxiety related to a possible upcoming hysterectomy. Her chart inclcuding visits and medication list has been reviewed.    Review of Systems   Constitutional:  Negative for fever.   HENT:  Positive for congestion, sinus pressure and sinus pain. Negative for sore throat.    Respiratory:  Positive for cough. Negative for shortness of breath and wheezing.         Productive, dark green   Gastrointestinal:  Positive for nausea. Negative for diarrhea and vomiting.   Musculoskeletal:  Negative for myalgias.   Neurological:  Positive for facial asymmetry and headaches.   Psychiatric/Behavioral:  The patient is nervous/anxious.        Past Medical History:   Diagnosis Date    Allergic     Anxiety     Asthma Beings of copd with asthma    Back pain     Bell's palsy     Bipolar 2 disorder     Blood clot in vein     COPD (chronic obstructive pulmonary disease) Six months ago    Deep vein thrombosis Last year    Depression     Diabetes mellitus November    Pre diabete    Frequent headaches     GERD (gastroesophageal reflux disease)     Hypertension     [facility]    Irritable bowel syndrome Two years ago    Kidney stone Two years ago    Migraine     Obesity All of my life    Ovarian cyst Two years ago    Panic     PTSD (post-traumatic  stress disorder)     Pulmonary embolism Not in lungs    Behind left knee cap    Recurrent pregnancy loss, antepartum condition or complication Every since i have been 14 yars old    Restless leg syndrome     Sinus problem     Snores     Tattoos     Urinary tract infection Have them on and off    Varicella Little    Visual impairment Since i was little    Vitamin B12 deficiency        Family History   Problem Relation Age of Onset    Irritable bowel syndrome Mother     Heart disease Mother     Miscarriages / Stillbirths Mother     Arthritis Mother     COPD Mother     Learning disabilities Mother     Mental illness Mother     Irritable bowel syndrome Father     Alcohol abuse Father     Diabetes Father     Hyperlipidemia Father     Anxiety disorder Father     Learning disabilities Father     Colon cancer Maternal Grandfather     Stroke Paternal Grandfather     Stroke Paternal Grandmother        Social History     Socioeconomic History    Marital status: Single   Tobacco Use    Smoking status: Some Days     Packs/day: 2.00     Years: 15.00     Additional pack years: 0.00     Total pack years: 30.00     Types: Cigarettes     Start date: 7/17/1992     Passive exposure: Never    Smokeless tobacco: Never    Tobacco comments:     Not going to quite   Vaping Use    Vaping Use: Former   Substance and Sexual Activity    Alcohol use: Never    Drug use: Not Currently    Sexual activity: Not Currently     Partners: Female     Birth control/protection: Other, Same-sex partner       Lindsay Amezquita  reports that she has been smoking cigarettes. She started smoking about 31 years ago. She has a 30.00 pack-year smoking history. She has never been exposed to tobacco smoke. She has never used smokeless tobacco.. I have educated her on the risk of diseases from using tobacco products such as cancer, COPD, and heart disease.     I advised her to quit and she is not willing to quit.    I spent 3  minutes counseling the patient.       Ht 160  "cm (63\")   Wt 108 kg (239 lb)   LMP 12/23/2023 (Approximate)   Breastfeeding No   BMI 42.34 kg/m²     PHYSICAL EXAM  Physical Exam   Constitutional: She is oriented to person, place, and time. She appears well-developed.   Eyes half open   HENT:   Head: Normocephalic and atraumatic.   Nose: Congestion present. Right sinus exhibits maxillary sinus tenderness (patient directed exam) and frontal sinus tenderness (patient directed exam). Left sinus exhibits maxillary sinus tenderness (patient directed exam) and frontal sinus tenderness (patient directed exam).   Speech slurred   Eyes: Lids are normal. Right eye exhibits no discharge. Left eye exhibits no discharge. Right conjunctiva is not injected. Left conjunctiva is not injected.   Pulmonary/Chest:  No respiratory distress.  Neurological: She is oriented to person, place, and time. No cranial nerve deficit.   Psychiatric: She has a normal mood and affect. Her speech is normal and behavior is normal. Judgment and thought content normal.       Results for orders placed or performed in visit on 01/17/24   NuSwab VG+ - Swab, Vagina    Specimen: Vagina; Swab    VA   Result Value Ref Range    Atopobium Vaginae High - 2 (A) Score    BVAB 2 Low - 0 Score    Megasphaera 1 Low - 0 Score    Candida Albicans, RUBY Negative Negative    Emilie Glabrata, RUBY Positive (A) Negative    Trichomonas vaginosis Negative Negative    Chlamydia trachomatis, RUBY Negative Negative    Neisseria gonorrhoeae, RUBY Negative Negative   LIQUID-BASED PAP SMEAR WITH HPV GENOTYPING REGARDLESS OF INTERPRETATION (ALLEY,COR,MAD)    Specimen: Cervix; ThinPrep Vial   Result Value Ref Range    Reference Lab Report       [facility]  [address] [place] [address]  Phone: [phone] or [phone]  Fax: [phone]  Cory Foreman M.D., Medical Director    PATIENT NAME                                     LABORATORY NO.  [id number]   [address] COTAP[address]                             "      [id number]  [id number]                                 AGE                    SEX   SSN              CLIENT REF #  [id number]                        38        1985      F     [id number]      [id number]    [facility] 3          REQUESTING M.D.           ATTENDING [place] [address]                         DATE COLLECTED            DATE RECEIVED          DATE REPORTED  2024    ThinPrep Pap with [facility]    DIAGNOSIS:  Negative for intraepithelial lesion or malignancy    Multiple factors can influence accuracy of Pap  tests; therefore, screening at  regular intervals is necessary for early cancer detection.      SPECIMEN ADEQUACY:  SATISFACTORY FOR EVALUATION  Transformation zone is present.  SOURCE OF SPECIMEN:       CERVICAL/ENDOCERVICAL  SLIDES:  1  CLINICAL HISTORY:  Screening for malignant neoplasm of cervix    HPV  HR-HPV POOL: Negative    The Aptima HPV assay is an in vitro nucleic acid amplification test for the  qualitative detection of E6/E7 viral messenger RNA from 14 high risk types of  HPV in cervical specimens. The high risk HPV types detected include: 16, 18,  31, 33, 35, 39, 45, 51, 52, 56, 58, 59, 66, 68    CYTOTECHNOLOGIST:             HARJINDER KULKARNI (ASCP)    CPT CODES:  67567, 25645         Diagnoses and all orders for this visit:    1. Viral sinusitis (Primary)    Other orders  -     guaiFENesin (Mucinex) 600 MG 12 hr tablet; Take 1 tablet by mouth 2 (Two) Times a Day for 14 days.  Dispense: 28 tablet; Refill: 0    Mucinex with plenty of fluids especially water to thin secretions and help with congestion.    FOLLOW-UP  If symptoms worsen or persist follow up with PCP, [facility] or Urgent Care    Patient verbalizes understanding of medication dosage, comfort measures, instructions for treatment and follow-up.    Patito GARCIA  CLAIR Ballesteros  01/27/2024  15:43 EST    The use of a video visit has been reviewed with the patient and verbal informed consent has been obtained. Myself and Lindsay Amezquita participated in this visit. The patient is located in [address] [place] [address] [place] [address] [place] [address].    I am located in [place], [place]. [employer] and [employer] Video Client were utilized. I spent 25 minutes in the patient's chart for this visit.

## 2024-01-30 ENCOUNTER — TELEPHONE (OUTPATIENT)
Dept: OBSTETRICS AND GYNECOLOGY | Facility: CLINIC | Age: 39
End: 2024-01-30

## 2024-01-30 ENCOUNTER — TELEPHONE (OUTPATIENT)
Dept: INTERNAL MEDICINE | Facility: CLINIC | Age: 39
End: 2024-01-30
Payer: MEDICAID

## 2024-01-30 NOTE — TELEPHONE ENCOUNTER
Caller: Lindsay Amezquita    Relationship: Self    Best call back number: [phone]     What is the medical concern/diagnosis: PAIN MANAGEMENT, SCIATICA, TINGLING LEGS AND FEET, MANAGE FOR LORTAB  10 MG, ZANAFLEX 4 MG AND GABAPENTIN 800    What specialty or service is being requested: PAIN MANAGEMENT    What is the provider, practice or medical service name: [facility],     What is the office location: [place]    What is the office phone number:   [phone]    Any additional details: CALL WITH APPOINTMENT INFORMATION, DID NOT LIKE RECENT REFERRED PAIN MANAGEMENT DOCTOR

## 2024-01-30 NOTE — TELEPHONE ENCOUNTER
Patient was on the schedule for today but had to RS, we did not have ultra sound tech. Wanted to let you know she is having heavy bleeding, having to use heating pad and laying on a puppy pad.  She is not bleeding through one pad an hour.     Thank You

## 2024-01-31 ENCOUNTER — TELEMEDICINE (OUTPATIENT)
Dept: BEHAVIORAL HEALTH | Facility: CLINIC | Age: 39
End: 2024-01-31
Payer: MEDICAID

## 2024-01-31 DIAGNOSIS — F51.04 PSYCHOPHYSIOLOGICAL INSOMNIA: ICD-10-CM

## 2024-01-31 DIAGNOSIS — F31.30 BIPOLAR I DISORDER, MOST RECENT EPISODE DEPRESSED: Primary | ICD-10-CM

## 2024-01-31 DIAGNOSIS — F43.10 POST TRAUMATIC STRESS DISORDER (PTSD): ICD-10-CM

## 2024-01-31 DIAGNOSIS — F41.1 GENERALIZED ANXIETY DISORDER: ICD-10-CM

## 2024-01-31 PROCEDURE — 1160F RVW MEDS BY RX/DR IN RCRD: CPT

## 2024-01-31 PROCEDURE — 99214 OFFICE O/P EST MOD 30 MIN: CPT

## 2024-01-31 PROCEDURE — 1159F MED LIST DOCD IN RCRD: CPT

## 2024-01-31 RX ORDER — DESVENLAFAXINE 100 MG/1
100 TABLET, EXTENDED RELEASE ORAL DAILY
Qty: 30 TABLET | Refills: 1 | Status: SHIPPED | OUTPATIENT
Start: 2024-01-31

## 2024-01-31 RX ORDER — HYDROXYZINE 50 MG/1
75 TABLET, FILM COATED ORAL 3 TIMES DAILY PRN
Qty: 135 TABLET | Refills: 1 | Status: SHIPPED | OUTPATIENT
Start: 2024-01-31

## 2024-01-31 RX ORDER — OXCARBAZEPINE 300 MG/1
300 TABLET, FILM COATED ORAL 2 TIMES DAILY
Qty: 60 TABLET | Refills: 1 | Status: SHIPPED | OUTPATIENT
Start: 2024-01-31

## 2024-01-31 RX ORDER — LURASIDONE HYDROCHLORIDE 120 MG/1
120 TABLET, FILM COATED ORAL DAILY
Qty: 30 TABLET | Refills: 1 | Status: SHIPPED | OUTPATIENT
Start: 2024-01-31

## 2024-01-31 RX ORDER — ALPRAZOLAM 1 MG/1
1 TABLET ORAL 3 TIMES DAILY PRN
Qty: 90 TABLET | Refills: 1 | Status: SHIPPED | OUTPATIENT
Start: 2024-01-31 | End: 2025-01-30

## 2024-01-31 NOTE — PROGRESS NOTES
This provider is located at [facility] ,[address], [address] [facility] in [place], [place],using a secure MyChart Video Visit through [employer]. Patient is being seen remotely via telehealth at their home address in [place], and stated they are in a secure environment for this session. The patient's condition being diagnosed/treated is appropriate for telemedicine. The provider identified herself as well as her credentials.   The patient, and/or patients guardian, consent to be seen remotely, and when consent is given they understand that the consent allows for patient identifiable information to be sent to a third party as needed.   They may refuse to be seen remotely at any time. The electronic data is encrypted and password protected, and the patient and/or guardian has been advised of the potential risks to privacy not withstanding such measures.    Video Visit    Patient Name: Lindsay Amezquita  : 1985   MRN: [id number]   Care Team: Patient Care Team:  Charley Robles APRN as PCP - General (Family Medicine)  Donna Mcqueen APRN as Nurse Practitioner (Behavioral Health)         Chief Complaint:    Chief Complaint   Patient presents with    Bipolar    Anxiety    Sleeping Problem    PTSD    Med Management       History of Present Illness: Lindsay Amezquita is a 38 y.o. female who presents via video visit for a medication management follow up. Patient reports she has been having a hard time staying calm lately and her anxiety has been unmanageable. She feels that when her anxiety isn't as high all she wants to do is lay in bed and stay away from everyone.  She denies SI/HI today.     The following portion of the patient's history were reviewed and updated appropriately: allergies, current and past medications, family history, medical history and social history.  Subjective   Review of Systems:    Review of Systems   Psychiatric/Behavioral:  Positive for depressed  mood and stress. The patient is nervous/anxious.    All other systems reviewed and are negative.      Current Medications:   Current Outpatient Medications   Medication Sig Dispense Refill    ALPRAZolam (Xanax) 1 MG tablet Take 1 tablet by mouth 3 (Three) Times a Day As Needed for Sleep. 90 tablet 1    desvenlafaxine (PRISTIQ) 100 MG 24 hr tablet Take 1 tablet by mouth Daily. 30 tablet 1    hydrOXYzine (ATARAX) 50 MG tablet Take 1.5 tablets by mouth 3 (Three) Times a Day As Needed (anxiety and/or sleep). 135 tablet 1    Lurasidone HCl (Latuda) 120 MG tablet tablet Take 1 tablet by mouth Daily. 30 tablet 1    OXcarbazepine (TRILEPTAL) 300 MG tablet Take 1 tablet by mouth 2 (Two) Times a Day. 60 tablet 1    Atogepant (Qulipta) 60 MG tablet Take 1 tablet by mouth Daily. 30 tablet 5    atorvastatin (LIPITOR) 40 MG tablet Take 1 tablet by mouth every night at bedtime. 90 tablet 3    Blood Glucose Monitoring Suppl (FreeStyle Freedom Lite) w/Device kit       butalbital-acetaminophen-caffeine (Esgic) -40 MG per tablet Take 1 tablet by mouth Every 12 (Twelve) Hours As Needed for Migraine. 20 tablet 0    chlorhexidine (HIBICLENS) 4 % external liquid Apply  topically to the appropriate area as directed Daily. 473 mL 3    Dihydroergotamine Mesylate HFA (Trudhesa) 0.725 MG/ACT aerosol solution 0.725 Act into the nostril(s) as directed by provider 1 (One) Time As Needed (migraine) for up to 1 dose. May repeat in 1 hour 8 mL 1    diphenhydrAMINE (BENADRYL) 50 MG capsule Take 1 capsule by mouth.      empagliflozin (Jardiance) 10 MG tablet tablet Take 1 tablet by mouth Daily. 30 tablet 3    fluconazole (Diflucan) 150 MG tablet 1 po now and repeat in 3 d. 2 tablet 0    fluticasone (FLONASE) 50 MCG/ACT nasal spray 2 sprays into the nostril(s) as directed by provider Daily. 48 g 0    gabapentin (NEURONTIN) 800 MG tablet Take 1 tablet by mouth 3 (Three) Times a Day.      glucose blood test strip Use as instructed 50 each 12     glucose monitor monitoring kit Check glucose daily 1 each 0    guaiFENesin (Mucinex) 600 MG 12 hr tablet Take 1 tablet by mouth 2 (Two) Times a Day for 14 days. 28 tablet 0    ibuprofen (ADVIL,MOTRIN) 800 MG tablet Take 1 tablet by mouth.      ketorolac (TORADOL) 10 MG tablet Take 1 tablet by mouth Every 6 (Six) Hours As Needed for Severe Pain. 12 tablet 0    Lancets misc Check fasting glucose daily and 1 hour after largest meal of day. 100 each 3    loratadine (Claritin) 10 MG tablet Take 1 tablet by mouth Daily. 30 tablet 5    naloxone (NARCAN) 4 MG/0.1ML nasal spray 1 spray into the nostril(s) as directed by provider As Needed.      naproxen (NAPROSYN) 500 MG tablet Take 1 tablet by mouth 2 (Two) Times a Day As Needed for Mild Pain or Moderate Pain. 14 tablet 0    nystatin (MYCOSTATIN) 100,000 unit/mL suspension Take 5 mL by mouth 4 (Four) Times a Day. 473 mL 0    nystatin (MYCOSTATIN) 603942 UNIT/GM powder Apply  topically to the appropriate area as directed 2 (Two) Times a Day. 60 g 3    omeprazole (priLOSEC) 40 MG capsule Take 1 capsule by mouth 2 (Two) Times a Day. 180 capsule 1    promethazine (PHENERGAN) 25 MG tablet Take 1 tablet by mouth Every 6 (Six) Hours As Needed for Nausea or Vomiting. 45 tablet 3    promethazine-dextromethorphan (PROMETHAZINE-DM) 6.25-15 MG/5ML syrup Take 5 mL by mouth At Night As Needed for Cough. 118 mL 0    Rimegepant Sulfate (Nurtec) 75 MG tablet dispersible tablet Take 1 tablet by mouth 1 (One) Time As Needed (HA) for up to 1 dose. 6 tablet 0    Rimegepant Sulfate (Nurtec) 75 MG tablet dispersible tablet Take 1 tablet by mouth Every Other Day. Take Monday,Wednesday,Friday, and Saturday. 16 tablet 0    tiZANidine (ZANAFLEX) 4 MG tablet Take 1 tablet by mouth Every 8 (Eight) Hours As Needed for Muscle Spasms. Further refills should come from pain management. 90 tablet 0    Umeclidinium-Vilanterol (Anoro Ellipta) 62.5-25 MCG/ACT aerosol powder  inhaler Inhale 1 puff Daily. 60 each  12    Ventolin  (90 Base) MCG/ACT inhaler INHALE 2 PUFFS EVERY 4 HOURS AS NEEDED FOR WHEEZING OR SHORTNESS OF AIR 18 g 0     No current facility-administered medications for this visit.       Mental Status Exam:   Hygiene:    unable to assess   Cooperation:  Cooperative  Eye Contact:  Good  Psychomotor Behavior:   unable to assess   Affect:  Appropriate  Mood: depressed and anxious  Speech:  Normal  Thought Process:  Linear  Thought Content:  Mood congruent  Suicidal:  None  Homicidal:  None  Hallucinations:  None  Delusion:  None  Memory:  Intact  Orientation:  Person, Place, Time, and Situation  Reliability:  fair  Insight:  Fair  Judgement:  Fair  Impulse Control:  Fair  Physical/Medical Issues:  Yes see chart       Objective   Vital Signs:   There were no vitals taken for this visit.      Assessment / Plan    Diagnoses and all orders for this visit:    1. Bipolar I disorder, most recent episode depressed (Primary)  -     Lurasidone HCl (Latuda) 120 MG tablet tablet; Take 1 tablet by mouth Daily.  Dispense: 30 tablet; Refill: 1  -     OXcarbazepine (TRILEPTAL) 300 MG tablet; Take 1 tablet by mouth 2 (Two) Times a Day.  Dispense: 60 tablet; Refill: 1    2. Psychophysiological insomnia  -     ALPRAZolam (Xanax) 1 MG tablet; Take 1 tablet by mouth 3 (Three) Times a Day As Needed for Sleep.  Dispense: 90 tablet; Refill: 1    3. Post traumatic stress disorder (PTSD)  -     ALPRAZolam (Xanax) 1 MG tablet; Take 1 tablet by mouth 3 (Three) Times a Day As Needed for Sleep.  Dispense: 90 tablet; Refill: 1  -     desvenlafaxine (PRISTIQ) 100 MG 24 hr tablet; Take 1 tablet by mouth Daily.  Dispense: 30 tablet; Refill: 1  -     hydrOXYzine (ATARAX) 50 MG tablet; Take 1.5 tablets by mouth 3 (Three) Times a Day As Needed (anxiety and/or sleep).  Dispense: 135 tablet; Refill: 1    4. Generalized anxiety disorder  -     ALPRAZolam (Xanax) 1 MG tablet; Take 1 tablet by mouth 3 (Three) Times a Day As Needed for Sleep.   Dispense: 90 tablet; Refill: 1  -     desvenlafaxine (PRISTIQ) 100 MG 24 hr tablet; Take 1 tablet by mouth Daily.  Dispense: 30 tablet; Refill: 1  -     hydrOXYzine (ATARAX) 50 MG tablet; Take 1.5 tablets by mouth 3 (Three) Times a Day As Needed (anxiety and/or sleep).  Dispense: 135 tablet; Refill: 1    Will increase Hydroxyzine and continue all other medication as ordered.     A psychological evaluation was conducted in order to assess past and current level of functioning. Areas assessed included, but were not limited to: perception of social support, perception of ability to face and deal with challenges in life (positive functioning), anxiety symptoms, depressive symptoms, perspective on beliefs/belief system, coping skills for stress, intelligence level,  Therapeutic rapport was established. Interventions conducted today were geared towards incorporating medication management along with support for continued therapy. Education was also provided as to the med management with this provider and what to expect in subsequent sessions.      We discussed risks, benefits, goals and side effects of the above medication and the patient was agreeable with the plan. Patient was educated on the importance of compliance with treatment and follow-up appointments. Patient is aware to contact the [facility] with any worsening of symptoms. To call for questions or concerns and return early if necessary. Patent is agreeable to go to the Emergency Department or call 911 should they begin SI/HI.        MEDS ORDERED DURING VISIT:  New Medications Ordered This Visit   Medications    ALPRAZolam (Xanax) 1 MG tablet     Sig: Take 1 tablet by mouth 3 (Three) Times a Day As Needed for Sleep.     Dispense:  90 tablet     Refill:  1    desvenlafaxine (PRISTIQ) 100 MG 24 hr tablet     Sig: Take 1 tablet by mouth Daily.     Dispense:  30 tablet     Refill:  1    hydrOXYzine (ATARAX) 50 MG tablet     Sig: Take 1.5 tablets by mouth 3  (Three) Times a Day As Needed (anxiety and/or sleep).     Dispense:  135 tablet     Refill:  1    Lurasidone HCl (Latuda) 120 MG tablet tablet     Sig: Take 1 tablet by mouth Daily.     Dispense:  30 tablet     Refill:  1    OXcarbazepine (TRILEPTAL) 300 MG tablet     Sig: Take 1 tablet by mouth 2 (Two) Times a Day.     Dispense:  60 tablet     Refill:  1         Follow Up   Return in about 8 weeks (around 3/27/2024).  Patient was given instructions and counseling regarding her condition or for health maintenance advice. Please see specific information pulled into the AVS if appropriate.     TREATMENT PLAN/GOALS: Continue supportive psychotherapy efforts and medications as indicated. Treatment and medication options discussed during today's visit. Patient acknowledged and verbally consented to continue with current treatment plan and was educated on the importance of compliance with treatment and follow-up appointments.    MEDICATION ISSUES:  Discussed medication options and treatment plan of prescribed medication as well as the risks, benefits, and side effects including potential falls, possible impaired driving and metabolic adversities among others. Patient is agreeable to call the office with any worsening of symptoms or onset of side effects. Patient is agreeable to call 911 or go to the nearest ER should he/she begin having SI/HI.        [facility]  [address] [place] [address] DR NANCY SORIA [address] [phone]    January 31, 2024 14:00 EST

## 2024-02-01 DIAGNOSIS — N93.9 ABNORMAL UTERINE BLEEDING (AUB): Primary | ICD-10-CM

## 2024-02-01 NOTE — TELEPHONE ENCOUNTER
Pt called the office and expressed that she is very upset that we haven't been able to set her up with pain management. She stated that if she can't go through them, then we would need to fill her medicine continuous due to her being in extreme pain. She is requesting that we could call her back with an update on this.

## 2024-02-01 NOTE — TELEPHONE ENCOUNTER
Spoke with patient, advised her that we could not send her referral to another pain management clinic, patient disconnected call.

## 2024-02-02 ENCOUNTER — TELEPHONE (OUTPATIENT)
Dept: INTERNAL MEDICINE | Facility: CLINIC | Age: 39
End: 2024-02-02
Payer: MEDICAID

## 2024-02-02 ENCOUNTER — TELEPHONE (OUTPATIENT)
Dept: OBSTETRICS AND GYNECOLOGY | Facility: CLINIC | Age: 39
End: 2024-02-02
Payer: MEDICAID

## 2024-02-02 DIAGNOSIS — R10.2 PELVIC PAIN: Primary | ICD-10-CM

## 2024-02-02 RX ORDER — IBUPROFEN 800 MG/1
800 TABLET ORAL EVERY 8 HOURS PRN
Qty: 30 TABLET | Refills: 0 | Status: SHIPPED | OUTPATIENT
Start: 2024-02-02

## 2024-02-02 RX ORDER — ACETAMINOPHEN 500 MG
1000 TABLET ORAL EVERY 8 HOURS PRN
Qty: 60 TABLET | Refills: 0 | Status: SHIPPED | OUTPATIENT
Start: 2024-02-02 | End: 2025-02-01

## 2024-02-02 RX ORDER — TRAMADOL HYDROCHLORIDE 50 MG/1
50 TABLET ORAL EVERY 8 HOURS PRN
Qty: 10 TABLET | Refills: 0 | Status: SHIPPED | OUTPATIENT
Start: 2024-02-02 | End: 2025-02-01

## 2024-02-02 NOTE — TELEPHONE ENCOUNTER
Patient is requesting a call back from Violet stated that she was talking with her and she lost the call. Contact her on [phone]

## 2024-02-02 NOTE — TELEPHONE ENCOUNTER
Returned patients call, advised that a referral to pain management is still in process. Patient would like for Charley to prescribe her controls, I advised that due to [facility] license she can not prescribe the medications that she is asking for and the other providers in our clinic refer to pain management for chronic pain control as well. Patient states understanding.

## 2024-02-03 ENCOUNTER — HOSPITAL ENCOUNTER (EMERGENCY)
Facility: HOSPITAL | Age: 39
Discharge: HOME | End: 2024-02-03
Attending: STUDENT IN AN ORGANIZED HEALTH CARE EDUCATION/TRAINING PROGRAM
Payer: MEDICAID

## 2024-02-03 ENCOUNTER — APPOINTMENT (OUTPATIENT)
Dept: GENERAL RADIOLOGY | Facility: HOSPITAL | Age: 39
End: 2024-02-03
Payer: MEDICAID

## 2024-02-03 ENCOUNTER — TELEMEDICINE (OUTPATIENT)
Dept: FAMILY MEDICINE CLINIC | Facility: TELEHEALTH | Age: 39
End: 2024-02-03
Payer: MEDICAID

## 2024-02-03 ENCOUNTER — APPOINTMENT (OUTPATIENT)
Dept: CT IMAGING | Facility: HOSPITAL | Age: 39
End: 2024-02-03
Payer: MEDICAID

## 2024-02-03 ENCOUNTER — TELEPHONE (OUTPATIENT)
Dept: INTERNAL MEDICINE | Facility: CLINIC | Age: 39
End: 2024-02-03
Payer: MEDICAID

## 2024-02-03 VITALS
TEMPERATURE: 98.2 F | RESPIRATION RATE: 18 BRPM | OXYGEN SATURATION: 95 % | WEIGHT: 244 LBS | DIASTOLIC BLOOD PRESSURE: 74 MMHG | HEART RATE: 80 BPM | BODY MASS INDEX: 43.23 KG/M2 | SYSTOLIC BLOOD PRESSURE: 99 MMHG | HEIGHT: 63 IN

## 2024-02-03 DIAGNOSIS — K85.90 ACUTE PANCREATITIS WITHOUT INFECTION OR NECROSIS, UNSPECIFIED PANCREATITIS TYPE: ICD-10-CM

## 2024-02-03 DIAGNOSIS — R10.13 EPIGASTRIC PAIN: Primary | ICD-10-CM

## 2024-02-03 DIAGNOSIS — K52.9 GASTROENTERITIS: Primary | ICD-10-CM

## 2024-02-03 LAB
ALBUMIN SERPL-MCNC: 4.2 G/DL (ref 3.5–5.2)
ALBUMIN/GLOB SERPL: 1.9 G/DL
ALP SERPL-CCNC: 98 U/L (ref 39–117)
ALT SERPL W P-5'-P-CCNC: 34 U/L (ref 1–33)
ANION GAP SERPL CALCULATED.3IONS-SCNC: 10.9 MMOL/L (ref 5–15)
AST SERPL-CCNC: 50 U/L (ref 1–32)
BASOPHILS # BLD AUTO: 0.05 10*3/MM3 (ref 0–0.2)
BASOPHILS NFR BLD AUTO: 0.5 % (ref 0–1.5)
BILIRUB SERPL-MCNC: 0.5 MG/DL (ref 0–1.2)
BILIRUB UR QL STRIP: NEGATIVE
BUN SERPL-MCNC: 5 MG/DL (ref 6–20)
BUN/CREAT SERPL: 8.9 (ref 7–25)
CALCIUM SPEC-SCNC: 9.5 MG/DL (ref 8.6–10.5)
CHLORIDE SERPL-SCNC: 99 MMOL/L (ref 98–107)
CLARITY UR: CLEAR
CO2 SERPL-SCNC: 24.1 MMOL/L (ref 22–29)
COLOR UR: YELLOW
CREAT SERPL-MCNC: 0.56 MG/DL (ref 0.57–1)
DEPRECATED RDW RBC AUTO: 42.2 FL (ref 37–54)
EGFRCR SERPLBLD CKD-EPI 2021: 119.2 ML/MIN/1.73
EOSINOPHIL # BLD AUTO: 0.21 10*3/MM3 (ref 0–0.4)
EOSINOPHIL NFR BLD AUTO: 2.3 % (ref 0.3–6.2)
ERYTHROCYTE [DISTWIDTH] IN BLOOD BY AUTOMATED COUNT: 13.7 % (ref 12.3–15.4)
GLOBULIN UR ELPH-MCNC: 2.2 GM/DL
GLUCOSE SERPL-MCNC: 90 MG/DL (ref 65–99)
GLUCOSE UR STRIP-MCNC: ABNORMAL MG/DL
HCT VFR BLD AUTO: 43.6 % (ref 34–46.6)
HGB BLD-MCNC: 14.9 G/DL (ref 12–15.9)
HGB UR QL STRIP.AUTO: NEGATIVE
HOLD SPECIMEN: NORMAL
HOLD SPECIMEN: NORMAL
IMM GRANULOCYTES # BLD AUTO: 0.02 10*3/MM3 (ref 0–0.05)
IMM GRANULOCYTES NFR BLD AUTO: 0.2 % (ref 0–0.5)
KETONES UR QL STRIP: NEGATIVE
LEUKOCYTE ESTERASE UR QL STRIP.AUTO: NEGATIVE
LIPASE SERPL-CCNC: 140 U/L (ref 13–60)
LYMPHOCYTES # BLD AUTO: 3.61 10*3/MM3 (ref 0.7–3.1)
LYMPHOCYTES NFR BLD AUTO: 38.8 % (ref 19.6–45.3)
MAGNESIUM SERPL-MCNC: 1.8 MG/DL (ref 1.6–2.6)
MCH RBC QN AUTO: 28.7 PG (ref 26.6–33)
MCHC RBC AUTO-ENTMCNC: 34.2 G/DL (ref 31.5–35.7)
MCV RBC AUTO: 83.8 FL (ref 79–97)
MONOCYTES # BLD AUTO: 0.59 10*3/MM3 (ref 0.1–0.9)
MONOCYTES NFR BLD AUTO: 6.3 % (ref 5–12)
NEUTROPHILS NFR BLD AUTO: 4.83 10*3/MM3 (ref 1.7–7)
NEUTROPHILS NFR BLD AUTO: 51.9 % (ref 42.7–76)
NITRITE UR QL STRIP: NEGATIVE
NRBC BLD AUTO-RTO: 0 /100 WBC (ref 0–0.2)
PH UR STRIP.AUTO: 6.5 [PH] (ref 5–8)
PLATELET # BLD AUTO: 303 10*3/MM3 (ref 140–450)
PMV BLD AUTO: 10 FL (ref 6–12)
POTASSIUM SERPL-SCNC: 3.8 MMOL/L (ref 3.5–5.2)
PROT SERPL-MCNC: 6.4 G/DL (ref 6–8.5)
PROT UR QL STRIP: NEGATIVE
RBC # BLD AUTO: 5.2 10*6/MM3 (ref 3.77–5.28)
SODIUM SERPL-SCNC: 134 MMOL/L (ref 136–145)
SP GR UR STRIP: 1.02 (ref 1–1.03)
UROBILINOGEN UR QL STRIP: ABNORMAL
WBC NRBC COR # BLD AUTO: 9.31 10*3/MM3 (ref 3.4–10.8)
WHOLE BLOOD HOLD COAG: NORMAL
WHOLE BLOOD HOLD SPECIMEN: NORMAL

## 2024-02-03 PROCEDURE — 83735 ASSAY OF MAGNESIUM: CPT | Performed by: STUDENT IN AN ORGANIZED HEALTH CARE EDUCATION/TRAINING PROGRAM

## 2024-02-03 PROCEDURE — 99285 EMERGENCY DEPT VISIT HI MDM: CPT

## 2024-02-03 PROCEDURE — 25010000002 HYDROMORPHONE 1 MG/ML SOLUTION: Performed by: STUDENT IN AN ORGANIZED HEALTH CARE EDUCATION/TRAINING PROGRAM

## 2024-02-03 PROCEDURE — 71045 X-RAY EXAM CHEST 1 VIEW: CPT

## 2024-02-03 PROCEDURE — 96361 HYDRATE IV INFUSION ADD-ON: CPT

## 2024-02-03 PROCEDURE — 81003 URINALYSIS AUTO W/O SCOPE: CPT | Performed by: STUDENT IN AN ORGANIZED HEALTH CARE EDUCATION/TRAINING PROGRAM

## 2024-02-03 PROCEDURE — 25510000001 IOPAMIDOL 61 % SOLUTION: Performed by: STUDENT IN AN ORGANIZED HEALTH CARE EDUCATION/TRAINING PROGRAM

## 2024-02-03 PROCEDURE — 85025 COMPLETE CBC W/AUTO DIFF WBC: CPT | Performed by: STUDENT IN AN ORGANIZED HEALTH CARE EDUCATION/TRAINING PROGRAM

## 2024-02-03 PROCEDURE — 74177 CT ABD & PELVIS W/CONTRAST: CPT

## 2024-02-03 PROCEDURE — 93005 ELECTROCARDIOGRAM TRACING: CPT

## 2024-02-03 PROCEDURE — 25010000002 ONDANSETRON PER 1 MG: Performed by: STUDENT IN AN ORGANIZED HEALTH CARE EDUCATION/TRAINING PROGRAM

## 2024-02-03 PROCEDURE — 25010000002 KETOROLAC TROMETHAMINE PER 15 MG: Performed by: STUDENT IN AN ORGANIZED HEALTH CARE EDUCATION/TRAINING PROGRAM

## 2024-02-03 PROCEDURE — 83690 ASSAY OF LIPASE: CPT | Performed by: STUDENT IN AN ORGANIZED HEALTH CARE EDUCATION/TRAINING PROGRAM

## 2024-02-03 PROCEDURE — 93005 ELECTROCARDIOGRAM TRACING: CPT | Performed by: STUDENT IN AN ORGANIZED HEALTH CARE EDUCATION/TRAINING PROGRAM

## 2024-02-03 PROCEDURE — 96374 THER/PROPH/DIAG INJ IV PUSH: CPT

## 2024-02-03 PROCEDURE — 96375 TX/PRO/DX INJ NEW DRUG ADDON: CPT

## 2024-02-03 PROCEDURE — 25810000003 SODIUM CHLORIDE 0.9 % SOLUTION: Performed by: STUDENT IN AN ORGANIZED HEALTH CARE EDUCATION/TRAINING PROGRAM

## 2024-02-03 PROCEDURE — 80053 COMPREHEN METABOLIC PANEL: CPT | Performed by: STUDENT IN AN ORGANIZED HEALTH CARE EDUCATION/TRAINING PROGRAM

## 2024-02-03 RX ORDER — KETOROLAC TROMETHAMINE 30 MG/ML
15 INJECTION, SOLUTION INTRAMUSCULAR; INTRAVENOUS ONCE
Status: COMPLETED | OUTPATIENT
Start: 2024-02-03 | End: 2024-02-03

## 2024-02-03 RX ORDER — SODIUM CHLORIDE 0.9 % (FLUSH) 0.9 %
10 SYRINGE (ML) INJECTION AS NEEDED
Status: DISCONTINUED | OUTPATIENT
Start: 2024-02-03 | End: 2024-02-03 | Stop reason: HOSPADM

## 2024-02-03 RX ORDER — ONDANSETRON 2 MG/ML
4 INJECTION INTRAMUSCULAR; INTRAVENOUS ONCE
Status: COMPLETED | OUTPATIENT
Start: 2024-02-03 | End: 2024-02-03

## 2024-02-03 RX ORDER — ONDANSETRON 8 MG/1
8 TABLET, ORALLY DISINTEGRATING ORAL EVERY 8 HOURS PRN
Qty: 10 TABLET | Refills: 0 | Status: SHIPPED | OUTPATIENT
Start: 2024-02-03 | End: 2024-02-06

## 2024-02-03 RX ADMIN — SODIUM CHLORIDE 1000 ML: 9 INJECTION, SOLUTION INTRAVENOUS at 03:48

## 2024-02-03 RX ADMIN — KETOROLAC TROMETHAMINE 15 MG: 30 INJECTION, SOLUTION INTRAMUSCULAR; INTRAVENOUS at 03:49

## 2024-02-03 RX ADMIN — IOPAMIDOL 100 ML: 612 INJECTION, SOLUTION INTRAVENOUS at 04:58

## 2024-02-03 RX ADMIN — ONDANSETRON 4 MG: 2 INJECTION INTRAMUSCULAR; INTRAVENOUS at 03:49

## 2024-02-03 RX ADMIN — HYDROMORPHONE HYDROCHLORIDE 0.5 MG: 1 INJECTION, SOLUTION INTRAMUSCULAR; INTRAVENOUS; SUBCUTANEOUS at 04:34

## 2024-02-03 NOTE — PROGRESS NOTES
You have chosen to receive care through a telehealth visit.  Do you consent to use a video/audio connection for your medical care today? Yes     CHIEF COMPLAINT  Chief Complaint   Patient presents with    Vomiting    Diarrhea         HPI  Lindsay Amezquita is a 38 y.o. female  presents with complaint of vomiting and diarrhea.  She states that she went to her gynecologist and had an exam.  She states that the next day she started her period and it is very painful.  She states that her doctor prescribed Ibuprofen, Tramadol and hormones.  She states that today she started having vomiting and diarrhea.    Review of Systems   Gastrointestinal:  Positive for diarrhea and vomiting.   All other systems reviewed and are negative.      Past Medical History:   Diagnosis Date    Allergic     Anxiety     Asthma Beings of copd with asthma    Back pain     Bell's palsy     Bipolar 2 disorder     Blood clot in vein     COPD (chronic obstructive pulmonary disease) Six months ago    Deep vein thrombosis Last year    Depression     Diabetes mellitus November    Pre diabete    Frequent headaches     GERD (gastroesophageal reflux disease)     Hypertension     [facility]    Irritable bowel syndrome Two years ago    Kidney stone Two years ago    Migraine     Obesity All of my life    Ovarian cyst Two years ago    Panic     PTSD (post-traumatic stress disorder)     Pulmonary embolism Not in lungs    Behind left knee cap    Recurrent pregnancy loss, antepartum condition or complication Every since i have been 14 yars old    Restless leg syndrome     Sinus problem     Snores     Tattoos     Urinary tract infection Have them on and off    Varicella Little    Visual impairment Since i was little    Vitamin B12 deficiency        Family History   Problem Relation Age of Onset    Irritable bowel syndrome Mother     Heart disease Mother     Miscarriages / Stillbirths Mother     Arthritis Mother     COPD Mother     Learning  disabilities Mother     Mental illness Mother     Irritable bowel syndrome Father     Alcohol abuse Father     Diabetes Father     Hyperlipidemia Father     Anxiety disorder Father     Learning disabilities Father     Colon cancer Maternal Grandfather     Stroke Paternal Grandfather     Stroke Paternal Grandmother        Social History     Socioeconomic History    Marital status: Single   Tobacco Use    Smoking status: Some Days     Packs/day: 2.00     Years: 15.00     Additional pack years: 0.00     Total pack years: 30.00     Types: Cigarettes     Start date: 7/17/1992     Passive exposure: Never    Smokeless tobacco: Never    Tobacco comments:     Not going to quite   Vaping Use    Vaping Use: Former   Substance and Sexual Activity    Alcohol use: Never    Drug use: Not Currently    Sexual activity: Not Currently     Partners: Female     Birth control/protection: Other, Same-sex partner       Lindsay Amezquita  reports that she has been smoking cigarettes. She started smoking about 31 years ago. She has a 30.00 pack-year smoking history. She has never been exposed to tobacco smoke. She has never used smokeless tobacco.. I have educated her on the risk of diseases from using tobacco products such as cancer, COPD, and heart disease.     I advised her to quit and she is not willing to quit.    I spent  1  minutes counseling the patient.              LMP 12/23/2023 (Approximate)     PHYSICAL EXAM  Physical Exam   Constitutional: She appears well-developed and well-nourished.   HENT:   Head: Normocephalic.   Eyes: Pupils are equal, round, and reactive to light.   Pulmonary/Chest: Effort normal.   Musculoskeletal: Normal range of motion.   Neurological: She is alert.   Psychiatric: She has a normal mood and affect.       Results for orders placed or performed in visit on 01/17/24   NuSwab VG+ - Swab, Vagina    Specimen: Vagina; Swab    VA   Result Value Ref Range    Atopobium Vaginae High - 2 (A) Score    BVAB 2 Low - 0  Score    Megasphaera 1 Low - 0 Score    Candida Albicans, RUBY Negative Negative    Emilie Glabrata, RUBY Positive (A) Negative    Trichomonas vaginosis Negative Negative    Chlamydia trachomatis, RUBY Negative Negative    Neisseria gonorrhoeae, RUBY Negative Negative   LIQUID-BASED PAP SMEAR WITH HPV GENOTYPING REGARDLESS OF INTERPRETATION (ALLEY,COR,MAD)    Specimen: Cervix; ThinPrep Vial   Result Value Ref Range    Reference Lab Report       [facility]  [address] [place] [address]  Phone: [phone] or [phone]  Fax: [phone]  Cory Foreman M.D., Medical Director    PATIENT NAME                                     LABORATORY NO.  [id number]   LEORA CTOA                                  [id number]  [id number]                                 AGE                    SEX   SSN              CLIENT REF #  [facility] SYLVIE LAYNE                        38        1985      F     [id number]      [id number]    [address] [facility] [address]          REQUESTING M.D.           ATTENDING M.D.         COPY TO.  [address] [place] [address]                         DATE COLLECTED            DATE RECEIVED          DATE REPORTED  2024    ThinPrep Pap with [facility]    DIAGNOSIS:  Negative for intraepithelial lesion or malignancy    Multiple factors can influence accuracy of Pap  tests; therefore, screening at  regular intervals is necessary for early cancer detection.      SPECIMEN ADEQUACY:  SATISFACTORY FOR EVALUATION  Transformation zone is present.  SOURCE OF SPECIMEN:       CERVICAL/ENDOCERVICAL  SLIDES:  1  CLINICAL HISTORY:  Screening for malignant neoplasm of cervix    HPV  HR-HPV POOL: Negative    The Aptima HPV assay is an in vitro nucleic acid amplification test for the  qualitative detection of E6/E7 viral messenger RNA from 14 high risk types of  HPV in cervical  specimens. The high risk HPV types detected include: 16, 18,  31, 33, 35, 39, 45, 51, 52, 56, 58, 59, 66, 68    CYTOTECHNOLOGIST:             HARJINDER KULKARNI ([facility])    CPT CODES:  95849, 04972         Diagnoses and all orders for this visit:    1. Gastroenteritis (Primary)  -     ondansetron ODT (ZOFRAN-ODT) 8 MG disintegrating tablet; Place 1 tablet on the tongue Every 8 (Eight) Hours As Needed for Nausea or Vomiting for up to 3 days.  Dispense: 10 tablet; Refill: 0          FOLLOW-UP  As discussed during visit with PCP/[facility] if no improvement or Urgent Care/Emergency Department if worsening of symptoms    Patient verbalizes understanding of medication dosage, comfort measures, instructions for treatment and follow-up.    CLAIR Marshall  02/03/2024  01:17 EST    The use of a video visit has been reviewed with the patient and verbal informed consent has been obtained. Myself and Lindsay Amezquita participated in this visit. The patient is located in [address] [place] [address] Dr Nya [address] [place] [address].    I am located in [place], [place]. Mychart and Twilio were utilized. I spent 15 minutes in the patient's chart for this visit.      Note Disclaimer: At [facility], we believe that sharing information builds trust and better   relationships. You are receiving this note because you recently visited [facility]. It is possible you   will see health information before a provider has talked with you about it. This kind of information can   be easy to misunderstand. To help you fully understand what it means for your health, we urge you to   discuss this note with your provider.

## 2024-02-03 NOTE — DISCHARGE INSTRUCTIONS
Follow-up closely with primary care provider.  Continue previously prescribed nausea and pain medications.  Return to emergency department if symptoms continue to worsen despite treatment.

## 2024-02-03 NOTE — TELEPHONE ENCOUNTER
PT WENT TO THE ER LAST NIGHT AND FOUND SHE HAD AN INFLAMED PANCREAS. THEY HAVE ADVISED HER TO DO A LIQUID DIET ONLY. PTS BLOOD WORK WAS ELEVATED AND BP WAS 99/74 AND DROPPED THROUGHOUT THE NIGHT. PT IS REQUESTING A HOSPITAL F/UP SATISH WITH PCP. INFORMED PT PCP DID NOT HAVE AVAILABILITY IN THE TIME FRAME SHE WANTED BUT OFFERED APPOINTMENTS WITH OTHER PROVIDERS AS EARLY AS TODAY. PT REFUSED. PLEASE ADVISE.

## 2024-02-03 NOTE — ED PROVIDER NOTES
EMERGENCY DEPARTMENT ENCOUNTER    Pt Name: Lindsay Amezquita  MRN: [id number]  Pt :   1985  Room Number:    Date of encounter:  2/3/2024  PCP: Charley Robles APRN  ED Provider: Byron Azevedo MD    Historian: Patient      HPI:  Chief Complaint: Abdominal pain        Context: Lindsay Amezquita is a 38 y.o. female who presents to the ED c/o abdominal pain.  Patient states for the past day she has been having pain in her abdomen.  States pain was in her mid upper abdomen but now is also in her right lower abdomen.  Also reports vaginal spotting.  Was started on new oral hormonal medications from her gynecologist within the past 2 weeks in preparation for hysterectomy soon.  Also reports frequent diarrhea for the past day.  Denies blood in stool.  Has been having nausea and vomiting so she has not been able to take her home medications.      PAST MEDICAL HISTORY  Past Medical History:   Diagnosis Date    Allergic     Anxiety     Asthma Beings of copd with asthma    Back pain     Bell's palsy     Bipolar 2 disorder     Blood clot in vein     COPD (chronic obstructive pulmonary disease) Six months ago    Deep vein thrombosis Last year    Depression     Diabetes mellitus November    Pre diabete    Frequent headaches     GERD (gastroesophageal reflux disease)     Hypertension     Every time i go into the emergacy room    Irritable bowel syndrome Two years ago    Kidney stone Two years ago    Migraine     Obesity All of my life    Ovarian cyst Two years ago    Panic     PTSD (post-traumatic stress disorder)     Pulmonary embolism Not in lungs    Behind left knee cap    Recurrent pregnancy loss, antepartum condition or complication Every since i have been 14 yars old    Restless leg syndrome     Sinus problem     Snores     Tattoos     Urinary tract infection Have them on and off    Varicella Little    Visual impairment Since i was little    Vitamin B12 deficiency          PAST SURGICAL HISTORY  Past Surgical  History:   Procedure Laterality Date    CHOLECYSTECTOMY      TOOTH EXTRACTION      WISDOM TOOTH EXTRACTION  All my teeth         FAMILY HISTORY  Family History   Problem Relation Age of Onset    Irritable bowel syndrome Mother     Heart disease Mother     Miscarriages / Stillbirths Mother     Arthritis Mother     COPD Mother     Learning disabilities Mother     Mental illness Mother     Irritable bowel syndrome Father     Alcohol abuse Father     Diabetes Father     Hyperlipidemia Father     Anxiety disorder Father     Learning disabilities Father     Colon cancer Maternal Grandfather     Stroke Paternal Grandfather     Stroke Paternal Grandmother          SOCIAL HISTORY  Social History     Socioeconomic History    Marital status: Single   Tobacco Use    Smoking status: Some Days     Packs/day: 2.00     Years: 15.00     Additional pack years: 0.00     Total pack years: 30.00     Types: Cigarettes     Start date: 7/17/1992     Passive exposure: Never    Smokeless tobacco: Never    Tobacco comments:     Not going to quite   Vaping Use    Vaping Use: Former   Substance and Sexual Activity    Alcohol use: Never    Drug use: Not Currently    Sexual activity: Not Currently     Partners: Female     Birth control/protection: Other, Same-sex partner         ALLERGIES  Aspirin, Codeine, Cyclobenzaprine, Haldol [haloperidol], Latex, Penicillins, Morphine, Ondansetron, Oxycodone-acetaminophen, Oxycontin [oxycodone], Triptans, Compazine [prochlorperazine], Lamictal [lamotrigine], and Reglan [metoclopramide]        REVIEW OF SYSTEMS  Review of Systems     All systems reviewed and negative except for those discussed in HPI.       PHYSICAL EXAM    I have reviewed the triage vital signs and nursing notes.    ED Triage Vitals [02/03/24 0313]   Temp Heart Rate Resp BP SpO2   98.2 °F (36.8 °C) 82 18 106/77 97 %      Temp src Heart Rate Source Patient Position BP Location FiO2 (%)   Oral Monitor Sitting Left arm --       Physical  Exam  GENERAL:   Appears in no acute distress.  Obese.  HENT: Nares patent.  Normocephalic and atraumatic  EYES: No scleral icterus.  CV: Regular rhythm, regular rate.  RESPIRATORY: Normal effort. clear lungs to auscultation bilaterally. No audible wheezes, rales or rhonchi.  ABDOMEN: Soft, tenderness to palpation worst in right lower quadrant and epigastrium.  No guarding.  No palpable masses. normal bowel sounds  MUSCULOSKELETAL: No deformities.   NEURO: Alert, moves all extremities, follows commands.  SKIN: Warm, dry, no rash visualized.      LAB RESULTS  Recent Results (from the past 24 hour(s))   Comprehensive Metabolic Panel    Collection Time: 02/03/24  3:41 AM    Specimen: Blood   Result Value Ref Range    Glucose 90 65 - 99 mg/dL    BUN 5 (L) 6 - 20 mg/dL    Creatinine 0.56 (L) 0.57 - 1.00 mg/dL    Sodium 134 (L) 136 - 145 mmol/L    Potassium 3.8 3.5 - 5.2 mmol/L    Chloride 99 98 - 107 mmol/L    CO2 24.1 22.0 - 29.0 mmol/L    Calcium 9.5 8.6 - 10.5 mg/dL    Total Protein 6.4 6.0 - 8.5 g/dL    Albumin 4.2 3.5 - 5.2 g/dL    ALT (SGPT) 34 (H) 1 - 33 U/L    AST (SGOT) 50 (H) 1 - 32 U/L    Alkaline Phosphatase 98 39 - 117 U/L    Total Bilirubin 0.5 0.0 - 1.2 mg/dL    Globulin 2.2 gm/dL    A/G Ratio 1.9 g/dL    BUN/Creatinine Ratio 8.9 7.0 - 25.0    Anion Gap 10.9 5.0 - 15.0 mmol/L    eGFR 119.2 >60.0 mL/min/1.73   Magnesium    Collection Time: 02/03/24  3:41 AM    Specimen: Blood   Result Value Ref Range    Magnesium 1.8 1.6 - 2.6 mg/dL   Green Top (Gel)    Collection Time: 02/03/24  3:41 AM   Result Value Ref Range    Extra Tube Hold for add-ons.    Lavender Top    Collection Time: 02/03/24  3:41 AM   Result Value Ref Range    Extra Tube hold for add-on    Gold Top - SST    Collection Time: 02/03/24  3:41 AM   Result Value Ref Range    Extra Tube Hold for add-ons.    Light Blue Top    Collection Time: 02/03/24  3:41 AM   Result Value Ref Range    Extra Tube Hold for add-ons.    CBC Auto Differential     Collection Time: 02/03/24  3:41 AM    Specimen: Blood   Result Value Ref Range    WBC 9.31 3.40 - 10.80 10*3/mm3    RBC 5.20 3.77 - 5.28 10*6/mm3    Hemoglobin 14.9 12.0 - 15.9 g/dL    Hematocrit 43.6 34.0 - 46.6 %    MCV 83.8 79.0 - 97.0 fL    MCH 28.7 26.6 - 33.0 pg    MCHC 34.2 31.5 - 35.7 g/dL    RDW 13.7 12.3 - 15.4 %    RDW-SD 42.2 37.0 - 54.0 fl    MPV 10.0 6.0 - 12.0 fL    Platelets 303 140 - 450 10*3/mm3    Neutrophil % 51.9 42.7 - 76.0 %    Lymphocyte % 38.8 19.6 - 45.3 %    Monocyte % 6.3 5.0 - 12.0 %    Eosinophil % 2.3 0.3 - 6.2 %    Basophil % 0.5 0.0 - 1.5 %    Immature Grans % 0.2 0.0 - 0.5 %    Neutrophils, Absolute 4.83 1.70 - 7.00 10*3/mm3    Lymphocytes, Absolute 3.61 (H) 0.70 - 3.10 10*3/mm3    Monocytes, Absolute 0.59 0.10 - 0.90 10*3/mm3    Eosinophils, Absolute 0.21 0.00 - 0.40 10*3/mm3    Basophils, Absolute 0.05 0.00 - 0.20 10*3/mm3    Immature Grans, Absolute 0.02 0.00 - 0.05 10*3/mm3    nRBC 0.0 0.0 - 0.2 /100 WBC   Lipase    Collection Time: 02/03/24  3:41 AM    Specimen: Blood   Result Value Ref Range    Lipase 140 (H) 13 - 60 U/L   Urinalysis With Microscopic If Indicated (No Culture) - Urine, Clean Catch    Collection Time: 02/03/24  3:51 AM    Specimen: Urine, Clean Catch   Result Value Ref Range    Color, UA Yellow Yellow, Straw    Appearance, UA Clear Clear    pH, UA 6.5 5.0 - 8.0    Specific Gravity, UA 1.018 1.005 - 1.030    Glucose, UA >=1000 mg/dL (3+) (A) Negative    Ketones, UA Negative Negative    Bilirubin, UA Negative Negative    Blood, UA Negative Negative    Protein, UA Negative Negative    Leuk Esterase, UA Negative Negative    Nitrite, UA Negative Negative    Urobilinogen, UA 0.2 E.U./dL 0.2 - 1.0 E.U./dL       If labs were ordered, I independently reviewed the results and considered them in treating the patient.        RADIOLOGY  CT Abdomen Pelvis With Contrast    Result Date: 2/3/2024  FINAL REPORT TECHNIQUE: null CLINICAL HISTORY: RLQ abdominal pain (Age >= 14y)  COMPARISON: null FINDINGS: Exam: CT abdomen and pelvis with IV contrast. Comparison: None Findings: No free air. No solid liver mass. Fatty infiltration. Cholecystectomy. No clinically significant biliary ductal dilation. No splenomegaly or suspicious splenic lesions. Mild fluid stranding about the pancreatic head and body consistent with acute pancreatitis. No solid or cystic pancreatic mass. No pancreatic ductal dilation. Indeterminate 1.6 x 2.0 cm left adrenal nodule. No suspicious renal mass. No hydronephrosis. No significant stranding. Bladder without acute pathology. No bowel obstruction. No mucosal thickening. Fluid distention throughout the colonic lumen consistent with diarrheal stool and colonic ileus. No evidence for acute appendicitis. No adenopathy. No abdominal aortic aneurysm. No suspicious pelvic masses. No acute soft tissue pathology. No acute osseous pathology. Degenerative changes.     IMPRESSION: Mild fluid stranding about the pancreatic head and body consistent with acute pancreatitis. Fluid distention throughout the colonic lumen consistent with diarrheal stool and colonic ileus. Fatty liver. Appendix normal. Indeterminate left adrenal nodule. Authenticated and Electronically Signed by Florencia Laguna MD on 02/03/2024 05:28:21 AM     I ordered and independently reviewed the above noted radiographic studies.      See radiologist's dictation for official interpretation.        PROCEDURES    Procedures    ECG 12 Lead ED Triage Standing Order; Weak / Dizzy / AMS   Final Result          MEDICATIONS GIVEN IN ER    Medications   sodium chloride 0.9 % flush 10 mL (has no administration in time range)   ondansetron (ZOFRAN) injection 4 mg (4 mg Intravenous Given 2/3/24 1919)   ketorolac (TORADOL) injection 15 mg (15 mg Intravenous Given 2/3/24 7299)   sodium chloride 0.9 % bolus 1,000 mL (0 mL Intravenous Stopped 2/3/24 0517)   HYDROmorphone (DILAUDID) injection 0.5 mg (0.5 mg Intravenous Given 2/3/24  8474)   iopamidol (ISOVUE-300) 61 % injection 100 mL (100 mL Intravenous Given 2/3/24 7312)         MEDICAL DECISION MAKING, PROGRESS, and CONSULTS    All labs, if obtained, have been independently reviewed by me.  All radiology studies, if obtained, have been reviewed by me and the radiologist dictating the report.  All EKG's, if obtained, have been independently viewed and interpreted by me/my attending physician.      Discussion below represents my analysis of pertinent findings related to patient's condition, differential diagnosis, treatment plan and final disposition.    38-year-old female presenting to emergency department for evaluation of abdominal pain.  Hemodynamically stable nontoxic in appearance upon arrival.  Differential diagnosis includes but is not limited to gastritis, constipation, viral gastroenteritis, ovarian cyst, appendicitis, bowel obstruction.  Extensive labs obtained along with CT of abdomen pelvis.  Patient given IV Toradol for pain control along with IV Zofran for nausea and 1 L bolus of IV lactated Ringer's.    Patient still complaining of pain following these medications so was given one-time dose of 0.5 mg IV Dilaudid.  Labs show no significant leukocytosis.  Normal electrolyte panel and no significant LFT elevations along with normal lipase.  Chest x-ray personally reviewed and interpreted and was negative for focal abnormality.  CT of abdomen pelvis with contrast ordered.  CT of abdomen pelvis shows evidence of possible early pancreatitis but no evidence of pancreatic abscess.  elevated lipase of 140, possibly correlating with early pancreatitis.  Discussed results with patient along with option for admission, but patient declined admission stating symptoms are well-controlled at this time.  Patient already has prescription for tramadol and for nausea medications at home.  Advised on continued fluid intake to prevent dehydration.  Advised on strict return precautions.  Patient and  mother agreeable with this plan patient was discharged home.                         Orders placed during this visit:  Orders Placed This Encounter   Procedures    XR Chest 1 View    CT Abdomen Pelvis With Contrast    [facility] Draw    Comprehensive Metabolic Panel    Magnesium    Urinalysis With Microscopic If Indicated (No Culture) - Urine, Clean Catch    CBC Auto Differential    Lipase    NPO Diet NPO Type: Strict NPO    Undress & Gown    Continuous Pulse Oximetry    Vital Signs    Oxygen Therapy- Nasal Cannula; Titrate 1-6 LPM Per SpO2; 90 - 95%    POC Glucose Once    ECG 12 Lead ED Triage Standing Order; Weak / Dizzy / AMS    Insert Peripheral IV    Fall Precautions    CBC & Differential    Green Top (Gel)    Lavender Top    Gold Top - SST    Light Blue Top             ED Course:    Consultants:      ED Course as of 02/03/24 0621   Sat Feb 03, 2024   0600 Lipase(!): 140 [FL]      ED Course User Index  [FL] Byron Azevedo MD              Shared Decision Making:  After my consideration of clinical presentation and any laboratory/radiology studies obtained, I discussed the findings with the patient/patient representative who is in agreement with the treatment plan and the final disposition.   Risks and benefits of discharge and/or observation/admission were discussed.       AS OF 06:21 EST VITALS:    BP - 103/63  HR - 80  TEMP - 98.2 °F (36.8 °C) (Oral)  O2 SATS - 94%                  DIAGNOSIS  Final diagnoses:   Epigastric pain   Acute pancreatitis without infection or necrosis, unspecified pancreatitis type         DISPOSITION  Discharge      Please note that portions of this document were completed with voice recognition software.        Byron Azevedo MD  02/03/24 0691

## 2024-02-05 ENCOUNTER — TELEPHONE (OUTPATIENT)
Dept: OBSTETRICS AND GYNECOLOGY | Facility: CLINIC | Age: 39
End: 2024-02-05

## 2024-02-05 NOTE — TELEPHONE ENCOUNTER
Patient wanted to let you know she was allergic to the pain medication and was seen in the ER. Still having pain.    Thank You

## 2024-02-06 ENCOUNTER — TELEPHONE (OUTPATIENT)
Dept: INTERNAL MEDICINE | Facility: CLINIC | Age: 39
End: 2024-02-06
Payer: MEDICAID

## 2024-02-06 RX ORDER — TIZANIDINE 4 MG/1
4 TABLET ORAL EVERY 8 HOURS PRN
Qty: 90 TABLET | Refills: 0 | Status: SHIPPED | OUTPATIENT
Start: 2024-02-06

## 2024-02-06 NOTE — TELEPHONE ENCOUNTER
She called back and asked if we can give her AB's. I reiterated that if she is worse, go to the ER. She said she is in pain.

## 2024-02-08 ENCOUNTER — TELEPHONE (OUTPATIENT)
Dept: INTERNAL MEDICINE | Facility: CLINIC | Age: 39
End: 2024-02-08
Payer: MEDICAID

## 2024-02-08 NOTE — TELEPHONE ENCOUNTER
Left vm for patient that I had spoken with her on the 6th and Charley had advised ER then due to pain. Recommended going to ER. Call for any ?.

## 2024-02-08 NOTE — TELEPHONE ENCOUNTER
Caller: Lindsay Amezquita    Relationship: Self    Best call back number: [phone]    What is the best time to reach you: AS SOON AS POSSIBLE     Who are you requesting to speak with (clinical staff, provider,  specific staff member): NURSE     What was the call regarding: THE PATIENT STATES THAT SHE WAS IN THE HOSPITAL EARLIER THIS WEEK AND DIAGNOSED WITH PANCREATITIS THE PATIENT STATES THAT SHE IS IN PAIN AND CAN'T TAKE TYLENOL OR TRAMODOL SHE WOULD LIKE TO KNOW WHAT TO DO

## 2024-02-11 ENCOUNTER — HOSPITAL ENCOUNTER (EMERGENCY)
Facility: HOSPITAL | Age: 39
Discharge: HOME | End: 2024-02-11
Attending: STUDENT IN AN ORGANIZED HEALTH CARE EDUCATION/TRAINING PROGRAM | Admitting: EMERGENCY MEDICINE
Payer: MEDICAID

## 2024-02-11 ENCOUNTER — APPOINTMENT (OUTPATIENT)
Dept: CT IMAGING | Facility: HOSPITAL | Age: 39
End: 2024-02-11
Payer: MEDICAID

## 2024-02-11 VITALS
DIASTOLIC BLOOD PRESSURE: 67 MMHG | HEIGHT: 63 IN | RESPIRATION RATE: 18 BRPM | OXYGEN SATURATION: 98 % | BODY MASS INDEX: 43.23 KG/M2 | TEMPERATURE: 97.5 F | HEART RATE: 68 BPM | WEIGHT: 244 LBS | SYSTOLIC BLOOD PRESSURE: 97 MMHG

## 2024-02-11 DIAGNOSIS — R10.9 RIGHT SIDED ABDOMINAL PAIN: Primary | ICD-10-CM

## 2024-02-11 LAB
ALBUMIN SERPL-MCNC: 4 G/DL (ref 3.5–5.2)
ALBUMIN/GLOB SERPL: 1.7 G/DL
ALP SERPL-CCNC: 58 U/L (ref 39–117)
ALT SERPL W P-5'-P-CCNC: 13 U/L (ref 1–33)
ANION GAP SERPL CALCULATED.3IONS-SCNC: 12.5 MMOL/L (ref 5–15)
AST SERPL-CCNC: 17 U/L (ref 1–32)
BACTERIA UR QL AUTO: ABNORMAL /HPF
BASOPHILS # BLD AUTO: 0.04 10*3/MM3 (ref 0–0.2)
BASOPHILS NFR BLD AUTO: 0.6 % (ref 0–1.5)
BILIRUB SERPL-MCNC: 0.4 MG/DL (ref 0–1.2)
BILIRUB UR QL STRIP: NEGATIVE
BUN SERPL-MCNC: 2 MG/DL (ref 6–20)
BUN/CREAT SERPL: 3.6 (ref 7–25)
CALCIUM SPEC-SCNC: 8.6 MG/DL (ref 8.6–10.5)
CHLORIDE SERPL-SCNC: 103 MMOL/L (ref 98–107)
CLARITY UR: ABNORMAL
CLUMPED PLATELETS: PRESENT
CO2 SERPL-SCNC: 20.5 MMOL/L (ref 22–29)
COLOR UR: YELLOW
CREAT SERPL-MCNC: 0.55 MG/DL (ref 0.57–1)
DEPRECATED RDW RBC AUTO: 44 FL (ref 37–54)
EGFRCR SERPLBLD CKD-EPI 2021: 119.7 ML/MIN/1.73
EOSINOPHIL # BLD AUTO: 0.13 10*3/MM3 (ref 0–0.4)
EOSINOPHIL NFR BLD AUTO: 1.8 % (ref 0.3–6.2)
ERYTHROCYTE [DISTWIDTH] IN BLOOD BY AUTOMATED COUNT: 14.4 % (ref 12.3–15.4)
GLOBULIN UR ELPH-MCNC: 2.4 GM/DL
GLUCOSE SERPL-MCNC: 84 MG/DL (ref 65–99)
GLUCOSE UR STRIP-MCNC: ABNORMAL MG/DL
HCT VFR BLD AUTO: 41.4 % (ref 34–46.6)
HGB BLD-MCNC: 14.1 G/DL (ref 12–15.9)
HGB UR QL STRIP.AUTO: NEGATIVE
HOLD SPECIMEN: NORMAL
HOLD SPECIMEN: NORMAL
HYALINE CASTS UR QL AUTO: ABNORMAL /LPF
IMM GRANULOCYTES # BLD AUTO: 0.02 10*3/MM3 (ref 0–0.05)
IMM GRANULOCYTES NFR BLD AUTO: 0.3 % (ref 0–0.5)
KETONES UR QL STRIP: NEGATIVE
LEUKOCYTE ESTERASE UR QL STRIP.AUTO: NEGATIVE
LIPASE SERPL-CCNC: 26 U/L (ref 13–60)
LYMPHOCYTES # BLD AUTO: 2.91 10*3/MM3 (ref 0.7–3.1)
LYMPHOCYTES NFR BLD AUTO: 41.2 % (ref 19.6–45.3)
MCH RBC QN AUTO: 28.7 PG (ref 26.6–33)
MCHC RBC AUTO-ENTMCNC: 34.1 G/DL (ref 31.5–35.7)
MCV RBC AUTO: 84.3 FL (ref 79–97)
MONOCYTES # BLD AUTO: 0.45 10*3/MM3 (ref 0.1–0.9)
MONOCYTES NFR BLD AUTO: 6.4 % (ref 5–12)
NEUTROPHILS NFR BLD AUTO: 3.52 10*3/MM3 (ref 1.7–7)
NEUTROPHILS NFR BLD AUTO: 49.7 % (ref 42.7–76)
NITRITE UR QL STRIP: NEGATIVE
NRBC BLD AUTO-RTO: 0 /100 WBC (ref 0–0.2)
PH UR STRIP.AUTO: 6 [PH] (ref 5–8)
PLATELET # BLD AUTO: 212 10*3/MM3 (ref 140–450)
PMV BLD AUTO: 10.3 FL (ref 6–12)
POTASSIUM SERPL-SCNC: 4.2 MMOL/L (ref 3.5–5.2)
PROT SERPL-MCNC: 6.4 G/DL (ref 6–8.5)
PROT UR QL STRIP: ABNORMAL
RBC # BLD AUTO: 4.91 10*6/MM3 (ref 3.77–5.28)
RBC # UR STRIP: ABNORMAL /HPF
RBC MORPH BLD: NORMAL
REF LAB TEST METHOD: ABNORMAL
SMALL PLATELETS BLD QL SMEAR: ADEQUATE
SODIUM SERPL-SCNC: 136 MMOL/L (ref 136–145)
SP GR UR STRIP: 1.02 (ref 1–1.03)
SQUAMOUS #/AREA URNS HPF: ABNORMAL /HPF
UROBILINOGEN UR QL STRIP: ABNORMAL
WBC # UR STRIP: ABNORMAL /HPF
WBC MORPH BLD: NORMAL
WBC NRBC COR # BLD AUTO: 7.07 10*3/MM3 (ref 3.4–10.8)
WHOLE BLOOD HOLD COAG: NORMAL
WHOLE BLOOD HOLD SPECIMEN: NORMAL

## 2024-02-11 PROCEDURE — 36415 COLL VENOUS BLD VENIPUNCTURE: CPT

## 2024-02-11 PROCEDURE — 99285 EMERGENCY DEPT VISIT HI MDM: CPT

## 2024-02-11 PROCEDURE — 85007 BL SMEAR W/DIFF WBC COUNT: CPT | Performed by: PHYSICIAN ASSISTANT

## 2024-02-11 PROCEDURE — 25010000002 FENTANYL CITRATE (PF) 50 MCG/ML SOLUTION: Performed by: STUDENT IN AN ORGANIZED HEALTH CARE EDUCATION/TRAINING PROGRAM

## 2024-02-11 PROCEDURE — 25810000003 SODIUM CHLORIDE 0.9 % SOLUTION: Performed by: PHYSICIAN ASSISTANT

## 2024-02-11 PROCEDURE — 96374 THER/PROPH/DIAG INJ IV PUSH: CPT

## 2024-02-11 PROCEDURE — 81001 URINALYSIS AUTO W/SCOPE: CPT | Performed by: PHYSICIAN ASSISTANT

## 2024-02-11 PROCEDURE — 25010000002 ONDANSETRON PER 1 MG: Performed by: PHYSICIAN ASSISTANT

## 2024-02-11 PROCEDURE — 25010000002 ONDANSETRON PER 1 MG: Performed by: EMERGENCY MEDICINE

## 2024-02-11 PROCEDURE — 85025 COMPLETE CBC W/AUTO DIFF WBC: CPT | Performed by: PHYSICIAN ASSISTANT

## 2024-02-11 PROCEDURE — 25010000002 HYDROMORPHONE 1 MG/ML SOLUTION: Performed by: EMERGENCY MEDICINE

## 2024-02-11 PROCEDURE — 83690 ASSAY OF LIPASE: CPT | Performed by: PHYSICIAN ASSISTANT

## 2024-02-11 PROCEDURE — 74177 CT ABD & PELVIS W/CONTRAST: CPT

## 2024-02-11 PROCEDURE — 80053 COMPREHEN METABOLIC PANEL: CPT | Performed by: PHYSICIAN ASSISTANT

## 2024-02-11 PROCEDURE — 25510000001 IOPAMIDOL 61 % SOLUTION: Performed by: EMERGENCY MEDICINE

## 2024-02-11 PROCEDURE — 96376 TX/PRO/DX INJ SAME DRUG ADON: CPT

## 2024-02-11 PROCEDURE — 96375 TX/PRO/DX INJ NEW DRUG ADDON: CPT

## 2024-02-11 RX ORDER — FENTANYL CITRATE 50 UG/ML
50 INJECTION, SOLUTION INTRAMUSCULAR; INTRAVENOUS ONCE
Status: COMPLETED | OUTPATIENT
Start: 2024-02-11 | End: 2024-02-11

## 2024-02-11 RX ORDER — IBUPROFEN 800 MG/1
800 TABLET ORAL
Qty: 21 TABLET | Refills: 0 | Status: SHIPPED | OUTPATIENT
Start: 2024-02-11 | End: 2024-02-16 | Stop reason: SDUPTHER

## 2024-02-11 RX ORDER — ONDANSETRON 2 MG/ML
4 INJECTION INTRAMUSCULAR; INTRAVENOUS ONCE
Status: COMPLETED | OUTPATIENT
Start: 2024-02-11 | End: 2024-02-11

## 2024-02-11 RX ADMIN — IOPAMIDOL 100 ML: 612 INJECTION, SOLUTION INTRAVENOUS at 16:47

## 2024-02-11 RX ADMIN — ONDANSETRON 4 MG: 2 INJECTION INTRAMUSCULAR; INTRAVENOUS at 16:54

## 2024-02-11 RX ADMIN — FENTANYL CITRATE 50 MCG: 50 INJECTION, SOLUTION INTRAMUSCULAR; INTRAVENOUS at 15:16

## 2024-02-11 RX ADMIN — HYDROMORPHONE HYDROCHLORIDE 0.5 MG: 1 INJECTION, SOLUTION INTRAMUSCULAR; INTRAVENOUS; SUBCUTANEOUS at 16:54

## 2024-02-11 RX ADMIN — SODIUM CHLORIDE 1000 ML: 9 INJECTION, SOLUTION INTRAVENOUS at 15:12

## 2024-02-11 RX ADMIN — ONDANSETRON 4 MG: 2 INJECTION INTRAMUSCULAR; INTRAVENOUS at 15:12

## 2024-02-12 ENCOUNTER — TELEPHONE (OUTPATIENT)
Dept: INTERNAL MEDICINE | Facility: CLINIC | Age: 39
End: 2024-02-12
Payer: MEDICAID

## 2024-02-16 RX ORDER — IBUPROFEN 800 MG/1
800 TABLET ORAL
Qty: 21 TABLET | Refills: 0 | Status: SHIPPED | OUTPATIENT
Start: 2024-02-16

## 2024-02-17 DIAGNOSIS — F43.10 POST TRAUMATIC STRESS DISORDER (PTSD): ICD-10-CM

## 2024-02-17 DIAGNOSIS — F41.1 GENERALIZED ANXIETY DISORDER: ICD-10-CM

## 2024-02-17 DIAGNOSIS — F51.04 PSYCHOPHYSIOLOGICAL INSOMNIA: ICD-10-CM

## 2024-02-19 RX ORDER — AMITRIPTYLINE HYDROCHLORIDE 150 MG/1
150 TABLET ORAL
Qty: 30 TABLET | Refills: 0 | OUTPATIENT
Start: 2024-02-19

## 2024-02-20 ENCOUNTER — HOSPITAL ENCOUNTER (EMERGENCY)
Facility: HOSPITAL | Age: 39
Discharge: HOME | End: 2024-02-20
Attending: STUDENT IN AN ORGANIZED HEALTH CARE EDUCATION/TRAINING PROGRAM | Admitting: STUDENT IN AN ORGANIZED HEALTH CARE EDUCATION/TRAINING PROGRAM
Payer: MEDICAID

## 2024-02-20 ENCOUNTER — TELEPHONE (OUTPATIENT)
Dept: INTERNAL MEDICINE | Facility: CLINIC | Age: 39
End: 2024-02-20
Payer: MEDICAID

## 2024-02-20 VITALS
HEIGHT: 62 IN | TEMPERATURE: 98.1 F | OXYGEN SATURATION: 96 % | WEIGHT: 241 LBS | RESPIRATION RATE: 20 BRPM | HEART RATE: 93 BPM | DIASTOLIC BLOOD PRESSURE: 98 MMHG | BODY MASS INDEX: 44.35 KG/M2 | SYSTOLIC BLOOD PRESSURE: 129 MMHG

## 2024-02-20 DIAGNOSIS — G43.909 MIGRAINE WITHOUT STATUS MIGRAINOSUS, NOT INTRACTABLE, UNSPECIFIED MIGRAINE TYPE: Primary | ICD-10-CM

## 2024-02-20 LAB
ALBUMIN SERPL-MCNC: 4.6 G/DL (ref 3.5–5.2)
ALBUMIN/GLOB SERPL: 1.8 G/DL
ALP SERPL-CCNC: 53 U/L (ref 39–117)
ALT SERPL W P-5'-P-CCNC: 22 U/L (ref 1–33)
ANION GAP SERPL CALCULATED.3IONS-SCNC: 14.8 MMOL/L (ref 5–15)
AST SERPL-CCNC: 20 U/L (ref 1–32)
BASOPHILS # BLD AUTO: 0.05 10*3/MM3 (ref 0–0.2)
BASOPHILS NFR BLD AUTO: 0.5 % (ref 0–1.5)
BILIRUB SERPL-MCNC: 0.6 MG/DL (ref 0–1.2)
BUN SERPL-MCNC: 6 MG/DL (ref 6–20)
BUN/CREAT SERPL: 11.5 (ref 7–25)
CALCIUM SPEC-SCNC: 9.3 MG/DL (ref 8.6–10.5)
CHLORIDE SERPL-SCNC: 104 MMOL/L (ref 98–107)
CO2 SERPL-SCNC: 24.2 MMOL/L (ref 22–29)
CREAT SERPL-MCNC: 0.52 MG/DL (ref 0.57–1)
DEPRECATED RDW RBC AUTO: 44.4 FL (ref 37–54)
EGFRCR SERPLBLD CKD-EPI 2021: 121.4 ML/MIN/1.73
EOSINOPHIL # BLD AUTO: 0.08 10*3/MM3 (ref 0–0.4)
EOSINOPHIL NFR BLD AUTO: 0.8 % (ref 0.3–6.2)
ERYTHROCYTE [DISTWIDTH] IN BLOOD BY AUTOMATED COUNT: 14.6 % (ref 12.3–15.4)
GLOBULIN UR ELPH-MCNC: 2.5 GM/DL
GLUCOSE SERPL-MCNC: 91 MG/DL (ref 65–99)
HCT VFR BLD AUTO: 44 % (ref 34–46.6)
HGB BLD-MCNC: 15 G/DL (ref 12–15.9)
IMM GRANULOCYTES # BLD AUTO: 0.02 10*3/MM3 (ref 0–0.05)
IMM GRANULOCYTES NFR BLD AUTO: 0.2 % (ref 0–0.5)
LYMPHOCYTES # BLD AUTO: 4.33 10*3/MM3 (ref 0.7–3.1)
LYMPHOCYTES NFR BLD AUTO: 43.6 % (ref 19.6–45.3)
MCH RBC QN AUTO: 28.8 PG (ref 26.6–33)
MCHC RBC AUTO-ENTMCNC: 34.1 G/DL (ref 31.5–35.7)
MCV RBC AUTO: 84.5 FL (ref 79–97)
MONOCYTES # BLD AUTO: 0.57 10*3/MM3 (ref 0.1–0.9)
MONOCYTES NFR BLD AUTO: 5.7 % (ref 5–12)
NEUTROPHILS NFR BLD AUTO: 4.89 10*3/MM3 (ref 1.7–7)
NEUTROPHILS NFR BLD AUTO: 49.2 % (ref 42.7–76)
NRBC BLD AUTO-RTO: 0 /100 WBC (ref 0–0.2)
PLATELET # BLD AUTO: 294 10*3/MM3 (ref 140–450)
PMV BLD AUTO: 10.3 FL (ref 6–12)
POTASSIUM SERPL-SCNC: 3.2 MMOL/L (ref 3.5–5.2)
PROT SERPL-MCNC: 7.1 G/DL (ref 6–8.5)
RBC # BLD AUTO: 5.21 10*6/MM3 (ref 3.77–5.28)
SODIUM SERPL-SCNC: 143 MMOL/L (ref 136–145)
WBC NRBC COR # BLD AUTO: 9.94 10*3/MM3 (ref 3.4–10.8)

## 2024-02-20 PROCEDURE — 25010000002 ONDANSETRON PER 1 MG: Performed by: NURSE PRACTITIONER

## 2024-02-20 PROCEDURE — 25010000002 KETOROLAC TROMETHAMINE PER 15 MG: Performed by: NURSE PRACTITIONER

## 2024-02-20 PROCEDURE — 96374 THER/PROPH/DIAG INJ IV PUSH: CPT

## 2024-02-20 PROCEDURE — 85025 COMPLETE CBC W/AUTO DIFF WBC: CPT | Performed by: NURSE PRACTITIONER

## 2024-02-20 PROCEDURE — 80053 COMPREHEN METABOLIC PANEL: CPT | Performed by: NURSE PRACTITIONER

## 2024-02-20 PROCEDURE — 99283 EMERGENCY DEPT VISIT LOW MDM: CPT

## 2024-02-20 PROCEDURE — 96375 TX/PRO/DX INJ NEW DRUG ADDON: CPT

## 2024-02-20 PROCEDURE — 96372 THER/PROPH/DIAG INJ SC/IM: CPT

## 2024-02-20 PROCEDURE — 25010000002 DIPHENHYDRAMINE PER 50 MG: Performed by: NURSE PRACTITIONER

## 2024-02-20 RX ORDER — SUMATRIPTAN 6 MG/.5ML
6 INJECTION, SOLUTION SUBCUTANEOUS ONCE
Status: COMPLETED | OUTPATIENT
Start: 2024-02-20 | End: 2024-02-20

## 2024-02-20 RX ORDER — SODIUM CHLORIDE 0.9 % (FLUSH) 0.9 %
10 SYRINGE (ML) INJECTION AS NEEDED
Status: DISCONTINUED | OUTPATIENT
Start: 2024-02-20 | End: 2024-02-21 | Stop reason: HOSPADM

## 2024-02-20 RX ORDER — DIPHENHYDRAMINE HYDROCHLORIDE 50 MG/ML
25 INJECTION INTRAMUSCULAR; INTRAVENOUS ONCE
Status: COMPLETED | OUTPATIENT
Start: 2024-02-20 | End: 2024-02-20

## 2024-02-20 RX ORDER — KETOROLAC TROMETHAMINE 30 MG/ML
30 INJECTION, SOLUTION INTRAMUSCULAR; INTRAVENOUS ONCE
Status: COMPLETED | OUTPATIENT
Start: 2024-02-20 | End: 2024-02-20

## 2024-02-20 RX ORDER — ONDANSETRON 2 MG/ML
4 INJECTION INTRAMUSCULAR; INTRAVENOUS ONCE
Status: COMPLETED | OUTPATIENT
Start: 2024-02-20 | End: 2024-02-20

## 2024-02-20 RX ADMIN — DIPHENHYDRAMINE HYDROCHLORIDE 25 MG: 50 INJECTION INTRAMUSCULAR; INTRAVENOUS at 22:00

## 2024-02-20 RX ADMIN — ONDANSETRON 4 MG: 2 INJECTION INTRAMUSCULAR; INTRAVENOUS at 22:01

## 2024-02-20 RX ADMIN — KETOROLAC TROMETHAMINE 30 MG: 30 INJECTION, SOLUTION INTRAMUSCULAR; INTRAVENOUS at 22:00

## 2024-02-20 RX ADMIN — SUMATRIPTAN 6 MG: 6 INJECTION, SOLUTION SUBCUTANEOUS at 22:00

## 2024-02-20 NOTE — TELEPHONE ENCOUNTER
Caller: Alirio Lindsay ADELA    Relationship: Self    Best call back number: [phone]    What is the medical concern/diagnosis: PAIN    What specialty or service is being requested: PAIN CLINIC    What is the provider, practice or medical service name: [facility]     What is the office location: [place]    What is the office phone number: [phone]    Any additional details: SHE NEEDS TO GET BACK ON HER PAIN MEDICATION. SHE IS OUT OF MOST OF HER MEDICATION AND ALMOST OUT OF THE REST. HAVING A LOT OF PAIN AND TINGLING AND NUMBNESS IN HER LEGS AND ARMS. TAKES HER ABOUT 20 MINUTES BEFORE SHE CAN GET OUT OF BED.

## 2024-02-21 NOTE — ED PROVIDER NOTES
Pt Name: Lindsay Amezquita  MRN: [id number]  : 1985  Date of Encounter: 2024    PCP: Charley Robles APRN      Subjective    History of Present Illness:    Chief Complaint: Migraine headache    History of Present Illness: Lindsay Amezquita is a 38 y.o. female who presents to the ER complaining of migraine headache that has been ongoing for the last 24 hours.  Patient states she has a history of migraine headaches this was started approximately 4:00 yesterday was seen at outside hospital given migraine cocktail did improve but did return late last night.        Nurses Notes reviewed and agree, including vitals, allergies, social history and prior medical history.       Allergies:    Aspirin, Codeine, Cyclobenzaprine, Haldol [haloperidol], Latex, Penicillins, Morphine, Ondansetron, Oxycodone-acetaminophen, Oxycontin [oxycodone], Tramadol, Triptans, Tylenol [acetaminophen], Compazine [prochlorperazine], Lamictal [lamotrigine], and Reglan [metoclopramide]    Past Medical History:   Diagnosis Date    Allergic     Anxiety     Asthma Beings of copd with asthma    Back pain     Bell's palsy     Bipolar 2 disorder     Blood clot in vein     COPD (chronic obstructive pulmonary disease) Six months ago    Deep vein thrombosis Last year    Depression     Diabetes mellitus November    Pre diabete    Frequent headaches     GERD (gastroesophageal reflux disease)     Hypertension     Every time i go into the emergacy room    Irritable bowel syndrome Two years ago    Kidney stone Two years ago    Migraine     Obesity All of my life    Ovarian cyst Two years ago    Panic     PTSD (post-traumatic stress disorder)     Pulmonary embolism Not in lungs    Behind left knee cap    Recurrent pregnancy loss, antepartum condition or complication Every since i have been 14 yars old    Restless leg syndrome     Sinus problem     Snores     Tattoos     Urinary tract infection Have them on and off    Varicella Little    Visual impairment  Since i was little    Vitamin B12 deficiency        Past Surgical History:   Procedure Laterality Date    CHOLECYSTECTOMY      TOOTH EXTRACTION      WISDOM TOOTH EXTRACTION  All my teeth       Social History     Socioeconomic History    Marital status: Single   Tobacco Use    Smoking status: Some Days     Packs/day: 2.00     Years: 15.00     Additional pack years: 0.00     Total pack years: 30.00     Types: Cigarettes     Start date: 7/17/1992     Passive exposure: Never    Smokeless tobacco: Never    Tobacco comments:     Not going to quite   Vaping Use    Vaping Use: Former   Substance and Sexual Activity    Alcohol use: Never    Drug use: Not Currently    Sexual activity: Not Currently     Partners: Female     Birth control/protection: Other, Same-sex partner       Family History   Problem Relation Age of Onset    Irritable bowel syndrome Mother     Heart disease Mother     Miscarriages / Stillbirths Mother     Arthritis Mother     COPD Mother     Learning disabilities Mother     Mental illness Mother     Irritable bowel syndrome Father     Alcohol abuse Father     Diabetes Father     Hyperlipidemia Father     Anxiety disorder Father     Learning disabilities Father     Colon cancer Maternal Grandfather     Stroke Paternal Grandfather     Stroke Paternal Grandmother        REVIEW OF SYSTEMS:     All systems reviewed and not pertinent unless noted.    Review of Systems   Neurological:  Positive for headaches.   All other systems reviewed and are negative.      Objective    Physical Exam  Vitals and nursing note reviewed.   Constitutional:       Appearance: Normal appearance.   HENT:      Head: Normocephalic and atraumatic.   Eyes:      Extraocular Movements: Extraocular movements intact.      Pupils: Pupils are equal, round, and reactive to light.   Cardiovascular:      Rate and Rhythm: Normal rate and regular rhythm.      Pulses: Normal pulses.      Heart sounds: Normal heart sounds.   Pulmonary:      Effort:  Pulmonary effort is normal.      Breath sounds: Normal breath sounds.   Abdominal:      General: Abdomen is flat. Bowel sounds are normal.      Palpations: Abdomen is soft.   Musculoskeletal:      Cervical back: Normal range of motion and neck supple.   Skin:     Capillary Refill: Capillary refill takes less than 2 seconds.   Neurological:      General: No focal deficit present.      Mental Status: She is alert and oriented to person, place, and time. Mental status is at baseline.      GCS: GCS eye subscore is 4. GCS verbal subscore is 5. GCS motor subscore is 6.      Sensory: Sensation is intact.      Motor: Motor function is intact.      Gait: Gait is intact.   Psychiatric:         Attention and Perception: Attention and perception normal.         Mood and Affect: Mood and affect normal.         Speech: Speech normal.         Behavior: Behavior normal. Behavior is cooperative.                          Procedures    ED Course:         LAB Results:    Lab Results (last 24 hours)       Procedure Component Value Units Date/Time    CBC & Differential [[id number]]  (Abnormal) Collected: 02/20/24 2155    Specimen: Blood Updated: 02/20/24 2202    Narrative:      The following orders were created for panel order CBC & Differential.  Procedure                               Abnormality         Status                     ---------                               -----------         ------                     CBC Auto Differential[[id number]]        Abnormal            Final result                 Please view results for these tests on the individual orders.    Comprehensive Metabolic Panel [[id number]]  (Abnormal) Collected: 02/20/24 2155    Specimen: Blood Updated: 02/20/24 2229     Glucose 91 mg/dL      BUN 6 mg/dL      Creatinine 0.52 mg/dL      Sodium 143 mmol/L      Potassium 3.2 mmol/L      Chloride 104 mmol/L      CO2 24.2 mmol/L      Calcium 9.3 mg/dL      Total Protein 7.1 g/dL      Albumin 4.6 g/dL      ALT (SGPT) 22 U/L       AST (SGOT) 20 U/L      Alkaline Phosphatase 53 U/L      Total Bilirubin 0.6 mg/dL      Globulin 2.5 gm/dL      A/G Ratio 1.8 g/dL      BUN/Creatinine Ratio 11.5     Anion Gap 14.8 mmol/L      eGFR 121.4 mL/min/1.73     Narrative:      GFR Normal >60  Chronic Kidney Disease <60  Kidney Failure <15      CBC Auto Differential [[id number]]  (Abnormal) Collected: 02/20/24 2155    Specimen: Blood Updated: 02/20/24 2202     WBC 9.94 10*3/mm3      RBC 5.21 10*6/mm3      Hemoglobin 15.0 g/dL      Hematocrit 44.0 %      MCV 84.5 fL      MCH 28.8 pg      MCHC 34.1 g/dL      RDW 14.6 %      RDW-SD 44.4 fl      MPV 10.3 fL      Platelets 294 10*3/mm3      Neutrophil % 49.2 %      Lymphocyte % 43.6 %      Monocyte % 5.7 %      Eosinophil % 0.8 %      Basophil % 0.5 %      Immature Grans % 0.2 %      Neutrophils, Absolute 4.89 10*3/mm3      Lymphocytes, Absolute 4.33 10*3/mm3      Monocytes, Absolute 0.57 10*3/mm3      Eosinophils, Absolute 0.08 10*3/mm3      Basophils, Absolute 0.05 10*3/mm3      Immature Grans, Absolute 0.02 10*3/mm3      nRBC 0.0 /100 WBC              If labs were ordered, I have independently reviewed the results and considered them in the diagnosis and treatment plan for the patient    RADIOLOGY    No radiology results from the last 24 hrs     If I have ordered, I have independently reviewed the above noted radiographic studies.  Please see the radiologist dictation for the official interpretation    Medications given to patient in the ER    Medications   magnesium oxide (MAG-OX) tablet 400 mg (has no administration in time range)   sodium chloride 0.9 % flush 10 mL (has no administration in time range)   SUMAtriptan (IMITREX) injection 6 mg (6 mg Subcutaneous Given 2/20/24 2200)   ketorolac (TORADOL) injection 30 mg (30 mg Intravenous Given 2/20/24 2200)   ondansetron (ZOFRAN) injection 4 mg (4 mg Intravenous Given 2/20/24 2201)   diphenhydrAMINE (BENADRYL) injection 25 mg (25 mg Intravenous Given  2/20/24 2200)           I have discussed all the test results with patient and available family and the plan for disposition is discharged home and request patient follow-up with primary care provider and neurologist for reevaluation of her migraine headaches.      Medical Decision Making  Lindsay Amezquita is a 38 y.o. female who presents to the ER complaining of migraine headache that has been ongoing for the last 24 hours.  Patient states she has a history of migraine headaches this was started approximately 4:00 yesterday was seen at outside hospital given migraine cocktail did improve but did return late last night.    DDX: includes but is not limited to: Headache, migraine, other    Problems Addressed:  Migraine without status migrainosus, not intractable, unspecified migraine type: complicated acute illness or injury    Amount and/or Complexity of Data Reviewed  Labs: ordered. Decision-making details documented in ED Course.     Details: I have personally reviewed and documented all results  Discussion of management or test interpretation with external provider(s): Discussed assessment, treatment and plan with ER attending    Risk  OTC drugs.  Prescription drug management.  Risk Details: I have discussed with patient the finding of the test preformed today. Patient has been diagnosed with migraine headache and will be discharged home.  Patient requested to follow-up with primary care provider, neurology within the next 7 days for reevaluation. Strict return precautions have been given and patient verbalizes understanding          Final diagnoses:   Migraine without status migrainosus, not intractable, unspecified migraine type         Please note that portions of this document were completed using voice recognition dictation software.       Jovan Freire APRN  02/20/24 [id number]       Jovan Freire APRN  02/20/24 [id number]

## 2024-02-21 NOTE — TELEPHONE ENCOUNTER
Spoke with patient.  She is aware that she is going to need to find a pain clinic that will accept her as a patient.  She was advised that we have reached our limit of referrals. Patient was very understanding and stated that she had a clinic that she was going to call to discuss her needs and see if they could accept her as a patient tomorrow.  .

## 2024-02-22 ENCOUNTER — OFFICE VISIT (OUTPATIENT)
Dept: NEUROLOGY | Facility: CLINIC | Age: 39
End: 2024-02-22
Payer: MEDICAID

## 2024-02-22 VITALS
SYSTOLIC BLOOD PRESSURE: 130 MMHG | TEMPERATURE: 98 F | OXYGEN SATURATION: 98 % | BODY MASS INDEX: 43.98 KG/M2 | HEIGHT: 62 IN | HEART RATE: 100 BPM | WEIGHT: 239 LBS | DIASTOLIC BLOOD PRESSURE: 90 MMHG

## 2024-02-22 DIAGNOSIS — G43.909 EPISODIC MIGRAINE: Primary | ICD-10-CM

## 2024-02-22 DIAGNOSIS — G43.001 MIGRAINE WITHOUT AURA AND WITH STATUS MIGRAINOSUS, NOT INTRACTABLE: ICD-10-CM

## 2024-02-22 RX ORDER — SUMATRIPTAN 6 MG/.5ML
INJECTION, SOLUTION SUBCUTANEOUS
Qty: 0.5 ML | Refills: 6 | Status: SHIPPED | OUTPATIENT
Start: 2024-02-22

## 2024-02-22 RX ORDER — RIMEGEPANT SULFATE 75 MG/75MG
75 TABLET, ORALLY DISINTEGRATING ORAL EVERY OTHER DAY
Qty: 16 TABLET | Refills: 6 | Status: SHIPPED | OUTPATIENT
Start: 2024-02-22

## 2024-02-22 RX ORDER — SUMATRIPTAN 6 MG/.5ML
6 INJECTION, SOLUTION SUBCUTANEOUS ONCE
Qty: 0.5 ML | Refills: 6 | Status: CANCELLED | OUTPATIENT
Start: 2024-02-22 | End: 2024-02-22

## 2024-02-22 NOTE — PROGRESS NOTES
"     Follow Up Office Visit      Patient Name: Lindsay Amezquita  : 1985   MRN: [id number]     Chief Complaint:    Chief Complaint   Patient presents with    Follow-up     Migraine; reports - migraine days; patient has been out of Nurtec for several days; would like to continue Nurtec every other day and would like to have a prescription for imitrex injections for abortive medication as she was given one in the ER and it worked well.       History of Present Illness: Lindsay Amezquita is a 38 y.o. female who is here today to follow up with migraines. She says she was given Reglan, Toradol and Benadryl in [place] last week and says again Tuesday she had to be seen in ER again for migraine and was given Imitrex shot, Benadrly and zofran.  She would like to try the Imitrex injections.  She did have allergy to triptans noted on her allergy list from 2022 and stated reaction was chest tightness and left arm pain and patient denies stating that \"no one ever had that \".  She says she had 0 side effects from the injection of Imitrex given in the ED.  She had CT head on 24 that was noncontributory. She has open MRI scheduled in [place].  She says she was only given 1 Box Nurtec at the pharmacy this last time. She feels the Nurtec really helps but feels where she was unable to get the QOD dosing that she had more and worse migraines this month. She has +photophobia, phonophobia, sensitive to certain smells, + N/V, blurred vision and see's spots and says with the severe ones \"it starts to close\".  Headaches start in the back, they wrap around the sides and to the forehead. She says she has to have sleep study in March as when she was in the ER she says her O2 was dropping.   Last eye exam a year ago. Will make a new appointment.     She is to have hysterectomy in the near future  Hx of Bell's Palsy 2 years ago and still has some residual rt sided droop    Subjective      Review of Systems:   Review of Systems "   Eyes:  Positive for photophobia and visual disturbance.   Gastrointestinal:  Positive for nausea and vomiting.   Neurological:  Positive for headache.       I have reviewed and the following portions of the patient's history were updated as appropriate: past family history, past medical history, past social history, past surgical history and problem list.    Medications:     Current Outpatient Medications:     ALPRAZolam (Xanax) 1 MG tablet, Take 1 tablet by mouth 3 (Three) Times a Day As Needed for Sleep., Disp: 90 tablet, Rfl: 1    atorvastatin (LIPITOR) 40 MG tablet, Take 1 tablet by mouth every night at bedtime., Disp: 90 tablet, Rfl: 3    Blood Glucose Monitoring Suppl (FreeStyle Freedom Lite) w/Device kit, , Disp: , Rfl:     chlorhexidine (HIBICLENS) 4 % external liquid, Apply  topically to the appropriate area as directed Daily., Disp: 473 mL, Rfl: 3    desvenlafaxine (PRISTIQ) 100 MG 24 hr tablet, Take 1 tablet by mouth Daily., Disp: 30 tablet, Rfl: 1    diphenhydrAMINE (BENADRYL) 50 MG capsule, Take 1 capsule by mouth., Disp: , Rfl:     empagliflozin (Jardiance) 10 MG tablet tablet, Take 1 tablet by mouth Daily., Disp: 30 tablet, Rfl: 3    fluconazole (Diflucan) 150 MG tablet, 1 po now and repeat in 3 d., Disp: 2 tablet, Rfl: 0    glucose blood test strip, Use as instructed, Disp: 50 each, Rfl: 12    glucose monitor monitoring kit, Check glucose daily, Disp: 1 each, Rfl: 0    hydrOXYzine (ATARAX) 50 MG tablet, Take 1.5 tablets by mouth 3 (Three) Times a Day As Needed (anxiety and/or sleep)., Disp: 135 tablet, Rfl: 1    ibuprofen (ADVIL,MOTRIN) 800 MG tablet, Take 1 tablet by mouth 3 (Three) Times a Day With Meals., Disp: 21 tablet, Rfl: 0    Lancets misc, Check fasting glucose daily and 1 hour after largest meal of day., Disp: 100 each, Rfl: 3    loratadine (Claritin) 10 MG tablet, Take 1 tablet by mouth Daily., Disp: 30 tablet, Rfl: 5    Lurasidone HCl (Latuda) 120 MG tablet tablet, Take 1 tablet by  "mouth Daily., Disp: 30 tablet, Rfl: 1    norethindrone (Aygestin) 5 MG tablet, Take 2 tablets by mouth Daily., Disp: 60 tablet, Rfl: 3    nystatin (MYCOSTATIN) 100,000 unit/mL suspension, Take 5 mL by mouth 4 (Four) Times a Day., Disp: 473 mL, Rfl: 0    nystatin (MYCOSTATIN) 035582 UNIT/GM powder, Apply  topically to the appropriate area as directed 2 (Two) Times a Day., Disp: 60 g, Rfl: 3    promethazine (PHENERGAN) 25 MG tablet, Take 1 tablet by mouth Every 6 (Six) Hours As Needed for Nausea or Vomiting., Disp: 45 tablet, Rfl: 3    promethazine-dextromethorphan (PROMETHAZINE-DM) 6.25-15 MG/5ML syrup, Take 5 mL by mouth At Night As Needed for Cough., Disp: 118 mL, Rfl: 0    Rimegepant Sulfate (Nurtec) 75 MG tablet dispersible tablet, Take 1 tablet by mouth Every Other Day. Take Monday,Wednesday,Friday, and Saturday., Disp: 16 tablet, Rfl: 6    tiZANidine (ZANAFLEX) 4 MG tablet, Take 1 tablet by mouth Every 8 (Eight) Hours As Needed for Muscle Spasms. Further refills should come from pain management., Disp: 90 tablet, Rfl: 0    Umeclidinium-Vilanterol (Anoro Ellipta) 62.5-25 MCG/ACT aerosol powder  inhaler, Inhale 1 puff Daily., Disp: 60 each, Rfl: 12    Ventolin  (90 Base) MCG/ACT inhaler, INHALE 2 PUFFS EVERY 4 HOURS AS NEEDED FOR WHEEZING OR SHORTNESS OF AIR, Disp: 18 g, Rfl: 0    naloxone (NARCAN) 4 MG/0.1ML nasal spray, 1 spray into the nostril(s) as directed by provider As Needed., Disp: , Rfl:     SUMAtriptan (IMITREX) 6 MG/0.5ML injection, Inject prescribed dose at onset of headache. May repeat dose one time in 1 hour(s) if headache not relieved. Please dispense sufficient quantity for 9 doses (4.5ml), Disp: 0.5 mL, Rfl: 6    Allergies:   Allergies   Allergen Reactions    Aspirin Hives     Wheezing    Wheezing    Codeine Anaphylaxis    Cyclobenzaprine Other (See Comments)     \"gives me chest pain\"  Other reaction(s): Other (See Comments)  \"gives me chest pain\"    Haldol [Haloperidol] Rash    Latex " "Hives    Penicillins Hives and Anaphylaxis     Vomiting    Morphine Itching    Ondansetron GI Intolerance     Other reaction(s): GI Intolerance    Oxycodone-Acetaminophen GI Intolerance     Other reaction(s): GI Intolerance    Oxycontin [Oxycodone] GI Intolerance    Tramadol Nausea And Vomiting    Tylenol [Acetaminophen] Nausea And Vomiting    Compazine [Prochlorperazine] Anxiety    Lamictal [Lamotrigine] Itching     Itching and hives    Reglan [Metoclopramide] Anxiety       Objective     Physical Exam:  Vital Signs:   Vitals:    02/22/24 1332   BP: 130/90   Pulse: 100   Temp: 98 °F (36.7 °C)   SpO2: 98%   Weight: 108 kg (239 lb)   Height: 157.5 cm (62\")   PainSc:   7   PainLoc: Head     Body mass index is 43.71 kg/m².    Physical Exam  Constitutional:       Appearance: Normal appearance.   HENT:      Head: Normocephalic.   Eyes:      Conjunctiva/sclera: Conjunctivae normal.   Pulmonary:      Effort: Pulmonary effort is normal.   Musculoskeletal:         General: Normal range of motion.   Skin:     General: Skin is warm and dry.   Neurological:      General: No focal deficit present.      Mental Status: She is alert and oriented to person, place, and time.      Cranial Nerves: Cranial nerves 2-12 are intact. No dysarthria.      Motor: Motor function is intact. No tremor or pronator drift.      Coordination: Coordination is intact. Finger-Nose-Finger Test normal.      Comments: Wide-based antalgic gait but steady.   Psychiatric:         Attention and Perception: Attention normal.         Mood and Affect: Mood and affect normal.         Speech: Speech normal.         Behavior: Behavior normal. Behavior is cooperative.         Thought Content: Thought content normal.         Cognition and Memory: Cognition normal.         Judgment: Judgment normal.         Neurologic Exam     Mental Status   Oriented to person, place, and time.   Speech: speech is normal   Level of consciousness: alert  Knowledge: good.     Cranial " Nerves   Cranial nerves II through XII intact.     CN XII   Tongue: not atrophic  Fasciculations: absent  Tongue deviation: none    Motor Exam     Strength   Right deltoid: 5/5  Left deltoid: 5/5  Right biceps: 5/5  Left biceps: 5/5  Right triceps: 5/5  Left triceps: 5/5  Right anterior tibial: 5/5  Left anterior tibial: 5/5  Right posterior tibial: 5/5  Left posterior tibial: 5/5    Gait, Coordination, and Reflexes     Coordination   Finger to nose coordination: normal       Assessment / Plan      Assessment/Plan:   Diagnoses and all orders for this visit:    1. Episodic migraine (Primary)  -     SUMAtriptan (IMITREX) 6 MG/0.5ML injection; Inject prescribed dose at onset of headache. May repeat dose one time in 1 hour(s) if headache not relieved. Please dispense sufficient quantity for 9 doses (4.5ml)  Dispense: 0.5 mL; Refill: 6    2. Migraine without aura and with status migrainosus, not intractable  -     Rimegepant Sulfate (Nurtec) 75 MG tablet dispersible tablet; Take 1 tablet by mouth Every Other Day. Take Monday,Wednesday,Friday, and Saturday.  Dispense: 16 tablet; Refill: 6         Will prescribe Imitrex 6mg SC PRN as patient did well with this and reported good relief when given dose in the emergency department 2 days ago.  Nurtec refilled at current dose without change. Indications and side effects discussed with patient she verbalizes understanding.  Patient will call in the interim if she has any questions or concerns or changes.    Follow Up:   No follow-ups on file.    CLAIR Gillespie, FNP-BC  [facility]

## 2024-02-23 ENCOUNTER — TELEPHONE (OUTPATIENT)
Dept: INTERNAL MEDICINE | Facility: CLINIC | Age: 39
End: 2024-02-23
Payer: MEDICAID

## 2024-02-23 DIAGNOSIS — F31.30 BIPOLAR I DISORDER, MOST RECENT EPISODE DEPRESSED: ICD-10-CM

## 2024-02-23 RX ORDER — OXCARBAZEPINE 150 MG/1
TABLET, FILM COATED ORAL
Qty: 30 TABLET | Refills: 1 | OUTPATIENT
Start: 2024-02-23

## 2024-02-23 NOTE — TELEPHONE ENCOUNTER
Caller: Lindsay Amezquita    Relationship: Self    Best call back number:     [phone]       What was the call regarding: PATIENT CALLED STATING THAT SHE IS NOT ABLE TO FIND A PAIN MANAGEMENT THAT WILL TAKE HER.  PATIENT ASKED IF SHE SHOULD SWITCH TO A DOCTOR IN THE OFFICE SO THAT SHE CAN HAVE A TIZANIDINE, GABAPENTIN, AND NORCO PRESCRIBED. PATIENT STATED THAT SHE IS IN PAIN AND IS NOT ABLE TO MOVE.

## 2024-02-26 DIAGNOSIS — G43.001 MIGRAINE WITHOUT AURA AND WITH STATUS MIGRAINOSUS, NOT INTRACTABLE: Primary | ICD-10-CM

## 2024-02-26 RX ORDER — BLOOD SUGAR DIAGNOSTIC
1 STRIP MISCELLANEOUS DAILY PRN
Qty: 100 EACH | Refills: 1 | Status: SHIPPED | OUTPATIENT
Start: 2024-02-26

## 2024-02-26 RX ORDER — IBUPROFEN 200 MG
1 TABLET ORAL DAILY PRN
Qty: 10 EACH | Refills: 5 | Status: SHIPPED | OUTPATIENT
Start: 2024-02-26

## 2024-02-26 NOTE — TELEPHONE ENCOUNTER
[facility] discharged her. She saw someone at [facility] and they got records from other facility and said she doesn't qualify for meds. Told her you are off today.

## 2024-02-27 ENCOUNTER — OFFICE VISIT (OUTPATIENT)
Dept: OBSTETRICS AND GYNECOLOGY | Facility: CLINIC | Age: 39
End: 2024-02-27
Payer: MEDICAID

## 2024-02-27 VITALS
DIASTOLIC BLOOD PRESSURE: 98 MMHG | BODY MASS INDEX: 44.35 KG/M2 | WEIGHT: 241 LBS | HEIGHT: 62 IN | SYSTOLIC BLOOD PRESSURE: 138 MMHG

## 2024-02-27 DIAGNOSIS — G43.909 EPISODIC MIGRAINE: Primary | ICD-10-CM

## 2024-02-27 DIAGNOSIS — Z53.21 PATIENT LEFT WITHOUT BEING SEEN: Primary | ICD-10-CM

## 2024-02-27 RX ORDER — TIZANIDINE 4 MG/1
4 TABLET ORAL EVERY 8 HOURS PRN
Qty: 90 TABLET | Refills: 3 | Status: SHIPPED | OUTPATIENT
Start: 2024-02-27

## 2024-02-27 RX ORDER — SUMATRIPTAN 100 MG/1
100 TABLET, FILM COATED ORAL ONCE AS NEEDED
Qty: 9 TABLET | Refills: 5 | Status: SHIPPED | OUTPATIENT
Start: 2024-02-27

## 2024-02-27 RX ORDER — AMITRIPTYLINE HYDROCHLORIDE 150 MG/1
TABLET ORAL
COMMUNITY
Start: 2024-02-21

## 2024-02-27 RX ORDER — SYRINGE,INSUL U-500,NDL,0.5ML 31GX15/64"
1 SYRINGE, EMPTY DISPOSABLE MISCELLANEOUS DAILY PRN
Qty: 100 EACH | Refills: 0 | Status: SHIPPED | OUTPATIENT
Start: 2024-02-27

## 2024-02-28 ENCOUNTER — PREP FOR SURGERY (OUTPATIENT)
Dept: OTHER | Facility: HOSPITAL | Age: 39
End: 2024-02-28
Payer: MEDICAID

## 2024-02-28 DIAGNOSIS — N93.9 ABNORMAL UTERINE BLEEDING (AUB): Primary | ICD-10-CM

## 2024-02-28 RX ORDER — SODIUM CHLORIDE 0.9 % (FLUSH) 0.9 %
3 SYRINGE (ML) INJECTION EVERY 12 HOURS SCHEDULED
OUTPATIENT
Start: 2024-02-28

## 2024-02-28 RX ORDER — SODIUM CHLORIDE 9 MG/ML
40 INJECTION, SOLUTION INTRAVENOUS AS NEEDED
OUTPATIENT
Start: 2024-02-28

## 2024-02-28 RX ORDER — SODIUM CHLORIDE 0.9 % (FLUSH) 0.9 %
10 SYRINGE (ML) INJECTION AS NEEDED
OUTPATIENT
Start: 2024-02-28

## 2024-03-01 ENCOUNTER — TELEPHONE (OUTPATIENT)
Dept: BEHAVIORAL HEALTH | Facility: CLINIC | Age: 39
End: 2024-03-01
Payer: MEDICAID

## 2024-03-01 NOTE — TELEPHONE ENCOUNTER
PATIENT SAYS SHE FEELS LIKE SHE IS ABOUT TO HAVE A NERVOUS BREAKDOWN. SHE IS TAKING ALL OF HER MEDICATION, BUT THEY DON'T SEEM TO HELP AT ALL, AND SHE IS SEEING HER THERAPIST. SHE SAYS SHE DOESN'T THINK SHE CAN KEEP GOING LIKE THIS & IS AT HER WITS ENDS. SHE ASKED IF DAVID HAS FOUND OUT ANYTHING ABOUT HER GETTING A MEDICAL MARIJUANA CARD.   *I DID ADVISE PATIENT TO GO TO THE ER IF SHE FELT SHE NEEDED TO*

## 2024-03-05 ENCOUNTER — TELEPHONE (OUTPATIENT)
Dept: INTERNAL MEDICINE | Facility: CLINIC | Age: 39
End: 2024-03-05
Payer: MEDICAID

## 2024-03-05 NOTE — TELEPHONE ENCOUNTER
Caller: Lindsay Amezquita    Relationship: Self    Best call back number: [phone]     What is the best time to reach you: ANY    Who are you requesting to speak with (clinical staff, provider,  specific staff member): NURSE    Do you know the name of the person who called: PATIENT    What was the call regarding: HAS PATIENT BEEN REFERRED TO PAIN CLINIC YET?  IN PAIN ALL THE TIME.     Is it okay if the provider responds through MyChart: PHONE CALL

## 2024-03-06 ENCOUNTER — TELEPHONE (OUTPATIENT)
Dept: NEUROLOGY | Facility: CLINIC | Age: 39
End: 2024-03-06
Payer: MEDICAID

## 2024-03-06 NOTE — TELEPHONE ENCOUNTER
Caller: Lindsay Amezquita    Relationship: Self    Best call back number: [phone]    Which medication are you concerned about:   SUMAtriptan (IMITREX) 100 MG tablet     Who prescribed you this medication:   ROYA    When did you start taking this medication:   2-22-24    What are your concerns:   PT'S LEFT ARM IS ACHING SINCE TAKING THIS RX.  IS THIS AN ASPRIN BASED PRODUCT? PT'S MOTHER TOLD HER THIS RX WAS ASPRIN BASED.    PT SAID SHE IS ALLERGIC TO ASPRIN. OFFICE WAS CLOSED. I TOLD PT TO GO TO ER AND SHE SAID SHE WOULD.

## 2024-03-07 ENCOUNTER — TELEPHONE (OUTPATIENT)
Dept: OBSTETRICS AND GYNECOLOGY | Facility: CLINIC | Age: 39
End: 2024-03-07

## 2024-03-07 NOTE — TELEPHONE ENCOUNTER
I called and spoke with patient and tried to explain to patient that she would not be aware of anything during surgery, patient has called office several times today uncertain about surgery, just wanted you to be aware.

## 2024-03-07 NOTE — TELEPHONE ENCOUNTER
----- Message from Lindsay Amezquita sent at 3/7/2024  2:07 PM EST -----  Regarding: I have a question to ask you Dr. Ribeiro  Contact: [phone]  Instead of doing the d.n.c. Would it be better if I let one of ultra sound ladies do. Vignail ultra sound. Because I don’t know what to except with when you do the d.n.c. Because Lulu told me I would be awake but wouldn’t feel nothing and I don’t know. I don’t take pain very well either. Can you please either give me a call back at [phone]. I am super nerves and scared

## 2024-03-07 NOTE — TELEPHONE ENCOUNTER
Called and spoke to Lindsay. Advised her to stop taking the Imitrex. She stated that she went to urgent care yesterday and is still not feeling well. I advised her to go to the ER if symptoms continue to worsen since they can do more scans.

## 2024-03-08 ENCOUNTER — TELEPHONE (OUTPATIENT)
Dept: INTERNAL MEDICINE | Facility: CLINIC | Age: 39
End: 2024-03-08
Payer: MEDICAID

## 2024-03-08 DIAGNOSIS — B37.0 ORAL THRUSH: ICD-10-CM

## 2024-03-08 NOTE — TELEPHONE ENCOUNTER
PT CALLED AND STATED THAT SHE HAD SOME VERY IMPORTANT INFORMATION TO GIVE TO ROYA. THE HUB CALLED THE OFFICE AND THE PT HUNG UP. I CALLED THE PT BACK, SHE DID NOT ANSWER. LEFT MESSAGE FOR PT TO CALL ME BACK.

## 2024-03-08 NOTE — TELEPHONE ENCOUNTER
Caller: Lindsay Amezquita    Relationship: Self    Best call back number: [phone]     Requested Prescriptions: MOUTHWASH  Requested Prescriptions      No prescriptions requested or ordered in this encounter        Pharmacy where request should be sent: [facility] #[id number] - [place], [place] - [address] [place] [address] [phone] [place] [phone] FX     Last office visit with prescribing clinician: 1/5/2024   Last telemedicine visit with prescribing clinician: Visit date not found   Next office visit with prescribing clinician: 4/5/2024     Additional details provided by patient: LINDSAY IS ASKING FOR A REFILL OF THE MOUTHWASH FOR THE YEAST IN HER MOUTH.  IT HAS NOT GONE AWAY.  EVERYTHING SHE EATS TASTES VERY SPICEY    Does the patient have less than a 3 day supply:  [x] Yes  [] No    Would you like a call back once the refill request has been completed: [x] Yes [] No    If the office needs to give you a call back, can they leave a voicemail: [] Yes [] No    Kim Lua Rep   03/08/24 14:35 EST

## 2024-03-08 NOTE — TELEPHONE ENCOUNTER
I spoke with patient she stated that she needed another medication for preventative migraine medication that she had a reaction to the Imitrex. Pt stated that she stopped breathing and was taken to [facility].     I advised patient that the Imitrex was only to be taken when she had a migraine and that she needed to discontinue use immediately. Pt v/u.     I advised patient that we would send the message to Jeannine.

## 2024-03-09 ENCOUNTER — TELEMEDICINE (OUTPATIENT)
Dept: FAMILY MEDICINE CLINIC | Facility: TELEHEALTH | Age: 39
End: 2024-03-09
Payer: MEDICAID

## 2024-03-09 DIAGNOSIS — K52.9 GASTROENTERITIS: ICD-10-CM

## 2024-03-09 DIAGNOSIS — H66.001 ACUTE SUPPURATIVE OTITIS MEDIA OF RIGHT EAR WITHOUT SPONTANEOUS RUPTURE OF TYMPANIC MEMBRANE, RECURRENCE NOT SPECIFIED: Primary | ICD-10-CM

## 2024-03-09 DIAGNOSIS — F31.30 BIPOLAR I DISORDER, MOST RECENT EPISODE DEPRESSED: ICD-10-CM

## 2024-03-09 RX ORDER — AZITHROMYCIN 250 MG/1
TABLET, FILM COATED ORAL
Qty: 6 TABLET | Refills: 0 | Status: SHIPPED | OUTPATIENT
Start: 2024-03-09

## 2024-03-09 NOTE — PROGRESS NOTES
You have chosen to receive care through a telehealth visit.  Do you consent to use a video/audio connection for your medical care today? Yes     CHIEF COMPLAINT  No chief complaint on file.        HPI  Lindsay Amezquita is a 38 y.o. female  presents with complaint of nausea, headache, tinnitus, ear pain with right ear hurting worse with white discharge, back hurts, upper abdominal pain.  Denies fever, sore throat, cough.  Thinks she has ear infection and 24 hour stomach bug.     Review of Systems  See HPI    Past Medical History:   Diagnosis Date    Allergic     Anxiety     Asthma Beings of copd with asthma    Back pain     Bell's palsy     Bipolar 2 disorder     Blood clot in vein     COPD (chronic obstructive pulmonary disease) Six months ago    Deep vein thrombosis Last year    Depression     Diabetes mellitus November    Pre diabete    Frequent headaches     GERD (gastroesophageal reflux disease)     Hypertension     Every time i go into the [facility] room    Irritable bowel syndrome Two years ago    Kidney stone Two years ago    Migraine     Obesity All of my life    Ovarian cyst Two years ago    Panic     PTSD (post-traumatic stress disorder)     Pulmonary embolism Not in lungs    Behind left knee cap    Recurrent pregnancy loss, antepartum condition or complication Every since i have been 14 yars old    Restless leg syndrome     Sinus problem     Snores     Tattoos     Urinary tract infection Have them on and off    Varicella Little    Visual impairment Since i was little    Vitamin B12 deficiency        Family History   Problem Relation Age of Onset    Irritable bowel syndrome Mother     Heart disease Mother     Miscarriages / Stillbirths Mother     Arthritis Mother     COPD Mother     Learning disabilities Mother     Mental illness Mother     Irritable bowel syndrome Father     Alcohol abuse Father     Diabetes Father     Hyperlipidemia Father     Anxiety disorder Father     Learning disabilities Father      Colon cancer Maternal Grandfather     Stroke Paternal Grandfather     Stroke Paternal Grandmother        Social History     Socioeconomic History    Marital status: Single   Tobacco Use    Smoking status: Some Days     Current packs/day: 2.00     Average packs/day: 2.0 packs/day for 31.6 years (63.3 ttl pk-yrs)     Types: Cigarettes     Start date: 7/17/1992     Passive exposure: Never    Smokeless tobacco: Never    Tobacco comments:     Not going to quite   Vaping Use    Vaping status: Former   Substance and Sexual Activity    Alcohol use: Never    Drug use: Not Currently    Sexual activity: Not Currently     Partners: Female     Birth control/protection: Other, Same-sex partner       Lindsay Amezquita  reports that she has been smoking cigarettes. She started smoking about 31 years ago. She has a 63.3 pack-year smoking history. She has never been exposed to tobacco smoke. She has never used smokeless tobacco..              There were no vitals taken for this visit.    PHYSICAL EXAM  Physical Exam   Constitutional: She is oriented to person, place, and time. She appears well-developed and well-nourished. She does not have a sickly appearance. She does not appear ill.   HENT:   Head: Normocephalic and atraumatic.   Right Ear: External ear normal. There is drainage and tenderness.   Pulmonary/Chest: Effort normal.  No respiratory distress.  Neurological: She is alert and oriented to person, place, and time.           Diagnoses and all orders for this visit:    1. Acute suppurative otitis media of right ear without spontaneous rupture of tympanic membrane, recurrence not specified (Primary)  -     azithromycin (Zithromax Z-Janusz) 250 MG tablet; Take 2 tablets by mouth on day 1, then 1 tablet daily on days 2-5  Dispense: 6 tablet; Refill: 0    2. Gastroenteritis    --take medications as prescribed  --increase fluids, rest as needed, tylenol or ibuprofen for pain  --f/u in 5-7 days if no improvement; ER if worsening  symptoms        FOLLOW-UP  As discussed during visit with PCP/[facility] if no improvement or Urgent Care/Emergency Department if worsening of symptoms    Patient verbalizes understanding of medication dosage, comfort measures, instructions for treatment and follow-up.    Rosalba Tucker, APRN  03/09/2024  18:08 EST    The use of a video visit has been reviewed with the patient and verbal informed consent has been obtained. Myself and Lindsay Amezquita participated in this visit. The patient is located in [address] [place] [address].    I am located in [place], [place]. [employer] and [employer] were utilized. I spent 8 minutes in the patient's chart for this visit.      Note Disclaimer: At [facility], we believe that sharing information builds trust and better   relationships. You are receiving this note because you recently visited [facility]. It is possible you   will see health information before a provider has talked with you about it. This kind of information can   be easy to misunderstand. To help you fully understand what it means for your health, we urge you to   discuss this note with your provider.

## 2024-03-11 ENCOUNTER — PATIENT MESSAGE (OUTPATIENT)
Dept: BEHAVIORAL HEALTH | Facility: CLINIC | Age: 39
End: 2024-03-11
Payer: MEDICAID

## 2024-03-11 ENCOUNTER — TELEPHONE (OUTPATIENT)
Dept: INTERNAL MEDICINE | Facility: CLINIC | Age: 39
End: 2024-03-11
Payer: MEDICAID

## 2024-03-11 ENCOUNTER — TELEPHONE (OUTPATIENT)
Dept: BEHAVIORAL HEALTH | Facility: CLINIC | Age: 39
End: 2024-03-11

## 2024-03-11 ENCOUNTER — TELEPHONE (OUTPATIENT)
Dept: NEUROLOGY | Facility: CLINIC | Age: 39
End: 2024-03-11

## 2024-03-11 ENCOUNTER — LAB (OUTPATIENT)
Dept: LAB | Facility: HOSPITAL | Age: 39
End: 2024-03-11
Payer: MEDICAID

## 2024-03-11 DIAGNOSIS — N93.9 ABNORMAL UTERINE BLEEDING (AUB): ICD-10-CM

## 2024-03-11 LAB
ANION GAP SERPL CALCULATED.3IONS-SCNC: 15.3 MMOL/L (ref 5–15)
B-HCG UR QL: NEGATIVE
BASOPHILS # BLD AUTO: 0.03 10*3/MM3 (ref 0–0.2)
BASOPHILS NFR BLD AUTO: 0.3 % (ref 0–1.5)
BILIRUB UR QL STRIP: NEGATIVE
BUN SERPL-MCNC: 5 MG/DL (ref 6–20)
BUN/CREAT SERPL: 7 (ref 7–25)
CALCIUM SPEC-SCNC: 8.9 MG/DL (ref 8.6–10.5)
CHLORIDE SERPL-SCNC: 104 MMOL/L (ref 98–107)
CLARITY UR: CLEAR
CO2 SERPL-SCNC: 20.7 MMOL/L (ref 22–29)
COLOR UR: YELLOW
CREAT SERPL-MCNC: 0.71 MG/DL (ref 0.57–1)
DEPRECATED RDW RBC AUTO: 46.9 FL (ref 37–54)
EGFRCR SERPLBLD CKD-EPI 2021: 111.1 ML/MIN/1.73
EOSINOPHIL # BLD AUTO: 0.17 10*3/MM3 (ref 0–0.4)
EOSINOPHIL NFR BLD AUTO: 1.7 % (ref 0.3–6.2)
ERYTHROCYTE [DISTWIDTH] IN BLOOD BY AUTOMATED COUNT: 14.5 % (ref 12.3–15.4)
GLUCOSE SERPL-MCNC: 154 MG/DL (ref 65–99)
GLUCOSE UR STRIP-MCNC: ABNORMAL MG/DL
HCT VFR BLD AUTO: 38.9 % (ref 34–46.6)
HGB BLD-MCNC: 13 G/DL (ref 12–15.9)
HGB UR QL STRIP.AUTO: ABNORMAL
HOLD SPECIMEN: NORMAL
IMM GRANULOCYTES # BLD AUTO: 0.03 10*3/MM3 (ref 0–0.05)
IMM GRANULOCYTES NFR BLD AUTO: 0.3 % (ref 0–0.5)
KETONES UR QL STRIP: NEGATIVE
LEUKOCYTE ESTERASE UR QL STRIP.AUTO: NEGATIVE
LYMPHOCYTES # BLD AUTO: 3.35 10*3/MM3 (ref 0.7–3.1)
LYMPHOCYTES NFR BLD AUTO: 33.2 % (ref 19.6–45.3)
MCH RBC QN AUTO: 29.2 PG (ref 26.6–33)
MCHC RBC AUTO-ENTMCNC: 33.4 G/DL (ref 31.5–35.7)
MCV RBC AUTO: 87.4 FL (ref 79–97)
MONOCYTES # BLD AUTO: 0.44 10*3/MM3 (ref 0.1–0.9)
MONOCYTES NFR BLD AUTO: 4.4 % (ref 5–12)
NEUTROPHILS NFR BLD AUTO: 6.06 10*3/MM3 (ref 1.7–7)
NEUTROPHILS NFR BLD AUTO: 60.1 % (ref 42.7–76)
NITRITE UR QL STRIP: NEGATIVE
NRBC BLD AUTO-RTO: 0 /100 WBC (ref 0–0.2)
PH UR STRIP.AUTO: 6.5 [PH] (ref 5–8)
PLATELET # BLD AUTO: 264 10*3/MM3 (ref 140–450)
PMV BLD AUTO: 11.4 FL (ref 6–12)
POTASSIUM SERPL-SCNC: 3.2 MMOL/L (ref 3.5–5.2)
PROT UR QL STRIP: NEGATIVE
RBC # BLD AUTO: 4.45 10*6/MM3 (ref 3.77–5.28)
SODIUM SERPL-SCNC: 140 MMOL/L (ref 136–145)
SP GR UR STRIP: 1.02 (ref 1–1.03)
UROBILINOGEN UR QL STRIP: ABNORMAL
WBC NRBC COR # BLD AUTO: 10.08 10*3/MM3 (ref 3.4–10.8)

## 2024-03-11 PROCEDURE — 36415 COLL VENOUS BLD VENIPUNCTURE: CPT

## 2024-03-11 PROCEDURE — 81001 URINALYSIS AUTO W/SCOPE: CPT

## 2024-03-11 PROCEDURE — 85025 COMPLETE CBC W/AUTO DIFF WBC: CPT

## 2024-03-11 PROCEDURE — 81025 URINE PREGNANCY TEST: CPT

## 2024-03-11 PROCEDURE — 80048 BASIC METABOLIC PNL TOTAL CA: CPT

## 2024-03-11 NOTE — TELEPHONE ENCOUNTER
Provider: ROYA COVARRUBIAS    Caller: PATIENT    Relationship to Patient: SELF    Phone Number: [phone]    Reason for Call: PT CALLING AGAIN STATING SOMEONE FROM THE OFFICE CALLED HER.      SPOKE WITH OFFICE AND WAS TOLD TO TELL PT PROVIDER IS REVIEWING HER INFO AND SOMEONE WILL GET BACK WITH HER AND IF SHE IS STILL HAVING BAD MIGRAINE TO GO TO ED.    PLEASE REVIEW AND ADVISE     THANK YOU

## 2024-03-11 NOTE — TELEPHONE ENCOUNTER
Caller: Lindsay Amezquita    Relationship: Self    Best call back number: [phone]     What is the best time to reach you: ANY     Who are you requesting to speak with (clinical staff, provider,  specific staff member): CLINICAL       What was the call regarding: CHECKING ON STATUS OF RECENT REFERRAL TO PAIN CLINIC IN [place]. PLEASE CALL TO DISCUSS     Is it okay if the provider responds through MyChart: NO

## 2024-03-11 NOTE — TELEPHONE ENCOUNTER
Called and spoke to Lindsay. She is willing to try the Vyepti infusions. She said that the Nurtec is helping with prevention, but she needs a new abortive.

## 2024-03-11 NOTE — TELEPHONE ENCOUNTER
Patient would like to be be referred to [facility]. Will discuss with Hudson

## 2024-03-11 NOTE — TELEPHONE ENCOUNTER
PT IS TAKING MEDICATION AS DIRECTED. ITS NOT WORKING ANYMORE. ATTITUDE IS BAD AND TEMPER IS HIGH. SHE IS NEEDING A MEDICATION CHANGE AND A GENESIGHT TEST DONE.

## 2024-03-11 NOTE — TELEPHONE ENCOUNTER
Provider: ROYA COVARRUBIAS    Caller: PATIENT     Relationship to Patient: SELF    Pharmacy: LISTED    Phone Number: [phone]    Reason for Call: PT CALLED WANTING TO K NOW IF THERE IS ANOTHER MEDICATION SHE CAN TAKE FOR HER MIGRAINE.  SHE STATED SHE HAS A BAD MIGRAINE NOW AND IS HAVING BLURRY VISION IN ONE OF HER EYES.  I ADVISED THE PT SHE SHOULD GO TO ED FOR BLURRY VISION.  SHE SAID TO TELL THE PROVIDER SHE WOULD  CALL LATER.  I ASKED HER IF SHE WOULD GO TO THE ED AND SHE STATED SHE WOULD AND THEN DISCONNECTED THE CALL.      PLEASE REVIEW AND ADVISE     THANK YOU

## 2024-03-11 NOTE — TELEPHONE ENCOUNTER
Caller: Lindsay Amezquita    Relationship: Self    Best call back number: [phone]    What was the call regarding: PATIENT STATED SHE FELL ASLEEP AND RECEIVED TWO MISSED CALLS. TRIED TO CONNECT TO OFFICE BUT WHILE SPEAKING WITH JUAN, PATIENT DISCONNECTED THE PHONE CALL.     WAS TOLD TO TELL PATIENT THAT JUAN F COVARRUBIAS IS REVIEWING HER MEDICAL INFO AND THAT THEY WILL REACH OUT WHEN ABLE TO GIVE MORE INFO.

## 2024-03-11 NOTE — TELEPHONE ENCOUNTER
Patient would like to be set up with [facility] in [place]. Will discuss with Charley

## 2024-03-11 NOTE — TELEPHONE ENCOUNTER
Called and spoke to Lindsay. Advised her to go to the ER, and she stated that she was not able to due to all of her doctors appointments today. I told her that CLAIR Gillespie was reviewing her med list and would try to find a medication to help, but the ER is recommended. Lindsay verbalized understanding.

## 2024-03-11 NOTE — TELEPHONE ENCOUNTER
Caller: Lindsay Amezquita    Relationship to patient: Self    Best call back number: [phone]    Patient is needing: PATIENT STATED THAT [facility] NOT ACCEPTING NEW PATIENT'S RIGHT NOW; ONLY DOES INJECTIONS AT [facility]; WOULD LIKE TO FIND SOMEWHERE ELSE    PLEASE ADVISE

## 2024-03-11 NOTE — TELEPHONE ENCOUNTER
Caller: Lindsay Amezquita    Relationship: Self    Best call back number: [phone]     What medication are you requesting: DEXCOM GLUCOSE KIT/ ALL SUPPLIES     What are your current symptoms: DIABETIC     How long have you been experiencing symptoms: ?     Have you had these symptoms before:    [x] Yes  [] No    Have you been treated for these symptoms before:   [x] Yes  [] No    If a prescription is needed, what is your preferred pharmacy and phone number: [id number] - [address] [phone] [facility] [phone] FX     Additional notes:  LOST HER GLUCOSE CHECKING MACHINE AND WOULD LIKE TO GET A DEXCOM ONE SINCE FINGER PRICK IS HURTING TOO BAD NOW. PLEASE CALL TO LET HER KNOW IF THIS IS OK

## 2024-03-12 ENCOUNTER — TELEPHONE (OUTPATIENT)
Dept: OBSTETRICS AND GYNECOLOGY | Facility: CLINIC | Age: 39
End: 2024-03-12

## 2024-03-12 LAB
BACTERIA UR QL AUTO: ABNORMAL /HPF
HYALINE CASTS UR QL AUTO: ABNORMAL /LPF
RBC # UR STRIP: ABNORMAL /HPF
REF LAB TEST METHOD: ABNORMAL
SQUAMOUS #/AREA URNS HPF: ABNORMAL /HPF
WBC # UR STRIP: ABNORMAL /HPF
YEAST URNS QL MICRO: ABNORMAL /HPF

## 2024-03-12 NOTE — TELEPHONE ENCOUNTER
Provider: David iRbeiro MD    Caller: LEORA COTA    Relationship to Patient: SELF    Pharmacy:     Phone Number: [phone]    Reason for Call: TEST RESULTS    When was the patient last seen: ?    PATIENT IS CALLING ABOUT TEST RESULTS    PATIENT CAN BE REACHED [phone]    THANK YOU

## 2024-03-12 NOTE — TELEPHONE ENCOUNTER
Looks like she saw these on [employer]. Nothing looks alarming. Inform that Dr Ribeiro will be here tomorrow if anything needs to be done

## 2024-03-13 ENCOUNTER — TELEPHONE (OUTPATIENT)
Dept: NEUROLOGY | Facility: CLINIC | Age: 39
End: 2024-03-13

## 2024-03-13 DIAGNOSIS — G43.909 EPISODIC MIGRAINE: Primary | ICD-10-CM

## 2024-03-13 RX ORDER — ZAVEGEPANT 10 MG/.1ML
10 SPRAY NASAL AS NEEDED
Qty: 1 EACH | Refills: 0 | COMMUNITY
Start: 2024-03-13

## 2024-03-13 RX ORDER — BUTALBITAL, ACETAMINOPHEN AND CAFFEINE 50; 325; 40 MG/1; MG/1; MG/1
1 TABLET ORAL 2 TIMES DAILY PRN
Qty: 20 TABLET | Refills: 2 | Status: SHIPPED | OUTPATIENT
Start: 2024-03-13

## 2024-03-13 NOTE — TELEPHONE ENCOUNTER
Called and spoke to Lindsay. Let her know that Jeannine is going to send in [facility] for her migraines. We are holding a sample for her in the office.

## 2024-03-13 NOTE — TELEPHONE ENCOUNTER
PATIENT CALLING TO [facility]ISE PHARMACY HAS NOT RECEIVED MEDICATION REQUEST.    PATIENT STATES SHE SPOKE WITH SOMEONE IN OFFICE THIS MORNING FOR A MEDICATION.     PLEASE ADVISE     THANK YOU

## 2024-03-14 ENCOUNTER — TELEPHONE (OUTPATIENT)
Dept: INTERNAL MEDICINE | Facility: CLINIC | Age: 39
End: 2024-03-14
Payer: MEDICAID

## 2024-03-14 ENCOUNTER — ANESTHESIA EVENT (OUTPATIENT)
Dept: PERIOP | Facility: HOSPITAL | Age: 39
End: 2024-03-14
Payer: MEDICAID

## 2024-03-14 ENCOUNTER — HOSPITAL ENCOUNTER (OUTPATIENT)
Facility: HOSPITAL | Age: 39
Setting detail: HOSPITAL OUTPATIENT SURGERY
Discharge: HOME | End: 2024-03-14
Attending: OBSTETRICS & GYNECOLOGY | Admitting: OBSTETRICS & GYNECOLOGY
Payer: MEDICAID

## 2024-03-14 ENCOUNTER — ANESTHESIA (OUTPATIENT)
Dept: PERIOP | Facility: HOSPITAL | Age: 39
End: 2024-03-14
Payer: MEDICAID

## 2024-03-14 VITALS
DIASTOLIC BLOOD PRESSURE: 106 MMHG | RESPIRATION RATE: 16 BRPM | TEMPERATURE: 97.5 F | SYSTOLIC BLOOD PRESSURE: 163 MMHG | HEART RATE: 78 BPM | OXYGEN SATURATION: 97 %

## 2024-03-14 DIAGNOSIS — N93.9 ABNORMAL UTERINE BLEEDING (AUB): ICD-10-CM

## 2024-03-14 PROBLEM — Z98.890 S/P ENDOMETRIAL ABLATION: Status: ACTIVE | Noted: 2024-03-14

## 2024-03-14 LAB — GLUCOSE BLDC GLUCOMTR-MCNC: 98 MG/DL (ref 70–130)

## 2024-03-14 PROCEDURE — 25010000002 FENTANYL CITRATE (PF) 50 MCG/ML SOLUTION PREFILLED SYRINGE: Performed by: NURSE ANESTHETIST, CERTIFIED REGISTERED

## 2024-03-14 PROCEDURE — 25010000002 DEXAMETHASONE PER 1 MG: Performed by: NURSE ANESTHETIST, CERTIFIED REGISTERED

## 2024-03-14 PROCEDURE — S0260 H&P FOR SURGERY: HCPCS | Performed by: OBSTETRICS & GYNECOLOGY

## 2024-03-14 PROCEDURE — 25810000003 LACTATED RINGERS PER 1000 ML: Performed by: OBSTETRICS & GYNECOLOGY

## 2024-03-14 PROCEDURE — 25010000002 MEPERIDINE PER 100 MG: Performed by: NURSE ANESTHETIST, CERTIFIED REGISTERED

## 2024-03-14 PROCEDURE — C1782 MORCELLATOR: HCPCS | Performed by: OBSTETRICS & GYNECOLOGY

## 2024-03-14 PROCEDURE — 25010000002 MIDAZOLAM PER 1MG: Performed by: NURSE ANESTHETIST, CERTIFIED REGISTERED

## 2024-03-14 PROCEDURE — 25010000002 PROPOFOL 10 MG/ML EMULSION: Performed by: NURSE ANESTHETIST, CERTIFIED REGISTERED

## 2024-03-14 PROCEDURE — 58563 HYSTEROSCOPY ABLATION: CPT | Performed by: OBSTETRICS & GYNECOLOGY

## 2024-03-14 PROCEDURE — 25010000002 ONDANSETRON PER 1 MG: Performed by: NURSE ANESTHETIST, CERTIFIED REGISTERED

## 2024-03-14 PROCEDURE — 82948 REAGENT STRIP/BLOOD GLUCOSE: CPT

## 2024-03-14 RX ORDER — MIDAZOLAM HYDROCHLORIDE 2 MG/2ML
2 INJECTION, SOLUTION INTRAMUSCULAR; INTRAVENOUS ONCE
Status: COMPLETED | OUTPATIENT
Start: 2024-03-14 | End: 2024-03-14

## 2024-03-14 RX ORDER — LIDOCAINE HYDROCHLORIDE AND EPINEPHRINE 10; 10 MG/ML; UG/ML
INJECTION, SOLUTION INFILTRATION; PERINEURAL AS NEEDED
Status: DISCONTINUED | OUTPATIENT
Start: 2024-03-14 | End: 2024-03-14 | Stop reason: HOSPADM

## 2024-03-14 RX ORDER — PROCHLORPERAZINE EDISYLATE 5 MG/ML
10 INJECTION INTRAMUSCULAR; INTRAVENOUS ONCE AS NEEDED
Status: DISCONTINUED | OUTPATIENT
Start: 2024-03-14 | End: 2024-03-14 | Stop reason: HOSPADM

## 2024-03-14 RX ORDER — FENTANYL CITRATE 50 UG/ML
INJECTION, SOLUTION INTRAMUSCULAR; INTRAVENOUS AS NEEDED
Status: DISCONTINUED | OUTPATIENT
Start: 2024-03-14 | End: 2024-03-14 | Stop reason: SURG

## 2024-03-14 RX ORDER — ACETAMINOPHEN 500 MG
1000 TABLET ORAL EVERY 8 HOURS PRN
Qty: 60 TABLET | Refills: 0 | Status: SHIPPED | OUTPATIENT
Start: 2024-03-14 | End: 2025-03-14

## 2024-03-14 RX ORDER — IBUPROFEN 800 MG/1
800 TABLET ORAL EVERY 8 HOURS PRN
Qty: 30 TABLET | Refills: 0 | Status: SHIPPED | OUTPATIENT
Start: 2024-03-14

## 2024-03-14 RX ORDER — SODIUM CHLORIDE 0.9 % (FLUSH) 0.9 %
10 SYRINGE (ML) INJECTION AS NEEDED
Status: DISCONTINUED | OUTPATIENT
Start: 2024-03-14 | End: 2024-03-14 | Stop reason: HOSPADM

## 2024-03-14 RX ORDER — SODIUM CHLORIDE 0.9 % (FLUSH) 0.9 %
3 SYRINGE (ML) INJECTION EVERY 12 HOURS SCHEDULED
Status: DISCONTINUED | OUTPATIENT
Start: 2024-03-14 | End: 2024-03-14 | Stop reason: HOSPADM

## 2024-03-14 RX ORDER — KETAMINE HCL IN NACL, ISO-OSM 100MG/10ML
SYRINGE (ML) INJECTION AS NEEDED
Status: DISCONTINUED | OUTPATIENT
Start: 2024-03-14 | End: 2024-03-14 | Stop reason: SURG

## 2024-03-14 RX ORDER — SODIUM CHLORIDE, SODIUM LACTATE, POTASSIUM CHLORIDE, CALCIUM CHLORIDE 600; 310; 30; 20 MG/100ML; MG/100ML; MG/100ML; MG/100ML
1000 INJECTION, SOLUTION INTRAVENOUS CONTINUOUS
Status: DISCONTINUED | OUTPATIENT
Start: 2024-03-14 | End: 2024-03-14 | Stop reason: HOSPADM

## 2024-03-14 RX ORDER — IBUPROFEN 600 MG/1
600 TABLET ORAL EVERY 6 HOURS PRN
Status: DISCONTINUED | OUTPATIENT
Start: 2024-03-14 | End: 2024-03-14 | Stop reason: HOSPADM

## 2024-03-14 RX ORDER — DEXAMETHASONE SODIUM PHOSPHATE 4 MG/ML
INJECTION, SOLUTION INTRA-ARTICULAR; INTRALESIONAL; INTRAMUSCULAR; INTRAVENOUS; SOFT TISSUE AS NEEDED
Status: DISCONTINUED | OUTPATIENT
Start: 2024-03-14 | End: 2024-03-14 | Stop reason: SURG

## 2024-03-14 RX ORDER — ONDANSETRON 2 MG/ML
INJECTION INTRAMUSCULAR; INTRAVENOUS AS NEEDED
Status: DISCONTINUED | OUTPATIENT
Start: 2024-03-14 | End: 2024-03-14 | Stop reason: SURG

## 2024-03-14 RX ORDER — MEPERIDINE HYDROCHLORIDE 25 MG/ML
12.5 INJECTION INTRAMUSCULAR; INTRAVENOUS; SUBCUTANEOUS
Status: DISCONTINUED | OUTPATIENT
Start: 2024-03-14 | End: 2024-03-14 | Stop reason: HOSPADM

## 2024-03-14 RX ORDER — SODIUM CHLORIDE 9 MG/ML
40 INJECTION, SOLUTION INTRAVENOUS AS NEEDED
Status: DISCONTINUED | OUTPATIENT
Start: 2024-03-14 | End: 2024-03-14 | Stop reason: HOSPADM

## 2024-03-14 RX ORDER — PROPOFOL 10 MG/ML
VIAL (ML) INTRAVENOUS CONTINUOUS PRN
Status: DISCONTINUED | OUTPATIENT
Start: 2024-03-14 | End: 2024-03-14 | Stop reason: SURG

## 2024-03-14 RX ORDER — LORAZEPAM 2 MG/ML
1 INJECTION INTRAMUSCULAR
Status: DISCONTINUED | OUTPATIENT
Start: 2024-03-14 | End: 2024-03-14 | Stop reason: HOSPADM

## 2024-03-14 RX ADMIN — MEPERIDINE HYDROCHLORIDE 12.5 MG: 25 INJECTION INTRAMUSCULAR; INTRAVENOUS; SUBCUTANEOUS at 14:56

## 2024-03-14 RX ADMIN — Medication 50 MG: at 13:46

## 2024-03-14 RX ADMIN — Medication 50 MG: at 13:59

## 2024-03-14 RX ADMIN — LIDOCAINE HYDROCHLORIDE 50 MG: 20 INJECTION, SOLUTION INTRAVENOUS at 13:46

## 2024-03-14 RX ADMIN — ONDANSETRON 4 MG: 2 INJECTION INTRAMUSCULAR; INTRAVENOUS at 13:46

## 2024-03-14 RX ADMIN — SODIUM CHLORIDE, POTASSIUM CHLORIDE, SODIUM LACTATE AND CALCIUM CHLORIDE 1000 ML: 600; 310; 30; 20 INJECTION, SOLUTION INTRAVENOUS at 12:27

## 2024-03-14 RX ADMIN — FENTANYL CITRATE 50 MCG: 50 INJECTION, SOLUTION INTRAMUSCULAR; INTRAVENOUS at 13:46

## 2024-03-14 RX ADMIN — PROPOFOL 200 MCG/KG/MIN: 10 INJECTION, EMULSION INTRAVENOUS at 13:46

## 2024-03-14 RX ADMIN — DEXAMETHASONE SODIUM PHOSPHATE 4 MG: 4 INJECTION, SOLUTION INTRA-ARTICULAR; INTRALESIONAL; INTRAMUSCULAR; INTRAVENOUS; SOFT TISSUE at 13:46

## 2024-03-14 RX ADMIN — MIDAZOLAM HYDROCHLORIDE 4 MG: 1 INJECTION, SOLUTION INTRAMUSCULAR; INTRAVENOUS at 13:46

## 2024-03-14 NOTE — TELEPHONE ENCOUNTER
Spoke with patient at 1524.  I explained that we are unable to send any more pain management referrals for patient.  I advised that it is her responsibility to find a pain treatment facility that will take her as a patient.  Patient verbalized understanding.  Patient stated that she had a DNC completed and for the next couple of days she was going to rest and not make any major decisions. My chart message was also sent to patient as a follow up message to reiterate our phone conversation.

## 2024-03-14 NOTE — ANESTHESIA POSTPROCEDURE EVALUATION
Patient: Lindsay Amezquita    Procedure Summary       Date: 03/14/24 Room / Location: [facility] OR 1 /  [facility]    Anesthesia Start: 1343 Anesthesia Stop: 1438    Procedure: HYSTEROSCOPY WITH MYOSURE, DILATION AND CURETTAGE WITH CERENE ABLATION (Vagina) Diagnosis:       Abnormal uterine bleeding (AUB)      (Abnormal uterine bleeding (AUB) [N93.9])    Surgeons: David Ribeiro MD Provider: Asim Cowan CRNA    Anesthesia Type: MAC ASA Status: 3            Anesthesia Type: MAC    Vitals  No vitals data found for the desired time range.          Anesthesia Post Evaluation

## 2024-03-14 NOTE — ANESTHESIA PREPROCEDURE EVALUATION
Anesthesia Evaluation     Patient summary reviewed and Nursing notes reviewed   no history of anesthetic complications:   NPO Solid Status: > 8 hours  NPO Liquid Status: > 8 hours           Airway   Mallampati: II  TM distance: >3 FB  Neck ROM: full  no difficulty expected and Possible difficult intubation  Dental - normal exam     Pulmonary - normal exam   (+) COPD, asthma,  Cardiovascular - normal exam    ECG reviewed    (+) hypertension      Neuro/Psych  (+) headaches, numbness, psychiatric history Bipolar, Anxiety, Depression and PTSD  GI/Hepatic/Renal/Endo    (+) obesity, morbid obesity, GERD, renal disease- stones, diabetes mellitus type 2 using insulin    Musculoskeletal     (+) back pain  Abdominal    Substance History - negative use     OB/GYN    (+) Pregnant        Other   arthritis,                   Anesthesia Plan    ASA 3     MAC     (Pt told that intravenous sedation will be used as the primary anesthetic along with local anesthesia if necessary. Every effort will be made to make sure the patient is comfortable.     The patient was told they may or may not have recall for the procedure. It was further explained that if the MAC was not adequate that a general anesthetic with either an LMA or endotracheal tube would be required.     Will proceed with the plan of care.)  intravenous induction     Anesthetic plan, risks, benefits, and alternatives have been provided, discussed and informed consent has been obtained with: patient.    CODE STATUS:

## 2024-03-14 NOTE — DISCHARGE INSTRUCTIONS
Pelvic Surgery  Home Care Instructions:  Dr. David Ribeiro      Thank you for choosing [facility]. Please let us know if you have questions or concerns or do not understand the information we give you. Always ask us to explain words or phrases you do not understand.    You have had pelvic surgery. Here are some basic guidelines to help you recover more quickly at home. Your doctor may give you more guidelines. If you have any questions after you go home, please call the numbers listed on the next page.    General Guidelines    1. You may resume normal daily activities.  2. Do not put anything in the vagina (no tampons or douching).    3.   Do not have sex until cleared by your physician.  4.   You may climb steps.  5. You may bathe or shower.  6. The only exercise you should do is walking. This is important for your recovery.  7. Do not drive while taking narcotics.  8. Take the medicines you were taking before surgery. Other medicines you need to take at home are:    Alternate Motrin and Tylenol around the clock. Take each every 8 hours in alternation so that you are getting one of the medications every 4 hours.      Metamucil + Colace daily to keep bowel movements soft        If you have questions about your medicines, let us know. Or, talk to your local pharmacist.    Surgery, lack of activity, pain medicines and iron pills tend to cause constipation. Take something every day to prevent constipation and straining.  Taking something like Metamucil® every day to increase fiber may prevent constipation. Follow the instructions on the container. If you need a gentle laxative, then something like Milk of Magnesia® or Correctol® work fairly well. You should not need to take laxatives often.    10. Call your doctor if you have:     a. Fever of 101 degrees or higher.  b. Heavy vaginal bleeding.  c. Worsening nausea or pain.  d. Other problems or concern.    If you have any questions or concerns about  your usual routines, please talk to the nurse or doctor.       To talk with your doctor at [facility], call:  Obstetrics and Gynecology Outpatient Clinic  Phone: [phone]      We appreciate the opportunity you have given us to participate in your care.       No pushing, pulling, tugging,  heavy lifting, or strenuous activity.  No major decision making, driving, or drinking alcoholic beverages for 24 hours. ( due to the medications you have  received)  Always use good hand hygiene/washing techniques.  NO driving while taking pain medications.    * if you have an incision:  Check your incision area every day for signs of infection.   Check for:  * more redness, swelling, or pain  *more fluid or blood  *warmth  *pus or bad smellPelvic rest is best described as not putting anything in your vagina. This includes tampons, douching, tub bathing or sexual activity.

## 2024-03-14 NOTE — OP NOTE
Marlo Amezquita  : 1985  MRN: [id number]  CSN: [id number]  Date: 3/14/2024    Operative Report    Pre-op Diagnosis:  Abnormal uterine bleeding  Menorrhagia with irregular cycle  Dysmenorrhea  Same-sex relationship, contraception not needed  BMI 44   Post-op Diagnosis:  Same  Endometrial polyps   Procedure: Hysteroscopy with Myosure  Dilation and curettage  Endometrial ablation with Cerene   Surgeon:  Surgical assistant: KATHERYN Calabrese was responsible for performing the following activities: Retraction and manipulation of hysteroscopic instruments  and their skilled assistance was necessary for the success of this case.     OR Staff: Circulator: Marley Quintana RN  Scrub Person: Yeimy Love, PCT     Anesthesia: Sedation   Estimated Blood Loss: 5 mls   ABx: None   Specimens:  Endometrial polyps + curettings       Complications: None           Findings:   Normal external genitalia and vaginal vault. Normal-appearing cervix. Cervical canal was normal in appearance. Anteverted uterus that sounded to 7 cm. Three small endometrial polyps were noted within the cavity.  Otherwise normal endometrium. Both tubal ostia were visualized. Cavity length of 3.5 cm.    Description of Procedure:   Following confirmation of informed consent, the patient was taken to the operating room where her anesthesia was obtained without difficulty. She was prepped and draped in the usual sterile fashion in the dorsal lithotomy position. A surgical timeout for safety was performed and agreed upon by all team members. A heavy-weighted speculum and Vee retractor was placed into her vagina and the cervix was visualized. A paracervical block was performed using 1% lidocaine with epinephrine. A long Allis clamp was used to grasp the anterior lip of the cervix. Gentle traction was applied and the uterus was sounded to 7 cm using a uterine sound. The sound was removed and the cervix was gently dilated with sterile  dilators to allow for entrance of an operative hysteroscope. This hysteroscope was then gently advanced to the uterine fundus and the above findings were noted.  The MyoSure device was inserted and used to fully resect the endometrial polyps and globally sample the endometrium. The hysteroscope was then removed and a sharp curettage was performed using a non-serrated curette until a gritty texture was noted throughout. All endometrial curettings were sent to pathology. The Cerene device was then placed and the ablation was performed per the 's recommendations and the device promptings. At the end of the procedure, excellent hemostasis was noted and all instruments were removed from the vagina. All counts were correct per the OR staff. The patient was awakened and taken to the recovery room in stable condition.    David Ribeiro MD  Obstetrics and Gynecology  [facility]

## 2024-03-14 NOTE — H&P
GYNECOLOGY HISTORY AND PHYSICAL    CHIEF COMPLAINT: Scheduled surgery    DIAGNOSIS:  Abnormal uterine bleeding  Menorrhagia with irregular cycle  Dysmenorrhea  Same-sex relationship, contraception not needed  BMI 44    ASSESSMENT/PLAN     38 y.o.  female who presents for scheduled hysteroscopy with possible Myosure, D&C, endometrial ablation with Cerene and all other indicated procedures.    - Proceed to the OR for scheduled surgery  - Risks for the procedure reviewed  - SCDs for DVT ppx  - Antibiotics: none    HISTORY OF PRESENT ILLNESS     38 y.o.  female who presents for the scheduled procedure listed above.    She has no complaints today and there are no interval changes to the history.    REVIEW OF SYSTEMS: A complete review of systems was performed and was specifically negative for headache, changes in vision, RUQ pain, shortness of breath, chest pain, lower extremity edema and dysuria.     HISTORY:  Obstetrical History:  OB History    Para Term  AB Living   3 1   1 2 0   SAB IAB Ectopic Molar Multiple Live Births   2         1      # Outcome Date GA Lbr Jose/2nd Weight Sex Type Anes PTL Lv   3 SAB            2 SAB            1       Vag-Spont   ND       Past Medical History:  Past Medical History:   Diagnosis Date    Allergic     Anxiety     Asthma Beings of copd with asthma    Back pain     Bell's palsy     Bipolar 2 disorder     Blood clot in vein     Behind left knee cap    COPD (chronic obstructive pulmonary disease) Six months ago    Deep vein thrombosis Last year    Depression     Diabetes mellitus November    Pre diabete    Frequent headaches     GERD (gastroesophageal reflux disease)     Hypertension     Every time i go into the emergacy room    Irritable bowel syndrome Two years ago    Kidney stone Two years ago    Migraine     Obesity All of my life    Ovarian cyst Two years ago    Panic     PTSD (post-traumatic stress disorder)     Recurrent pregnancy loss,  "antepartum condition or complication Every since i have been 14 yars old    Restless leg syndrome     Sinus problem     Snores     Tattoos     Urinary tract infection Have them on and off    Varicella Little    Visual impairment Since i was little    Vitamin B12 deficiency     Wears glasses        Past Surgical History:  Past Surgical History:   Procedure Laterality Date    CHOLECYSTECTOMY      MULTIPLE TOOTH EXTRACTIONS      All my teeth    WISDOM TOOTH EXTRACTION  All my teeth       Social History:  Social History     Tobacco Use    Smoking status: Some Days     Current packs/day: 0.50     Average packs/day: 2.0 packs/day for 31.7 years (63.0 ttl pk-yrs)     Types: Cigarettes     Start date: 7/17/1992     Passive exposure: Never    Smokeless tobacco: Never    Tobacco comments:         Vaping Use    Vaping status: Former   Substance Use Topics    Alcohol use: Yes     Comment: rarely    Drug use: Not Currently       Family History  Family History   Problem Relation Age of Onset    Irritable bowel syndrome Mother     Heart disease Mother     Miscarriages / Stillbirths Mother     Arthritis Mother     COPD Mother     Learning disabilities Mother     Mental illness Mother     Irritable bowel syndrome Father     Alcohol abuse Father     Diabetes Father     Hyperlipidemia Father     Anxiety disorder Father     Learning disabilities Father     Colon cancer Maternal Grandfather     Stroke Paternal Grandfather     Stroke Paternal Grandmother         Allergies:   Allergies   Allergen Reactions    Aspirin Anaphylaxis and Hives     Wheezing         Codeine Anaphylaxis    Cyclobenzaprine Other (See Comments)     \"gives me chest pain\"  Other reaction(s): Other (See Comments)  \"gives me chest pain\"    Haldol [Haloperidol] Rash    Imitrex [Sumatriptan] Anaphylaxis and Rash    Latex Hives and Rash    Penicillins Hives and Anaphylaxis     Vomiting    Morphine Itching    Ondansetron GI Intolerance     Other reaction(s): GI Intolerance "    Oxycodone-Acetaminophen GI Intolerance     Other reaction(s): GI Intolerance    Oxycontin [Oxycodone] GI Intolerance    Tramadol Nausea And Vomiting    Tylenol [Acetaminophen] Nausea And Vomiting    Compazine [Prochlorperazine] Nausea And Vomiting    Lamictal [Lamotrigine] Itching     Itching and hives    Reglan [Metoclopramide] Hives and Headache       OBJECTIVE   VITALS:       PHYSICAL EXAM:  GENERAL: NAD, alert  CHEST: No increased work of breathing, CTAB  CV: RRR, WWP  ABDOMEN: Soft, NTTP  EXTREMITIES:  Warm and well-perfused, nontender, nonedematous  NEURO: AAO x 3, CN II-XII grossly intact    No current facility-administered medications on file prior to encounter.     Current Outpatient Medications on File Prior to Encounter   Medication Sig Dispense Refill    Alcohol Swabs (Alcohol Pads) 70 % pads Use 1 Pad Daily As Needed (to administer Imitrex SC). 100 each 1    ALPRAZolam (Xanax) 1 MG tablet Take 1 tablet by mouth 3 (Three) Times a Day As Needed for Sleep. (Patient taking differently: Take 1 tablet by mouth 3 times a day.) 90 tablet 1    amitriptyline (ELAVIL) 150 MG tablet Take 1 tablet by mouth Every Night.      atorvastatin (LIPITOR) 40 MG tablet Take 1 tablet by mouth every night at bedtime. 90 tablet 3    empagliflozin (Jardiance) 10 MG tablet tablet Take 1 tablet by mouth Daily. 30 tablet 3    hydrOXYzine (ATARAX) 50 MG tablet Take 1.5 tablets by mouth 3 (Three) Times a Day As Needed (anxiety and/or sleep). 135 tablet 1    ibuprofen (ADVIL,MOTRIN) 800 MG tablet Take 1 tablet by mouth 3 (Three) Times a Day With Meals. 21 tablet 0    Insulin Syringe/Needle U-500 (BD Insulin Syringe U-500) 31G X 6MM 0.5 ML misc Use 1 syringe Daily As Needed (with Imitrex SC). 100 each 0    Lurasidone HCl (Latuda) 120 MG tablet tablet Take 1 tablet by mouth Daily. 30 tablet 1    norethindrone (Aygestin) 5 MG tablet Take 2 tablets by mouth Daily. 60 tablet 3    promethazine (PHENERGAN) 25 MG tablet Take 1 tablet by  "mouth Every 6 (Six) Hours As Needed for Nausea or Vomiting. 45 tablet 3    promethazine-dextromethorphan (PROMETHAZINE-DM) 6.25-15 MG/5ML syrup Take 5 mL by mouth At Night As Needed for Cough. 118 mL 0    Rimegepant Sulfate (Nurtec) 75 MG tablet dispersible tablet Take 1 tablet by mouth Every Other Day. Take Monday,Wednesday,Friday, and Saturday. 16 tablet 6    tiZANidine (ZANAFLEX) 4 MG tablet Take 1 tablet by mouth Every 8 (Eight) Hours As Needed for Muscle Spasms. (Patient taking differently: Take 1 tablet by mouth Every Night.) 90 tablet 3    Umeclidinium-Vilanterol (Anoro Ellipta) 62.5-25 MCG/ACT aerosol powder  inhaler Inhale 1 puff Daily. 60 each 12    Blood Glucose Monitoring Suppl (FreeStyle Freedom Lite) w/Device kit       chlorhexidine (HIBICLENS) 4 % external liquid Apply  topically to the appropriate area as directed Daily. 473 mL 3    desvenlafaxine (PRISTIQ) 100 MG 24 hr tablet Take 1 tablet by mouth Daily. 30 tablet 1    diphenhydrAMINE (BENADRYL) 50 MG capsule Take 1 capsule by mouth.      fluconazole (Diflucan) 150 MG tablet 1 po now and repeat in 3 d. 2 tablet 0    glucose blood test strip Use as instructed 50 each 12    glucose monitor monitoring kit Check glucose daily 1 each 0    Insulin Syringe 29G X 1/2\" 0.5 ML misc Use 1 syringe Daily As Needed (for migraine (to administer Imitrex)). 10 each 5    Lancets misc Check fasting glucose daily and 1 hour after largest meal of day. 100 each 3    loratadine (Claritin) 10 MG tablet Take 1 tablet by mouth Daily. 30 tablet 5    naloxone (NARCAN) 4 MG/0.1ML nasal spray 1 spray into the nostril(s) as directed by provider As Needed.      nystatin (MYCOSTATIN) 937324 UNIT/GM powder Apply  topically to the appropriate area as directed 2 (Two) Times a Day. 60 g 3    Ventolin  (90 Base) MCG/ACT inhaler INHALE 2 PUFFS EVERY 4 HOURS AS NEEDED FOR WHEEZING OR SHORTNESS OF AIR 18 g 0       DIAGNOSTIC STUDIES:  Results from last 7 days   Lab Units " 03/11/24  1410   WBC 10*3/mm3 10.08   HEMOGLOBIN g/dL 13.0   HEMATOCRIT % 38.9   PLATELETS 10*3/mm3 264   SODIUM mmol/L 140   POTASSIUM mmol/L 3.2*   CHLORIDE mmol/L 104   CO2 mmol/L 20.7*   BUN mg/dL 5*   CREATININE mg/dL 0.71   GLUCOSE mg/dL 154*   CALCIUM mg/dL 8.9       Recent Results (from the past 24 hour(s))   POC Glucose Once    Collection Time: 03/14/24 12:04 PM    Specimen: Blood   Result Value Ref Range    Glucose 98 70 - 130 mg/dL       David Ribeiro MD  Obstetrics and Gynecology  [facility]

## 2024-03-15 ENCOUNTER — TELEPHONE (OUTPATIENT)
Dept: OBSTETRICS AND GYNECOLOGY | Facility: CLINIC | Age: 39
End: 2024-03-15
Payer: MEDICAID

## 2024-03-15 ENCOUNTER — TELEPHONE (OUTPATIENT)
Dept: FAMILY MEDICINE CLINIC | Facility: CLINIC | Age: 39
End: 2024-03-15
Payer: MEDICAID

## 2024-03-15 ENCOUNTER — TELEPHONE (OUTPATIENT)
Dept: NEUROLOGY | Facility: CLINIC | Age: 39
End: 2024-03-15

## 2024-03-15 DIAGNOSIS — F51.04 PSYCHOPHYSIOLOGICAL INSOMNIA: Primary | ICD-10-CM

## 2024-03-15 NOTE — TELEPHONE ENCOUNTER
Caller: MARANDA    Relationship: Payer    Marquis call back number: [phone]    What form or medical record are you requesting: PRIOR AUTH FOR ZAVZPRET    Who is requesting this form or medical record from you: GUCCI    How would you like to receive the form or medical records (pick-up, mail, fax): FAX  If fax, what is the fax number: [facility]      Timeframe paperwork needed: ASAP    Additional notes: NEEDS PRIOR TO FILL FOR THIS SCRIPT. PLEASE SUBMIT RIGHT AWAY THROUGH COVER MY MEDS.

## 2024-03-15 NOTE — TELEPHONE ENCOUNTER
"Lindsay called and wanted to relay a message to Donna and to let her know how she was doing, regarding her surgery (Hysteroscopy) that was done 03/15/2024. She states that she doesn't feel as \"crazy\" and that everything feels like it's working right. She said that she feels happy again.     She also asked if we could send in Amitriptyline to [facility] in [place].  "

## 2024-03-15 NOTE — TELEPHONE ENCOUNTER
Provider: DR ROMERO    Caller: LEORA COTA    Pharmacy: [id number]    Phone Number: [phone]    Reason for Call: PATIENT IS CALLING WITH CONCERNS SINCE HAVING THE PROCEDURE YESTERDAY//SHE SATATED THAT THE BLEEDING DID STOP LAST NIGHT BUT THAT SINCE 1:30 THIS MORNING SHE HAS BEEN HAVING LOWER BACK PAIN THAT FEELS LIKE STABBING PAIN AND HER STOMACH IS HAVING GAS PAINS//NOTHING IS HELPING//IT WOKE HER UP AND SHE HASN'T BEEN ABLE TO GO BACK T SLEEP//PATIENT CALLED THE AFTER HOURS # AND WAS ADVISED TO GO TO THE ER BUT SHE DOESN'T WISH TO GO TO THE ER

## 2024-03-16 ENCOUNTER — HOSPITAL ENCOUNTER (EMERGENCY)
Facility: HOSPITAL | Age: 39
Discharge: HOME | End: 2024-03-17
Attending: EMERGENCY MEDICINE | Admitting: EMERGENCY MEDICINE
Payer: MEDICAID

## 2024-03-16 ENCOUNTER — APPOINTMENT (OUTPATIENT)
Dept: CT IMAGING | Facility: HOSPITAL | Age: 39
End: 2024-03-16
Payer: MEDICAID

## 2024-03-16 DIAGNOSIS — R10.9 NON-SURGICAL ABDOMINAL PAIN: Primary | ICD-10-CM

## 2024-03-16 DIAGNOSIS — E11.65 TYPE 2 DIABETES MELLITUS WITH HYPERGLYCEMIA, WITHOUT LONG-TERM CURRENT USE OF INSULIN: Primary | ICD-10-CM

## 2024-03-16 LAB
BACTERIA UR QL AUTO: ABNORMAL /HPF
BASOPHILS # BLD AUTO: 0.06 10*3/MM3 (ref 0–0.2)
BASOPHILS NFR BLD AUTO: 0.6 % (ref 0–1.5)
BILIRUB UR QL STRIP: NEGATIVE
CLARITY UR: CLEAR
COLOR UR: YELLOW
DEPRECATED RDW RBC AUTO: 46.2 FL (ref 37–54)
EOSINOPHIL # BLD AUTO: 0.15 10*3/MM3 (ref 0–0.4)
EOSINOPHIL NFR BLD AUTO: 1.5 % (ref 0.3–6.2)
ERYTHROCYTE [DISTWIDTH] IN BLOOD BY AUTOMATED COUNT: 14.9 % (ref 12.3–15.4)
GLUCOSE UR STRIP-MCNC: ABNORMAL MG/DL
HCG SERPL QL: NEGATIVE
HCT VFR BLD AUTO: 40.6 % (ref 34–46.6)
HGB BLD-MCNC: 13.8 G/DL (ref 12–15.9)
HGB UR QL STRIP.AUTO: ABNORMAL
HYALINE CASTS UR QL AUTO: ABNORMAL /LPF
IMM GRANULOCYTES # BLD AUTO: 0.04 10*3/MM3 (ref 0–0.05)
IMM GRANULOCYTES NFR BLD AUTO: 0.4 % (ref 0–0.5)
KETONES UR QL STRIP: NEGATIVE
LEUKOCYTE ESTERASE UR QL STRIP.AUTO: NEGATIVE
LYMPHOCYTES # BLD AUTO: 4.43 10*3/MM3 (ref 0.7–3.1)
LYMPHOCYTES NFR BLD AUTO: 43.9 % (ref 19.6–45.3)
MCH RBC QN AUTO: 28.8 PG (ref 26.6–33)
MCHC RBC AUTO-ENTMCNC: 34 G/DL (ref 31.5–35.7)
MCV RBC AUTO: 84.6 FL (ref 79–97)
MONOCYTES # BLD AUTO: 0.56 10*3/MM3 (ref 0.1–0.9)
MONOCYTES NFR BLD AUTO: 5.5 % (ref 5–12)
MUCOUS THREADS URNS QL MICRO: ABNORMAL /HPF
NEUTROPHILS NFR BLD AUTO: 4.86 10*3/MM3 (ref 1.7–7)
NEUTROPHILS NFR BLD AUTO: 48.1 % (ref 42.7–76)
NITRITE UR QL STRIP: NEGATIVE
NRBC BLD AUTO-RTO: 0 /100 WBC (ref 0–0.2)
PH UR STRIP.AUTO: 6 [PH] (ref 5–8)
PLATELET # BLD AUTO: 265 10*3/MM3 (ref 140–450)
PMV BLD AUTO: 10 FL (ref 6–12)
PROT UR QL STRIP: ABNORMAL
RBC # BLD AUTO: 4.8 10*6/MM3 (ref 3.77–5.28)
RBC # UR STRIP: ABNORMAL /HPF
REF LAB TEST METHOD: ABNORMAL
SP GR UR STRIP: >=1.03 (ref 1–1.03)
SQUAMOUS #/AREA URNS HPF: ABNORMAL /HPF
UROBILINOGEN UR QL STRIP: ABNORMAL
WBC # UR STRIP: ABNORMAL /HPF
WBC NRBC COR # BLD AUTO: 10.1 10*3/MM3 (ref 3.4–10.8)

## 2024-03-16 PROCEDURE — 99285 EMERGENCY DEPT VISIT HI MDM: CPT

## 2024-03-16 PROCEDURE — 96375 TX/PRO/DX INJ NEW DRUG ADDON: CPT

## 2024-03-16 PROCEDURE — 85025 COMPLETE CBC W/AUTO DIFF WBC: CPT | Performed by: EMERGENCY MEDICINE

## 2024-03-16 PROCEDURE — 74177 CT ABD & PELVIS W/CONTRAST: CPT

## 2024-03-16 PROCEDURE — 83690 ASSAY OF LIPASE: CPT | Performed by: EMERGENCY MEDICINE

## 2024-03-16 PROCEDURE — 25810000003 SODIUM CHLORIDE 0.9 % SOLUTION: Performed by: EMERGENCY MEDICINE

## 2024-03-16 PROCEDURE — 25010000002 KETOROLAC TROMETHAMINE PER 15 MG: Performed by: EMERGENCY MEDICINE

## 2024-03-16 PROCEDURE — 84703 CHORIONIC GONADOTROPIN ASSAY: CPT | Performed by: EMERGENCY MEDICINE

## 2024-03-16 PROCEDURE — 80053 COMPREHEN METABOLIC PANEL: CPT | Performed by: EMERGENCY MEDICINE

## 2024-03-16 PROCEDURE — 81001 URINALYSIS AUTO W/SCOPE: CPT | Performed by: EMERGENCY MEDICINE

## 2024-03-16 PROCEDURE — 96374 THER/PROPH/DIAG INJ IV PUSH: CPT

## 2024-03-16 PROCEDURE — 25010000002 ONDANSETRON PER 1 MG: Performed by: EMERGENCY MEDICINE

## 2024-03-16 PROCEDURE — 25010000002 HYDROMORPHONE 1 MG/ML SOLUTION: Performed by: EMERGENCY MEDICINE

## 2024-03-16 RX ORDER — KETOROLAC TROMETHAMINE 30 MG/ML
15 INJECTION, SOLUTION INTRAMUSCULAR; INTRAVENOUS ONCE
Status: COMPLETED | OUTPATIENT
Start: 2024-03-16 | End: 2024-03-16

## 2024-03-16 RX ORDER — ACYCLOVIR 400 MG/1
1 TABLET ORAL DAILY
Qty: 1 EACH | Refills: 0 | Status: SHIPPED | OUTPATIENT
Start: 2024-03-16

## 2024-03-16 RX ORDER — ACYCLOVIR 400 MG/1
1 TABLET ORAL DAILY
Qty: 3 EACH | Refills: 12 | Status: SHIPPED | OUTPATIENT
Start: 2024-03-16

## 2024-03-16 RX ORDER — ONDANSETRON 2 MG/ML
8 INJECTION INTRAMUSCULAR; INTRAVENOUS ONCE
Status: COMPLETED | OUTPATIENT
Start: 2024-03-16 | End: 2024-03-16

## 2024-03-16 RX ADMIN — HYDROMORPHONE HYDROCHLORIDE 1 MG: 1 INJECTION, SOLUTION INTRAMUSCULAR; INTRAVENOUS; SUBCUTANEOUS at 23:22

## 2024-03-16 RX ADMIN — KETOROLAC TROMETHAMINE 15 MG: 30 INJECTION, SOLUTION INTRAMUSCULAR; INTRAVENOUS at 23:22

## 2024-03-16 RX ADMIN — ONDANSETRON 8 MG: 2 INJECTION INTRAMUSCULAR; INTRAVENOUS at 23:21

## 2024-03-16 RX ADMIN — SODIUM CHLORIDE 1000 ML: 9 INJECTION, SOLUTION INTRAVENOUS at 23:29

## 2024-03-17 VITALS
HEIGHT: 62 IN | DIASTOLIC BLOOD PRESSURE: 128 MMHG | WEIGHT: 251.2 LBS | HEART RATE: 80 BPM | SYSTOLIC BLOOD PRESSURE: 156 MMHG | OXYGEN SATURATION: 94 % | TEMPERATURE: 98.1 F | RESPIRATION RATE: 20 BRPM | BODY MASS INDEX: 46.22 KG/M2

## 2024-03-17 LAB
ALBUMIN SERPL-MCNC: 4.4 G/DL (ref 3.5–5.2)
ALBUMIN/GLOB SERPL: 1.9 G/DL
ALP SERPL-CCNC: 54 U/L (ref 39–117)
ALT SERPL W P-5'-P-CCNC: 28 U/L (ref 1–33)
ANION GAP SERPL CALCULATED.3IONS-SCNC: 12.9 MMOL/L (ref 5–15)
AST SERPL-CCNC: 23 U/L (ref 1–32)
BILIRUB SERPL-MCNC: 0.4 MG/DL (ref 0–1.2)
BUN SERPL-MCNC: 6 MG/DL (ref 6–20)
BUN/CREAT SERPL: 9.1 (ref 7–25)
CALCIUM SPEC-SCNC: 9.1 MG/DL (ref 8.6–10.5)
CHLORIDE SERPL-SCNC: 104 MMOL/L (ref 98–107)
CO2 SERPL-SCNC: 26.1 MMOL/L (ref 22–29)
CREAT SERPL-MCNC: 0.66 MG/DL (ref 0.57–1)
EGFRCR SERPLBLD CKD-EPI 2021: 114.6 ML/MIN/1.73
GLOBULIN UR ELPH-MCNC: 2.3 GM/DL
GLUCOSE SERPL-MCNC: 111 MG/DL (ref 65–99)
LIPASE SERPL-CCNC: 30 U/L (ref 13–60)
POTASSIUM SERPL-SCNC: 3.1 MMOL/L (ref 3.5–5.2)
PROT SERPL-MCNC: 6.7 G/DL (ref 6–8.5)
SODIUM SERPL-SCNC: 143 MMOL/L (ref 136–145)

## 2024-03-17 PROCEDURE — 25010000002 HYDROMORPHONE 1 MG/ML SOLUTION: Performed by: EMERGENCY MEDICINE

## 2024-03-17 PROCEDURE — 25510000001 IOPAMIDOL 61 % SOLUTION: Performed by: EMERGENCY MEDICINE

## 2024-03-17 PROCEDURE — 96376 TX/PRO/DX INJ SAME DRUG ADON: CPT

## 2024-03-17 RX ADMIN — IOPAMIDOL 100 ML: 612 INJECTION, SOLUTION INTRAVENOUS at 00:20

## 2024-03-17 RX ADMIN — HYDROMORPHONE HYDROCHLORIDE 1 MG: 1 INJECTION, SOLUTION INTRAMUSCULAR; INTRAVENOUS; SUBCUTANEOUS at 00:49

## 2024-03-17 NOTE — DISCHARGE INSTRUCTIONS
You should follow-up with your OB/GYN this week and return to the emergency department before then for fever, vomiting, inability to eat or drink, vaginal bleeding, discharge or for any concerning symptoms or new concerns.

## 2024-03-17 NOTE — ED PROVIDER NOTES
EMERGENCY DEPARTMENT ENCOUNTER    Pt Name: Lindsay Amezquita  MRN: [id number]  Pt :   1985  Room Number:  15/15  Date of encounter:  3/16/2024  PCP: Charley Robles APRN  ED Provider: Zaki Culp MD    Historian: Patient      HPI:  Chief Complaint: Abdominal pain        Context: Lindsay Amezquita is a 38 y.o. female who presents to the ED c/o abdominal pain.  On  the patient underwent hysteroscopy with possible MyoSure, D&C, and endometrial ablation.  This was done by Dr. Ribeiro here at [facility].  Patient says that ever since she has been having bilateral lower and suprapubic abdominal pain.  She says she had some spotting the first day of the surgery but since then has not had any vaginal bleeding.  She denies having any vaginal discharge.  She says she has had some intermittent nausea and vomiting.  She denies fever.  She says she has been taking Tylenol and ibuprofen at home without any improvement in her pain.      PAST MEDICAL HISTORY  Past Medical History:   Diagnosis Date    Allergic     Anxiety     Asthma Beings of copd with asthma    Back pain     Bell's palsy     Bipolar 2 disorder     Blood clot in vein     Behind left knee cap    COPD (chronic obstructive pulmonary disease) Six months ago    Deep vein thrombosis Last year    Depression     Diabetes mellitus November    Pre diabete    Frequent headaches     GERD (gastroesophageal reflux disease)     Hypertension     Every time i go into the emergacy room    Irritable bowel syndrome Two years ago    Kidney stone Two years ago    Migraine     Obesity All of my life    Ovarian cyst Two years ago    Panic     PTSD (post-traumatic stress disorder)     Recurrent pregnancy loss, antepartum condition or complication Every since i have been 14 yars old    Restless leg syndrome     Sinus problem     Snores     Tattoos     Urinary tract infection Have them on and off    Varicella Little    Visual impairment Since i was little    Vitamin  B12 deficiency     Wears glasses          PAST SURGICAL HISTORY  Past Surgical History:   Procedure Laterality Date    CHOLECYSTECTOMY      MULTIPLE TOOTH EXTRACTIONS      All my teeth    WISDOM TOOTH EXTRACTION  All my teeth         FAMILY HISTORY  Family History   Problem Relation Age of Onset    Irritable bowel syndrome Mother     Heart disease Mother     Miscarriages / Stillbirths Mother     Arthritis Mother     COPD Mother     Learning disabilities Mother     Mental illness Mother     Irritable bowel syndrome Father     Alcohol abuse Father     Diabetes Father     Hyperlipidemia Father     Anxiety disorder Father     Learning disabilities Father     Colon cancer Maternal Grandfather     Stroke Paternal Grandfather     Stroke Paternal Grandmother          SOCIAL HISTORY  Social History     Socioeconomic History    Marital status: Single   Tobacco Use    Smoking status: Some Days     Current packs/day: 0.50     Average packs/day: 2.0 packs/day for 31.7 years (63.0 ttl pk-yrs)     Types: Cigarettes     Start date: 7/17/1992     Passive exposure: Never    Smokeless tobacco: Never    Tobacco comments:         Vaping Use    Vaping status: Former   Substance and Sexual Activity    Alcohol use: Yes     Comment: rarely    Drug use: Not Currently    Sexual activity: Defer     Partners: Female     Birth control/protection: Other, Same-sex partner         ALLERGIES  Aspirin, Codeine, Cyclobenzaprine, Haldol [haloperidol], Imitrex [sumatriptan], Latex, Penicillins, Morphine, Ondansetron, Oxycodone-acetaminophen, Oxycontin [oxycodone], Tramadol, Tylenol [acetaminophen], Compazine [prochlorperazine], Lamictal [lamotrigine], and Reglan [metoclopramide]        REVIEW OF SYSTEMS    All systems reviewed and negative except for those discussed in HPI.       PHYSICAL EXAM    I have reviewed the triage vital signs and nursing notes.    ED Triage Vitals [03/16/24 2156]   Temp Heart Rate Resp BP SpO2   98.1 °F (36.7 °C) 99 20 (!)  165/105 98 %      Temp src Heart Rate Source Patient Position BP Location FiO2 (%)   Oral Monitor -- -- --         General: Moderate acute distress secondary to pain  Skin: normal color, warm and dry  Head: normocephalic, atraumatic  Eyes: Pupils equally round and reactive to light.  Nose: normal nasal mucosa, no visible deformity.  Mouth: dry mucous membranes.  Neck: supple.  Chest: no retractions, no visible deformity  Cardiovascular: Regular rate and rhythm.  Lungs: clear to auscultation bilaterally.  Abdomen: soft, tender to palpation in the bilateral lower abdomen, non-distended. No rebound tenderness, no guarding.  No peritonitis.  Neuro:  alert and oriented x3, no focal neurological deficits.  Psych:  appropriate mood and behavior.        LAB RESULTS  Recent Results (from the past 24 hour(s))   Urinalysis With Microscopic If Indicated (No Culture) - Urine, Clean Catch    Collection Time: 03/16/24 10:46 PM    Specimen: Urine, Clean Catch   Result Value Ref Range    Color, UA Yellow Yellow, Straw    Appearance, UA Clear Clear    pH, UA 6.0 5.0 - 8.0    Specific Gravity, UA >=1.030 1.005 - 1.030    Glucose, UA >=1000 mg/dL (3+) (A) Negative    Ketones, UA Negative Negative    Bilirubin, UA Negative Negative    Blood, UA Moderate (2+) (A) Negative    Protein, UA 30 mg/dL (1+) (A) Negative    Leuk Esterase, UA Negative Negative    Nitrite, UA Negative Negative    Urobilinogen, UA 0.2 E.U./dL 0.2 - 1.0 E.U./dL   Urinalysis, Microscopic Only - Urine, Clean Catch    Collection Time: 03/16/24 10:46 PM    Specimen: Urine, Clean Catch   Result Value Ref Range    RBC, UA 3-5 (A) None Seen, 0-2 /HPF    WBC, UA 0-2 None Seen, 0-2 /HPF    Bacteria, UA Trace (A) None Seen /HPF    Squamous Epithelial Cells, UA 3-6 (A) None Seen, 0-2 /HPF    Hyaline Casts, UA 0-2 None Seen /LPF    Mucus, UA Moderate/2+ (A) None Seen, Trace /HPF    Methodology Manual Light Microscopy    Comprehensive Metabolic Panel    Collection Time: 03/16/24  11:26 PM    Specimen: Blood   Result Value Ref Range    Glucose 111 (H) 65 - 99 mg/dL    BUN 6 6 - 20 mg/dL    Creatinine 0.66 0.57 - 1.00 mg/dL    Sodium 143 136 - 145 mmol/L    Potassium 3.1 (L) 3.5 - 5.2 mmol/L    Chloride 104 98 - 107 mmol/L    CO2 26.1 22.0 - 29.0 mmol/L    Calcium 9.1 8.6 - 10.5 mg/dL    Total Protein 6.7 6.0 - 8.5 g/dL    Albumin 4.4 3.5 - 5.2 g/dL    ALT (SGPT) 28 1 - 33 U/L    AST (SGOT) 23 1 - 32 U/L    Alkaline Phosphatase 54 39 - 117 U/L    Total Bilirubin 0.4 0.0 - 1.2 mg/dL    Globulin 2.3 gm/dL    A/G Ratio 1.9 g/dL    BUN/Creatinine Ratio 9.1 7.0 - 25.0    Anion Gap 12.9 5.0 - 15.0 mmol/L    eGFR 114.6 >60.0 mL/min/1.73   CBC Auto Differential    Collection Time: 03/16/24 11:26 PM    Specimen: Blood   Result Value Ref Range    WBC 10.10 3.40 - 10.80 10*3/mm3    RBC 4.80 3.77 - 5.28 10*6/mm3    Hemoglobin 13.8 12.0 - 15.9 g/dL    Hematocrit 40.6 34.0 - 46.6 %    MCV 84.6 79.0 - 97.0 fL    MCH 28.8 26.6 - 33.0 pg    MCHC 34.0 31.5 - 35.7 g/dL    RDW 14.9 12.3 - 15.4 %    RDW-SD 46.2 37.0 - 54.0 fl    MPV 10.0 6.0 - 12.0 fL    Platelets 265 140 - 450 10*3/mm3    Neutrophil % 48.1 42.7 - 76.0 %    Lymphocyte % 43.9 19.6 - 45.3 %    Monocyte % 5.5 5.0 - 12.0 %    Eosinophil % 1.5 0.3 - 6.2 %    Basophil % 0.6 0.0 - 1.5 %    Immature Grans % 0.4 0.0 - 0.5 %    Neutrophils, Absolute 4.86 1.70 - 7.00 10*3/mm3    Lymphocytes, Absolute 4.43 (H) 0.70 - 3.10 10*3/mm3    Monocytes, Absolute 0.56 0.10 - 0.90 10*3/mm3    Eosinophils, Absolute 0.15 0.00 - 0.40 10*3/mm3    Basophils, Absolute 0.06 0.00 - 0.20 10*3/mm3    Immature Grans, Absolute 0.04 0.00 - 0.05 10*3/mm3    nRBC 0.0 0.0 - 0.2 /100 WBC   Lipase    Collection Time: 03/16/24 11:26 PM    Specimen: Blood   Result Value Ref Range    Lipase 30 13 - 60 U/L   hCG, Serum, Qualitative    Collection Time: 03/16/24 11:26 PM    Specimen: Blood   Result Value Ref Range    HCG Qualitative Negative Negative       If labs were ordered, I independently  reviewed the results and considered them in treating the patient.        RADIOLOGY  CT Abdomen Pelvis With Contrast    Result Date: 3/17/2024  FINAL REPORT TECHNIQUE: null CLINICAL HISTORY: RLQ and LLQ Abdominal pain s/p D & C Thursday Prior a/p w/ 02/11/2024 COMPARISON: null FINDINGS: CT abdomen and pelvis with contrast Comparison: CT/SR - CT ABDOMEN PELVIS W CONTRAST - 02/11/2024 04:46 PM EST CT/SR - CT ABDOMEN PELVIS WO CONTRAST - 02/10/2023 01:04 AM EST Findings: There is minimal bibasilar atelectasis. There is mild hepatic steatosis. There is diffuse liver surface nodularity consistent with cirrhosis. There is no biliary duct dilation. Patient is status post cholecystectomy. The pancreas, spleen, and kidneys are unremarkable. There is a stable 2 cm left adrenal adenoma. Right adrenal gland is unremarkable. The appendix is normal. The remainder of the gastrointestinal tract is unremarkable. There is no free fluid or free air. There are no enlarged lymph nodes. Aorta and IVC are normal. Uterus and adnexa are unremarkable. There is no fracture or suspicious lytic or sclerotic lesion. There are degenerative changes of the lower lumbar spine with mild degenerative grade 1 anterolisthesis of L4 on L5.     Impression: 1. No acute abnormality in the abdomen or pelvis. 2. Cirrhosis and mild hepatic steatosis. 3. Stable 2 cm left adrenal adenoma. Authenticated and Electronically Signed by Joe Galan MD on 03/17/2024 01:31:41 AM     I ordered and independently reviewed the above noted radiographic studies.  See radiologist's dictation for official interpretation.    PROCEDURES    Procedures    No orders to display       MEDICATIONS GIVEN IN ER    Medications   sodium chloride 0.9 % bolus 1,000 mL (0 mL Intravenous Stopped 3/16/24 1794)   ketorolac (TORADOL) injection 15 mg (15 mg Intravenous Given 3/16/24 2322)   ondansetron (ZOFRAN) injection 8 mg (8 mg Intravenous Given 3/16/24 2321)   HYDROmorphone (DILAUDID)  injection 1 mg (1 mg Intravenous Given 3/16/24 6522)   iopamidol (ISOVUE-300) 61 % injection 100 mL (100 mL Intravenous Given 3/17/24 0020)   HYDROmorphone (DILAUDID) injection 1 mg (1 mg Intravenous Given 3/17/24 0049)         MEDICAL DECISION MAKING, PROGRESS, and CONSULTS    All labs, if obtained, have been independently reviewed by me.  All radiology studies, if obtained, have been reviewed by me and the radiologist dictating the report.  All EKG's, if obtained, have been independently viewed and interpreted by me/my attending physician.      Discussion below represents my analysis of pertinent findings related to patient's condition, differential diagnosis, treatment plan and final disposition.                         Differential diagnosis:    Differential diagnosis for this patient includes post-operative infection and/or hemorrhage, hepatitis, cholangitis,  pancreatitis, gastritis, enteritis, colitis, gastroenteritis, appendicitis, volvulus, obstruction, ischemia, torsion, cystitis, pyelonephritis, nephrolithiasis, uretolithiasis, ectopic pregnancy, cervicitis, PID, other acute emergency.  Patient reports cholecystectomy.    Medical Decision Making Discussion:    Patient vitals are reviewed and demonstrate hypertension and upper limit of normal heart rate but otherwise are normal.    Patient's labs are reviewed and are all unremarkable.  Urinalysis is negative for infection.    CT scan of the abdomen pelvis was obtained and per radiology is negative for acute findings.    EXTR administer medications, fluids and antiemetics patient reports she feels significantly better.  The patient is discharged home with instructions to contact her OB GYN this Monday and to follow-up with them this week.  She is instructed to return to the emergency department for fever, vomiting, vaginal bleeding, vaginal discharge, inability to control her pain, concerning symptoms, worsening symptoms or any concerns.    Additional  sources:    - External (non-ED) record review:  H&P 3/14/2024-schedule surgery for abnormal uterine bleeding, menorrhagia with irregular cycle, dysmenorrhea.  She was scheduled to undergo hysteroscopy with possible MyoSure, D&C, endometrial ablation.    Shared Decision Making:  After my consideration of clinical presentation and any laboratory/radiology studies obtained, I discussed the findings with the patient/patient representative who is in agreement with the treatment plan and the final disposition.   Risks and benefits of discharge and/or observation/admission were discussed.    Orders placed during this visit:  Orders Placed This Encounter   Procedures    CT Abdomen Pelvis With Contrast    Comprehensive Metabolic Panel    CBC Auto Differential    Lipase    hCG, Serum, Qualitative    Urinalysis With Microscopic If Indicated (No Culture) - Urine, Clean Catch    Urinalysis, Microscopic Only - Urine, Clean Catch       AS OF 03:05 EDT VITALS:    BP - (!) 156/128  HR - 80  TEMP - 98.1 °F (36.7 °C) (Oral)  O2 SATS - 94%                  DIAGNOSIS  Final diagnoses:   Non-surgical abdominal pain         DISPOSITION  Discharge      Please note that portions of this document were completed with voice recognition software.        Zaki Culp MD  03/17/24 1973

## 2024-03-18 ENCOUNTER — TELEPHONE (OUTPATIENT)
Dept: INTERNAL MEDICINE | Facility: CLINIC | Age: 39
End: 2024-03-18
Payer: MEDICAID

## 2024-03-18 LAB — REF LAB TEST METHOD: NORMAL

## 2024-03-18 RX ORDER — AMITRIPTYLINE HYDROCHLORIDE 100 MG/1
100 TABLET ORAL NIGHTLY PRN
Qty: 30 TABLET | Refills: 1 | Status: SHIPPED | OUTPATIENT
Start: 2024-03-18

## 2024-03-18 NOTE — TELEPHONE ENCOUNTER
Caller: Lindsay Amezquita    Relationship: Self    Best call back number: [phone]    What specialty or service is being requested:     PAIN MANAGEMENT     What is the provider, practice or medical service name: PAIN CLINIC  [facility]    What is the office location:     [address], [address]  [place]  [address]    What is the office phone number:     [phone]    Any additional details:     PLEASE CALL PATIENT WITH APPOINTMENT DETAILS

## 2024-03-18 NOTE — TELEPHONE ENCOUNTER
Caller: Lindsay Amezquita    Relationship: Self    Best call back number: [phone]     What is the best time to reach you: ANY    Who are you requesting to speak with (clinical staff, provider,  specific staff member): CLINICAL     What was the call regarding: PATIENT IS WANTING A CALL BACK TO DISCUSS THE POSSIBILITIES OF GETTING A BLOOD GLUCOSE MONITOR PRESCRIBED. PLEASE CALL AND ADVISED IF THIS IS SOMETHING THAT CAN BE  APPROVED.

## 2024-03-18 NOTE — TELEPHONE ENCOUNTER
Per message on 3/14/2024    Lindsay is to find a pain clinic that is willing to accept her and then have that pain clinic contact our office/referral team letting us know that they will take her as a pt.  We are not placing any further pain management referrals without confirmation of them taking patient.    Additional message sent to the patient restating this.

## 2024-03-18 NOTE — TELEPHONE ENCOUNTER
Glad to hear she is doing well. Please call her and let her know that it has been sent.     Thanks!

## 2024-03-18 NOTE — TELEPHONE ENCOUNTER
Patient called and stated that [facility] told her it would be 900 hundred dollars for her supplies, stated that she can not afford that and wants to know if there is anything else she can do would like a call back [phone]

## 2024-03-20 ENCOUNTER — TELEPHONE (OUTPATIENT)
Dept: NEUROLOGY | Facility: CLINIC | Age: 39
End: 2024-03-20

## 2024-03-20 ENCOUNTER — TELEPHONE (OUTPATIENT)
Dept: INTERNAL MEDICINE | Facility: CLINIC | Age: 39
End: 2024-03-20
Payer: MEDICAID

## 2024-03-20 ENCOUNTER — TELEPHONE (OUTPATIENT)
Dept: INTERNAL MEDICINE | Facility: CLINIC | Age: 39
End: 2024-03-20

## 2024-03-20 NOTE — TELEPHONE ENCOUNTER
Caller: Alirio Lindsay ADELA    Relationship: Self    Best call back number: [phone]     What medication are you requesting: SOMETHING FOR SINUS ISSUES    Have you had these symptoms before:    [x] Yes  [] No    Have you been treated for these symptoms before:   [x] Yes  [] No    If a prescription is needed, what is your preferred pharmacy and phone number: [id number] - [address] [phone] [facility] [phone] FX     Additional notes: HER NEUROLOGIST DID AN MRI ON HER AND FOUND THAT SHE HAS A LOT OF SINUS PROBLEMS.  SHE IS HOPING THAT FELICIANO CARDOZA CAN SEND IN MEDICATION TO HELP WITH THIS. PLEASE LET THE PATIENT KNOW AND LET HER KNOW IF THERE ARE ANY QUESTIONS.

## 2024-03-20 NOTE — TELEPHONE ENCOUNTER
PATIENT CALLED BACK  THERE ARE SEVERAL MESSAGES REGARDING THIS    SHE STATES [facility] WILL ACCEPT HER AS A PATIENT    THEY STATED OUR DOCTORS OFFICE MUST CALL THEM       [phone]

## 2024-03-20 NOTE — TELEPHONE ENCOUNTER
Nasal spray was approved through insurance.  You have signed the MRI result.  What would you like for me to relay to her?

## 2024-03-20 NOTE — TELEPHONE ENCOUNTER
Caller: Lindsay Amezquita    Relationship: Self    Best call back number: [phone]     What is the medical concern/diagnosis: PAIN ALL OVER    What specialty or service is being requested: PAIN CLINIC    What is the provider, practice or medical service name: [facility]    What is the office location: [place]    What is the office phone number: [phone]    Any additional details: NA

## 2024-03-20 NOTE — TELEPHONE ENCOUNTER
Caller: Lindsay Amezquita    Relationship: Self    Best call back number: [phone]    What test was performed: MRI BRAIN WWO CONTRAST    When was the test performed: 3/12/2024    Where was the test performed: [facility]    Additional notes: MRI RESULTS INDEXED IN PT'S CHART, SEE MEDIA TAB. PT WOULD LIKE TO KNOW RESULTS ONCE CLAIR MESA HAS HAD A CHANCE TO REVIEW THE RESULTS.    **PT WANTED TO LET CLAIR MESA KNOW SHE USED THE ZAVZPRET NASAL SPRAY WHEN SHE HAD A MIGRAINE YESTERDAY AND IT WORKED REALLY WELL FOR HER. PT STATES [facility] IS WORKING ON FILLING THE SCRIPT FOR HER.    PLEASE REVIEW AND ADVISE.

## 2024-03-21 ENCOUNTER — TELEPHONE (OUTPATIENT)
Dept: NEUROLOGY | Facility: CLINIC | Age: 39
End: 2024-03-21

## 2024-03-21 ENCOUNTER — TELEPHONE (OUTPATIENT)
Dept: OBSTETRICS AND GYNECOLOGY | Facility: CLINIC | Age: 39
End: 2024-03-21

## 2024-03-21 DIAGNOSIS — N93.9 ABNORMAL UTERINE BLEEDING (AUB): ICD-10-CM

## 2024-03-21 DIAGNOSIS — N93.9 ABNORMAL UTERINE BLEEDING (AUB): Primary | ICD-10-CM

## 2024-03-21 NOTE — TELEPHONE ENCOUNTER
Provider: MARCI    Caller: LEORA    Relationship to Patient: SELF    Phone Number: [phone]    Reason for Call: PATIENT STATED THAT SHE PICKED UP HER NASAL SPRAY AND THE PHARMACIST ADVISED HER TO NOT TAKE THE NURTEC WITH IT DUE TO CAUSING A REACTION. PATIENT IS REQUESITNG A CALL BACK ASAP.    PLEASE REVIEW    THANK YOU

## 2024-03-21 NOTE — TELEPHONE ENCOUNTER
Contacted [facility] pharmacy to see if there was a contraindication with Nurtec and Zavzpret.  There is not.  They are simply the same class of medication.  Nurtec is taken every other day for migraine prevention.  The Zavzpret is only to be taken as an abortive therapy for Migraine.  Patient notified and verbalized understanding.

## 2024-03-21 NOTE — TELEPHONE ENCOUNTER
Relayed to patient. She lost her glucometer, strips and lancets if we can send these in. Stated that it has been over a year since her last one.

## 2024-03-21 NOTE — TELEPHONE ENCOUNTER
She had also asked about getting med called in for sinus issues. I told her that we do not call in med. for that.

## 2024-03-21 NOTE — TELEPHONE ENCOUNTER
Patient is requesting refills on the same hormone medication that she was previously on     SEND TO: [facility]    Thank You

## 2024-03-21 NOTE — TELEPHONE ENCOUNTER
Left vm that she needs an appt. for eval. Give us a call back and we will check for availability.

## 2024-03-22 ENCOUNTER — TELEPHONE (OUTPATIENT)
Dept: INTERNAL MEDICINE | Facility: CLINIC | Age: 39
End: 2024-03-22
Payer: MEDICAID

## 2024-03-22 ENCOUNTER — TELEMEDICINE (OUTPATIENT)
Dept: FAMILY MEDICINE CLINIC | Facility: TELEHEALTH | Age: 39
End: 2024-03-22
Payer: MEDICAID

## 2024-03-22 DIAGNOSIS — J01.40 ACUTE PANSINUSITIS, RECURRENCE NOT SPECIFIED: Primary | ICD-10-CM

## 2024-03-22 DIAGNOSIS — R05.1 ACUTE COUGH: ICD-10-CM

## 2024-03-22 RX ORDER — LANCETS 30 GAUGE
EACH MISCELLANEOUS
Qty: 100 EACH | Refills: 3 | Status: SHIPPED | OUTPATIENT
Start: 2024-03-22

## 2024-03-22 RX ORDER — GUAIFENESIN 600 MG/1
1200 TABLET, EXTENDED RELEASE ORAL 2 TIMES DAILY
Qty: 20 TABLET | Refills: 0 | Status: SHIPPED | OUTPATIENT
Start: 2024-03-22 | End: 2024-03-24 | Stop reason: SDUPTHER

## 2024-03-22 RX ORDER — BLOOD-GLUCOSE METER
1 KIT MISCELLANEOUS AS NEEDED
Qty: 1 EACH | Refills: 0 | Status: SHIPPED | OUTPATIENT
Start: 2024-03-22

## 2024-03-22 RX ORDER — DEXTROMETHORPHAN HYDROBROMIDE AND PROMETHAZINE HYDROCHLORIDE 15; 6.25 MG/5ML; MG/5ML
5 SYRUP ORAL 4 TIMES DAILY PRN
Qty: 118 ML | Refills: 0 | Status: SHIPPED | OUTPATIENT
Start: 2024-03-22

## 2024-03-22 RX ORDER — DOXYCYCLINE HYCLATE 100 MG/1
100 CAPSULE ORAL 2 TIMES DAILY
Qty: 14 CAPSULE | Refills: 0 | Status: SHIPPED | OUTPATIENT
Start: 2024-03-22 | End: 2024-03-29

## 2024-03-22 NOTE — PROGRESS NOTES
You have chosen to receive care through a telehealth visit.  Do you consent to use a video/audio connection for your medical care today? Yes     HPI  Lindsay Amezquita is a 38 y.o. female  presents with complaint of over 10 days of sinus pressure, tenderness around eyes, nasal discharge, cough,  chest phlegm (green) and mild shortness of breath and wheezing. She had been treated with azithromycin last week for an ear infection which did not help. Now she is coughing and has worsening sinus pressure.     Review of Systems   Constitutional:  Positive for fatigue and fever (tactile).   HENT:  Positive for congestion, postnasal drip, rhinorrhea and sinus pain.    Respiratory:  Positive for cough, shortness of breath and wheezing.    Cardiovascular: Negative.    Gastrointestinal: Negative.    Musculoskeletal: Negative.    Skin: Negative.    Neurological: Negative.    Hematological: Negative.    Psychiatric/Behavioral: Negative.         Past Medical History:   Diagnosis Date    Allergic     Anxiety     Asthma Beings of copd with asthma    Back pain     Bell's palsy     Bipolar 2 disorder     Blood clot in vein     Behind left knee cap    COPD (chronic obstructive pulmonary disease) Six months ago    Deep vein thrombosis Last year    Depression     Diabetes mellitus November    Pre diabete    Frequent headaches     GERD (gastroesophageal reflux disease)     Hypertension     Every time i go into the [facility] room    Irritable bowel syndrome Two years ago    Kidney stone Two years ago    Migraine     Obesity All of my life    Ovarian cyst Two years ago    Panic     PTSD (post-traumatic stress disorder)     Recurrent pregnancy loss, antepartum condition or complication Every since i have been 14 yars old    Restless leg syndrome     Sinus problem     Snores     Tattoos     Urinary tract infection Have them on and off    Varicella Little    Visual impairment Since i was little    Vitamin B12 deficiency     Wears glasses         Family History   Problem Relation Age of Onset    Irritable bowel syndrome Mother     Heart disease Mother     Miscarriages / Stillbirths Mother     Arthritis Mother     COPD Mother     Learning disabilities Mother     Mental illness Mother     Irritable bowel syndrome Father     Alcohol abuse Father     Diabetes Father     Hyperlipidemia Father     Anxiety disorder Father     Learning disabilities Father     Colon cancer Maternal Grandfather     Stroke Paternal Grandfather     Stroke Paternal Grandmother        Social History     Socioeconomic History    Marital status: Single   Tobacco Use    Smoking status: Some Days     Current packs/day: 0.50     Average packs/day: 2.0 packs/day for 31.7 years (63.0 ttl pk-yrs)     Types: Cigarettes     Start date: 7/17/1992     Passive exposure: Never    Smokeless tobacco: Never    Tobacco comments:         Vaping Use    Vaping status: Former   Substance and Sexual Activity    Alcohol use: Yes     Comment: rarely    Drug use: Not Currently    Sexual activity: Defer     Partners: Female     Birth control/protection: Other, Same-sex partner         LMP 02/21/2024 (Approximate)     PHYSICAL EXAM  Physical Exam   Constitutional: She is oriented to person, place, and time. She appears well-developed and well-nourished. She does not have a sickly appearance. She does not appear ill. No distress.   HENT:   Head: Normocephalic and atraumatic.   Nose: Mucosal edema, rhinorrhea and congestion present. Right sinus exhibits maxillary sinus tenderness and frontal sinus tenderness. Left sinus exhibits maxillary sinus tenderness and frontal sinus tenderness.   Mouth/Throat: Mouth/Lips are normal.  Pulmonary/Chest: Effort normal.  No respiratory distress.  Neurological: She is alert and oriented to person, place, and time.   Psychiatric: She has a normal mood and affect.   Vitals reviewed.      Diagnoses and all orders for this visit:    1. Acute pansinusitis, recurrence not specified  (Primary)  -     doxycycline (VIBRAMYCIN) 100 MG capsule; Take 1 capsule by mouth 2 (Two) Times a Day for 7 days.  Dispense: 14 capsule; Refill: 0    2. Acute cough  -     promethazine-dextromethorphan (PROMETHAZINE-DM) 6.25-15 MG/5ML syrup; Take 5 mL by mouth 4 (Four) Times a Day As Needed for Cough.  Dispense: 118 mL; Refill: 0  -     guaiFENesin (Mucinex) 600 MG 12 hr tablet; Take 2 tablets by mouth 2 (Two) Times a Day.  Dispense: 20 tablet; Refill: 0    Increase fluids and rest.         FOLLOW-UP  As discussed during visit with [facility], if symptoms worsen or fail to improve, follow-up with PCP/Urgent Care/Emergency Department.    Patient verbalizes understanding of medications, instructions for treatment and follow-up.    Monserrat Spencer, CLAIR  03/22/2024  18:03 EDT    The use of a video visit has been reviewed with the patient and verbal informed consent has been obtained. Myself and Lindsay Amezquita participated in this visit. The patient is located in [facility], and I am located in [place], [place]. [employer] and [email or web address]  were utilized.

## 2024-03-22 NOTE — TELEPHONE ENCOUNTER
Patient called asking if Halima could give her a call when she can.  She is needing a re-diagosis, a genesight test, and a recommendation or referral to something she feels comfortable so she can get away from it all.  Please advise.  Phone number verified.

## 2024-03-23 ENCOUNTER — TELEPHONE (OUTPATIENT)
Dept: INTERNAL MEDICINE | Facility: CLINIC | Age: 39
End: 2024-03-23
Payer: MEDICAID

## 2024-03-23 ENCOUNTER — APPOINTMENT (OUTPATIENT)
Dept: GENERAL RADIOLOGY | Facility: HOSPITAL | Age: 39
End: 2024-03-23
Payer: MEDICAID

## 2024-03-23 ENCOUNTER — HOSPITAL ENCOUNTER (EMERGENCY)
Facility: HOSPITAL | Age: 39
Discharge: HOME | End: 2024-03-23
Attending: EMERGENCY MEDICINE
Payer: MEDICAID

## 2024-03-23 VITALS
RESPIRATION RATE: 18 BRPM | OXYGEN SATURATION: 98 % | HEART RATE: 64 BPM | SYSTOLIC BLOOD PRESSURE: 121 MMHG | DIASTOLIC BLOOD PRESSURE: 71 MMHG | HEIGHT: 62 IN | TEMPERATURE: 98 F | WEIGHT: 251 LBS | BODY MASS INDEX: 46.19 KG/M2

## 2024-03-23 DIAGNOSIS — J18.9 PNEUMONIA OF RIGHT LOWER LOBE DUE TO INFECTIOUS ORGANISM: Primary | ICD-10-CM

## 2024-03-23 LAB
ALBUMIN SERPL-MCNC: 4.4 G/DL (ref 3.5–5.2)
ALBUMIN/GLOB SERPL: 2.1 G/DL
ALP SERPL-CCNC: 75 U/L (ref 39–117)
ALT SERPL W P-5'-P-CCNC: 26 U/L (ref 1–33)
ANION GAP SERPL CALCULATED.3IONS-SCNC: 15.2 MMOL/L (ref 5–15)
AST SERPL-CCNC: 15 U/L (ref 1–32)
BASOPHILS # BLD AUTO: 0.03 10*3/MM3 (ref 0–0.2)
BASOPHILS NFR BLD AUTO: 0.3 % (ref 0–1.5)
BILIRUB SERPL-MCNC: 0.3 MG/DL (ref 0–1.2)
BUN SERPL-MCNC: 3 MG/DL (ref 6–20)
BUN/CREAT SERPL: 4.2 (ref 7–25)
CALCIUM SPEC-SCNC: 8.9 MG/DL (ref 8.6–10.5)
CHLORIDE SERPL-SCNC: 100 MMOL/L (ref 98–107)
CO2 SERPL-SCNC: 23.8 MMOL/L (ref 22–29)
CREAT SERPL-MCNC: 0.71 MG/DL (ref 0.57–1)
DEPRECATED RDW RBC AUTO: 47.2 FL (ref 37–54)
EGFRCR SERPLBLD CKD-EPI 2021: 111.1 ML/MIN/1.73
EOSINOPHIL # BLD AUTO: 0.21 10*3/MM3 (ref 0–0.4)
EOSINOPHIL NFR BLD AUTO: 2.4 % (ref 0.3–6.2)
ERYTHROCYTE [DISTWIDTH] IN BLOOD BY AUTOMATED COUNT: 15.1 % (ref 12.3–15.4)
GEN 5 2HR TROPONIN T REFLEX: 9 NG/L
GLOBULIN UR ELPH-MCNC: 2.1 GM/DL
GLUCOSE SERPL-MCNC: 181 MG/DL (ref 65–99)
HCT VFR BLD AUTO: 37.7 % (ref 34–46.6)
HGB BLD-MCNC: 12.8 G/DL (ref 12–15.9)
HOLD SPECIMEN: NORMAL
HOLD SPECIMEN: NORMAL
IMM GRANULOCYTES # BLD AUTO: 0.02 10*3/MM3 (ref 0–0.05)
IMM GRANULOCYTES NFR BLD AUTO: 0.2 % (ref 0–0.5)
LYMPHOCYTES # BLD AUTO: 3.21 10*3/MM3 (ref 0.7–3.1)
LYMPHOCYTES NFR BLD AUTO: 36.9 % (ref 19.6–45.3)
MCH RBC QN AUTO: 28.8 PG (ref 26.6–33)
MCHC RBC AUTO-ENTMCNC: 34 G/DL (ref 31.5–35.7)
MCV RBC AUTO: 84.9 FL (ref 79–97)
MONOCYTES # BLD AUTO: 0.46 10*3/MM3 (ref 0.1–0.9)
MONOCYTES NFR BLD AUTO: 5.3 % (ref 5–12)
NEUTROPHILS NFR BLD AUTO: 4.78 10*3/MM3 (ref 1.7–7)
NEUTROPHILS NFR BLD AUTO: 54.9 % (ref 42.7–76)
NRBC BLD AUTO-RTO: 0 /100 WBC (ref 0–0.2)
PLATELET # BLD AUTO: 243 10*3/MM3 (ref 140–450)
PMV BLD AUTO: 10 FL (ref 6–12)
POTASSIUM SERPL-SCNC: 3.4 MMOL/L (ref 3.5–5.2)
PROT SERPL-MCNC: 6.5 G/DL (ref 6–8.5)
RBC # BLD AUTO: 4.44 10*6/MM3 (ref 3.77–5.28)
SODIUM SERPL-SCNC: 139 MMOL/L (ref 136–145)
TROPONIN T DELTA: 3 NG/L
TROPONIN T SERPL HS-MCNC: 6 NG/L
WBC NRBC COR # BLD AUTO: 8.71 10*3/MM3 (ref 3.4–10.8)
WHOLE BLOOD HOLD COAG: NORMAL
WHOLE BLOOD HOLD SPECIMEN: NORMAL

## 2024-03-23 PROCEDURE — 71045 X-RAY EXAM CHEST 1 VIEW: CPT

## 2024-03-23 PROCEDURE — 36415 COLL VENOUS BLD VENIPUNCTURE: CPT

## 2024-03-23 PROCEDURE — 93005 ELECTROCARDIOGRAM TRACING: CPT | Performed by: EMERGENCY MEDICINE

## 2024-03-23 PROCEDURE — 85025 COMPLETE CBC W/AUTO DIFF WBC: CPT | Performed by: EMERGENCY MEDICINE

## 2024-03-23 PROCEDURE — 80053 COMPREHEN METABOLIC PANEL: CPT | Performed by: EMERGENCY MEDICINE

## 2024-03-23 PROCEDURE — 99284 EMERGENCY DEPT VISIT MOD MDM: CPT

## 2024-03-23 PROCEDURE — 84484 ASSAY OF TROPONIN QUANT: CPT | Performed by: EMERGENCY MEDICINE

## 2024-03-23 RX ORDER — SODIUM CHLORIDE 0.9 % (FLUSH) 0.9 %
10 SYRINGE (ML) INJECTION AS NEEDED
Status: DISCONTINUED | OUTPATIENT
Start: 2024-03-23 | End: 2024-03-23 | Stop reason: HOSPADM

## 2024-03-23 NOTE — ED PROVIDER NOTES
EMERGENCY DEPARTMENT ENCOUNTER    Pt Name: Lindsay Amezquita  MRN: [id number]  Pt :   1985  Room Number:    Date of encounter:  3/23/2024  PCP: Charley Robles APRN  ED Provider: Zaki Culp MD    Historian: Patient      HPI:  Chief Complaint: Cough, congestion, sinus pressure, nausea, vomiting        Context: Lindsay Amezquita is a 38 y.o. female who presents to the ED c/o cough, congestion, sinus pressure, nausea and vomiting.  Patient underwent hysteroscopy with possible MyoSure, D&C, and endometrial ablation on .  This was done by Dr. Ribeiro here at [facility].  Patient says over the past several days she has been having sinus pressure, cough, congestion and intermittent post-tussive emesis.  He says she has been coughing so much that her chest feels sore.  She says she is also having persistent abdominal tenderness since her surgery.  She says yesterday she was prescribed some cough syrup and antibiotics via telemedicine.      PAST MEDICAL HISTORY  Past Medical History:   Diagnosis Date    Allergic     Anxiety     Asthma Beings of copd with asthma    Back pain     Bell's palsy     Bipolar 2 disorder     Blood clot in vein     Behind left knee cap    COPD (chronic obstructive pulmonary disease) Six months ago    Deep vein thrombosis Last year    Depression     Diabetes mellitus November    Pre diabete    Frequent headaches     GERD (gastroesophageal reflux disease)     Hypertension     Every time i go into the [facility] room    Irritable bowel syndrome Two years ago    Kidney stone Two years ago    Migraine     Obesity All of my life    Ovarian cyst Two years ago    Panic     PTSD (post-traumatic stress disorder)     Recurrent pregnancy loss, antepartum condition or complication Every since i have been 14 yars old    Restless leg syndrome     Sinus problem     Snores     Tattoos     Urinary tract infection Have them on and off    Varicella Little    Visual impairment Since i was  little    Vitamin B12 deficiency     Wears glasses          PAST SURGICAL HISTORY  Past Surgical History:   Procedure Laterality Date    CHOLECYSTECTOMY      HYSTEROSCOPY N/A 3/14/2024    Procedure: HYSTEROSCOPY WITH MYOSURE, DILATION AND CURETTAGE WITH CERENE ABLATION;  Surgeon: David Ribeiro MD;  Location: [facility];  Service: Obstetrics/Gynecology;  Laterality: N/A;    MULTIPLE TOOTH EXTRACTIONS      All my teeth    WISDOM TOOTH EXTRACTION  All my teeth         FAMILY HISTORY  Family History   Problem Relation Age of Onset    Irritable bowel syndrome Mother     Heart disease Mother     Miscarriages / Stillbirths Mother     Arthritis Mother     COPD Mother     Learning disabilities Mother     Mental illness Mother     Irritable bowel syndrome Father     Alcohol abuse Father     Diabetes Father     Hyperlipidemia Father     Anxiety disorder Father     Learning disabilities Father     Colon cancer Maternal Grandfather     Stroke Paternal Grandfather     Stroke Paternal Grandmother          SOCIAL HISTORY  Social History     Socioeconomic History    Marital status: Single   Tobacco Use    Smoking status: Some Days     Current packs/day: 0.50     Average packs/day: 2.0 packs/day for 31.7 years (63.0 ttl pk-yrs)     Types: Cigarettes     Start date: 7/17/1992     Passive exposure: Never    Smokeless tobacco: Never    Tobacco comments:         Vaping Use    Vaping status: Former   Substance and Sexual Activity    Alcohol use: Yes     Comment: rarely    Drug use: Not Currently    Sexual activity: Defer     Partners: Female     Birth control/protection: Other, Same-sex partner         ALLERGIES  Aspirin, Codeine, Cyclobenzaprine, Haldol [haloperidol], Imitrex [sumatriptan], Latex, Penicillins, Morphine, Ondansetron, Oxycodone-acetaminophen, Oxycontin [oxycodone], Tramadol, Tylenol [acetaminophen], Compazine [prochlorperazine], Lamictal [lamotrigine], and Reglan [metoclopramide]        REVIEW OF SYSTEMS    All  systems reviewed and negative except for those discussed in HPI.       PHYSICAL EXAM    I have reviewed the triage vital signs and nursing notes.    ED Triage Vitals [03/23/24 0208]   Temp Heart Rate Resp BP SpO2   97.8 °F (36.6 °C) 64 18 137/95 99 %      Temp src Heart Rate Source Patient Position BP Location FiO2 (%)   Oral Monitor Sitting Left arm --         General: no acute distress, well-appearing, non-toxic  Skin: normal color, warm and dry  Head: normocephalic, atraumatic  Eyes: Pupils equally round and reactive to light.  No periorbital erythema or swelling.  Nose: normal nasal mucosa, no visible deformity.  Mouth: moist mucous membranes.  Neck: supple.  Chest: no retractions, no visible deformity  Cardiovascular: Regular rate and rhythm.  Lungs: Rhonchi in the right lung base.  Otherwise, clear to auscultation bilaterally.  Abdomen: soft, non-tender, non-distended. No rebound tenderness, no guarding.  No peritonitis.  Neuro:  alert and oriented x3, no focal neurological deficits.  Psych:  appropriate mood and behavior.        LAB RESULTS  Recent Results (from the past 24 hour(s))   Comprehensive Metabolic Panel    Collection Time: 03/23/24  2:33 AM    Specimen: Blood   Result Value Ref Range    Glucose 181 (H) 65 - 99 mg/dL    BUN 3 (L) 6 - 20 mg/dL    Creatinine 0.71 0.57 - 1.00 mg/dL    Sodium 139 136 - 145 mmol/L    Potassium 3.4 (L) 3.5 - 5.2 mmol/L    Chloride 100 98 - 107 mmol/L    CO2 23.8 22.0 - 29.0 mmol/L    Calcium 8.9 8.6 - 10.5 mg/dL    Total Protein 6.5 6.0 - 8.5 g/dL    Albumin 4.4 3.5 - 5.2 g/dL    ALT (SGPT) 26 1 - 33 U/L    AST (SGOT) 15 1 - 32 U/L    Alkaline Phosphatase 75 39 - 117 U/L    Total Bilirubin 0.3 0.0 - 1.2 mg/dL    Globulin 2.1 gm/dL    A/G Ratio 2.1 g/dL    BUN/Creatinine Ratio 4.2 (L) 7.0 - 25.0    Anion Gap 15.2 (H) 5.0 - 15.0 mmol/L    eGFR 111.1 >60.0 mL/min/1.73   High Sensitivity Troponin T    Collection Time: 03/23/24  2:33 AM    Specimen: Blood   Result Value Ref  Range    HS Troponin T 6 <14 ng/L   Green Top (Gel)    Collection Time: 03/23/24  2:33 AM   Result Value Ref Range    Extra Tube Hold for add-ons.    Lavender Top    Collection Time: 03/23/24  2:33 AM   Result Value Ref Range    Extra Tube hold for add-on    Gold Top - SST    Collection Time: 03/23/24  2:33 AM   Result Value Ref Range    Extra Tube Hold for add-ons.    Light Blue Top    Collection Time: 03/23/24  2:33 AM   Result Value Ref Range    Extra Tube Hold for add-ons.    CBC Auto Differential    Collection Time: 03/23/24  2:33 AM    Specimen: Blood   Result Value Ref Range    WBC 8.71 3.40 - 10.80 10*3/mm3    RBC 4.44 3.77 - 5.28 10*6/mm3    Hemoglobin 12.8 12.0 - 15.9 g/dL    Hematocrit 37.7 34.0 - 46.6 %    MCV 84.9 79.0 - 97.0 fL    MCH 28.8 26.6 - 33.0 pg    MCHC 34.0 31.5 - 35.7 g/dL    RDW 15.1 12.3 - 15.4 %    RDW-SD 47.2 37.0 - 54.0 fl    MPV 10.0 6.0 - 12.0 fL    Platelets 243 140 - 450 10*3/mm3    Neutrophil % 54.9 42.7 - 76.0 %    Lymphocyte % 36.9 19.6 - 45.3 %    Monocyte % 5.3 5.0 - 12.0 %    Eosinophil % 2.4 0.3 - 6.2 %    Basophil % 0.3 0.0 - 1.5 %    Immature Grans % 0.2 0.0 - 0.5 %    Neutrophils, Absolute 4.78 1.70 - 7.00 10*3/mm3    Lymphocytes, Absolute 3.21 (H) 0.70 - 3.10 10*3/mm3    Monocytes, Absolute 0.46 0.10 - 0.90 10*3/mm3    Eosinophils, Absolute 0.21 0.00 - 0.40 10*3/mm3    Basophils, Absolute 0.03 0.00 - 0.20 10*3/mm3    Immature Grans, Absolute 0.02 0.00 - 0.05 10*3/mm3    nRBC 0.0 0.0 - 0.2 /100 WBC   High Sensitivity Troponin T 2Hr    Collection Time: 03/23/24  4:31 AM    Specimen: Blood   Result Value Ref Range    HS Troponin T 9 <14 ng/L    Troponin T Delta 3 >=-4 - <+4 ng/L       If labs were ordered, I independently reviewed the results and considered them in treating the patient.        RADIOLOGY  No Radiology Exams Resulted Within Past 24 Hours    I ordered and independently reviewed the above noted radiographic studies.  See radiologist's dictation for official  interpretation.    Per my independent reading: Chest radiograph is obtained and based on my independent reading is negative for acute cardiopulmonary findings.            PROCEDURES    Procedures    ECG 12 Lead ED Triage Standing Order; Chest Pain   Final Result          MEDICATIONS GIVEN IN ER    Medications   sodium chloride 0.9 % flush 10 mL (has no administration in time range)         MEDICAL DECISION MAKING, PROGRESS, and CONSULTS    All labs, if obtained, have been independently reviewed by me.  All radiology studies, if obtained, have been reviewed by me and the radiologist dictating the report.  All EKG's, if obtained, have been independently viewed and interpreted by me/my attending physician.      Discussion below represents my analysis of pertinent findings related to patient's condition, differential diagnosis, treatment plan and final disposition.                         Differential diagnosis:    Differential diagnosis for this patient includes acute coronary syndrome, pulmonary embolism, sepsis, influenza, SARS-CoV-2, other viral illness, pneumonia, cystitis, pyelonephritis, other acute emergency.    Medical Decision Making Discussion:    Patient's vitals are reviewed and are normal.    EKG is obtained based on my independent reading demonstrates normal sinus rhythm without acute ischemic changes.    Labs are obtained and are essentially all unremarkable.  Her troponin is gray zone.    Clinically, patient has an upper respiratory infection versus pneumonia.  She has rhonchi in the right lung base associated with a productive cough, congestion and posttussive emesis.  She was already prescribed doxycycline yesterday via telemedicine.    I doubt intra-abdominal surgical emergency as her abdomen is soft and completely nontender with no peritoneal signs.  She has already had a CT scan of her abdomen pelvis IV contrast after her surgery on March 16 which was interpreted as negative for acute abnormality  per radiology.    Repeat troponin was obtained and there is no significant delta.    Patient discharged home with instruction to continue her antibiotics that were prescribed to her via telemedicine and to follow-up with primary care within 1 week.  She is instructed to return to the emerged department before then for any concerning symptoms, worsening symptoms or new concerns.        Additional sources:    - External (non-ED) record review: Op note from March 14 the patient underwent hysteroscopy with possible MyoSure, D&C, and endometrial ablation.  This was done by Dr. Ribeiro     Shared Decision Making:  After my consideration of clinical presentation and any laboratory/radiology studies obtained, I discussed the findings with the patient/patient representative who is in agreement with the treatment plan and the final disposition.   Risks and benefits of discharge and/or observation/admission were discussed.    Orders placed during this visit:  Orders Placed This Encounter   Procedures    XR Chest 1 View    Tyler Draw    Comprehensive Metabolic Panel    High Sensitivity Troponin T    CBC Auto Differential    High Sensitivity Troponin T 2Hr    NPO Diet NPO Type: Strict NPO    Undress & Gown    Continuous Pulse Oximetry    Oxygen Therapy- Nasal Cannula; Titrate 1-6 LPM Per SpO2; 90 - 95%    ECG 12 Lead ED Triage Standing Order; Chest Pain    ECG 12 Lead ED Triage Standing Order; Chest Pain    Insert Peripheral IV    CBC & Differential    Green Top (Gel)    Lavender Top    Gold Top - SST    Light Blue Top         AS OF 05:02 EDT VITALS:    BP - 121/71  HR - 64  TEMP - 97.8 °F (36.6 °C) (Oral)  O2 SATS - 98%                  DIAGNOSIS  Final diagnoses:   Pneumonia of right lower lobe due to infectious organism         DISPOSITION  Discharge      Please note that portions of this document were completed with voice recognition software.        Zaki Culp MD  03/23/24 1878

## 2024-03-23 NOTE — DISCHARGE INSTRUCTIONS
You should take your antibiotics as prescribed.  You should follow-up closely with your primary care provider within 1 week.  You should return to the emergency department for any concerning symptoms, worsening symptoms or new concerns.

## 2024-03-23 NOTE — TELEPHONE ENCOUNTER
Patient states she received a dismissal letter in the mail today and would like to speak to a supervisor.  Please advise.  Phone number verified.

## 2024-03-24 ENCOUNTER — TELEMEDICINE (OUTPATIENT)
Dept: FAMILY MEDICINE CLINIC | Facility: TELEHEALTH | Age: 39
End: 2024-03-24
Payer: MEDICAID

## 2024-03-24 DIAGNOSIS — R05.1 ACUTE COUGH: ICD-10-CM

## 2024-03-24 DIAGNOSIS — J22 LOWER RESPIRATORY INFECTION (E.G., BRONCHITIS, PNEUMONIA, PNEUMONITIS, PULMONITIS): Primary | ICD-10-CM

## 2024-03-24 DIAGNOSIS — E11.65 TYPE 2 DIABETES MELLITUS WITH HYPERGLYCEMIA, WITHOUT LONG-TERM CURRENT USE OF INSULIN: ICD-10-CM

## 2024-03-24 RX ORDER — GUAIFENESIN 600 MG/1
1200 TABLET, EXTENDED RELEASE ORAL 2 TIMES DAILY
Qty: 20 TABLET | Refills: 0 | Status: SHIPPED | OUTPATIENT
Start: 2024-03-24

## 2024-03-24 RX ORDER — ALBUTEROL SULFATE 90 UG/1
2 AEROSOL, METERED RESPIRATORY (INHALATION) EVERY 4 HOURS PRN
Qty: 18 G | Refills: 0 | Status: SHIPPED | OUTPATIENT
Start: 2024-03-24

## 2024-03-24 RX ORDER — LEVOCETIRIZINE DIHYDROCHLORIDE 5 MG/1
5 TABLET, FILM COATED ORAL EVERY EVENING
COMMUNITY
Start: 2024-03-19

## 2024-03-24 RX ORDER — DESVENLAFAXINE 100 MG/1
1 TABLET, EXTENDED RELEASE ORAL DAILY
COMMUNITY
Start: 2024-03-19

## 2024-03-24 RX ORDER — CEFUROXIME AXETIL 250 MG/1
TABLET ORAL
COMMUNITY
Start: 2024-03-04

## 2024-03-24 RX ORDER — OXCARBAZEPINE 300 MG/1
1 TABLET, FILM COATED ORAL EVERY 12 HOURS SCHEDULED
COMMUNITY
Start: 2024-03-18

## 2024-03-24 RX ORDER — PREDNISONE 10 MG/1
TABLET ORAL DAILY
Qty: 21 EACH | Refills: 0 | Status: SHIPPED | OUTPATIENT
Start: 2024-03-24 | End: 2024-03-30

## 2024-03-24 NOTE — PROGRESS NOTES
"Subjective   Chief Complaint   Patient presents with    URI       Lindsay Amezquita is a 38 y.o. female.     History of Present Illness  Patient was seen 2 days ago for URI symptoms.  She was treated for sinusitis and cough with doxycycline, Promethazine DM and guaifenesin.  Patient states she started the medications but symptoms worsened so she went to the ER yesterday.  They diagnosed her with pneumonia and advised her to continue previously prescribed doxycycline and cough syrup.  Patient states she continues to feel bad, her chest hurts she has fatigue, weakness, decreased appetite, wheezing and shortness of air.  She is requesting a steroid.  She also states she never received the pharmacy never received the guaifenesin prescription.  URI   This is a new problem. Episode onset: 1-2 weeks. The problem has been gradually worsening. There has been no fever. Associated symptoms include congestion, coughing, headaches and wheezing. Pertinent negatives include no abdominal pain, chest pain, diarrhea, dysuria, ear pain, nausea, sore throat or vomiting. Treatments tried: doxycycline, promethazine DM.        Allergies   Allergen Reactions    Aspirin Anaphylaxis and Hives     Wheezing         Codeine Anaphylaxis    Cyclobenzaprine Other (See Comments)     \"gives me chest pain\"  Other reaction(s): Other (See Comments)  \"gives me chest pain\"    Haldol [Haloperidol] Rash    Imitrex [Sumatriptan] Anaphylaxis and Rash    Latex Hives and Rash    Penicillins Hives and Anaphylaxis     Vomiting    Morphine Itching    Ondansetron GI Intolerance     Other reaction(s): GI Intolerance    Oxycodone-Acetaminophen GI Intolerance     Other reaction(s): GI Intolerance    Oxycontin [Oxycodone] GI Intolerance    Tramadol Nausea And Vomiting    Tylenol [Acetaminophen] Nausea And Vomiting    Compazine [Prochlorperazine] Nausea And Vomiting    Lamictal [Lamotrigine] Itching     Itching and hives    Reglan [Metoclopramide] Hives and Headache "       Past Medical History:   Diagnosis Date    Allergic     Anxiety     Asthma Beings of copd with asthma    Back pain     Bell's palsy     Bipolar 2 disorder     Blood clot in vein     Behind left knee cap    COPD (chronic obstructive pulmonary disease) Six months ago    Deep vein thrombosis Last year    Depression     Diabetes mellitus November    Pre diabete    Frequent headaches     GERD (gastroesophageal reflux disease)     Hypertension     Every time i go into the emergacy room    Irritable bowel syndrome Two years ago    Kidney stone Two years ago    Migraine     Obesity All of my life    Ovarian cyst Two years ago    Panic     PTSD (post-traumatic stress disorder)     Recurrent pregnancy loss, antepartum condition or complication Every since i have been 14 yars old    Restless leg syndrome     Sinus problem     Snores     Tattoos     Urinary tract infection Have them on and off    Varicella Little    Visual impairment Since i was little    Vitamin B12 deficiency     Wears glasses        Past Surgical History:   Procedure Laterality Date    CHOLECYSTECTOMY      HYSTEROSCOPY N/A 3/14/2024    Procedure: HYSTEROSCOPY WITH MYOSURE, DILATION AND CURETTAGE WITH CERENE ABLATION;  Surgeon: David Ribeiro MD;  Location: [facility];  Service: Obstetrics/Gynecology;  Laterality: N/A;    MULTIPLE TOOTH EXTRACTIONS      All my teeth    WISDOM TOOTH EXTRACTION  All my teeth       Social History     Socioeconomic History    Marital status: Single   Tobacco Use    Smoking status: Some Days     Current packs/day: 0.50     Average packs/day: 2.0 packs/day for 31.7 years (63.0 ttl pk-yrs)     Types: Cigarettes     Start date: 7/17/1992     Passive exposure: Never    Smokeless tobacco: Never    Tobacco comments:         Vaping Use    Vaping status: Former   Substance and Sexual Activity    Alcohol use: Yes     Comment: rarely    Drug use: Not Currently    Sexual activity: Defer     Partners: Female     Birth  control/protection: Other, Same-sex partner       Family History   Problem Relation Age of Onset    Irritable bowel syndrome Mother     Heart disease Mother     Miscarriages / Stillbirths Mother     Arthritis Mother     COPD Mother     Learning disabilities Mother     Mental illness Mother     Irritable bowel syndrome Father     Alcohol abuse Father     Diabetes Father     Hyperlipidemia Father     Anxiety disorder Father     Learning disabilities Father     Colon cancer Maternal Grandfather     Stroke Paternal Grandfather     Stroke Paternal Grandmother          Current Outpatient Medications:     desvenlafaxine (PRISTIQ) 100 MG 24 hr tablet, Take 1 tablet by mouth Daily., Disp: , Rfl:     guaiFENesin (Mucinex) 600 MG 12 hr tablet, Take 2 tablets by mouth 2 (Two) Times a Day., Disp: 20 tablet, Rfl: 0    levocetirizine (XYZAL) 5 MG tablet, Take 1 tablet by mouth Every Evening., Disp: , Rfl:     OXcarbazepine (TRILEPTAL) 300 MG tablet, Take 1 tablet by mouth Every 12 (Twelve) Hours., Disp: , Rfl:     SUMAtriptan Succinate (IMITREX) 6 MG/0.5ML injection, , Disp: , Rfl:     acetaminophen (TYLENOL) 500 MG tablet, Take 2 tablets by mouth Every 8 (Eight) Hours As Needed for Mild Pain., Disp: 60 tablet, Rfl: 0    albuterol sulfate  (90 Base) MCG/ACT inhaler, Inhale 2 puffs Every 4 (Four) Hours As Needed for Wheezing., Disp: 18 g, Rfl: 0    ALPRAZolam (Xanax) 1 MG tablet, Take 1 tablet by mouth 3 (Three) Times a Day As Needed for Sleep. (Patient taking differently: Take 1 tablet by mouth 3 times a day.), Disp: 90 tablet, Rfl: 1    amitriptyline (ELAVIL) 100 MG tablet, Take 1 tablet by mouth At Night As Needed for Sleep., Disp: 30 tablet, Rfl: 1    atorvastatin (LIPITOR) 40 MG tablet, Take 1 tablet by mouth every night at bedtime., Disp: 90 tablet, Rfl: 3    butalbital-acetaminophen-caffeine (Esgic) -40 MG per tablet, Take 1 tablet by mouth 2 (Two) Times a Day As Needed for Headache., Disp: 20 tablet, Rfl: 2     doxycycline (VIBRAMYCIN) 100 MG capsule, Take 1 capsule by mouth 2 (Two) Times a Day for 7 days., Disp: 14 capsule, Rfl: 0    empagliflozin (Jardiance) 10 MG tablet tablet, Take 1 tablet by mouth Daily., Disp: 30 tablet, Rfl: 3    glucose blood test strip, Use as instructed, Disp: 50 each, Rfl: 12    glucose monitor monitoring kit, Use 1 each As Needed (monitor glucose TID)., Disp: 1 each, Rfl: 0    hydrOXYzine (ATARAX) 50 MG tablet, Take 1.5 tablets by mouth 3 (Three) Times a Day As Needed (anxiety and/or sleep)., Disp: 135 tablet, Rfl: 1    ibuprofen (ADVIL,MOTRIN) 800 MG tablet, Take 1 tablet by mouth Every 8 (Eight) Hours As Needed for Mild Pain, Disp: 30 tablet, Rfl: 0    Lancets misc, Check fasting glucose daily and 1 hour after largest meal of day., Disp: 100 each, Rfl: 3    Lurasidone HCl (Latuda) 120 MG tablet tablet, Take 1 tablet by mouth Daily., Disp: 30 tablet, Rfl: 1    norethindrone (Aygestin) 5 MG tablet, Take 2 tablets by mouth Daily., Disp: 60 tablet, Rfl: 5    predniSONE (DELTASONE) 10 MG (21) dose pack, Take  by mouth Daily for 6 days., Disp: 21 each, Rfl: 0    promethazine (PHENERGAN) 25 MG tablet, Take 1 tablet by mouth Every 6 (Six) Hours As Needed for Nausea or Vomiting., Disp: 45 tablet, Rfl: 3    promethazine-dextromethorphan (PROMETHAZINE-DM) 6.25-15 MG/5ML syrup, Take 5 mL by mouth At Night As Needed for Cough., Disp: 118 mL, Rfl: 0    promethazine-dextromethorphan (PROMETHAZINE-DM) 6.25-15 MG/5ML syrup, Take 5 mL by mouth 4 (Four) Times a Day As Needed for Cough., Disp: 118 mL, Rfl: 0    Rimegepant Sulfate (Nurtec) 75 MG tablet dispersible tablet, Take 1 tablet by mouth Every Other Day. Take Monday,Wednesday,Friday, and Saturday., Disp: 16 tablet, Rfl: 6    tiZANidine (ZANAFLEX) 4 MG tablet, Take 1 tablet by mouth Every 8 (Eight) Hours As Needed for Muscle Spasms. (Patient taking differently: Take 1 tablet by mouth Every Night.), Disp: 90 tablet, Rfl: 3    Zavegepant HCl 10 MG/ACT  solution, 10 mg into the nostril(s) as directed by provider Daily As Needed (migraine). (1 spray into 1 nostril every 24 hrs prn), Disp: 6 each, Rfl: 5      Review of Systems   Constitutional:  Positive for activity change, appetite change and fatigue. Negative for chills, diaphoresis and fever.   HENT:  Positive for congestion. Negative for ear pain and sore throat.    Respiratory:  Positive for cough, shortness of breath and wheezing. Negative for chest tightness.    Cardiovascular:  Negative for chest pain.   Gastrointestinal:  Negative for abdominal pain, diarrhea, nausea and vomiting.   Genitourinary:  Negative for dysuria.   Musculoskeletal:  Positive for myalgias.   Neurological:  Positive for headache.        There were no vitals filed for this visit.    Objective   Physical Exam  Constitutional:       General: She is not in acute distress.     Appearance: She is ill-appearing. She is not toxic-appearing or diaphoretic.   HENT:      Head: Normocephalic.      Nose: Nose normal.      Mouth/Throat:      Lips: Pink.      Mouth: Mucous membranes are moist.   Pulmonary:      Effort: Pulmonary effort is normal.   Neurological:      Mental Status: She is alert and oriented to person, place, and time.          Procedures     Assessment & Plan   Diagnoses and all orders for this visit:    1. Lower respiratory infection (e.g., bronchitis, pneumonia, pneumonitis, pulmonitis) (Primary)  -     predniSONE (DELTASONE) 10 MG (21) dose pack; Take  by mouth Daily for 6 days.  Dispense: 21 each; Refill: 0  -     albuterol sulfate  (90 Base) MCG/ACT inhaler; Inhale 2 puffs Every 4 (Four) Hours As Needed for Wheezing.  Dispense: 18 g; Refill: 0    2. Acute cough  -     predniSONE (DELTASONE) 10 MG (21) dose pack; Take  by mouth Daily for 6 days.  Dispense: 21 each; Refill: 0  -     guaiFENesin (Mucinex) 600 MG 12 hr tablet; Take 2 tablets by mouth 2 (Two) Times a Day.  Dispense: 20 tablet; Refill: 0    Continue previously  prescribed medicines.  Increase fluids and rest.    If symptoms worsen or do not improve follow up with your PCP or visit your nearest Urgent Care Center or ER.        PLAN: Discussed dosing, side effects, recommended other symptomatic care.  Patient should follow up with primary care provider, Urgent Care or ER if symptoms worsen, fail to resolve or other symptoms need attention. Patient/family agree to the above.         CLAIR Alcantar     The use of a video visit has been reviewed with the patient and verbal informed consent has been obtained. Myself and Lindsay Amezquita participated in this visit. The patient is located at [address] [place] [address]  [address] [place], [place] [id number]. I am located in [place], [place]. Mychart and Zoom were utilized.        This visit was performed via Telehealth.  This patient has been instructed to follow-up with their primary care provider if their symptoms worsen or the treatment provided does not resolve their illness.

## 2024-03-24 NOTE — PATIENT INSTRUCTIONS
Continue previously prescribed medicines.  Increase fluids and rest.    If symptoms worsen or do not improve follow up with your PCP or visit your nearest Urgent Care Center or ER.

## 2024-03-25 ENCOUNTER — HOSPITAL ENCOUNTER (EMERGENCY)
Facility: HOSPITAL | Age: 39
Discharge: HOME | End: 2024-03-26
Attending: STUDENT IN AN ORGANIZED HEALTH CARE EDUCATION/TRAINING PROGRAM | Admitting: STUDENT IN AN ORGANIZED HEALTH CARE EDUCATION/TRAINING PROGRAM
Payer: MEDICAID

## 2024-03-25 VITALS
OXYGEN SATURATION: 95 % | WEIGHT: 251 LBS | TEMPERATURE: 98.7 F | HEIGHT: 62 IN | BODY MASS INDEX: 46.19 KG/M2 | DIASTOLIC BLOOD PRESSURE: 83 MMHG | SYSTOLIC BLOOD PRESSURE: 127 MMHG | HEART RATE: 72 BPM | RESPIRATION RATE: 18 BRPM

## 2024-03-25 DIAGNOSIS — R51.9 ACUTE NONINTRACTABLE HEADACHE, UNSPECIFIED HEADACHE TYPE: Primary | ICD-10-CM

## 2024-03-25 PROCEDURE — 25010000002 DIPHENHYDRAMINE PER 50 MG: Performed by: STUDENT IN AN ORGANIZED HEALTH CARE EDUCATION/TRAINING PROGRAM

## 2024-03-25 PROCEDURE — 96375 TX/PRO/DX INJ NEW DRUG ADDON: CPT

## 2024-03-25 PROCEDURE — 96374 THER/PROPH/DIAG INJ IV PUSH: CPT

## 2024-03-25 PROCEDURE — 25010000002 KETOROLAC TROMETHAMINE PER 15 MG: Performed by: STUDENT IN AN ORGANIZED HEALTH CARE EDUCATION/TRAINING PROGRAM

## 2024-03-25 PROCEDURE — 99283 EMERGENCY DEPT VISIT LOW MDM: CPT

## 2024-03-25 PROCEDURE — 25810000003 SODIUM CHLORIDE 0.9 % SOLUTION: Performed by: STUDENT IN AN ORGANIZED HEALTH CARE EDUCATION/TRAINING PROGRAM

## 2024-03-25 RX ORDER — KETOROLAC TROMETHAMINE 30 MG/ML
30 INJECTION, SOLUTION INTRAMUSCULAR; INTRAVENOUS ONCE
Status: DISCONTINUED | OUTPATIENT
Start: 2024-03-25 | End: 2024-03-25

## 2024-03-25 RX ORDER — KETOROLAC TROMETHAMINE 30 MG/ML
15 INJECTION, SOLUTION INTRAMUSCULAR; INTRAVENOUS ONCE
Status: COMPLETED | OUTPATIENT
Start: 2024-03-25 | End: 2024-03-25

## 2024-03-25 RX ORDER — EMPAGLIFLOZIN 10 MG/1
10 TABLET, FILM COATED ORAL DAILY
Qty: 90 TABLET | Refills: 1 | Status: SHIPPED | OUTPATIENT
Start: 2024-03-25

## 2024-03-25 RX ORDER — SODIUM CHLORIDE 0.9 % (FLUSH) 0.9 %
10 SYRINGE (ML) INJECTION AS NEEDED
Status: DISCONTINUED | OUTPATIENT
Start: 2024-03-25 | End: 2024-03-26 | Stop reason: HOSPADM

## 2024-03-25 RX ORDER — DIPHENHYDRAMINE HYDROCHLORIDE 50 MG/ML
25 INJECTION INTRAMUSCULAR; INTRAVENOUS ONCE
Status: COMPLETED | OUTPATIENT
Start: 2024-03-25 | End: 2024-03-25

## 2024-03-25 RX ADMIN — SODIUM CHLORIDE 500 ML: 9 INJECTION, SOLUTION INTRAVENOUS at 23:47

## 2024-03-25 RX ADMIN — KETOROLAC TROMETHAMINE 15 MG: 30 INJECTION, SOLUTION INTRAMUSCULAR at 23:46

## 2024-03-25 RX ADMIN — DIPHENHYDRAMINE HYDROCHLORIDE 25 MG: 50 INJECTION, SOLUTION INTRAMUSCULAR; INTRAVENOUS at 23:46

## 2024-03-25 NOTE — TELEPHONE ENCOUNTER
Called patient. She has the phone number for the [facility] connection. States she feels like she is on edge but will discuss further with Augusta at her appointment

## 2024-03-26 ENCOUNTER — OFFICE VISIT (OUTPATIENT)
Dept: OBSTETRICS AND GYNECOLOGY | Facility: CLINIC | Age: 39
End: 2024-03-26
Payer: MEDICAID

## 2024-03-26 VITALS
WEIGHT: 247 LBS | BODY MASS INDEX: 45.45 KG/M2 | SYSTOLIC BLOOD PRESSURE: 122 MMHG | DIASTOLIC BLOOD PRESSURE: 84 MMHG | HEIGHT: 62 IN

## 2024-03-26 DIAGNOSIS — Z98.890 S/P ENDOMETRIAL ABLATION: Primary | ICD-10-CM

## 2024-03-26 LAB — GLUCOSE BLDC GLUCOMTR-MCNC: 138 MG/DL (ref 70–130)

## 2024-03-26 PROCEDURE — 82948 REAGENT STRIP/BLOOD GLUCOSE: CPT

## 2024-03-26 NOTE — ED PROVIDER NOTES
EMERGENCY DEPARTMENT ENCOUNTER    Pt Name: Lindsay Amezquita  MRN: [id number]  Pt :   1985  Room Number:    Date of encounter:  3/25/2024  PCP: Provider, No Known  ED Provider: Byron Azevedo MD    Historian: Patient      HPI:  Chief Complaint: Headache        Context: Lindsay Amezquita is a 38 y.o. female who presents to the ED c/o headache.  Patient states she was at home with her family whenever they started smelling marijuana smoke.  States that this gave her a headache.  States that she also feels like she has a dry mouth now.  Took her home migraine medicine with no relief.  No other symptoms reported at this time.      PAST MEDICAL HISTORY  Past Medical History:   Diagnosis Date    Allergic     Anxiety     Asthma Beings of copd with asthma    Back pain     Bell's palsy     Bipolar 2 disorder     Blood clot in vein     Behind left knee cap    COPD (chronic obstructive pulmonary disease) Six months ago    Deep vein thrombosis Last year    Depression     Diabetes mellitus November    Pre diabete    Frequent headaches     GERD (gastroesophageal reflux disease)     Hypertension     Every time i go into the emergacy room    Irritable bowel syndrome Two years ago    Kidney stone Two years ago    Migraine     Obesity All of my life    Ovarian cyst Two years ago    Panic     PTSD (post-traumatic stress disorder)     Recurrent pregnancy loss, antepartum condition or complication Every since i have been 14 yars old    Restless leg syndrome     Sinus problem     Snores     Tattoos     Urinary tract infection Have them on and off    Varicella Little    Visual impairment Since i was little    Vitamin B12 deficiency     Wears glasses          PAST SURGICAL HISTORY  Past Surgical History:   Procedure Laterality Date    CHOLECYSTECTOMY      HYSTEROSCOPY N/A 3/14/2024    Procedure: HYSTEROSCOPY WITH MYOSURE, DILATION AND CURETTAGE WITH CERENE ABLATION;  Surgeon: David Ribeiro MD;  Location: [facility];  Service:  Obstetrics/Gynecology;  Laterality: N/A;    MULTIPLE TOOTH EXTRACTIONS      All my teeth    WISDOM TOOTH EXTRACTION  All my teeth         FAMILY HISTORY  Family History   Problem Relation Age of Onset    Irritable bowel syndrome Mother     Heart disease Mother     Miscarriages / Stillbirths Mother     Arthritis Mother     COPD Mother     Learning disabilities Mother     Mental illness Mother     Irritable bowel syndrome Father     Alcohol abuse Father     Diabetes Father     Hyperlipidemia Father     Anxiety disorder Father     Learning disabilities Father     Colon cancer Maternal Grandfather     Stroke Paternal Grandfather     Stroke Paternal Grandmother          SOCIAL HISTORY  Social History     Socioeconomic History    Marital status: Single   Tobacco Use    Smoking status: Some Days     Current packs/day: 0.50     Average packs/day: 2.0 packs/day for 31.7 years (63.0 ttl pk-yrs)     Types: Cigarettes     Start date: 7/17/1992     Passive exposure: Never    Smokeless tobacco: Never    Tobacco comments:         Vaping Use    Vaping status: Former   Substance and Sexual Activity    Alcohol use: Yes     Comment: rarely    Drug use: Not Currently    Sexual activity: Defer     Partners: Female     Birth control/protection: Other, Same-sex partner         ALLERGIES  Aspirin, Codeine, Cyclobenzaprine, Haldol [haloperidol], Imitrex [sumatriptan], Latex, Penicillins, Morphine, Ondansetron, Oxycodone-acetaminophen, Oxycontin [oxycodone], Tramadol, Tylenol [acetaminophen], Compazine [prochlorperazine], Lamictal [lamotrigine], and Reglan [metoclopramide]        REVIEW OF SYSTEMS  Review of Systems     All systems reviewed and negative except for those discussed in HPI.       PHYSICAL EXAM    I have reviewed the triage vital signs and nursing notes.    ED Triage Vitals [03/25/24 2323]   Temp Heart Rate Resp BP SpO2   98.7 °F (37.1 °C) 72 18 127/83 95 %      Temp src Heart Rate Source Patient Position BP Location FiO2 (%)    Oral Monitor Sitting Left arm --       Physical Exam    General:  Awake, alert, no acute distress  HEENT: Atraumatic, normocephalic, EOMI, PERRLA, mucous membranes moist  NECK:  Supple, atraumatic  Cardiovascular:  Regular rate, regular rhythm, no murmurs, rubs, or gallops.  Extremities well perfused   Respiratory:  Regular rate, clear lungs to auscultation bilaterally.  No rhonchi, rales, wheezing  Abdominal:  Soft, nondistended, nontender.  No guarding or rebound.  No palpable masses  Extremity:  No visible bony abnormalities in all 4 extremities.  Full range of motion of all extremities.  Skin:  Warm and dry.  No rashes  Neuro:  AAOx3, GCS 15. Cranial nerves 2-12 grossly intact.  No focal strength or sensation deficits.  Psych:  Mood and affect appropriate.        LAB RESULTS  Recent Results (from the past 24 hour(s))   POC Glucose Once    Collection Time: 03/26/24 12:44 AM    Specimen: Blood   Result Value Ref Range    Glucose 138 (H) 70 - 130 mg/dL       If labs were ordered, I independently reviewed the results and considered them in treating the patient.        RADIOLOGY  No Radiology Exams Resulted Within Past 24 Hours    I ordered and independently reviewed the above noted radiographic studies.     See radiologist's dictation for official interpretation.        PROCEDURES    Procedures    No orders to display       MEDICATIONS GIVEN IN ER    Medications   sodium chloride 0.9 % flush 10 mL (has no administration in time range)   ketorolac (TORADOL) injection 15 mg (15 mg Intravenous Given 3/25/24 2346)   diphenhydrAMINE (BENADRYL) injection 25 mg (25 mg Intravenous Given 3/25/24 2346)   sodium chloride 0.9 % bolus 500 mL (0 mL Intravenous Stopped 3/26/24 0017)         MEDICAL DECISION MAKING, PROGRESS, and CONSULTS    All labs, if obtained, have been independently reviewed by me.  All radiology studies, if obtained, have been reviewed by me and the radiologist dictating the report.  All EKG's, if obtained,  have been independently viewed and interpreted by me.      Discussion below represents my analysis of pertinent findings related to patient's condition, differential diagnosis, treatment plan and final disposition.    Lindsay Amezquita is a 38 y.o. female who presents to the ED c/o headache.  Hemodynamically stable nontoxic in appearance upon arrival.  Brought in by EMS along with her family member.  Complaining of headache, likely exacerbated by exposure to marijuana smoke secondhand exposure.  Lungs clear to auscultation bilaterally with no significant wheezing.  No tachypnea or increased work of breathing.  Patient given Toradol and diphenhydramine along with 500 cc bolus of IV fluids.    Headache improved while in emergency department.  Advised on return precautions the importance following up with primary care provider.  Patient agreeable with this plan was discharged.                                 Orders placed during this visit:  Orders Placed This Encounter   Procedures    POC Glucose Once    Insert Peripheral IV         ED Course:    Consultants:                  Shared Decision Making:  After my consideration of clinical presentation and any laboratory/radiology studies obtained, I discussed the findings with the patient/patient representative who is in agreement with the treatment plan and the final disposition.   Risks and benefits of discharge and/or observation/admission were discussed.      AS OF 01:02 EDT VITALS:    BP - 127/83  HR - 72  TEMP - 98.7 °F (37.1 °C) (Oral)  O2 SATS - 95%                  DIAGNOSIS  Final diagnoses:   Acute nonintractable headache, unspecified headache type         DISPOSITION  Discharge      Please note that portions of this document were completed with voice recognition software.        Byron Azevedo MD  03/26/24 0102

## 2024-03-27 ENCOUNTER — LAB (OUTPATIENT)
Dept: FAMILY MEDICINE CLINIC | Facility: CLINIC | Age: 39
End: 2024-03-27
Payer: MEDICAID

## 2024-03-27 ENCOUNTER — TELEMEDICINE (OUTPATIENT)
Dept: BEHAVIORAL HEALTH | Facility: CLINIC | Age: 39
End: 2024-03-27
Payer: MEDICAID

## 2024-03-27 ENCOUNTER — OFFICE VISIT (OUTPATIENT)
Dept: FAMILY MEDICINE CLINIC | Facility: CLINIC | Age: 39
End: 2024-03-27
Payer: MEDICAID

## 2024-03-27 ENCOUNTER — TELEPHONE (OUTPATIENT)
Dept: OBSTETRICS AND GYNECOLOGY | Facility: CLINIC | Age: 39
End: 2024-03-27
Payer: MEDICAID

## 2024-03-27 ENCOUNTER — PATIENT ROUNDING (BHMG ONLY) (OUTPATIENT)
Dept: FAMILY MEDICINE CLINIC | Facility: CLINIC | Age: 39
End: 2024-03-27
Payer: MEDICAID

## 2024-03-27 VITALS
DIASTOLIC BLOOD PRESSURE: 90 MMHG | RESPIRATION RATE: 16 BRPM | HEIGHT: 62 IN | BODY MASS INDEX: 46.08 KG/M2 | SYSTOLIC BLOOD PRESSURE: 144 MMHG | WEIGHT: 250.4 LBS | HEART RATE: 84 BPM | OXYGEN SATURATION: 97 % | TEMPERATURE: 98.2 F

## 2024-03-27 DIAGNOSIS — E87.6 HYPOKALEMIA DUE TO EXCESSIVE RENAL LOSS OF POTASSIUM: ICD-10-CM

## 2024-03-27 DIAGNOSIS — F41.1 GENERALIZED ANXIETY DISORDER: ICD-10-CM

## 2024-03-27 DIAGNOSIS — F41.1 GAD (GENERALIZED ANXIETY DISORDER): ICD-10-CM

## 2024-03-27 DIAGNOSIS — K85.90 ACUTE PANCREATITIS WITHOUT INFECTION OR NECROSIS, UNSPECIFIED PANCREATITIS TYPE: ICD-10-CM

## 2024-03-27 DIAGNOSIS — K74.60 HEPATIC CIRRHOSIS, UNSPECIFIED HEPATIC CIRRHOSIS TYPE, UNSPECIFIED WHETHER ASCITES PRESENT: ICD-10-CM

## 2024-03-27 DIAGNOSIS — F43.10 POST TRAUMATIC STRESS DISORDER (PTSD): ICD-10-CM

## 2024-03-27 DIAGNOSIS — R79.89 LOW VITAMIN D LEVEL: ICD-10-CM

## 2024-03-27 DIAGNOSIS — K74.60 HEPATIC CIRRHOSIS, UNSPECIFIED HEPATIC CIRRHOSIS TYPE, UNSPECIFIED WHETHER ASCITES PRESENT: Primary | ICD-10-CM

## 2024-03-27 DIAGNOSIS — F31.30 BIPOLAR I DISORDER, MOST RECENT EPISODE DEPRESSED: Primary | ICD-10-CM

## 2024-03-27 DIAGNOSIS — E11.65 TYPE 2 DIABETES MELLITUS WITH HYPERGLYCEMIA, WITH LONG-TERM CURRENT USE OF INSULIN: ICD-10-CM

## 2024-03-27 DIAGNOSIS — F51.04 PSYCHOPHYSIOLOGICAL INSOMNIA: ICD-10-CM

## 2024-03-27 DIAGNOSIS — Z79.4 TYPE 2 DIABETES MELLITUS WITH HYPERGLYCEMIA, WITH LONG-TERM CURRENT USE OF INSULIN: ICD-10-CM

## 2024-03-27 LAB
25(OH)D3 SERPL-MCNC: 21.8 NG/ML (ref 30–100)
EXPIRATION DATE: ABNORMAL
HBA1C MFR BLD: 6.5 % (ref 4.5–5.7)
HBV SURFACE AB SER RIA-ACNC: REACTIVE
HBV SURFACE AG SERPL QL IA: NORMAL
HCV AB SER DONR QL: NORMAL
LIPASE SERPL-CCNC: 19 U/L (ref 13–60)
Lab: ABNORMAL
POTASSIUM SERPL-SCNC: 3.1 MMOL/L (ref 3.5–5.2)
TSH SERPL DL<=0.05 MIU/L-ACNC: 1.7 UIU/ML (ref 0.27–4.2)
VIT B12 BLD-MCNC: 242 PG/ML (ref 211–946)

## 2024-03-27 PROCEDURE — 86706 HEP B SURFACE ANTIBODY: CPT | Performed by: PHYSICIAN ASSISTANT

## 2024-03-27 PROCEDURE — 86803 HEPATITIS C AB TEST: CPT | Performed by: PHYSICIAN ASSISTANT

## 2024-03-27 PROCEDURE — 82977 ASSAY OF GGT: CPT | Performed by: PHYSICIAN ASSISTANT

## 2024-03-27 PROCEDURE — 1159F MED LIST DOCD IN RCRD: CPT

## 2024-03-27 PROCEDURE — 82306 VITAMIN D 25 HYDROXY: CPT | Performed by: PHYSICIAN ASSISTANT

## 2024-03-27 PROCEDURE — 99214 OFFICE O/P EST MOD 30 MIN: CPT

## 2024-03-27 PROCEDURE — 86708 HEPATITIS A ANTIBODY: CPT | Performed by: PHYSICIAN ASSISTANT

## 2024-03-27 PROCEDURE — 82465 ASSAY BLD/SERUM CHOLESTEROL: CPT | Performed by: PHYSICIAN ASSISTANT

## 2024-03-27 PROCEDURE — 83010 ASSAY OF HAPTOGLOBIN QUANT: CPT | Performed by: PHYSICIAN ASSISTANT

## 2024-03-27 PROCEDURE — 82947 ASSAY GLUCOSE BLOOD QUANT: CPT | Performed by: PHYSICIAN ASSISTANT

## 2024-03-27 PROCEDURE — 84478 ASSAY OF TRIGLYCERIDES: CPT | Performed by: PHYSICIAN ASSISTANT

## 2024-03-27 PROCEDURE — 1160F RVW MEDS BY RX/DR IN RCRD: CPT

## 2024-03-27 PROCEDURE — 84132 ASSAY OF SERUM POTASSIUM: CPT | Performed by: PHYSICIAN ASSISTANT

## 2024-03-27 PROCEDURE — 84443 ASSAY THYROID STIM HORMONE: CPT | Performed by: PHYSICIAN ASSISTANT

## 2024-03-27 PROCEDURE — 82607 VITAMIN B-12: CPT | Performed by: PHYSICIAN ASSISTANT

## 2024-03-27 PROCEDURE — 82247 BILIRUBIN TOTAL: CPT | Performed by: PHYSICIAN ASSISTANT

## 2024-03-27 PROCEDURE — 84460 ALANINE AMINO (ALT) (SGPT): CPT | Performed by: PHYSICIAN ASSISTANT

## 2024-03-27 PROCEDURE — 86704 HEP B CORE ANTIBODY TOTAL: CPT | Performed by: PHYSICIAN ASSISTANT

## 2024-03-27 PROCEDURE — 84450 TRANSFERASE (AST) (SGOT): CPT | Performed by: PHYSICIAN ASSISTANT

## 2024-03-27 PROCEDURE — 82172 ASSAY OF APOLIPOPROTEIN: CPT | Performed by: PHYSICIAN ASSISTANT

## 2024-03-27 PROCEDURE — 86709 HEPATITIS A IGM ANTIBODY: CPT | Performed by: PHYSICIAN ASSISTANT

## 2024-03-27 PROCEDURE — 83883 ASSAY NEPHELOMETRY NOT SPEC: CPT | Performed by: PHYSICIAN ASSISTANT

## 2024-03-27 PROCEDURE — 83690 ASSAY OF LIPASE: CPT | Performed by: PHYSICIAN ASSISTANT

## 2024-03-27 PROCEDURE — 87340 HEPATITIS B SURFACE AG IA: CPT | Performed by: PHYSICIAN ASSISTANT

## 2024-03-27 RX ORDER — DESVENLAFAXINE 100 MG/1
100 TABLET, EXTENDED RELEASE ORAL DAILY
Qty: 30 TABLET | Refills: 1 | Status: SHIPPED | OUTPATIENT
Start: 2024-03-27

## 2024-03-27 RX ORDER — ALPRAZOLAM 1 MG/1
1 TABLET ORAL 3 TIMES DAILY PRN
Qty: 90 TABLET | Refills: 1 | Status: SHIPPED | OUTPATIENT
Start: 2024-03-27 | End: 2025-03-27

## 2024-03-27 RX ORDER — OXCARBAZEPINE 300 MG/1
300 TABLET, FILM COATED ORAL 2 TIMES DAILY
Qty: 60 TABLET | Refills: 1 | Status: SHIPPED | OUTPATIENT
Start: 2024-03-27

## 2024-03-27 RX ORDER — HYDROXYZINE 50 MG/1
75 TABLET, FILM COATED ORAL 3 TIMES DAILY PRN
Qty: 135 TABLET | Refills: 1 | Status: SHIPPED | OUTPATIENT
Start: 2024-03-27

## 2024-03-27 RX ORDER — IBUPROFEN 800 MG/1
800 TABLET ORAL EVERY 8 HOURS PRN
Qty: 30 TABLET | Refills: 0 | Status: SHIPPED | OUTPATIENT
Start: 2024-03-27 | End: 2024-04-01

## 2024-03-27 RX ORDER — LURASIDONE HYDROCHLORIDE 120 MG/1
120 TABLET, FILM COATED ORAL DAILY
Qty: 30 TABLET | Refills: 1 | OUTPATIENT
Start: 2024-03-27

## 2024-03-27 RX ORDER — AMITRIPTYLINE HYDROCHLORIDE 150 MG/1
150 TABLET ORAL NIGHTLY
Qty: 30 TABLET | Refills: 1 | Status: SHIPPED | OUTPATIENT
Start: 2024-03-27

## 2024-03-27 RX ORDER — LURASIDONE HYDROCHLORIDE 120 MG/1
120 TABLET, FILM COATED ORAL DAILY
Qty: 30 TABLET | Refills: 1 | Status: SHIPPED | OUTPATIENT
Start: 2024-03-27

## 2024-03-27 NOTE — TELEPHONE ENCOUNTER
Patient called to let you know that she would like a hysterectomy.  She also needs refills on Ibuprofen.    Thanks

## 2024-03-27 NOTE — PROGRESS NOTES
This provider is located at [facility], Behavioral Health, [address] [place] [address], [facility],using a secure MyChart Video Visit through [employer]. Patient is being seen remotely via telehealth at their home address in [place], and stated they are in a secure environment for this session. The patient's condition being diagnosed/treated is appropriate for telemedicine. The provider identified herself as well as her credentials.   The patient, and/or patients guardian, consent to be seen remotely, and when consent is given they understand that the consent allows for patient identifiable information to be sent to a third party as needed.   They may refuse to be seen remotely at any time. The electronic data is encrypted and password protected, and the patient and/or guardian has been advised of the potential risks to privacy not withstanding such measures.    Video Visit    Patient Name: Lindsay Amezquita  : 1985   MRN: [id number]   Care Team: Patient Care Team:  Stephanie Schroeder PA-C as PCP - General (Physician Assistant)  Donna Mcqueen APRN as Nurse Practitioner (Behavioral Health)         Chief Complaint:    Chief Complaint   Patient presents with    Bipolar    Anxiety    PTSD    Sleeping Problem    Med Management       History of Present Illness: Lindsay Amezquita is a 38 y.o. female who presents via video visit for a medication management follow up. Patient reports that things have finally started to level out. She has had a lot of physical health issues she has been dealing with, along with some situational stressors. She states she has been taking her medication and been slowly feeling better. She reports that today she feels that her anxiety and mood have been managed well. She did not see the need to make any changes and did not have any medication concerns at today's visit. She denies SI/HI today.     The following portion of the patient's history were reviewed and updated  appropriately: allergies, current and past medications, family history, medical history and social history. MITCH reviewed and appropriate.   Subjective   Review of Systems:    Review of Systems   Respiratory: Negative.     Cardiovascular: Negative.  Negative for chest pain and palpitations.   Neurological: Negative.    Psychiatric/Behavioral:  Positive for stress. The patient is nervous/anxious.         Mood swings   All other systems reviewed and are negative.      Current Medications:   Current Outpatient Medications   Medication Sig Dispense Refill    ALPRAZolam (Xanax) 1 MG tablet Take 1 tablet by mouth 3 (Three) Times a Day As Needed for Sleep. 90 tablet 1    desvenlafaxine (PRISTIQ) 100 MG 24 hr tablet Take 1 tablet by mouth Daily. 30 tablet 1    hydrOXYzine (ATARAX) 50 MG tablet Take 1.5 tablets by mouth 3 (Three) Times a Day As Needed (anxiety and/or sleep). 135 tablet 1    Lurasidone HCl (Latuda) 120 MG tablet tablet Take 1 tablet by mouth Daily. 30 tablet 1    OXcarbazepine (TRILEPTAL) 300 MG tablet Take 1 tablet by mouth 2 (Two) Times a Day. 60 tablet 1    albuterol sulfate  (90 Base) MCG/ACT inhaler Inhale 2 puffs Every 4 (Four) Hours As Needed for Wheezing. 18 g 0    amitriptyline (ELAVIL) 150 MG tablet Take 1 tablet by mouth Every Night. 30 tablet 1    atorvastatin (LIPITOR) 40 MG tablet Take 1 tablet by mouth every night at bedtime. 90 tablet 3    butalbital-acetaminophen-caffeine (Esgic) -40 MG per tablet Take 1 tablet by mouth 2 (Two) Times a Day As Needed for Headache. 20 tablet 2    empagliflozin (Jardiance) 10 MG tablet tablet TAKE 1 TABLET BY MOUTH EVERY DAY 90 tablet 1    glucose blood test strip Use as instructed 50 each 12    glucose monitor monitoring kit Use 1 each As Needed (monitor glucose TID). 1 each 0    guaiFENesin (Mucinex) 600 MG 12 hr tablet Take 2 tablets by mouth 2 (Two) Times a Day. 20 tablet 0    ibuprofen (ADVIL,MOTRIN) 800 MG tablet Take 1 tablet by mouth Every  8 (Eight) Hours As Needed for Mild Pain 30 tablet 0    Lancets misc Check fasting glucose daily and 1 hour after largest meal of day. 100 each 3    levocetirizine (XYZAL) 5 MG tablet Take 1 tablet by mouth Every Evening.      norethindrone (Aygestin) 5 MG tablet Take 2 tablets by mouth Daily. 60 tablet 5    predniSONE (DELTASONE) 10 MG (21) dose pack Take  by mouth Daily for 6 days. 21 each 0    promethazine (PHENERGAN) 25 MG tablet Take 1 tablet by mouth Every 6 (Six) Hours As Needed for Nausea or Vomiting. 45 tablet 3    promethazine-dextromethorphan (PROMETHAZINE-DM) 6.25-15 MG/5ML syrup Take 5 mL by mouth 4 (Four) Times a Day As Needed for Cough. 118 mL 0    Rimegepant Sulfate (Nurtec) 75 MG tablet dispersible tablet Take 1 tablet by mouth Every Other Day. Take Monday,Wednesday,Friday, and Saturday. 16 tablet 6    tiZANidine (ZANAFLEX) 4 MG tablet Take 1 tablet by mouth Every 8 (Eight) Hours As Needed for Muscle Spasms. (Patient taking differently: Take 1 tablet by mouth Every 8 (Eight) Hours As Needed.) 90 tablet 3    Umeclidinium-Vilanterol (ANORO ELLIPTA IN) Inhale.      Zavegepant HCl 10 MG/ACT solution 10 mg into the nostril(s) as directed by provider Daily As Needed (migraine). (1 spray into 1 nostril every 24 hrs prn) 6 each 5     No current facility-administered medications for this visit.       Mental Status Exam:   Hygiene:    unable to assess   Cooperation:  Cooperative  Eye Contact:  Good  Psychomotor Behavior:   appears to be WNL   Affect:  Appropriate  Mood: normal, anxious, and stressed  Speech:  Normal  Thought Process:  Goal directed and Linear  Thought Content:  Normal and Mood congruent  Suicidal:  None  Homicidal:  None  Hallucinations:  None  Delusion:  None  Memory:  Intact  Orientation:  Person, Place, Time, and Situation  Reliability:  good  Insight:  Good  Judgement:  Good  Impulse Control:  Good  Physical/Medical Issues:  Yes see chart       Objective   Vital Signs:   There were no  vitals taken for this visit.      On 3/27/2024 BP: 144/90 P: 84    Assessment / Plan    Diagnoses and all orders for this visit:    1. Bipolar I disorder, most recent episode depressed (Primary)  -     Lurasidone HCl (Latuda) 120 MG tablet tablet; Take 1 tablet by mouth Daily.  Dispense: 30 tablet; Refill: 1  -     OXcarbazepine (TRILEPTAL) 300 MG tablet; Take 1 tablet by mouth 2 (Two) Times a Day.  Dispense: 60 tablet; Refill: 1    2. Psychophysiological insomnia  -     amitriptyline (ELAVIL) 150 MG tablet; Take 1 tablet by mouth Every Night.  Dispense: 30 tablet; Refill: 1  -     ALPRAZolam (Xanax) 1 MG tablet; Take 1 tablet by mouth 3 (Three) Times a Day As Needed for Sleep.  Dispense: 90 tablet; Refill: 1    3. Post traumatic stress disorder (PTSD)  -     desvenlafaxine (PRISTIQ) 100 MG 24 hr tablet; Take 1 tablet by mouth Daily.  Dispense: 30 tablet; Refill: 1  -     ALPRAZolam (Xanax) 1 MG tablet; Take 1 tablet by mouth 3 (Three) Times a Day As Needed for Sleep.  Dispense: 90 tablet; Refill: 1  -     hydrOXYzine (ATARAX) 50 MG tablet; Take 1.5 tablets by mouth 3 (Three) Times a Day As Needed (anxiety and/or sleep).  Dispense: 135 tablet; Refill: 1    4. Generalized anxiety disorder  -     desvenlafaxine (PRISTIQ) 100 MG 24 hr tablet; Take 1 tablet by mouth Daily.  Dispense: 30 tablet; Refill: 1  -     ALPRAZolam (Xanax) 1 MG tablet; Take 1 tablet by mouth 3 (Three) Times a Day As Needed for Sleep.  Dispense: 90 tablet; Refill: 1  -     hydrOXYzine (ATARAX) 50 MG tablet; Take 1.5 tablets by mouth 3 (Three) Times a Day As Needed (anxiety and/or sleep).  Dispense: 135 tablet; Refill: 1    Overall, patient appears to be doing well on current medication. No issues reported and no indication for change. Will continue medication as ordered.     As part of patient's treatment plan I am prescribing a controlled substance.  The patient has been made aware of the appropriate use of such medications, including potential  risk of somnolence, limited ability to drive and/or work safely, and potential for dependence and/or overdose.  It has also been made clear that these medications are for use by this patient only, without concomitant use of alcohol or other substances, unless prescribed.    Patient has completed a prescribing agreement detailing terms of continued prescribing of controlled substances, including monitoring MITCH reports, urine drug screening, and pill counts if necessary.  Patient is aware that inappropriate use will result in cessation of prescribing such medications      A psychological evaluation was conducted in order to assess past and current level of functioning. Areas assessed included, but were not limited to: perception of social support, perception of ability to face and deal with challenges in life (positive functioning), anxiety symptoms, depressive symptoms, perspective on beliefs/belief system, coping skills for stress, intelligence level,  Therapeutic rapport was established. Interventions conducted today were geared towards incorporating medication management along with support for continued therapy. Education was also provided as to the med management with this provider and what to expect in subsequent sessions.      We discussed risks, benefits, goals and side effects of the above medication and the patient was agreeable with the plan. Patient was educated on the importance of compliance with treatment and follow-up appointments. Patient is aware to contact the [facility] with any worsening of symptoms. To call for questions or concerns and return early if necessary. Patent is agreeable to go to the Emergency Department or call 911 should they begin SI/HI.        MEDS ORDERED DURING VISIT:  New Medications Ordered This Visit   Medications    amitriptyline (ELAVIL) 150 MG tablet     Sig: Take 1 tablet by mouth Every Night.     Dispense:  30 tablet     Refill:  1    desvenlafaxine (PRISTIQ) 100  MG 24 hr tablet     Sig: Take 1 tablet by mouth Daily.     Dispense:  30 tablet     Refill:  1    ALPRAZolam (Xanax) 1 MG tablet     Sig: Take 1 tablet by mouth 3 (Three) Times a Day As Needed for Sleep.     Dispense:  90 tablet     Refill:  1    hydrOXYzine (ATARAX) 50 MG tablet     Sig: Take 1.5 tablets by mouth 3 (Three) Times a Day As Needed (anxiety and/or sleep).     Dispense:  135 tablet     Refill:  1    Lurasidone HCl (Latuda) 120 MG tablet tablet     Sig: Take 1 tablet by mouth Daily.     Dispense:  30 tablet     Refill:  1    OXcarbazepine (TRILEPTAL) 300 MG tablet     Sig: Take 1 tablet by mouth 2 (Two) Times a Day.     Dispense:  60 tablet     Refill:  1         Follow Up   Return in about 8 weeks (around 5/22/2024).  Patient was given instructions and counseling regarding her condition or for health maintenance advice. Please see specific information pulled into the AVS if appropriate.     TREATMENT PLAN/GOALS: Continue supportive psychotherapy efforts and medications as indicated. Treatment and medication options discussed during today's visit. Patient acknowledged and verbally consented to continue with current treatment plan and was educated on the importance of compliance with treatment and follow-up appointments.    MEDICATION ISSUES:  Discussed medication options and treatment plan of prescribed medication as well as the risks, benefits, and side effects including potential falls, possible impaired driving and metabolic adversities among others. Patient is agreeable to call the office with any worsening of symptoms or onset of side effects. Patient is agreeable to call 911 or go to the nearest ER should he/she begin having SI/HI.        [facility]  [address] [place] [address] [address] NANCY SORIA [address]  [phone]    March 27, 2024 14:44 EDT

## 2024-03-27 NOTE — PROGRESS NOTES
"     New Patient Office Visit      Date: 2024  Patient Name: Lindsay Amezquita  : 1985   MRN: [id number]     Chief Complaint:    Chief Complaint   Patient presents with    Establish Care     Transfer care due to discharge from previous PCP.  Referral to pain clinic called [facility] [phone] in [place].    Diabetes       History of Present Illness: Lindsay Amezquita is a 38 y.o. female who is here today for  a new patient appointment to establish care.  She has been a repetitive Emergency department and specialty clinic visitor with varied complaints and has had a parting of the ways with her previous provider.  Her main concerns today are frequent headaches and high blood pressure.  She admits that she has a \"learning disability\" , did not finish high school and frequently panics when she has a problem and this leads to frequent visits.  She recently presented to the ER for URI symptoms and headache, then under went multiple telehealth visits as well.  She has a mental health consult by telehealth later today.  Management of her psychiatric medications has never been satisfactory.  She has been wheezing and is currently taking steroids as well as doxycycline that makes her vomit.  Her cough is less productive and she would like to discontinue her medicine.    Subjective      Review of Systems:   Review of Systems   Constitutional: Negative.    HENT:  Positive for congestion, postnasal drip and sore throat.    Respiratory:  Positive for cough, shortness of breath and wheezing.    Cardiovascular: Negative.    Gastrointestinal: Negative.    Psychiatric/Behavioral:  Positive for agitation, sleep disturbance and stress.      Past Medical History:   Past Medical History:   Diagnosis Date    Allergic     Anxiety     Asthma Beings of copd with asthma    Back pain     Bell's palsy     Bipolar 2 disorder     Blood clot in vein     Behind left knee cap    COPD (chronic obstructive pulmonary disease) Six " months ago    Deep vein thrombosis Last year    Depression     Diabetes mellitus November    Pre diabete    Frequent headaches     GERD (gastroesophageal reflux disease)     Hypertension     Every time i go into the emergacy room    Irritable bowel syndrome Two years ago    Kidney stone Two years ago    Migraine     Obesity All of my life    Ovarian cyst Two years ago    Panic     PTSD (post-traumatic stress disorder)     Pulmonary embolism Not in lungs    Recurrent pregnancy loss, antepartum condition or complication Every since i have been 14 yars old    Restless leg syndrome     Sinus problem     Snores     Tattoos     Urinary tract infection Have them on and off    Varicella Little    Visual impairment Since i was little    Vitamin B12 deficiency     Wears glasses        Past Surgical History:   Past Surgical History:   Procedure Laterality Date    CHOLECYSTECTOMY      HYSTEROSCOPY N/A 3/14/2024    Procedure: HYSTEROSCOPY WITH MYOSURE, DILATION AND CURETTAGE WITH CERENE ABLATION;  Surgeon: David Ribeiro MD;  Location: [facility];  Service: Obstetrics/Gynecology;  Laterality: N/A;    MULTIPLE TOOTH EXTRACTIONS      All my teeth    WISDOM TOOTH EXTRACTION  All my teeth       Family History:   Family History   Problem Relation Age of Onset    Irritable bowel syndrome Mother     Heart disease Mother     Miscarriages / Stillbirths Mother     Arthritis Mother     COPD Mother     Learning disabilities Mother     Mental illness Mother     Asthma Mother     Irritable bowel syndrome Father     Alcohol abuse Father     Diabetes Father     Hyperlipidemia Father     Anxiety disorder Father     Learning disabilities Father     Colon cancer Maternal Grandfather     Stroke Paternal Grandfather     Stroke Paternal Grandmother        Social History:   Social History     Socioeconomic History    Marital status: Single   Tobacco Use    Smoking status: Every Day     Current packs/day: 0.50     Average packs/day: 2.0 packs/day  for 31.8 years (63.2 ttl pk-yrs)     Types: Cigarettes     Start date: 7/17/1992     Passive exposure: Current    Smokeless tobacco: Never    Tobacco comments:         Vaping Use    Vaping status: Former    Passive vaping exposure: Yes   Substance and Sexual Activity    Alcohol use: Not Currently     Comment: rarely    Drug use: Not Currently    Sexual activity: Not Currently     Partners: Female     Birth control/protection: Other, Same-sex partner       Medications:     Current Outpatient Medications:     albuterol sulfate  (90 Base) MCG/ACT inhaler, Inhale 2 puffs Every 4 (Four) Hours As Needed for Wheezing., Disp: 18 g, Rfl: 0    ALPRAZolam (Xanax) 1 MG tablet, Take 1 tablet by mouth 3 (Three) Times a Day As Needed for Sleep. (Patient taking differently: Take 1 tablet by mouth 3 times a day.), Disp: 90 tablet, Rfl: 1    amitriptyline (ELAVIL) 100 MG tablet, Take 1 tablet by mouth At Night As Needed for Sleep., Disp: 30 tablet, Rfl: 1    atorvastatin (LIPITOR) 40 MG tablet, Take 1 tablet by mouth every night at bedtime., Disp: 90 tablet, Rfl: 3    butalbital-acetaminophen-caffeine (Esgic) -40 MG per tablet, Take 1 tablet by mouth 2 (Two) Times a Day As Needed for Headache., Disp: 20 tablet, Rfl: 2    desvenlafaxine (PRISTIQ) 100 MG 24 hr tablet, Take 1 tablet by mouth Daily., Disp: , Rfl:     empagliflozin (Jardiance) 10 MG tablet tablet, TAKE 1 TABLET BY MOUTH EVERY DAY, Disp: 90 tablet, Rfl: 1    glucose blood test strip, Use as instructed, Disp: 50 each, Rfl: 12    glucose monitor monitoring kit, Use 1 each As Needed (monitor glucose TID)., Disp: 1 each, Rfl: 0    guaiFENesin (Mucinex) 600 MG 12 hr tablet, Take 2 tablets by mouth 2 (Two) Times a Day., Disp: 20 tablet, Rfl: 0    hydrOXYzine (ATARAX) 50 MG tablet, Take 1.5 tablets by mouth 3 (Three) Times a Day As Needed (anxiety and/or sleep)., Disp: 135 tablet, Rfl: 1    ibuprofen (ADVIL,MOTRIN) 800 MG tablet, Take 1 tablet by mouth Every 8  "(Eight) Hours As Needed for Mild Pain, Disp: 30 tablet, Rfl: 0    Lancets misc, Check fasting glucose daily and 1 hour after largest meal of day., Disp: 100 each, Rfl: 3    levocetirizine (XYZAL) 5 MG tablet, Take 1 tablet by mouth Every Evening., Disp: , Rfl:     Lurasidone HCl (Latuda) 120 MG tablet tablet, Take 1 tablet by mouth Daily., Disp: 30 tablet, Rfl: 1    norethindrone (Aygestin) 5 MG tablet, Take 2 tablets by mouth Daily., Disp: 60 tablet, Rfl: 5    OXcarbazepine (TRILEPTAL) 300 MG tablet, Take 1 tablet by mouth Every 12 (Twelve) Hours., Disp: , Rfl:     predniSONE (DELTASONE) 10 MG (21) dose pack, Take  by mouth Daily for 6 days., Disp: 21 each, Rfl: 0    promethazine (PHENERGAN) 25 MG tablet, Take 1 tablet by mouth Every 6 (Six) Hours As Needed for Nausea or Vomiting., Disp: 45 tablet, Rfl: 3    promethazine-dextromethorphan (PROMETHAZINE-DM) 6.25-15 MG/5ML syrup, Take 5 mL by mouth 4 (Four) Times a Day As Needed for Cough., Disp: 118 mL, Rfl: 0    Rimegepant Sulfate (Nurtec) 75 MG tablet dispersible tablet, Take 1 tablet by mouth Every Other Day. Take Monday,Wednesday,Friday, and Saturday., Disp: 16 tablet, Rfl: 6    tiZANidine (ZANAFLEX) 4 MG tablet, Take 1 tablet by mouth Every 8 (Eight) Hours As Needed for Muscle Spasms. (Patient taking differently: Take 1 tablet by mouth Every 8 (Eight) Hours As Needed.), Disp: 90 tablet, Rfl: 3    Umeclidinium-Vilanterol (ANORO ELLIPTA IN), Inhale., Disp: , Rfl:     Zavegepant HCl 10 MG/ACT solution, 10 mg into the nostril(s) as directed by provider Daily As Needed (migraine). (1 spray into 1 nostril every 24 hrs prn), Disp: 6 each, Rfl: 5    Allergies:   Allergies   Allergen Reactions    Aspirin Anaphylaxis and Hives     Wheezing         Codeine Anaphylaxis    Cyclobenzaprine Other (See Comments)     \"gives me chest pain\"  Other reaction(s): Other (See Comments)  \"gives me chest pain\"    Haldol [Haloperidol] Rash    Imitrex [Sumatriptan] Anaphylaxis and Rash    " "Latex Hives and Rash    Penicillins Hives and Anaphylaxis     Vomiting    Compazine [Prochlorperazine] Nausea And Vomiting    Lamictal [Lamotrigine] Itching     Itching and hives    Morphine Itching    Ondansetron GI Intolerance     Other reaction(s): GI Intolerance    Oxycodone-Acetaminophen GI Intolerance     Other reaction(s): GI Intolerance    Oxycontin [Oxycodone] GI Intolerance    Reglan [Metoclopramide] Hives and Headache    Tramadol Nausea And Vomiting    Tylenol [Acetaminophen] Nausea And Vomiting       Objective     Physical Exam:  Vital Signs:   Vitals:    03/27/24 1056 03/27/24 1058   BP: 150/90 144/90   BP Location: Right arm Left arm   Patient Position: Sitting Sitting   Cuff Size: Adult Large Adult   Pulse: 84    Resp: 16    Temp: 98.2 °F (36.8 °C)    TempSrc: Temporal    SpO2: 97%    Weight: 114 kg (250 lb 6.4 oz)    Height: 157.5 cm (62\")      Body mass index is 45.8 kg/m².   Facility age limit for growth %gale is 20 years.          Physical Exam  Vitals and nursing note reviewed.   Constitutional:       General: She is not in acute distress.     Appearance: Normal appearance. She is not ill-appearing or toxic-appearing.   HENT:      Head: Normocephalic and atraumatic.      Ears:      Comments: Patient has low set ears, shortenedneck, arms and is of short stature suggestive of genetic aberrance       Mouth/Throat:      Mouth: Mucous membranes are moist.   Eyes:      Extraocular Movements: Extraocular movements intact.      Pupils: Pupils are equal, round, and reactive to light.   Cardiovascular:      Rate and Rhythm: Normal rate and regular rhythm.      Heart sounds: No murmur heard.     No friction rub. No gallop.   Pulmonary:      Effort: Pulmonary effort is normal. No respiratory distress.      Breath sounds: Wheezing (diffuse inspiratory and expiratory wheeze) present. No rales.   Neurological:      General: No focal deficit present.      Mental Status: She is alert and oriented to person, " place, and time.      Cranial Nerves: No cranial nerve deficit.      Motor: No weakness.      Gait: Gait normal.   Psychiatric:         Mood and Affect: Mood normal.         Behavior: Behavior normal.         Thought Content: Thought content normal.         Judgment: Judgment normal.       Assessment / Plan      Assessment/Plan:   1. Hepatic cirrhosis, unspecified hepatic cirrhosis type, unspecified whether ascites present  Noted on recent Ct of abdomen and pelvis in someone with longstanding obesity and diabetes  - SKINNER Fibrosure; Future  - Hepatitis B surface antigen; Future  - Hepatitis B surface antibody; Future  - Hepatitis B core antibody, total; Future  - Hepatitis A antibody, total; Future  - Hepatitis C antibody; Future    2. Type 2 diabetes mellitus with hyperglycemia, with long-term current use of insulin  BP is not at goal but this may be associated with current prednisone taper  Patient is currently on jardiance and A1C is at goal but this is with considerable polydipsia and polyuria and low potassium  - POC Glycosylated Hemoglobin (Hb A1C)    3. KENY (generalized anxiety disorder)  The patient appears to be syndromic and use of genesight may be beneficial for metobolic concerns of psychiatric medications  - GeneSight - Swab,; Future  - TSH Rfx On Abnormal To Free T4; Future  - GeneSight - Swab, Oral Cavity    4. Acute pancreatitis without infection or necrosis, unspecified pancreatitis type  Recent lab and ct evidence of pancreatitis, will confirm resolution  - Vitamin B12; Future  - Lipase; Future    5. Hypokalemia due to excessive renal loss of potassium  Most likely secondary to poorly controlled hyperglycemia  - Potassium; Future    6. Low vitamin D level  Poor dietary input is certain, treatment may improve anxiety  - Vitamin D,25-Hydroxy; Future      Follow Up:   No follow-ups on file.    At [facility], we believe that sharing information builds trust and better relationships. You are  receiving this note because you recently visited [facility]. It is possible you will see health information before a provider has talked with you about it. This kind of information can be easy to misunderstand. To help you fully understand what it means for your health, we urge you to discuss this note with your provider.    Stephanie Schroeder PA-C   [facility]

## 2024-03-27 NOTE — PROGRESS NOTES
A [facility] message has been sent to the patient for PATIENT  ROUNDING with [facility]

## 2024-03-28 RX ORDER — POTASSIUM CHLORIDE 750 MG/1
10 TABLET, EXTENDED RELEASE ORAL 2 TIMES DAILY
Qty: 180 TABLET | Refills: 0 | Status: SHIPPED | OUTPATIENT
Start: 2024-03-28

## 2024-03-29 ENCOUNTER — TELEPHONE (OUTPATIENT)
Dept: FAMILY MEDICINE CLINIC | Facility: CLINIC | Age: 39
End: 2024-03-29

## 2024-03-29 LAB
HAV AB SER QL IA: POSITIVE
HAV IGM SERPL QL IA: NORMAL
HBV CORE AB SERPL QL IA: NEGATIVE

## 2024-03-29 NOTE — TELEPHONE ENCOUNTER
Caller: Lindsay Amezquita    Relationship: Self    Best call back number: [phone]     Caller requesting test results: LABS     What test was performed: LABS     When was the test performed: 3.27    Where was the test performed: OFFICE     Additional notes: WANTS TO GO OVER RESULTS AND ABNORMAL FINDINGS SHE SEES IN MYCHART

## 2024-03-30 ENCOUNTER — HOSPITAL ENCOUNTER (EMERGENCY)
Facility: HOSPITAL | Age: 39
Discharge: HOME | End: 2024-03-30
Attending: STUDENT IN AN ORGANIZED HEALTH CARE EDUCATION/TRAINING PROGRAM
Payer: MEDICAID

## 2024-03-30 ENCOUNTER — APPOINTMENT (OUTPATIENT)
Dept: CT IMAGING | Facility: HOSPITAL | Age: 39
End: 2024-03-30
Payer: MEDICAID

## 2024-03-30 VITALS
HEIGHT: 62 IN | BODY MASS INDEX: 44.35 KG/M2 | TEMPERATURE: 98 F | WEIGHT: 241 LBS | DIASTOLIC BLOOD PRESSURE: 71 MMHG | RESPIRATION RATE: 16 BRPM | HEART RATE: 80 BPM | OXYGEN SATURATION: 92 % | SYSTOLIC BLOOD PRESSURE: 103 MMHG

## 2024-03-30 DIAGNOSIS — K59.00 CONSTIPATION, UNSPECIFIED CONSTIPATION TYPE: Primary | ICD-10-CM

## 2024-03-30 DIAGNOSIS — R10.30 LOWER ABDOMINAL PAIN: ICD-10-CM

## 2024-03-30 LAB
A2 MACROGLOB SERPL-MCNC: 208 MG/DL (ref 110–276)
ALBUMIN SERPL-MCNC: 4.1 G/DL (ref 3.5–5.2)
ALBUMIN/GLOB SERPL: 2.1 G/DL
ALP SERPL-CCNC: 67 U/L (ref 39–117)
ALT SERPL W P-5'-P-CCNC: 13 U/L (ref 1–33)
ALT SERPL W P-5'-P-CCNC: 24 IU/L (ref 0–40)
ANION GAP SERPL CALCULATED.3IONS-SCNC: 14.7 MMOL/L (ref 5–15)
APO A-I SERPL-MCNC: 120 MG/DL (ref 116–209)
AST SERPL W P-5'-P-CCNC: 16 IU/L (ref 0–40)
AST SERPL-CCNC: 11 U/L (ref 1–32)
BASOPHILS # BLD AUTO: 0.06 10*3/MM3 (ref 0–0.2)
BASOPHILS NFR BLD AUTO: 0.5 % (ref 0–1.5)
BILIRUB SERPL-MCNC: 0.3 MG/DL (ref 0–1.2)
BILIRUB SERPL-MCNC: 0.4 MG/DL (ref 0–1.2)
BILIRUB UR QL STRIP: NEGATIVE
BUN SERPL-MCNC: 9 MG/DL (ref 6–20)
BUN/CREAT SERPL: 15 (ref 7–25)
CALCIUM SPEC-SCNC: 8.6 MG/DL (ref 8.6–10.5)
CHLORIDE SERPL-SCNC: 96 MMOL/L (ref 98–107)
CHOLEST SERPL-MCNC: 115 MG/DL (ref 100–199)
CLARITY UR: ABNORMAL
CO2 SERPL-SCNC: 24.3 MMOL/L (ref 22–29)
COLOR UR: YELLOW
CREAT SERPL-MCNC: 0.6 MG/DL (ref 0.57–1)
DEPRECATED RDW RBC AUTO: 47.6 FL (ref 37–54)
EGFRCR SERPLBLD CKD-EPI 2021: 117.3 ML/MIN/1.73
EOSINOPHIL # BLD AUTO: 0.1 10*3/MM3 (ref 0–0.4)
EOSINOPHIL NFR BLD AUTO: 0.8 % (ref 0.3–6.2)
ERYTHROCYTE [DISTWIDTH] IN BLOOD BY AUTOMATED COUNT: 15.4 % (ref 12.3–15.4)
FIBROSIS SCORING:: ABNORMAL
FIBROSIS STAGE SERPL QL: ABNORMAL
GGT SERPL-CCNC: 102 IU/L (ref 0–60)
GLOBULIN UR ELPH-MCNC: 2 GM/DL
GLUCOSE SERPL-MCNC: 109 MG/DL (ref 65–99)
GLUCOSE SERPL-MCNC: 92 MG/DL (ref 70–99)
GLUCOSE UR STRIP-MCNC: ABNORMAL MG/DL
HAPTOGLOB SERPL-MCNC: 320 MG/DL (ref 33–278)
HCT VFR BLD AUTO: 37.2 % (ref 34–46.6)
HGB BLD-MCNC: 12.8 G/DL (ref 12–15.9)
HGB UR QL STRIP.AUTO: NEGATIVE
IMM GRANULOCYTES # BLD AUTO: 0.04 10*3/MM3 (ref 0–0.05)
IMM GRANULOCYTES NFR BLD AUTO: 0.3 % (ref 0–0.5)
KETONES UR QL STRIP: NEGATIVE
LABORATORY COMMENT REPORT: ABNORMAL
LEUKOCYTE ESTERASE UR QL STRIP.AUTO: NEGATIVE
LIVER FIBR SCORE SERPL CALC.FIBROSURE: 0.17 (ref 0–0.21)
LIVER STEATOSIS GRADE SERPL QL: ABNORMAL
LIVER STEATOSIS SCORE SERPL: 0.6 (ref 0–0.4)
LYMPHOCYTES # BLD AUTO: 6.49 10*3/MM3 (ref 0.7–3.1)
LYMPHOCYTES NFR BLD AUTO: 49.6 % (ref 19.6–45.3)
MCH RBC QN AUTO: 29.2 PG (ref 26.6–33)
MCHC RBC AUTO-ENTMCNC: 34.4 G/DL (ref 31.5–35.7)
MCV RBC AUTO: 84.9 FL (ref 79–97)
MONOCYTES # BLD AUTO: 0.67 10*3/MM3 (ref 0.1–0.9)
MONOCYTES NFR BLD AUTO: 5.1 % (ref 5–12)
NASH GRADE SERPL QL: ABNORMAL
NASH INTERPRETATION SERPL-IMP: ABNORMAL
NASH SCORE SERPL: 0.32 (ref 0–0.25)
NASH SCORING: ABNORMAL
NEUTROPHILS NFR BLD AUTO: 43.7 % (ref 42.7–76)
NEUTROPHILS NFR BLD AUTO: 5.73 10*3/MM3 (ref 1.7–7)
NITRITE UR QL STRIP: NEGATIVE
NRBC BLD AUTO-RTO: 0 /100 WBC (ref 0–0.2)
PH UR STRIP.AUTO: 7 [PH] (ref 5–8)
PLATELET # BLD AUTO: 275 10*3/MM3 (ref 140–450)
PMV BLD AUTO: 9.3 FL (ref 6–12)
POTASSIUM SERPL-SCNC: 3.1 MMOL/L (ref 3.5–5.2)
PROT SERPL-MCNC: 6.1 G/DL (ref 6–8.5)
PROT UR QL STRIP: NEGATIVE
RBC # BLD AUTO: 4.38 10*6/MM3 (ref 3.77–5.28)
RBC MORPH BLD: NORMAL
SMALL PLATELETS BLD QL SMEAR: ADEQUATE
SODIUM SERPL-SCNC: 135 MMOL/L (ref 136–145)
SP GR UR STRIP: >=1.03 (ref 1–1.03)
STEATOSIS SCORING: ABNORMAL
TEST PERFORMANCE INFO SPEC: ABNORMAL
TEST PERFORMANCE INFO SPEC: ABNORMAL
TRIGL SERPL-MCNC: 121 MG/DL (ref 0–149)
UROBILINOGEN UR QL STRIP: ABNORMAL
WBC MORPH BLD: NORMAL
WBC NRBC COR # BLD AUTO: 13.09 10*3/MM3 (ref 3.4–10.8)

## 2024-03-30 PROCEDURE — 25510000001 IOPAMIDOL 61 % SOLUTION: Performed by: STUDENT IN AN ORGANIZED HEALTH CARE EDUCATION/TRAINING PROGRAM

## 2024-03-30 PROCEDURE — 85007 BL SMEAR W/DIFF WBC COUNT: CPT | Performed by: STUDENT IN AN ORGANIZED HEALTH CARE EDUCATION/TRAINING PROGRAM

## 2024-03-30 PROCEDURE — 80053 COMPREHEN METABOLIC PANEL: CPT | Performed by: STUDENT IN AN ORGANIZED HEALTH CARE EDUCATION/TRAINING PROGRAM

## 2024-03-30 PROCEDURE — 96375 TX/PRO/DX INJ NEW DRUG ADDON: CPT

## 2024-03-30 PROCEDURE — 85025 COMPLETE CBC W/AUTO DIFF WBC: CPT | Performed by: STUDENT IN AN ORGANIZED HEALTH CARE EDUCATION/TRAINING PROGRAM

## 2024-03-30 PROCEDURE — 25010000002 HYDROMORPHONE 1 MG/ML SOLUTION: Performed by: STUDENT IN AN ORGANIZED HEALTH CARE EDUCATION/TRAINING PROGRAM

## 2024-03-30 PROCEDURE — 96374 THER/PROPH/DIAG INJ IV PUSH: CPT

## 2024-03-30 PROCEDURE — 25010000002 KETOROLAC TROMETHAMINE PER 15 MG: Performed by: STUDENT IN AN ORGANIZED HEALTH CARE EDUCATION/TRAINING PROGRAM

## 2024-03-30 PROCEDURE — 99285 EMERGENCY DEPT VISIT HI MDM: CPT

## 2024-03-30 PROCEDURE — 74177 CT ABD & PELVIS W/CONTRAST: CPT

## 2024-03-30 PROCEDURE — 81003 URINALYSIS AUTO W/O SCOPE: CPT | Performed by: STUDENT IN AN ORGANIZED HEALTH CARE EDUCATION/TRAINING PROGRAM

## 2024-03-30 RX ORDER — POLYETHYLENE GLYCOL 3350 17 G/17G
17 POWDER, FOR SOLUTION ORAL DAILY
Qty: 30 PACKET | Refills: 0 | Status: SHIPPED | OUTPATIENT
Start: 2024-03-30 | End: 2024-04-29

## 2024-03-30 RX ORDER — KETOROLAC TROMETHAMINE 30 MG/ML
15 INJECTION, SOLUTION INTRAMUSCULAR; INTRAVENOUS ONCE
Status: COMPLETED | OUTPATIENT
Start: 2024-03-30 | End: 2024-03-30

## 2024-03-30 RX ORDER — SODIUM CHLORIDE 0.9 % (FLUSH) 0.9 %
10 SYRINGE (ML) INJECTION AS NEEDED
Status: DISCONTINUED | OUTPATIENT
Start: 2024-03-30 | End: 2024-03-30 | Stop reason: HOSPADM

## 2024-03-30 RX ADMIN — KETOROLAC TROMETHAMINE 15 MG: 30 INJECTION, SOLUTION INTRAMUSCULAR; INTRAVENOUS at 04:00

## 2024-03-30 RX ADMIN — HYDROMORPHONE HYDROCHLORIDE 0.5 MG: 1 INJECTION, SOLUTION INTRAMUSCULAR; INTRAVENOUS; SUBCUTANEOUS at 02:52

## 2024-03-30 RX ADMIN — IOPAMIDOL 100 ML: 612 INJECTION, SOLUTION INTRAVENOUS at 04:16

## 2024-03-30 NOTE — PROGRESS NOTES
"Postoperative Assessment Visit    Subjective   Chief Complaint   Patient presents with    Post-op     12 days post op D&C hysteroscopy, cerene ablation.       38 y.o.  female who presents for a post-op visit.    Pre-op Diagnosis:  Abnormal uterine bleeding  Menorrhagia with irregular cycle  Dysmenorrhea  Same-sex relationship, contraception not needed  BMI 44   Post-op Diagnosis:  Same  Endometrial polyps   Procedure: Hysteroscopy with Myosure  Dilation and curettage  Endometrial ablation with Cerene     The patient is doing well with no major issues.  She has experienced very little to no bleeding since the procedure.  She does have some pelvic pain that is ongoing.     Objective   Vitals:    24 1340   BP: 122/84   Weight: 112 kg (247 lb)   Height: 157.5 cm (62\")     Physical Exam:  Reassuring postoperative exam       Medical Decision Making:    I have reviewed the operative note.  I have reviewed the postoperative labs where appropriate.    Assessment   Cerene ablation  Post-op evaluation     Plan    No orders of the defined types were placed in this encounter.      Medication Management: Refill ibuprofen    Patient is meeting all post-op milestones.  Pathology reviewed - benign  Surgical findings discussed.  Postoperative precautions and restrictions reviewed.  Continue with expectant management for 4 months to see what benefit we get from the procedure.  Refill ibuprofen.  We again discussed the importance of avoiding pregnancy moving forward.    RTC for reassessment in 4 weeks.    David Ribeiro MD  Obstetrics and Gynecology  [facility]    "

## 2024-03-30 NOTE — ED PROVIDER NOTES
EMERGENCY DEPARTMENT ENCOUNTER    Pt Name: Lindsay Amezquita  MRN: [id number]  Pt :   1985  Room Number:  19SF/19  Date of encounter:  3/30/2024  PCP: Stephanie Schroeder PA-C  ED Provider: Byron Azevedo MD    Historian: Patient      HPI:  Chief Complaint: Abdominal pain        Context: Lindsay Amezquita is a 38 y.o. female who presents to the ED c/o abdominal pain.  Patient states that she had a D&C within the past couple weeks and has had intermittent issues with pain ever since.  Then within the past 2 days she states she accidentally ran into the bedpost with her lower abdomen/vagina area.  States she has had pain with occasional spotting/bleeding from vagina ever since.  Has not taken anything for the pain at home.      PAST MEDICAL HISTORY  Past Medical History:   Diagnosis Date    Allergic     Anxiety     Asthma Beings of copd with asthma    Back pain     Bell's palsy     Bipolar 2 disorder     Blood clot in vein     Behind left knee cap    COPD (chronic obstructive pulmonary disease) Six months ago    Deep vein thrombosis Last year    Depression     Diabetes mellitus November    Pre diabete    Frequent headaches     GERD (gastroesophageal reflux disease)     Hypertension     Every time i go into the emergacy room    Irritable bowel syndrome Two years ago    Kidney stone Two years ago    Migraine     Obesity All of my life    Ovarian cyst Two years ago    Panic     PTSD (post-traumatic stress disorder)     Pulmonary embolism Not in lungs    Recurrent pregnancy loss, antepartum condition or complication Every since i have been 14 yars old    Restless leg syndrome     Sinus problem     Snores     Tattoos     Urinary tract infection Have them on and off    Varicella Little    Visual impairment Since i was little    Vitamin B12 deficiency     Wears glasses          PAST SURGICAL HISTORY  Past Surgical History:   Procedure Laterality Date    CHOLECYSTECTOMY      HYSTEROSCOPY N/A 3/14/2024    Procedure:  HYSTEROSCOPY WITH MYOSURE, DILATION AND CURETTAGE WITH CERENE ABLATION;  Surgeon: David Ribeiro MD;  Location: [facility];  Service: Obstetrics/Gynecology;  Laterality: N/A;    MULTIPLE TOOTH EXTRACTIONS      All my teeth    WISDOM TOOTH EXTRACTION  All my teeth         FAMILY HISTORY  Family History   Problem Relation Age of Onset    Irritable bowel syndrome Mother     Heart disease Mother     Miscarriages / Stillbirths Mother     Arthritis Mother     COPD Mother     Learning disabilities Mother     Mental illness Mother     Asthma Mother     Irritable bowel syndrome Father     Alcohol abuse Father     Diabetes Father     Hyperlipidemia Father     Anxiety disorder Father     Learning disabilities Father     Colon cancer Maternal Grandfather     Stroke Paternal Grandfather     Stroke Paternal Grandmother          SOCIAL HISTORY  Social History     Socioeconomic History    Marital status: Single   Tobacco Use    Smoking status: Every Day     Current packs/day: 0.50     Average packs/day: 2.0 packs/day for 31.8 years (63.2 ttl pk-yrs)     Types: Cigarettes     Start date: 7/17/1992     Passive exposure: Current    Smokeless tobacco: Never    Tobacco comments:         Vaping Use    Vaping status: Former    Passive vaping exposure: Yes   Substance and Sexual Activity    Alcohol use: Not Currently     Comment: rarely    Drug use: Not Currently    Sexual activity: Not Currently     Partners: Female     Birth control/protection: Other, Same-sex partner         ALLERGIES  Aspirin, Codeine, Cyclobenzaprine, Haldol [haloperidol], Imitrex [sumatriptan], Latex, Penicillins, Compazine [prochlorperazine], Lamictal [lamotrigine], Morphine, Ondansetron, Oxycodone-acetaminophen, Oxycontin [oxycodone], Reglan [metoclopramide], Tramadol, and Tylenol [acetaminophen]        REVIEW OF SYSTEMS  Review of Systems     All systems reviewed and negative except for those discussed in HPI.       PHYSICAL EXAM    I have reviewed the triage  vital signs and nursing notes.    ED Triage Vitals [03/30/24 0032]   Temp Heart Rate Resp BP SpO2   98.6 °F (37 °C) 74 24 110/71 98 %      Temp src Heart Rate Source Patient Position BP Location FiO2 (%)   Oral Monitor Sitting Left arm --       Physical Exam    General:  Awake, alert, obese, no acute distress  HEENT: Atraumatic, normocephalic, EOMI, PERRLA, mucous membranes moist  NECK:  Supple, atraumatic  Cardiovascular:  Regular rate, regular rhythm, no murmurs, rubs, or gallops.  Extremities well perfused   Respiratory:  Regular rate, clear lungs to auscultation bilaterally.  No rhonchi, rales, wheezing  Abdominal:  Soft, nondistended, tender in suprapubic region.  No guarding or rebound.  No palpable masses  Extremity:  No visible bony abnormalities in all 4 extremities.  Full range of motion of all extremities.  Genital: No external signs of trauma with no bruising, bleeding, or swelling.  Skin:  Warm and dry.  No rashes  Neuro:  AAOx3, GCS 15. Cranial nerves 2-12 grossly intact.  No focal strength or sensation deficits.  Psych:  Mood and affect appropriate.        LAB RESULTS  Recent Results (from the past 24 hour(s))   Comprehensive Metabolic Panel    Collection Time: 03/30/24  2:52 AM    Specimen: Blood   Result Value Ref Range    Glucose 109 (H) 65 - 99 mg/dL    BUN 9 6 - 20 mg/dL    Creatinine 0.60 0.57 - 1.00 mg/dL    Sodium 135 (L) 136 - 145 mmol/L    Potassium 3.1 (L) 3.5 - 5.2 mmol/L    Chloride 96 (L) 98 - 107 mmol/L    CO2 24.3 22.0 - 29.0 mmol/L    Calcium 8.6 8.6 - 10.5 mg/dL    Total Protein 6.1 6.0 - 8.5 g/dL    Albumin 4.1 3.5 - 5.2 g/dL    ALT (SGPT) 13 1 - 33 U/L    AST (SGOT) 11 1 - 32 U/L    Alkaline Phosphatase 67 39 - 117 U/L    Total Bilirubin 0.3 0.0 - 1.2 mg/dL    Globulin 2.0 gm/dL    A/G Ratio 2.1 g/dL    BUN/Creatinine Ratio 15.0 7.0 - 25.0    Anion Gap 14.7 5.0 - 15.0 mmol/L    eGFR 117.3 >60.0 mL/min/1.73   CBC Auto Differential    Collection Time: 03/30/24  2:52 AM    Specimen:  Blood   Result Value Ref Range    WBC 13.09 (H) 3.40 - 10.80 10*3/mm3    RBC 4.38 3.77 - 5.28 10*6/mm3    Hemoglobin 12.8 12.0 - 15.9 g/dL    Hematocrit 37.2 34.0 - 46.6 %    MCV 84.9 79.0 - 97.0 fL    MCH 29.2 26.6 - 33.0 pg    MCHC 34.4 31.5 - 35.7 g/dL    RDW 15.4 12.3 - 15.4 %    RDW-SD 47.6 37.0 - 54.0 fl    MPV 9.3 6.0 - 12.0 fL    Platelets 275 140 - 450 10*3/mm3    Neutrophil % 43.7 42.7 - 76.0 %    Lymphocyte % 49.6 (H) 19.6 - 45.3 %    Monocyte % 5.1 5.0 - 12.0 %    Eosinophil % 0.8 0.3 - 6.2 %    Basophil % 0.5 0.0 - 1.5 %    Immature Grans % 0.3 0.0 - 0.5 %    Neutrophils, Absolute 5.73 1.70 - 7.00 10*3/mm3    Lymphocytes, Absolute 6.49 (H) 0.70 - 3.10 10*3/mm3    Monocytes, Absolute 0.67 0.10 - 0.90 10*3/mm3    Eosinophils, Absolute 0.10 0.00 - 0.40 10*3/mm3    Basophils, Absolute 0.06 0.00 - 0.20 10*3/mm3    Immature Grans, Absolute 0.04 0.00 - 0.05 10*3/mm3    nRBC 0.0 0.0 - 0.2 /100 WBC   Scan Slide    Collection Time: 03/30/24  2:52 AM    Specimen: Blood   Result Value Ref Range    RBC Morphology Normal Normal    WBC Morphology Normal Normal    Platelet Estimate Adequate Normal   Urinalysis With Microscopic If Indicated (No Culture) - Urine, Clean Catch    Collection Time: 03/30/24  3:03 AM    Specimen: Urine, Clean Catch   Result Value Ref Range    Color, UA Yellow Yellow, Straw    Appearance, UA Cloudy (A) Clear    pH, UA 7.0 5.0 - 8.0    Specific Gravity, UA >=1.030 1.005 - 1.030    Glucose, UA >=1000 mg/dL (3+) (A) Negative    Ketones, UA Negative Negative    Bilirubin, UA Negative Negative    Blood, UA Negative Negative    Protein, UA Negative Negative    Leuk Esterase, UA Negative Negative    Nitrite, UA Negative Negative    Urobilinogen, UA 0.2 E.U./dL 0.2 - 1.0 E.U./dL       If labs were ordered, I independently reviewed the results and considered them in treating the patient.        RADIOLOGY  CT Abdomen Pelvis With Contrast    Result Date: 3/30/2024  FINAL REPORT TECHNIQUE: null CLINICAL  HISTORY: Lower abdominal pain, recent D & C Prior 3/17/2024 COMPARISON: null FINDINGS: Exam: CT abdomen and pelvis with IV contrast. Comparison: CT/SR - CT ABDOMEN PELVIS W CONTRAST - 03/17/2024 12:09 AM EDT Findings: No free air. No solid liver mass. Stable cirrhotic nodularity with fatty infiltration. Cholecystectomy. No clinically significant biliary ductal dilation. No splenomegaly or suspicious splenic lesions. No solid or cystic pancreatic mass. No pancreatic ductal dilation. No acute inflammatory changes. Stable 2.2 cm left adrenal nodule, likely adenoma. No right adrenal nodule. No suspicious renal mass. No hydronephrosis. No significant stranding. Bladder without acute pathology. Dense colonic fecal retention without obstruction. No evidence for acute appendicitis. No adenopathy. No abdominal aortic aneurysm. No suspicious pelvic masses. No acute soft tissue pathology. No acute osseous pathology. Degenerative changes.     IMPRESSION: Dense colonic fecal retention, progressed versus prior. No evidence for acute appendicitis. Stable cirrhotic liver changes with fatty infiltration. No free air, free fluid, or fluid collection. Authenticated and Electronically Signed by Florencia Laguna MD on 03/30/2024 04:47:44 AM     I ordered and independently reviewed the above noted radiographic studies.     See radiologist's dictation for official interpretation.        PROCEDURES    Procedures    No orders to display       MEDICATIONS GIVEN IN ER    Medications   sodium chloride 0.9 % flush 10 mL (has no administration in time range)   HYDROmorphone (DILAUDID) injection 0.5 mg (0.5 mg Intravenous Given 3/30/24 0252)   ketorolac (TORADOL) injection 15 mg (15 mg Intravenous Given 3/30/24 0400)   iopamidol (ISOVUE-300) 61 % injection 100 mL (100 mL Intravenous Given 3/30/24 3116)         MEDICAL DECISION MAKING, PROGRESS, and CONSULTS    All labs, if obtained, have been independently reviewed by me.  All radiology studies,  if obtained, have been reviewed by me and the radiologist dictating the report.  All EKG's, if obtained, have been independently viewed and interpreted by me.      Discussion below represents my analysis of pertinent findings related to patient's condition, differential diagnosis, treatment plan and final disposition.    Lindsay Amezquita is a 38 y.o. female who presents to the ED c/o abdominal pain.  Hemodynamically stable nontoxic in appearance upon arrival.  Differential includes was not limited to soft tissue bruising, intra-abdominal hematoma or intra-abdominal postoperative infection.  Chaperoned exam performed with IZABELLA Glover, with no evidence of significant external vaginal trauma or deformity with no active bleeding.  Labs obtained which were personally reviewed and interpreted showing evidence of leukocytosis of 13.  Urinalysis shows glucose but no significant ketones or evidence of infection.  Labs show evidence of mild hypokalemia of 3.1 but no other critical electrolyte abnormalities.    CT of abdomen pelvis with contrast ordered which showed evidence of dense colonic fecal retention/constipation but no evidence of overt bowel obstruction along with no significant infective findings per radiology.  Provided with prescription for magnesium citrate and MiraLAX for treatment of constipation as an outpatient.  Discussed results along with plan of care with patient and family at bedside.  Patient agreeable with plan and was discharged.                                 Orders placed during this visit:  Orders Placed This Encounter   Procedures    CT Abdomen Pelvis With Contrast    Comprehensive Metabolic Panel    Urinalysis With Microscopic If Indicated (No Culture) - Urine, Clean Catch    CBC Auto Differential    Scan Slide    Insert Peripheral IV    CBC & Differential         ED Course:    Consultants:                  Shared Decision Making:  After my consideration of clinical presentation and any  laboratory/radiology studies obtained, I discussed the findings with the patient/patient representative who is in agreement with the treatment plan and the final disposition.   Risks and benefits of discharge and/or observation/admission were discussed.      AS OF 05:37 EDT VITALS:    BP - 100/67  HR - 76  TEMP - 98 °F (36.7 °C) (Oral)  O2 SATS - 94%                  DIAGNOSIS  Final diagnoses:   Constipation, unspecified constipation type   Lower abdominal pain         DISPOSITION  Discharge      Please note that portions of this document were completed with voice recognition software.        Byron Azevedo MD  03/30/24 0539

## 2024-03-30 NOTE — DISCHARGE INSTRUCTIONS
Take half bottle of magnesium citrate today to help with bowel cleanout, if you do not have significant bowel movement despite half bottle, may take the rest of the bottle.  Then continue MiraLAX once daily to help prevent repeat constipation issues.

## 2024-04-01 ENCOUNTER — TELEPHONE (OUTPATIENT)
Dept: FAMILY MEDICINE CLINIC | Facility: CLINIC | Age: 39
End: 2024-04-01

## 2024-04-01 ENCOUNTER — HOSPITAL ENCOUNTER (EMERGENCY)
Facility: HOSPITAL | Age: 39
Discharge: HOME | End: 2024-04-01
Attending: EMERGENCY MEDICINE | Admitting: EMERGENCY MEDICINE
Payer: MEDICAID

## 2024-04-01 ENCOUNTER — APPOINTMENT (OUTPATIENT)
Dept: GENERAL RADIOLOGY | Facility: HOSPITAL | Age: 39
End: 2024-04-01
Payer: MEDICAID

## 2024-04-01 ENCOUNTER — HOSPITAL ENCOUNTER (EMERGENCY)
Facility: HOSPITAL | Age: 39
Discharge: LEFT BEFORE DISCHARGE | End: 2024-04-02
Attending: STUDENT IN AN ORGANIZED HEALTH CARE EDUCATION/TRAINING PROGRAM | Admitting: STUDENT IN AN ORGANIZED HEALTH CARE EDUCATION/TRAINING PROGRAM
Payer: MEDICAID

## 2024-04-01 ENCOUNTER — OFFICE VISIT (OUTPATIENT)
Dept: FAMILY MEDICINE CLINIC | Facility: CLINIC | Age: 39
End: 2024-04-01
Payer: MEDICAID

## 2024-04-01 VITALS
DIASTOLIC BLOOD PRESSURE: 103 MMHG | OXYGEN SATURATION: 98 % | SYSTOLIC BLOOD PRESSURE: 131 MMHG | BODY MASS INDEX: 45.03 KG/M2 | TEMPERATURE: 98.2 F | RESPIRATION RATE: 16 BRPM | HEART RATE: 76 BPM | WEIGHT: 244.71 LBS | HEIGHT: 62 IN

## 2024-04-01 VITALS
WEIGHT: 244 LBS | BODY MASS INDEX: 44.9 KG/M2 | TEMPERATURE: 98 F | HEART RATE: 89 BPM | HEIGHT: 62 IN | SYSTOLIC BLOOD PRESSURE: 110 MMHG | RESPIRATION RATE: 18 BRPM | DIASTOLIC BLOOD PRESSURE: 70 MMHG | OXYGEN SATURATION: 95 %

## 2024-04-01 DIAGNOSIS — R06.02 SHORTNESS OF BREATH: Primary | ICD-10-CM

## 2024-04-01 DIAGNOSIS — Z53.29 LEFT AGAINST MEDICAL ADVICE: Primary | ICD-10-CM

## 2024-04-01 DIAGNOSIS — J44.1 COPD EXACERBATION: Primary | ICD-10-CM

## 2024-04-01 DIAGNOSIS — R11.0 NAUSEA: ICD-10-CM

## 2024-04-01 DIAGNOSIS — J20.9 ACUTE BRONCHITIS, UNSPECIFIED ORGANISM: ICD-10-CM

## 2024-04-01 DIAGNOSIS — R06.2 WHEEZING ON INSPIRATION: ICD-10-CM

## 2024-04-01 LAB
ALBUMIN SERPL-MCNC: 4.7 G/DL (ref 3.5–5.2)
ALBUMIN/GLOB SERPL: 2 G/DL
ALP SERPL-CCNC: 74 U/L (ref 39–117)
ALT SERPL W P-5'-P-CCNC: 22 U/L (ref 1–33)
ANION GAP SERPL CALCULATED.3IONS-SCNC: 18.7 MMOL/L (ref 5–15)
AST SERPL-CCNC: 12 U/L (ref 1–32)
BASOPHILS # BLD AUTO: 0.05 10*3/MM3 (ref 0–0.2)
BASOPHILS NFR BLD AUTO: 0.4 % (ref 0–1.5)
BILIRUB SERPL-MCNC: 0.4 MG/DL (ref 0–1.2)
BUN SERPL-MCNC: 5 MG/DL (ref 6–20)
BUN/CREAT SERPL: 7.7 (ref 7–25)
CALCIUM SPEC-SCNC: 9.5 MG/DL (ref 8.6–10.5)
CHLORIDE SERPL-SCNC: 102 MMOL/L (ref 98–107)
CO2 SERPL-SCNC: 22.3 MMOL/L (ref 22–29)
CREAT SERPL-MCNC: 0.65 MG/DL (ref 0.57–1)
DEPRECATED RDW RBC AUTO: 50 FL (ref 37–54)
EGFRCR SERPLBLD CKD-EPI 2021: 115 ML/MIN/1.73
EOSINOPHIL # BLD AUTO: 0.21 10*3/MM3 (ref 0–0.4)
EOSINOPHIL NFR BLD AUTO: 1.8 % (ref 0.3–6.2)
ERYTHROCYTE [DISTWIDTH] IN BLOOD BY AUTOMATED COUNT: 16.2 % (ref 12.3–15.4)
FLUAV SUBTYP SPEC NAA+PROBE: NOT DETECTED
FLUBV RNA ISLT QL NAA+PROBE: NOT DETECTED
GLOBULIN UR ELPH-MCNC: 2.4 GM/DL
GLUCOSE SERPL-MCNC: 178 MG/DL (ref 65–99)
HCT VFR BLD AUTO: 44.1 % (ref 34–46.6)
HGB BLD-MCNC: 15.3 G/DL (ref 12–15.9)
HOLD SPECIMEN: NORMAL
HOLD SPECIMEN: NORMAL
IMM GRANULOCYTES # BLD AUTO: 0.05 10*3/MM3 (ref 0–0.05)
IMM GRANULOCYTES NFR BLD AUTO: 0.4 % (ref 0–0.5)
LYMPHOCYTES # BLD AUTO: 4.64 10*3/MM3 (ref 0.7–3.1)
LYMPHOCYTES NFR BLD AUTO: 40.3 % (ref 19.6–45.3)
MCH RBC QN AUTO: 29.3 PG (ref 26.6–33)
MCHC RBC AUTO-ENTMCNC: 34.7 G/DL (ref 31.5–35.7)
MCV RBC AUTO: 84.5 FL (ref 79–97)
MONOCYTES # BLD AUTO: 0.53 10*3/MM3 (ref 0.1–0.9)
MONOCYTES NFR BLD AUTO: 4.6 % (ref 5–12)
NEUTROPHILS NFR BLD AUTO: 52.5 % (ref 42.7–76)
NEUTROPHILS NFR BLD AUTO: 6.02 10*3/MM3 (ref 1.7–7)
NRBC BLD AUTO-RTO: 0 /100 WBC (ref 0–0.2)
NT-PROBNP SERPL-MCNC: <36 PG/ML (ref 0–450)
PLATELET # BLD AUTO: 311 10*3/MM3 (ref 140–450)
PMV BLD AUTO: 9.2 FL (ref 6–12)
POTASSIUM SERPL-SCNC: 3.8 MMOL/L (ref 3.5–5.2)
PROT SERPL-MCNC: 7.1 G/DL (ref 6–8.5)
RBC # BLD AUTO: 5.22 10*6/MM3 (ref 3.77–5.28)
SARS-COV-2 RNA RESP QL NAA+PROBE: NOT DETECTED
SODIUM SERPL-SCNC: 143 MMOL/L (ref 136–145)
TROPONIN T SERPL HS-MCNC: <6 NG/L
WBC NRBC COR # BLD AUTO: 11.5 10*3/MM3 (ref 3.4–10.8)
WHOLE BLOOD HOLD COAG: NORMAL
WHOLE BLOOD HOLD SPECIMEN: NORMAL

## 2024-04-01 PROCEDURE — 99284 EMERGENCY DEPT VISIT MOD MDM: CPT

## 2024-04-01 PROCEDURE — 85025 COMPLETE CBC W/AUTO DIFF WBC: CPT

## 2024-04-01 PROCEDURE — 25010000002 ONDANSETRON PER 1 MG

## 2024-04-01 PROCEDURE — 25010000002 METHYLPREDNISOLONE PER 125 MG

## 2024-04-01 PROCEDURE — 96375 TX/PRO/DX INJ NEW DRUG ADDON: CPT

## 2024-04-01 PROCEDURE — 83880 ASSAY OF NATRIURETIC PEPTIDE: CPT

## 2024-04-01 PROCEDURE — 80053 COMPREHEN METABOLIC PANEL: CPT

## 2024-04-01 PROCEDURE — 84484 ASSAY OF TROPONIN QUANT: CPT

## 2024-04-01 PROCEDURE — 93005 ELECTROCARDIOGRAM TRACING: CPT

## 2024-04-01 PROCEDURE — 3044F HG A1C LEVEL LT 7.0%: CPT | Performed by: PHYSICIAN ASSISTANT

## 2024-04-01 PROCEDURE — 94640 AIRWAY INHALATION TREATMENT: CPT

## 2024-04-01 PROCEDURE — 87636 SARSCOV2 & INF A&B AMP PRB: CPT

## 2024-04-01 PROCEDURE — 99213 OFFICE O/P EST LOW 20 MIN: CPT | Performed by: PHYSICIAN ASSISTANT

## 2024-04-01 PROCEDURE — 96374 THER/PROPH/DIAG INJ IV PUSH: CPT

## 2024-04-01 PROCEDURE — 71045 X-RAY EXAM CHEST 1 VIEW: CPT

## 2024-04-01 RX ORDER — ALBUTEROL SULFATE 1.25 MG/3ML
1.25 SOLUTION RESPIRATORY (INHALATION) EVERY 6 HOURS PRN
Status: SHIPPED | OUTPATIENT
Start: 2024-04-01

## 2024-04-01 RX ORDER — METHYLPREDNISOLONE SODIUM SUCCINATE 125 MG/2ML
125 INJECTION, POWDER, LYOPHILIZED, FOR SOLUTION INTRAMUSCULAR; INTRAVENOUS ONCE
Status: COMPLETED | OUTPATIENT
Start: 2024-04-01 | End: 2024-04-01

## 2024-04-01 RX ORDER — ALBUTEROL SULFATE 1.25 MG/3ML
1 SOLUTION RESPIRATORY (INHALATION) EVERY 6 HOURS PRN
Qty: 3 ML | Refills: 12 | Status: SHIPPED | OUTPATIENT
Start: 2024-04-01

## 2024-04-01 RX ORDER — AZITHROMYCIN 250 MG/1
TABLET, FILM COATED ORAL
Qty: 6 TABLET | Refills: 0 | Status: SHIPPED | OUTPATIENT
Start: 2024-04-01

## 2024-04-01 RX ORDER — PREDNISONE 50 MG/1
50 TABLET ORAL DAILY
Qty: 5 TABLET | Refills: 0 | Status: SHIPPED | OUTPATIENT
Start: 2024-04-01 | End: 2024-04-06

## 2024-04-01 RX ORDER — SODIUM CHLORIDE 0.9 % (FLUSH) 0.9 %
10 SYRINGE (ML) INJECTION AS NEEDED
Status: DISCONTINUED | OUTPATIENT
Start: 2024-04-01 | End: 2024-04-01 | Stop reason: HOSPADM

## 2024-04-01 RX ORDER — NEBULIZER ACCESSORIES
1 KIT MISCELLANEOUS EVERY 4 HOURS PRN
Qty: 1 EACH | Refills: 0 | Status: SHIPPED | OUTPATIENT
Start: 2024-04-01

## 2024-04-01 RX ORDER — DOXYCYCLINE HYCLATE 100 MG/1
100 CAPSULE ORAL 2 TIMES DAILY
Qty: 14 CAPSULE | Refills: 0 | Status: SHIPPED | OUTPATIENT
Start: 2024-04-01 | End: 2024-04-08

## 2024-04-01 RX ORDER — IPRATROPIUM BROMIDE AND ALBUTEROL SULFATE 2.5; .5 MG/3ML; MG/3ML
3 SOLUTION RESPIRATORY (INHALATION) ONCE
Status: COMPLETED | OUTPATIENT
Start: 2024-04-01 | End: 2024-04-01

## 2024-04-01 RX ORDER — ONDANSETRON 4 MG/1
4 TABLET, FILM COATED ORAL EVERY 8 HOURS PRN
Qty: 21 TABLET | Refills: 0 | Status: SHIPPED | OUTPATIENT
Start: 2024-04-01 | End: 2024-04-08

## 2024-04-01 RX ORDER — ONDANSETRON 2 MG/ML
4 INJECTION INTRAMUSCULAR; INTRAVENOUS ONCE
Status: COMPLETED | OUTPATIENT
Start: 2024-04-01 | End: 2024-04-01

## 2024-04-01 RX ORDER — ALBUTEROL SULFATE 90 UG/1
2 AEROSOL, METERED RESPIRATORY (INHALATION) EVERY 4 HOURS PRN
Qty: 6.7 G | Refills: 0 | Status: SHIPPED | OUTPATIENT
Start: 2024-04-01

## 2024-04-01 RX ADMIN — METHYLPREDNISOLONE SODIUM SUCCINATE 125 MG: 125 INJECTION, POWDER, FOR SOLUTION INTRAMUSCULAR; INTRAVENOUS at 18:28

## 2024-04-01 RX ADMIN — IPRATROPIUM BROMIDE AND ALBUTEROL SULFATE 3 ML: .5; 3 SOLUTION RESPIRATORY (INHALATION) at 18:30

## 2024-04-01 RX ADMIN — ONDANSETRON 4 MG: 2 INJECTION INTRAMUSCULAR; INTRAVENOUS at 19:06

## 2024-04-01 NOTE — DISCHARGE INSTRUCTIONS
Return to the ER for any acute changes or worsening of your condition otherwise follow-up with your primary care provider within 3 days.

## 2024-04-01 NOTE — PROGRESS NOTES
Attempted to call patient with results. Left a detailed voicemail with patients results. Instructed patient to call us if they have any questions or concerns. Patient also saw results via [employer]
Contatced patient to speak about blood work. Patient verbalized good understanding and appreciation. No questions or concerns.        Patient states she has never had a hep a vaccine.
PATIENT CALLED TO DISCUSS LAB RESULTS, ADVISED VITAMIN D LOW AND WILL NEED TO  OTC VITAMIN D 5000 IU, POTASSIUM IS LOW AND A PRESCRIPTION HAS BEEN SENT TO THE PHARMACY, WILL NEED TO  AT PHARMACY  PATIENT REQUESTING INFORMATION CONCERNING HER HEPATITIS RESULTS.
IBW

## 2024-04-01 NOTE — PROGRESS NOTES
"    Follow Up Office Visit      Date: 2024   Patient Name: Lindsay Amezquita  : 1985   MRN: [id number]     Chief Complaint:    Chief Complaint   Patient presents with   • Follow-up     States that this morning her O2 was really low this morning and her BP was higher states that they told her that she needed to go to the ER and pt refused    • Labs Only       History of Present Illness: Lindsay Amezquita is a 38 y.o. female who is here today for follow up of her labs, an ER visit and a call to EMS since or introductory visit last week.  She continues to have some dyspnea and was noted to have a \"low\" O2 by EMS last night. She refused to go to the ER because she had a visit here today.  She went to the ER due to pain and vaginal bleeding and was told that she was constipated.  She reports that she has had multiple bowel movements since that time.    She voices understanding about recent labs and states that she has been eating only meats and vegetables since our last visit.Answers submitted by the patient for this visit:  Other (Submitted on 3/31/2024)  Please describe your symptoms.: Going over trst results and let know i having sinus issurs n bring green stuff up and very sick  Have you had these symptoms before?: Yes  How long have you been having these symptoms?: 5-7 days  Primary Reason for Visit (Submitted on 3/31/2024)  What is the primary reason for your visit?: Other  .    Subjective      Review of Systems:   Review of Systems   Constitutional:  Positive for fatigue. Negative for fever.   HENT:  Positive for congestion, postnasal drip and sinus pressure.    Respiratory:  Positive for cough, shortness of breath and wheezing.    Cardiovascular: Negative.    Gastrointestinal: Negative.      I have reviewed the patients family history, social history, past medical history, past surgical history and have updated it as appropriate.     Medications:     Current Outpatient Medications:   •  albuterol sulfate "  (90 Base) MCG/ACT inhaler, Inhale 2 puffs Every 4 (Four) Hours As Needed for Wheezing., Disp: 18 g, Rfl: 0  •  ALPRAZolam (Xanax) 1 MG tablet, Take 1 tablet by mouth 3 (Three) Times a Day As Needed for Sleep., Disp: 90 tablet, Rfl: 1  •  amitriptyline (ELAVIL) 150 MG tablet, Take 1 tablet by mouth Every Night., Disp: 30 tablet, Rfl: 1  •  atorvastatin (LIPITOR) 40 MG tablet, Take 1 tablet by mouth every night at bedtime., Disp: 90 tablet, Rfl: 3  •  butalbital-acetaminophen-caffeine (Esgic) -40 MG per tablet, Take 1 tablet by mouth 2 (Two) Times a Day As Needed for Headache., Disp: 20 tablet, Rfl: 2  •  desvenlafaxine (PRISTIQ) 100 MG 24 hr tablet, Take 1 tablet by mouth Daily., Disp: 30 tablet, Rfl: 1  •  empagliflozin (Jardiance) 10 MG tablet tablet, TAKE 1 TABLET BY MOUTH EVERY DAY, Disp: 90 tablet, Rfl: 1  •  guaiFENesin (Mucinex) 600 MG 12 hr tablet, Take 2 tablets by mouth 2 (Two) Times a Day., Disp: 20 tablet, Rfl: 0  •  hydrOXYzine (ATARAX) 50 MG tablet, Take 1.5 tablets by mouth 3 (Three) Times a Day As Needed (anxiety and/or sleep)., Disp: 135 tablet, Rfl: 1  •  levocetirizine (XYZAL) 5 MG tablet, Take 1 tablet by mouth Every Evening., Disp: , Rfl:   •  Lurasidone HCl (Latuda) 120 MG tablet tablet, Take 1 tablet by mouth Daily., Disp: 30 tablet, Rfl: 1  •  norethindrone (Aygestin) 5 MG tablet, Take 2 tablets by mouth Daily., Disp: 60 tablet, Rfl: 5  •  OXcarbazepine (TRILEPTAL) 300 MG tablet, Take 1 tablet by mouth 2 (Two) Times a Day., Disp: 60 tablet, Rfl: 1  •  polyethylene glycol (MIRALAX) 17 g packet, Take 17 g by mouth Daily for 30 days., Disp: 30 packet, Rfl: 0  •  potassium chloride (KLOR-CON M10) 10 MEQ CR tablet, Take 1 tablet by mouth 2 (Two) Times a Day., Disp: 180 tablet, Rfl: 0  •  promethazine (PHENERGAN) 25 MG tablet, Take 1 tablet by mouth Every 6 (Six) Hours As Needed for Nausea or Vomiting., Disp: 45 tablet, Rfl: 3  •  promethazine-dextromethorphan (PROMETHAZINE-DM) 6.25-15  "MG/5ML syrup, Take 5 mL by mouth 4 (Four) Times a Day As Needed for Cough., Disp: 118 mL, Rfl: 0  •  Rimegepant Sulfate (Nurtec) 75 MG tablet dispersible tablet, Take 1 tablet by mouth Every Other Day. Take Monday,Wednesday,Friday, and Saturday., Disp: 16 tablet, Rfl: 6  •  tiZANidine (ZANAFLEX) 4 MG tablet, Take 1 tablet by mouth Every 8 (Eight) Hours As Needed for Muscle Spasms. (Patient taking differently: Take 1 tablet by mouth Every 8 (Eight) Hours As Needed.), Disp: 90 tablet, Rfl: 3  •  Umeclidinium-Vilanterol (ANORO ELLIPTA IN), Inhale., Disp: , Rfl:   •  Zavegepant HCl 10 MG/ACT solution, 10 mg into the nostril(s) as directed by provider Daily As Needed (migraine). (1 spray into 1 nostril every 24 hrs prn), Disp: 6 each, Rfl: 5  •  glucose blood test strip, Use as instructed, Disp: 50 each, Rfl: 12  •  glucose monitor monitoring kit, Use 1 each As Needed (monitor glucose TID)., Disp: 1 each, Rfl: 0  •  ibuprofen (ADVIL,MOTRIN) 800 MG tablet, Take 1 tablet by mouth Every 8 (Eight) Hours As Needed for Mild Pain, Disp: 30 tablet, Rfl: 0  •  Lancets misc, Check fasting glucose daily and 1 hour after largest meal of day., Disp: 100 each, Rfl: 3    Current Facility-Administered Medications:   •  albuterol (PROVENTIL) nebulizer solution 0.042% 1.25 mg/3mL, 1.25 mg, Nebulization, Q6H PRN, Stephanie Schroeder PA-C    Allergies:   Allergies   Allergen Reactions   • Aspirin Anaphylaxis and Hives     Wheezing        • Codeine Anaphylaxis   • Cyclobenzaprine Other (See Comments)     \"gives me chest pain\"  Other reaction(s): Other (See Comments)  \"gives me chest pain\"   • Haldol [Haloperidol] Rash   • Imitrex [Sumatriptan] Anaphylaxis and Rash   • Latex Hives and Rash   • Penicillins Hives and Anaphylaxis     Vomiting   • Compazine [Prochlorperazine] Nausea And Vomiting   • Lamictal [Lamotrigine] Itching     Itching and hives   • Morphine Itching   • Ondansetron GI Intolerance     Other reaction(s): GI Intolerance   • " "Oxycodone-Acetaminophen GI Intolerance     Other reaction(s): GI Intolerance   • Oxycontin [Oxycodone] GI Intolerance   • Reglan [Metoclopramide] Hives and Headache   • Tramadol Nausea And Vomiting   • Tylenol [Acetaminophen] Nausea And Vomiting       Objective     Physical Exam: Please see above  Vital Signs:   Vitals:    04/01/24 1424   BP: 110/70   BP Location: Left arm   Patient Position: Sitting   Cuff Size: Adult   Pulse: 89   Resp: 18   Temp: 98 °F (36.7 °C)   TempSrc: Temporal   SpO2: 95%   Weight: 111 kg (244 lb)   Height: 157.5 cm (62.01\")     Body mass index is 44.62 kg/m².  Facility age limit for growth %gale is 20 years.          Physical Exam  Vitals and nursing note reviewed.   Constitutional:       General: She is not in acute distress.     Appearance: Normal appearance. She is not ill-appearing or toxic-appearing.   Eyes:      Extraocular Movements: Extraocular movements intact.      Pupils: Pupils are equal, round, and reactive to light.   Cardiovascular:      Rate and Rhythm: Normal rate and regular rhythm.      Heart sounds: No murmur heard.     No friction rub. No gallop.   Pulmonary:      Effort: Pulmonary effort is normal. No respiratory distress.      Breath sounds: Normal breath sounds. No wheezing or rales.      Comments: O2 sat was low until I had her take multiple deep breaths during exam and then it was 96% she did have wheeze, we will try to improve pulmonary toilet with nebs and follow  Neurological:      Mental Status: She is alert.     Procedures    Assessment / Plan      Assessment/Plan:   1. Shortness of breath      2. Wheezing on inspiration  Poor use of inhaler may be of concern, we will try nebs and follow  - albuterol (PROVENTIL) nebulizer solution 0.042% 1.25 mg/3mL      Follow Up:   No follow-ups on file.      At [facility], we believe that sharing information builds trust and better relationships. You are receiving this note because you recently visited [facility]. " It is possible you will see health information before a provider has talked with you about it. This kind of information can be easy to misunderstand. To help you fully understand what it means for your health, we urge you to discuss this note with your provider.    Stephanie Schroeder PA-C  [facility]

## 2024-04-01 NOTE — TELEPHONE ENCOUNTER
SPOKE WITH PATIENT, ADVISED RECORDS AND ORDERS TO BE FAXED [facility], ADVISED IF S/S WORSEN SEEK ED EVAL.PATIENT VERBALIZED UNDERSTANDING,.PATIETNS DENIES SOA, CHEST FEELS TIGHT, PATIENT WILL SEEK ED EVAL AT [facility]. PATIENT IS CALLING NON EMERGENCY TRANSPORT FOR TRANSPORT TO ED.

## 2024-04-01 NOTE — TELEPHONE ENCOUNTER
Caller: Lindsay Amezquita    Relationship: Self    Best call back number: [phone]    Who are you requesting to speak with (clinical staff, provider,  specific staff member): CLINICAL     What was the call regarding: THE PATIENT IS REQUESTING THAT THE ORDER FOR A NEBULIZER BE SENT TO [facility]. [facility] ALSO NEED A OFFICE NOTE.  THE PATIENT DOES NOT HAVE THE FAX NUMBER.  THE PHONE NUMBER FOR [facility]  [phone].    PLEASE CALL THE PATIENT WHEN THIS HAS BEEN DONE , SO THAT SHE CAN GO PICK IT UP .    Is it okay if the provider responds through MyChart: NO

## 2024-04-01 NOTE — ED PROVIDER NOTES
EMERGENCY DEPARTMENT ENCOUNTER    Pt Name: Lindsay Amezquita  MRN: [id number]  Pt :   1985  Room Number:    Date of encounter:  2024  PCP: Stephanie Schroeder PA-C  ED Provider: Antoni Rodriguez PA-C    Historian: Patient, nursing notes      HPI:  Chief Complaint: Shortness of breath last 24 hours        Context: Lindsay Amezquita is a 38 y.o. female who presents to the ED c/o shortness of breath last 24 hours.  Patient reports she has a history of asthma, COPD and emphysema.  She reports that 24 hours ago she began experience increased shortness of breath along with cough.  Patient is concerned for possible RSV or other viral illness infection.  She denies wearing any oxygen at home.  Patient denies chest pain, back pain, hemoptysis, vision changes, headache, dizziness, or lightheadedness,      PAST MEDICAL HISTORY  Past Medical History:   Diagnosis Date    Allergic     Anxiety     Asthma Beings of copd with asthma    Back pain     Bell's palsy     Bipolar 2 disorder     Blood clot in vein     Behind left knee cap    COPD (chronic obstructive pulmonary disease) Six months ago    Deep vein thrombosis Last year    Depression     Diabetes mellitus November    Pre diabete    Frequent headaches     GERD (gastroesophageal reflux disease)     Hypertension     Every time i go into the emergacy room    Irritable bowel syndrome Two years ago    Kidney stone Two years ago    Migraine     Obesity All of my life    Ovarian cyst Two years ago    Panic     PTSD (post-traumatic stress disorder)     Pulmonary embolism Not in lungs    Recurrent pregnancy loss, antepartum condition or complication Every since i have been 14 yars old    Restless leg syndrome     Sinus problem     Snores     Tattoos     Urinary tract infection Have them on and off    Varicella Little    Visual impairment Since i was little    Vitamin B12 deficiency     Wears glasses          PAST SURGICAL HISTORY  Past Surgical History:   Procedure Laterality  Date    CHOLECYSTECTOMY      HYSTEROSCOPY N/A 3/14/2024    Procedure: HYSTEROSCOPY WITH MYOSURE, DILATION AND CURETTAGE WITH CERENE ABLATION;  Surgeon: David Ribeiro MD;  Location: [facility];  Service: Obstetrics/Gynecology;  Laterality: N/A;    MULTIPLE TOOTH EXTRACTIONS      All my teeth    WISDOM TOOTH EXTRACTION  All my teeth         FAMILY HISTORY  Family History   Problem Relation Age of Onset    Irritable bowel syndrome Mother     Heart disease Mother     Miscarriages / Stillbirths Mother     Arthritis Mother     COPD Mother     Learning disabilities Mother     Mental illness Mother     Asthma Mother     Irritable bowel syndrome Father     Alcohol abuse Father     Diabetes Father     Hyperlipidemia Father     Anxiety disorder Father     Learning disabilities Father     Colon cancer Maternal Grandfather     Stroke Paternal Grandfather     Stroke Paternal Grandmother          SOCIAL HISTORY  Social History     Socioeconomic History    Marital status: Single   Tobacco Use    Smoking status: Every Day     Current packs/day: 0.50     Average packs/day: 2.0 packs/day for 31.8 years (63.2 ttl pk-yrs)     Types: Cigarettes     Start date: 7/17/1992     Passive exposure: Current    Smokeless tobacco: Never    Tobacco comments:         Vaping Use    Vaping status: Former    Passive vaping exposure: Yes   Substance and Sexual Activity    Alcohol use: Not Currently     Comment: rarely    Drug use: Not Currently    Sexual activity: Not Currently     Partners: Female     Birth control/protection: Other, Same-sex partner         ALLERGIES  Aspirin, Codeine, Cyclobenzaprine, Haldol [haloperidol], Imitrex [sumatriptan], Latex, Penicillins, Compazine [prochlorperazine], Lamictal [lamotrigine], Morphine, Ondansetron, Oxycodone-acetaminophen, Oxycontin [oxycodone], Reglan [metoclopramide], Tramadol, and Tylenol [acetaminophen]        REVIEW OF SYSTEMS  Review of Systems   Constitutional:  Negative for chills and fever.    HENT:  Negative for congestion and sore throat.    Respiratory:  Positive for cough and shortness of breath. Negative for chest tightness and wheezing.    Cardiovascular:  Negative for chest pain.   Gastrointestinal:  Negative for abdominal pain, nausea and vomiting.   Genitourinary:  Negative for dysuria.   Musculoskeletal:  Negative for back pain.   Skin:  Negative for wound.   Neurological:  Negative for dizziness and headaches.   Psychiatric/Behavioral:  Negative for confusion.    All other systems reviewed and are negative.         All systems reviewed and negative except for those discussed in HPI.       PHYSICAL EXAM    I have reviewed the triage vital signs and nursing notes.    ED Triage Vitals [04/01/24 1800]   Temp Heart Rate Resp BP SpO2   98.2 °F (36.8 °C) 93 20 (!) 160/106 97 %      Temp src Heart Rate Source Patient Position BP Location FiO2 (%)   Oral Monitor Sitting Left arm --       Physical Exam  Vitals and nursing note reviewed.   Constitutional:       General: She is not in acute distress.     Appearance: She is obese. She is not ill-appearing, toxic-appearing or diaphoretic.   HENT:      Head: Normocephalic and atraumatic.      Mouth/Throat:      Mouth: Mucous membranes are moist.      Pharynx: Oropharynx is clear.   Eyes:      Extraocular Movements: Extraocular movements intact.      Pupils: Pupils are equal, round, and reactive to light.   Cardiovascular:      Rate and Rhythm: Normal rate.      Heart sounds: Normal heart sounds.   Pulmonary:      Effort: Pulmonary effort is normal. No respiratory distress.      Breath sounds: Normal breath sounds. No decreased breath sounds, wheezing, rhonchi or rales.   Chest:      Chest wall: No tenderness or crepitus.   Abdominal:      Tenderness: There is no abdominal tenderness.   Skin:     General: Skin is warm and dry.      Findings: No rash.   Neurological:      Mental Status: She is alert.             LAB RESULTS  Recent Results (from the past 24  hour(s))   Comprehensive Metabolic Panel    Collection Time: 04/01/24  6:05 PM    Specimen: Blood   Result Value Ref Range    Glucose 178 (H) 65 - 99 mg/dL    BUN 5 (L) 6 - 20 mg/dL    Creatinine 0.65 0.57 - 1.00 mg/dL    Sodium 143 136 - 145 mmol/L    Potassium 3.8 3.5 - 5.2 mmol/L    Chloride 102 98 - 107 mmol/L    CO2 22.3 22.0 - 29.0 mmol/L    Calcium 9.5 8.6 - 10.5 mg/dL    Total Protein 7.1 6.0 - 8.5 g/dL    Albumin 4.7 3.5 - 5.2 g/dL    ALT (SGPT) 22 1 - 33 U/L    AST (SGOT) 12 1 - 32 U/L    Alkaline Phosphatase 74 39 - 117 U/L    Total Bilirubin 0.4 0.0 - 1.2 mg/dL    Globulin 2.4 gm/dL    A/G Ratio 2.0 g/dL    BUN/Creatinine Ratio 7.7 7.0 - 25.0    Anion Gap 18.7 (H) 5.0 - 15.0 mmol/L    eGFR 115.0 >60.0 mL/min/1.73   BNP    Collection Time: 04/01/24  6:05 PM    Specimen: Blood   Result Value Ref Range    proBNP <36.0 0.0 - 450.0 pg/mL   Single High Sensitivity Troponin T    Collection Time: 04/01/24  6:05 PM    Specimen: Blood   Result Value Ref Range    HS Troponin T <6 <14 ng/L   CBC Auto Differential    Collection Time: 04/01/24  6:05 PM    Specimen: Blood   Result Value Ref Range    WBC 11.50 (H) 3.40 - 10.80 10*3/mm3    RBC 5.22 3.77 - 5.28 10*6/mm3    Hemoglobin 15.3 12.0 - 15.9 g/dL    Hematocrit 44.1 34.0 - 46.6 %    MCV 84.5 79.0 - 97.0 fL    MCH 29.3 26.6 - 33.0 pg    MCHC 34.7 31.5 - 35.7 g/dL    RDW 16.2 (H) 12.3 - 15.4 %    RDW-SD 50.0 37.0 - 54.0 fl    MPV 9.2 6.0 - 12.0 fL    Platelets 311 140 - 450 10*3/mm3    Neutrophil % 52.5 42.7 - 76.0 %    Lymphocyte % 40.3 19.6 - 45.3 %    Monocyte % 4.6 (L) 5.0 - 12.0 %    Eosinophil % 1.8 0.3 - 6.2 %    Basophil % 0.4 0.0 - 1.5 %    Immature Grans % 0.4 0.0 - 0.5 %    Neutrophils, Absolute 6.02 1.70 - 7.00 10*3/mm3    Lymphocytes, Absolute 4.64 (H) 0.70 - 3.10 10*3/mm3    Monocytes, Absolute 0.53 0.10 - 0.90 10*3/mm3    Eosinophils, Absolute 0.21 0.00 - 0.40 10*3/mm3    Basophils, Absolute 0.05 0.00 - 0.20 10*3/mm3    Immature Grans, Absolute 0.05  0.00 - 0.05 10*3/mm3    nRBC 0.0 0.0 - 0.2 /100 WBC   COVID-19 and FLU A/B PCR, 1 HR TAT - Swab, Nasopharynx    Collection Time: 04/01/24  6:22 PM    Specimen: Nasopharynx; Swab   Result Value Ref Range    COVID19 Not Detected Not Detected - Ref. Range    Influenza A PCR Not Detected Not Detected    Influenza B PCR Not Detected Not Detected       If labs were ordered, I independently reviewed the results and considered them in treating the patient.        RADIOLOGY  No Radiology Exams Resulted Within Past 24 Hours    I ordered and independently reviewed the above noted radiographic studies.      I viewed images of chest x-ray which showed no acute cardiopulmonary process per my independent interpretation.    See radiologist's dictation for official interpretation.        PROCEDURES    Procedures    ECG 12 Lead ED Triage Standing Order; SOA   Final Result          MEDICATIONS GIVEN IN ER    Medications   sodium chloride 0.9 % flush 10 mL (has no administration in time range)   ipratropium-albuterol (DUO-NEB) nebulizer solution 3 mL (3 mL Nebulization Given 4/1/24 1830)   methylPREDNISolone sodium succinate (SOLU-Medrol) injection 125 mg (125 mg Intravenous Given 4/1/24 1828)   ondansetron (ZOFRAN) injection 4 mg (4 mg Intravenous Given 4/1/24 1906)         MEDICAL DECISION MAKING, PROGRESS, and CONSULTS    All labs, if obtained, have been independently reviewed by me.  All radiology studies, if obtained, have been reviewed by me and the radiologist dictating the report.  All EKG's, if obtained, have been independently viewed and interpreted by me/my attending physician.      Discussion below represents my analysis of pertinent findings related to patient's condition, differential diagnosis, treatment plan and final disposition.    CBC shows mild leukocytosis.  CMP shows glucose 178 anion gap of 18.7.  Flu COVID RSV testing is negative proBNP is at goal at 36 and troponin less than 6.  EKG showed no concerning signs  for ischemic changes per ED attending.  Chest x-ray per my own interpretation showed no acute cardiopulmonary process.  Patient was given Solu-Medrol in the ED along with a DuoNeb treatment and on reevaluation she states that she is breathing better.  She has maintained 97 to 99% O2 saturations throughout the entirety of the ED course on room air.  I suspect COPD exacerbation as primary cause the patient's symptoms we will treat appropriately with doxycycline and prednisone.  Patient is a non-insulin-dependent diabetic I did  regular blood sugar checks while taking prednisone and to discontinue prednisone usage if her blood sugars become erratic.  Strict ED return precautions were provided which she verbalized understanding of and agreement the plan I have also follow-up with PCP in 2 to 3 days.                     Differential diagnosis:    Differential diagnosis included but was not limited to COPD exacerbation, asthma exacerbation, flu, COVID, RSV, viral syndrome      Additional sources:    - Discussed/ obtained information from independent historians: Father at bedside    - External (non-ED) record review: None    - Chronic or social conditions impacting care: COPD      Orders placed during this visit:  Orders Placed This Encounter   Procedures    COVID-19 and FLU A/B PCR, 1 HR TAT - Swab, Nasopharynx    XR Chest 1 View    [place] Draw    Comprehensive Metabolic Panel    BNP    Single High Sensitivity Troponin T    CBC Auto Differential    NPO Diet NPO Type: Strict NPO    Undress & Gown    Continuous Pulse Oximetry    Vital Signs    Oxygen Therapy- Nasal Cannula; Titrate 1-6 LPM Per SpO2; 90 - 95%    ECG 12 Lead ED Triage Standing Order; SOA    Insert Peripheral IV    CBC & Differential    Green Top (Gel)    Lavender Top    Gold Top - SST    Light Blue Top         Additional orders considered but not ordered:      ED Course:    Consultants:      ED Course as of 04/01/24 1907 Mon Apr 01, 2024   [id number]  Interpretation:  EKG was directly visualized by myself, interpretations as documented in hospital course.  Normal sinus rhythm with a ventricular rate of 99 bpm, nonspecific changes, incomplete right bundle branch block [JK]      ED Course User Index  [JK] Meek Palmer MD              Shared Decision Making:  After my consideration of clinical presentation and any laboratory/radiology studies obtained, I discussed the findings with the patient/patient representative who is in agreement with the treatment plan and the final disposition.   Risks and benefits of discharge and/or observation/admission were discussed.       AS OF 19:07 EDT VITALS:    BP - (!) 160/106  HR - 93  TEMP - 98.2 °F (36.8 °C) (Oral)  O2 SATS - 97%                  DIAGNOSIS  Final diagnoses:   COPD exacerbation   Nausea         DISPOSITION  Discharge home      Please note that portions of this document were completed with voice recognition software.      Antoni Rodriguez PA-C  04/01/24 5680

## 2024-04-01 NOTE — TELEPHONE ENCOUNTER
Caller: Lindsay Amezquita    Relationship: Self    Best call back number: [phone]     What medication are you requesting: NEBULIZER     What are your current symptoms: ASTHMA     How long have you been experiencing symptoms:     Have you had these symptoms before:    [] Yes  [] No    Have you been treated for these symptoms before:   [] Yes  [] No    If a prescription is needed, what is your preferred pharmacy and phone number:      Additional notes: PATIENT STATES SHE SPOKE WITH [facility] AND WAS TOLD THEY WILL REQUIRE NOTES FROM PCP BEFORE INSURANCE WILL COVER THE COST OF THE NEBULIZER. PATIENT STATES SHE WILL ALSO TRY TO GET IN TOUCH WITH HER PHARMACY COORDINATOR, MARANDA, FROM [facility] TO ATTEMPT TO HAVE THIS OVERRODE.     PATIENT STATES SHE WILL BE GOING TO THE ED BECAUSE SHE IS HAVING DIFFICULTY BREATHING.

## 2024-04-02 ENCOUNTER — APPOINTMENT (OUTPATIENT)
Dept: GENERAL RADIOLOGY | Facility: HOSPITAL | Age: 39
End: 2024-04-02
Payer: MEDICAID

## 2024-04-02 ENCOUNTER — APPOINTMENT (OUTPATIENT)
Dept: CT IMAGING | Facility: HOSPITAL | Age: 39
End: 2024-04-02
Payer: MEDICAID

## 2024-04-02 ENCOUNTER — TELEPHONE (OUTPATIENT)
Dept: FAMILY MEDICINE CLINIC | Facility: CLINIC | Age: 39
End: 2024-04-02
Payer: MEDICAID

## 2024-04-02 VITALS
WEIGHT: 241 LBS | BODY MASS INDEX: 44.35 KG/M2 | DIASTOLIC BLOOD PRESSURE: 72 MMHG | SYSTOLIC BLOOD PRESSURE: 153 MMHG | RESPIRATION RATE: 20 BRPM | TEMPERATURE: 97.9 F | OXYGEN SATURATION: 95 % | HEART RATE: 91 BPM | HEIGHT: 62 IN

## 2024-04-02 DIAGNOSIS — R06.02 SHORTNESS OF BREATH: Primary | ICD-10-CM

## 2024-04-02 DIAGNOSIS — G89.4 CHRONIC PAIN SYNDROME: Primary | ICD-10-CM

## 2024-04-02 LAB
ALBUMIN SERPL-MCNC: 4.5 G/DL (ref 3.5–5.2)
ALBUMIN/GLOB SERPL: 1.9 G/DL
ALP SERPL-CCNC: 76 U/L (ref 39–117)
ALT SERPL W P-5'-P-CCNC: 22 U/L (ref 1–33)
AMPHET+METHAMPHET UR QL: NEGATIVE
AMPHETAMINES UR QL: NEGATIVE
ANION GAP SERPL CALCULATED.3IONS-SCNC: 16.7 MMOL/L (ref 5–15)
AST SERPL-CCNC: 13 U/L (ref 1–32)
BARBITURATES UR QL SCN: NEGATIVE
BASOPHILS # BLD AUTO: 0.02 10*3/MM3 (ref 0–0.2)
BASOPHILS NFR BLD AUTO: 0.2 % (ref 0–1.5)
BENZODIAZ UR QL SCN: POSITIVE
BILIRUB SERPL-MCNC: 0.4 MG/DL (ref 0–1.2)
BILIRUB UR QL STRIP: NEGATIVE
BUN SERPL-MCNC: 7 MG/DL (ref 6–20)
BUN/CREAT SERPL: 7.7 (ref 7–25)
BUPRENORPHINE SERPL-MCNC: NEGATIVE NG/ML
CALCIUM SPEC-SCNC: 9.3 MG/DL (ref 8.6–10.5)
CANNABINOIDS SERPL QL: NEGATIVE
CHLORIDE SERPL-SCNC: 98 MMOL/L (ref 98–107)
CLARITY UR: CLEAR
CO2 SERPL-SCNC: 17.3 MMOL/L (ref 22–29)
COCAINE UR QL: NEGATIVE
COLOR UR: YELLOW
CREAT SERPL-MCNC: 0.91 MG/DL (ref 0.57–1)
DEPRECATED RDW RBC AUTO: 50.1 FL (ref 37–54)
EGFRCR SERPLBLD CKD-EPI 2021: 82.5 ML/MIN/1.73
EOSINOPHIL # BLD AUTO: 0.01 10*3/MM3 (ref 0–0.4)
EOSINOPHIL NFR BLD AUTO: 0.1 % (ref 0.3–6.2)
ERYTHROCYTE [DISTWIDTH] IN BLOOD BY AUTOMATED COUNT: 15.9 % (ref 12.3–15.4)
ETHANOL BLD-MCNC: <10 MG/DL (ref 0–10)
ETHANOL UR QL: <0.01 %
FENTANYL UR-MCNC: NEGATIVE NG/ML
GLOBULIN UR ELPH-MCNC: 2.4 GM/DL
GLUCOSE SERPL-MCNC: 448 MG/DL (ref 65–99)
GLUCOSE UR STRIP-MCNC: ABNORMAL MG/DL
HCT VFR BLD AUTO: 42.4 % (ref 34–46.6)
HGB BLD-MCNC: 14.3 G/DL (ref 12–15.9)
HGB UR QL STRIP.AUTO: NEGATIVE
IMM GRANULOCYTES # BLD AUTO: 0.06 10*3/MM3 (ref 0–0.05)
IMM GRANULOCYTES NFR BLD AUTO: 0.7 % (ref 0–0.5)
KETONES UR QL STRIP: NEGATIVE
LEUKOCYTE ESTERASE UR QL STRIP.AUTO: NEGATIVE
LYMPHOCYTES # BLD AUTO: 0.86 10*3/MM3 (ref 0.7–3.1)
LYMPHOCYTES NFR BLD AUTO: 9.5 % (ref 19.6–45.3)
MAGNESIUM SERPL-MCNC: 1.9 MG/DL (ref 1.6–2.6)
MCH RBC QN AUTO: 28.8 PG (ref 26.6–33)
MCHC RBC AUTO-ENTMCNC: 33.7 G/DL (ref 31.5–35.7)
MCV RBC AUTO: 85.5 FL (ref 79–97)
METHADONE UR QL SCN: NEGATIVE
MONOCYTES # BLD AUTO: 0.06 10*3/MM3 (ref 0.1–0.9)
MONOCYTES NFR BLD AUTO: 0.7 % (ref 5–12)
NEUTROPHILS NFR BLD AUTO: 8.08 10*3/MM3 (ref 1.7–7)
NEUTROPHILS NFR BLD AUTO: 88.8 % (ref 42.7–76)
NITRITE UR QL STRIP: NEGATIVE
NRBC BLD AUTO-RTO: 0 /100 WBC (ref 0–0.2)
OPIATES UR QL: POSITIVE
OXYCODONE UR QL SCN: NEGATIVE
PCP UR QL SCN: NEGATIVE
PH UR STRIP.AUTO: 6 [PH] (ref 5–8)
PHOSPHATE SERPL-MCNC: 2.9 MG/DL (ref 2.5–4.5)
PLATELET # BLD AUTO: 288 10*3/MM3 (ref 140–450)
PMV BLD AUTO: 9.6 FL (ref 6–12)
POTASSIUM SERPL-SCNC: 4.3 MMOL/L (ref 3.5–5.2)
PROT SERPL-MCNC: 6.9 G/DL (ref 6–8.5)
PROT UR QL STRIP: NEGATIVE
RBC # BLD AUTO: 4.96 10*6/MM3 (ref 3.77–5.28)
SODIUM SERPL-SCNC: 132 MMOL/L (ref 136–145)
SP GR UR STRIP: >=1.03 (ref 1–1.03)
TRICYCLICS UR QL SCN: POSITIVE
TROPONIN T SERPL HS-MCNC: <6 NG/L
UROBILINOGEN UR QL STRIP: ABNORMAL
WBC NRBC COR # BLD AUTO: 9.09 10*3/MM3 (ref 3.4–10.8)

## 2024-04-02 PROCEDURE — 25810000003 SODIUM CHLORIDE 0.9 % SOLUTION

## 2024-04-02 PROCEDURE — 83735 ASSAY OF MAGNESIUM: CPT

## 2024-04-02 PROCEDURE — 80307 DRUG TEST PRSMV CHEM ANLYZR: CPT

## 2024-04-02 PROCEDURE — 84100 ASSAY OF PHOSPHORUS: CPT

## 2024-04-02 PROCEDURE — 82077 ASSAY SPEC XCP UR&BREATH IA: CPT

## 2024-04-02 PROCEDURE — 70450 CT HEAD/BRAIN W/O DYE: CPT

## 2024-04-02 PROCEDURE — 84484 ASSAY OF TROPONIN QUANT: CPT

## 2024-04-02 PROCEDURE — 85025 COMPLETE CBC W/AUTO DIFF WBC: CPT

## 2024-04-02 PROCEDURE — 80053 COMPREHEN METABOLIC PANEL: CPT

## 2024-04-02 PROCEDURE — 63710000001 INSULIN REGULAR HUMAN PER 5 UNITS

## 2024-04-02 PROCEDURE — 81003 URINALYSIS AUTO W/O SCOPE: CPT

## 2024-04-02 PROCEDURE — 71045 X-RAY EXAM CHEST 1 VIEW: CPT

## 2024-04-02 RX ORDER — SODIUM CHLORIDE 0.9 % (FLUSH) 0.9 %
10 SYRINGE (ML) INJECTION AS NEEDED
Status: DISCONTINUED | OUTPATIENT
Start: 2024-04-02 | End: 2024-04-02 | Stop reason: HOSPADM

## 2024-04-02 RX ADMIN — HUMAN INSULIN 10 UNITS: 100 INJECTION, SOLUTION SUBCUTANEOUS at 01:58

## 2024-04-02 RX ADMIN — SODIUM CHLORIDE 1000 ML: 9 INJECTION, SOLUTION INTRAVENOUS at 00:58

## 2024-04-02 NOTE — ED NOTES
"Pt is wanting to leave AMA. Pt educated on risks of leaving versus the benefits of staying. Pt states \"I do not care! I want to smoke.:\" Pt was offered a nicotine patch. Pt states she is allergic to the patches and can not chew the gum d/t it \"being too hard and I do not have any teeth\". Provider informed. Provider instructed to go ahead and give IV insulin before pt leaves. See MAR  "

## 2024-04-02 NOTE — ED PROVIDER NOTES
" EMERGENCY DEPARTMENT ENCOUNTER    Pt Name: Lindsay Amezquita  MRN: [id number]  Pt :   1985  Room Number:    Date of encounter:  2024  PCP: Stephanie Schroeder PA-C  ED Provider: Antoni Rodriguez PA-C    Historian: patient, father at bedside, nursing notes      HPI:  Chief Complaint: shortness of breath        Context: Lindsay Amezquita is a 38 y.o. female who presents to the ED c/o shortness of breath for the past hour.  Patient's father at bedside states that after leaving the ER earlier today the patient reported feeling \"much better\" but she then took her \"nighttime medicines\" and shortly afterwards began complaining of increased shortness of breath and asked her father to take her back to the ER.  Patient denies nausea, vomiting, or abdominal pain.        PAST MEDICAL HISTORY  Past Medical History:   Diagnosis Date    Allergic     Anxiety     Asthma Beings of copd with asthma    Back pain     Bell's palsy     Bipolar 2 disorder     Blood clot in vein     Behind left knee cap    COPD (chronic obstructive pulmonary disease) Six months ago    Deep vein thrombosis Last year    Depression     Diabetes mellitus November    Pre diabete    Frequent headaches     GERD (gastroesophageal reflux disease)     Hypertension     Every time i go into the emergacy room    Irritable bowel syndrome Two years ago    Kidney stone Two years ago    Migraine     Obesity All of my life    Ovarian cyst Two years ago    Panic     PTSD (post-traumatic stress disorder)     Pulmonary embolism Not in lungs    Recurrent pregnancy loss, antepartum condition or complication Every since i have been 14 yars old    Restless leg syndrome     Sinus problem     Snores     Tattoos     Urinary tract infection Have them on and off    Varicella Little    Visual impairment Since i was little    Vitamin B12 deficiency     Wears glasses          PAST SURGICAL HISTORY  Past Surgical History:   Procedure Laterality Date    CHOLECYSTECTOMY      " HYSTEROSCOPY N/A 3/14/2024    Procedure: HYSTEROSCOPY WITH MYOSURE, DILATION AND CURETTAGE WITH CERENE ABLATION;  Surgeon: David Ribeiro MD;  Location: [facility];  Service: Obstetrics/Gynecology;  Laterality: N/A;    MULTIPLE TOOTH EXTRACTIONS      All my teeth    WISDOM TOOTH EXTRACTION  All my teeth         FAMILY HISTORY  Family History   Problem Relation Age of Onset    Irritable bowel syndrome Mother     Heart disease Mother     Miscarriages / Stillbirths Mother     Arthritis Mother     COPD Mother     Learning disabilities Mother     Mental illness Mother     Asthma Mother     Irritable bowel syndrome Father     Alcohol abuse Father     Diabetes Father     Hyperlipidemia Father     Anxiety disorder Father     Learning disabilities Father     Colon cancer Maternal Grandfather     Stroke Paternal Grandfather     Stroke Paternal Grandmother          SOCIAL HISTORY  Social History     Socioeconomic History    Marital status: Single   Tobacco Use    Smoking status: Every Day     Current packs/day: 0.50     Average packs/day: 2.0 packs/day for 31.8 years (63.2 ttl pk-yrs)     Types: Cigarettes     Start date: 7/17/1992     Passive exposure: Current    Smokeless tobacco: Never    Tobacco comments:         Vaping Use    Vaping status: Former    Passive vaping exposure: Yes   Substance and Sexual Activity    Alcohol use: Not Currently     Comment: rarely    Drug use: Not Currently    Sexual activity: Not Currently     Partners: Female     Birth control/protection: Other, Same-sex partner         ALLERGIES  Aspirin, Codeine, Cyclobenzaprine, Haldol [haloperidol], Imitrex [sumatriptan], Latex, Penicillins, Compazine [prochlorperazine], Lamictal [lamotrigine], Morphine, Ondansetron, Oxycodone-acetaminophen, Oxycontin [oxycodone], Reglan [metoclopramide], Tramadol, and Tylenol [acetaminophen]        REVIEW OF SYSTEMS  Review of Systems   Constitutional:  Negative for chills and fever.   HENT:  Negative for  congestion and sore throat.    Respiratory:  Positive for shortness of breath. Negative for cough and wheezing.    Cardiovascular:  Negative for chest pain.   Gastrointestinal:  Negative for abdominal pain, nausea and vomiting.   Genitourinary:  Negative for dysuria.   Musculoskeletal:  Negative for back pain.   Skin:  Negative for wound.   Neurological:  Negative for dizziness and headaches.   Psychiatric/Behavioral:  Negative for confusion.    All other systems reviewed and are negative.         All systems reviewed and negative except for those discussed in HPI.       PHYSICAL EXAM    I have reviewed the triage vital signs and nursing notes.    ED Triage Vitals [04/01/24 2337]   Temp Heart Rate Resp BP SpO2   97.9 °F (36.6 °C) 94 24 125/77 92 %      Temp src Heart Rate Source Patient Position BP Location FiO2 (%)   Oral Monitor Sitting Left arm --       Physical Exam  Vitals and nursing note reviewed.   Constitutional:       General: She is not in acute distress.     Appearance: She is not ill-appearing, toxic-appearing or diaphoretic.   HENT:      Head: Normocephalic and atraumatic.      Mouth/Throat:      Mouth: Mucous membranes are moist.      Pharynx: Oropharynx is clear.   Eyes:      Extraocular Movements: Extraocular movements intact.   Cardiovascular:      Rate and Rhythm: Normal rate.      Heart sounds: Normal heart sounds.   Pulmonary:      Effort: Pulmonary effort is normal. No respiratory distress.      Breath sounds: Normal breath sounds. No decreased breath sounds, wheezing, rhonchi or rales.   Abdominal:      Tenderness: There is no abdominal tenderness.   Skin:     General: Skin is warm and dry.      Findings: No rash.   Neurological:      Mental Status: She is alert.             LAB RESULTS  Recent Results (from the past 24 hour(s))   Comprehensive Metabolic Panel    Collection Time: 04/01/24  6:05 PM    Specimen: Blood   Result Value Ref Range    Glucose 178 (H) 65 - 99 mg/dL    BUN 5 (L) 6 - 20  mg/dL    Creatinine 0.65 0.57 - 1.00 mg/dL    Sodium 143 136 - 145 mmol/L    Potassium 3.8 3.5 - 5.2 mmol/L    Chloride 102 98 - 107 mmol/L    CO2 22.3 22.0 - 29.0 mmol/L    Calcium 9.5 8.6 - 10.5 mg/dL    Total Protein 7.1 6.0 - 8.5 g/dL    Albumin 4.7 3.5 - 5.2 g/dL    ALT (SGPT) 22 1 - 33 U/L    AST (SGOT) 12 1 - 32 U/L    Alkaline Phosphatase 74 39 - 117 U/L    Total Bilirubin 0.4 0.0 - 1.2 mg/dL    Globulin 2.4 gm/dL    A/G Ratio 2.0 g/dL    BUN/Creatinine Ratio 7.7 7.0 - 25.0    Anion Gap 18.7 (H) 5.0 - 15.0 mmol/L    eGFR 115.0 >60.0 mL/min/1.73   BNP    Collection Time: 04/01/24  6:05 PM    Specimen: Blood   Result Value Ref Range    proBNP <36.0 0.0 - 450.0 pg/mL   Single High Sensitivity Troponin T    Collection Time: 04/01/24  6:05 PM    Specimen: Blood   Result Value Ref Range    HS Troponin T <6 <14 ng/L   Green Top (Gel)    Collection Time: 04/01/24  6:05 PM   Result Value Ref Range    Extra Tube Hold for add-ons.    Lavender Top    Collection Time: 04/01/24  6:05 PM   Result Value Ref Range    Extra Tube hold for add-on    Gold Top - SST    Collection Time: 04/01/24  6:05 PM   Result Value Ref Range    Extra Tube Hold for add-ons.    Light Blue Top    Collection Time: 04/01/24  6:05 PM   Result Value Ref Range    Extra Tube Hold for add-ons.    CBC Auto Differential    Collection Time: 04/01/24  6:05 PM    Specimen: Blood   Result Value Ref Range    WBC 11.50 (H) 3.40 - 10.80 10*3/mm3    RBC 5.22 3.77 - 5.28 10*6/mm3    Hemoglobin 15.3 12.0 - 15.9 g/dL    Hematocrit 44.1 34.0 - 46.6 %    MCV 84.5 79.0 - 97.0 fL    MCH 29.3 26.6 - 33.0 pg    MCHC 34.7 31.5 - 35.7 g/dL    RDW 16.2 (H) 12.3 - 15.4 %    RDW-SD 50.0 37.0 - 54.0 fl    MPV 9.2 6.0 - 12.0 fL    Platelets 311 140 - 450 10*3/mm3    Neutrophil % 52.5 42.7 - 76.0 %    Lymphocyte % 40.3 19.6 - 45.3 %    Monocyte % 4.6 (L) 5.0 - 12.0 %    Eosinophil % 1.8 0.3 - 6.2 %    Basophil % 0.4 0.0 - 1.5 %    Immature Grans % 0.4 0.0 - 0.5 %    Neutrophils,  Absolute 6.02 1.70 - 7.00 10*3/mm3    Lymphocytes, Absolute 4.64 (H) 0.70 - 3.10 10*3/mm3    Monocytes, Absolute 0.53 0.10 - 0.90 10*3/mm3    Eosinophils, Absolute 0.21 0.00 - 0.40 10*3/mm3    Basophils, Absolute 0.05 0.00 - 0.20 10*3/mm3    Immature Grans, Absolute 0.05 0.00 - 0.05 10*3/mm3    nRBC 0.0 0.0 - 0.2 /100 WBC   COVID-19 and FLU A/B PCR, 1 HR TAT - Swab, Nasopharynx    Collection Time: 04/01/24  6:22 PM    Specimen: Nasopharynx; Swab   Result Value Ref Range    COVID19 Not Detected Not Detected - Ref. Range    Influenza A PCR Not Detected Not Detected    Influenza B PCR Not Detected Not Detected   Urinalysis With Microscopic If Indicated (No Culture) - Urine, Clean Catch    Collection Time: 04/02/24 12:51 AM    Specimen: Urine, Clean Catch   Result Value Ref Range    Color, UA Yellow Yellow, Straw    Appearance, UA Clear Clear    pH, UA 6.0 5.0 - 8.0    Specific Gravity, UA >=1.030 1.005 - 1.030    Glucose, UA >=1000 mg/dL (3+) (A) Negative    Ketones, UA Negative Negative    Bilirubin, UA Negative Negative    Blood, UA Negative Negative    Protein, UA Negative Negative    Leuk Esterase, UA Negative Negative    Nitrite, UA Negative Negative    Urobilinogen, UA 0.2 E.U./dL 0.2 - 1.0 E.U./dL   Urine Drug Screen - Urine, Clean Catch    Collection Time: 04/02/24 12:51 AM    Specimen: Urine, Clean Catch   Result Value Ref Range    THC, Screen, Urine Negative Negative    Phencyclidine (PCP), Urine Negative Negative    Cocaine Screen, Urine Negative Negative    Methamphetamine, Ur Negative Negative    Opiate Screen Positive (A) Negative    Amphetamine Screen, Urine Negative Negative    Benzodiazepine Screen, Urine Positive (A) Negative    Tricyclic Antidepressants Screen Positive (A) Negative    Methadone Screen, Urine Negative Negative    Barbiturates Screen, Urine Negative Negative    Oxycodone Screen, Urine Negative Negative    Buprenorphine, Screen, Urine Negative Negative   Fentanyl, Urine - Urine, Clean  Catch    Collection Time: 04/02/24 12:51 AM    Specimen: Urine, Clean Catch   Result Value Ref Range    Fentanyl, Urine Negative Negative   Comprehensive Metabolic Panel    Collection Time: 04/02/24 12:58 AM    Specimen: Blood   Result Value Ref Range    Glucose 448 (C) 65 - 99 mg/dL    BUN 7 6 - 20 mg/dL    Creatinine 0.91 0.57 - 1.00 mg/dL    Sodium 132 (L) 136 - 145 mmol/L    Potassium 4.3 3.5 - 5.2 mmol/L    Chloride 98 98 - 107 mmol/L    CO2 17.3 (L) 22.0 - 29.0 mmol/L    Calcium 9.3 8.6 - 10.5 mg/dL    Total Protein 6.9 6.0 - 8.5 g/dL    Albumin 4.5 3.5 - 5.2 g/dL    ALT (SGPT) 22 1 - 33 U/L    AST (SGOT) 13 1 - 32 U/L    Alkaline Phosphatase 76 39 - 117 U/L    Total Bilirubin 0.4 0.0 - 1.2 mg/dL    Globulin 2.4 gm/dL    A/G Ratio 1.9 g/dL    BUN/Creatinine Ratio 7.7 7.0 - 25.0    Anion Gap 16.7 (H) 5.0 - 15.0 mmol/L    eGFR 82.5 >60.0 mL/min/1.73   Magnesium    Collection Time: 04/02/24 12:58 AM    Specimen: Blood   Result Value Ref Range    Magnesium 1.9 1.6 - 2.6 mg/dL   Ethanol    Collection Time: 04/02/24 12:58 AM    Specimen: Blood   Result Value Ref Range    Ethanol <10 0 - 10 mg/dL    Ethanol % <0.010 %   CBC Auto Differential    Collection Time: 04/02/24 12:58 AM    Specimen: Blood   Result Value Ref Range    WBC 9.09 3.40 - 10.80 10*3/mm3    RBC 4.96 3.77 - 5.28 10*6/mm3    Hemoglobin 14.3 12.0 - 15.9 g/dL    Hematocrit 42.4 34.0 - 46.6 %    MCV 85.5 79.0 - 97.0 fL    MCH 28.8 26.6 - 33.0 pg    MCHC 33.7 31.5 - 35.7 g/dL    RDW 15.9 (H) 12.3 - 15.4 %    RDW-SD 50.1 37.0 - 54.0 fl    MPV 9.6 6.0 - 12.0 fL    Platelets 288 140 - 450 10*3/mm3    Neutrophil % 88.8 (H) 42.7 - 76.0 %    Lymphocyte % 9.5 (L) 19.6 - 45.3 %    Monocyte % 0.7 (L) 5.0 - 12.0 %    Eosinophil % 0.1 (L) 0.3 - 6.2 %    Basophil % 0.2 0.0 - 1.5 %    Immature Grans % 0.7 (H) 0.0 - 0.5 %    Neutrophils, Absolute 8.08 (H) 1.70 - 7.00 10*3/mm3    Lymphocytes, Absolute 0.86 0.70 - 3.10 10*3/mm3    Monocytes, Absolute 0.06 (L) 0.10 - 0.90  10*3/mm3    Eosinophils, Absolute 0.01 0.00 - 0.40 10*3/mm3    Basophils, Absolute 0.02 0.00 - 0.20 10*3/mm3    Immature Grans, Absolute 0.06 (H) 0.00 - 0.05 10*3/mm3    nRBC 0.0 0.0 - 0.2 /100 WBC   Single High Sensitivity Troponin T    Collection Time: 04/02/24 12:58 AM    Specimen: Blood   Result Value Ref Range    HS Troponin T <6 <14 ng/L   Phosphorus    Collection Time: 04/02/24 12:58 AM    Specimen: Blood   Result Value Ref Range    Phosphorus 2.9 2.5 - 4.5 mg/dL       If labs were ordered, I independently reviewed the results and considered them in treating the patient.        RADIOLOGY  CT Head Without Contrast    Result Date: 4/2/2024  FINAL REPORT TECHNIQUE: null CLINICAL HISTORY: Mental status change, unknown cause COMPARISON: null FINDINGS: CT Head WO Contrast COMPARISON: CT/SR - CT HEAD WO CONTRAST - 01/17/2024 01:36 AM EST FINDINGS: No acute intracranial hemorrhage. No evidence of acute infarction. No mass-effect or midline shift. No hydrocephalus. Visualized paranasal sinuses are clear. The mastoid air cells are clear. The visible orbits are normal. No acute fracture. Normal variant developmental non-fusion of the posterior C1 ring. Unremarkable soft tissues.     IMPRESSION: No acute intracranial findings. Authenticated and Electronically Signed by Jose Pizano MD on 04/02/2024 01:01:29 AM     I ordered and independently reviewed the above noted radiographic studies.      I viewed images of CT head which showed no acute intracranial abnormalities per my independent interpretation.    I viewed images of the chest x-ray which showed    See radiologist's dictation for official interpretation.        PROCEDURES    Procedures    ECG 12 Lead Other; soa   Final Result      ECG 12 Lead Chest Pain    (Results Pending)       MEDICATIONS GIVEN IN ER    Medications   sodium chloride 0.9 % flush 10 mL (has no administration in time range)   sodium chloride 0.9 % bolus 1,000 mL (0 mL Intravenous Stopped 4/2/24  0201)   insulin regular (humuLIN R,novoLIN R) injection 10 Units (10 Units Intravenous Given 4/2/24 0158)         MEDICAL DECISION MAKING, PROGRESS, and CONSULTS    All labs, if obtained, have been independently reviewed by me.  All radiology studies, if obtained, have been reviewed by me and the radiologist dictating the report.  All EKG's, if obtained, have been independently viewed and interpreted by me/my attending physician.      Discussion below represents my analysis of pertinent findings related to patient's condition, differential diagnosis, treatment plan and final disposition.    On initial presentation patient was complaining of SOB for the past 1 hour.  Patient had mild slurred speech and appeared intoxicated.  Extensive workup was initiated.  Ct Head per radiology showed no acute intracranial abnormalities.  CXR per my own interpretation showed no acute cardiopulmonary process.  Lab work showed a glucose of 448, CO2 17.3, anion gap 16.7.  Patient is alert oriented and communicating although with mild slurred speech she does appear capable of making her own medical decisions.  Patient did require nasal cannula 2 L of oxygen to maintain 94 or 95% which represents a new oxygen requirement for her.  IV fluids were given along with 10 units of insulin IV due to her hyperglycemia.  Urinalysis revealed no ketonuria.  Urine drug screen did show positive opiate and benzodiazepine and tricyclic antidepressant testing explaining the patient's mild slurred speech.    Over concern for patient's new oxygen requirement hyperglycemia, I did communicate to the patient that I recommended she be admitted to the hospital.  Patient refused stating she did not want to be admitted and rather wanted to go home AGAINST MEDICAL ADVICE.  I clearly explained the risks and dangers including death to the patient multiple times as well as to her father at bedside and patient still decided to leave the hospital at her own risk  AGAINST MEDICAL ADVICE.                     Differential diagnosis:    Differential diagnosis included but wasn't limited to DKA, pneumonia, drug intoxication, medication reaction, COPD exacerbation      Additional sources:    - Discussed/ obtained information from independent historians:   Father at bedside    - External (non-ED) record review: Previous hospital admission records and primary care visit records    - Chronic or social conditions impacting care: Diabetes    - Shared decision making: Patient left AGAINST MEDICAL ADVICE after clearly explaining the risks to the patient at bedside      Orders placed during this visit:  Orders Placed This Encounter   Procedures    CT Head Without Contrast    XR Chest 1 View    Comprehensive Metabolic Panel    Urinalysis With Microscopic If Indicated (No Culture) - Urine, Clean Catch    Magnesium    Urine Drug Screen - Urine, Clean Catch    Ethanol    CBC Auto Differential    Single High Sensitivity Troponin T    Phosphorus    Blood Gas, Venous -With Co-Ox Panel: Yes    Fentanyl, Urine - Urine, Clean Catch    POC Glucose STAT    ECG 12 Lead Other; soa    ECG 12 Lead Chest Pain    Insert Peripheral IV    CBC & Differential         Additional orders considered but not ordered:      ED Course:    Consultants:                  Shared Decision Making:  After my consideration of clinical presentation and any laboratory/radiology studies obtained, I discussed the findings with the patient/patient representative who is in agreement with the treatment plan and the final disposition.   Risks and benefits of discharge and/or observation/admission were discussed.       AS OF 02:02 EDT VITALS:    BP - 153/72  HR - 91  TEMP - 97.9 °F (36.6 °C) (Oral)  O2 SATS - 95%                  DIAGNOSIS  Final diagnoses:   Left against medical advice         DISPOSITION  AMA      Please note that portions of this document were completed with voice recognition software.      Antoni Rodriguez,  BEN  04/02/24 0202

## 2024-04-02 NOTE — TELEPHONE ENCOUNTER
Caller: Lindsay Amezquita    Relationship: Self    Best call back number: [phone]     What is the best time to reach you: ANYTIME    Who are you requesting to speak with (clinical staff, provider,  specific staff member): MARANDA MARTINEZ    What was the call regarding: PATIENT WAS SEEN AT THE EMERGENCY ROOM TWICE ON  04/01/24, AT [facility]. SHE HAD TO SIGN OUT AMA BECAUSE THEY WOULD NOT LISTEN TO THE PATIENT. SHE STATED THAT SHE WAS NOT HAPPY WITH THE WAAY THAT SHE WAS TREATED AND THAT THE HOSPITAL WANTED TO ADMIT HER AND SHE WAS NOT OK WITH THAT. SHE IS NOT COMFORTABLE GOING BACK TO [facility]. THE PATIENT WAS NOT ALLOWED TO GO TO THE BATHROOM AT THE HOSPITAL. SHE IS ALSO HAVING ISSUES WITH [facility].

## 2024-04-02 NOTE — ED NOTES
Discussed with pt the potential for deterioration and/or death. Pt still refuses admission. Pt A&O x4. GCS 15. Reviewed AMA paper with pt. Pt signed form. Form placed in records for scan. Pt refused last set of vitals and left hospital with father.

## 2024-04-02 NOTE — TELEPHONE ENCOUNTER
PATIENT REQUESTING A REFERRAL TO PAIN MANAGEMENT  - [address] , [place]. [place] FOR LOW BACK PAIN, NECK PAIN

## 2024-04-02 NOTE — TELEPHONE ENCOUNTER
CONFIRMED ORDERS RECEIVED PER MILTON ZAPATA [facility].    CALLED PATIENT TO ADVISE ORDERS ARE RECEIVED AND THEY ARE PROCESSING HER ORDER     PATIENT WANTS THE PROVIDER TO KNOW ABOUT HER EXPERIENCE AT ED LAST NIGHT. PREVIOUS MESSAGE SENT TO PROVIDER, PATIENT STATES SHE IS FEELING BETTER TODAY.

## 2024-04-02 NOTE — TELEPHONE ENCOUNTER
SPOKE WITH LIYA COHN - [facility] [phone] - PHONE  [phone]   FAXED ORDERS TO LIYA.  ORDERS WERE FAXED LAST NIGHT LIYA DID NOT RECEIVE

## 2024-04-03 ENCOUNTER — OFFICE VISIT (OUTPATIENT)
Dept: PULMONOLOGY | Facility: CLINIC | Age: 39
End: 2024-04-03
Payer: MEDICAID

## 2024-04-03 ENCOUNTER — TELEPHONE (OUTPATIENT)
Dept: PULMONOLOGY | Facility: CLINIC | Age: 39
End: 2024-04-03

## 2024-04-03 VITALS
DIASTOLIC BLOOD PRESSURE: 78 MMHG | HEART RATE: 94 BPM | HEIGHT: 62 IN | WEIGHT: 246 LBS | BODY MASS INDEX: 45.27 KG/M2 | OXYGEN SATURATION: 97 % | SYSTOLIC BLOOD PRESSURE: 134 MMHG | RESPIRATION RATE: 20 BRPM

## 2024-04-03 DIAGNOSIS — J41.0 CHRONIC BRONCHITIS, SIMPLE: ICD-10-CM

## 2024-04-03 DIAGNOSIS — R06.2 WHEEZING ON INSPIRATION: Primary | ICD-10-CM

## 2024-04-03 DIAGNOSIS — G47.8 SLEEP TALKING: ICD-10-CM

## 2024-04-03 DIAGNOSIS — R06.02 SHORTNESS OF BREATH: Primary | ICD-10-CM

## 2024-04-03 DIAGNOSIS — G47.52 DREAM ENACTMENT BEHAVIOR: ICD-10-CM

## 2024-04-03 DIAGNOSIS — J44.9 CHRONIC OBSTRUCTIVE PULMONARY DISEASE, UNSPECIFIED COPD TYPE: ICD-10-CM

## 2024-04-03 DIAGNOSIS — E66.01 MORBID OBESITY, UNSPECIFIED OBESITY TYPE: ICD-10-CM

## 2024-04-03 DIAGNOSIS — R06.83 SNORING: ICD-10-CM

## 2024-04-03 DIAGNOSIS — G47.19 EXCESSIVE DAYTIME SLEEPINESS: ICD-10-CM

## 2024-04-03 DIAGNOSIS — F17.210 NICOTINE DEPENDENCE, CIGARETTES, UNCOMPLICATED: ICD-10-CM

## 2024-04-03 DIAGNOSIS — G47.33 OBSTRUCTIVE SLEEP APNEA: ICD-10-CM

## 2024-04-03 NOTE — PROGRESS NOTES
"  Chief Complaint   Patient presents with    Follow-up    Breathing Problem       Subjective   Lindsay Amezquita is a 38 y.o. female.   Not seen since Oct 2022.    She continues to have cough, phlegm production or shortness of breath.  She actually says that she wakes up in the morning and spends few minutes clearing her throat.    Patient used to smoke 2 PPD for about 32 years.  She currently smokes 1/2 PPD now.    She drinks multiple caffeinated drinks per day.    She does complain of daytime sleepiness, tiredness and fatigue.  She also snores and wakes up in the middle of the night gasping for breath.      The following portions of the patient's history were reviewed and updated as appropriate: allergies, current medications, past family history, past medical history, past social history, and past surgical history.    Review of Systems   HENT:  Positive for sinus pressure and sneezing. Negative for sore throat.    Respiratory:  Positive for cough, chest tightness, shortness of breath and wheezing.        Objective   Visit Vitals  /78   Pulse 94   Resp 20   Ht 157.5 cm (62\") Comment: pt reported   Wt 112 kg (246 lb)   LMP 02/21/2024 (Approximate)   SpO2 97%   BMI 44.99 kg/m²         BMI Readings from Last 3 Encounters:   04/03/24 44.99 kg/m²   04/01/24 44.08 kg/m²   04/01/24 44.76 kg/m²       Physical Exam  Vitals reviewed.   Constitutional:       Appearance: She is well-developed.   HENT:      Head: Normocephalic and atraumatic.      Mouth/Throat:      Comments: Oropharynx was crowded.  Eyes:      Extraocular Movements: Extraocular movements intact.   Cardiovascular:      Rate and Rhythm: Normal rate.   Pulmonary:      Comments: Somewhat hyperresonant to percussion.  Somewhat decreased air entry.  No obvious wheezing noted.   Musculoskeletal:      Cervical back: Neck supple.   Neurological:      Mental Status: She is alert and oriented to person, place, and time.             Assessment & Plan   Diagnoses and " all orders for this visit:    1. Shortness of breath (Primary)  -     Complete PFT - Pre & Post Bronchodilator; Future    2. Chronic obstructive pulmonary disease, unspecified COPD type  -     Complete PFT - Pre & Post Bronchodilator; Future    3. Chronic bronchitis, simple    4. Nicotine dependence, cigarettes, uncomplicated    5. Obstructive sleep apnea  -     Polysomnography 4 or More Parameters; Future    6. Snoring  -     Polysomnography 4 or More Parameters; Future    7. Excessive daytime sleepiness  -     Polysomnography 4 or More Parameters; Future    8. Morbid obesity, unspecified obesity type  -     Polysomnography 4 or More Parameters; Future    9. Dream enactment behavior  -     Polysomnography 4 or More Parameters; Future    10. Sleep talking  -     Polysomnography 4 or More Parameters; Future    Other orders  -     Fluticasone-Umeclidin-Vilant (TRELEGY) 100-62.5-25 MCG/ACT inhaler; Inhale 1 puff Daily. Rinse mouth with water after use.  Dispense: 1 each; Refill: 5           Return in about 7 weeks (around 5/23/2024) for Recheck, PFT F/U, Sleep study, For [place] ([place]).    DISCUSSION (if any):  I reviewed the patient's ER summary, dated 10/22/2022, that mentioned acute respiratory failure, lung nodules, RSV infection, suspected PING and anxiety.    Last chest x-ray was reviewed personally and the results were shared with the patient.  Images reviewed personally.   Results for orders placed during the hospital encounter of 04/01/24    XR Chest 1 View    Narrative  PROCEDURE: XR CHEST 1 VW-    HISTORY: short of air, AMS today    COMPARISON: 1 day prior.    FINDINGS: The heart is normal in size. The mediastinum is unremarkable.  Decreased inspiratory effort noted with crowding of the lung markings in  both lung bases. No acute infiltrate noted.. There is no pneumothorax.  There are no acute osseous abnormalities. Patient is rotated to the  right.    Impression  No acute cardiopulmonary  process.        This report was signed and finalized on 4/2/2024 8:06 AM by Rosalba Falcon MD.    Last CT scan results was reviewed in great detail with the patient.  Results for orders placed during the hospital encounter of 06/09/23    CT Angiogram Chest Pulmonary Embolism    Narrative  FINAL REPORT    TECHNIQUE:  The patient was injected with IV contrast.  Axial images were  obtained through the chest in a PE protocol.  3-D reconstruction  images were also performed.  Individualized dose reduction  techniques using automated exposure control or adjustment of the  MA and/or KV according to patient's size were employed.    CLINICAL HISTORY:  Hypoxia, tachycardia, positive D-dimer    FINDINGS:  Mediastinal vasculature is adequately opacified.  No pulmonary  artery filling defects are identified to suggest PE.   There is  no aortic dissection. There is no axillary adenopathy.  There is  no hilar or mediastinal adenopathy.  The heart size is normal.  There is scarring of the left lung base.  There is no  pericardial or pleural effusion. Limited images of the upper  abdomen demonstrate a nodule in the left adrenal gland measuring  2.0 cm which demonstrates a mean attenuation value of 6  Hounsfield units consistent with an adenoma.  There appears to  be some partially visualized inflammatory reaction near the head  of the pancreas, possibly acute pancreatitis.    Impression  1.  No pulmonary embolus or dissection.  2.  Left adrenal  adenoma.  3.  Possible acute pancreatitis.    Authenticated and Electronically Signed by Lacho Leslie MD  on 06/09/2023 11:15:21 PM        Laboratory workup also showed   Lab Results   Component Value Date    HGB 14.3 04/02/2024    HGB 15.3 04/01/2024    HGB 12.8 03/30/2024   ,   Lab Results   Component Value Date    HCT 42.4 04/02/2024    HCT 44.1 04/01/2024    HCT 37.2 03/30/2024       Lab Results   Component Value Date    EOSABS 0.01 04/02/2024    EOSABS 0.21 04/01/2024    EOSABS 0.10  03/30/2024    & Laboratory workup also showed   Lab Results   Component Value Date    CO2 17.3 (L) 04/02/2024   ,   Lab Results   Component Value Date    HGBA1C 6.5 (A) 03/27/2024    HGBA1C 7.0 (H) 01/05/2024    HGBA1C 6.3 05/25/2023   ,   Lab Results   Component Value Date    TSH 1.700 03/27/2024    TSH 2.780 01/04/2023    TSH 3.340 11/17/2022     Laboratory workup also showed   Lab Results   Component Value Date    PHART 7.344 (L) 10/21/2022    WBW4UKP 63.4 (C) 10/21/2022    PO2ART 66.6 (L) 10/21/2022    [id number] 93.4 (L) 10/21/2022    CARBOXYHGB 0.7 10/21/2022     ===========================  ===========================    Orders as above.    I have told the patient, that in my view, shortness of breath is most likely from underlying chronic obstructive lung disease.     Patient was given education and demonstration on how to use the medicine.     Side effects, of prescribed medicines, discussed.    Patient was also instructed on compliance and adherence with instructions.     I have discussed the need to quit smoking as soon as possible.    Patient was given reading material, as appropriate.     Patient was asked to call with any concerns.     Patient will be followed clinically to assess for response to treatment and further recommendations will be made, based on response.    Sleep questionnaire was provided to the patient    The pathophysiology of sleep apnea was discussed, with her.     We will encourage her to schedule the sleep study soon.     The patient will definitely need to be considered for an in lab study due to dream enactment, sleep talking & COPD among other reasons.  It should be noted that a home sleep study is likely to underestimate the true AHI and is unable to assess sleep stages and abnormal sleep behaviors etc. The patient has understood.    The patient is agreeable to try CPAP/BiPAP, if needed.     Patient was educated on good sleep hygiene measures and voiced understanding of the same.      Patient was given reading material regarding sleep apnea.      Dictated utilizing Dragon dictation.    This document was electronically signed by Anna Roger MD on 04/03/24 at 11:00 EDT

## 2024-04-03 NOTE — TELEPHONE ENCOUNTER
Provider: IVY    Caller: [facility]    Relationship to Patient: PROVIDER    Reason for Call: PRIOR AUTH IS NEEDED FOR THE TRELEGY ELIPTA-HE STATES IT WILL BE DENIED, BUT IT IS PART OF THE PROCESS. SHE WILL NEED TO TRY WHAT THEY CALL STEP THERAPY BEFORE THIS CAN BE AUTH. PLEASE CALL

## 2024-04-04 NOTE — TELEPHONE ENCOUNTER
Caller: Lindsay Amezquita    Relationship: Self    Best call back number: [phone]    What test/procedure requested: TRELEGY - PRIOR AUTHORIZATION NEEDED.    When is it needed: AS SOON AS POSSIBLE    Additional information or concerns:  PT IS HAVING A HARD TIME.    PT USES [facility]

## 2024-04-04 NOTE — TELEPHONE ENCOUNTER
Caller: Lindsay Amezquita    Relationship to patient: Self    Best call back number: [phone]     Patient is needing: PATIENT CALLED TO CHECK ON STATUS OF THIS REQUEST. SHE STATES THAT SHE HAS TRIED OTHER ALTERNATIVES PRIOR TO TRELEGY AND THEY HAVE NOT HELPED. PLEASE CALL BACK TO ADVISE.

## 2024-04-06 ENCOUNTER — TELEMEDICINE (OUTPATIENT)
Dept: FAMILY MEDICINE CLINIC | Facility: TELEHEALTH | Age: 39
End: 2024-04-06
Payer: MEDICAID

## 2024-04-06 ENCOUNTER — APPOINTMENT (OUTPATIENT)
Dept: GENERAL RADIOLOGY | Facility: HOSPITAL | Age: 39
End: 2024-04-06
Payer: MEDICAID

## 2024-04-06 ENCOUNTER — HOSPITAL ENCOUNTER (EMERGENCY)
Facility: HOSPITAL | Age: 39
Discharge: HOME | End: 2024-04-06
Attending: STUDENT IN AN ORGANIZED HEALTH CARE EDUCATION/TRAINING PROGRAM
Payer: MEDICAID

## 2024-04-06 VITALS
WEIGHT: 241 LBS | DIASTOLIC BLOOD PRESSURE: 95 MMHG | HEART RATE: 99 BPM | HEIGHT: 62 IN | OXYGEN SATURATION: 92 % | BODY MASS INDEX: 44.35 KG/M2 | SYSTOLIC BLOOD PRESSURE: 143 MMHG | TEMPERATURE: 97.7 F | RESPIRATION RATE: 18 BRPM

## 2024-04-06 DIAGNOSIS — R05.1 ACUTE COUGH: Primary | ICD-10-CM

## 2024-04-06 DIAGNOSIS — J40 BRONCHITIS: Primary | ICD-10-CM

## 2024-04-06 DIAGNOSIS — R07.89 TIGHTNESS IN CHEST: ICD-10-CM

## 2024-04-06 PROBLEM — Z86.69 HISTORY OF MIGRAINE: Status: ACTIVE | Noted: 2024-04-06

## 2024-04-06 PROBLEM — G43.909 MIGRAINE: Status: ACTIVE | Noted: 2020-05-21

## 2024-04-06 PROBLEM — J20.9 ACUTE BRONCHITIS: Status: ACTIVE | Noted: 2017-12-19

## 2024-04-06 PROBLEM — R10.9 ABDOMINAL PAIN: Status: ACTIVE | Noted: 2020-05-21

## 2024-04-06 PROBLEM — L23.7 CONTACT DERMATITIS DUE TO POISON IVY: Status: ACTIVE | Noted: 2019-07-15

## 2024-04-06 PROBLEM — K80.20 BILIARY CALCULUS: Status: ACTIVE | Noted: 2020-05-21

## 2024-04-06 PROBLEM — F31.81 BIPOLAR II DISORDER: Status: ACTIVE | Noted: 2020-05-21

## 2024-04-06 PROBLEM — E27.8 ADRENAL MASS: Status: ACTIVE | Noted: 2020-08-23

## 2024-04-06 PROBLEM — R19.7 NAUSEA, VOMITING AND DIARRHEA: Status: ACTIVE | Noted: 2018-09-17

## 2024-04-06 PROBLEM — G89.4 CHRONIC PAIN DISORDER: Status: ACTIVE | Noted: 2021-08-12

## 2024-04-06 PROBLEM — M54.16 LUMBAR RADICULOPATHY: Status: ACTIVE | Noted: 2024-01-30

## 2024-04-06 PROBLEM — R03.0 FINDING OF ABOVE NORMAL BLOOD PRESSURE: Status: ACTIVE | Noted: 2020-08-23

## 2024-04-06 PROBLEM — F41.9 ANXIETY DISORDER: Status: ACTIVE | Noted: 2019-10-29

## 2024-04-06 PROBLEM — L25.9 CONTACT DERMATITIS: Status: ACTIVE | Noted: 2021-03-20

## 2024-04-06 PROBLEM — S61.219A LACERATION OF FINGER: Status: ACTIVE | Noted: 2017-04-13

## 2024-04-06 PROBLEM — R07.9 CHEST PAIN: Status: ACTIVE | Noted: 2020-02-18

## 2024-04-06 PROBLEM — R10.9 FLANK PAIN: Status: ACTIVE | Noted: 2020-08-23

## 2024-04-06 PROBLEM — M54.50 LOW BACK PAIN: Status: ACTIVE | Noted: 2020-02-27

## 2024-04-06 PROBLEM — M47.812 CERVICAL SPONDYLOSIS: Status: ACTIVE | Noted: 2024-01-30

## 2024-04-06 PROBLEM — R11.2 NAUSEA, VOMITING AND DIARRHEA: Status: ACTIVE | Noted: 2018-09-17

## 2024-04-06 PROBLEM — G51.0 BELL'S PALSY: Status: ACTIVE | Noted: 2021-04-18

## 2024-04-06 PROBLEM — J06.9 VIRAL UPPER RESPIRATORY TRACT INFECTION: Status: ACTIVE | Noted: 2019-10-29

## 2024-04-06 LAB
ALBUMIN SERPL-MCNC: 4.9 G/DL (ref 3.5–5.2)
ALBUMIN/GLOB SERPL: 1.9 G/DL
ALP SERPL-CCNC: 99 U/L (ref 39–117)
ALT SERPL W P-5'-P-CCNC: 96 U/L (ref 1–33)
ANION GAP SERPL CALCULATED.3IONS-SCNC: 14.7 MMOL/L (ref 5–15)
AST SERPL-CCNC: 88 U/L (ref 1–32)
BILIRUB SERPL-MCNC: 0.6 MG/DL (ref 0–1.2)
BUN SERPL-MCNC: 8 MG/DL (ref 6–20)
BUN/CREAT SERPL: 11.8 (ref 7–25)
CALCIUM SPEC-SCNC: 9.4 MG/DL (ref 8.6–10.5)
CHLORIDE SERPL-SCNC: 98 MMOL/L (ref 98–107)
CO2 SERPL-SCNC: 24.3 MMOL/L (ref 22–29)
CREAT SERPL-MCNC: 0.68 MG/DL (ref 0.57–1)
DEPRECATED RDW RBC AUTO: 48.5 FL (ref 37–54)
EGFRCR SERPLBLD CKD-EPI 2021: 113.8 ML/MIN/1.73
ERYTHROCYTE [DISTWIDTH] IN BLOOD BY AUTOMATED COUNT: 15.6 % (ref 12.3–15.4)
GLOBULIN UR ELPH-MCNC: 2.6 GM/DL
GLUCOSE SERPL-MCNC: 146 MG/DL (ref 65–99)
HCT VFR BLD AUTO: 51.5 % (ref 34–46.6)
HGB BLD-MCNC: 17.6 G/DL (ref 12–15.9)
HOLD SPECIMEN: NORMAL
HOLD SPECIMEN: NORMAL
LYMPHOCYTES # BLD MANUAL: 5.17 10*3/MM3 (ref 0.7–3.1)
LYMPHOCYTES NFR BLD MANUAL: 7 % (ref 5–12)
MCH RBC QN AUTO: 29.1 PG (ref 26.6–33)
MCHC RBC AUTO-ENTMCNC: 34.2 G/DL (ref 31.5–35.7)
MCV RBC AUTO: 85.3 FL (ref 79–97)
METAMYELOCYTES NFR BLD MANUAL: 1 % (ref 0–0)
MONOCYTES # BLD: 1.01 10*3/MM3 (ref 0.1–0.9)
NEUTROPHILS # BLD AUTO: 8.04 10*3/MM3 (ref 1.7–7)
NEUTROPHILS NFR BLD MANUAL: 53 % (ref 42.7–76)
NEUTS BAND NFR BLD MANUAL: 3 % (ref 0–5)
NT-PROBNP SERPL-MCNC: <36 PG/ML (ref 0–450)
PLATELET # BLD AUTO: 301 10*3/MM3 (ref 140–450)
PMV BLD AUTO: 9.4 FL (ref 6–12)
POTASSIUM SERPL-SCNC: 4.1 MMOL/L (ref 3.5–5.2)
PROT SERPL-MCNC: 7.5 G/DL (ref 6–8.5)
RBC # BLD AUTO: 6.04 10*6/MM3 (ref 3.77–5.28)
RBC MORPH BLD: NORMAL
SCAN SLIDE: NORMAL
SMALL PLATELETS BLD QL SMEAR: ADEQUATE
SODIUM SERPL-SCNC: 137 MMOL/L (ref 136–145)
TROPONIN T SERPL HS-MCNC: <6 NG/L
VARIANT LYMPHS NFR BLD MANUAL: 3 % (ref 0–5)
VARIANT LYMPHS NFR BLD MANUAL: 33 % (ref 19.6–45.3)
WBC MORPH BLD: NORMAL
WBC NRBC COR # BLD AUTO: 14.36 10*3/MM3 (ref 3.4–10.8)
WHOLE BLOOD HOLD COAG: NORMAL
WHOLE BLOOD HOLD SPECIMEN: NORMAL

## 2024-04-06 PROCEDURE — 83880 ASSAY OF NATRIURETIC PEPTIDE: CPT

## 2024-04-06 PROCEDURE — 25810000003 SODIUM CHLORIDE 0.9 % SOLUTION: Performed by: STUDENT IN AN ORGANIZED HEALTH CARE EDUCATION/TRAINING PROGRAM

## 2024-04-06 PROCEDURE — 80053 COMPREHEN METABOLIC PANEL: CPT

## 2024-04-06 PROCEDURE — 96374 THER/PROPH/DIAG INJ IV PUSH: CPT

## 2024-04-06 PROCEDURE — 99213 OFFICE O/P EST LOW 20 MIN: CPT | Performed by: NURSE PRACTITIONER

## 2024-04-06 PROCEDURE — 84484 ASSAY OF TROPONIN QUANT: CPT

## 2024-04-06 PROCEDURE — 85007 BL SMEAR W/DIFF WBC COUNT: CPT

## 2024-04-06 PROCEDURE — 25010000002 DEXAMETHASONE SODIUM PHOSPHATE 10 MG/ML SOLUTION: Performed by: STUDENT IN AN ORGANIZED HEALTH CARE EDUCATION/TRAINING PROGRAM

## 2024-04-06 PROCEDURE — 71045 X-RAY EXAM CHEST 1 VIEW: CPT

## 2024-04-06 PROCEDURE — 36415 COLL VENOUS BLD VENIPUNCTURE: CPT

## 2024-04-06 PROCEDURE — 96375 TX/PRO/DX INJ NEW DRUG ADDON: CPT

## 2024-04-06 PROCEDURE — 93005 ELECTROCARDIOGRAM TRACING: CPT

## 2024-04-06 PROCEDURE — 25010000002 ONDANSETRON PER 1 MG: Performed by: STUDENT IN AN ORGANIZED HEALTH CARE EDUCATION/TRAINING PROGRAM

## 2024-04-06 PROCEDURE — 85025 COMPLETE CBC W/AUTO DIFF WBC: CPT

## 2024-04-06 PROCEDURE — 99284 EMERGENCY DEPT VISIT MOD MDM: CPT

## 2024-04-06 PROCEDURE — 25010000002 KETOROLAC TROMETHAMINE PER 15 MG: Performed by: STUDENT IN AN ORGANIZED HEALTH CARE EDUCATION/TRAINING PROGRAM

## 2024-04-06 RX ORDER — KETOROLAC TROMETHAMINE 30 MG/ML
15 INJECTION, SOLUTION INTRAMUSCULAR; INTRAVENOUS ONCE
Status: COMPLETED | OUTPATIENT
Start: 2024-04-06 | End: 2024-04-06

## 2024-04-06 RX ORDER — ONDANSETRON 2 MG/ML
4 INJECTION INTRAMUSCULAR; INTRAVENOUS ONCE
Status: COMPLETED | OUTPATIENT
Start: 2024-04-06 | End: 2024-04-06

## 2024-04-06 RX ORDER — SODIUM CHLORIDE 0.9 % (FLUSH) 0.9 %
10 SYRINGE (ML) INJECTION AS NEEDED
Status: DISCONTINUED | OUTPATIENT
Start: 2024-04-06 | End: 2024-04-07 | Stop reason: HOSPADM

## 2024-04-06 RX ORDER — IPRATROPIUM BROMIDE AND ALBUTEROL SULFATE 2.5; .5 MG/3ML; MG/3ML
3 SOLUTION RESPIRATORY (INHALATION) ONCE
Status: COMPLETED | OUTPATIENT
Start: 2024-04-06 | End: 2024-04-06

## 2024-04-06 RX ORDER — DEXAMETHASONE SODIUM PHOSPHATE 10 MG/ML
10 INJECTION, SOLUTION INTRAMUSCULAR; INTRAVENOUS ONCE
Status: COMPLETED | OUTPATIENT
Start: 2024-04-06 | End: 2024-04-06

## 2024-04-06 RX ADMIN — DEXAMETHASONE SODIUM PHOSPHATE 10 MG: 10 INJECTION INTRAMUSCULAR; INTRAVENOUS at 22:17

## 2024-04-06 RX ADMIN — KETOROLAC TROMETHAMINE 15 MG: 30 INJECTION, SOLUTION INTRAMUSCULAR; INTRAVENOUS at 23:16

## 2024-04-06 RX ADMIN — IPRATROPIUM BROMIDE AND ALBUTEROL SULFATE 3 ML: .5; 3 SOLUTION RESPIRATORY (INHALATION) at 23:16

## 2024-04-06 RX ADMIN — SODIUM CHLORIDE 500 ML: 9 INJECTION, SOLUTION INTRAVENOUS at 22:17

## 2024-04-06 RX ADMIN — ONDANSETRON 4 MG: 2 INJECTION INTRAMUSCULAR; INTRAVENOUS at 22:24

## 2024-04-07 NOTE — ED PROVIDER NOTES
EMERGENCY DEPARTMENT ENCOUNTER    Pt Name: Lindsay Amezquita  MRN: [id number]  Pt :   1985  Room Number:  10/10  Date of encounter:  2024  PCP: Stephanie Schroeder PA-C  ED Provider: Byron Azevedo MD    Historian: Patient      HPI:  Chief Complaint: Cough        Context: Lindsay Amezquita is a 38 y.o. female who presents to the ED c/o cough.  Patient states that she recently had pneumonia and finished steroids along with currently on her last day of doxycycline.  States that today symptoms have gotten worse.  No longer having productive cough but still coughing frequently.  Denies fever.  Has also had nausea vomiting and diarrhea.      PAST MEDICAL HISTORY  Past Medical History:   Diagnosis Date    Allergic     Anxiety     Asthma Beings of copd with asthma    Back pain     Bell's palsy     Bipolar 2 disorder     Blood clot in vein     Behind left knee cap    COPD (chronic obstructive pulmonary disease) Six months ago    Deep vein thrombosis Last year    Depression     Diabetes mellitus November    Pre diabete    Frequent headaches     GERD (gastroesophageal reflux disease)     Hypertension     Every time i go into the emergacy room    Irritable bowel syndrome Two years ago    Kidney stone Two years ago    Migraine     Nausea, vomiting and diarrhea 2018    Obesity All of my life    Ovarian cyst Two years ago    Panic     PTSD (post-traumatic stress disorder)     Pulmonary embolism Not in lungs    Recurrent pregnancy loss, antepartum condition or complication Every since i have been 14 yars old    Restless leg syndrome     Sinus problem     Snores     Tattoos     Urinary tract infection Have them on and off    Varicella Little    Visual impairment Since i was little    Vitamin B12 deficiency     Wears glasses          PAST SURGICAL HISTORY  Past Surgical History:   Procedure Laterality Date    CHOLECYSTECTOMY      HYSTEROSCOPY N/A 3/14/2024    Procedure: HYSTEROSCOPY WITH MYOSURE, DILATION AND CURETTAGE  WITH CERENE ABLATION;  Surgeon: David Ribeiro MD;  Location: [facility];  Service: Obstetrics/Gynecology;  Laterality: N/A;    MULTIPLE TOOTH EXTRACTIONS      All my teeth    WISDOM TOOTH EXTRACTION  All my teeth         FAMILY HISTORY  Family History   Problem Relation Age of Onset    Irritable bowel syndrome Mother     Heart disease Mother     Miscarriages / Stillbirths Mother     Arthritis Mother     COPD Mother     Learning disabilities Mother     Mental illness Mother     Asthma Mother     Irritable bowel syndrome Father     Alcohol abuse Father     Diabetes Father     Hyperlipidemia Father     Anxiety disorder Father     Learning disabilities Father     Colon cancer Maternal Grandfather     Stroke Paternal Grandfather     Stroke Paternal Grandmother          SOCIAL HISTORY  Social History     Socioeconomic History    Marital status: Single   Tobacco Use    Smoking status: Every Day     Current packs/day: 0.50     Average packs/day: 2.0 packs/day for 31.8 years (63.2 ttl pk-yrs)     Types: Cigarettes     Start date: 7/17/1992     Passive exposure: Current    Smokeless tobacco: Never    Tobacco comments:         Vaping Use    Vaping status: Former    Passive vaping exposure: Yes   Substance and Sexual Activity    Alcohol use: Not Currently     Comment: rarely    Drug use: Not Currently    Sexual activity: Not Currently     Partners: Female     Birth control/protection: Other, Same-sex partner         ALLERGIES  Aspirin, Codeine, Cyclobenzaprine, Haldol [haloperidol], Imitrex [sumatriptan], Latex, Penicillins, Compazine [prochlorperazine], Lamictal [lamotrigine], Morphine, Ondansetron, Oxycodone-acetaminophen, Oxycontin [oxycodone], Reglan [metoclopramide], Tramadol, and Tylenol [acetaminophen]        REVIEW OF SYSTEMS  Review of Systems     All systems reviewed and negative except for those discussed in HPI.       PHYSICAL EXAM    I have reviewed the triage vital signs and nursing notes.    ED Triage  Vitals [04/06/24 2142]   Temp Heart Rate Resp BP SpO2   97.7 °F (36.5 °C) 114 18 (!) 156/122 95 %      Temp src Heart Rate Source Patient Position BP Location FiO2 (%)   Oral Monitor Sitting Left arm --       Physical Exam    General:  Awake, alert, obese, no acute distress  HEENT: Atraumatic, normocephalic, EOMI, PERRLA, mucous membranes moist  NECK:  Supple, atraumatic  Cardiovascular:  tachycardic, regular rhythm, no murmurs, rubs, or gallops.  Extremities well perfused   Respiratory:  Regular rate, largely clear lungs to auscultation bilaterally.  Mild expiratory wheeze.  Abdominal:  Soft, nondistended, nontender.  No guarding or rebound.  No palpable masses  Extremity:  No visible bony abnormalities in all 4 extremities.  Full range of motion of all extremities.  Skin:  Warm and dry.  No rashes  Neuro:  AAOx3, GCS 15. Cranial nerves 2-12 grossly intact.  No focal strength or sensation deficits.  Psych:  Mood and affect appropriate.        LAB RESULTS  Recent Results (from the past 24 hour(s))   Comprehensive Metabolic Panel    Collection Time: 04/06/24  9:49 PM    Specimen: Blood   Result Value Ref Range    Glucose 146 (H) 65 - 99 mg/dL    BUN 8 6 - 20 mg/dL    Creatinine 0.68 0.57 - 1.00 mg/dL    Sodium 137 136 - 145 mmol/L    Potassium 4.1 3.5 - 5.2 mmol/L    Chloride 98 98 - 107 mmol/L    CO2 24.3 22.0 - 29.0 mmol/L    Calcium 9.4 8.6 - 10.5 mg/dL    Total Protein 7.5 6.0 - 8.5 g/dL    Albumin 4.9 3.5 - 5.2 g/dL    ALT (SGPT) 96 (H) 1 - 33 U/L    AST (SGOT) 88 (H) 1 - 32 U/L    Alkaline Phosphatase 99 39 - 117 U/L    Total Bilirubin 0.6 0.0 - 1.2 mg/dL    Globulin 2.6 gm/dL    A/G Ratio 1.9 g/dL    BUN/Creatinine Ratio 11.8 7.0 - 25.0    Anion Gap 14.7 5.0 - 15.0 mmol/L    eGFR 113.8 >60.0 mL/min/1.73   BNP    Collection Time: 04/06/24  9:49 PM    Specimen: Blood   Result Value Ref Range    proBNP <36.0 0.0 - 450.0 pg/mL   Single High Sensitivity Troponin T    Collection Time: 04/06/24  9:49 PM    Specimen:  Blood   Result Value Ref Range    HS Troponin T <6 <14 ng/L   Green Top (Gel)    Collection Time: 04/06/24  9:49 PM   Result Value Ref Range    Extra Tube Hold for add-ons.    Lavender Top    Collection Time: 04/06/24  9:49 PM   Result Value Ref Range    Extra Tube hold for add-on    Gold Top - SST    Collection Time: 04/06/24  9:49 PM   Result Value Ref Range    Extra Tube Hold for add-ons.    Light Blue Top    Collection Time: 04/06/24  9:49 PM   Result Value Ref Range    Extra Tube Hold for add-ons.    CBC Auto Differential    Collection Time: 04/06/24  9:49 PM    Specimen: Blood   Result Value Ref Range    WBC 14.36 (H) 3.40 - 10.80 10*3/mm3    RBC 6.04 (H) 3.77 - 5.28 10*6/mm3    Hemoglobin 17.6 (H) 12.0 - 15.9 g/dL    Hematocrit 51.5 (H) 34.0 - 46.6 %    MCV 85.3 79.0 - 97.0 fL    MCH 29.1 26.6 - 33.0 pg    MCHC 34.2 31.5 - 35.7 g/dL    RDW 15.6 (H) 12.3 - 15.4 %    RDW-SD 48.5 37.0 - 54.0 fl    MPV 9.4 6.0 - 12.0 fL    Platelets 301 140 - 450 10*3/mm3   Scan Slide    Collection Time: 04/06/24  9:49 PM    Specimen: Blood   Result Value Ref Range    Scan Slide     Manual Differential    Collection Time: 04/06/24  9:49 PM    Specimen: Blood   Result Value Ref Range    Neutrophil % 53.0 42.7 - 76.0 %    Lymphocyte % 33.0 19.6 - 45.3 %    Monocyte % 7.0 5.0 - 12.0 %    Bands %  3.0 0.0 - 5.0 %    Metamyelocyte % 1.0 (H) 0.0 - 0.0 %    Atypical Lymphocyte % 3.0 0.0 - 5.0 %    Neutrophils Absolute 8.04 (H) 1.70 - 7.00 10*3/mm3    Lymphocytes Absolute 5.17 (H) 0.70 - 3.10 10*3/mm3    Monocytes Absolute 1.01 (H) 0.10 - 0.90 10*3/mm3    RBC Morphology Normal Normal    WBC Morphology Normal Normal    Platelet Estimate Adequate Normal       If labs were ordered, I independently reviewed the results and considered them in treating the patient.        RADIOLOGY  No Radiology Exams Resulted Within Past 24 Hours  Chest x-ray obtained which was personally reviewed and interpreted showing no evidence of focal pneumonia along  with no other acute cardiopulmonary abnormalities with compared to recent imaging studies over the past week in our system.      PROCEDURES    Procedures    ECG 12 Lead ED Triage Standing Order; SOA   Final Result          MEDICATIONS GIVEN IN ER    Medications   sodium chloride 0.9 % flush 10 mL (has no administration in time range)   sodium chloride 0.9 % bolus 500 mL (0 mL Intravenous Stopped 4/6/24 2247)   dexAMETHasone sodium phosphate injection 10 mg (10 mg Intravenous Given 4/6/24 2217)   ondansetron (ZOFRAN) injection 4 mg (4 mg Intravenous Given 4/6/24 2224)   ketorolac (TORADOL) injection 15 mg (15 mg Intravenous Given 4/6/24 2316)   ipratropium-albuterol (DUO-NEB) nebulizer solution 3 mL (3 mL Nebulization Given 4/6/24 2316)         MEDICAL DECISION MAKING, PROGRESS, and CONSULTS    All labs, if obtained, have been independently reviewed by me.  All radiology studies, if obtained, have been reviewed by me and the radiologist dictating the report.  All EKG's, if obtained, have been independently viewed and interpreted by me.      Discussion below represents my analysis of pertinent findings related to patient's condition, differential diagnosis, treatment plan and final disposition.    Lindsay Amezquita is a 38 y.o. female who presents to the ED c/o shortness of air/cough.  Tachycardic on arrival but otherwise hemodynamically stable and mentating appropriately.  Oxygen saturation 95% on room air.  Differential includes was not limited to bronchitis, pneumonia, pulmonary edema.  Labs obtained along with EKG.  EKG personally reviewed and interpreted showing evidence of sinus tachycardia with rate of 107 but no evidence of acute ischemic changes or significant arrhythmia otherwise with normal intervals.    Chest x-ray obtained which was personally reviewed and interpreted showing no evidence of focal pneumonia or acute cardiopulmonary abnormality.  Compared this x-ray to 2 other x-rays in our system from the past  4 days and there are no significant changes.  Labs personally reviewed and interpreted showing leukocytosis of 14, likely secondary to recent glucocorticoids as patient has been on prednisone for the past 5 days.  Also mildly elevated hemoglobin and hematocrit consistent with possible dehydration/hemoconcentration.  Patient given IV fluid bolus.  Patient also given Zofran for nausea.  Troponin less than 6, symptom onset greater than 3 hours no indication for repeat troponin per high-sensitivity troponin protocol.    Given dose of dexamethasone in emergency department along with DuoNeb with improvement.  Extensively discussed outpatient treatment and follow-up for bronchitis.  Advised on return precautions.  Patient remained stable in emergency department and was discharged.                           Orders placed during this visit:  Orders Placed This Encounter   Procedures    XR Chest 1 View    Eunice Draw    Comprehensive Metabolic Panel    BNP    Single High Sensitivity Troponin T    CBC Auto Differential    Scan Slide    Manual Differential    Continuous Pulse Oximetry    Vital Signs    Oxygen Therapy- Nasal Cannula; Titrate 1-6 LPM Per SpO2; 90 - 95%    ECG 12 Lead ED Triage Standing Order; SOA    Insert Peripheral IV    CBC & Differential    Green Top (Gel)    Lavender Top    Gold Top - SST    Light Blue Top         ED Course:    Consultants:                  Shared Decision Making:  After my consideration of clinical presentation and any laboratory/radiology studies obtained, I discussed the findings with the patient/patient representative who is in agreement with the treatment plan and the final disposition.   Risks and benefits of discharge and/or observation/admission were discussed.      AS OF 00:46 EDT VITALS:    BP - 143/95  HR - 99  TEMP - 97.7 °F (36.5 °C) (Oral)  O2 SATS - 92%                  DIAGNOSIS  Final diagnoses:   Bronchitis         DISPOSITION  Discharge      Please note that portions of  this document were completed with voice recognition software.        Byron Azevedo MD  04/07/24 0046

## 2024-04-07 NOTE — PROGRESS NOTES
You have chosen to receive care through a telehealth visit.  Do you consent to use a video/audio connection for your medical care today? Yes     CHIEF COMPLAINT  Chief Complaint   Patient presents with    Cough         HPI  Lindsay Amezquita is a 38 y.o. female  presents with complaint of recently having pneumonia. Reports she was started on azithromycin initally. Reports her O2 sats were 79-90% and she went to the ER on 4-1-24. They changed her to doxy, inhalers and albuterol mini nebs. Reports she went to the ER because she couldn't breath. Reports they gave her steroids and reports it increased her sugar of 500. Reports she left AMA that day. Reports fever. Unmeasured. No chills. + horrible cough. Reports nausea and vomiting. Vomited 4 times today and has been in bed all day. Chest tight and hurts to breath. Reports her sugar is 191 this evening.      Review of Systems   Constitutional:  Positive for fatigue and fever. Negative for chills.   HENT:  Negative for congestion, ear discharge, ear pain, sinus pressure, sinus pain and sore throat.    Respiratory:  Positive for cough, chest tightness and shortness of breath (at times). Negative for wheezing.    Cardiovascular:  Negative for chest pain.   Gastrointestinal:  Positive for nausea and vomiting. Negative for abdominal pain and diarrhea.   Musculoskeletal:  Negative for back pain and myalgias.   Neurological:  Negative for dizziness and headaches.   Psychiatric/Behavioral: Negative.         Past Medical History:   Diagnosis Date    Allergic     Anxiety     Asthma Beings of copd with asthma    Back pain     Bell's palsy     Bipolar 2 disorder     Blood clot in vein     Behind left knee cap    COPD (chronic obstructive pulmonary disease) Six months ago    Deep vein thrombosis Last year    Depression     Diabetes mellitus November    Pre diabete    Frequent headaches     GERD (gastroesophageal reflux disease)     Hypertension     Every time i go into the [facility] room     Irritable bowel syndrome Two years ago    Kidney stone Two years ago    Migraine     Nausea, vomiting and diarrhea 9/17/2018    Obesity All of my life    Ovarian cyst Two years ago    Panic     PTSD (post-traumatic stress disorder)     Pulmonary embolism Not in lungs    Recurrent pregnancy loss, antepartum condition or complication Every since i have been 14 yars old    Restless leg syndrome     Sinus problem     Snores     Tattoos     Urinary tract infection Have them on and off    Varicella Little    Visual impairment Since i was little    Vitamin B12 deficiency     Wears glasses        Family History   Problem Relation Age of Onset    Irritable bowel syndrome Mother     Heart disease Mother     Miscarriages / Stillbirths Mother     Arthritis Mother     COPD Mother     Learning disabilities Mother     Mental illness Mother     Asthma Mother     Irritable bowel syndrome Father     Alcohol abuse Father     Diabetes Father     Hyperlipidemia Father     Anxiety disorder Father     Learning disabilities Father     Colon cancer Maternal Grandfather     Stroke Paternal Grandfather     Stroke Paternal Grandmother        Social History     Socioeconomic History    Marital status: Single   Tobacco Use    Smoking status: Every Day     Current packs/day: 0.50     Average packs/day: 2.0 packs/day for 31.8 years (63.2 ttl pk-yrs)     Types: Cigarettes     Start date: 7/17/1992     Passive exposure: Current    Smokeless tobacco: Never    Tobacco comments:         Vaping Use    Vaping status: Former    Passive vaping exposure: Yes   Substance and Sexual Activity    Alcohol use: Not Currently     Comment: rarely    Drug use: Not Currently    Sexual activity: Not Currently     Partners: Female     Birth control/protection: Other, Same-sex partner       Lindsay CHAPMAN Alirio  reports that she has been smoking cigarettes. She started smoking about 31 years ago. She has a 63.2 pack-year smoking history. She has been exposed to tobacco  smoke. She has never used smokeless tobacco. I have educated her on the risk of diseases from using tobacco products such as cancer, COPD, and heart disease.         I spent  1  minutes counseling the patient.              LMP 02/21/2024 (Approximate)   Breastfeeding No     PHYSICAL EXAM  Physical Exam   Constitutional: She is oriented to person, place, and time. She appears well-developed and well-nourished. She has a sickly appearance. No distress.   HENT:   Head: Normocephalic and atraumatic.   Right Ear: Hearing normal.   Left Ear: Hearing normal.   Nose: No congestion.   Mouth/Throat: Mouth/Lips are normal.  Eyes: Conjunctivae and lids are normal.   Pulmonary/Chest: Effort normal.  No respiratory distress.  Neurological: She is alert and oriented to person, place, and time.   Psychiatric: She has a normal mood and affect. Her speech is normal and behavior is normal.   Coughing     Results for orders placed or performed during the hospital encounter of 04/01/24   Comprehensive Metabolic Panel    Specimen: Blood   Result Value Ref Range    Glucose 448 (C) 65 - 99 mg/dL    BUN 7 6 - 20 mg/dL    Creatinine 0.91 0.57 - 1.00 mg/dL    Sodium 132 (L) 136 - 145 mmol/L    Potassium 4.3 3.5 - 5.2 mmol/L    Chloride 98 98 - 107 mmol/L    CO2 17.3 (L) 22.0 - 29.0 mmol/L    Calcium 9.3 8.6 - 10.5 mg/dL    Total Protein 6.9 6.0 - 8.5 g/dL    Albumin 4.5 3.5 - 5.2 g/dL    ALT (SGPT) 22 1 - 33 U/L    AST (SGOT) 13 1 - 32 U/L    Alkaline Phosphatase 76 39 - 117 U/L    Total Bilirubin 0.4 0.0 - 1.2 mg/dL    Globulin 2.4 gm/dL    A/G Ratio 1.9 g/dL    BUN/Creatinine Ratio 7.7 7.0 - 25.0    Anion Gap 16.7 (H) 5.0 - 15.0 mmol/L    eGFR 82.5 >60.0 mL/min/1.73   Urinalysis With Microscopic If Indicated (No Culture) - Urine, Clean Catch    Specimen: Urine, Clean Catch   Result Value Ref Range    Color, UA Yellow Yellow, Straw    Appearance, UA Clear Clear    pH, UA 6.0 5.0 - 8.0    Specific Gravity, UA >=1.030 1.005 - 1.030     Glucose, UA >=1000 mg/dL (3+) (A) Negative    Ketones, UA Negative Negative    Bilirubin, UA Negative Negative    Blood, UA Negative Negative    Protein, UA Negative Negative    Leuk Esterase, UA Negative Negative    Nitrite, UA Negative Negative    Urobilinogen, UA 0.2 E.U./dL 0.2 - 1.0 E.U./dL   Magnesium    Specimen: Blood   Result Value Ref Range    Magnesium 1.9 1.6 - 2.6 mg/dL   Urine Drug Screen - Urine, Clean Catch    Specimen: Urine, Clean Catch   Result Value Ref Range    THC, Screen, Urine Negative Negative    Phencyclidine (PCP), Urine Negative Negative    Cocaine Screen, Urine Negative Negative    Methamphetamine, Ur Negative Negative    Opiate Screen Positive (A) Negative    Amphetamine Screen, Urine Negative Negative    Benzodiazepine Screen, Urine Positive (A) Negative    Tricyclic Antidepressants Screen Positive (A) Negative    Methadone Screen, Urine Negative Negative    Barbiturates Screen, Urine Negative Negative    Oxycodone Screen, Urine Negative Negative    Buprenorphine, Screen, Urine Negative Negative   Ethanol    Specimen: Blood   Result Value Ref Range    Ethanol <10 0 - 10 mg/dL    Ethanol % <0.010 %   CBC Auto Differential    Specimen: Blood   Result Value Ref Range    WBC 9.09 3.40 - 10.80 10*3/mm3    RBC 4.96 3.77 - 5.28 10*6/mm3    Hemoglobin 14.3 12.0 - 15.9 g/dL    Hematocrit 42.4 34.0 - 46.6 %    MCV 85.5 79.0 - 97.0 fL    MCH 28.8 26.6 - 33.0 pg    MCHC 33.7 31.5 - 35.7 g/dL    RDW 15.9 (H) 12.3 - 15.4 %    RDW-SD 50.1 37.0 - 54.0 fl    MPV 9.6 6.0 - 12.0 fL    Platelets 288 140 - 450 10*3/mm3    Neutrophil % 88.8 (H) 42.7 - 76.0 %    Lymphocyte % 9.5 (L) 19.6 - 45.3 %    Monocyte % 0.7 (L) 5.0 - 12.0 %    Eosinophil % 0.1 (L) 0.3 - 6.2 %    Basophil % 0.2 0.0 - 1.5 %    Immature Grans % 0.7 (H) 0.0 - 0.5 %    Neutrophils, Absolute 8.08 (H) 1.70 - 7.00 10*3/mm3    Lymphocytes, Absolute 0.86 0.70 - 3.10 10*3/mm3    Monocytes, Absolute 0.06 (L) 0.10 - 0.90 10*3/mm3    Eosinophils,  Absolute 0.01 0.00 - 0.40 10*3/mm3    Basophils, Absolute 0.02 0.00 - 0.20 10*3/mm3    Immature Grans, Absolute 0.06 (H) 0.00 - 0.05 10*3/mm3    nRBC 0.0 0.0 - 0.2 /100 WBC   Single High Sensitivity Troponin T    Specimen: Blood   Result Value Ref Range    HS Troponin T <6 <14 ng/L   Phosphorus    Specimen: Blood   Result Value Ref Range    Phosphorus 2.9 2.5 - 4.5 mg/dL   Fentanyl, Urine - Urine, Clean Catch    Specimen: Urine, Clean Catch   Result Value Ref Range    Fentanyl, Urine Negative Negative       Diagnoses and all orders for this visit:    1. Acute cough (Primary)    2. Tightness in chest    Advised patient to go to ER for in person evaluation. Patient reports she is unable to check O2 sat. She is speaking in full sentences. Patient reports she will have her father take her to ER. Understands the risk of not going to ER. Patient understands she needs vitals, lung auscultation, labs and radiology. Advised if symptoms worsen call EMS to transport her. Patient reports she will go to [facility].           Court Serrano Matteo, CLAIR  04/06/2024  21:24 EDT    The use of a video visit has been reviewed with the patient and verbal informed consent has been obtained. Myself and Lindsay Amezquita participated in this visit. The patient is located in [address] [place], [place] [id number].    I am located in [place], [place]. Mychart and Twilio were utilized. I spent 5 minutes in the patient's chart for this visit.      Note Disclaimer: At [facility], we believe that sharing information builds trust and better   relationships. You are receiving this note because you recently visited [facility]. It is possible you   will see health information before a provider has talked with you about it. This kind of information can   be easy to misunderstand. To help you fully understand what it means for your health, we urge you to   discuss this note with your provider.

## 2024-04-08 ENCOUNTER — TELEPHONE (OUTPATIENT)
Dept: FAMILY MEDICINE CLINIC | Facility: CLINIC | Age: 39
End: 2024-04-08
Payer: MEDICAID

## 2024-04-08 RX ORDER — TIZANIDINE 4 MG/1
4 TABLET ORAL EVERY 8 HOURS PRN
Qty: 90 TABLET | Refills: 3 | Status: CANCELLED | OUTPATIENT
Start: 2024-04-08

## 2024-04-08 RX ORDER — TIZANIDINE 4 MG/1
4 TABLET ORAL EVERY 8 HOURS PRN
Qty: 270 TABLET | Refills: 1 | OUTPATIENT
Start: 2024-04-08

## 2024-04-08 NOTE — TELEPHONE ENCOUNTER
Caller: Lindsay Amezquita    Relationship: Self    Best call back number: [phone]     Requested Prescriptions:   Requested Prescriptions     Pending Prescriptions Disp Refills    tiZANidine (ZANAFLEX) 4 MG tablet 90 tablet 3     Sig: Take 1 tablet by mouth Every 8 (Eight) Hours As Needed for Muscle Spasms.        Pharmacy where request should be sent: [facility][id number] - [place], [place] - [address] [address] [phone] [facility] [phone]      Last office visit with prescribing clinician: 4/1/2024   Last telemedicine visit with prescribing clinician: Visit date not found   Next office visit with prescribing clinician: 4/15/2024     Additional details provided by patient: PATIENT REFUSED HOSPITAL FOLLOW UP, STATES SHE WAS SEEN IN THE [facility] TWICE 04/01/24 AND AGAIN ON 04/06/24 FOR SEVERE BRONCHITIS.PATIENT WAS GIVEN INSULIN DUE TO GLUCOSE  DUE TO THE STEROIDS, ON MONDAY 04/01/24 WAS DIAGNOSED WITH BRONCHULAR PNEUMONIA    Does the patient have less than a 3 day supply:  [x] Yes  [] No OUT    Would you like a call back once the refill request has been completed: [] Yes [x] No    If the office needs to give you a call back, can they leave a voicemail: [x] Yes [] No    Kim Aviles   04/08/24 08:25 EDT

## 2024-04-12 ENCOUNTER — TELEPHONE (OUTPATIENT)
Dept: FAMILY MEDICINE CLINIC | Facility: CLINIC | Age: 39
End: 2024-04-12
Payer: MEDICAID

## 2024-04-12 NOTE — TELEPHONE ENCOUNTER
PATIENT CALLED TO REPORT THAT SHE WAS EXPERIENCING A MIGRAINE FOR AN ONSET OF 4 1/2 DAYS. SHE HAS TAKEN TORADOL, BENADRYL, NURTEC, AND ZOFRAN BUT THE MEDICATIONS ARE NOT HELPING. SHE HAS ALSO BEEN EXPERIENCING BLURRY VISION, SWEATING, EARS HURT, DIARRHEA, COUGHING, AND SHE BELIEVES SHE HAS A SINUS INFECTION. I LET HER KNOW THAT THAT SHE WOULD NEED TO HEAD TO THE ER AND THE PROVIDER ON SITE AGREED. PATIENT CONFIRMED UNDERSTANDING.

## 2024-04-15 ENCOUNTER — TELEPHONE (OUTPATIENT)
Dept: NEUROLOGY | Facility: CLINIC | Age: 39
End: 2024-04-15
Payer: MEDICAID

## 2024-04-15 NOTE — TELEPHONE ENCOUNTER
----- Message from CLAIR Aquino sent at 4/15/2024  9:11 AM EDT -----  Ok that sounds like the best option  ----- Message -----  From: Fabiola Cross MA  Sent: 4/15/2024   9:05 AM EDT  To: CLAIR Aquino    Lindsay called and said that she has had a horrible migraine for about a week now. The Zavegepant nasal spray makes her throw up every time she takes it. She is worried that there is something wrong that is causing the migraines. The only thing that helps is the Esgic. I advised her to go to the ER for a migraine cocktail, and she said she is planning to. She doesn't know what to take as an abortive for her migraines.

## 2024-04-16 ENCOUNTER — TELEPHONE (OUTPATIENT)
Dept: NEUROLOGY | Facility: CLINIC | Age: 39
End: 2024-04-16

## 2024-04-16 ENCOUNTER — HOSPITAL ENCOUNTER (EMERGENCY)
Facility: HOSPITAL | Age: 39
Discharge: HOME | End: 2024-04-17
Attending: STUDENT IN AN ORGANIZED HEALTH CARE EDUCATION/TRAINING PROGRAM
Payer: MEDICAID

## 2024-04-16 ENCOUNTER — APPOINTMENT (OUTPATIENT)
Dept: GENERAL RADIOLOGY | Facility: HOSPITAL | Age: 39
End: 2024-04-16
Payer: MEDICAID

## 2024-04-16 DIAGNOSIS — J40 BRONCHITIS: Primary | ICD-10-CM

## 2024-04-16 DIAGNOSIS — R51.9 NONINTRACTABLE HEADACHE, UNSPECIFIED CHRONICITY PATTERN, UNSPECIFIED HEADACHE TYPE: ICD-10-CM

## 2024-04-16 LAB
ALBUMIN SERPL-MCNC: 4.2 G/DL (ref 3.5–5.2)
ALBUMIN/GLOB SERPL: 2.1 G/DL
ALP SERPL-CCNC: 83 U/L (ref 39–117)
ALT SERPL W P-5'-P-CCNC: 41 U/L (ref 1–33)
ANION GAP SERPL CALCULATED.3IONS-SCNC: 13.1 MMOL/L (ref 5–15)
AST SERPL-CCNC: 20 U/L (ref 1–32)
BASOPHILS # BLD AUTO: 0.06 10*3/MM3 (ref 0–0.2)
BASOPHILS NFR BLD AUTO: 0.6 % (ref 0–1.5)
BILIRUB SERPL-MCNC: 0.2 MG/DL (ref 0–1.2)
BUN SERPL-MCNC: 4 MG/DL (ref 6–20)
BUN/CREAT SERPL: 6.2 (ref 7–25)
CALCIUM SPEC-SCNC: 9.3 MG/DL (ref 8.6–10.5)
CHLORIDE SERPL-SCNC: 102 MMOL/L (ref 98–107)
CO2 SERPL-SCNC: 22.9 MMOL/L (ref 22–29)
CREAT SERPL-MCNC: 0.65 MG/DL (ref 0.57–1)
DEPRECATED RDW RBC AUTO: 46.4 FL (ref 37–54)
EGFRCR SERPLBLD CKD-EPI 2021: 115 ML/MIN/1.73
EOSINOPHIL # BLD AUTO: 0.16 10*3/MM3 (ref 0–0.4)
EOSINOPHIL NFR BLD AUTO: 1.6 % (ref 0.3–6.2)
ERYTHROCYTE [DISTWIDTH] IN BLOOD BY AUTOMATED COUNT: 14.6 % (ref 12.3–15.4)
FLUAV RNA RESP QL NAA+PROBE: NOT DETECTED
FLUBV RNA RESP QL NAA+PROBE: NOT DETECTED
GLOBULIN UR ELPH-MCNC: 2 GM/DL
GLUCOSE SERPL-MCNC: 156 MG/DL (ref 65–99)
HCT VFR BLD AUTO: 44 % (ref 34–46.6)
HGB BLD-MCNC: 14.8 G/DL (ref 12–15.9)
HOLD SPECIMEN: NORMAL
HOLD SPECIMEN: NORMAL
IMM GRANULOCYTES # BLD AUTO: 0.02 10*3/MM3 (ref 0–0.05)
IMM GRANULOCYTES NFR BLD AUTO: 0.2 % (ref 0–0.5)
LYMPHOCYTES # BLD AUTO: 4.22 10*3/MM3 (ref 0.7–3.1)
LYMPHOCYTES NFR BLD AUTO: 41.6 % (ref 19.6–45.3)
MCH RBC QN AUTO: 29.1 PG (ref 26.6–33)
MCHC RBC AUTO-ENTMCNC: 33.6 G/DL (ref 31.5–35.7)
MCV RBC AUTO: 86.4 FL (ref 79–97)
MONOCYTES # BLD AUTO: 0.52 10*3/MM3 (ref 0.1–0.9)
MONOCYTES NFR BLD AUTO: 5.1 % (ref 5–12)
NEUTROPHILS NFR BLD AUTO: 5.16 10*3/MM3 (ref 1.7–7)
NEUTROPHILS NFR BLD AUTO: 50.9 % (ref 42.7–76)
NRBC BLD AUTO-RTO: 0 /100 WBC (ref 0–0.2)
PLATELET # BLD AUTO: 288 10*3/MM3 (ref 140–450)
PMV BLD AUTO: 10.2 FL (ref 6–12)
POTASSIUM SERPL-SCNC: 3.1 MMOL/L (ref 3.5–5.2)
PROT SERPL-MCNC: 6.2 G/DL (ref 6–8.5)
RBC # BLD AUTO: 5.09 10*6/MM3 (ref 3.77–5.28)
RSV RNA RESP QL NAA+PROBE: NOT DETECTED
S PYO AG THROAT QL: NEGATIVE
SARS-COV-2 RNA RESP QL NAA+PROBE: NOT DETECTED
SODIUM SERPL-SCNC: 138 MMOL/L (ref 136–145)
TROPONIN T SERPL HS-MCNC: <6 NG/L
WBC NRBC COR # BLD AUTO: 10.14 10*3/MM3 (ref 3.4–10.8)
WHOLE BLOOD HOLD COAG: NORMAL
WHOLE BLOOD HOLD SPECIMEN: NORMAL

## 2024-04-16 PROCEDURE — 87880 STREP A ASSAY W/OPTIC: CPT

## 2024-04-16 PROCEDURE — 84484 ASSAY OF TROPONIN QUANT: CPT

## 2024-04-16 PROCEDURE — 87081 CULTURE SCREEN ONLY: CPT

## 2024-04-16 PROCEDURE — 71045 X-RAY EXAM CHEST 1 VIEW: CPT

## 2024-04-16 PROCEDURE — 80053 COMPREHEN METABOLIC PANEL: CPT

## 2024-04-16 PROCEDURE — 87637 SARSCOV2&INF A&B&RSV AMP PRB: CPT

## 2024-04-16 PROCEDURE — 93005 ELECTROCARDIOGRAM TRACING: CPT

## 2024-04-16 PROCEDURE — 85025 COMPLETE CBC W/AUTO DIFF WBC: CPT

## 2024-04-16 PROCEDURE — 99284 EMERGENCY DEPT VISIT MOD MDM: CPT

## 2024-04-16 RX ORDER — DEXAMETHASONE SODIUM PHOSPHATE 10 MG/ML
10 INJECTION, SOLUTION INTRAMUSCULAR; INTRAVENOUS ONCE
Status: COMPLETED | OUTPATIENT
Start: 2024-04-16 | End: 2024-04-17

## 2024-04-16 RX ORDER — PROCHLORPERAZINE EDISYLATE 5 MG/ML
10 INJECTION INTRAMUSCULAR; INTRAVENOUS ONCE
Status: DISCONTINUED | OUTPATIENT
Start: 2024-04-16 | End: 2024-04-17 | Stop reason: HOSPADM

## 2024-04-16 RX ORDER — SODIUM CHLORIDE 0.9 % (FLUSH) 0.9 %
10 SYRINGE (ML) INJECTION AS NEEDED
Status: DISCONTINUED | OUTPATIENT
Start: 2024-04-16 | End: 2024-04-17 | Stop reason: HOSPADM

## 2024-04-16 RX ORDER — SODIUM CHLORIDE 9 MG/ML
125 INJECTION, SOLUTION INTRAVENOUS CONTINUOUS
Status: DISCONTINUED | OUTPATIENT
Start: 2024-04-16 | End: 2024-04-17 | Stop reason: HOSPADM

## 2024-04-16 NOTE — TELEPHONE ENCOUNTER
Provider: ROYA COVARRUBIAS    Caller: PATIENT    Relationship to Patient: SELF    Pharmacy: L[facility]ED    Phone Number: [phone]    Reason for Call: PT IS CALLING BECAUSE SHE HAS HAD A MIGRAINE FOR 8 DAYS NOW.  STATES SHE WENT TO ED SUNDAY NIGHT.  AT THE ED SHE WAS GIVEN AN IV WITH FLUIDS AND MIGRAINE COCKTAIL.  SHE STATES IT ONLY GOT HER PAIN LEVEL DOWN TO AN 8 FROM A 10.  PT STATES HER NECK IS REALLY STIFF, BLURRED VISION,  VERY UPSET STOMACH, VERY THIRSTY.  SHE CAN'T DO THE NASAL SPRAY AS IT MAKES HER THROW UP.   SHE HAS TAKEN EXCEDRIN, NURTEC, TYLENOL AND ESGIC WITH NO RELIEF.  SHE IS ALSO REALLY SLEEPY FROM IT.      ADVISED TO GO TO ED FOR SEVERE PAIN.  PT STATES SHE WILL HAVE HER FATHER TAKE HER.      PLEASE CALL TO ADVISE     THANK YOU

## 2024-04-17 VITALS
HEART RATE: 92 BPM | DIASTOLIC BLOOD PRESSURE: 92 MMHG | HEIGHT: 62 IN | WEIGHT: 241.6 LBS | OXYGEN SATURATION: 96 % | TEMPERATURE: 98.4 F | SYSTOLIC BLOOD PRESSURE: 134 MMHG | BODY MASS INDEX: 44.46 KG/M2 | RESPIRATION RATE: 18 BRPM

## 2024-04-17 DIAGNOSIS — G43.711 INTRACTABLE CHRONIC MIGRAINE WITHOUT AURA AND WITH STATUS MIGRAINOSUS: Primary | ICD-10-CM

## 2024-04-17 DIAGNOSIS — Z87.39 HISTORY OF LOW BACK PAIN: ICD-10-CM

## 2024-04-17 PROCEDURE — 96374 THER/PROPH/DIAG INJ IV PUSH: CPT

## 2024-04-17 PROCEDURE — 25810000003 SODIUM CHLORIDE 0.9 % SOLUTION: Performed by: NURSE PRACTITIONER

## 2024-04-17 PROCEDURE — 25010000002 DEXAMETHASONE SODIUM PHOSPHATE 10 MG/ML SOLUTION: Performed by: NURSE PRACTITIONER

## 2024-04-17 RX ORDER — POTASSIUM CHLORIDE 750 MG/1
20 CAPSULE, EXTENDED RELEASE ORAL ONCE
Status: COMPLETED | OUTPATIENT
Start: 2024-04-17 | End: 2024-04-17

## 2024-04-17 RX ADMIN — DEXAMETHASONE SODIUM PHOSPHATE 10 MG: 10 INJECTION INTRAMUSCULAR; INTRAVENOUS at 00:19

## 2024-04-17 RX ADMIN — SODIUM CHLORIDE 1000 ML: 9 INJECTION, SOLUTION INTRAVENOUS at 00:15

## 2024-04-17 RX ADMIN — POTASSIUM CHLORIDE 20 MEQ: 750 CAPSULE, EXTENDED RELEASE ORAL at 00:21

## 2024-04-17 NOTE — ED PROVIDER NOTES
Pt Name: Lindsay Amezquita  MRN: [id number]  : 1985  Date of Encounter: 2024    PCP: Stephanie Schroeder PA-C      Subjective    History of Present Illness:    Chief Complaint: Chest tightness, cough, congestion, headache    History of Present Illness: Lindsay Amezquita is a 38 y.o. female who presents to the ER complaining of chest tightness cough and congestion that has been ongoing for the last 3 days.  Patient states she was recently diagnosed approximately 2 weeks ago with bronchitis has been doing her albuterol neb treatments, albuterol inhalers, [employer].  Patient states that she has had no relieved is complaining of the shortness of breath.  Patient states that over the last week she is had a constant migraine headache that she rates as 8 out of 10 describes as a dull continual ache on the top part of her head.        Nurses Notes reviewed and agree, including vitals, allergies, social history and prior medical history.       Allergies:    Aspirin, Codeine, Cyclobenzaprine, Haldol [haloperidol], Imitrex [sumatriptan], Latex, Penicillins, Compazine [prochlorperazine], Lamictal [lamotrigine], Morphine, Ondansetron, Oxycodone-acetaminophen, Oxycontin [oxycodone], Reglan [metoclopramide], Tramadol, and Tylenol [acetaminophen]    Past Medical History:   Diagnosis Date    Allergic     Anxiety     Asthma Beings of copd with asthma    Back pain     Bell's palsy     Bipolar 2 disorder     Blood clot in vein     Behind left knee cap    COPD (chronic obstructive pulmonary disease) Six months ago    Deep vein thrombosis Last year    Depression     Diabetes mellitus November    Pre diabete    Frequent headaches     GERD (gastroesophageal reflux disease)     Hypertension     Every time i go into the [facility] room    Irritable bowel syndrome Two years ago    Kidney stone Two years ago    Migraine     Nausea, vomiting and diarrhea 2018    Obesity All of my life    Ovarian cyst Two years ago    Panic     PTSD  (post-traumatic stress disorder)     Pulmonary embolism Not in lungs    Recurrent pregnancy loss, antepartum condition or complication Every since i have been 14 yars old    Restless leg syndrome     Sinus problem     Snores     Tattoos     Urinary tract infection Have them on and off    Varicella Little    Visual impairment Since i was little    Vitamin B12 deficiency     Wears glasses        Past Surgical History:   Procedure Laterality Date    CHOLECYSTECTOMY      HYSTEROSCOPY N/A 3/14/2024    Procedure: HYSTEROSCOPY WITH MYOSURE, DILATION AND CURETTAGE WITH CERENE ABLATION;  Surgeon: David Ribeiro MD;  Location: [facility];  Service: Obstetrics/Gynecology;  Laterality: N/A;    MULTIPLE TOOTH EXTRACTIONS      All my teeth    WISDOM TOOTH EXTRACTION  All my teeth       Social History     Socioeconomic History    Marital status: Single   Tobacco Use    Smoking status: Every Day     Current packs/day: 0.50     Average packs/day: 2.0 packs/day for 31.8 years (63.3 ttl pk-yrs)     Types: Cigarettes     Start date: 7/17/1992     Passive exposure: Current    Smokeless tobacco: Never    Tobacco comments:         Vaping Use    Vaping status: Former    Passive vaping exposure: Yes   Substance and Sexual Activity    Alcohol use: Not Currently     Comment: rarely    Drug use: Not Currently    Sexual activity: Not Currently     Partners: Female     Birth control/protection: Other, Same-sex partner       Family History   Problem Relation Age of Onset    Irritable bowel syndrome Mother     Heart disease Mother     Miscarriages / Stillbirths Mother     Arthritis Mother     COPD Mother     Learning disabilities Mother     Mental illness Mother     Asthma Mother     Irritable bowel syndrome Father     Alcohol abuse Father     Diabetes Father     Hyperlipidemia Father     Anxiety disorder Father     Learning disabilities Father     Colon cancer Maternal Grandfather     Stroke Paternal Grandfather     Stroke Paternal  Grandmother        REVIEW OF SYSTEMS:     All systems reviewed and not pertinent unless noted.    Review of Systems   HENT:  Positive for congestion.    Respiratory:  Positive for cough and shortness of breath.    Neurological:  Positive for headaches.       Objective    Physical Exam  Vitals and nursing note reviewed.   Constitutional:       Appearance: Normal appearance.   HENT:      Head: Normocephalic and atraumatic.   Eyes:      Extraocular Movements: Extraocular movements intact.      Pupils: Pupils are equal, round, and reactive to light.   Cardiovascular:      Rate and Rhythm: Normal rate and regular rhythm.      Pulses:           Radial pulses are 1+ on the right side and 1+ on the left side.      Heart sounds: Normal heart sounds.   Pulmonary:      Effort: Pulmonary effort is normal.      Breath sounds: Decreased breath sounds and wheezing present.   Abdominal:      General: Abdomen is flat. Bowel sounds are normal.      Palpations: Abdomen is soft.   Musculoskeletal:      Cervical back: Normal range of motion and neck supple.   Skin:     Capillary Refill: Capillary refill takes less than 2 seconds.   Neurological:      General: No focal deficit present.      Mental Status: She is alert and oriented to person, place, and time. Mental status is at baseline.      GCS: GCS eye subscore is 4. GCS verbal subscore is 5. GCS motor subscore is 6.      Sensory: Sensation is intact.      Motor: Motor function is intact.      Gait: Gait is intact.   Psychiatric:         Attention and Perception: Attention and perception normal.         Mood and Affect: Mood and affect normal.         Speech: Speech normal.         Behavior: Behavior normal. Behavior is cooperative.               Procedures    ED Course:    ED Course as of 04/17/24 0042   Wed Apr 17, 2024   0002 Chest x-ray shows chronic COPD type changes, no hemopneumothorax no ostial abnormality.  No effusion or pneumonia is noted [KH]      ED Course User  Index  [KH] Jovan Freire APRN       LAB Results:    Lab Results (last 24 hours)       Procedure Component Value Units Date/Time    CBC & Differential [[id number]]  (Abnormal) Collected: 04/16/24 2237    Specimen: Blood Updated: 04/16/24 2243    Narrative:      The following orders were created for panel order CBC & Differential.  Procedure                               Abnormality         Status                     ---------                               -----------         ------                     CBC Auto Differential[[id number]]        Abnormal            Final result                 Please view results for these tests on the individual orders.    Comprehensive Metabolic Panel [[id number]]  (Abnormal) Collected: 04/16/24 2237    Specimen: Blood Updated: 04/16/24 2309     Glucose 156 mg/dL      BUN 4 mg/dL      Creatinine 0.65 mg/dL      Sodium 138 mmol/L      Potassium 3.1 mmol/L      Chloride 102 mmol/L      CO2 22.9 mmol/L      Calcium 9.3 mg/dL      Total Protein 6.2 g/dL      Albumin 4.2 g/dL      ALT (SGPT) 41 U/L      AST (SGOT) 20 U/L      Alkaline Phosphatase 83 U/L      Total Bilirubin 0.2 mg/dL      Globulin 2.0 gm/dL      A/G Ratio 2.1 g/dL      BUN/Creatinine Ratio 6.2     Anion Gap 13.1 mmol/L      eGFR 115.0 mL/min/1.73     Narrative:      GFR Normal >60  Chronic Kidney Disease <60  Kidney Failure <15      High Sensitivity Troponin T [[id number]]  (Normal) Collected: 04/16/24 2237    Specimen: Blood Updated: 04/16/24 2304     HS Troponin T <6 ng/L     Narrative:      High Sensitive Troponin T Reference Range:  <14.0 ng/L- Negative Female for AMI  <22.0 ng/L- Negative Male for AMI  >=14 - Abnormal Female indicating possible myocardial injury.  >=22 - Abnormal Male indicating possible myocardial injury.   Clinicians would have to utilize clinical acumen, EKG, Troponin, and serial changes to determine if it is an Acute Myocardial Infarction or myocardial injury due to an underlying chronic  condition.         COVID-19, FLU A/B, RSV PCR 1 HR TAT - Swab, Nasopharynx [[id number]]  (Normal) Collected: 04/16/24 2237    Specimen: Swab from Nasopharynx Updated: 04/16/24 2322     COVID19 Not Detected     Influenza A PCR Not Detected     Influenza B PCR Not Detected     RSV, PCR Not Detected    Narrative:      Fact sheet for providers: https://www.fda.gov/media/966896/download    Fact sheet for patients: https://www.fda.gov/media/694452/download    Test performed by PCR.    Rapid Strep A Screen - Swab, Throat [[id number]]  (Normal) Collected: 04/16/24 2237    Specimen: Swab from Throat Updated: 04/16/24 2251     Strep A Ag Negative    CBC Auto Differential [[id number]]  (Abnormal) Collected: 04/16/24 2237    Specimen: Blood Updated: 04/16/24 2243     WBC 10.14 10*3/mm3      RBC 5.09 10*6/mm3      Hemoglobin 14.8 g/dL      Hematocrit 44.0 %      MCV 86.4 fL      MCH 29.1 pg      MCHC 33.6 g/dL      RDW 14.6 %      RDW-SD 46.4 fl      MPV 10.2 fL      Platelets 288 10*3/mm3      Neutrophil % 50.9 %      Lymphocyte % 41.6 %      Monocyte % 5.1 %      Eosinophil % 1.6 %      Basophil % 0.6 %      Immature Grans % 0.2 %      Neutrophils, Absolute 5.16 10*3/mm3      Lymphocytes, Absolute 4.22 10*3/mm3      Monocytes, Absolute 0.52 10*3/mm3      Eosinophils, Absolute 0.16 10*3/mm3      Basophils, Absolute 0.06 10*3/mm3      Immature Grans, Absolute 0.02 10*3/mm3      nRBC 0.0 /100 WBC     Beta Strep Culture, Throat - Swab, Throat [[id number]] Collected: 04/16/24 2237    Specimen: Swab from Throat Updated: 04/16/24 2251             If labs were ordered, I have independently reviewed the results and considered them in the diagnosis and treatment plan for the patient    RADIOLOGY    No radiology results from the last 24 hrs     If I have ordered, I have independently reviewed the above noted radiographic studies.  Please see the radiologist dictation for the official interpretation    Medications given to patient in the  ER    Medications   sodium chloride 0.9 % flush 10 mL (has no administration in time range)   sodium chloride 0.9 % flush 10 mL (has no administration in time range)   sodium chloride 0.9 % bolus 1,000 mL (1,000 mL Intravenous New Bag 4/17/24 0015)   sodium chloride 0.9 % infusion (has no administration in time range)   prochlorperazine (COMPAZINE) injection 10 mg (10 mg Intravenous Not Given 4/17/24 0027)   dexAMETHasone sodium phosphate injection 10 mg (10 mg Intravenous Given 4/17/24 0019)   potassium chloride (MICRO-K/KLOR-CON) CR capsule (20 mEq Oral Given 4/17/24 0021)                 Shared Decision Making: After my consideration the clinical presentation and laboratory/radiology studies obtained, I discussed the findings with the patient/patient representative who is in agreement with the treatment plan and final disposition. Risks and benefits of discharge and/or observation admission were discussed.  Final disposition of the patient will be discharged home request patient follow-up with primary care provider and pulmonology within the next week for reevaluation.    Medical Decision Making  Lindsay Amezquita is a 38 y.o. female who presents to the ER complaining of chest tightness cough and congestion that has been ongoing for the last 3 days.  Patient states she was recently diagnosed approximately 2 weeks ago with bronchitis has been doing her albuterol neb treatments, albuterol inhalers, [employer].  Patient states that she has had no relieved is complaining of the shortness of breath.  Patient states that over the last week she is had a constant migraine headache that she rates as 8 out of 10 describes as a dull continual ache on the top part of her head.    DDX: includes but is not limited to: COVID-19, influenza, viral respiratory illness, bronchitis, COPD exacerbation, headache, migraine, other    Problems Addressed:  Bronchitis: complicated acute illness or injury  Nonintractable headache, unspecified  chronicity pattern, unspecified headache type: complicated acute illness or injury    Amount and/or Complexity of Data Reviewed  External Data Reviewed:      Details: Insert  Labs: ordered. Decision-making details documented in ED Course.     Details: I have personally reviewed and documented all results  Radiology: ordered and independent interpretation performed. Decision-making details documented in ED Course.     Details: I have personally reviewed and documented all results  Discussion of management or test interpretation with external provider(s): Discussed assessment, treatment and plan with ER attending    Risk  Prescription drug management.  Risk Details: I have discussed with patient the finding of the test preformed today. Patient has been diagnosed with bronchitis, non-intractable headache and will be discharged home.  Patient requested to follow-up with primary care provider, pulmonology within the next 7 days for reevaluation. Strict return precautions have been given and patient verbalizes understanding          Final diagnoses:   Bronchitis   Nonintractable headache, unspecified chronicity pattern, unspecified headache type         Please note that portions of this document were completed using voice recognition dictation software.       Jovan Freire, APRN  04/17/24 0042

## 2024-04-17 NOTE — TELEPHONE ENCOUNTER
Patient Returned call, she stated she is agreeable to pain clinic    She requested a referral to the following pain clinic.    [facility] [address] [facility].     Phone number   [phone]

## 2024-04-18 ENCOUNTER — TELEPHONE (OUTPATIENT)
Dept: FAMILY MEDICINE CLINIC | Facility: CLINIC | Age: 39
End: 2024-04-18
Payer: MEDICAID

## 2024-04-18 LAB — BACTERIA SPEC AEROBE CULT: NORMAL

## 2024-04-18 NOTE — TELEPHONE ENCOUNTER
Caller: Lindsay Amezquita    Relationship: Self    Best call back number: [phone]     What is the best time to reach you: ANYTIME, OK TO LEAVE VOICEMAIL.    Who are you requesting to speak with (clinical staff, provider,  specific staff member): CLINICAL STAFF    Do you know the name of the person who called: PATIENT    What was the call regarding: PATIENT IS CALLING TO NOTIFY THE PROVIDE THAT SHE HAS STOPPED TAKING THE POTASSIUM PILL BECAUSE EVERY TIME SHE TAKES IT SHE THROWS UP.    Is it okay if the provider responds through MyChart: NO CALL ONLY

## 2024-04-19 NOTE — TELEPHONE ENCOUNTER
"SPOKE WITH THE PATIENT, SHE IS UNABLE TO TAKE THE POTASSIUM, CAUSE HER TO \"THROW UP', PATIENT IS REQUESTING CAPSULE IF POSSIBLE.  "

## 2024-04-19 NOTE — TELEPHONE ENCOUNTER
PATIENT CALLED BACK TO CONFIRM THAT HER PHARMACY DOES HAVE CAPSULES ON HAND AND WANTED TO HAVE A BETTER UNDERSTANDING OF HER POTASSIUM LEVEL OF 3.1. TRANSFERRED HER TO [facility].

## 2024-04-20 ENCOUNTER — TELEMEDICINE (OUTPATIENT)
Dept: FAMILY MEDICINE CLINIC | Facility: TELEHEALTH | Age: 39
End: 2024-04-20
Payer: MEDICAID

## 2024-04-20 DIAGNOSIS — B37.0 THRUSH: ICD-10-CM

## 2024-04-20 DIAGNOSIS — R39.89 SUSPECTED UTI: Primary | ICD-10-CM

## 2024-04-20 DIAGNOSIS — B37.9 YEAST INFECTION: ICD-10-CM

## 2024-04-20 DIAGNOSIS — Z87.09 HISTORY OF BRONCHITIS: ICD-10-CM

## 2024-04-20 DIAGNOSIS — J01.40 ACUTE NON-RECURRENT PANSINUSITIS: ICD-10-CM

## 2024-04-20 PROCEDURE — 99213 OFFICE O/P EST LOW 20 MIN: CPT | Performed by: NURSE PRACTITIONER

## 2024-04-20 PROCEDURE — 1159F MED LIST DOCD IN RCRD: CPT | Performed by: NURSE PRACTITIONER

## 2024-04-20 PROCEDURE — 1160F RVW MEDS BY RX/DR IN RCRD: CPT | Performed by: NURSE PRACTITIONER

## 2024-04-20 RX ORDER — DOXYCYCLINE HYCLATE 100 MG/1
100 CAPSULE ORAL 2 TIMES DAILY
Qty: 20 CAPSULE | Refills: 0 | Status: SHIPPED | OUTPATIENT
Start: 2024-04-20 | End: 2024-04-30

## 2024-04-20 RX ORDER — FLUCONAZOLE 150 MG/1
150 TABLET ORAL ONCE
Qty: 2 TABLET | Refills: 0 | Status: SHIPPED | OUTPATIENT
Start: 2024-04-20 | End: 2024-04-20

## 2024-04-20 RX ORDER — BLOOD-GLUCOSE METER
KIT MISCELLANEOUS
COMMUNITY
Start: 2024-04-01

## 2024-04-21 NOTE — PROGRESS NOTES
You have chosen to receive care through a telehealth visit.  Do you consent to use a video/audio connection for your medical care today? Yes     CHIEF COMPLAINT  Chief Complaint   Patient presents with    Difficulty Urinating         HPI  Lindsay Amezquita is a 38 y.o. female  presents with complaint of burning with urination, frequency and urgency. Reports her symptoms started 1 day ago. Reports mild pressure in bladder area. Reports she has chronic back pain with sciatica and reports back is not hurting in kidney area. Denies any pain in the groin. Reports she is having vaginal itching. Denies having any vaginal d/c. Reports she is also having a slick tongue. The tongue and throat are burning and hurts to eat and drink. Reports she has had sinus pressure and congestion for over 2 weeks. Reports she was seen recently in the ER for bronchitis. Reports she is having some mild wheezing at times. Reports she has not taken any medication for her symptoms. Patient feels feverish. No chills. No nausea vomiting. Denies any abdominal pain. Doesn't want to go to ER again so she did a video visit. Blood sugar has been wnl    Review of Systems   Constitutional:  Positive for fever (feels feverish). Negative for chills and fatigue.   HENT:  Positive for congestion, sinus pressure, sinus pain and sore throat. Negative for ear discharge and ear pain.         Tongue and throat with burning and soreness   Respiratory:  Positive for wheezing (mild). Negative for cough, chest tightness and shortness of breath.    Cardiovascular:  Negative for chest pain.   Gastrointestinal:  Negative for abdominal pain, diarrhea, nausea and vomiting.   Genitourinary:  Positive for difficulty urinating, dysuria, frequency, urgency and vaginal discharge. Negative for flank pain and hematuria.   Musculoskeletal:  Positive for back pain (has chronic back pain with sciatica). Negative for myalgias.   Neurological:  Negative for dizziness and headaches.    Psychiatric/Behavioral: Negative.         Past Medical History:   Diagnosis Date    Allergic     Anxiety     Asthma Beings of copd with asthma    Back pain     Bell's palsy     Bipolar 2 disorder     Blood clot in vein     Behind left knee cap    COPD (chronic obstructive pulmonary disease) Six months ago    Deep vein thrombosis Last year    Depression     Diabetes mellitus November    Pre diabete    Frequent headaches     GERD (gastroesophageal reflux disease)     Hypertension     Every time i go into the emergacy room    Irritable bowel syndrome Two years ago    Kidney stone Two years ago    Migraine     Nausea, vomiting and diarrhea 9/17/2018    Obesity All of my life    Ovarian cyst Two years ago    Panic     PTSD (post-traumatic stress disorder)     Pulmonary embolism Not in lungs    Recurrent pregnancy loss, antepartum condition or complication Every since i have been 14 yars old    Restless leg syndrome     Sinus problem     Snores     Tattoos     Urinary tract infection Have them on and off    Varicella Little    Visual impairment Since i was little    Vitamin B12 deficiency     Wears glasses        Family History   Problem Relation Age of Onset    Irritable bowel syndrome Mother     Heart disease Mother     Miscarriages / Stillbirths Mother     Arthritis Mother     COPD Mother     Learning disabilities Mother     Mental illness Mother     Asthma Mother     Irritable bowel syndrome Father     Alcohol abuse Father     Diabetes Father     Hyperlipidemia Father     Anxiety disorder Father     Learning disabilities Father     Colon cancer Maternal Grandfather     Stroke Paternal Grandfather     Stroke Paternal Grandmother        Social History     Socioeconomic History    Marital status: Single   Tobacco Use    Smoking status: Every Day     Current packs/day: 0.50     Average packs/day: 2.0 packs/day for 31.9 years (63.3 ttl pk-yrs)     Types: Cigarettes     Start date: 7/17/1992     Passive exposure: Current     Smokeless tobacco: Never    Tobacco comments:         Vaping Use    Vaping status: Former    Passive vaping exposure: Yes   Substance and Sexual Activity    Alcohol use: Not Currently     Comment: rarely    Drug use: Not Currently    Sexual activity: Not Currently     Partners: Female     Birth control/protection: Other, Same-sex partner       Lindsay Amezquita  reports that she has been smoking cigarettes. She started smoking about 31 years ago. She has a 63.3 pack-year smoking history. She has been exposed to tobacco smoke. She has never used smokeless tobacco. I have educated her on the risk of diseases from using tobacco products such as cancer, COPD, and heart disease.     I advised her to quit and she is attempting to quit on her own.     I spent  1  minutes counseling the patient.              Breastfeeding No     PHYSICAL EXAM  Physical Exam   Constitutional: She is oriented to person, place, and time. She appears well-developed and well-nourished. No distress.   HENT:   Head: Normocephalic and atraumatic.   Right Ear: Hearing normal.   Left Ear: Hearing normal.   Nose: Congestion present. Right sinus exhibits maxillary sinus tenderness and frontal sinus tenderness. Left sinus exhibits maxillary sinus tenderness and frontal sinus tenderness.   Mouth/Throat: Mouth/Lips are normal.Oropharynx is clear and moist. No oropharyngeal exudate.   Patient directed exam  Tongue with redness and white coating. Throat red without any white patches.    Eyes: Conjunctivae and lids are normal.   Pulmonary/Chest: Effort normal.  No respiratory distress.  Abdominal: There is abdominal tenderness in the suprapubic area.   Patient directed exam   No back pain in the kidney area with palpation    Neurological: She is alert and oriented to person, place, and time.   Psychiatric: She has a normal mood and affect. Her speech is normal and behavior is normal.       Results for orders placed or performed during the hospital encounter  of 04/16/24   COVID-19, FLU A/B, RSV PCR 1 HR TAT - Swab, Nasopharynx    Specimen: Nasopharynx; Swab   Result Value Ref Range    COVID19 Not Detected Not Detected - Ref. Range    Influenza A PCR Not Detected Not Detected    Influenza B PCR Not Detected Not Detected    RSV, PCR Not Detected Not Detected   Rapid Strep A Screen - Swab, Throat    Specimen: Throat; Swab   Result Value Ref Range    Strep A Ag Negative Negative   Beta Strep Culture, Throat - Swab, Throat    Specimen: Throat; Swab   Result Value Ref Range    Throat Culture, Beta Strep No Beta Hemolytic Streptococcus Isolated    Comprehensive Metabolic Panel    Specimen: Blood   Result Value Ref Range    Glucose 156 (H) 65 - 99 mg/dL    BUN 4 (L) 6 - 20 mg/dL    Creatinine 0.65 0.57 - 1.00 mg/dL    Sodium 138 136 - 145 mmol/L    Potassium 3.1 (L) 3.5 - 5.2 mmol/L    Chloride 102 98 - 107 mmol/L    CO2 22.9 22.0 - 29.0 mmol/L    Calcium 9.3 8.6 - 10.5 mg/dL    Total Protein 6.2 6.0 - 8.5 g/dL    Albumin 4.2 3.5 - 5.2 g/dL    ALT (SGPT) 41 (H) 1 - 33 U/L    AST (SGOT) 20 1 - 32 U/L    Alkaline Phosphatase 83 39 - 117 U/L    Total Bilirubin 0.2 0.0 - 1.2 mg/dL    Globulin 2.0 gm/dL    A/G Ratio 2.1 g/dL    BUN/Creatinine Ratio 6.2 (L) 7.0 - 25.0    Anion Gap 13.1 5.0 - 15.0 mmol/L    eGFR 115.0 >60.0 mL/min/1.73   High Sensitivity Troponin T    Specimen: Blood   Result Value Ref Range    HS Troponin T <6 <14 ng/L   CBC Auto Differential    Specimen: Blood   Result Value Ref Range    WBC 10.14 3.40 - 10.80 10*3/mm3    RBC 5.09 3.77 - 5.28 10*6/mm3    Hemoglobin 14.8 12.0 - 15.9 g/dL    Hematocrit 44.0 34.0 - 46.6 %    MCV 86.4 79.0 - 97.0 fL    MCH 29.1 26.6 - 33.0 pg    MCHC 33.6 31.5 - 35.7 g/dL    RDW 14.6 12.3 - 15.4 %    RDW-SD 46.4 37.0 - 54.0 fl    MPV 10.2 6.0 - 12.0 fL    Platelets 288 140 - 450 10*3/mm3    Neutrophil % 50.9 42.7 - 76.0 %    Lymphocyte % 41.6 19.6 - 45.3 %    Monocyte % 5.1 5.0 - 12.0 %    Eosinophil % 1.6 0.3 - 6.2 %    Basophil % 0.6  0.0 - 1.5 %    Immature Grans % 0.2 0.0 - 0.5 %    Neutrophils, Absolute 5.16 1.70 - 7.00 10*3/mm3    Lymphocytes, Absolute 4.22 (H) 0.70 - 3.10 10*3/mm3    Monocytes, Absolute 0.52 0.10 - 0.90 10*3/mm3    Eosinophils, Absolute 0.16 0.00 - 0.40 10*3/mm3    Basophils, Absolute 0.06 0.00 - 0.20 10*3/mm3    Immature Grans, Absolute 0.02 0.00 - 0.05 10*3/mm3    nRBC 0.0 0.0 - 0.2 /100 WBC   Green Top (Gel)   Result Value Ref Range    Extra Tube Hold for add-ons.    Lavender Top   Result Value Ref Range    Extra Tube hold for add-on    Gold Top - SST   Result Value Ref Range    Extra Tube Hold for add-ons.    Light Blue Top   Result Value Ref Range    Extra Tube Hold for add-ons.      *Note: Due to a large number of results and/or encounters for the requested time period, some results have not been displayed. A complete set of results can be found in Results Review.       Diagnoses and all orders for this visit:    1. Suspected UTI (Primary)    2. Thrush    3. Acute non-recurrent pansinusitis    4. Yeast infection    5. History of bronchitis    Other orders  -     doxycycline (VIBRAMYCIN) 100 MG capsule; Take 1 capsule by mouth 2 (Two) Times a Day for 10 days.  Dispense: 20 capsule; Refill: 0  -     nystatin (MYCOSTATIN) 100,000 unit/mL suspension; Swish and swallow 5 mL 4 (Four) Times a Day for 10 days.  Dispense: 200 mL; Refill: 0  -     fluconazole (Diflucan) 150 MG tablet; Take 1 tablet by mouth 1 (One) Time for 1 dose. May repeat in 3 days if symptoms continue  Dispense: 2 tablet; Refill: 0    Rest  Increase water intake   Check blood sugar twice a day  Patient agrees she can take doxy and diflucan. Patient with multiple allergies   Declines UA and urine culture   PCP if symptoms persist   ER for any worsening symptoms such as high fever, chest pain, abdominal pain, SOA or nausea vomiting   4/16/24 CXR chronic COPD     FOLLOW-UP  As discussed during visit with PCP/[facility] if no improvement or Urgent  Care/Emergency Department if worsening of symptoms    Patient verbalizes understanding of medication dosage, comfort measures, instructions for treatment and follow-up.    Court Felipe, APRN  04/20/2024  22:28 EDT    The use of a video visit has been reviewed with the patient and verbal informed consent has been obtained. Myself and Lindsay Amezquita participated in this visit. The patient is located in [address] [place] [address].    I am located in [place], [place]. Mychart and Twilio were utilized. I spent 5 minutes in the patient's chart for this visit.      Note Disclaimer: At [facility], we believe that sharing information builds trust and better   relationships. You are receiving this note because you recently visited [facility]. It is possible you   will see health information before a provider has talked with you about it. This kind of information can   be easy to misunderstand. To help you fully understand what it means for your health, we urge you to   discuss this note with your provider.

## 2024-04-21 NOTE — PATIENT INSTRUCTIONS
Urinary Tract Infection, Adult    A urinary tract infection (UTI) is an infection of any part of the urinary tract. The urinary tract includes the kidneys, ureters, bladder, and urethra. These organs make, store, and get rid of urine in the body.  An upper UTI affects the ureters and kidneys. A lower UTI affects the bladder and urethra.  What are the causes?  Most urinary tract infections are caused by bacteria in your genital area around your urethra, where urine leaves your body. These bacteria grow and cause inflammation of your urinary tract.  What increases the risk?  You are more likely to develop this condition if:  You have a urinary catheter that stays in place.  You are not able to control when you urinate or have a bowel movement (incontinence).  You are female and you:  Use a spermicide or diaphragm for birth control.  Have low estrogen levels.  Are pregnant.  You have certain genes that increase your risk.  You are sexually active.  You take antibiotic medicines.  You have a condition that causes your flow of urine to slow down, such as:  An enlarged prostate, if you are male.  Blockage in your urethra.  A kidney stone.  A nerve condition that affects your bladder control (neurogenic bladder).  Not getting enough to drink, or not urinating often.  You have certain medical conditions, such as:  Diabetes.  A weak disease-fighting system (immunesystem).  Sickle cell disease.  Gout.  Spinal cord injury.  What are the signs or symptoms?  Symptoms of this condition include:  Needing to urinate right away (urgency).  Frequent urination. This may include small amounts of urine each time you urinate.  Pain or burning with urination.  Blood in the urine.  Urine that smells bad or unusual.  Trouble urinating.  Cloudy urine.  Vaginal discharge, if you are female.  Pain in the abdomen or the lower back.  You may also have:  Vomiting or a decreased appetite.  Confusion.  Irritability or tiredness.  A fever or  chills.  Diarrhea.  The first symptom in older adults may be confusion. In some cases, they may not have any symptoms until the infection has worsened.  How is this diagnosed?  This condition is diagnosed based on your medical history and a physical exam. You may also have other tests, including:  Urine tests.  Blood tests.  Tests for STIs (sexually transmitted infections).  If you have had more than one UTI, a cystoscopy or imaging studies may be done to determine the cause of the infections.  How is this treated?  Treatment for this condition includes:  Antibiotic medicine.  Over-the-counter medicines to treat discomfort.  Drinking enough water to stay hydrated.  If you have frequent infections or have other conditions such as a kidney stone, you may need to see a health care provider who specializes in the urinary tract (urologist).  In rare cases, urinary tract infections can cause sepsis. Sepsis is a life-threatening condition that occurs when the body responds to an infection. Sepsis is treated in the hospital with IV antibiotics, fluids, and other medicines.  Follow these instructions at home:    Medicines  Take over-the-counter and prescription medicines only as told by your health care provider.  If you were prescribed an antibiotic medicine, take it as told by your health care provider. Do not stop using the antibiotic even if you start to feel better.  General instructions  Make sure you:  Empty your bladder often and completely. Do not hold urine for long periods of time.  Empty your bladder after sex.  Wipe from front to back after urinating or having a bowel movement if you are female. Use each tissue only one time when you wipe.  Drink enough fluid to keep your urine pale yellow.  Keep all follow-up visits. This is important.  Contact a health care provider if:  Your symptoms do not get better after 1-2 days.  Your symptoms go away and then return.  Get help right away if:  You have severe pain in  your back or your lower abdomen.  You have a fever or chills.  You have nausea or vomiting.  Summary  A urinary tract infection (UTI) is an infection of any part of the urinary tract, which includes the kidneys, ureters, bladder, and urethra.  Most urinary tract infections are caused by bacteria in your genital area.  Treatment for this condition often includes antibiotic medicines.  If you were prescribed an antibiotic medicine, take it as told by your health care provider. Do not stop using the antibiotic even if you start to feel better.  Keep all follow-up visits. This is important.  This information is not intended to replace advice given to you by your health care provider. Make sure you discuss any questions you have with your health care provider.  Document Revised: 07/25/2021 Document Reviewed: 07/30/2021  [employer] Patient Education © 2024 [employer]  Sinus Infection, Adult  A sinus infection, also called sinusitis, is inflammation of your sinuses. Sinuses are hollow spaces in the bones around your face. Your sinuses are located:  Around your eyes.  In the middle of your forehead.  Behind your nose.  In your cheekbones.  Mucus normally drains out of your sinuses. When your nasal tissues become inflamed or swollen, mucus can become trapped or blocked. This allows bacteria, viruses, and fungi to grow, which leads to infection. Most infections of the sinuses are caused by a virus.  A sinus infection can develop quickly. It can last for up to 4 weeks (acute) or for more than 12 weeks (chronic). A sinus infection often develops after a cold.  What are the causes?  This condition is caused by anything that creates swelling in the sinuses or stops mucus from draining. This includes:  Allergies.  Asthma.  Infection from bacteria or viruses.  Deformities or blockages in your nose or sinuses.  Abnormal growths in the nose (nasal polyps).  Pollutants, such as chemicals or irritants in the air.  Infection from  fungi. This is rare.  What increases the risk?  You are more likely to develop this condition if you:  Have a weak body defense system (immune system).  Do a lot of swimming or diving.  Overuse nasal sprays.  Smoke.  What are the signs or symptoms?  The main symptoms of this condition are pain and a feeling of pressure around the affected sinuses. Other symptoms include:  Stuffy nose or congestion that makes it difficult to breathe through your nose.  Thick yellow or greenish drainage from your nose.  Tenderness, swelling, and warmth over the affected sinuses.  A cough that may get worse at night.  Decreased sense of smell and taste.  Extra mucus that collects in the throat or the back of the nose (postnasal drip) causing a sore throat or bad breath.  Tiredness (fatigue).  Fever.  How is this diagnosed?  This condition is diagnosed based on:  Your symptoms.  Your medical history.  A physical exam.  Tests to find out if your condition is acute or chronic. This may include:  Checking your nose for nasal polyps.  Viewing your sinuses using a device that has a light (endoscope).  Testing for allergies or bacteria.  Imaging tests, such as an MRI or CT scan.  In rare cases, a bone biopsy may be done to rule out more serious types of fungal sinus disease.  How is this treated?  Treatment for a sinus infection depends on the cause and whether your condition is chronic or acute.  If caused by a virus, your symptoms should go away on their own within 10 days. You may be given medicines to relieve symptoms. They include:  Medicines that shrink swollen nasal passages (decongestants).  A spray that eases inflammation of the nostrils (topical intranasal corticosteroids).  Rinses that help get rid of thick mucus in your nose (nasal saline washes).  Medicines that treat allergies (antihistamines).  Over-the-counter pain relievers.  If caused by bacteria, your health care provider may recommend waiting to see if your symptoms  improve. Most bacterial infections will get better without antibiotic medicine. You may be given antibiotics if you have:  A severe infection.  A weak immune system.  If caused by narrow nasal passages or nasal polyps, surgery may be needed.  Follow these instructions at home:  Medicines  Take, use, or apply over-the-counter and prescription medicines only as told by your health care provider. These may include nasal sprays.  If you were prescribed an antibiotic medicine, take it as told by your health care provider. Do not stop taking the antibiotic even if you start to feel better.  Hydrate and humidify    Drink enough fluid to keep your urine pale yellow. Staying hydrated will help to thin your mucus.  Use a cool mist humidifier to keep the humidity level in your home above 50%.  Inhale steam for 10-15 minutes, 3-4 times a day, or as told by your health care provider. You can do this in the bathroom while a hot shower is running.  Limit your exposure to cool or dry air.  Rest  Rest as much as possible.  Sleep with your head raised (elevated).  Make sure you get enough sleep each night.  General instructions    Apply a warm, moist washcloth to your face 3-4 times a day or as told by your health care provider. This will help with discomfort.  Use nasal saline washes as often as told by your health care provider.  Wash your hands often with soap and water to reduce your exposure to germs. If soap and water are not available, use hand .  Do not smoke. Avoid being around people who are smoking (secondhand smoke).  Keep all follow-up visits. This is important.  Contact a health care provider if:  You have a fever.  Your symptoms get worse.  Your symptoms do not improve within 10 days.  Get help right away if:  You have a severe headache.  You have persistent vomiting.  You have severe pain or swelling around your face or eyes.  You have vision problems.  You develop confusion.  Your neck is stiff.  You have  trouble breathing.  These symptoms may be an emergency. Get help right away. Call 911.  Do not wait to see if the symptoms will go away.  Do not drive yourself to the hospital.  Summary  A sinus infection is soreness and inflammation of your sinuses. Sinuses are hollow spaces in the bones around your face.  This condition is caused by nasal tissues that become inflamed or swollen. The swelling traps or blocks the flow of mucus. This allows bacteria, viruses, and fungi to grow, which leads to infection.  If you were prescribed an antibiotic medicine, take it as told by your health care provider. Do not stop taking the antibiotic even if you start to feel better.  Keep all follow-up visits. This is important.  This information is not intended to replace advice given to you by your health care provider. Make sure you discuss any questions you have with your health care provider.  Document Revised: 11/22/2022 Document Reviewed: 11/22/2022  [employer] Patient Education © 2024 [employer]

## 2024-04-22 ENCOUNTER — TELEPHONE (OUTPATIENT)
Dept: FAMILY MEDICINE CLINIC | Facility: CLINIC | Age: 39
End: 2024-04-22
Payer: MEDICAID

## 2024-04-22 NOTE — TELEPHONE ENCOUNTER
HUB to Relay    Attempted to contact patient by phone.  No answer, left message for patient to return call.  Jeannine has exhausted all medication options.  Steroids are given for resistant migraines.  They were given at recent ER visit.  [facility]  Keep appointment when scheduled.

## 2024-04-22 NOTE — TELEPHONE ENCOUNTER
I am not sure how to respond to this.  She was notified last week that we have exhausted medication options and she chose the [facility] referral instead of headache clinic referral   (she also had a referral from PCP, records have been sent but no appointment scheduled)

## 2024-04-22 NOTE — TELEPHONE ENCOUNTER
Caller: Lindsay Amezquita    Relationship: Self    Best call back number: [phone]     What is the best time to reach you: ANY    Who are you requesting to speak with (clinical staff, provider,  specific staff member): NURSE    Do you know the name of the person who called: PATIENT    What was the call regarding: PATIENT NEEDS PRESCRIPTION FOR MASK FOR HER NEBULIZER SENT TO [facility].      Is it okay if the provider responds through MyChart: PHONE CALL PLEASE

## 2024-04-22 NOTE — TELEPHONE ENCOUNTER
Caller: Lindsay Amezquita    Relationship: Self    Best call back number: [phone]     Which medication are you concerned about: albuterol AND     potassium chloride       Who prescribed you this medication: HUNTER MARTINEZ    What are your concerns: PT NEEDS THE MASK FOR THE ALBUTEROL NEBULIZER AND SHE NEEDS THE POTASSIUM CHLORIDE IN THE CAPSULE THE TABLET IS TOO LARGE AND SHE CAN'T TAKE IT.  [id number] - [address] [phone] [place] [phone] [facility] [phone]     [facility] IS WHERE THE PT STATED THEY HAVE THE MASK.[phone]

## 2024-04-22 NOTE — TELEPHONE ENCOUNTER
Name: AlirioAnaly barrona ADELA    Relationship: Self    Best Callback Number:  Telephone Information:   Mobile [phone]         HUB PROVIDED THE RELAY MESSAGE FROM THE OFFICE   PATIENT VOICED UNDERSTANDING AND HAS NO FURTHER QUESTIONS AT THIS TIME    ADDITIONAL INFORMATION:

## 2024-04-22 NOTE — TELEPHONE ENCOUNTER
Caller: Lindsay Amezquita    Relationship: Self    Best call back number: [phone]    Caller requesting test results: SELF    What test was performed: LABS    When was the test performed: LAST WEEK    Where was the test performed: [facility]

## 2024-04-22 NOTE — TELEPHONE ENCOUNTER
INR in good range- keep same dose and recheck in 2 wks please PATIENT NEEDS REFILL ON XANAX   Received fax from [facility] for the medication the patient was asking for:  Wixela 250/50    I pended the medication, but not sure it is all correct.     There is a mychart message from the patient.

## 2024-04-23 ENCOUNTER — HOSPITAL ENCOUNTER (OUTPATIENT)
Dept: SLEEP MEDICINE | Facility: HOSPITAL | Age: 39
Discharge: HOME | End: 2024-04-23
Admitting: INTERNAL MEDICINE
Payer: MEDICAID

## 2024-04-23 ENCOUNTER — TELEPHONE (OUTPATIENT)
Dept: FAMILY MEDICINE CLINIC | Facility: CLINIC | Age: 39
End: 2024-04-23
Payer: MEDICAID

## 2024-04-23 DIAGNOSIS — G47.33 OBSTRUCTIVE SLEEP APNEA: ICD-10-CM

## 2024-04-23 DIAGNOSIS — E66.01 MORBID OBESITY, UNSPECIFIED OBESITY TYPE: ICD-10-CM

## 2024-04-23 DIAGNOSIS — R06.83 SNORING: ICD-10-CM

## 2024-04-23 DIAGNOSIS — E87.6 HYPOKALEMIA: Primary | ICD-10-CM

## 2024-04-23 DIAGNOSIS — G47.19 EXCESSIVE DAYTIME SLEEPINESS: ICD-10-CM

## 2024-04-23 DIAGNOSIS — G47.52 DREAM ENACTMENT BEHAVIOR: ICD-10-CM

## 2024-04-23 DIAGNOSIS — G47.8 SLEEP TALKING: ICD-10-CM

## 2024-04-23 PROCEDURE — 95810 POLYSOM 6/> YRS 4/> PARAM: CPT

## 2024-04-23 RX ORDER — POTASSIUM CHLORIDE 600 MG/1
8 CAPSULE, EXTENDED RELEASE ORAL 2 TIMES DAILY
Qty: 180 EACH | Refills: 3 | Status: SHIPPED | OUTPATIENT
Start: 2024-04-23

## 2024-04-23 NOTE — TELEPHONE ENCOUNTER
Caller: Lindsay Amezquita    Relationship: Self    Best call back number: [phone]     What is the best time to reach you: ANY    What was the call regarding: LINDSAY LOST HER VOICE AND WANTS TO KNOW IF SHE NEEDS AN APPT?  AND GO OVER GENE SITE TEST.  SHE HAS A SLEEP STUDY TEST TONIGHT.      DID YOU SEND RX FOR THE MASK FOR LINDSAY'S NEBULIZER?    Is it okay if the provider responds through MyChart: IRCHY

## 2024-04-23 NOTE — TELEPHONE ENCOUNTER
SPOKE WITH THE PATIENT , PATIENT HAS QUESTIONS CONCERNING MEDICATION, LOST HER VOICE, UTI, AND NEBULIZER SUPPLIES, LABS PATIENT IS SCHEDULED ON 04/24/2024 WITH MARANDA YARBROUGH

## 2024-04-23 NOTE — TELEPHONE ENCOUNTER
SPOKE WITH THE PATIENT , PATIENT HAS QUESTIONS CONCERNING MEDICATION, LOST HER VOICE, UTI, AND NEBULIZER SUPPLIES, PATIENT IS SCHEDULED ON 04/24/2024 WITH MARANDA YARBROUGH

## 2024-04-24 ENCOUNTER — TELEPHONE (OUTPATIENT)
Dept: OBSTETRICS AND GYNECOLOGY | Facility: CLINIC | Age: 39
End: 2024-04-24

## 2024-04-24 ENCOUNTER — TELEPHONE (OUTPATIENT)
Dept: FAMILY MEDICINE CLINIC | Facility: CLINIC | Age: 39
End: 2024-04-24

## 2024-04-24 ENCOUNTER — TELEPHONE (OUTPATIENT)
Dept: NEUROLOGY | Facility: CLINIC | Age: 39
End: 2024-04-24

## 2024-04-24 DIAGNOSIS — J44.9 CHRONIC OBSTRUCTIVE PULMONARY DISEASE, UNSPECIFIED COPD TYPE: Primary | ICD-10-CM

## 2024-04-24 PROCEDURE — 95810 POLYSOM 6/> YRS 4/> PARAM: CPT | Performed by: INTERNAL MEDICINE

## 2024-04-24 RX ORDER — UMECLIDINIUM BROMIDE AND VILANTEROL TRIFENATATE 62.5; 25 UG/1; UG/1
1 POWDER RESPIRATORY (INHALATION) DAILY
COMMUNITY
Start: 2024-04-15

## 2024-04-24 NOTE — TELEPHONE ENCOUNTER
SPOKE WITH MILTON AT [facility], THE PATIENT HAS REQUESTED A MASK FOR HER NEBULIZER. PATIENT WILL NEED ANOTHER ORDER NEBULIZER KIT WITH MASK. FAX [phone]

## 2024-04-24 NOTE — TELEPHONE ENCOUNTER
Caller: Lindsay Amezquita    Relationship: Self    Best call back number: [phone]    What is the best time to reach you:     ASAP    Who are you requesting to speak with (clinical staff, provider,  specific staff member):     NURSE OR DR ROMERO    What was the call regarding:     PT C/O SPOTTING, CRAMPING, MIGRAINE AND UNABLE TO EAT    THIS HAS BEEN GOING ON SINCE 4 THIS AM    SHE WOULD LIKE A CALL BACK ASAP

## 2024-04-24 NOTE — TELEPHONE ENCOUNTER
Caller: Lindsay Amezquita    Relationship: Self    Best call back number: [phone]     What is the best time to reach you: ANY    Who are you requesting to speak with (clinical staff, provider,  specific staff member): PROVIDER    What was the call regarding: PATIENT TELEPHONED TO ADVISE SHE HAD A SLEEP STUDY DONE ON YESTERDAY. WHEN SHE GOT HOME SHE TOOK   NURTEC ALONG WITH BUTALBITAL-ACETAMINOPHEN-CAFFEIN & THAT COMBINATION WORKED.     PLEASE REVIEW & CALL WITH QUESTIONS OR CONCERNS-THANK YOU

## 2024-04-24 NOTE — TELEPHONE ENCOUNTER
Provider: DR ROMERO    Caller: LEORA COTA    Relationship to Patient: SELF    Phone Number: [phone]    Reason for Call: PT SAME DAY CANCELLED APPT FOR TODAY @ 1:10PM; PT STATED SHE DID NOT SLEEP WELL AND NEEDED TO R/S; R/S FOR NEXT AVAIL.

## 2024-04-25 ENCOUNTER — TELEPHONE (OUTPATIENT)
Dept: PULMONOLOGY | Facility: CLINIC | Age: 39
End: 2024-04-25

## 2024-04-25 ENCOUNTER — TELEPHONE (OUTPATIENT)
Dept: FAMILY MEDICINE CLINIC | Facility: CLINIC | Age: 39
End: 2024-04-25
Payer: MEDICAID

## 2024-04-25 NOTE — TELEPHONE ENCOUNTER
Caller: Lindsay Amezquita    Relationship to patient: Self    Best call back number: [phone]     Patient is needing: PATIENT WANTS TO KNOW IF SHE NEEDS TO BE SEEN PRIOR [facility], OR IF THE ORDER CAN GO AHEAD AND BE SENT. SHE ALSO STATES THAT SHE WILL A MASK RATHER THAN NASAL PIECE BECAUSE THE NASAL PIECE IS UNCOMFORTABLE FOR HER. PLEASE CALL BACK TO ADVISE.

## 2024-04-25 NOTE — TELEPHONE ENCOUNTER
SPOKE WITH PATIENT. SHE WILL BE KEEPING HER APPOINTMENT ON MONDAY AND WILL CALL AERKIMMIEE ABOUT HER MASK.

## 2024-04-25 NOTE — TELEPHONE ENCOUNTER
CALLED PATIENT TO DISCUSS HER REQUEST TO CANCEL A FUTURE APPOINTMENT AND DISCUSS AN ORDER THAT WAS SENT TO [facility] FOR  NEB MASK. LEFT A DETAILED VOICEMAIL AND ASK PATIENT TO RETURN A CALL TO THE OFFICE.

## 2024-04-25 NOTE — TELEPHONE ENCOUNTER
Caller: Lindsay Amezquita    Relationship: Self    Best call back number:  [phone]    Caller requesting test results: YES    What test was performed: SLEEP    When was the test performed: 04-23-24    Where was the test performed: [facility]     Additional notes: PLEASE CALL PT

## 2024-04-26 ENCOUNTER — TELEPHONE (OUTPATIENT)
Dept: FAMILY MEDICINE CLINIC | Facility: CLINIC | Age: 39
End: 2024-04-26
Payer: MEDICAID

## 2024-04-26 ENCOUNTER — TELEPHONE (OUTPATIENT)
Dept: BEHAVIORAL HEALTH | Facility: CLINIC | Age: 39
End: 2024-04-26

## 2024-04-26 NOTE — TELEPHONE ENCOUNTER
PATIENT CALLED IN, SAYS [facility] DID NOT RECEIVE THE ORDER FAXED YESTERDAY  [phone] FOR NEBULIZER MASK, CHECKED WITH EULA PATE WHO CHECKED HER FAX QUE AND SAYS IT WENT, WILL REFAX AGAIN

## 2024-04-29 ENCOUNTER — TELEPHONE (OUTPATIENT)
Dept: BEHAVIORAL HEALTH | Facility: CLINIC | Age: 39
End: 2024-04-29
Payer: MEDICAID

## 2024-04-29 NOTE — TELEPHONE ENCOUNTER
SPOKE WITH [facility], SHE STATED THAT THE FAX WAS RECEIVED, THEY REACHED OUT TO THE PATIENT TO INFORM HER THAT THE MASK WAS READY FOR .

## 2024-04-29 NOTE — TELEPHONE ENCOUNTER
PATIENT IS VERY UPSET HAD A VISIT WITH PA TODAY AND SHE HAS TOOK SOME OF PATIENTS MEDS AWAY FROM HER AND SHE IS REALLY UPSET SHE HAS AN APPT. HER WITH [facility] TOMORROW BUT WANTS TO TALK TO SOMEONE TODAY ABOUT IT

## 2024-04-30 ENCOUNTER — TELEMEDICINE (OUTPATIENT)
Dept: BEHAVIORAL HEALTH | Facility: CLINIC | Age: 39
End: 2024-04-30
Payer: MEDICAID

## 2024-04-30 ENCOUNTER — TELEPHONE (OUTPATIENT)
Dept: NEUROLOGY | Facility: CLINIC | Age: 39
End: 2024-04-30
Payer: MEDICAID

## 2024-04-30 DIAGNOSIS — F31.30 BIPOLAR I DISORDER, MOST RECENT EPISODE DEPRESSED: ICD-10-CM

## 2024-04-30 DIAGNOSIS — F51.04 PSYCHOPHYSIOLOGICAL INSOMNIA: ICD-10-CM

## 2024-04-30 DIAGNOSIS — F41.1 GENERALIZED ANXIETY DISORDER: ICD-10-CM

## 2024-04-30 DIAGNOSIS — F43.10 POST TRAUMATIC STRESS DISORDER (PTSD): ICD-10-CM

## 2024-04-30 PROCEDURE — 1160F RVW MEDS BY RX/DR IN RCRD: CPT

## 2024-04-30 PROCEDURE — 99214 OFFICE O/P EST MOD 30 MIN: CPT

## 2024-04-30 PROCEDURE — 1159F MED LIST DOCD IN RCRD: CPT

## 2024-04-30 RX ORDER — DIAZEPAM 2 MG/1
2 TABLET ORAL 3 TIMES DAILY PRN
Qty: 90 TABLET | Refills: 0 | Status: SHIPPED | OUTPATIENT
Start: 2024-04-30

## 2024-04-30 RX ORDER — LURASIDONE HYDROCHLORIDE 120 MG/1
120 TABLET, FILM COATED ORAL DAILY
Qty: 30 TABLET | Refills: 1 | Status: SHIPPED | OUTPATIENT
Start: 2024-04-30

## 2024-04-30 RX ORDER — OXCARBAZEPINE 300 MG/1
300 TABLET, FILM COATED ORAL 2 TIMES DAILY
Qty: 60 TABLET | Refills: 1 | Status: SHIPPED | OUTPATIENT
Start: 2024-04-30

## 2024-04-30 RX ORDER — HYDROXYZINE 50 MG/1
75 TABLET, FILM COATED ORAL 3 TIMES DAILY PRN
Qty: 135 TABLET | Refills: 1 | Status: SHIPPED | OUTPATIENT
Start: 2024-04-30

## 2024-04-30 RX ORDER — DESVENLAFAXINE 100 MG/1
100 TABLET, EXTENDED RELEASE ORAL DAILY
Qty: 30 TABLET | Refills: 1 | Status: SHIPPED | OUTPATIENT
Start: 2024-04-30

## 2024-04-30 RX ORDER — AMITRIPTYLINE HYDROCHLORIDE 150 MG/1
150 TABLET ORAL NIGHTLY
Qty: 30 TABLET | Refills: 1 | Status: SHIPPED | OUTPATIENT
Start: 2024-04-30

## 2024-04-30 NOTE — TELEPHONE ENCOUNTER
Caller: Lindsay Amezquita    Relationship: Self    Best call back number: [phone] (home)   What was the call regarding: PATIENT IS NEEDING A REFILL OF THE Zavzpret. SHE HAS ONE LEFT AND SHE WOULD ALSO LIKE A RX OF THE ZOFRAN ALSO TO USE WITH THE MEDICATION.     [facility]/pharmacy #[id number] - [place], [place] - [address] [place] [address] [phone] [place] [phone] FX       PLEASE REVIEW  THANK YOU

## 2024-04-30 NOTE — TELEPHONE ENCOUNTER
Provider:MARJ MARCOS     Caller: LEORA COTA     Relationship to Patient: SELF    Phone Number:     [phone]       Reason for Call: THE PT NEEDS TO SPEAK WITH SOMEONE ABOUT HER ORDER FOR HER CPAP/BIPAP SUPPLIES. PLEASE ADVISE     When was the patient last seen: 04/03/24    
Questions answered   
no

## 2024-05-01 ENCOUNTER — TELEPHONE (OUTPATIENT)
Dept: NEUROLOGY | Facility: CLINIC | Age: 39
End: 2024-05-01

## 2024-05-01 DIAGNOSIS — R11.2 NAUSEA AND VOMITING, UNSPECIFIED VOMITING TYPE: Primary | ICD-10-CM

## 2024-05-01 RX ORDER — PROMETHAZINE HYDROCHLORIDE 25 MG/1
25 TABLET ORAL EVERY 6 HOURS PRN
Qty: 20 TABLET | Refills: 0 | Status: SHIPPED | OUTPATIENT
Start: 2024-05-01

## 2024-05-01 NOTE — TELEPHONE ENCOUNTER
Patient now wants to know if she can take excedrin migraine with the zavzpret.  She is also requesting phenergan instead of Zofran.

## 2024-05-01 NOTE — TELEPHONE ENCOUNTER
Caller: Lindsay Amezquita    Relationship: Self    Best call back number: [phone]     Who are you requesting to speak with (clinical staff, provider,  specific staff member): PROVIDER    What was the call regarding: PATIENT TELEPHONED TO ADVISE  SHE PICKED UP NASAL MEDS FOR HA.  IS IT OK TO TAKE EXCEDRIN W/ NASAL SPRAY?    ALSO, SHE WANTS PROVIDER TO SEND RX FOR PHENERGAN INSTEAD OF ZOFRAN. ZOFRAN IS ON HER ALLGERY LIST.    [phone] & CALL & ADVISE-THANK YOU

## 2024-05-06 ENCOUNTER — TELEPHONE (OUTPATIENT)
Dept: PULMONOLOGY | Facility: CLINIC | Age: 39
End: 2024-05-06
Payer: MEDICAID

## 2024-05-07 ENCOUNTER — OFFICE VISIT (OUTPATIENT)
Dept: FAMILY MEDICINE CLINIC | Facility: CLINIC | Age: 39
End: 2024-05-07
Payer: MEDICAID

## 2024-05-07 VITALS
DIASTOLIC BLOOD PRESSURE: 82 MMHG | WEIGHT: 241.2 LBS | BODY MASS INDEX: 44.39 KG/M2 | RESPIRATION RATE: 16 BRPM | TEMPERATURE: 97.5 F | OXYGEN SATURATION: 95 % | HEIGHT: 62 IN | HEART RATE: 85 BPM | SYSTOLIC BLOOD PRESSURE: 126 MMHG

## 2024-05-07 DIAGNOSIS — F31.9 BIPOLAR 1 DISORDER, DEPRESSED: ICD-10-CM

## 2024-05-07 DIAGNOSIS — Z23 NEED FOR VACCINATION: ICD-10-CM

## 2024-05-07 DIAGNOSIS — E11.65 TYPE 2 DIABETES MELLITUS WITH HYPERGLYCEMIA, WITHOUT LONG-TERM CURRENT USE OF INSULIN: ICD-10-CM

## 2024-05-07 DIAGNOSIS — R45.851 PASSIVE SUICIDAL IDEATIONS: ICD-10-CM

## 2024-05-07 DIAGNOSIS — F17.210 CIGARETTE NICOTINE DEPENDENCE WITHOUT COMPLICATION: ICD-10-CM

## 2024-05-07 DIAGNOSIS — Z00.00 WELL ADULT EXAM: Primary | ICD-10-CM

## 2024-05-07 DIAGNOSIS — E66.01 CLASS 3 SEVERE OBESITY DUE TO EXCESS CALORIES WITH SERIOUS COMORBIDITY AND BODY MASS INDEX (BMI) OF 40.0 TO 44.9 IN ADULT: ICD-10-CM

## 2024-05-07 DIAGNOSIS — F41.1 GAD (GENERALIZED ANXIETY DISORDER): ICD-10-CM

## 2024-05-07 DIAGNOSIS — M54.16 LUMBAR RADICULOPATHY: ICD-10-CM

## 2024-05-07 PROBLEM — L23.7 POISON IVY DERMATITIS: Status: RESOLVED | Noted: 2019-07-15 | Resolved: 2024-05-07

## 2024-05-07 PROBLEM — G51.0 BELL PALSY: Status: RESOLVED | Noted: 2021-07-06 | Resolved: 2024-05-07

## 2024-05-07 PROBLEM — R11.2 NAUSEA, VOMITING AND DIARRHEA: Status: RESOLVED | Noted: 2018-09-17 | Resolved: 2024-05-07

## 2024-05-07 PROBLEM — R07.9 CHEST PAIN: Status: RESOLVED | Noted: 2020-02-18 | Resolved: 2024-05-07

## 2024-05-07 PROBLEM — M54.30 SCIATICA: Status: RESOLVED | Noted: 2023-10-07 | Resolved: 2024-05-07

## 2024-05-07 PROBLEM — R03.0 FINDING OF ABOVE NORMAL BLOOD PRESSURE: Status: RESOLVED | Noted: 2020-08-23 | Resolved: 2024-05-07

## 2024-05-07 PROBLEM — G43.109 MIGRAINE AURA WITHOUT HEADACHE: Status: RESOLVED | Noted: 2020-05-21 | Resolved: 2024-05-07

## 2024-05-07 PROBLEM — Z86.69 HISTORY OF MIGRAINE: Status: RESOLVED | Noted: 2024-04-06 | Resolved: 2024-05-07

## 2024-05-07 PROBLEM — G43.909 MIGRAINE: Status: RESOLVED | Noted: 2020-05-21 | Resolved: 2024-05-07

## 2024-05-07 PROBLEM — J06.9 VIRAL UPPER RESPIRATORY TRACT INFECTION: Status: RESOLVED | Noted: 2019-10-29 | Resolved: 2024-05-07

## 2024-05-07 PROBLEM — M19.90 ARTHRITIS: Status: RESOLVED | Noted: 2020-05-21 | Resolved: 2024-05-07

## 2024-05-07 PROBLEM — G43.909 MIGRAINE HEADACHE: Status: RESOLVED | Noted: 2018-02-20 | Resolved: 2024-05-07

## 2024-05-07 PROBLEM — R10.9 FLANK PAIN: Status: RESOLVED | Noted: 2020-08-23 | Resolved: 2024-05-07

## 2024-05-07 PROBLEM — L23.7 CONTACT DERMATITIS DUE TO POISON IVY: Status: RESOLVED | Noted: 2019-07-15 | Resolved: 2024-05-07

## 2024-05-07 PROBLEM — S61.219A LACERATION OF FINGER: Status: RESOLVED | Noted: 2017-04-13 | Resolved: 2024-05-07

## 2024-05-07 PROBLEM — E66.813 CLASS 3 SEVERE OBESITY DUE TO EXCESS CALORIES WITH SERIOUS COMORBIDITY AND BODY MASS INDEX (BMI) OF 40.0 TO 44.9 IN ADULT: Status: ACTIVE | Noted: 2022-06-23

## 2024-05-07 PROBLEM — R19.7 NAUSEA, VOMITING AND DIARRHEA: Status: RESOLVED | Noted: 2018-09-17 | Resolved: 2024-05-07

## 2024-05-07 PROBLEM — J20.9 ACUTE BRONCHITIS: Status: RESOLVED | Noted: 2017-12-19 | Resolved: 2024-05-07

## 2024-05-07 PROBLEM — T78.40XA ALLERGIC REACTION: Status: RESOLVED | Noted: 2021-03-25 | Resolved: 2024-05-07

## 2024-05-07 PROBLEM — G51.0 BELL'S PALSY: Status: RESOLVED | Noted: 2021-04-18 | Resolved: 2024-05-07

## 2024-05-07 PROBLEM — K80.20 CHOLELITHIASIS: Status: RESOLVED | Noted: 2020-05-21 | Resolved: 2024-05-07

## 2024-05-07 PROBLEM — R10.9 ABDOMINAL PAIN: Status: RESOLVED | Noted: 2020-05-21 | Resolved: 2024-05-07

## 2024-05-07 PROBLEM — M54.50 LOW BACK PAIN: Status: RESOLVED | Noted: 2020-02-27 | Resolved: 2024-05-07

## 2024-05-07 PROBLEM — I82.432 ACUTE DEEP VEIN THROMBOSIS (DVT) OF POPLITEAL VEIN OF LEFT LOWER EXTREMITY: Status: RESOLVED | Noted: 2021-08-10 | Resolved: 2024-05-07

## 2024-05-07 PROCEDURE — 2014F MENTAL STATUS ASSESS: CPT | Performed by: FAMILY MEDICINE

## 2024-05-07 PROCEDURE — 90471 IMMUNIZATION ADMIN: CPT | Performed by: FAMILY MEDICINE

## 2024-05-07 PROCEDURE — 1159F MED LIST DOCD IN RCRD: CPT | Performed by: FAMILY MEDICINE

## 2024-05-07 PROCEDURE — 1125F AMNT PAIN NOTED PAIN PRSNT: CPT | Performed by: FAMILY MEDICINE

## 2024-05-07 PROCEDURE — 3044F HG A1C LEVEL LT 7.0%: CPT | Performed by: FAMILY MEDICINE

## 2024-05-07 PROCEDURE — 1160F RVW MEDS BY RX/DR IN RCRD: CPT | Performed by: FAMILY MEDICINE

## 2024-05-07 PROCEDURE — 90714 TD VACC NO PRESV 7 YRS+ IM: CPT | Performed by: FAMILY MEDICINE

## 2024-05-07 PROCEDURE — 99395 PREV VISIT EST AGE 18-39: CPT | Performed by: FAMILY MEDICINE

## 2024-05-07 RX ORDER — CHLORHEXIDINE GLUCONATE 40 MG/ML
SOLUTION TOPICAL
COMMUNITY

## 2024-05-07 RX ORDER — BLOOD-GLUCOSE METER
KIT MISCELLANEOUS SEE ADMIN INSTRUCTIONS
COMMUNITY
Start: 2024-04-26

## 2024-05-07 RX ORDER — TIZANIDINE 4 MG/1
4 TABLET ORAL EVERY 8 HOURS PRN
Qty: 270 TABLET | Refills: 0 | Status: SHIPPED | OUTPATIENT
Start: 2024-05-07

## 2024-05-10 ENCOUNTER — PATIENT ROUNDING (BHMG ONLY) (OUTPATIENT)
Dept: FAMILY MEDICINE CLINIC | Facility: CLINIC | Age: 39
End: 2024-05-10
Payer: MEDICAID

## 2024-05-10 ENCOUNTER — TELEPHONE (OUTPATIENT)
Dept: BEHAVIORAL HEALTH | Facility: CLINIC | Age: 39
End: 2024-05-10
Payer: MEDICAID

## 2024-05-10 ENCOUNTER — TELEPHONE (OUTPATIENT)
Dept: FAMILY MEDICINE CLINIC | Facility: CLINIC | Age: 39
End: 2024-05-10

## 2024-05-10 ENCOUNTER — TELEMEDICINE (OUTPATIENT)
Dept: FAMILY MEDICINE CLINIC | Facility: TELEHEALTH | Age: 39
End: 2024-05-10
Payer: MEDICAID

## 2024-05-10 DIAGNOSIS — J06.9 ACUTE URI: Primary | ICD-10-CM

## 2024-05-10 RX ORDER — PREDNISONE 20 MG/1
20 TABLET ORAL 2 TIMES DAILY
Qty: 10 TABLET | Refills: 0 | Status: SHIPPED | OUTPATIENT
Start: 2024-05-10 | End: 2024-05-15

## 2024-05-10 RX ORDER — AZITHROMYCIN 250 MG/1
TABLET, FILM COATED ORAL
Qty: 6 TABLET | Refills: 0 | Status: SHIPPED | OUTPATIENT
Start: 2024-05-10

## 2024-05-10 RX ORDER — DEXTROMETHORPHAN HYDROBROMIDE AND PROMETHAZINE HYDROCHLORIDE 15; 6.25 MG/5ML; MG/5ML
5 SYRUP ORAL NIGHTLY PRN
Qty: 100 ML | Refills: 0 | Status: SHIPPED | OUTPATIENT
Start: 2024-05-10

## 2024-05-10 NOTE — TELEPHONE ENCOUNTER
Patient stopped taking hydroxyzine cause its making her more sad. She said she is going to stick with the other medicines she is on.

## 2024-05-10 NOTE — TELEPHONE ENCOUNTER
Caller: Lindsay Amezquita     Relationship: PATIENT    Best call back number: [phone]     What is your medical concern? PAINFUL CYSTS UNDER LEFT ARMPIT AND ON TOP OF STOMACH. ALSO, HAS ACID REFLUX.    How long has this issue been going on? OVER NIGHT CYSTS BUT HISTORY OF THESE. ACID REFLUX IS AN ONGOING ISSUE.    Is your provider already aware of this issue? NOT SURE.     Have you been treated for this issue? NOT NOW BUT WAS PREVIOUSLY WITH ANTIBIOTIC.  PATIENT REQUESTS THAT YOU SEND IN AN  DOXYCYCLINE AND THE GENERIC VERSION OF PRILOSEC.

## 2024-05-13 DIAGNOSIS — K21.00 GASTROESOPHAGEAL REFLUX DISEASE WITH ESOPHAGITIS WITHOUT HEMORRHAGE: ICD-10-CM

## 2024-05-13 DIAGNOSIS — L73.2 HIDRADENITIS SUPPURATIVA: Primary | ICD-10-CM

## 2024-05-13 RX ORDER — OMEPRAZOLE 20 MG/1
20 CAPSULE, DELAYED RELEASE ORAL DAILY
Qty: 90 CAPSULE | Refills: 1 | Status: SHIPPED | OUTPATIENT
Start: 2024-05-13

## 2024-05-13 RX ORDER — DOXYCYCLINE HYCLATE 100 MG/1
100 CAPSULE ORAL 2 TIMES DAILY
Qty: 120 CAPSULE | Refills: 0 | Status: SHIPPED | OUTPATIENT
Start: 2024-05-13 | End: 2024-07-12

## 2024-05-14 ENCOUNTER — TELEPHONE (OUTPATIENT)
Dept: PULMONOLOGY | Facility: CLINIC | Age: 39
End: 2024-05-14
Payer: MEDICAID

## 2024-05-14 DIAGNOSIS — G47.34 NOCTURNAL HYPOXIA: Primary | ICD-10-CM

## 2024-05-14 NOTE — TELEPHONE ENCOUNTER
Hub staff attempted to follow warm transfer process and was unsuccessful     Caller: Lindsay Amezquita    Relationship to patient: Self    Best call back number: [phone]     Patient is needing: THE PT HAD A MISSED CALL WITH NO VM LEFT. PLEASE ADVISE.

## 2024-05-14 NOTE — TELEPHONE ENCOUNTER
I have attempted without success to contact this patient by phone to discuss the results of their overnight oximetry test. Voicemail Left.

## 2024-05-15 ENCOUNTER — TELEPHONE (OUTPATIENT)
Dept: OBSTETRICS AND GYNECOLOGY | Facility: CLINIC | Age: 39
End: 2024-05-15

## 2024-05-15 NOTE — TELEPHONE ENCOUNTER
Provider: DR ROMERO    Caller: LEORA COTA    Phone Number: [phone] / LVM    Reason for Call: SAME DAY CANCELLATION FOR GYN F/U @ 3:30 pm - PT HAS BAD MIGRAINE - HUB R/S FOR 05/22/24 @ 10:10am

## 2024-05-16 ENCOUNTER — TELEPHONE (OUTPATIENT)
Dept: BEHAVIORAL HEALTH | Facility: CLINIC | Age: 39
End: 2024-05-16
Payer: MEDICAID

## 2024-05-16 DIAGNOSIS — F43.10 POST TRAUMATIC STRESS DISORDER (PTSD): Primary | ICD-10-CM

## 2024-05-16 DIAGNOSIS — J44.9 CHRONIC OBSTRUCTIVE PULMONARY DISEASE, UNSPECIFIED COPD TYPE: ICD-10-CM

## 2024-05-16 DIAGNOSIS — F41.1 GENERALIZED ANXIETY DISORDER: ICD-10-CM

## 2024-05-16 RX ORDER — DIAZEPAM 5 MG/1
5 TABLET ORAL 3 TIMES DAILY PRN
Qty: 45 TABLET | Refills: 0 | Status: SHIPPED | OUTPATIENT
Start: 2024-05-16 | End: 2025-05-16

## 2024-05-16 NOTE — TELEPHONE ENCOUNTER
PATIENT CALLED ASKING IF WE CAN INCREASE HER DIAZEPAM TO 5MG. SHE THINKS SHE IS GOING THROUGH MENOPAUSE. SHE HAS AN APPT TODAY WITH PCP AND WILL HOPEFULLY GET LABS CHECKED/SEE WHAT HE THINKS ALSO. SHE IS HAVING SWEATS, CONTINUAL CRYING ALL THE TIME, NOT HARDLY SLEEPING IN THE LAST COUPLE DAYS (@ 2 HOURS PER NIGHT), HAVING PANIC ATTACKS, AND NOT ABLE TO COPE WITH ANYTHING. SYMPTOMS STARTED @ 2 DAYS AGO.     *ROUTED TO PCP ALSO SO THAT HE WILL KNOW WHAT IS GOING ON & WHAT SYMPTOMS SHE IS HAVING*   Patient receives Sacrament of the Sick (anointing) from [facility].    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 Spiritual Care will follow as needed. (writer charting for [employer].)     11/89/66 0940   Encounter Summary   Services provided to: Patient   Referral/Consult From: Leonid   Continue Visiting   (1/19/22 anointed)   Complexity of Encounter Low   Length of Encounter 15 minutes   Routine   Type Follow up   Sacraments   Sacrament of Sick-Anointing Anointed  (1/19/22 Fr. Zhou Stafford)

## 2024-05-18 ENCOUNTER — TELEMEDICINE (OUTPATIENT)
Dept: FAMILY MEDICINE CLINIC | Facility: TELEHEALTH | Age: 39
End: 2024-05-18
Payer: MEDICAID

## 2024-05-18 DIAGNOSIS — T36.95XA ANTIBIOTIC-INDUCED YEAST INFECTION: ICD-10-CM

## 2024-05-18 DIAGNOSIS — B37.9 ANTIBIOTIC-INDUCED YEAST INFECTION: ICD-10-CM

## 2024-05-18 DIAGNOSIS — R49.0 HOARSENESS OF VOICE: ICD-10-CM

## 2024-05-18 DIAGNOSIS — J02.9 SORE THROAT: Primary | ICD-10-CM

## 2024-05-19 RX ORDER — FLUCONAZOLE 150 MG/1
150 TABLET ORAL ONCE
Qty: 2 TABLET | Refills: 0 | Status: SHIPPED | OUTPATIENT
Start: 2024-05-19 | End: 2024-05-19

## 2024-05-19 RX ORDER — LIDOCAINE HYDROCHLORIDE 20 MG/ML
10 SOLUTION OROPHARYNGEAL 3 TIMES DAILY PRN
Qty: 100 ML | Refills: 0 | OUTPATIENT
Start: 2024-05-19 | End: 2024-05-24

## 2024-05-19 RX ORDER — HYDROXYZINE 50 MG/1
TABLET, FILM COATED ORAL
COMMUNITY
Start: 2024-05-12

## 2024-05-19 NOTE — PROGRESS NOTES
You have chosen to receive care through a telehealth visit.  Do you consent to use a video/audio connection for your medical care today? Yes     CHIEF COMPLAINT  Chief Complaint   Patient presents with    Sore Throat         HPI  Lindsay Amezquita is a 38 y.o. female  presents with complaint of getting choked a couple nights again on a drink of water. Reports she is now having a sore throat, voice sounds hoarse and throat feels feels raw. Reports no fever or chills. No nausea or vomiting. No CP or SOA. Reports she has tried Luden's cough drops, nystatin, cough medication. Patient reports she is on doxy for an abscess at this time. Reports she is having some thick, white d/c after doxy. + itching.       Review of Systems   Constitutional:  Negative for chills, fatigue and fever.   HENT:  Positive for sore throat and voice change. Negative for congestion, ear discharge, ear pain, sinus pressure and sinus pain.    Respiratory:  Negative for cough, chest tightness, shortness of breath and wheezing.    Cardiovascular:  Negative for chest pain.   Gastrointestinal:  Negative for abdominal pain, diarrhea, nausea and vomiting.   Genitourinary:  Positive for vaginal discharge. Negative for dysuria, frequency, hematuria and urgency.        Itching    Musculoskeletal:  Negative for back pain and myalgias.   Neurological:  Negative for dizziness and headaches.   Psychiatric/Behavioral: Negative.         Past Medical History:   Diagnosis Date    Allergic     Anxiety     Asthma Beings of copd with asthma    Back pain     Bell palsy 07/06/2021    Bell's palsy     Bipolar 2 disorder     Blood clot in vein     Behind left knee cap    COPD (chronic obstructive pulmonary disease) Six months ago    Deep vein thrombosis Last year    Depression     Diabetes mellitus November    Pre diabete    Frequent headaches     GERD (gastroesophageal reflux disease)     Hypertension     Every time i go into the [facility] room    Irritable bowel syndrome  Two years ago    Kidney stone Two years ago    Migraine     Nausea, vomiting and diarrhea 09/17/2018    Obesity All of my life    Ovarian cyst Two years ago    Panic     PTSD (post-traumatic stress disorder)     Pulmonary embolism Not in lungs    Recurrent pregnancy loss, antepartum condition or complication Every since i have been 14 yars old    Restless leg syndrome     Sinus problem     Snores     Tattoos     Urinary tract infection Have them on and off    Varicella Little    Visual impairment Since i was little    Vitamin B12 deficiency     Wears glasses        Family History   Problem Relation Age of Onset    Irritable bowel syndrome Mother     Heart disease Mother     Miscarriages / Stillbirths Mother     Arthritis Mother     COPD Mother     Learning disabilities Mother     Mental illness Mother     Asthma Mother     Irritable bowel syndrome Father     Alcohol abuse Father     Diabetes Father     Hyperlipidemia Father     Anxiety disorder Father     Learning disabilities Father     Colon cancer Maternal Grandfather     Stroke Paternal Grandfather     Stroke Paternal Grandmother        Social History     Socioeconomic History    Marital status: Single   Tobacco Use    Smoking status: Some Days     Current packs/day: 0.50     Average packs/day: 2.0 packs/day for 31.9 years (63.3 ttl pk-yrs)     Types: Cigarettes     Start date: 7/17/1992     Passive exposure: Current    Smokeless tobacco: Never    Tobacco comments:         Vaping Use    Vaping status: Former    Passive vaping exposure: Yes   Substance and Sexual Activity    Alcohol use: Not Currently     Comment: rarely    Drug use: Not Currently    Sexual activity: Not Currently     Partners: Female     Birth control/protection: Other, Same-sex partner       Lindsay ADELA Amezquita  reports that she has been smoking cigarettes. She started smoking about 31 years ago. She has a 63.3 pack-year smoking history. She has been exposed to tobacco smoke. She has never used  smokeless tobacco. I have educated her on the risk of diseases from using tobacco products such as cancer, COPD, and heart disease.     I advised her to quit and she is willing to quit. We have discussed the following method/s for tobacco cessation:  Counseling.      I spent  1  minutes counseling the patient.              Breastfeeding No     PHYSICAL EXAM  Physical Exam   Constitutional: She is oriented to person, place, and time. She appears well-developed and well-nourished. No distress.   HENT:   Head: Normocephalic and atraumatic.   Right Ear: Hearing normal.   Left Ear: Hearing normal.   Nose: No congestion.   Mouth/Throat: Mouth/Lips are normal.Uvula is midline.   Patient directed exam  Throat is red    Eyes: Conjunctivae and lids are normal.   Pulmonary/Chest: Effort normal.  No respiratory distress.  Neurological: She is alert and oriented to person, place, and time.   Psychiatric: She has a normal mood and affect. Her speech is normal and behavior is normal.       Results for orders placed or performed during the hospital encounter of 04/16/24   COVID-19, FLU A/B, RSV PCR 1 HR TAT - Swab, Nasopharynx    Specimen: Nasopharynx; Swab   Result Value Ref Range    COVID19 Not Detected Not Detected - Ref. Range    Influenza A PCR Not Detected Not Detected    Influenza B PCR Not Detected Not Detected    RSV, PCR Not Detected Not Detected   Rapid Strep A Screen - Swab, Throat    Specimen: Throat; Swab   Result Value Ref Range    Strep A Ag Negative Negative   Beta Strep Culture, Throat - Swab, Throat    Specimen: Throat; Swab   Result Value Ref Range    Throat Culture, Beta Strep No Beta Hemolytic Streptococcus Isolated    Comprehensive Metabolic Panel    Specimen: Blood   Result Value Ref Range    Glucose 156 (H) 65 - 99 mg/dL    BUN 4 (L) 6 - 20 mg/dL    Creatinine 0.65 0.57 - 1.00 mg/dL    Sodium 138 136 - 145 mmol/L    Potassium 3.1 (L) 3.5 - 5.2 mmol/L    Chloride 102 98 - 107 mmol/L    CO2 22.9 22.0 - 29.0  mmol/L    Calcium 9.3 8.6 - 10.5 mg/dL    Total Protein 6.2 6.0 - 8.5 g/dL    Albumin 4.2 3.5 - 5.2 g/dL    ALT (SGPT) 41 (H) 1 - 33 U/L    AST (SGOT) 20 1 - 32 U/L    Alkaline Phosphatase 83 39 - 117 U/L    Total Bilirubin 0.2 0.0 - 1.2 mg/dL    Globulin 2.0 gm/dL    A/G Ratio 2.1 g/dL    BUN/Creatinine Ratio 6.2 (L) 7.0 - 25.0    Anion Gap 13.1 5.0 - 15.0 mmol/L    eGFR 115.0 >60.0 mL/min/1.73   High Sensitivity Troponin T    Specimen: Blood   Result Value Ref Range    HS Troponin T <6 <14 ng/L   CBC Auto Differential    Specimen: Blood   Result Value Ref Range    WBC 10.14 3.40 - 10.80 10*3/mm3    RBC 5.09 3.77 - 5.28 10*6/mm3    Hemoglobin 14.8 12.0 - 15.9 g/dL    Hematocrit 44.0 34.0 - 46.6 %    MCV 86.4 79.0 - 97.0 fL    MCH 29.1 26.6 - 33.0 pg    MCHC 33.6 31.5 - 35.7 g/dL    RDW 14.6 12.3 - 15.4 %    RDW-SD 46.4 37.0 - 54.0 fl    MPV 10.2 6.0 - 12.0 fL    Platelets 288 140 - 450 10*3/mm3    Neutrophil % 50.9 42.7 - 76.0 %    Lymphocyte % 41.6 19.6 - 45.3 %    Monocyte % 5.1 5.0 - 12.0 %    Eosinophil % 1.6 0.3 - 6.2 %    Basophil % 0.6 0.0 - 1.5 %    Immature Grans % 0.2 0.0 - 0.5 %    Neutrophils, Absolute 5.16 1.70 - 7.00 10*3/mm3    Lymphocytes, Absolute 4.22 (H) 0.70 - 3.10 10*3/mm3    Monocytes, Absolute 0.52 0.10 - 0.90 10*3/mm3    Eosinophils, Absolute 0.16 0.00 - 0.40 10*3/mm3    Basophils, Absolute 0.06 0.00 - 0.20 10*3/mm3    Immature Grans, Absolute 0.02 0.00 - 0.05 10*3/mm3    nRBC 0.0 0.0 - 0.2 /100 WBC   Green Top (Gel)   Result Value Ref Range    Extra Tube Hold for add-ons.    Lavender Top   Result Value Ref Range    Extra Tube hold for add-on    Gold Top - SST   Result Value Ref Range    Extra Tube Hold for add-ons.    Light Blue Top   Result Value Ref Range    Extra Tube Hold for add-ons.      *Note: Due to a large number of results and/or encounters for the requested time period, some results have not been displayed. A complete set of results can be found in Results Review.       Diagnoses  and all orders for this visit:    1. Sore throat (Primary)    2. Hoarseness of voice    3. Antibiotic-induced yeast infection    Other orders  -     fluconazole (Diflucan) 150 MG tablet; Take 1 tablet by mouth 1 (One) Time for 1 dose. May repeat in 3 days if symptoms continue  Dispense: 2 tablet; Refill: 0    Rest  Increase fluids  Continue doxy as ordered  PCP if symptoms persist  ER for any worsening symptoms such as high fever, chest pain, trouble breathing or SOA       FOLLOW-UP  As discussed during visit with PCP/[facility] if no improvement or Urgent Care/Emergency Department if worsening of symptoms    Patient verbalizes understanding of medication dosage, comfort measures, instructions for treatment and follow-up.    Court Felipe, APRN  05/18/2024  23:25 EDT    The use of a video visit has been reviewed with the patient and verbal informed consent has been obtained. Myself and Lindsay Amezquita participated in this visit. The patient is located in [address] [place] [address].    I am located in [place], [place]. Mychart and Twilio were utilized. I spent 5 minutes in the patient's chart for this visit.      Note Disclaimer: At [facility], we believe that sharing information builds trust and better   relationships. You are receiving this note because you recently visited [facility]. It is possible you   will see health information before a provider has talked with you about it. This kind of information can   be easy to misunderstand. To help you fully understand what it means for your health, we urge you to   discuss this note with your provider.

## 2024-05-21 ENCOUNTER — TELEPHONE (OUTPATIENT)
Dept: PULMONOLOGY | Facility: CLINIC | Age: 39
End: 2024-05-21
Payer: MEDICAID

## 2024-05-21 NOTE — TELEPHONE ENCOUNTER
Patient called, stated she is using her oxygen but is still SOB. I advised her to make sure the machine is functioning properly. If it is not then to call the DME to mariee it. It she believes the machine is functioning and is still SOB she needs to go to the emergency room.     She was advised she had a visit this Friday on 5/24

## 2024-05-22 ENCOUNTER — TELEPHONE (OUTPATIENT)
Dept: FAMILY MEDICINE CLINIC | Facility: CLINIC | Age: 39
End: 2024-05-22

## 2024-05-22 ENCOUNTER — TELEMEDICINE (OUTPATIENT)
Dept: BEHAVIORAL HEALTH | Facility: CLINIC | Age: 39
End: 2024-05-22
Payer: MEDICAID

## 2024-05-22 ENCOUNTER — HOSPITAL ENCOUNTER (EMERGENCY)
Facility: HOSPITAL | Age: 39
Discharge: HOME | End: 2024-05-22
Attending: STUDENT IN AN ORGANIZED HEALTH CARE EDUCATION/TRAINING PROGRAM
Payer: MEDICAID

## 2024-05-22 VITALS
SYSTOLIC BLOOD PRESSURE: 140 MMHG | HEIGHT: 62 IN | HEART RATE: 109 BPM | WEIGHT: 250.2 LBS | TEMPERATURE: 98.8 F | BODY MASS INDEX: 46.04 KG/M2 | OXYGEN SATURATION: 94 % | DIASTOLIC BLOOD PRESSURE: 96 MMHG | RESPIRATION RATE: 18 BRPM

## 2024-05-22 DIAGNOSIS — F41.1 GENERALIZED ANXIETY DISORDER: ICD-10-CM

## 2024-05-22 DIAGNOSIS — F51.04 PSYCHOPHYSIOLOGICAL INSOMNIA: ICD-10-CM

## 2024-05-22 DIAGNOSIS — M79.672 LEFT FOOT PAIN: Primary | ICD-10-CM

## 2024-05-22 DIAGNOSIS — F43.10 POST TRAUMATIC STRESS DISORDER (PTSD): ICD-10-CM

## 2024-05-22 DIAGNOSIS — F31.30 BIPOLAR I DISORDER, MOST RECENT EPISODE DEPRESSED: Primary | ICD-10-CM

## 2024-05-22 PROCEDURE — 1160F RVW MEDS BY RX/DR IN RCRD: CPT

## 2024-05-22 PROCEDURE — 25010000002 DEXAMETHASONE SODIUM PHOSPHATE 10 MG/ML SOLUTION

## 2024-05-22 PROCEDURE — 96372 THER/PROPH/DIAG INJ SC/IM: CPT

## 2024-05-22 PROCEDURE — 99214 OFFICE O/P EST MOD 30 MIN: CPT

## 2024-05-22 PROCEDURE — 99283 EMERGENCY DEPT VISIT LOW MDM: CPT

## 2024-05-22 PROCEDURE — 1159F MED LIST DOCD IN RCRD: CPT

## 2024-05-22 RX ORDER — IBUPROFEN 600 MG/1
600 TABLET ORAL EVERY 6 HOURS PRN
Qty: 30 TABLET | Refills: 0 | Status: SHIPPED | OUTPATIENT
Start: 2024-05-22

## 2024-05-22 RX ORDER — DESVENLAFAXINE 100 MG/1
100 TABLET, EXTENDED RELEASE ORAL DAILY
Qty: 30 TABLET | Refills: 0 | Status: SHIPPED | OUTPATIENT
Start: 2024-05-22

## 2024-05-22 RX ORDER — DEXAMETHASONE SODIUM PHOSPHATE 10 MG/ML
10 INJECTION, SOLUTION INTRAMUSCULAR; INTRAVENOUS ONCE
Status: COMPLETED | OUTPATIENT
Start: 2024-05-22 | End: 2024-05-22

## 2024-05-22 RX ORDER — FEXOFENADINE HCL AND PSEUDOEPHEDRINE HCI 60; 120 MG/1; MG/1
1 TABLET, EXTENDED RELEASE ORAL 2 TIMES DAILY
Qty: 60 TABLET | Refills: 0 | Status: SHIPPED | OUTPATIENT
Start: 2024-05-22

## 2024-05-22 RX ORDER — OXCARBAZEPINE 300 MG/1
300 TABLET, FILM COATED ORAL 2 TIMES DAILY
Qty: 60 TABLET | Refills: 0 | Status: SHIPPED | OUTPATIENT
Start: 2024-05-22

## 2024-05-22 RX ORDER — LURASIDONE HYDROCHLORIDE 120 MG/1
120 TABLET, FILM COATED ORAL DAILY
Qty: 30 TABLET | Refills: 0 | Status: SHIPPED | OUTPATIENT
Start: 2024-05-22

## 2024-05-22 RX ORDER — DIAZEPAM 5 MG/1
5 TABLET ORAL 3 TIMES DAILY PRN
Qty: 90 TABLET | Refills: 0 | Status: SHIPPED | OUTPATIENT
Start: 2024-05-22 | End: 2025-05-22

## 2024-05-22 RX ORDER — AMITRIPTYLINE HYDROCHLORIDE 150 MG/1
150 TABLET ORAL NIGHTLY
Qty: 30 TABLET | Refills: 0 | Status: SHIPPED | OUTPATIENT
Start: 2024-05-22

## 2024-05-22 RX ORDER — IBUPROFEN 600 MG/1
600 TABLET ORAL ONCE
Status: COMPLETED | OUTPATIENT
Start: 2024-05-22 | End: 2024-05-22

## 2024-05-22 RX ADMIN — DEXAMETHASONE SODIUM PHOSPHATE 10 MG: 10 INJECTION INTRAMUSCULAR; INTRAVENOUS at 18:15

## 2024-05-22 RX ADMIN — IBUPROFEN 600 MG: 600 TABLET, FILM COATED ORAL at 18:15

## 2024-05-22 NOTE — TELEPHONE ENCOUNTER
Caller: Lindsay Amezquita    Relationship: Self    Best call back number: [phone]     What medication are you requesting: ALLEGRA D 24 HOUR     What are your current symptoms: NASAL CONGESTION     If a prescription is needed, what is your preferred pharmacy and phone number: [facility]/PHARMACY #[id number] [address] [phone] [facility] [phone]      Additional notes: PLEASE CALL PATIENT BACK, IF NO ANSWER LEAVE A DETAILED MESSAGE.

## 2024-05-22 NOTE — PROGRESS NOTES
This provider is located at [facility], Behavioral Health, [address] [place] [address], [facility],using a secure MyChart Video Visit through [employer]. Patient is being seen remotely via telehealth at their home address in [place], and stated they are in a secure environment for this session. The patient's condition being diagnosed/treated is appropriate for telemedicine. The provider identified herself as well as her credentials.   The patient, and/or patients guardian, consent to be seen remotely, and when consent is given they understand that the consent allows for patient identifiable information to be sent to a third party as needed.   They may refuse to be seen remotely at any time. The electronic data is encrypted and password protected, and the patient and/or guardian has been advised of the potential risks to privacy not withstanding such measures.    Video Visit    Patient Name: Lindsay Amezquita  : 1985   MRN: [id number]   Care Team: Patient Care Team:  Hunter Winkler MD as PCP - General (Family Medicine)  Donna Mcqueen APRN as Nurse Practitioner (Behavioral Health)         Chief Complaint:    Chief Complaint   Patient presents with    Bipolar     Anxiety    PTSD    Sleeping Problem    Med Management       History of Present Illness: Lindsay Amezquita is a 38 y.o. female who presents via video visit for a medication management follow up. Patient reports that switching to the Valium has been much better than the Xanax. She feels that it keeps her calm and she feel that is has helped with her mood and patience. She feels that all of her other medications continue to work well also. She feels that overall she is on a good regimen of medication. She endorses some situational stressors, however, she feels that she is managing them well and she has increased therapy to 4 times a week which is helpful. She did not see the need to make any changes and did not have any medication  concerns at today's visit. She denies SI/HI today.     The following portion of the patient's history were reviewed and updated appropriately: allergies, current and past medications, family history, medical history and social history. MITCH reviewed and appropriate.   Subjective   Review of Systems:    Review of Systems   Respiratory: Negative.     Cardiovascular: Negative.  Negative for chest pain and palpitations.   Neurological: Negative.    Psychiatric/Behavioral:  Positive for stress. The patient is nervous/anxious.    All other systems reviewed and are negative.      Current Medications:   Current Outpatient Medications   Medication Sig Dispense Refill    amitriptyline (ELAVIL) 150 MG tablet Take 1 tablet by mouth Every Night. 30 tablet 0    desvenlafaxine (PRISTIQ) 100 MG 24 hr tablet Take 1 tablet by mouth Daily. 30 tablet 0    diazePAM (Valium) 5 MG tablet Take 1 tablet by mouth 3 (Three) Times a Day As Needed for Anxiety or Sleep. 90 tablet 0    Lurasidone HCl (Latuda) 120 MG tablet tablet Take 1 tablet by mouth Daily. 30 tablet 0    OXcarbazepine (TRILEPTAL) 300 MG tablet Take 1 tablet by mouth 2 (Two) Times a Day. 60 tablet 0    albuterol (ACCUNEB) 1.25 MG/3ML nebulizer solution Take 3 mL by nebulization Every 6 (Six) Hours As Needed for Shortness of Air. 3 mL 12    albuterol sulfate  (90 Base) MCG/ACT inhaler Inhale 2 puffs Every 4 (Four) Hours As Needed for Wheezing. 6.7 g 0    atorvastatin (LIPITOR) 40 MG tablet Take 1 tablet by mouth every night at bedtime. 90 tablet 3    azithromycin (Zithromax Z-Janusz) 250 MG tablet Take 2 tablets by mouth on day 1, then 1 tablet daily on days 2-5 (Patient not taking: Reported on 5/19/2024) 6 tablet 0    Blood Glucose Monitoring Suppl (FreeStyle Lite) w/Device kit USE 1 EACH AS NEEDED (MONITOR GLUCOSE 3 TIMES A DAY      butalbital-acetaminophen-caffeine (Esgic) -40 MG per tablet Take 1 tablet by mouth 2 (Two) Times a Day As Needed for Headache. 20  tablet 2    Chlorhexidine Gluconate 4 % solution APPLY TOPICALLY TO THE APPROPRIATE AREA AS DIRECTED DAILY AS NEEDED FOR WOUND CARE.      doxycycline (VIBRAMYCIN) 100 MG capsule Take 1 capsule by mouth 2 (Two) Times a Day for 60 days. 120 capsule 0    empagliflozin (Jardiance) 10 MG tablet tablet Take 1 tablet by mouth Daily. 90 tablet 1    Fluticasone-Umeclidin-Vilant (TRELEGY) 100-62.5-25 MCG/ACT inhaler Inhale 1 puff Daily. Rinse mouth with water after use. 1 each 5    FREESTYLE LITE test strip See Admin Instructions.      hydrOXYzine (ATARAX) 50 MG tablet TAKE 1.5 TABLETS BY MOUTH 3 (THREE) TIMES A DAY AS NEEDED (ANXIETY AND/OR SLEEP).      levocetirizine (XYZAL) 5 MG tablet Take 1 tablet by mouth Every Evening.      Lidocaine Viscous HCl (XYLOCAINE) 2 % solution Take 10 mL by mouth 3 (Three) Times a Day As Needed for Moderate Pain for up to 5 days. Use 1 to 2 teaspoons as needed three times a day- swish and spit 100 mL 0    norethindrone (Aygestin) 5 MG tablet Take 2 tablets by mouth Daily. 60 tablet 5    nystatin (MYCOSTATIN) 100,000 unit/mL suspension Swish and swallow 5 mL 4 (Four) Times a Day.      omeprazole (priLOSEC) 20 MG capsule Take 1 capsule by mouth Daily. 90 capsule 1    potassium chloride (MICRO-K) 8 MEQ CR capsule Take 1 capsule by mouth 2 (Two) Times a Day. 180 each 3    promethazine (PHENERGAN) 25 MG tablet Take 1 tablet by mouth Every 6 (Six) Hours As Needed for Nausea or Vomiting. 20 tablet 0    promethazine-dextromethorphan (PROMETHAZINE-DM) 6.25-15 MG/5ML syrup Take 5 mL by mouth At Night As Needed for Cough. 100 mL 0    Respiratory Therapy Supplies (Nebulizer/Tubing/Mouthpiece) kit Use 1 each Every 4 (Four) Hours As Needed (for asthma symptoms). 1 each 0    Rimegepant Sulfate (Nurtec) 75 MG tablet dispersible tablet Take 1 tablet by mouth Every Other Day. Take Monday,Wednesday,Friday, and Saturday. 16 tablet 6    tiZANidine (ZANAFLEX) 4 MG tablet Take 1 tablet by mouth Every 8 (Eight)  Hours As Needed for Muscle Spasms. 270 tablet 0    Zavegepant HCl 10 MG/ACT solution 10 mg into the nostril(s) as directed by provider Daily As Needed (migraine). (1 spray into 1 nostril every 24 hrs prn) 6 each 5     Current Facility-Administered Medications   Medication Dose Route Frequency Provider Last Rate Last Admin    albuterol (PROVENTIL) nebulizer solution 0.042% 1.25 mg/3mL  1.25 mg Nebulization Q6H PRN Stephanie Schroeder PA-C           Mental Status Exam:   Hygiene:    unable to assess   Cooperation:  Cooperative  Eye Contact:  Good  Psychomotor Behavior:   appears to be WNL   Affect:  Appropriate  Mood: normal  Speech:  Normal  Thought Process:  Goal directed and Linear  Thought Content:  Normal and Mood congruent  Suicidal:  None  Homicidal:  None  Hallucinations:  None  Delusion:  None  Memory:  Intact  Orientation:  Person, Place, Time, and Situation  Reliability:  good  Insight:  Good  Judgement:  Good  Impulse Control:  Good  Physical/Medical Issues:  Yes see chart       Objective   Vital Signs:   There were no vitals taken for this visit.      On 5/7/2024  BP: 126/82  P: 85    Assessment / Plan    Diagnoses and all orders for this visit:    1. Bipolar I disorder, most recent episode depressed (Primary)  -     Lurasidone HCl (Latuda) 120 MG tablet tablet; Take 1 tablet by mouth Daily.  Dispense: 30 tablet; Refill: 0  -     OXcarbazepine (TRILEPTAL) 300 MG tablet; Take 1 tablet by mouth 2 (Two) Times a Day.  Dispense: 60 tablet; Refill: 0    2. Psychophysiological insomnia  -     amitriptyline (ELAVIL) 150 MG tablet; Take 1 tablet by mouth Every Night.  Dispense: 30 tablet; Refill: 0    3. Post traumatic stress disorder (PTSD)  -     desvenlafaxine (PRISTIQ) 100 MG 24 hr tablet; Take 1 tablet by mouth Daily.  Dispense: 30 tablet; Refill: 0  -     diazePAM (Valium) 5 MG tablet; Take 1 tablet by mouth 3 (Three) Times a Day As Needed for Anxiety or Sleep.  Dispense: 90 tablet; Refill: 0    4.  Generalized anxiety disorder  -     desvenlafaxine (PRISTIQ) 100 MG 24 hr tablet; Take 1 tablet by mouth Daily.  Dispense: 30 tablet; Refill: 0  -     diazePAM (Valium) 5 MG tablet; Take 1 tablet by mouth 3 (Three) Times a Day As Needed for Anxiety or Sleep.  Dispense: 90 tablet; Refill: 0    Patient appears to be doing well on current medication. No issues reported and no indication for change. Will continue medication as ordered.     As part of patient's treatment plan I am prescribing a controlled substance.  The patient has been made aware of the appropriate use of such medications, including potential risk of somnolence, limited ability to drive and/or work safely, and potential for dependence and/or overdose.  It has also been made clear that these medications are for use by this patient only, without concomitant use of alcohol or other substances, unless prescribed.    Patient has completed a prescribing agreement detailing terms of continued prescribing of controlled substances, including monitoring MITCH reports, urine drug screening, and pill counts if necessary.  Patient is aware that inappropriate use will result in cessation of prescribing such medications.    AIMS  Facial and Oral Movements  Muscles of Facial Expression: None, normal  Lips and Perioral Area: None, normal  Jaw: None, normal  Tongue: None, normal  Extremity Movements  Upper (arms, wrists, hands, fingers): None, normal  Lower (legs, knees, ankles, toes): None, normal  Trunk Movements  Neck, shoulders, hips: None, normal  Overall Severity  Severity of abnormal movements (max 4): 0  Incapacitation due to abnormal movements: None, normal  Patient's awareness of abnormal movements (rate only patient's report): Aware, no distress  Dental Status  Current problems with teeth and/or dentures?: No  Does patient usually wear dentures?: Yes        A psychological evaluation was conducted in order to assess past and current level of functioning.  Areas assessed included, but were not limited to: perception of social support, perception of ability to face and deal with challenges in life (positive functioning), anxiety symptoms, depressive symptoms, perspective on beliefs/belief system, coping skills for stress, intelligence level,  Therapeutic rapport was established. Interventions conducted today were geared towards incorporating medication management along with support for continued therapy. Education was also provided as to the med management with this provider and what to expect in subsequent sessions.      We discussed risks, benefits, goals and side effects of the above medication and the patient was agreeable with the plan. Patient was educated on the importance of compliance with treatment and follow-up appointments. Patient is aware to contact the [facility] with any worsening of symptoms. To call for questions or concerns and return early if necessary. Patent is agreeable to go to the Emergency Department or call 911 should they begin SI/HI.        MEDS ORDERED DURING VISIT:  New Medications Ordered This Visit   Medications    amitriptyline (ELAVIL) 150 MG tablet     Sig: Take 1 tablet by mouth Every Night.     Dispense:  30 tablet     Refill:  0    desvenlafaxine (PRISTIQ) 100 MG 24 hr tablet     Sig: Take 1 tablet by mouth Daily.     Dispense:  30 tablet     Refill:  0    diazePAM (Valium) 5 MG tablet     Sig: Take 1 tablet by mouth 3 (Three) Times a Day As Needed for Anxiety or Sleep.     Dispense:  90 tablet     Refill:  0    Lurasidone HCl (Latuda) 120 MG tablet tablet     Sig: Take 1 tablet by mouth Daily.     Dispense:  30 tablet     Refill:  0    OXcarbazepine (TRILEPTAL) 300 MG tablet     Sig: Take 1 tablet by mouth 2 (Two) Times a Day.     Dispense:  60 tablet     Refill:  0         Follow Up   Return in about 4 weeks (around 6/19/2024).  Patient was given instructions and counseling regarding her condition or for health maintenance  advice. Please see specific information pulled into the AVS if appropriate.     TREATMENT PLAN/GOALS: Continue supportive psychotherapy efforts and medications as indicated. Treatment and medication options discussed during today's visit. Patient acknowledged and verbally consented to continue with current treatment plan and was educated on the importance of compliance with treatment and follow-up appointments.    MEDICATION ISSUES:  Discussed medication options and treatment plan of prescribed medication as well as the risks, benefits, and side effects including potential falls, possible impaired driving and metabolic adversities among others. Patient is agreeable to call the office with any worsening of symptoms or onset of side effects. Patient is agreeable to call 911 or go to the nearest ER should he/she begin having SI/HI.        [facility]  [address] [place] [address] DR NANCY SORIA [address] [phone]    May 22, 2024 09:02 EDT

## 2024-05-22 NOTE — DISCHARGE INSTRUCTIONS
Follow-up with podiatry.  Their numbers been attached, call to schedule follow-up appointment.  Recommend using a frozen water bottle and rolling her foot over it daily multiple times.  I have sent ibuprofen.  Ice several times daily.  Wear the postop shoe for comfort and support.  Recommend sole inserts for your shoes with good arch support.

## 2024-05-22 NOTE — ED PROVIDER NOTES
Subjective  History of Present Illness:    This is a 38-year-old female, history of anxiety, Bell's palsy, bipolar, COPD, DVT, pulmonary embolism, GERD, present emergency room today for evaluation of left foot pain.  Patient reports she is unsure what she did to cause pain to the left plantar heel area, no known injury.  Started this morning.  Rates pain 10 out of 10.  She does not report any difficulty in breathing or chest pain.  No tenderness to palpation or pain of the deep venous system in the calf.  She reports painful with weightbearing and walking.  No falls.  Has been taking ibuprofen and Tylenol at home with minimal relief    Nurses Notes reviewed and agree, including vitals, allergies, social history and prior medical history.     REVIEW OF SYSTEMS: All systems reviewed and not pertinent unless noted.  Review of Systems   Constitutional:  Negative for fever.   Musculoskeletal:  Positive for arthralgias. Negative for joint swelling.   All other systems reviewed and are negative.      Past Medical History:   Diagnosis Date    Allergic     Anxiety     Asthma Beings of copd with asthma    Back pain     Bell palsy 07/06/2021    Bell's palsy     Bipolar 2 disorder     Blood clot in vein     Behind left knee cap    COPD (chronic obstructive pulmonary disease) Six months ago    Deep vein thrombosis Last year    Depression     Diabetes mellitus November    Pre diabete    Frequent headaches     GERD (gastroesophageal reflux disease)     Hypertension     Every time i go into the [facility] room    Irritable bowel syndrome Two years ago    Kidney stone Two years ago    Migraine     Nausea, vomiting and diarrhea 09/17/2018    Obesity All of my life    Ovarian cyst Two years ago    Panic     PTSD (post-traumatic stress disorder)     Pulmonary embolism Not in lungs    Recurrent pregnancy loss, antepartum condition or complication Every since i have been 14 yars old    Restless leg syndrome     Sinus problem     Snores      Tattoos     Urinary tract infection Have them on and off    Varicella Little    Visual impairment Since i was little    Vitamin B12 deficiency     Wears glasses        Allergies:    Aspirin, Codeine, Cyclobenzaprine, Haldol [haloperidol], Imitrex [sumatriptan], Latex, Penicillins, Acetaminophen, Lamictal [lamotrigine], Metoclopramide, Morphine, Ondansetron, Oxycodone-acetaminophen, Oxycontin [oxycodone], Prochlorperazine, and Tramadol      Past Surgical History:   Procedure Laterality Date    CHOLECYSTECTOMY      HYSTEROSCOPY N/A 3/14/2024    Procedure: HYSTEROSCOPY WITH MYOSURE, DILATION AND CURETTAGE WITH CERENE ABLATION;  Surgeon: David Ribeiro MD;  Location: [facility];  Service: Obstetrics/Gynecology;  Laterality: N/A;    MULTIPLE TOOTH EXTRACTIONS      All my teeth    WISDOM TOOTH EXTRACTION  All my teeth         Social History     Socioeconomic History    Marital status: Single   Tobacco Use    Smoking status: Some Days     Current packs/day: 0.50     Average packs/day: 2.0 packs/day for 31.9 years (63.3 ttl pk-yrs)     Types: Cigarettes     Start date: 7/17/1992     Passive exposure: Current    Smokeless tobacco: Never    Tobacco comments:         Vaping Use    Vaping status: Former    Passive vaping exposure: Yes   Substance and Sexual Activity    Alcohol use: Not Currently     Comment: rarely    Drug use: Not Currently    Sexual activity: Not Currently     Partners: Female     Birth control/protection: Other, Same-sex partner         Family History   Problem Relation Age of Onset    Irritable bowel syndrome Mother     Heart disease Mother     Miscarriages / Stillbirths Mother     Arthritis Mother     COPD Mother     Learning disabilities Mother     Mental illness Mother     Asthma Mother     Irritable bowel syndrome Father     Alcohol abuse Father     Diabetes Father     Hyperlipidemia Father     Anxiety disorder Father     Learning disabilities Father     Colon cancer Maternal Grandfather     Stroke  "Paternal Grandfather     Stroke Paternal Grandmother        Objective  Physical Exam:  /96 (BP Location: Left arm, Patient Position: Sitting)   Pulse 109   Temp 98.8 °F (37.1 °C) (Oral)   Resp 18   Ht 157.5 cm (62.01\")   Wt 113 kg (250 lb 3.2 oz)   SpO2 94%   BMI 45.75 kg/m²      Physical Exam  Vitals and nursing note reviewed.   Constitutional:       General: She is not in acute distress.     Appearance: She is obese. She is not ill-appearing, toxic-appearing or diaphoretic.   HENT:      Head: Normocephalic and atraumatic.      Nose: Nose normal.      Mouth/Throat:      Pharynx: Oropharynx is clear.   Eyes:      Extraocular Movements: Extraocular movements intact.   Cardiovascular:      Pulses: Normal pulses.      Comments: Appears well-perfused  Pulmonary:      Effort: Pulmonary effort is normal. No respiratory distress.   Abdominal:      General: Abdomen is flat.   Musculoskeletal:         General: Tenderness present. No swelling or deformity.      Cervical back: Normal range of motion.   Skin:     General: Skin is warm and dry.      Capillary Refill: Capillary refill takes less than 2 seconds.      Findings: No erythema.   Neurological:      General: No focal deficit present.      Mental Status: She is alert and oriented to person, place, and time.   Psychiatric:         Mood and Affect: Mood normal.         Behavior: Behavior normal.         Thought Content: Thought content normal.         Judgment: Judgment normal.               Procedures    ED Course:         Lab Results (last 24 hours)       ** No results found for the last 24 hours. **             No radiology results from the last 24 hrs       MDM      Initial impression of presenting illness: This is a 38-year-old female presenting for evaluation of left foot pain plantar aspect started this morning with no known injury    DDX: includes but is not limited to: Tendinopathy, tendinitis, plantar fasciitis, DVT, others    Patient arrives " hemodynamically stable afebrile mildly tachycardic at a rate of 109 nonhypoxic on room air nontachypneic nontoxic-appearing with vitals interpreted by myself.     Pertinent features from physical exam: Patient has negative Galan test, no tenderness to palpation of the deep venous system of the left lower extremity, no unilateral leg pain or swelling.  Patient does have tenderness palpation plantar aspect of the left posterior heel region.  Pain with range of motion and weightbearing.  No clinical concerns or signs for DVT..  No overlying erythema or signs of cellulitis.    Initial diagnostic plan: No labs or imaging are clinically indicated this time in absence of trauma.    Results from initial plan were reviewed and interpreted by me revealing N/A    Diagnostic information from other sources: Record reviewed    Interventions / Re-evaluation: Ibuprofen.  Decadron.  Postop shoe.  Stable for discharge    Results/clinical rationale were discussed with patient at the bedside.  Clinically patient appears to likely have plantar fasciitis.    Consultations/Discussion of results with other physicians: N/A    Disposition plan: Discharge.  Podiatry follow-up.  Supportive care discussed.  Recommended frozen water bottle rolling for her foot, NSAIDs, Tylenol, elevate and ice and follow-up with podiatry as well as shoe inserts for support of arch.  Gave return precautions and stable for discharge  -----    Final diagnoses:   Left foot pain          Dean Byrne PA-C  05/22/24 [id number]

## 2024-05-22 NOTE — TELEPHONE ENCOUNTER
Caller: Lindsay Amezquita    Relationship: Self    Best call back number: [phone]     What was the call regarding: PATIENT WANTED DR PULIDO TO KNOW SHE IS NO LONGER ON THE HYROXIZINE AT ALL. ALSO WANTED TO THANK HIM FOR CALLING IN THE ALLERGY MEDICATION AND TO LET HIM KNOW HE'S AWESOME AND SHE'S REFERRING PEOPLE TO HIM DUE TO THAT.

## 2024-05-23 ENCOUNTER — TELEPHONE (OUTPATIENT)
Dept: PULMONOLOGY | Facility: CLINIC | Age: 39
End: 2024-05-23

## 2024-05-23 DIAGNOSIS — G47.34 NOCTURNAL HYPOXEMIA: Primary | ICD-10-CM

## 2024-05-23 NOTE — TELEPHONE ENCOUNTER
Hub staff attempted to follow warm transfer process and was unsuccessful     Caller: Lindsay Amezquita    Relationship to patient: Self    Best call back number: [phone]     Patient is needing: REQUESTING TO SPEAK WITH SHIELA. PATIENT WANTS TO KNOW WHAT OTHER ORDERS ARE GOING TO BE SENT IN. PLEASE CALL BACK TO ADVISE.

## 2024-05-23 NOTE — TELEPHONE ENCOUNTER
I have tried to reach this patient by the phone number listed, NO ANSWER.   Rx will be faxed tomorrow morning along with all of the other orders wrote today.

## 2024-05-23 NOTE — TELEPHONE ENCOUNTER
Caller: Lindsay Amezquita    Relationship to patient: Self    Best call back number: [phone]    Patient is needing: PT WANTS A MASK INSTEAD FOR HER SLEEP MACHINE, GOES TO [facility]

## 2024-05-23 NOTE — TELEPHONE ENCOUNTER
Caller: Lindsay Amezquita    Relationship to patient: Self    Best call back number: [phone]     Patient is needing: PATIENT IS CHECKING ON STATUS OF MASK ORDER. PLEASE CALL BACK TO UPDATE.

## 2024-05-24 ENCOUNTER — TELEPHONE (OUTPATIENT)
Dept: FAMILY MEDICINE CLINIC | Facility: CLINIC | Age: 39
End: 2024-05-24

## 2024-05-24 DIAGNOSIS — B37.0 CANDIDAL STOMATITIS: ICD-10-CM

## 2024-05-24 DIAGNOSIS — N76.0 BV (BACTERIAL VAGINOSIS): ICD-10-CM

## 2024-05-24 DIAGNOSIS — R10.2 PELVIC PAIN: ICD-10-CM

## 2024-05-24 DIAGNOSIS — L73.2 HIDRADENITIS SUPPURATIVA: ICD-10-CM

## 2024-05-24 DIAGNOSIS — R11.2 NAUSEA AND VOMITING, UNSPECIFIED VOMITING TYPE: ICD-10-CM

## 2024-05-24 DIAGNOSIS — F31.30 BIPOLAR I DISORDER, MOST RECENT EPISODE DEPRESSED: ICD-10-CM

## 2024-05-24 DIAGNOSIS — F43.10 POST TRAUMATIC STRESS DISORDER (PTSD): ICD-10-CM

## 2024-05-24 DIAGNOSIS — F51.04 PSYCHOPHYSIOLOGICAL INSOMNIA: ICD-10-CM

## 2024-05-24 DIAGNOSIS — F41.1 GENERALIZED ANXIETY DISORDER: ICD-10-CM

## 2024-05-24 DIAGNOSIS — F41.9 ANXIETY: ICD-10-CM

## 2024-05-24 DIAGNOSIS — F43.10 POST-TRAUMATIC STRESS DISORDER, UNSPECIFIED: ICD-10-CM

## 2024-05-24 DIAGNOSIS — B96.89 BV (BACTERIAL VAGINOSIS): ICD-10-CM

## 2024-05-24 RX ORDER — DIAZEPAM 2 MG/1
TABLET ORAL
Qty: 1 TABLET | Refills: 0 | Status: SHIPPED | OUTPATIENT
Start: 2024-05-24

## 2024-05-24 RX ORDER — GLYCERIN/MIN OIL/POLYCARBOPHIL
GEL WITH APPLICATOR (GRAM) VAGINAL
Qty: 60 TABLET | Refills: 0 | Status: SHIPPED | OUTPATIENT
Start: 2024-05-24

## 2024-05-24 RX ORDER — FLUCONAZOLE 150 MG/1
150 TABLET ORAL
Qty: 1 TABLET | Refills: 0 | OUTPATIENT
Start: 2024-05-24

## 2024-05-24 RX ORDER — IBUPROFEN 800 MG/1
800 TABLET ORAL EVERY 8 HOURS PRN
Qty: 30 TABLET | Refills: 0 | Status: SHIPPED | OUTPATIENT
Start: 2024-05-24

## 2024-05-24 RX ORDER — FLUTICASONE PROPIONATE 50 MCG
2 SPRAY, SUSPENSION (ML) NASAL DAILY
Qty: 48 ML | OUTPATIENT
Start: 2024-05-24

## 2024-05-24 RX ORDER — DOXYCYCLINE HYCLATE 100 MG/1
100 CAPSULE ORAL DAILY
Qty: 30 CAPSULE | OUTPATIENT
Start: 2024-05-24

## 2024-05-24 RX ORDER — CHLORHEXIDINE GLUCONATE 4 %
LIQUID (ML) TOPICAL
Qty: 474 ML | OUTPATIENT
Start: 2024-05-24

## 2024-05-24 RX ORDER — DOXYCYCLINE HYCLATE 100 MG/1
CAPSULE ORAL
Qty: 20 CAPSULE | OUTPATIENT
Start: 2024-05-24

## 2024-05-24 RX ORDER — TRAMADOL HYDROCHLORIDE 50 MG/1
50 TABLET ORAL EVERY 8 HOURS PRN
Qty: 10 TABLET | Refills: 0 | Status: SHIPPED | OUTPATIENT
Start: 2024-05-24

## 2024-05-24 RX ORDER — METRONIDAZOLE 500 MG/1
500 TABLET ORAL 2 TIMES DAILY
Qty: 14 TABLET | Refills: 0 | Status: SHIPPED | OUTPATIENT
Start: 2024-05-24

## 2024-05-24 NOTE — TELEPHONE ENCOUNTER
Caller: Lindsay Amezquita    Relationship: Self    Best call back number: [phone]     Which medication are you concerned about: doxycycline (VIBRAMYCIN) 100 MG capsule     Who prescribed you this medication: DR PULIDO    When did you start taking this medication: 05-14-24    What are your concerns: PT STATED THE MEDICATION IS WORKING FOR THE INFECTION BUT SHE NEEDS SOMETHING FOR THEE YEAST. PT IS VERY UNCOMFORTABLE.

## 2024-05-26 DIAGNOSIS — M72.2 PLANTAR FASCIITIS: Primary | ICD-10-CM

## 2024-05-27 ENCOUNTER — APPOINTMENT (OUTPATIENT)
Dept: GENERAL RADIOLOGY | Facility: HOSPITAL | Age: 39
End: 2024-05-27
Payer: MEDICAID

## 2024-05-27 ENCOUNTER — HOSPITAL ENCOUNTER (EMERGENCY)
Facility: HOSPITAL | Age: 39
Discharge: HOME | End: 2024-05-27
Attending: STUDENT IN AN ORGANIZED HEALTH CARE EDUCATION/TRAINING PROGRAM
Payer: MEDICAID

## 2024-05-27 VITALS
BODY MASS INDEX: 47.11 KG/M2 | OXYGEN SATURATION: 95 % | HEART RATE: 98 BPM | DIASTOLIC BLOOD PRESSURE: 108 MMHG | WEIGHT: 256 LBS | HEIGHT: 62 IN | TEMPERATURE: 98.1 F | RESPIRATION RATE: 20 BRPM | SYSTOLIC BLOOD PRESSURE: 154 MMHG

## 2024-05-27 DIAGNOSIS — M72.2 PLANTAR FASCIITIS: Primary | ICD-10-CM

## 2024-05-27 DIAGNOSIS — R03.0 ELEVATED BLOOD PRESSURE READING: ICD-10-CM

## 2024-05-27 PROCEDURE — 96372 THER/PROPH/DIAG INJ SC/IM: CPT

## 2024-05-27 PROCEDURE — 25010000002 KETOROLAC TROMETHAMINE PER 15 MG: Performed by: STUDENT IN AN ORGANIZED HEALTH CARE EDUCATION/TRAINING PROGRAM

## 2024-05-27 PROCEDURE — 99283 EMERGENCY DEPT VISIT LOW MDM: CPT

## 2024-05-27 PROCEDURE — 73630 X-RAY EXAM OF FOOT: CPT

## 2024-05-27 PROCEDURE — 25010000002 DIAZEPAM PER 5 MG: Performed by: STUDENT IN AN ORGANIZED HEALTH CARE EDUCATION/TRAINING PROGRAM

## 2024-05-27 PROCEDURE — 96374 THER/PROPH/DIAG INJ IV PUSH: CPT

## 2024-05-27 RX ORDER — DIAZEPAM 5 MG/ML
5 INJECTION, SOLUTION INTRAMUSCULAR; INTRAVENOUS ONCE
Status: COMPLETED | OUTPATIENT
Start: 2024-05-27 | End: 2024-05-27

## 2024-05-27 RX ORDER — KETOROLAC TROMETHAMINE 30 MG/ML
30 INJECTION, SOLUTION INTRAMUSCULAR; INTRAVENOUS ONCE
Status: COMPLETED | OUTPATIENT
Start: 2024-05-27 | End: 2024-05-27

## 2024-05-27 RX ADMIN — KETOROLAC TROMETHAMINE 30 MG: 30 INJECTION, SOLUTION INTRAMUSCULAR; INTRAVENOUS at 22:35

## 2024-05-27 RX ADMIN — DIAZEPAM 5 MG: 5 INJECTION, SOLUTION INTRAMUSCULAR; INTRAVENOUS at 22:35

## 2024-05-28 ENCOUNTER — TELEPHONE (OUTPATIENT)
Dept: FAMILY MEDICINE CLINIC | Facility: CLINIC | Age: 39
End: 2024-05-28

## 2024-05-28 DIAGNOSIS — M79.672 LEFT FOOT PAIN: Primary | ICD-10-CM

## 2024-05-28 DIAGNOSIS — R10.2 PELVIC PAIN: ICD-10-CM

## 2024-05-28 RX ORDER — PREGABALIN 50 MG/1
50 CAPSULE ORAL 3 TIMES DAILY
Qty: 6 CAPSULE | Refills: 0 | Status: SHIPPED | OUTPATIENT
Start: 2024-05-28 | End: 2024-05-30

## 2024-05-28 RX ORDER — HYDROXYZINE 50 MG/1
TABLET, FILM COATED ORAL
Qty: 90 TABLET | Refills: 1 | OUTPATIENT
Start: 2024-05-28

## 2024-05-28 RX ORDER — FLUCONAZOLE 150 MG/1
150 TABLET ORAL
Qty: 2 TABLET | Refills: 0 | Status: SHIPPED | OUTPATIENT
Start: 2024-05-28

## 2024-05-28 RX ORDER — ALPRAZOLAM 1 MG/1
1 TABLET ORAL 3 TIMES DAILY PRN
Qty: 90 TABLET | Refills: 1 | OUTPATIENT
Start: 2024-05-28 | End: 2025-05-28

## 2024-05-28 RX ORDER — OXCARBAZEPINE 150 MG/1
TABLET, FILM COATED ORAL
Qty: 30 TABLET | Refills: 1 | OUTPATIENT
Start: 2024-05-28

## 2024-05-28 RX ORDER — PROMETHAZINE HYDROCHLORIDE 25 MG/1
25 TABLET ORAL EVERY 6 HOURS PRN
Qty: 20 TABLET | Refills: 1 | Status: SHIPPED | OUTPATIENT
Start: 2024-05-28

## 2024-05-28 NOTE — ED PROVIDER NOTES
EMERGENCY DEPARTMENT ENCOUNTER    Pt Name: Lindsay Amezquita  MRN: [id number]  Pt :   1985  Room Number:  19SF/19  Date of encounter:  2024  PCP: Hunter Winkler MD  ED Provider: Valentín Rivera MD    Historian: Patient      HPI:  Chief Complaint: Foot pain        Context: Lindsay Amezquita is a 38 y.o. female who presents to the ED for left foot pain.  Patient reports increasing pain to the left foot.  She has referral to podiatry pending.  She was diagnosed recently with plantar fasciitis.  Denies any overt trauma.  She has been taking ibuprofen and applying Biofreeze but states it is becoming more painful.  She has been walking on the affected extremity.  She reports her postop shoe was too big and has caused her more discomfort.      PAST MEDICAL HISTORY  Past Medical History:   Diagnosis Date    Allergic     Anxiety     Asthma Beings of copd with asthma    Back pain     Bell palsy 2021    Bell's palsy     Bipolar 2 disorder     Blood clot in vein     Behind left knee cap    COPD (chronic obstructive pulmonary disease) Six months ago    Deep vein thrombosis Last year    Depression     Diabetes mellitus November    Pre diabete    Frequent headaches     GERD (gastroesophageal reflux disease)     Hypertension     Every time i go into the emergacy room    Irritable bowel syndrome Two years ago    Kidney stone Two years ago    Migraine     Nausea, vomiting and diarrhea 2018    Obesity All of my life    Ovarian cyst Two years ago    Panic     PTSD (post-traumatic stress disorder)     Pulmonary embolism Not in lungs    Recurrent pregnancy loss, antepartum condition or complication Every since i have been 14 yars old    Restless leg syndrome     Sinus problem     Snores     Tattoos     Urinary tract infection Have them on and off    Varicella Little    Visual impairment Since i was little    Vitamin B12 deficiency     Wears glasses          PAST SURGICAL HISTORY  Past Surgical History:   Procedure  Laterality Date    CHOLECYSTECTOMY      HYSTEROSCOPY N/A 3/14/2024    Procedure: HYSTEROSCOPY WITH MYOSURE, DILATION AND CURETTAGE WITH CERENE ABLATION;  Surgeon: David Ribeiro MD;  Location: [facility] OR;  Service: Obstetrics/Gynecology;  Laterality: N/A;    MULTIPLE TOOTH EXTRACTIONS      All my teeth    WISDOM TOOTH EXTRACTION  All my teeth         FAMILY HISTORY  Family History   Problem Relation Age of Onset    Irritable bowel syndrome Mother     Heart disease Mother     Miscarriages / Stillbirths Mother     Arthritis Mother     COPD Mother     Learning disabilities Mother     Mental illness Mother     Asthma Mother     Irritable bowel syndrome Father     Alcohol abuse Father     Diabetes Father     Hyperlipidemia Father     Anxiety disorder Father     Learning disabilities Father     Colon cancer Maternal Grandfather     Stroke Paternal Grandfather     Stroke Paternal Grandmother          SOCIAL HISTORY  Social History     Socioeconomic History    Marital status: Single   Tobacco Use    Smoking status: Some Days     Current packs/day: 0.50     Average packs/day: 2.0 packs/day for 32.0 years (63.3 ttl pk-yrs)     Types: Cigarettes     Start date: 7/17/1992     Passive exposure: Current    Smokeless tobacco: Never    Tobacco comments:         Vaping Use    Vaping status: Former    Passive vaping exposure: Yes   Substance and Sexual Activity    Alcohol use: Not Currently     Comment: rarely    Drug use: Not Currently    Sexual activity: Not Currently     Partners: Female     Birth control/protection: Other, Same-sex partner         ALLERGIES  Aspirin, Codeine, Cyclobenzaprine, Haldol [haloperidol], Imitrex [sumatriptan], Latex, Penicillins, Acetaminophen, Lamictal [lamotrigine], Metoclopramide, Morphine, Ondansetron, Oxycodone-acetaminophen, Oxycontin [oxycodone], Prochlorperazine, and Tramadol        REVIEW OF SYSTEMS  Systems reviewed and negative      PHYSICAL EXAM    I have reviewed the triage vital signs  and nursing notes.    ED Triage Vitals [05/27/24 2059]   Temp Heart Rate Resp BP SpO2   98.1 °F (36.7 °C) 98 20 (!) 154/108 95 %      Temp src Heart Rate Source Patient Position BP Location FiO2 (%)   Oral Monitor Sitting Left arm --       Physical Exam  GENERAL:   Appears in no acute distress.   HENT: Nares patent.  EYES: No scleral icterus.  CV: Regular rhythm, regular rate.  RESPIRATORY: Normal effort.    ABDOMEN: Soft  MUSCULOSKELETAL: No deformities.  Tenderness to palpation over the plantar aspect of the left foot.  No skin lesions or deformities noted.  Intact DP/PT pulses.  Able to flex and extend at the ankle able to wiggle toes, distal foot is neurovascularly intact.  No gross swelling.  NEURO: Alert, moves all extremities, follows commands.  SKIN: Warm, dry, no rash visualized.      LAB RESULTS  No results found for this or any previous visit (from the past 24 hour(s)).    If labs were ordered, I independently reviewed the results and considered them in treating the patient.        RADIOLOGY  No Radiology Exams Resulted Within Past 24 Hours    PROCEDURES    Procedures    No orders to display       MEDICATIONS GIVEN IN ER    Medications   ketorolac (TORADOL) injection 30 mg (has no administration in time range)   diazePAM (VALIUM) injection 5 mg (has no administration in time range)         MEDICAL DECISION MAKING, PROGRESS, and CONSULTS    All labs, if obtained, have been independently reviewed by me.  All radiology studies, if obtained, have been reviewed by me and the radiologist dictating the report.  All EKG's, if obtained, have been independently viewed and interpreted by me.      Discussion below represents my analysis of pertinent findings related to patient's condition, differential diagnosis, treatment plan and final disposition.                         Differential diagnosis:    Fracture, musculoskeletal strain, plantar fasciitis, contusion, others.      Additional sources:    - Discussed/  obtained information from independent historians:      - External (non-ED) record review: Outpatient orders from yesterday she had ambulatory referral to podiatry placed    - Chronic or social conditions impacting care:      - Shared decision making:        Orders placed during this visit:  Orders Placed This Encounter   Procedures    XR Foot 3+ View Left    Crutches (fit & training)    Obtain & Apply The Following- Lower extremity; Post-op shoe         Additional orders considered but not ordered:      ED Course:    Patient is a 38-year-old female presenting with left foot pain.  Previously diagnosed with plantar fasciitis.  Her tenderness is located at the insertion of the plantar fascia at the level of the calcaneus.  There is no signs of infection on examination.  She tolerates range of motion and is neurovascularly intact.  No evidence for compartment syndrome.  X-rays obtained showed no acute fracture my interpretation.  Will manage symptomatically.  Given the severity of her symptoms plan for weightbearing as tolerated will provide crutches for pressure relief.  Instructed and counseled on postop shoe compliance recommendation for podiatry follow-up.                Consultants:          AS OF 22:30 EDT VITALS:    BP - (!) 154/108  HR - 98  TEMP - 98.1 °F (36.7 °C) (Oral)  O2 SATS - 95%                  DIAGNOSIS  Final diagnoses:   Plantar fasciitis   Elevated blood pressure reading         DISPOSITION  ED Disposition       ED Disposition   Discharge    Condition   Stable    Comment   --                   Please note that portions of this document were completed with voice recognition software.        Valentín Rivera MD  06/01/24 0891

## 2024-05-28 NOTE — TELEPHONE ENCOUNTER
Caller: Lindsay Amezquita    Relationship: Self    Best call back number: [phone]     What is the best time to reach you: ANY    Who are you requesting to speak with (clinical staff, provider,  specific staff member): DR. PULIDO OR HIS NURSE    What was the call regarding: THE PATIENT SAID THAT SHE DID GO TO THE ER YESTERDAY. THEY STARTED HER ON AN IV JUST TO GIVE HER A VALIUM AND TORADOL SHOT. THIS DID NOTHING FOR HER. SHE ALSO SAID THAT IBUPROFEN AND TYLENOL ARE NOT TOUCHING THE PAIN. SHE IS IN SO MUCH PAIN THAT SHE CAN HARDLY STAND IT ANYMORE. PLEASE CALL TO ADVISE ASAP. IF POSSIBLE SHE WOULD LIKE A CALL TODAY.     Is it okay if the provider responds through [facility]: NO       55

## 2024-05-28 NOTE — TELEPHONE ENCOUNTER
Caller: Lindsay Amezquita    Relationship: Self    Best call back number: [phone]     What medication are you requesting: PAIN MEDICATION - NOT TORADOL     What are your current symptoms: LEFT FOOT PAIN      If a prescription is needed, what is your preferred pharmacy and phone number: [id number] - [place], [place] - [address] [phone] [facility] [phone]      Additional notes: PATIENT CALLED BACK STATING THE PAIN IS VERY BAD AND SHE NEEDS SOMETHING TO HELP UNTIL SHE GOES TO THE PAIN CLINIC TOMORROW

## 2024-05-28 NOTE — DISCHARGE INSTRUCTIONS
Preliminary x-ray read shows no broken bones  Use crutches as needed  Follow-up with podiatry as listed

## 2024-05-29 ENCOUNTER — TELEPHONE (OUTPATIENT)
Dept: FAMILY MEDICINE CLINIC | Facility: CLINIC | Age: 39
End: 2024-05-29
Payer: MEDICAID

## 2024-05-29 DIAGNOSIS — M54.16 LUMBAR RADICULOPATHY: ICD-10-CM

## 2024-05-29 RX ORDER — TIZANIDINE 4 MG/1
4 TABLET ORAL EVERY 8 HOURS PRN
Qty: 270 TABLET | Refills: 1 | OUTPATIENT
Start: 2024-05-29

## 2024-05-29 NOTE — TELEPHONE ENCOUNTER
NEEDS REFERRAL TO A DIFFERENT PAIN MANAGEMENT. PATIENT SAID [facility] IS NOT HELPING HER. SHE IS IN A LOT OF PAIN SHE CANNOT STAND IT. SHE IS WANTING TO SPEAK WITH SOMEONE AS SOON AS POSSIBLE.

## 2024-05-29 NOTE — TELEPHONE ENCOUNTER
"DR PULIDO WOULD LIKE TO SPEAK WITH THE PAIN TX PROVIDER. I LEFT A VM WITH [facility] [phone] ASKING FOR SOMEONE TO CALL BACK TO SPEAK WITH DR PULIDO (I CALLED THEM BACK TO CANCEL THE CALL BACK SINCE THIS NOT HER CURRENT PAIN TX CENTER, BUT ASKED IF THEY CAN STILL SEND RECORDS TO US FOR HER CHART). I CALLED PATIENT TO CONFIRM WHICH OFFICE SHE ACTUALLY SEES. SHE SAYS SHE SAW [facility] THIS MORNING. SHE STATES THEY WANT HER TO DO SURGERY AND ALSO INJECTIONS. SHE WON'T DO IT BECAUSE SHE HAS ONLY HEARD BAD THINGS ABOUT BACK SURGERIES. I LEFT [facility] A VM [phone] ASKING THEM TO FAX THEIR OFFICE NOTE TO DR PULIDO, AND THAT HE WOULD ALSO LIKE TO SPEAK WITH THE PROVIDER IN THEIR OFFICE.     *PATIENT SAYS [facility] WON'T TAKE HER BACK BECAUSE THEY SAID SHE \"SELF DISCHARGED\". SHE SAW THEM FOR @ 6-7 MONTHS AND SAID SHE NEVER STATED THAT SHE WANTED TO BE DISCHARGED FROM THEIR OFFICE*   "

## 2024-05-29 NOTE — TELEPHONE ENCOUNTER
PATIENT CALLED BACK AND WANTS TO TALK TO SOMEONE SAID SHE IS IN SO MUCH PAIN I TOLD HER [facility] AND  DR. PULIDO IS IN CLINIC AND THAT SOMEONE WOULD GET BACK TO HER. SHE ASKED IF SOMEONE COULD GET BACK TO HER BEFORE WE CLOSE TODAY.

## 2024-05-30 ENCOUNTER — TELEPHONE (OUTPATIENT)
Dept: FAMILY MEDICINE CLINIC | Facility: CLINIC | Age: 39
End: 2024-05-30
Payer: MEDICAID

## 2024-05-30 NOTE — TELEPHONE ENCOUNTER
Caller: Lindsay Amezquita    Relationship: Self    Best call back number: [phone]       Who are you requesting to speak with (clinical staff, provider,  specific staff member): CLINICAL    What was the call regarding: PATIENT STATES IN EXTREME PAIN, WITH HER FOOT PLEASE ADVISE    Is it okay if the provider responds through MyChart: YES

## 2024-05-30 NOTE — TELEPHONE ENCOUNTER
Spoke with pt, gave her Dr. Winkler's advise. She voiced understanding and thanked me for calling her back.

## 2024-05-30 NOTE — TELEPHONE ENCOUNTER
[facility]/ [facility] CALLED AND SAID SHE WAS SENDING NOTES TO US. SHE HASN'T BEEN SEEN SINCE 9/2023. SHE SAYS SHE SELF DISCHARGED EVEN THOUGH PATIENT SAYS SHE DID NOT. THE PROVIDERS ARE UNWILLING TO TAKE HER BACK DUE TO HER BEHAVIOR.

## 2024-05-31 ENCOUNTER — TELEPHONE (OUTPATIENT)
Dept: FAMILY MEDICINE CLINIC | Facility: CLINIC | Age: 39
End: 2024-05-31
Payer: MEDICAID

## 2024-05-31 NOTE — TELEPHONE ENCOUNTER
"Dr. Marquez called from [facility]. I informed him that Dr. Winkler was out of the office for a couple weeks. I wasn't sure exactly what Dr. Winkler needed in particular, except that patient saw them 1 time (5/29/24) and she called us wanting to see someone else because \"they want to try surgery and injections\". Dr. Marquez stated they wanted to try injections on patient, and also that she was not a good candidate for long term pain meds. He stated the meds would most likely hurt/worsen her other health issues. He also stated that patient has no insight for rehab. I said I would forward this information to Dr. Winkler and if he has additional questions, he may call him back. The office note has already been indexed in patients chart.   "

## 2024-05-31 NOTE — TELEPHONE ENCOUNTER
Caller: Lindsay Amezquita    Relationship to patient: Self    Best call back number: [phone]     PATIENT IS CALLING TO CHECK THE STATUS OF HER REFERRAL TO PAIN MANAGEMENT.

## 2024-06-02 ENCOUNTER — APPOINTMENT (OUTPATIENT)
Dept: CT IMAGING | Facility: HOSPITAL | Age: 39
End: 2024-06-02
Payer: MEDICAID

## 2024-06-02 ENCOUNTER — HOSPITAL ENCOUNTER (EMERGENCY)
Facility: HOSPITAL | Age: 39
Discharge: HOME | End: 2024-06-02
Attending: STUDENT IN AN ORGANIZED HEALTH CARE EDUCATION/TRAINING PROGRAM | Admitting: STUDENT IN AN ORGANIZED HEALTH CARE EDUCATION/TRAINING PROGRAM
Payer: MEDICAID

## 2024-06-02 VITALS
BODY MASS INDEX: 44.65 KG/M2 | TEMPERATURE: 98.2 F | SYSTOLIC BLOOD PRESSURE: 128 MMHG | HEIGHT: 63 IN | WEIGHT: 252 LBS | HEART RATE: 88 BPM | RESPIRATION RATE: 18 BRPM | OXYGEN SATURATION: 93 % | DIASTOLIC BLOOD PRESSURE: 75 MMHG

## 2024-06-02 DIAGNOSIS — G43.909 MIGRAINE WITHOUT STATUS MIGRAINOSUS, NOT INTRACTABLE, UNSPECIFIED MIGRAINE TYPE: Primary | ICD-10-CM

## 2024-06-02 LAB
ALBUMIN SERPL-MCNC: 4.1 G/DL (ref 3.5–5.2)
ALBUMIN/GLOB SERPL: 1.8 G/DL
ALP SERPL-CCNC: 63 U/L (ref 39–117)
ALT SERPL W P-5'-P-CCNC: 34 U/L (ref 1–33)
ANION GAP SERPL CALCULATED.3IONS-SCNC: 10.6 MMOL/L (ref 5–15)
AST SERPL-CCNC: 24 U/L (ref 1–32)
BASOPHILS # BLD AUTO: 0.05 10*3/MM3 (ref 0–0.2)
BASOPHILS NFR BLD AUTO: 0.6 % (ref 0–1.5)
BILIRUB SERPL-MCNC: 0.2 MG/DL (ref 0–1.2)
BILIRUB UR QL STRIP: NEGATIVE
BUN SERPL-MCNC: 6 MG/DL (ref 6–20)
BUN/CREAT SERPL: 11.3 (ref 7–25)
CALCIUM SPEC-SCNC: 9.1 MG/DL (ref 8.6–10.5)
CHLORIDE SERPL-SCNC: 104 MMOL/L (ref 98–107)
CLARITY UR: CLEAR
CO2 SERPL-SCNC: 24.4 MMOL/L (ref 22–29)
COLOR UR: YELLOW
CREAT SERPL-MCNC: 0.53 MG/DL (ref 0.57–1)
DEPRECATED RDW RBC AUTO: 50 FL (ref 37–54)
EGFRCR SERPLBLD CKD-EPI 2021: 120.8 ML/MIN/1.73
EOSINOPHIL # BLD AUTO: 0.17 10*3/MM3 (ref 0–0.4)
EOSINOPHIL NFR BLD AUTO: 1.9 % (ref 0.3–6.2)
ERYTHROCYTE [DISTWIDTH] IN BLOOD BY AUTOMATED COUNT: 15.8 % (ref 12.3–15.4)
GLOBULIN UR ELPH-MCNC: 2.3 GM/DL
GLUCOSE BLDC GLUCOMTR-MCNC: 309 MG/DL (ref 70–130)
GLUCOSE SERPL-MCNC: 148 MG/DL (ref 65–99)
GLUCOSE UR STRIP-MCNC: ABNORMAL MG/DL
HCG SERPL QL: NEGATIVE
HCT VFR BLD AUTO: 42.2 % (ref 34–46.6)
HGB BLD-MCNC: 14.2 G/DL (ref 12–15.9)
HGB UR QL STRIP.AUTO: NEGATIVE
IMM GRANULOCYTES # BLD AUTO: 0.02 10*3/MM3 (ref 0–0.05)
IMM GRANULOCYTES NFR BLD AUTO: 0.2 % (ref 0–0.5)
KETONES UR QL STRIP: NEGATIVE
LEUKOCYTE ESTERASE UR QL STRIP.AUTO: NEGATIVE
LYMPHOCYTES # BLD AUTO: 3.57 10*3/MM3 (ref 0.7–3.1)
LYMPHOCYTES NFR BLD AUTO: 40.2 % (ref 19.6–45.3)
MAGNESIUM SERPL-MCNC: 2 MG/DL (ref 1.6–2.6)
MCH RBC QN AUTO: 29.3 PG (ref 26.6–33)
MCHC RBC AUTO-ENTMCNC: 33.6 G/DL (ref 31.5–35.7)
MCV RBC AUTO: 87 FL (ref 79–97)
MONOCYTES # BLD AUTO: 0.53 10*3/MM3 (ref 0.1–0.9)
MONOCYTES NFR BLD AUTO: 6 % (ref 5–12)
NEUTROPHILS NFR BLD AUTO: 4.55 10*3/MM3 (ref 1.7–7)
NEUTROPHILS NFR BLD AUTO: 51.1 % (ref 42.7–76)
NITRITE UR QL STRIP: NEGATIVE
NRBC BLD AUTO-RTO: 0 /100 WBC (ref 0–0.2)
PH UR STRIP.AUTO: 6.5 [PH] (ref 5–8)
PLATELET # BLD AUTO: 268 10*3/MM3 (ref 140–450)
PMV BLD AUTO: 10.1 FL (ref 6–12)
POTASSIUM SERPL-SCNC: 4 MMOL/L (ref 3.5–5.2)
PROT SERPL-MCNC: 6.4 G/DL (ref 6–8.5)
PROT UR QL STRIP: NEGATIVE
RBC # BLD AUTO: 4.85 10*6/MM3 (ref 3.77–5.28)
SODIUM SERPL-SCNC: 139 MMOL/L (ref 136–145)
SP GR UR STRIP: >1.03 (ref 1–1.03)
UROBILINOGEN UR QL STRIP: ABNORMAL
WBC NRBC COR # BLD AUTO: 8.89 10*3/MM3 (ref 3.4–10.8)

## 2024-06-02 PROCEDURE — 96375 TX/PRO/DX INJ NEW DRUG ADDON: CPT

## 2024-06-02 PROCEDURE — 96374 THER/PROPH/DIAG INJ IV PUSH: CPT

## 2024-06-02 PROCEDURE — 70450 CT HEAD/BRAIN W/O DYE: CPT

## 2024-06-02 PROCEDURE — 84703 CHORIONIC GONADOTROPIN ASSAY: CPT | Performed by: PHYSICIAN ASSISTANT

## 2024-06-02 PROCEDURE — 25010000002 KETOROLAC TROMETHAMINE PER 15 MG: Performed by: PHYSICIAN ASSISTANT

## 2024-06-02 PROCEDURE — 81003 URINALYSIS AUTO W/O SCOPE: CPT | Performed by: PHYSICIAN ASSISTANT

## 2024-06-02 PROCEDURE — 25810000003 SODIUM CHLORIDE 0.9 % SOLUTION: Performed by: PHYSICIAN ASSISTANT

## 2024-06-02 PROCEDURE — 25510000001 IOPAMIDOL 61 % SOLUTION: Performed by: STUDENT IN AN ORGANIZED HEALTH CARE EDUCATION/TRAINING PROGRAM

## 2024-06-02 PROCEDURE — 70496 CT ANGIOGRAPHY HEAD: CPT

## 2024-06-02 PROCEDURE — 80053 COMPREHEN METABOLIC PANEL: CPT | Performed by: PHYSICIAN ASSISTANT

## 2024-06-02 PROCEDURE — 82948 REAGENT STRIP/BLOOD GLUCOSE: CPT

## 2024-06-02 PROCEDURE — 99285 EMERGENCY DEPT VISIT HI MDM: CPT

## 2024-06-02 PROCEDURE — 85025 COMPLETE CBC W/AUTO DIFF WBC: CPT | Performed by: PHYSICIAN ASSISTANT

## 2024-06-02 PROCEDURE — 83735 ASSAY OF MAGNESIUM: CPT | Performed by: PHYSICIAN ASSISTANT

## 2024-06-02 PROCEDURE — 36415 COLL VENOUS BLD VENIPUNCTURE: CPT

## 2024-06-02 PROCEDURE — 25010000002 DIPHENHYDRAMINE PER 50 MG: Performed by: PHYSICIAN ASSISTANT

## 2024-06-02 PROCEDURE — 70498 CT ANGIOGRAPHY NECK: CPT

## 2024-06-02 RX ORDER — ACETAMINOPHEN 500 MG
1000 TABLET ORAL ONCE
Status: COMPLETED | OUTPATIENT
Start: 2024-06-02 | End: 2024-06-02

## 2024-06-02 RX ORDER — DIPHENHYDRAMINE HYDROCHLORIDE 50 MG/ML
25 INJECTION INTRAMUSCULAR; INTRAVENOUS ONCE
Status: COMPLETED | OUTPATIENT
Start: 2024-06-02 | End: 2024-06-02

## 2024-06-02 RX ORDER — KETOROLAC TROMETHAMINE 30 MG/ML
30 INJECTION, SOLUTION INTRAMUSCULAR; INTRAVENOUS ONCE
Status: COMPLETED | OUTPATIENT
Start: 2024-06-02 | End: 2024-06-02

## 2024-06-02 RX ADMIN — SODIUM CHLORIDE 1000 ML: 9 INJECTION, SOLUTION INTRAVENOUS at 13:51

## 2024-06-02 RX ADMIN — KETOROLAC TROMETHAMINE 30 MG: 30 INJECTION, SOLUTION INTRAMUSCULAR; INTRAVENOUS at 16:12

## 2024-06-02 RX ADMIN — ACETAMINOPHEN 1000 MG: 500 TABLET, FILM COATED ORAL at 13:47

## 2024-06-02 RX ADMIN — IOPAMIDOL 100 ML: 612 INJECTION, SOLUTION INTRAVENOUS at 14:59

## 2024-06-02 RX ADMIN — DIPHENHYDRAMINE HYDROCHLORIDE 25 MG: 50 INJECTION, SOLUTION INTRAMUSCULAR; INTRAVENOUS at 13:47

## 2024-06-02 NOTE — ED PROVIDER NOTES
Subjective:  Chief Complaint:  Headache    History of Present Illness:  Patient is a 38-year-old female with history of anxiety, asthma, Bell's palsy, bipolar disorder, COPD, among others presenting to the ER with complaints of headache, numbness/tingling to the back of her head radiating to the front of her head, and bilateral blurry vision.  Patient states she did walk in, was brought in by family member.  She states she will not take ambulances.  She appears very anxious and states she feels like she is having a Bell's palsy flareup.  She denies additional symptoms or complaints at this time.  States she is prediabetic.  Glucose 309 upon arrival.      Nurses Notes reviewed and agree, including vitals, allergies, social history and prior medical history.     REVIEW OF SYSTEMS: All systems reviewed and not pertinent unless noted.  Review of Systems   Eyes:  Positive for visual disturbance.   Neurological:  Positive for numbness and headaches.   All other systems reviewed and are negative.      Past Medical History:   Diagnosis Date    Allergic     Anxiety     Asthma Beings of copd with asthma    Back pain     Bell palsy 07/06/2021    Bell's palsy     Bipolar 2 disorder     Blood clot in vein     Behind left knee cap    COPD (chronic obstructive pulmonary disease) Six months ago    Deep vein thrombosis Last year    Depression     Diabetes mellitus November    Pre diabete    Frequent headaches     GERD (gastroesophageal reflux disease)     Hypertension     Every time i go into the emergacy room    Irritable bowel syndrome Two years ago    Kidney stone Two years ago    Migraine     Nausea, vomiting and diarrhea 09/17/2018    Obesity All of my life    Ovarian cyst Two years ago    Panic     PTSD (post-traumatic stress disorder)     Pulmonary embolism Not in lungs    Recurrent pregnancy loss, antepartum condition or complication Every since i have been 14 yars old    Restless leg syndrome     Sinus problem     Snores      Tattoos     Urinary tract infection Have them on and off    Varicella Little    Visual impairment Since i was little    Vitamin B12 deficiency     Wears glasses        Allergies:    Aspirin, Codeine, Cyclobenzaprine, Haldol [haloperidol], Imitrex [sumatriptan], Latex, Penicillins, Acetaminophen, Lamictal [lamotrigine], Metoclopramide, Morphine, Ondansetron, Oxycodone-acetaminophen, Oxycontin [oxycodone], Prochlorperazine, and Tramadol      Past Surgical History:   Procedure Laterality Date    CHOLECYSTECTOMY      HYSTEROSCOPY N/A 3/14/2024    Procedure: HYSTEROSCOPY WITH MYOSURE, DILATION AND CURETTAGE WITH CERENE ABLATION;  Surgeon: David Ribeiro MD;  Location: [facility];  Service: Obstetrics/Gynecology;  Laterality: N/A;    MULTIPLE TOOTH EXTRACTIONS      All my teeth    WISDOM TOOTH EXTRACTION  All my teeth         Social History     Socioeconomic History    Marital status: Single   Tobacco Use    Smoking status: Some Days     Current packs/day: 0.50     Average packs/day: 2.0 packs/day for 32.0 years (63.3 ttl pk-yrs)     Types: Cigarettes     Start date: 7/17/1992     Passive exposure: Current    Smokeless tobacco: Never    Tobacco comments:         Vaping Use    Vaping status: Former    Passive vaping exposure: Yes   Substance and Sexual Activity    Alcohol use: Not Currently     Comment: rarely    Drug use: Not Currently    Sexual activity: Not Currently     Partners: Female     Birth control/protection: Other, Same-sex partner         Family History   Problem Relation Age of Onset    Irritable bowel syndrome Mother     Heart disease Mother     Miscarriages / Stillbirths Mother     Arthritis Mother     COPD Mother     Learning disabilities Mother     Mental illness Mother     Asthma Mother     Irritable bowel syndrome Father     Alcohol abuse Father     Diabetes Father     Hyperlipidemia Father     Anxiety disorder Father     Learning disabilities Father     Colon cancer Maternal Grandfather      "Stroke Paternal Grandfather     Stroke Paternal Grandmother        Objective  Physical Exam:  /75   Pulse 88   Temp 98.2 °F (36.8 °C)   Resp 18   Ht 160 cm (63\")   Wt 114 kg (252 lb)   SpO2 93%   BMI 44.64 kg/m²      Physical Exam  Vitals and nursing note reviewed.   Constitutional:       General: She is not in acute distress.     Appearance: She is not toxic-appearing.   HENT:      Head: Normocephalic and atraumatic.      Right Ear: External ear normal.      Left Ear: External ear normal.      Nose: Nose normal.   Eyes:      Extraocular Movements: Extraocular movements intact.      Conjunctiva/sclera: Conjunctivae normal.      Pupils: Pupils are equal, round, and reactive to light.   Cardiovascular:      Rate and Rhythm: Normal rate.   Pulmonary:      Effort: Pulmonary effort is normal. No respiratory distress.   Abdominal:      General: There is no distension.   Musculoskeletal:         General: Normal range of motion.      Cervical back: Normal range of motion and neck supple.   Skin:     General: Skin is warm and dry.   Neurological:      General: No focal deficit present.      Mental Status: She is alert and oriented to person, place, and time.      Coordination: Coordination normal.      Gait: Gait normal.      Comments: Normal finger-nose and heel-to-shin test, alert and oriented x 4, no extremity weakness, no facial palsy noted, no focal deficits appreciated   Psychiatric:         Mood and Affect: Mood normal.         Behavior: Behavior normal.         Procedures    ED Course:         Lab Results (last 24 hours)       Procedure Component Value Units Date/Time    POC Glucose Once [[id number]]  (Abnormal) Collected: 06/02/24 1307    Specimen: Blood Updated: 06/02/24 1312     Glucose 309 mg/dL      Comment: Serial Number: [id number]:  [id number]       CBC & Differential [[id number]]  (Abnormal) Collected: 06/02/24 1357    Specimen: Blood Updated: 06/02/24 1405    Narrative:      The following " orders were created for panel order CBC & Differential.  Procedure                               Abnormality         Status                     ---------                               -----------         ------                     CBC Auto Differential[[id number]]        Abnormal            Final result                 Please view results for these tests on the individual orders.    Comprehensive Metabolic Panel [[id number]]  (Abnormal) Collected: 06/02/24 1357    Specimen: Blood Updated: 06/02/24 1424     Glucose 148 mg/dL      BUN 6 mg/dL      Creatinine 0.53 mg/dL      Sodium 139 mmol/L      Potassium 4.0 mmol/L      Chloride 104 mmol/L      CO2 24.4 mmol/L      Calcium 9.1 mg/dL      Total Protein 6.4 g/dL      Albumin 4.1 g/dL      ALT (SGPT) 34 U/L      AST (SGOT) 24 U/L      Alkaline Phosphatase 63 U/L      Total Bilirubin 0.2 mg/dL      Globulin 2.3 gm/dL      A/G Ratio 1.8 g/dL      BUN/Creatinine Ratio 11.3     Anion Gap 10.6 mmol/L      eGFR 120.8 mL/min/1.73     Narrative:      GFR Normal >60  Chronic Kidney Disease <60  Kidney Failure <15      hCG, Serum, Qualitative [[id number]]  (Normal) Collected: 06/02/24 1357    Specimen: Blood Updated: 06/02/24 1433     HCG Qualitative Negative    Magnesium [[id number]]  (Normal) Collected: 06/02/24 1357    Specimen: Blood Updated: 06/02/24 1424     Magnesium 2.0 mg/dL     CBC Auto Differential [[id number]]  (Abnormal) Collected: 06/02/24 1357    Specimen: Blood Updated: 06/02/24 1405     WBC 8.89 10*3/mm3      RBC 4.85 10*6/mm3      Hemoglobin 14.2 g/dL      Hematocrit 42.2 %      MCV 87.0 fL      MCH 29.3 pg      MCHC 33.6 g/dL      RDW 15.8 %      RDW-SD 50.0 fl      MPV 10.1 fL      Platelets 268 10*3/mm3      Neutrophil % 51.1 %      Lymphocyte % 40.2 %      Monocyte % 6.0 %      Eosinophil % 1.9 %      Basophil % 0.6 %      Immature Grans % 0.2 %      Neutrophils, Absolute 4.55 10*3/mm3      Lymphocytes, Absolute 3.57 10*3/mm3      Monocytes, Absolute 0.53  10*3/mm3      Eosinophils, Absolute 0.17 10*3/mm3      Basophils, Absolute 0.05 10*3/mm3      Immature Grans, Absolute 0.02 10*3/mm3      nRBC 0.0 /100 WBC     Urinalysis With Microscopic If Indicated (No Culture) - Urine, Clean Catch [[id number]]  (Abnormal) Collected: 06/02/24 1522    Specimen: Urine, Clean Catch Updated: 06/02/24 1527     Color, UA Yellow     Appearance, UA Clear     pH, UA 6.5     Specific Gravity, UA >1.030     Glucose, UA >=1000 mg/dL (3+)     Ketones, UA Negative     Bilirubin, UA Negative     Blood, UA Negative     Protein, UA Negative     Leuk Esterase, UA Negative     Nitrite, UA Negative     Urobilinogen, UA 0.2 E.U./dL    Narrative:      Urine microscopic not indicated.             CT Angiogram Head    Result Date: 6/2/2024  PROCEDURE: CT ANGIOGRAM NECK-and head  HISTORY: headache, blurry vision  TECHNIQUE: Thin section axial CT with IV contrast supplemented with multiplanar reconstruction of the head and neck.  FINDINGS:  Head CT: The ventricles are normal in size. There is no evidence of hemorrhage.  No masses are identified.  No extra-axial fluid is seen. The sinuses are normal. No abnormal enhancing lesions. Incidental note is made of a calcification along the superior posterior left globe.  CTA:  Aortic arch:  Arch shows no significant narrowing. Great vessel origins are widely patent.  Right carotid:  No significant stenosis is seen of the cervical common or internal carotid artery.  Left carotid:  No significant stenosis is seen of the cervical common or internal carotid artery.  Vertebrals: Left vertebral artery is dominant. No significant stenosis is present.  The cranial circulation is  unremarkable. No evidence of large vessel occlusion or aneurysm.      Impression: No large vessel occlusion or significant carotid stenosis.   This study was performed with techniques to keep radiation doses as low as reasonably achievable (ALARA). Individualized dose reduction techniques using  automated exposure control or adjustment of mA and/or kV according to the patient size were employed.    CTDI: 36.09 mGy DLP:583.92 mGy.cm   This report was signed and finalized on 6/2/2024 3:26 PM by Javad Rader DO.      CT Angiogram Neck    Result Date: 6/2/2024  PROCEDURE: CT ANGIOGRAM NECK-and head  HISTORY: headache, blurry vision  TECHNIQUE: Thin section axial CT with IV contrast supplemented with multiplanar reconstruction of the head and neck.  FINDINGS:  Head CT: The ventricles are normal in size. There is no evidence of hemorrhage.  No masses are identified.  No extra-axial fluid is seen. The sinuses are normal. No abnormal enhancing lesions.  CTA:  Aortic arch:  Arch shows no significant narrowing. Great vessel origins are widely patent.  Right carotid:  No significant stenosis is seen of the cervical common or internal carotid artery.  Left carotid:  No significant stenosis is seen of the cervical common or internal carotid artery.  Vertebrals: Left vertebral artery is dominant. No significant stenosis is present.  The cranial circulation is  unremarkable. No evidence of large vessel occlusion or aneurysm.      Impression: No large vessel occlusion or significant carotid stenosis.   This study was performed with techniques to keep radiation doses as low as reasonably achievable (ALARA). Individualized dose reduction techniques using automated exposure control or adjustment of mA and/or kV according to the patient size were employed.    CTDI: 36.09 mGy DLP:583.92 mGy.cm   This report was signed and finalized on 6/2/2024 3:24 PM by Javad Rader DO.      CT Head Without Contrast    Result Date: 6/2/2024  PROCEDURE: CT HEAD WO CONTRAST-  HISTORY: numbness/tingling to back of head, headache, blurry vision  COMPARISON: April 2, 2024.  TECHNIQUE: Multiple axial CT images were performed from the foramen magnum to the vertex without enhancement.  FINDINGS: There is no CT evidence of hemorrhage. There is no mass,  mass effect or midline shift.  There is no hydrocephalus. The paranasal sinuses are clear. Bone windows reveal no acute osseous abnormalities.      Impression: No acute hemorhage, mass effect, or midline shift..      CTDI: 36.09 mGy DLP:583.92 mGy.cm   This study was performed with techniques to keep radiation doses as low as reasonably achievable (ALARA). Individualized dose reduction techniques using automated exposure control or adjustment of mA and/or kV according to the patient size were employed.   This report was signed and finalized on 6/2/2024 3:18 PM by Javad Rader DO.          MDM  Patient evaluated in the ER for paresthesias to head and face, bilateral blurry vision, headache.  Patient hemodynamically stable, afebrile, nontoxic-appearing on exam.  No focal neurologic deficits on exam.  Patient following commands appropriately, ambulating without difficulty, no extremity weakness.  She is alert and oriented x 4.  Differential diagnosis includes but is not limited to migraine, vascular abnormality, tension headache, anxiety, among others.  Initial plan includes CBC, CMP, magnesium, hCG qualitative, urinalysis, CT head, CTA of head and neck, and initial treatment with 1 L IV fluids, Tylenol, Benadryl.    Imaging unremarkable per radiology.  Labs nonactionable.  Patient feeling much better after medications and fluids in the ER.  Resting comfortably upon reevaluation.  Agreeable with plan for discharge.  Recommended follow-up with PCP for further outpatient evaluation if symptoms persist.  Precautions were given for return to the ER for any new or worsening symptoms.    Final diagnoses:   Migraine without status migrainosus, not intractable, unspecified migraine type          Peace Kelly, BEN  06/02/24 [id number]

## 2024-06-02 NOTE — DISCHARGE INSTRUCTIONS
Take Tylenol and Excedrin Migraine as prescribed over-the-counter per directions on the package.  Follow-up with your PCP for further outpatient evaluation if symptoms persist.  Return to the ER for new or worsening symptoms or acute concerns.

## 2024-06-04 DIAGNOSIS — M54.16 LUMBAR RADICULOPATHY: ICD-10-CM

## 2024-06-04 RX ORDER — TIZANIDINE 4 MG/1
4 TABLET ORAL EVERY 8 HOURS PRN
Qty: 270 TABLET | Refills: 0 | Status: SHIPPED | OUTPATIENT
Start: 2024-06-04

## 2024-06-04 NOTE — TELEPHONE ENCOUNTER
Patient will need an appointment before I send in another pain management referral and I will not be prescribing any gabapentin or oxycodone until this visit. We are going to need to talk about goals for pain control and previous pain control regimen. This was not discussed extensively during our only visit.

## 2024-06-04 NOTE — TELEPHONE ENCOUNTER
Patient called and said she was checking to see if PCP was going to prescribe her medication. She is requesting hydrocodone, Gabapentin & Tizanadine. Trevon with the [facility] was trying to get information from her and he stated that she started yelling. She was very hard to understand and slurring her words. I had to ask her to repeat herself several times. I didn't see an encounter for med refills, but they have been for pain management only as far as I can tell. I informed patient that Dr. Winkler was out of the office and we may have to wait until he is back, or possibly next week when he begins doing telehealth/working from home. She said she will just wait til next week then.

## 2024-06-04 NOTE — TELEPHONE ENCOUNTER
DUPLICATE---    I added the messages from this encounter to the 5/29/24 phone encount since there is already info pertaining to referrals.

## 2024-06-07 NOTE — TELEPHONE ENCOUNTER
Patient returned my call. I let her know that the Tizanidine was sent to [facility]. I also let her know that Dr. Winkler was not going to prescribe Gabapentin or Oxycodone at this time. He will discuss more at the 6/11/24 video visit.

## 2024-06-07 NOTE — TELEPHONE ENCOUNTER
RAMIREZ for patient to call me. I wanted to let her know that her Tizanidine was sent to [facility]. Also wanted to confirm that she was aware of message from Dr. Winkler that he will not be prescribing Gabapentin or Oxycodone at this time. He will discuss more at the 6/11/24 video visit.

## 2024-06-09 ENCOUNTER — HOSPITAL ENCOUNTER (EMERGENCY)
Facility: HOSPITAL | Age: 39
Discharge: HOME | End: 2024-06-09
Attending: STUDENT IN AN ORGANIZED HEALTH CARE EDUCATION/TRAINING PROGRAM | Admitting: STUDENT IN AN ORGANIZED HEALTH CARE EDUCATION/TRAINING PROGRAM
Payer: MEDICAID

## 2024-06-09 ENCOUNTER — APPOINTMENT (OUTPATIENT)
Dept: GENERAL RADIOLOGY | Facility: HOSPITAL | Age: 39
End: 2024-06-09
Payer: MEDICAID

## 2024-06-09 VITALS
OXYGEN SATURATION: 95 % | TEMPERATURE: 97.6 F | SYSTOLIC BLOOD PRESSURE: 124 MMHG | RESPIRATION RATE: 20 BRPM | HEART RATE: 80 BPM | WEIGHT: 255.6 LBS | DIASTOLIC BLOOD PRESSURE: 84 MMHG | BODY MASS INDEX: 47.04 KG/M2 | HEIGHT: 62 IN

## 2024-06-09 DIAGNOSIS — M79.604 PAIN OF RIGHT LOWER EXTREMITY: Primary | ICD-10-CM

## 2024-06-09 PROCEDURE — 99283 EMERGENCY DEPT VISIT LOW MDM: CPT

## 2024-06-09 PROCEDURE — 73502 X-RAY EXAM HIP UNI 2-3 VIEWS: CPT

## 2024-06-09 PROCEDURE — 73560 X-RAY EXAM OF KNEE 1 OR 2: CPT

## 2024-06-09 RX ORDER — ACETAMINOPHEN 325 MG/1
650 TABLET ORAL ONCE
Status: COMPLETED | OUTPATIENT
Start: 2024-06-09 | End: 2024-06-09

## 2024-06-09 RX ORDER — ACETAMINOPHEN 500 MG
500 TABLET ORAL EVERY 6 HOURS PRN
Qty: 60 TABLET | Refills: 0 | Status: SHIPPED | OUTPATIENT
Start: 2024-06-09

## 2024-06-09 RX ADMIN — ACETAMINOPHEN 650 MG: 325 TABLET, FILM COATED ORAL at 04:58

## 2024-06-09 NOTE — DISCHARGE INSTRUCTIONS
You were evaluated for leg pain.  We got x-rays of your leg that showed no concerns for fracture.  You are now stable for discharge.  We would recommend Tyle ibuprofen at home, keep the area raised and you can use ice where you have pain would recommend following the primary care doctor to ensure that you improve appropriately.  You are now stable for discharge.

## 2024-06-09 NOTE — Clinical Note
[facility] EMERGENCY DEPARTMENT  [address] [address]  Phone: [phone]    Lindsay Amezquita was seen and treated in our emergency department on 6/9/2024.  She may return to work on 06/12/2024.         Thank you for choosing [facility].    Meek Daniels MD

## 2024-06-09 NOTE — ED PROVIDER NOTES
Subjective:  History of Present Illness:    Patient is a 38-year-old female history of bipolar disorder, diabetes, GERD, nephrolithiasis, migraine disorder, PTSD, restless leg syndrome who presents today with right leg pain after a fall.  Reports that she was in the shower and fell yesterday.  Says that she went to bed but had persistent pain.  She stated that she was concerned and called her primary care doctor's office call line.  Was told to come to the emergency department for evaluation.  She denies any headache or visual change.  No obvious deformity to the right leg.  No ecchymosis.  No chest pain, abdominal pain.  Well-appearing on exam.    Nurses Notes reviewed and agree, including vitals, allergies, social history and prior medical history.     REVIEW OF SYSTEMS: All systems reviewed and not pertinent unless noted.  Review of Systems   Constitutional:  Positive for activity change. Negative for appetite change, chills, fatigue and fever.   HENT:  Negative for rhinorrhea, sinus pressure and sinus pain.    Eyes:  Negative for discharge and itching.   Respiratory:  Negative for cough and shortness of breath.    Cardiovascular:  Negative for chest pain and leg swelling.   Gastrointestinal:  Negative for abdominal distention, abdominal pain, nausea and vomiting.   Endocrine: Negative for cold intolerance and heat intolerance.   Genitourinary:  Negative for decreased urine volume, difficulty urinating, flank pain, frequency, urgency, vaginal bleeding, vaginal discharge and vaginal pain.   Musculoskeletal:  Positive for arthralgias and myalgias. Negative for gait problem, joint swelling, neck pain and neck stiffness.   Skin:  Negative for color change.   Allergic/Immunologic: Negative for environmental allergies.   Neurological:  Negative for seizures, syncope, facial asymmetry and speech difficulty.   Psychiatric/Behavioral:  Negative for self-injury and suicidal ideas.        Past Medical History:   Diagnosis  Date    Allergic     Anxiety     Asthma Beings of copd with asthma    Back pain     Bell palsy 07/06/2021    Bell's palsy     Bipolar 2 disorder     Blood clot in vein     Behind left knee cap    COPD (chronic obstructive pulmonary disease) Six months ago    Deep vein thrombosis Last year    Depression     Diabetes mellitus November    Pre diabete    Frequent headaches     GERD (gastroesophageal reflux disease)     Hypertension     Every time i go into the emergacy room    Irritable bowel syndrome Two years ago    Kidney stone Two years ago    Migraine     Nausea, vomiting and diarrhea 09/17/2018    Obesity All of my life    Ovarian cyst Two years ago    Panic     PTSD (post-traumatic stress disorder)     Pulmonary embolism Not in lungs    Recurrent pregnancy loss, antepartum condition or complication Every since i have been 14 yars old    Restless leg syndrome     Sinus problem     Snores     Tattoos     Urinary tract infection Have them on and off    Varicella Little    Visual impairment Since i was little    Vitamin B12 deficiency     Wears glasses        Allergies:    Aspirin, Codeine, Cyclobenzaprine, Haldol [haloperidol], Imitrex [sumatriptan], Latex, Penicillins, Acetaminophen, Lamictal [lamotrigine], Metoclopramide, Morphine, Ondansetron, Oxycodone-acetaminophen, Oxycontin [oxycodone], Prochlorperazine, and Tramadol      Past Surgical History:   Procedure Laterality Date    CHOLECYSTECTOMY      HYSTEROSCOPY N/A 3/14/2024    Procedure: HYSTEROSCOPY WITH MYOSURE, DILATION AND CURETTAGE WITH CERENE ABLATION;  Surgeon: David Ribeiro MD;  Location: [facility];  Service: Obstetrics/Gynecology;  Laterality: N/A;    MULTIPLE TOOTH EXTRACTIONS      All my teeth    WISDOM TOOTH EXTRACTION  All my teeth         Social History     Socioeconomic History    Marital status: Single   Tobacco Use    Smoking status: Some Days     Current packs/day: 0.50     Average packs/day: 2.0 packs/day for 32.0 years (63.3 ttl  "pk-yrs)     Types: Cigarettes     Start date: 7/17/1992     Passive exposure: Current    Smokeless tobacco: Never    Tobacco comments:         Vaping Use    Vaping status: Former    Passive vaping exposure: Yes   Substance and Sexual Activity    Alcohol use: Not Currently     Comment: rarely    Drug use: Not Currently    Sexual activity: Not Currently     Partners: Female     Birth control/protection: Other, Same-sex partner         Family History   Problem Relation Age of Onset    Irritable bowel syndrome Mother     Heart disease Mother     Miscarriages / Stillbirths Mother     Arthritis Mother     COPD Mother     Learning disabilities Mother     Mental illness Mother     Asthma Mother     Irritable bowel syndrome Father     Alcohol abuse Father     Diabetes Father     Hyperlipidemia Father     Anxiety disorder Father     Learning disabilities Father     Colon cancer Maternal Grandfather     Stroke Paternal Grandfather     Stroke Paternal Grandmother        Objective  Physical Exam:  /84 (BP Location: Left arm, Patient Position: Sitting)   Pulse 80   Temp 97.6 °F (36.4 °C)   Resp 20   Ht 157.5 cm (62\")   Wt 116 kg (255 lb 9.6 oz)   SpO2 95%   BMI 46.75 kg/m²      Physical Exam  Constitutional:       General: She is not in acute distress.     Appearance: Normal appearance. She is not ill-appearing.   HENT:      Head: Normocephalic and atraumatic.      Nose: Nose normal. No congestion or rhinorrhea.      Mouth/Throat:      Mouth: Mucous membranes are dry.      Pharynx: Oropharynx is clear. No oropharyngeal exudate or posterior oropharyngeal erythema.   Eyes:      Extraocular Movements: Extraocular movements intact.      Conjunctiva/sclera: Conjunctivae normal.      Pupils: Pupils are equal, round, and reactive to light.   Cardiovascular:      Rate and Rhythm: Normal rate and regular rhythm.      Pulses: Normal pulses.      Heart sounds: Normal heart sounds.   Pulmonary:      Effort: Pulmonary effort is " normal. No respiratory distress.      Breath sounds: Normal breath sounds.   Abdominal:      General: Abdomen is flat. Bowel sounds are normal. There is no distension.      Palpations: Abdomen is soft.      Tenderness: There is no abdominal tenderness. There is no guarding or rebound.   Musculoskeletal:         General: Tenderness present. No swelling or deformity. Normal range of motion.      Cervical back: Normal range of motion and neck supple.      Comments: Patient with tenderness palpation to her right thigh with no obvious deformity, ecchymosis to the area   Skin:     General: Skin is warm and dry.      Capillary Refill: Capillary refill takes less than 2 seconds.   Neurological:      General: No focal deficit present.      Mental Status: She is alert and oriented to person, place, and time. Mental status is at baseline.      Cranial Nerves: No cranial nerve deficit.      Sensory: No sensory deficit.      Motor: No weakness.      Coordination: Coordination normal.   Psychiatric:         Mood and Affect: Mood normal.         Behavior: Behavior normal.         Thought Content: Thought content normal.         Judgment: Judgment normal.         Procedures    ED Course:    ED Course as of 06/09/24 0620   Sun Jun 09, 2024   0522 X-rays independently interpreted by me prior radiology overread showing no acute fracture [JE]      ED Course User Index  [JE] Meek Daniels MD       Lab Results (last 24 hours)       ** No results found for the last 24 hours. **             No radiology results from the last 24 hrs       [facility]     Amount and/or Complexity of Data Reviewed  Independent visualization of images, tracings, or specimens: yes        Initial impression of presenting illness: Leg pain, fall    DDX: includes but is not limited to: Hip fracture, femur fracture, tibia fracture, soft tissue injury    Patient arrives stable with vitals interpreted by myself.     Pertinent features from physical exam: Clear to  oscitation, regular rate rhythm, no murmur, nontender abdominal palpation, patient tender to palpation to the right thigh with no obvious deformity, ecchymosis.    Initial diagnostic plan: Plain film right hip, plain film right knee    Results from initial plan were reviewed and interpreted by me revealing no acute fracture per my dependent interpretation of plain film prior radiology overread    Diagnostic information from other sources: Discussed with mother and father at bedside reviewed past medical records    Interventions / Re-evaluation: Given Tylenol for pain control    Results/clinical rationale were discussed with patient at bedside    Consultations/Discussion of results with other physicians: Marly negative trauma workup in emergency department, no concern for acute fracture.  Suspect soft tissue injury.  Prescribed Tylenol per patient request and encouraged follow-up with patient's primary care doctor to ensure resolution of symptoms    Disposition plan: Discharge  -----        Final diagnoses:   Pain of right lower extremity          Meek Daniels MD  06/09/24 9810

## 2024-06-10 ENCOUNTER — TELEPHONE (OUTPATIENT)
Dept: FAMILY MEDICINE CLINIC | Facility: CLINIC | Age: 39
End: 2024-06-10
Payer: MEDICAID

## 2024-06-10 NOTE — TELEPHONE ENCOUNTER
PATIENT CALLED AND WANTED TO LET PCP KNOW THAT SHE FELL IN THE SHOWER AND WENT TO [facility]. SHE STATED THEY WOULDN'T GIVE HER ANYTHING FOR THE PAIN. SHE ALSO ASKED FOR A DIFFERENT DOCTOR WHICH SHE DIDN'T GET. SHE SAYS SHE THEN WENT TO [facility]. THEY GAVE HER A LORTAB & GABAPENTIN. THEY TOLD HER TO CALL [facility] DEPT AND MAKE A COMPLAINT ABOUT IGNORING HER PAIN & REQUEST FOR SOMETHING TO HELP HER. SHE SAYS SHE JUST WANTED TO LET DR PULIDO KNOW.

## 2024-06-10 NOTE — TELEPHONE ENCOUNTER
----- Message from Hunter Winkler sent at 6/10/2024 10:49 AM EDT -----  Can you please call [facility] and ask them to fax last 2 notes to us? We faxed request on 5/30/24 but I still do not see it in the chart and I see this patient tomorrow. It will be very helpful if I can get some history of her pain management course prior to speaking with her.     Thank you.

## 2024-06-11 ENCOUNTER — TELEMEDICINE (OUTPATIENT)
Dept: FAMILY MEDICINE CLINIC | Facility: CLINIC | Age: 39
End: 2024-06-11
Payer: MEDICAID

## 2024-06-11 VITALS — BODY MASS INDEX: 46.93 KG/M2 | WEIGHT: 255 LBS | HEIGHT: 62 IN

## 2024-06-11 DIAGNOSIS — F45.1 SOMATIC SYMPTOM AND RELATED DISORDERS: ICD-10-CM

## 2024-06-11 DIAGNOSIS — G89.29 CHRONIC LOW BACK PAIN WITH BILATERAL SCIATICA, UNSPECIFIED BACK PAIN LATERALITY: ICD-10-CM

## 2024-06-11 DIAGNOSIS — G89.4 CHRONIC PAIN SYNDROME: Primary | ICD-10-CM

## 2024-06-11 DIAGNOSIS — M54.41 CHRONIC LOW BACK PAIN WITH BILATERAL SCIATICA, UNSPECIFIED BACK PAIN LATERALITY: ICD-10-CM

## 2024-06-11 DIAGNOSIS — E66.01 CLASS 3 SEVERE OBESITY DUE TO EXCESS CALORIES WITH SERIOUS COMORBIDITY AND BODY MASS INDEX (BMI) OF 40.0 TO 44.9 IN ADULT: ICD-10-CM

## 2024-06-11 DIAGNOSIS — G47.33 OSA (OBSTRUCTIVE SLEEP APNEA): ICD-10-CM

## 2024-06-11 DIAGNOSIS — M54.42 CHRONIC LOW BACK PAIN WITH BILATERAL SCIATICA, UNSPECIFIED BACK PAIN LATERALITY: ICD-10-CM

## 2024-06-11 PROCEDURE — 1159F MED LIST DOCD IN RCRD: CPT | Performed by: FAMILY MEDICINE

## 2024-06-11 PROCEDURE — 1125F AMNT PAIN NOTED PAIN PRSNT: CPT | Performed by: FAMILY MEDICINE

## 2024-06-11 PROCEDURE — 1160F RVW MEDS BY RX/DR IN RCRD: CPT | Performed by: FAMILY MEDICINE

## 2024-06-11 PROCEDURE — 3044F HG A1C LEVEL LT 7.0%: CPT | Performed by: FAMILY MEDICINE

## 2024-06-11 PROCEDURE — 99215 OFFICE O/P EST HI 40 MIN: CPT | Performed by: FAMILY MEDICINE

## 2024-06-11 PROCEDURE — 99417 PROLNG OP E/M EACH 15 MIN: CPT | Performed by: FAMILY MEDICINE

## 2024-06-11 NOTE — PROGRESS NOTES
Telehealth E-Visit      Date: 2024   Patient Name: Lindsay Amezquita  : 1985   MRN: [id number]     Chief Complaint:    Chief Complaint   Patient presents with   • pain management     Pt needs a referral for pain management        I have reviewed the E-Visit questionnaire and the patient's answers, my assessment and plan are listed below.     This provider is located at the [facility] (through [facility]), [address] [place] [address] using a secure [employer] Video Visit through [employer]. Patient is being seen remotely via telehealth at their home address in [place], and stated they are in a secure environment for this session. The patient's condition being diagnosed/treated is appropriate for telemedicine. The provider identified herself as well as her credentials. The patient, and/or patients guardian, consent to be seen remotely, and when consent is given they understand that the consent allows for patient identifiable information to be sent to a third party as needed. They may refuse to be seen remotely at any time. The electronic data is encrypted and password protected, and the patient and/or guardian has been advised of the potential risks to privacy not withstanding such measures.    You have chosen to receive care through a telehealth visit. Do you consent to use a video/audio connection for your medical care today? Yes    History of Present Illness: Lindsay Amezquita is a 38 y.o. female who is here today to follow up with pain management. Patient has hx of chronic pain syndrome and chronic low back pain. History obtained by patient as well as chart review.  I was able to obtain access from patient's last pain management visit with [facility] in [place], [place] with Dr. Marquez.      Patient's pain began in  without accident, but worsened after she had an epidural placed during pregnancy.  She describes the pain is severe low back pain that  radiates down the legs stopping at the toes and she also has secondary pain from migraines.  Her pain is 10 out of 10 and she describes it as aching, burning, cramping, sharp, stabbing, numb, and tingling.  Pain is exacerbated by bending/stooping, changing positions, standing/sitting, lifting/carrying items, lying on back, weather.  Pain is alleviated by resting.  She reports that this affects her everyday life.  She has found physical therapy helpful in the past.  She has been followed by pain management in the past. She is a former patient of Dr. Andrews at [facility] pain [facility] center and per their records patient self discharged.  She is also a former patient of [facility]. She had been treated with injection therapy by Dr. Hurley and she reports that he hurt her and she does not want to do any injections ever again.  Recently patient has relied on Tylenol and ibuprofen as her primary pain relief but states that this is of minimal benefit however she continues to take these medications regularly.  She was previously prescribed Norco 10 4 times daily. She states that this medication did allow for some tolerance of activities of daily living but admits that she was becoming tolerant to that medication when it was discontinued. She had also previously been prescribed gabapenting for chronic pain.      Patient has been seen multiple times in urgent care and ER recently for acute on chronic pain mostly for plantar fasciitis but also recently after a fall while she was showering.  Per chart review during these visits pain is usually controlled with Tylenol, Toradol injection, or in 1 instance a dose of Percocet.    Per Dr. Marquez's note, his opinion is that restarting patient on Norco would be of minimal utility in the absence of patient's starting home exercise program and making attempts at weight loss.  She does have contraindications to use of opioid analgesics due to her morbid obesity and  sleep apnea.  Patient also does have multiple listed allergies and/or intolerances to multiple analgesics.  Dr. Marquez discussed options for injection therapy, specifically L3-5 medial branch block and potentially radiofrequency ablation.  At that time the risks and benefits of the procedures were described along with use of an anatomic model to demonstrate where the injections would likely be most effective.  Patient reports to me today that Dr. Marquez wanted to do surgery and only offered that any injections as pain management and so she would like to see a different pain management specialist.      Subjective      I have reviewed and the following portions of the patient's history were updated as appropriate: past family history, past medical history, past social history, past surgical history and problem list.    Medications:     Current Outpatient Medications:   •  acetaminophen (TYLENOL) 500 MG tablet, Take 1 tablet by mouth Every 6 (Six) Hours As Needed for Mild Pain or Moderate Pain., Disp: 60 tablet, Rfl: 0  •  albuterol sulfate  (90 Base) MCG/ACT inhaler, Inhale 2 puffs Every 4 (Four) Hours As Needed for Wheezing., Disp: 6.7 g, Rfl: 0  •  amitriptyline (ELAVIL) 150 MG tablet, Take 1 tablet by mouth Every Night., Disp: 30 tablet, Rfl: 0  •  atorvastatin (LIPITOR) 40 MG tablet, Take 1 tablet by mouth every night at bedtime., Disp: 90 tablet, Rfl: 3  •  Blood Glucose Monitoring Suppl (FreeStyle Lite) w/Device kit, USE 1 EACH AS NEEDED (MONITOR GLUCOSE 3 TIMES A DAY, Disp: , Rfl:   •  Chlorhexidine Gluconate 4 % solution, APPLY TOPICALLY TO THE APPROPRIATE AREA AS DIRECTED DAILY AS NEEDED FOR WOUND CARE., Disp: , Rfl:   •  desvenlafaxine (PRISTIQ) 100 MG 24 hr tablet, Take 1 tablet by mouth Daily., Disp: 30 tablet, Rfl: 0  •  diazePAM (Valium) 5 MG tablet, Take 1 tablet by mouth 3 (Three) Times a Day As Needed for Anxiety or Sleep., Disp: 90 tablet, Rfl: 0  •  empagliflozin (Jardiance) 10 MG  tablet tablet, Take 1 tablet by mouth Daily., Disp: 90 tablet, Rfl: 1  •  fexofenadine-pseudoephedrine (GNP Fexofenadine/PSE ER)  MG per 12 hr tablet, Take 1 tablet by mouth 2 (Two) Times a Day., Disp: 60 tablet, Rfl: 0  •  Fluticasone-Umeclidin-Vilant (TRELEGY) 100-62.5-25 MCG/ACT inhaler, Inhale 1 puff Daily. Rinse mouth with water after use., Disp: 1 each, Rfl: 5  •  FREESTYLE LITE test strip, See Admin Instructions., Disp: , Rfl:   •  Lurasidone HCl (Latuda) 120 MG tablet tablet, Take 1 tablet by mouth Daily., Disp: 30 tablet, Rfl: 0  •  nystatin (MYCOSTATIN) 100,000 unit/mL suspension, Swish and swallow 5 mL 4 (Four) Times a Day., Disp: , Rfl:   •  omeprazole (priLOSEC) 20 MG capsule, Take 1 capsule by mouth Daily., Disp: 90 capsule, Rfl: 1  •  OXcarbazepine (TRILEPTAL) 300 MG tablet, Take 1 tablet by mouth 2 (Two) Times a Day., Disp: 60 tablet, Rfl: 0  •  promethazine (PHENERGAN) 25 MG tablet, TAKE 1 TABLET BY MOUTH EVERY 6 HOURS AS NEEDED FOR NAUSEA OR VOMITING., Disp: 20 tablet, Rfl: 1  •  promethazine-dextromethorphan (PROMETHAZINE-DM) 6.25-15 MG/5ML syrup, Take 5 mL by mouth At Night As Needed for Cough., Disp: 100 mL, Rfl: 0  •  Respiratory Therapy Supplies (Nebulizer/Tubing/Mouthpiece) kit, Use 1 each Every 4 (Four) Hours As Needed (for asthma symptoms)., Disp: 1 each, Rfl: 0  •  Rimegepant Sulfate (Nurtec) 75 MG tablet dispersible tablet, Take 1 tablet by mouth Every Other Day. Take Monday,Wednesday,Friday, and Saturday., Disp: 16 tablet, Rfl: 6  •  tiZANidine (ZANAFLEX) 4 MG tablet, Take 1 tablet by mouth Every 8 (Eight) Hours As Needed for Muscle Spasms., Disp: 270 tablet, Rfl: 0  •  Zavegepant HCl 10 MG/ACT solution, 10 mg into the nostril(s) as directed by provider Daily As Needed (migraine). (1 spray into 1 nostril every 24 hrs prn), Disp: 6 each, Rfl: 5  •  Alcohol Swabs (Alcohol Pads) 70 % pads, USE 1 PAD DAILY AS NEEDED (TO ADMINISTER IMITREX )., Disp: , Rfl:   •   "butalbital-acetaminophen-caffeine (FIORICET, ESGIC) -40 MG per tablet, TAKE 1 TABLET BY MOUTH 2 (TWO) TIMES A DAY AS NEEDED FOR HEADACHE., Disp: 20 tablet, Rfl: 0  •  ibuprofen (ADVIL,MOTRIN) 800 MG tablet, TAKE 1 TABLET BY MOUTH EVERY 8 (EIGHT) HOURS AS NEEDED FOR MILD PAIN, Disp: 30 tablet, Rfl: 0  •  Lancets (freestyle) lancets, CHECK FASTING GLUCOSE DAILY AND 1 HOUR AFTER LARGEST MEAL OF DAY., Disp: , Rfl:   •  mupirocin (BACTROBAN) 2 % ointment, Apply 1 Application topically to the appropriate area as directed 3 (Three) Times a Day for 10 days., Disp: 22 g, Rfl: 0  •  petrolatum-hydrophilic-aloe vera (ALOE VESTA) ointment, Apply 1 Application topically to the appropriate area as directed 2 (Two) Times a Day As Needed for Wound Care for up to 7 days., Disp: 56 g, Rfl: 0  No current facility-administered medications for this visit.    Allergies:   Allergies   Allergen Reactions   • Aspirin Anaphylaxis and Hives     Wheezing        • Codeine Anaphylaxis   • Cyclobenzaprine Other (See Comments)     \"gives me chest pain\"    Other reaction(s): Other (See Comments)   • Haldol [Haloperidol] Rash   • Imitrex [Sumatriptan] Anaphylaxis and Rash   • Latex Hives and Rash   • Penicillins Hives and Anaphylaxis     Vomiting   • Acetaminophen Nausea And Vomiting   • Lamictal [Lamotrigine] Itching     Itching and hives   • Metoclopramide Headache, Hives and Other (See Comments)   • Morphine Itching   • Ondansetron GI Intolerance     Other reaction(s): GI Intolerance   • Oxycodone-Acetaminophen GI Intolerance     Other reaction(s): GI Intolerance   • Oxycontin [Oxycodone] GI Intolerance   • Prochlorperazine Nausea And Vomiting and Unknown (See Comments)   • Tramadol Hives and Nausea And Vomiting       Objective     Physical Exam:  Vital Signs:   Vitals:    06/09/24 1316   Weight: 116 kg (255 lb)   Height: 157.5 cm (62\")            Physical Exam  Constitutional:       General: She is not in acute distress.     Appearance: " Normal appearance. She is not ill-appearing, toxic-appearing or diaphoretic.   HENT:      Head: Normocephalic and atraumatic.      Right Ear: External ear normal.      Left Ear: External ear normal.      Nose: Nose normal.   Eyes:      Extraocular Movements: Extraocular movements intact.      Conjunctiva/sclera: Conjunctivae normal.   Pulmonary:      Effort: Pulmonary effort is normal.   Musculoskeletal:      Cervical back: Normal range of motion.   Neurological:      General: No focal deficit present.      Mental Status: She is alert and oriented to person, place, and time.   Psychiatric:         Mood and Affect: Mood normal.         Behavior: Behavior normal.         Thought Content: Thought content normal.         Judgment: Judgment normal.           Assessment / Plan      Assessment/Plan:   Diagnoses and all orders for this visit:    1. Chronic pain syndrome (Primary)  -     Ambulatory Referral to Pain Management    2. Chronic low back pain with bilateral sciatica, unspecified back pain laterality    3. Class 3 severe obesity due to excess calories with serious comorbidity and body mass index (BMI) of 40.0 to 44.9 in adult    4. PING (obstructive sleep apnea)    5. Somatic symptom and related disorders    Discussed with patient that I did read last note from [facility] which recommended injections and possibly radiofrequency ablation, but that did not mean that surgery was going to happen only that it was possibly an option for treatment and that several more conversations would have to occur prior to surgery happening including her consent. We also discussed that patient should try injections with another provider to see if there is any benefit especially as by her own admission she was becoming tolerant to Norco 10. I explained to her that I would send a referral to another pain management practice, with the caveat that the new pain management practice may also elect to not treat her with any oral  pain medications and that she needed to trust their judgment and trial the treatments that were suggested by the pain management specialists.  Additionally I explained that I would not write any further referrals after this 1 for pain management.  I would also not be treating her pain as this is what the referrals to the specialist are for.   Diet and exercise recommendations  Patient to follow-up with pulmonology as indicated for her sleep apnea  Patient does have significant behavioral health issues as listed in chart.  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   Additionally patient would benefit from a lianne discussion on goals of treatment and that her goals should be about ability to function and perform ADLs rather than eradication of all pain. This needs to be done in person and preferably with another family member present. Unfortunately we did not have this discussion due to this being a virtual visit.     Follow Up:   Return for Next scheduled follow up.    Any medications prescribed have been sent electronically to   [facility]/pharmacy #[id number] - [place], [place] - [address] - [phone]  - [phone]   [address] [address]  Phone: [phone] Fax: [phone]      I spent 60 minutes caring for Lindsay Amezquita on this date of service. This time includes time spent by me in the following activities:preparing for the visit, reviewing tests, obtaining and/or reviewing a separately obtained history, performing a medically appropriate examination and/or evaluation , counseling and educating the patient/family/caregiver, ordering medications, tests, or procedures, referring and communicating with other health care professionals , documenting information in the medical record, independently interpreting results and communicating that information with the patient/family/caregiver, and care coordination.      Hunter Winkler MD  [facility]  Amanda  06/18/24  13:02 EDT

## 2024-06-12 ENCOUNTER — TELEPHONE (OUTPATIENT)
Dept: BEHAVIORAL HEALTH | Facility: CLINIC | Age: 39
End: 2024-06-12

## 2024-06-12 NOTE — TELEPHONE ENCOUNTER
PATIENT CALLED IN AND WANTS TO KNOW IF SHE CAN GET A MEDICAL [place]IJ[employer] CARD FOR PAIN AND SLEEP RATHER THAN GOING TO THE PAIN CLINIC.

## 2024-06-13 RX ORDER — IBUPROFEN 800 MG/1
800 TABLET ORAL EVERY 8 HOURS PRN
Qty: 30 TABLET | Refills: 0 | Status: SHIPPED | OUTPATIENT
Start: 2024-06-13

## 2024-06-14 ENCOUNTER — TELEPHONE (OUTPATIENT)
Dept: FAMILY MEDICINE CLINIC | Facility: CLINIC | Age: 39
End: 2024-06-14

## 2024-06-14 ENCOUNTER — HOSPITAL ENCOUNTER (EMERGENCY)
Facility: HOSPITAL | Age: 39
Discharge: HOME | End: 2024-06-14
Attending: STUDENT IN AN ORGANIZED HEALTH CARE EDUCATION/TRAINING PROGRAM
Payer: MEDICAID

## 2024-06-14 ENCOUNTER — TELEPHONE (OUTPATIENT)
Dept: PULMONOLOGY | Facility: CLINIC | Age: 39
End: 2024-06-14

## 2024-06-14 VITALS
BODY MASS INDEX: 47.06 KG/M2 | HEART RATE: 108 BPM | OXYGEN SATURATION: 92 % | HEIGHT: 62 IN | DIASTOLIC BLOOD PRESSURE: 92 MMHG | RESPIRATION RATE: 18 BRPM | WEIGHT: 255.73 LBS | TEMPERATURE: 98.5 F | SYSTOLIC BLOOD PRESSURE: 152 MMHG

## 2024-06-14 DIAGNOSIS — R30.0 DYSURIA: Primary | ICD-10-CM

## 2024-06-14 DIAGNOSIS — L55.9 SUNBURN: Primary | ICD-10-CM

## 2024-06-14 PROCEDURE — 99282 EMERGENCY DEPT VISIT SF MDM: CPT

## 2024-06-14 RX ORDER — LEVOCETIRIZINE DIHYDROCHLORIDE 5 MG/1
5 TABLET, FILM COATED ORAL EVERY EVENING
Qty: 90 TABLET | Refills: 3 | OUTPATIENT
Start: 2024-06-14

## 2024-06-14 RX ORDER — ALOE VERA/PETROLATUM
1 OINTMENT (GRAM) TOPICAL 2 TIMES DAILY PRN
Qty: 56 G | Refills: 0 | Status: SHIPPED | OUTPATIENT
Start: 2024-06-14 | End: 2024-06-21

## 2024-06-14 NOTE — ED PROVIDER NOTES
Pt Name: Lindsay Amezquita  MRN: [id number]  : 1985  Date of Encounter: 2024    PCP: Hunter Winkler MD      Subjective    History of Present Illness:    Chief Complaint: Sunburn    History of Present Illness: Lindsay Amezquita is a 38 y.o. female who presents to the ER complaining of pain from a sunburn.  Patient has been seen previously at urgent treatment was prescribed lidocaine gel and told to place aloe vera on her some burn for comfort.  Patient states that it is not helping.  Patient rates her pain as 6 out of 10 describes as a burning sensation worse with any movement, touching of the skin.        Nurses Notes reviewed and agree, including vitals, allergies, social history and prior medical history.       Allergies:    Aspirin, Codeine, Cyclobenzaprine, Haldol [haloperidol], Imitrex [sumatriptan], Latex, Penicillins, Acetaminophen, Lamictal [lamotrigine], Metoclopramide, Morphine, Ondansetron, Oxycodone-acetaminophen, Oxycontin [oxycodone], Prochlorperazine, and Tramadol    Past Medical History:   Diagnosis Date    Allergic     Anxiety     Asthma Beings of copd with asthma    Back pain     Bell palsy 2021    Bell's palsy     Bipolar 2 disorder     Blood clot in vein     Behind left knee cap    COPD (chronic obstructive pulmonary disease) Six months ago    Deep vein thrombosis Last year    Depression     Diabetes mellitus November    Pre diabete    Frequent headaches     GERD (gastroesophageal reflux disease)     Hypertension     [facility]    Irritable bowel syndrome Two years ago    Kidney stone Two years ago    Migraine     Nausea, vomiting and diarrhea 2018    Obesity All of my life    Ovarian cyst Two years ago    Panic     PTSD (post-traumatic stress disorder)     Pulmonary embolism Not in lungs    Recurrent pregnancy loss, antepartum condition or complication Every since i have been 14 yars old    Restless leg syndrome     Sinus problem     Snores      Tattoos     Urinary tract infection Have them on and off    Varicella Little    Visual impairment Since i was little    Vitamin B12 deficiency     Wears glasses        Past Surgical History:   Procedure Laterality Date    CHOLECYSTECTOMY      HYSTEROSCOPY N/A 3/14/2024    Procedure: HYSTEROSCOPY WITH MYOSURE, DILATION AND CURETTAGE WITH CERENE ABLATION;  Surgeon: David Ribeiro MD;  Location: [facility];  Service: Obstetrics/Gynecology;  Laterality: N/A;    MULTIPLE TOOTH EXTRACTIONS      All my teeth    WISDOM TOOTH EXTRACTION  All my teeth       Social History     Socioeconomic History    Marital status: Single   Tobacco Use    Smoking status: Some Days     Current packs/day: 0.50     Average packs/day: 2.0 packs/day for 32.0 years (63.3 ttl pk-yrs)     Types: Cigarettes     Start date: 7/17/1992     Passive exposure: Current    Smokeless tobacco: Never    Tobacco comments:         Vaping Use    Vaping status: Former    Passive vaping exposure: Yes   Substance and Sexual Activity    Alcohol use: Not Currently     Comment: rarely    Drug use: Not Currently    Sexual activity: Not Currently     Partners: Female     Birth control/protection: Other, Same-sex partner       Family History   Problem Relation Age of Onset    Irritable bowel syndrome Mother     Heart disease Mother     Miscarriages / Stillbirths Mother     Arthritis Mother     COPD Mother     Learning disabilities Mother     Mental illness Mother     Asthma Mother     Irritable bowel syndrome Father     Alcohol abuse Father     Diabetes Father     Hyperlipidemia Father     Anxiety disorder Father     Learning disabilities Father     Colon cancer Maternal Grandfather     Stroke Paternal Grandfather     Stroke Paternal Grandmother        REVIEW OF SYSTEMS:     All systems reviewed and not pertinent unless noted.    Review of Systems   Skin:  Positive for color change.   All other systems reviewed and are negative.      Objective    Physical Exam  Vitals  and nursing note reviewed.   Constitutional:       Appearance: Normal appearance.   HENT:      Head: Normocephalic and atraumatic.   Eyes:      Extraocular Movements: Extraocular movements intact.      Pupils: Pupils are equal, round, and reactive to light.   Cardiovascular:      Rate and Rhythm: Normal rate and regular rhythm.      Pulses: Normal pulses.      Heart sounds: Normal heart sounds.   Pulmonary:      Effort: Pulmonary effort is normal.      Breath sounds: Normal breath sounds.   Abdominal:      General: Abdomen is flat. Bowel sounds are normal.      Palpations: Abdomen is soft.   Musculoskeletal:      Cervical back: Normal range of motion and neck supple.   Skin:     Capillary Refill: Capillary refill takes less than 2 seconds.      Findings: Erythema present.      Comments: Patient has first-degree sunburn on bilateral upper extremities, shoulders, chest.  No noted blistering   Neurological:      General: No focal deficit present.      Mental Status: She is alert and oriented to person, place, and time. Mental status is at baseline.      GCS: GCS eye subscore is 4. GCS verbal subscore is 5. GCS motor subscore is 6.      Sensory: Sensation is intact.      Motor: Motor function is intact.      Gait: Gait is intact.   Psychiatric:         Attention and Perception: Attention and perception normal.         Mood and Affect: Mood and affect normal.         Speech: Speech normal.         Behavior: Behavior normal. Behavior is cooperative.               Procedures    ED Course:         LAB Results:    Lab Results (last 24 hours)       ** No results found for the last 24 hours. **             If labs were ordered, I have independently reviewed the results and considered them in the diagnosis and treatment plan for the patient    RADIOLOGY    No radiology results from the last 24 hrs     If I have ordered, I have independently reviewed the above noted radiographic studies.  Please see the radiologist dictation for  the official interpretation    Medications given to patient in the ER    Medications - No data to display              Shared Decision Making: After my consideration the clinical presentation and laboratory/radiology studies obtained, I discussed the findings with the patient/patient representative who is in agreement with the treatment plan and final disposition. Risks and benefits of discharge and/or observation admission were discussed.  Final disposition of the patient will be discharged home    Medications prescribed: Aloe vera Vesta ointment apply liberally twice daily to some burn area was prescribed during ER visit       Medical Decision Making  Lindsay Amezquita is a 38 y.o. female who presents to the ER complaining of pain from a sunburn.  Patient has been seen previously at urgent treatment was prescribed lidocaine gel and told to place aloe vera on her some burn for comfort.  Patient states that it is not helping.  Patient rates her pain as 6 out of 10 describes as a burning sensation worse with any movement, touching of the skin.    DDX: includes but is not limited to: Burn, second-degree burn, sunburn, other    Problems Addressed:  Sunburn: acute illness or injury    Amount and/or Complexity of Data Reviewed  Discussion of management or test interpretation with external provider(s): Discussed assessment, treatment and plan with ER attending    Risk  OTC drugs.  Risk Details: I have discussed with patient the finding of the test preformed today. Patient has been diagnosed with some burn and will be discharged home.  Patient requested to follow-up with primary care provider within the next 7 days for reevaluation. Strict return precautions have been given and patient verbalizes understanding          Final diagnoses:   Sunburn         Please note that portions of this document were completed using voice recognition dictation software.       Jovan Freire, APRN  06/14/24 [id number]

## 2024-06-14 NOTE — TELEPHONE ENCOUNTER
Hub staff attempted to follow warm transfer process and was unsuccessful     Caller: Lindsay Amezquita    Relationship to patient: Self    Best call back number: [phone]     Patient is needing: PT IS HAVING ISSUES BREATHING AT NIGHT AND SHE HAS HER OXYGEN TURNED UP TO LEVEL 5. NEED TO KNOW WHAT CAN BE DONE TO HELP WITH THIS.     HUB UNABLE TO SCHEDULE SOONER THAN 7/5/24 APPT PT HAS AND SHE IN IN ASSISTANCE.

## 2024-06-14 NOTE — TELEPHONE ENCOUNTER
Caller: Lindsay Amezquita    Relationship: Self    Best call back number: [phone]    What medication are you requesting:     What are your current symptoms:   PAINFUL URINATION    If a prescription is needed, what is your preferred pharmacy and phone number: [id number] - [place], [place] - [address] [phone] [facility] [phone]      Additional notes: PATIENT THINKS SHE IS GETTING A UTI FROM ALL THE ANTIBIOTICS SHE IS TAKING

## 2024-06-14 NOTE — TELEPHONE ENCOUNTER
PATIENT CALLED IN AND ASKED TO GET A MESSAGE TO DR. PULIDO SAID HER BP /92 AND ER WAS NOT EVEN CONCERNED ABOUT IT.

## 2024-06-15 DIAGNOSIS — G43.909 EPISODIC MIGRAINE: ICD-10-CM

## 2024-06-17 ENCOUNTER — TELEPHONE (OUTPATIENT)
Dept: FAMILY MEDICINE CLINIC | Facility: CLINIC | Age: 39
End: 2024-06-17

## 2024-06-17 RX ORDER — BUTALBITAL, ACETAMINOPHEN AND CAFFEINE 50; 325; 40 MG/1; MG/1; MG/1
1 TABLET ORAL 2 TIMES DAILY PRN
Qty: 20 TABLET | Refills: 0 | Status: SHIPPED | OUTPATIENT
Start: 2024-06-17

## 2024-06-17 NOTE — TELEPHONE ENCOUNTER
MITCH: PRINTED UPDATED COPY TODAY.    CSA: PATIENT NEEDS TO SIGN.      FOLLOW UP SCHEDULED 8/22/2024

## 2024-06-17 NOTE — TELEPHONE ENCOUNTER
Caller: Lindsay Amezquita    Relationship to patient: Self    Best call back number: [phone]    Chief complaint: SUNBURN ON RIGHT KNUCKLES, ELBOW, AND FOREARMS    Type of visit: SAME    Requested date: ASAP     If rescheduling, when is the original appointment: N/A     Additional notes: PATIENT WANTS TO BE  SEEN TODAY BUT DOESN'T WANT TO SEE NADER FARRIS

## 2024-06-18 ENCOUNTER — OFFICE VISIT (OUTPATIENT)
Dept: OBSTETRICS AND GYNECOLOGY | Facility: CLINIC | Age: 39
End: 2024-06-18
Payer: MEDICAID

## 2024-06-18 ENCOUNTER — HOSPITAL ENCOUNTER (EMERGENCY)
Facility: HOSPITAL | Age: 39
Discharge: LEFT BEFORE DISCHARGE | End: 2024-06-18
Payer: MEDICAID

## 2024-06-18 VITALS
OXYGEN SATURATION: 94 % | HEART RATE: 100 BPM | HEIGHT: 62 IN | SYSTOLIC BLOOD PRESSURE: 171 MMHG | TEMPERATURE: 98.4 F | DIASTOLIC BLOOD PRESSURE: 98 MMHG | RESPIRATION RATE: 17 BRPM | WEIGHT: 252 LBS | BODY MASS INDEX: 46.38 KG/M2

## 2024-06-18 VITALS
DIASTOLIC BLOOD PRESSURE: 90 MMHG | WEIGHT: 255 LBS | SYSTOLIC BLOOD PRESSURE: 142 MMHG | BODY MASS INDEX: 46.93 KG/M2 | HEIGHT: 62 IN

## 2024-06-18 DIAGNOSIS — Z98.890 S/P ENDOMETRIAL ABLATION: Primary | ICD-10-CM

## 2024-06-18 DIAGNOSIS — N95.1 MENOPAUSAL SYMPTOMS: ICD-10-CM

## 2024-06-18 PROBLEM — F45.1 SOMATIC SYMPTOM AND RELATED DISORDERS: Status: ACTIVE | Noted: 2024-06-18

## 2024-06-18 PROCEDURE — 99213 OFFICE O/P EST LOW 20 MIN: CPT | Performed by: OBSTETRICS & GYNECOLOGY

## 2024-06-18 PROCEDURE — 99211 OFF/OP EST MAY X REQ PHY/QHP: CPT

## 2024-06-18 NOTE — ED NOTES
Upon completion of triage, patient asked how long her wait time would be. I informed the patient that the ER sees patients based on acuity and I could not give an accurate wait time. The patient stated she didn't know if she wanted to stay due to the possibility of a long wait. I told the patient that she is free to leave at any point if she does not wish to stay, patient then said that she wouldn't wait and called for her ride to get her.

## 2024-06-19 ENCOUNTER — TELEMEDICINE (OUTPATIENT)
Dept: BEHAVIORAL HEALTH | Facility: CLINIC | Age: 39
End: 2024-06-19
Payer: MEDICAID

## 2024-06-19 DIAGNOSIS — F51.04 PSYCHOPHYSIOLOGICAL INSOMNIA: ICD-10-CM

## 2024-06-19 DIAGNOSIS — F43.10 POST TRAUMATIC STRESS DISORDER (PTSD): ICD-10-CM

## 2024-06-19 DIAGNOSIS — F31.30 BIPOLAR I DISORDER, MOST RECENT EPISODE DEPRESSED: ICD-10-CM

## 2024-06-19 DIAGNOSIS — F41.1 GENERALIZED ANXIETY DISORDER: ICD-10-CM

## 2024-06-19 LAB
ESTRADIOL SERPL-MCNC: 14 PG/ML
FSH SERPL-ACNC: 7.4 MIU/ML

## 2024-06-19 PROCEDURE — 1160F RVW MEDS BY RX/DR IN RCRD: CPT

## 2024-06-19 PROCEDURE — 99214 OFFICE O/P EST MOD 30 MIN: CPT

## 2024-06-19 PROCEDURE — 1159F MED LIST DOCD IN RCRD: CPT

## 2024-06-19 RX ORDER — OXCARBAZEPINE 300 MG/1
300 TABLET, FILM COATED ORAL 2 TIMES DAILY
Qty: 60 TABLET | Refills: 1 | Status: SHIPPED | OUTPATIENT
Start: 2024-06-19

## 2024-06-19 RX ORDER — DESVENLAFAXINE 100 MG/1
100 TABLET, EXTENDED RELEASE ORAL DAILY
Qty: 30 TABLET | Refills: 1 | Status: SHIPPED | OUTPATIENT
Start: 2024-06-19

## 2024-06-19 RX ORDER — AMITRIPTYLINE HYDROCHLORIDE 150 MG/1
150 TABLET ORAL NIGHTLY
Qty: 30 TABLET | Refills: 1 | Status: SHIPPED | OUTPATIENT
Start: 2024-06-19

## 2024-06-19 RX ORDER — LURASIDONE HYDROCHLORIDE 120 MG/1
120 TABLET, FILM COATED ORAL DAILY
Qty: 30 TABLET | Refills: 1 | Status: SHIPPED | OUTPATIENT
Start: 2024-06-19

## 2024-06-19 RX ORDER — DIAZEPAM 5 MG/1
5 TABLET ORAL 3 TIMES DAILY PRN
Qty: 90 TABLET | Refills: 1 | Status: SHIPPED | OUTPATIENT
Start: 2024-06-19 | End: 2025-06-19

## 2024-06-19 NOTE — PROGRESS NOTES
This provider is located at [facility], [facility], [address] [place] [address], [facility],using a secure MyChart Video Visit through [employer]. Patient is being seen remotely via telehealth at their home address in [place], and stated they are in a secure environment for this session. The patient's condition being diagnosed/treated is appropriate for telemedicine. The provider identified herself as well as her credentials.   The patient, and/or patients guardian, consent to be seen remotely, and when consent is given they understand that the consent allows for patient identifiable information to be sent to a third party as needed.   They may refuse to be seen remotely at any time. The electronic data is encrypted and password protected, and the patient and/or guardian has been advised of the potential risks to privacy not withstanding such measures.    Video Visit    Patient Name: Lindsay Amezquita  : 1985   MRN: [id number]   Care Team: Patient Care Team:  Hunter Winkler MD as PCP - General (Family Medicine)  Donna Mcqueen APRN as Nurse Practitioner (Behavioral Health)         Chief Complaint:    Chief Complaint   Patient presents with    Bipolar    Anxiety    PTSD    Sleeping Problem    Med Management       History of Present Illness: Lindsay Amezquita is a 38 y.o. female who presents via video visit for a medication management follow up. Patient reports she has not been doing well. She states she has been very depressed lately. She states she has not been eating and all she wants to do is sleep. She has been hacing some issues with her step-dad and that has been impacting her mood daily. She denies SI/HI today, however does endorse suicidal thoughts in the past when they were arguing. She denies any plan or intent to hurt herself.       The following portion of the patient's history were reviewed and updated appropriately: allergies, current and past medications, family history,  medical history and social history. MITCH reviewed and appropriate.   Subjective   Review of Systems:    Review of Systems   Respiratory: Negative.     Cardiovascular:  Negative for chest pain and palpitations.   Neurological: Negative.    Psychiatric/Behavioral:  Positive for agitation, sleep disturbance, depressed mood and stress. The patient is nervous/anxious.    All other systems reviewed and are negative.      Current Medications:   Current Outpatient Medications   Medication Sig Dispense Refill    amitriptyline (ELAVIL) 150 MG tablet Take 1 tablet by mouth Every Night. 30 tablet 1    desvenlafaxine (PRISTIQ) 100 MG 24 hr tablet Take 1 tablet by mouth Daily. 30 tablet 1    diazePAM (Valium) 5 MG tablet Take 1 tablet by mouth 3 (Three) Times a Day As Needed for Anxiety or Sleep. 90 tablet 1    Lurasidone HCl (Latuda) 120 MG tablet tablet Take 1 tablet by mouth Daily. 30 tablet 1    OXcarbazepine (TRILEPTAL) 300 MG tablet Take 1 tablet by mouth 2 (Two) Times a Day. 60 tablet 1    acetaminophen (TYLENOL) 500 MG tablet Take 1 tablet by mouth Every 6 (Six) Hours As Needed for Mild Pain or Moderate Pain. 60 tablet 0    albuterol sulfate  (90 Base) MCG/ACT inhaler Inhale 2 puffs Every 4 (Four) Hours As Needed for Wheezing. 6.7 g 0    Alcohol Swabs (Alcohol Pads) 70 % pads USE 1 PAD DAILY AS NEEDED (TO ADMINISTER IMITREX ).      atorvastatin (LIPITOR) 40 MG tablet Take 1 tablet by mouth every night at bedtime. 90 tablet 3    Blood Glucose Monitoring Suppl (FreeStyle Lite) w/Device kit USE 1 EACH AS NEEDED (MONITOR GLUCOSE 3 TIMES A DAY      butalbital-acetaminophen-caffeine (FIORICET, ESGIC) -40 MG per tablet TAKE 1 TABLET BY MOUTH 2 (TWO) TIMES A DAY AS NEEDED FOR HEADACHE. 20 tablet 0    Cariprazine HCl (Vraylar) 1.5 MG capsule capsule Take 1 capsule by mouth Every Night. 30 capsule 1    Chlorhexidine Gluconate 4 % solution APPLY TOPICALLY TO THE APPROPRIATE AREA AS DIRECTED DAILY AS NEEDED FOR WOUND  CARE.      empagliflozin (Jardiance) 10 MG tablet tablet Take 1 tablet by mouth Daily. 90 tablet 1    fexofenadine-pseudoephedrine (GNP Fexofenadine/PSE ER)  MG per 12 hr tablet Take 1 tablet by mouth 2 (Two) Times a Day. 60 tablet 0    Fluticasone-Umeclidin-Vilant (TRELEGY) 100-62.5-25 MCG/ACT inhaler Inhale 1 puff Daily. Rinse mouth with water after use. 1 each 5    FREESTYLE LITE test strip See Admin Instructions.      ibuprofen (ADVIL,MOTRIN) 800 MG tablet TAKE 1 TABLET BY MOUTH EVERY 8 (EIGHT) HOURS AS NEEDED FOR MILD PAIN 30 tablet 0    Lancets (freestyle) lancets CHECK FASTING GLUCOSE DAILY AND 1 HOUR AFTER LARGEST MEAL OF DAY.      mupirocin (BACTROBAN) 2 % ointment Apply 1 Application topically to the appropriate area as directed 3 (Three) Times a Day for 10 days. 22 g 0    nystatin (MYCOSTATIN) 100,000 unit/mL suspension Swish and swallow 5 mL 4 (Four) Times a Day.      omeprazole (priLOSEC) 20 MG capsule Take 1 capsule by mouth Daily. 90 capsule 1    petrolatum-hydrophilic-aloe vera (ALOE VESTA) ointment Apply 1 Application topically to the appropriate area as directed 2 (Two) Times a Day As Needed for Wound Care for up to 7 days. 56 g 0    promethazine (PHENERGAN) 25 MG tablet TAKE 1 TABLET BY MOUTH EVERY 6 HOURS AS NEEDED FOR NAUSEA OR VOMITING. 20 tablet 1    promethazine-dextromethorphan (PROMETHAZINE-DM) 6.25-15 MG/5ML syrup Take 5 mL by mouth At Night As Needed for Cough. 100 mL 0    Respiratory Therapy Supplies (Nebulizer/Tubing/Mouthpiece) kit Use 1 each Every 4 (Four) Hours As Needed (for asthma symptoms). 1 each 0    Rimegepant Sulfate (Nurtec) 75 MG tablet dispersible tablet Take 1 tablet by mouth Every Other Day. Take Monday,Wednesday,Friday, and Saturday. 16 tablet 6    tiZANidine (ZANAFLEX) 4 MG tablet Take 1 tablet by mouth Every 8 (Eight) Hours As Needed for Muscle Spasms. 270 tablet 0    Zavegepant HCl 10 MG/ACT solution 10 mg into the nostril(s) as directed by provider Daily As  Needed (migraine). (1 spray into 1 nostril every 24 hrs prn) 6 each 5     No current facility-administered medications for this visit.       Mental Status Exam:   Hygiene:    unable to assess   Cooperation:  Cooperative  Eye Contact:  Good  Psychomotor Behavior:   appears to be WNL   Affect:  Appropriate  Mood: sad, depressed, anxious, irritable, and fluctates  Speech:  Pressured  Thought Process:  Linear  Thought Content:  Mood congruent  Suicidal:  None  Homicidal:  None  Hallucinations:  None  Delusion:  None  Memory:  Intact  Orientation:  Person, Place, Time, and Situation  Reliability:  good  Insight:  Fair  Judgement:  Fair  Impulse Control:  Fair  Physical/Medical Issues:  Yes see chart       Objective   Vital Signs:   There were no vitals taken for this visit.      On 6/18/2024  BP: 134/92  P: 82    Assessment / Plan    Diagnoses and all orders for this visit:    1. Psychophysiological insomnia  -     amitriptyline (ELAVIL) 150 MG tablet; Take 1 tablet by mouth Every Night.  Dispense: 30 tablet; Refill: 1    2. Post traumatic stress disorder (PTSD)  -     desvenlafaxine (PRISTIQ) 100 MG 24 hr tablet; Take 1 tablet by mouth Daily.  Dispense: 30 tablet; Refill: 1  -     diazePAM (Valium) 5 MG tablet; Take 1 tablet by mouth 3 (Three) Times a Day As Needed for Anxiety or Sleep.  Dispense: 90 tablet; Refill: 1    3. Generalized anxiety disorder  -     desvenlafaxine (PRISTIQ) 100 MG 24 hr tablet; Take 1 tablet by mouth Daily.  Dispense: 30 tablet; Refill: 1  -     diazePAM (Valium) 5 MG tablet; Take 1 tablet by mouth 3 (Three) Times a Day As Needed for Anxiety or Sleep.  Dispense: 90 tablet; Refill: 1    4. Bipolar I disorder, most recent episode depressed  -     Cariprazine HCl (Vraylar) 1.5 MG capsule capsule; Take 1 capsule by mouth Every Night.  Dispense: 30 capsule; Refill: 1  -     Lurasidone HCl (Latuda) 120 MG tablet tablet; Take 1 tablet by mouth Daily.  Dispense: 30 tablet; Refill: 1  -      OXcarbazepine (TRILEPTAL) 300 MG tablet; Take 1 tablet by mouth 2 (Two) Times a Day.  Dispense: 60 tablet; Refill: 1    Will start Vraylar nightly. Continue all other medication as ordered.     As part of patient's treatment plan I am prescribing a controlled substance.  The patient has been made aware of the appropriate use of such medications, including potential risk of somnolence, limited ability to drive and/or work safely, and potential for dependence and/or overdose.  It has also been made clear that these medications are for use by this patient only, without concomitant use of alcohol or other substances, unless prescribed.    Patient has completed a prescribing agreement detailing terms of continued prescribing of controlled substances, including monitoring MITCH reports, urine drug screening, and pill counts if necessary.  Patient is aware that inappropriate use will result in cessation of prescribing such medications.    AIMS  Facial and Oral Movements  Muscles of Facial Expression: None, normal  Lips and Perioral Area: None, normal  Jaw: None, normal  Tongue: None, normal  Extremity Movements  Upper (arms, wrists, hands, fingers): None, normal  Lower (legs, knees, ankles, toes): None, normal  Trunk Movements  Neck, shoulders, hips: None, normal  Overall Severity  Severity of abnormal movements (max 4): 0  Incapacitation due to abnormal movements: None, normal  Patient's awareness of abnormal movements (rate only patient's report): No Awareness  Dental Status  Current problems with teeth and/or dentures?: No  Does patient usually wear dentures?: Yes      A psychological evaluation was conducted in order to assess past and current level of functioning. Areas assessed included, but were not limited to: perception of social support, perception of ability to face and deal with challenges in life (positive functioning), anxiety symptoms, depressive symptoms, perspective on beliefs/belief system, coping skills  for stress, intelligence level,  Therapeutic rapport was established. Interventions conducted today were geared towards incorporating medication management along with support for continued therapy. Education was also provided as to the med management with this provider and what to expect in subsequent sessions.      We discussed risks, benefits, goals and side effects of the above medication and the patient was agreeable with the plan. Patient was educated on the importance of compliance with treatment and follow-up appointments. Patient is aware to contact the [facility] with any worsening of symptoms. To call for questions or concerns and return early if necessary. Patent is agreeable to go to the Emergency Department or call 911 should they begin SI/HI.        MEDS ORDERED DURING VISIT:  New Medications Ordered This Visit   Medications    Cariprazine HCl (Vraylar) 1.5 MG capsule capsule     Sig: Take 1 capsule by mouth Every Night.     Dispense:  30 capsule     Refill:  1    amitriptyline (ELAVIL) 150 MG tablet     Sig: Take 1 tablet by mouth Every Night.     Dispense:  30 tablet     Refill:  1    desvenlafaxine (PRISTIQ) 100 MG 24 hr tablet     Sig: Take 1 tablet by mouth Daily.     Dispense:  30 tablet     Refill:  1    diazePAM (Valium) 5 MG tablet     Sig: Take 1 tablet by mouth 3 (Three) Times a Day As Needed for Anxiety or Sleep.     Dispense:  90 tablet     Refill:  1    Lurasidone HCl (Latuda) 120 MG tablet tablet     Sig: Take 1 tablet by mouth Daily.     Dispense:  30 tablet     Refill:  1    OXcarbazepine (TRILEPTAL) 300 MG tablet     Sig: Take 1 tablet by mouth 2 (Two) Times a Day.     Dispense:  60 tablet     Refill:  1         Follow Up   Return in about 6 weeks (around 7/31/2024).  Patient was given instructions and counseling regarding her condition or for health maintenance advice. Please see specific information pulled into the AVS if appropriate.     TREATMENT PLAN/GOALS: Continue  supportive psychotherapy efforts and medications as indicated. Treatment and medication options discussed during today's visit. Patient acknowledged and verbally consented to continue with current treatment plan and was educated on the importance of compliance with treatment and follow-up appointments.    MEDICATION ISSUES:  Discussed medication options and treatment plan of prescribed medication as well as the risks, benefits, and side effects including potential falls, possible impaired driving and metabolic adversities among others. Patient is agreeable to call the office with any worsening of symptoms or onset of side effects. Patient is agreeable to call 911 or go to the nearest ER should he/she begin having SI/HI.        [facility]  [address] [place] [address] [phone]    June 19, 2024 10:57 EDT

## 2024-06-21 ENCOUNTER — TELEPHONE (OUTPATIENT)
Dept: FAMILY MEDICINE CLINIC | Facility: CLINIC | Age: 39
End: 2024-06-21
Payer: MEDICAID

## 2024-06-21 DIAGNOSIS — M54.16 LUMBAR RADICULOPATHY: ICD-10-CM

## 2024-06-21 RX ORDER — TIZANIDINE 4 MG/1
4 TABLET ORAL EVERY 8 HOURS PRN
Qty: 270 TABLET | Refills: 0 | Status: SHIPPED | OUTPATIENT
Start: 2024-06-21

## 2024-06-23 RX ORDER — POTASSIUM CHLORIDE 750 MG/1
10 TABLET, EXTENDED RELEASE ORAL 2 TIMES DAILY
Qty: 180 TABLET | Refills: 0 | OUTPATIENT
Start: 2024-06-23

## 2024-06-24 ENCOUNTER — TELEPHONE (OUTPATIENT)
Dept: FAMILY MEDICINE CLINIC | Facility: CLINIC | Age: 39
End: 2024-06-24

## 2024-06-25 ENCOUNTER — TELEPHONE (OUTPATIENT)
Dept: BEHAVIORAL HEALTH | Facility: CLINIC | Age: 39
End: 2024-06-25
Payer: MEDICAID

## 2024-06-25 NOTE — TELEPHONE ENCOUNTER
PATIENT SAID RECENTLY STARTED TAKING VRAYLAR. SHE SAYS FOR 3 NIGHTS IN A ROW SHE IS HAVING VIVID DREAMS/NIGHTMARES OF HER HAVING A C SECTION, AND IT'S CREEPING HER OUT. SHE SAYS SHE CAN FEEL THE EPIDURAL, NEEDLES, HER HANDS BEING TIED DOWN, ETC. IT STARTED WHEN SHE STARTED TAKING THE NEW RX.

## 2024-06-25 NOTE — TELEPHONE ENCOUNTER
Caller: Lindsay Amezquita    Relationship: Self    Best call back number:[phone]     Requested Prescriptions:   Requested Prescriptions     Pending Prescriptions Disp Refills    ibuprofen (ADVIL,MOTRIN) 800 MG tablet 30 tablet 0     Sig: Take 1 tablet by mouth Every 8 (Eight) Hours As Needed for Mild Pain for up to 30 doses.    fexofenadine-pseudoephedrine (GNP Fexofenadine/PSE ER)  MG per 12 hr tablet 60 tablet 0     Sig: Take 1 tablet by mouth 2 (Two) Times a Day.        Pharmacy where request should be sent: [facility]ACY #[id number] - [address] [phone] [facility] [phone]      Last office visit with prescribing clinician: 5/7/2024   Last telemedicine visit with prescribing clinician: 6/11/2024   Next office visit with prescribing clinician: 7/8/2024     Additional details provided by patient: ONE DAY REMAINING     Does the patient have less than a 3 day supply:  [x] Yes  [] No    Would you like a call back once the refill request has been completed: [] Yes [x] No    If the office needs to give you a call back, can they leave a voicemail: [] Yes [x] No    Kim Ramos   06/25/24 12:36 EDT

## 2024-06-26 RX ORDER — FEXOFENADINE HCL AND PSEUDOEPHEDRINE HCI 60; 120 MG/1; MG/1
1 TABLET, EXTENDED RELEASE ORAL 2 TIMES DAILY
Qty: 60 TABLET | Refills: 0 | Status: SHIPPED | OUTPATIENT
Start: 2024-06-26

## 2024-06-26 RX ORDER — IBUPROFEN 800 MG/1
800 TABLET ORAL EVERY 8 HOURS PRN
Qty: 30 TABLET | Refills: 0 | OUTPATIENT
Start: 2024-06-26

## 2024-06-27 ENCOUNTER — TRANSCRIBE ORDERS (OUTPATIENT)
Dept: OBSTETRICS AND GYNECOLOGY | Facility: CLINIC | Age: 39
End: 2024-06-27
Payer: MEDICAID

## 2024-06-27 DIAGNOSIS — Z12.31 ENCOUNTER FOR SCREENING MAMMOGRAM FOR BREAST CANCER: Primary | ICD-10-CM

## 2024-06-27 NOTE — PROGRESS NOTES
"GYN Office Visit  Subjective   Chief Complaint   Patient presents with    Consult     Wants to discuss hysterectomy        Lindsay Amezquita is a 38 y.o. year old  presenting for follow-up Cerene ablation.    She actually reports doing very well after the Cerene.  She has very minimal pain and essentially no bleeding at this point.  She does have menopausal symptoms.       Objective   /90   Ht 157.5 cm (62\")   Wt 116 kg (255 lb)   BMI 46.64 kg/m²     Physical Exam:  None     Assessment   AUB, improved  Chronic pelvic pain and dysmenorrhea, improved  Endometrial ablation with Cerene  Same-sex relationship  Menopausal symptoms    We reviewed her situation in detail today.  I recommend continuing without further surgery at this time.  Hormone levels were drawn today to assess her menopausal symptoms.  Will follow-up results by phone.  All questions answered.    Coding/billing based on time: 20 total minutes were required for this encounter.    David Ribeiro MD  Obstetrics and Gynecology  [facility]   "

## 2024-06-28 NOTE — TELEPHONE ENCOUNTER
PATIENT CALLED AND SAID SHE IS STILL HAVING DREAMS, EVEN THOUGH SHE STOPPED TAKING THE VRAYLAR @ 2-3 NIGHTS AGO.

## 2024-07-01 ENCOUNTER — TELEPHONE (OUTPATIENT)
Dept: BEHAVIORAL HEALTH | Facility: CLINIC | Age: 39
End: 2024-07-01
Payer: MEDICAID

## 2024-07-01 NOTE — TELEPHONE ENCOUNTER
Patient notified exchange at 2204 regarding feeling like she was going to have a nervous breakdown and crying uncontrollably. Denied SI or HI and wanted to speak to Halima I explained I was the one on-call and advised her to go to the er. She has been seeing a counselor 3 times a week. Medication changes include d/c of vraylar due to bad dreams.Stated she had been taking all of her other meds. Trigger for making symptoms worse iclude her dad not going to come to 39th birthday. I contacted ER to inform them I advised her to come and her mother was going to transport her ( I also spoke with mother)

## 2024-07-01 NOTE — TELEPHONE ENCOUNTER
Spoke with patient. She is struggling with personal life (arguing with family triggering her mood). Spoke with Augusta, she suggested to start back on Vraylar since it seems that it wasn't what caused her nightmares.    Tried to call patient back and reached her voicemail. Let her know to start back on the Vraylar and I made her an appt on the 22nd, Video visit.

## 2024-07-01 NOTE — TELEPHONE ENCOUNTER
CALLED FOR HELP LAST NIGHT BUT DID NOT GO TO THE ER AS ADVISED. SHE SAID THAT SHE IS NOT TAKING HER MEDICINE SHE DOES NOT FEEL LIKE IT IS WORKING. SHE SAID ALL SHE WANTS TO DO IS SLEEP. SHE SAYS SHE IS IN THE MIDDLE OF A BREAKDOWN AND WANTS TO SPEAK WITH EVERT NURSE ASAP.

## 2024-07-02 ENCOUNTER — TELEPHONE (OUTPATIENT)
Dept: FAMILY MEDICINE CLINIC | Facility: CLINIC | Age: 39
End: 2024-07-02
Payer: MEDICAID

## 2024-07-02 ENCOUNTER — TELEPHONE (OUTPATIENT)
Dept: PULMONOLOGY | Facility: CLINIC | Age: 39
End: 2024-07-02
Payer: MEDICAID

## 2024-07-02 DIAGNOSIS — M54.41 CHRONIC LOW BACK PAIN WITH BILATERAL SCIATICA, UNSPECIFIED BACK PAIN LATERALITY: ICD-10-CM

## 2024-07-02 DIAGNOSIS — G89.4 CHRONIC PAIN SYNDROME: Primary | ICD-10-CM

## 2024-07-02 DIAGNOSIS — G89.29 CHRONIC LOW BACK PAIN WITH BILATERAL SCIATICA, UNSPECIFIED BACK PAIN LATERALITY: ICD-10-CM

## 2024-07-02 DIAGNOSIS — M54.42 CHRONIC LOW BACK PAIN WITH BILATERAL SCIATICA, UNSPECIFIED BACK PAIN LATERALITY: ICD-10-CM

## 2024-07-02 NOTE — TELEPHONE ENCOUNTER
PATIENT CALLED AND SAID SHE HAD WENT TO THE STORE AROUND 8:30 THIS MORNING AND SHE SAYS SHE CHECKED HER BP AROUND 9 AM AND BP /135 AND SHE TOOK IT AGAIN AROUND 10 AND IT ?145 AND SHE HAD CHECKED IT A THIRD TIME AROUND 11 AM AND BP /115. I ADVISED HER TO GO TO THE NEAREST EMERGENCY ROOM.

## 2024-07-02 NOTE — TELEPHONE ENCOUNTER
Patient called regarding her Pain Management referral.  She states that [facility] contacted her and declined her referral due to her numerous prior pain management providers and patient stated that she also advised them that she was currently going to [place] for pain management to get her medications.  I discussed with patient regarding limited local remaining options for Pain Management for her [place] [employer].  She stated that she had already called and spoke with [place] [facility] and was advised to have us send a referral to [facility] - tel #[phone] fax #[phone]. A new pain management referral order is needed so that I can track what offices she has been sent to and I will get it submitted to them.

## 2024-07-02 NOTE — TELEPHONE ENCOUNTER
Caller: Lindsay Amezquita    Relationship to patient: Self    Best call back number: [phone] (home)       Patient is needing: PT HAS OXYGEN ON 5 AND CAN NOT FEEL IT AND MASK WILL NOT STAY ON DURING THE NIGHT. PT IS WAKING UP WITH BAD HEADACHES. PLEASE CALL PT TO ADVISE.

## 2024-07-02 NOTE — TELEPHONE ENCOUNTER
Patient called in and advised she is having some issues with her mask and waking up with headaches. She has a follow up appoint on 7/5/2024. She was advised to be here at 1:45. She was given directions to the office.     She said she would keep appt. And speak with Walter on Friday.

## 2024-07-03 ENCOUNTER — OFFICE VISIT (OUTPATIENT)
Dept: FAMILY MEDICINE CLINIC | Facility: CLINIC | Age: 39
End: 2024-07-03
Payer: MEDICAID

## 2024-07-03 ENCOUNTER — TELEPHONE (OUTPATIENT)
Dept: OBSTETRICS AND GYNECOLOGY | Facility: CLINIC | Age: 39
End: 2024-07-03
Payer: MEDICAID

## 2024-07-03 VITALS
DIASTOLIC BLOOD PRESSURE: 88 MMHG | OXYGEN SATURATION: 95 % | TEMPERATURE: 97.2 F | BODY MASS INDEX: 48.18 KG/M2 | WEIGHT: 261.8 LBS | SYSTOLIC BLOOD PRESSURE: 138 MMHG | HEIGHT: 62 IN | RESPIRATION RATE: 16 BRPM | HEART RATE: 98 BPM

## 2024-07-03 DIAGNOSIS — N89.8 VAGINAL DRYNESS: ICD-10-CM

## 2024-07-03 DIAGNOSIS — G89.4 CHRONIC PAIN SYNDROME: ICD-10-CM

## 2024-07-03 DIAGNOSIS — E66.01 CLASS 3 SEVERE OBESITY DUE TO EXCESS CALORIES WITH SERIOUS COMORBIDITY AND BODY MASS INDEX (BMI) OF 45.0 TO 49.9 IN ADULT: ICD-10-CM

## 2024-07-03 DIAGNOSIS — J32.9 RHINOSINUSITIS: ICD-10-CM

## 2024-07-03 DIAGNOSIS — R30.0 DYSURIA: Primary | ICD-10-CM

## 2024-07-03 DIAGNOSIS — R61 NIGHT SWEATS: ICD-10-CM

## 2024-07-03 LAB
BILIRUB BLD-MCNC: NEGATIVE MG/DL
CLARITY, POC: CLEAR
COLOR UR: YELLOW
EXPIRATION DATE: ABNORMAL
GLUCOSE UR STRIP-MCNC: ABNORMAL MG/DL
KETONES UR QL: NEGATIVE
LEUKOCYTE EST, POC: NEGATIVE
Lab: ABNORMAL
NITRITE UR-MCNC: NEGATIVE MG/ML
PH UR: 6 [PH] (ref 5–8)
PROT UR STRIP-MCNC: NEGATIVE MG/DL
RBC # UR STRIP: NEGATIVE /UL
SP GR UR: 1.01 (ref 1–1.03)
UROBILINOGEN UR QL: ABNORMAL

## 2024-07-03 PROCEDURE — 1159F MED LIST DOCD IN RCRD: CPT | Performed by: FAMILY MEDICINE

## 2024-07-03 PROCEDURE — 1160F RVW MEDS BY RX/DR IN RCRD: CPT | Performed by: FAMILY MEDICINE

## 2024-07-03 PROCEDURE — 1125F AMNT PAIN NOTED PAIN PRSNT: CPT | Performed by: FAMILY MEDICINE

## 2024-07-03 PROCEDURE — 3044F HG A1C LEVEL LT 7.0%: CPT | Performed by: FAMILY MEDICINE

## 2024-07-03 PROCEDURE — 99215 OFFICE O/P EST HI 40 MIN: CPT | Performed by: FAMILY MEDICINE

## 2024-07-03 RX ORDER — FEXOFENADINE HCL AND PSEUDOEPHEDRINE HCI 60; 120 MG/1; MG/1
1 TABLET, EXTENDED RELEASE ORAL 2 TIMES DAILY
Qty: 60 TABLET | Refills: 0 | Status: SHIPPED | OUTPATIENT
Start: 2024-07-03

## 2024-07-03 RX ORDER — IBUPROFEN 800 MG/1
800 TABLET ORAL EVERY 8 HOURS PRN
Qty: 60 TABLET | Refills: 0 | Status: SHIPPED | OUTPATIENT
Start: 2024-07-03 | End: 2024-07-22 | Stop reason: SDUPTHER

## 2024-07-03 RX ORDER — GABAPENTIN 800 MG/1
800 TABLET ORAL 3 TIMES DAILY
Qty: 90 TABLET | Refills: 0 | Status: SHIPPED | OUTPATIENT
Start: 2024-07-03

## 2024-07-03 RX ORDER — FEXOFENADINE HCL AND PSEUDOEPHEDRINE HCI 60; 120 MG/1; MG/1
1 TABLET, EXTENDED RELEASE ORAL 2 TIMES DAILY
Qty: 60 TABLET | Refills: 0 | Status: CANCELLED | OUTPATIENT
Start: 2024-07-03

## 2024-07-03 NOTE — PROGRESS NOTES
"    Office Note     Name: Lindsay Amezquita  : 1985   MRN: [id number]     Chief Complaint:  Flank Pain (Pt has been having trouble with urination and she thinks she might have kidney stones. ), Difficulty Urinating (Pt has had a hard time urinating for about a week or so. Also the top of pt's genital area is swollen. ), Insomnia (She only sleeps a few hours due to pain in legs at night. Tylenol PM does not help and tizanidine does not help much.  ), and Menopause (Pt feels she needs a second opinion on Menopause. She is sweating so bad at night she has to get up to change the sheets at night.  )    Subjective     History of Present Illness:  Lindsay Amezquita is a 38 y.o. female who presents today for f/u for multiple issues including flank pain, difficulty urinating, vaginal swelling, insomnia, and reported signs of menopause mostly including night time sweating.     Flank Pain/Dysuria/Vaginal swelling  Ongoing for 1-2 weeks. No hematuria. Urinating is slightly painful. Some constipation but this is normal for her. No BRBPR.    Insomnia/Night sweats  Only able to sleep a few hours per night d/t pain in legs. She tries OTC sleep aids such as Tylenol PM but this does not help. Has been prescribed tizanidine as well but this also doesn't help much. She reports that she is sweating so bad at night that she has to change the sheets. No exposure to TB and no hemoptysis or new breathing issues. She was recently seen by her OB/GYN who did workup and reported to patient that her hormone levels were pre-menopausal.    I have reviewed the following portions of the patient's history and these were updated and discussed with the patient as appropriate: past family history, past medical history, past social history, past surgical history, and problem list.     Objective     Vital Signs  /88   Pulse 98   Temp 97.2 °F (36.2 °C) (Temporal)   Resp 16   Ht 157.5 cm (62\")   Wt 119 kg (261 lb 12.8 oz)   SpO2 95%   BMI " "47.88 kg/m²   Estimated body mass index is 47.88 kg/m² as calculated from the following:    Height as of this encounter: 157.5 cm (62\").    Weight as of this encounter: 119 kg (261 lb 12.8 oz).    Physical Exam  Vitals reviewed. Exam conducted with a chaperone present (MA: Hilary JOHNSON).   Constitutional:       General: She is not in acute distress.     Appearance: Normal appearance. She is morbidly obese. She is not ill-appearing.   HENT:      Head: Normocephalic.      Right Ear: Tympanic membrane, ear canal and external ear normal.      Left Ear: Tympanic membrane, ear canal and external ear normal.      Nose: Congestion and rhinorrhea present.      Mouth/Throat:      Mouth: Mucous membranes are moist.      Pharynx: No oropharyngeal exudate or posterior oropharyngeal erythema.      Comments: PND  Eyes:      Extraocular Movements: Extraocular movements intact.   Cardiovascular:      Rate and Rhythm: Normal rate and regular rhythm.      Heart sounds: Normal heart sounds. No murmur heard.  Pulmonary:      Effort: Pulmonary effort is normal. No respiratory distress.      Breath sounds: Normal breath sounds. No stridor. No wheezing, rhonchi or rales.   Chest:      Chest wall: No tenderness.   Abdominal:      General: Bowel sounds are normal. There is no distension.      Palpations: Abdomen is soft. There is no mass.      Tenderness: There is no abdominal tenderness. There is no right CVA tenderness, left CVA tenderness, guarding or rebound.      Hernia: No hernia is present. There is no hernia in the left inguinal area or right inguinal area.   Genitourinary:     Exam position: Lithotomy position.      Pubic Area: No rash or pubic lice.       Franklin stage (genital): 5.      Labia:         Right: Tenderness present. No rash.         Left: Tenderness present. No rash.       Urethra: No prolapse, urethral pain, urethral swelling or urethral lesion.   Musculoskeletal:         General: Normal range of motion.      Cervical " back: Normal range of motion.   Lymphadenopathy:      Lower Body: No right inguinal adenopathy. No left inguinal adenopathy.   Skin:     General: Skin is warm and dry.   Neurological:      Mental Status: She is alert and oriented to person, place, and time.   Psychiatric:         Mood and Affect: Mood normal.         Behavior: Behavior normal.            Assessment and Plan     Diagnoses and all orders for this visit:    1. Dysuria (Primary)  -     Urine Culture - Urine, Urine, Clean Catch; Future  -     POCT urinalysis dipstick, automated    2. Chronic pain syndrome  -     ibuprofen (ADVIL,MOTRIN) 800 MG tablet; Take 1 tablet by mouth Every 8 (Eight) Hours As Needed for Mild Pain.  Dispense: 60 tablet; Refill: 0  -     gabapentin (Neurontin) 800 MG tablet; Take 1 tablet by mouth 3 (Three) Times a Day.  Dispense: 90 tablet; Refill: 0    3. Vaginal dryness    4. Rhinosinusitis  -     fexofenadine-pseudoephedrine (GNP Fexofenadine/PSE ER)  MG per 12 hr tablet; Take 1 tablet by mouth 2 (Two) Times a Day.  Dispense: 60 tablet; Refill: 0    5. Night sweats  -     gabapentin (Neurontin) 800 MG tablet; Take 1 tablet by mouth 3 (Three) Times a Day.  Dispense: 90 tablet; Refill: 0    6. Class 3 severe obesity due to excess calories with serious comorbidity and body mass index (BMI) of 45.0 to 49.9 in adult    POC UA unremarkable. Will send urine for C&S but will hold on empiric abx. Patient has been on several abx in the recent past and I'm not convinced that symptoms are related to UTI. No hematuria and flank pain is subjective, I also do not think this is nephrolithiasis or pyelo. However, if culture results are positive will send in appropriate abx  Vaginal swelling seems to be 2/2 vaginal dryness. Advised patient to utilize OTC topical vaginal lubricants and be careful with wiping with toilet tissue  Advised increased hydration  Patient requests refill of allegra-d. Sent to pharmacy requested  Patient's night  sweats seem to correspond to stopping gabapentin. Patient was previously on this medication d/t chronic pain syn and per last pain management clinic she self-discharged and then wanted 2nd opinion after last pain mgt visit. Referral already placed and being acted on for patient to see [facility] pain management. However, will restart gabapentin mainly to treat vasomotor symptoms at night in addition to treating chronic pain. Further pain management prescriptions per pain management.   As part of patient's treatment plan I am prescribing a controlled substance.  The patient has been made aware of the appropriate use of such medications, including potential risk of somnolence, limited ability to drive and/or work safely, and potential for dependence and/or overdose.  It has also been made clear that these medications are for use by this patient only, without concomitant use of alcohol or other substances, unless prescribed.  History and physical exam exhibit continued safe and appropriate use of controlled substance.  MITCH reviewed.  Patient has completed a prescribing agreement detailing terms of continued prescribing of controlled substances, including monitoring MITCH reports, urine drug screening, and pill counts if necessary.  Patient is aware that inappropriate use will result in cessation of prescribing such medications.  Class 3 Severe Obesity (BMI >=40). Obesity-related health conditions include the following: obstructive sleep apnea, hypertension, dyslipidemias, and osteoarthritis. Obesity is worsening. BMI is is above average; BMI management plan is completed. We discussed portion control and increasing exercise.         Discussion Summary:     Discussed plan of care in detail with patient today. Patient was encouraged to keep me informed of any acute changes, lack of improvement, or any new concerning symptoms.  Patient is also aware of reasons to seek emergent care. Patient verbalized understanding and  agrees with plan of care.    This visit was billed based on time.  I spent 40 minutes caring for Lindsay Amezquita on this date of service. This time includes time spent by me in the following activities:preparing for the visit, reviewing tests, obtaining and/or reviewing a separately obtained history, performing a medically appropriate examination and/or evaluation , counseling and educating the patient/family/caregiver, ordering medications, tests, or procedures, referring and communicating with other health care professionals , documenting information in the medical record, independently interpreting results and communicating that information with the patient/family/caregiver, and care coordination.    Follow Up  Return in about 5 weeks (around 8/7/2024) for Recheck Dm2 - 30 min visit.    Please note that portions of this note may have been completed with a voice recognition program.     Hunter Winkler MD, MPH  [facility] [place] [facility]

## 2024-07-03 NOTE — TELEPHONE ENCOUNTER
Patient called and states that she started having bad cramping going into her back at 4 AM today. She has tried Ibprofen and tylenol and it is not helping. I advised that if she is having that much pain she should go to the ER and she says it is not bad enough for that. She just wanted me to tell you and see what you thought she should do. Please advise  No constipation, diarrhea, vomiting or fever.

## 2024-07-03 NOTE — TELEPHONE ENCOUNTER
Called patient and notified her that her referral was faxed to [facility] late yesterday afternoon and that someone from their scheduling team should reach out to her to schedule once referral is reviewed. Also discussed with patient that if she is accepted and is made an appointment, that she will need to remain compliant with their requirements and if she is dismissed or self-dismisses, Dr Winkler will not be providing any further pain management referrals for her.  Patient stated that she understood and thanked me for the call.

## 2024-07-05 ENCOUNTER — TELEPHONE (OUTPATIENT)
Dept: FAMILY MEDICINE CLINIC | Facility: CLINIC | Age: 39
End: 2024-07-05
Payer: MEDICAID

## 2024-07-05 ENCOUNTER — OFFICE VISIT (OUTPATIENT)
Dept: PULMONOLOGY | Facility: CLINIC | Age: 39
End: 2024-07-05
Payer: MEDICAID

## 2024-07-05 VITALS
BODY MASS INDEX: 48.21 KG/M2 | SYSTOLIC BLOOD PRESSURE: 142 MMHG | OXYGEN SATURATION: 94 % | HEART RATE: 89 BPM | WEIGHT: 262 LBS | HEIGHT: 62 IN | DIASTOLIC BLOOD PRESSURE: 88 MMHG

## 2024-07-05 DIAGNOSIS — J45.50 SEVERE PERSISTENT ASTHMA WITHOUT COMPLICATION: Primary | ICD-10-CM

## 2024-07-05 DIAGNOSIS — G47.34 NOCTURNAL HYPOXIA: ICD-10-CM

## 2024-07-05 DIAGNOSIS — J44.9 CHRONIC OBSTRUCTIVE PULMONARY DISEASE, UNSPECIFIED COPD TYPE: ICD-10-CM

## 2024-07-05 DIAGNOSIS — R06.02 SHORTNESS OF BREATH: ICD-10-CM

## 2024-07-05 DIAGNOSIS — J30.9 ALLERGIC RHINITIS, UNSPECIFIED SEASONALITY, UNSPECIFIED TRIGGER: ICD-10-CM

## 2024-07-05 LAB
APPEARANCE UR: CLEAR
BACTERIA #/AREA URNS HPF: ABNORMAL /HPF
BACTERIA UR CULT: NORMAL
BACTERIA UR CULT: NORMAL
BILIRUB UR QL STRIP: NEGATIVE
CASTS URNS MICRO: ABNORMAL
COLOR UR: YELLOW
EPI CELLS #/AREA URNS HPF: ABNORMAL /HPF
GLUCOSE UR QL STRIP: ABNORMAL
HGB UR QL STRIP: NEGATIVE
KETONES UR QL STRIP: NEGATIVE
LEUKOCYTE ESTERASE UR QL STRIP: NEGATIVE
NITRITE UR QL STRIP: NEGATIVE
PH UR STRIP: 6.5 [PH] (ref 5–8)
PROT UR QL STRIP: NEGATIVE
RBC #/AREA URNS HPF: ABNORMAL /HPF
SP GR UR STRIP: 1.02 (ref 1–1.03)
UROBILINOGEN UR STRIP-MCNC: ABNORMAL MG/DL
WBC #/AREA URNS HPF: ABNORMAL /HPF

## 2024-07-05 PROCEDURE — 99214 OFFICE O/P EST MOD 30 MIN: CPT | Performed by: NURSE PRACTITIONER

## 2024-07-05 RX ORDER — MONTELUKAST SODIUM 10 MG/1
10 TABLET ORAL NIGHTLY
Qty: 30 TABLET | Refills: 5 | Status: SHIPPED | OUTPATIENT
Start: 2024-07-05

## 2024-07-05 NOTE — PROGRESS NOTES
"     Follow Up Office Visit      Patient Name: Lindsay Amezquita    Chief Complaint:    Chief Complaint   Patient presents with    Breathing Problem       History of Present Illness: Lindsay Amezquita is a 38 y.o. female who is here today for follow up of shortness of breath and sleep apnea.  Since last visit, she reports compliance with Trelegy does still require short acting beta agonist use 5-6x/day. In addition to dyspnea, she notes that she is functionally limited by chronic back pain.  She is still smoking but has been cutting back and says that she is \"on the verge of quitting\".  She requests to use a BiPAP machine as she says that she used it when she was hospitalized with RSV and liked it.  Sleep study has shown AHI less than 5/h.  She declines to have an ABG.    Duration: Asthma diagnosed in childhood  Associated Symptoms: Exertional dyspnea, occasional cough with production of sputum, occasional wheezing, allergic rhinitis, no fevers, no hemoptysis, no unintended weight loss  Supplemental Oxygen: qhs    Subjective      Review of Systems:  Review of Systems   Constitutional:  Negative for fever and unexpected weight change.   Respiratory:          See HPI   Cardiovascular:  Negative for chest pain and leg swelling.   Musculoskeletal:  Positive for back pain.   Allergic/Immunologic: Positive for environmental allergies.        Past Medical History:   Past Medical History:   Diagnosis Date    Allergic     Anxiety     Asthma Beings of copd with asthma    Back pain     Bell palsy 07/06/2021    Bell's palsy     Bipolar 2 disorder     Blood clot in vein     Behind left knee cap    COPD (chronic obstructive pulmonary disease) Six months ago    Deep vein thrombosis Last year    Depression     Diabetes mellitus November    Pre diabete    Frequent headaches     GERD (gastroesophageal reflux disease)     Hypertension     Every time i go into the emergacy room    Irritable bowel syndrome Two years ago    Kidney stone Two " years ago    Migraine     Nausea, vomiting and diarrhea 09/17/2018    Obesity All of my life    Ovarian cyst Two years ago    Panic     PTSD (post-traumatic stress disorder)     Pulmonary embolism Not in lungs    Recurrent pregnancy loss, antepartum condition or complication Every since i have been 14 yars old    Restless leg syndrome     Sinus problem     Snores     Tattoos     Urinary tract infection Have them on and off    Varicella Little    Visual impairment Since i was little    Vitamin B12 deficiency     Wears glasses        Past Surgical History:   Past Surgical History:   Procedure Laterality Date    CHOLECYSTECTOMY      HYSTEROSCOPY N/A 3/14/2024    Procedure: HYSTEROSCOPY WITH MYOSURE, DILATION AND CURETTAGE WITH CERENE ABLATION;  Surgeon: David Rbieiro MD;  Location: [facility];  Service: Obstetrics/Gynecology;  Laterality: N/A;    MULTIPLE TOOTH EXTRACTIONS      All my teeth    WISDOM TOOTH EXTRACTION  All my teeth       Family History:   Family History   Problem Relation Age of Onset    Irritable bowel syndrome Mother     Heart disease Mother     Miscarriages / Stillbirths Mother     Arthritis Mother     COPD Mother     Learning disabilities Mother     Mental illness Mother     Asthma Mother     Irritable bowel syndrome Father     Alcohol abuse Father     Diabetes Father     Hyperlipidemia Father     Anxiety disorder Father     Learning disabilities Father     Colon cancer Maternal Grandfather     Stroke Paternal Grandfather     Stroke Paternal Grandmother        Social History:   Social History     Socioeconomic History    Marital status: Single   Tobacco Use    Smoking status: Every Day     Current packs/day: 0.50     Average packs/day: 2.0 packs/day for 32.1 years (63.4 ttl pk-yrs)     Types: Cigarettes     Start date: 7/17/1992     Passive exposure: Current    Smokeless tobacco: Never    Tobacco comments:      Around 2.5 packs per day- 7/5/24   Vaping Use    Vaping status: Former    Passive  vaping exposure: Yes   Substance and Sexual Activity    Alcohol use: Not Currently     Comment: rarely    Drug use: Not Currently    Sexual activity: Not Currently     Partners: Female     Birth control/protection: Other, Same-sex partner       Current Medications:     Current Outpatient Medications:     acetaminophen (TYLENOL) 500 MG tablet, Take 1 tablet by mouth Every 6 (Six) Hours As Needed for Mild Pain or Moderate Pain., Disp: 60 tablet, Rfl: 0    albuterol sulfate  (90 Base) MCG/ACT inhaler, Inhale 2 puffs Every 4 (Four) Hours As Needed for Wheezing., Disp: 6.7 g, Rfl: 0    Alcohol Swabs (Alcohol Pads) 70 % pads, USE 1 PAD DAILY AS NEEDED (TO ADMINISTER IMITREX )., Disp: , Rfl:     amitriptyline (ELAVIL) 150 MG tablet, Take 1 tablet by mouth Every Night., Disp: 30 tablet, Rfl: 1    atorvastatin (LIPITOR) 40 MG tablet, Take 1 tablet by mouth every night at bedtime., Disp: 90 tablet, Rfl: 3    Blood Glucose Monitoring Suppl (FreeStyle Lite) w/Device kit, USE 1 EACH AS NEEDED (MONITOR GLUCOSE 3 TIMES A DAY, Disp: , Rfl:     butalbital-acetaminophen-caffeine (FIORICET, ESGIC) -40 MG per tablet, TAKE 1 TABLET BY MOUTH 2 (TWO) TIMES A DAY AS NEEDED FOR HEADACHE., Disp: 20 tablet, Rfl: 0    Cariprazine HCl (Vraylar) 1.5 MG capsule capsule, Take 1 capsule by mouth Every Night., Disp: 30 capsule, Rfl: 1    Chlorhexidine Gluconate 4 % solution, APPLY TOPICALLY TO THE APPROPRIATE AREA AS DIRECTED DAILY AS NEEDED FOR WOUND CARE., Disp: , Rfl:     desvenlafaxine (PRISTIQ) 100 MG 24 hr tablet, Take 1 tablet by mouth Daily., Disp: 30 tablet, Rfl: 1    diazePAM (Valium) 5 MG tablet, Take 1 tablet by mouth 3 (Three) Times a Day As Needed for Anxiety or Sleep., Disp: 90 tablet, Rfl: 1    empagliflozin (Jardiance) 10 MG tablet tablet, Take 1 tablet by mouth Daily., Disp: 90 tablet, Rfl: 1    fexofenadine-pseudoephedrine (GNP Fexofenadine/PSE ER)  MG per 12 hr tablet, Take 1 tablet by mouth 2 (Two) Times a  Day., Disp: 60 tablet, Rfl: 0    Fluticasone-Umeclidin-Vilant (TRELEGY) 100-62.5-25 MCG/ACT inhaler, Inhale 1 puff Daily. Rinse mouth with water after use., Disp: 1 each, Rfl: 5    FREESTYLE LITE test strip, See Admin Instructions., Disp: , Rfl:     gabapentin (Neurontin) 800 MG tablet, Take 1 tablet by mouth 3 (Three) Times a Day., Disp: 90 tablet, Rfl: 0    ibuprofen (ADVIL,MOTRIN) 800 MG tablet, Take 1 tablet by mouth Every 8 (Eight) Hours As Needed for Mild Pain., Disp: 60 tablet, Rfl: 0    Lancets (freestyle) lancets, CHECK FASTING GLUCOSE DAILY AND 1 HOUR AFTER LARGEST MEAL OF DAY., Disp: , Rfl:     Lurasidone HCl (Latuda) 120 MG tablet tablet, Take 1 tablet by mouth Daily., Disp: 30 tablet, Rfl: 1    nystatin (MYCOSTATIN) 100,000 unit/mL suspension, Swish and swallow 5 mL 4 (Four) Times a Day., Disp: , Rfl:     omeprazole (priLOSEC) 20 MG capsule, Take 1 capsule by mouth Daily., Disp: 90 capsule, Rfl: 1    OXcarbazepine (TRILEPTAL) 300 MG tablet, Take 1 tablet by mouth 2 (Two) Times a Day., Disp: 60 tablet, Rfl: 1    promethazine (PHENERGAN) 25 MG tablet, TAKE 1 TABLET BY MOUTH EVERY 6 HOURS AS NEEDED FOR NAUSEA OR VOMITING., Disp: 20 tablet, Rfl: 1    promethazine-dextromethorphan (PROMETHAZINE-DM) 6.25-15 MG/5ML syrup, Take 5 mL by mouth At Night As Needed for Cough., Disp: 100 mL, Rfl: 0    Respiratory Therapy Supplies (Nebulizer/Tubing/Mouthpiece) kit, Use 1 each Every 4 (Four) Hours As Needed (for asthma symptoms)., Disp: 1 each, Rfl: 0    Rimegepant Sulfate (Nurtec) 75 MG tablet dispersible tablet, Take 1 tablet by mouth Every Other Day. Take Monday,Wednesday,Friday, and Saturday., Disp: 16 tablet, Rfl: 6    tiZANidine (ZANAFLEX) 4 MG tablet, Take 1 tablet by mouth Every 8 (Eight) Hours As Needed for Muscle Spasms., Disp: 270 tablet, Rfl: 0    Zavegepant HCl 10 MG/ACT solution, 10 mg into the nostril(s) as directed by provider Daily As Needed (migraine). (1 spray into 1 nostril every 24 hrs prn), Disp:  "6 each, Rfl: 5     Allergies:   Allergies   Allergen Reactions    Aspirin Anaphylaxis and Hives     Wheezing         Codeine Anaphylaxis    Cyclobenzaprine Other (See Comments)     \"gives me chest pain\"    Other reaction(s): Other (See Comments)    Haldol [Haloperidol] Rash    Imitrex [Sumatriptan] Anaphylaxis and Rash    Latex Hives and Rash    Penicillins Hives and Anaphylaxis     Vomiting    Acetaminophen Nausea And Vomiting    Lamictal [Lamotrigine] Itching     Itching and hives    Metoclopramide Headache, Hives and Other (See Comments)    Morphine Itching    Ondansetron GI Intolerance     Other reaction(s): GI Intolerance    Oxycodone-Acetaminophen GI Intolerance     Other reaction(s): GI Intolerance    Oxycontin [Oxycodone] GI Intolerance    Prochlorperazine Nausea And Vomiting and Unknown (See Comments)    Tramadol Hives and Nausea And Vomiting       Objective     Physical Exam:  Vital Signs:   Vitals:    07/05/24 1444   BP: 142/88   Pulse: 89   SpO2: 94%   Weight: 119 kg (262 lb)   Height: 157.5 cm (62\")     Body mass index is 47.92 kg/m².    Physical Exam  Vitals reviewed.   Constitutional:       General: She is not in acute distress.     Appearance: She is obese. She is not toxic-appearing.   HENT:      Head: Normocephalic and atraumatic.      Mouth/Throat:      Mouth: Mucous membranes are moist.   Eyes:      Conjunctiva/sclera: Conjunctivae normal.   Cardiovascular:      Rate and Rhythm: Normal rate.      Heart sounds: Normal heart sounds.   Pulmonary:      Effort: Pulmonary effort is normal.      Breath sounds: Normal breath sounds.   Abdominal:      General: There is no distension.      Palpations: Abdomen is soft.   Musculoskeletal:         General: No swelling.      Cervical back: Neck supple.   Skin:     General: Skin is warm and dry.      Findings: No rash.   Neurological:      General: No focal deficit present.      Mental Status: She is alert and oriented to person, place, and time.   Psychiatric: "         Mood and Affect: Mood normal.         Behavior: Behavior normal.        Results Review:   PFTs obtained today showed no obstruction, without significant bronchodilator response. Post BD FEV1: 92 %, FEV1/FVC ratio was 84. No restriction, but hyperinflation with %.  Air-trapping suggested. Normal diffusion capacity. DLCO/VA: 113%.    April 2024 sleep study showed AHI of 4.3/h.  Poor REM sleep percentage.    Site   Date Value Ref Range Status   10/21/2022 Right Radial  Final     Bienvenido's Test   Date Value Ref Range Status   10/21/2022 Positive  Final     pH, Arterial   Date Value Ref Range Status   10/21/2022 7.344 (L) 7.350 - 7.450 pH units Final     Comment:     84 Value below reference range     pCO2, Arterial   Date Value Ref Range Status   10/21/2022 63.4 (C) 35.0 - 45.0 mm Hg Final     Comment:     83 Value above reference range     pO2, Arterial   Date Value Ref Range Status   10/21/2022 66.6 (L) 75.0 - 100.0 mm Hg Final     Comment:     84 Value below reference range     HCO3, Arterial   Date Value Ref Range Status   10/21/2022 34.5 (H) 22.0 - 28.0 mmol/L Final     Comment:     83 Value above reference range     Base Excess, Arterial   Date Value Ref Range Status   10/21/2022 6.7 (H) 0.0 - 2.0 mmol/L Final     Comment:     83 Value above reference range     O2 Saturation, Arterial   Date Value Ref Range Status   10/21/2022 93.4 (L) 94.0 - 100.0 % Final     Comment:     84 Value below reference range     Hematocrit, Blood Gas   Date Value Ref Range Status   10/21/2022 40.5 % Final     Oxyhemoglobin   Date Value Ref Range Status   10/21/2022 92.5 (L) 94 - 99 % Final     Comment:     84 Value below reference range     Methemoglobin   Date Value Ref Range Status   10/21/2022 0.30 0.00 - 1.50 % Final     Carboxyhemoglobin   Date Value Ref Range Status   10/21/2022 0.7 0 - 2 % Final     Barometric Pressure for Blood Gas   Date Value Ref Range Status   10/21/2022 733 mmHg Final     Modality   Date Value  Ref Range Status   10/21/2022 Nasal Cannula  Final     FIO2   Date Value Ref Range Status   10/19/2022 40 % Final     Assessment / Plan      Assessment/Plan:   Diagnoses and all orders for this visit:    1. Severe persistent asthma without complication (Primary)  -     Fluticasone-Umeclidin-Vilant (TRELEGY ELLIPTA) 200-62.5-25 MCG/ACT inhaler; Inhale 1 puff Daily for 30 days. Rinse mouth out after use  Dispense: 1 each; Refill: 5  PFT results obtained today were reviewed and discussed with patient.  Increase Trelegy dose based on symptoms. We discussed the risk and benefits of inhaled corticosteroids. Patient instructed to take them on a regular basis as prescribed. Patient instructed to rinse their mouth out after each use.     2. Allergic rhinitis, unspecified seasonality, unspecified trigger  -     montelukast (SINGULAIR) 10 MG tablet; Take 1 tablet by mouth Every Night.  Dispense: 30 tablet; Refill: 5  Start Singulair.  Patient reports that she did take this in the past and tolerated it well. Patient advised to stop taking the medicine if they experience any mood disturbances. Discussed possible side effects of medications. Risk and benefits of the medication were discussed with patient.     3. Nocturnal hypoxia  Recent sleep study results were reviewed and discussed with patient.  Overall AHI of less than 5/h, and therefore no indication for Pap at this time.  She will continue on supplemental O2 nightly.       Follow Up:   October 2024 as previously scheduled  The patient was counseled on diagnostic results, risks and benefits of treatment options, risk factor modifications and the importance of treatment compliance. The patient was advised to contact the clinic with concerns or worsening symptoms.     CLAIR Chery   [facility]     This document has been electronically signed by CLAIR Chery  July 5, 2024
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
## 2024-07-08 ENCOUNTER — TELEMEDICINE (OUTPATIENT)
Dept: FAMILY MEDICINE CLINIC | Facility: TELEHEALTH | Age: 39
End: 2024-07-08
Payer: MEDICAID

## 2024-07-08 DIAGNOSIS — N89.8 VAGINAL DISCHARGE: Primary | ICD-10-CM

## 2024-07-08 PROCEDURE — 99213 OFFICE O/P EST LOW 20 MIN: CPT | Performed by: NURSE PRACTITIONER

## 2024-07-08 RX ORDER — FLUCONAZOLE 150 MG/1
150 TABLET ORAL
Qty: 3 TABLET | Refills: 0 | Status: SHIPPED | OUTPATIENT
Start: 2024-07-08

## 2024-07-09 NOTE — PROGRESS NOTES
Subjective   Lindsay Amezquita is a 38 y.o. female.     History of Present Illness  She has vaginal itching, burning with urination, and white vaginal discharge. She was having these symptoms when she went to her PCP 5 days ago and her urinalysis showed 3+ glucose but no indicators of UTI. She has been using OTC remedies but nothing has helped thus far.       The following portions of the patient's history were reviewed and updated as appropriate: allergies, current medications, past family history, past medical history, past social history, past surgical history, and problem list.    Review of Systems   Gastrointestinal:  Negative for nausea and vomiting.   Genitourinary:  Positive for dysuria, vaginal discharge and vaginal pain (itching). Negative for flank pain and hematuria.   Musculoskeletal:  Positive for back pain (low).       Objective   Physical Exam  Constitutional:       General: She is not in acute distress.     Appearance: She is well-developed. She is not diaphoretic.   Pulmonary:      Effort: Pulmonary effort is normal.   Neurological:      Mental Status: She is alert and oriented to person, place, and time.   Psychiatric:         Behavior: Behavior normal.           Assessment & Plan   Diagnoses and all orders for this visit:    1. Vaginal discharge (Primary)  -     fluconazole (Diflucan) 150 MG tablet; Take 1 tablet by mouth Every 72 (Seventy-Two) Hours.  Dispense: 3 tablet; Refill: 0                 The use of a video visit has been reviewed with the patient and verbal informed consent has been obtained. Myself and Lindsay Amezquita participated in this visit. The patient is located in [place], [place]. I am located in [place], [place]. [employer] and [employer] Video Client were utilized. I spent 14 minutes in the patient's chart for this visit.

## 2024-07-10 ENCOUNTER — TELEPHONE (OUTPATIENT)
Dept: NEUROLOGY | Facility: CLINIC | Age: 39
End: 2024-07-10

## 2024-07-10 DIAGNOSIS — G43.001 MIGRAINE WITHOUT AURA AND WITH STATUS MIGRAINOSUS, NOT INTRACTABLE: ICD-10-CM

## 2024-07-10 RX ORDER — RIMEGEPANT SULFATE 75 MG/75MG
75 TABLET, ORALLY DISINTEGRATING ORAL EVERY OTHER DAY
Qty: 16 TABLET | Refills: 1 | Status: SHIPPED | OUTPATIENT
Start: 2024-07-10

## 2024-07-10 NOTE — TELEPHONE ENCOUNTER
Patient and mother take every other day dosing of Nurtec.  They are requesting samples.  We do not have enough sample to get them through to that appointment.  Would you be willing to send in one of month Nurtec?

## 2024-07-10 NOTE — TELEPHONE ENCOUNTER
Provider: MARCI    Caller: LEORA    Phone Number: [phone]     Reason for Call: PT CALLED AND IS WANTING TO KNOW IF THERE ANY WAY SHE CAN BE SEEN TODAY 07/10/24 OR TOMORROW AS PT STATES SHE DOES NOT HAVE ENOUGH NURTEC TO GET HER THREW THE MONTH.    PLEASE REVIEW AND ADVISE  THANK YOU

## 2024-07-10 NOTE — TELEPHONE ENCOUNTER
Caller Name: Lindsay Amezquita  Relationship: Self  Best Contact Number: [phone]     Patient is requesting samples of NURTEC  How many days of medication do you have left? BOTH THE PT AND HER MOTHER ARE OUT OF THIS RX    Additional Information:   PT IS WILLING TO COME TO THE OFFICE TODAY TO PU. PT HAS BEEN RESCHEDULED FOR 7-23-24.

## 2024-07-12 DIAGNOSIS — K21.00 GASTROESOPHAGEAL REFLUX DISEASE WITH ESOPHAGITIS WITHOUT HEMORRHAGE: ICD-10-CM

## 2024-07-12 DIAGNOSIS — M54.16 LUMBAR RADICULOPATHY: ICD-10-CM

## 2024-07-12 DIAGNOSIS — G43.909 EPISODIC MIGRAINE: ICD-10-CM

## 2024-07-12 RX ORDER — TIZANIDINE 4 MG/1
4 TABLET ORAL EVERY 8 HOURS PRN
Qty: 270 TABLET | Refills: 0 | Status: SHIPPED | OUTPATIENT
Start: 2024-07-12

## 2024-07-12 RX ORDER — CHLORHEXIDINE GLUCONATE 4 %
LIQUID (ML) TOPICAL
Qty: 237 ML | Refills: 7 | OUTPATIENT
Start: 2024-07-12

## 2024-07-12 RX ORDER — OMEPRAZOLE 20 MG/1
20 CAPSULE, DELAYED RELEASE ORAL DAILY
Qty: 90 CAPSULE | Refills: 1 | Status: SHIPPED | OUTPATIENT
Start: 2024-07-12

## 2024-07-12 NOTE — TELEPHONE ENCOUNTER
MEDICATION REFILL REQUEST    Caller: Lindsay Amezquita    Relationship: Self    Best call back number: [phone]    Requested Prescriptions:   Requested Prescriptions     Pending Prescriptions Disp Refills    butalbital-acetaminophen-caffeine (FIORICET, ESGIC) -40 MG per tablet [Pharmacy Med Name: SIPKPF-POKYCONS-PROO -40] 20 tablet 0     Sig: TAKE 1 TABLET BY MOUTH 2 (TWO) TIMES A DAY AS NEEDED FOR HEADACHE.      Pharmacy where request should be sent: [facility] #[id number] - [address] [phone] [place] [phone]      Last office visit with prescribing clinician: 2/22/2024   Last telemedicine visit with prescribing clinician: Visit date not found   Next office visit with prescribing clinician: 7/23/2024     Additional details provided by patient: PT REPORTS SHE IS COMPLETELY OUT OF THE BUTALBITAL-ACETAMINOPHEN-CAFFEINE MEDICATION; SHE STATES HER MIGRAINES HAVE INCREASED SIGNIFICANTLY. SHE BELIEVES THIS IS TRIGGERED BY THE EXTREME HEAT WE HAVE HAD.    Does the patient have less than a 3 day supply?:  [x] Yes  [] No    Would you like a call back once the refill request has been completed?: [] Yes  [x] No    If the office needs to give you a call back, can they leave a voicemail?: [] Yes  [x] No    PLEASE REVIEW AND ADVISE.    Kim Giles Rep   07/12/24 10:23 EDT

## 2024-07-15 ENCOUNTER — TELEPHONE (OUTPATIENT)
Dept: OBSTETRICS AND GYNECOLOGY | Facility: CLINIC | Age: 39
End: 2024-07-15
Payer: MEDICAID

## 2024-07-15 ENCOUNTER — APPOINTMENT (OUTPATIENT)
Dept: CT IMAGING | Facility: HOSPITAL | Age: 39
End: 2024-07-15
Payer: MEDICAID

## 2024-07-15 ENCOUNTER — HOSPITAL ENCOUNTER (EMERGENCY)
Facility: HOSPITAL | Age: 39
Discharge: HOME | End: 2024-07-15
Attending: STUDENT IN AN ORGANIZED HEALTH CARE EDUCATION/TRAINING PROGRAM
Payer: MEDICAID

## 2024-07-15 VITALS
OXYGEN SATURATION: 96 % | DIASTOLIC BLOOD PRESSURE: 98 MMHG | BODY MASS INDEX: 48.28 KG/M2 | WEIGHT: 262.35 LBS | HEART RATE: 96 BPM | HEIGHT: 62 IN | RESPIRATION RATE: 18 BRPM | SYSTOLIC BLOOD PRESSURE: 139 MMHG | TEMPERATURE: 98.7 F

## 2024-07-15 DIAGNOSIS — K59.00 CONSTIPATION, UNSPECIFIED CONSTIPATION TYPE: Primary | ICD-10-CM

## 2024-07-15 DIAGNOSIS — G43.909 EPISODIC MIGRAINE: ICD-10-CM

## 2024-07-15 LAB
ALBUMIN SERPL-MCNC: 4.4 G/DL (ref 3.5–5.2)
ALBUMIN/GLOB SERPL: 1.8 G/DL
ALP SERPL-CCNC: 74 U/L (ref 39–117)
ALT SERPL W P-5'-P-CCNC: 29 U/L (ref 1–33)
ANION GAP SERPL CALCULATED.3IONS-SCNC: 14.8 MMOL/L (ref 5–15)
AST SERPL-CCNC: 20 U/L (ref 1–32)
B-HCG UR QL: NEGATIVE
BACTERIA UR QL AUTO: ABNORMAL /HPF
BASOPHILS # BLD AUTO: 0.05 10*3/MM3 (ref 0–0.2)
BASOPHILS NFR BLD AUTO: 0.5 % (ref 0–1.5)
BILIRUB SERPL-MCNC: 0.3 MG/DL (ref 0–1.2)
BILIRUB UR QL STRIP: NEGATIVE
BUN SERPL-MCNC: 7 MG/DL (ref 6–20)
BUN/CREAT SERPL: 13.2 (ref 7–25)
CALCIUM SPEC-SCNC: 8.8 MG/DL (ref 8.6–10.5)
CHLORIDE SERPL-SCNC: 104 MMOL/L (ref 98–107)
CLARITY UR: CLEAR
CO2 SERPL-SCNC: 23.2 MMOL/L (ref 22–29)
COLOR UR: YELLOW
CREAT SERPL-MCNC: 0.53 MG/DL (ref 0.57–1)
D-LACTATE SERPL-SCNC: 2 MMOL/L (ref 0.5–2)
DEPRECATED RDW RBC AUTO: 51.8 FL (ref 37–54)
EGFRCR SERPLBLD CKD-EPI 2021: 120.8 ML/MIN/1.73
EOSINOPHIL # BLD AUTO: 0.21 10*3/MM3 (ref 0–0.4)
EOSINOPHIL NFR BLD AUTO: 2.2 % (ref 0.3–6.2)
ERYTHROCYTE [DISTWIDTH] IN BLOOD BY AUTOMATED COUNT: 15.5 % (ref 12.3–15.4)
GLOBULIN UR ELPH-MCNC: 2.5 GM/DL
GLUCOSE SERPL-MCNC: 165 MG/DL (ref 65–99)
GLUCOSE UR STRIP-MCNC: ABNORMAL MG/DL
HCT VFR BLD AUTO: 47.6 % (ref 34–46.6)
HGB BLD-MCNC: 15.9 G/DL (ref 12–15.9)
HGB UR QL STRIP.AUTO: NEGATIVE
HOLD SPECIMEN: NORMAL
HOLD SPECIMEN: NORMAL
HYALINE CASTS UR QL AUTO: ABNORMAL /LPF
IMM GRANULOCYTES # BLD AUTO: 0.02 10*3/MM3 (ref 0–0.05)
IMM GRANULOCYTES NFR BLD AUTO: 0.2 % (ref 0–0.5)
KETONES UR QL STRIP: NEGATIVE
LEUKOCYTE ESTERASE UR QL STRIP.AUTO: NEGATIVE
LIPASE SERPL-CCNC: 30 U/L (ref 13–60)
LYMPHOCYTES # BLD AUTO: 3.86 10*3/MM3 (ref 0.7–3.1)
LYMPHOCYTES NFR BLD AUTO: 40.8 % (ref 19.6–45.3)
MCH RBC QN AUTO: 30.2 PG (ref 26.6–33)
MCHC RBC AUTO-ENTMCNC: 33.4 G/DL (ref 31.5–35.7)
MCV RBC AUTO: 90.5 FL (ref 79–97)
MONOCYTES # BLD AUTO: 0.56 10*3/MM3 (ref 0.1–0.9)
MONOCYTES NFR BLD AUTO: 5.9 % (ref 5–12)
NEUTROPHILS NFR BLD AUTO: 4.77 10*3/MM3 (ref 1.7–7)
NEUTROPHILS NFR BLD AUTO: 50.4 % (ref 42.7–76)
NITRITE UR QL STRIP: NEGATIVE
NRBC BLD AUTO-RTO: 0 /100 WBC (ref 0–0.2)
PH UR STRIP.AUTO: 7.5 [PH] (ref 5–8)
PLATELET # BLD AUTO: 306 10*3/MM3 (ref 140–450)
PMV BLD AUTO: 9.8 FL (ref 6–12)
POTASSIUM SERPL-SCNC: 4.3 MMOL/L (ref 3.5–5.2)
PROCALCITONIN SERPL-MCNC: 0.04 NG/ML (ref 0–0.25)
PROT SERPL-MCNC: 6.9 G/DL (ref 6–8.5)
PROT UR QL STRIP: ABNORMAL
RBC # BLD AUTO: 5.26 10*6/MM3 (ref 3.77–5.28)
RBC # UR STRIP: ABNORMAL /HPF
REF LAB TEST METHOD: ABNORMAL
SODIUM SERPL-SCNC: 142 MMOL/L (ref 136–145)
SP GR UR STRIP: >=1.03 (ref 1–1.03)
SQUAMOUS #/AREA URNS HPF: ABNORMAL /HPF
UROBILINOGEN UR QL STRIP: ABNORMAL
WBC # UR STRIP: ABNORMAL /HPF
WBC NRBC COR # BLD AUTO: 9.47 10*3/MM3 (ref 3.4–10.8)
WHOLE BLOOD HOLD COAG: NORMAL
WHOLE BLOOD HOLD SPECIMEN: NORMAL
YEAST URNS QL MICRO: ABNORMAL /HPF

## 2024-07-15 PROCEDURE — 25510000001 IOPAMIDOL 61 % SOLUTION: Performed by: STUDENT IN AN ORGANIZED HEALTH CARE EDUCATION/TRAINING PROGRAM

## 2024-07-15 PROCEDURE — 96374 THER/PROPH/DIAG INJ IV PUSH: CPT

## 2024-07-15 PROCEDURE — 85025 COMPLETE CBC W/AUTO DIFF WBC: CPT

## 2024-07-15 PROCEDURE — 96376 TX/PRO/DX INJ SAME DRUG ADON: CPT

## 2024-07-15 PROCEDURE — 81001 URINALYSIS AUTO W/SCOPE: CPT

## 2024-07-15 PROCEDURE — 84145 PROCALCITONIN (PCT): CPT | Performed by: NURSE PRACTITIONER

## 2024-07-15 PROCEDURE — 81025 URINE PREGNANCY TEST: CPT | Performed by: NURSE PRACTITIONER

## 2024-07-15 PROCEDURE — 25010000002 ONDANSETRON PER 1 MG: Performed by: NURSE PRACTITIONER

## 2024-07-15 PROCEDURE — 99285 EMERGENCY DEPT VISIT HI MDM: CPT

## 2024-07-15 PROCEDURE — 96361 HYDRATE IV INFUSION ADD-ON: CPT

## 2024-07-15 PROCEDURE — 25010000002 DIAZEPAM PER 5 MG: Performed by: STUDENT IN AN ORGANIZED HEALTH CARE EDUCATION/TRAINING PROGRAM

## 2024-07-15 PROCEDURE — 96375 TX/PRO/DX INJ NEW DRUG ADDON: CPT

## 2024-07-15 PROCEDURE — 83690 ASSAY OF LIPASE: CPT

## 2024-07-15 PROCEDURE — 74177 CT ABD & PELVIS W/CONTRAST: CPT

## 2024-07-15 PROCEDURE — 83605 ASSAY OF LACTIC ACID: CPT | Performed by: NURSE PRACTITIONER

## 2024-07-15 PROCEDURE — 80053 COMPREHEN METABOLIC PANEL: CPT

## 2024-07-15 PROCEDURE — 36415 COLL VENOUS BLD VENIPUNCTURE: CPT

## 2024-07-15 PROCEDURE — 25810000003 SODIUM CHLORIDE 0.9 % SOLUTION: Performed by: NURSE PRACTITIONER

## 2024-07-15 PROCEDURE — 25010000002 KETOROLAC TROMETHAMINE PER 15 MG: Performed by: NURSE PRACTITIONER

## 2024-07-15 RX ORDER — DIAZEPAM 5 MG/ML
5 INJECTION, SOLUTION INTRAMUSCULAR; INTRAVENOUS EVERY 4 HOURS PRN
Status: DISCONTINUED | OUTPATIENT
Start: 2024-07-15 | End: 2024-07-15 | Stop reason: HOSPADM

## 2024-07-15 RX ORDER — DIAZEPAM 5 MG/ML
5 INJECTION, SOLUTION INTRAMUSCULAR; INTRAVENOUS ONCE
Status: COMPLETED | OUTPATIENT
Start: 2024-07-15 | End: 2024-07-15

## 2024-07-15 RX ORDER — BUTALBITAL, ACETAMINOPHEN AND CAFFEINE 50; 325; 40 MG/1; MG/1; MG/1
1 TABLET ORAL 2 TIMES DAILY PRN
Qty: 20 TABLET | Refills: 0 | OUTPATIENT
Start: 2024-07-15

## 2024-07-15 RX ORDER — KETOROLAC TROMETHAMINE 30 MG/ML
30 INJECTION, SOLUTION INTRAMUSCULAR; INTRAVENOUS ONCE
Status: COMPLETED | OUTPATIENT
Start: 2024-07-15 | End: 2024-07-15

## 2024-07-15 RX ORDER — POLYETHYLENE GLYCOL 3350 17 G/17G
17 POWDER, FOR SOLUTION ORAL DAILY
Qty: 1 EACH | Refills: 0 | Status: SHIPPED | OUTPATIENT
Start: 2024-07-15 | End: 2024-07-22

## 2024-07-15 RX ORDER — SODIUM CHLORIDE 0.9 % (FLUSH) 0.9 %
10 SYRINGE (ML) INJECTION AS NEEDED
Status: DISCONTINUED | OUTPATIENT
Start: 2024-07-15 | End: 2024-07-15 | Stop reason: HOSPADM

## 2024-07-15 RX ORDER — ONDANSETRON 2 MG/ML
4 INJECTION INTRAMUSCULAR; INTRAVENOUS ONCE
Status: COMPLETED | OUTPATIENT
Start: 2024-07-15 | End: 2024-07-15

## 2024-07-15 RX ORDER — MAGNESIUM HYDROXIDE 1200 MG/15ML
1 LIQUID ORAL ONCE
Qty: 1 ENEMA | Refills: 0 | Status: SHIPPED | OUTPATIENT
Start: 2024-07-15 | End: 2024-07-15

## 2024-07-15 RX ADMIN — IOPAMIDOL 100 ML: 612 INJECTION, SOLUTION INTRAVENOUS at 13:11

## 2024-07-15 RX ADMIN — DIAZEPAM 5 MG: 5 INJECTION INTRAMUSCULAR; INTRAVENOUS at 11:44

## 2024-07-15 RX ADMIN — ONDANSETRON 4 MG: 2 INJECTION INTRAMUSCULAR; INTRAVENOUS at 11:32

## 2024-07-15 RX ADMIN — KETOROLAC TROMETHAMINE 30 MG: 30 INJECTION, SOLUTION INTRAMUSCULAR; INTRAVENOUS at 11:05

## 2024-07-15 RX ADMIN — DIAZEPAM 5 MG: 5 INJECTION INTRAMUSCULAR; INTRAVENOUS at 13:30

## 2024-07-15 RX ADMIN — SODIUM CHLORIDE 1000 ML: 9 INJECTION, SOLUTION INTRAVENOUS at 13:31

## 2024-07-15 NOTE — ED PROVIDER NOTES
Subjective:  History of Present Illness:    Patient is a 38-year-old female without contributing health history.  Presents to the ER today with generalized abdominal pain.  Patient reports that she has had this pelvic/abdominal pain x 2 weeks intermittently.  Reports that she has a hysterectomy scheduled for 722.  She is unsure if this is contributing to her pain.  She denies change in bowel or bladder habit.  Denies OTC medication home remedy.  Denies alleviating exacerbating factors.    Nurses Notes reviewed and agree, including vitals, allergies, social history and prior medical history.     REVIEW OF SYSTEMS: All systems reviewed and not pertinent unless noted.  Review of Systems   Gastrointestinal:  Positive for abdominal pain.   All other systems reviewed and are negative.      Past Medical History:   Diagnosis Date    Allergic     Anxiety     Asthma Beings of copd with asthma    Back pain     Bell palsy 07/06/2021    Bell's palsy     Bipolar 2 disorder     Blood clot in vein     Behind left knee cap    COPD (chronic obstructive pulmonary disease) Six months ago    Deep vein thrombosis Last year    Depression     Diabetes mellitus November    Pre diabete    Frequent headaches     GERD (gastroesophageal reflux disease)     Hypertension     [facility]    Irritable bowel syndrome Two years ago    Kidney stone Two years ago    Migraine     Nausea, vomiting and diarrhea 09/17/2018    Obesity All of my life    Ovarian cyst Two years ago    Panic     PTSD (post-traumatic stress disorder)     Pulmonary embolism Not in lungs    Recurrent pregnancy loss, antepartum condition or complication Every since i have been 14 yars old    Restless leg syndrome     Sinus problem     Snores     Tattoos     Urinary tract infection Have them on and off    Varicella Little    Visual impairment Since i was little    Vitamin B12 deficiency     Wears glasses        Allergies:    Aspirin, Codeine,  Cyclobenzaprine, Haldol [haloperidol], Imitrex [sumatriptan], Latex, Penicillins, Acetaminophen, Lamictal [lamotrigine], Metoclopramide, Morphine, Ondansetron, Oxycodone-acetaminophen, Oxycontin [oxycodone], Prochlorperazine, and Tramadol      Past Surgical History:   Procedure Laterality Date    CHOLECYSTECTOMY      HYSTEROSCOPY N/A 3/14/2024    Procedure: HYSTEROSCOPY WITH MYOSURE, DILATION AND CURETTAGE WITH CERENE ABLATION;  Surgeon: David Ribeiro MD;  [facility];  Service: Obstetrics/Gynecology;  Laterality: N/A;    MULTIPLE TOOTH EXTRACTIONS      All my teeth    WISDOM TOOTH EXTRACTION  All my teeth         Social History     Socioeconomic History    Marital status: Single   Tobacco Use    Smoking status: Every Day     Current packs/day: 0.50     Average packs/day: 2.0 packs/day for 32.1 years (63.4 ttl pk-yrs)     Types: Cigarettes     Start date: 7/17/1992     Passive exposure: Current    Smokeless tobacco: Never    Tobacco comments:      Around 2.5 packs per day- 7/5/24   Vaping Use    Vaping status: Former    Passive vaping exposure: Yes   Substance and Sexual Activity    Alcohol use: Not Currently     Comment: rarely    Drug use: Not Currently    Sexual activity: Not Currently     Partners: Female     Birth control/protection: Other, Same-sex partner         Family History   Problem Relation Age of Onset    Irritable bowel syndrome Mother     Heart disease Mother     Miscarriages / Stillbirths Mother     Arthritis Mother     COPD Mother     Learning disabilities Mother     Mental illness Mother     Asthma Mother     Irritable bowel syndrome Father     Alcohol abuse Father     Diabetes Father     Hyperlipidemia Father     Anxiety disorder Father     Learning disabilities Father     Colon cancer Maternal Grandfather     Stroke Paternal Grandfather     Stroke Paternal Grandmother        Objective  Physical Exam:  /98 (BP Location: Left arm, Patient Position: Sitting)   Pulse 96   Temp  "98.7 °F (37.1 °C) (Oral)   Resp 18   Ht 157.5 cm (62\")   Wt 119 kg (262 lb 5.6 oz)   SpO2 96%   BMI 47.98 kg/m²      Physical Exam  Vitals and nursing note reviewed.   Constitutional:       Appearance: Normal appearance. She is normal weight.   HENT:      Head: Normocephalic and atraumatic.      Nose: Nose normal.      Mouth/Throat:      Mouth: Mucous membranes are moist.      Pharynx: Oropharynx is clear.   Eyes:      Extraocular Movements: Extraocular movements intact.      Conjunctiva/sclera: Conjunctivae normal.      Pupils: Pupils are equal, round, and reactive to light.   Cardiovascular:      Rate and Rhythm: Normal rate.      Pulses: Normal pulses.      Heart sounds: Normal heart sounds.   Pulmonary:      Effort: Pulmonary effort is normal.      Breath sounds: Normal breath sounds.   Abdominal:      General: Abdomen is flat. Bowel sounds are normal.      Palpations: Abdomen is soft.      Tenderness: There is abdominal tenderness.   Musculoskeletal:         General: Normal range of motion.      Cervical back: Normal range of motion and neck supple.   Skin:     General: Skin is warm and dry.      Capillary Refill: Capillary refill takes less than 2 seconds.   Neurological:      General: No focal deficit present.      Mental Status: She is alert and oriented to person, place, and time. Mental status is at baseline.   Psychiatric:         Mood and Affect: Mood normal.         Behavior: Behavior normal.         Thought Content: Thought content normal.         Judgment: Judgment normal.         Procedures    ED Course:         Lab Results (last 24 hours)       Procedure Component Value Units Date/Time    CBC & Differential [[id number]]  (Abnormal) Collected: 07/15/24 1010    Specimen: Blood Updated: 07/15/24 1018    Narrative:      The following orders were created for panel order CBC & Differential.  Procedure                               Abnormality         Status                     ---------                "                -----------         ------                     CBC Auto Differential[[id number]]        Abnormal            Final result                 Please view results for these tests on the individual orders.    Comprehensive Metabolic Panel [[id number]]  (Abnormal) Collected: 07/15/24 1010    Specimen: Blood Updated: 07/15/24 1042     Glucose 165 mg/dL      BUN 7 mg/dL      Creatinine 0.53 mg/dL      Sodium 142 mmol/L      Potassium 4.3 mmol/L      Chloride 104 mmol/L      CO2 23.2 mmol/L      Calcium 8.8 mg/dL      Total Protein 6.9 g/dL      Albumin 4.4 g/dL      ALT (SGPT) 29 U/L      AST (SGOT) 20 U/L      Alkaline Phosphatase 74 U/L      Total Bilirubin 0.3 mg/dL      Globulin 2.5 gm/dL      A/G Ratio 1.8 g/dL      BUN/Creatinine Ratio 13.2     Anion Gap 14.8 mmol/L      eGFR 120.8 mL/min/1.73     Narrative:      GFR Normal >60  Chronic Kidney Disease <60  Kidney Failure <15      Lipase [[id number]]  (Normal) Collected: 07/15/24 1010    Specimen: Blood Updated: 07/15/24 1042     Lipase 30 U/L     CBC Auto Differential [[id number]]  (Abnormal) Collected: 07/15/24 1010    Specimen: Blood Updated: 07/15/24 1018     WBC 9.47 10*3/mm3      RBC 5.26 10*6/mm3      Hemoglobin 15.9 g/dL      Hematocrit 47.6 %      MCV 90.5 fL      MCH 30.2 pg      MCHC 33.4 g/dL      RDW 15.5 %      RDW-SD 51.8 fl      MPV 9.8 fL      Platelets 306 10*3/mm3      Neutrophil % 50.4 %      Lymphocyte % 40.8 %      Monocyte % 5.9 %      Eosinophil % 2.2 %      Basophil % 0.5 %      Immature Grans % 0.2 %      Neutrophils, Absolute 4.77 10*3/mm3      Lymphocytes, Absolute 3.86 10*3/mm3      Monocytes, Absolute 0.56 10*3/mm3      Eosinophils, Absolute 0.21 10*3/mm3      Basophils, Absolute 0.05 10*3/mm3      Immature Grans, Absolute 0.02 10*3/mm3      nRBC 0.0 /100 WBC     Urinalysis With Microscopic If Indicated (No Culture) - Urine, Clean Catch [[id number]]  (Abnormal) Collected: 07/15/24 1042    Specimen: Urine, Clean Catch Updated:  "07/15/24 1050     Color, UA Yellow     Appearance, UA Clear     pH, UA 7.5     Specific Gravity, UA >=1.030     Glucose, UA >=1000 mg/dL (3+)     Ketones, UA Negative     Bilirubin, UA Negative     Blood, UA Negative     Protein, UA 30 mg/dL (1+)     Leuk Esterase, UA Negative     Nitrite, UA Negative     Urobilinogen, UA 0.2 E.U./dL    Pregnancy, Urine - Urine, Clean Catch [[id number]]  (Normal) Collected: 07/15/24 1042    Specimen: Urine, Clean Catch Updated: 07/15/24 1213     HCG, Urine QL Negative    Urinalysis, Microscopic Only - Urine, Clean Catch [[id number]]  (Abnormal) Collected: 07/15/24 1042    Specimen: Urine, Clean Catch Updated: 07/15/24 1057     RBC, UA None Seen /HPF      WBC, UA None Seen /HPF      Bacteria, UA Trace /HPF      Squamous Epithelial Cells, UA 7-12 /HPF      Yeast, UA Moderate/2+ Budding Yeast /HPF      Hyaline Casts, UA None Seen /LPF      Methodology Manual Light Microscopy    Lactic Acid, Plasma [[id number]]  (Normal) Collected: 07/15/24 1103    Specimen: Blood Updated: 07/15/24 1135     Lactate 2.0 mmol/L     Procalcitonin [[id number]]  (Normal) Collected: 07/15/24 1103    Specimen: Blood Updated: 07/15/24 1143     Procalcitonin 0.04 ng/mL     Narrative:      As a Marker for Sepsis (Non-Neonates):    1. <0.5 ng/mL represents a low risk of severe sepsis and/or septic shock.  2. >2 ng/mL represents a high risk of severe sepsis and/or septic shock.    As a Marker for Lower Respiratory Tract Infections that require antibiotic therapy:    PCT on Admission    Antibiotic Therapy       6-12 Hrs later    >0.5                Strongly Recommended  >0.25 - <0.5        Recommended   0.1 - 0.25          Discouraged              Remeasure/reassess PCT  <0.1                Strongly Discouraged     Remeasure/reassess PCT    As 28 day mortality risk marker: \"Change in Procalcitonin Result\" (>80% or <=80%) if Day 0 (or Day 1) and Day 4 values are available. Refer to " http://www.[facility]-pct-calculator.com    Change in PCT <=80%  A decrease of PCT levels below or equal to 80% defines a positive change in PCT test result representing a higher risk for 28-day all-cause mortality of patients diagnosed with severe sepsis for septic shock.    Change in PCT >80%  A decrease of PCT levels of more than 80% defines a negative change in PCT result representing a lower risk for 28-day all-cause mortality of patients diagnosed with severe sepsis or septic shock.                CT Abdomen Pelvis With Contrast    Result Date: 7/15/2024  PROCEDURE: CT ABDOMEN PELVIS W CONTRAST-  HISTORY:  Generalized abdominal pain  COMPARISON: March 30, 2024.  TECHNIQUE: Multiple axial CT images were obtained from the lung bases through the pubic symphysis following the administration of Isovue 300 contrast.  FINDINGS:  ABDOMEN: The lung bases are clear. The heart is normal in size. The liver is fatty infiltrated. The gallbladder is surgically absent. The spleen is unremarkable. There is a 21 mm left adrenal nodule which is stable.  The pancreas is normal. The kidneys are normal. The aorta is normal in caliber. There is no free fluid or adenopathy. There is fecal impaction.  PELVIS: The appendix is normal. Uterus and ovaries are unremarkable. The urinary bladder is unremarkable. There is no significant free fluid or adenopathy.      Impression: No acute findings.  Fecal impaction.   CTDI: 17.78 mGy DLP: 971.16 mGy.cm   This study was performed with techniques to keep radiation doses as low as reasonably achievable (ALARA). Individualized dose reduction techniques using automated exposure control or adjustment of mA and/or kV according to the patient size were employed.      Images were reviewed, interpreted, and dictated by Dr. Gurvinder Naylor MD Transcribed by Tori Reich PA-C.  This report was signed and finalized on 7/15/2024 2:04 PM by Gurvinder Naylor MD.          MDM      Initial impression of presenting  illness: Patient is a 38-year-old female without contributing health history.  Presents to the ER today with generalized abdominal pain.  Patient reports that she has had this pelvic/abdominal pain x 2 weeks intermittently.  Reports that she has a hysterectomy scheduled for 722.  She is unsure if this is contributing to her pain.  She denies change in bowel or bladder habit.  Denies OTC medication home remedy.  Denies alleviating exacerbating factors.    DDX: includes but is not limited to: Diverticulitis, colitis, ovarian cyst, constipation, or other    Patient arrives stable with vitals interpreted by myself.     Pertinent features from physical exam: Lung sounds are clear bilaterally throughout.  Ab soft nontender.  Bowel sounds normal.  Heart sounds normal..    Initial diagnostic plan: CBC, CMP, lipase, lactic acid, procalcitonin, urine pregnancy, urinalysis, CT abdomen pelvis with contrast    Results from initial plan were reviewed and interpreted by me revealing CBC is within appropriate range.  CMP is within appropriate range.  Urinalysis most likely contaminated sample given the absence of leukocytes.  And patient is asymptomatic.  Procalcitonin was negative.  Urine pregnancy was negative.  Lactic acid was negative.  CT abdomen pelvis with contrast with the following impression no acute findings.  Fecal impaction    Diagnostic information from other sources: Chart review    Interventions / Re-evaluation: Vital signs stable throughout encounter    Results/clinical rationale were discussed with patient    Consultations/Discussion of results with other physicians: N/A    Disposition plan: Patient is hemodynamically stable nontoxic-appearing appropriate discharge.  Will send her home with fleets enema, MiraLAX, magnesium citrate.  Have her follow-up with PCP in 1 week.  Follow-up ER for new or worse symptoms.  -----        Final diagnoses:   Constipation, unspecified constipation type          Cristian Lerner,  APRN  07/15/24 1645

## 2024-07-15 NOTE — TELEPHONE ENCOUNTER
Caller: Lindsay Amezquita    Relationship: Self    Best call back number: [phone]    Requested Prescriptions:   Requested Prescriptions     Pending Prescriptions Disp Refills    butalbital-acetaminophen-caffeine (FIORICET, ESGIC) -40 MG per tablet 20 tablet 0     Sig: Take 1 tablet by mouth 2 (Two) Times a Day As Needed for Headache.        Pharmacy where request should be sent: [id number] - [address] [phone] [place] [phone]      Last office visit with prescribing clinician: 2/22/2024   Last telemedicine visit with prescribing clinician: Visit date not found   Next office visit with prescribing clinician: 8/22/2024     Additional details provided by patient: 1 DAY LEFT    Does the patient have less than a 3 day supply:  [x] Yes  [] No    Would you like a call back once the refill request has been completed: [] Yes [x] No    If the office needs to give you a call back, can they leave a voicemail: [] Yes [x] No    Kim Pham Rep   07/15/24 09:30 EDT

## 2024-07-16 ENCOUNTER — TELEPHONE (OUTPATIENT)
Dept: PULMONOLOGY | Facility: CLINIC | Age: 39
End: 2024-07-16

## 2024-07-16 PROBLEM — J32.9 RHINOSINUSITIS: Status: ACTIVE | Noted: 2024-07-16

## 2024-07-16 PROBLEM — R61 NIGHT SWEATS: Status: ACTIVE | Noted: 2024-07-16

## 2024-07-16 RX ORDER — BUTALBITAL, ACETAMINOPHEN AND CAFFEINE 50; 325; 40 MG/1; MG/1; MG/1
1 TABLET ORAL 2 TIMES DAILY PRN
Qty: 20 TABLET | Refills: 0 | Status: SHIPPED | OUTPATIENT
Start: 2024-07-16

## 2024-07-16 NOTE — TELEPHONE ENCOUNTER
Hub staff attempted to follow warm transfer process and was unsuccessful     Caller: Lindsay Amezquita    Relationship to patient: Self    Best call back number: [phone]     Patient is needing: PT WANTS TO BE SEEN BUT NO AVAIL UNTIL OCTOBER. PT STATES THEY ARE TAKING HER OXYGEN TOMORROW AND SHE WANTS A BIPAP MACHINE.

## 2024-07-17 ENCOUNTER — TELEPHONE (OUTPATIENT)
Dept: FAMILY MEDICINE CLINIC | Facility: CLINIC | Age: 39
End: 2024-07-17
Payer: MEDICAID

## 2024-07-17 NOTE — TELEPHONE ENCOUNTER
Patient called requesting to have Dr Winkler increase her Tizanidine to 6 mg. States that she can't get out of bed and is hacving extreme back and leg spasms that are very painful.  I advised patient that Dr Winkler was out of the office for the month of July and has limited access to Epic at this time, but that I would send a message to clinical Cecil for review by our providers. Patient wanted to know if this medication could be sent in for her by early afternoon today. I discussed with her that for regular medication refill requests, we ask for 48-72 hours to refill - and with a medication change request, that could take longer and she may even be asked by our provider to make an appointment to be seen before any medication change will be made.  Patient voiced understanding and asked if someone would call her to let her know ASAP.

## 2024-07-17 NOTE — TELEPHONE ENCOUNTER
Per patient Appointment Desk, she scheduled a Virtual Visit today with [facility] and was evaluated for this issue. This visit does not yet appear in her encounters, but is listed on her appointment desk as completed today.

## 2024-07-17 NOTE — TELEPHONE ENCOUNTER
Attempted to call patient to schedule appointment with next available provider to discuss medication increase request. No answer - Left detailed message for patient to contact our office to schedule an appointment to discuss this request. Patient will need to be scheduled with APRN.

## 2024-07-20 ENCOUNTER — HOSPITAL ENCOUNTER (EMERGENCY)
Facility: HOSPITAL | Age: 39
Discharge: HOME | End: 2024-07-20
Attending: STUDENT IN AN ORGANIZED HEALTH CARE EDUCATION/TRAINING PROGRAM
Payer: MEDICAID

## 2024-07-20 VITALS
OXYGEN SATURATION: 91 % | SYSTOLIC BLOOD PRESSURE: 147 MMHG | WEIGHT: 257.6 LBS | HEIGHT: 62 IN | BODY MASS INDEX: 47.41 KG/M2 | DIASTOLIC BLOOD PRESSURE: 100 MMHG | TEMPERATURE: 97.8 F | RESPIRATION RATE: 20 BRPM | HEART RATE: 112 BPM

## 2024-07-20 DIAGNOSIS — M79.10 MYALGIA: ICD-10-CM

## 2024-07-20 DIAGNOSIS — B34.9 VIRAL ILLNESS: Primary | ICD-10-CM

## 2024-07-20 LAB
FLUAV RNA RESP QL NAA+PROBE: NOT DETECTED
FLUBV RNA RESP QL NAA+PROBE: NOT DETECTED
RSV RNA RESP QL NAA+PROBE: NOT DETECTED
S PYO AG THROAT QL: NEGATIVE
SARS-COV-2 RNA RESP QL NAA+PROBE: NOT DETECTED

## 2024-07-20 PROCEDURE — 63710000001 ONDANSETRON ODT 4 MG TABLET DISPERSIBLE: Performed by: STUDENT IN AN ORGANIZED HEALTH CARE EDUCATION/TRAINING PROGRAM

## 2024-07-20 PROCEDURE — 99283 EMERGENCY DEPT VISIT LOW MDM: CPT

## 2024-07-20 PROCEDURE — 25010000002 KETOROLAC TROMETHAMINE PER 15 MG: Performed by: STUDENT IN AN ORGANIZED HEALTH CARE EDUCATION/TRAINING PROGRAM

## 2024-07-20 PROCEDURE — 87880 STREP A ASSAY W/OPTIC: CPT | Performed by: STUDENT IN AN ORGANIZED HEALTH CARE EDUCATION/TRAINING PROGRAM

## 2024-07-20 PROCEDURE — 87637 SARSCOV2&INF A&B&RSV AMP PRB: CPT | Performed by: STUDENT IN AN ORGANIZED HEALTH CARE EDUCATION/TRAINING PROGRAM

## 2024-07-20 PROCEDURE — 87081 CULTURE SCREEN ONLY: CPT | Performed by: STUDENT IN AN ORGANIZED HEALTH CARE EDUCATION/TRAINING PROGRAM

## 2024-07-20 PROCEDURE — 96372 THER/PROPH/DIAG INJ SC/IM: CPT

## 2024-07-20 RX ORDER — ONDANSETRON 4 MG/1
4 TABLET, ORALLY DISINTEGRATING ORAL ONCE
Status: COMPLETED | OUTPATIENT
Start: 2024-07-20 | End: 2024-07-20

## 2024-07-20 RX ORDER — ONDANSETRON 4 MG/1
4 TABLET, ORALLY DISINTEGRATING ORAL EVERY 8 HOURS PRN
Qty: 12 TABLET | Refills: 0 | Status: SHIPPED | OUTPATIENT
Start: 2024-07-20

## 2024-07-20 RX ORDER — KETOROLAC TROMETHAMINE 30 MG/ML
30 INJECTION, SOLUTION INTRAMUSCULAR; INTRAVENOUS ONCE
Status: COMPLETED | OUTPATIENT
Start: 2024-07-20 | End: 2024-07-20

## 2024-07-20 RX ADMIN — ONDANSETRON 4 MG: 4 TABLET, ORALLY DISINTEGRATING ORAL at 02:21

## 2024-07-20 RX ADMIN — KETOROLAC TROMETHAMINE 30 MG: 30 INJECTION, SOLUTION INTRAMUSCULAR; INTRAVENOUS at 01:26

## 2024-07-20 NOTE — ED PROVIDER NOTES
EMERGENCY DEPARTMENT ENCOUNTER    Pt Name: Lindsay Amezquita  MRN: [id number]  Pt :   1985  Room Number:  10/10  Date of encounter:  2024  PCP: Hunter Winkler MD  ED Provider: Byron Azevedo MD    Historian: Patient      HPI:  Chief Complaint: Flu like illness        Context: Lindsay Amezquita is a 39 y.o. female who presents to the ED c/o flulike illness.  Patient states that earlier today she started getting sick with cough, congestion, and bodyaches.  Has taken Zofran for nausea but no Tylenol or Motrin.  Reports aches in bilateral hips.  No other reported symptoms.      PAST MEDICAL HISTORY  Past Medical History:   Diagnosis Date    Allergic     Anxiety     Asthma Beings of copd with asthma    Back pain     Bell palsy 2021    Bell's palsy     Bipolar 2 disorder     Blood clot in vein     Behind left knee cap    COPD (chronic obstructive pulmonary disease) Six months ago    Deep vein thrombosis Last year    Depression     Diabetes mellitus November    Pre diabete    Frequent headaches     GERD (gastroesophageal reflux disease)     Hypertension     Every time i go into the emergacy room    Irritable bowel syndrome Two years ago    Kidney stone Two years ago    Migraine     Nausea, vomiting and diarrhea 2018    Obesity All of my life    Ovarian cyst Two years ago    Panic     PTSD (post-traumatic stress disorder)     Pulmonary embolism Not in lungs    Recurrent pregnancy loss, antepartum condition or complication Every since i have been 14 yars old    Restless leg syndrome     Sinus problem     Snores     Tattoos     Urinary tract infection Have them on and off    Varicella Little    Visual impairment Since i was little    Vitamin B12 deficiency     Wears glasses          PAST SURGICAL HISTORY  Past Surgical History:   Procedure Laterality Date    CHOLECYSTECTOMY      HYSTEROSCOPY N/A 3/14/2024    Procedure: HYSTEROSCOPY WITH MYOSURE, DILATION AND CURETTAGE WITH CERENE ABLATION;  Surgeon:  David Ribeiro MD;  Location: [facility];  Service: Obstetrics/Gynecology;  Laterality: N/A;    MULTIPLE TOOTH EXTRACTIONS      All my teeth    WISDOM TOOTH EXTRACTION  All my teeth         FAMILY HISTORY  Family History   Problem Relation Age of Onset    Irritable bowel syndrome Mother     Heart disease Mother     Miscarriages / Stillbirths Mother     Arthritis Mother     COPD Mother     Learning disabilities Mother     Mental illness Mother     Asthma Mother     Irritable bowel syndrome Father     Alcohol abuse Father     Diabetes Father     Hyperlipidemia Father     Anxiety disorder Father     Learning disabilities Father     Colon cancer Maternal Grandfather     Stroke Paternal Grandfather     Stroke Paternal Grandmother          SOCIAL HISTORY  Social History     Socioeconomic History    Marital status: Single   Tobacco Use    Smoking status: Every Day     Current packs/day: 0.50     Average packs/day: 2.0 packs/day for 32.1 years (63.4 ttl pk-yrs)     Types: Cigarettes     Start date: 7/17/1992     Passive exposure: Current    Smokeless tobacco: Never    Tobacco comments:      Around 2.5 packs per day- 7/5/24   Vaping Use    Vaping status: Former    Passive vaping exposure: Yes   Substance and Sexual Activity    Alcohol use: Not Currently     Comment: rarely    Drug use: Not Currently    Sexual activity: Not Currently     Partners: Female     Birth control/protection: Other, Same-sex partner         ALLERGIES  Aspirin, Codeine, Cyclobenzaprine, Haldol [haloperidol], Imitrex [sumatriptan], Latex, Penicillins, Acetaminophen, Lamictal [lamotrigine], Metoclopramide, Morphine, Ondansetron, Oxycodone-acetaminophen, Oxycontin [oxycodone], Prochlorperazine, and Tramadol        REVIEW OF SYSTEMS  Review of Systems     All systems reviewed and negative except for those discussed in HPI.       PHYSICAL EXAM    I have reviewed the triage vital signs and nursing notes.    ED Triage Vitals [07/20/24 0107]   Temp Heart  Rate Resp BP SpO2   97.8 °F (36.6 °C) 112 20 (!) 132/114 97 %      Temp src Heart Rate Source Patient Position BP Location FiO2 (%)   Oral Monitor Sitting Left arm --       Physical Exam    General:  Awake, alert, obese, no acute distress  HEENT: Atraumatic, normocephalic, EOMI, PERRLA, mucous membranes moist  NECK:  Supple, atraumatic  Cardiovascular: Mildly tachycardic, regular rhythm, no murmurs, rubs, or gallops.  Extremities well perfused   Respiratory:  Regular rate, clear lungs to auscultation bilaterally.  No rhonchi, rales, wheezing  Abdominal:  Soft, nondistended, nontender.  No guarding or rebound.  No palpable masses  Extremity:  No visible bony abnormalities in all 4 extremities.  Full range of motion of all extremities.  Skin:  Warm and dry.  No rashes  Neuro:  AAOx3, GCS 15. Cranial nerves 2-12 grossly intact.  No focal strength or sensation deficits.  Psych:  Mood and affect appropriate.        LAB RESULTS  Recent Results (from the past 24 hour(s))   COVID-19, FLU A/B, RSV PCR 1 HR TAT - Swab, Nasopharynx    Collection Time: 07/20/24  1:11 AM    Specimen: Nasopharynx; Swab   Result Value Ref Range    COVID19 Not Detected Not Detected - Ref. Range    Influenza A PCR Not Detected Not Detected    Influenza B PCR Not Detected Not Detected    RSV, PCR Not Detected Not Detected   Rapid Strep A Screen - Swab, Throat    Collection Time: 07/20/24  1:11 AM    Specimen: Throat; Swab   Result Value Ref Range    Strep A Ag Negative Negative             RADIOLOGY  No Radiology Exams Resulted Within Past 24 Hours        PROCEDURES    Procedures    No orders to display       MEDICATIONS GIVEN IN ER    Medications   ketorolac (TORADOL) injection 30 mg (30 mg Intramuscular Given 7/20/24 0126)   ondansetron ODT (ZOFRAN-ODT) disintegrating tablet 4 mg (4 mg Oral Given 7/20/24 0221)         MEDICAL DECISION MAKING, PROGRESS, and CONSULTS    All labs, if obtained, have been independently reviewed by me.  All radiology  studies, if obtained, have been reviewed by me and the radiologist dictating the report.  All EKG's, if obtained, have been independently viewed and interpreted by me.      Discussion below represents my analysis of pertinent findings related to patient's condition, differential diagnosis, treatment plan and final disposition.    Lindsay Amezquita is a 39 y.o. female who presents to the ED c/o flulike illness.  Hemodynamically stable nontoxic in appearance upon arrival.  Oxygen saturation 97% on room air.  Lungs clear to auscultation bilaterally making pneumonia less likely.  Symptoms likely secondary to viral upper respiratory infection as patient and family members have similar symptoms.  Swab obtained for COVID-19 and strep throat.  Patient given shot of Toradol for muscle aches/myalgias.    COVID and influenza negative.  Strep negative. Likely other viral illness or false negative COVID-19 due to symptom onset within the past few hours.   No indication for antibiotics.  No further episodes of vomiting while in emergency department.  Advised on return precautions the importance following up with primary care provider.  Patient discharged.                               Orders placed during this visit:  Orders Placed This Encounter   Procedures    COVID-19, FLU A/B, RSV PCR 1 HR TAT - Swab, Nasopharynx    Rapid Strep A Screen - Swab, Throat    Beta Strep Culture, Throat - Swab, Throat         ED Course:    Consultants:                  Shared Decision Making:  After my consideration of clinical presentation and any laboratory/radiology studies obtained, I discussed the findings with the patient/patient representative who is in agreement with the treatment plan and the final disposition.   Risks and benefits of discharge and/or observation/admission were discussed.      AS OF 02:49 EDT VITALS:    BP - 147/100  HR - 112  TEMP - 97.8 °F (36.6 °C) (Oral)  O2 SATS - 91%                  DIAGNOSIS  Final diagnoses:   Viral  illness   Myalgia         DISPOSITION  Discharge      Please note that portions of this document were completed with voice recognition software.        Byron Azevedo MD  07/20/24 1942

## 2024-07-20 NOTE — DISCHARGE INSTRUCTIONS
Continue Zofran or Phenergan as needed for nausea vomiting.  Drink plenty of fluids to prevent dehydration.  COVID-19 may still be possible despite negative testing as symptoms only started today and some people do not test positive for up to 5 days.  Follow-up with primary care provider as an outpatient.

## 2024-07-22 ENCOUNTER — TELEMEDICINE (OUTPATIENT)
Dept: BEHAVIORAL HEALTH | Facility: CLINIC | Age: 39
End: 2024-07-22
Payer: MEDICAID

## 2024-07-22 DIAGNOSIS — F31.30 BIPOLAR I DISORDER, MOST RECENT EPISODE DEPRESSED: Primary | ICD-10-CM

## 2024-07-22 DIAGNOSIS — F43.10 POST TRAUMATIC STRESS DISORDER (PTSD): ICD-10-CM

## 2024-07-22 DIAGNOSIS — F51.04 PSYCHOPHYSIOLOGICAL INSOMNIA: ICD-10-CM

## 2024-07-22 DIAGNOSIS — G89.4 CHRONIC PAIN SYNDROME: ICD-10-CM

## 2024-07-22 DIAGNOSIS — F41.1 GENERALIZED ANXIETY DISORDER: ICD-10-CM

## 2024-07-22 LAB — BACTERIA SPEC AEROBE CULT: NORMAL

## 2024-07-22 PROCEDURE — 1160F RVW MEDS BY RX/DR IN RCRD: CPT

## 2024-07-22 PROCEDURE — 1159F MED LIST DOCD IN RCRD: CPT

## 2024-07-22 PROCEDURE — 99214 OFFICE O/P EST MOD 30 MIN: CPT

## 2024-07-22 RX ORDER — DIAZEPAM 5 MG/1
5 TABLET ORAL 3 TIMES DAILY PRN
Qty: 90 TABLET | Refills: 1 | Status: SHIPPED | OUTPATIENT
Start: 2024-07-22 | End: 2025-07-22

## 2024-07-22 RX ORDER — OXCARBAZEPINE 300 MG/1
300 TABLET, FILM COATED ORAL 2 TIMES DAILY
Qty: 60 TABLET | Refills: 1 | Status: SHIPPED | OUTPATIENT
Start: 2024-07-22

## 2024-07-22 RX ORDER — BUPROPION HYDROCHLORIDE 150 MG/1
150 TABLET ORAL EVERY MORNING
Qty: 30 TABLET | Refills: 1 | Status: SHIPPED | OUTPATIENT
Start: 2024-07-22

## 2024-07-22 RX ORDER — LURASIDONE HYDROCHLORIDE 120 MG/1
120 TABLET, FILM COATED ORAL DAILY
Qty: 30 TABLET | Refills: 1 | Status: SHIPPED | OUTPATIENT
Start: 2024-07-22

## 2024-07-22 RX ORDER — IBUPROFEN 800 MG/1
800 TABLET ORAL EVERY 8 HOURS PRN
Qty: 60 TABLET | Refills: 0 | Status: SHIPPED | OUTPATIENT
Start: 2024-07-22

## 2024-07-22 RX ORDER — DESVENLAFAXINE 100 MG/1
100 TABLET, EXTENDED RELEASE ORAL DAILY
Qty: 30 TABLET | Refills: 1 | Status: SHIPPED | OUTPATIENT
Start: 2024-07-22

## 2024-07-22 RX ORDER — AMITRIPTYLINE HYDROCHLORIDE 150 MG/1
150 TABLET ORAL NIGHTLY
Qty: 30 TABLET | Refills: 1 | Status: SHIPPED | OUTPATIENT
Start: 2024-07-22

## 2024-07-22 NOTE — PROGRESS NOTES
This provider is located at [facility], Behavioral Health, [address] [place] [address], [facility],using a secure MyChart Video Visit through [employer]. Patient is being seen remotely via telehealth at their home address in [place], and stated they are in a secure environment for this session. The patient's condition being diagnosed/treated is appropriate for telemedicine. The provider identified herself as well as her credentials.   The patient, and/or patients guardian, consent to be seen remotely, and when consent is given they understand that the consent allows for patient identifiable information to be sent to a third party as needed.   They may refuse to be seen remotely at any time. The electronic data is encrypted and password protected, and the patient and/or guardian has been advised of the potential risks to privacy not withstanding such measures.    Video Visit    Patient Name: Lindsay Amezquita  : 1985   MRN: [id number]   Care Team: Patient Care Team:  Hunter Winkler MD as PCP - General (Family Medicine)  Donna Mcqueen APRN as Nurse Practitioner (Behavioral Health)         Chief Complaint:    Chief Complaint   Patient presents with    Bipolar    Anxiety    PTSD    Sleeping Problem    Med Management       History of Present Illness: Lindsay Amezquita is a 39 y.o. female who presents via video visit for a medication management follow up. Patient reports that she has been having a hard time. She has been feeling more depressed and states that she just wants to run away. She states they had to put her dog down and that has been really hard on her. She has been seeing her therapist 4 times per week and feels that helps some. She states she doesn't want to do anything or go anywhere. She feels very overwhelmed most of the time. She denies any SI/HI today.     The following portion of the patient's history were reviewed and updated appropriately: allergies, current and past  medications, family history, medical history and social history. MITCH reviewed and appropriate.   Subjective   Review of Systems:    Review of Systems   Respiratory: Negative.     Cardiovascular: Negative.  Negative for chest pain and palpitations.   Gastrointestinal:  Positive for vomiting.   Neurological: Negative.    Psychiatric/Behavioral:  Positive for sleep disturbance, depressed mood and stress. The patient is nervous/anxious.    All other systems reviewed and are negative.      Current Medications:   Current Outpatient Medications   Medication Sig Dispense Refill    amitriptyline (ELAVIL) 150 MG tablet Take 1 tablet by mouth Every Night. 30 tablet 1    desvenlafaxine (PRISTIQ) 100 MG 24 hr tablet Take 1 tablet by mouth Daily. 30 tablet 1    diazePAM (Valium) 5 MG tablet Take 1 tablet by mouth 3 (Three) Times a Day As Needed for Anxiety or Sleep. 90 tablet 1    Lurasidone HCl (Latuda) 120 MG tablet tablet Take 1 tablet by mouth Daily. 30 tablet 1    OXcarbazepine (TRILEPTAL) 300 MG tablet Take 1 tablet by mouth 2 (Two) Times a Day. 60 tablet 1    acetaminophen (TYLENOL) 500 MG tablet Take 1 tablet by mouth Every 6 (Six) Hours As Needed for Mild Pain or Moderate Pain. 60 tablet 0    albuterol sulfate  (90 Base) MCG/ACT inhaler Inhale 2 puffs Every 4 (Four) Hours As Needed for Wheezing. 6.7 g 0    Alcohol Swabs (Alcohol Pads) 70 % pads USE 1 PAD DAILY AS NEEDED (TO ADMINISTER IMITREX ).      atorvastatin (LIPITOR) 40 MG tablet Take 1 tablet by mouth every night at bedtime. 90 tablet 3    Blood Glucose Monitoring Suppl (FreeStyle Lite) w/Device kit USE 1 EACH AS NEEDED (MONITOR GLUCOSE 3 TIMES A DAY      buPROPion XL (Wellbutrin XL) 150 MG 24 hr tablet Take 1 tablet by mouth Every Morning. 30 tablet 1    butalbital-acetaminophen-caffeine (FIORICET, ESGIC) -40 MG per tablet TAKE 1 TABLET BY MOUTH 2 (TWO) TIMES A DAY AS NEEDED FOR HEADACHE. 20 tablet 0    Cariprazine HCl (Vraylar) 3 MG capsule  capsule Take 1 capsule by mouth Daily. 30 capsule 1    Chlorhexidine Gluconate 4 % solution APPLY TOPICALLY TO THE APPROPRIATE AREA AS DIRECTED DAILY AS NEEDED FOR WOUND CARE.      empagliflozin (Jardiance) 10 MG tablet tablet Take 1 tablet by mouth Daily. 90 tablet 1    fexofenadine-pseudoephedrine (GNP Fexofenadine/PSE ER)  MG per 12 hr tablet Take 1 tablet by mouth 2 (Two) Times a Day. 60 tablet 0    fluconazole (Diflucan) 150 MG tablet Take 1 tablet by mouth Every 72 (Seventy-Two) Hours. 3 tablet 0    Fluticasone-Umeclidin-Vilant (TRELEGY ELLIPTA) 200-62.5-25 MCG/ACT inhaler Inhale 1 puff Daily for 30 days. Rinse mouth out after use 1 each 5    FREESTYLE LITE test strip See Admin Instructions.      gabapentin (Neurontin) 800 MG tablet Take 1 tablet by mouth 3 (Three) Times a Day. 90 tablet 0    ibuprofen (ADVIL,MOTRIN) 800 MG tablet Take 1 tablet by mouth Every 8 (Eight) Hours As Needed for Mild Pain. 60 tablet 0    ketorolac (TORADOL) 10 MG tablet Take 1 tablet by mouth Every 6 (Six) Hours As Needed for Severe Pain. 20 tablet 0    Lancets (freestyle) lancets CHECK FASTING GLUCOSE DAILY AND 1 HOUR AFTER LARGEST MEAL OF DAY.      montelukast (SINGULAIR) 10 MG tablet Take 1 tablet by mouth Every Night. 30 tablet 5    nystatin (MYCOSTATIN) 100,000 unit/mL suspension Swish and swallow 5 mL 4 (Four) Times a Day.      omeprazole (priLOSEC) 20 MG capsule Take 1 capsule by mouth Daily. 90 capsule 1    ondansetron ODT (ZOFRAN-ODT) 4 MG disintegrating tablet Place 1 tablet on the tongue Every 8 (Eight) Hours As Needed for Nausea or Vomiting. 12 tablet 0    promethazine (PHENERGAN) 25 MG tablet TAKE 1 TABLET BY MOUTH EVERY 6 HOURS AS NEEDED FOR NAUSEA OR VOMITING. 20 tablet 1    promethazine-dextromethorphan (PROMETHAZINE-DM) 6.25-15 MG/5ML syrup Take 5 mL by mouth At Night As Needed for Cough. 100 mL 0    Respiratory Therapy Supplies (Nebulizer/Tubing/Mouthpiece) kit Use 1 each Every 4 (Four) Hours As Needed (for  asthma symptoms). 1 each 0    Rimegepant Sulfate (Nurtec) 75 MG tablet dispersible tablet Take 1 tablet by mouth Every Other Day. Take Monday,Wednesday,Friday, and Saturday. 16 tablet 1    tiZANidine (ZANAFLEX) 4 MG tablet Take 1 tablet by mouth Every 8 (Eight) Hours As Needed for Muscle Spasms. 270 tablet 0    Zavegepant HCl 10 MG/ACT solution 10 mg into the nostril(s) as directed by provider Daily As Needed (migraine). (1 spray into 1 nostril every 24 hrs prn) 6 each 5     No current facility-administered medications for this visit.       Mental Status Exam:   Hygiene:    unable to assess   Cooperation:  Cooperative  Eye Contact:  Good  Psychomotor Behavior:   appears to be WNL   Affect:  Appropriate  Mood: depressed, anxious, and fluctates  Speech:  Pressured and Rambling  Thought Process:  Linear  Thought Content:  Mood congruent  Suicidal:  None  Homicidal:  None  Hallucinations:  None  Delusion:  None  Memory:  Intact  Orientation:  Person, Place, Time, and Situation  Reliability:  good  Insight:  Good  Judgement:  Good  Impulse Control:  Good  Physical/Medical Issues:  Yes see chart       Objective   Vital Signs:   There were no vitals taken for this visit.      On 7/20/2024  BP: 147/100 P: 112    Assessment / Plan    Diagnoses and all orders for this visit:    1. Bipolar I disorder, most recent episode depressed (Primary)  -     Cariprazine HCl (Vraylar) 3 MG capsule capsule; Take 1 capsule by mouth Daily.  Dispense: 30 capsule; Refill: 1  -     buPROPion XL (Wellbutrin XL) 150 MG 24 hr tablet; Take 1 tablet by mouth Every Morning.  Dispense: 30 tablet; Refill: 1  -     Lurasidone HCl (Latuda) 120 MG tablet tablet; Take 1 tablet by mouth Daily.  Dispense: 30 tablet; Refill: 1  -     OXcarbazepine (TRILEPTAL) 300 MG tablet; Take 1 tablet by mouth 2 (Two) Times a Day.  Dispense: 60 tablet; Refill: 1    2. Psychophysiological insomnia  -     amitriptyline (ELAVIL) 150 MG tablet; Take 1 tablet by mouth Every  Night.  Dispense: 30 tablet; Refill: 1    3. Post traumatic stress disorder (PTSD)  -     Cariprazine HCl (Vraylar) 3 MG capsule capsule; Take 1 capsule by mouth Daily.  Dispense: 30 capsule; Refill: 1  -     desvenlafaxine (PRISTIQ) 100 MG 24 hr tablet; Take 1 tablet by mouth Daily.  Dispense: 30 tablet; Refill: 1  -     diazePAM (Valium) 5 MG tablet; Take 1 tablet by mouth 3 (Three) Times a Day As Needed for Anxiety or Sleep.  Dispense: 90 tablet; Refill: 1    4. Generalized anxiety disorder  -     desvenlafaxine (PRISTIQ) 100 MG 24 hr tablet; Take 1 tablet by mouth Daily.  Dispense: 30 tablet; Refill: 1  -     diazePAM (Valium) 5 MG tablet; Take 1 tablet by mouth 3 (Three) Times a Day As Needed for Anxiety or Sleep.  Dispense: 90 tablet; Refill: 1      Will add Wellbutrin and continue all other medication as ordered.     As part of patient's treatment plan I am prescribing a controlled substance.  The patient has been made aware of the appropriate use of such medications, including potential risk of somnolence, limited ability to drive and/or work safely, and potential for dependence and/or overdose.  It has also been made clear that these medications are for use by this patient only, without concomitant use of alcohol or other substances, unless prescribed.    Patient has completed a prescribing agreement detailing terms of continued prescribing of controlled substances, including monitoring MITCH reports, urine drug screening, and pill counts if necessary.  Patient is aware that inappropriate use will result in cessation of prescribing such medications.    AIMS  Facial and Oral Movements  Muscles of Facial Expression: None, normal  Lips and Perioral Area: None, normal  Jaw: None, normal  Tongue: None, normal  Extremity Movements  Upper (arms, wrists, hands, fingers): None, normal  Lower (legs, knees, ankles, toes): None, normal  Trunk Movements  Neck, shoulders, hips: None, normal  Overall Severity  Severity  of abnormal movements (max 4): 0  Incapacitation due to abnormal movements: None, normal  Patient's awareness of abnormal movements (rate only patient's report): Aware, no distress  Dental Status  Current problems with teeth and/or dentures?: No  Does patient usually wear dentures?: No      A psychological evaluation was conducted in order to assess past and current level of functioning. Areas assessed included, but were not limited to: perception of social support, perception of ability to face and deal with challenges in life (positive functioning), anxiety symptoms, depressive symptoms, perspective on beliefs/belief system, coping skills for stress, intelligence level,  Therapeutic rapport was established. Interventions conducted today were geared towards incorporating medication management along with support for continued therapy. Education was also provided as to the med management with this provider and what to expect in subsequent sessions.      We discussed risks, benefits, goals and side effects of the above medication and the patient was agreeable with the plan. Patient was educated on the importance of compliance with treatment and follow-up appointments. Patient is aware to contact the [facility] with any worsening of symptoms. To call for questions or concerns and return early if necessary. Patent is agreeable to go to the Emergency Department or call 911 should they begin SI/HI.        MEDS ORDERED DURING VISIT:  New Medications Ordered This Visit   Medications    Cariprazine HCl (Vraylar) 3 MG capsule capsule     Sig: Take 1 capsule by mouth Daily.     Dispense:  30 capsule     Refill:  1    buPROPion XL (Wellbutrin XL) 150 MG 24 hr tablet     Sig: Take 1 tablet by mouth Every Morning.     Dispense:  30 tablet     Refill:  1    amitriptyline (ELAVIL) 150 MG tablet     Sig: Take 1 tablet by mouth Every Night.     Dispense:  30 tablet     Refill:  1    desvenlafaxine (PRISTIQ) 100 MG 24 hr tablet      Sig: Take 1 tablet by mouth Daily.     Dispense:  30 tablet     Refill:  1    diazePAM (Valium) 5 MG tablet     Sig: Take 1 tablet by mouth 3 (Three) Times a Day As Needed for Anxiety or Sleep.     Dispense:  90 tablet     Refill:  1    Lurasidone HCl (Latuda) 120 MG tablet tablet     Sig: Take 1 tablet by mouth Daily.     Dispense:  30 tablet     Refill:  1    OXcarbazepine (TRILEPTAL) 300 MG tablet     Sig: Take 1 tablet by mouth 2 (Two) Times a Day.     Dispense:  60 tablet     Refill:  1         Follow Up   Return in about 4 weeks (around 8/19/2024).  Patient was given instructions and counseling regarding her condition or for health maintenance advice. Please see specific information pulled into the AVS if appropriate.     TREATMENT PLAN/GOALS: Continue supportive psychotherapy efforts and medications as indicated. Treatment and medication options discussed during today's visit. Patient acknowledged and verbally consented to continue with current treatment plan and was educated on the importance of compliance with treatment and follow-up appointments.    MEDICATION ISSUES:  Discussed medication options and treatment plan of prescribed medication as well as the risks, benefits, and side effects including potential falls, possible impaired driving and metabolic adversities among others. Patient is agreeable to call the office with any worsening of symptoms or onset of side effects. Patient is agreeable to call 911 or go to the nearest ER should he/she begin having SI/HI.        [facility]  [address] [place] [address] DR NANCY SORIA [address]  [phone]    July 24, 2024 14:57 EDT

## 2024-07-22 NOTE — TELEPHONE ENCOUNTER
Caller: Lindsay Amezquita    Relationship: Self    Best call back number: [phone]     Requested Prescriptions:   Requested Prescriptions     Pending Prescriptions Disp Refills    ibuprofen (ADVIL,MOTRIN) 800 MG tablet 60 tablet 0     Sig: Take 1 tablet by mouth Every 8 (Eight) Hours As Needed for Mild Pain.        Pharmacy where request should be sent: [facility]/PHARMACY #[id number] - [place], [place] - [address] [facility] [phone] [facility] [phone]      Last office visit with prescribing clinician: 7/3/2024   Last telemedicine visit with prescribing clinician: 6/11/2024   Next office visit with prescribing clinician: 8/8/2024     Additional details provided by patient: PATIENT HAS 2 DAYS LEFT.     Does the patient have less than a 3 day supply:  [x] Yes  [] No    Would you like a call back once the refill request has been completed: [] Yes [x] No    If the office needs to give you a call back, can they leave a voicemail: [] Yes [x] No    Cadance Dunaway, RegSched Rep   07/22/24 08:57 EDT

## 2024-07-22 NOTE — TELEPHONE ENCOUNTER
PATIENT CALLED AND STATES THAT SHE THINKS SHE IS GETTING AN EATING DISORDER SHE SAID SHE MAKES HERSELF THROW UP AFTER SHE EATS AND ONLY DRINKS WATER. JUST THOUGHT HER  SHOULD KNOW.

## 2024-07-23 ENCOUNTER — TELEPHONE (OUTPATIENT)
Dept: OBSTETRICS AND GYNECOLOGY | Facility: CLINIC | Age: 39
End: 2024-07-23

## 2024-07-23 ENCOUNTER — TELEPHONE (OUTPATIENT)
Dept: BEHAVIORAL HEALTH | Facility: CLINIC | Age: 39
End: 2024-07-23
Payer: MEDICAID

## 2024-07-23 ENCOUNTER — TELEPHONE (OUTPATIENT)
Dept: FAMILY MEDICINE CLINIC | Facility: CLINIC | Age: 39
End: 2024-07-23
Payer: MEDICAID

## 2024-07-23 DIAGNOSIS — F31.30 BIPOLAR I DISORDER, MOST RECENT EPISODE DEPRESSED: ICD-10-CM

## 2024-07-23 DIAGNOSIS — G43.001 MIGRAINE WITHOUT AURA AND WITH STATUS MIGRAINOSUS, NOT INTRACTABLE: ICD-10-CM

## 2024-07-23 DIAGNOSIS — G43.909 EPISODIC MIGRAINE: ICD-10-CM

## 2024-07-23 RX ORDER — RIMEGEPANT SULFATE 75 MG/75MG
75 TABLET, ORALLY DISINTEGRATING ORAL EVERY OTHER DAY
Qty: 16 TABLET | Refills: 1 | OUTPATIENT
Start: 2024-07-23

## 2024-07-23 RX ORDER — BUTALBITAL, ACETAMINOPHEN AND CAFFEINE 50; 325; 40 MG/1; MG/1; MG/1
1 TABLET ORAL 2 TIMES DAILY PRN
Qty: 20 TABLET | Refills: 0 | OUTPATIENT
Start: 2024-07-23

## 2024-07-23 NOTE — TELEPHONE ENCOUNTER
Provider:  DR ROMERO    Caller:LEORA COTA    Phone Number:[phone]    PHARMACY:[facility] #[id number]     Reason for Call:PATIENT STATED THAT SHE IS STILL HAVING SEVER PELVIC PAIN//LAST NIGHT SHE WAS DOUBLED OVER FROM THE PAIN//PATIENT STATES THAT ROTATING TYLENOL AND IBUPROFEN ISNT HELPING WITH THE PAIN//PLEASE FOLLOW UP

## 2024-07-23 NOTE — TELEPHONE ENCOUNTER
Caller: Lindsay Amezquita    Relationship: Self    Best call back number: [phone]     Requested Prescriptions:   ALLEGRA MARGO        Pharmacy where request should be sent: [facility]/PHARMACY #[id number] - [address] [phone] [facility] [phone]      Last office visit with prescribing clinician: 7/3/2024   Last telemedicine visit with prescribing clinician: 6/11/2024   Next office visit with prescribing clinician: 8/8/2024     Additional details provided by patient: PATIENT OUT OF MEDICATION    Does the patient have less than a 3 day supply:  [x] Yes  [] No    Would you like a call back once the refill request has been completed: [x] Yes [] No    If the office needs to give you a call back, can they leave a voicemail: [x] Yes [] No    Kim Oneil Rep   07/23/24 15:12 EDT

## 2024-07-23 NOTE — TELEPHONE ENCOUNTER
PATIENT IS WANTING TO KNOW WHEN SHE SHOULD TAKE HER WELLBUTRIN. SHE CALLED EARLIER AND I LET HER KNOW TO TAKE IT AT NIGHT IF IT IS MAKING HER SLEEPY AND SHE CALLED AGAIN ASKING WHEN SHE SHOULD TAKE IT

## 2024-07-23 NOTE — TELEPHONE ENCOUNTER
Called patient to inform her if the wellbutrin is causing her to be drowsy or fatigued, to start taking it at night. Patient asked about her Vraylar as well, informed her to take the Vraylar during the day.

## 2024-07-23 NOTE — TELEPHONE ENCOUNTER
Caller: Lindsay Amezquita    Relationship: Self    Best call back number: [phone]    Requested Prescriptions:   Requested Prescriptions     Pending Prescriptions Disp Refills    butalbital-acetaminophen-caffeine (FIORICET, ESGIC) -40 MG per tablet 20 tablet 0     Sig: Take 1 tablet by mouth 2 (Two) Times a Day As Needed for Headache.    Rimegepant Sulfate (Nurtec) 75 MG tablet dispersible tablet 16 tablet 1     Sig: Take 1 tablet by mouth Every Other Day. Take Monday,Wednesday,Friday, and Saturday.    Zavegepant HCl 10 MG/ACT solution 6 each 5     Sig: 10 mg into the nostril(s) as directed by provider Daily As Needed (migraine). (1 spray into 1 nostril every 24 hrs prn)        Pharmacy where request should be sent: [facility][id number] [place], [place] - [facility] [address] AT [facility] [address] - [phone] PH - [phone] FX     Last office visit with prescribing clinician: 2/22/2024   Last telemedicine visit with prescribing clinician: Visit date not found   Next office visit with prescribing clinician: 8/22/2024     Additional details provided by patient: PT HAS BEEN HAVING MORE MIGRAINES SINCE HAVING DNC 3-14-24. PT STATES WEATHER ALSO HAS AN EFFECT ON HER MIGRAINES AS WELL. PT IS OUT OF [facility].    Does the patient have less than a 3 day supply:  [x] Yes  [] No    Would you like a call back once the refill request has been completed: [] Yes [] No    If the office needs to give you a call back, can they leave a voicemail: [] Yes [] No    Kim Aranda Rep   07/23/24 14:20 EDT

## 2024-07-24 DIAGNOSIS — R10.2 PELVIC PAIN: Primary | ICD-10-CM

## 2024-07-24 RX ORDER — KETOROLAC TROMETHAMINE 10 MG/1
10 TABLET, FILM COATED ORAL EVERY 6 HOURS PRN
Qty: 20 TABLET | Refills: 0 | Status: SHIPPED | OUTPATIENT
Start: 2024-07-24

## 2024-07-25 ENCOUNTER — TELEPHONE (OUTPATIENT)
Dept: FAMILY MEDICINE CLINIC | Facility: CLINIC | Age: 39
End: 2024-07-25
Payer: MEDICAID

## 2024-07-25 NOTE — TELEPHONE ENCOUNTER
Caller: [facility]    Relationship: Other     Best call back number:      What is the best time to reach you: ANYTIME    Who are you requesting to speak with (clinical staff, provider,  specific staff member): CLINICAL STAFF    Do you know the name of the person who called: JAMAAL    What was the call regarding: JAMAAL ADVISED THEY HAVE SENT A FAX TO THE OFFICE ON [id number]  FOR CERTIFICATE OF MEDICAL NECESSITY FOR THE PATIENT TO GET A BLOOD PRESSURE MONITOR AND HAVE NOT RECEIVED A CALL BACK    Is it okay if the provider responds through MyChart: PLEASE CALL TO ADVISE

## 2024-07-25 NOTE — TELEPHONE ENCOUNTER
Caller: Lindsay Amezquita    Relationship: Self    Best call back number: [phone]     What medication are you requesting: DICYCLINE 100 MG AND SOMETHING FOR YEAST INFECTION     What are your current symptoms: RUNNY NOSE, FEVER,     How long have you been experiencing symptoms: 2 WEEKS    Have you had these symptoms before:    [] Yes  [x] No    Have you been treated for these symptoms before:   [] Yes  [x] No    If a prescription is needed, what is your preferred pharmacy and phone number:  [facility]     Additional notes: PATIENT IS NOT FEELING WELL AND WOULD LIKE TO SEE IF AN ANTIBIOTIC COULD BE CALLED IN FOR HER.

## 2024-07-26 ENCOUNTER — TELEMEDICINE (OUTPATIENT)
Dept: FAMILY MEDICINE CLINIC | Facility: TELEHEALTH | Age: 39
End: 2024-07-26
Payer: MEDICAID

## 2024-07-26 ENCOUNTER — TELEPHONE (OUTPATIENT)
Dept: OBSTETRICS AND GYNECOLOGY | Facility: CLINIC | Age: 39
End: 2024-07-26
Payer: MEDICAID

## 2024-07-26 DIAGNOSIS — J02.9 ACUTE PHARYNGITIS, UNSPECIFIED ETIOLOGY: ICD-10-CM

## 2024-07-26 DIAGNOSIS — J01.00 ACUTE NON-RECURRENT MAXILLARY SINUSITIS: Primary | ICD-10-CM

## 2024-07-26 PROCEDURE — 1160F RVW MEDS BY RX/DR IN RCRD: CPT | Performed by: NURSE PRACTITIONER

## 2024-07-26 PROCEDURE — 1159F MED LIST DOCD IN RCRD: CPT | Performed by: NURSE PRACTITIONER

## 2024-07-26 PROCEDURE — 99213 OFFICE O/P EST LOW 20 MIN: CPT | Performed by: NURSE PRACTITIONER

## 2024-07-26 RX ORDER — FLUCONAZOLE 150 MG/1
150 TABLET ORAL ONCE
Qty: 2 TABLET | Refills: 0 | Status: SHIPPED | OUTPATIENT
Start: 2024-07-26 | End: 2024-07-26

## 2024-07-26 RX ORDER — AZITHROMYCIN 250 MG/1
TABLET, FILM COATED ORAL
Qty: 6 TABLET | Refills: 0 | Status: SHIPPED | OUTPATIENT
Start: 2024-07-26

## 2024-07-26 RX ORDER — BENZONATATE 100 MG/1
100 CAPSULE ORAL 3 TIMES DAILY PRN
Qty: 21 CAPSULE | Refills: 0 | Status: SHIPPED | OUTPATIENT
Start: 2024-07-26 | End: 2024-08-02

## 2024-07-26 NOTE — TELEPHONE ENCOUNTER
This is a patient of Dr. Ribeiro's. She called stating she was having a lot of pelvic pain that that wraps around the lower part of her back. She states she think she may have a fever, she is very cold and can not get warm. She is requesting medicine, or a plan of action.

## 2024-07-27 NOTE — PROGRESS NOTES
You have chosen to receive care through a telehealth visit.  Do you consent to use a video/audio connection for your medical care today? Yes     CHIEF COMPLAINT  Chief Complaint   Patient presents with    Sinus Problem         HPI  Lindsay Amezquita is a 39 y.o. female  presents with complaint of sore throat, can't eat anything and nasal drainage. Reports her symptoms started 10 days ago. Reports small blisters on her throat. Nasal drainage dark green in color. Reports she is spitting up dark green drainage that is running to the back of her throat. Reports fever 102.3 x 1. Reports she has taken IBU today and no fever since. No drooling. No nausea or vomiting. Reports no chills. Reports she has not taken any other medications for her symptoms. Reports she was seen in the ER 7 days ago and tested negative for strep, flu, RSV, COVID. Reports zpack has worked well in the past. Drinking fluids well.     Review of Systems   Constitutional:  Positive for fever. Negative for chills and fatigue.   HENT:  Positive for congestion, sinus pressure, sinus pain and sore throat. Negative for ear discharge and ear pain.    Respiratory:  Positive for cough. Negative for chest tightness, shortness of breath and wheezing.    Cardiovascular:  Negative for chest pain.   Gastrointestinal:  Negative for abdominal pain, diarrhea, nausea and vomiting.   Musculoskeletal:  Negative for back pain and myalgias.   Neurological:  Negative for dizziness and headaches.   Psychiatric/Behavioral: Negative.         Past Medical History:   Diagnosis Date    Allergic     Anxiety     Asthma Beings of copd with asthma    Back pain     Bell palsy 07/06/2021    Bell's palsy     Bipolar 2 disorder     Blood clot in vein     Behind left knee cap    COPD (chronic obstructive pulmonary disease) Six months ago    Deep vein thrombosis Last year    Depression     Diabetes mellitus November    Pre diabete    Frequent headaches     GERD (gastroesophageal reflux disease)      Hypertension     [facility]    Irritable bowel syndrome Two years ago    Kidney stone Two years ago    Migraine     Nausea, vomiting and diarrhea 09/17/2018    Obesity All of my life    Ovarian cyst Two years ago    Panic     PTSD (post-traumatic stress disorder)     Pulmonary embolism Not in lungs    Recurrent pregnancy loss, antepartum condition or complication Every since i have been 14 yars old    Restless leg syndrome     Sinus problem     Snores     Tattoos     Urinary tract infection Have them on and off    Varicella Little    Visual impairment Since i was little    Vitamin B12 deficiency     Wears glasses        Family History   Problem Relation Age of Onset    Irritable bowel syndrome Mother     Heart disease Mother     Miscarriages / Stillbirths Mother     Arthritis Mother     COPD Mother     Learning disabilities Mother     Mental illness Mother     Asthma Mother     Irritable bowel syndrome Father     Alcohol abuse Father     Diabetes Father     Hyperlipidemia Father     Anxiety disorder Father     Learning disabilities Father     Colon cancer Maternal Grandfather     Stroke Paternal Grandfather     Stroke Paternal Grandmother        Social History     Socioeconomic History    Marital status: Single   Tobacco Use    Smoking status: Every Day     Current packs/day: 0.50     Average packs/day: 2.0 packs/day for 32.1 years (63.4 ttl pk-yrs)     Types: Cigarettes     Start date: 7/17/1992     Passive exposure: Current    Smokeless tobacco: Never    Tobacco comments:      Around 2.5 packs per day- 7/5/24   Vaping Use    Vaping status: Former    Passive vaping exposure: Yes   Substance and Sexual Activity    Alcohol use: Not Currently     Comment: rarely    Drug use: Not Currently    Sexual activity: Not Currently     Partners: Female     Birth control/protection: Other, Same-sex partner       Lindsay M Alirio  reports that she has been smoking cigarettes. She started smoking about  32 years ago. She has a 63.4 pack-year smoking history. She has been exposed to tobacco smoke. She has never used smokeless tobacco. I have educated her on the risk of diseases from using tobacco products such as cancer, COPD, and heart disease.     I advised her to quit and she is not willing to quit.    I spent  1  minutes counseling the patient.              Breastfeeding No     PHYSICAL EXAM  Physical Exam   Constitutional: She is oriented to person, place, and time. She appears well-developed and well-nourished. No distress.   HENT:   Head: Normocephalic and atraumatic.   Right Ear: Hearing normal.   Left Ear: Hearing normal.   Nose: Rhinorrhea and congestion present. Right sinus exhibits maxillary sinus tenderness. Left sinus exhibits maxillary sinus tenderness.   Mouth/Throat: Mouth/Lips are normal.  Patient directed exam  Red blisters to throat.    Eyes: Conjunctivae and lids are normal.   Pulmonary/Chest: Effort normal.  No respiratory distress.  Abdominal: There is no abdominal tenderness.   Lymphadenopathy:        Right cervical: No anterior cervical adenopathy present.       Left cervical: No anterior cervical adenopathy present.   Neurological: She is alert and oriented to person, place, and time.   Psychiatric: She has a normal mood and affect. Her speech is normal and behavior is normal.       Results for orders placed or performed during the hospital encounter of 07/20/24   COVID-19, FLU A/B, RSV PCR 1 HR TAT - Swab, Nasopharynx    Specimen: Nasopharynx; Swab   Result Value Ref Range    COVID19 Not Detected Not Detected - Ref. Range    Influenza A PCR Not Detected Not Detected    Influenza B PCR Not Detected Not Detected    RSV, PCR Not Detected Not Detected   Rapid Strep A Screen - Swab, Throat    Specimen: Throat; Swab   Result Value Ref Range    Strep A Ag Negative Negative   Beta Strep Culture, Throat - Swab, Throat    Specimen: Throat; Swab   Result Value Ref Range    Throat Culture, Beta Strep  No Beta Hemolytic Streptococcus Isolated      *Note: Due to a large number of results and/or encounters for the requested time period, some results have not been displayed. A complete set of results can be found in Results Review.       Diagnoses and all orders for this visit:    1. Acute non-recurrent maxillary sinusitis (Primary)    2. Acute pharyngitis, unspecified etiology    Other orders  -     azithromycin (Zithromax Z-Janusz) 250 MG tablet; Take 2 tablets by mouth on day 1, then 1 tablet daily on days 2-5  Dispense: 6 tablet; Refill: 0  -     fluconazole (Diflucan) 150 MG tablet; Take 1 tablet by mouth 1 (One) Time for 1 dose. May repeat in 3 days if symptoms continue  Dispense: 2 tablet; Refill: 0  -     benzonatate (Tessalon Perles) 100 MG capsule; Take 1 capsule by mouth 3 (Three) Times a Day As Needed for Cough for up to 7 days.  Dispense: 21 capsule; Refill: 0    Rest  Fluids  PCP if symptoms persist   ER for any worsening symptoms such as high fever, chest pain or SOA       FOLLOW-UP  As discussed during visit with PCP/[facility] if no improvement or Urgent Care/Emergency Department if worsening of symptoms    Patient verbalizes understanding of medication dosage, comfort measures, instructions for treatment and follow-up.    Court Felipe, CLAIR  07/26/2024  20:46 EDT    The use of a video visit has been reviewed with the patient and verbal informed consent has been obtained. Myself and Lindsay Amezquita participated in this visit. The patient is located in [address] [place] [address] ISAIAS [address] [place] [address].    I am located in [place], [place]. Mychart and Twilio were utilized. I spent 5 minutes in the patient's chart for this visit.      Note Disclaimer: At [facility], we believe that sharing information builds trust and better   relationships. You are receiving this note because you recently visited [facility]. It is possible you   will see health information before a provider has talked with you about  it. This kind of information can   be easy to misunderstand. To help you fully understand what it means for your health, we urge you to   discuss this note with your provider.

## 2024-07-27 NOTE — PATIENT INSTRUCTIONS
Occupational Therapy  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 Reports good carryover of PROM exercises. GOALS/ TREATMENT SESSION:  1. Patient/Caregiver will be independent with home exercise program.   2. Improve sensory modulation to maintain seated position for feeding task x10 reps without extensor thrust. --pt took bottle this date x4-5 oz. Able to self feed in slightly reclined position with moderate fidgeting t/o.   3. Integrate ATNR so as rotation of head will not elicit response. --ongoing hesitancy to roll to L side in supine to achieve seated/four point position. 4. Improve tone so that pt is able to maintain propped sitting x30 seconds, 3/5 trials. --improved tone observed this date and able to maintain sitting position via self initiated as well as propped x1-2 minutes. 5. Improve body awareness by displaying righting reactions x80% of the time. --  6. Improve AROM B UE shoulder flexion from 0-180 °. --Improved active reach with B UEs without LOB in sitting position this date. Improved PROM observed with stretching as well. Caregiver edu on hip flexor stretch.      EDUCATION  Education provided to patient/family/caregiver:      [x]Yes/New education    []Yes/Continued Review of prior education   __No  If yes Education Provided: see goal 6 and recommend scheduling PT eval for LE ROM/tone    Method of Sinus Infection, Adult  A sinus infection, also called sinusitis, is inflammation of your sinuses. Sinuses are hollow spaces in the bones around your face. Your sinuses are located:  Around your eyes.  In the middle of your forehead.  Behind your nose.  In your cheekbones.  Mucus normally drains out of your sinuses. When your nasal tissues become inflamed or swollen, mucus can become trapped or blocked. This allows bacteria, viruses, and fungi to grow, which leads to infection. Most infections of the sinuses are caused by a virus.  A sinus infection can develop quickly. It can last for up to 4 weeks (acute) or for more than 12 weeks (chronic). A sinus infection often develops after a cold.  What are the causes?  This condition is caused by anything that creates swelling in the sinuses or stops mucus from draining. This includes:  Allergies.  Asthma.  Infection from bacteria or viruses.  Deformities or blockages in your nose or sinuses.  Abnormal growths in the nose (nasal polyps).  Pollutants, such as chemicals or irritants in the air.  Infection from fungi. This is rare.  What increases the risk?  You are more likely to develop this condition if you:  Have a weak body defense system (immune system).  Do a lot of swimming or diving.  Overuse nasal sprays.  Smoke.  What are the signs or symptoms?  The main symptoms of this condition are pain and a feeling of pressure around the affected sinuses. Other symptoms include:  Stuffy nose or congestion that makes it difficult to breathe through your nose.  Thick yellow or greenish drainage from your nose.  Tenderness, swelling, and warmth over the affected sinuses.  A cough that may get worse at night.  Decreased sense of smell and taste.  Extra mucus that collects in the throat or the back of the nose (postnasal drip) causing a sore throat or bad breath.  Tiredness (fatigue).  Fever.  How is this diagnosed?  This condition is diagnosed based on:  Your symptoms.  Your  medical history.  A physical exam.  Tests to find out if your condition is acute or chronic. This may include:  Checking your nose for nasal polyps.  Viewing your sinuses using a device that has a light (endoscope).  Testing for allergies or bacteria.  Imaging tests, such as an MRI or CT scan.  In rare cases, a bone biopsy may be done to rule out more serious types of fungal sinus disease.  How is this treated?  Treatment for a sinus infection depends on the cause and whether your condition is chronic or acute.  If caused by a virus, your symptoms should go away on their own within 10 days. You may be given medicines to relieve symptoms. They include:  Medicines that shrink swollen nasal passages (decongestants).  A spray that eases inflammation of the nostrils (topical intranasal corticosteroids).  Rinses that help get rid of thick mucus in your nose (nasal saline washes).  Medicines that treat allergies (antihistamines).  Over-the-counter pain relievers.  If caused by bacteria, your health care provider may recommend waiting to see if your symptoms improve. Most bacterial infections will get better without antibiotic medicine. You may be given antibiotics if you have:  A severe infection.  A weak immune system.  If caused by narrow nasal passages or nasal polyps, surgery may be needed.  Follow these instructions at home:  Medicines  Take, use, or apply over-the-counter and prescription medicines only as told by your health care provider. These may include nasal sprays.  If you were prescribed an antibiotic medicine, take it as told by your health care provider. Do not stop taking the antibiotic even if you start to feel better.  Hydrate and humidify    Drink enough fluid to keep your urine pale yellow. Staying hydrated will help to thin your mucus.  Use a cool mist humidifier to keep the humidity level in your home above 50%.  Inhale steam for 10-15 minutes, 3-4 times a day, or as told by your health care  provider. You can do this in the bathroom while a hot shower is running.  Limit your exposure to cool or dry air.  Rest  Rest as much as possible.  Sleep with your head raised (elevated).  Make sure you get enough sleep each night.  General instructions    Apply a warm, moist washcloth to your face 3-4 times a day or as told by your health care provider. This will help with discomfort.  Use nasal saline washes as often as told by your health care provider.  Wash your hands often with soap and water to reduce your exposure to germs. If soap and water are not available, use hand .  Do not smoke. Avoid being around people who are smoking (secondhand smoke).  Keep all follow-up visits. This is important.  Contact a health care provider if:  You have a fever.  Your symptoms get worse.  Your symptoms do not improve within 10 days.  Get help right away if:  You have a severe headache.  You have persistent vomiting.  You have severe pain or swelling around your face or eyes.  You have vision problems.  You develop confusion.  Your neck is stiff.  You have trouble breathing.  These symptoms may be an emergency. Get help right away. Call 911.  Do not wait to see if the symptoms will go away.  Do not drive yourself to the hospital.  Summary  A sinus infection is soreness and inflammation of your sinuses. Sinuses are hollow spaces in the bones around your face.  This condition is caused by nasal tissues that become inflamed or swollen. The swelling traps or blocks the flow of mucus. This allows bacteria, viruses, and fungi to grow, which leads to infection.  If you were prescribed an antibiotic medicine, take it as told by your health care provider. Do not stop taking the antibiotic even if you start to feel better.  Keep all follow-up visits. This is important.  This information is not intended to replace advice given to you by your health care provider. Make sure you discuss any questions you have with your health  care provider.  Document Revised: 11/22/2022 Document Reviewed: 11/22/2022  Elsevier Patient Education © 2024 [employer] Inc[employer]

## 2024-07-29 DIAGNOSIS — J32.9 RHINOSINUSITIS: ICD-10-CM

## 2024-07-29 RX ORDER — FEXOFENADINE HCL AND PSEUDOEPHEDRINE HCI 60; 120 MG/1; MG/1
1 TABLET, EXTENDED RELEASE ORAL 2 TIMES DAILY
Qty: 60 TABLET | Refills: 0 | Status: SHIPPED | OUTPATIENT
Start: 2024-07-29

## 2024-07-29 NOTE — TELEPHONE ENCOUNTER
We have never prescribed this for the patient.    Patient would need an appointment to discuss getting this medication.    Patient was notified by SOLOMON Aranda

## 2024-07-29 NOTE — TELEPHONE ENCOUNTER
Caller: Lindsay Amezquita    Relationship: Self    Best call back number: [phone]     Requested Prescriptions:   Requested Prescriptions     Pending Prescriptions Disp Refills    fexofenadine-pseudoephedrine (GNP Fexofenadine/PSE ER)  MG per 12 hr tablet 60 tablet 0     Sig: Take 1 tablet by mouth 2 (Two) Times a Day.        Pharmacy where request should be sent: [id number] [place], [place] - [facility] [address] AT [facility] [address] - [phone]  - [phone] FX     Last office visit with prescribing clinician: 7/3/2024   Last telemedicine visit with prescribing clinician: 6/11/2024   Next office visit with prescribing clinician: 8/8/2024     Additional details provided by patient: PATIENT HAS 2 DAYS LEFT.     Does the patient have less than a 3 day supply:  [x] Yes  [] No    Would you like a call back once the refill request has been completed: [] Yes [x] No    If the office needs to give you a call back, can they leave a voicemail: [] Yes [x] No    Cadance Dunaway, RegSched Rep   07/29/24 13:34 EDT

## 2024-07-29 NOTE — TELEPHONE ENCOUNTER
Patient needs an appointment to discuss getting these medications.    Patient was notified by SOLOMON Aranda

## 2024-07-30 ENCOUNTER — TELEPHONE (OUTPATIENT)
Dept: PULMONOLOGY | Facility: CLINIC | Age: 39
End: 2024-07-30
Payer: MEDICAID

## 2024-07-30 ENCOUNTER — TELEPHONE (OUTPATIENT)
Dept: FAMILY MEDICINE CLINIC | Facility: CLINIC | Age: 39
End: 2024-07-30
Payer: MEDICAID

## 2024-07-30 DIAGNOSIS — G43.001 MIGRAINE WITHOUT AURA AND WITH STATUS MIGRAINOSUS, NOT INTRACTABLE: ICD-10-CM

## 2024-07-30 RX ORDER — RIMEGEPANT SULFATE 75 MG/75MG
75 TABLET, ORALLY DISINTEGRATING ORAL EVERY OTHER DAY
Qty: 16 TABLET | Refills: 0 | Status: SHIPPED | OUTPATIENT
Start: 2024-07-30

## 2024-07-30 NOTE — TELEPHONE ENCOUNTER
Caller: Lindsay Amezquita    Relationship: Self    Best call back number: [phone]     Requested Prescriptions:   Requested Prescriptions     Pending Prescriptions Disp Refills    Rimegepant Sulfate (Nurtec) 75 MG tablet dispersible tablet 16 tablet 1     Sig: Take 1 tablet by mouth Every Other Day. Take Monday,Wednesday,Friday, and Saturday.        Pharmacy where request should be sent: [facility] #[id number] - [place], [place] - [facility] [address] AT [facility] [address] - [phone]  - [phone] FX     Last office visit with prescribing clinician: 2/22/2024   Last telemedicine visit with prescribing clinician: Visit date not found   Next office visit with prescribing clinician: 8/22/2024     Additional details provided by patient: PATIENT LOST HER SECOND BOX OF THE NURTEC, SHE IS SUFFERING FROM A SEVERE MIGRAINE AND HAS BEEN TO THE ED AND IT DID NOT HELP.     SHE STATED SHE IS CURRENTLY OUT OF THE MEDICATION    Does the patient have less than a 3 day supply:  [x] Yes  [] No    Would you like a call back once the refill request has been completed: [x] Yes [] No    If the office needs to give you a call back, can they leave a voicemail: [x] Yes [] No    Kim Peterson   07/30/24 11:11 EDT

## 2024-07-30 NOTE — TELEPHONE ENCOUNTER
Caller: Lindsay Amezquita    Relationship to patient: Self    Best call back number: [phone]     Patient is needing: PT IS WAKING UP COUGHING AND GASPING FOR AIR. SHE DOES NOT THINK HER OXYGEN IS HELPING. SHE BELIEVES SHE NEEDS A BIPAP MACHINE. WHEN SHE WAS IN THE HOSPITAL THEY HAD HER ON ONE AND IT HELPED HER ALOT

## 2024-07-30 NOTE — TELEPHONE ENCOUNTER
PATIENT CALLED IN AND IS ASKING FOR A CPAP MACHINE SAID SHE WOKE UP THIS MORNING AND STRUGGLING TO BREATH I TOLD HER A NURSE WOULD CALL HER BACK SAID SHE CALLED HER LUNG DR. AND THEY SAID SHE HAD TO DO THREE TESTS.

## 2024-07-31 ENCOUNTER — APPOINTMENT (OUTPATIENT)
Dept: CT IMAGING | Facility: HOSPITAL | Age: 39
End: 2024-07-31
Payer: MEDICAID

## 2024-07-31 ENCOUNTER — HOSPITAL ENCOUNTER (EMERGENCY)
Facility: HOSPITAL | Age: 39
Discharge: HOME | End: 2024-07-31
Attending: STUDENT IN AN ORGANIZED HEALTH CARE EDUCATION/TRAINING PROGRAM
Payer: MEDICAID

## 2024-07-31 ENCOUNTER — TELEPHONE (OUTPATIENT)
Dept: OBSTETRICS AND GYNECOLOGY | Facility: CLINIC | Age: 39
End: 2024-07-31

## 2024-07-31 VITALS
TEMPERATURE: 98.5 F | HEART RATE: 88 BPM | RESPIRATION RATE: 18 BRPM | HEIGHT: 62 IN | DIASTOLIC BLOOD PRESSURE: 78 MMHG | OXYGEN SATURATION: 94 % | WEIGHT: 262.6 LBS | BODY MASS INDEX: 48.32 KG/M2 | SYSTOLIC BLOOD PRESSURE: 114 MMHG

## 2024-07-31 DIAGNOSIS — R10.2 PELVIC PAIN: ICD-10-CM

## 2024-07-31 DIAGNOSIS — G89.4 CHRONIC PAIN SYNDROME: ICD-10-CM

## 2024-07-31 DIAGNOSIS — R10.2 PELVIC PAIN: Primary | ICD-10-CM

## 2024-07-31 DIAGNOSIS — R10.31 RIGHT LOWER QUADRANT ABDOMINAL PAIN: Primary | ICD-10-CM

## 2024-07-31 LAB
ALBUMIN SERPL-MCNC: 4.3 G/DL (ref 3.5–5.2)
ALBUMIN/GLOB SERPL: 1.9 G/DL
ALP SERPL-CCNC: 87 U/L (ref 39–117)
ALT SERPL W P-5'-P-CCNC: 105 U/L (ref 1–33)
ANION GAP SERPL CALCULATED.3IONS-SCNC: 11.7 MMOL/L (ref 5–15)
AST SERPL-CCNC: 166 U/L (ref 1–32)
B-HCG UR QL: NEGATIVE
BASOPHILS # BLD AUTO: 0.03 10*3/MM3 (ref 0–0.2)
BASOPHILS NFR BLD AUTO: 0.3 % (ref 0–1.5)
BILIRUB SERPL-MCNC: 0.4 MG/DL (ref 0–1.2)
BILIRUB UR QL STRIP: NEGATIVE
BUN SERPL-MCNC: 12 MG/DL (ref 6–20)
BUN/CREAT SERPL: 19 (ref 7–25)
CALCIUM SPEC-SCNC: 9.3 MG/DL (ref 8.6–10.5)
CHLORIDE SERPL-SCNC: 102 MMOL/L (ref 98–107)
CLARITY UR: CLEAR
CO2 SERPL-SCNC: 24.3 MMOL/L (ref 22–29)
COLOR UR: YELLOW
CREAT SERPL-MCNC: 0.63 MG/DL (ref 0.57–1)
D-LACTATE SERPL-SCNC: 1.1 MMOL/L (ref 0.5–2)
DEPRECATED RDW RBC AUTO: 47.1 FL (ref 37–54)
EGFRCR SERPLBLD CKD-EPI 2021: 115.2 ML/MIN/1.73
EOSINOPHIL # BLD AUTO: 0.12 10*3/MM3 (ref 0–0.4)
EOSINOPHIL NFR BLD AUTO: 1.3 % (ref 0.3–6.2)
ERYTHROCYTE [DISTWIDTH] IN BLOOD BY AUTOMATED COUNT: 14 % (ref 12.3–15.4)
GLOBULIN UR ELPH-MCNC: 2.3 GM/DL
GLUCOSE SERPL-MCNC: 98 MG/DL (ref 65–99)
GLUCOSE UR STRIP-MCNC: ABNORMAL MG/DL
HCT VFR BLD AUTO: 43.1 % (ref 34–46.6)
HGB BLD-MCNC: 14.6 G/DL (ref 12–15.9)
HGB UR QL STRIP.AUTO: NEGATIVE
HOLD SPECIMEN: NORMAL
HOLD SPECIMEN: NORMAL
IMM GRANULOCYTES # BLD AUTO: 0.03 10*3/MM3 (ref 0–0.05)
IMM GRANULOCYTES NFR BLD AUTO: 0.3 % (ref 0–0.5)
KETONES UR QL STRIP: NEGATIVE
LEUKOCYTE ESTERASE UR QL STRIP.AUTO: NEGATIVE
LIPASE SERPL-CCNC: 36 U/L (ref 13–60)
LYMPHOCYTES # BLD AUTO: 3.29 10*3/MM3 (ref 0.7–3.1)
LYMPHOCYTES NFR BLD AUTO: 34.9 % (ref 19.6–45.3)
MCH RBC QN AUTO: 30.6 PG (ref 26.6–33)
MCHC RBC AUTO-ENTMCNC: 33.9 G/DL (ref 31.5–35.7)
MCV RBC AUTO: 90.4 FL (ref 79–97)
MONOCYTES # BLD AUTO: 0.62 10*3/MM3 (ref 0.1–0.9)
MONOCYTES NFR BLD AUTO: 6.6 % (ref 5–12)
NEUTROPHILS NFR BLD AUTO: 5.34 10*3/MM3 (ref 1.7–7)
NEUTROPHILS NFR BLD AUTO: 56.6 % (ref 42.7–76)
NITRITE UR QL STRIP: NEGATIVE
NRBC BLD AUTO-RTO: 0 /100 WBC (ref 0–0.2)
PH UR STRIP.AUTO: 6.5 [PH] (ref 5–8)
PLATELET # BLD AUTO: 328 10*3/MM3 (ref 140–450)
PMV BLD AUTO: 9.9 FL (ref 6–12)
POTASSIUM SERPL-SCNC: 4.2 MMOL/L (ref 3.5–5.2)
PROT SERPL-MCNC: 6.6 G/DL (ref 6–8.5)
PROT UR QL STRIP: NEGATIVE
RBC # BLD AUTO: 4.77 10*6/MM3 (ref 3.77–5.28)
SODIUM SERPL-SCNC: 138 MMOL/L (ref 136–145)
SP GR UR STRIP: 1.02 (ref 1–1.03)
UROBILINOGEN UR QL STRIP: ABNORMAL
WBC NRBC COR # BLD AUTO: 9.43 10*3/MM3 (ref 3.4–10.8)
WHOLE BLOOD HOLD COAG: NORMAL
WHOLE BLOOD HOLD SPECIMEN: NORMAL

## 2024-07-31 PROCEDURE — 96374 THER/PROPH/DIAG INJ IV PUSH: CPT

## 2024-07-31 PROCEDURE — 83690 ASSAY OF LIPASE: CPT | Performed by: STUDENT IN AN ORGANIZED HEALTH CARE EDUCATION/TRAINING PROGRAM

## 2024-07-31 PROCEDURE — 99285 EMERGENCY DEPT VISIT HI MDM: CPT

## 2024-07-31 PROCEDURE — 81003 URINALYSIS AUTO W/O SCOPE: CPT | Performed by: STUDENT IN AN ORGANIZED HEALTH CARE EDUCATION/TRAINING PROGRAM

## 2024-07-31 PROCEDURE — 63710000001 PROMETHAZINE PER 12.5 MG: Performed by: PHYSICIAN ASSISTANT

## 2024-07-31 PROCEDURE — 85025 COMPLETE CBC W/AUTO DIFF WBC: CPT | Performed by: STUDENT IN AN ORGANIZED HEALTH CARE EDUCATION/TRAINING PROGRAM

## 2024-07-31 PROCEDURE — 81025 URINE PREGNANCY TEST: CPT | Performed by: STUDENT IN AN ORGANIZED HEALTH CARE EDUCATION/TRAINING PROGRAM

## 2024-07-31 PROCEDURE — 25010000002 KETOROLAC TROMETHAMINE PER 15 MG: Performed by: STUDENT IN AN ORGANIZED HEALTH CARE EDUCATION/TRAINING PROGRAM

## 2024-07-31 PROCEDURE — 83605 ASSAY OF LACTIC ACID: CPT | Performed by: STUDENT IN AN ORGANIZED HEALTH CARE EDUCATION/TRAINING PROGRAM

## 2024-07-31 PROCEDURE — 25510000001 IOPAMIDOL 61 % SOLUTION: Performed by: STUDENT IN AN ORGANIZED HEALTH CARE EDUCATION/TRAINING PROGRAM

## 2024-07-31 PROCEDURE — 80053 COMPREHEN METABOLIC PANEL: CPT | Performed by: STUDENT IN AN ORGANIZED HEALTH CARE EDUCATION/TRAINING PROGRAM

## 2024-07-31 PROCEDURE — 74177 CT ABD & PELVIS W/CONTRAST: CPT

## 2024-07-31 RX ORDER — PROMETHAZINE HYDROCHLORIDE 12.5 MG/1
12.5 TABLET ORAL ONCE
Status: COMPLETED | OUTPATIENT
Start: 2024-07-31 | End: 2024-07-31

## 2024-07-31 RX ORDER — SODIUM CHLORIDE 0.9 % (FLUSH) 0.9 %
10 SYRINGE (ML) INJECTION AS NEEDED
Status: DISCONTINUED | OUTPATIENT
Start: 2024-07-31 | End: 2024-08-01 | Stop reason: HOSPADM

## 2024-07-31 RX ORDER — HYDROCODONE BITARTRATE AND ACETAMINOPHEN 5; 325 MG/1; MG/1
1 TABLET ORAL EVERY 8 HOURS PRN
Qty: 5 TABLET | Refills: 0 | Status: SHIPPED | OUTPATIENT
Start: 2024-07-31 | End: 2024-08-01 | Stop reason: SDUPTHER

## 2024-07-31 RX ORDER — ACETAMINOPHEN 500 MG
1000 TABLET ORAL EVERY 8 HOURS PRN
Qty: 60 TABLET | Refills: 0 | Status: SHIPPED | OUTPATIENT
Start: 2024-07-31 | End: 2025-07-31

## 2024-07-31 RX ORDER — KETOROLAC TROMETHAMINE 30 MG/ML
30 INJECTION, SOLUTION INTRAMUSCULAR; INTRAVENOUS ONCE
Status: COMPLETED | OUTPATIENT
Start: 2024-07-31 | End: 2024-07-31

## 2024-07-31 RX ORDER — ONDANSETRON 2 MG/ML
4 INJECTION INTRAMUSCULAR; INTRAVENOUS ONCE
Status: DISCONTINUED | OUTPATIENT
Start: 2024-07-31 | End: 2024-08-01 | Stop reason: HOSPADM

## 2024-07-31 RX ORDER — HYDROCODONE BITARTRATE AND ACETAMINOPHEN 5; 325 MG/1; MG/1
1 TABLET ORAL EVERY 8 HOURS PRN
Qty: 5 TABLET | Refills: 0 | Status: CANCELLED | OUTPATIENT
Start: 2024-07-31

## 2024-07-31 RX ADMIN — IOPAMIDOL 100 ML: 612 INJECTION, SOLUTION INTRAVENOUS at 21:05

## 2024-07-31 RX ADMIN — KETOROLAC TROMETHAMINE 30 MG: 30 INJECTION, SOLUTION INTRAMUSCULAR; INTRAVENOUS at 20:25

## 2024-07-31 RX ADMIN — PROMETHAZINE HYDROCHLORIDE 12.5 MG: 12.5 TABLET ORAL at 21:19

## 2024-07-31 NOTE — TELEPHONE ENCOUNTER
Patient wanted to inform you that she bled for 2 1/2 hours ( heavy) Is not bleeding now. States she is in a lot of pain and has no pain medication. Would like to schedule surgery ASAP       Thank You

## 2024-07-31 NOTE — ED PROVIDER NOTES
EMERGENCY DEPARTMENT ENCOUNTER    Pt Name: Lindsay Amezquita  MRN: [id number]  Pt :   1985  Room Number:  10/10  Date of encounter:  2024  PCP: Hunter Winkler MD  ED Provider: Cristian Frazier PA-C    Historian: Patient      HPI:  Chief Complaint   Patient presents with    Abdominal Pain          Context: Lindsay Amezquita is a 39 y.o. female who presents to the ED c/o right lower quadrant abdominal pain nausea vomiting vaginal bleeding.  Patient states in the early hours of the morning had acute onset right lower quadrant abdominal pain.  Think she may have an ovarian cyst rupture.  Called her OB/GYN and they called her and [place] but she did not get it filled yet as the initial pharmacy was called into did not have it it is since been called into another pharmacy.  No more vaginal bleeding.  No dysuria.  Normal BM this morning.      PAST MEDICAL HISTORY  Past Medical History:   Diagnosis Date    Allergic     Anxiety     Asthma Beings of copd with asthma    Back pain     Bell palsy 2021    Bell's palsy     Bipolar 2 disorder     Blood clot in vein     Behind left knee cap    COPD (chronic obstructive pulmonary disease) Six months ago    Deep vein thrombosis Last year    Depression     Diabetes mellitus November    Pre diabete    Frequent headaches     GERD (gastroesophageal reflux disease)     Hypertension     Every time i go into the emergacy room    Irritable bowel syndrome Two years ago    Kidney stone Two years ago    Migraine     Nausea, vomiting and diarrhea 2018    Obesity All of my life    Ovarian cyst Two years ago    Panic     PTSD (post-traumatic stress disorder)     Pulmonary embolism Not in lungs    Recurrent pregnancy loss, antepartum condition or complication Every since i have been 14 yars old    Restless leg syndrome     Sinus problem     Snores     Tattoos     Urinary tract infection Have them on and off    Varicella Little    Visual impairment Since i was little     Vitamin B12 deficiency     Wears glasses          PAST SURGICAL HISTORY  Past Surgical History:   Procedure Laterality Date    CHOLECYSTECTOMY      HYSTEROSCOPY N/A 3/14/2024    Procedure: HYSTEROSCOPY WITH MYOSURE, DILATION AND CURETTAGE WITH CERENE ABLATION;  Surgeon: David Ribeiro MD;  Location: [facility];  Service: Obstetrics/Gynecology;  Laterality: N/A;    MULTIPLE TOOTH EXTRACTIONS      All my teeth    WISDOM TOOTH EXTRACTION  All my teeth         FAMILY HISTORY  Family History   Problem Relation Age of Onset    Irritable bowel syndrome Mother     Heart disease Mother     Miscarriages / Stillbirths Mother     Arthritis Mother     COPD Mother     Learning disabilities Mother     Mental illness Mother     Asthma Mother     Irritable bowel syndrome Father     Alcohol abuse Father     Diabetes Father     Hyperlipidemia Father     Anxiety disorder Father     Learning disabilities Father     Colon cancer Maternal Grandfather     Stroke Paternal Grandfather     Stroke Paternal Grandmother          SOCIAL HISTORY  Social History     Socioeconomic History    Marital status: Single   Tobacco Use    Smoking status: Every Day     Current packs/day: 0.50     Average packs/day: 2.0 packs/day for 32.1 years (63.4 ttl pk-yrs)     Types: Cigarettes     Start date: 7/17/1992     Passive exposure: Current    Smokeless tobacco: Never    Tobacco comments:      Around 2.5 packs per day- 7/5/24   Vaping Use    Vaping status: Former    Passive vaping exposure: Yes   Substance and Sexual Activity    Alcohol use: Not Currently     Comment: rarely    Drug use: Not Currently    Sexual activity: Not Currently     Partners: Female     Birth control/protection: Other, Same-sex partner         ALLERGIES  Aspirin, Codeine, Cyclobenzaprine, Haldol [haloperidol], Imitrex [sumatriptan], Latex, Penicillins, Acetaminophen, Lamictal [lamotrigine], Metoclopramide, Morphine, Ondansetron, Oxycodone-acetaminophen, Oxycontin [oxycodone],  Prochlorperazine, and Tramadol        REVIEW OF SYSTEMS  Review of Systems   Constitutional: Negative.    HENT: Negative.     Eyes: Negative.    Respiratory: Negative.     Cardiovascular: Negative.    Gastrointestinal:  Positive for abdominal pain, nausea and vomiting. Negative for constipation and diarrhea.   Genitourinary:  Positive for vaginal bleeding. Negative for dysuria.   Musculoskeletal: Negative.    Skin: Negative.    Neurological: Negative.    Psychiatric/Behavioral: Negative.          All systems reviewed and negative except for those discussed in HPI.       PHYSICAL EXAM    I have reviewed the triage vital signs and nursing notes.    ED Triage Vitals [07/31/24 1940]   Temp Heart Rate Resp BP SpO2   98.5 °F (36.9 °C) 101 20 (!) 189/114 97 %      Temp src Heart Rate Source Patient Position BP Location FiO2 (%)   Oral Monitor Sitting Other (Comment) --       Physical Exam  Vitals and nursing note reviewed.   Constitutional:       General: She is not in acute distress.     Appearance: She is well-developed. She is obese. She is not ill-appearing, toxic-appearing or diaphoretic.   HENT:      Head: Normocephalic and atraumatic.   Eyes:      Extraocular Movements: Extraocular movements intact.   Cardiovascular:      Rate and Rhythm: Normal rate.   Pulmonary:      Effort: Pulmonary effort is normal.   Abdominal:      Palpations: Abdomen is soft.      Tenderness:  in the right lower quadrant There is no guarding or rebound.   Skin:     General: Skin is warm and dry.   Neurological:      General: No focal deficit present.      Mental Status: She is alert.   Psychiatric:         Mood and Affect: Mood normal.         Behavior: Behavior normal.            LAB RESULTS  Recent Results (from the past 24 hour(s))   Comprehensive Metabolic Panel    Collection Time: 07/31/24  8:16 PM    Specimen: Blood   Result Value Ref Range    Glucose 98 65 - 99 mg/dL    BUN 12 6 - 20 mg/dL    Creatinine 0.63 0.57 - 1.00 mg/dL     Sodium 138 136 - 145 mmol/L    Potassium 4.2 3.5 - 5.2 mmol/L    Chloride 102 98 - 107 mmol/L    CO2 24.3 22.0 - 29.0 mmol/L    Calcium 9.3 8.6 - 10.5 mg/dL    Total Protein 6.6 6.0 - 8.5 g/dL    Albumin 4.3 3.5 - 5.2 g/dL    ALT (SGPT) 105 (H) 1 - 33 U/L    AST (SGOT) 166 (H) 1 - 32 U/L    Alkaline Phosphatase 87 39 - 117 U/L    Total Bilirubin 0.4 0.0 - 1.2 mg/dL    Globulin 2.3 gm/dL    A/G Ratio 1.9 g/dL    BUN/Creatinine Ratio 19.0 7.0 - 25.0    Anion Gap 11.7 5.0 - 15.0 mmol/L    eGFR 115.2 >60.0 mL/min/1.73   Lipase    Collection Time: 07/31/24  8:16 PM    Specimen: Blood   Result Value Ref Range    Lipase 36 13 - 60 U/L   Lactic Acid, Plasma    Collection Time: 07/31/24  8:16 PM    Specimen: Blood   Result Value Ref Range    Lactate 1.1 0.5 - 2.0 mmol/L   Green Top (Gel)    Collection Time: 07/31/24  8:16 PM   Result Value Ref Range    Extra Tube Hold for add-ons.    Lavender Top    Collection Time: 07/31/24  8:16 PM   Result Value Ref Range    Extra Tube hold for add-on    Gold Top - SST    Collection Time: 07/31/24  8:16 PM   Result Value Ref Range    Extra Tube Hold for add-ons.    Light Blue Top    Collection Time: 07/31/24  8:16 PM   Result Value Ref Range    Extra Tube Hold for add-ons.    CBC Auto Differential    Collection Time: 07/31/24  8:16 PM    Specimen: Blood   Result Value Ref Range    WBC 9.43 3.40 - 10.80 10*3/mm3    RBC 4.77 3.77 - 5.28 10*6/mm3    Hemoglobin 14.6 12.0 - 15.9 g/dL    Hematocrit 43.1 34.0 - 46.6 %    MCV 90.4 79.0 - 97.0 fL    MCH 30.6 26.6 - 33.0 pg    MCHC 33.9 31.5 - 35.7 g/dL    RDW 14.0 12.3 - 15.4 %    RDW-SD 47.1 37.0 - 54.0 fl    MPV 9.9 6.0 - 12.0 fL    Platelets 328 140 - 450 10*3/mm3    Neutrophil % 56.6 42.7 - 76.0 %    Lymphocyte % 34.9 19.6 - 45.3 %    Monocyte % 6.6 5.0 - 12.0 %    Eosinophil % 1.3 0.3 - 6.2 %    Basophil % 0.3 0.0 - 1.5 %    Immature Grans % 0.3 0.0 - 0.5 %    Neutrophils, Absolute 5.34 1.70 - 7.00 10*3/mm3    Lymphocytes, Absolute 3.29 (H)  0.70 - 3.10 10*3/mm3    Monocytes, Absolute 0.62 0.10 - 0.90 10*3/mm3    Eosinophils, Absolute 0.12 0.00 - 0.40 10*3/mm3    Basophils, Absolute 0.03 0.00 - 0.20 10*3/mm3    Immature Grans, Absolute 0.03 0.00 - 0.05 10*3/mm3    nRBC 0.0 0.0 - 0.2 /100 WBC   Urinalysis With Microscopic If Indicated (No Culture) - Urine, Clean Catch    Collection Time: 07/31/24  8:46 PM    Specimen: Urine, Clean Catch   Result Value Ref Range    Color, UA Yellow Yellow, Straw    Appearance, UA Clear Clear    pH, UA 6.5 5.0 - 8.0    Specific Gravity, UA 1.017 1.005 - 1.030    Glucose, UA >=1000 mg/dL (3+) (A) Negative    Ketones, UA Negative Negative    Bilirubin, UA Negative Negative    Blood, UA Negative Negative    Protein, UA Negative Negative    Leuk Esterase, UA Negative Negative    Nitrite, UA Negative Negative    Urobilinogen, UA 0.2 E.U./dL 0.2 - 1.0 E.U./dL   Pregnancy, Urine - Urine, Clean Catch    Collection Time: 07/31/24  8:46 PM    Specimen: Urine, Clean Catch   Result Value Ref Range    HCG, Urine QL Negative Negative       If labs were ordered, I independently reviewed the results and considered them in treating the patient.        RADIOLOGY  CT Abdomen Pelvis With Contrast    Result Date: 7/31/2024  FINAL REPORT TECHNIQUE: null CLINICAL HISTORY: RLQ abd pain COMPARISON: null FINDINGS: CT abdomen and pelvis with contrast Comparison: 3/30/24, 3/17/24, 2/11/24, 2/3/24 and 2/10/23 Findings: No consolidation at the lung bases. Status post cholecystectomy. Cirrhotic liver. 1.8 cm left adrenal adenoma, unchanged. No hydronephrosis or nephrolithiasis. No focal decreased enhancement of the kidneys. Underdistended bladder. The other solid organs are unremarkable. No bowel wall thickening or dilation. A normal appendix is visualized measuring 6 mm, containing intraluminal air and without periappendiceal stranding. No aneurysm. No calcified atherosclerotic disease. No lymphadenopathy. No ascites. No acute osseous abnormality.  Grade 1 anterolisthesis of L4 on L5, degenerative.     Impression: No acute findings. Authenticated and Electronically Signed by Jessica Foster MD on 07/31/2024 09:44:01 PM         PROCEDURES    Procedures    Interpretations    O2 Sat: The patients oxygen saturation was 97% on Room Air.  This was independently interpreted by me as Normal    Radiology: I ordered and independently reviewed the above noted radiographic studies.  I viewed images of CT Abdomen/Pelvis which showed No intraabdominal process per my independent interpretation. See radiologist's dictation for official interpretation.       MEDICATIONS GIVEN IN ER    Medications   sodium chloride 0.9 % flush 10 mL (has no administration in time range)   ondansetron (ZOFRAN) injection 4 mg (4 mg Intravenous Not Given 7/31/24 2104)   ketorolac (TORADOL) injection 30 mg (30 mg Intravenous Given 7/31/24 2025)   iopamidol (ISOVUE-300) 61 % injection 100 mL (100 mL Intravenous Given 7/31/24 2105)   promethazine (PHENERGAN) tablet 12.5 mg (12.5 mg Oral Given 7/31/24 2119)         MEDICAL DECISION MAKING, PROGRESS, and CONSULTS    All labs, if obtained, have been independently reviewed by me.  All radiology studies, if obtained, have been reviewed by me and the radiologist dictating the report.  All EKG's, if obtained, have been independently viewed and interpreted by me      Discussion below represents my analysis of pertinent findings related to patient's condition, differential diagnosis, treatment plan and final disposition.      Differential diagnosis:    Appendicitis, ruptured ovarian cyst, constipation    Additional Sources:  None      Orders placed during this visit:  Orders Placed This Encounter   Procedures    CT Abdomen Pelvis With Contrast    [place] Draw    Comprehensive Metabolic Panel    Lipase    Urinalysis With Microscopic If Indicated (No Culture) - Urine, Clean Catch    Pregnancy, Urine - Urine, Clean Catch    Lactic Acid, Plasma    CBC Auto  Differential    NPO Diet NPO Type: Strict NPO    Undress & Gown    Insert Peripheral IV    CBC & Differential    Green Top (Gel)    Lavender Top    Gold Top - SST    Light Blue Top         Additional orders considered but not ordered:  None    ED Course:    Consultants:  None    ED Course as of 07/31/24 2155 Wed Jul 31, 2024 2153 Patient feeling much better at this time.  Blood pressure is normalized with therapy.  Has a prescription for hydrocodone sent in by her OB/GYN at the pharmacy will pick it up tomorrow.  She is requesting discharge home.  CT scan abdomen pelvis unremarkable. [TM]      ED Course User Index  [TM] Cristian Frazier PA-C           After my consideration of clinical presentation and any laboratory/radiology studies obtained, I discussed the findings with the patient/patient representative who is in agreement with the treatment plan and the final disposition. Risks and benefits of discharge were discussed.     AS OF 21:55 EDT VITALS:    BP - 115/78  HR - 99  TEMP - 98.5 °F (36.9 °C) (Oral)  O2 SATS - 95%    I reviewed the patients prescription monitoring report if available prior to discharge    DIAGNOSIS  Final diagnoses:   None         DISPOSITION  ED Disposition       None                Please note that portions of this document were completed with voice recognition software.        Cristian Frazier PA-C  07/31/24 2155

## 2024-07-31 NOTE — TELEPHONE ENCOUNTER
[facility] pharmacy is out of Norco 5mg patient requested to be resent to [facility], thanks.

## 2024-08-01 DIAGNOSIS — R10.2 PELVIC PAIN: ICD-10-CM

## 2024-08-01 DIAGNOSIS — G89.4 CHRONIC PAIN SYNDROME: ICD-10-CM

## 2024-08-01 RX ORDER — HYDROCODONE BITARTRATE AND ACETAMINOPHEN 5; 325 MG/1; MG/1
1 TABLET ORAL EVERY 8 HOURS PRN
Qty: 5 TABLET | Refills: 0 | Status: SHIPPED | OUTPATIENT
Start: 2024-08-01

## 2024-08-01 NOTE — TELEPHONE ENCOUNTER
Caller: JAMAAL [facility] DELIVERED    Relationship: Other    Best call back number: [phone]     What form or medical record are you requesting: CERTIFICATE OF MEDICAL NECESSITY     Who is requesting this form or medical record from you: JAMAAL [facility] DELIVERED   How would you like to receive the form: FAX: [phone]    Timeframe paperwork needed: ASAP PLEASE     Additional notes: [facility] ADVISED THEY FAXED THE REQUEST FOR THE CERTIFICATE OF MEDICAL NECESSITY FOR THE PATIENT'S AUTOMATIC BLOOD PRESSURE MONITOR ON 07/19/2024 AND AGAIN TODAY, 08/01/2024.    PLEASE CALL AND ADVISE.

## 2024-08-02 ENCOUNTER — TELEPHONE (OUTPATIENT)
Dept: OBSTETRICS AND GYNECOLOGY | Facility: CLINIC | Age: 39
End: 2024-08-02
Payer: MEDICAID

## 2024-08-02 ENCOUNTER — TELEPHONE (OUTPATIENT)
Dept: FAMILY MEDICINE CLINIC | Facility: CLINIC | Age: 39
End: 2024-08-02
Payer: MEDICAID

## 2024-08-02 DIAGNOSIS — G43.909 EPISODIC MIGRAINE: ICD-10-CM

## 2024-08-02 NOTE — TELEPHONE ENCOUNTER
Patient called and advised she needs different pain medication sent to pharmacy, her insurance will not cover the one sent.

## 2024-08-05 ENCOUNTER — TELEPHONE (OUTPATIENT)
Dept: BEHAVIORAL HEALTH | Facility: CLINIC | Age: 39
End: 2024-08-05
Payer: MEDICAID

## 2024-08-05 NOTE — TELEPHONE ENCOUNTER
Caller: Lindsay Amezquita ADELA    Relationship: Self    Best call back number:   Telephone Information:   Mobile [phone]         Requested Prescriptions:   Requested Prescriptions     Pending Prescriptions Disp Refills    butalbital-acetaminophen-caffeine (FIORICET, ESGIC) -40 MG per tablet [Pharmacy Med Name: [id number]-[id number] -40] 20 tablet 0     Sig: TAKE 1 TABLET BY MOUTH 2 (TWO) TIMES A DAY AS NEEDED FOR HEADACHE.        Pharmacy where request should be sent: [facility]/PHARMACY #[id number] - [address] [phone] [place] [phone]      Last office visit with prescribing clinician: 2/22/2024   Last telemedicine visit with prescribing clinician: Visit date not found   Next office visit with prescribing clinician: 8/22/2024     Additional details provided by patient: PT STATES SHE ONLY HAS TWO TABLETS LEFT, SHE STATES TODAY IS DAY SIX/NINE OF A HER CURRENT MIGRAINE.     Does the patient have less than a 3 day supply:  [x] Yes  [] No    Would you like a call back once the refill request has been completed: [] Yes [x] No    If the office needs to give you a call back, can they leave a voicemail: [] Yes [x] No    Kim Niño Rep   08/05/24 10:07 EDT

## 2024-08-05 NOTE — TELEPHONE ENCOUNTER
"Received a message Friday 8/2:  \"PT CALLED AND WANTED TO GET HER VIDEO VISIT RESCHEDULED FOR A TIME BEFORE 10 AM BECAUSE SHE HAS TO BE AT WORK BY THEN AND DOESN'T KNOW WHAT TIME SHE'S GOING TO BE OFF. I LOOKED THROUGH THE SCHEDULE BUT IT WAS BOOKED UP AND I TOLD PT I WAS NOT ALLOWED TO OVER BOOK WITHOUT CHECKING SO I TOLD HER MAO'S MA COULD AND  WOULD LOOK AT THE SCHEDULE AND SEE WHEN SHE COULD PUT HER ON BEFORE 10\"    Left voice message with patient to let her know if she wants an appointment before 10 am that we may have to schedule further out.    Patient was last seen 7/22 and is requesting a sooner appointment than 8/26.  "

## 2024-08-05 NOTE — TELEPHONE ENCOUNTER
PATIENT WANTED TO PASS A MESSAGE ALONG TO [facility] ABOUT THAT SHE DID NOT CANCEL HER LASTEST APPT, SHE SAYS SHE DOES NOT KNOW WHO DID THAT AND IS NEEDING TO SEE MAO TILLMAN OR SHE IS GOING TO GO CRAZY. I TRIED TO FIND HER A SOONER APPT BUT THERE WAS NONE AVAILABLE.

## 2024-08-06 ENCOUNTER — TELEPHONE (OUTPATIENT)
Dept: NEUROLOGY | Facility: CLINIC | Age: 39
End: 2024-08-06

## 2024-08-06 ENCOUNTER — TELEMEDICINE (OUTPATIENT)
Dept: FAMILY MEDICINE CLINIC | Facility: TELEHEALTH | Age: 39
End: 2024-08-06
Payer: MEDICAID

## 2024-08-06 DIAGNOSIS — G43.001 MIGRAINE WITHOUT AURA AND WITH STATUS MIGRAINOSUS, NOT INTRACTABLE: ICD-10-CM

## 2024-08-06 DIAGNOSIS — N89.8 VAGINAL ITCHING: ICD-10-CM

## 2024-08-06 DIAGNOSIS — R30.0 DYSURIA: Primary | ICD-10-CM

## 2024-08-06 RX ORDER — RIMEGEPANT SULFATE 75 MG/75MG
75 TABLET, ORALLY DISINTEGRATING ORAL EVERY OTHER DAY
Qty: 16 TABLET | Refills: 0 | OUTPATIENT
Start: 2024-08-06

## 2024-08-06 RX ORDER — FLUCONAZOLE 150 MG/1
150 TABLET ORAL ONCE
Qty: 2 TABLET | Refills: 0 | Status: SHIPPED | OUTPATIENT
Start: 2024-08-06 | End: 2024-08-06

## 2024-08-06 RX ORDER — SULFAMETHOXAZOLE AND TRIMETHOPRIM 800; 160 MG/1; MG/1
1 TABLET ORAL 2 TIMES DAILY
Qty: 14 TABLET | Refills: 0 | Status: SHIPPED | OUTPATIENT
Start: 2024-08-06 | End: 2024-08-13

## 2024-08-06 RX ORDER — BUTALBITAL, ACETAMINOPHEN AND CAFFEINE 50; 325; 40 MG/1; MG/1; MG/1
1 TABLET ORAL 2 TIMES DAILY PRN
Qty: 20 TABLET | Refills: 0 | Status: SHIPPED | OUTPATIENT
Start: 2024-08-06

## 2024-08-06 NOTE — TELEPHONE ENCOUNTER
Caller: Lindsay Amezquita    Relationship: Self    Best call back number: [phone]    Requested Prescriptions:   Requested Prescriptions     Pending Prescriptions Disp Refills    Rimegepant Sulfate (Nurtec) 75 MG tablet dispersible tablet 16 tablet 0     Sig: Take 1 tablet by mouth Every Other Day. Take Monday,Wednesday,Friday, and Saturday.        Pharmacy where request should be sent: [facility]/PHARMACY #[id number] - [address] [phone] [place] [phone]      Last office visit with prescribing clinician: 2/22/2024   Last telemedicine visit with prescribing clinician: Visit date not found   Next office visit with prescribing clinician: 8/27/2024     Additional details provided by patient: PT IS COMPLETELY OUT OF MEDICATION.  SHE WOULD LIKE THE AMOUNT SHE GETS EVERY MONTH INCREASED AS SHE IS HAVING MORE HEADACHES.  PT STATE SHE HAS HAD A HEADACHE FOR 7 DAYS, SHE HAS USED HER NOSE SPRAY AND BUTALBITAL, EXCEDRIN MIGRAINE AND EXCEDRIN TENSION    Does the patient have less than a 3 day supply:  [x] Yes  [] No    Would you like a call back once the refill request has been completed: [x] Yes [] No    If the office needs to give you a call back, can they leave a voicemail: [x] Yes [] No    Kim Sheppard Rep   08/06/24 13:28 EDT

## 2024-08-06 NOTE — PROGRESS NOTES
Subjective   Lindsay Amezquita is a 39 y.o. female.     History of Present Illness  She has burning with urination, vaginal itching. This has been going on for two days.  She has been on zpak recently for sinusitis.  She has been pushing water.        The following portions of the patient's history were reviewed and updated as appropriate: allergies, current medications, past family history, past medical history, past social history, past surgical history, and problem list.    Review of Systems   Constitutional:  Negative for fever (feels warm 99.3).   Gastrointestinal:  Negative for abdominal pain, nausea and vomiting.   Genitourinary:  Positive for dysuria, frequency, vaginal discharge and vaginal pain. Negative for hematuria.        Urine is dark   Musculoskeletal:  Positive for back pain.       Objective   Physical Exam  Constitutional:       General: She is not in acute distress.     Appearance: She is well-developed. She is not diaphoretic.   Pulmonary:      Effort: Pulmonary effort is normal.   Neurological:      Mental Status: She is alert and oriented to person, place, and time.   Psychiatric:         Behavior: Behavior normal.           Assessment & Plan   Diagnoses and all orders for this visit:    1. Dysuria (Primary)  -     sulfamethoxazole-trimethoprim (Bactrim DS) 800-160 MG per tablet; Take 1 tablet by mouth 2 (Two) Times a Day for 7 days.  Dispense: 14 tablet; Refill: 0    2. Vaginal itching  -     fluconazole (Diflucan) 150 MG tablet; Take 1 tablet by mouth 1 (One) Time for 1 dose. Can repeat in 72 hours if symptoms persist  Dispense: 2 tablet; Refill: 0      She was recently on antibiotics and has had large amounts of glucose in her urine before. Will treat her empirically for UTI and vaginitis, but if no improvement in 3 days she needs in-person evaluation.           The use of a video visit has been reviewed with the patient and verbal informed consent has been obtained. Myself and Lindsay Amezquita  participated in this visit. The patient is located in [place], [place]. I am located in [place], [place]. Access [employer] and [employer] Video Client were utilized. I spent 12 minutes in the patient's chart for this visit.

## 2024-08-06 NOTE — TELEPHONE ENCOUNTER
Provider: ROYA COVARRUBIAS    Caller: PATIENT    Relationship to Patient: SELF    Pharmacy: LISTED    Phone Number: [phone]    Reason for Call: PT CALLED TO GET HER NURTEC REFILLED.  PT STATES SHE HAS BEEN HAVING MORE FREQUENT HEADACHES AND WOULD LIKE HER NURTEC INCREASED.  SHE STATES WITH THE SEASON GETTING READY TO CHANGE SHE GETS MORE FREQUENT HEADACHES AND MORE ANXIETY.  PT STATES SHE IS NOW STARTING TO GO THROUGH MENOPAUSE ALSO.  PT STATES SHE HAS TAKEN ALL OF HER MIGRAINE MEDICATIONS, ALONG WITH EXCEDRIN MIGRAINE AND EXCEDRIN TENSION.  ADVISED FOR SEVERE HEADACHE TO GO TO ED.  PT STATES SHE HAS A HEADACHE NOW BUT IT IS JUST A DULL ACHE.      PLEASE REVIEW AND ADVISE     THANK YOU

## 2024-08-06 NOTE — TELEPHONE ENCOUNTER
Notified via [place] message insurance will only cover #16 Nurtec per month.  Medication sent 07/30/2024.

## 2024-08-07 ENCOUNTER — TELEPHONE (OUTPATIENT)
Dept: OBSTETRICS AND GYNECOLOGY | Facility: CLINIC | Age: 39
End: 2024-08-07
Payer: MEDICAID

## 2024-08-07 ENCOUNTER — TELEPHONE (OUTPATIENT)
Dept: OBSTETRICS AND GYNECOLOGY | Facility: CLINIC | Age: 39
End: 2024-08-07

## 2024-08-07 NOTE — PATIENT INSTRUCTIONS
Take antibiotic as prescribed  Increase fluid intake  Avoid caffeine and carbonation  If you develop fever or mid-back pain, go to ER  If symptoms worsen or do not improve in 48 hours, go to urgent care

## 2024-08-07 NOTE — TELEPHONE ENCOUNTER
Caller: Lindsay Amezquita    Relationship: Self    Best call back number: [phone]      Who are you requesting to speak with (clinical staff, DR. ROMERO      What was the call regarding: PATIENT CALLED FOR AN UPDATE ON HER MESSAGE FROM EARLIER TODAY, ALSO ADVISED THAT HER PAIN IS GETTING WORSE, WOULD LIKE TO SPEAK TO SOMEONE. PLEASE ASSIST

## 2024-08-07 NOTE — TELEPHONE ENCOUNTER
Patient states the pain medication is not helping at all. Would like to know when she can have surgery ? She would like to have it ASAP.      Thank You

## 2024-08-08 ENCOUNTER — TELEPHONE (OUTPATIENT)
Dept: FAMILY MEDICINE CLINIC | Facility: CLINIC | Age: 39
End: 2024-08-08

## 2024-08-08 NOTE — TELEPHONE ENCOUNTER
PATIENT CALLED WHAT OTHER OVER THE COUNTER MEDINE CAN SHE GET BESIDES TYLENOL AND IBUPORHRINE SHE IS IN PAIN AND HAVING SURGERY TALK ON AUG 27. SHE WOULD LIKE A CALL BACK PLEASE ABOUT THIS

## 2024-08-12 ENCOUNTER — APPOINTMENT (OUTPATIENT)
Dept: CT IMAGING | Facility: HOSPITAL | Age: 39
End: 2024-08-12
Payer: MEDICAID

## 2024-08-12 ENCOUNTER — APPOINTMENT (OUTPATIENT)
Dept: GENERAL RADIOLOGY | Facility: HOSPITAL | Age: 39
End: 2024-08-12
Payer: MEDICAID

## 2024-08-12 ENCOUNTER — TELEPHONE (OUTPATIENT)
Dept: NEUROLOGY | Facility: CLINIC | Age: 39
End: 2024-08-12

## 2024-08-12 ENCOUNTER — HOSPITAL ENCOUNTER (EMERGENCY)
Facility: HOSPITAL | Age: 39
Discharge: HOME | End: 2024-08-12
Attending: STUDENT IN AN ORGANIZED HEALTH CARE EDUCATION/TRAINING PROGRAM
Payer: MEDICAID

## 2024-08-12 VITALS
HEART RATE: 75 BPM | OXYGEN SATURATION: 93 % | BODY MASS INDEX: 48.28 KG/M2 | HEIGHT: 62 IN | DIASTOLIC BLOOD PRESSURE: 93 MMHG | SYSTOLIC BLOOD PRESSURE: 126 MMHG | WEIGHT: 262.35 LBS | TEMPERATURE: 98.3 F | RESPIRATION RATE: 16 BRPM

## 2024-08-12 DIAGNOSIS — G43.101 MIGRAINE WITH AURA AND WITH STATUS MIGRAINOSUS, NOT INTRACTABLE: Primary | ICD-10-CM

## 2024-08-12 LAB
ALBUMIN SERPL-MCNC: 3.9 G/DL (ref 3.5–5.2)
ALBUMIN/GLOB SERPL: 1.9 G/DL
ALP SERPL-CCNC: 60 U/L (ref 39–117)
ALT SERPL W P-5'-P-CCNC: 32 U/L (ref 1–33)
AMPHET+METHAMPHET UR QL: NEGATIVE
AMPHETAMINES UR QL: NEGATIVE
ANION GAP SERPL CALCULATED.3IONS-SCNC: 10.4 MMOL/L (ref 5–15)
AST SERPL-CCNC: 20 U/L (ref 1–32)
BARBITURATES UR QL SCN: POSITIVE
BASOPHILS # BLD AUTO: 0.03 10*3/MM3 (ref 0–0.2)
BASOPHILS NFR BLD AUTO: 0.4 % (ref 0–1.5)
BENZODIAZ UR QL SCN: POSITIVE
BILIRUB SERPL-MCNC: 0.3 MG/DL (ref 0–1.2)
BILIRUB UR QL STRIP: NEGATIVE
BUN SERPL-MCNC: 9 MG/DL (ref 6–20)
BUN/CREAT SERPL: 16.4 (ref 7–25)
BUPRENORPHINE SERPL-MCNC: NEGATIVE NG/ML
CALCIUM SPEC-SCNC: 8.7 MG/DL (ref 8.6–10.5)
CANNABINOIDS SERPL QL: NEGATIVE
CHLORIDE SERPL-SCNC: 102 MMOL/L (ref 98–107)
CLARITY UR: CLEAR
CO2 SERPL-SCNC: 23.6 MMOL/L (ref 22–29)
COCAINE UR QL: NEGATIVE
COLOR UR: YELLOW
CREAT SERPL-MCNC: 0.55 MG/DL (ref 0.57–1)
CRP SERPL-MCNC: 0.52 MG/DL (ref 0–0.5)
D-LACTATE SERPL-SCNC: 2 MMOL/L (ref 0.5–2)
DEPRECATED RDW RBC AUTO: 45.4 FL (ref 37–54)
EGFRCR SERPLBLD CKD-EPI 2021: 119 ML/MIN/1.73
EOSINOPHIL # BLD AUTO: 0.16 10*3/MM3 (ref 0–0.4)
EOSINOPHIL NFR BLD AUTO: 2.4 % (ref 0.3–6.2)
ERYTHROCYTE [DISTWIDTH] IN BLOOD BY AUTOMATED COUNT: 13.5 % (ref 12.3–15.4)
FENTANYL UR-MCNC: NEGATIVE NG/ML
FLUAV RNA RESP QL NAA+PROBE: NOT DETECTED
FLUBV RNA RESP QL NAA+PROBE: NOT DETECTED
GLOBULIN UR ELPH-MCNC: 2.1 GM/DL
GLUCOSE SERPL-MCNC: 117 MG/DL (ref 65–99)
GLUCOSE UR STRIP-MCNC: ABNORMAL MG/DL
HCT VFR BLD AUTO: 41.8 % (ref 34–46.6)
HGB BLD-MCNC: 14 G/DL (ref 12–15.9)
HGB UR QL STRIP.AUTO: NEGATIVE
HOLD SPECIMEN: NORMAL
HOLD SPECIMEN: NORMAL
IMM GRANULOCYTES # BLD AUTO: 0.02 10*3/MM3 (ref 0–0.05)
IMM GRANULOCYTES NFR BLD AUTO: 0.3 % (ref 0–0.5)
INR PPP: 0.99 (ref 0.9–1.1)
KETONES UR QL STRIP: NEGATIVE
LEUKOCYTE ESTERASE UR QL STRIP.AUTO: NEGATIVE
LYMPHOCYTES # BLD AUTO: 2.84 10*3/MM3 (ref 0.7–3.1)
LYMPHOCYTES NFR BLD AUTO: 41.8 % (ref 19.6–45.3)
MAGNESIUM SERPL-MCNC: 2 MG/DL (ref 1.6–2.6)
MCH RBC QN AUTO: 30.7 PG (ref 26.6–33)
MCHC RBC AUTO-ENTMCNC: 33.5 G/DL (ref 31.5–35.7)
MCV RBC AUTO: 91.7 FL (ref 79–97)
METHADONE UR QL SCN: NEGATIVE
MONOCYTES # BLD AUTO: 0.43 10*3/MM3 (ref 0.1–0.9)
MONOCYTES NFR BLD AUTO: 6.3 % (ref 5–12)
NEUTROPHILS NFR BLD AUTO: 3.31 10*3/MM3 (ref 1.7–7)
NEUTROPHILS NFR BLD AUTO: 48.8 % (ref 42.7–76)
NITRITE UR QL STRIP: NEGATIVE
NRBC BLD AUTO-RTO: 0 /100 WBC (ref 0–0.2)
NT-PROBNP SERPL-MCNC: <36 PG/ML (ref 0–450)
OPIATES UR QL: POSITIVE
OXYCODONE UR QL SCN: NEGATIVE
PCP UR QL SCN: NEGATIVE
PH UR STRIP.AUTO: 5.5 [PH] (ref 5–8)
PLATELET # BLD AUTO: 242 10*3/MM3 (ref 140–450)
PMV BLD AUTO: 9.7 FL (ref 6–12)
POTASSIUM SERPL-SCNC: 4.5 MMOL/L (ref 3.5–5.2)
PROCALCITONIN SERPL-MCNC: 0.04 NG/ML (ref 0–0.25)
PROT SERPL-MCNC: 6 G/DL (ref 6–8.5)
PROT UR QL STRIP: NEGATIVE
PROTHROMBIN TIME: 13.6 SECONDS (ref 12.3–15.1)
RBC # BLD AUTO: 4.56 10*6/MM3 (ref 3.77–5.28)
SARS-COV-2 RNA RESP QL NAA+PROBE: NOT DETECTED
SODIUM SERPL-SCNC: 136 MMOL/L (ref 136–145)
SP GR UR STRIP: >1.03 (ref 1–1.03)
TRICYCLICS UR QL SCN: POSITIVE
TROPONIN T SERPL HS-MCNC: 6 NG/L
UROBILINOGEN UR QL STRIP: ABNORMAL
WBC NRBC COR # BLD AUTO: 6.79 10*3/MM3 (ref 3.4–10.8)
WHOLE BLOOD HOLD COAG: NORMAL
WHOLE BLOOD HOLD SPECIMEN: NORMAL

## 2024-08-12 PROCEDURE — 81003 URINALYSIS AUTO W/O SCOPE: CPT | Performed by: STUDENT IN AN ORGANIZED HEALTH CARE EDUCATION/TRAINING PROGRAM

## 2024-08-12 PROCEDURE — 83880 ASSAY OF NATRIURETIC PEPTIDE: CPT | Performed by: STUDENT IN AN ORGANIZED HEALTH CARE EDUCATION/TRAINING PROGRAM

## 2024-08-12 PROCEDURE — 25010000002 DIPHENHYDRAMINE PER 50 MG: Performed by: STUDENT IN AN ORGANIZED HEALTH CARE EDUCATION/TRAINING PROGRAM

## 2024-08-12 PROCEDURE — 93005 ELECTROCARDIOGRAM TRACING: CPT | Performed by: STUDENT IN AN ORGANIZED HEALTH CARE EDUCATION/TRAINING PROGRAM

## 2024-08-12 PROCEDURE — 87636 SARSCOV2 & INF A&B AMP PRB: CPT | Performed by: STUDENT IN AN ORGANIZED HEALTH CARE EDUCATION/TRAINING PROGRAM

## 2024-08-12 PROCEDURE — 99285 EMERGENCY DEPT VISIT HI MDM: CPT

## 2024-08-12 PROCEDURE — 85610 PROTHROMBIN TIME: CPT | Performed by: STUDENT IN AN ORGANIZED HEALTH CARE EDUCATION/TRAINING PROGRAM

## 2024-08-12 PROCEDURE — 70496 CT ANGIOGRAPHY HEAD: CPT

## 2024-08-12 PROCEDURE — 86140 C-REACTIVE PROTEIN: CPT | Performed by: STUDENT IN AN ORGANIZED HEALTH CARE EDUCATION/TRAINING PROGRAM

## 2024-08-12 PROCEDURE — 96365 THER/PROPH/DIAG IV INF INIT: CPT

## 2024-08-12 PROCEDURE — 96375 TX/PRO/DX INJ NEW DRUG ADDON: CPT

## 2024-08-12 PROCEDURE — 71045 X-RAY EXAM CHEST 1 VIEW: CPT

## 2024-08-12 PROCEDURE — 25010000002 DROPERIDOL PER 5 MG: Performed by: STUDENT IN AN ORGANIZED HEALTH CARE EDUCATION/TRAINING PROGRAM

## 2024-08-12 PROCEDURE — 0042T HC CT CEREBRAL PERFUSION W/WO CONTRAST: CPT

## 2024-08-12 PROCEDURE — 85025 COMPLETE CBC W/AUTO DIFF WBC: CPT | Performed by: STUDENT IN AN ORGANIZED HEALTH CARE EDUCATION/TRAINING PROGRAM

## 2024-08-12 PROCEDURE — 83605 ASSAY OF LACTIC ACID: CPT | Performed by: STUDENT IN AN ORGANIZED HEALTH CARE EDUCATION/TRAINING PROGRAM

## 2024-08-12 PROCEDURE — 84145 PROCALCITONIN (PCT): CPT | Performed by: STUDENT IN AN ORGANIZED HEALTH CARE EDUCATION/TRAINING PROGRAM

## 2024-08-12 PROCEDURE — 25010000002 MAGNESIUM SULFATE 2 GM/50ML SOLUTION: Performed by: STUDENT IN AN ORGANIZED HEALTH CARE EDUCATION/TRAINING PROGRAM

## 2024-08-12 PROCEDURE — 83735 ASSAY OF MAGNESIUM: CPT | Performed by: STUDENT IN AN ORGANIZED HEALTH CARE EDUCATION/TRAINING PROGRAM

## 2024-08-12 PROCEDURE — 80307 DRUG TEST PRSMV CHEM ANLYZR: CPT | Performed by: STUDENT IN AN ORGANIZED HEALTH CARE EDUCATION/TRAINING PROGRAM

## 2024-08-12 PROCEDURE — 70450 CT HEAD/BRAIN W/O DYE: CPT

## 2024-08-12 PROCEDURE — 80053 COMPREHEN METABOLIC PANEL: CPT | Performed by: STUDENT IN AN ORGANIZED HEALTH CARE EDUCATION/TRAINING PROGRAM

## 2024-08-12 PROCEDURE — 84484 ASSAY OF TROPONIN QUANT: CPT | Performed by: STUDENT IN AN ORGANIZED HEALTH CARE EDUCATION/TRAINING PROGRAM

## 2024-08-12 PROCEDURE — 25810000003 SODIUM CHLORIDE 0.9 % SOLUTION: Performed by: STUDENT IN AN ORGANIZED HEALTH CARE EDUCATION/TRAINING PROGRAM

## 2024-08-12 PROCEDURE — 70498 CT ANGIOGRAPHY NECK: CPT

## 2024-08-12 PROCEDURE — 25510000001 IOPAMIDOL 61 % SOLUTION: Performed by: STUDENT IN AN ORGANIZED HEALTH CARE EDUCATION/TRAINING PROGRAM

## 2024-08-12 RX ORDER — DROPERIDOL 2.5 MG/ML
2.5 INJECTION, SOLUTION INTRAMUSCULAR; INTRAVENOUS ONCE
Status: COMPLETED | OUTPATIENT
Start: 2024-08-12 | End: 2024-08-12

## 2024-08-12 RX ORDER — SODIUM CHLORIDE 0.9 % (FLUSH) 0.9 %
10 SYRINGE (ML) INJECTION AS NEEDED
Status: DISCONTINUED | OUTPATIENT
Start: 2024-08-12 | End: 2024-08-12 | Stop reason: HOSPADM

## 2024-08-12 RX ORDER — MAGNESIUM SULFATE HEPTAHYDRATE 40 MG/ML
2 INJECTION, SOLUTION INTRAVENOUS ONCE
Status: COMPLETED | OUTPATIENT
Start: 2024-08-12 | End: 2024-08-12

## 2024-08-12 RX ORDER — DIPHENHYDRAMINE HYDROCHLORIDE 50 MG/ML
25 INJECTION INTRAMUSCULAR; INTRAVENOUS ONCE
Status: COMPLETED | OUTPATIENT
Start: 2024-08-12 | End: 2024-08-12

## 2024-08-12 RX ADMIN — MAGNESIUM SULFATE HEPTAHYDRATE 2 G: 40 INJECTION, SOLUTION INTRAVENOUS at 14:14

## 2024-08-12 RX ADMIN — IOPAMIDOL 50 ML: 612 INJECTION, SOLUTION INTRAVENOUS at 13:44

## 2024-08-12 RX ADMIN — SODIUM CHLORIDE 1000 ML: 9 INJECTION, SOLUTION INTRAVENOUS at 14:14

## 2024-08-12 RX ADMIN — DROPERIDOL 2.5 MG: 2.5 INJECTION, SOLUTION INTRAMUSCULAR; INTRAVENOUS at 14:13

## 2024-08-12 RX ADMIN — IOPAMIDOL 100 ML: 612 INJECTION, SOLUTION INTRAVENOUS at 13:43

## 2024-08-12 RX ADMIN — DIPHENHYDRAMINE HYDROCHLORIDE 25 MG: 50 INJECTION, SOLUTION INTRAMUSCULAR; INTRAVENOUS at 14:14

## 2024-08-12 NOTE — TELEPHONE ENCOUNTER
Caller: BEATA Cho call back number: [phone]       What was the call regarding: PT BLURED VISION AND SLURRED  SPEECH . LOWER BACK HURTS RT SIDE NUMB TINGLING ARM/LEGS RIGHT SIDE  FACE DROPPED RT SIDE INFORMED GO TO E.R OFFERED TO CALL SAID DONT WANT TO GO BY AMBULANCE. CALLED OFFICE TOLD JUAN WHAT WAS GOING ON SAID PUT ENCOUNTER IN .     Is it okay if the provider responds through MyChart: WILL BE GOING TO [email or web address]     INFORMED PT TO GO TO  E.R HAS SEVERAL RED FLAG SIGNS       PLEASE ADVISE.

## 2024-08-12 NOTE — Clinical Note
[facility] EMERGENCY DEPARTMENT  [address] [address]  Phone: [phone]    Lindsay Amezquita was seen and treated in our emergency department on 8/12/2024.  She may return to work on 08/15/2024.         Thank you for choosing [facility].    Meek Daniels MD

## 2024-08-12 NOTE — ED PROVIDER NOTES
Subjective:  History of Present Illness:    Patient is a 39-year-old female history of bipolar disorder, COPD, DVT, diabetes mellitus, migraine headaches, irritable bowel syndrome, nephrolithiasis, PTSD, panic disorder, restless leg syndrome, obesity who presents today with right sided paresthesias and headache.  Reports onset of symptoms last night at 9 PM.  States that she took her rescue medications for migraines and this did not improve.  Woke up this morning still had similar symptoms, they persisted throughout the day and she now presents to our emergency department for evaluation.  Endorsing right-sided weakness not appreciated on exam.  Denies any fevers.  States that she has had some cough congestion and shortness of breath prior to arrival.  Denies chest pain.  No abdominal pain nausea vomiting or diarrhea.  Denies dysuria.  Denies any abnormal vaginal bleeding or discharge.  No leg swelling or leg pain.      Nurses Notes reviewed and agree, including vitals, allergies, social history and prior medical history.     REVIEW OF SYSTEMS: All systems reviewed and not pertinent unless noted.  Review of Systems   Constitutional:  Positive for activity change. Negative for appetite change, chills, fatigue and fever.   HENT:  Negative for rhinorrhea, sinus pressure and sinus pain.    Eyes:  Negative for discharge and itching.   Respiratory:  Negative for cough and shortness of breath.    Cardiovascular:  Negative for chest pain and leg swelling.   Gastrointestinal:  Negative for abdominal distention, abdominal pain, nausea and vomiting.   Endocrine: Negative for cold intolerance and heat intolerance.   Genitourinary:  Negative for decreased urine volume, difficulty urinating, flank pain, frequency, urgency, vaginal bleeding, vaginal discharge and vaginal pain.   Musculoskeletal:  Negative for gait problem, neck pain and neck stiffness.   Skin:  Negative for color change.   Allergic/Immunologic: Negative for  environmental allergies.   Neurological:  Positive for numbness and headaches. Negative for seizures, syncope, facial asymmetry and speech difficulty.   Psychiatric/Behavioral:  Negative for self-injury and suicidal ideas.        Past Medical History:   Diagnosis Date    Allergic     Anxiety     Asthma Beings of copd with asthma    Back pain     Bell palsy 07/06/2021    Bell's palsy     Bipolar 2 disorder     Blood clot in vein     Behind left knee cap    COPD (chronic obstructive pulmonary disease) Six months ago    Deep vein thrombosis Last year    Depression     Diabetes mellitus November    Pre diabete    Frequent headaches     GERD (gastroesophageal reflux disease)     Hypertension     Every time i go into the [facility] room    Irritable bowel syndrome Two years ago    Kidney stone Two years ago    Migraine     Nausea, vomiting and diarrhea 09/17/2018    Obesity All of my life    Ovarian cyst Two years ago    Panic     PTSD (post-traumatic stress disorder)     Pulmonary embolism Not in lungs    Recurrent pregnancy loss, antepartum condition or complication Every since i have been 14 yars old    Restless leg syndrome     Sinus problem     Snores     Tattoos     Urinary tract infection Have them on and off    Varicella Little    Visual impairment Since i was little    Vitamin B12 deficiency     Wears glasses        Allergies:    Aspirin, Codeine, Cyclobenzaprine, Haldol [haloperidol], Imitrex [sumatriptan], Latex, Penicillins, Acetaminophen, Lamictal [lamotrigine], Metoclopramide, Morphine, Ondansetron, Oxycodone-acetaminophen, Oxycontin [oxycodone], Prochlorperazine, and Tramadol      Past Surgical History:   Procedure Laterality Date    CHOLECYSTECTOMY      HYSTEROSCOPY N/A 3/14/2024    Procedure: HYSTEROSCOPY WITH MYOSURE, DILATION AND CURETTAGE WITH CERENE ABLATION;  Surgeon: David Ribeiro MD;  Location: [facility];  Service: Obstetrics/Gynecology;  Laterality: N/A;    MULTIPLE TOOTH EXTRACTIONS      All  "my teeth    WISDOM TOOTH EXTRACTION  All my teeth         Social History     Socioeconomic History    Marital status: Single   Tobacco Use    Smoking status: Every Day     Current packs/day: 0.50     Average packs/day: 2.0 packs/day for 32.2 years (63.4 ttl pk-yrs)     Types: Cigarettes     Start date: 7/17/1992     Passive exposure: Current    Smokeless tobacco: Never    Tobacco comments:      Around 2.5 packs per day- 7/5/24   Vaping Use    Vaping status: Former    Passive vaping exposure: Yes   Substance and Sexual Activity    Alcohol use: Not Currently     Comment: rarely    Drug use: Not Currently    Sexual activity: Not Currently     Partners: Female     Birth control/protection: Other, Same-sex partner         Family History   Problem Relation Age of Onset    Irritable bowel syndrome Mother     Heart disease Mother     Miscarriages / Stillbirths Mother     Arthritis Mother     COPD Mother     Learning disabilities Mother     Mental illness Mother     Asthma Mother     Irritable bowel syndrome Father     Alcohol abuse Father     Diabetes Father     Hyperlipidemia Father     Anxiety disorder Father     Learning disabilities Father     Colon cancer Maternal Grandfather     Stroke Paternal Grandfather     Stroke Paternal Grandmother        Objective  Physical Exam:  /93   Pulse 75   Temp 98.3 °F (36.8 °C) (Oral)   Resp 16   Ht 157.5 cm (62\")   Wt 119 kg (262 lb 5.6 oz)   SpO2 93%   BMI 47.98 kg/m²      Physical Exam  Constitutional:       General: She is not in acute distress.     Appearance: Normal appearance. She is obese. She is not ill-appearing.   HENT:      Head: Normocephalic and atraumatic.      Nose: Nose normal. No congestion or rhinorrhea.      Mouth/Throat:      Mouth: Mucous membranes are dry.      Pharynx: Oropharynx is clear. No oropharyngeal exudate or posterior oropharyngeal erythema.   Eyes:      Extraocular Movements: Extraocular movements intact.      Conjunctiva/sclera: " Conjunctivae normal.      Pupils: Pupils are equal, round, and reactive to light.   Cardiovascular:      Rate and Rhythm: Normal rate and regular rhythm.      Pulses: Normal pulses.      Heart sounds: Normal heart sounds.   Pulmonary:      Effort: Pulmonary effort is normal. No respiratory distress.      Breath sounds: Normal breath sounds.   Abdominal:      General: Abdomen is flat. Bowel sounds are normal. There is no distension.      Palpations: Abdomen is soft.      Tenderness: There is no abdominal tenderness.   Musculoskeletal:         General: No swelling or tenderness. Normal range of motion.      Cervical back: Normal range of motion and neck supple.   Skin:     General: Skin is warm and dry.      Capillary Refill: Capillary refill takes less than 2 seconds.   Neurological:      General: No focal deficit present.      Mental Status: She is alert and oriented to person, place, and time. Mental status is at baseline.      Cranial Nerves: No cranial nerve deficit.      Sensory: No sensory deficit.      Motor: Weakness present.      Coordination: Coordination normal.      Comments: Endorsing paresthesias down her entire right side.  Involving both the upper and lower extremity.  Also states this extends to her face.  Endorsing weakness to that side but no focal weakness noted on my exam.   Psychiatric:         Mood and Affect: Mood normal.         Behavior: Behavior normal.         Thought Content: Thought content normal.         Judgment: Judgment normal.         Procedures    ED Course:    ED Course as of 08/12/24 2311   Mon Aug 12, 2024   1334 EKG interpreted by me, normal sinus rhythm no concerning ST changes noted, rate of 88 [JE]   1351 No acute process per my independent interpretation of chest x-ray per radiology overread, no acute process per my independent interpretation of CT head Noncon [JE]      ED Course User Index  [JE] Meek Daniels MD       Lab Results (last 24 hours)       Procedure  Component Value Units Date/Time    COVID-19 and FLU A/B PCR, 1 HR TAT - Swab, Nasopharynx [[id number]]  (Normal) Collected: 08/12/24 1321    Specimen: Swab from Nasopharynx Updated: 08/12/24 1343     COVID19 Not Detected     Influenza A PCR Not Detected     Influenza B PCR Not Detected    Narrative:      Fact sheet for providers: https://www.fda.gov/media/918115/download    Fact sheet for patients: https://www.fda.gov/media/781487/download    Test performed by PCR.    CBC & Differential [[id number]]  (Normal) Collected: 08/12/24 1357    Specimen: Blood Updated: 08/12/24 1407    Narrative:      The following orders were created for panel order CBC & Differential.  Procedure                               Abnormality         Status                     ---------                               -----------         ------                     CBC Auto Differential[[id number]]        Normal              Final result                 Please view results for these tests on the individual orders.    Comprehensive Metabolic Panel [[id number]]  (Abnormal) Collected: 08/12/24 1357    Specimen: Blood Updated: 08/12/24 1433     Glucose 117 mg/dL      BUN 9 mg/dL      Creatinine 0.55 mg/dL      Sodium 136 mmol/L      Potassium 4.5 mmol/L      Chloride 102 mmol/L      CO2 23.6 mmol/L      Calcium 8.7 mg/dL      Total Protein 6.0 g/dL      Albumin 3.9 g/dL      ALT (SGPT) 32 U/L      AST (SGOT) 20 U/L      Alkaline Phosphatase 60 U/L      Total Bilirubin 0.3 mg/dL      Globulin 2.1 gm/dL      A/G Ratio 1.9 g/dL      BUN/Creatinine Ratio 16.4     Anion Gap 10.4 mmol/L      eGFR 119.0 mL/min/1.73     Narrative:      GFR Normal >60  Chronic Kidney Disease <60  Kidney Failure <15      High Sensitivity Troponin T [[id number]]  (Normal) Collected: 08/12/24 1357    Specimen: Blood Updated: 08/12/24 1437     HS Troponin T 6 ng/L     Narrative:      High Sensitive Troponin T Reference Range:  <14.0 ng/L- Negative Female for AMI  <22.0 ng/L- Negative  Male for AMI  >=14 - Abnormal Female indicating possible myocardial injury.  >=22 - Abnormal Male indicating possible myocardial injury.   Clinicians would have to utilize clinical acumen, EKG, Troponin, and serial changes to determine if it is an Acute Myocardial Infarction or myocardial injury due to an underlying chronic condition.         BNP [[id number]]  (Normal) Collected: 08/12/24 1357    Specimen: Blood Updated: 08/12/24 1504     proBNP <36.0 pg/mL     Narrative:      This assay is used as an aid in the diagnosis of individuals suspected of having heart failure. It can be used as an aid in the diagnosis of acute decompensated heart failure (ADHF) in patients presenting with signs and symptoms of ADHF to the emergency department (ED). In addition, NT-proBNP of <300 pg/mL indicates ADHF is not likely.    Age Range Result Interpretation  NT-proBNP Concentration (pg/mL:      <50             Positive            >450                   Gray                 300-450                    Negative             <300    50-75           Positive            >900                  Gray                300-900                  Negative            <300      >75             Positive            >1800                  Gray                300-1800                  Negative            <300    C-reactive Protein [[id number]]  (Abnormal) Collected: 08/12/24 1357    Specimen: Blood Updated: 08/12/24 1433     C-Reactive Protein 0.52 mg/dL     Lactic Acid, Plasma [[id number]]  (Normal) Collected: 08/12/24 1357    Specimen: Blood Updated: 08/12/24 1431     Lactate 2.0 mmol/L     Magnesium [[id number]]  (Normal) Collected: 08/12/24 1357    Specimen: Blood Updated: 08/12/24 1433     Magnesium 2.0 mg/dL     CBC Auto Differential [[id number]]  (Normal) Collected: 08/12/24 1357    Specimen: Blood Updated: 08/12/24 1407     WBC 6.79 10*3/mm3      RBC 4.56 10*6/mm3      Hemoglobin 14.0 g/dL      Hematocrit 41.8 %      MCV 91.7 fL      MCH 30.7 pg       "MCHC 33.5 g/dL      RDW 13.5 %      RDW-SD 45.4 fl      MPV 9.7 fL      Platelets 242 10*3/mm3      Neutrophil % 48.8 %      Lymphocyte % 41.8 %      Monocyte % 6.3 %      Eosinophil % 2.4 %      Basophil % 0.4 %      Immature Grans % 0.3 %      Neutrophils, Absolute 3.31 10*3/mm3      Lymphocytes, Absolute 2.84 10*3/mm3      Monocytes, Absolute 0.43 10*3/mm3      Eosinophils, Absolute 0.16 10*3/mm3      Basophils, Absolute 0.03 10*3/mm3      Immature Grans, Absolute 0.02 10*3/mm3      nRBC 0.0 /100 WBC     Protime-INR [[id number]]  (Normal) Collected: 08/12/24 1357    Specimen: Blood Updated: 08/12/24 1431     Protime 13.6 Seconds      INR 0.99    Narrative:      Suggested INR therapeutic range for stable oral anticoagulant therapy:    Low Intensity therapy:   1.5-2.0  Moderate Intensity therapy:   2.0-3.0  High Intensity therapy:   2.5-4.0    Procalcitonin [[id number]]  (Normal) Collected: 08/12/24 1403    Specimen: Blood Updated: 08/12/24 1441     Procalcitonin 0.04 ng/mL     Narrative:      As a Marker for Sepsis (Non-Neonates):    1. <0.5 ng/mL represents a low risk of severe sepsis and/or septic shock.  2. >2 ng/mL represents a high risk of severe sepsis and/or septic shock.    As a Marker for Lower Respiratory Tract Infections that require antibiotic therapy:    PCT on Admission    Antibiotic Therapy       6-12 Hrs later    >0.5                Strongly Recommended  >0.25 - <0.5        Recommended   0.1 - 0.25          Discouraged              Remeasure/reassess PCT  <0.1                Strongly Discouraged     Remeasure/reassess PCT    As 28 day mortality risk marker: \"Change in Procalcitonin Result\" (>80% or <=80%) if Day 0 (or Day 1) and Day 4 values are available. Refer to [email or web address]    Change in PCT <=80%  A decrease of PCT levels below or equal to 80% defines a positive change in PCT test result representing a higher risk for 28-day all-cause mortality of patients diagnosed with " severe sepsis for septic shock.    Change in PCT >80%  A decrease of PCT levels of more than 80% defines a negative change in PCT result representing a lower risk for 28-day all-cause mortality of patients diagnosed with severe sepsis or septic shock.       Urinalysis With Culture If Indicated - Urine, Clean Catch [[id number]]  (Abnormal) Collected: 08/12/24 1418    Specimen: Urine, Clean Catch Updated: 08/12/24 1425     Color, UA Yellow     Appearance, UA Clear     pH, UA 5.5     Specific Gravity, UA >1.030     Glucose, UA >=1000 mg/dL (3+)     Ketones, UA Negative     Bilirubin, UA Negative     Blood, UA Negative     Protein, UA Negative     Leuk Esterase, UA Negative     Nitrite, UA Negative     Urobilinogen, UA 0.2 E.U./dL    Narrative:      In absence of clinical symptoms, the presence of pyuria, bacteria, and/or nitrites on the urinalysis result does not correlate with infection.  Urine microscopic not indicated.    Urine Drug Screen - Urine, Clean Catch [[id number]]  (Abnormal) Collected: 08/12/24 1418    Specimen: Urine, Clean Catch Updated: 08/12/24 1435     THC, Screen, Urine Negative     Phencyclidine (PCP), Urine Negative     Cocaine Screen, Urine Negative     Methamphetamine, Ur Negative     Opiate Screen Positive     Amphetamine Screen, Urine Negative     Benzodiazepine Screen, Urine Positive     Tricyclic Antidepressants Screen Positive     Methadone Screen, Urine Negative     Barbiturates Screen, Urine Positive     Oxycodone Screen, Urine Negative     Buprenorphine, Screen, Urine Negative    Narrative:      Cutoff For Drugs Screened:    Amphetamines               500 ng/ml  Barbiturates               200 ng/ml  Benzodiazepines            150 ng/ml  Cocaine                    150 ng/ml  Methadone                  200 ng/ml  Opiates                    100 ng/ml  Phencyclidine               25 ng/ml  THC                         50 ng/ml  Methamphetamine            500 ng/ml  Tricyclic Antidepressants  300  ng/ml  Oxycodone                  100 ng/ml  Buprenorphine               10 ng/ml    The normal value for all drugs tested is negative. This report includes unconfirmed screening results, with the cutoff values listed, to be used for medical treatment purposes only.  Unconfirmed results must not be used for non-medical purposes such as employment or legal testing.  Clinical consideration should be applied to any drug of abuse test, particularly when unconfirmed results are used.      Fentanyl, Urine - Urine, Clean Catch [[id number]]  (Normal) Collected: 08/12/24 1418    Specimen: Urine, Clean Catch Updated: 08/12/24 1450     Fentanyl, Urine Negative    Narrative:      Negative Threshold:      Fentanyl 5 ng/mL     The normal value for the drug tested is negative. This report includes final unconfirmed screening results to be used for medical treatment purposes only. Unconfirmed results must not be used for non-medical purposes such as employment or legal testing. Clinical consideration should be applied to any drug of abuse test, particularly when unconfirmed results are used.                    CT Angiogram Neck    Result Date: 8/12/2024  PROCEDURE: CT ANGIOGRAM NECK-  HISTORY: Stroke, follow up, facial droop. Symptoms of CVA.  TECHNIQUE: Thin section axial CT with IV contrast supplemented with multiplanar reconstruction.   3 D reconstructions were performed on a separate workstation.  NASCET criteria and technique was utilized during interpretation.  FINDINGS:  Aortic arch:  Arch shows no significant narrowing. Great vessel origins are widely patent.  Right carotid:  No significant stenosis is seen of the cervical common or internal carotid artery.  Left carotid:  No significant stenosis is seen of the cervical common or internal carotid artery.  Vertebrals: The vertebral arteries are codominant. No significant stenosis is present.       Impression: No evidence of cervical carotid send   This study was performed  with techniques to keep radiation doses as low as reasonably achievable (ALARA). Individualized dose reduction techniques using automated exposure control or adjustment of vA and/or kV according to the patient size were employed.   This report was signed and finalized on 8/12/2024 2:02 PM by Gurvinder Naylor MD.      CT Angiogram Head    Result Date: 8/12/2024  PROCEDURE: CT ANGIOGRAM HEAD-  HISTORY: Neuro deficit, acute, stroke suspected, facial droop  TECHNIQUE: Thin section axial CT with contrast with multiplanar reconstruction.   3 D reconstructions were performed on a separate workstation.  FINDINGS:  Internal carotid arteries: The petrous, cavernous and postcavernous ICAs are unremarkable. No aneurysm is seen.  Anterior cerebral arteries: Central anterior cerebral arteries are unremarkable. No aneurysm is seen.  Middle cerebral arteries: Central middle cerebral arteries are unremarkable. No aneurysm is seen.  Basilar artery: Distal vertebral and basilar arteries are widely patent. No aneurysm is seen.  Posterior cerebral artery: Posterior cerebral arteries are patent centrally. No obvious aneurysm is seen.  Venous sinuses: Major venous sinuses are patent.      Impression: No evidence of acute large vessel occlusive disease    This study was performed with techniques to keep radiation doses as low as reasonably achievable (ALARA). Individualized dose reduction techniques using automated exposure control or adjustment of vA and/or kV according to the patient size were employed.  This report was signed and finalized on 8/12/2024 1:58 PM by Gurvinder Naylor MD.      XR Chest 1 View    Result Date: 8/12/2024  PROCEDURE: XR CHEST 1 VW-  HISTORY: Altered mental status, facial droop. Symptoms of CVA  COMPARISON: 4/16/2024  FINDINGS:  Portable view of the chest demonstrates the lungs to be grossly clear. There is no evidence of effusion, pneumothorax or other significant pleural disease. The mediastinum is unremarkable.  The  heart size is normal.      Impression: Unremarkable portable chest.    This report was signed and finalized on 8/12/2024 1:56 PM by Gurvinder Naylor MD.      CT Head Without Contrast Stroke Protocol    Result Date: 8/12/2024  PROCEDURE: CT HEAD WO CONTRAST STROKE PROTOCOL-  HISTORY: Stroke, follow up, facial droop  TECHNIQUE: Noncontrast exam  FINDINGS: Brain parenchyma is homogeneous without evidence of hemorrhage, mass effect or edema. No extra-axial abnormality is noted. Ventricles and cisterns appear normal.  The visualized sinuses, orbits and petrous temporal bones appear unremarkable.      Impression: Unremarkable unenhanced CT of the brain.   This study was performed with techniques to keep radiation doses as low as reasonably achievable (ALARA). Individualized dose reduction techniques using automated exposure control or adjustment of vA and/or kV according to the patient size were employed.    This report was signed and finalized on 8/12/2024 1:51 PM by Gurvinder Naylor MD.      CT CEREBRAL PERFUSION WITH & WITHOUT CONTRAST    Result Date: 8/12/2024  CT cerebral perfusion, without and with contrast  HISTORY: Symptoms of CVA. Difficulty with speech.  TECHNIQUE: CT brain perfusion with mapping of flow parameters including transit time, cerebral blood flow and cerebral blood volume. IV contrast was utilized.  FINDINGS:  Tissue with delayed blood, Tmax > 6 secs:  0 ml  Tissue with significantly reduced blood, rCBF < 30%:  0        Impression: 1.  No evidence of acute large vessel occlusion    This report was signed and finalized on 8/12/2024 1:50 PM by Gurvinder Naylor MD.          MDM     Amount and/or Complexity of Data Reviewed  Independent visualization of images, tracings, or specimens: yes        Initial impression of presenting illness: Paresthesias, headache    DDX: includes but is not limited to: Complex migraine, intracranial hemorrhage, CVA, viral URI, panic disorder    Patient arrives stable with vitals  interpreted by myself.     Pertinent features from physical exam: Clear to auscultation, regular rate and rhythm, no murmur, nontender to abdominal palpation, patient endorsing diffuse right-sided paresthesias with no focal weakness noted on my exam, somewhat inconsistent exam but patient able to be coached to equal strength to bilateral upper and lower extremities..    Initial diagnostic plan: CBC, CMP, troponin, BNP, UA, EKG, chest x-ray, CRP, Pro-Mervin, lactic acid, magnesium, CT head, CTA head neck, CT perfusion    Results from initial plan were reviewed and interpreted by me revealing no concern for intracranial process on CT imaging, no concern for pneumonia    Diagnostic information from other sources: Reviewed past medical records and discussed with father at bedside    Interventions / Re-evaluation: Given Compazine, Benadryl, IV fluid, magnesium for migraine cocktail, on reassessment, patient's migraine has ceased, patient has had resolution of her neurologic symptoms    Results/clinical rationale were discussed with patient and father at bedside    Consultations/Discussion of results with other physicians: Discussed negative workup in emergency department, given this, believe that patient is likely had a complex migraine which is etiology of her symptoms.  Encouraged continued follow-up with her neurologist and chronic care providers to further discuss her migraine management moving forward.  Given strict return precaution's for any new neurologic symptoms.    Disposition plan: Discharge  -----        Final diagnoses:   Migraine with aura and with status migrainosus, not intractable          Meek Daniels MD  08/12/24 1319

## 2024-08-12 NOTE — DISCHARGE INSTRUCTIONS
You were evaluated due to headache and numbness.  We got labs a CT scan of your head and gave you medicines to stop a migraine.  You are now stable for discharge.  We believe that your symptoms are caused by migraine.  Would recommend you continue to follow with urologist to discuss treatment regimen changes as needed and continue care moving forward.  You are now stable to discharge.

## 2024-08-13 ENCOUNTER — TELEPHONE (OUTPATIENT)
Dept: NEUROLOGY | Facility: CLINIC | Age: 39
End: 2024-08-13

## 2024-08-13 DIAGNOSIS — R61 NIGHT SWEATS: ICD-10-CM

## 2024-08-13 DIAGNOSIS — G89.4 CHRONIC PAIN SYNDROME: ICD-10-CM

## 2024-08-13 RX ORDER — GABAPENTIN 800 MG/1
800 TABLET ORAL 3 TIMES DAILY
Qty: 90 TABLET | Refills: 0 | Status: SHIPPED | OUTPATIENT
Start: 2024-08-13

## 2024-08-13 NOTE — TELEPHONE ENCOUNTER
Rx Refill Note  Requested Prescriptions     Pending Prescriptions Disp Refills    gabapentin (Neurontin) 800 MG tablet 90 tablet 0     Sig: Take 1 tablet by mouth 3 (Three) Times a Day.      Last office visit with prescribing clinician: 7/3/2024   Last telemedicine visit with prescribing clinician: 6/11/2024   Next office visit with prescribing clinician: Visit date not found     Hilary Hart MA  08/13/24, 15:06 EDT

## 2024-08-13 NOTE — TELEPHONE ENCOUNTER
Caller: Alirio Lindsay ADELA    Relationship: Self    Best call back number: [phone]*    Requested Prescriptions:   Requested Prescriptions      No prescriptions requested or ordered in this encounter        gabapentin (Neurontin) 800 MG tablet     Pharmacy where request should be sent: [facility]ARMACY #[id number] - [place], [place] - [address] [phone] [facility] [phone]      Last office visit with prescribing clinician: 7/3/2024   Last telemedicine visit with prescribing clinician: 6/11/2024   Next office visit with prescribing clinician: Visit date not found     Does the patient have less than a 3 day supply:  [x] Yes  [] No    Would you like a call back once the refill request has been completed: [] Yes [x] No    If the office needs to give you a call back, can they leave a voicemail: [] Yes [x] No    Ashlie Vazquez, PCT   08/13/24 10:55 EDT

## 2024-08-13 NOTE — TELEPHONE ENCOUNTER
Caller: Lindsay Amezquita    Relationship to patient: Self    Best call back number: [phone]     PATIENT IS CALLING TO CHECK THE STATUS OF THE REFILL REQUEST.

## 2024-08-13 NOTE — TELEPHONE ENCOUNTER
Provider: ROYA COVARRUBIAS    Caller: PATIENT    Relationship to Patient: SELF    Pharmacy: LISTED    Phone Number: [phone]    Reason for Call: PT WANTED TO LET THE PROVIDER KNOW SHE WENT TO THE ED AT [facility] YESTERDAY.  SHE DRANK A RED BULL ENERGY DRINK, SHE STARTED HAVING A SEVERE HEADACHE, HER EYELID AND MOUTH WAS DROOPY ON THE RIGHT SIDE AND HER ARM AND LEG WAS NUMB AND TINGLING.  STROKE AND BELLS PALSY WAS RULED OUT AND SHE WAS TOLD SHE HAD A MIGRAINE.  SHE HAD A CT SCAN DONE.  THEY GAVE HER MEDICATION AND SENT HER HOME.  TODAY SHE FEELS BETTER, BUT HER HEAD FEELS FOGGY AND SHE IS REALLY HOT.      PLEASE REVIEW AND ADVISE     THANK YOU

## 2024-08-15 ENCOUNTER — APPOINTMENT (OUTPATIENT)
Dept: CT IMAGING | Facility: HOSPITAL | Age: 39
End: 2024-08-15
Payer: MEDICAID

## 2024-08-15 ENCOUNTER — APPOINTMENT (OUTPATIENT)
Dept: GENERAL RADIOLOGY | Facility: HOSPITAL | Age: 39
End: 2024-08-15
Payer: MEDICAID

## 2024-08-15 ENCOUNTER — HOSPITAL ENCOUNTER (EMERGENCY)
Facility: HOSPITAL | Age: 39
Discharge: HOME | End: 2024-08-15
Attending: EMERGENCY MEDICINE
Payer: MEDICAID

## 2024-08-15 ENCOUNTER — TELEPHONE (OUTPATIENT)
Dept: FAMILY MEDICINE CLINIC | Facility: CLINIC | Age: 39
End: 2024-08-15

## 2024-08-15 VITALS
BODY MASS INDEX: 48.28 KG/M2 | SYSTOLIC BLOOD PRESSURE: 141 MMHG | DIASTOLIC BLOOD PRESSURE: 67 MMHG | HEART RATE: 100 BPM | RESPIRATION RATE: 16 BRPM | TEMPERATURE: 98.3 F | OXYGEN SATURATION: 95 % | WEIGHT: 262.35 LBS | HEIGHT: 62 IN

## 2024-08-15 DIAGNOSIS — G89.4 CHRONIC PAIN SYNDROME: ICD-10-CM

## 2024-08-15 DIAGNOSIS — M25.551 RIGHT HIP PAIN: ICD-10-CM
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
## 2024-08-15 LAB
ALBUMIN SERPL-MCNC: 4 G/DL (ref 3.5–5.2)
ALBUMIN/GLOB SERPL: 2 G/DL
ALP SERPL-CCNC: 69 U/L (ref 39–117)
ALT SERPL W P-5'-P-CCNC: 21 U/L (ref 1–33)
ANION GAP SERPL CALCULATED.3IONS-SCNC: 9.7 MMOL/L (ref 5–15)
AST SERPL-CCNC: 17 U/L (ref 1–32)
BASOPHILS # BLD AUTO: 0.03 10*3/MM3 (ref 0–0.2)
BASOPHILS NFR BLD AUTO: 0.4 % (ref 0–1.5)
BILIRUB SERPL-MCNC: 0.2 MG/DL (ref 0–1.2)
BUN SERPL-MCNC: 6 MG/DL (ref 6–20)
BUN/CREAT SERPL: 10 (ref 7–25)
CALCIUM SPEC-SCNC: 9.1 MG/DL (ref 8.6–10.5)
CHLORIDE SERPL-SCNC: 107 MMOL/L (ref 98–107)
CO2 SERPL-SCNC: 23.3 MMOL/L (ref 22–29)
CREAT SERPL-MCNC: 0.6 MG/DL (ref 0.57–1)
DEPRECATED RDW RBC AUTO: 47 FL (ref 37–54)
EGFRCR SERPLBLD CKD-EPI 2021: 116.5 ML/MIN/1.73
EOSINOPHIL # BLD AUTO: 0.18 10*3/MM3 (ref 0–0.4)
EOSINOPHIL NFR BLD AUTO: 2.3 % (ref 0.3–6.2)
ERYTHROCYTE [DISTWIDTH] IN BLOOD BY AUTOMATED COUNT: 13.9 % (ref 12.3–15.4)
GLOBULIN UR ELPH-MCNC: 2 GM/DL
GLUCOSE SERPL-MCNC: 143 MG/DL (ref 65–99)
HCT VFR BLD AUTO: 41 % (ref 34–46.6)
HGB BLD-MCNC: 13.7 G/DL (ref 12–15.9)
IMM GRANULOCYTES # BLD AUTO: 0.04 10*3/MM3 (ref 0–0.05)
IMM GRANULOCYTES NFR BLD AUTO: 0.5 % (ref 0–0.5)
LYMPHOCYTES # BLD AUTO: 2.65 10*3/MM3 (ref 0.7–3.1)
LYMPHOCYTES NFR BLD AUTO: 34.6 % (ref 19.6–45.3)
MCH RBC QN AUTO: 30.5 PG (ref 26.6–33)
MCHC RBC AUTO-ENTMCNC: 33.4 G/DL (ref 31.5–35.7)
MCV RBC AUTO: 91.3 FL (ref 79–97)
MONOCYTES # BLD AUTO: 0.5 10*3/MM3 (ref 0.1–0.9)
MONOCYTES NFR BLD AUTO: 6.5 % (ref 5–12)
NEUTROPHILS NFR BLD AUTO: 4.27 10*3/MM3 (ref 1.7–7)
NEUTROPHILS NFR BLD AUTO: 55.7 % (ref 42.7–76)
NRBC BLD AUTO-RTO: 0 /100 WBC (ref 0–0.2)
PLATELET # BLD AUTO: 264 10*3/MM3 (ref 140–450)
PMV BLD AUTO: 9.4 FL (ref 6–12)
POTASSIUM SERPL-SCNC: 4.4 MMOL/L (ref 3.5–5.2)
PROT SERPL-MCNC: 6 G/DL (ref 6–8.5)
RBC # BLD AUTO: 4.49 10*6/MM3 (ref 3.77–5.28)
SODIUM SERPL-SCNC: 140 MMOL/L (ref 136–145)
WBC NRBC COR # BLD AUTO: 7.67 10*3/MM3 (ref 3.4–10.8)

## 2024-08-15 PROCEDURE — 80053 COMPREHEN METABOLIC PANEL: CPT | Performed by: NURSE PRACTITIONER

## 2024-08-15 PROCEDURE — 99285 EMERGENCY DEPT VISIT HI MDM: CPT

## 2024-08-15 PROCEDURE — 74177 CT ABD & PELVIS W/CONTRAST: CPT

## 2024-08-15 PROCEDURE — 73502 X-RAY EXAM HIP UNI 2-3 VIEWS: CPT

## 2024-08-15 PROCEDURE — 25510000001 IOPAMIDOL 61 % SOLUTION: Performed by: EMERGENCY MEDICINE

## 2024-08-15 PROCEDURE — 85025 COMPLETE CBC W/AUTO DIFF WBC: CPT | Performed by: NURSE PRACTITIONER

## 2024-08-15 PROCEDURE — 25010000002 ONDANSETRON PER 1 MG: Performed by: EMERGENCY MEDICINE

## 2024-08-15 PROCEDURE — 96374 THER/PROPH/DIAG INJ IV PUSH: CPT

## 2024-08-15 RX ORDER — IBUPROFEN 800 MG/1
800 TABLET ORAL EVERY 8 HOURS PRN
Qty: 60 TABLET | Refills: 0 | Status: SHIPPED | OUTPATIENT
Start: 2024-08-15

## 2024-08-15 RX ORDER — HYDROCODONE BITARTRATE AND ACETAMINOPHEN 5; 325 MG/1; MG/1
1 TABLET ORAL ONCE
Status: COMPLETED | OUTPATIENT
Start: 2024-08-15 | End: 2024-08-15

## 2024-08-15 RX ORDER — ONDANSETRON 2 MG/ML
4 INJECTION INTRAMUSCULAR; INTRAVENOUS ONCE
Status: COMPLETED | OUTPATIENT
Start: 2024-08-15 | End: 2024-08-15

## 2024-08-15 RX ORDER — PREDNISONE 5 MG/1
1 TABLET ORAL DAILY
Qty: 48 TABLET | Refills: 0 | Status: SHIPPED | OUTPATIENT
Start: 2024-08-15

## 2024-08-15 RX ADMIN — HYDROCODONE BITARTRATE AND ACETAMINOPHEN 1 TABLET: 5; 325 TABLET ORAL at 17:11

## 2024-08-15 RX ADMIN — HYDROCODONE BITARTRATE AND ACETAMINOPHEN 1 TABLET: 5; 325 TABLET ORAL at 15:42

## 2024-08-15 RX ADMIN — ONDANSETRON 4 MG: 2 INJECTION INTRAMUSCULAR; INTRAVENOUS at 15:41

## 2024-08-15 RX ADMIN — IOPAMIDOL 100 ML: 612 INJECTION, SOLUTION INTRAVENOUS at 16:09

## 2024-08-15 NOTE — ED PROVIDER NOTES
Subjective:  History of Present Illness:    Patient is a 39-year-old female without any health history.  Presents to the ER today for right hip and lower right abdominal pain status post fall.  Patient reports that she was getting out of shower last night slipped.  Reports that she is ambulatory.  Denies LOC or head injury.  Reports that she primarily came to the ER because she has history of ovarian cyst and is afraid that cyst may have ruptured.  She denies bruising.  Denies chest pain or shortness of breath.  Denies OTC medication or home remedy.  Denies alleviating or exacerbating factors.    Nurses Notes reviewed and agree, including vitals, allergies, social history and prior medical history.     REVIEW OF SYSTEMS: All systems reviewed and not pertinent unless noted.  Review of Systems   Gastrointestinal:  Positive for abdominal pain.   Musculoskeletal:         Right hip pain   All other systems reviewed and are negative.      Past Medical History:   Diagnosis Date    Allergic     Anxiety     Asthma Beings of copd with asthma    Back pain     Bell palsy 07/06/2021    Bell's palsy     Bipolar 2 disorder     Blood clot in vein     Behind left knee cap    COPD (chronic obstructive pulmonary disease) Six months ago    Deep vein thrombosis Last year    Depression     Diabetes mellitus November    Pre diabete    Frequent headaches     GERD (gastroesophageal reflux disease)     Hypertension     Every time i go into the emergacy room    Irritable bowel syndrome Two years ago    Kidney stone Two years ago    Migraine     Nausea, vomiting and diarrhea 09/17/2018    Obesity All of my life    Ovarian cyst Two years ago    Panic     PTSD (post-traumatic stress disorder)     Pulmonary embolism Not in lungs    Recurrent pregnancy loss, antepartum condition or complication Every since i have been 14 yars old    Restless leg syndrome     Sinus problem     Snores     Tattoos     Urinary tract infection Have them on and off     Varicella Little    Visual impairment Since i was little    Vitamin B12 deficiency     Wears glasses        Allergies:    Aspirin, Codeine, Cyclobenzaprine, Haldol [haloperidol], Imitrex [sumatriptan], Latex, Penicillins, Acetaminophen, Lamictal [lamotrigine], Metoclopramide, Morphine, Ondansetron, Oxycodone-acetaminophen, Oxycontin [oxycodone], Prochlorperazine, and Tramadol      Past Surgical History:   Procedure Laterality Date    CHOLECYSTECTOMY      HYSTEROSCOPY N/A 3/14/2024    Procedure: HYSTEROSCOPY WITH MYOSURE, DILATION AND CURETTAGE WITH CERENE ABLATION;  Surgeon: David Ribeiro MD;  Location: [facility];  Service: Obstetrics/Gynecology;  Laterality: N/A;    MULTIPLE TOOTH EXTRACTIONS      All my teeth    WISDOM TOOTH EXTRACTION  All my teeth         Social History     Socioeconomic History    Marital status: Single   Tobacco Use    Smoking status: Every Day     Current packs/day: 0.50     Average packs/day: 2.0 packs/day for 32.2 years (63.4 ttl pk-yrs)     Types: Cigarettes     Start date: 7/17/1992     Passive exposure: Current    Smokeless tobacco: Never    Tobacco comments:      Around 2.5 packs per day- 7/5/24   Vaping Use    Vaping status: Former    Passive vaping exposure: Yes   Substance and Sexual Activity    Alcohol use: Not Currently     Comment: rarely    Drug use: Not Currently    Sexual activity: Not Currently     Partners: Female     Birth control/protection: Other, Partner of same sex         Family History   Problem Relation Age of Onset    Irritable bowel syndrome Mother     Heart disease Mother     Miscarriages / Stillbirths Mother     Arthritis Mother     COPD Mother     Learning disabilities Mother     Mental illness Mother     Asthma Mother     Irritable bowel syndrome Father     Alcohol abuse Father     Diabetes Father     Hyperlipidemia Father     Anxiety disorder Father     Learning disabilities Father     Colon cancer Maternal Grandfather     Stroke Paternal Grandfather      "Stroke Paternal Grandmother        Objective  Physical Exam:  /67   Pulse 100   Temp 98.3 °F (36.8 °C) (Oral)   Resp 16   Ht 157.5 cm (62.01\")   Wt 119 kg (262 lb 5.6 oz)   SpO2 95%   BMI 47.97 kg/m²      Physical Exam  Vitals and nursing note reviewed.   Constitutional:       Appearance: Normal appearance. She is normal weight.   HENT:      Head: Normocephalic and atraumatic.      Nose: Nose normal.      Mouth/Throat:      Mouth: Mucous membranes are moist.      Pharynx: Oropharynx is clear.   Eyes:      Extraocular Movements: Extraocular movements intact.      Conjunctiva/sclera: Conjunctivae normal.      Pupils: Pupils are equal, round, and reactive to light.   Cardiovascular:      Rate and Rhythm: Normal rate and regular rhythm.      Pulses: Normal pulses.      Heart sounds: Normal heart sounds.   Pulmonary:      Effort: Pulmonary effort is normal.      Breath sounds: Normal breath sounds.   Abdominal:      General: Abdomen is flat. Bowel sounds are normal.      Palpations: Abdomen is soft.   Musculoskeletal:         General: Normal range of motion.      Cervical back: Normal range of motion and neck supple.      Comments: Patient has full range of motion of all extremities.  And is ambulatory   Skin:     General: Skin is warm and dry.      Capillary Refill: Capillary refill takes less than 2 seconds.      Comments: No bruising or ecchymosis noted to right hip or right abdomen   Neurological:      General: No focal deficit present.      Mental Status: She is alert and oriented to person, place, and time. Mental status is at baseline.   Psychiatric:         Mood and Affect: Mood normal.         Behavior: Behavior normal.         Thought Content: Thought content normal.         Judgment: Judgment normal.         Procedures    ED Course:         Lab Results (last 24 hours)       Procedure Component Value Units Date/Time    CBC Auto Differential [[id number]]  (Normal) Collected: 08/15/24 1542    " Specimen: Blood Updated: 08/15/24 1547     WBC 7.67 10*3/mm3      RBC 4.49 10*6/mm3      Hemoglobin 13.7 g/dL      Hematocrit 41.0 %      MCV 91.3 fL      MCH 30.5 pg      MCHC 33.4 g/dL      RDW 13.9 %      RDW-SD 47.0 fl      MPV 9.4 fL      Platelets 264 10*3/mm3      Neutrophil % 55.7 %      Lymphocyte % 34.6 %      Monocyte % 6.5 %      Eosinophil % 2.3 %      Basophil % 0.4 %      Immature Grans % 0.5 %      Neutrophils, Absolute 4.27 10*3/mm3      Lymphocytes, Absolute 2.65 10*3/mm3      Monocytes, Absolute 0.50 10*3/mm3      Eosinophils, Absolute 0.18 10*3/mm3      Basophils, Absolute 0.03 10*3/mm3      Immature Grans, Absolute 0.04 10*3/mm3      nRBC 0.0 /100 WBC     Comprehensive Metabolic Panel [[id number]]  (Abnormal) Collected: 08/15/24 1542    Specimen: Blood Updated: 08/15/24 1609     Glucose 143 mg/dL      BUN 6 mg/dL      Creatinine 0.60 mg/dL      Sodium 140 mmol/L      Potassium 4.4 mmol/L      Chloride 107 mmol/L      CO2 23.3 mmol/L      Calcium 9.1 mg/dL      Total Protein 6.0 g/dL      Albumin 4.0 g/dL      ALT (SGPT) 21 U/L      AST (SGOT) 17 U/L      Alkaline Phosphatase 69 U/L      Total Bilirubin 0.2 mg/dL      Globulin 2.0 gm/dL      A/G Ratio 2.0 g/dL      BUN/Creatinine Ratio 10.0     Anion Gap 9.7 mmol/L      eGFR 116.5 mL/min/1.73     Narrative:      GFR Normal >60  Chronic Kidney Disease <60  Kidney Failure <15               CT Abdomen Pelvis With Contrast    Result Date: 8/15/2024  PROCEDURE: CT ABDOMEN PELVIS W CONTRAST-  TECHNIQUE: IV contrast enhanced exam  HISTORY: Right abdominal pain  COMPARISON: 7/31/2024.  FINDINGS:  ABDOMEN: Fatty infiltration of liver is present. Remaining solid organs are normal. Patient is status post cholecystectomy. No bowel obstruction or bowel wall thickening is present.  PELVIS: Appendix is normal. Pelvic bowel loops are unremarkable. There is no free fluid. Uterus and ovaries are normal.      Impression: No findings to account for symptoms   This  study was performed with techniques to keep radiation doses as low as reasonably achievable (ALARA). Individualized dose reduction techniques using automated exposure control or adjustment of vA and/or kV according to the patient size were employed.  This report was signed and finalized on 8/15/2024 4:40 PM by Gurvinder Naylor MD.          MDM      Initial impression of presenting illness: Patient is a 39-year-old female without any health history.  Presents to the ER today for right hip and lower right abdominal pain status post fall.  Patient reports that she was getting out of shower last night slipped.  Reports that she is ambulatory.  Denies LOC or head injury.  Reports that she primarily came to the ER because she has history of ovarian cyst and is afraid that cyst may have ruptured.  She denies bruising.  Denies chest pain or shortness of breath.  Denies OTC medication or home remedy.  Denies alleviating or exacerbating factors.    DDX: includes but is not limited to: Strain, sprain, fracture or other    Patient arrives stable with vitals interpreted by myself.     Pertinent features from physical exam: There is no ecchymosis or edema noted on the right hip or abdomen.  Patient is ambulatory.  There is no evidence of shortening or rotation of right hip.  Otherwise assessment is unremarkable.    Initial diagnostic plan: CBC, CMP, CT abdomen pelvis, hip x-ray    Results from initial plan were reviewed and interpreted by me revealing CBC CMP are within appropriate range.  CT abdomen pelvis is unremarkable.  X-ray of hip is unremarkable.      Diagnostic information from other sources: Chart review    Interventions / Re-evaluation: Vital signs stable throughout encounter.  Patient received total of 10 mg of Lortab while here in the ER.    Results/clinical rationale were discussed with patient    Consultations/Discussion of results with other physicians: N/A    Disposition plan: Patient is hemodynamically stable  nontoxic-appearing appropriate discharge.  Patient to follow-up PCP in 1 week.  Follow-up ER for any worse symptoms.  -----  MITCH reviewed by Rajeev Singh, , Kamlesh Cowan DO     Final diagnoses:   Fall, initial encounter   Right hip pain          Cristian Lerner, APRN  08/15/24 6553

## 2024-08-15 NOTE — TELEPHONE ENCOUNTER
Caller: Lindsay Amezquita    Relationship to patient: Self    Best call back number: [phone]     Chief complaint: PAINFUL TO BREATH, SHARP HEADACHE    Patient directed to call 911 or go to their nearest emergency room.     Patient verbalized understanding: [x] Yes  [] No  If no, why?    Additional notes: PATIENT CALLED TO INFORM DR PULIDO THAT SHE FELL OUT OF THE TUB YESTERDAY, AND AFTERWORDS, BEGAN TO HAVE A PAIN IN HER GROIN AREA, AS WELL AS PAIN WHEN BREATHING, A SHARP HEADACHE, AND A GENERAL FEELING OF SORENESS    PATIENT WAS REFERRED TO THE EMERGENCY ROOM AND ACCEPTED, THEN ENDED THE CALL    PLEASE BE ADVISED

## 2024-08-16 ENCOUNTER — TELEPHONE (OUTPATIENT)
Dept: OBSTETRICS AND GYNECOLOGY | Facility: CLINIC | Age: 39
End: 2024-08-16

## 2024-08-17 DIAGNOSIS — R10.2 PELVIC PAIN: ICD-10-CM

## 2024-08-17 DIAGNOSIS — G89.4 CHRONIC PAIN SYNDROME: ICD-10-CM

## 2024-08-17 RX ORDER — ONDANSETRON 4 MG/1
4 TABLET, ORALLY DISINTEGRATING ORAL EVERY 8 HOURS PRN
Qty: 12 TABLET | Refills: 0 | Status: SHIPPED | OUTPATIENT
Start: 2024-08-17

## 2024-08-17 RX ORDER — HYDROCODONE BITARTRATE AND ACETAMINOPHEN 5; 325 MG/1; MG/1
1 TABLET ORAL EVERY 8 HOURS PRN
Qty: 5 TABLET | Refills: 0 | Status: SHIPPED | OUTPATIENT
Start: 2024-08-17

## 2024-08-18 DIAGNOSIS — G43.001 MIGRAINE WITHOUT AURA, NOT INTRACTABLE, WITH STATUS MIGRAINOSUS: ICD-10-CM

## 2024-08-19 ENCOUNTER — TELEPHONE (OUTPATIENT)
Dept: FAMILY MEDICINE CLINIC | Facility: CLINIC | Age: 39
End: 2024-08-19

## 2024-08-19 ENCOUNTER — TELEPHONE (OUTPATIENT)
Dept: FAMILY MEDICINE CLINIC | Facility: CLINIC | Age: 39
End: 2024-08-19
Payer: MEDICAID

## 2024-08-19 RX ORDER — BLOOD SUGAR DIAGNOSTIC
STRIP MISCELLANEOUS
Qty: 100 EACH | Refills: 1 | OUTPATIENT
Start: 2024-08-19

## 2024-08-19 NOTE — TELEPHONE ENCOUNTER
Caller: Lindsay Amezquita    Relationship to patient: Self    Best call back number: [phone]     Chief complaint: PATIENT STATED THAT SHE HAD A SEIZURE IN HER SLEEP AND ONCE AWOKE SHE WAS JERKING AND SHAKING. PATIENT WAS INFORMED TO CALL 911 OR GOT TO THE HOSPITAL TO BE SEEN. PATIENT STATED THAT SHE WAS GOING TO BE HOSPITAL TO BE SEEN.     Patient directed to call 911 or go to their nearest emergency room.     Patient verbalized understanding: [x] Yes  [] No

## 2024-08-21 ENCOUNTER — TELEPHONE (OUTPATIENT)
Dept: NEUROLOGY | Facility: CLINIC | Age: 39
End: 2024-08-21

## 2024-08-22 ENCOUNTER — APPOINTMENT (OUTPATIENT)
Dept: GENERAL RADIOLOGY | Facility: HOSPITAL | Age: 39
End: 2024-08-22
Payer: MEDICAID

## 2024-08-22 ENCOUNTER — HOSPITAL ENCOUNTER (EMERGENCY)
Facility: HOSPITAL | Age: 39
Discharge: HOME | End: 2024-08-22
Attending: EMERGENCY MEDICINE
Payer: MEDICAID

## 2024-08-22 VITALS
DIASTOLIC BLOOD PRESSURE: 76 MMHG | BODY MASS INDEX: 45.27 KG/M2 | SYSTOLIC BLOOD PRESSURE: 122 MMHG | OXYGEN SATURATION: 95 % | HEIGHT: 62 IN | TEMPERATURE: 98.8 F | HEART RATE: 98 BPM | WEIGHT: 246 LBS | RESPIRATION RATE: 18 BRPM

## 2024-08-22 DIAGNOSIS — M25.552 BILATERAL HIP PAIN: ICD-10-CM

## 2024-08-22 DIAGNOSIS — M25.562 ACUTE PAIN OF LEFT KNEE: ICD-10-CM
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
## 2024-08-22 DIAGNOSIS — M25.551 BILATERAL HIP PAIN: ICD-10-CM

## 2024-08-22 PROCEDURE — 73560 X-RAY EXAM OF KNEE 1 OR 2: CPT

## 2024-08-22 PROCEDURE — 63710000001 ONDANSETRON ODT 4 MG TABLET DISPERSIBLE: Performed by: EMERGENCY MEDICINE

## 2024-08-22 PROCEDURE — 73523 X-RAY EXAM HIPS BI 5/> VIEWS: CPT

## 2024-08-22 PROCEDURE — 99283 EMERGENCY DEPT VISIT LOW MDM: CPT

## 2024-08-22 PROCEDURE — 73630 X-RAY EXAM OF FOOT: CPT

## 2024-08-22 RX ORDER — ONDANSETRON 4 MG/1
4 TABLET, ORALLY DISINTEGRATING ORAL ONCE
Status: COMPLETED | OUTPATIENT
Start: 2024-08-22 | End: 2024-08-22

## 2024-08-22 RX ORDER — OXYCODONE HYDROCHLORIDE 5 MG/1
5 TABLET ORAL ONCE
Status: COMPLETED | OUTPATIENT
Start: 2024-08-22 | End: 2024-08-22

## 2024-08-22 RX ADMIN — ONDANSETRON 4 MG: 4 TABLET, ORALLY DISINTEGRATING ORAL at 03:59

## 2024-08-22 RX ADMIN — OXYCODONE HYDROCHLORIDE 5 MG: 5 TABLET ORAL at 03:59

## 2024-08-22 NOTE — ED PROVIDER NOTES
EMERGENCY DEPARTMENT ENCOUNTER    Pt Name: Lindsay Amezquita  MRN: [id number]  Pt :   1985  Room Number:    Date of encounter:  2024  PCP: Hunter Winkler MD  ED Provider: Zaki Culp MD    Historian: Patient      HPI:  Chief Complaint: Fall        Context: Lindsay Amezquita is a 39 y.o. female who presents to the ED c/o fall.  Patient says that she fell out of bed last night.  She says that she injured her left knee, foot and bilateral hips.  She says she took her Norco at home without improvement in the pain.  She denies vomiting.  Denies any chest pain or dyspnea.  She denies abdominal pain or back pain.      PAST MEDICAL HISTORY  Past Medical History:   Diagnosis Date    Allergic     Anxiety     Asthma Beings of copd with asthma    Back pain     Bell palsy 2021    Bell's palsy     Bipolar 2 disorder     Blood clot in vein     Behind left knee cap    COPD (chronic obstructive pulmonary disease) Six months ago    Deep vein thrombosis Last year    Depression     Diabetes mellitus November    Pre diabete    Frequent headaches     GERD (gastroesophageal reflux disease)     Hypertension     Every time i go into the emergacy room    Irritable bowel syndrome Two years ago    Kidney stone Two years ago    Migraine     Nausea, vomiting and diarrhea 2018    Obesity All of my life    Ovarian cyst Two years ago    Panic     PTSD (post-traumatic stress disorder)     Pulmonary embolism Not in lungs    Recurrent pregnancy loss, antepartum condition or complication Every since i have been 14 yars old    Restless leg syndrome     Sinus problem     Snores     Tattoos     Urinary tract infection Have them on and off    Varicella Little    Visual impairment Since i was little    Vitamin B12 deficiency     Wears glasses          PAST SURGICAL HISTORY  Past Surgical History:   Procedure Laterality Date    CHOLECYSTECTOMY      HYSTEROSCOPY N/A 3/14/2024    Procedure: HYSTEROSCOPY WITH MYOSURE, DILATION AND  CURETTAGE WITH CERENE ABLATION;  Surgeon: David Ribeiro MD;  Location: [facility];  Service: Obstetrics/Gynecology;  Laterality: N/A;    MULTIPLE TOOTH EXTRACTIONS      All my teeth    WISDOM TOOTH EXTRACTION  All my teeth         FAMILY HISTORY  Family History   Problem Relation Age of Onset    Irritable bowel syndrome Mother     Heart disease Mother     Miscarriages / Stillbirths Mother     Arthritis Mother     COPD Mother     Learning disabilities Mother     Mental illness Mother     Asthma Mother     Irritable bowel syndrome Father     Alcohol abuse Father     Diabetes Father     Hyperlipidemia Father     Anxiety disorder Father     Learning disabilities Father     Colon cancer Maternal Grandfather     Stroke Paternal Grandfather     Stroke Paternal Grandmother          SOCIAL HISTORY  Social History     Socioeconomic History    Marital status: Single   Tobacco Use    Smoking status: Every Day     Current packs/day: 0.50     Average packs/day: 2.0 packs/day for 32.2 years (63.4 ttl pk-yrs)     Types: Cigarettes     Start date: 7/17/1992     Passive exposure: Current    Smokeless tobacco: Never    Tobacco comments:      Around 2.5 packs per day- 7/5/24   Vaping Use    Vaping status: Former    Passive vaping exposure: Yes   Substance and Sexual Activity    Alcohol use: Not Currently     Comment: rarely    Drug use: Not Currently    Sexual activity: Not Currently     Partners: Female     Birth control/protection: Other, Partner of same sex         ALLERGIES  Aspirin, Codeine, Cyclobenzaprine, Haldol [haloperidol], Imitrex [sumatriptan], Latex, Penicillins, Lamictal [lamotrigine], Metoclopramide, Morphine, Oxycodone-acetaminophen, Oxycontin [oxycodone], Prochlorperazine, and Tramadol        REVIEW OF SYSTEMS    All systems reviewed and negative except for those discussed in HPI.       PHYSICAL EXAM    I have reviewed the triage vital signs and nursing notes.    ED Triage Vitals [08/22/24 0321]   Temp Heart  Rate Resp BP SpO2   98.6 °F (37 °C) 100 20 112/73 95 %      Temp src Heart Rate Source Patient Position BP Location FiO2 (%)   -- Monitor Sitting Left arm --         General: no acute distress, well-appearing, non-toxic  Skin: normal color, warm and dry.  No open wounds or lacerations.  Head: normocephalic, atraumatic  Eyes: Pupils equally round and reactive to light.  Nose: normal nasal mucosa, no visible deformity.  Mouth: moist mucous membranes.  Neck: supple.  No midline cervical spine tenderness to palpation.  Chest: no retractions, no visible deformity  Cardiovascular: Regular rate and rhythm.  Lungs: clear to auscultation bilaterally.  Back: no contusions, wounds or abrasions.  No midline thoracic or lumbar spine tenderness.  Abdomen: soft, non-tender, non-distended. No rebound tenderness, no guarding.  No peritonitis.  Extremities: no cyanosis or edema. Palpable radial pulses bilaterally. Palpable dorsalis pedis pulses bilaterally.  No obvious deformity or injury.  Tenderness to palpation at the left knee and left foot.  Is able to flex and extend at the hips bilaterally.  Neuro:  alert and oriented x3, no focal neurological deficits.  GCS 15.        LAB RESULTS  No results found for this or any previous visit (from the past 24 hour(s)).    If labs were ordered, I independently reviewed the results and considered them in treating the patient.  See medical decision making discussion section for my interpretation of lab results.        RADIOLOGY  No Radiology Exams Resulted Within Past 24 Hours    I ordered and independently reviewed the above noted radiographic studies.  See radiologist's dictation for official interpretation.    Per my independent reading:      Plain film of the left knee obtained and is negative for acute osseous abnormality based on my independent reading.    Plain film of the left foot obtained and is negative for acute osseous abnormality based on my independent  reading.      PROCEDURES    Procedures    No orders to display       MEDICATIONS GIVEN IN ER    Medications   oxyCODONE (ROXICODONE) immediate release tablet 5 mg (5 mg Oral Given 8/22/24 0359)   ondansetron ODT (ZOFRAN-ODT) disintegrating tablet 4 mg (4 mg Oral Given 8/22/24 0359)         MEDICAL DECISION MAKING, PROGRESS, and CONSULTS    All labs, if obtained, have been independently reviewed by me.  All radiology studies, if obtained, have been reviewed by me and the radiologist dictating the report.  All EKG's, if obtained, have been independently viewed and interpreted by me/my attending physician.      Discussion below represents my analysis of pertinent findings related to patient's condition, differential diagnosis, treatment plan and final disposition.                         Differential diagnosis:    Differential includes cranial hemorrhage, C-spine fracture, extremity fracture, extremity dislocation, other acute emergency.    Medical Decision Making Discussion:    Vitals reviewed and are remarkable for tachycardia but otherwise are normal.    I doubt intracranial hemorrhage as patient is Pershing head CT rule negative.  I doubt C-spine fracture as she is Nexus negative.    Plain films of the left lower extremity were negative for acute osseous abnormality based on my independent reading.    On repeat exam patient was well-appearing and non-toxic.  Patient discharged home with instructions to follow-up closely with primary care and to return to the emergency department before then for any concerning symptoms, worsening symptoms or new concerns.    Additional sources:    - External (non-ED) record review: History and physical from March 2024 documenting history of abnormal uterine bleeding, menorrhagia with irregular cycle, dysmenorrhea.    Shared Decision Making:  After my consideration of clinical presentation and any laboratory/radiology studies obtained, I discussed the findings with the  patient/patient representative who is in agreement with the treatment plan and the final disposition.   Risks and benefits of discharge and/or observation/admission were discussed.    Orders placed during this visit:  Orders Placed This Encounter   Procedures    XR Hips Bilateral With or Without Pelvis 5 View    XR Knee 1 or 2 View Left    XR Foot 3+ View Left       AS OF 05:00 EDT VITALS:    BP - 122/76  HR - 98  TEMP - 98.8 °F (37.1 °C) (Oral)  O2 SATS - 95%                  DIAGNOSIS  Final diagnoses:   Fall, initial encounter   Acute pain of left knee   Bilateral hip pain         DISPOSITION  Discharge      Please note that portions of this document were completed with voice recognition software.        Zaki Culp MD  08/22/24 6493

## 2024-08-22 NOTE — TELEPHONE ENCOUNTER
,    I just wanted to make you aware of this message.     Its looks like the patient did not go to the ER on 8/19/24.    However she was seen in the ER today.

## 2024-08-28 DIAGNOSIS — F43.10 POST TRAUMATIC STRESS DISORDER (PTSD): ICD-10-CM

## 2024-08-28 DIAGNOSIS — F41.1 GENERALIZED ANXIETY DISORDER: ICD-10-CM

## 2024-08-28 RX ORDER — DIAZEPAM 5 MG
5 TABLET ORAL 3 TIMES DAILY PRN
Qty: 90 TABLET | Refills: 1 | Status: SHIPPED | OUTPATIENT
Start: 2024-08-28 | End: 2025-08-28

## 2024-08-29 ENCOUNTER — TELEMEDICINE (OUTPATIENT)
Dept: FAMILY MEDICINE CLINIC | Facility: CLINIC | Age: 39
End: 2024-08-29
Payer: MEDICAID

## 2024-08-29 ENCOUNTER — TELEPHONE (OUTPATIENT)
Dept: BEHAVIORAL HEALTH | Facility: CLINIC | Age: 39
End: 2024-08-29
Payer: MEDICAID

## 2024-08-29 DIAGNOSIS — J06.9 ACUTE URI: Primary | ICD-10-CM

## 2024-08-29 DIAGNOSIS — R51.9 ACUTE NONINTRACTABLE HEADACHE, UNSPECIFIED HEADACHE TYPE: ICD-10-CM

## 2024-08-29 DIAGNOSIS — K59.00 CONSTIPATION, UNSPECIFIED CONSTIPATION TYPE: ICD-10-CM

## 2024-08-29 DIAGNOSIS — R09.81 NASAL CONGESTION: ICD-10-CM

## 2024-08-29 DIAGNOSIS — R11.0 NAUSEA: ICD-10-CM

## 2024-08-29 PROCEDURE — 1159F MED LIST DOCD IN RCRD: CPT | Performed by: NURSE PRACTITIONER

## 2024-08-29 PROCEDURE — 99213 OFFICE O/P EST LOW 20 MIN: CPT | Performed by: NURSE PRACTITIONER

## 2024-08-29 PROCEDURE — 3044F HG A1C LEVEL LT 7.0%: CPT | Performed by: NURSE PRACTITIONER

## 2024-08-29 PROCEDURE — 1125F AMNT PAIN NOTED PAIN PRSNT: CPT | Performed by: NURSE PRACTITIONER

## 2024-08-29 PROCEDURE — 1160F RVW MEDS BY RX/DR IN RCRD: CPT | Performed by: NURSE PRACTITIONER

## 2024-08-29 RX ORDER — POLYETHYLENE GLYCOL 3350 17 G/17G
17 POWDER, FOR SOLUTION ORAL DAILY
Qty: 30 EACH | Refills: 1 | Status: SHIPPED | OUTPATIENT
Start: 2024-08-29

## 2024-08-29 RX ORDER — FLUTICASONE PROPIONATE 50 MCG
2 SPRAY, SUSPENSION (ML) NASAL DAILY
Qty: 11.1 ML | Refills: 1 | Status: SHIPPED | OUTPATIENT
Start: 2024-08-29

## 2024-08-29 RX ORDER — GUAIFENESIN 600 MG/1
1200 TABLET, EXTENDED RELEASE ORAL 2 TIMES DAILY
Qty: 28 TABLET | Refills: 0 | Status: SHIPPED | OUTPATIENT
Start: 2024-08-29 | End: 2024-09-05

## 2024-08-29 RX ORDER — ONDANSETRON 4 MG/1
4 TABLET, ORALLY DISINTEGRATING ORAL EVERY 8 HOURS PRN
Start: 2024-08-29

## 2024-08-29 NOTE — PROGRESS NOTES
"                      Established Patient        Chief Complaint:   Chief Complaint   Patient presents with    Cough    Nasal Congestion     Thick green mucous    Sore Throat       You have chosen to receive care through a telephone visit. Do you consent to use a telephone visit for your medical care today? Yes    History of Present Illness:    Lindsay Amezquita is a 39 y.o. female who presents today via [employer] Tele[facility] encounter. Patient is being seen today for complaints of low-grade fever, cough, congestion, BLE weakness, fatigue, sore throat. Onset 3 days ago. Reports thick, green mucous production.    No known sick contacts.    Allegra-D, zofran for nausea but causing constipation    Unable to come in to office at this time due to transportation issues.     At the time of encounter today, patient is located in her home and provider is located in [facility] office in [place], [place].   Subjective     The following portions of the patient's history were reviewed and updated as appropriate: allergies, current medications, past family history, past medical history, past social history, past surgical history and problem list.    ALLERGIES  Allergies   Allergen Reactions    Aspirin Anaphylaxis and Hives     Wheezing         Codeine Anaphylaxis    Cyclobenzaprine Other (See Comments)     \"gives me chest pain\"    Other reaction(s): Other (See Comments)    Haldol [Haloperidol] Rash    Imitrex [Sumatriptan] Anaphylaxis and Rash    Latex Hives and Rash    Penicillins Hives and Anaphylaxis     Vomiting    Lamictal [Lamotrigine] Itching     Itching and hives    Metoclopramide Headache, Hives and Other (See Comments)    Morphine Itching    Oxycodone-Acetaminophen GI Intolerance     Other reaction(s): GI Intolerance    Oxycontin [Oxycodone] GI Intolerance    Prochlorperazine Nausea And Vomiting and Unknown (See Comments)    Tramadol Hives and Nausea And Vomiting       ROS  Review of Systems   Constitutional:  Positive for fatigue. Negative " for chills and fever.   HENT:  Positive for congestion and sore throat. Negative for rhinorrhea, sinus pressure and sinus pain.    Respiratory:  Positive for cough. Negative for chest tightness and shortness of breath.    Cardiovascular:  Negative for chest pain and palpitations.   Gastrointestinal:  Positive for constipation and nausea. Negative for diarrhea and vomiting.   Musculoskeletal:  Positive for myalgias.   Neurological:  Positive for weakness and headaches. Negative for dizziness.       Objective     Vital Signs:   LMP  (LMP Unknown)             Physical Exam   Physical Exam  Vitals and nursing note reviewed.   Constitutional:       Appearance: Normal appearance.   Pulmonary:      Effort: Pulmonary effort is normal.   Neurological:      Mental Status: She is alert and oriented to person, place, and time.   Psychiatric:         Mood and Affect: Mood normal.         Behavior: Behavior normal.         Assessment and Plan      Assessment/Plan:   Diagnoses and all orders for this visit:    1. Acute URI (Primary)  -     guaiFENesin (Mucinex) 600 MG 12 hr tablet; Take 2 tablets by mouth 2 (Two) Times a Day for 7 days.  Dispense: 28 tablet; Refill: 0  -     fluticasone (FLONASE) 50 MCG/ACT nasal spray; 2 sprays into the nostril(s) as directed by provider Daily.  Dispense: 11.1 mL; Refill: 1    2. Constipation, unspecified constipation type  -     polyethylene glycol (MIRALAX) 17 g packet; Take 17 g by mouth Daily.  Dispense: 30 each; Refill: 1    3. Nausea  -     ondansetron ODT (ZOFRAN-ODT) 4 MG disintegrating tablet; Place 1 tablet on the tongue Every 8 (Eight) Hours As Needed for Nausea or Vomiting.    4. Nasal congestion  -     guaiFENesin (Mucinex) 600 MG 12 hr tablet; Take 2 tablets by mouth 2 (Two) Times a Day for 7 days.  Dispense: 28 tablet; Refill: 0  -     fluticasone (FLONASE) 50 MCG/ACT nasal spray; 2 sprays into the nostril(s) as directed by provider Daily.  Dispense: 11.1 mL; Refill: 1    5. Acute  nonintractable headache, unspecified headache type    Risks, benefits, and potential side effects of current/new medications reviewed with patient.  Patient voiced understanding and wished to proceed with treatment.    May alternate Acetaminophen and Ibuprofen every 4-6 hours as needed for pain, fever > 101.    Patient was encouraged to keep me informed of any acute changes, lack of improvement, or any new concerning symptoms.    Patient to come in to office for further evaluation and treatment if symptoms worsen or persist.     Discussion Summary:  Discussed plan of care in detail with pt today; pt verb understanding and agrees.        I have reviewed and updated all copied forward information, as appropriate.  I attest to the accuracy and relevance of any unchanged information.    This was an audio and video enabled telemedicine encounter.      Follow up:  No follow-ups on file.     Patient Education:  There are no Patient Instructions on file for this visit.    CLAIR Wagoner  08/29/24  11:12 EDT          Please note that portions of this note may have been completed with a voice recognition program.

## 2024-08-29 NOTE — TELEPHONE ENCOUNTER
Not right now, will see how she does when weaning off the Wellbutrin and will discuss at follow up.

## 2024-08-29 NOTE — TELEPHONE ENCOUNTER
PATIENT SAYS SHE HAS BEEN ON BUSPAR BEFORE, (ALSO WELLBUTRIN) SHE SAID BOTH OF THEM MAKE HER PARANOID.

## 2024-09-04 ENCOUNTER — TELEPHONE (OUTPATIENT)
Dept: FAMILY MEDICINE CLINIC | Facility: CLINIC | Age: 39
End: 2024-09-04

## 2024-09-04 NOTE — TELEPHONE ENCOUNTER
Caller: Lindsay Amezquita    Relationship: Self    Best call back number: [phone]     What is the best time to reach you: ANYTIME     Who are you requesting to speak with (clinical staff, provider,  specific staff member): CLINICAL STAFF    What was the call regarding: PATIENT IS CALLING TO REQUEST THAT IT BE PLACED IN HER CHART THAT SHE CANNOT TAKE ZOFRAN. SHE SAID THAT SHE WAS SO IMPACTED FOR TWO DAYS. SHE IS ASKING TO BE PRESCRIBED LINZESS. SHE IS ALSO WANTING TO LET HIM KNOW ABOUT HER KNEES BUCKLING FROM NOT GETTING ENOUGH SLEEP. SHE IS UNSURE IF THIS IS RELATED TO HER SCIATICA.     PATIENT ALSO WANTING TO SEE ABOUT GETTING HER HEART LOOKED AT. SHE SAYS THAT SHE HAS BEEN BREAKING INTO A COLD SWEAT FOR NO REASON AND HER LEFT ARM IS THROBBING. SHE SAYS THAT SHE HAS NOT BEEN ABLE TO GO TO THE HOSPITAL BECAUSE SHE DID NOT HAVE A WA TO GET HOME DUE TO NOT HAVING A VEHICLE.     PATIENT WOULD LIKE TO HAVE A CALL BACK FROM A NURSE.     Is it okay if the provider responds through [employer]: YES

## 2024-09-05 ENCOUNTER — TELEPHONE (OUTPATIENT)
Dept: FAMILY MEDICINE CLINIC | Facility: CLINIC | Age: 39
End: 2024-09-05

## 2024-09-05 DIAGNOSIS — G43.909 EPISODIC MIGRAINE: ICD-10-CM

## 2024-09-05 RX ORDER — BUTALBITAL, ACETAMINOPHEN AND CAFFEINE 50; 325; 40 MG/1; MG/1; MG/1
1 TABLET ORAL 2 TIMES DAILY PRN
Qty: 20 TABLET | Refills: 0 | OUTPATIENT
Start: 2024-09-05

## 2024-09-05 NOTE — TELEPHONE ENCOUNTER
Caller: Lindsay Amezquita    Relationship: Self    Best call back number: [phone]    What is the best time to reach you: ANYTIME     Who are you requesting to speak with (clinical staff, provider,  specific staff member): CLINICAL STAFF    PATIENT NEEDING VIDEO VISIT FOR MONDAY UNABLE TO MAKE IT DUE TO CAR TROUBLES.

## 2024-09-05 NOTE — TELEPHONE ENCOUNTER
Caller: AlirioLindsay barron    Relationship: Self    Best call back number:  Telephone Information:   Mobile [phone]         Requested Prescriptions:   Requested Prescriptions     Pending Prescriptions Disp Refills    butalbital-acetaminophen-caffeine (FIORICET, ESGIC) -40 MG per tablet 20 tablet 0     Sig: Take 1 tablet by mouth 2 (Two) Times a Day As Needed for Headache.        Pharmacy where request should be sent: [id number] [place] [address] AT [facility] [address] - [phone]  - [phone] FX     Last office visit with prescribing clinician: 2/22/2024   Last telemedicine visit with prescribing clinician: Visit date not found   Next office visit with prescribing clinician: 10/2/2024     Additional details provided by patient: ONE TABLET LEFT    Does the patient have less than a 3 day supply:  [x] Yes  [] No    Would you like a call back once the refill request has been completed: [] Yes [x] No    If the office needs to give you a call back, can they leave a voicemail: [] Yes [x] No    Kim Niño Rep   09/05/24 15:02 EDT

## 2024-09-07 ENCOUNTER — TELEMEDICINE (OUTPATIENT)
Dept: FAMILY MEDICINE CLINIC | Facility: TELEHEALTH | Age: 39
End: 2024-09-07
Payer: MEDICAID

## 2024-09-07 DIAGNOSIS — Z86.59 HISTORY OF PANIC ATTACKS: ICD-10-CM

## 2024-09-07 DIAGNOSIS — F41.9 ANXIETY: Primary | ICD-10-CM

## 2024-09-07 DIAGNOSIS — B34.9 VIRAL ILLNESS: Primary | ICD-10-CM

## 2024-09-07 PROCEDURE — 1160F RVW MEDS BY RX/DR IN RCRD: CPT | Performed by: NURSE PRACTITIONER

## 2024-09-07 PROCEDURE — 99213 OFFICE O/P EST LOW 20 MIN: CPT | Performed by: NURSE PRACTITIONER

## 2024-09-07 PROCEDURE — 1159F MED LIST DOCD IN RCRD: CPT | Performed by: NURSE PRACTITIONER

## 2024-09-07 NOTE — PROGRESS NOTES
HPI  Lindsay Amezquita is a 39 y.o. female  presents with complaint of not feeling well. Had EMS at her house at 3am today due to her having a panic attack. EMS was able to get her to calm down and control her breathing. She went back to sleep and woke up about 1.5 hours ago (1130am) and started having temp up 100.1, green nasal drainage and nausea, mild cough due to drainage, dry/sore throat. Denies covid, flu or strep exposure but mom does have bronchitis. Has not taken any meds for symptoms yet. Tested negative for covid.     Review of Systems    Past Medical History:   Diagnosis Date    Allergic     Anxiety     Asthma Beings of copd with asthma    Back pain     Bell palsy 07/06/2021    Bell's palsy     Bipolar 2 disorder     Blood clot in vein     Behind left knee cap    COPD (chronic obstructive pulmonary disease) Six months ago    Deep vein thrombosis Last year    Depression     Diabetes mellitus November    Pre diabete    Frequent headaches     GERD (gastroesophageal reflux disease)     Hypertension     Every time i go into the emergacy room    Irritable bowel syndrome Two years ago    Kidney stone Two years ago    Migraine     Nausea, vomiting and diarrhea 09/17/2018    Obesity All of my life    Ovarian cyst Two years ago    Panic     PTSD (post-traumatic stress disorder)     Pulmonary embolism Not in lungs    Recurrent pregnancy loss, antepartum condition or complication Every since i have been 14 yars old    Restless leg syndrome     Sinus problem     Snores     Tattoos     Urinary tract infection Have them on and off    Varicella Little    Visual impairment Since i was little    Vitamin B12 deficiency     Wears glasses        Family History   Problem Relation Age of Onset    Irritable bowel syndrome Mother     Heart disease Mother     Miscarriages / Stillbirths Mother     Arthritis Mother     COPD Mother     Learning disabilities Mother     Mental illness Mother     Asthma Mother     Irritable bowel syndrome  Father     Alcohol abuse Father     Diabetes Father     Hyperlipidemia Father     Anxiety disorder Father     Learning disabilities Father     Colon cancer Maternal Grandfather     Stroke Paternal Grandfather     Stroke Paternal Grandmother        Social History     Socioeconomic History    Marital status: Single   Tobacco Use    Smoking status: Every Day     Current packs/day: 0.50     Average packs/day: 2.0 packs/day for 32.2 years (63.5 ttl pk-yrs)     Types: Cigarettes     Start date: 7/17/1992     Passive exposure: Current    Smokeless tobacco: Never    Tobacco comments:      Around 2.5 packs per day- 7/5/24   Vaping Use    Vaping status: Former    Passive vaping exposure: Yes   Substance and Sexual Activity    Alcohol use: Not Currently     Comment: rarely    Drug use: Not Currently    Sexual activity: Not Currently     Partners: Female     Birth control/protection: Other, Partner of same sex         LMP  (LMP Unknown)     PHYSICAL EXAM  Physical Exam   Constitutional: She appears well-developed and well-nourished.   HENT:   Head: Normocephalic.   Nose: Nose normal.   Neck: Neck normal appearance.  Pulmonary/Chest: Effort normal.   Neurological: She is alert.   Psychiatric: She has a normal mood and affect. Her speech is normal.       Diagnoses and all orders for this visit:    1. Viral illness (Primary)      Take ibuprofen, promethazine, flonase as previously prescribed. Rest, increase fluids.     FOLLOW-UP  As discussed during visit with [facility], if symptoms worsen or fail to improve, follow-up with PCP/Urgent Care/Emergency Department.    Patient verbalizes understanding of medications, instructions for treatment and follow-up.    Adelaida Willams, APRN  09/07/2024  12:59 EDT    The use of a video visit has been reviewed with the patient and verbal informed consent has been obtained. Myself and Lindsay Amezquita participated in this visit. The patient is located in [place], [place], and I am located  in [place], [place]. [employer] and [employer] were utilized.

## 2024-09-08 NOTE — PROGRESS NOTES
You have chosen to receive care through a telehealth visit.  Do you consent to use a video/audio connection for your medical care today? Yes     CHIEF COMPLAINT  Chief Complaint   Patient presents with    Panic Attack         HPI  Lindsay Amezquita is a 39 y.o. female  presents with complaint of panic attack. Reports her symptoms started 20 min ago. Reports she has been dealing with panic attacks today. Reports chest pain and headache. Reports she is having some SOA. Reports no fever or chills. No nausea or vomiting. Reports she has taken some valium 5 mg 20 min ago. Reports she has her mother there with her.      Review of Systems   Constitutional:  Negative for chills, fatigue and fever.   HENT:  Negative for congestion, ear discharge, ear pain, sinus pressure, sinus pain and sore throat.    Respiratory:  Positive for shortness of breath. Negative for cough, chest tightness and wheezing.    Cardiovascular:  Positive for chest pain.   Gastrointestinal:  Negative for abdominal pain, diarrhea, nausea and vomiting.   Musculoskeletal:  Negative for back pain and myalgias.   Neurological:  Positive for headaches. Negative for dizziness.   Psychiatric/Behavioral:  The patient is nervous/anxious.        Past Medical History:   Diagnosis Date    Allergic     Anxiety     Asthma Beings of copd with asthma    Back pain     Bell palsy 07/06/2021    Bell's palsy     Bipolar 2 disorder     Blood clot in vein     Behind left knee cap    COPD (chronic obstructive pulmonary disease) Six months ago    Deep vein thrombosis Last year    Depression     Diabetes mellitus November    Pre diabete    Frequent headaches     GERD (gastroesophageal reflux disease)     Hypertension     [facility]    Irritable bowel syndrome Two years ago    Kidney stone Two years ago    Migraine     Nausea, vomiting and diarrhea 09/17/2018    Obesity All of my life    Ovarian cyst Two years ago    Panic     PTSD (post-traumatic stress  disorder)     Pulmonary embolism Not in lungs    Recurrent pregnancy loss, antepartum condition or complication Every since i have been 14 yars old    Restless leg syndrome     Sinus problem     Snores     Tattoos     Urinary tract infection Have them on and off    Varicella Little    Visual impairment Since i was little    Vitamin B12 deficiency     Wears glasses        Family History   Problem Relation Age of Onset    Irritable bowel syndrome Mother     Heart disease Mother     Miscarriages / Stillbirths Mother     Arthritis Mother     COPD Mother     Learning disabilities Mother     Mental illness Mother     Asthma Mother     Irritable bowel syndrome Father     Alcohol abuse Father     Diabetes Father     Hyperlipidemia Father     Anxiety disorder Father     Learning disabilities Father     Colon cancer Maternal Grandfather     Stroke Paternal Grandfather     Stroke Paternal Grandmother        Social History     Socioeconomic History    Marital status: Single   Tobacco Use    Smoking status: Every Day     Current packs/day: 0.50     Average packs/day: 2.0 packs/day for 32.2 years (63.5 ttl pk-yrs)     Types: Cigarettes     Start date: 7/17/1992     Passive exposure: Current    Smokeless tobacco: Never    Tobacco comments:      Around 2.5 packs per day- 7/5/24   Vaping Use    Vaping status: Former    Passive vaping exposure: Yes   Substance and Sexual Activity    Alcohol use: Not Currently     Comment: rarely    Drug use: Not Currently    Sexual activity: Not Currently     Partners: Female     Birth control/protection: Other, Partner of same sex       Lindsay Amezquita  reports that she has been smoking cigarettes. She started smoking about 32 years ago. She has a 63.5 pack-year smoking history. She has been exposed to tobacco smoke. She has never used smokeless tobacco. I have educated her on the risk of diseases from using tobacco products such as cancer, COPD, and heart disease.     I advised her to quit and she is  willing to quit. We have discussed the following method/s for tobacco cessation:  Counseling.  Reports she will discuss with her PCP     I spent  1  minutes counseling the patient.              LMP  (LMP Unknown)   Breastfeeding No     PHYSICAL EXAM  Physical Exam   Constitutional: She is oriented to person, place, and time. She appears well-developed and well-nourished. No distress.   HENT:   Head: Normocephalic and atraumatic.   Right Ear: Hearing normal.   Left Ear: Hearing normal.   Mouth/Throat: Mouth/Lips are normal.  Eyes: Conjunctivae and lids are normal.   Pulmonary/Chest: Effort normal.  No respiratory distress.  Neurological: She is alert and oriented to person, place, and time.   Psychiatric: Her speech is normal and behavior is normal. She mood appears anxious.   Patient is speaking in full sentences.     Results for orders placed or performed during the hospital encounter of 08/15/24   CBC Auto Differential    Specimen: Blood   Result Value Ref Range    WBC 7.67 3.40 - 10.80 10*3/mm3    RBC 4.49 3.77 - 5.28 10*6/mm3    Hemoglobin 13.7 12.0 - 15.9 g/dL    Hematocrit 41.0 34.0 - 46.6 %    MCV 91.3 79.0 - 97.0 fL    MCH 30.5 26.6 - 33.0 pg    MCHC 33.4 31.5 - 35.7 g/dL    RDW 13.9 12.3 - 15.4 %    RDW-SD 47.0 37.0 - 54.0 fl    MPV 9.4 6.0 - 12.0 fL    Platelets 264 140 - 450 10*3/mm3    Neutrophil % 55.7 42.7 - 76.0 %    Lymphocyte % 34.6 19.6 - 45.3 %    Monocyte % 6.5 5.0 - 12.0 %    Eosinophil % 2.3 0.3 - 6.2 %    Basophil % 0.4 0.0 - 1.5 %    Immature Grans % 0.5 0.0 - 0.5 %    Neutrophils, Absolute 4.27 1.70 - 7.00 10*3/mm3    Lymphocytes, Absolute 2.65 0.70 - 3.10 10*3/mm3    Monocytes, Absolute 0.50 0.10 - 0.90 10*3/mm3    Eosinophils, Absolute 0.18 0.00 - 0.40 10*3/mm3    Basophils, Absolute 0.03 0.00 - 0.20 10*3/mm3    Immature Grans, Absolute 0.04 0.00 - 0.05 10*3/mm3    nRBC 0.0 0.0 - 0.2 /100 WBC   Comprehensive Metabolic Panel    Specimen: Blood   Result Value Ref Range    Glucose 143 (H) 65  - 99 mg/dL    BUN 6 6 - 20 mg/dL    Creatinine 0.60 0.57 - 1.00 mg/dL    Sodium 140 136 - 145 mmol/L    Potassium 4.4 3.5 - 5.2 mmol/L    Chloride 107 98 - 107 mmol/L    CO2 23.3 22.0 - 29.0 mmol/L    Calcium 9.1 8.6 - 10.5 mg/dL    Total Protein 6.0 6.0 - 8.5 g/dL    Albumin 4.0 3.5 - 5.2 g/dL    ALT (SGPT) 21 1 - 33 U/L    AST (SGOT) 17 1 - 32 U/L    Alkaline Phosphatase 69 39 - 117 U/L    Total Bilirubin 0.2 0.0 - 1.2 mg/dL    Globulin 2.0 gm/dL    A/G Ratio 2.0 g/dL    BUN/Creatinine Ratio 10.0 7.0 - 25.0    Anion Gap 9.7 5.0 - 15.0 mmol/L    eGFR 116.5 >60.0 mL/min/1.73     *Note: Due to a large number of results and/or encounters for the requested time period, some results have not been displayed. A complete set of results can be found in Results Review.       Diagnoses and all orders for this visit:    1. Anxiety (Primary)    2. History of panic attacks    Advised patient to go to ER for in person evaluation. Advised patient since she was having chest pain and SOA to call ems to transport her. Patient declines for provider to call EMS but reports she will call. Understands the risk of not going to a higher level of care for her symptoms. Reports she will call EMS now.         Court Felipe, CLAIR  09/07/2024  20:16 EDT    The use of a video visit has been reviewed with the patient and verbal informed consent has been obtained. Myself and Lindsay Amezquita participated in this visit. The patient is located in [address] [place], [place] [id number].    I am located in [facility]. Mychart and [employer] were utilized. I spent 5 minutes in the patient's chart for this visit.      Note Disclaimer: At [facility], we believe that sharing information builds trust and better   relationships. You are receiving this note because you recently visited [facility]. It is possible you   will see health information before a provider has talked with you about it. This kind of information can   be easy to  misunderstand. To help you fully understand what it means for your health, we urge you to   discuss this note with your provider.

## 2024-09-09 ENCOUNTER — TELEMEDICINE (OUTPATIENT)
Dept: FAMILY MEDICINE CLINIC | Facility: CLINIC | Age: 39
End: 2024-09-09
Payer: MEDICAID

## 2024-09-09 ENCOUNTER — TELEPHONE (OUTPATIENT)
Dept: OBSTETRICS AND GYNECOLOGY | Facility: CLINIC | Age: 39
End: 2024-09-09
Payer: MEDICAID

## 2024-09-09 ENCOUNTER — TELEMEDICINE (OUTPATIENT)
Dept: BEHAVIORAL HEALTH | Facility: CLINIC | Age: 39
End: 2024-09-09
Payer: MEDICAID

## 2024-09-09 DIAGNOSIS — F31.30 BIPOLAR I DISORDER, MOST RECENT EPISODE DEPRESSED: ICD-10-CM

## 2024-09-09 DIAGNOSIS — B37.31 VAGINAL CANDIDA: ICD-10-CM

## 2024-09-09 DIAGNOSIS — N76.4 FURUNCLE OF LABIA MAJORA: ICD-10-CM

## 2024-09-09 DIAGNOSIS — F51.04 PSYCHOPHYSIOLOGICAL INSOMNIA: ICD-10-CM

## 2024-09-09 DIAGNOSIS — F41.0 PANIC ATTACKS: ICD-10-CM

## 2024-09-09 DIAGNOSIS — F41.1 GENERALIZED ANXIETY DISORDER: ICD-10-CM

## 2024-09-09 DIAGNOSIS — Z59.82 TRANSPORTATION INSECURITY DUE TO LACK OF ACCESS TO VEHICLE: ICD-10-CM

## 2024-09-09 DIAGNOSIS — K58.1 IRRITABLE BOWEL SYNDROME WITH CONSTIPATION: Primary | ICD-10-CM

## 2024-09-09 DIAGNOSIS — G43.001 MIGRAINE WITHOUT AURA AND WITH STATUS MIGRAINOSUS, NOT INTRACTABLE: ICD-10-CM

## 2024-09-09 DIAGNOSIS — G43.909 EPISODIC MIGRAINE: ICD-10-CM

## 2024-09-09 DIAGNOSIS — F43.10 POST TRAUMATIC STRESS DISORDER (PTSD): ICD-10-CM

## 2024-09-09 DIAGNOSIS — J32.9 RHINOSINUSITIS: ICD-10-CM

## 2024-09-09 DIAGNOSIS — N61.1 FURUNCLE OF BREAST: ICD-10-CM

## 2024-09-09 PROCEDURE — 1160F RVW MEDS BY RX/DR IN RCRD: CPT | Performed by: FAMILY MEDICINE

## 2024-09-09 PROCEDURE — 3044F HG A1C LEVEL LT 7.0%: CPT | Performed by: FAMILY MEDICINE

## 2024-09-09 PROCEDURE — 99214 OFFICE O/P EST MOD 30 MIN: CPT

## 2024-09-09 PROCEDURE — 1159F MED LIST DOCD IN RCRD: CPT | Performed by: FAMILY MEDICINE

## 2024-09-09 PROCEDURE — 99214 OFFICE O/P EST MOD 30 MIN: CPT | Performed by: FAMILY MEDICINE

## 2024-09-09 PROCEDURE — 1125F AMNT PAIN NOTED PAIN PRSNT: CPT | Performed by: FAMILY MEDICINE

## 2024-09-09 RX ORDER — FEXOFENADINE HCL AND PSEUDOEPHEDRINE HCI 60; 120 MG/1; MG/1
1 TABLET, EXTENDED RELEASE ORAL 2 TIMES DAILY
Qty: 60 TABLET | Refills: 1 | Status: SHIPPED | OUTPATIENT
Start: 2024-09-09

## 2024-09-09 RX ORDER — BUPROPION HYDROCHLORIDE 150 MG/1
150 TABLET ORAL EVERY MORNING
Qty: 30 TABLET | Refills: 1 | Status: SHIPPED | OUTPATIENT
Start: 2024-09-09

## 2024-09-09 RX ORDER — DESVENLAFAXINE 100 MG/1
100 TABLET, EXTENDED RELEASE ORAL DAILY
Qty: 30 TABLET | Refills: 1 | Status: SHIPPED | OUTPATIENT
Start: 2024-09-09

## 2024-09-09 RX ORDER — BUTALBITAL, ACETAMINOPHEN AND CAFFEINE 50; 325; 40 MG/1; MG/1; MG/1
1 TABLET ORAL 2 TIMES DAILY PRN
Qty: 20 TABLET | Refills: 0 | Status: SHIPPED | OUTPATIENT
Start: 2024-09-09

## 2024-09-09 RX ORDER — RIMEGEPANT SULFATE 75 MG/75MG
75 TABLET, ORALLY DISINTEGRATING ORAL EVERY OTHER DAY
Qty: 16 TABLET | Refills: 0 | Status: SHIPPED | OUTPATIENT
Start: 2024-09-09

## 2024-09-09 RX ORDER — OXCARBAZEPINE 300 MG/1
300 TABLET, FILM COATED ORAL 2 TIMES DAILY
Qty: 60 TABLET | Refills: 1 | Status: SHIPPED | OUTPATIENT
Start: 2024-09-09

## 2024-09-09 RX ORDER — LURASIDONE HYDROCHLORIDE 120 MG/1
120 TABLET, FILM COATED ORAL DAILY
Qty: 30 TABLET | Refills: 1 | Status: SHIPPED | OUTPATIENT
Start: 2024-09-09

## 2024-09-09 RX ORDER — FLUCONAZOLE 150 MG/1
150 TABLET ORAL
Qty: 3 TABLET | Refills: 0 | Status: SHIPPED | OUTPATIENT
Start: 2024-09-09 | End: 2024-09-16

## 2024-09-09 RX ORDER — DIAZEPAM 10 MG
10 TABLET ORAL 3 TIMES DAILY PRN
Qty: 45 TABLET | Refills: 0 | Status: SHIPPED | OUTPATIENT
Start: 2024-09-09 | End: 2025-09-09

## 2024-09-09 RX ORDER — AMITRIPTYLINE HYDROCHLORIDE 150 MG/1
150 TABLET ORAL NIGHTLY
Qty: 30 TABLET | Refills: 1 | Status: SHIPPED | OUTPATIENT
Start: 2024-09-09

## 2024-09-09 SDOH — ECONOMIC STABILITY - TRANSPORTATION SECURITY: TRANSPORTATION INSECURITY: Z59.82

## 2024-09-09 NOTE — PROGRESS NOTES
This provider is located at [facility], Behavioral [facility], [address] [place] [address], [facility],using a secure MyChart Video Visit through [employer]. Patient is being seen remotely via telehealth at their home address in [place], and stated they are in a secure environment for this session. The patient's condition being diagnosed/treated is appropriate for telemedicine. The provider identified herself as well as her credentials.   The patient, and/or patients guardian, consent to be seen remotely, and when consent is given they understand that the consent allows for patient identifiable information to be sent to a third party as needed.   They may refuse to be seen remotely at any time. The electronic data is encrypted and password protected, and the patient and/or guardian has been advised of the potential risks to privacy not withstanding such measures.    Video Visit    Patient Name: Lindsay Amezquita  : 1985   MRN: [id number]   Care Team: Patient Care Team:  Hunter Winkler MD as PCP - General (Family Medicine)  Donna Mcqueen APRN as Nurse Practitioner (Behavioral Health)         Chief Complaint:    Chief Complaint   Patient presents with    Anxiety    Panic Attack    PTSD    Sleeping Problem    Bipolar    Med Management       History of Present Illness: Lindsay Amezquita is a 39 y.o. female who presents via video visit for a medication management follow up. Patient reports that she has been having a hard time lately. She states the frequency of her panic attacks have increased significantly and she has had EMS at her house three times last week due to panic. She feels exteremly stressed and feels that she cannot do anything to help her calm down. She denies any SI/HI today.     The following portion of the patient's history were reviewed and updated appropriately: allergies, current and past medications, family history, medical history and social history. MITCH reviewed and  appropriate.   Subjective   Review of Systems:    Review of Systems   Respiratory: Negative.     Cardiovascular: Negative.  Negative for chest pain and palpitations.   Neurological: Negative.    Psychiatric/Behavioral:  Positive for sleep disturbance and stress. The patient is nervous/anxious.         Panic   All other systems reviewed and are negative.      Current Medications:   Current Outpatient Medications   Medication Sig Dispense Refill    amitriptyline (ELAVIL) 150 MG tablet Take 1 tablet by mouth Every Night. 30 tablet 1    buPROPion XL (Wellbutrin XL) 150 MG 24 hr tablet Take 1 tablet by mouth Every Morning. 30 tablet 1    desvenlafaxine (PRISTIQ) 100 MG 24 hr tablet Take 1 tablet by mouth Daily. 30 tablet 1    Lurasidone HCl (Latuda) 120 MG tablet tablet Take 1 tablet by mouth Daily. 30 tablet 1    OXcarbazepine (TRILEPTAL) 300 MG tablet Take 1 tablet by mouth 2 (Two) Times a Day. 60 tablet 1    acetaminophen (TYLENOL) 500 MG tablet Take 2 tablets by mouth Every 8 (Eight) Hours As Needed for Mild Pain. 60 tablet 0    atorvastatin (LIPITOR) 40 MG tablet Take 1 tablet by mouth every night at bedtime. 90 tablet 3    butalbital-acetaminophen-caffeine (FIORICET, ESGIC) -40 MG per tablet Take 1 tablet by mouth 2 (Two) Times a Day As Needed for Headache. 20 tablet 0    Cariprazine HCl (Vraylar) 4.5 MG capsule capsule Take 1 capsule by mouth Daily. 30 capsule 1    Chlorhexidine Gluconate 4 % solution APPLY TOPICALLY TO THE APPROPRIATE AREA AS DIRECTED DAILY AS NEEDED FOR WOUND CARE.      diazePAM (VALIUM) 10 MG tablet Take 1 tablet by mouth 3 (Three) Times a Day As Needed for Anxiety. 45 tablet 0    empagliflozin (Jardiance) 10 MG tablet tablet Take 1 tablet by mouth Daily. 90 tablet 1    fexofenadine-pseudoephedrine (GNP Fexofenadine/PSE ER)  MG per 12 hr tablet Take 1 tablet by mouth 2 (Two) Times a Day. 60 tablet 1    fluconazole (DIFLUCAN) 150 MG tablet Take 1 tablet by mouth Every 72  (Seventy-Two) Hours for 3 doses. 3 tablet 0    fluticasone (FLONASE) 50 MCG/ACT nasal spray 2 sprays into the nostril(s) as directed by provider Daily. 11.1 mL 1    FREESTYLE LITE test strip See Admin Instructions.      gabapentin (Neurontin) 800 MG tablet Take 1 tablet by mouth 3 (Three) Times a Day. 90 tablet 0    HYDROcodone-acetaminophen (NORCO) 5-325 MG per tablet Take 1 tablet by mouth Every 8 (Eight) Hours As Needed for Severe Pain (pain) for up to 12 doses. 5 tablet 0    ibuprofen (ADVIL,MOTRIN) 800 MG tablet Take 1 tablet by mouth Every 8 (Eight) Hours As Needed for Mild Pain. 60 tablet 0    ketorolac (TORADOL) 10 MG tablet Take 1 tablet by mouth Every 6 (Six) Hours As Needed for Severe Pain. 20 tablet 0    montelukast (SINGULAIR) 10 MG tablet Take 1 tablet by mouth Every Night. 30 tablet 5    omeprazole (priLOSEC) 20 MG capsule Take 1 capsule by mouth Daily. 90 capsule 1    polyethylene glycol (MIRALAX) 17 g packet Take 17 g by mouth Daily. 30 each 1    promethazine (PHENERGAN) 25 MG tablet TAKE 1 TABLET BY MOUTH EVERY 6 HOURS AS NEEDED FOR NAUSEA OR VOMITING. (Patient not taking: Reported on 9/7/2024) 20 tablet 1    Rimegepant Sulfate (Nurtec) 75 MG tablet dispersible tablet Take 1 tablet by mouth Every Other Day. Take Monday,Wednesday,Friday, and Saturday. 16 tablet 0    tiZANidine (ZANAFLEX) 4 MG tablet Take 1 tablet by mouth Every 8 (Eight) Hours As Needed for Muscle Spasms. 270 tablet 0    Zavegepant HCl 10 MG/ACT solution 10 mg into the nostril(s) as directed by provider Daily As Needed (migraine). (1 spray into 1 nostril every 24 hrs prn) 6 each 5     No current facility-administered medications for this visit.       Mental Status Exam:   Hygiene:    unable to assess   Cooperation:  Cooperative  Eye Contact:  Good  Psychomotor Behavior:   appears to be WNL   Affect:  Appropriate  Mood: anxious and panicky  Speech:  Pressured, Rapid, and Rambling  Thought Process:  Linear  Thought Content:  Mood  congruent  Suicidal:  None  Homicidal:  None  Hallucinations:  None  Delusion:  None  Memory:  Intact  Orientation:  Person, Place, Time, and Situation  Reliability:  fair  Insight:  Fair  Judgement:  Poor  Impulse Control:  Poor  Physical/Medical Issues:  Yes see chart       Objective   Vital Signs:   There were no vitals taken for this visit.      On 8/22/2024  BP: 122/76  P: 98    Assessment / Plan    Diagnoses and all orders for this visit:    1. Bipolar I disorder, most recent episode depressed  -     Cariprazine HCl (Vraylar) 4.5 MG capsule capsule; Take 1 capsule by mouth Daily.  Dispense: 30 capsule; Refill: 1  -     buPROPion XL (Wellbutrin XL) 150 MG 24 hr tablet; Take 1 tablet by mouth Every Morning.  Dispense: 30 tablet; Refill: 1  -     Lurasidone HCl (Latuda) 120 MG tablet tablet; Take 1 tablet by mouth Daily.  Dispense: 30 tablet; Refill: 1  -     OXcarbazepine (TRILEPTAL) 300 MG tablet; Take 1 tablet by mouth 2 (Two) Times a Day.  Dispense: 60 tablet; Refill: 1    2. Post traumatic stress disorder (PTSD)  -     diazePAM (VALIUM) 10 MG tablet; Take 1 tablet by mouth 3 (Three) Times a Day As Needed for Anxiety.  Dispense: 45 tablet; Refill: 0  -     desvenlafaxine (PRISTIQ) 100 MG 24 hr tablet; Take 1 tablet by mouth Daily.  Dispense: 30 tablet; Refill: 1    3. Generalized anxiety disorder  -     diazePAM (VALIUM) 10 MG tablet; Take 1 tablet by mouth 3 (Three) Times a Day As Needed for Anxiety.  Dispense: 45 tablet; Refill: 0  -     desvenlafaxine (PRISTIQ) 100 MG 24 hr tablet; Take 1 tablet by mouth Daily.  Dispense: 30 tablet; Refill: 1    4. Psychophysiological insomnia  -     amitriptyline (ELAVIL) 150 MG tablet; Take 1 tablet by mouth Every Night.  Dispense: 30 tablet; Refill: 1    5. Panic attacks  -     diazePAM (VALIUM) 10 MG tablet; Take 1 tablet by mouth 3 (Three) Times a Day As Needed for Anxiety.  Dispense: 45 tablet; Refill: 0    Will increase Vraylar and Valium. Continue all other  medication as ordered.     As part of patient's treatment plan I am prescribing a controlled substance.  The patient has been made aware of the appropriate use of such medications, including potential risk of somnolence, limited ability to drive and/or work safely, and potential for dependence and/or overdose.  It has also been made clear that these medications are for use by this patient only, without concomitant use of alcohol or other substances, unless prescribed.    Patient has completed a prescribing agreement detailing terms of continued prescribing of controlled substances, including monitoring MITCH reports, urine drug screening, and pill counts if necessary.  Patient is aware that inappropriate use will result in cessation of prescribing such medications.      AIMS  Facial and Oral Movements  Muscles of Facial Expression: None, normal  Lips and Perioral Area: None, normal  Jaw: None, normal  Tongue: None, normal  Extremity Movements  Upper (arms, wrists, hands, fingers): None, normal  Lower (legs, knees, ankles, toes): None, normal  Trunk Movements  Neck, shoulders, hips: None, normal  Overall Severity  Severity of abnormal movements (max 4): 0  Incapacitation due to abnormal movements: None, normal  Patient's awareness of abnormal movements (rate only patient's report): Aware, no distress        A psychological evaluation was conducted in order to assess past and current level of functioning. Areas assessed included, but were not limited to: perception of social support, perception of ability to face and deal with challenges in life (positive functioning), anxiety symptoms, depressive symptoms, perspective on beliefs/belief system, coping skills for stress, intelligence level,  Therapeutic rapport was established. Interventions conducted today were geared towards incorporating medication management along with support for continued therapy. Education was also provided as to the med management with this  provider and what to expect in subsequent sessions.      We discussed risks, benefits, goals and side effects of the above medication and the patient was agreeable with the plan. Patient was educated on the importance of compliance with treatment and follow-up appointments. Patient is aware to contact the [facility] with any worsening of symptoms. To call for questions or concerns and return early if necessary. Patent is agreeable to go to the Emergency Department or call 911 should they begin SI/HI.        MEDS ORDERED DURING VISIT:  New Medications Ordered This Visit   Medications    Cariprazine HCl (Vraylar) 4.5 MG capsule capsule     Sig: Take 1 capsule by mouth Daily.     Dispense:  30 capsule     Refill:  1    diazePAM (VALIUM) 10 MG tablet     Sig: Take 1 tablet by mouth 3 (Three) Times a Day As Needed for Anxiety.     Dispense:  45 tablet     Refill:  0    buPROPion XL (Wellbutrin XL) 150 MG 24 hr tablet     Sig: Take 1 tablet by mouth Every Morning.     Dispense:  30 tablet     Refill:  1    desvenlafaxine (PRISTIQ) 100 MG 24 hr tablet     Sig: Take 1 tablet by mouth Daily.     Dispense:  30 tablet     Refill:  1    Lurasidone HCl (Latuda) 120 MG tablet tablet     Sig: Take 1 tablet by mouth Daily.     Dispense:  30 tablet     Refill:  1    OXcarbazepine (TRILEPTAL) 300 MG tablet     Sig: Take 1 tablet by mouth 2 (Two) Times a Day.     Dispense:  60 tablet     Refill:  1    amitriptyline (ELAVIL) 150 MG tablet     Sig: Take 1 tablet by mouth Every Night.     Dispense:  30 tablet     Refill:  1         Follow Up   Return in about 6 weeks (around 10/21/2024).  Patient was given instructions and counseling regarding her condition or for health maintenance advice. Please see specific information pulled into the AVS if appropriate.     TREATMENT PLAN/GOALS: Continue supportive psychotherapy efforts and medications as indicated. Treatment and medication options discussed during today's visit. Patient  acknowledged and verbally consented to continue with current treatment plan and was educated on the importance of compliance with treatment and follow-up appointments.    MEDICATION ISSUES:  Discussed medication options and treatment plan of prescribed medication as well as the risks, benefits, and side effects including potential falls, possible impaired driving and metabolic adversities among others. Patient is agreeable to call the office with any worsening of symptoms or onset of side effects. Patient is agreeable to call 911 or go to the nearest ER should he/she begin having SI/HI.        [facility]  [address] [place] [address] DR NANCY SORIA [id number] [phone]    September 11, 2024 10:35 EDT

## 2024-09-09 NOTE — TELEPHONE ENCOUNTER
Caller: Lindsay Amezquita    Relationship: Self    Best call back number:   Telephone Information:   Mobile [phone]         Requested Prescriptions:   Requested Prescriptions     Pending Prescriptions Disp Refills    butalbital-acetaminophen-caffeine (FIORICET, ESGIC) -40 MG per tablet 20 tablet 0     Sig: Take 1 tablet by mouth 2 (Two) Times a Day As Needed for Headache.    Rimegepant Sulfate (Nurtec) 75 MG tablet dispersible tablet 16 tablet 0     Sig: Take 1 tablet by mouth Every Other Day. Take Monday,Wednesday,Friday, and Saturday.        Pharmacy where request should be sent: [id number] - [place], [place] - [address] AT [facility] [address] - [phone] PH - [phone] FX     Last office visit with prescribing clinician: 2/22/2024   Last telemedicine visit with prescribing clinician: Visit date not found   Next office visit with prescribing clinician: 10/2/2024     Additional details provided by patient: PT STATES SHE IS COMPLETELY OUT OF MEDICATION, SHE HAD CALLED ON 9-5-24 TO R/S HER APPOINTMENT FROM 9-6-24 TO 10-2-24, LOOKS LIKE THE REFILL HAD BEEN DENIED. PLEASE REVIEW AND ADVISE.     Does the patient have less than a 3 day supply:  [x] Yes  [] No    Would you like a call back once the refill request has been completed: [] Yes [x] No    If the office needs to give you a call back, can they leave a voicemail: [] Yes [x] No    Kim Niño Rep   09/09/24 08:41 EDT

## 2024-09-09 NOTE — PROGRESS NOTES
Telehealth E-Visit      Date: 2024   Patient Name: Lindsay Amezquita  : 1985   MRN: [id number]     Chief Complaint:    Chief Complaint   Patient presents with    Constipation    Cyst              I have reviewed the E-Visit questionnaire and the patient's answers, my assessment and plan are listed below.     This provider is located at the [facility] (through [facility]), [address] [place] [address] using a secure My 1% Video Visit through [employer]. Patient is being seen remotely via telehealth at their home address in [place], and stated they are in a secure environment for this session. The patient's condition being diagnosed/treated is appropriate for telemedicine. The provider identified herself as well as her credentials. The patient, and/or patients guardian, consent to be seen remotely, and when consent is given they understand that the consent allows for patient identifiable information to be sent to a third party as needed. They may refuse to be seen remotely at any time. The electronic data is encrypted and password protected, and the patient and/or guardian has been advised of the potential risks to privacy not withstanding such measures.    You have chosen to receive care through a telehealth visit. Do you consent to use a video/audio connection for your medical care today? Yes    History of Present Illness: Lindsay Amezquita is a 39 y.o. female who is here today to follow up with patient.  She reports that she has been experiencing intermittent constipation for several days after being given Zofran in the emergency room.  She has tried MiraLAX but that has not helped much.  She does have history of intermittent diarrhea and constipation.  She inquires about the possibility of Linzess based on speaking to her family member who had used Linzess in the past.    Patient also has concerns about small cysts on her left breast and also labia majora.  She reports  that she gets cysts sometimes but these occur in different areas.  She also has history of hidradenitis suppurativa.      Subjective      I have reviewed and the following portions of the patient's history were updated as appropriate: past family history, past medical history, past social history, past surgical history and problem list.    Medications:     Current Outpatient Medications:     fexofenadine-pseudoephedrine (GNP Fexofenadine/PSE ER)  MG per 12 hr tablet, Take 1 tablet by mouth 2 (Two) Times a Day., Disp: 60 tablet, Rfl: 1    acetaminophen (TYLENOL) 500 MG tablet, Take 2 tablets by mouth Every 8 (Eight) Hours As Needed for Mild Pain., Disp: 60 tablet, Rfl: 0    amitriptyline (ELAVIL) 150 MG tablet, Take 1 tablet by mouth Every Night., Disp: 30 tablet, Rfl: 1    atorvastatin (LIPITOR) 40 MG tablet, Take 1 tablet by mouth every night at bedtime., Disp: 90 tablet, Rfl: 3    buPROPion XL (Wellbutrin XL) 150 MG 24 hr tablet, Take 1 tablet by mouth Every Morning., Disp: 30 tablet, Rfl: 1    butalbital-acetaminophen-caffeine (FIORICET, ESGIC) -40 MG per tablet, Take 1 tablet by mouth 2 (Two) Times a Day As Needed for Headache., Disp: 20 tablet, Rfl: 0    Cariprazine HCl (Vraylar) 4.5 MG capsule capsule, Take 1 capsule by mouth Daily., Disp: 30 capsule, Rfl: 1    Chlorhexidine Gluconate 4 % solution, APPLY TOPICALLY TO THE APPROPRIATE AREA AS DIRECTED DAILY AS NEEDED FOR WOUND CARE., Disp: , Rfl:     desvenlafaxine (PRISTIQ) 100 MG 24 hr tablet, Take 1 tablet by mouth Daily., Disp: 30 tablet, Rfl: 1    diazePAM (VALIUM) 10 MG tablet, Take 1 tablet by mouth 3 (Three) Times a Day As Needed for Anxiety., Disp: 45 tablet, Rfl: 0    empagliflozin (Jardiance) 10 MG tablet tablet, Take 1 tablet by mouth Daily., Disp: 90 tablet, Rfl: 1    fluconazole (DIFLUCAN) 150 MG tablet, Take 1 tablet by mouth Every 72 (Seventy-Two) Hours for 3 doses., Disp: 3 tablet, Rfl: 0    fluticasone (FLONASE) 50 MCG/ACT nasal  spray, 2 sprays into the nostril(s) as directed by provider Daily., Disp: 11.1 mL, Rfl: 1    FREESTYLE LITE test strip, See Admin Instructions., Disp: , Rfl:     gabapentin (Neurontin) 800 MG tablet, Take 1 tablet by mouth 3 (Three) Times a Day., Disp: 90 tablet, Rfl: 0    HYDROcodone-acetaminophen (NORCO) 5-325 MG per tablet, Take 1 tablet by mouth Every 8 (Eight) Hours As Needed for Severe Pain (pain) for up to 12 doses., Disp: 5 tablet, Rfl: 0    ibuprofen (ADVIL,MOTRIN) 800 MG tablet, Take 1 tablet by mouth Every 8 (Eight) Hours As Needed for Mild Pain., Disp: 60 tablet, Rfl: 0    ketorolac (TORADOL) 10 MG tablet, Take 1 tablet by mouth Every 6 (Six) Hours As Needed for Severe Pain., Disp: 20 tablet, Rfl: 0    Lurasidone HCl (Latuda) 120 MG tablet tablet, Take 1 tablet by mouth Daily., Disp: 30 tablet, Rfl: 1    montelukast (SINGULAIR) 10 MG tablet, Take 1 tablet by mouth Every Night., Disp: 30 tablet, Rfl: 5    omeprazole (priLOSEC) 20 MG capsule, Take 1 capsule by mouth Daily., Disp: 90 capsule, Rfl: 1    OXcarbazepine (TRILEPTAL) 300 MG tablet, Take 1 tablet by mouth 2 (Two) Times a Day., Disp: 60 tablet, Rfl: 1    polyethylene glycol (MIRALAX) 17 g packet, Take 17 g by mouth Daily., Disp: 30 each, Rfl: 1    promethazine (PHENERGAN) 25 MG tablet, TAKE 1 TABLET BY MOUTH EVERY 6 HOURS AS NEEDED FOR NAUSEA OR VOMITING. (Patient not taking: Reported on 9/7/2024), Disp: 20 tablet, Rfl: 1    Rimegepant Sulfate (Nurtec) 75 MG tablet dispersible tablet, Take 1 tablet by mouth Every Other Day. Take Monday,Wednesday,Friday, and Saturday., Disp: 16 tablet, Rfl: 0    tiZANidine (ZANAFLEX) 4 MG tablet, Take 1 tablet by mouth Every 8 (Eight) Hours As Needed for Muscle Spasms., Disp: 270 tablet, Rfl: 0    Zavegepant HCl 10 MG/ACT solution, 10 mg into the nostril(s) as directed by provider Daily As Needed (migraine). (1 spray into 1 nostril every 24 hrs prn), Disp: 6 each, Rfl: 5    Allergies:   Allergies   Allergen  "Reactions    Aspirin Anaphylaxis and Hives     Wheezing         Codeine Anaphylaxis    Cyclobenzaprine Other (See Comments)     \"gives me chest pain\"    Other reaction(s): Other (See Comments)    Haldol [Haloperidol] Rash    Imitrex [Sumatriptan] Anaphylaxis and Rash    Latex Hives and Rash    Penicillins Hives and Anaphylaxis     Vomiting    Zofran [Ondansetron] GI Intolerance     Constipation    Lamictal [Lamotrigine] Itching     Itching and hives    Metoclopramide Headache, Hives and Other (See Comments)    Morphine Itching    Oxycodone-Acetaminophen GI Intolerance     Other reaction(s): GI Intolerance    Oxycontin [Oxycodone] GI Intolerance    Prochlorperazine Nausea And Vomiting and Unknown (See Comments)    Tramadol Hives and Nausea And Vomiting       Objective     Physical Exam: Limited due to virtual visit  Vital Signs: There were no vitals filed for this visit.         Physical Exam  Constitutional:       General: She is not in acute distress.     Appearance: Normal appearance. She is not ill-appearing, toxic-appearing or diaphoretic.   HENT:      Head: Normocephalic and atraumatic.      Right Ear: External ear normal.      Left Ear: External ear normal.      Nose: Nose normal.   Eyes:      Extraocular Movements: Extraocular movements intact.      Conjunctiva/sclera: Conjunctivae normal.   Pulmonary:      Effort: Pulmonary effort is normal.   Musculoskeletal:      Cervical back: Normal range of motion.   Neurological:      General: No focal deficit present.      Mental Status: She is alert and oriented to person, place, and time.   Psychiatric:         Mood and Affect: Mood normal.         Behavior: Behavior normal.         Thought Content: Thought content normal.         Judgment: Judgment normal.           Assessment / Plan      Assessment/Plan:   Diagnoses and all orders for this visit:    1. Irritable bowel syndrome with constipation (Primary)    2. Vaginal candida  -     fluconazole (DIFLUCAN) 150 MG " tablet; Take 1 tablet by mouth Every 72 (Seventy-Two) Hours for 3 doses.  Dispense: 3 tablet; Refill: 0    3. Rhinosinusitis  -     fexofenadine-pseudoephedrine (GNP Fexofenadine/PSE ER)  MG per 12 hr tablet; Take 1 tablet by mouth 2 (Two) Times a Day.  Dispense: 60 tablet; Refill: 1    4. Furuncle of breast    5. Furuncle of labia majora    6. Transportation insecurity due to lack of access to vehicle    Patient has temporary constipation due to Zofran.  She requested that to be placed in her chart that she has intolerance to this medication causing constipation.  Advised patient that she could refuse this medication when she is in the hospital  I do not think that it is appropriate to prescribe Linzess at this time and discussed this with patient as she is having bowel movements currently.  She has waxing and waning diarrhea and constipation.  At this time I think that increased hydration and fiber will help with her constipation.  She may also utilize MiraLAX  Patient was seen in a telemedicine visit yesterday for viral illness patient reports that she is now developing yeast infection and would request some fluconazole  Patient advised to continue with Mucinex, Flonase, and Allegra-D.  Refill given of Allegra-D.  Conservative treatment of pharmacal of breast and labia majora including warm compress.  Patient advised not to pick at lesions to prevent introduction of further bacteria  Patient reports transportation insecurity which is the reason for her virtual visit today.  Discussed with the patient that she could call her insurance and arrange for transportation to visits.  Patient has had 4 telemedicine visits in the last 7 days.  Advised patient that further visits would need to be in person to assess vital signs and conduct a thorough physical exam.    Follow Up:   Return if symptoms worsen or fail to improve.    Any medications prescribed have been sent electronically to   [id number]  - [place], [place] - [address] AT [facility] [address] - [phone] PH - [phone] FX  [address] [place] [address]  Phone: [phone] Fax: [phone]      16 minutes were spent reviewing the patient's questionnaire, formulating a treatment plan, and relaying information to the patient via MyChart.    Hunter Winkler MD  [facility]  09/10/24  10:55 EDT

## 2024-09-10 ENCOUNTER — TELEPHONE (OUTPATIENT)
Dept: FAMILY MEDICINE CLINIC | Facility: CLINIC | Age: 39
End: 2024-09-10
Payer: MEDICAID

## 2024-09-10 DIAGNOSIS — G47.33 OSA (OBSTRUCTIVE SLEEP APNEA): ICD-10-CM

## 2024-09-10 DIAGNOSIS — R10.2 PELVIC PAIN: ICD-10-CM

## 2024-09-10 DIAGNOSIS — G89.4 CHRONIC PAIN SYNDROME: ICD-10-CM

## 2024-09-10 DIAGNOSIS — G47.34 NOCTURNAL HYPOXEMIA: Primary | ICD-10-CM

## 2024-09-10 PROBLEM — Z59.82: Status: ACTIVE | Noted: 2024-09-10

## 2024-09-10 RX ORDER — IBUPROFEN 800 MG/1
800 TABLET, FILM COATED ORAL EVERY 8 HOURS PRN
Qty: 60 TABLET | Refills: 0 | OUTPATIENT
Start: 2024-09-10 | End: 2024-09-14

## 2024-09-10 RX ORDER — ACETAMINOPHEN 500 MG
1000 TABLET ORAL EVERY 8 HOURS PRN
Qty: 60 TABLET | Refills: 0 | Status: SHIPPED | OUTPATIENT
Start: 2024-09-10 | End: 2025-09-10

## 2024-09-10 NOTE — TELEPHONE ENCOUNTER
Patient called and left a message on my voicemail yesterday that she was not pleased with her treatment with Dr Roger's office and would like a referral to another provider for sleep medicine.  Also said that she wanted to see someone in [place] who could send her orders to [facility] instead of [facility] because she didn't like them either.

## 2024-09-10 NOTE — TELEPHONE ENCOUNTER
Caller: Lindsay Amezquita    Relationship: Self    Best call back number:[phone]    What is the medical concern/diagnosis: PT REQUEST ANOTHER SLEEP STUDY PT RATHER GO TO [place], STATED THAT THE ONE IN [place] GOT AN ATTITUDE WITH HER YESTERDAY AND SHE IS OVER THEM.

## 2024-09-10 NOTE — TELEPHONE ENCOUNTER
HUB TO READ; orders placed in [place] for sleep study. Attempted to contact pt, no answer voicemail box unavailable.

## 2024-09-12 ENCOUNTER — TELEMEDICINE (OUTPATIENT)
Dept: FAMILY MEDICINE CLINIC | Facility: CLINIC | Age: 39
End: 2024-09-12
Payer: MEDICAID

## 2024-09-12 DIAGNOSIS — J06.9 ACUTE URI: Primary | ICD-10-CM

## 2024-09-12 DIAGNOSIS — J01.90 ACUTE SINUSITIS, RECURRENCE NOT SPECIFIED, UNSPECIFIED LOCATION: ICD-10-CM

## 2024-09-12 PROCEDURE — 1160F RVW MEDS BY RX/DR IN RCRD: CPT | Performed by: FAMILY MEDICINE

## 2024-09-12 PROCEDURE — 1125F AMNT PAIN NOTED PAIN PRSNT: CPT | Performed by: FAMILY MEDICINE

## 2024-09-12 PROCEDURE — 1159F MED LIST DOCD IN RCRD: CPT | Performed by: FAMILY MEDICINE

## 2024-09-12 PROCEDURE — 3044F HG A1C LEVEL LT 7.0%: CPT | Performed by: FAMILY MEDICINE

## 2024-09-12 PROCEDURE — 99213 OFFICE O/P EST LOW 20 MIN: CPT | Performed by: FAMILY MEDICINE

## 2024-09-12 RX ORDER — DEXTROMETHORPHAN HYDROBROMIDE AND PROMETHAZINE HYDROCHLORIDE 15; 6.25 MG/5ML; MG/5ML
5 SYRUP ORAL 4 TIMES DAILY PRN
Qty: 180 ML | Refills: 1 | Status: SHIPPED | OUTPATIENT
Start: 2024-09-12

## 2024-09-12 RX ORDER — AZELASTINE 1 MG/ML
2 SPRAY, METERED NASAL 2 TIMES DAILY
Qty: 30 ML | Refills: 1 | Status: SHIPPED | OUTPATIENT
Start: 2024-09-12

## 2024-09-12 RX ORDER — CEFDINIR 300 MG/1
300 CAPSULE ORAL 2 TIMES DAILY
Qty: 14 CAPSULE | Refills: 0 | Status: SHIPPED | OUTPATIENT
Start: 2024-09-12 | End: 2024-09-19

## 2024-09-13 ENCOUNTER — TELEPHONE (OUTPATIENT)
Dept: SLEEP MEDICINE | Facility: CLINIC | Age: 39
End: 2024-09-13

## 2024-09-13 DIAGNOSIS — F31.30 BIPOLAR I DISORDER, MOST RECENT EPISODE DEPRESSED: ICD-10-CM

## 2024-09-13 NOTE — TELEPHONE ENCOUNTER
Caller: Lindsay Amezquita    Relationship to patient: Self    Best call back number: [phone]     Patient is needing: PT CALLING TO UPDATE OFFICE: CURRENT PROVIDER REMOVED PT FROM OXYGEN TREATMENT, SHE WOULD LIKE TO RESUME W NEW PROVIDER AFTER TRANSFER OF CARE TO OFFICE. PLEASE REVIEW AND ADVISE.

## 2024-09-14 ENCOUNTER — HOSPITAL ENCOUNTER (EMERGENCY)
Facility: HOSPITAL | Age: 39
Discharge: HOME | DRG: 439 | End: 2024-09-14
Attending: STUDENT IN AN ORGANIZED HEALTH CARE EDUCATION/TRAINING PROGRAM
Payer: MEDICAID

## 2024-09-14 ENCOUNTER — APPOINTMENT (OUTPATIENT)
Dept: GENERAL RADIOLOGY | Facility: HOSPITAL | Age: 39
DRG: 439 | End: 2024-09-14
Payer: MEDICAID

## 2024-09-14 ENCOUNTER — APPOINTMENT (OUTPATIENT)
Dept: CT IMAGING | Facility: HOSPITAL | Age: 39
DRG: 439 | End: 2024-09-14
Payer: MEDICAID

## 2024-09-14 VITALS
BODY MASS INDEX: 45.27 KG/M2 | SYSTOLIC BLOOD PRESSURE: 100 MMHG | WEIGHT: 246 LBS | OXYGEN SATURATION: 95 % | HEIGHT: 62 IN | TEMPERATURE: 98.8 F | RESPIRATION RATE: 16 BRPM | DIASTOLIC BLOOD PRESSURE: 73 MMHG | HEART RATE: 81 BPM

## 2024-09-14 DIAGNOSIS — R06.02 SHORTNESS OF BREATH: ICD-10-CM

## 2024-09-14 DIAGNOSIS — R07.9 CHEST PAIN, UNSPECIFIED TYPE: Primary | ICD-10-CM

## 2024-09-14 DIAGNOSIS — R05.1 ACUTE COUGH: ICD-10-CM

## 2024-09-14 LAB
ALBUMIN SERPL-MCNC: 4.1 G/DL (ref 3.5–5.2)
ALBUMIN/GLOB SERPL: 2 G/DL
ALP SERPL-CCNC: 60 U/L (ref 39–117)
ALT SERPL W P-5'-P-CCNC: 35 U/L (ref 1–33)
ANION GAP SERPL CALCULATED.3IONS-SCNC: 13.8 MMOL/L (ref 5–15)
AST SERPL-CCNC: 19 U/L (ref 1–32)
BASOPHILS # BLD AUTO: 0.04 10*3/MM3 (ref 0–0.2)
BASOPHILS NFR BLD AUTO: 0.4 % (ref 0–1.5)
BILIRUB SERPL-MCNC: <0.2 MG/DL (ref 0–1.2)
BUN SERPL-MCNC: 5 MG/DL (ref 6–20)
BUN/CREAT SERPL: 7.5 (ref 7–25)
CALCIUM SPEC-SCNC: 8.2 MG/DL (ref 8.6–10.5)
CHLORIDE SERPL-SCNC: 103 MMOL/L (ref 98–107)
CO2 SERPL-SCNC: 19.2 MMOL/L (ref 22–29)
CREAT SERPL-MCNC: 0.67 MG/DL (ref 0.57–1)
DEPRECATED RDW RBC AUTO: 45.7 FL (ref 37–54)
EGFRCR SERPLBLD CKD-EPI 2021: 113.5 ML/MIN/1.73
EOSINOPHIL # BLD AUTO: 0.19 10*3/MM3 (ref 0–0.4)
EOSINOPHIL NFR BLD AUTO: 1.9 % (ref 0.3–6.2)
ERYTHROCYTE [DISTWIDTH] IN BLOOD BY AUTOMATED COUNT: 13.4 % (ref 12.3–15.4)
FLUAV SUBTYP SPEC NAA+PROBE: NOT DETECTED
FLUBV RNA ISLT QL NAA+PROBE: NOT DETECTED
GEN 5 2HR TROPONIN T REFLEX: <6 NG/L
GLOBULIN UR ELPH-MCNC: 2.1 GM/DL
GLUCOSE SERPL-MCNC: 214 MG/DL (ref 65–99)
HCT VFR BLD AUTO: 40.6 % (ref 34–46.6)
HGB BLD-MCNC: 13.5 G/DL (ref 12–15.9)
HOLD SPECIMEN: NORMAL
HOLD SPECIMEN: NORMAL
IMM GRANULOCYTES # BLD AUTO: 0.03 10*3/MM3 (ref 0–0.05)
IMM GRANULOCYTES NFR BLD AUTO: 0.3 % (ref 0–0.5)
LYMPHOCYTES # BLD AUTO: 4.31 10*3/MM3 (ref 0.7–3.1)
LYMPHOCYTES NFR BLD AUTO: 42.1 % (ref 19.6–45.3)
MCH RBC QN AUTO: 30.5 PG (ref 26.6–33)
MCHC RBC AUTO-ENTMCNC: 33.3 G/DL (ref 31.5–35.7)
MCV RBC AUTO: 91.9 FL (ref 79–97)
MONOCYTES # BLD AUTO: 0.46 10*3/MM3 (ref 0.1–0.9)
MONOCYTES NFR BLD AUTO: 4.5 % (ref 5–12)
NEUTROPHILS NFR BLD AUTO: 5.21 10*3/MM3 (ref 1.7–7)
NEUTROPHILS NFR BLD AUTO: 50.8 % (ref 42.7–76)
NRBC BLD AUTO-RTO: 0 /100 WBC (ref 0–0.2)
NT-PROBNP SERPL-MCNC: <36 PG/ML (ref 0–450)
PLATELET # BLD AUTO: 288 10*3/MM3 (ref 140–450)
PMV BLD AUTO: 10.3 FL (ref 6–12)
POTASSIUM SERPL-SCNC: 3.6 MMOL/L (ref 3.5–5.2)
PROT SERPL-MCNC: 6.2 G/DL (ref 6–8.5)
RBC # BLD AUTO: 4.42 10*6/MM3 (ref 3.77–5.28)
SARS-COV-2 RNA RESP QL NAA+PROBE: NOT DETECTED
SODIUM SERPL-SCNC: 136 MMOL/L (ref 136–145)
TROPONIN T DELTA: NORMAL
TROPONIN T SERPL HS-MCNC: <6 NG/L
WBC NRBC COR # BLD AUTO: 10.24 10*3/MM3 (ref 3.4–10.8)
WHOLE BLOOD HOLD SPECIMEN: NORMAL

## 2024-09-14 PROCEDURE — 83880 ASSAY OF NATRIURETIC PEPTIDE: CPT | Performed by: STUDENT IN AN ORGANIZED HEALTH CARE EDUCATION/TRAINING PROGRAM

## 2024-09-14 PROCEDURE — 99285 EMERGENCY DEPT VISIT HI MDM: CPT

## 2024-09-14 PROCEDURE — 71275 CT ANGIOGRAPHY CHEST: CPT

## 2024-09-14 PROCEDURE — 84484 ASSAY OF TROPONIN QUANT: CPT | Performed by: STUDENT IN AN ORGANIZED HEALTH CARE EDUCATION/TRAINING PROGRAM

## 2024-09-14 PROCEDURE — 36415 COLL VENOUS BLD VENIPUNCTURE: CPT

## 2024-09-14 PROCEDURE — 80053 COMPREHEN METABOLIC PANEL: CPT | Performed by: STUDENT IN AN ORGANIZED HEALTH CARE EDUCATION/TRAINING PROGRAM

## 2024-09-14 PROCEDURE — 96374 THER/PROPH/DIAG INJ IV PUSH: CPT

## 2024-09-14 PROCEDURE — 25510000001 IOPAMIDOL 61 % SOLUTION: Performed by: STUDENT IN AN ORGANIZED HEALTH CARE EDUCATION/TRAINING PROGRAM

## 2024-09-14 PROCEDURE — 85025 COMPLETE CBC W/AUTO DIFF WBC: CPT | Performed by: STUDENT IN AN ORGANIZED HEALTH CARE EDUCATION/TRAINING PROGRAM

## 2024-09-14 PROCEDURE — 87636 SARSCOV2 & INF A&B AMP PRB: CPT | Performed by: STUDENT IN AN ORGANIZED HEALTH CARE EDUCATION/TRAINING PROGRAM

## 2024-09-14 PROCEDURE — 25010000002 KETOROLAC TROMETHAMINE PER 15 MG: Performed by: STUDENT IN AN ORGANIZED HEALTH CARE EDUCATION/TRAINING PROGRAM

## 2024-09-14 PROCEDURE — 93005 ELECTROCARDIOGRAM TRACING: CPT | Performed by: STUDENT IN AN ORGANIZED HEALTH CARE EDUCATION/TRAINING PROGRAM

## 2024-09-14 PROCEDURE — 71045 X-RAY EXAM CHEST 1 VIEW: CPT

## 2024-09-14 RX ORDER — IOPAMIDOL 612 MG/ML
85 INJECTION, SOLUTION INTRAVASCULAR
Status: COMPLETED | OUTPATIENT
Start: 2024-09-14 | End: 2024-09-14

## 2024-09-14 RX ORDER — KETOROLAC TROMETHAMINE 30 MG/ML
15 INJECTION, SOLUTION INTRAMUSCULAR; INTRAVENOUS ONCE
Status: COMPLETED | OUTPATIENT
Start: 2024-09-14 | End: 2024-09-14

## 2024-09-14 RX ORDER — IBUPROFEN 800 MG/1
800 TABLET, FILM COATED ORAL EVERY 8 HOURS PRN
Qty: 15 TABLET | Refills: 0 | Status: SHIPPED | OUTPATIENT
Start: 2024-09-14 | End: 2024-09-20 | Stop reason: HOSPADM

## 2024-09-14 RX ADMIN — IOPAMIDOL 85 ML: 612 INJECTION, SOLUTION INTRAVENOUS at 04:36

## 2024-09-14 RX ADMIN — KETOROLAC TROMETHAMINE 15 MG: 30 INJECTION, SOLUTION INTRAMUSCULAR; INTRAVENOUS at 04:37

## 2024-09-14 NOTE — ED PROVIDER NOTES
[facility] EMERGENCY DEPARTMENT  Emergency Department Encounter  Emergency Medicine Physician Note       Pt Name: Lindsay Amezquita  MRN: [id number]  Pt :   1985  Room Number:    Date of encounter:  2024  PCP: Hunter Winkler MD  ED Physician: Juan Ramon Chavez DO    HPI:  Lindsay Amezquita is a 39 y.o. female who presents to the ED with chief complaint of chest pain.  Gradual onset of symptoms around 6 PM.  Located the left side of the chest.  Comes and goes.  Worse with drinking and touch.  Associated shortness of breath and cough.  His cough is triggered with green sputum.    States that she was diagnosed with bronchitis yesterday by her PCP.  Started on antibiotics with minimal improvement of symptoms.    No personal history of cardiac disease.  Positive family history of cardiac disease.    PAST MEDICAL HISTORY  Past Medical History:   Diagnosis Date    Allergic     Anxiety     Asthma Beings of copd with asthma    Back pain     Bell palsy 2021    Bell's palsy     Bipolar 2 disorder     Blood clot in vein     Behind left knee cap    COPD (chronic obstructive pulmonary disease) Six months ago    Deep vein thrombosis Last year    Depression     Diabetes mellitus November    Pre diabete    Frequent headaches     GERD (gastroesophageal reflux disease)     Hypertension     Every time i go into the emergacy room    Irritable bowel syndrome Two years ago    Kidney stone Two years ago    Migraine     Nausea, vomiting and diarrhea 2018    Obesity All of my life    Ovarian cyst Two years ago    Panic     PTSD (post-traumatic stress disorder)     Pulmonary embolism Not in lungs    Recurrent pregnancy loss, antepartum condition or complication Every since i have been 14 yars old    Restless leg syndrome     Sinus problem     Snores     Tattoos     Urinary tract infection Have them on and off    Varicella Little    Visual impairment Since i was little    Vitamin B12 deficiency     Wears  glasses      Current Outpatient Medications   Medication Instructions    acetaminophen (TYLENOL) 1,000 mg, Oral, Every 8 Hours PRN    amitriptyline (ELAVIL) 150 mg, Oral, Nightly    atorvastatin (LIPITOR) 40 mg, Oral, Every Night at Bedtime    azelastine (ASTELIN) 0.1 % nasal spray 2 sprays, Nasal, 2 Times Daily, Use in each nostril as directed    buPROPion XL (WELLBUTRIN XL) 150 mg, Oral, Every Morning    butalbital-acetaminophen-caffeine (FIORICET, ESGIC) -40 MG per tablet 1 tablet, Oral, 2 Times Daily PRN    Cariprazine HCl (VRAYLAR) 4.5 mg, Oral, Daily    cefdinir (OMNICEF) 300 mg, Oral, 2 Times Daily    Chlorhexidine Gluconate 4 % solution APPLY TOPICALLY TO THE APPROPRIATE AREA AS DIRECTED DAILY AS NEEDED FOR WOUND CARE.    desvenlafaxine (PRISTIQ) 100 mg, Oral, Daily    diazePAM (VALIUM) 10 mg, Oral, 3 Times Daily PRN    empagliflozin (JARDIANCE) 10 mg, Oral, Daily    fexofenadine-pseudoephedrine (GNP Fexofenadine/PSE ER)  MG per 12 hr tablet 1 tablet, Oral, 2 Times Daily    fluconazole (DIFLUCAN) 150 mg, Oral, Every 72 Hours    fluticasone (FLONASE) 50 MCG/ACT nasal spray 2 sprays, Nasal, Daily    FREESTYLE LITE test strip See Admin Instructions    gabapentin (NEURONTIN) 800 mg, Oral, 3 Times Daily    HYDROcodone-acetaminophen (NORCO) 5-325 MG per tablet 1 tablet, Oral, Every 8 Hours PRN    ibuprofen (ADVIL,MOTRIN) 800 mg, Oral, Every 8 Hours PRN    ketorolac (TORADOL) 10 mg, Oral, Every 6 Hours PRN    Lurasidone HCl (LATUDA) 120 mg, Oral, Daily    montelukast (SINGULAIR) 10 mg, Oral, Nightly    Nurtec 75 mg, Oral, Every Other Day, Take Monday,Wednesday,Friday, and Saturday.    omeprazole (PRILOSEC) 20 mg, Oral, Daily    OXcarbazepine (TRILEPTAL) 300 mg, Oral, 2 Times Daily    polyethylene glycol (MIRALAX) 17 g, Oral, Daily    promethazine (PHENERGAN) 25 mg, Oral, Every 6 Hours PRN    promethazine-dextromethorphan (PROMETHAZINE-DM) 6.25-15 MG/5ML syrup 5 mL, Oral, 4 Times Daily PRN    tiZANidine  (ZANAFLEX) 4 mg, Oral, Every 8 Hours PRN    Zavegepant HCl 10 mg, Nasal, Daily PRN, (1 spray into 1 nostril every 24 hrs prn)      PAST SURGICAL HISTORY  Past Surgical History:   Procedure Laterality Date    CHOLECYSTECTOMY      HYSTEROSCOPY N/A 3/14/2024    Procedure: HYSTEROSCOPY WITH MYOSURE, DILATION AND CURETTAGE WITH CERENE ABLATION;  Surgeon: David Ribeiro MD;  Location: [facility];  Service: Obstetrics/Gynecology;  Laterality: N/A;    MULTIPLE TOOTH EXTRACTIONS      All my teeth    WISDOM TOOTH EXTRACTION  All my teeth       FAMILY HISTORY  Family History   Problem Relation Age of Onset    Irritable bowel syndrome Mother     Heart disease Mother     Miscarriages / Stillbirths Mother     Arthritis Mother     COPD Mother     Learning disabilities Mother     Mental illness Mother     Asthma Mother     Irritable bowel syndrome Father     Alcohol abuse Father     Diabetes Father     Hyperlipidemia Father     Anxiety disorder Father     Learning disabilities Father     Colon cancer Maternal Grandfather     Stroke Paternal Grandfather     Stroke Paternal Grandmother        SOCIAL HISTORY  Social History     Socioeconomic History    Marital status: Single   Tobacco Use    Smoking status: Every Day     Current packs/day: 0.50     Average packs/day: 2.0 packs/day for 32.3 years (63.5 ttl pk-yrs)     Types: Cigarettes     Start date: 7/17/1992     Passive exposure: Current    Smokeless tobacco: Never    Tobacco comments:      Around 2.5 packs per day- 7/5/24   Vaping Use    Vaping status: Former    Passive vaping exposure: Yes   Substance and Sexual Activity    Alcohol use: Not Currently     Comment: rarely    Drug use: Not Currently    Sexual activity: Not Currently     Partners: Female     Birth control/protection: Other, Partner of same sex     ALLERGIES  Aspirin, Codeine, Cyclobenzaprine, Haldol [haloperidol], Imitrex [sumatriptan], Latex, Penicillins, Zofran [ondansetron], Lamictal [lamotrigine],  Metoclopramide, Morphine, Oxycodone-acetaminophen, Oxycontin [oxycodone], Prochlorperazine, and Tramadol    REVIEW OF SYSTEMS  All systems reviewed and negative except for those discussed in HPI.     PHYSICAL EXAM  ED Triage Vitals [09/14/24 0309]   Temp Heart Rate Resp BP SpO2   98.8 °F (37.1 °C) 75 16 -- 94 %      Temp src Heart Rate Source Patient Position BP Location FiO2 (%)   Oral Monitor Sitting Left arm --     I have reviewed the triage vital signs and nursing notes.    General: Morbidly obese female.  Alert.  Nontoxic appearance.  Actively coughing, but in no acute distress.  Head: Normocephalic.  Atraumatic.  Eyes: No scleral icterus.  ENT: Moist mucous membranes.  Cardiovascular: Regular rate and rhythm.  No murmurs.  No rubs.  2+ distal pulses bilaterally.  Respiratory: Equal breath sounds bilaterally.  No rales.  No rhonchi.  No wheezing.  GI: Abdomen is soft.  Nondistended.  Nontender to palpation.  No rebound.  No guarding.  No CVA tenderness.  MSK: Moves all 4 extremities. + Reproducible tenderness along the left anterior chest wall.  Neurologic: Oriented x 3.  No focal deficits.  Skin: No erythema.  No edema. No pallor. No cyanosis.  Psych: Normal mood and affect.    LAB RESULTS  Recent Results (from the past 24 hour(s))   High Sensitivity Troponin T    Collection Time: 09/14/24  3:14 AM    Specimen: Blood   Result Value Ref Range    HS Troponin T <6 <14 ng/L   Comprehensive Metabolic Panel    Collection Time: 09/14/24  3:14 AM    Specimen: Blood   Result Value Ref Range    Glucose 214 (H) 65 - 99 mg/dL    BUN 5 (L) 6 - 20 mg/dL    Creatinine 0.67 0.57 - 1.00 mg/dL    Sodium 136 136 - 145 mmol/L    Potassium 3.6 3.5 - 5.2 mmol/L    Chloride 103 98 - 107 mmol/L    CO2 19.2 (L) 22.0 - 29.0 mmol/L    Calcium 8.2 (L) 8.6 - 10.5 mg/dL    Total Protein 6.2 6.0 - 8.5 g/dL    Albumin 4.1 3.5 - 5.2 g/dL    ALT (SGPT) 35 (H) 1 - 33 U/L    AST (SGOT) 19 1 - 32 U/L    Alkaline Phosphatase 60 39 - 117 U/L     Total Bilirubin <0.2 0.0 - 1.2 mg/dL    Globulin 2.1 gm/dL    A/G Ratio 2.0 g/dL    BUN/Creatinine Ratio 7.5 7.0 - 25.0    Anion Gap 13.8 5.0 - 15.0 mmol/L    eGFR 113.5 >60.0 mL/min/1.73   BNP    Collection Time: 09/14/24  3:14 AM    Specimen: Blood   Result Value Ref Range    proBNP <36.0 0.0 - 450.0 pg/mL   Green Top (Gel)    Collection Time: 09/14/24  3:14 AM   Result Value Ref Range    Extra Tube Hold for add-ons.    Lavender Top    Collection Time: 09/14/24  3:14 AM   Result Value Ref Range    Extra Tube hold for add-on    Gold Top - SST    Collection Time: 09/14/24  3:14 AM   Result Value Ref Range    Extra Tube Hold for add-ons.    CBC Auto Differential    Collection Time: 09/14/24  3:14 AM    Specimen: Blood   Result Value Ref Range    WBC 10.24 3.40 - 10.80 10*3/mm3    RBC 4.42 3.77 - 5.28 10*6/mm3    Hemoglobin 13.5 12.0 - 15.9 g/dL    Hematocrit 40.6 34.0 - 46.6 %    MCV 91.9 79.0 - 97.0 fL    MCH 30.5 26.6 - 33.0 pg    MCHC 33.3 31.5 - 35.7 g/dL    RDW 13.4 12.3 - 15.4 %    RDW-SD 45.7 37.0 - 54.0 fl    MPV 10.3 6.0 - 12.0 fL    Platelets 288 140 - 450 10*3/mm3    Neutrophil % 50.8 42.7 - 76.0 %    Lymphocyte % 42.1 19.6 - 45.3 %    Monocyte % 4.5 (L) 5.0 - 12.0 %    Eosinophil % 1.9 0.3 - 6.2 %    Basophil % 0.4 0.0 - 1.5 %    Immature Grans % 0.3 0.0 - 0.5 %    Neutrophils, Absolute 5.21 1.70 - 7.00 10*3/mm3    Lymphocytes, Absolute 4.31 (H) 0.70 - 3.10 10*3/mm3    Monocytes, Absolute 0.46 0.10 - 0.90 10*3/mm3    Eosinophils, Absolute 0.19 0.00 - 0.40 10*3/mm3    Basophils, Absolute 0.04 0.00 - 0.20 10*3/mm3    Immature Grans, Absolute 0.03 0.00 - 0.05 10*3/mm3    nRBC 0.0 0.0 - 0.2 /100 WBC   COVID-19 and FLU A/B PCR, 1 HR TAT - Swab, Nasopharynx    Collection Time: 09/14/24  4:37 AM    Specimen: Nasopharynx; Swab   Result Value Ref Range    COVID19 Not Detected Not Detected - Ref. Range    Influenza A PCR Not Detected Not Detected    Influenza B PCR Not Detected Not Detected   High Sensitivity  Troponin T 2Hr    Collection Time: 09/14/24  5:23 AM    Specimen: Blood   Result Value Ref Range    HS Troponin T <6 <14 ng/L    Troponin T Delta         RADIOLOGY  CT Angiogram Chest Pulmonary Embolism    Result Date: 9/14/2024  FINAL REPORT TECHNIQUE: null CLINICAL HISTORY: LT sided cp, soa; recent diagnosis of bronchitis COMPARISON: null FINDINGS: CT angiography chest with contrast. 3D Postprocessing. Comparison: CT/SR - CT ANGIOGRAM CHEST PULMONARY EMBOLISM - 06/09/2023 10:10 PM EDT Findings: Vascular: No pulmonary embolism. No thoracic aortic aneurysm. Mediastinum: No cardiomegaly. Lymph nodes: No adenopathy. Lungs: No focal infiltrates or masses. No edema. Pleura: No pneumothorax or effusion. Upper abdomen: Fatty liver. Stable left adrenal nodule, likely adenoma. Bones: No acute fracture or dislocation.     IMPRESSION: No acute pathology. No pulmonary emboli. No focal infiltrates or masses. No edema. Stable left adrenal nodule, likely adenoma. Authenticated and Electronically Signed by Florencia Laguna MD on 09/14/2024 05:18:36 AM     PROCEDURES  Procedures    RISK STRATIFICATION  HEART SCORE  History: Slightly Suspicious (0)  ECG: Normal (0)  Age: Less than 45 years (0)  Risk factors: >3 Risks or PMH of ASCVD (2)  Troponin: normal (0)    This patient's HEART score is 2.    According to the HEART Score Study: Score (Risk of adverse cardiac event in the next 14 days)  Scores 0-3: (0.9-1.7%) In the HEART Score study, these patients were discharged home.  Scores 4-6: (12-16.6%)  In the HEART Score study, these patients were admitted to the hospital.  Scores ?7: (50-65%) In the HEART Score study, these patients were candidates for early invasive measures.   Final disposition is based on individual provider judgement and current clinical situation.    MEDICAL DECISION MAKING  39 y.o. female with past medical history listed above who presents with chest pain, shortness of breath, and cough.    Patient arrives via  EMS.  I have reviewed the EMS documentation/notes and included that information in my HPI.    Vital signs within normal limits.  Pulse ox 94% on room air.  Initial documented blood pressure 89/63, but repeat blood pressure 111/50 without any intervention.    Based on clinical presentation and physical exam, differential diagnosis includes, but is not limited to, MSK etiology, costochondritis, pneumonia, viral URI, bronchitis, pneumothorax, acute coronary syndrome, obstructive coronary disease, pulmonary embolism.    At least 3 different tests have been ordered on this patient.    Please see ED course below for my interpretation of the ED workup.  ED Course as of 09/14/24 0557   Sat Sep 14, 2024   0313 ECG 12 Lead Chest Pain  EKG per my interpretation normal sinus rhythm, rate 75, normal axis, no ST segment elevation or depression, normal QRS QTC intervals.   [JS]   0544 I reviewed the labs listed above. Clinically unremarkable.    Notable findings are highlighted below.    Old laboratory data was reviewed from the medical records and compared to today's results.   [JS]   0545 CBC & Differential(!) [JS]   0545 Comprehensive Metabolic Panel(!) [JS]   0545 Glucose(!): 214 [JS]   0545 ALT (SGPT)(!): 35 [JS]   0545 High Sensitivity Troponin T 2Hr [JS]   0545 HS Troponin T: <6 [JS]   0545 proBNP: <36.0 [JS]   0545 COVID19: Not Detected [JS]   0545 Influenza A PCR: Not Detected [JS]   0545 Influenza B PCR: Not Detected [JS]   0553 CT Angiogram Chest Pulmonary Embolism  I have independently reviewed and interpreted the CTA chest.  My interpretation is negative for saddle PE or infiltrates.     [JS]      ED Course User Index  [JS] Juan Ramon Chavez DO     Medications administered in ED:  Medications   ketorolac (TORADOL) injection 15 mg (15 mg Intravenous Given 9/14/24 0437)   iopamidol (ISOVUE-300) 61 % injection 85 mL (85 mL Intravenous Given 9/14/24 0436)     On re-evaluation, patient resting comfortably.  States symptoms  have improved following therapy.  Denies active chest pain.  Vital signs remained stable on room air.  Patient was ambulatory in the ED with steady gait.  Able to tolerate oral intake appropriately.    I discussed the findings of the ED workup with the patient at bedside.  No clinical indication for admission.  I recommended outpatient follow-up with PCP/cardiology.  Patient was deemed medically stable for discharge with close outpatient follow-up and strict ED return precautions. Patient agreeable with plan and disposition.    Home medications were reviewed.  Prescriptions for discharge: Ibuprofen    Chronic conditions affecting care: None    Social determinants of health impacting treatment or disposition: None    REPEAT VITAL SIGNS  AS OF 05:57 EDT VITALS:  BP - 100/73  HR - 81  TEMP - 98.8 °F (37.1 °C) (Oral)  O2 SATS - 95%    DIAGNOSIS  Final diagnoses:   Chest pain, unspecified type   Shortness of breath   Acute cough     DISPOSITION  ED Disposition       ED Disposition   Discharge    Condition   Stable    Comment   --               PATIENT REFERRALS  [facility] CARDIOLOGY  [address] [facility] [address]  [facility] [place]note 2] [id number] [phone]        Hunter Winkler MD  [address] [place] [facility] [address]  [phone]      PCP. 48 hours.            Please note that portions of this document were completed with voice recognition software.        Juan Ramon Chavez,   09/14/24 2069

## 2024-09-15 ENCOUNTER — APPOINTMENT (OUTPATIENT)
Dept: GENERAL RADIOLOGY | Facility: HOSPITAL | Age: 39
DRG: 439 | End: 2024-09-15
Payer: MEDICAID

## 2024-09-15 ENCOUNTER — HOSPITAL ENCOUNTER (EMERGENCY)
Facility: HOSPITAL | Age: 39
Discharge: HOME | DRG: 439 | End: 2024-09-15
Attending: EMERGENCY MEDICINE | Admitting: EMERGENCY MEDICINE
Payer: MEDICAID

## 2024-09-15 VITALS
BODY MASS INDEX: 45.44 KG/M2 | SYSTOLIC BLOOD PRESSURE: 102 MMHG | DIASTOLIC BLOOD PRESSURE: 68 MMHG | OXYGEN SATURATION: 90 % | HEIGHT: 62 IN | TEMPERATURE: 98.7 F | RESPIRATION RATE: 23 BRPM | WEIGHT: 246.91 LBS | HEART RATE: 77 BPM

## 2024-09-15 DIAGNOSIS — J44.1 COPD EXACERBATION: ICD-10-CM

## 2024-09-15 DIAGNOSIS — R05.1 ACUTE COUGH: Primary | ICD-10-CM

## 2024-09-15 LAB
ALBUMIN SERPL-MCNC: 4 G/DL (ref 3.5–5.2)
ALBUMIN/GLOB SERPL: 1.9 G/DL
ALP SERPL-CCNC: 63 U/L (ref 39–117)
ALT SERPL W P-5'-P-CCNC: 41 U/L (ref 1–33)
ANION GAP SERPL CALCULATED.3IONS-SCNC: 14.5 MMOL/L (ref 5–15)
AST SERPL-CCNC: 32 U/L (ref 1–32)
B PARAPERT DNA SPEC QL NAA+PROBE: NOT DETECTED
B PERT DNA SPEC QL NAA+PROBE: NOT DETECTED
BASOPHILS # BLD AUTO: 0.04 10*3/MM3 (ref 0–0.2)
BASOPHILS NFR BLD AUTO: 0.5 % (ref 0–1.5)
BILIRUB SERPL-MCNC: 0.2 MG/DL (ref 0–1.2)
BUN SERPL-MCNC: 8 MG/DL (ref 6–20)
BUN/CREAT SERPL: 12.1 (ref 7–25)
C PNEUM DNA NPH QL NAA+NON-PROBE: NOT DETECTED
CALCIUM SPEC-SCNC: 8.5 MG/DL (ref 8.6–10.5)
CHLORIDE SERPL-SCNC: 99 MMOL/L (ref 98–107)
CO2 SERPL-SCNC: 20.5 MMOL/L (ref 22–29)
CREAT SERPL-MCNC: 0.66 MG/DL (ref 0.57–1)
CRP SERPL-MCNC: 0.35 MG/DL (ref 0–0.5)
D-LACTATE SERPL-SCNC: 2.4 MMOL/L (ref 0.5–2)
DEPRECATED RDW RBC AUTO: 45.2 FL (ref 37–54)
EGFRCR SERPLBLD CKD-EPI 2021: 113.9 ML/MIN/1.73
EOSINOPHIL # BLD AUTO: 0.19 10*3/MM3 (ref 0–0.4)
EOSINOPHIL NFR BLD AUTO: 2.2 % (ref 0.3–6.2)
ERYTHROCYTE [DISTWIDTH] IN BLOOD BY AUTOMATED COUNT: 13.3 % (ref 12.3–15.4)
FLUAV RNA RESP QL NAA+PROBE: NOT DETECTED
FLUAV SUBTYP SPEC NAA+PROBE: NOT DETECTED
FLUBV RNA ISLT QL NAA+PROBE: NOT DETECTED
FLUBV RNA RESP QL NAA+PROBE: NOT DETECTED
GLOBULIN UR ELPH-MCNC: 2.1 GM/DL
GLUCOSE SERPL-MCNC: 307 MG/DL (ref 65–99)
HADV DNA SPEC NAA+PROBE: NOT DETECTED
HCOV 229E RNA SPEC QL NAA+PROBE: NOT DETECTED
HCOV HKU1 RNA SPEC QL NAA+PROBE: NOT DETECTED
HCOV NL63 RNA SPEC QL NAA+PROBE: NOT DETECTED
HCOV OC43 RNA SPEC QL NAA+PROBE: NOT DETECTED
HCT VFR BLD AUTO: 40.5 % (ref 34–46.6)
HGB BLD-MCNC: 13.6 G/DL (ref 12–15.9)
HMPV RNA NPH QL NAA+NON-PROBE: NOT DETECTED
HOLD SPECIMEN: NORMAL
HOLD SPECIMEN: NORMAL
HPIV1 RNA ISLT QL NAA+PROBE: NOT DETECTED
HPIV2 RNA SPEC QL NAA+PROBE: NOT DETECTED
HPIV3 RNA NPH QL NAA+PROBE: NOT DETECTED
HPIV4 P GENE NPH QL NAA+PROBE: NOT DETECTED
IMM GRANULOCYTES # BLD AUTO: 0.02 10*3/MM3 (ref 0–0.05)
IMM GRANULOCYTES NFR BLD AUTO: 0.2 % (ref 0–0.5)
LYMPHOCYTES # BLD AUTO: 2.78 10*3/MM3 (ref 0.7–3.1)
LYMPHOCYTES NFR BLD AUTO: 31.7 % (ref 19.6–45.3)
M PNEUMO IGG SER IA-ACNC: NOT DETECTED
MAGNESIUM SERPL-MCNC: 1.9 MG/DL (ref 1.6–2.6)
MCH RBC QN AUTO: 30.7 PG (ref 26.6–33)
MCHC RBC AUTO-ENTMCNC: 33.6 G/DL (ref 31.5–35.7)
MCV RBC AUTO: 91.4 FL (ref 79–97)
MONOCYTES # BLD AUTO: 0.35 10*3/MM3 (ref 0.1–0.9)
MONOCYTES NFR BLD AUTO: 4 % (ref 5–12)
NEUTROPHILS NFR BLD AUTO: 5.4 10*3/MM3 (ref 1.7–7)
NEUTROPHILS NFR BLD AUTO: 61.4 % (ref 42.7–76)
NRBC BLD AUTO-RTO: 0 /100 WBC (ref 0–0.2)
PLATELET # BLD AUTO: 250 10*3/MM3 (ref 140–450)
PMV BLD AUTO: 10.3 FL (ref 6–12)
POTASSIUM SERPL-SCNC: 3.7 MMOL/L (ref 3.5–5.2)
PROCALCITONIN SERPL-MCNC: 0.04 NG/ML (ref 0–0.25)
PROT SERPL-MCNC: 6.1 G/DL (ref 6–8.5)
RBC # BLD AUTO: 4.43 10*6/MM3 (ref 3.77–5.28)
RHINOVIRUS RNA SPEC NAA+PROBE: NOT DETECTED
RSV RNA NPH QL NAA+NON-PROBE: NOT DETECTED
RSV RNA RESP QL NAA+PROBE: NOT DETECTED
S PYO AG THROAT QL: NEGATIVE
SARS-COV-2 RNA NPH QL NAA+NON-PROBE: NOT DETECTED
SARS-COV-2 RNA RESP QL NAA+PROBE: NOT DETECTED
SODIUM SERPL-SCNC: 134 MMOL/L (ref 136–145)
TROPONIN T SERPL HS-MCNC: 7 NG/L
WBC NRBC COR # BLD AUTO: 8.78 10*3/MM3 (ref 3.4–10.8)
WHOLE BLOOD HOLD COAG: NORMAL
WHOLE BLOOD HOLD SPECIMEN: NORMAL

## 2024-09-15 PROCEDURE — 99284 EMERGENCY DEPT VISIT MOD MDM: CPT

## 2024-09-15 PROCEDURE — 85025 COMPLETE CBC W/AUTO DIFF WBC: CPT

## 2024-09-15 PROCEDURE — 94640 AIRWAY INHALATION TREATMENT: CPT

## 2024-09-15 PROCEDURE — 87637 SARSCOV2&INF A&B&RSV AMP PRB: CPT | Performed by: EMERGENCY MEDICINE

## 2024-09-15 PROCEDURE — 87880 STREP A ASSAY W/OPTIC: CPT

## 2024-09-15 PROCEDURE — 94799 UNLISTED PULMONARY SVC/PX: CPT

## 2024-09-15 PROCEDURE — 86140 C-REACTIVE PROTEIN: CPT

## 2024-09-15 PROCEDURE — 25010000002 KETOROLAC TROMETHAMINE PER 15 MG

## 2024-09-15 PROCEDURE — 25010000002 METHYLPREDNISOLONE PER 125 MG

## 2024-09-15 PROCEDURE — 96374 THER/PROPH/DIAG INJ IV PUSH: CPT

## 2024-09-15 PROCEDURE — 71045 X-RAY EXAM CHEST 1 VIEW: CPT

## 2024-09-15 PROCEDURE — 84484 ASSAY OF TROPONIN QUANT: CPT

## 2024-09-15 PROCEDURE — 84145 PROCALCITONIN (PCT): CPT

## 2024-09-15 PROCEDURE — 83735 ASSAY OF MAGNESIUM: CPT

## 2024-09-15 PROCEDURE — 0202U NFCT DS 22 TRGT SARS-COV-2: CPT

## 2024-09-15 PROCEDURE — 83605 ASSAY OF LACTIC ACID: CPT

## 2024-09-15 PROCEDURE — 80053 COMPREHEN METABOLIC PANEL: CPT

## 2024-09-15 PROCEDURE — 93005 ELECTROCARDIOGRAM TRACING: CPT | Performed by: EMERGENCY MEDICINE

## 2024-09-15 PROCEDURE — 94761 N-INVAS EAR/PLS OXIMETRY MLT: CPT

## 2024-09-15 PROCEDURE — 87081 CULTURE SCREEN ONLY: CPT

## 2024-09-15 PROCEDURE — 96375 TX/PRO/DX INJ NEW DRUG ADDON: CPT

## 2024-09-15 RX ORDER — FLUTICASONE PROPIONATE 50 MCG
2 SPRAY, SUSPENSION (ML) NASAL DAILY
Qty: 16 G | Refills: 0 | Status: SHIPPED | OUTPATIENT
Start: 2024-09-15 | End: 2024-09-20 | Stop reason: HOSPADM

## 2024-09-15 RX ORDER — ALBUTEROL SULFATE 90 UG/1
2 AEROSOL, METERED RESPIRATORY (INHALATION) EVERY 4 HOURS PRN
Qty: 6.7 G | Refills: 0 | Status: SHIPPED | OUTPATIENT
Start: 2024-09-15

## 2024-09-15 RX ORDER — SODIUM CHLORIDE 0.9 % (FLUSH) 0.9 %
10 SYRINGE (ML) INJECTION AS NEEDED
Status: DISCONTINUED | OUTPATIENT
Start: 2024-09-15 | End: 2024-09-15 | Stop reason: HOSPADM

## 2024-09-15 RX ORDER — GUAIFENESIN/DEXTROMETHORPHAN 100-10MG/5
5 SYRUP ORAL 3 TIMES DAILY PRN
Qty: 80 ML | Refills: 0 | Status: SHIPPED | OUTPATIENT
Start: 2024-09-15

## 2024-09-15 RX ORDER — KETOROLAC TROMETHAMINE 30 MG/ML
15 INJECTION, SOLUTION INTRAMUSCULAR; INTRAVENOUS ONCE
Status: COMPLETED | OUTPATIENT
Start: 2024-09-15 | End: 2024-09-15

## 2024-09-15 RX ORDER — DOXYCYCLINE 100 MG/1
100 CAPSULE ORAL 2 TIMES DAILY
Qty: 14 CAPSULE | Refills: 0 | Status: SHIPPED | OUTPATIENT
Start: 2024-09-15 | End: 2024-09-20 | Stop reason: HOSPADM

## 2024-09-15 RX ORDER — METHYLPREDNISOLONE SODIUM SUCCINATE 125 MG/2ML
125 INJECTION, POWDER, LYOPHILIZED, FOR SOLUTION INTRAMUSCULAR; INTRAVENOUS ONCE
Status: COMPLETED | OUTPATIENT
Start: 2024-09-15 | End: 2024-09-15

## 2024-09-15 RX ORDER — IPRATROPIUM BROMIDE AND ALBUTEROL SULFATE 2.5; .5 MG/3ML; MG/3ML
3 SOLUTION RESPIRATORY (INHALATION) ONCE
Status: COMPLETED | OUTPATIENT
Start: 2024-09-15 | End: 2024-09-15

## 2024-09-15 RX ADMIN — IPRATROPIUM BROMIDE AND ALBUTEROL SULFATE 3 ML: .5; 3 SOLUTION RESPIRATORY (INHALATION) at 12:03

## 2024-09-15 RX ADMIN — METHYLPREDNISOLONE SODIUM SUCCINATE 125 MG: 125 INJECTION, POWDER, FOR SOLUTION INTRAMUSCULAR; INTRAVENOUS at 11:47

## 2024-09-15 RX ADMIN — KETOROLAC TROMETHAMINE 15 MG: 30 INJECTION, SOLUTION INTRAMUSCULAR; INTRAVENOUS at 13:01

## 2024-09-16 ENCOUNTER — HOSPITAL ENCOUNTER (EMERGENCY)
Facility: HOSPITAL | Age: 39
Discharge: LEFT BEFORE DISCHARGE | DRG: 439 | End: 2024-09-16
Attending: EMERGENCY MEDICINE | Admitting: EMERGENCY MEDICINE
Payer: MEDICAID

## 2024-09-16 ENCOUNTER — APPOINTMENT (OUTPATIENT)
Dept: CT IMAGING | Facility: HOSPITAL | Age: 39
DRG: 439 | End: 2024-09-16
Payer: MEDICAID

## 2024-09-16 ENCOUNTER — TELEPHONE (OUTPATIENT)
Dept: FAMILY MEDICINE CLINIC | Facility: CLINIC | Age: 39
End: 2024-09-16

## 2024-09-16 ENCOUNTER — HOSPITAL ENCOUNTER (INPATIENT)
Facility: HOSPITAL | Age: 39
LOS: 1 days | Discharge: LEFT BEFORE DISCHARGE | DRG: 439 | End: 2024-09-17
Attending: STUDENT IN AN ORGANIZED HEALTH CARE EDUCATION/TRAINING PROGRAM | Admitting: FAMILY MEDICINE
Payer: MEDICAID

## 2024-09-16 VITALS
OXYGEN SATURATION: 93 % | TEMPERATURE: 99.3 F | RESPIRATION RATE: 16 BRPM | BODY MASS INDEX: 46.56 KG/M2 | DIASTOLIC BLOOD PRESSURE: 104 MMHG | SYSTOLIC BLOOD PRESSURE: 158 MMHG | WEIGHT: 253 LBS | HEIGHT: 62 IN | HEART RATE: 109 BPM

## 2024-09-16 DIAGNOSIS — K85.90 ACUTE PANCREATITIS, UNSPECIFIED COMPLICATION STATUS, UNSPECIFIED PANCREATITIS TYPE: Primary | ICD-10-CM

## 2024-09-16 LAB
ALBUMIN SERPL-MCNC: 4.4 G/DL (ref 3.5–5.2)
ALBUMIN SERPL-MCNC: 4.5 G/DL (ref 3.5–5.2)
ALBUMIN/GLOB SERPL: 1.8 G/DL
ALBUMIN/GLOB SERPL: 1.9 G/DL
ALP SERPL-CCNC: 66 U/L (ref 39–117)
ALP SERPL-CCNC: 66 U/L (ref 39–117)
ALT SERPL W P-5'-P-CCNC: 34 U/L (ref 1–33)
ALT SERPL W P-5'-P-CCNC: 40 U/L (ref 1–33)
AMPHET+METHAMPHET UR QL: NEGATIVE
AMPHETAMINES UR QL: NEGATIVE
ANION GAP SERPL CALCULATED.3IONS-SCNC: 19.4 MMOL/L (ref 5–15)
ANION GAP SERPL CALCULATED.3IONS-SCNC: 21 MMOL/L (ref 5–15)
AST SERPL-CCNC: 19 U/L (ref 1–32)
AST SERPL-CCNC: 20 U/L (ref 1–32)
BACTERIA UR QL AUTO: NORMAL /HPF
BARBITURATES UR QL SCN: POSITIVE
BASOPHILS # BLD AUTO: 0.03 10*3/MM3 (ref 0–0.2)
BASOPHILS # BLD AUTO: 0.03 10*3/MM3 (ref 0–0.2)
BASOPHILS NFR BLD AUTO: 0.2 % (ref 0–1.5)
BASOPHILS NFR BLD AUTO: 0.2 % (ref 0–1.5)
BENZODIAZ UR QL SCN: POSITIVE
BILIRUB SERPL-MCNC: 0.6 MG/DL (ref 0–1.2)
BILIRUB SERPL-MCNC: 0.8 MG/DL (ref 0–1.2)
BILIRUB UR QL STRIP: NEGATIVE
BUN SERPL-MCNC: 7 MG/DL (ref 6–20)
BUN SERPL-MCNC: 8 MG/DL (ref 6–20)
BUN/CREAT SERPL: 13.2 (ref 7–25)
BUN/CREAT SERPL: 13.8 (ref 7–25)
BUPRENORPHINE SERPL-MCNC: NEGATIVE NG/ML
CALCIUM SPEC-SCNC: 8.7 MG/DL (ref 8.6–10.5)
CALCIUM SPEC-SCNC: 8.9 MG/DL (ref 8.6–10.5)
CANNABINOIDS SERPL QL: NEGATIVE
CHLORIDE SERPL-SCNC: 97 MMOL/L (ref 98–107)
CHLORIDE SERPL-SCNC: 98 MMOL/L (ref 98–107)
CLARITY UR: CLEAR
CO2 SERPL-SCNC: 15 MMOL/L (ref 22–29)
CO2 SERPL-SCNC: 18.6 MMOL/L (ref 22–29)
COCAINE UR QL: NEGATIVE
COLOR UR: YELLOW
CREAT SERPL-MCNC: 0.53 MG/DL (ref 0.57–1)
CREAT SERPL-MCNC: 0.58 MG/DL (ref 0.57–1)
DEPRECATED RDW RBC AUTO: 42.9 FL (ref 37–54)
DEPRECATED RDW RBC AUTO: 45 FL (ref 37–54)
EGFRCR SERPLBLD CKD-EPI 2021: 117.5 ML/MIN/1.73
EGFRCR SERPLBLD CKD-EPI 2021: 120.1 ML/MIN/1.73
EOSINOPHIL # BLD AUTO: 0 10*3/MM3 (ref 0–0.4)
EOSINOPHIL # BLD AUTO: 0.01 10*3/MM3 (ref 0–0.4)
EOSINOPHIL NFR BLD AUTO: 0 % (ref 0.3–6.2)
EOSINOPHIL NFR BLD AUTO: 0.1 % (ref 0.3–6.2)
ERYTHROCYTE [DISTWIDTH] IN BLOOD BY AUTOMATED COUNT: 13.2 % (ref 12.3–15.4)
ERYTHROCYTE [DISTWIDTH] IN BLOOD BY AUTOMATED COUNT: 13.3 % (ref 12.3–15.4)
ETHANOL BLD-MCNC: <10 MG/DL (ref 0–10)
ETHANOL UR QL: <0.01 %
FENTANYL UR-MCNC: NEGATIVE NG/ML
GLOBULIN UR ELPH-MCNC: 2.3 GM/DL
GLOBULIN UR ELPH-MCNC: 2.5 GM/DL
GLUCOSE SERPL-MCNC: 147 MG/DL (ref 65–99)
GLUCOSE SERPL-MCNC: 150 MG/DL (ref 65–99)
GLUCOSE UR STRIP-MCNC: ABNORMAL MG/DL
HCT VFR BLD AUTO: 46.1 % (ref 34–46.6)
HCT VFR BLD AUTO: 48.4 % (ref 34–46.6)
HGB BLD-MCNC: 15.8 G/DL (ref 12–15.9)
HGB BLD-MCNC: 16.1 G/DL (ref 12–15.9)
HGB UR QL STRIP.AUTO: NEGATIVE
HOLD SPECIMEN: NORMAL
HOLD SPECIMEN: NORMAL
HYALINE CASTS UR QL AUTO: NORMAL /LPF
IMM GRANULOCYTES # BLD AUTO: 0.07 10*3/MM3 (ref 0–0.05)
IMM GRANULOCYTES # BLD AUTO: 0.07 10*3/MM3 (ref 0–0.05)
IMM GRANULOCYTES NFR BLD AUTO: 0.4 % (ref 0–0.5)
IMM GRANULOCYTES NFR BLD AUTO: 0.4 % (ref 0–0.5)
KETONES UR QL STRIP: ABNORMAL
LEUKOCYTE ESTERASE UR QL STRIP.AUTO: NEGATIVE
LIPASE SERPL-CCNC: 1037 U/L (ref 13–60)
LIPASE SERPL-CCNC: 1593 U/L (ref 13–60)
LYMPHOCYTES # BLD AUTO: 1.27 10*3/MM3 (ref 0.7–3.1)
LYMPHOCYTES # BLD AUTO: 1.57 10*3/MM3 (ref 0.7–3.1)
LYMPHOCYTES NFR BLD AUTO: 8 % (ref 19.6–45.3)
LYMPHOCYTES NFR BLD AUTO: 9.4 % (ref 19.6–45.3)
MCH RBC QN AUTO: 30.5 PG (ref 26.6–33)
MCH RBC QN AUTO: 30.8 PG (ref 26.6–33)
MCHC RBC AUTO-ENTMCNC: 33.3 G/DL (ref 31.5–35.7)
MCHC RBC AUTO-ENTMCNC: 34.3 G/DL (ref 31.5–35.7)
MCV RBC AUTO: 89 FL (ref 79–97)
MCV RBC AUTO: 92.7 FL (ref 79–97)
METHADONE UR QL SCN: NEGATIVE
MONOCYTES # BLD AUTO: 0.65 10*3/MM3 (ref 0.1–0.9)
MONOCYTES # BLD AUTO: 0.74 10*3/MM3 (ref 0.1–0.9)
MONOCYTES NFR BLD AUTO: 4.1 % (ref 5–12)
MONOCYTES NFR BLD AUTO: 4.5 % (ref 5–12)
NEUTROPHILS NFR BLD AUTO: 13.88 10*3/MM3 (ref 1.7–7)
NEUTROPHILS NFR BLD AUTO: 14.2 10*3/MM3 (ref 1.7–7)
NEUTROPHILS NFR BLD AUTO: 85.4 % (ref 42.7–76)
NEUTROPHILS NFR BLD AUTO: 87.3 % (ref 42.7–76)
NITRITE UR QL STRIP: NEGATIVE
NRBC BLD AUTO-RTO: 0 /100 WBC (ref 0–0.2)
NRBC BLD AUTO-RTO: 0 /100 WBC (ref 0–0.2)
OPIATES UR QL: POSITIVE
OXYCODONE UR QL SCN: NEGATIVE
PCP UR QL SCN: NEGATIVE
PH UR STRIP.AUTO: 5.5 [PH] (ref 5–8)
PLATELET # BLD AUTO: 230 10*3/MM3 (ref 140–450)
PLATELET # BLD AUTO: 278 10*3/MM3 (ref 140–450)
PMV BLD AUTO: 10.1 FL (ref 6–12)
PMV BLD AUTO: 10.5 FL (ref 6–12)
POTASSIUM SERPL-SCNC: 4.1 MMOL/L (ref 3.5–5.2)
POTASSIUM SERPL-SCNC: 4.2 MMOL/L (ref 3.5–5.2)
PROT SERPL-MCNC: 6.7 G/DL (ref 6–8.5)
PROT SERPL-MCNC: 7 G/DL (ref 6–8.5)
PROT UR QL STRIP: ABNORMAL
RBC # BLD AUTO: 5.18 10*6/MM3 (ref 3.77–5.28)
RBC # BLD AUTO: 5.22 10*6/MM3 (ref 3.77–5.28)
RBC # UR STRIP: NORMAL /HPF
REF LAB TEST METHOD: NORMAL
SODIUM SERPL-SCNC: 134 MMOL/L (ref 136–145)
SODIUM SERPL-SCNC: 135 MMOL/L (ref 136–145)
SP GR UR STRIP: >=1.03 (ref 1–1.03)
SQUAMOUS #/AREA URNS HPF: NORMAL /HPF
TRICYCLICS UR QL SCN: POSITIVE
UROBILINOGEN UR QL STRIP: ABNORMAL
WBC # UR STRIP: NORMAL /HPF
WBC NRBC COR # BLD AUTO: 15.9 10*3/MM3 (ref 3.4–10.8)
WBC NRBC COR # BLD AUTO: 16.62 10*3/MM3 (ref 3.4–10.8)
WHOLE BLOOD HOLD COAG: NORMAL
WHOLE BLOOD HOLD SPECIMEN: NORMAL

## 2024-09-16 PROCEDURE — 25810000003 LACTATED RINGERS SOLUTION: Performed by: PHYSICIAN ASSISTANT

## 2024-09-16 PROCEDURE — 25810000003 LACTATED RINGERS SOLUTION: Performed by: EMERGENCY MEDICINE

## 2024-09-16 PROCEDURE — 80053 COMPREHEN METABOLIC PANEL: CPT | Performed by: STUDENT IN AN ORGANIZED HEALTH CARE EDUCATION/TRAINING PROGRAM

## 2024-09-16 PROCEDURE — 96374 THER/PROPH/DIAG INJ IV PUSH: CPT

## 2024-09-16 PROCEDURE — 25010000002 FENTANYL CITRATE (PF) 50 MCG/ML SOLUTION: Performed by: EMERGENCY MEDICINE

## 2024-09-16 PROCEDURE — 25010000002 FENTANYL CITRATE (PF) 50 MCG/ML SOLUTION: Performed by: STUDENT IN AN ORGANIZED HEALTH CARE EDUCATION/TRAINING PROGRAM

## 2024-09-16 PROCEDURE — 96361 HYDRATE IV INFUSION ADD-ON: CPT

## 2024-09-16 PROCEDURE — 83690 ASSAY OF LIPASE: CPT | Performed by: STUDENT IN AN ORGANIZED HEALTH CARE EDUCATION/TRAINING PROGRAM

## 2024-09-16 PROCEDURE — 85025 COMPLETE CBC W/AUTO DIFF WBC: CPT | Performed by: STUDENT IN AN ORGANIZED HEALTH CARE EDUCATION/TRAINING PROGRAM

## 2024-09-16 PROCEDURE — 81001 URINALYSIS AUTO W/SCOPE: CPT | Performed by: EMERGENCY MEDICINE

## 2024-09-16 PROCEDURE — 96375 TX/PRO/DX INJ NEW DRUG ADDON: CPT

## 2024-09-16 PROCEDURE — 25810000003 LACTATED RINGERS PER 1000 ML: Performed by: EMERGENCY MEDICINE

## 2024-09-16 PROCEDURE — 99285 EMERGENCY DEPT VISIT HI MDM: CPT

## 2024-09-16 PROCEDURE — 25510000001 IOPAMIDOL 61 % SOLUTION: Performed by: EMERGENCY MEDICINE

## 2024-09-16 PROCEDURE — 82077 ASSAY SPEC XCP UR&BREATH IA: CPT | Performed by: FAMILY MEDICINE

## 2024-09-16 PROCEDURE — 74177 CT ABD & PELVIS W/CONTRAST: CPT

## 2024-09-16 PROCEDURE — 36415 COLL VENOUS BLD VENIPUNCTURE: CPT

## 2024-09-16 PROCEDURE — 83690 ASSAY OF LIPASE: CPT | Performed by: EMERGENCY MEDICINE

## 2024-09-16 PROCEDURE — 80053 COMPREHEN METABOLIC PANEL: CPT | Performed by: EMERGENCY MEDICINE

## 2024-09-16 PROCEDURE — 83605 ASSAY OF LACTIC ACID: CPT | Performed by: STUDENT IN AN ORGANIZED HEALTH CARE EDUCATION/TRAINING PROGRAM

## 2024-09-16 PROCEDURE — 99223 1ST HOSP IP/OBS HIGH 75: CPT | Performed by: FAMILY MEDICINE

## 2024-09-16 PROCEDURE — 85025 COMPLETE CBC W/AUTO DIFF WBC: CPT | Performed by: EMERGENCY MEDICINE

## 2024-09-16 PROCEDURE — 80307 DRUG TEST PRSMV CHEM ANLYZR: CPT | Performed by: FAMILY MEDICINE

## 2024-09-16 PROCEDURE — 25010000002 ONDANSETRON PER 1 MG: Performed by: STUDENT IN AN ORGANIZED HEALTH CARE EDUCATION/TRAINING PROGRAM

## 2024-09-16 RX ORDER — PANTOPRAZOLE SODIUM 40 MG/10ML
80 INJECTION, POWDER, LYOPHILIZED, FOR SOLUTION INTRAVENOUS ONCE
Status: COMPLETED | OUTPATIENT
Start: 2024-09-16 | End: 2024-09-16

## 2024-09-16 RX ORDER — ALUMINA, MAGNESIA, AND SIMETHICONE 2400; 2400; 240 MG/30ML; MG/30ML; MG/30ML
15 SUSPENSION ORAL ONCE
Status: COMPLETED | OUTPATIENT
Start: 2024-09-16 | End: 2024-09-16

## 2024-09-16 RX ORDER — SODIUM CHLORIDE 0.9 % (FLUSH) 0.9 %
10 SYRINGE (ML) INJECTION AS NEEDED
Status: DISCONTINUED | OUTPATIENT
Start: 2024-09-16 | End: 2024-09-16 | Stop reason: HOSPADM

## 2024-09-16 RX ORDER — SODIUM CHLORIDE 0.9 % (FLUSH) 0.9 %
10 SYRINGE (ML) INJECTION AS NEEDED
Status: DISCONTINUED | OUTPATIENT
Start: 2024-09-16 | End: 2024-09-17 | Stop reason: HOSPADM

## 2024-09-16 RX ORDER — IOPAMIDOL 612 MG/ML
100 INJECTION, SOLUTION INTRAVASCULAR
Status: COMPLETED | OUTPATIENT
Start: 2024-09-16 | End: 2024-09-16

## 2024-09-16 RX ORDER — FENTANYL CITRATE 50 UG/ML
50 INJECTION, SOLUTION INTRAMUSCULAR; INTRAVENOUS
Status: DISCONTINUED | OUTPATIENT
Start: 2024-09-16 | End: 2024-09-16 | Stop reason: HOSPADM

## 2024-09-16 RX ORDER — FENTANYL CITRATE 50 UG/ML
50 INJECTION, SOLUTION INTRAMUSCULAR; INTRAVENOUS ONCE
Status: COMPLETED | OUTPATIENT
Start: 2024-09-16 | End: 2024-09-16

## 2024-09-16 RX ORDER — ONDANSETRON 2 MG/ML
4 INJECTION INTRAMUSCULAR; INTRAVENOUS ONCE
Status: COMPLETED | OUTPATIENT
Start: 2024-09-16 | End: 2024-09-16

## 2024-09-16 RX ORDER — SODIUM CHLORIDE, SODIUM LACTATE, POTASSIUM CHLORIDE, CALCIUM CHLORIDE 600; 310; 30; 20 MG/100ML; MG/100ML; MG/100ML; MG/100ML
125 INJECTION, SOLUTION INTRAVENOUS CONTINUOUS
Status: DISCONTINUED | OUTPATIENT
Start: 2024-09-16 | End: 2024-09-16 | Stop reason: HOSPADM

## 2024-09-16 RX ORDER — FAMOTIDINE 10 MG/ML
20 INJECTION, SOLUTION INTRAVENOUS ONCE
Status: COMPLETED | OUTPATIENT
Start: 2024-09-16 | End: 2024-09-16

## 2024-09-16 RX ADMIN — ONDANSETRON 4 MG: 2 INJECTION INTRAMUSCULAR; INTRAVENOUS at 22:57

## 2024-09-16 RX ADMIN — PANTOPRAZOLE SODIUM 80 MG: 40 INJECTION, POWDER, FOR SOLUTION INTRAVENOUS at 17:26

## 2024-09-16 RX ADMIN — ALUMINUM HYDROXIDE, MAGNESIUM HYDROXIDE, DIMETHICONE 15 ML: 400; 400; 40 SUSPENSION ORAL at 16:48

## 2024-09-16 RX ADMIN — SODIUM CHLORIDE, POTASSIUM CHLORIDE, SODIUM LACTATE AND CALCIUM CHLORIDE 125 ML/HR: 600; 310; 30; 20 INJECTION, SOLUTION INTRAVENOUS at 18:57

## 2024-09-16 RX ADMIN — SODIUM CHLORIDE, POTASSIUM CHLORIDE, SODIUM LACTATE AND CALCIUM CHLORIDE 1000 ML: 600; 310; 30; 20 INJECTION, SOLUTION INTRAVENOUS at 22:57

## 2024-09-16 RX ADMIN — SODIUM CHLORIDE, POTASSIUM CHLORIDE, SODIUM LACTATE AND CALCIUM CHLORIDE 1000 ML: 600; 310; 30; 20 INJECTION, SOLUTION INTRAVENOUS at 16:48

## 2024-09-16 RX ADMIN — IOPAMIDOL 100 ML: 612 INJECTION, SOLUTION INTRAVENOUS at 16:41

## 2024-09-16 RX ADMIN — FENTANYL CITRATE 50 MCG: 50 INJECTION, SOLUTION INTRAMUSCULAR; INTRAVENOUS at 22:57

## 2024-09-16 RX ADMIN — FENTANYL CITRATE 50 MCG: 50 INJECTION, SOLUTION INTRAMUSCULAR; INTRAVENOUS at 17:48

## 2024-09-16 RX ADMIN — FAMOTIDINE 20 MG: 10 INJECTION INTRAVENOUS at 16:47

## 2024-09-16 NOTE — ED PROVIDER NOTES
[facility] EMERGENCY DEPARTMENT  Emergency Department Encounter  Emergency Medicine Physician Note     Pt Name:Lindsay Amezquita  MRN: [id number]  Birthdate 1985  Date of evaluation: 9/16/2024  PCP:  Hunter Winkler MD  Note Started: 3:18 PM EDT      CHIEF COMPLAINT       Chief Complaint   Patient presents with    Abdominal Pain       HISTORY OF PRESENT ILLNESS  (Location/Symptom, Timing/Onset, Context/Setting, Quality, Duration, Modifying Factors, Severity.)      Lindsay Amezquita is a 39 y.o. female who presents with epigastric abdominal pain, nausea vomiting diarrhea.  Patient denies any constipation.  Patient denies any chest pain or shortness of breath.  Patient was evaluated for chest pain yesterday and, states this is resolved.  Patient denies any fevers or chills.  Patient does state the vomiting was brown in color.  Patient denies any blood in her vomit.  Patient denies history of vomiting like this before.    PAST MEDICAL / SURGICAL / SOCIAL / FAMILY HISTORY     Past Medical History:   Diagnosis Date    Allergic     Anxiety     Asthma Beings of copd with asthma    Back pain     Bell palsy 07/06/2021    Bell's palsy     Bipolar 2 disorder     Blood clot in vein     Behind left knee cap    COPD (chronic obstructive pulmonary disease) Six months ago    Deep vein thrombosis Last year    Depression     Diabetes mellitus November    Pre diabete    Frequent headaches     GERD (gastroesophageal reflux disease)     Hypertension     Every time i go into the emergacy room    Irritable bowel syndrome Two years ago    Kidney stone Two years ago    Migraine     Nausea, vomiting and diarrhea 09/17/2018    Obesity All of my life    Ovarian cyst Two years ago    Panic     PTSD (post-traumatic stress disorder)     Pulmonary embolism Not in lungs    Recurrent pregnancy loss, antepartum condition or complication Every since i have been 14 yars old    Restless leg syndrome     Sinus problem     Snores      Tattoos     Urinary tract infection Have them on and off    Varicella Little    Visual impairment Since i was little    Vitamin B12 deficiency     Wears glasses      No additional pertinent       Past Surgical History:   Procedure Laterality Date    CHOLECYSTECTOMY      HYSTEROSCOPY N/A 3/14/2024    Procedure: HYSTEROSCOPY WITH MYOSURE, DILATION AND CURETTAGE WITH CERENE ABLATION;  Surgeon: David Ribeiro MD;  Location: [facility];  Service: Obstetrics/Gynecology;  Laterality: N/A;    MULTIPLE TOOTH EXTRACTIONS      All my teeth    WISDOM TOOTH EXTRACTION  All my teeth     No additional pertinent       Social History     Socioeconomic History    Marital status: Single   Tobacco Use    Smoking status: Every Day     Current packs/day: 0.50     Average packs/day: 2.0 packs/day for 32.3 years (63.5 ttl pk-yrs)     Types: Cigarettes     Start date: 7/17/1992     Passive exposure: Current    Smokeless tobacco: Never    Tobacco comments:      Around 2.5 packs per day- 7/5/24   Vaping Use    Vaping status: Former    Passive vaping exposure: Yes   Substance and Sexual Activity    Alcohol use: Not Currently     Comment: rarely    Drug use: Not Currently    Sexual activity: Not Currently     Partners: Female     Birth control/protection: Other, Partner of same sex       Family History   Problem Relation Age of Onset    Irritable bowel syndrome Mother     Heart disease Mother     Miscarriages / Stillbirths Mother     Arthritis Mother     COPD Mother     Learning disabilities Mother     Mental illness Mother     Asthma Mother     Irritable bowel syndrome Father     Alcohol abuse Father     Diabetes Father     Hyperlipidemia Father     Anxiety disorder Father     Learning disabilities Father     Colon cancer Maternal Grandfather     Stroke Paternal Grandfather     Stroke Paternal Grandmother        Allergies:  Aspirin, Codeine, Cyclobenzaprine, Haldol [haloperidol], Imitrex [sumatriptan], Latex, Penicillins, Zofran  [ondansetron], Lamictal [lamotrigine], Metoclopramide, Morphine, Oxycodone-acetaminophen, Oxycontin [oxycodone], Prochlorperazine, and Tramadol    Home Medications:  Prior to Admission medications    Medication Sig Start Date End Date Taking? Authorizing Provider   acetaminophen (TYLENOL) 500 MG tablet Take 2 tablets by mouth Every 8 (Eight) Hours As Needed for Mild Pain. 9/10/24 9/10/25  David Ribeiro MD   albuterol sulfate HFA (Ventolin HFA) 108 (90 Base) MCG/ACT inhaler Inhale 2 puffs Every 4 (Four) Hours As Needed for Wheezing. 9/15/24   Antoni Rodriguez PA-C   amitriptyline (ELAVIL) 150 MG tablet Take 1 tablet by mouth Every Night. 9/9/24   Donna Mcqueen APRN   atorvastatin (LIPITOR) 40 MG tablet Take 1 tablet by mouth every night at bedtime. 11/28/23   Charley Robles APRN   azelastine (ASTELIN) 0.1 % nasal spray 2 sprays into the nostril(s) as directed by provider 2 (Two) Times a Day. Use in each nostril as directed 9/12/24   Hunter Winkler MD   buPROPion XL (Wellbutrin XL) 150 MG 24 hr tablet Take 1 tablet by mouth Every Morning. 9/9/24   Donna Mcqueen APRN   butalbital-acetaminophen-caffeine (FIORICET, ESGIC) -40 MG per tablet Take 1 tablet by mouth 2 (Two) Times a Day As Needed for Headache. 9/9/24   Jeannine Askew APRN   Cariprazine HCl (Vraylar) 4.5 MG capsule capsule Take 1 capsule by mouth Daily. 9/9/24   Donna Mcqueen APRN   cefdinir (OMNICEF) 300 MG capsule Take 1 capsule by mouth 2 (Two) Times a Day for 7 days. 9/12/24 9/19/24  Hunter Winkler MD   Chlorhexidine Gluconate 4 % solution APPLY TOPICALLY TO THE APPROPRIATE AREA AS DIRECTED DAILY AS NEEDED FOR WOUND CARE.    Provider, MD Michael   desvenlafaxine (PRISTIQ) 100 MG 24 hr tablet Take 1 tablet by mouth Daily. 9/9/24   Donna Mcqueen APRN   diazePAM (VALIUM) 10 MG tablet Take 1 tablet by mouth 3 (Three) Times a Day As Needed for Anxiety. 9/9/24 9/9/25  Donna Mcqueen,  CLAIR   doxycycline (VIBRAMYCIN) 100 MG capsule Take 1 capsule by mouth 2 (Two) Times a Day for 7 days. 9/15/24 9/22/24  Antoni Rodriguez PA-C   empagliflozin (Jardiance) 10 MG tablet tablet Take 1 tablet by mouth Daily. 5/7/24   Hunter Winkler MD   fexofenadine-pseudoephedrine (GNP Fexofenadine/PSE ER)  MG per 12 hr tablet Take 1 tablet by mouth 2 (Two) Times a Day. 9/9/24   Hunter Winkler MD   fluconazole (DIFLUCAN) 150 MG tablet Take 1 tablet by mouth Every 72 (Seventy-Two) Hours for 3 doses. 9/9/24 9/16/24  Hunter Winkler MD   fluticasone (FLONASE) 50 MCG/ACT nasal spray 2 sprays into the nostril(s) as directed by provider Daily for 14 days. 9/15/24 9/29/24  Antoni Rodriguez PA-C   FREESTYLE LITE test strip See Admin Instructions. 4/26/24   Provider, MD Michael   gabapentin (Neurontin) 800 MG tablet Take 1 tablet by mouth 3 (Three) Times a Day. 8/13/24   Hunter Winkler MD   guaiFENesin-dextromethorphan (ROBITUSSIN DM) 100-10 MG/5ML syrup Take 5 mL by mouth 3 (Three) Times a Day As Needed for Cough. 9/15/24   Antoni Rodriguez PA-C   HYDROcodone-acetaminophen (NORCO) 5-325 MG per tablet Take 1 tablet by mouth Every 8 (Eight) Hours As Needed for Severe Pain (pain) for up to 12 doses. 8/17/24   David Ribeiro MD   ibuprofen (ADVIL,MOTRIN) 800 MG tablet Take 1 tablet by mouth Every 8 (Eight) Hours As Needed for Mild Pain. 9/14/24   Juan Ramon Chavez DO   ketorolac (TORADOL) 10 MG tablet Take 1 tablet by mouth Every 6 (Six) Hours As Needed for Severe Pain. 7/24/24   David Ribeiro MD   Lurasidone HCl (Latuda) 120 MG tablet tablet Take 1 tablet by mouth Daily. 9/9/24   Donna Mcqueen APRN   montelukast (SINGULAIR) 10 MG tablet Take 1 tablet by mouth Every Night. 7/5/24   Anneliese Walter APRN   omeprazole (priLOSEC) 20 MG capsule Take 1 capsule by mouth Daily. 7/12/24   Hunter Winkler MD   OXcarbazepine (TRILEPTAL) 300 MG tablet Take 1 tablet by mouth 2 (Two) Times a Day. 9/9/24    "Donna Mcqueen APRN   polyethylene glycol (MIRALAX) 17 g packet Take 17 g by mouth Daily. 8/29/24   Blanca Louis APRN   promethazine (PHENERGAN) 25 MG tablet TAKE 1 TABLET BY MOUTH EVERY 6 HOURS AS NEEDED FOR NAUSEA OR VOMITING.  Patient not taking: Reported on 9/7/2024 5/28/24   Jeannine Askew APRN   promethazine-dextromethorphan (PROMETHAZINE-DM) 6.25-15 MG/5ML syrup Take 5 mL by mouth 4 (Four) Times a Day As Needed for Cough (Nausea). 9/12/24   Hunter Winkler MD   Rimegepant Sulfate (Nurtec) 75 MG tablet dispersible tablet Take 1 tablet by mouth Every Other Day. Take Monday,Wednesday,Friday, and Saturday. 9/9/24   Jeannine Askew APRN   tiZANidine (ZANAFLEX) 4 MG tablet Take 1 tablet by mouth Every 8 (Eight) Hours As Needed for Muscle Spasms. 7/12/24   Hunter Winkler MD   Zavegepant HCl 10 MG/ACT solution 10 mg into the nostril(s) as directed by provider Daily As Needed (migraine). (1 spray into 1 nostril every 24 hrs prn) 4/30/24   Jeannine Askew APRN         REVIEW OF SYSTEMS       Review of Systems   Constitutional:  Negative for diaphoresis and fever.   Respiratory:  Negative for chest tightness and shortness of breath.    Cardiovascular:  Negative for chest pain.   Gastrointestinal:  Positive for abdominal pain, nausea and vomiting.   Endocrine: Negative for polyuria.   Genitourinary:  Negative for difficulty urinating and frequency.       PHYSICAL EXAM      INITIAL VITALS:   BP (!) 158/104   Pulse 109   Temp 99.3 °F (37.4 °C)   Resp 16   Ht 157.5 cm (62\")   Wt 115 kg (253 lb)   LMP  (LMP Unknown)   SpO2 93%   BMI 46.27 kg/m²     Physical Exam  Constitutional:       Appearance: Normal appearance.   HENT:      Head: Normocephalic and atraumatic.      Mouth/Throat:      Mouth: Mucous membranes are moist.      Pharynx: Oropharynx is clear.   Cardiovascular:      Rate and Rhythm: Normal rate and regular rhythm.      Pulses: Normal pulses.   Pulmonary:      Effort: Pulmonary " effort is normal.      Breath sounds: Normal breath sounds.   Abdominal:      General: Abdomen is flat. There is no distension.      Palpations: Abdomen is soft.      Tenderness: There is abdominal tenderness. There is rebound. There is no guarding.   Musculoskeletal:         General: Normal range of motion.   Skin:     General: Skin is warm and dry.   Neurological:      General: No focal deficit present.      Mental Status: She is alert and oriented to person, place, and time.   Psychiatric:         Mood and Affect: Mood is anxious.         Behavior: Behavior normal.           DDX/DIAGNOSTIC RESULTS / EMERGENCY DEPARTMENT COURSE / MDM     Differential Diagnosis included but not limited: Peptic ulcer disease, perforated bowel, pancreatitis, gastroesophageal reflux disease,    Diagnoses Considered but Do Not Suspect: Gallbladder disease as patient has had gallbladder surgery.  Patient still at risk for choledocholithiasis.    Decision Rules/Scores utilized: N/A     Tests considered but not ordered and why:  N/A     MIPS: N/A     Code Status Discussion:  Not Discussed    Additional Patient Education Provided: None     Medical Decision Making    Medical Decision Making  Patient presenting with abdominal pain, denies any lower abdominal pain describes as epigastric.  Patient has any vaginal bleeding discharge or constipation.  Patient does describe some associated diarrhea, and severe nausea vomiting is brown in color.  Workup demonstrated concerns for pancreatitis.  Has never had pancreatitis before in her life.  Patient describes epigastric abdominal tenderness.  CT scan demonstrates concerns for severe pancreatitis with patient having a lipase of 1500.  Patient started on IV fluids and given pain control.  Patient's case discussed with hospitalist and in the process of admission when I was informed by nursing staff that she left AGAINST MEDICAL ADVICE.  Unable to provide any discharge instructions or  recommendations.    Amount and/or Complexity of Data Reviewed  External Data Reviewed: labs and notes.     Details: Labs notes and previous radiological images evaluated from most recent care  Labs: ordered.  Radiology: ordered.  Discussion of management or test interpretation with external provider(s): Hospitalist for admission    Risk  OTC drugs.  Prescription drug management.        See ED COURSE for additional MDM statements    EKG  None Performed     All EKG's are interpreted by the Emergency Department Physician who either signs or Co-signs this chart in the absence of a cardiologist.    Additional Scores                   EMERGENCY DEPARTMENT COURSE:         PROCEDURES:  None Performed   Procedures    DATA FOR LAB AND RADIOLOGY TESTS ORDERED BELOW ARE REVIEWED BY THE ED CLINICIAN:    RADIOLOGY: All x-rays, CT, MRI, and formal ultrasound images (except ED bedside ultrasound) are read by the radiologist, see reports below, unless otherwise noted in MDM or here.  Reports below are reviewed by myself.  CT Abdomen Pelvis With Contrast   Final Result   IMPRESSION:      1. Severe acute pancreatitis.      2. Moderate free fluid. No walled-off pseudocyst.      3. Apparent interval increase in the size of a 2.5 cm left adrenal nodule. This increase in size may be related to the immediately adjacent inflammatory process. An adenoma is still favored, but malignancy is not excluded and consideration should be    given to further evaluation with MRI.      4. Severe secondary edema of the duodenal.      5. There is fatty infiltration of the liver. There is mild irregularity of the liver contour suggesting early cirrhosis.      Authenticated and Electronically Signed by Patito Quach MD on   09/16/2024 05:32:00 PM          LABS: Lab orders shown below, the results are reviewed by myself, and all abnormals are listed below.  Labs Reviewed   COMPREHENSIVE METABOLIC PANEL - Abnormal; Notable for the following components:        Result Value    Glucose 147 (*)     Sodium 134 (*)     CO2 15.0 (*)     ALT (SGPT) 40 (*)     Anion Gap 21.0 (*)     All other components within normal limits    Narrative:     GFR Normal >60  Chronic Kidney Disease <60  Kidney Failure <15     LIPASE - Abnormal; Notable for the following components:    Lipase 1,593 (*)     All other components within normal limits   URINALYSIS W/ MICROSCOPIC IF INDICATED (NO CULTURE) - Abnormal; Notable for the following components:    Glucose, UA >=1000 mg/dL (3+) (*)     Ketones, UA 40 mg/dL (2+) (*)     Protein,  mg/dL (2+) (*)     All other components within normal limits   CBC WITH AUTO DIFFERENTIAL - Abnormal; Notable for the following components:    WBC 16.62 (*)     Hemoglobin 16.1 (*)     Hematocrit 48.4 (*)     Neutrophil % 85.4 (*)     Lymphocyte % 9.4 (*)     Monocyte % 4.5 (*)     Eosinophil % 0.1 (*)     Neutrophils, Absolute 14.20 (*)     Immature Grans, Absolute 0.07 (*)     All other components within normal limits   RAINBOW DRAW    Narrative:     The following orders were created for panel order [facility] Draw.  Procedure                               Abnormality         Status                     ---------                               -----------         ------                     Green Top (Gel)[[id number]]                                  Final result               Lavender Top[[id number]]                                     Final result               Gold Top - SST[[id number]]                                   Final result               Light Blue Top[[id number]]                                   Final result                 Please view results for these tests on the individual orders.   URINALYSIS, MICROSCOPIC ONLY   CBC AND DIFFERENTIAL    Narrative:     The following orders were created for panel order CBC & Differential.  Procedure                               Abnormality         Status                     ---------                                -----------         ------                     CBC Auto Differential[[id number]]        Abnormal            Final result               Scan Slide[[id number]]                                                                    Please view results for these tests on the individual orders.   GREEN TOP   LAVENDER TOP   GOLD TOP - SST   LIGHT BLUE TOP       Vitals Reviewed:    Vitals:    09/16/24 1859 09/16/24 1915 09/16/24 1929 09/16/24 1944   BP: 152/86 (!) 159/109 (!) 144/106 (!) 158/104   BP Location:       Patient Position:       Pulse: 105 101 106 109   Resp:       Temp:       TempSrc:       SpO2: 93%      Weight:       Height:           MEDICATIONS GIVEN TO PATIENT THIS ENCOUNTER:  Medications   lactated ringers bolus 1,000 mL (0 mL Intravenous Stopped 9/16/24 1827)   famotidine (PEPCID) injection 20 mg (20 mg Intravenous Given 9/16/24 1647)   aluminum-magnesium hydroxide-simethicone (MAALOX MAX) 400-400-40 MG/5ML suspension 15 mL (15 mL Oral Given 9/16/24 1648)   iopamidol (ISOVUE-300) 61 % injection 100 mL (100 mL Intravenous Given 9/16/24 1641)   pantoprazole (PROTONIX) injection 80 mg (80 mg Intravenous Given 9/16/24 1726)       CONSULTS:  None    CRITICAL CARE:  There was significant risk of life threatening deterioration of patient's condition requiring my direct management. Critical care time 0 minutes, excluding any documented procedures.    FINAL IMPRESSION      1. Acute pancreatitis, unspecified complication status, unspecified pancreatitis type          DISPOSITION / PLAN     ED Disposition       ED Disposition   AMA    Condition   --    Comment   --               PATIENT REFERRED TO:  No follow-up provider specified.    DISCHARGE MEDICATIONS:     Medication List        ASK your doctor about these medications      acetaminophen 500 MG tablet  Commonly known as: TYLENOL  Take 2 tablets by mouth Every 8 (Eight) Hours As Needed for Mild Pain.     albuterol sulfate  (90 Base) MCG/ACT inhaler  Commonly  known as: Ventolin HFA  Inhale 2 puffs Every 4 (Four) Hours As Needed for Wheezing.     amitriptyline 150 MG tablet  Commonly known as: ELAVIL  Take 1 tablet by mouth Every Night.     atorvastatin 40 MG tablet  Commonly known as: LIPITOR  Take 1 tablet by mouth every night at bedtime.     azelastine 0.1 % nasal spray  Commonly known as: ASTELIN  2 sprays into the nostril(s) as directed by provider 2 (Two) Times a Day. Use in each nostril as directed     buPROPion  MG 24 hr tablet  Commonly known as: Wellbutrin XL  Take 1 tablet by mouth Every Morning.     butalbital-acetaminophen-caffeine -40 MG per tablet  Commonly known as: FIORICET, ESGIC  Take 1 tablet by mouth 2 (Two) Times a Day As Needed for Headache.     Cariprazine HCl 4.5 MG capsule capsule  Commonly known as: Vraylar  Take 1 capsule by mouth Daily.     cefdinir 300 MG capsule  Commonly known as: OMNICEF  Take 1 capsule by mouth 2 (Two) Times a Day for 7 days.     Chlorhexidine Gluconate 4 % solution     desvenlafaxine 100 MG 24 hr tablet  Commonly known as: PRISTIQ  Take 1 tablet by mouth Daily.     diazePAM 10 MG tablet  Commonly known as: VALIUM  Take 1 tablet by mouth 3 (Three) Times a Day As Needed for Anxiety.     doxycycline 100 MG capsule  Commonly known as: VIBRAMYCIN  Take 1 capsule by mouth 2 (Two) Times a Day for 7 days.     empagliflozin 10 MG tablet tablet  Commonly known as: Jardiance  Take 1 tablet by mouth Daily.     fexofenadine-pseudoephedrine  MG per 12 hr tablet  Commonly known as: GNP Fexofenadine/PSE ER  Take 1 tablet by mouth 2 (Two) Times a Day.     fluconazole 150 MG tablet  Commonly known as: DIFLUCAN  Take 1 tablet by mouth Every 72 (Seventy-Two) Hours for 3 doses.  Ask about: Should I take this medication?     fluticasone 50 MCG/ACT nasal spray  Commonly known as: FLONASE  2 sprays into the nostril(s) as directed by provider Daily for 14 days.     FREESTYLE LITE test strip  Generic drug: glucose blood      gabapentin 800 MG tablet  Commonly known as: Neurontin  Take 1 tablet by mouth 3 (Three) Times a Day.     guaiFENesin-dextromethorphan 100-10 MG/5ML syrup  Commonly known as: ROBITUSSIN DM  Take 5 mL by mouth 3 (Three) Times a Day As Needed for Cough.     HYDROcodone-acetaminophen 5-325 MG per tablet  Commonly known as: NORCO  Take 1 tablet by mouth Every 8 (Eight) Hours As Needed for Severe Pain (pain) for up to 12 doses.     ibuprofen 800 MG tablet  Commonly known as: ADVIL,MOTRIN  Take 1 tablet by mouth Every 8 (Eight) Hours As Needed for Mild Pain.     ketorolac 10 MG tablet  Commonly known as: TORADOL  Take 1 tablet by mouth Every 6 (Six) Hours As Needed for Severe Pain.     Lurasidone HCl 120 MG tablet tablet  Commonly known as: Latuda  Take 1 tablet by mouth Daily.     montelukast 10 MG tablet  Commonly known as: SINGULAIR  Take 1 tablet by mouth Every Night.     Nurtec 75 MG dispersible tablet  Generic drug: Rimegepant Sulfate  Take 1 tablet by mouth Every Other Day. Take Monday,Wednesday,Friday, and Saturday.     omeprazole 20 MG capsule  Commonly known as: priLOSEC  Take 1 capsule by mouth Daily.     OXcarbazepine 300 MG tablet  Commonly known as: TRILEPTAL  Take 1 tablet by mouth 2 (Two) Times a Day.     polyethylene glycol 17 g packet  Commonly known as: MIRALAX  Take 17 g by mouth Daily.     promethazine 25 MG tablet  Commonly known as: PHENERGAN  TAKE 1 TABLET BY MOUTH EVERY 6 HOURS AS NEEDED FOR NAUSEA OR VOMITING.     promethazine-dextromethorphan 6.25-15 MG/5ML syrup  Commonly known as: PROMETHAZINE-DM  Take 5 mL by mouth 4 (Four) Times a Day As Needed for Cough (Nausea).     tiZANidine 4 MG tablet  Commonly known as: ZANAFLEX  Take 1 tablet by mouth Every 8 (Eight) Hours As Needed for Muscle Spasms.     Zavegepant HCl 10 MG/ACT solution  10 mg into the nostril(s) as directed by provider Daily As Needed (migraine). (1 spray into 1 nostril every 24 hrs prn)              Electronically signed by  Rajeev Singh DO, 09/16/24, 3:18 PM EDT.    Emergency Medicine Physician  [facility]  (Please note that portions of thisnote were completed with a voice recognition program.  Efforts were made to edit the dictations but occasionally words are mis-transcribed.)         Rajeev Singh,   09/17/24 0051

## 2024-09-16 NOTE — TELEPHONE ENCOUNTER
Caller: Lindsay Amezquita    Relationship to patient: Self    Best call back number: [phone]    Chief complaint: HAS NOT HAD A BOWEL MOVEMENT SINCE SATURDAY,  STOMACH PAIN, VOMITING 3 TIMES ALREADY.       Patient directed to call 911 or go to their nearest emergency room. YES, DOES NOT WANT TO GO     Patient verbalized understanding: [] Yes  [] No  If no, why?    Additional notes:CALL TRANSFER TO OFFICE

## 2024-09-17 VITALS
DIASTOLIC BLOOD PRESSURE: 103 MMHG | HEIGHT: 62 IN | OXYGEN SATURATION: 90 % | SYSTOLIC BLOOD PRESSURE: 155 MMHG | HEART RATE: 126 BPM | TEMPERATURE: 98.5 F | WEIGHT: 272.05 LBS | RESPIRATION RATE: 18 BRPM | BODY MASS INDEX: 50.06 KG/M2

## 2024-09-17 LAB
ALBUMIN SERPL-MCNC: 4.1 G/DL (ref 3.5–5.2)
ALBUMIN/GLOB SERPL: 1.5 G/DL
ALP SERPL-CCNC: 66 U/L (ref 39–117)
ALT SERPL W P-5'-P-CCNC: 31 U/L (ref 1–33)
ANION GAP SERPL CALCULATED.3IONS-SCNC: 18.3 MMOL/L (ref 5–15)
AST SERPL-CCNC: 16 U/L (ref 1–32)
BACTERIA SPEC AEROBE CULT: NORMAL
BILIRUB SERPL-MCNC: 0.8 MG/DL (ref 0–1.2)
BUN SERPL-MCNC: 8 MG/DL (ref 6–20)
BUN/CREAT SERPL: 17 (ref 7–25)
CALCIUM SPEC-SCNC: 8.8 MG/DL (ref 8.6–10.5)
CHLORIDE SERPL-SCNC: 97 MMOL/L (ref 98–107)
CHOLEST SERPL-MCNC: 108 MG/DL (ref 0–200)
CO2 SERPL-SCNC: 18.7 MMOL/L (ref 22–29)
CREAT SERPL-MCNC: 0.47 MG/DL (ref 0.57–1)
D-LACTATE SERPL-SCNC: 3 MMOL/L (ref 0.5–2)
D-LACTATE SERPL-SCNC: 4.2 MMOL/L (ref 0.5–2)
D-LACTATE SERPL-SCNC: 5.5 MMOL/L (ref 0.5–2)
DEPRECATED RDW RBC AUTO: 43.4 FL (ref 37–54)
EGFRCR SERPLBLD CKD-EPI 2021: 123.6 ML/MIN/1.73
ERYTHROCYTE [DISTWIDTH] IN BLOOD BY AUTOMATED COUNT: 13.3 % (ref 12.3–15.4)
GLOBULIN UR ELPH-MCNC: 2.7 GM/DL
GLUCOSE SERPL-MCNC: 156 MG/DL (ref 65–99)
HCT VFR BLD AUTO: 48.8 % (ref 34–46.6)
HDLC SERPL-MCNC: 32 MG/DL (ref 40–60)
HGB BLD-MCNC: 16.1 G/DL (ref 12–15.9)
LDLC SERPL CALC-MCNC: 45 MG/DL (ref 0–100)
LDLC/HDLC SERPL: 1.21 {RATIO}
LIPASE SERPL-CCNC: 600 U/L (ref 13–60)
MCH RBC QN AUTO: 29.8 PG (ref 26.6–33)
MCHC RBC AUTO-ENTMCNC: 33 G/DL (ref 31.5–35.7)
MCV RBC AUTO: 90.2 FL (ref 79–97)
PLATELET # BLD AUTO: 274 10*3/MM3 (ref 140–450)
PMV BLD AUTO: 10.4 FL (ref 6–12)
POTASSIUM SERPL-SCNC: 4.1 MMOL/L (ref 3.5–5.2)
PROT SERPL-MCNC: 6.8 G/DL (ref 6–8.5)
RBC # BLD AUTO: 5.41 10*6/MM3 (ref 3.77–5.28)
SODIUM SERPL-SCNC: 134 MMOL/L (ref 136–145)
TRIGL SERPL-MCNC: 187 MG/DL (ref 0–150)
VLDLC SERPL-MCNC: 31 MG/DL (ref 5–40)
WBC NRBC COR # BLD AUTO: 15.93 10*3/MM3 (ref 3.4–10.8)

## 2024-09-17 PROCEDURE — 85027 COMPLETE CBC AUTOMATED: CPT | Performed by: FAMILY MEDICINE

## 2024-09-17 PROCEDURE — 99238 HOSP IP/OBS DSCHRG MGMT 30/<: CPT | Performed by: FAMILY MEDICINE

## 2024-09-17 PROCEDURE — 25010000002 ONDANSETRON PER 1 MG: Performed by: FAMILY MEDICINE

## 2024-09-17 PROCEDURE — 83605 ASSAY OF LACTIC ACID: CPT | Performed by: STUDENT IN AN ORGANIZED HEALTH CARE EDUCATION/TRAINING PROGRAM

## 2024-09-17 PROCEDURE — 25010000002 HYDROMORPHONE 1 MG/ML SOLUTION: Performed by: FAMILY MEDICINE

## 2024-09-17 PROCEDURE — 63710000001 PROMETHAZINE PER 12.5 MG: Performed by: FAMILY MEDICINE

## 2024-09-17 PROCEDURE — 25010000002 ENOXAPARIN PER 10 MG: Performed by: FAMILY MEDICINE

## 2024-09-17 PROCEDURE — 25810000003 SODIUM CHLORIDE 0.9 % SOLUTION: Performed by: FAMILY MEDICINE

## 2024-09-17 PROCEDURE — 99233 SBSQ HOSP IP/OBS HIGH 50: CPT | Performed by: FAMILY MEDICINE

## 2024-09-17 PROCEDURE — 80053 COMPREHEN METABOLIC PANEL: CPT | Performed by: FAMILY MEDICINE

## 2024-09-17 PROCEDURE — 80061 LIPID PANEL: CPT | Performed by: FAMILY MEDICINE

## 2024-09-17 PROCEDURE — 83690 ASSAY OF LIPASE: CPT | Performed by: FAMILY MEDICINE

## 2024-09-17 PROCEDURE — 83605 ASSAY OF LACTIC ACID: CPT | Performed by: FAMILY MEDICINE

## 2024-09-17 PROCEDURE — 63710000001 PROMETHAZINE PER 12.5 MG: Performed by: PHYSICIAN ASSISTANT

## 2024-09-17 PROCEDURE — 25010000002 FENTANYL CITRATE (PF) 50 MCG/ML SOLUTION: Performed by: STUDENT IN AN ORGANIZED HEALTH CARE EDUCATION/TRAINING PROGRAM

## 2024-09-17 RX ORDER — NORETHINDRONE ACETATE 5 MG
5 TABLET ORAL 2 TIMES DAILY
COMMUNITY

## 2024-09-17 RX ORDER — SODIUM CHLORIDE 9 MG/ML
40 INJECTION, SOLUTION INTRAVENOUS AS NEEDED
Status: DISCONTINUED | OUTPATIENT
Start: 2024-09-17 | End: 2024-09-17 | Stop reason: HOSPADM

## 2024-09-17 RX ORDER — SODIUM CHLORIDE 0.9 % (FLUSH) 0.9 %
10 SYRINGE (ML) INJECTION AS NEEDED
Status: DISCONTINUED | OUTPATIENT
Start: 2024-09-17 | End: 2024-09-17 | Stop reason: HOSPADM

## 2024-09-17 RX ORDER — BISACODYL 5 MG/1
5 TABLET, DELAYED RELEASE ORAL DAILY PRN
Status: DISCONTINUED | OUTPATIENT
Start: 2024-09-17 | End: 2024-09-17 | Stop reason: HOSPADM

## 2024-09-17 RX ORDER — BUTALBITAL, ACETAMINOPHEN AND CAFFEINE 50; 325; 40 MG/1; MG/1; MG/1
1 TABLET ORAL 2 TIMES DAILY PRN
Status: DISCONTINUED | OUTPATIENT
Start: 2024-09-17 | End: 2024-09-17 | Stop reason: HOSPADM

## 2024-09-17 RX ORDER — ACETAMINOPHEN 160 MG/5ML
650 SOLUTION ORAL EVERY 4 HOURS PRN
Status: DISCONTINUED | OUTPATIENT
Start: 2024-09-17 | End: 2024-09-17 | Stop reason: HOSPADM

## 2024-09-17 RX ORDER — HYDROMORPHONE HYDROCHLORIDE 2 MG/ML
0.5 INJECTION, SOLUTION INTRAMUSCULAR; INTRAVENOUS; SUBCUTANEOUS
Status: DISCONTINUED | OUTPATIENT
Start: 2024-09-17 | End: 2024-09-17

## 2024-09-17 RX ORDER — ENOXAPARIN SODIUM 100 MG/ML
40 INJECTION SUBCUTANEOUS EVERY 12 HOURS
Status: DISCONTINUED | OUTPATIENT
Start: 2024-09-17 | End: 2024-09-17 | Stop reason: HOSPADM

## 2024-09-17 RX ORDER — ATORVASTATIN CALCIUM 40 MG/1
40 TABLET, FILM COATED ORAL DAILY
Status: DISCONTINUED | OUTPATIENT
Start: 2024-09-17 | End: 2024-09-17 | Stop reason: HOSPADM

## 2024-09-17 RX ORDER — ACETAMINOPHEN 325 MG/1
650 TABLET ORAL EVERY 4 HOURS PRN
Status: DISCONTINUED | OUTPATIENT
Start: 2024-09-17 | End: 2024-09-17 | Stop reason: HOSPADM

## 2024-09-17 RX ORDER — HYDROCODONE BITARTRATE AND ACETAMINOPHEN 5; 325 MG/1; MG/1
1 TABLET ORAL EVERY 8 HOURS PRN
Status: DISCONTINUED | OUTPATIENT
Start: 2024-09-17 | End: 2024-09-17 | Stop reason: HOSPADM

## 2024-09-17 RX ORDER — AMITRIPTYLINE HYDROCHLORIDE 50 MG/1
150 TABLET ORAL NIGHTLY
Status: DISCONTINUED | OUTPATIENT
Start: 2024-09-17 | End: 2024-09-17 | Stop reason: HOSPADM

## 2024-09-17 RX ORDER — ACETAMINOPHEN 650 MG/1
650 SUPPOSITORY RECTAL EVERY 4 HOURS PRN
Status: DISCONTINUED | OUTPATIENT
Start: 2024-09-17 | End: 2024-09-17 | Stop reason: HOSPADM

## 2024-09-17 RX ORDER — PROMETHAZINE HYDROCHLORIDE 12.5 MG/1
12.5 TABLET ORAL ONCE
Status: COMPLETED | OUTPATIENT
Start: 2024-09-17 | End: 2024-09-17

## 2024-09-17 RX ORDER — LURASIDONE HYDROCHLORIDE 20 MG/1
120 TABLET, FILM COATED ORAL DAILY
Status: DISCONTINUED | OUTPATIENT
Start: 2024-09-17 | End: 2024-09-17 | Stop reason: HOSPADM

## 2024-09-17 RX ORDER — AMOXICILLIN 250 MG
2 CAPSULE ORAL 2 TIMES DAILY PRN
Status: DISCONTINUED | OUTPATIENT
Start: 2024-09-17 | End: 2024-09-17 | Stop reason: HOSPADM

## 2024-09-17 RX ORDER — ONDANSETRON 2 MG/ML
4 INJECTION INTRAMUSCULAR; INTRAVENOUS EVERY 6 HOURS PRN
Status: DISCONTINUED | OUTPATIENT
Start: 2024-09-17 | End: 2024-09-17 | Stop reason: HOSPADM

## 2024-09-17 RX ORDER — PANTOPRAZOLE SODIUM 40 MG/1
40 TABLET, DELAYED RELEASE ORAL
Status: DISCONTINUED | OUTPATIENT
Start: 2024-09-17 | End: 2024-09-17 | Stop reason: HOSPADM

## 2024-09-17 RX ORDER — PROMETHAZINE HYDROCHLORIDE 12.5 MG/1
12.5 TABLET ORAL EVERY 4 HOURS PRN
Status: DISCONTINUED | OUTPATIENT
Start: 2024-09-17 | End: 2024-09-17 | Stop reason: HOSPADM

## 2024-09-17 RX ORDER — BISACODYL 10 MG
10 SUPPOSITORY, RECTAL RECTAL DAILY PRN
Status: DISCONTINUED | OUTPATIENT
Start: 2024-09-17 | End: 2024-09-17 | Stop reason: HOSPADM

## 2024-09-17 RX ORDER — DIAZEPAM 5 MG
10 TABLET ORAL 3 TIMES DAILY PRN
Status: DISCONTINUED | OUTPATIENT
Start: 2024-09-17 | End: 2024-09-17 | Stop reason: HOSPADM

## 2024-09-17 RX ORDER — POLYETHYLENE GLYCOL 3350 17 G/17G
17 POWDER, FOR SOLUTION ORAL DAILY PRN
Status: DISCONTINUED | OUTPATIENT
Start: 2024-09-17 | End: 2024-09-17 | Stop reason: HOSPADM

## 2024-09-17 RX ORDER — OXCARBAZEPINE 300 MG/1
300 TABLET, FILM COATED ORAL 2 TIMES DAILY
Status: DISCONTINUED | OUTPATIENT
Start: 2024-09-17 | End: 2024-09-17 | Stop reason: HOSPADM

## 2024-09-17 RX ORDER — NALOXONE HCL 0.4 MG/ML
0.4 VIAL (ML) INJECTION
Status: DISCONTINUED | OUTPATIENT
Start: 2024-09-17 | End: 2024-09-17 | Stop reason: HOSPADM

## 2024-09-17 RX ORDER — MONTELUKAST SODIUM 10 MG/1
10 TABLET ORAL NIGHTLY
Status: DISCONTINUED | OUTPATIENT
Start: 2024-09-17 | End: 2024-09-17 | Stop reason: HOSPADM

## 2024-09-17 RX ORDER — ALBUTEROL SULFATE 0.83 MG/ML
2.5 SOLUTION RESPIRATORY (INHALATION) EVERY 6 HOURS PRN
Status: DISCONTINUED | OUTPATIENT
Start: 2024-09-17 | End: 2024-09-17 | Stop reason: HOSPADM

## 2024-09-17 RX ORDER — SODIUM CHLORIDE 0.9 % (FLUSH) 0.9 %
10 SYRINGE (ML) INJECTION EVERY 12 HOURS SCHEDULED
Status: DISCONTINUED | OUTPATIENT
Start: 2024-09-17 | End: 2024-09-17 | Stop reason: HOSPADM

## 2024-09-17 RX ORDER — SODIUM CHLORIDE 9 MG/ML
125 INJECTION, SOLUTION INTRAVENOUS CONTINUOUS
Status: DISCONTINUED | OUTPATIENT
Start: 2024-09-17 | End: 2024-09-17 | Stop reason: HOSPADM

## 2024-09-17 RX ORDER — FENTANYL CITRATE 50 UG/ML
50 INJECTION, SOLUTION INTRAMUSCULAR; INTRAVENOUS ONCE
Status: COMPLETED | OUTPATIENT
Start: 2024-09-17 | End: 2024-09-17

## 2024-09-17 RX ADMIN — HYDROMORPHONE HYDROCHLORIDE 0.5 MG: 1 INJECTION, SOLUTION INTRAMUSCULAR; INTRAVENOUS; SUBCUTANEOUS at 12:53

## 2024-09-17 RX ADMIN — HYDROMORPHONE HYDROCHLORIDE 0.5 MG: 1 INJECTION, SOLUTION INTRAMUSCULAR; INTRAVENOUS; SUBCUTANEOUS at 07:47

## 2024-09-17 RX ADMIN — PROMETHAZINE HYDROCHLORIDE 12.5 MG: 12.5 TABLET ORAL at 01:24

## 2024-09-17 RX ADMIN — FENTANYL CITRATE 50 MCG: 50 INJECTION, SOLUTION INTRAMUSCULAR; INTRAVENOUS at 00:05

## 2024-09-17 RX ADMIN — DIAZEPAM 10 MG: 5 TABLET ORAL at 03:50

## 2024-09-17 RX ADMIN — ONDANSETRON 4 MG: 2 INJECTION INTRAMUSCULAR; INTRAVENOUS at 12:53

## 2024-09-17 RX ADMIN — ONDANSETRON 4 MG: 2 INJECTION INTRAMUSCULAR; INTRAVENOUS at 03:02

## 2024-09-17 RX ADMIN — ENOXAPARIN SODIUM 40 MG: 100 INJECTION SUBCUTANEOUS at 16:31

## 2024-09-17 RX ADMIN — ATORVASTATIN CALCIUM 40 MG: 40 TABLET, FILM COATED ORAL at 10:32

## 2024-09-17 RX ADMIN — OXCARBAZEPINE 300 MG: 300 TABLET, FILM COATED ORAL at 10:32

## 2024-09-17 RX ADMIN — HYDROMORPHONE HYDROCHLORIDE 0.5 MG: 1 INJECTION, SOLUTION INTRAMUSCULAR; INTRAVENOUS; SUBCUTANEOUS at 03:02

## 2024-09-17 RX ADMIN — HYDROCODONE BITARTRATE AND ACETAMINOPHEN 1 TABLET: 5; 325 TABLET ORAL at 11:53

## 2024-09-17 RX ADMIN — NICOTINE POLACRILEX 2 MG: 2 GUM, CHEWING BUCCAL at 08:24

## 2024-09-17 RX ADMIN — HYDROMORPHONE HYDROCHLORIDE 0.5 MG: 1 INJECTION, SOLUTION INTRAMUSCULAR; INTRAVENOUS; SUBCUTANEOUS at 05:37

## 2024-09-17 RX ADMIN — LURASIDONE HYDROCHLORIDE 120 MG: 20 TABLET, FILM COATED ORAL at 10:33

## 2024-09-17 RX ADMIN — HYDROMORPHONE HYDROCHLORIDE 0.5 MG: 1 INJECTION, SOLUTION INTRAMUSCULAR; INTRAVENOUS; SUBCUTANEOUS at 09:46

## 2024-09-17 RX ADMIN — CARIPRAZINE 4.5 MG: 1.5 CAPSULE, GELATIN COATED ORAL at 10:32

## 2024-09-17 RX ADMIN — Medication 10 ML: at 10:38

## 2024-09-17 RX ADMIN — HYDROMORPHONE HYDROCHLORIDE 0.5 MG: 1 INJECTION, SOLUTION INTRAMUSCULAR; INTRAVENOUS; SUBCUTANEOUS at 17:40

## 2024-09-17 RX ADMIN — DIAZEPAM 10 MG: 5 TABLET ORAL at 18:53

## 2024-09-17 RX ADMIN — BUTALBITAL, ACETAMINOPHEN, AND CAFFEINE 1 TABLET: 50; 325; 40 TABLET ORAL at 18:54

## 2024-09-17 RX ADMIN — PROMETHAZINE HYDROCHLORIDE 12.5 MG: 12.5 TABLET ORAL at 10:32

## 2024-09-17 RX ADMIN — SODIUM CHLORIDE 125 ML/HR: 9 INJECTION, SOLUTION INTRAVENOUS at 03:02

## 2024-09-17 RX ADMIN — HYDROCODONE BITARTRATE AND ACETAMINOPHEN 1 TABLET: 5; 325 TABLET ORAL at 03:50

## 2024-09-17 RX ADMIN — PANTOPRAZOLE SODIUM 40 MG: 40 TABLET, DELAYED RELEASE ORAL at 03:51

## 2024-09-17 RX ADMIN — ENOXAPARIN SODIUM 40 MG: 100 INJECTION SUBCUTANEOUS at 03:50

## 2024-09-17 RX ADMIN — HYDROMORPHONE HYDROCHLORIDE 0.5 MG: 1 INJECTION, SOLUTION INTRAMUSCULAR; INTRAVENOUS; SUBCUTANEOUS at 14:50

## 2024-09-17 NOTE — H&P
[facility]   HISTORY AND PHYSICAL      Name:  Lindsay Amezquita   Age:  39 y.o.  Sex:  female  :  1985  MRN:  [id number]   Visit Number:  [id number]  Admission Date:  2024  Date Of Service:  24  Primary Care Physician:  Hunter Winkler MD    Chief Complaint:     Abdominal pain, vomiting    History Of Presenting Illness:      Patient is a 39 years old female with a past medical history of anxiety/depression, PTSD, bipolar disorder, COPD, diabetes mellitus, GERD, history of DVT and PE and obesity who presented to the ER with a chief complaint of abdominal pain.  Patient reports epigastric abdominal pain that radiates down to her lower abdomen, reporting also severe nausea and vomiting with symptoms started 1 day prior to arrival in the morning.  She reports that she has not been able to keep anything down even liquids due to severe nausea and vomiting.  She denies diarrhea.  Denies fever or chills or urinary symptoms.  Patient with no chest pain, shortness of breath or cough.  Patient was evaluated in the ER day before for chest pain on 9/15.    On ER evaluation, her blood pressure was slightly elevated otherwise hemodynamically stable, afebrile, on room air.  Her workup was significant for WBC of 16.6, lipase 1593, ALT 40, lactate 2.4.  Urinalysis negative for infection.  UDS positive for barbiturates, benzodiazepine, opioids and tricyclic antidepressants. CT abdomen pelvis showed 1. Severe acute pancreatitis. 2. Moderate free fluid. No walled-off pseudocyst. 3. Apparent interval increase in the size of a 2.5 cm left adrenal nodule. This increase in size may be related to the immediately adjacent inflammatory process. An adenoma is still favored, but malignancy is not excluded and consideration should be  given to further evaluation with MRI. 4. Severe secondary edema of the duodenal. 5. There is fatty infiltration of the liver. There is mild irregularity of the liver contour  suggesting early cirrhosis.      Patient was recommended admission however she left the ER AGAINST MEDICAL ADVICE prior to admission.  She reports that she was getting scared and went home, she started throwing up which made her come to the ER back to be admitted.    Patient received fentanyl, LR liter bolus, Zofran and Phenergan in the ER.  Hospitalist consulted for admission.    Review Of Systems:    All systems were reviewed and negative except as mentioned in history of presenting illness, assessment and plan.    Past Medical History: Patient  has a past medical history of Allergic, Anxiety, Asthma (Beings of copd with asthma), Back pain, Bell palsy (07/06/2021), Bell's palsy, Bipolar 2 disorder, Blood clot in vein, COPD (chronic obstructive pulmonary disease) (Six months ago), Deep vein thrombosis (Last year), Depression, Diabetes mellitus (November), Frequent headaches, GERD (gastroesophageal reflux disease), Hypertension, Irritable bowel syndrome (Two years ago), Kidney stone (Two years ago), Migraine, Nausea, vomiting and diarrhea (09/17/2018), Obesity (All of my life), Ovarian cyst (Two years ago), Panic, PTSD (post-traumatic stress disorder), Pulmonary embolism (Not in lungs), Recurrent pregnancy loss, antepartum condition or complication (Every since i have been 14 yars old), Restless leg syndrome, Sinus problem, Snores, Tattoos, Urinary tract infection (Have them on and off), Varicella (Little), Visual impairment (Since i was little), Vitamin B12 deficiency, and Wears glasses.    Past Surgical History: Patient  has a past surgical history that includes Cholecystectomy; [place] tooth extraction (All my teeth); Multiple tooth extractions; and Hysteroscopy (N/A, 3/14/2024).    Social History: Patient  reports that she has been smoking cigarettes. She started smoking about 32 years ago. She has a 63.5 pack-year smoking history. She has been exposed to tobacco smoke. She has never used smokeless tobacco. She  reports that she does not currently use alcohol. She reports that she does not currently use drugs.    Family History:  Patient's family history has been reviewed and found to be noncontributory.     Allergies:      Aspirin, Codeine, Cyclobenzaprine, Haldol [haloperidol], Imitrex [sumatriptan], Latex, Penicillins, Zofran [ondansetron], Lamictal [lamotrigine], Metoclopramide, Morphine, Oxycodone-acetaminophen, Oxycontin [oxycodone], Prochlorperazine, and Tramadol    Home Medications:    Prior to Admission Medications       Prescriptions Last Dose Informant Patient Reported? Taking?    acetaminophen (TYLENOL) 500 MG tablet   No No    Take 2 tablets by mouth Every 8 (Eight) Hours As Needed for Mild Pain.    albuterol sulfate HFA (Ventolin HFA) 108 (90 Base) MCG/ACT inhaler   No No    Inhale 2 puffs Every 4 (Four) Hours As Needed for Wheezing.    amitriptyline (ELAVIL) 150 MG tablet   No No    Take 1 tablet by mouth Every Night.    atorvastatin (LIPITOR) 40 MG tablet   No No    Take 1 tablet by mouth every night at bedtime.    azelastine (ASTELIN) 0.1 % nasal spray   No No    2 sprays into the nostril(s) as directed by provider 2 (Two) Times a Day. Use in each nostril as directed    buPROPion XL (Wellbutrin XL) 150 MG 24 hr tablet   No No    Take 1 tablet by mouth Every Morning.    butalbital-acetaminophen-caffeine (FIORICET, ESGIC) -40 MG per tablet   No No    Take 1 tablet by mouth 2 (Two) Times a Day As Needed for Headache.    Cariprazine HCl (Vraylar) 4.5 MG capsule capsule   No No    Take 1 capsule by mouth Daily.    cefdinir (OMNICEF) 300 MG capsule   No No    Take 1 capsule by mouth 2 (Two) Times a Day for 7 days.    Chlorhexidine Gluconate 4 % solution   Yes No    APPLY TOPICALLY TO THE APPROPRIATE AREA AS DIRECTED DAILY AS NEEDED FOR WOUND CARE.    desvenlafaxine (PRISTIQ) 100 MG 24 hr tablet   No No    Take 1 tablet by mouth Daily.    diazePAM (VALIUM) 10 MG tablet   No No    Take 1 tablet by mouth 3  (Three) Times a Day As Needed for Anxiety.    doxycycline (VIBRAMYCIN) 100 MG capsule   No No    Take 1 capsule by mouth 2 (Two) Times a Day for 7 days.    empagliflozin (Jardiance) 10 MG tablet tablet   No No    Take 1 tablet by mouth Daily.    fexofenadine-pseudoephedrine (GNP Fexofenadine/PSE ER)  MG per 12 hr tablet   No No    Take 1 tablet by mouth 2 (Two) Times a Day.    fluconazole (DIFLUCAN) 150 MG tablet   No No    Take 1 tablet by mouth Every 72 (Seventy-Two) Hours for 3 doses.    fluticasone (FLONASE) 50 MCG/ACT nasal spray   No No    2 sprays into the nostril(s) as directed by provider Daily for 14 days.    FREESTYLE LITE test strip   Yes No    See Admin Instructions.    gabapentin (Neurontin) 800 MG tablet   No No    Take 1 tablet by mouth 3 (Three) Times a Day.    guaiFENesin-dextromethorphan (ROBITUSSIN DM) 100-10 MG/5ML syrup   No No    Take 5 mL by mouth 3 (Three) Times a Day As Needed for Cough.    HYDROcodone-acetaminophen (NORCO) 5-325 MG per tablet   No No    Take 1 tablet by mouth Every 8 (Eight) Hours As Needed for Severe Pain (pain) for up to 12 doses.    ibuprofen (ADVIL,MOTRIN) 800 MG tablet   No No    Take 1 tablet by mouth Every 8 (Eight) Hours As Needed for Mild Pain.    ketorolac (TORADOL) 10 MG tablet   No No    Take 1 tablet by mouth Every 6 (Six) Hours As Needed for Severe Pain.    Lurasidone HCl (Latuda) 120 MG tablet tablet   No No    Take 1 tablet by mouth Daily.    montelukast (SINGULAIR) 10 MG tablet   No No    Take 1 tablet by mouth Every Night.    omeprazole (priLOSEC) 20 MG capsule   No No    Take 1 capsule by mouth Daily.    OXcarbazepine (TRILEPTAL) 300 MG tablet   No No    Take 1 tablet by mouth 2 (Two) Times a Day.    polyethylene glycol (MIRALAX) 17 g packet   No No    Take 17 g by mouth Daily.    promethazine (PHENERGAN) 25 MG tablet   No No    TAKE 1 TABLET BY MOUTH EVERY 6 HOURS AS NEEDED FOR NAUSEA OR VOMITING.    Patient not taking:  Reported on 9/7/2024     "promethazine-dextromethorphan (PROMETHAZINE-DM) 6.25-15 MG/5ML syrup   No No    Take 5 mL by mouth 4 (Four) Times a Day As Needed for Cough (Nausea).    Rimegepant Sulfate (Nurtec) 75 MG tablet dispersible tablet   No No    Take 1 tablet by mouth Every Other Day. Take Monday,Wednesday,Friday, and Saturday.    tiZANidine (ZANAFLEX) 4 MG tablet   No No    Take 1 tablet by mouth Every 8 (Eight) Hours As Needed for Muscle Spasms.    Zavegepant HCl 10 MG/ACT solution   No No    10 mg into the nostril(s) as directed by provider Daily As Needed (migraine). (1 spray into 1 nostril every 24 hrs prn)          ED Medications:    Medications   sodium chloride 0.9 % flush 10 mL (has no administration in time range)   ondansetron (ZOFRAN) injection 4 mg (has no administration in time range)   fentaNYL citrate (PF) (SUBLIMAZE) injection 50 mcg (has no administration in time range)   lactated ringers bolus 1,000 mL (has no administration in time range)     Vital Signs:  Temp:  [99 °F (37.2 °C)-99.8 °F (37.7 °C)] 99 °F (37.2 °C)  Heart Rate:  [] 105  Resp:  [16-20] 20  BP: (118-182)/() 161/99        09/16/24  2232   Weight: 115 kg (253 lb 8.5 oz)     Body mass index is 46.66 kg/m².    Physical Exam:     Most recent vital Signs: /99 (BP Location: Left arm, Patient Position: Sitting)   Pulse 105   Temp 99 °F (37.2 °C) (Oral)   Resp 20   Ht 157 cm (61.81\")   Wt 115 kg (253 lb 8.5 oz)   LMP  (LMP Unknown)   SpO2 95%   BMI 46.66 kg/m²     Physical Exam  Vitals and nursing note reviewed.   Constitutional:       General: She is not in acute distress.     Appearance: She is obese. She is ill-appearing.   HENT:      Head: Normocephalic and atraumatic.      Nose: Nose normal.      Mouth/Throat:      Mouth: Mucous membranes are dry.   Eyes:      Extraocular Movements: Extraocular movements intact.      Conjunctiva/sclera: Conjunctivae normal.      Pupils: Pupils are equal, round, and reactive to light. "   Cardiovascular:      Rate and Rhythm: Normal rate and regular rhythm.      Pulses: Normal pulses.      Heart sounds: Normal heart sounds.   Pulmonary:      Effort: Pulmonary effort is normal. No respiratory distress.      Breath sounds: Normal breath sounds. No wheezing or rhonchi.   Abdominal:      General: Bowel sounds are normal. There is no distension.      Palpations: Abdomen is soft.      Tenderness: There is abdominal tenderness in the epigastric area.   Musculoskeletal:         General: Normal range of motion.      Cervical back: Normal range of motion and neck supple.      Right lower leg: No edema.      Left lower leg: No edema.   Skin:     General: Skin is warm and dry.      Findings: No rash.   Neurological:      General: No focal deficit present.      Mental Status: She is alert and oriented to person, place, and time. Mental status is at baseline.      Motor: No weakness.   Psychiatric:         Mood and Affect: Mood normal.         Behavior: Behavior normal.         Thought Content: Thought content normal.         Laboratory data:    I have reviewed the labs done in the emergency room.    Results from last 7 days   Lab Units 09/16/24  1710 09/15/24  1131 09/14/24  0314   SODIUM mmol/L 134* 134* 136   POTASSIUM mmol/L 4.2 3.7 3.6   CHLORIDE mmol/L 98 99 103   CO2 mmol/L 15.0* 20.5* 19.2*   BUN mg/dL 8 8 5*   CREATININE mg/dL 0.58 0.66 0.67   CALCIUM mg/dL 8.9 8.5* 8.2*   BILIRUBIN mg/dL 0.6 0.2 <0.2   ALK PHOS U/L 66 63 60   ALT (SGPT) U/L 40* 41* 35*   AST (SGOT) U/L 20 32 19   GLUCOSE mg/dL 147* 307* 214*     Results from last 7 days   Lab Units 09/16/24  1710 09/15/24  1131 09/14/24  0314   WBC 10*3/mm3 16.62* 8.78 10.24   HEMOGLOBIN g/dL 16.1* 13.6 13.5   HEMATOCRIT % 48.4* 40.5 40.6   PLATELETS 10*3/mm3 230 250 288         Results from last 7 days   Lab Units 09/15/24  1131 09/14/24  0523 09/14/24  0314   HSTROP T ng/L 7 <6 <6     Results from last 7 days   Lab Units 09/14/24  0314   PROBNP pg/mL  <36.0         Results from last 7 days   Lab Units 09/16/24  1710   LIPASE U/L 1,593*         Results from last 7 days   Lab Units 09/16/24  1550   COLOR UA  Yellow   GLUCOSE UA  >=1000 mg/dL (3+)*   KETONES UA  40 mg/dL (2+)*   BLOOD UA  Negative   LEUKOCYTES UA  Negative   PH, URINE  5.5   BILIRUBIN UA  Negative   UROBILINOGEN UA  0.2 E.U./dL   RBC UA /HPF None Seen   WBC UA /HPF None Seen       Pain Management Panel  More data exists         Latest Ref Rng & Units 8/12/2024 4/2/2024   Pain Management Panel   Amphetamine, Urine Qual Negative Negative  Negative    Barbiturates Screen, Urine Negative Positive  Negative    Benzodiazepine Screen, Urine Negative Positive  Positive    Buprenorphine, Screen, Urine Negative Negative  Negative    Cocaine Screen, Urine Negative Negative  Negative    Fentanyl, Urine Negative Negative  Negative    Methadone Screen , Urine Negative Negative  Negative    Methamphetamine, Ur Negative Negative  Negative       Details                   Radiology:    CT Abdomen Pelvis With Contrast    Result Date: 9/16/2024  FINAL REPORT TECHNIQUE: null CLINICAL HISTORY: epigastric pain COMPARISON: null FINDINGS: CT abdomen and pelvis with contrast Comparison: CT/SR - CT ABDOMEN PELVIS W CONTRAST - 07/31/2024 09:07 PM EDT CT/SR - CT ABDOMEN PELVIS WO CONTRAST - 02/10/2023 01:04 AM EST Findings: The lung bases are clear. The liver is enlarged and hypodense. Mild nodularity of the liver contour. Severe edema of the pancreas and pancreatic mesentery. Moderate free fluid within the lesser sac. No walled-off pseudocyst. 2.5 cm left adrenal nodule with apparent interval increase in size. Unremarkable right adrenal gland. Normal spleen and bilateral kidneys. Prior cholecystectomy. No bowel obstruction, pneumoperitoneum, or pneumatosis. Pelvic contents unremarkable. Normal appendix. The bones are intact.     IMPRESSION: 1. Severe acute pancreatitis. 2. Moderate free fluid. No walled-off pseudocyst. 3.  Apparent interval increase in the size of a 2.5 cm left adrenal nodule. This increase in size may be related to the immediately adjacent inflammatory process. An adenoma is still favored, but malignancy is not excluded and consideration should be given to further evaluation with MRI. 4. Severe secondary edema of the duodenal. 5. There is fatty infiltration of the liver. There is mild irregularity of the liver contour suggesting early cirrhosis. Authenticated and Electronically Signed by Patito Quach MD on 09/16/2024 05:32:00 PM    XR Chest 1 View    Result Date: 9/15/2024  PROCEDURE: XR CHEST 1 VW-  HISTORY: cough, concern for pneumonia  COMPARISON: September 14, 2014.  FINDINGS: The heart is normal in size. The mediastinum is unremarkable. The lungs are clear. There is no pneumothorax.  There are no acute osseous abnormalities.      No significant change from prior.  Continued followup is recommended.    This report was signed and finalized on 9/15/2024 12:47 PM by Javad Rader DO.      XR Chest 1 View    Result Date: 9/14/2024  PROCEDURE: XR CHEST 1 VW-  HISTORY: Chest pain  COMPARISON: August 12, 2024.  FINDINGS: The heart is normal in size. The mediastinum is unremarkable. Basilar atelectasis, the lungs are otherwise clear. There is no pneumothorax.  There are no acute osseous abnormalities.      No acute cardiopulmonary process.  Continued followup is recommended.    This report was signed and finalized on 9/14/2024 7:48 AM by Javad Rader DO.      CT Angiogram Chest Pulmonary Embolism    Result Date: 9/14/2024  FINAL REPORT TECHNIQUE: null CLINICAL HISTORY: LT sided cp, soa; recent diagnosis of bronchitis COMPARISON: null FINDINGS: CT angiography chest with contrast. 3D Postprocessing. Comparison: CT/SR - CT ANGIOGRAM CHEST PULMONARY EMBOLISM - 06/09/2023 10:10 PM EDT Findings: Vascular: No pulmonary embolism. No thoracic aortic aneurysm. Mediastinum: No cardiomegaly. Lymph nodes: No adenopathy. Lungs: No  focal infiltrates or masses. No edema. Pleura: No pneumothorax or effusion. Upper abdomen: Fatty liver. Stable left adrenal nodule, likely adenoma. Bones: No acute fracture or dislocation.     IMPRESSION: No acute pathology. No pulmonary emboli. No focal infiltrates or masses. No edema. Stable left adrenal nodule, likely adenoma. Authenticated and Electronically Signed by Florencia Laguna MD on 09/14/2024 05:18:36 AM     Assessment:    Acute pancreatitis, POA  Incidental left adrenal nodule  Anxiety/depression  PTSD  Bipolar disorder  COPD  Diabetes mellitus  GERD  History of PE and DVT  Morbid obesity with BMI of 49    Plan:    Patient is admitted for further management and treatment.    Acute pancreatitis  -Prior cholecystectomy, denies alcohol  -Continue IV fluids  -Pain control  -Will keep patient n.p.o. for now  -Will check lipid panel with a.m. labs  -Lipase and lactic acid recheck in the morning.  -Consider consulting GI if not better or worse.    Bipolar disorder/PTSD/anxiety/depression  -Patient on multiple psych meds at home which we will continue    Left adrenal nodule  - Apparent interval increase in the size of a 2.5 cm left adrenal nodule. This increase in size may be related to the immediately adjacent inflammatory process. An adenoma is still favored, but malignancy is not excluded and consideration should be given to further evaluation with MRI.  -Needs further evaluation/follow-up.    -Continue home meds as warranted.  -Further orders as indicated per clinical course.    -I discussed with the mother Yaniv Doe over the phone about management and treatment plan.  All questions were answered to her satisfaction.    Risk Assessment: High  DVT Prophylaxis: Lovenox prophylaxis (benefit> risk)  Code Status: Full  Diet: N.p.o.    Advance Care Planning   ACP discussion was held with the patient during this visit. Patient does not have an advance directive, information provided.           Gabriela Zhao  MD  09/16/24  22:49 EDT    Dictated utilizing Dragon dictation.

## 2024-09-17 NOTE — PLAN OF CARE
Problem: Adult Inpatient Plan of Care  Goal: Plan of Care Review  Outcome: Ongoing, Progressing  Goal: Patient-Specific Goal (Individualized)  Outcome: Ongoing, Progressing  Goal: Absence of Hospital-Acquired Illness or Injury  Outcome: Ongoing, Progressing  Goal: Optimal Comfort and Wellbeing  Outcome: Ongoing, Progressing  Intervention: Monitor Pain and Promote Comfort  Recent Flowsheet Documentation  Taken 9/17/2024 2286 by Dee Sherwood RN  Pain Management Interventions: see MAR  Goal: Readiness for Transition of Care  Outcome: Ongoing, Progressing   Goal Outcome Evaluation:

## 2024-09-17 NOTE — PAYOR COMM NOTE
"TO:[facility]  FROM:FATOUMATA MCGOWAN, RN PHONE [phone]  CLINICALS REF# [id number]    Leora Amezquita (39 y.o. Female)       Date of Birth   1985    Social Security Number       [address]   [address] [place] [facility] [address]    Home Phone   [phone]    MRN   [id number]       Confucianist   Caodaism    Marital Status   Single                            Admission Date   9/16/24    Admission Type   Emergency    Admitting Provider   Gabriela Zhao MD    Attending Provider   Lily Collins DO    Department, Room/Bed   [facility] TELEMETRY 3, 311/1       Discharge Date       Discharge Disposition       Discharge Destination                                 Attending Provider: Lily Collins DO    Allergies: Aspirin, Codeine, Cyclobenzaprine, Haldol [Haloperidol], Imitrex [Sumatriptan], Latex, Penicillins, Zofran [Ondansetron], Lamictal [Lamotrigine], Metoclopramide, Morphine, Oxycodone-acetaminophen, Oxycontin [Oxycodone], Prochlorperazine, Tramadol    Isolation: None   Infection: None   Code Status: CPR    Ht: 157.5 cm (62\")   Wt: 123 kg (272 lb 0.8 oz)    Admission Cmt: None   Principal Problem: Pancreatitis [K85.90]                   Active Insurance as of 9/16/2024       Primary Coverage       Payor Plan Insurance Group Employer/Plan Group    ANTHEM MEDICAID ANTHEM MEDICAID [id number]       Payor Plan Address Payor Plan Phone Number Payor Plan Fax Number Effective Dates    [address] [address] [phone]  1/1/2022 - None Entered    [facility] [id number]         Subscriber Name Subscriber Birth Date Member ID       LEORA AMEZQUITA 1985 [id number]                     Emergency Contacts        (Rel.) Home Phone Work Phone Mobile Phone    Yaniv Doe (Mother) [phone] -- [phone]                 History & Physical        Gabriela Zhao MD at 09/16/24 9692            [facility] HOSPITALIST   HISTORY AND " PHYSICAL      Name:  Lindsay Amezquita   Age:  39 y.o.  Sex:  female  :  1985  MRN:  [id number]   Visit Number:  [id number]  Admission Date:  2024  Date Of Service:  24  Primary Care Physician:  Hunter Winkler MD    Chief Complaint:     Abdominal pain, vomiting    History Of Presenting Illness:      Patient is a 39 years old female with a past medical history of anxiety/depression, PTSD, bipolar disorder, COPD, diabetes mellitus, GERD, history of DVT and PE and obesity who presented to the ER with a chief complaint of abdominal pain.  Patient reports epigastric abdominal pain that radiates down to her lower abdomen, reporting also severe nausea and vomiting with symptoms started 1 day prior to arrival in the morning.  She reports that she has not been able to keep anything down even liquids due to severe nausea and vomiting.  She denies diarrhea.  Denies fever or chills or urinary symptoms.  Patient with no chest pain, shortness of breath or cough.  Patient was evaluated in the ER day before for chest pain on 9/15.    On ER evaluation, her blood pressure was slightly elevated otherwise hemodynamically stable, afebrile, on room air.  Her workup was significant for WBC of 16.6, lipase 1593, ALT 40, lactate 2.4.  Urinalysis negative for infection.  UDS positive for barbiturates, benzodiazepine, opioids and tricyclic antidepressants. CT abdomen pelvis showed 1. Severe acute pancreatitis. 2. Moderate free fluid. No walled-off pseudocyst. 3. Apparent interval increase in the size of a 2.5 cm left adrenal nodule. This increase in size may be related to the immediately adjacent inflammatory process. An adenoma is still favored, but malignancy is not excluded and consideration should be  given to further evaluation with MRI. 4. Severe secondary edema of the duodenal. 5. There is fatty infiltration of the liver. There is mild irregularity of the liver contour suggesting early cirrhosis.      Patient was  recommended admission however she left the ER AGAINST MEDICAL ADVICE prior to admission.  She reports that she was getting scared and went home, she started throwing up which made her come to the ER back to be admitted.    Patient received fentanyl, LR liter bolus, Zofran and Phenergan in the ER.  Hospitalist consulted for admission.    Review Of Systems:    All systems were reviewed and negative except as mentioned in history of presenting illness, assessment and plan.    Past Medical History: Patient  has a past medical history of Allergic, Anxiety, Asthma (Beings of copd with asthma), Back pain, Bell palsy (07/06/2021), Bell's palsy, Bipolar 2 disorder, Blood clot in vein, COPD (chronic obstructive pulmonary disease) (Six months ago), Deep vein thrombosis (Last year), Depression, Diabetes mellitus (November), Frequent headaches, GERD (gastroesophageal reflux disease), Hypertension, Irritable bowel syndrome (Two years ago), Kidney stone (Two years ago), Migraine, Nausea, vomiting and diarrhea (09/17/2018), Obesity (All of my life), Ovarian cyst (Two years ago), Panic, PTSD (post-traumatic stress disorder), Pulmonary embolism (Not in lungs), Recurrent pregnancy loss, antepartum condition or complication (Every since i have been 14 yars old), Restless leg syndrome, Sinus problem, Snores, Tattoos, Urinary tract infection (Have them on and off), Varicella (Little), Visual impairment (Since i was little), Vitamin B12 deficiency, and Wears glasses.    Past Surgical History: Patient  has a past surgical history that includes Cholecystectomy; Solo tooth extraction (All my teeth); Multiple tooth extractions; and Hysteroscopy (N/A, 3/14/2024).    Social History: Patient  reports that she has been smoking cigarettes. She started smoking about 32 years ago. She has a 63.5 pack-year smoking history. She has been exposed to tobacco smoke. She has never used smokeless tobacco. She reports that she does not currently use  alcohol. She reports that she does not currently use drugs.    Family History:  Patient's family history has been reviewed and found to be noncontributory.     Allergies:      Aspirin, Codeine, Cyclobenzaprine, Haldol [haloperidol], Imitrex [sumatriptan], Latex, Penicillins, Zofran [ondansetron], Lamictal [lamotrigine], Metoclopramide, Morphine, Oxycodone-acetaminophen, Oxycontin [oxycodone], Prochlorperazine, and Tramadol    Home Medications:    Prior to Admission Medications       Prescriptions Last Dose Informant Patient Reported? Taking?    acetaminophen (TYLENOL) 500 MG tablet   No No    Take 2 tablets by mouth Every 8 (Eight) Hours As Needed for Mild Pain.    albuterol sulfate HFA (Ventolin HFA) 108 (90 Base) MCG/ACT inhaler   No No    Inhale 2 puffs Every 4 (Four) Hours As Needed for Wheezing.    amitriptyline (ELAVIL) 150 MG tablet   No No    Take 1 tablet by mouth Every Night.    atorvastatin (LIPITOR) 40 MG tablet   No No    Take 1 tablet by mouth every night at bedtime.    azelastine (ASTELIN) 0.1 % nasal spray   No No    2 sprays into the nostril(s) as directed by provider 2 (Two) Times a Day. Use in each nostril as directed    buPROPion XL (Wellbutrin XL) 150 MG 24 hr tablet   No No    Take 1 tablet by mouth Every Morning.    butalbital-acetaminophen-caffeine (FIORICET, ESGIC) -40 MG per tablet   No No    Take 1 tablet by mouth 2 (Two) Times a Day As Needed for Headache.    Cariprazine HCl (Vraylar) 4.5 MG capsule capsule   No No    Take 1 capsule by mouth Daily.    cefdinir (OMNICEF) 300 MG capsule   No No    Take 1 capsule by mouth 2 (Two) Times a Day for 7 days.    Chlorhexidine Gluconate 4 % solution   Yes No    APPLY TOPICALLY TO THE APPROPRIATE AREA AS DIRECTED DAILY AS NEEDED FOR WOUND CARE.    desvenlafaxine (PRISTIQ) 100 MG 24 hr tablet   No No    Take 1 tablet by mouth Daily.    diazePAM (VALIUM) 10 MG tablet   No No    Take 1 tablet by mouth 3 (Three) Times a Day As Needed for Anxiety.     doxycycline (VIBRAMYCIN) 100 MG capsule   No No    Take 1 capsule by mouth 2 (Two) Times a Day for 7 days.    empagliflozin (Jardiance) 10 MG tablet tablet   No No    Take 1 tablet by mouth Daily.    fexofenadine-pseudoephedrine (GNP Fexofenadine/PSE ER)  MG per 12 hr tablet   No No    Take 1 tablet by mouth 2 (Two) Times a Day.    fluconazole (DIFLUCAN) 150 MG tablet   No No    Take 1 tablet by mouth Every 72 (Seventy-Two) Hours for 3 doses.    fluticasone (FLONASE) 50 MCG/ACT nasal spray   No No    2 sprays into the nostril(s) as directed by provider Daily for 14 days.    FREESTYLE LITE test strip   Yes No    See Admin Instructions.    gabapentin (Neurontin) 800 MG tablet   No No    Take 1 tablet by mouth 3 (Three) Times a Day.    guaiFENesin-dextromethorphan (ROBITUSSIN DM) 100-10 MG/5ML syrup   No No    Take 5 mL by mouth 3 (Three) Times a Day As Needed for Cough.    HYDROcodone-acetaminophen (NORCO) 5-325 MG per tablet   No No    Take 1 tablet by mouth Every 8 (Eight) Hours As Needed for Severe Pain (pain) for up to 12 doses.    ibuprofen (ADVIL,MOTRIN) 800 MG tablet   No No    Take 1 tablet by mouth Every 8 (Eight) Hours As Needed for Mild Pain.    ketorolac (TORADOL) 10 MG tablet   No No    Take 1 tablet by mouth Every 6 (Six) Hours As Needed for Severe Pain.    Lurasidone HCl (Latuda) 120 MG tablet tablet   No No    Take 1 tablet by mouth Daily.    montelukast (SINGULAIR) 10 MG tablet   No No    Take 1 tablet by mouth Every Night.    omeprazole (priLOSEC) 20 MG capsule   No No    Take 1 capsule by mouth Daily.    OXcarbazepine (TRILEPTAL) 300 MG tablet   No No    Take 1 tablet by mouth 2 (Two) Times a Day.    polyethylene glycol (MIRALAX) 17 g packet   No No    Take 17 g by mouth Daily.    promethazine (PHENERGAN) 25 MG tablet   No No    TAKE 1 TABLET BY MOUTH EVERY 6 HOURS AS NEEDED FOR NAUSEA OR VOMITING.    Patient not taking:  Reported on 9/7/2024    promethazine-dextromethorphan  "(PROMETHAZINE-DM) 6.25-15 MG/5ML syrup   No No    Take 5 mL by mouth 4 (Four) Times a Day As Needed for Cough (Nausea).    Rimegepant Sulfate (Nurtec) 75 MG tablet dispersible tablet   No No    Take 1 tablet by mouth Every Other Day. Take Monday,Wednesday,Friday, and Saturday.    tiZANidine (ZANAFLEX) 4 MG tablet   No No    Take 1 tablet by mouth Every 8 (Eight) Hours As Needed for Muscle Spasms.    Zavegepant HCl 10 MG/ACT solution   No No    10 mg into the nostril(s) as directed by provider Daily As Needed (migraine). (1 spray into 1 nostril every 24 hrs prn)          ED Medications:    Medications   sodium chloride 0.9 % flush 10 mL (has no administration in time range)   ondansetron (ZOFRAN) injection 4 mg (has no administration in time range)   fentaNYL citrate (PF) (SUBLIMAZE) injection 50 mcg (has no administration in time range)   lactated ringers bolus 1,000 mL (has no administration in time range)     Vital Signs:  Temp:  [99 °F (37.2 °C)-99.8 °F (37.7 °C)] 99 °F (37.2 °C)  Heart Rate:  [] 105  Resp:  [16-20] 20  BP: (118-182)/() 161/99        09/16/24  2232   Weight: 115 kg (253 lb 8.5 oz)     Body mass index is 46.66 kg/m².    Physical Exam:     Most recent vital Signs: /99 (BP Location: Left arm, Patient Position: Sitting)   Pulse 105   Temp 99 °F (37.2 °C) (Oral)   Resp 20   Ht 157 cm (61.81\")   Wt 115 kg (253 lb 8.5 oz)   LMP  (LMP Unknown)   SpO2 95%   BMI 46.66 kg/m²     Physical Exam  Vitals and nursing note reviewed.   Constitutional:       General: She is not in acute distress.     Appearance: She is obese. She is ill-appearing.   HENT:      Head: Normocephalic and atraumatic.      Nose: Nose normal.      Mouth/Throat:      Mouth: Mucous membranes are dry.   Eyes:      Extraocular Movements: Extraocular movements intact.      Conjunctiva/sclera: Conjunctivae normal.      Pupils: Pupils are equal, round, and reactive to light.   Cardiovascular:      Rate and Rhythm: " Normal rate and regular rhythm.      Pulses: Normal pulses.      Heart sounds: Normal heart sounds.   Pulmonary:      Effort: Pulmonary effort is normal. No respiratory distress.      Breath sounds: Normal breath sounds. No wheezing or rhonchi.   Abdominal:      General: Bowel sounds are normal. There is no distension.      Palpations: Abdomen is soft.      Tenderness: There is abdominal tenderness in the epigastric area.   Musculoskeletal:         General: Normal range of motion.      Cervical back: Normal range of motion and neck supple.      Right lower leg: No edema.      Left lower leg: No edema.   Skin:     General: Skin is warm and dry.      Findings: No rash.   Neurological:      General: No focal deficit present.      Mental Status: She is alert and oriented to person, place, and time. Mental status is at baseline.      Motor: No weakness.   Psychiatric:         Mood and Affect: Mood normal.         Behavior: Behavior normal.         Thought Content: Thought content normal.         Laboratory data:    I have reviewed the labs done in the emergency room.    Results from last 7 days   Lab Units 09/16/24  1710 09/15/24  1131 09/14/24  0314   SODIUM mmol/L 134* 134* 136   POTASSIUM mmol/L 4.2 3.7 3.6   CHLORIDE mmol/L 98 99 103   CO2 mmol/L 15.0* 20.5* 19.2*   BUN mg/dL 8 8 5*   CREATININE mg/dL 0.58 0.66 0.67   CALCIUM mg/dL 8.9 8.5* 8.2*   BILIRUBIN mg/dL 0.6 0.2 <0.2   ALK PHOS U/L 66 63 60   ALT (SGPT) U/L 40* 41* 35*   AST (SGOT) U/L 20 32 19   GLUCOSE mg/dL 147* 307* 214*     Results from last 7 days   Lab Units 09/16/24  1710 09/15/24  1131 09/14/24  0314   WBC 10*3/mm3 16.62* 8.78 10.24   HEMOGLOBIN g/dL 16.1* 13.6 13.5   HEMATOCRIT % 48.4* 40.5 40.6   PLATELETS 10*3/mm3 230 250 288         Results from last 7 days   Lab Units 09/15/24  1131 09/14/24  0523 09/14/24  0314   HSTROP T ng/L 7 <6 <6     Results from last 7 days   Lab Units 09/14/24  0314   PROBNP pg/mL <36.0         Results from last 7 days    Lab Units 09/16/24  1710   LIPASE U/L 1,593*         Results from last 7 days   Lab Units 09/16/24  1550   COLOR UA  Yellow   GLUCOSE UA  >=1000 mg/dL (3+)*   KETONES UA  40 mg/dL (2+)*   BLOOD UA  Negative   LEUKOCYTES UA  Negative   PH, URINE  5.5   BILIRUBIN UA  Negative   UROBILINOGEN UA  0.2 E.U./dL   RBC UA /HPF None Seen   WBC UA /HPF None Seen       Pain Management Panel  More data exists         Latest Ref Rng & Units 8/12/2024 4/2/2024   Pain Management Panel   Amphetamine, Urine Qual Negative Negative  Negative    Barbiturates Screen, Urine Negative Positive  Negative    Benzodiazepine Screen, Urine Negative Positive  Positive    Buprenorphine, Screen, Urine Negative Negative  Negative    Cocaine Screen, Urine Negative Negative  Negative    Fentanyl, Urine Negative Negative  Negative    Methadone Screen , Urine Negative Negative  Negative    Methamphetamine, Ur Negative Negative  Negative       Details                   Radiology:    CT Abdomen Pelvis With Contrast    Result Date: 9/16/2024  FINAL REPORT TECHNIQUE: null CLINICAL HISTORY: epigastric pain COMPARISON: null FINDINGS: CT abdomen and pelvis with contrast Comparison: CT/SR - CT ABDOMEN PELVIS W CONTRAST - 07/31/2024 09:07 PM EDT CT/SR - CT ABDOMEN PELVIS WO CONTRAST - 02/10/2023 01:04 AM EST Findings: The lung bases are clear. The liver is enlarged and hypodense. Mild nodularity of the liver contour. Severe edema of the pancreas and pancreatic mesentery. Moderate free fluid within the lesser sac. No walled-off pseudocyst. 2.5 cm left adrenal nodule with apparent interval increase in size. Unremarkable right adrenal gland. Normal spleen and bilateral kidneys. Prior cholecystectomy. No bowel obstruction, pneumoperitoneum, or pneumatosis. Pelvic contents unremarkable. Normal appendix. The bones are intact.     IMPRESSION: 1. Severe acute pancreatitis. 2. Moderate free fluid. No walled-off pseudocyst. 3. Apparent interval increase in the size of a  2.5 cm left adrenal nodule. This increase in size may be related to the immediately adjacent inflammatory process. An adenoma is still favored, but malignancy is not excluded and consideration should be given to further evaluation with MRI. 4. Severe secondary edema of the duodenal. 5. There is fatty infiltration of the liver. There is mild irregularity of the liver contour suggesting early cirrhosis. Authenticated and Electronically Signed by Patito Quach MD on 09/16/2024 05:32:00 PM    XR Chest 1 View    Result Date: 9/15/2024  PROCEDURE: XR CHEST 1 VW-  HISTORY: cough, concern for pneumonia  COMPARISON: September 14, 2014.  FINDINGS: The heart is normal in size. The mediastinum is unremarkable. The lungs are clear. There is no pneumothorax.  There are no acute osseous abnormalities.      No significant change from prior.  Continued followup is recommended.    This report was signed and finalized on 9/15/2024 12:47 PM by Javad Rader DO.      XR Chest 1 View    Result Date: 9/14/2024  PROCEDURE: XR CHEST 1 VW-  HISTORY: Chest pain  COMPARISON: August 12, 2024.  FINDINGS: The heart is normal in size. The mediastinum is unremarkable. Basilar atelectasis, the lungs are otherwise clear. There is no pneumothorax.  There are no acute osseous abnormalities.      No acute cardiopulmonary process.  Continued followup is recommended.    This report was signed and finalized on 9/14/2024 7:48 AM by Javad Rader DO.      CT Angiogram Chest Pulmonary Embolism    Result Date: 9/14/2024  FINAL REPORT TECHNIQUE: null CLINICAL HISTORY: LT sided cp, soa; recent diagnosis of bronchitis COMPARISON: null FINDINGS: CT angiography chest with contrast. 3D Postprocessing. Comparison: CT/SR - CT ANGIOGRAM CHEST PULMONARY EMBOLISM - 06/09/2023 10:10 PM EDT Findings: Vascular: No pulmonary embolism. No thoracic aortic aneurysm. Mediastinum: No cardiomegaly. Lymph nodes: No adenopathy. Lungs: No focal infiltrates or masses. No edema.  Pleura: No pneumothorax or effusion. Upper abdomen: Fatty liver. Stable left adrenal nodule, likely adenoma. Bones: No acute fracture or dislocation.     IMPRESSION: No acute pathology. No pulmonary emboli. No focal infiltrates or masses. No edema. Stable left adrenal nodule, likely adenoma. Authenticated and Electronically Signed by Florencia Laguna MD on 09/14/2024 05:18:36 AM     Assessment:    Acute pancreatitis, POA  Incidental left adrenal nodule  Anxiety/depression  PTSD  Bipolar disorder  COPD  Diabetes mellitus  GERD  History of PE and DVT  Morbid obesity with BMI of 49    Plan:    Patient is admitted for further management and treatment.    Acute pancreatitis  -Prior cholecystectomy, denies alcohol  -Continue IV fluids  -Pain control  -Will keep patient n.p.o. for now  -Will check lipid panel with a.m. labs  -Lipase and lactic acid recheck in the morning.  -Consider consulting GI if not better or worse.    Bipolar disorder/PTSD/anxiety/depression  -Patient on multiple psych meds at home which we will continue    Left adrenal nodule  - Apparent interval increase in the size of a 2.5 cm left adrenal nodule. This increase in size may be related to the immediately adjacent inflammatory process. An adenoma is still favored, but malignancy is not excluded and consideration should be given to further evaluation with MRI.  -Needs further evaluation/follow-up.    -Continue home meds as warranted.  -Further orders as indicated per clinical course.    -I discussed with the mother Yaniv Doe over the phone about management and treatment plan.  All questions were answered to her satisfaction.    Risk Assessment: High  DVT Prophylaxis: Lovenox prophylaxis (benefit> risk)  Code Status: Full  Diet: N.p.o.    Advance Care Planning  ACP discussion was held with the patient during this visit. Patient does not have an advance directive, information provided.           Gabriela Zhao MD  09/16/24  22:49 EDT    Dictated  "utilizing Dragon dictation.    Electronically signed by Gabriela Zhao MD at 24 0346          Emergency Department Notes        Matt Dang RN at 24          Called report to binh on  at this time. No other questions after report given.    Electronically signed by Matt Dang RN at 24       Cristian Frazier PA-C at 24       Attestation signed by Byron Azevedo MD at 24        SHARED APC FACE TO FACE: I performed a substantive part of the MDM during the patient's E/M visit. I personally evaluated and examined the patient. I personally made or approved the documented management plan and acknowledge its risk of complications.   Byron Azevedo MD 2024 02:21 EDT                          EMERGENCY DEPARTMENT ENCOUNTER    Pt Name: Lindsay Amezquita  MRN: [id number]  Pt :   1985  Room Number:  02SF/02  Date of encounter:  2024  PCP: Hunter Winkler MD  ED Provider: Cristian Frazier PA-C    Historian: Patient      HPI:  Chief Complaint   Patient presents with    Vomiting    Abdominal Pain          Context: Lindsay Amezquita is a 39 y.o. female who presents to the ED c/o nausea vomiting and epigastric abdominal pain.  Patient states at 0 530 started with the symptoms.  No history of pancreatitis.  Has had a cholecystectomy.  No chest pain or shortness of breath.  Here earlier diagnosed with pancreatitis but left A because \"I did not have anybody to stay with me.\"  She now returns because she went home after leaving AMA and vomited and recalls the nurse telling her she could \"die from this.\"  She now returns agreeable to be admitted.      PAST MEDICAL HISTORY  Past Medical History:   Diagnosis Date    Allergic     Anxiety     Asthma Beings of copd with asthma    Back pain     Bell palsy 2021    Bell's palsy     Bipolar 2 disorder     Blood clot in vein     Behind left knee cap    COPD (chronic obstructive pulmonary " disease) Six months ago    Deep vein thrombosis Last year    Depression     Diabetes mellitus November    Pre diabete    Frequent headaches     GERD (gastroesophageal reflux disease)     Hypertension     Every time i go into the emergacy room    Irritable bowel syndrome Two years ago    Kidney stone Two years ago    Migraine     Nausea, vomiting and diarrhea 09/17/2018    Obesity All of my life    Ovarian cyst Two years ago    Panic     PTSD (post-traumatic stress disorder)     Pulmonary embolism Not in lungs    Recurrent pregnancy loss, antepartum condition or complication Every since i have been 14 yars old    Restless leg syndrome     Sinus problem     Snores     Tattoos     Urinary tract infection Have them on and off    Varicella Little    Visual impairment Since i was little    Vitamin B12 deficiency     Wears glasses          PAST SURGICAL HISTORY  Past Surgical History:   Procedure Laterality Date    CHOLECYSTECTOMY      HYSTEROSCOPY N/A 3/14/2024    Procedure: HYSTEROSCOPY WITH MYOSURE, DILATION AND CURETTAGE WITH CERENE ABLATION;  Surgeon: David Ribeiro MD;  Location: [facility];  Service: Obstetrics/Gynecology;  Laterality: N/A;    MULTIPLE TOOTH EXTRACTIONS      All my teeth    WISDOM TOOTH EXTRACTION  All my teeth         FAMILY HISTORY  Family History   Problem Relation Age of Onset    Irritable bowel syndrome Mother     Heart disease Mother     Miscarriages / Stillbirths Mother     Arthritis Mother     COPD Mother     Learning disabilities Mother     Mental illness Mother     Asthma Mother     Irritable bowel syndrome Father     Alcohol abuse Father     Diabetes Father     Hyperlipidemia Father     Anxiety disorder Father     Learning disabilities Father     Colon cancer Maternal Grandfather     Stroke Paternal Grandfather     Stroke Paternal Grandmother          SOCIAL HISTORY  Social History     Socioeconomic History    Marital status: Single   Tobacco Use    Smoking status: Every Day      Current packs/day: 0.50     Average packs/day: 2.0 packs/day for 32.3 years (63.5 ttl pk-yrs)     Types: Cigarettes     Start date: 7/17/1992     Passive exposure: Current    Smokeless tobacco: Never    Tobacco comments:      Around 2.5 packs per day- 7/5/24   Vaping Use    Vaping status: Former    Passive vaping exposure: Yes   Substance and Sexual Activity    Alcohol use: Not Currently     Comment: rarely    Drug use: Not Currently    Sexual activity: Not Currently     Partners: Female     Birth control/protection: Other, Partner of same sex         ALLERGIES  Aspirin, Codeine, Cyclobenzaprine, Haldol [haloperidol], Imitrex [sumatriptan], Latex, Penicillins, Zofran [ondansetron], Lamictal [lamotrigine], Metoclopramide, Morphine, Oxycodone-acetaminophen, Oxycontin [oxycodone], Prochlorperazine, and Tramadol        REVIEW OF SYSTEMS  Review of Systems   Constitutional: Negative.    HENT: Negative.     Eyes: Negative.    Respiratory: Negative.     Cardiovascular: Negative.    Gastrointestinal: Negative.  Positive for abdominal pain, nausea and vomiting.   Genitourinary: Negative.    Musculoskeletal: Negative.    Skin: Negative.    Neurological: Negative.    Psychiatric/Behavioral: Negative.          All systems reviewed and negative except for those discussed in HPI.       PHYSICAL EXAM    I have reviewed the triage vital signs and nursing notes.    ED Triage Vitals [09/16/24 2231]   Temp Heart Rate Resp BP SpO2   99 °F (37.2 °C) 105 20 161/99 95 %      Temp src Heart Rate Source Patient Position BP Location FiO2 (%)   Oral Monitor Sitting Left arm --       Physical Exam  Vitals and nursing note reviewed.   Constitutional:       General: She is not in acute distress.     Appearance: She is well-developed. She is obese. She is not ill-appearing, toxic-appearing or diaphoretic.   HENT:      Head: Normocephalic and atraumatic.   Eyes:      Extraocular Movements: Extraocular movements intact.   Cardiovascular:      Rate  and Rhythm: Normal rate.   Pulmonary:      Effort: Pulmonary effort is normal.   Abdominal:      Palpations: Abdomen is soft.      Tenderness: There is abdominal tenderness in the periumbilical area. There is no guarding or rebound.   Skin:     General: Skin is warm and dry.   Neurological:      Mental Status: She is alert.   Psychiatric:         Mood and Affect: Mood normal.         Behavior: Behavior normal.            LAB RESULTS  Recent Results (from the past 24 hour(s))   Urinalysis With Microscopic If Indicated (No Culture) - Urine, Clean Catch    Collection Time: 09/16/24  3:50 PM    Specimen: Urine, Clean Catch   Result Value Ref Range    Color, UA Yellow Yellow, Straw    Appearance, UA Clear Clear    pH, UA 5.5 5.0 - 8.0    Specific Gravity, UA >=1.030 1.005 - 1.030    Glucose, UA >=1000 mg/dL (3+) (A) Negative    Ketones, UA 40 mg/dL (2+) (A) Negative    Bilirubin, UA Negative Negative    Blood, UA Negative Negative    Protein,  mg/dL (2+) (A) Negative    Leuk Esterase, UA Negative Negative    Nitrite, UA Negative Negative    Urobilinogen, UA 0.2 E.U./dL 0.2 - 1.0 E.U./dL   Urinalysis, Microscopic Only - Urine, Clean Catch    Collection Time: 09/16/24  3:50 PM    Specimen: Urine, Clean Catch   Result Value Ref Range    RBC, UA None Seen None Seen, 0-2 /HPF    WBC, UA None Seen None Seen, 0-2 /HPF    Bacteria, UA None Seen None Seen /HPF    Squamous Epithelial Cells, UA 0-2 None Seen, 0-2 /HPF    Hyaline Casts, UA None Seen None Seen /LPF    Methodology Manual Light Microscopy    Comprehensive Metabolic Panel    Collection Time: 09/16/24  5:10 PM    Specimen: Blood   Result Value Ref Range    Glucose 147 (H) 65 - 99 mg/dL    BUN 8 6 - 20 mg/dL    Creatinine 0.58 0.57 - 1.00 mg/dL    Sodium 134 (L) 136 - 145 mmol/L    Potassium 4.2 3.5 - 5.2 mmol/L    Chloride 98 98 - 107 mmol/L    CO2 15.0 (L) 22.0 - 29.0 mmol/L    Calcium 8.9 8.6 - 10.5 mg/dL    Total Protein 7.0 6.0 - 8.5 g/dL    Albumin 4.5 3.5 -  5.2 g/dL    ALT (SGPT) 40 (H) 1 - 33 U/L    AST (SGOT) 20 1 - 32 U/L    Alkaline Phosphatase 66 39 - 117 U/L    Total Bilirubin 0.6 0.0 - 1.2 mg/dL    Globulin 2.5 gm/dL    A/G Ratio 1.8 g/dL    BUN/Creatinine Ratio 13.8 7.0 - 25.0    Anion Gap 21.0 (H) 5.0 - 15.0 mmol/L    eGFR 117.5 >60.0 mL/min/1.73   Lipase    Collection Time: 09/16/24  5:10 PM    Specimen: Blood   Result Value Ref Range    Lipase 1,593 (H) 13 - 60 U/L   Green Top (Gel)    Collection Time: 09/16/24  5:10 PM   Result Value Ref Range    Extra Tube Hold for add-ons.    Lavender Top    Collection Time: 09/16/24  5:10 PM   Result Value Ref Range    Extra Tube hold for add-on    Gold Top - SST    Collection Time: 09/16/24  5:10 PM   Result Value Ref Range    Extra Tube Hold for add-ons.    Light Blue Top    Collection Time: 09/16/24  5:10 PM   Result Value Ref Range    Extra Tube Hold for add-ons.    CBC Auto Differential    Collection Time: 09/16/24  5:10 PM    Specimen: Blood   Result Value Ref Range    WBC 16.62 (H) 3.40 - 10.80 10*3/mm3    RBC 5.22 3.77 - 5.28 10*6/mm3    Hemoglobin 16.1 (H) 12.0 - 15.9 g/dL    Hematocrit 48.4 (H) 34.0 - 46.6 %    MCV 92.7 79.0 - 97.0 fL    MCH 30.8 26.6 - 33.0 pg    MCHC 33.3 31.5 - 35.7 g/dL    RDW 13.3 12.3 - 15.4 %    RDW-SD 45.0 37.0 - 54.0 fl    MPV 10.5 6.0 - 12.0 fL    Platelets 230 140 - 450 10*3/mm3    Neutrophil % 85.4 (H) 42.7 - 76.0 %    Lymphocyte % 9.4 (L) 19.6 - 45.3 %    Monocyte % 4.5 (L) 5.0 - 12.0 %    Eosinophil % 0.1 (L) 0.3 - 6.2 %    Basophil % 0.2 0.0 - 1.5 %    Immature Grans % 0.4 0.0 - 0.5 %    Neutrophils, Absolute 14.20 (H) 1.70 - 7.00 10*3/mm3    Lymphocytes, Absolute 1.57 0.70 - 3.10 10*3/mm3    Monocytes, Absolute 0.74 0.10 - 0.90 10*3/mm3    Eosinophils, Absolute 0.01 0.00 - 0.40 10*3/mm3    Basophils, Absolute 0.03 0.00 - 0.20 10*3/mm3    Immature Grans, Absolute 0.07 (H) 0.00 - 0.05 10*3/mm3    nRBC 0.0 0.0 - 0.2 /100 WBC       If labs were ordered, I independently reviewed the  results and considered them in treating the patient.        RADIOLOGY  CT Abdomen Pelvis With Contrast    Result Date: 9/16/2024  FINAL REPORT TECHNIQUE: null CLINICAL HISTORY: epigastric pain COMPARISON: null FINDINGS: CT abdomen and pelvis with contrast Comparison: CT/SR - CT ABDOMEN PELVIS W CONTRAST - 07/31/2024 09:07 PM EDT CT/SR - CT ABDOMEN PELVIS WO CONTRAST - 02/10/2023 01:04 AM EST Findings: The lung bases are clear. The liver is enlarged and hypodense. Mild nodularity of the liver contour. Severe edema of the pancreas and pancreatic mesentery. Moderate free fluid within the lesser sac. No walled-off pseudocyst. 2.5 cm left adrenal nodule with apparent interval increase in size. Unremarkable right adrenal gland. Normal spleen and bilateral kidneys. Prior cholecystectomy. No bowel obstruction, pneumoperitoneum, or pneumatosis. Pelvic contents unremarkable. Normal appendix. The bones are intact.     IMPRESSION: 1. Severe acute pancreatitis. 2. Moderate free fluid. No walled-off pseudocyst. 3. Apparent interval increase in the size of a 2.5 cm left adrenal nodule. This increase in size may be related to the immediately adjacent inflammatory process. An adenoma is still favored, but malignancy is not excluded and consideration should be given to further evaluation with MRI. 4. Severe secondary edema of the duodenal. 5. There is fatty infiltration of the liver. There is mild irregularity of the liver contour suggesting early cirrhosis. Authenticated and Electronically Signed by Patito Quach MD on 09/16/2024 05:32:00 PM         PROCEDURES    Procedures    Interpretations    O2 Sat: The patients oxygen saturation was 95% on Room Air.  This was independently interpreted by me as Normal    MEDICATIONS GIVEN IN ER    Medications   sodium chloride 0.9 % flush 10 mL (has no administration in time range)   ondansetron (ZOFRAN) injection 4 mg (has no administration in time range)   fentaNYL citrate (PF) (SUBLIMAZE)  injection 50 mcg (has no administration in time range)   lactated ringers bolus 1,000 mL (has no administration in time range)         MEDICAL DECISION MAKING, PROGRESS, and CONSULTS    All labs, if obtained, have been independently reviewed by me.  All radiology studies, if obtained, have been reviewed by me and the radiologist dictating the report.  All EKG's, if obtained, have been independently viewed and interpreted by me      Discussion below represents my analysis of pertinent findings related to patient's condition, differential diagnosis, treatment plan and final disposition.      Differential diagnosis:    Worsening pancreatitis, electrolyte abnormal    Additional Sources:  None      Orders placed during this visit:  Orders Placed This Encounter   Procedures    Lipase    Comprehensive Metabolic Panel    Lactic Acid, Plasma    CBC Auto Differential    Urine Drug Screen - Urine, Clean Catch    Ethanol    Fentanyl, Urine - Urine, Clean Catch    Insert Peripheral IV    CBC & Differential         Additional orders considered but not ordered:  None    ED Course:    Consultants:  None             After my consideration of clinical presentation and any laboratory/radiology studies obtained, I discussed the findings with the patient/patient representative who is in agreement with the treatment plan and the final disposition. Risks and benefits of admission was discussed     AS OF 22:56 EDT VITALS:    BP - 161/99  HR - 105  TEMP - 99 °F (37.2 °C) (Oral)  O2 SATS - 95%    I reviewed the patients prescription monitoring report if available prior to discharge    DIAGNOSIS  Final diagnoses:   Acute pancreatitis, unspecified complication status, unspecified pancreatitis type         DISPOSITION  ED Disposition       ED Disposition   Decision to Admit    Condition   --    Comment   --                   Please note that portions of this document were completed with voice recognition software.        Cristian Frazier  BEN Haynes  09/16/24 2256      Electronically signed by Byron Azevedo MD at 09/17/24 0221       Vital Signs (last day)       Date/Time Temp Temp src Pulse Resp BP Patient Position SpO2    09/17/24 0700 98.3 (36.8) Oral 110 20 159/99 Sitting 95    09/17/24 0240 98.5 (36.9) Oral 119 -- 172/98 Lying 94    09/17/24 0030 -- -- 99 20 154/96 Sitting 91    09/17/24 0000 -- -- 103 20 161/90 Sitting 99    09/16/24 2330 -- -- 100 20 166/102 Sitting 96    09/16/24 2319 -- -- -- -- -- -- 96    09/16/24 2300 -- -- -- -- 144/100 -- 83    09/16/24 2231 99 (37.2) Oral 105 20 161/99 Sitting 95          Current Facility-Administered Medications   Medication Dose Route Frequency Provider Last Rate Last Admin    acetaminophen (TYLENOL) tablet 650 mg  650 mg Oral Q4H PRN Gabriela Zhao MD        Or    acetaminophen (TYLENOL) 160 MG/5ML oral solution 650 mg  650 mg Oral Q4H PRN Gabriela Zhao MD        Or    acetaminophen (TYLENOL) suppository 650 mg  650 mg Rectal Q4H PRN Gabriela Zhao MD        acetaminophen (TYLENOL) tablet 650 mg  650 mg Oral Q4H PRN aGbriela Zhao MD        Or    acetaminophen (TYLENOL) 160 MG/5ML oral solution 650 mg  650 mg Oral Q4H PRN Gabriela Zhao MD        Or    acetaminophen (TYLENOL) suppository 650 mg  650 mg Rectal Q4H PRN Gabriela Zhao MD        albuterol (PROVENTIL) nebulizer solution 0.083% 2.5 mg/3mL  2.5 mg Nebulization Q6H PRN Gabriela Zhao MD        amitriptyline (ELAVIL) tablet 150 mg  150 mg Oral Nightly Gabriela Zhao MD        atorvastatin (LIPITOR) tablet 40 mg  40 mg Oral Daily Gabriela Zhao MD        sennosides-docusate (PERICOLACE) 8.6-50 MG per tablet 2 tablet  2 tablet Oral BID PRN Gabriela Zhao MD        And    polyethylene glycol (MIRALAX) packet 17 g  17 g Oral Daily PRN Gabriela Zhao MD        And    bisacodyl (DULCOLAX) EC tablet 5 mg  5 mg Oral Daily PRN Gabriela Zhao MD        And    bisacodyl (DULCOLAX) suppository 10 mg  10 mg Rectal Daily PRN  Gabriela Zhao MD        butalbital-acetaminophen-caffeine (FIORICET, ESGIC) -40 MG per tablet 1 tablet  1 tablet Oral BID PRN Gabriela Zhao MD        Cariprazine HCl (VRAYLAR) capsule capsule 4.5 mg  4.5 mg Oral Daily Gabriela Zhao MD        diazePAM (VALIUM) tablet 10 mg  10 mg Oral TID PRN Gabriela Zhao MD   10 mg at 09/17/24 0350    Enoxaparin Sodium (LOVENOX) syringe 40 mg  40 mg Subcutaneous Q12H Gabriela Zhao MD   40 mg at 09/17/24 0350    HYDROcodone-acetaminophen (NORCO) 5-325 MG per tablet 1 tablet  1 tablet Oral Q8H PRN Gabriela Zhao MD   1 tablet at 09/17/24 0350    HYDROmorphone (DILAUDID) injection 0.5 mg  0.5 mg Intravenous Q2H PRN Gabriela Zhao MD   0.5 mg at 09/17/24 0747    Lurasidone HCl (LATUDA) tablet 120 mg  120 mg Oral Daily Gabriela Zhao MD        Magnesium Standard Dose Replacement - Follow Nurse / BPA Driven Protocol   Does not apply PRN Gabriela Zhao MD        melatonin tablet 5 mg  5 mg Oral Nightly PRN Gabriela Zhao MD        montelukast (SINGULAIR) tablet 10 mg  10 mg Oral Nightly Gabriela Zhao MD        naloxone (NARCAN) injection 0.4 mg  0.4 mg Intravenous Q5 Min PRN Gabriela Zhao MD        nicotine polacrilex (NICORETTE) gum 2 mg  2 mg Mouth/Throat Q1H PRN Lily Collins DO        ondansetron (ZOFRAN) injection 4 mg  4 mg Intravenous Q6H PRN Gabriela Zhao MD   4 mg at 09/17/24 0302    ondansetron (ZOFRAN) injection 4 mg  4 mg Intravenous Q6H PRN Gabriela Zhao MD        OXcarbazepine (TRILEPTAL) tablet 300 mg  300 mg Oral BID Gabriela Zhao MD        pantoprazole (PROTONIX) EC tablet 40 mg  40 mg Oral Q AM Gabriela Zhao MD   40 mg at 09/17/24 0351    Pharmacy to Dose enoxaparin (LOVENOX)   Does not apply Continuous PRN Gabriela Zhao MD        Potassium Replacement - Follow Nurse / BPA Driven Protocol   Does not apply PRN Gabriela Zhao MD        sodium chloride 0.9 % flush 10 mL  10 mL Intravenous PRN Gabriela Zhao,  MD        sodium chloride 0.9 % flush 10 mL  10 mL Intravenous Q12H Gabriela Zhao MD        sodium chloride 0.9 % flush 10 mL  10 mL Intravenous PRN Gabriela Zhao MD        sodium chloride 0.9 % infusion 40 mL  40 mL Intravenous PRN Gabriela Zhao MD        sodium chloride 0.9 % infusion  125 mL/hr Intravenous Continuous Gabriela Zhao  mL/hr at 09/17/24 0302 125 mL/hr at 09/17/24 0302     Lab Results (last 24 hours)       Procedure Component Value Units Date/Time    STAT Lactic Acid, Reflex [[id number]] Collected: 09/17/24 0818    Specimen: Blood Updated: 09/17/24 0818    Comprehensive Metabolic Panel [[id number]]  (Abnormal) Collected: 09/17/24 0720    Specimen: Blood Updated: 09/17/24 0817     Glucose 156 mg/dL      BUN 8 mg/dL      Creatinine 0.47 mg/dL      Sodium 134 mmol/L      Potassium 4.1 mmol/L      Chloride 97 mmol/L      CO2 18.7 mmol/L      Calcium 8.8 mg/dL      Total Protein 6.8 g/dL      Albumin 4.1 g/dL      ALT (SGPT) 31 U/L      AST (SGOT) 16 U/L      Alkaline Phosphatase 66 U/L      Total Bilirubin 0.8 mg/dL      Globulin 2.7 gm/dL      A/G Ratio 1.5 g/dL      BUN/Creatinine Ratio 17.0     Anion Gap 18.3 mmol/L      eGFR 123.6 mL/min/1.73     Narrative:      GFR Normal >60  Chronic Kidney Disease <60  Kidney Failure <15      Lipid Panel [[id number]]  (Abnormal) Collected: 09/17/24 0720    Specimen: Blood Updated: 09/17/24 0817     Total Cholesterol 108 mg/dL      Triglycerides 187 mg/dL      HDL Cholesterol 32 mg/dL      LDL Cholesterol  45 mg/dL      VLDL Cholesterol 31 mg/dL      LDL/HDL Ratio 1.21    Narrative:      Cholesterol Reference Ranges  (U[place] Department of Health and Human Services ATP III Classifications)    Desirable          <200 mg/dL  Borderline High    200-239 mg/dL  High Risk          >240 mg/dL      Triglyceride Reference Ranges  (U.S. Department of Health and Human Services ATP III Classifications)    Normal           <150 mg/dL  Borderline High  150-199  mg/dL  High             200-499 mg/dL  Very High        >500 mg/dL    HDL Reference Ranges  (U.S. Department of Health and Human Services ATP III Classifications)    Low     <40 mg/dl (major risk factor for CHD)  High    >60 mg/dl ('negative' risk factor for CHD)        LDL Reference Ranges  (U.S. Department of Health and Human Services ATP III Classifications)    Optimal          <100 mg/dL  Near Optimal     100-129 mg/dL  Borderline High  130-159 mg/dL  High             160-189 mg/dL  Very High        >189 mg/dL    CBC (No Diff) [[id number]]  (Abnormal) Collected: 09/17/24 0720    Specimen: Blood Updated: 09/17/24 0756     WBC 15.93 10*3/mm3      RBC 5.41 10*6/mm3      Hemoglobin 16.1 g/dL      Hematocrit 48.8 %      MCV 90.2 fL      MCH 29.8 pg      MCHC 33.0 g/dL      RDW 13.3 %      RDW-SD 43.4 fl      MPV 10.4 fL      Platelets 274 10*3/mm3     Lipase [[id number]] Collected: 09/17/24 0720    Specimen: Blood Updated: 09/17/24 0752    STAT Lactic Acid, Reflex [[id number]]  (Abnormal) Collected: 09/17/24 0211    Specimen: Blood Updated: 09/17/24 0248     Lactate 5.5 mmol/L     Lactic Acid, Plasma [[id number]]  (Abnormal) Collected: 09/16/24 2317    Specimen: Blood Updated: 09/17/24 0010     Lactate 4.2 mmol/L     Lipase [[id number]]  (Abnormal) Collected: 09/16/24 2317    Specimen: Blood Updated: 09/16/24 2351     Lipase 1,037 U/L     Comprehensive Metabolic Panel [[id number]]  (Abnormal) Collected: 09/16/24 2317    Specimen: Blood Updated: 09/16/24 2341     Glucose 150 mg/dL      BUN 7 mg/dL      Creatinine 0.53 mg/dL      Sodium 135 mmol/L      Potassium 4.1 mmol/L      Comment: Slight hemolysis detected by analyzer. Result may be falsely elevated.        Chloride 97 mmol/L      CO2 18.6 mmol/L      Calcium 8.7 mg/dL      Total Protein 6.7 g/dL      Albumin 4.4 g/dL      ALT (SGPT) 34 U/L      AST (SGOT) 19 U/L      Alkaline Phosphatase 66 U/L      Total Bilirubin 0.8 mg/dL      Globulin 2.3 gm/dL      A/G Ratio 1.9  g/dL      BUN/Creatinine Ratio 13.2     Anion Gap 19.4 mmol/L      eGFR 120.1 mL/min/1.73     Narrative:      GFR Normal >60  Chronic Kidney Disease <60  Kidney Failure <15      CBC & Differential [[id number]]  (Abnormal) Collected: 09/16/24 2317    Specimen: Blood Updated: 09/16/24 2323    Narrative:      The following orders were created for panel order CBC & Differential.  Procedure                               Abnormality         Status                     ---------                               -----------         ------                     CBC Auto Differential[[id number]]        Abnormal            Final result                 Please view results for these tests on the individual orders.    CBC Auto Differential [[id number]]  (Abnormal) Collected: 09/16/24 2317    Specimen: Blood Updated: 09/16/24 2323     WBC 15.90 10*3/mm3      RBC 5.18 10*6/mm3      Hemoglobin 15.8 g/dL      Hematocrit 46.1 %      MCV 89.0 fL      MCH 30.5 pg      MCHC 34.3 g/dL      RDW 13.2 %      RDW-SD 42.9 fl      MPV 10.1 fL      Platelets 278 10*3/mm3      Neutrophil % 87.3 %      Lymphocyte % 8.0 %      Monocyte % 4.1 %      Eosinophil % 0.0 %      Basophil % 0.2 %      Immature Grans % 0.4 %      Neutrophils, Absolute 13.88 10*3/mm3      Lymphocytes, Absolute 1.27 10*3/mm3      Monocytes, Absolute 0.65 10*3/mm3      Eosinophils, Absolute 0.00 10*3/mm3      Basophils, Absolute 0.03 10*3/mm3      Immature Grans, Absolute 0.07 10*3/mm3      nRBC 0.0 /100 WBC     Ethanol [[id number]] Collected: 09/16/24 1710    Specimen: Blood Updated: 09/16/24 2306     Ethanol <10 mg/dL      Ethanol % <0.010 %     Narrative:      This result is for medical use only and should not be used for forensic purposes.    Fentanyl, Urine - Urine, Clean Catch [[id number]]  (Normal) Collected: 09/16/24 1550    Specimen: Urine, Clean Catch Updated: 09/16/24 2306     Fentanyl, Urine Negative    Narrative:      Negative Threshold:      Fentanyl 5 ng/mL     The normal  value for the drug tested is negative. This report includes final unconfirmed screening results to be used for medical treatment purposes only. Unconfirmed results must not be used for non-medical purposes such as employment or legal testing. Clinical consideration should be applied to any drug of abuse test, particularly when unconfirmed results are used.           Urine Drug Screen - Urine, Clean Catch [[id number]]  (Abnormal) Collected: 09/16/24 1550    Specimen: Urine, Clean Catch Updated: 09/16/24 2302     THC, Screen, Urine Negative     Phencyclidine (PCP), Urine Negative     Cocaine Screen, Urine Negative     Methamphetamine, Ur Negative     Opiate Screen Positive     Amphetamine Screen, Urine Negative     Benzodiazepine Screen, Urine Positive     Tricyclic Antidepressants Screen Positive     Methadone Screen, Urine Negative     Barbiturates Screen, Urine Positive     Oxycodone Screen, Urine Negative     Buprenorphine, Screen, Urine Negative    Narrative:      Cutoff For Drugs Screened:    Amphetamines               500 ng/ml  Barbiturates               200 ng/ml  Benzodiazepines            150 ng/ml  Cocaine                    150 ng/ml  Methadone                  200 ng/ml  Opiates                    100 ng/ml  Phencyclidine               25 ng/ml  THC                         50 ng/ml  Methamphetamine            500 ng/ml  Tricyclic Antidepressants  300 ng/ml  Oxycodone                  100 ng/ml  Buprenorphine               10 ng/ml    The normal value for all drugs tested is negative. This report includes unconfirmed screening results, with the cutoff values listed, to be used for medical treatment purposes only.  Unconfirmed results must not be used for non-medical purposes such as employment or legal testing.  Clinical consideration should be applied to any drug of abuse test, particularly when unconfirmed results are used.            Imaging Results (Last 24 Hours)       ** No results found for the last  24 hours. **          Physician Progress Notes (last 24 hours)  Notes from 09/16/24 0821 through 09/17/24 0821   No notes of this type exist for this encounter.

## 2024-09-17 NOTE — PLAN OF CARE
Goal Outcome Evaluation:  Plan of Care Reviewed With: patient        Progress: no change  Outcome Evaluation: New admit, VSS, pain and nausea controlled with PRN medications

## 2024-09-17 NOTE — ED PROVIDER NOTES
" EMERGENCY DEPARTMENT ENCOUNTER    Pt Name: Lindsay Amezquita  MRN: [id number]  Pt :   1985  Room Number:  02SF/02  Date of encounter:  2024  PCP: Hunter Winkler MD  ED Provider: Cristian Frazier PA-C    Historian: Patient      HPI:  Chief Complaint   Patient presents with    Vomiting    Abdominal Pain          Context: Lindsay Amezquita is a 39 y.o. female who presents to the ED c/o nausea vomiting and epigastric abdominal pain.  Patient states at 0 530 started with the symptoms.  No history of pancreatitis.  Has had a cholecystectomy.  No chest pain or shortness of breath.  Here earlier diagnosed with pancreatitis but left AMA because \"I did not have anybody to stay with me.\"  She now returns because she went home after leaving AMA and vomited and recalls the nurse telling her she could \"die from this.\"  She now returns agreeable to be admitted.      PAST MEDICAL HISTORY  Past Medical History:   Diagnosis Date    Allergic     Anxiety     Asthma Beings of copd with asthma    Back pain     Bell palsy 2021    Bell's palsy     Bipolar 2 disorder     Blood clot in vein     Behind left knee cap    COPD (chronic obstructive pulmonary disease) Six months ago    Deep vein thrombosis Last year    Depression     Diabetes mellitus November    Pre diabete    Frequent headaches     GERD (gastroesophageal reflux disease)     Hypertension     Every time i go into the emergacy room    Irritable bowel syndrome Two years ago    Kidney stone Two years ago    Migraine     Nausea, vomiting and diarrhea 2018    Obesity All of my life    Ovarian cyst Two years ago    Panic     PTSD (post-traumatic stress disorder)     Pulmonary embolism Not in lungs    Recurrent pregnancy loss, antepartum condition or complication Every since i have been 14 yars old    Restless leg syndrome     Sinus problem     Snores     Tattoos     Urinary tract infection Have them on and off    Varicella Little    Visual impairment Since i " was little    Vitamin B12 deficiency     Wears glasses          PAST SURGICAL HISTORY  Past Surgical History:   Procedure Laterality Date    CHOLECYSTECTOMY      HYSTEROSCOPY N/A 3/14/2024    Procedure: HYSTEROSCOPY WITH MYOSURE, DILATION AND CURETTAGE WITH CERENE ABLATION;  Surgeon: David Ribeiro MD;  Location: [facility];  Service: Obstetrics/Gynecology;  Laterality: N/A;    MULTIPLE TOOTH EXTRACTIONS      All my teeth    WISDOM TOOTH EXTRACTION  All my teeth         FAMILY HISTORY  Family History   Problem Relation Age of Onset    Irritable bowel syndrome Mother     Heart disease Mother     Miscarriages / Stillbirths Mother     Arthritis Mother     COPD Mother     Learning disabilities Mother     Mental illness Mother     Asthma Mother     Irritable bowel syndrome Father     Alcohol abuse Father     Diabetes Father     Hyperlipidemia Father     Anxiety disorder Father     Learning disabilities Father     Colon cancer Maternal Grandfather     Stroke Paternal Grandfather     Stroke Paternal Grandmother          SOCIAL HISTORY  Social History     Socioeconomic History    Marital status: Single   Tobacco Use    Smoking status: Every Day     Current packs/day: 0.50     Average packs/day: 2.0 packs/day for 32.3 years (63.5 ttl pk-yrs)     Types: Cigarettes     Start date: 7/17/1992     Passive exposure: Current    Smokeless tobacco: Never    Tobacco comments:      Around 2.5 packs per day- 7/5/24   Vaping Use    Vaping status: Former    Passive vaping exposure: Yes   Substance and Sexual Activity    Alcohol use: Not Currently     Comment: rarely    Drug use: Not Currently    Sexual activity: Not Currently     Partners: Female     Birth control/protection: Other, Partner of same sex         ALLERGIES  Aspirin, Codeine, Cyclobenzaprine, Haldol [haloperidol], Imitrex [sumatriptan], Latex, Penicillins, Zofran [ondansetron], Lamictal [lamotrigine], Metoclopramide, Morphine, Oxycodone-acetaminophen, Oxycontin  [oxycodone], Prochlorperazine, and Tramadol        REVIEW OF SYSTEMS  Review of Systems   Constitutional: Negative.    HENT: Negative.     Eyes: Negative.    Respiratory: Negative.     Cardiovascular: Negative.    Gastrointestinal: Negative.  Positive for abdominal pain, nausea and vomiting.   Genitourinary: Negative.    Musculoskeletal: Negative.    Skin: Negative.    Neurological: Negative.    Psychiatric/Behavioral: Negative.          All systems reviewed and negative except for those discussed in HPI.       PHYSICAL EXAM    I have reviewed the triage vital signs and nursing notes.    ED Triage Vitals [09/16/24 2231]   Temp Heart Rate Resp BP SpO2   99 °F (37.2 °C) 105 20 161/99 95 %      Temp src Heart Rate Source Patient Position BP Location FiO2 (%)   Oral Monitor Sitting Left arm --       Physical Exam  Vitals and nursing note reviewed.   Constitutional:       General: She is not in acute distress.     Appearance: She is well-developed. She is obese. She is not ill-appearing, toxic-appearing or diaphoretic.   HENT:      Head: Normocephalic and atraumatic.   Eyes:      Extraocular Movements: Extraocular movements intact.   Cardiovascular:      Rate and Rhythm: Normal rate.   Pulmonary:      Effort: Pulmonary effort is normal.   Abdominal:      Palpations: Abdomen is soft.      Tenderness: There is abdominal tenderness in the periumbilical area. There is no guarding or rebound.   Skin:     General: Skin is warm and dry.   Neurological:      Mental Status: She is alert.   Psychiatric:         Mood and Affect: Mood normal.         Behavior: Behavior normal.            LAB RESULTS  Recent Results (from the past 24 hour(s))   Urinalysis With Microscopic If Indicated (No Culture) - Urine, Clean Catch    Collection Time: 09/16/24  3:50 PM    Specimen: Urine, Clean Catch   Result Value Ref Range    Color, UA Yellow Yellow, Straw    Appearance, UA Clear Clear    pH, UA 5.5 5.0 - 8.0    Specific Gravity, UA >=1.030  1.005 - 1.030    Glucose, UA >=1000 mg/dL (3+) (A) Negative    Ketones, UA 40 mg/dL (2+) (A) Negative    Bilirubin, UA Negative Negative    Blood, UA Negative Negative    Protein,  mg/dL (2+) (A) Negative    Leuk Esterase, UA Negative Negative    Nitrite, UA Negative Negative    Urobilinogen, UA 0.2 E.U./dL 0.2 - 1.0 E.U./dL   Urinalysis, Microscopic Only - Urine, Clean Catch    Collection Time: 09/16/24  3:50 PM    Specimen: Urine, Clean Catch   Result Value Ref Range    RBC, UA None Seen None Seen, 0-2 /HPF    WBC, UA None Seen None Seen, 0-2 /HPF    Bacteria, UA None Seen None Seen /HPF    Squamous Epithelial Cells, UA 0-2 None Seen, 0-2 /HPF    Hyaline Casts, UA None Seen None Seen /LPF    Methodology Manual Light Microscopy    Comprehensive Metabolic Panel    Collection Time: 09/16/24  5:10 PM    Specimen: Blood   Result Value Ref Range    Glucose 147 (H) 65 - 99 mg/dL    BUN 8 6 - 20 mg/dL    Creatinine 0.58 0.57 - 1.00 mg/dL    Sodium 134 (L) 136 - 145 mmol/L    Potassium 4.2 3.5 - 5.2 mmol/L    Chloride 98 98 - 107 mmol/L    CO2 15.0 (L) 22.0 - 29.0 mmol/L    Calcium 8.9 8.6 - 10.5 mg/dL    Total Protein 7.0 6.0 - 8.5 g/dL    Albumin 4.5 3.5 - 5.2 g/dL    ALT (SGPT) 40 (H) 1 - 33 U/L    AST (SGOT) 20 1 - 32 U/L    Alkaline Phosphatase 66 39 - 117 U/L    Total Bilirubin 0.6 0.0 - 1.2 mg/dL    Globulin 2.5 gm/dL    A/G Ratio 1.8 g/dL    BUN/Creatinine Ratio 13.8 7.0 - 25.0    Anion Gap 21.0 (H) 5.0 - 15.0 mmol/L    eGFR 117.5 >60.0 mL/min/1.73   Lipase    Collection Time: 09/16/24  5:10 PM    Specimen: Blood   Result Value Ref Range    Lipase 1,593 (H) 13 - 60 U/L   Green Top (Gel)    Collection Time: 09/16/24  5:10 PM   Result Value Ref Range    Extra Tube Hold for add-ons.    Lavender Top    Collection Time: 09/16/24  5:10 PM   Result Value Ref Range    Extra Tube hold for add-on    Gold Top - SST    Collection Time: 09/16/24  5:10 PM   Result Value Ref Range    Extra Tube Hold for add-ons.    Light  Blue Top    Collection Time: 09/16/24  5:10 PM   Result Value Ref Range    Extra Tube Hold for add-ons.    CBC Auto Differential    Collection Time: 09/16/24  5:10 PM    Specimen: Blood   Result Value Ref Range    WBC 16.62 (H) 3.40 - 10.80 10*3/mm3    RBC 5.22 3.77 - 5.28 10*6/mm3    Hemoglobin 16.1 (H) 12.0 - 15.9 g/dL    Hematocrit 48.4 (H) 34.0 - 46.6 %    MCV 92.7 79.0 - 97.0 fL    MCH 30.8 26.6 - 33.0 pg    MCHC 33.3 31.5 - 35.7 g/dL    RDW 13.3 12.3 - 15.4 %    RDW-SD 45.0 37.0 - 54.0 fl    MPV 10.5 6.0 - 12.0 fL    Platelets 230 140 - 450 10*3/mm3    Neutrophil % 85.4 (H) 42.7 - 76.0 %    Lymphocyte % 9.4 (L) 19.6 - 45.3 %    Monocyte % 4.5 (L) 5.0 - 12.0 %    Eosinophil % 0.1 (L) 0.3 - 6.2 %    Basophil % 0.2 0.0 - 1.5 %    Immature Grans % 0.4 0.0 - 0.5 %    Neutrophils, Absolute 14.20 (H) 1.70 - 7.00 10*3/mm3    Lymphocytes, Absolute 1.57 0.70 - 3.10 10*3/mm3    Monocytes, Absolute 0.74 0.10 - 0.90 10*3/mm3    Eosinophils, Absolute 0.01 0.00 - 0.40 10*3/mm3    Basophils, Absolute 0.03 0.00 - 0.20 10*3/mm3    Immature Grans, Absolute 0.07 (H) 0.00 - 0.05 10*3/mm3    nRBC 0.0 0.0 - 0.2 /100 WBC       If labs were ordered, I independently reviewed the results and considered them in treating the patient.        RADIOLOGY  CT Abdomen Pelvis With Contrast    Result Date: 9/16/2024  FINAL REPORT TECHNIQUE: null CLINICAL HISTORY: epigastric pain COMPARISON: null FINDINGS: CT abdomen and pelvis with contrast Comparison: CT/SR - CT ABDOMEN PELVIS W CONTRAST - 07/31/2024 09:07 PM EDT CT/SR - CT ABDOMEN PELVIS WO CONTRAST - 02/10/2023 01:04 AM EST Findings: The lung bases are clear. The liver is enlarged and hypodense. Mild nodularity of the liver contour. Severe edema of the pancreas and pancreatic mesentery. Moderate free fluid within the lesser sac. No walled-off pseudocyst. 2.5 cm left adrenal nodule with apparent interval increase in size. Unremarkable right adrenal gland. Normal spleen and bilateral kidneys.  Prior cholecystectomy. No bowel obstruction, pneumoperitoneum, or pneumatosis. Pelvic contents unremarkable. Normal appendix. The bones are intact.     IMPRESSION: 1. Severe acute pancreatitis. 2. Moderate free fluid. No walled-off pseudocyst. 3. Apparent interval increase in the size of a 2.5 cm left adrenal nodule. This increase in size may be related to the immediately adjacent inflammatory process. An adenoma is still favored, but malignancy is not excluded and consideration should be given to further evaluation with MRI. 4. Severe secondary edema of the duodenal. 5. There is fatty infiltration of the liver. There is mild irregularity of the liver contour suggesting early cirrhosis. Authenticated and Electronically Signed by Patito Quach MD on 09/16/2024 05:32:00 PM         PROCEDURES    Procedures    Interpretations    O2 Sat: The patients oxygen saturation was 95% on Room Air.  This was independently interpreted by me as Normal    MEDICATIONS GIVEN IN ER    Medications   sodium chloride 0.9 % flush 10 mL (has no administration in time range)   ondansetron (ZOFRAN) injection 4 mg (has no administration in time range)   fentaNYL citrate (PF) (SUBLIMAZE) injection 50 mcg (has no administration in time range)   lactated ringers bolus 1,000 mL (has no administration in time range)         MEDICAL DECISION MAKING, PROGRESS, and CONSULTS    All labs, if obtained, have been independently reviewed by me.  All radiology studies, if obtained, have been reviewed by me and the radiologist dictating the report.  All EKG's, if obtained, have been independently viewed and interpreted by me      Discussion below represents my analysis of pertinent findings related to patient's condition, differential diagnosis, treatment plan and final disposition.      Differential diagnosis:    Worsening pancreatitis, electrolyte abnormal    Additional Sources:  None      Orders placed during this visit:  Orders Placed This Encounter    Procedures    Lipase    Comprehensive Metabolic Panel    Lactic Acid, Plasma    CBC Auto Differential    Urine Drug Screen - Urine, Clean Catch    Ethanol    Fentanyl, Urine - Urine, Clean Catch    Insert Peripheral IV    CBC & Differential         Additional orders considered but not ordered:  None    ED Course:    Consultants:  None             After my consideration of clinical presentation and any laboratory/radiology studies obtained, I discussed the findings with the patient/patient representative who is in agreement with the treatment plan and the final disposition. Risks and benefits of admission was discussed     AS OF 22:56 EDT VITALS:    BP - 161/99  HR - 105  TEMP - 99 °F (37.2 °C) (Oral)  O2 SATS - 95%    I reviewed the patients prescription monitoring report if available prior to discharge    DIAGNOSIS  Final diagnoses:   Acute pancreatitis, unspecified complication status, unspecified pancreatitis type         DISPOSITION  ED Disposition       ED Disposition   Decision to Admit    Condition   --    Comment   --                   Please note that portions of this document were completed with voice recognition software.        Cristian Frazier PA-C  09/16/24 0997

## 2024-09-17 NOTE — PROGRESS NOTES
[facility]    Name:  Lindsay Amezquita   Age:  39 y.o.  Sex:  female  :  1985  MRN:  [id number]   Visit Number:  [id number]  Admission Date:  2024  Date Of Service:  24  Primary Care Physician:  Hunter Winkler MD     LOS: 1 day :    Chief Complaint:      Follow-up pancreatitis    Subjective:    Patient seen at bedside, no acute changes overnight.  Notes less pain today.  Urine is slightly dark.  No fevers or chills.  Denies any alcohol use.  Admits to multiple family members-pancreatitis.  No medication changes other than Vraylar and Wellbutrin about 6 weeks ago being started.    Hospital Course:    Patient is a chronically ill 39-year-old female with a history of PTSD, bipolar disease, COPD, diabetes, prior DVT/PE, who presented from home with complaints of abdominal pain radiating to her back and lower abdomen.  She has associated nausea and vomiting.    Workup in the emergency room the patient had a lipase greater than 1500 white count of 16 ALT of 40 lactic acid 2.4.  Urinalysis unremarkable.  UDS positive for barbiturates, benzodiazepines, opioids, TCAs.  CT scan abdomen pelvis showing acute pancreatitis moderate free fluid, no evidence of pseudocyst.  There was interval increase in a 2.5 cm left adrenal nodule.  There is also evidence of fatty liver disease.    The patient was given IV fluid resuscitation, pain control, and admitted to the hospital service.    Review of Systems:     All systems were reviewed and negative except as mentioned in subjective, assessment and plan.    Vital Signs:    Temp:  [98.3 °F (36.8 °C)-99.8 °F (37.7 °C)] 98.7 °F (37.1 °C)  Heart Rate:  [] 132  Resp:  [16-20] 20  BP: (118-182)/() 164/106    Intake and output:    I/O last 3 completed shifts:  In: 1000 [IV Piggyback:1000]  Out: 700 [Urine:700]  I/O this shift:  In: -   Out: 300 [Urine:300]    Physical Examination:    General Appearance:  Alert and  "cooperative.  No acute distress, obese   Head:  Atraumatic and normocephalic.   Eyes: Conjunctivae and sclerae normal, no icterus. No pallor.   Throat: No oral lesions, no thrush, oral mucosa moist.   Neck: Supple, trachea midline, no thyromegaly.   Lungs:   Breath sounds heard bilaterally equally.  No wheezing or crackles. No Pleural rub or bronchial breathing.   Heart:  Normal S1 and S2, no murmur, no gallop, no rub. No JVD.   Abdomen:   Bowel sounds present, mid to moderate midepigastric tenderness on palpation.  There is no guarding.   Extremities: Supple, no edema, no cyanosis, no clubbing.   Skin: No bleeding or rash.   Neurologic: Alert and oriented x 3. No facial asymmetry. Moves all four limbs. No tremors.      Laboratory results:    Results from last 7 days   Lab Units 09/17/24  0720 09/16/24  2317 09/16/24  1710   SODIUM mmol/L 134* 135* 134*   POTASSIUM mmol/L 4.1 4.1 4.2   CHLORIDE mmol/L 97* 97* 98   CO2 mmol/L 18.7* 18.6* 15.0*   BUN mg/dL 8 7 8   CREATININE mg/dL 0.47* 0.53* 0.58   CALCIUM mg/dL 8.8 8.7 8.9   BILIRUBIN mg/dL 0.8 0.8 0.6   ALK PHOS U/L 66 66 66   ALT (SGPT) U/L 31 34* 40*   AST (SGOT) U/L 16 19 20   GLUCOSE mg/dL 156* 150* 147*     Results from last 7 days   Lab Units 09/17/24  0720 09/16/24  2317 09/16/24  1710   WBC 10*3/mm3 15.93* 15.90* 16.62*   HEMOGLOBIN g/dL 16.1* 15.8 16.1*   HEMATOCRIT % 48.8* 46.1 48.4*   PLATELETS 10*3/mm3 274 278 230         Results from last 7 days   Lab Units 09/15/24  1131 09/14/24  0523 09/14/24  0314   HSTROP T ng/L 7 <6 <6         No results for input(s): \"PHART\", \"NCY9TAQ\", \"PO2ART\", \"ZKM9FSP\", \"BASEEXCESS\" in the last 8760 hours.   I have reviewed the patient's laboratory results.    Radiology results:    CT Abdomen Pelvis With Contrast    Result Date: 9/16/2024  FINAL REPORT TECHNIQUE: null CLINICAL HISTORY: epigastric pain COMPARISON: null FINDINGS: CT abdomen and pelvis with contrast Comparison: CT/SR - CT ABDOMEN PELVIS W CONTRAST - 07/31/2024 " 09:07 PM EDT CT/SR - CT ABDOMEN PELVIS WO CONTRAST - 02/10/2023 01:04 AM EST Findings: The lung bases are clear. The liver is enlarged and hypodense. Mild nodularity of the liver contour. Severe edema of the pancreas and pancreatic mesentery. Moderate free fluid within the lesser sac. No walled-off pseudocyst. 2.5 cm left adrenal nodule with apparent interval increase in size. Unremarkable right adrenal gland. Normal spleen and bilateral kidneys. Prior cholecystectomy. No bowel obstruction, pneumoperitoneum, or pneumatosis. Pelvic contents unremarkable. Normal appendix. The bones are intact.     Impression: IMPRESSION: 1. Severe acute pancreatitis. 2. Moderate free fluid. No walled-off pseudocyst. 3. Apparent interval increase in the size of a 2.5 cm left adrenal nodule. This increase in size may be related to the immediately adjacent inflammatory process. An adenoma is still favored, but malignancy is not excluded and consideration should be given to further evaluation with MRI. 4. Severe secondary edema of the duodenal. 5. There is fatty infiltration of the liver. There is mild irregularity of the liver contour suggesting early cirrhosis. Authenticated and Electronically Signed by Patito Quach MD on 09/16/2024 05:32:00 PM   I have reviewed the patient's radiology reports.    Medication Review:     I have reviewed the patient's active and prn medications.     Problem List:      Pancreatitis      Assessment:    Acute pancreatitis, POA  Incidental left adrenal nodule  Anxiety/depression  PTSD  Bipolar disorder  COPD  Diabetes mellitus  GERD  History of PE and DVT  Morbid obesity with BMI of 49    Plan:    Acute pancreatitis  Idiopathic at this point.  Prior cholecystectomy, no alcohol use, lipid panel normal.  Patient did note a recent initiation of Wellbutrin and Vraylar which would not typically associate with pancreatitis.  She does have underlying diabetes which has been fairly well-controlled.  Will continue  with IV fluid resuscitation, advance to clear liquids today.  May end up needing GI consultation prior to discharge versus outpatient.     Bipolar disorder/PTSD/anxiety/depression  Continue home medications, follows with psychiatry and PCP     Left adrenal nodule  Apparently increasing size of the left adrenal nodule need outpatient MRI.    Further recommendations depend upon clinical course.    DVT Prophylaxis: Lovenox  Code Status: Full  Diet: Clear liquids  Discharge Plan: Hopefully home in 1 to 2 days    Lily Collins DO  09/17/24  14:08 EDT    Dictated utilizing Dragon dictation.

## 2024-09-17 NOTE — CASE MANAGEMENT/SOCIAL WORK
Discharge Planning Assessment  [facility]     Patient Name: Lindsay Amezquita  MRN: [id number]  Today's Date: 9/17/2024    Admit Date: 9/16/2024    Plan: Dcp iniated at bedside with pt providing demographics. She does not have an AD, POA, financial stressors, or home DME. Dr. Hunter Mills is her primary, also follows with mental health provider, [facility], agreeable to meds to bed. Pt hresides with her parents and has been at current address 1 yrs. Her mother provides transportation. She plans to return home at OR, no needs identified.   Discharge Needs Assessment       Row Name 09/17/24 1129       Living Environment    People in Home parent(s)    Name(s) of People in Home mother Yaniv Doe    Current Living Arrangements apartment    Duration at Residence 1 yr    Potentially Unsafe Housing Conditions none    In the past 12 months has the electric, gas, oil, or water company threatened to shut off services in your home? No    Primary Care Provided by self    Family Caregiver if Needed parent(s)    Quality of Family Relationships helpful;involved    Able to Return to Prior Arrangements yes       Resource/Environmental Concerns    Transportation Concerns none       Transportation Needs    In the past 12 months, has lack of transportation kept you from medical appointments or from getting medications? no    In the past 12 months, has lack of transportation kept you from meetings, work, or from getting things needed for daily living? No       Food Insecurity    Within the past 12 months, you worried that your food would run out before you got the money to buy more. Never true    Within the past 12 months, the food you bought just didn't last and you didn't have money to get more. Never true       Transition Planning    Patient/Family Anticipates Transition to home with family    Patient/Family Anticipated Services at Transition none    Transportation Anticipated family or friend will provide        Discharge Needs Assessment    Readmission Within the Last 30 Days no previous admission in last 30 days    Concerns to be Addressed denies needs/concerns at this time;no discharge needs identified    Anticipated Changes Related to Illness none    Equipment Needed After Discharge none                   Discharge Plan       Row Name 09/17/24 1131       Plan    Plan Dcp iniated at bedside with pt providing demographics. She does not have an AD, POA, financial stressors, or home DME. Dr. Hunter Mills is her primary, also follows with mental health provider, [facility], agreeable to meds to bed. Pt hresides with her parents and has been at current address 1 yrs. Her mother provides transportation. She plans to return home at dc, no needs identified.    Final Discharge Disposition Code 01 - home or self-care                  Continued Care and Services - Admitted Since 9/16/2024    No active coordination exists for this encounter.          Demographic Summary       Row Name 09/17/24 1128       General Information    Admission Type inpatient    Arrived From emergency department    Referral Source admission list    Reason for Consult discharge planning    Preferred Language English       Contact Information    Permission Granted to Share Info With family/designee                   Functional Status       Row Name 09/17/24 1128       Functional Status    Usual Activity Tolerance good    Current Activity Tolerance good       Physical Activity    On average, how many days per week do you engage in moderate to strenuous exercise (like a brisk walk)? 0 days    On average, how many minutes do you engage in exercise at this level? 0 min    Number of minutes of exercise per week 0       Functional Status, IADL    Medications independent    Meal Preparation independent    Housekeeping independent    Laundry independent    Shopping independent       Mental Status    General Appearance WDL WDL       Mental  Status Summary    Recent Changes in Mental Status/Cognitive Functioning no changes       Employment/    Employment Status disabled                   Psychosocial    No documentation.                  Abuse/Neglect    No documentation.                  Legal    No documentation.                  Substance Abuse    No documentation.                  Patient Forms    No documentation.                     Jolly Jimenez RN

## 2024-09-18 NOTE — NURSING NOTE
Patient told care tech she wanted to sign out against medical advice at 1942 care tech told me. I went in her room at 1943 asked if she wanted to leave against medical advise she said yes. Patient was educated on complication that could occur if she left and she still wanted to leave. Dr Hernández was called at 1945 said if she wanted to leave she can. Patient signed AMA paper work at 1955 and walked off the floor at 1956.

## 2024-09-18 NOTE — PAYOR COMM NOTE
"To:  [place]  From: Shanel Fontana RN  Phone: [phone]  Fax: [phone]  NPI: [id number]  TIN: [id number]  Member ID: [id number]   MRN: [id number]    Leora Amezquita (39 y.o. Female)       Date of Birth   1985    Social Security Number       Address   [address] [place] [address] [place] [address]    Home Phone   [phone]    MRN   [id number]       Episcopalian   [facility]    Marital Status   Single                            Admission Date   9/16/24    Admission Type   Emergency    Admitting Provider   Gabriela Zhao MD    Attending Provider       Department, Room/Bed   [facility] TELEMETRY 3, 311/1       Discharge Date   9/17/2024    Discharge Disposition   Left Against Medical Advice    Discharge Destination                                 Attending Provider: (none)   Allergies: Aspirin, Codeine, Cyclobenzaprine, Haldol [Haloperidol], Imitrex [Sumatriptan], Latex, Penicillins, Zofran [Ondansetron], Lamictal [Lamotrigine], Metoclopramide, Morphine, Oxycodone-acetaminophen, Oxycontin [Oxycodone], Prochlorperazine, Tramadol    Isolation: None   Infection: None   Code Status: Prior    Ht: 157.5 cm (62\")   Wt: 123 kg (272 lb 0.8 oz)    Admission Cmt: None   Principal Problem: Pancreatitis [K85.90]                   Active Insurance as of 9/16/2024       Primary Coverage       Payor Plan Insurance Group Employer/Plan Group    [facility] MEDICAID ANTHEM MEDICAID [id number]       Payor Plan Address Payor Plan Phone Number Payor Plan Fax Number Effective Dates    [address] [address] [phone]  1/1/2022 - None Entered    [facility] [id number]         Subscriber Name Subscriber Birth Date Member ID       LEORA AMEZQUITA 1985 [id number]                     Emergency Contacts        (Rel.) Home Phone Work Phone Mobile Phone    Yaniv Doe (Mother) [phone] -- [phone]                 Discharge Summary        Lily Collins DO at 09/17/24 [address] [place] [address]" [facility]   DISCHARGE SUMMARY      Name:  Lindsay Amezquita   Age:  39 y.o.  Sex:  female  :  1985  MRN:  [id number]   Visit Number:  [id number]    Admission Date:  2024  Date of Discharge:  2024  Primary Care Physician:  Hunter Winkler MD    Important issues to note:    Patient unfortunately left AMA on evening of 2024    Discharge Diagnoses:     Acute pancreatitis, POA  Incidental left adrenal nodule  Anxiety/depression  PTSD  Bipolar disorder  COPD  Diabetes mellitus  GERD  History of PE and DVT  Morbid obesity with BMI of 49    Problem List:     Active Hospital Problems    Diagnosis  POA    **Pancreatitis [K85.90]  Yes      Resolved Hospital Problems   No resolved problems to display.     Presenting Problem:    Chief Complaint   Patient presents with    Vomiting    Abdominal Pain      Consults:     Consulting Physician(s)                     None              Procedures Performed:        History of presenting illness/Hospital Course:    Patient is a chronically ill 39-year-old female with a history of PTSD, bipolar disease, COPD, diabetes, prior DVT/PE, who presented from home with complaints of abdominal pain radiating to her back and lower abdomen.  She has associated nausea and vomiting.     Workup in the emergency room the patient had a lipase greater than 1500 white count of 16 ALT of 40 lactic acid 2.4.  Urinalysis unremarkable.  UDS positive for barbiturates, benzodiazepines, opioids, TCAs.  CT scan abdomen pelvis showing acute pancreatitis moderate free fluid, no evidence of pseudocyst.  There was interval increase in a 2.5 cm left adrenal nodule.  There is also evidence of fatty liver disease.     The patient was given IV fluid resuscitation, pain control, and admitted to the hospital service.  Her enzymes including lipase were trending down this morning.  She was switched to a clear liquid diet.  She was kept on IV fluids.  Otherwise she had been in stable  condition.    I was notified by nighttime attending that patient had left AGAINST MEDICAL ADVICE.  Apparently patient had wanted to smoke, was not willing to use nicotine patch or nicotine gum, and subsequent left the hospital.    Vital Signs:    Temp:  [98.5 °F (36.9 °C)-98.7 °F (37.1 °C)] 98.5 °F (36.9 °C)  Heart Rate:  [115-132] 126  Resp:  [18-20] 18  BP: (150-164)/() 155/103    Physical Exam:      Pertinent Lab Results:     Results from last 7 days   Lab Units 09/17/24  0720 09/16/24 2317 09/16/24  1710   SODIUM mmol/L 134* 135* 134*   POTASSIUM mmol/L 4.1 4.1 4.2   CHLORIDE mmol/L 97* 97* 98   CO2 mmol/L 18.7* 18.6* 15.0*   BUN mg/dL 8 7 8   CREATININE mg/dL 0.47* 0.53* 0.58   CALCIUM mg/dL 8.8 8.7 8.9   BILIRUBIN mg/dL 0.8 0.8 0.6   ALK PHOS U/L 66 66 66   ALT (SGPT) U/L 31 34* 40*   AST (SGOT) U/L 16 19 20   GLUCOSE mg/dL 156* 150* 147*     Results from last 7 days   Lab Units 09/17/24  0720 09/16/24 2317 09/16/24  1710   WBC 10*3/mm3 15.93* 15.90* 16.62*   HEMOGLOBIN g/dL 16.1* 15.8 16.1*   HEMATOCRIT % 48.8* 46.1 48.4*   PLATELETS 10*3/mm3 274 278 230         Results from last 7 days   Lab Units 09/15/24  1131 09/14/24  0523 09/14/24  0314   HSTROP T ng/L 7 <6 <6     Results from last 7 days   Lab Units 09/14/24  0314   PROBNP pg/mL <36.0         Results from last 7 days   Lab Units 09/17/24  0720   LIPASE U/L 600*               Pertinent Radiology Results:    Imaging Results (All)       None            Echo:      Condition on Discharge:      Stable.    Code status during the hospital stay:    Code Status and Medical Interventions: CPR (Attempt to Resuscitate); Full Support   Ordered at: 09/17/24 0331     Code Status (Patient has no pulse and is not breathing):    CPR (Attempt to Resuscitate)     Medical Interventions (Patient has pulse or is breathing):    Full Support     Discharge Disposition:    Left Against Medical Advice    Discharge Medications:       Discharge Medications        Continue  These Medications        Instructions Start Date   acetaminophen 500 MG tablet  Commonly known as: TYLENOL   1,000 mg, Oral, Every 8 Hours PRN      albuterol sulfate  (90 Base) MCG/ACT inhaler  Commonly known as: Ventolin HFA   2 puffs, Inhalation, Every 4 Hours PRN      amitriptyline 150 MG tablet  Commonly known as: ELAVIL   150 mg, Oral, Nightly      atorvastatin 40 MG tablet  Commonly known as: LIPITOR   40 mg, Oral, Every Night at Bedtime      azelastine 0.1 % nasal spray  Commonly known as: ASTELIN   2 sprays, Nasal, 2 Times Daily, Use in each nostril as directed      buPROPion  MG 24 hr tablet  Commonly known as: Wellbutrin XL   150 mg, Oral, Every Morning      butalbital-acetaminophen-caffeine -40 MG per tablet  Commonly known as: FIORICET, ESGIC   1 tablet, Oral, 2 Times Daily PRN      Cariprazine HCl 4.5 MG capsule capsule  Commonly known as: Vraylar   4.5 mg, Oral, Daily      cefdinir 300 MG capsule  Commonly known as: OMNICEF   300 mg, Oral, 2 Times Daily      Chlorhexidine Gluconate 4 % solution   APPLY TOPICALLY TO THE APPROPRIATE AREA AS DIRECTED DAILY AS NEEDED FOR WOUND CARE.      desvenlafaxine 100 MG 24 hr tablet  Commonly known as: PRISTIQ   100 mg, Oral, Daily      diazePAM 10 MG tablet  Commonly known as: VALIUM   10 mg, Oral, 3 Times Daily PRN      doxycycline 100 MG capsule  Commonly known as: VIBRAMYCIN   100 mg, Oral, 2 Times Daily      empagliflozin 10 MG tablet tablet  Commonly known as: Jardiance   10 mg, Oral, Daily      fexofenadine-pseudoephedrine  MG per 12 hr tablet  Commonly known as: GNP Fexofenadine/PSE ER   1 tablet, Oral, 2 Times Daily      fluticasone 50 MCG/ACT nasal spray  Commonly known as: FLONASE   2 sprays, Nasal, Daily      FREESTYLE LITE test strip  Generic drug: glucose blood   See Admin Instructions      gabapentin 800 MG tablet  Commonly known as: Neurontin   800 mg, Oral, 3 Times Daily      guaiFENesin-dextromethorphan 100-10 MG/5ML  syrup  Commonly known as: ROBITUSSIN DM   5 mL, Oral, 3 Times Daily PRN      HYDROcodone-acetaminophen 5-325 MG per tablet  Commonly known as: NORCO   1 tablet, Oral, Every 8 Hours PRN      ibuprofen 800 MG tablet  Commonly known as: ADVIL,MOTRIN   800 mg, Oral, Every 8 Hours PRN      ketorolac 10 MG tablet  Commonly known as: TORADOL   10 mg, Oral, Every 6 Hours PRN      Lurasidone HCl 120 MG tablet tablet  Commonly known as: Latuda   120 mg, Oral, Daily      montelukast 10 MG tablet  Commonly known as: SINGULAIR   10 mg, Oral, Nightly      Nurtec 75 MG dispersible tablet  Generic drug: Rimegepant Sulfate   75 mg, Oral, Every Other Day, Take Monday,Wednesday,Friday, and Saturday.      omeprazole 20 MG capsule  Commonly known as: priLOSEC   20 mg, Oral, Daily      OXcarbazepine 300 MG tablet  Commonly known as: TRILEPTAL   300 mg, Oral, 2 Times Daily      polyethylene glycol 17 g packet  Commonly known as: MIRALAX   17 g, Oral, Daily      promethazine 25 MG tablet  Commonly known as: PHENERGAN   25 mg, Oral, Every 6 Hours PRN      promethazine-dextromethorphan 6.25-15 MG/5ML syrup  Commonly known as: PROMETHAZINE-DM   5 mL, Oral, 4 Times Daily PRN      tiZANidine 4 MG tablet  Commonly known as: ZANAFLEX   4 mg, Oral, Every 8 Hours PRN      Zavegepant HCl 10 MG/ACT solution   10 mg, Nasal, Daily PRN, (1 spray into 1 nostril every 24 hrs prn)             ASK your doctor about these medications        Instructions Start Date   fluconazole 150 MG tablet  Commonly known as: DIFLUCAN  Ask about: Should I take this medication?   150 mg, Oral, Every 72 Hours      norethindrone 5 MG tablet  Commonly known as: AYGESTIN   5 mg, Oral, 2 Times Daily             Discharge Diet:       Activity at Discharge:       Follow-up Appointments:     Follow-up Information       Hunter Winkler MD .    Specialty: Family Medicine  Contact information:  [place] [address] [phone]                           Future  Appointments   Date Time Provider Department Center   9/27/2024 10:10 AM David Ribeiro MD [facility] [place] ([facility]   10/2/2024  3:30 PM Jeannine Askew APRN [facility]   10/8/2024  2:00 PM [facility] 1 BH [facility]   10/14/2024 10:45 AM Donna Mcqueen, [facility]   12/11/2024  9:30 AM Dawn Saucedo, CLAIR [facility]     Test Results Pending at Discharge:           Lily Collins DO  09/18/24  07:07 EDT    Time: I spent 10 minutes on this discharge activity which included: face-to-face encounter with the patient, reviewing the data in the system, coordination of the care with the nursing staff as well as consultants, documentation, and entering orders.     Dictated utilizing Dragon dictation.      Electronically signed by Lily Collins DO at 09/18/24 0714

## 2024-09-18 NOTE — DISCHARGE SUMMARY
[facility]   DISCHARGE SUMMARY      Name:  Lindsay Amezquita   Age:  39 y.o.  Sex:  female  :  1985  MRN:  [id number]   Visit Number:  [id number]    Admission Date:  2024  Date of Discharge:  2024  Primary Care Physician:  Hunter Winkler MD    Important issues to note:    Patient unfortunately left AMA on evening of 2024    Discharge Diagnoses:     Acute pancreatitis, POA  Incidental left adrenal nodule  Anxiety/depression  PTSD  Bipolar disorder  COPD  Diabetes mellitus  GERD  History of PE and DVT  Morbid obesity with BMI of 49    Problem List:     Active Hospital Problems    Diagnosis  POA    **Pancreatitis [K85.90]  Yes      Resolved Hospital Problems   No resolved problems to display.     Presenting Problem:    Chief Complaint   Patient presents with    Vomiting    Abdominal Pain      Consults:     Consulting Physician(s)                     None              Procedures Performed:        History of presenting illness/Hospital Course:    Patient is a chronically ill 39-year-old female with a history of PTSD, bipolar disease, COPD, diabetes, prior DVT/PE, who presented from home with complaints of abdominal pain radiating to her back and lower abdomen.  She has associated nausea and vomiting.     Workup in the emergency room the patient had a lipase greater than 1500 white count of 16 ALT of 40 lactic acid 2.4.  Urinalysis unremarkable.  UDS positive for barbiturates, benzodiazepines, opioids, TCAs.  CT scan abdomen pelvis showing acute pancreatitis moderate free fluid, no evidence of pseudocyst.  There was interval increase in a 2.5 cm left adrenal nodule.  There is also evidence of fatty liver disease.     The patient was given IV fluid resuscitation, pain control, and admitted to the hospital service.  Her enzymes including lipase were trending down this morning.  She was switched to a clear liquid diet.  She was kept on IV fluids.  Otherwise she had been in  stable condition.    I was notified by nighttime attending that patient had left AGAINST MEDICAL ADVICE.  Apparently patient had wanted to smoke, was not willing to use nicotine patch or nicotine gum, and subsequent left the hospital.    Vital Signs:    Temp:  [98.5 °F (36.9 °C)-98.7 °F (37.1 °C)] 98.5 °F (36.9 °C)  Heart Rate:  [115-132] 126  Resp:  [18-20] 18  BP: (150-164)/() 155/103    Physical Exam:      Pertinent Lab Results:     Results from last 7 days   Lab Units 09/17/24  0720 09/16/24 2317 09/16/24  1710   SODIUM mmol/L 134* 135* 134*   POTASSIUM mmol/L 4.1 4.1 4.2   CHLORIDE mmol/L 97* 97* 98   CO2 mmol/L 18.7* 18.6* 15.0*   BUN mg/dL 8 7 8   CREATININE mg/dL 0.47* 0.53* 0.58   CALCIUM mg/dL 8.8 8.7 8.9   BILIRUBIN mg/dL 0.8 0.8 0.6   ALK PHOS U/L 66 66 66   ALT (SGPT) U/L 31 34* 40*   AST (SGOT) U/L 16 19 20   GLUCOSE mg/dL 156* 150* 147*     Results from last 7 days   Lab Units 09/17/24  0720 09/16/24 2317 09/16/24  1710   WBC 10*3/mm3 15.93* 15.90* 16.62*   HEMOGLOBIN g/dL 16.1* 15.8 16.1*   HEMATOCRIT % 48.8* 46.1 48.4*   PLATELETS 10*3/mm3 274 278 230         Results from last 7 days   Lab Units 09/15/24  1131 09/14/24  0523 09/14/24  0314   HSTROP T ng/L 7 <6 <6     Results from last 7 days   Lab Units 09/14/24  0314   PROBNP pg/mL <36.0         Results from last 7 days   Lab Units 09/17/24  0720   LIPASE U/L 600*               Pertinent Radiology Results:    Imaging Results (All)       None            Echo:      Condition on Discharge:      Stable.    Code status during the hospital stay:    Code Status and Medical Interventions: CPR (Attempt to Resuscitate); Full Support   Ordered at: 09/17/24 0331     Code Status (Patient has no pulse and is not breathing):    CPR (Attempt to Resuscitate)     Medical Interventions (Patient has pulse or is breathing):    Full Support     Discharge Disposition:    Left Against Medical Advice    Discharge Medications:       Discharge Medications         Continue These Medications        Instructions Start Date   acetaminophen 500 MG tablet  Commonly known as: TYLENOL   1,000 mg, Oral, Every 8 Hours PRN      albuterol sulfate  (90 Base) MCG/ACT inhaler  Commonly known as: Ventolin HFA   2 puffs, Inhalation, Every 4 Hours PRN      amitriptyline 150 MG tablet  Commonly known as: ELAVIL   150 mg, Oral, Nightly      atorvastatin 40 MG tablet  Commonly known as: LIPITOR   40 mg, Oral, Every Night at Bedtime      azelastine 0.1 % nasal spray  Commonly known as: ASTELIN   2 sprays, Nasal, 2 Times Daily, Use in each nostril as directed      buPROPion  MG 24 hr tablet  Commonly known as: Wellbutrin XL   150 mg, Oral, Every Morning      butalbital-acetaminophen-caffeine -40 MG per tablet  Commonly known as: FIORICET, ESGIC   1 tablet, Oral, 2 Times Daily PRN      Cariprazine HCl 4.5 MG capsule capsule  Commonly known as: Vraylar   4.5 mg, Oral, Daily      cefdinir 300 MG capsule  Commonly known as: OMNICEF   300 mg, Oral, 2 Times Daily      Chlorhexidine Gluconate 4 % solution   APPLY TOPICALLY TO THE APPROPRIATE AREA AS DIRECTED DAILY AS NEEDED FOR WOUND CARE.      desvenlafaxine 100 MG 24 hr tablet  Commonly known as: PRISTIQ   100 mg, Oral, Daily      diazePAM 10 MG tablet  Commonly known as: VALIUM   10 mg, Oral, 3 Times Daily PRN      doxycycline 100 MG capsule  Commonly known as: VIBRAMYCIN   100 mg, Oral, 2 Times Daily      empagliflozin 10 MG tablet tablet  Commonly known as: Jardiance   10 mg, Oral, Daily      fexofenadine-pseudoephedrine  MG per 12 hr tablet  Commonly known as: GNP Fexofenadine/PSE ER   1 tablet, Oral, 2 Times Daily      fluticasone 50 MCG/ACT nasal spray  Commonly known as: FLONASE   2 sprays, Nasal, Daily      FREESTYLE LITE test strip  Generic drug: glucose blood   See Admin Instructions      gabapentin 800 MG tablet  Commonly known as: Neurontin   800 mg, Oral, 3 Times Daily      guaiFENesin-dextromethorphan 100-10  MG/5ML syrup  Commonly known as: ROBITUSSIN DM   5 mL, Oral, 3 Times Daily PRN      HYDROcodone-acetaminophen 5-325 MG per tablet  Commonly known as: NORCO   1 tablet, Oral, Every 8 Hours PRN      ibuprofen 800 MG tablet  Commonly known as: ADVIL,MOTRIN   800 mg, Oral, Every 8 Hours PRN      ketorolac 10 MG tablet  Commonly known as: TORADOL   10 mg, Oral, Every 6 Hours PRN      Lurasidone HCl 120 MG tablet tablet  Commonly known as: Latuda   120 mg, Oral, Daily      montelukast 10 MG tablet  Commonly known as: SINGULAIR   10 mg, Oral, Nightly      Nurtec 75 MG dispersible tablet  Generic drug: Rimegepant Sulfate   75 mg, Oral, Every Other Day, Take Monday,Wednesday,Friday, and Saturday.      omeprazole 20 MG capsule  Commonly known as: priLOSEC   20 mg, Oral, Daily      OXcarbazepine 300 MG tablet  Commonly known as: TRILEPTAL   300 mg, Oral, 2 Times Daily      polyethylene glycol 17 g packet  Commonly known as: MIRALAX   17 g, Oral, Daily      promethazine 25 MG tablet  Commonly known as: PHENERGAN   25 mg, Oral, Every 6 Hours PRN      promethazine-dextromethorphan 6.25-15 MG/5ML syrup  Commonly known as: PROMETHAZINE-DM   5 mL, Oral, 4 Times Daily PRN      tiZANidine 4 MG tablet  Commonly known as: ZANAFLEX   4 mg, Oral, Every 8 Hours PRN      Zavegepant HCl 10 MG/ACT solution   10 mg, Nasal, Daily PRN, (1 spray into 1 nostril every 24 hrs prn)             ASK your doctor about these medications        Instructions Start Date   fluconazole 150 MG tablet  Commonly known as: DIFLUCAN  Ask about: Should I take this medication?   150 mg, Oral, Every 72 Hours      norethindrone 5 MG tablet  Commonly known as: AYGESTIN   5 mg, Oral, 2 Times Daily             Discharge Diet:       Activity at Discharge:       Follow-up Appointments:     Follow-up Information       Hunter Winkler MD .    Specialty: Family Medicine  Contact information:  [place] [address] [phone]                            Future Appointments   Date Time Provider Department Center   9/27/2024 10:10 AM David Ribeiro MD [facility] [place] ([facility]   10/2/2024  3:30 PM Jeannine Askew APRN [facility]   10/8/2024  2:00 PM [facility] 1 [facility]   10/14/2024 10:45 AM Donna Mcqueen APRN [facility]   12/11/2024  9:30 AM Dawn Saucedo, [facility]     Test Results Pending at Discharge:           Lily Collins DO  09/18/24  07:07 EDT    Time: I spent 10 minutes on this discharge activity which included: face-to-face encounter with the patient, reviewing the data in the system, coordination of the care with the nursing staff as well as consultants, documentation, and entering orders.     Dictated utilizing Dragon dictation.

## 2024-09-19 ENCOUNTER — OFFICE VISIT (OUTPATIENT)
Dept: FAMILY MEDICINE CLINIC | Facility: CLINIC | Age: 39
End: 2024-09-19
Payer: MEDICAID

## 2024-09-19 ENCOUNTER — HOSPITAL ENCOUNTER (INPATIENT)
Facility: HOSPITAL | Age: 39
LOS: 1 days | Discharge: HOME | DRG: 439 | End: 2024-09-20
Attending: STUDENT IN AN ORGANIZED HEALTH CARE EDUCATION/TRAINING PROGRAM | Admitting: INTERNAL MEDICINE
Payer: MEDICAID

## 2024-09-19 ENCOUNTER — APPOINTMENT (OUTPATIENT)
Dept: CT IMAGING | Facility: HOSPITAL | Age: 39
DRG: 439 | End: 2024-09-19
Payer: MEDICAID

## 2024-09-19 VITALS
DIASTOLIC BLOOD PRESSURE: 100 MMHG | TEMPERATURE: 98.2 F | HEART RATE: 123 BPM | BODY MASS INDEX: 49.38 KG/M2 | WEIGHT: 270 LBS | SYSTOLIC BLOOD PRESSURE: 140 MMHG | OXYGEN SATURATION: 97 %

## 2024-09-19 DIAGNOSIS — R10.9 ABDOMINAL PAIN, UNSPECIFIED ABDOMINAL LOCATION: ICD-10-CM

## 2024-09-19 DIAGNOSIS — K85.90 ACUTE PANCREATITIS, UNSPECIFIED COMPLICATION STATUS, UNSPECIFIED PANCREATITIS TYPE: Primary | ICD-10-CM

## 2024-09-19 DIAGNOSIS — E87.6 HYPOKALEMIA: ICD-10-CM

## 2024-09-19 DIAGNOSIS — R11.2 NAUSEA AND VOMITING, UNSPECIFIED VOMITING TYPE: ICD-10-CM

## 2024-09-19 DIAGNOSIS — K85.90 ACUTE PANCREATITIS WITHOUT INFECTION OR NECROSIS, UNSPECIFIED PANCREATITIS TYPE: ICD-10-CM

## 2024-09-19 DIAGNOSIS — R10.2 PELVIC PAIN: ICD-10-CM

## 2024-09-19 DIAGNOSIS — G89.4 CHRONIC PAIN SYNDROME: ICD-10-CM

## 2024-09-19 DIAGNOSIS — E87.20 LACTIC ACIDOSIS: ICD-10-CM

## 2024-09-19 LAB
ALBUMIN SERPL-MCNC: 4.1 G/DL (ref 3.5–5.2)
ALBUMIN/GLOB SERPL: 1.4 G/DL
ALP SERPL-CCNC: 86 U/L (ref 39–117)
ALT SERPL W P-5'-P-CCNC: 29 U/L (ref 1–33)
ANION GAP SERPL CALCULATED.3IONS-SCNC: 12.5 MMOL/L (ref 5–15)
AST SERPL-CCNC: 37 U/L (ref 1–32)
BASOPHILS # BLD AUTO: 0.04 10*3/MM3 (ref 0–0.2)
BASOPHILS NFR BLD AUTO: 0.3 % (ref 0–1.5)
BILIRUB SERPL-MCNC: 0.4 MG/DL (ref 0–1.2)
BUN SERPL-MCNC: 6 MG/DL (ref 6–20)
BUN/CREAT SERPL: 8.2 (ref 7–25)
CALCIUM SPEC-SCNC: 9.1 MG/DL (ref 8.6–10.5)
CHLORIDE SERPL-SCNC: 97 MMOL/L (ref 98–107)
CO2 SERPL-SCNC: 29.5 MMOL/L (ref 22–29)
CREAT SERPL-MCNC: 0.73 MG/DL (ref 0.57–1)
D-LACTATE SERPL-SCNC: 1.1 MMOL/L (ref 0.5–2)
D-LACTATE SERPL-SCNC: 2.6 MMOL/L (ref 0.5–2)
DEPRECATED RDW RBC AUTO: 43.4 FL (ref 37–54)
EGFRCR SERPLBLD CKD-EPI 2021: 106.8 ML/MIN/1.73
EOSINOPHIL # BLD AUTO: 0.12 10*3/MM3 (ref 0–0.4)
EOSINOPHIL NFR BLD AUTO: 0.9 % (ref 0.3–6.2)
ERYTHROCYTE [DISTWIDTH] IN BLOOD BY AUTOMATED COUNT: 13.2 % (ref 12.3–15.4)
GLOBULIN UR ELPH-MCNC: 2.9 GM/DL
GLUCOSE SERPL-MCNC: 145 MG/DL (ref 65–99)
HCT VFR BLD AUTO: 43 % (ref 34–46.6)
HGB BLD-MCNC: 14.6 G/DL (ref 12–15.9)
IMM GRANULOCYTES # BLD AUTO: 0.05 10*3/MM3 (ref 0–0.05)
IMM GRANULOCYTES NFR BLD AUTO: 0.4 % (ref 0–0.5)
LIPASE SERPL-CCNC: 78 U/L (ref 13–60)
LYMPHOCYTES # BLD AUTO: 1.98 10*3/MM3 (ref 0.7–3.1)
LYMPHOCYTES NFR BLD AUTO: 15.3 % (ref 19.6–45.3)
MAGNESIUM SERPL-MCNC: 2.4 MG/DL (ref 1.6–2.6)
MCH RBC QN AUTO: 30.2 PG (ref 26.6–33)
MCHC RBC AUTO-ENTMCNC: 34 G/DL (ref 31.5–35.7)
MCV RBC AUTO: 89 FL (ref 79–97)
MONOCYTES # BLD AUTO: 0.79 10*3/MM3 (ref 0.1–0.9)
MONOCYTES NFR BLD AUTO: 6.1 % (ref 5–12)
NEUTROPHILS NFR BLD AUTO: 77 % (ref 42.7–76)
NEUTROPHILS NFR BLD AUTO: 9.93 10*3/MM3 (ref 1.7–7)
NRBC BLD AUTO-RTO: 0 /100 WBC (ref 0–0.2)
PLATELET # BLD AUTO: 271 10*3/MM3 (ref 140–450)
PMV BLD AUTO: 10.4 FL (ref 6–12)
POTASSIUM SERPL-SCNC: 2.8 MMOL/L (ref 3.5–5.2)
PROT SERPL-MCNC: 7 G/DL (ref 6–8.5)
RBC # BLD AUTO: 4.83 10*6/MM3 (ref 3.77–5.28)
SODIUM SERPL-SCNC: 139 MMOL/L (ref 136–145)
WBC NRBC COR # BLD AUTO: 12.91 10*3/MM3 (ref 3.4–10.8)

## 2024-09-19 PROCEDURE — 25010000002 ONDANSETRON PER 1 MG: Performed by: STUDENT IN AN ORGANIZED HEALTH CARE EDUCATION/TRAINING PROGRAM

## 2024-09-19 PROCEDURE — 93005 ELECTROCARDIOGRAM TRACING: CPT | Performed by: STUDENT IN AN ORGANIZED HEALTH CARE EDUCATION/TRAINING PROGRAM

## 2024-09-19 PROCEDURE — 63710000001 PROMETHAZINE PER 25 MG: Performed by: STUDENT IN AN ORGANIZED HEALTH CARE EDUCATION/TRAINING PROGRAM

## 2024-09-19 PROCEDURE — 74177 CT ABD & PELVIS W/CONTRAST: CPT

## 2024-09-19 PROCEDURE — 25810000003 LACTATED RINGERS SOLUTION: Performed by: STUDENT IN AN ORGANIZED HEALTH CARE EDUCATION/TRAINING PROGRAM

## 2024-09-19 PROCEDURE — 1125F AMNT PAIN NOTED PAIN PRSNT: CPT | Performed by: NURSE PRACTITIONER

## 2024-09-19 PROCEDURE — 83605 ASSAY OF LACTIC ACID: CPT | Performed by: STUDENT IN AN ORGANIZED HEALTH CARE EDUCATION/TRAINING PROGRAM

## 2024-09-19 PROCEDURE — 99285 EMERGENCY DEPT VISIT HI MDM: CPT

## 2024-09-19 PROCEDURE — 25010000002 HYDROMORPHONE 1 MG/ML SOLUTION: Performed by: STUDENT IN AN ORGANIZED HEALTH CARE EDUCATION/TRAINING PROGRAM

## 2024-09-19 PROCEDURE — 85025 COMPLETE CBC W/AUTO DIFF WBC: CPT | Performed by: STUDENT IN AN ORGANIZED HEALTH CARE EDUCATION/TRAINING PROGRAM

## 2024-09-19 PROCEDURE — 36415 COLL VENOUS BLD VENIPUNCTURE: CPT

## 2024-09-19 PROCEDURE — 3044F HG A1C LEVEL LT 7.0%: CPT | Performed by: NURSE PRACTITIONER

## 2024-09-19 PROCEDURE — 1159F MED LIST DOCD IN RCRD: CPT | Performed by: NURSE PRACTITIONER

## 2024-09-19 PROCEDURE — 25510000001 IOPAMIDOL 61 % SOLUTION: Performed by: STUDENT IN AN ORGANIZED HEALTH CARE EDUCATION/TRAINING PROGRAM

## 2024-09-19 PROCEDURE — 83690 ASSAY OF LIPASE: CPT | Performed by: STUDENT IN AN ORGANIZED HEALTH CARE EDUCATION/TRAINING PROGRAM

## 2024-09-19 PROCEDURE — 83735 ASSAY OF MAGNESIUM: CPT | Performed by: STUDENT IN AN ORGANIZED HEALTH CARE EDUCATION/TRAINING PROGRAM

## 2024-09-19 PROCEDURE — 99222 1ST HOSP IP/OBS MODERATE 55: CPT | Performed by: INTERNAL MEDICINE

## 2024-09-19 PROCEDURE — 99213 OFFICE O/P EST LOW 20 MIN: CPT | Performed by: NURSE PRACTITIONER

## 2024-09-19 PROCEDURE — 80053 COMPREHEN METABOLIC PANEL: CPT | Performed by: STUDENT IN AN ORGANIZED HEALTH CARE EDUCATION/TRAINING PROGRAM

## 2024-09-19 PROCEDURE — 1160F RVW MEDS BY RX/DR IN RCRD: CPT | Performed by: NURSE PRACTITIONER

## 2024-09-19 RX ORDER — DESVENLAFAXINE 50 MG/1
100 TABLET, FILM COATED, EXTENDED RELEASE ORAL DAILY
Status: DISCONTINUED | OUTPATIENT
Start: 2024-09-20 | End: 2024-09-20 | Stop reason: HOSPADM

## 2024-09-19 RX ORDER — SODIUM CHLORIDE 9 MG/ML
100 INJECTION, SOLUTION INTRAVENOUS CONTINUOUS
Status: DISCONTINUED | OUTPATIENT
Start: 2024-09-20 | End: 2024-09-20 | Stop reason: HOSPADM

## 2024-09-19 RX ORDER — SODIUM CHLORIDE 0.9 % (FLUSH) 0.9 %
10 SYRINGE (ML) INJECTION AS NEEDED
Status: DISCONTINUED | OUTPATIENT
Start: 2024-09-19 | End: 2024-09-20 | Stop reason: HOSPADM

## 2024-09-19 RX ORDER — ATORVASTATIN CALCIUM 40 MG/1
40 TABLET, FILM COATED ORAL DAILY
Status: DISCONTINUED | OUTPATIENT
Start: 2024-09-20 | End: 2024-09-20 | Stop reason: HOSPADM

## 2024-09-19 RX ORDER — PANTOPRAZOLE SODIUM 40 MG/1
40 TABLET, DELAYED RELEASE ORAL
Status: DISCONTINUED | OUTPATIENT
Start: 2024-09-20 | End: 2024-09-20 | Stop reason: HOSPADM

## 2024-09-19 RX ORDER — BISACODYL 10 MG
10 SUPPOSITORY, RECTAL RECTAL DAILY PRN
Status: DISCONTINUED | OUTPATIENT
Start: 2024-09-19 | End: 2024-09-20 | Stop reason: HOSPADM

## 2024-09-19 RX ORDER — ENOXAPARIN SODIUM 100 MG/ML
40 INJECTION SUBCUTANEOUS EVERY 12 HOURS
Status: DISCONTINUED | OUTPATIENT
Start: 2024-09-20 | End: 2024-09-20 | Stop reason: HOSPADM

## 2024-09-19 RX ORDER — BUPROPION HYDROCHLORIDE 150 MG/1
150 TABLET ORAL EVERY MORNING
Status: DISCONTINUED | OUTPATIENT
Start: 2024-09-20 | End: 2024-09-20 | Stop reason: HOSPADM

## 2024-09-19 RX ORDER — NICOTINE 21 MG/24HR
1 PATCH, TRANSDERMAL 24 HOURS TRANSDERMAL EVERY 24 HOURS
Status: DISCONTINUED | OUTPATIENT
Start: 2024-09-20 | End: 2024-09-20 | Stop reason: HOSPADM

## 2024-09-19 RX ORDER — SODIUM CHLORIDE 9 MG/ML
40 INJECTION, SOLUTION INTRAVENOUS AS NEEDED
Status: DISCONTINUED | OUTPATIENT
Start: 2024-09-19 | End: 2024-09-20 | Stop reason: HOSPADM

## 2024-09-19 RX ORDER — BISACODYL 5 MG/1
5 TABLET, DELAYED RELEASE ORAL DAILY PRN
Status: DISCONTINUED | OUTPATIENT
Start: 2024-09-19 | End: 2024-09-20 | Stop reason: HOSPADM

## 2024-09-19 RX ORDER — FLUTICASONE FUROATE, UMECLIDINIUM BROMIDE AND VILANTEROL TRIFENATATE 200; 62.5; 25 UG/1; UG/1; UG/1
1 POWDER RESPIRATORY (INHALATION)
COMMUNITY
Start: 2024-09-17

## 2024-09-19 RX ORDER — AMOXICILLIN 250 MG
2 CAPSULE ORAL 2 TIMES DAILY PRN
Status: DISCONTINUED | OUTPATIENT
Start: 2024-09-19 | End: 2024-09-20 | Stop reason: HOSPADM

## 2024-09-19 RX ORDER — POTASSIUM CHLORIDE 1500 MG/1
40 TABLET, EXTENDED RELEASE ORAL EVERY 4 HOURS
Status: DISPENSED | OUTPATIENT
Start: 2024-09-20 | End: 2024-09-20

## 2024-09-19 RX ORDER — IOPAMIDOL 612 MG/ML
100 INJECTION, SOLUTION INTRAVASCULAR
Status: COMPLETED | OUTPATIENT
Start: 2024-09-19 | End: 2024-09-19

## 2024-09-19 RX ORDER — HYDROCODONE BITARTRATE AND ACETAMINOPHEN 5; 325 MG/1; MG/1
1 TABLET ORAL ONCE
Status: COMPLETED | OUTPATIENT
Start: 2024-09-19 | End: 2024-09-19

## 2024-09-19 RX ORDER — BUDESONIDE AND FORMOTEROL FUMARATE DIHYDRATE 160; 4.5 UG/1; UG/1
2 AEROSOL RESPIRATORY (INHALATION)
Status: DISCONTINUED | OUTPATIENT
Start: 2024-09-20 | End: 2024-09-20 | Stop reason: HOSPADM

## 2024-09-19 RX ORDER — PROMETHAZINE HYDROCHLORIDE 12.5 MG/1
12.5 TABLET ORAL ONCE
Status: COMPLETED | OUTPATIENT
Start: 2024-09-19 | End: 2024-09-19

## 2024-09-19 RX ORDER — SODIUM CHLORIDE 0.9 % (FLUSH) 0.9 %
10 SYRINGE (ML) INJECTION EVERY 12 HOURS SCHEDULED
Status: DISCONTINUED | OUTPATIENT
Start: 2024-09-20 | End: 2024-09-20 | Stop reason: HOSPADM

## 2024-09-19 RX ORDER — OXCARBAZEPINE 300 MG/1
300 TABLET, FILM COATED ORAL 2 TIMES DAILY
Status: DISCONTINUED | OUTPATIENT
Start: 2024-09-20 | End: 2024-09-20 | Stop reason: HOSPADM

## 2024-09-19 RX ORDER — POLYETHYLENE GLYCOL 3350 17 G/17G
17 POWDER, FOR SOLUTION ORAL DAILY PRN
Status: DISCONTINUED | OUTPATIENT
Start: 2024-09-19 | End: 2024-09-20 | Stop reason: HOSPADM

## 2024-09-19 RX ORDER — ACETAMINOPHEN 160 MG/5ML
650 SOLUTION ORAL EVERY 4 HOURS PRN
Status: DISCONTINUED | OUTPATIENT
Start: 2024-09-19 | End: 2024-09-20 | Stop reason: HOSPADM

## 2024-09-19 RX ORDER — HYDROCODONE BITARTRATE AND ACETAMINOPHEN 10; 325 MG/1; MG/1
1 TABLET ORAL EVERY 6 HOURS PRN
Status: DISCONTINUED | OUTPATIENT
Start: 2024-09-19 | End: 2024-09-20 | Stop reason: HOSPADM

## 2024-09-19 RX ORDER — ONDANSETRON 2 MG/ML
4 INJECTION INTRAMUSCULAR; INTRAVENOUS EVERY 6 HOURS PRN
Status: DISCONTINUED | OUTPATIENT
Start: 2024-09-19 | End: 2024-09-20 | Stop reason: HOSPADM

## 2024-09-19 RX ORDER — ACETAMINOPHEN 650 MG/1
650 SUPPOSITORY RECTAL EVERY 4 HOURS PRN
Status: DISCONTINUED | OUTPATIENT
Start: 2024-09-19 | End: 2024-09-20 | Stop reason: HOSPADM

## 2024-09-19 RX ORDER — ONDANSETRON 2 MG/ML
4 INJECTION INTRAMUSCULAR; INTRAVENOUS ONCE
Status: COMPLETED | OUTPATIENT
Start: 2024-09-19 | End: 2024-09-19

## 2024-09-19 RX ORDER — AMITRIPTYLINE HYDROCHLORIDE 50 MG/1
150 TABLET ORAL NIGHTLY
Status: DISCONTINUED | OUTPATIENT
Start: 2024-09-20 | End: 2024-09-20 | Stop reason: HOSPADM

## 2024-09-19 RX ORDER — HYDROCODONE BITARTRATE AND ACETAMINOPHEN 5; 325 MG/1; MG/1
TABLET ORAL
Status: DISPENSED
Start: 2024-09-19 | End: 2024-09-20

## 2024-09-19 RX ORDER — LURASIDONE HYDROCHLORIDE 20 MG/1
120 TABLET, FILM COATED ORAL DAILY
Status: DISCONTINUED | OUTPATIENT
Start: 2024-09-20 | End: 2024-09-20 | Stop reason: HOSPADM

## 2024-09-19 RX ORDER — PROMETHAZINE HYDROCHLORIDE 25 MG/1
TABLET ORAL
Status: DISPENSED
Start: 2024-09-19 | End: 2024-09-20

## 2024-09-19 RX ORDER — GABAPENTIN 400 MG/1
800 CAPSULE ORAL EVERY 8 HOURS SCHEDULED
Status: DISCONTINUED | OUTPATIENT
Start: 2024-09-20 | End: 2024-09-20 | Stop reason: HOSPADM

## 2024-09-19 RX ORDER — DIAZEPAM 5 MG
10 TABLET ORAL 3 TIMES DAILY PRN
Status: DISCONTINUED | OUTPATIENT
Start: 2024-09-19 | End: 2024-09-20 | Stop reason: HOSPADM

## 2024-09-19 RX ORDER — ACETAMINOPHEN 325 MG/1
650 TABLET ORAL EVERY 4 HOURS PRN
Status: DISCONTINUED | OUTPATIENT
Start: 2024-09-19 | End: 2024-09-20 | Stop reason: HOSPADM

## 2024-09-19 RX ORDER — HYDROMORPHONE HYDROCHLORIDE 2 MG/ML
0.25 INJECTION, SOLUTION INTRAMUSCULAR; INTRAVENOUS; SUBCUTANEOUS ONCE
Status: DISCONTINUED | OUTPATIENT
Start: 2024-09-19 | End: 2024-09-19

## 2024-09-19 RX ADMIN — HYDROMORPHONE HYDROCHLORIDE 1 MG: 1 INJECTION, SOLUTION INTRAMUSCULAR; INTRAVENOUS; SUBCUTANEOUS at 18:59

## 2024-09-19 RX ADMIN — PROMETHAZINE HYDROCHLORIDE 12.5 MG: 25 TABLET ORAL at 22:51

## 2024-09-19 RX ADMIN — SODIUM CHLORIDE, POTASSIUM CHLORIDE, SODIUM LACTATE AND CALCIUM CHLORIDE 1000 ML: 600; 310; 30; 20 INJECTION, SOLUTION INTRAVENOUS at 18:59

## 2024-09-19 RX ADMIN — HYDROCODONE BITARTRATE AND ACETAMINOPHEN 1 TABLET: 5; 325 TABLET ORAL at 22:50

## 2024-09-19 RX ADMIN — ONDANSETRON 4 MG: 2 INJECTION INTRAMUSCULAR; INTRAVENOUS at 18:59

## 2024-09-19 RX ADMIN — IOPAMIDOL 100 ML: 612 INJECTION, SOLUTION INTRAVENOUS at 20:14

## 2024-09-20 ENCOUNTER — READMISSION MANAGEMENT (OUTPATIENT)
Dept: CALL CENTER | Facility: HOSPITAL | Age: 39
End: 2024-09-20
Payer: MEDICAID

## 2024-09-20 VITALS
RESPIRATION RATE: 18 BRPM | WEIGHT: 270 LBS | HEIGHT: 62 IN | BODY MASS INDEX: 49.69 KG/M2 | DIASTOLIC BLOOD PRESSURE: 94 MMHG | OXYGEN SATURATION: 96 % | HEART RATE: 119 BPM | SYSTOLIC BLOOD PRESSURE: 162 MMHG | TEMPERATURE: 98.3 F

## 2024-09-20 LAB
ANION GAP SERPL CALCULATED.3IONS-SCNC: 13.4 MMOL/L (ref 5–15)
BASOPHILS # BLD AUTO: 0.03 10*3/MM3 (ref 0–0.2)
BASOPHILS NFR BLD AUTO: 0.2 % (ref 0–1.5)
BUN SERPL-MCNC: 4 MG/DL (ref 6–20)
BUN/CREAT SERPL: 8.3 (ref 7–25)
CALCIUM SPEC-SCNC: 8.9 MG/DL (ref 8.6–10.5)
CHLORIDE SERPL-SCNC: 99 MMOL/L (ref 98–107)
CO2 SERPL-SCNC: 25.6 MMOL/L (ref 22–29)
CREAT SERPL-MCNC: 0.48 MG/DL (ref 0.57–1)
DEPRECATED RDW RBC AUTO: 42.5 FL (ref 37–54)
EGFRCR SERPLBLD CKD-EPI 2021: 123 ML/MIN/1.73
EOSINOPHIL # BLD AUTO: 0.11 10*3/MM3 (ref 0–0.4)
EOSINOPHIL NFR BLD AUTO: 0.9 % (ref 0.3–6.2)
ERYTHROCYTE [DISTWIDTH] IN BLOOD BY AUTOMATED COUNT: 13.4 % (ref 12.3–15.4)
GLUCOSE SERPL-MCNC: 124 MG/DL (ref 65–99)
HCT VFR BLD AUTO: 40.9 % (ref 34–46.6)
HGB BLD-MCNC: 14.2 G/DL (ref 12–15.9)
IMM GRANULOCYTES # BLD AUTO: 0.06 10*3/MM3 (ref 0–0.05)
IMM GRANULOCYTES NFR BLD AUTO: 0.5 % (ref 0–0.5)
LYMPHOCYTES # BLD AUTO: 2.61 10*3/MM3 (ref 0.7–3.1)
LYMPHOCYTES NFR BLD AUTO: 21.2 % (ref 19.6–45.3)
MCH RBC QN AUTO: 30.3 PG (ref 26.6–33)
MCHC RBC AUTO-ENTMCNC: 34.7 G/DL (ref 31.5–35.7)
MCV RBC AUTO: 87.2 FL (ref 79–97)
MONOCYTES # BLD AUTO: 0.91 10*3/MM3 (ref 0.1–0.9)
MONOCYTES NFR BLD AUTO: 7.4 % (ref 5–12)
NEUTROPHILS NFR BLD AUTO: 69.8 % (ref 42.7–76)
NEUTROPHILS NFR BLD AUTO: 8.58 10*3/MM3 (ref 1.7–7)
NRBC BLD AUTO-RTO: 0 /100 WBC (ref 0–0.2)
PLATELET # BLD AUTO: 234 10*3/MM3 (ref 140–450)
PMV BLD AUTO: 10.5 FL (ref 6–12)
POTASSIUM SERPL-SCNC: 3.3 MMOL/L (ref 3.5–5.2)
POTASSIUM SERPL-SCNC: 3.4 MMOL/L (ref 3.5–5.2)
RBC # BLD AUTO: 4.69 10*6/MM3 (ref 3.77–5.28)
SODIUM SERPL-SCNC: 138 MMOL/L (ref 136–145)
WBC NRBC COR # BLD AUTO: 12.3 10*3/MM3 (ref 3.4–10.8)

## 2024-09-20 PROCEDURE — 80048 BASIC METABOLIC PNL TOTAL CA: CPT | Performed by: INTERNAL MEDICINE

## 2024-09-20 PROCEDURE — 94761 N-INVAS EAR/PLS OXIMETRY MLT: CPT

## 2024-09-20 PROCEDURE — 94799 UNLISTED PULMONARY SVC/PX: CPT

## 2024-09-20 PROCEDURE — 94640 AIRWAY INHALATION TREATMENT: CPT

## 2024-09-20 PROCEDURE — 85025 COMPLETE CBC W/AUTO DIFF WBC: CPT | Performed by: INTERNAL MEDICINE

## 2024-09-20 PROCEDURE — 25010000002 ONDANSETRON PER 1 MG: Performed by: INTERNAL MEDICINE

## 2024-09-20 PROCEDURE — 25010000002 ENOXAPARIN PER 10 MG: Performed by: INTERNAL MEDICINE

## 2024-09-20 PROCEDURE — 84132 ASSAY OF SERUM POTASSIUM: CPT | Performed by: INTERNAL MEDICINE

## 2024-09-20 PROCEDURE — 25010000002 HYDROMORPHONE 1 MG/ML SOLUTION: Performed by: FAMILY MEDICINE

## 2024-09-20 PROCEDURE — 99238 HOSP IP/OBS DSCHRG MGMT 30/<: CPT | Performed by: FAMILY MEDICINE

## 2024-09-20 PROCEDURE — 25810000003 SODIUM CHLORIDE 0.9 % SOLUTION: Performed by: INTERNAL MEDICINE

## 2024-09-20 RX ORDER — AMITRIPTYLINE HYDROCHLORIDE 50 MG/1
TABLET ORAL
Status: COMPLETED
Start: 2024-09-20 | End: 2024-09-20

## 2024-09-20 RX ORDER — OXCARBAZEPINE 300 MG/1
TABLET, FILM COATED ORAL
Status: COMPLETED
Start: 2024-09-20 | End: 2024-09-20

## 2024-09-20 RX ORDER — POTASSIUM CHLORIDE 1500 MG/1
TABLET, EXTENDED RELEASE ORAL
Status: COMPLETED
Start: 2024-09-20 | End: 2024-09-20

## 2024-09-20 RX ORDER — GABAPENTIN 400 MG/1
CAPSULE ORAL
Status: COMPLETED
Start: 2024-09-20 | End: 2024-09-20

## 2024-09-20 RX ORDER — HYDROCODONE BITARTRATE AND ACETAMINOPHEN 5; 325 MG/1; MG/1
1 TABLET ORAL EVERY 8 HOURS PRN
Qty: 5 TABLET | Refills: 0 | Status: SHIPPED | OUTPATIENT
Start: 2024-09-20 | End: 2024-09-24

## 2024-09-20 RX ORDER — ONDANSETRON 4 MG/1
4 TABLET, ORALLY DISINTEGRATING ORAL EVERY 6 HOURS PRN
Status: DISCONTINUED | OUTPATIENT
Start: 2024-09-20 | End: 2024-09-20 | Stop reason: HOSPADM

## 2024-09-20 RX ORDER — ALPRAZOLAM 1 MG
1 TABLET ORAL 3 TIMES DAILY PRN
COMMUNITY
End: 2024-09-20 | Stop reason: HOSPADM

## 2024-09-20 RX ORDER — PROMETHAZINE HYDROCHLORIDE 25 MG/1
25 TABLET ORAL EVERY 6 HOURS PRN
Qty: 12 TABLET | Refills: 0 | Status: SHIPPED | OUTPATIENT
Start: 2024-09-20 | End: 2024-09-24 | Stop reason: SDUPTHER

## 2024-09-20 RX ORDER — HYDROCODONE BITARTRATE AND ACETAMINOPHEN 10; 325 MG/1; MG/1
TABLET ORAL
Status: COMPLETED
Start: 2024-09-20 | End: 2024-09-20

## 2024-09-20 RX ORDER — POLYETHYLENE GLYCOL 3350 17 G/17G
17 POWDER, FOR SOLUTION ORAL DAILY
Status: DISCONTINUED | OUTPATIENT
Start: 2024-09-20 | End: 2024-09-20 | Stop reason: HOSPADM

## 2024-09-20 RX ADMIN — DESVENLAFAXINE SUCCINATE 100 MG: 50 TABLET, EXTENDED RELEASE ORAL at 08:43

## 2024-09-20 RX ADMIN — HYDROCODONE BITARTRATE AND ACETAMINOPHEN 1 TABLET: 10; 325 TABLET ORAL at 00:47

## 2024-09-20 RX ADMIN — SODIUM CHLORIDE 100 ML/HR: 9 INJECTION, SOLUTION INTRAVENOUS at 01:02

## 2024-09-20 RX ADMIN — AMITRIPTYLINE HYDROCHLORIDE 150 MG: 50 TABLET, FILM COATED ORAL at 00:45

## 2024-09-20 RX ADMIN — HYDROMORPHONE HYDROCHLORIDE 0.5 MG: 1 INJECTION, SOLUTION INTRAMUSCULAR; INTRAVENOUS; SUBCUTANEOUS at 11:50

## 2024-09-20 RX ADMIN — OXCARBAZEPINE 300 MG: 300 TABLET, FILM COATED ORAL at 00:46

## 2024-09-20 RX ADMIN — DIAZEPAM 10 MG: 5 TABLET ORAL at 08:50

## 2024-09-20 RX ADMIN — LURASIDONE HYDROCHLORIDE 120 MG: 20 TABLET, FILM COATED ORAL at 08:42

## 2024-09-20 RX ADMIN — HYDROCODONE BITARTRATE AND ACETAMINOPHEN 1 TABLET: 10; 325 TABLET ORAL at 13:50

## 2024-09-20 RX ADMIN — AMITRIPTYLINE HYDROCHLORIDE 150 MG: 50 TABLET ORAL at 00:45

## 2024-09-20 RX ADMIN — POLYETHYLENE GLYCOL 3350 17 G: 17 POWDER, FOR SOLUTION ORAL at 08:45

## 2024-09-20 RX ADMIN — CARIPRAZINE 4.5 MG: 1.5 CAPSULE, GELATIN COATED ORAL at 08:43

## 2024-09-20 RX ADMIN — Medication 4 MG: at 00:47

## 2024-09-20 RX ADMIN — ENOXAPARIN SODIUM 40 MG: 100 INJECTION SUBCUTANEOUS at 11:50

## 2024-09-20 RX ADMIN — TIOTROPIUM BROMIDE INHALATION SPRAY 2 PUFF: 3.12 SPRAY, METERED RESPIRATORY (INHALATION) at 09:22

## 2024-09-20 RX ADMIN — ATORVASTATIN CALCIUM 40 MG: 40 TABLET, FILM COATED ORAL at 08:43

## 2024-09-20 RX ADMIN — Medication 10 ML: at 09:08

## 2024-09-20 RX ADMIN — ALUMINUM HYDROXIDE, MAGNESIUM HYDROXIDE, DIMETHICONE: 400; 400; 40 SUSPENSION ORAL at 04:55

## 2024-09-20 RX ADMIN — GABAPENTIN 800 MG: 400 CAPSULE ORAL at 00:46

## 2024-09-20 RX ADMIN — POTASSIUM CHLORIDE 40 MEQ: 1500 TABLET, EXTENDED RELEASE ORAL at 08:43

## 2024-09-20 RX ADMIN — GABAPENTIN 800 MG: 400 CAPSULE ORAL at 13:50

## 2024-09-20 RX ADMIN — BUDESONIDE AND FORMOTEROL FUMARATE DIHYDRATE 2 PUFF: 160; 4.5 AEROSOL RESPIRATORY (INHALATION) at 09:22

## 2024-09-20 RX ADMIN — POTASSIUM CHLORIDE 40 MEQ: 1500 TABLET, EXTENDED RELEASE ORAL at 00:48

## 2024-09-20 RX ADMIN — TIZANIDINE 4 MG: 4 TABLET ORAL at 00:47

## 2024-09-20 RX ADMIN — ONDANSETRON 4 MG: 2 INJECTION INTRAMUSCULAR; INTRAVENOUS at 01:02

## 2024-09-20 RX ADMIN — OXCARBAZEPINE 300 MG: 300 TABLET, FILM COATED ORAL at 09:07

## 2024-09-20 RX ADMIN — HYDROCODONE BITARTRATE AND ACETAMINOPHEN 1 TABLET: 10; 325 TABLET ORAL at 06:26

## 2024-09-22 ENCOUNTER — HOSPITAL ENCOUNTER (EMERGENCY)
Facility: HOSPITAL | Age: 39
Discharge: HOME | End: 2024-09-22
Attending: EMERGENCY MEDICINE | Admitting: EMERGENCY MEDICINE
Payer: MEDICAID

## 2024-09-22 ENCOUNTER — APPOINTMENT (OUTPATIENT)
Dept: CT IMAGING | Facility: HOSPITAL | Age: 39
End: 2024-09-22
Payer: MEDICAID

## 2024-09-22 VITALS
RESPIRATION RATE: 20 BRPM | HEART RATE: 95 BPM | WEIGHT: 270 LBS | SYSTOLIC BLOOD PRESSURE: 120 MMHG | TEMPERATURE: 98.6 F | BODY MASS INDEX: 49.69 KG/M2 | HEIGHT: 62 IN | OXYGEN SATURATION: 95 % | DIASTOLIC BLOOD PRESSURE: 72 MMHG

## 2024-09-22 DIAGNOSIS — K85.30 DRUG-INDUCED ACUTE PANCREATITIS WITHOUT INFECTION OR NECROSIS: Primary | ICD-10-CM

## 2024-09-22 LAB
ALBUMIN SERPL-MCNC: 3.3 G/DL (ref 3.5–5.2)
ALBUMIN/GLOB SERPL: 1.2 G/DL
ALP SERPL-CCNC: 127 U/L (ref 39–117)
ALT SERPL W P-5'-P-CCNC: 56 U/L (ref 1–33)
AMPHET+METHAMPHET UR QL: NEGATIVE
AMPHETAMINES UR QL: NEGATIVE
ANION GAP SERPL CALCULATED.3IONS-SCNC: 10.2 MMOL/L (ref 5–15)
AST SERPL-CCNC: 40 U/L (ref 1–32)
BACTERIA UR QL AUTO: ABNORMAL /HPF
BARBITURATES UR QL SCN: POSITIVE
BASOPHILS # BLD AUTO: 0.04 10*3/MM3 (ref 0–0.2)
BASOPHILS NFR BLD AUTO: 0.5 % (ref 0–1.5)
BENZODIAZ UR QL SCN: POSITIVE
BILIRUB SERPL-MCNC: 0.3 MG/DL (ref 0–1.2)
BILIRUB UR QL STRIP: NEGATIVE
BUN SERPL-MCNC: 5 MG/DL (ref 6–20)
BUN/CREAT SERPL: 9.8 (ref 7–25)
BUPRENORPHINE SERPL-MCNC: NEGATIVE NG/ML
CALCIUM SPEC-SCNC: 9.1 MG/DL (ref 8.6–10.5)
CANNABINOIDS SERPL QL: NEGATIVE
CHLORIDE SERPL-SCNC: 99 MMOL/L (ref 98–107)
CLARITY UR: CLEAR
CO2 SERPL-SCNC: 29.8 MMOL/L (ref 22–29)
COCAINE UR QL: NEGATIVE
COLOR UR: YELLOW
CREAT SERPL-MCNC: 0.51 MG/DL (ref 0.57–1)
D-LACTATE SERPL-SCNC: 1.2 MMOL/L (ref 0.5–2)
DEPRECATED RDW RBC AUTO: 44.6 FL (ref 37–54)
EGFRCR SERPLBLD CKD-EPI 2021: 121.2 ML/MIN/1.73
EOSINOPHIL # BLD AUTO: 0.21 10*3/MM3 (ref 0–0.4)
EOSINOPHIL NFR BLD AUTO: 2.4 % (ref 0.3–6.2)
ERYTHROCYTE [DISTWIDTH] IN BLOOD BY AUTOMATED COUNT: 13.6 % (ref 12.3–15.4)
ETHANOL BLD-MCNC: <10 MG/DL (ref 0–10)
ETHANOL UR QL: <0.01 %
FENTANYL UR-MCNC: NEGATIVE NG/ML
GLOBULIN UR ELPH-MCNC: 2.8 GM/DL
GLUCOSE SERPL-MCNC: 170 MG/DL (ref 65–99)
GLUCOSE UR STRIP-MCNC: ABNORMAL MG/DL
HCT VFR BLD AUTO: 37.8 % (ref 34–46.6)
HGB BLD-MCNC: 12.7 G/DL (ref 12–15.9)
HGB UR QL STRIP.AUTO: NEGATIVE
HOLD SPECIMEN: NORMAL
HOLD SPECIMEN: NORMAL
HYALINE CASTS UR QL AUTO: ABNORMAL /LPF
IMM GRANULOCYTES # BLD AUTO: 0.05 10*3/MM3 (ref 0–0.05)
IMM GRANULOCYTES NFR BLD AUTO: 0.6 % (ref 0–0.5)
KETONES UR QL STRIP: NEGATIVE
LEUKOCYTE ESTERASE UR QL STRIP.AUTO: NEGATIVE
LIPASE SERPL-CCNC: 47 U/L (ref 13–60)
LYMPHOCYTES # BLD AUTO: 1.98 10*3/MM3 (ref 0.7–3.1)
LYMPHOCYTES NFR BLD AUTO: 22.7 % (ref 19.6–45.3)
MCH RBC QN AUTO: 30.3 PG (ref 26.6–33)
MCHC RBC AUTO-ENTMCNC: 33.6 G/DL (ref 31.5–35.7)
MCV RBC AUTO: 90.2 FL (ref 79–97)
METHADONE UR QL SCN: NEGATIVE
MONOCYTES # BLD AUTO: 0.57 10*3/MM3 (ref 0.1–0.9)
MONOCYTES NFR BLD AUTO: 6.5 % (ref 5–12)
NEUTROPHILS NFR BLD AUTO: 5.87 10*3/MM3 (ref 1.7–7)
NEUTROPHILS NFR BLD AUTO: 67.3 % (ref 42.7–76)
NITRITE UR QL STRIP: NEGATIVE
NRBC BLD AUTO-RTO: 0 /100 WBC (ref 0–0.2)
OPIATES UR QL: POSITIVE
OXYCODONE UR QL SCN: NEGATIVE
PCP UR QL SCN: NEGATIVE
PH UR STRIP.AUTO: 7 [PH] (ref 5–8)
PLATELET # BLD AUTO: 288 10*3/MM3 (ref 140–450)
PMV BLD AUTO: 9.9 FL (ref 6–12)
POTASSIUM SERPL-SCNC: 3.8 MMOL/L (ref 3.5–5.2)
PROT SERPL-MCNC: 6.1 G/DL (ref 6–8.5)
PROT UR QL STRIP: ABNORMAL
RBC # BLD AUTO: 4.19 10*6/MM3 (ref 3.77–5.28)
RBC # UR STRIP: ABNORMAL /HPF
REF LAB TEST METHOD: ABNORMAL
SODIUM SERPL-SCNC: 139 MMOL/L (ref 136–145)
SP GR UR STRIP: 1.03 (ref 1–1.03)
SQUAMOUS #/AREA URNS HPF: ABNORMAL /HPF
TRICYCLICS UR QL SCN: POSITIVE
UROBILINOGEN UR QL STRIP: ABNORMAL
WBC # UR STRIP: ABNORMAL /HPF
WBC NRBC COR # BLD AUTO: 8.72 10*3/MM3 (ref 3.4–10.8)
WHOLE BLOOD HOLD COAG: NORMAL
WHOLE BLOOD HOLD SPECIMEN: NORMAL
YEAST URNS QL MICRO: ABNORMAL /HPF

## 2024-09-22 PROCEDURE — 25510000001 IOPAMIDOL 61 % SOLUTION: Performed by: EMERGENCY MEDICINE

## 2024-09-22 PROCEDURE — 74177 CT ABD & PELVIS W/CONTRAST: CPT

## 2024-09-22 PROCEDURE — 96374 THER/PROPH/DIAG INJ IV PUSH: CPT

## 2024-09-22 PROCEDURE — 36415 COLL VENOUS BLD VENIPUNCTURE: CPT

## 2024-09-22 PROCEDURE — 80053 COMPREHEN METABOLIC PANEL: CPT | Performed by: EMERGENCY MEDICINE

## 2024-09-22 PROCEDURE — 82077 ASSAY SPEC XCP UR&BREATH IA: CPT | Performed by: PHYSICIAN ASSISTANT

## 2024-09-22 PROCEDURE — 81001 URINALYSIS AUTO W/SCOPE: CPT | Performed by: EMERGENCY MEDICINE

## 2024-09-22 PROCEDURE — 99285 EMERGENCY DEPT VISIT HI MDM: CPT

## 2024-09-22 PROCEDURE — 83605 ASSAY OF LACTIC ACID: CPT | Performed by: PHYSICIAN ASSISTANT

## 2024-09-22 PROCEDURE — 80307 DRUG TEST PRSMV CHEM ANLYZR: CPT | Performed by: PHYSICIAN ASSISTANT

## 2024-09-22 PROCEDURE — 25010000002 FENTANYL CITRATE (PF) 50 MCG/ML SOLUTION: Performed by: EMERGENCY MEDICINE

## 2024-09-22 PROCEDURE — 25810000003 LACTATED RINGERS SOLUTION: Performed by: PHYSICIAN ASSISTANT

## 2024-09-22 PROCEDURE — 83690 ASSAY OF LIPASE: CPT | Performed by: EMERGENCY MEDICINE

## 2024-09-22 PROCEDURE — 96375 TX/PRO/DX INJ NEW DRUG ADDON: CPT

## 2024-09-22 PROCEDURE — 85025 COMPLETE CBC W/AUTO DIFF WBC: CPT | Performed by: EMERGENCY MEDICINE

## 2024-09-22 PROCEDURE — 25010000002 ONDANSETRON PER 1 MG: Performed by: EMERGENCY MEDICINE

## 2024-09-22 RX ORDER — SODIUM CHLORIDE 0.9 % (FLUSH) 0.9 %
10 SYRINGE (ML) INJECTION AS NEEDED
Status: DISCONTINUED | OUTPATIENT
Start: 2024-09-22 | End: 2024-09-22 | Stop reason: HOSPADM

## 2024-09-22 RX ORDER — OXYCODONE AND ACETAMINOPHEN 5; 325 MG/1; MG/1
1 TABLET ORAL EVERY 6 HOURS PRN
Qty: 12 TABLET | Refills: 0 | Status: SHIPPED | OUTPATIENT
Start: 2024-09-22 | End: 2024-09-24 | Stop reason: SDUPTHER

## 2024-09-22 RX ORDER — OXYCODONE AND ACETAMINOPHEN 5; 325 MG/1; MG/1
1 TABLET ORAL ONCE
Status: COMPLETED | OUTPATIENT
Start: 2024-09-22 | End: 2024-09-22

## 2024-09-22 RX ORDER — ONDANSETRON 2 MG/ML
4 INJECTION INTRAMUSCULAR; INTRAVENOUS ONCE
Status: COMPLETED | OUTPATIENT
Start: 2024-09-22 | End: 2024-09-22

## 2024-09-22 RX ORDER — FENTANYL CITRATE 50 UG/ML
50 INJECTION, SOLUTION INTRAMUSCULAR; INTRAVENOUS ONCE
Status: COMPLETED | OUTPATIENT
Start: 2024-09-22 | End: 2024-09-22

## 2024-09-22 RX ORDER — IOPAMIDOL 612 MG/ML
100 INJECTION, SOLUTION INTRAVASCULAR
Status: COMPLETED | OUTPATIENT
Start: 2024-09-22 | End: 2024-09-22

## 2024-09-22 RX ADMIN — FENTANYL CITRATE 50 MCG: 50 INJECTION, SOLUTION INTRAMUSCULAR; INTRAVENOUS at 17:35

## 2024-09-22 RX ADMIN — IOPAMIDOL 100 ML: 612 INJECTION, SOLUTION INTRAVENOUS at 18:39

## 2024-09-22 RX ADMIN — OXYCODONE HYDROCHLORIDE AND ACETAMINOPHEN 1 TABLET: 5; 325 TABLET ORAL at 20:18

## 2024-09-22 RX ADMIN — SODIUM CHLORIDE, POTASSIUM CHLORIDE, SODIUM LACTATE AND CALCIUM CHLORIDE 1000 ML: 600; 310; 30; 20 INJECTION, SOLUTION INTRAVENOUS at 17:35

## 2024-09-22 RX ADMIN — ONDANSETRON 4 MG: 2 INJECTION INTRAMUSCULAR; INTRAVENOUS at 17:35

## 2024-09-23 ENCOUNTER — TRANSITIONAL CARE MANAGEMENT TELEPHONE ENCOUNTER (OUTPATIENT)
Dept: CALL CENTER | Facility: HOSPITAL | Age: 39
End: 2024-09-23
Payer: MEDICAID

## 2024-09-23 DIAGNOSIS — F43.10 POST TRAUMATIC STRESS DISORDER (PTSD): ICD-10-CM

## 2024-09-23 DIAGNOSIS — F41.0 PANIC ATTACKS: ICD-10-CM

## 2024-09-23 DIAGNOSIS — F41.1 GENERALIZED ANXIETY DISORDER: ICD-10-CM

## 2024-09-23 RX ORDER — DIAZEPAM 10 MG
10 TABLET ORAL 3 TIMES DAILY PRN
Qty: 45 TABLET | Refills: 0 | Status: SHIPPED | OUTPATIENT
Start: 2024-09-23 | End: 2025-09-23

## 2024-09-24 ENCOUNTER — TELEMEDICINE (OUTPATIENT)
Dept: FAMILY MEDICINE CLINIC | Facility: CLINIC | Age: 39
End: 2024-09-24
Payer: MEDICAID

## 2024-09-24 ENCOUNTER — TELEPHONE (OUTPATIENT)
Dept: FAMILY MEDICINE CLINIC | Facility: CLINIC | Age: 39
End: 2024-09-24

## 2024-09-24 DIAGNOSIS — E66.01 CLASS 3 SEVERE OBESITY DUE TO EXCESS CALORIES WITH SERIOUS COMORBIDITY AND BODY MASS INDEX (BMI) OF 45.0 TO 49.9 IN ADULT: ICD-10-CM

## 2024-09-24 DIAGNOSIS — F17.210 CIGARETTE NICOTINE DEPENDENCE WITHOUT COMPLICATION: ICD-10-CM

## 2024-09-24 DIAGNOSIS — F41.1 GAD (GENERALIZED ANXIETY DISORDER): ICD-10-CM

## 2024-09-24 DIAGNOSIS — K85.30 DRUG-INDUCED ACUTE PANCREATITIS WITHOUT INFECTION OR NECROSIS: Primary | ICD-10-CM

## 2024-09-24 DIAGNOSIS — F32.2 SEVERE DEPRESSION: ICD-10-CM

## 2024-09-24 DIAGNOSIS — F31.81 BIPOLAR 2 DISORDER: ICD-10-CM

## 2024-09-24 DIAGNOSIS — R11.2 NAUSEA AND VOMITING, UNSPECIFIED VOMITING TYPE: ICD-10-CM

## 2024-09-24 DIAGNOSIS — Z09 HOSPITAL DISCHARGE FOLLOW-UP: ICD-10-CM

## 2024-09-24 PROCEDURE — 99496 TRANSJ CARE MGMT HIGH F2F 7D: CPT | Performed by: FAMILY MEDICINE

## 2024-09-24 PROCEDURE — 1160F RVW MEDS BY RX/DR IN RCRD: CPT | Performed by: FAMILY MEDICINE

## 2024-09-24 PROCEDURE — 1125F AMNT PAIN NOTED PAIN PRSNT: CPT | Performed by: FAMILY MEDICINE

## 2024-09-24 PROCEDURE — 3044F HG A1C LEVEL LT 7.0%: CPT | Performed by: FAMILY MEDICINE

## 2024-09-24 PROCEDURE — 1159F MED LIST DOCD IN RCRD: CPT | Performed by: FAMILY MEDICINE

## 2024-09-24 RX ORDER — OXYCODONE AND ACETAMINOPHEN 5; 325 MG/1; MG/1
1 TABLET ORAL EVERY 8 HOURS PRN
Qty: 18 TABLET | Refills: 0 | Status: SHIPPED | OUTPATIENT
Start: 2024-09-24 | End: 2024-09-24

## 2024-09-24 RX ORDER — PROMETHAZINE HYDROCHLORIDE 25 MG/1
25 TABLET ORAL EVERY 6 HOURS PRN
Qty: 20 TABLET | Refills: 0 | Status: SHIPPED | OUTPATIENT
Start: 2024-09-24

## 2024-09-24 RX ORDER — OXYCODONE AND ACETAMINOPHEN 5; 325 MG/1; MG/1
1 TABLET ORAL EVERY 8 HOURS PRN
Qty: 18 TABLET | Refills: 0 | Status: SHIPPED | OUTPATIENT
Start: 2024-09-24

## 2024-09-25 ENCOUNTER — TELEPHONE (OUTPATIENT)
Dept: FAMILY MEDICINE CLINIC | Facility: CLINIC | Age: 39
End: 2024-09-25
Payer: MEDICAID

## 2024-09-26 DIAGNOSIS — K21.00 GASTROESOPHAGEAL REFLUX DISEASE WITH ESOPHAGITIS WITHOUT HEMORRHAGE: ICD-10-CM

## 2024-09-27 DIAGNOSIS — M54.16 LUMBAR RADICULOPATHY: ICD-10-CM

## 2024-09-30 ENCOUNTER — TELEPHONE (OUTPATIENT)
Dept: FAMILY MEDICINE CLINIC | Facility: CLINIC | Age: 39
End: 2024-09-30

## 2024-09-30 ENCOUNTER — HOSPITAL ENCOUNTER (EMERGENCY)
Facility: HOSPITAL | Age: 39
Discharge: HOME | End: 2024-10-01
Attending: EMERGENCY MEDICINE
Payer: MEDICAID

## 2024-09-30 DIAGNOSIS — R10.13 EPIGASTRIC PAIN: Primary | ICD-10-CM

## 2024-09-30 LAB
ALBUMIN SERPL-MCNC: 4.2 G/DL (ref 3.5–5.2)
ALBUMIN/GLOB SERPL: 1.6 G/DL
ALP SERPL-CCNC: 88 U/L (ref 39–117)
ALT SERPL W P-5'-P-CCNC: 28 U/L (ref 1–33)
ANION GAP SERPL CALCULATED.3IONS-SCNC: 11.5 MMOL/L (ref 5–15)
AST SERPL-CCNC: 32 U/L (ref 1–32)
B-HCG UR QL: NEGATIVE
BACTERIA UR QL AUTO: ABNORMAL /HPF
BASOPHILS # BLD AUTO: 0.05 10*3/MM3 (ref 0–0.2)
BASOPHILS NFR BLD AUTO: 0.5 % (ref 0–1.5)
BILIRUB SERPL-MCNC: 0.3 MG/DL (ref 0–1.2)
BILIRUB UR QL STRIP: NEGATIVE
BUN SERPL-MCNC: 10 MG/DL (ref 6–20)
BUN/CREAT SERPL: 16.4 (ref 7–25)
CALCIUM SPEC-SCNC: 9.3 MG/DL (ref 8.6–10.5)
CHLORIDE SERPL-SCNC: 102 MMOL/L (ref 98–107)
CLARITY UR: CLEAR
CO2 SERPL-SCNC: 25.5 MMOL/L (ref 22–29)
COLOR UR: YELLOW
CREAT SERPL-MCNC: 0.61 MG/DL (ref 0.57–1)
DEPRECATED RDW RBC AUTO: 45.6 FL (ref 37–54)
EGFRCR SERPLBLD CKD-EPI 2021: 116.1 ML/MIN/1.73
EOSINOPHIL # BLD AUTO: 0.15 10*3/MM3 (ref 0–0.4)
EOSINOPHIL NFR BLD AUTO: 1.5 % (ref 0.3–6.2)
ERYTHROCYTE [DISTWIDTH] IN BLOOD BY AUTOMATED COUNT: 13.9 % (ref 12.3–15.4)
GLOBULIN UR ELPH-MCNC: 2.7 GM/DL
GLUCOSE SERPL-MCNC: 103 MG/DL (ref 65–99)
GLUCOSE UR STRIP-MCNC: ABNORMAL MG/DL
HCT VFR BLD AUTO: 45.4 % (ref 34–46.6)
HGB BLD-MCNC: 15.3 G/DL (ref 12–15.9)
HGB UR QL STRIP.AUTO: NEGATIVE
HOLD SPECIMEN: NORMAL
HOLD SPECIMEN: NORMAL
HYALINE CASTS UR QL AUTO: ABNORMAL /LPF
IMM GRANULOCYTES # BLD AUTO: 0.03 10*3/MM3 (ref 0–0.05)
IMM GRANULOCYTES NFR BLD AUTO: 0.3 % (ref 0–0.5)
KETONES UR QL STRIP: NEGATIVE
LEUKOCYTE ESTERASE UR QL STRIP.AUTO: NEGATIVE
LIPASE SERPL-CCNC: 67 U/L (ref 13–60)
LYMPHOCYTES # BLD AUTO: 3.32 10*3/MM3 (ref 0.7–3.1)
LYMPHOCYTES NFR BLD AUTO: 34 % (ref 19.6–45.3)
MCH RBC QN AUTO: 30.2 PG (ref 26.6–33)
MCHC RBC AUTO-ENTMCNC: 33.7 G/DL (ref 31.5–35.7)
MCV RBC AUTO: 89.5 FL (ref 79–97)
MONOCYTES # BLD AUTO: 0.57 10*3/MM3 (ref 0.1–0.9)
MONOCYTES NFR BLD AUTO: 5.8 % (ref 5–12)
MUCOUS THREADS URNS QL MICRO: ABNORMAL /HPF
NEUTROPHILS NFR BLD AUTO: 5.64 10*3/MM3 (ref 1.7–7)
NEUTROPHILS NFR BLD AUTO: 57.9 % (ref 42.7–76)
NITRITE UR QL STRIP: NEGATIVE
NRBC BLD AUTO-RTO: 0 /100 WBC (ref 0–0.2)
PH UR STRIP.AUTO: 7 [PH] (ref 5–8)
PLATELET # BLD AUTO: 477 10*3/MM3 (ref 140–450)
PMV BLD AUTO: 9.6 FL (ref 6–12)
POTASSIUM SERPL-SCNC: 4 MMOL/L (ref 3.5–5.2)
PROT SERPL-MCNC: 6.9 G/DL (ref 6–8.5)
PROT UR QL STRIP: ABNORMAL
RBC # BLD AUTO: 5.07 10*6/MM3 (ref 3.77–5.28)
RBC # UR STRIP: ABNORMAL /HPF
REF LAB TEST METHOD: ABNORMAL
SODIUM SERPL-SCNC: 139 MMOL/L (ref 136–145)
SP GR UR STRIP: 1.02 (ref 1–1.03)
SQUAMOUS #/AREA URNS HPF: ABNORMAL /HPF
UROBILINOGEN UR QL STRIP: ABNORMAL
WBC # UR STRIP: ABNORMAL /HPF
WBC NRBC COR # BLD AUTO: 9.76 10*3/MM3 (ref 3.4–10.8)
WHOLE BLOOD HOLD COAG: NORMAL
WHOLE BLOOD HOLD SPECIMEN: NORMAL

## 2024-09-30 PROCEDURE — 81025 URINE PREGNANCY TEST: CPT | Performed by: EMERGENCY MEDICINE

## 2024-09-30 PROCEDURE — 81001 URINALYSIS AUTO W/SCOPE: CPT | Performed by: EMERGENCY MEDICINE

## 2024-09-30 PROCEDURE — 85025 COMPLETE CBC W/AUTO DIFF WBC: CPT | Performed by: EMERGENCY MEDICINE

## 2024-09-30 PROCEDURE — 83690 ASSAY OF LIPASE: CPT | Performed by: EMERGENCY MEDICINE

## 2024-09-30 PROCEDURE — 25810000003 SODIUM CHLORIDE 0.9 % SOLUTION: Performed by: EMERGENCY MEDICINE

## 2024-09-30 PROCEDURE — 96374 THER/PROPH/DIAG INJ IV PUSH: CPT

## 2024-09-30 PROCEDURE — 36415 COLL VENOUS BLD VENIPUNCTURE: CPT

## 2024-09-30 PROCEDURE — 96375 TX/PRO/DX INJ NEW DRUG ADDON: CPT

## 2024-09-30 PROCEDURE — 25010000002 HYDROMORPHONE PER 4 MG: Performed by: EMERGENCY MEDICINE

## 2024-09-30 PROCEDURE — 80053 COMPREHEN METABOLIC PANEL: CPT | Performed by: EMERGENCY MEDICINE

## 2024-09-30 PROCEDURE — 25010000002 DROPERIDOL PER 5 MG: Performed by: EMERGENCY MEDICINE

## 2024-09-30 PROCEDURE — 99283 EMERGENCY DEPT VISIT LOW MDM: CPT

## 2024-09-30 RX ORDER — HYDROMORPHONE HYDROCHLORIDE 2 MG/ML
0.5 INJECTION, SOLUTION INTRAMUSCULAR; INTRAVENOUS; SUBCUTANEOUS ONCE
Status: COMPLETED | OUTPATIENT
Start: 2024-09-30 | End: 2024-09-30

## 2024-09-30 RX ORDER — DROPERIDOL 2.5 MG/ML
1.25 INJECTION, SOLUTION INTRAMUSCULAR; INTRAVENOUS ONCE
Status: COMPLETED | OUTPATIENT
Start: 2024-09-30 | End: 2024-09-30

## 2024-09-30 RX ADMIN — HYDROMORPHONE HYDROCHLORIDE 0.5 MG: 2 INJECTION, SOLUTION INTRAMUSCULAR; INTRAVENOUS; SUBCUTANEOUS at 23:45

## 2024-09-30 RX ADMIN — DROPERIDOL 1.25 MG: 2.5 INJECTION, SOLUTION INTRAMUSCULAR; INTRAVENOUS at 23:44

## 2024-09-30 RX ADMIN — SODIUM CHLORIDE 1000 ML: 9 INJECTION, SOLUTION INTRAVENOUS at 23:44

## 2024-09-30 NOTE — TELEPHONE ENCOUNTER
Hub staff attempted to follow warm transfer process and was unsuccessful     Caller: Lindsay Amezquita    Relationship to patient: Self    Best call back number: [phone]     Patient is needing: TO RESCHEDULE HER APPOINTMENT FROM TODAY. PATIENT WAS ADVISED AGAIN TO GO TO EMERGENCY ROOM, PATIENT DECLINED AND WANTS TO RESCHEDULE HER APPOINTMENT FOR POSSIBLE BLOOD CLOTS AND DISCUSS MEDICATIONS

## 2024-10-01 ENCOUNTER — HOSPITAL ENCOUNTER (EMERGENCY)
Facility: HOSPITAL | Age: 39
Discharge: LEFT BEFORE DISCHARGE | End: 2024-10-02
Payer: MEDICAID

## 2024-10-01 VITALS
WEIGHT: 270 LBS | HEIGHT: 62 IN | TEMPERATURE: 98.2 F | DIASTOLIC BLOOD PRESSURE: 91 MMHG | BODY MASS INDEX: 49.69 KG/M2 | RESPIRATION RATE: 18 BRPM | SYSTOLIC BLOOD PRESSURE: 145 MMHG | OXYGEN SATURATION: 94 % | HEART RATE: 100 BPM

## 2024-10-01 VITALS
WEIGHT: 257.4 LBS | HEART RATE: 120 BPM | HEIGHT: 62 IN | TEMPERATURE: 98.1 F | RESPIRATION RATE: 20 BRPM | BODY MASS INDEX: 47.37 KG/M2 | DIASTOLIC BLOOD PRESSURE: 114 MMHG | OXYGEN SATURATION: 92 % | SYSTOLIC BLOOD PRESSURE: 154 MMHG

## 2024-10-01 LAB
ALBUMIN SERPL-MCNC: 4.2 G/DL (ref 3.5–5.2)
ALBUMIN/GLOB SERPL: 1.4 G/DL
ALP SERPL-CCNC: 88 U/L (ref 39–117)
ALT SERPL W P-5'-P-CCNC: 39 U/L (ref 1–33)
ANION GAP SERPL CALCULATED.3IONS-SCNC: 16.5 MMOL/L (ref 5–15)
AST SERPL-CCNC: 42 U/L (ref 1–32)
BASOPHILS # BLD AUTO: 0.03 10*3/MM3 (ref 0–0.2)
BASOPHILS NFR BLD AUTO: 0.3 % (ref 0–1.5)
BILIRUB SERPL-MCNC: 0.3 MG/DL (ref 0–1.2)
BUN SERPL-MCNC: 7 MG/DL (ref 6–20)
BUN/CREAT SERPL: 10.8 (ref 7–25)
CALCIUM SPEC-SCNC: 9.5 MG/DL (ref 8.6–10.5)
CHLORIDE SERPL-SCNC: 100 MMOL/L (ref 98–107)
CO2 SERPL-SCNC: 21.5 MMOL/L (ref 22–29)
CREAT SERPL-MCNC: 0.65 MG/DL (ref 0.57–1)
DEPRECATED RDW RBC AUTO: 46.1 FL (ref 37–54)
EGFRCR SERPLBLD CKD-EPI 2021: 114.3 ML/MIN/1.73
EOSINOPHIL # BLD AUTO: 0.09 10*3/MM3 (ref 0–0.4)
EOSINOPHIL NFR BLD AUTO: 0.9 % (ref 0.3–6.2)
ERYTHROCYTE [DISTWIDTH] IN BLOOD BY AUTOMATED COUNT: 13.9 % (ref 12.3–15.4)
GLOBULIN UR ELPH-MCNC: 2.9 GM/DL
GLUCOSE SERPL-MCNC: 181 MG/DL (ref 65–99)
HCT VFR BLD AUTO: 48.4 % (ref 34–46.6)
HGB BLD-MCNC: 15.8 G/DL (ref 12–15.9)
HOLD SPECIMEN: NORMAL
HOLD SPECIMEN: NORMAL
IMM GRANULOCYTES # BLD AUTO: 0.03 10*3/MM3 (ref 0–0.05)
IMM GRANULOCYTES NFR BLD AUTO: 0.3 % (ref 0–0.5)
LIPASE SERPL-CCNC: 57 U/L (ref 13–60)
LYMPHOCYTES # BLD AUTO: 2.84 10*3/MM3 (ref 0.7–3.1)
LYMPHOCYTES NFR BLD AUTO: 27.9 % (ref 19.6–45.3)
MCH RBC QN AUTO: 29.5 PG (ref 26.6–33)
MCHC RBC AUTO-ENTMCNC: 32.6 G/DL (ref 31.5–35.7)
MCV RBC AUTO: 90.5 FL (ref 79–97)
MONOCYTES # BLD AUTO: 0.42 10*3/MM3 (ref 0.1–0.9)
MONOCYTES NFR BLD AUTO: 4.1 % (ref 5–12)
NEUTROPHILS NFR BLD AUTO: 6.76 10*3/MM3 (ref 1.7–7)
NEUTROPHILS NFR BLD AUTO: 66.5 % (ref 42.7–76)
NRBC BLD AUTO-RTO: 0 /100 WBC (ref 0–0.2)
PLATELET # BLD AUTO: 486 10*3/MM3 (ref 140–450)
PMV BLD AUTO: 9.3 FL (ref 6–12)
POTASSIUM SERPL-SCNC: 4 MMOL/L (ref 3.5–5.2)
PROT SERPL-MCNC: 7.1 G/DL (ref 6–8.5)
RBC # BLD AUTO: 5.35 10*6/MM3 (ref 3.77–5.28)
SODIUM SERPL-SCNC: 138 MMOL/L (ref 136–145)
TROPONIN T SERPL HS-MCNC: 10 NG/L
WBC NRBC COR # BLD AUTO: 10.17 10*3/MM3 (ref 3.4–10.8)
WHOLE BLOOD HOLD SPECIMEN: NORMAL

## 2024-10-01 PROCEDURE — 83690 ASSAY OF LIPASE: CPT

## 2024-10-01 PROCEDURE — 99211 OFF/OP EST MAY X REQ PHY/QHP: CPT

## 2024-10-01 PROCEDURE — 85025 COMPLETE CBC W/AUTO DIFF WBC: CPT

## 2024-10-01 PROCEDURE — 84484 ASSAY OF TROPONIN QUANT: CPT

## 2024-10-01 PROCEDURE — 80053 COMPREHEN METABOLIC PANEL: CPT

## 2024-10-01 RX ORDER — SODIUM CHLORIDE 0.9 % (FLUSH) 0.9 %
10 SYRINGE (ML) INJECTION AS NEEDED
Status: DISCONTINUED | OUTPATIENT
Start: 2024-10-01 | End: 2024-10-02 | Stop reason: HOSPADM

## 2024-10-01 NOTE — ED PROVIDER NOTES
EMERGENCY DEPARTMENT ENCOUNTER    Pt Name: Lindsay Amezquita  MRN: [id number]  Pt :   1985  Room Number:    Date of encounter:  2024  PCP: Hunter Winkler MD  ED Provider: Zaki Culp MD    Historian: Patient      HPI:  Chief Complaint: Abdominal pain        Context: Lindsay Amezquita is a 39 y.o. female who presents to the ED c/o abdominal pain.  Patient has past history significant for bipolar disorder, prior PE and DVT, COPD and hypertension.  Patient has been hospitalized here twice this month.  She was hospitalized here on  and  for acute pancreatitis.  Patient left AGAINST MEDICAL ADVICE on .  She was readmitted  for pancreatitis.  She was discharged .  Patient returns emerged department tonight reporting recurrence of midepigastric abdominal pain associate with nausea.  She says she is having diarrhea.  She says her last bowel movement was today.  She denies having fever, chest pain or dyspnea.  She says she has been taking Percocet at home without any improvement in her pain.      PAST MEDICAL HISTORY  Past Medical History:   Diagnosis Date    Allergic     Anxiety     Asthma Beings of copd with asthma    Back pain     Bell palsy 2021    Bell's palsy     Bipolar 2 disorder     Blood clot in vein     Behind left knee cap    COPD (chronic obstructive pulmonary disease) Six months ago    Deep vein thrombosis Last year    Depression     Diabetes mellitus November    Pre diabete    Frequent headaches     GERD (gastroesophageal reflux disease)     Hypertension     Every time i go into the emergacy room    Irritable bowel syndrome Two years ago    Kidney stone Two years ago    Migraine     Nausea, vomiting and diarrhea 2018    Obesity All of my life    Ovarian cyst Two years ago    Panic     PTSD (post-traumatic stress disorder)     Pulmonary embolism Not in lungs    Recurrent pregnancy loss, antepartum condition or complication Every since i  have been 14 yars old    Restless leg syndrome     Sinus problem     Snores     Tattoos     Urinary tract infection Have them on and off    Varicella Little    Visual impairment Since i was little    Vitamin B12 deficiency     Wears glasses          PAST SURGICAL HISTORY  Past Surgical History:   Procedure Laterality Date    CHOLECYSTECTOMY      HYSTEROSCOPY N/A 3/14/2024    Procedure: HYSTEROSCOPY WITH MYOSURE, DILATION AND CURETTAGE WITH CERENE ABLATION;  Surgeon: David Ribeiro MD;  Location: [facility] OR;  Service: Obstetrics/Gynecology;  Laterality: N/A;    MULTIPLE TOOTH EXTRACTIONS      All my teeth    WISDOM TOOTH EXTRACTION  All my teeth         FAMILY HISTORY  Family History   Problem Relation Age of Onset    Irritable bowel syndrome Mother     Heart disease Mother     Miscarriages / Stillbirths Mother     Arthritis Mother     COPD Mother     Learning disabilities Mother     Mental illness Mother     Asthma Mother     Irritable bowel syndrome Father     Alcohol abuse Father     Diabetes Father     Hyperlipidemia Father     Anxiety disorder Father     Learning disabilities Father     Colon cancer Maternal Grandfather     Stroke Paternal Grandfather     Stroke Paternal Grandmother          SOCIAL HISTORY  Social History     Socioeconomic History    Marital status: Single   Tobacco Use    Smoking status: Every Day     Current packs/day: 0.50     Average packs/day: 2.0 packs/day for 32.3 years (63.5 ttl pk-yrs)     Types: Cigarettes     Start date: 7/17/1992     Passive exposure: Current    Smokeless tobacco: Never    Tobacco comments:      Around 2.5 packs per day- 7/5/24   Vaping Use    Vaping status: Former    Passive vaping exposure: Yes   Substance and Sexual Activity    Alcohol use: Not Currently     Comment: rarely    Drug use: Not Currently    Sexual activity: Not Currently     Partners: Female     Birth control/protection: Other, Partner of same sex         ALLERGIES  Aspirin, Codeine,  Cyclobenzaprine, Haldol [haloperidol], Imitrex [sumatriptan], Latex, Penicillins, Zofran [ondansetron], Lamictal [lamotrigine], Metoclopramide, Morphine, Oxycodone-acetaminophen, Oxycontin [oxycodone], Prochlorperazine, and Tramadol        REVIEW OF SYSTEMS    All systems reviewed and negative except for those discussed in HPI.       PHYSICAL EXAM    I have reviewed the triage vital signs and nursing notes.    ED Triage Vitals [09/30/24 2150]   Temp Heart Rate Resp BP SpO2   98.2 °F (36.8 °C) 104 18 (!) 157/114 94 %      Temp src Heart Rate Source Patient Position BP Location FiO2 (%)   Oral Monitor Sitting Left arm --         General: no acute distress, well-appearing, non-toxic  Skin: normal color, warm and dry  Head: normocephalic, atraumatic  Nose: normal nasal mucosa, no visible deformity.  Mouth: dry mucous membranes.  Neck: supple.  Chest: no retractions, no visible deformity  Cardiovascular: Regular rate and rhythm.  Lungs: clear to auscultation bilaterally.  Back: no contusions, wounds or abrasions.  Abdomen: soft, slight tenderness in the mid-epigastrium, non-distended. No rebound tenderness, no guarding.  No peritonitis.  No palpable hernias.  Neuro:  alert and oriented x3, no focal neurological deficits.  Psych:  appropriate mood and behavior.        LAB RESULTS  Recent Results (from the past 24 hour(s))   Green Top (Gel)    Collection Time: 09/30/24 10:06 PM   Result Value Ref Range    Extra Tube Hold for add-ons.    Lavender Top    Collection Time: 09/30/24 10:06 PM   Result Value Ref Range    Extra Tube hold for add-on    Gold Top - SST    Collection Time: 09/30/24 10:06 PM   Result Value Ref Range    Extra Tube Hold for add-ons.    Light Blue Top    Collection Time: 09/30/24 10:06 PM   Result Value Ref Range    Extra Tube Hold for add-ons.    Comprehensive Metabolic Panel    Collection Time: 09/30/24 10:06 PM    Specimen: Blood   Result Value Ref Range    Glucose 103 (H) 65 - 99 mg/dL    BUN 10 6 -  20 mg/dL    Creatinine 0.61 0.57 - 1.00 mg/dL    Sodium 139 136 - 145 mmol/L    Potassium 4.0 3.5 - 5.2 mmol/L    Chloride 102 98 - 107 mmol/L    CO2 25.5 22.0 - 29.0 mmol/L    Calcium 9.3 8.6 - 10.5 mg/dL    Total Protein 6.9 6.0 - 8.5 g/dL    Albumin 4.2 3.5 - 5.2 g/dL    ALT (SGPT) 28 1 - 33 U/L    AST (SGOT) 32 1 - 32 U/L    Alkaline Phosphatase 88 39 - 117 U/L    Total Bilirubin 0.3 0.0 - 1.2 mg/dL    Globulin 2.7 gm/dL    A/G Ratio 1.6 g/dL    BUN/Creatinine Ratio 16.4 7.0 - 25.0    Anion Gap 11.5 5.0 - 15.0 mmol/L    eGFR 116.1 >60.0 mL/min/1.73   CBC Auto Differential    Collection Time: 09/30/24 10:06 PM    Specimen: Blood   Result Value Ref Range    WBC 9.76 3.40 - 10.80 10*3/mm3    RBC 5.07 3.77 - 5.28 10*6/mm3    Hemoglobin 15.3 12.0 - 15.9 g/dL    Hematocrit 45.4 34.0 - 46.6 %    MCV 89.5 79.0 - 97.0 fL    MCH 30.2 26.6 - 33.0 pg    MCHC 33.7 31.5 - 35.7 g/dL    RDW 13.9 12.3 - 15.4 %    RDW-SD 45.6 37.0 - 54.0 fl    MPV 9.6 6.0 - 12.0 fL    Platelets 477 (H) 140 - 450 10*3/mm3    Neutrophil % 57.9 42.7 - 76.0 %    Lymphocyte % 34.0 19.6 - 45.3 %    Monocyte % 5.8 5.0 - 12.0 %    Eosinophil % 1.5 0.3 - 6.2 %    Basophil % 0.5 0.0 - 1.5 %    Immature Grans % 0.3 0.0 - 0.5 %    Neutrophils, Absolute 5.64 1.70 - 7.00 10*3/mm3    Lymphocytes, Absolute 3.32 (H) 0.70 - 3.10 10*3/mm3    Monocytes, Absolute 0.57 0.10 - 0.90 10*3/mm3    Eosinophils, Absolute 0.15 0.00 - 0.40 10*3/mm3    Basophils, Absolute 0.05 0.00 - 0.20 10*3/mm3    Immature Grans, Absolute 0.03 0.00 - 0.05 10*3/mm3    nRBC 0.0 0.0 - 0.2 /100 WBC   Lipase    Collection Time: 09/30/24 10:06 PM    Specimen: Blood   Result Value Ref Range    Lipase 67 (H) 13 - 60 U/L   Urinalysis With Microscopic If Indicated (No Culture) - Urine, Clean Catch    Collection Time: 09/30/24 11:40 PM    Specimen: Urine, Clean Catch   Result Value Ref Range    Color, UA Yellow Yellow, Straw    Appearance, UA Clear Clear    pH, UA 7.0 5.0 - 8.0    Specific Gravity, UA  1.019 1.005 - 1.030    Glucose,  mg/dL (Trace) (A) Negative    Ketones, UA Negative Negative    Bilirubin, UA Negative Negative    Blood, UA Negative Negative    Protein, UA 30 mg/dL (1+) (A) Negative    Leuk Esterase, UA Negative Negative    Nitrite, UA Negative Negative    Urobilinogen, UA 1.0 E.U./dL 0.2 - 1.0 E.U./dL   Pregnancy, Urine - Urine, Clean Catch    Collection Time: 09/30/24 11:40 PM    Specimen: Urine, Clean Catch   Result Value Ref Range    HCG, Urine QL Negative Negative   Urinalysis, Microscopic Only - Urine, Clean Catch    Collection Time: 09/30/24 11:40 PM    Specimen: Urine, Clean Catch   Result Value Ref Range    RBC, UA None Seen None Seen, 0-2 /HPF    WBC, UA 0-2 None Seen, 0-2 /HPF    Bacteria, UA Trace (A) None Seen /HPF    Squamous Epithelial Cells, UA 3-6 (A) None Seen, 0-2 /HPF    Hyaline Casts, UA None Seen None Seen /LPF    Mucus, UA Small/1+ (A) None Seen, Trace /HPF    Methodology Manual Light Microscopy        If labs were ordered, I independently reviewed the results and considered them in treating the patient.  See medical decision making discussion section for my interpretation of lab results.        RADIOLOGY  No Radiology Exams Resulted Within Past 24 Hours      PROCEDURES    Procedures    No orders to display       MEDICATIONS GIVEN IN ER    Medications   sodium chloride 0.9 % bolus 1,000 mL (0 mL Intravenous Stopped 10/1/24 0041)   HYDROmorphone (DILAUDID) injection 0.5 mg (0.5 mg Intravenous Given 9/30/24 2345)   droperidol (INAPSINE) injection 1.25 mg (1.25 mg Intravenous Given 9/30/24 2344)         MEDICAL DECISION MAKING, PROGRESS, and CONSULTS    All labs, if obtained, have been independently reviewed by me.  All radiology studies, if obtained, have been reviewed by me and the radiologist dictating the report.  All EKG's, if obtained, have been independently viewed and interpreted by me/my attending physician.      Discussion below represents my analysis of  pertinent findings related to patient's condition, differential diagnosis, treatment plan and final disposition.                         Differential diagnosis:    Differential diagnosis for this patient includes hepatitis, cholangitis, pancreatitis, gastritis, enteritis, colitis, gastroenteritis, appendicitis, volvulus, obstruction, ischemia, torsion, cystitis, pyelonephritis, nephrolithiasis, uretolithiasis, ectopic pregnancy, cervicitis, PID, other acute emergency.  Patient reports cholecystectomy.    Medical Decision Making Discussion:    Vitals reviewed and are remarkable for hypertension and tachycardia but otherwise are normal.    Labs reviewed and are nonactionable.  Pregnancy test negative.  Urinalysis contaminated with squamous epithelial cells.    Patient given antiemetics and pain medication.  On repeat exam she was well-appearing and nontoxic.  On repeat abdominal examination her abdomen was soft and completely nontender.  Patient reports complete resolution of her symptoms.  Patient discharged with instruction to follow-up closely with primary care and to return to the emerged apartment before then for any concerning symptoms or new concerns.        Additional sources:    - External (non-ED) record review: History and physical from September 19, 2024 documenting history of hypertension.  Hospitalized for acute pancreatitis.    Reviewed discharge summary from September 20 documenting discharged with instructions to follow a low-fat diet and advance diet as tolerated.  Outpatient referral to gastroenterology.    - Chronic or social conditions impacting care: Bipolar disorder, pancreatitis    Shared Decision Making:  After my consideration of clinical presentation and any laboratory/radiology studies obtained, I discussed the findings with the patient/patient representative who is in agreement with the treatment plan and the final disposition.   Risks and benefits of discharge and/or observation/admission  were discussed.    Orders placed during this visit:  Orders Placed This Encounter   Procedures    [place] Draw    Comprehensive Metabolic Panel    CBC Auto Differential    Lipase    Urinalysis With Microscopic If Indicated (No Culture) - Urine, Clean Catch    Pregnancy, Urine - Urine, Clean Catch    Urinalysis, Microscopic Only - Urine, Clean Catch    Green Top (Gel)    Lavender Top    Gold Top - SST    Light Blue Top       AS OF 05:28 EDT VITALS:    BP - 145/91  HR - 100  TEMP - 98.2 °F (36.8 °C) (Oral)  O2 SATS - 94%                  DIAGNOSIS  Final diagnoses:   Epigastric pain         DISPOSITION  Discharge      Please note that portions of this document were completed with voice recognition software.        Zaki Culp MD  10/01/24 0443

## 2024-10-01 NOTE — DISCHARGE INSTRUCTIONS
You should return to the emerged department within 24 hours for repeat evaluation for persistent or worsening abdominal pain, vomiting, inability to eat or drink, fever or for any concerning symptoms or new concerns.  You should otherwise follow-up with primary care within 1 week.

## 2024-10-02 ENCOUNTER — TELEMEDICINE (OUTPATIENT)
Dept: FAMILY MEDICINE CLINIC | Facility: CLINIC | Age: 39
End: 2024-10-02
Payer: MEDICAID

## 2024-10-02 ENCOUNTER — HOSPITAL ENCOUNTER (EMERGENCY)
Facility: HOSPITAL | Age: 39
Discharge: HOME | End: 2024-10-02
Attending: EMERGENCY MEDICINE
Payer: MEDICAID

## 2024-10-02 ENCOUNTER — TELEPHONE (OUTPATIENT)
Dept: FAMILY MEDICINE CLINIC | Facility: CLINIC | Age: 39
End: 2024-10-02

## 2024-10-02 VITALS
WEIGHT: 252 LBS | OXYGEN SATURATION: 95 % | TEMPERATURE: 98.1 F | BODY MASS INDEX: 46.38 KG/M2 | HEART RATE: 106 BPM | HEIGHT: 62 IN | SYSTOLIC BLOOD PRESSURE: 147 MMHG | DIASTOLIC BLOOD PRESSURE: 95 MMHG | RESPIRATION RATE: 18 BRPM

## 2024-10-02 DIAGNOSIS — K85.90 ACUTE PANCREATITIS WITHOUT INFECTION OR NECROSIS, UNSPECIFIED PANCREATITIS TYPE: ICD-10-CM

## 2024-10-02 DIAGNOSIS — R11.2 NAUSEA AND VOMITING, UNSPECIFIED VOMITING TYPE: ICD-10-CM

## 2024-10-02 DIAGNOSIS — K21.00 GASTROESOPHAGEAL REFLUX DISEASE WITH ESOPHAGITIS WITHOUT HEMORRHAGE: ICD-10-CM

## 2024-10-02 DIAGNOSIS — F17.210 CIGARETTE NICOTINE DEPENDENCE WITHOUT COMPLICATION: ICD-10-CM

## 2024-10-02 DIAGNOSIS — M54.16 LUMBAR RADICULOPATHY: ICD-10-CM

## 2024-10-02 DIAGNOSIS — R10.84 GENERALIZED ABDOMINAL PAIN: Primary | ICD-10-CM

## 2024-10-02 DIAGNOSIS — K85.30 DRUG-INDUCED ACUTE PANCREATITIS WITHOUT INFECTION OR NECROSIS: ICD-10-CM

## 2024-10-02 LAB
ALBUMIN SERPL-MCNC: 4.6 G/DL (ref 3.5–5.2)
ALBUMIN/GLOB SERPL: 1.6 G/DL
ALP SERPL-CCNC: 94 U/L (ref 39–117)
ALT SERPL W P-5'-P-CCNC: 39 U/L (ref 1–33)
ANION GAP SERPL CALCULATED.3IONS-SCNC: 14.9 MMOL/L (ref 5–15)
AST SERPL-CCNC: 35 U/L (ref 1–32)
B-HCG UR QL: NEGATIVE
BACTERIA UR QL AUTO: NORMAL /HPF
BASOPHILS # BLD AUTO: 0.04 10*3/MM3 (ref 0–0.2)
BASOPHILS NFR BLD AUTO: 0.4 % (ref 0–1.5)
BILIRUB SERPL-MCNC: 0.4 MG/DL (ref 0–1.2)
BILIRUB UR QL STRIP: NEGATIVE
BUN SERPL-MCNC: 7 MG/DL (ref 6–20)
BUN/CREAT SERPL: 12.3 (ref 7–25)
CALCIUM SPEC-SCNC: 9.8 MG/DL (ref 8.6–10.5)
CHLORIDE SERPL-SCNC: 102 MMOL/L (ref 98–107)
CLARITY UR: CLEAR
CO2 SERPL-SCNC: 24.1 MMOL/L (ref 22–29)
COLOR UR: YELLOW
CREAT SERPL-MCNC: 0.57 MG/DL (ref 0.57–1)
DEPRECATED RDW RBC AUTO: 45.1 FL (ref 37–54)
EGFRCR SERPLBLD CKD-EPI 2021: 118 ML/MIN/1.73
EOSINOPHIL # BLD AUTO: 0.09 10*3/MM3 (ref 0–0.4)
EOSINOPHIL NFR BLD AUTO: 0.8 % (ref 0.3–6.2)
ERYTHROCYTE [DISTWIDTH] IN BLOOD BY AUTOMATED COUNT: 13.9 % (ref 12.3–15.4)
GLOBULIN UR ELPH-MCNC: 2.8 GM/DL
GLUCOSE SERPL-MCNC: 114 MG/DL (ref 65–99)
GLUCOSE UR STRIP-MCNC: ABNORMAL MG/DL
HCT VFR BLD AUTO: 48.1 % (ref 34–46.6)
HGB BLD-MCNC: 16.1 G/DL (ref 12–15.9)
HGB UR QL STRIP.AUTO: NEGATIVE
HOLD SPECIMEN: NORMAL
HOLD SPECIMEN: NORMAL
HYALINE CASTS UR QL AUTO: NORMAL /LPF
IMM GRANULOCYTES # BLD AUTO: 0.03 10*3/MM3 (ref 0–0.05)
IMM GRANULOCYTES NFR BLD AUTO: 0.3 % (ref 0–0.5)
KETONES UR QL STRIP: NEGATIVE
LEUKOCYTE ESTERASE UR QL STRIP.AUTO: NEGATIVE
LIPASE SERPL-CCNC: 55 U/L (ref 13–60)
LYMPHOCYTES # BLD AUTO: 3.73 10*3/MM3 (ref 0.7–3.1)
LYMPHOCYTES NFR BLD AUTO: 33.4 % (ref 19.6–45.3)
MCH RBC QN AUTO: 29.9 PG (ref 26.6–33)
MCHC RBC AUTO-ENTMCNC: 33.5 G/DL (ref 31.5–35.7)
MCV RBC AUTO: 89.4 FL (ref 79–97)
MONOCYTES # BLD AUTO: 0.6 10*3/MM3 (ref 0.1–0.9)
MONOCYTES NFR BLD AUTO: 5.4 % (ref 5–12)
NEUTROPHILS NFR BLD AUTO: 59.7 % (ref 42.7–76)
NEUTROPHILS NFR BLD AUTO: 6.67 10*3/MM3 (ref 1.7–7)
NITRITE UR QL STRIP: NEGATIVE
NRBC BLD AUTO-RTO: 0 /100 WBC (ref 0–0.2)
PH UR STRIP.AUTO: 6.5 [PH] (ref 5–8)
PLATELET # BLD AUTO: 512 10*3/MM3 (ref 140–450)
PMV BLD AUTO: 9.2 FL (ref 6–12)
POTASSIUM SERPL-SCNC: 3.9 MMOL/L (ref 3.5–5.2)
PROT SERPL-MCNC: 7.4 G/DL (ref 6–8.5)
PROT UR QL STRIP: ABNORMAL
RBC # BLD AUTO: 5.38 10*6/MM3 (ref 3.77–5.28)
RBC # UR STRIP: NORMAL /HPF
REF LAB TEST METHOD: NORMAL
SODIUM SERPL-SCNC: 141 MMOL/L (ref 136–145)
SP GR UR STRIP: 1.02 (ref 1–1.03)
SQUAMOUS #/AREA URNS HPF: NORMAL /HPF
UROBILINOGEN UR QL STRIP: ABNORMAL
WBC # UR STRIP: NORMAL /HPF
WBC NRBC COR # BLD AUTO: 11.16 10*3/MM3 (ref 3.4–10.8)
WHOLE BLOOD HOLD COAG: NORMAL
WHOLE BLOOD HOLD SPECIMEN: NORMAL

## 2024-10-02 PROCEDURE — 3044F HG A1C LEVEL LT 7.0%: CPT | Performed by: FAMILY MEDICINE

## 2024-10-02 PROCEDURE — 83690 ASSAY OF LIPASE: CPT

## 2024-10-02 PROCEDURE — 25010000002 DROPERIDOL PER 5 MG: Performed by: EMERGENCY MEDICINE

## 2024-10-02 PROCEDURE — 96374 THER/PROPH/DIAG INJ IV PUSH: CPT

## 2024-10-02 PROCEDURE — 81025 URINE PREGNANCY TEST: CPT | Performed by: EMERGENCY MEDICINE

## 2024-10-02 PROCEDURE — 80053 COMPREHEN METABOLIC PANEL: CPT

## 2024-10-02 PROCEDURE — 99283 EMERGENCY DEPT VISIT LOW MDM: CPT

## 2024-10-02 PROCEDURE — 1159F MED LIST DOCD IN RCRD: CPT | Performed by: FAMILY MEDICINE

## 2024-10-02 PROCEDURE — 99214 OFFICE O/P EST MOD 30 MIN: CPT | Performed by: FAMILY MEDICINE

## 2024-10-02 PROCEDURE — 85025 COMPLETE CBC W/AUTO DIFF WBC: CPT

## 2024-10-02 PROCEDURE — 81001 URINALYSIS AUTO W/SCOPE: CPT | Performed by: EMERGENCY MEDICINE

## 2024-10-02 PROCEDURE — 1125F AMNT PAIN NOTED PAIN PRSNT: CPT | Performed by: FAMILY MEDICINE

## 2024-10-02 PROCEDURE — 25810000003 SODIUM CHLORIDE 0.9 % SOLUTION: Performed by: EMERGENCY MEDICINE

## 2024-10-02 PROCEDURE — 1160F RVW MEDS BY RX/DR IN RCRD: CPT | Performed by: FAMILY MEDICINE

## 2024-10-02 RX ORDER — PANTOPRAZOLE SODIUM 40 MG/1
40 TABLET, DELAYED RELEASE ORAL DAILY
Qty: 90 TABLET | Refills: 1 | Status: SHIPPED | OUTPATIENT
Start: 2024-10-02

## 2024-10-02 RX ORDER — OXYCODONE AND ACETAMINOPHEN 5; 325 MG/1; MG/1
1 TABLET ORAL EVERY 8 HOURS PRN
Qty: 18 TABLET | Refills: 0 | OUTPATIENT
Start: 2024-10-02

## 2024-10-02 RX ORDER — DROPERIDOL 2.5 MG/ML
2.5 INJECTION, SOLUTION INTRAMUSCULAR; INTRAVENOUS ONCE
Status: COMPLETED | OUTPATIENT
Start: 2024-10-02 | End: 2024-10-02

## 2024-10-02 RX ORDER — ONDANSETRON 4 MG/1
4 TABLET, ORALLY DISINTEGRATING ORAL EVERY 8 HOURS PRN
Qty: 20 TABLET | Refills: 1 | Status: SHIPPED | OUTPATIENT
Start: 2024-10-02

## 2024-10-02 RX ORDER — SODIUM CHLORIDE 0.9 % (FLUSH) 0.9 %
10 SYRINGE (ML) INJECTION AS NEEDED
Status: DISCONTINUED | OUTPATIENT
Start: 2024-10-02 | End: 2024-10-02 | Stop reason: HOSPADM

## 2024-10-02 RX ADMIN — DROPERIDOL 2.5 MG: 2.5 INJECTION, SOLUTION INTRAMUSCULAR; INTRAVENOUS at 04:05

## 2024-10-02 RX ADMIN — SODIUM CHLORIDE 1000 ML: 9 INJECTION, SOLUTION INTRAVENOUS at 04:05

## 2024-10-02 NOTE — TELEPHONE ENCOUNTER
Caller: Lindsay Amezquita    Relationship: Self    Best call back number:       [phone] (Mobile)     Which medication are you concerned about:       tiZANidine (ZANAFLEX) 4 MG tablet     Who prescribed you this medication:     DR PULIDO    What are your concerns:     PATIENT REQUESTED FREQUENCY FOR MEDICATION BE CHANGED TO (4) TIMES DAILY INSTEAD OF (3) TIMES DAILY    PATIENT ALSO REQUESTED ANOTHER MEDICATION BE PRESCRIBED INSTEAD OF PRESCRIPTION INCLUDED BELOW SINCE IT HAS NOT HELPED WITH ACID REFLUX    omeprazole (priLOSEC) 20 MG capsule

## 2024-10-02 NOTE — TELEPHONE ENCOUNTER
Caller: AlirioLindsay barron    Relationship: Self    Best call back number:   Telephone Information:   Mobile [phone]     Requested Prescriptions:   Requested Prescriptions     Pending Prescriptions Disp Refills    oxyCODONE-acetaminophen (PERCOCET) 5-325 MG per tablet 18 tablet 0     Sig: Take 1 tablet by mouth Every 8 (Eight) Hours As Needed for Moderate Pain or Severe Pain.        Pharmacy where request should be sent: [id number] - [place], [place] - [facility] [address] AT [facility] [address] - [phone]  - [phone]     Last office visit with prescribing clinician: 7/3/2024   Last telemedicine visit with prescribing clinician: 9/24/2024   Next office visit with prescribing clinician: Visit date not found     Additional details provided by patient: PATIENT STATES THE PAIN IS WORSE. LIKE SHE IS DOUBLED OVER IN PAIN    Kim Sinclair Rep   10/02/24 14:56 EDT

## 2024-10-02 NOTE — ED PROVIDER NOTES
EMERGENCY DEPARTMENT ENCOUNTER    Pt Name: Lindsay Amezquita  MRN: [id number]  Pt :   1985  Room Number:    Date of encounter:  10/2/2024  PCP: Hunter Winkler MD  ED Provider: Zaki Culp MD    Historian: Patient      HPI:  Chief Complaint: Abdominal pain        Context: Lindsay Amezquita is a 39 y.o. female who presents to the ED c/o abdominal pain.  Patient has a past history significant for bipolar disorder, prior PE and DVT, COPD and hypertension.  Patient has been hospitalized here twice this month.  She was hospitalized here on  and  for acute pancreatitis.  Patient left AGAINST MEDICAL ADVICE on .  She was readmitted  for pancreatitis.  She was discharged  with instructions to follow a low-fat diet.  Patient was evaluated emerged apartment last night for abdominal pain and discharged home.  Patient returns tonight reporting persistent abdominal pain.  She says she has associated nausea and vomiting.  She says she does not know of anything that makes the pain better or worse.  She denies fever.      PAST MEDICAL HISTORY  Past Medical History:   Diagnosis Date    Allergic     Anxiety     Asthma Beings of copd with asthma    Back pain     Bell palsy 2021    Bell's palsy     Bipolar 2 disorder     Blood clot in vein     Behind left knee cap    COPD (chronic obstructive pulmonary disease) Six months ago    Deep vein thrombosis Last year    Depression     Diabetes mellitus November    Pre diabete    Frequent headaches     GERD (gastroesophageal reflux disease)     Hypertension     Every time i go into the emergacy room    Irritable bowel syndrome Two years ago    Kidney stone Two years ago    Migraine     Nausea, vomiting and diarrhea 2018    Obesity All of my life    Ovarian cyst Two years ago    Pancreatitis     Panic     PTSD (post-traumatic stress disorder)     Pulmonary embolism Not in lungs    Recurrent pregnancy loss, antepartum condition  or complication Every since i have been 14 yars old    Restless leg syndrome     Sinus problem     Snores     Tattoos     Urinary tract infection Have them on and off    Varicella Little    Visual impairment Since i was little    Vitamin B12 deficiency     Wears glasses          PAST SURGICAL HISTORY  Past Surgical History:   Procedure Laterality Date    CHOLECYSTECTOMY      HYSTEROSCOPY N/A 3/14/2024    Procedure: HYSTEROSCOPY WITH MYOSURE, DILATION AND CURETTAGE WITH CERENE ABLATION;  Surgeon: David Ribeiro MD;  Location: [facility];  Service: Obstetrics/Gynecology;  Laterality: N/A;    MULTIPLE TOOTH EXTRACTIONS      All my teeth    WISDOM TOOTH EXTRACTION  All my teeth         FAMILY HISTORY  Family History   Problem Relation Age of Onset    Irritable bowel syndrome Mother     Heart disease Mother     Miscarriages / Stillbirths Mother     Arthritis Mother     COPD Mother     Learning disabilities Mother     Mental illness Mother     Asthma Mother     Irritable bowel syndrome Father     Alcohol abuse Father     Diabetes Father     Hyperlipidemia Father     Anxiety disorder Father     Learning disabilities Father     Colon cancer Maternal Grandfather     Stroke Paternal Grandfather     Stroke Paternal Grandmother          SOCIAL HISTORY  Social History     Socioeconomic History    Marital status: Single   Tobacco Use    Smoking status: Every Day     Current packs/day: 0.50     Average packs/day: 2.0 packs/day for 32.3 years (63.5 ttl pk-yrs)     Types: Cigarettes     Start date: 7/17/1992     Passive exposure: Current    Smokeless tobacco: Never    Tobacco comments:      Around 2.5 packs per day- 7/5/24   Vaping Use    Vaping status: Former    Passive vaping exposure: Yes   Substance and Sexual Activity    Alcohol use: Not Currently     Comment: rarely    Drug use: Not Currently    Sexual activity: Not Currently     Partners: Female     Birth control/protection: Other, Partner of same sex          ALLERGIES  Aspirin, Codeine, Cyclobenzaprine, Haldol [haloperidol], Imitrex [sumatriptan], Latex, Penicillins, Zofran [ondansetron], Lamictal [lamotrigine], Metoclopramide, Morphine, Oxycontin [oxycodone], Prochlorperazine, and Tramadol        REVIEW OF SYSTEMS    All systems reviewed and negative except for those discussed in HPI.       PHYSICAL EXAM    I have reviewed the triage vital signs and nursing notes.    ED Triage Vitals [10/02/24 0151]   Temp Heart Rate Resp BP SpO2   98.1 °F (36.7 °C) (!) 122 20 (!) 158/138 95 %      Temp src Heart Rate Source Patient Position BP Location FiO2 (%)   Oral Monitor Sitting Left arm --         General: no acute distress, well-appearing, non-toxic  Skin: normal color, warm and dry  Head: normocephalic, atraumatic  Nose: normal nasal mucosa, no visible deformity.  Mouth: dry mucous membranes.  Neck: supple.  Chest: no retractions, no visible deformity  Cardiovascular: Tachycardic, regular rhythm  Lungs: clear to auscultation bilaterally.  Abdomen: soft, slightly tender to palpation in the mid-epigastrium, non-distended. No rebound tenderness, no guarding. No peritonitis.  Neuro:  alert and oriented x3, no focal neurological deficits.  Psych:  appropriate mood and behavior.        LAB RESULTS  Recent Results (from the past 24 hour(s))   Comprehensive Metabolic Panel    Collection Time: 10/01/24 10:51 PM    Specimen: Blood   Result Value Ref Range    Glucose 181 (H) 65 - 99 mg/dL    BUN 7 6 - 20 mg/dL    Creatinine 0.65 0.57 - 1.00 mg/dL    Sodium 138 136 - 145 mmol/L    Potassium 4.0 3.5 - 5.2 mmol/L    Chloride 100 98 - 107 mmol/L    CO2 21.5 (L) 22.0 - 29.0 mmol/L    Calcium 9.5 8.6 - 10.5 mg/dL    Total Protein 7.1 6.0 - 8.5 g/dL    Albumin 4.2 3.5 - 5.2 g/dL    ALT (SGPT) 39 (H) 1 - 33 U/L    AST (SGOT) 42 (H) 1 - 32 U/L    Alkaline Phosphatase 88 39 - 117 U/L    Total Bilirubin 0.3 0.0 - 1.2 mg/dL    Globulin 2.9 gm/dL    A/G Ratio 1.4 g/dL    BUN/Creatinine Ratio  10.8 7.0 - 25.0    Anion Gap 16.5 (H) 5.0 - 15.0 mmol/L    eGFR 114.3 >60.0 mL/min/1.73   Lipase    Collection Time: 10/01/24 10:51 PM    Specimen: Blood   Result Value Ref Range    Lipase 57 13 - 60 U/L   Single High Sensitivity Troponin T    Collection Time: 10/01/24 10:51 PM    Specimen: Blood   Result Value Ref Range    HS Troponin T 10 <14 ng/L   Green Top (Gel)    Collection Time: 10/01/24 10:51 PM   Result Value Ref Range    Extra Tube Hold for add-ons.    Lavender Top    Collection Time: 10/01/24 10:51 PM   Result Value Ref Range    Extra Tube hold for add-on    Gold Top - SST    Collection Time: 10/01/24 10:51 PM   Result Value Ref Range    Extra Tube Hold for add-ons.    CBC Auto Differential    Collection Time: 10/01/24 10:51 PM    Specimen: Blood   Result Value Ref Range    WBC 10.17 3.40 - 10.80 10*3/mm3    RBC 5.35 (H) 3.77 - 5.28 10*6/mm3    Hemoglobin 15.8 12.0 - 15.9 g/dL    Hematocrit 48.4 (H) 34.0 - 46.6 %    MCV 90.5 79.0 - 97.0 fL    MCH 29.5 26.6 - 33.0 pg    MCHC 32.6 31.5 - 35.7 g/dL    RDW 13.9 12.3 - 15.4 %    RDW-SD 46.1 37.0 - 54.0 fl    MPV 9.3 6.0 - 12.0 fL    Platelets 486 (H) 140 - 450 10*3/mm3    Neutrophil % 66.5 42.7 - 76.0 %    Lymphocyte % 27.9 19.6 - 45.3 %    Monocyte % 4.1 (L) 5.0 - 12.0 %    Eosinophil % 0.9 0.3 - 6.2 %    Basophil % 0.3 0.0 - 1.5 %    Immature Grans % 0.3 0.0 - 0.5 %    Neutrophils, Absolute 6.76 1.70 - 7.00 10*3/mm3    Lymphocytes, Absolute 2.84 0.70 - 3.10 10*3/mm3    Monocytes, Absolute 0.42 0.10 - 0.90 10*3/mm3    Eosinophils, Absolute 0.09 0.00 - 0.40 10*3/mm3    Basophils, Absolute 0.03 0.00 - 0.20 10*3/mm3    Immature Grans, Absolute 0.03 0.00 - 0.05 10*3/mm3    nRBC 0.0 0.0 - 0.2 /100 WBC   Comprehensive Metabolic Panel    Collection Time: 10/02/24  2:21 AM    Specimen: Blood   Result Value Ref Range    Glucose 114 (H) 65 - 99 mg/dL    BUN 7 6 - 20 mg/dL    Creatinine 0.57 0.57 - 1.00 mg/dL    Sodium 141 136 - 145 mmol/L    Potassium 3.9 3.5 - 5.2  mmol/L    Chloride 102 98 - 107 mmol/L    CO2 24.1 22.0 - 29.0 mmol/L    Calcium 9.8 8.6 - 10.5 mg/dL    Total Protein 7.4 6.0 - 8.5 g/dL    Albumin 4.6 3.5 - 5.2 g/dL    ALT (SGPT) 39 (H) 1 - 33 U/L    AST (SGOT) 35 (H) 1 - 32 U/L    Alkaline Phosphatase 94 39 - 117 U/L    Total Bilirubin 0.4 0.0 - 1.2 mg/dL    Globulin 2.8 gm/dL    A/G Ratio 1.6 g/dL    BUN/Creatinine Ratio 12.3 7.0 - 25.0    Anion Gap 14.9 5.0 - 15.0 mmol/L    eGFR 118.0 >60.0 mL/min/1.73   Lipase    Collection Time: 10/02/24  2:21 AM    Specimen: Blood   Result Value Ref Range    Lipase 55 13 - 60 U/L   Green Top (Gel)    Collection Time: 10/02/24  2:21 AM   Result Value Ref Range    Extra Tube Hold for add-ons.    Lavender Top    Collection Time: 10/02/24  2:21 AM   Result Value Ref Range    Extra Tube hold for add-on    Gold Top - SST    Collection Time: 10/02/24  2:21 AM   Result Value Ref Range    Extra Tube Hold for add-ons.    Light Blue Top    Collection Time: 10/02/24  2:21 AM   Result Value Ref Range    Extra Tube Hold for add-ons.    CBC Auto Differential    Collection Time: 10/02/24  2:21 AM    Specimen: Blood   Result Value Ref Range    WBC 11.16 (H) 3.40 - 10.80 10*3/mm3    RBC 5.38 (H) 3.77 - 5.28 10*6/mm3    Hemoglobin 16.1 (H) 12.0 - 15.9 g/dL    Hematocrit 48.1 (H) 34.0 - 46.6 %    MCV 89.4 79.0 - 97.0 fL    MCH 29.9 26.6 - 33.0 pg    MCHC 33.5 31.5 - 35.7 g/dL    RDW 13.9 12.3 - 15.4 %    RDW-SD 45.1 37.0 - 54.0 fl    MPV 9.2 6.0 - 12.0 fL    Platelets 512 (H) 140 - 450 10*3/mm3    Neutrophil % 59.7 42.7 - 76.0 %    Lymphocyte % 33.4 19.6 - 45.3 %    Monocyte % 5.4 5.0 - 12.0 %    Eosinophil % 0.8 0.3 - 6.2 %    Basophil % 0.4 0.0 - 1.5 %    Immature Grans % 0.3 0.0 - 0.5 %    Neutrophils, Absolute 6.67 1.70 - 7.00 10*3/mm3    Lymphocytes, Absolute 3.73 (H) 0.70 - 3.10 10*3/mm3    Monocytes, Absolute 0.60 0.10 - 0.90 10*3/mm3    Eosinophils, Absolute 0.09 0.00 - 0.40 10*3/mm3    Basophils, Absolute 0.04 0.00 - 0.20 10*3/mm3     Immature Grans, Absolute 0.03 0.00 - 0.05 10*3/mm3    nRBC 0.0 0.0 - 0.2 /100 WBC   Urinalysis With Microscopic If Indicated (No Culture) - Urine, Clean Catch    Collection Time: 10/02/24  3:52 AM    Specimen: Urine, Clean Catch   Result Value Ref Range    Color, UA Yellow Yellow, Straw    Appearance, UA Clear Clear    pH, UA 6.5 5.0 - 8.0    Specific Gravity, UA 1.018 1.005 - 1.030    Glucose, UA >=1000 mg/dL (3+) (A) Negative    Ketones, UA Negative Negative    Bilirubin, UA Negative Negative    Blood, UA Negative Negative    Protein, UA 30 mg/dL (1+) (A) Negative    Leuk Esterase, UA Negative Negative    Nitrite, UA Negative Negative    Urobilinogen, UA 0.2 E.U./dL 0.2 - 1.0 E.U./dL   Pregnancy, Urine - Urine, Clean Catch    Collection Time: 10/02/24  3:52 AM    Specimen: Urine, Clean Catch   Result Value Ref Range    HCG, Urine QL Negative Negative   Urinalysis, Microscopic Only - Urine, Clean Catch    Collection Time: 10/02/24  3:52 AM    Specimen: Urine, Clean Catch   Result Value Ref Range    RBC, UA None Seen None Seen, 0-2 /HPF    WBC, UA 0-2 None Seen, 0-2 /HPF    Bacteria, UA None Seen None Seen /HPF    Squamous Epithelial Cells, UA 0-2 None Seen, 0-2 /HPF    Hyaline Casts, UA None Seen None Seen /LPF    Methodology Manual Light Microscopy        If labs were ordered, I independently reviewed the results and considered them in treating the patient.  See medical decision making discussion section for my interpretation of lab results.        RADIOLOGY  No Radiology Exams Resulted Within Past 24 Hours      PROCEDURES    Procedures    No orders to display       MEDICATIONS GIVEN IN ER    Medications   sodium chloride 0.9 % flush 10 mL (has no administration in time range)   sodium chloride 0.9 % bolus 1,000 mL (0 mL Intravenous Stopped 10/2/24 1372)   droperidol (INAPSINE) injection 2.5 mg (2.5 mg Intravenous Given 10/2/24 9844)         MEDICAL DECISION MAKING, PROGRESS, and CONSULTS    All labs, if obtained,  have been independently reviewed by me.  All radiology studies, if obtained, have been reviewed by me and the radiologist dictating the report.  All EKG's, if obtained, have been independently viewed and interpreted by me/my attending physician.      Discussion below represents my analysis of pertinent findings related to patient's condition, differential diagnosis, treatment plan and final disposition.                         Differential diagnosis:    Differential diagnosis for this patient includes hepatitis, cholangitis, cholecystitis, pancreatitis, gastritis, enteritis, colitis, gastroenteritis, appendicitis, volvulus, obstruction, ischemia, torsion, cystitis, pyelonephritis, nephrolithiasis, uretolithiasis, ectopic pregnancy, cervicitis, PID, other acute emergency.    Medical Decision Making Discussion:    Vitals reviewed and demonstrate hypertension and tachycardia but otherwise are normal.    Labs reviewed and show elevated hemoglobin and hematocrit, slight leukocytosis but otherwise normal.  Lipase is not elevated.  Elevated hemoglobin and hematocrit may be secondary to dehydration.    Urinalysis negative for infection.  Pregnancy test negative.    After administered medications patient reports significant improvement of her symptoms.  Repeat vitals have significantly improved.  On repeat exam examination patient's abdomen was soft and non-tender with no peritonitis.    Patient reports she has a gastroenterology appointment for this morning.  She is instructed to follow-up with gastroenterology this morning as scheduled.  She is instructed to return to the emerged apartment before then for inability to eat or drink, persistent or worsening abdominal pain, concerning symptoms, worsening symptoms or new concerns.    Additional sources:    - External (non-ED) record review: Discharge summary from September 20 documenting acute pancreatitis, bipolar disorder, asthma.    - Chronic or social conditions impacting  care: Pancreatitis    Shared Decision Making:  After my consideration of clinical presentation and any laboratory/radiology studies obtained, I discussed the findings with the patient/patient representative who is in agreement with the treatment plan and the final disposition.   Risks and benefits of discharge and/or observation/admission were discussed.    Orders placed during this visit:  Orders Placed This Encounter   Procedures    [place] Draw    Comprehensive Metabolic Panel    Lipase    Urinalysis With Microscopic If Indicated (No Culture) - Urine, Clean Catch    CBC Auto Differential    Pregnancy, Urine - Urine, Clean Catch    Urinalysis, Microscopic Only - Urine, Clean Catch    NPO Diet NPO Type: Strict NPO    Undress & Gown    Insert Peripheral IV    CBC & Differential    Green Top (Gel)    Lavender Top    Gold Top - SST    Light Blue Top         Additional orders considered but not ordered:  Considered CT imaging of the abdomen pelvis however on initial and repeat abdominal examinations her abdomen is soft with no peritonitis.  Patient just had 2 recent CT scans of the abdomen pelvis on September 19 and 22 which both demonstrated pancreatitis.  Patient's labs are non-actionable and she has benign abdominal examination so imaging was deferred.        AS OF 05:10 EDT VITALS:    BP - 147/95  HR - 106  TEMP - 98.1 °F (36.7 °C) (Oral)  O2 SATS - 95%                  DIAGNOSIS  Final diagnoses:   Generalized abdominal pain   Nausea and vomiting, unspecified vomiting type         DISPOSITION  Discharge      Please note that portions of this document were completed with voice recognition software.        Zaki Culp MD  10/02/24 0590

## 2024-10-02 NOTE — DISCHARGE INSTRUCTIONS
You should follow-up with gastroenterology today as scheduled.  You should return to the emergency department before then for any concerning symptoms, worsening symptoms or new concerns.

## 2024-10-02 NOTE — PROGRESS NOTES
Telehealth E-Visit      Date: 10/02/2024   Patient Name: Lindsay Amezquita  : 1985   MRN: [id number]     Chief Complaint:    Chief Complaint   Patient presents with    Abdominal Pain       I have reviewed the E-Visit questionnaire and the patient's answers, my assessment and plan are listed below.     This provider is located at the [facility] (through [facility]), [address] [place] [address] using a secure [employer] Video Visit through [employer]. Patient is being seen remotely via telehealth at their home address in [place], and stated they are in a secure environment for this session. The patient's condition being diagnosed/treated is appropriate for telemedicine. The provider identified herself as well as her credentials. The patient, and/or patients guardian, consent to be seen remotely, and when consent is given they understand that the consent allows for patient identifiable information to be sent to a third party as needed. They may refuse to be seen remotely at any time. The electronic data is encrypted and password protected, and the patient and/or guardian has been advised of the potential risks to privacy not withstanding such measures.    You have chosen to receive care through a telehealth visit. Do you consent to use a video/audio connection for your medical care today? Yes    History of Present Illness: Lindsay Amzequita is a 39 y.o. female who is here today to follow up with abdominal pain. Patient has a past history significant for bipolar disorder, prior PE and DVT, COPD and hypertension. Patient has been hospitalized at [facility] twice this month. She was hospitalized here on  and  for acute pancreatitis. Patient left AGAINST MEDICAL ADVICE on . She was readmitted  for pancreatitis. She was discharged  with instructions to follow a low-fat diet. Patient was evaluated emergency department yesterday as well as last night  for abdominal pain and discharged home in both instances. Patient discharged between 5 and 6 AM and advised to follow-up with gastro appointment at 9 AM.  She states she did not do this because she felt sick with nausea and vomiting. She says she does not know of anything that makes the pain better or worse. She denies fever.  She reports she has not eaten anything in 2 days.  She will sip on Sprite.  She has tried promethazine for nausea and vomiting relief but that has not helped.  She received IV droperidol in the ER which did help her nausea and vomiting for approximately 10 hours.  She does endorse diarrhea      Subjective      I have reviewed and the following portions of the patient's history were updated as appropriate: past family history, past medical history, past social history, past surgical history and problem list.    Medications:     Current Outpatient Medications:     tiZANidine (ZANAFLEX) 4 MG tablet, Take 1 tablet by mouth Every 8 (Eight) Hours As Needed for Muscle Spasms., Disp: 270 tablet, Rfl: 1    albuterol sulfate HFA (Ventolin HFA) 108 (90 Base) MCG/ACT inhaler, Inhale 2 puffs Every 4 (Four) Hours As Needed for Wheezing., Disp: 6.7 g, Rfl: 0    amitriptyline (ELAVIL) 150 MG tablet, Take 1 tablet by mouth Every Night., Disp: 30 tablet, Rfl: 1    atorvastatin (LIPITOR) 40 MG tablet, Take 1 tablet by mouth every night at bedtime., Disp: 90 tablet, Rfl: 3    azelastine (ASTELIN) 0.1 % nasal spray, 2 sprays into the nostril(s) as directed by provider 2 (Two) Times a Day. Use in each nostril as directed (Patient taking differently: Administer 2 sprays into the nostril(s) as directed by provider Every 4 (Four) Hours As Needed.), Disp: 30 mL, Rfl: 1    buPROPion XL (Wellbutrin XL) 150 MG 24 hr tablet, Take 1 tablet by mouth Every Morning. (Patient taking differently: Take 1 tablet by mouth Every Evening.), Disp: 30 tablet, Rfl: 1    butalbital-acetaminophen-caffeine (FIORICET, ESGIC) -40 MG  per tablet, Take 1 tablet by mouth 2 (Two) Times a Day As Needed for Headache., Disp: 20 tablet, Rfl: 0    Chlorhexidine Gluconate 4 % solution, , Disp: , Rfl:     desvenlafaxine (PRISTIQ) 100 MG 24 hr tablet, Take 1 tablet by mouth Daily., Disp: 30 tablet, Rfl: 1    diazePAM (VALIUM) 10 MG tablet, Take 1 tablet by mouth 3 (Three) Times a Day As Needed for Anxiety., Disp: 45 tablet, Rfl: 0    FREESTYLE LITE test strip, See Admin Instructions., Disp: , Rfl:     gabapentin (Neurontin) 800 MG tablet, Take 1 tablet by mouth 3 (Three) Times a Day., Disp: 90 tablet, Rfl: 0    guaiFENesin-dextromethorphan (ROBITUSSIN DM) 100-10 MG/5ML syrup, Take 5 mL by mouth 3 (Three) Times a Day As Needed for Cough., Disp: 80 mL, Rfl: 0    Lurasidone HCl (Latuda) 120 MG tablet tablet, Take 1 tablet by mouth Daily., Disp: 30 tablet, Rfl: 1    montelukast (SINGULAIR) 10 MG tablet, Take 1 tablet by mouth Every Night., Disp: 30 tablet, Rfl: 5    naloxone (NARCAN) 4 MG/0.1ML nasal spray, Call 911. Don't prime. Truman in 1 nostril for overdose. Repeat in 2-3 minutes in other nostril if no or minimal breathing/responsiveness., Disp: 2 each, Rfl: 0    norethindrone (AYGESTIN) 5 MG tablet, Take 1 tablet by mouth 2 (Two) Times a Day., Disp: , Rfl:     ondansetron ODT (ZOFRAN-ODT) 4 MG disintegrating tablet, Place 1 tablet on the tongue Every 8 (Eight) Hours As Needed for Nausea or Vomiting., Disp: 20 tablet, Rfl: 1    OXcarbazepine (TRILEPTAL) 300 MG tablet, Take 1 tablet by mouth 2 (Two) Times a Day., Disp: 60 tablet, Rfl: 1    oxyCODONE-acetaminophen (PERCOCET) 5-325 MG per tablet, Take 1 tablet by mouth Every 8 (Eight) Hours As Needed for Moderate Pain or Severe Pain., Disp: 18 tablet, Rfl: 0    pantoprazole (Protonix) 40 MG EC tablet, Take 1 tablet by mouth Daily., Disp: 90 tablet, Rfl: 1    polyethylene glycol (MIRALAX) 17 g packet, Take 17 g by mouth Daily. (Patient taking differently: Take 17 g by mouth As Needed.), Disp: 30 each, Rfl: 1     "promethazine (PHENERGAN) 25 MG tablet, Take 1 tablet by mouth Every 6 (Six) Hours As Needed for Nausea or Vomiting., Disp: 20 tablet, Rfl: 0    Rimegepant Sulfate (Nurtec) 75 MG tablet dispersible tablet, Take 1 tablet by mouth Every Other Day. Take Monday,Wednesday,Friday, and Saturday., Disp: 16 tablet, Rfl: 0    Trelegy Ellipta 200-62.5-25 MCG/ACT inhaler, Inhale 1 puff Daily. Patient states she uses Trelegy Ellipta inhaler 1 inhalation a day., Disp: , Rfl:     vitamin E 100 UNIT capsule, Take 2 capsules by mouth Daily. Patient unsure of dose, takes \"2 tablets every night at bedtime\", Disp: , Rfl:     Zavegepant HCl 10 MG/ACT solution, 10 mg into the nostril(s) as directed by provider Daily As Needed (migraine). (1 spray into 1 nostril every 24 hrs prn), Disp: 6 each, Rfl: 5  No current facility-administered medications for this visit.    Allergies:   Allergies   Allergen Reactions    Aspirin Anaphylaxis and Hives     Wheezing         Codeine Anaphylaxis     Tolerates oxycodone-acetaminophen with no allergic rxn    Cyclobenzaprine Other (See Comments)     \"gives me chest pain\"    Other reaction(s): Other (See Comments)    Haldol [Haloperidol] Rash    Imitrex [Sumatriptan] Anaphylaxis and Rash    Latex Hives and Rash    Penicillins Hives and Anaphylaxis     Vomiting    Zofran [Ondansetron] GI Intolerance     Constipation    Lamictal [Lamotrigine] Itching     Itching and hives    Metoclopramide Headache, Hives and Other (See Comments)    Morphine Itching    Oxycontin [Oxycodone] GI Intolerance    Prochlorperazine Nausea And Vomiting and Unknown (See Comments)    Tramadol Hives and Nausea And Vomiting       Objective     Physical Exam:  Vital Signs: There were no vitals filed for this visit.         Physical Exam  Constitutional:       General: She is in acute distress.      Appearance: Normal appearance. She is obese. She is not ill-appearing, toxic-appearing or diaphoretic.   HENT:      Head: Normocephalic and " atraumatic.      Right Ear: External ear normal.      Left Ear: External ear normal.      Nose: Nose normal.   Eyes:      Extraocular Movements: Extraocular movements intact.      Conjunctiva/sclera: Conjunctivae normal.   Pulmonary:      Effort: Pulmonary effort is normal.   Musculoskeletal:      Cervical back: Normal range of motion.   Neurological:      General: No focal deficit present.      Mental Status: She is alert and oriented to person, place, and time.   Psychiatric:         Mood and Affect: Mood normal.         Behavior: Behavior normal.         Thought Content: Thought content normal.         Judgment: Judgment normal.           Assessment / Plan      Assessment/Plan:   Diagnoses and all orders for this visit:    1. Generalized abdominal pain (Primary)    2. Acute pancreatitis without infection or necrosis, unspecified pancreatitis type    3. Lumbar radiculopathy  -     tiZANidine (ZANAFLEX) 4 MG tablet; Take 1 tablet by mouth Every 8 (Eight) Hours As Needed for Muscle Spasms.  Dispense: 270 tablet; Refill: 1    4. Nausea and vomiting, unspecified vomiting type  -     ondansetron ODT (ZOFRAN-ODT) 4 MG disintegrating tablet; Place 1 tablet on the tongue Every 8 (Eight) Hours As Needed for Nausea or Vomiting.  Dispense: 20 tablet; Refill: 1    5. Gastroesophageal reflux disease with esophagitis without hemorrhage  -     pantoprazole (Protonix) 40 MG EC tablet; Take 1 tablet by mouth Daily.  Dispense: 90 tablet; Refill: 1    6. Cigarette nicotine dependence without complication    Discussed with patient that abdominal pain likely continued sequela from pancreatitis but exacerbated by her lack of p.o. intake.  Discussed continuing with broth and crackers and gradually advancing her diet as tolerated  Patient reports that previously she could not tolerate Zofran because it caused constipation but she is now having diarrhea.  Advised that since she is unable to keep promethazine down that we should start  Zofran since she could use ODT formulation.  Advised that once she is able to tolerate p.o. intake and diarrhea resolve that she should stop Zofran and restart promethazine as needed for nausea and vomiting.  Strongly advised not to mix the 2  Will refill patient's tizanidine for her lumbar radiculopathy until she is seen by pain management.  She is currently utilizing this for muscle spasms that are secondary to recurrent vomiting and poor oral intake.  Advised patient that she must start oral intake or she would continue to have these problems that compound on each other  Patient reports that omeprazole not helping with her GERD.  Currently on 20 mg daily.  Will switch to 40 mg of Protonix until patient is seen by gastro.  She missed her appointment today and states that she has follow-up next week    Follow Up:   Return if symptoms worsen or fail to improve.    Any medications prescribed have been sent electronically to   [id number] - [place], [place] - [address] AT [facility] [address] - [phone] PH - [phone] FX  [address] [place] [address]  Phone: [phone] Fax: [phone]      30 minutes were spent reviewing the patient's questionnaire, formulating a treatment plan, and relaying information to the patient via [employer].    Hunter Winkler MD  [facility]  10/02/24  17:25 EDT

## 2024-10-03 ENCOUNTER — TELEPHONE (OUTPATIENT)
Dept: FAMILY MEDICINE CLINIC | Facility: CLINIC | Age: 39
End: 2024-10-03
Payer: MEDICAID

## 2024-10-03 DIAGNOSIS — M54.16 LUMBAR RADICULOPATHY: ICD-10-CM

## 2024-10-03 NOTE — TELEPHONE ENCOUNTER
PATIENT SAYS THE PHARMACY IS SAYING IT'S TOO EARLY TO FILL THE NEW SCRIPT FOR TIZANIDINE. THEY WON'T FILL IT UNTIL 12/11/24. PATIENT IS COMPLETELY OUT OF IT, AND ASKING TO SEE IF WE CAN HELP GET THIS FILLED. I COULD NOT HARDLY UNDERSTAND HER.

## 2024-10-04 ENCOUNTER — TELEPHONE (OUTPATIENT)
Dept: FAMILY MEDICINE CLINIC | Facility: CLINIC | Age: 39
End: 2024-10-04
Payer: MEDICAID

## 2024-10-04 NOTE — TELEPHONE ENCOUNTER
Spoke with patient, per patient she didn't know that the frequency had changed. I adv her to call pharmacy to see if the new frequency ( every 6 hours) would need PA still. Patient to call office if anything further is needed. Telephone encounter also made from this call.

## 2024-10-04 NOTE — TELEPHONE ENCOUNTER
Patient called stating that she was told by the pharmacy that her medication tizanidine needed PA. Medication was sent in yesterday with frequency change, Patient notified via [employer] message.     Patient states she didn't know the frequency had changed and will call pharmacy to check to see if medication was approved.

## 2024-10-08 ENCOUNTER — TELEPHONE (OUTPATIENT)
Dept: FAMILY MEDICINE CLINIC | Facility: CLINIC | Age: 39
End: 2024-10-08
Payer: MEDICAID

## 2024-10-08 DIAGNOSIS — F43.10 POST TRAUMATIC STRESS DISORDER (PTSD): ICD-10-CM

## 2024-10-08 DIAGNOSIS — G43.909 EPISODIC MIGRAINE: ICD-10-CM

## 2024-10-08 DIAGNOSIS — G43.001 MIGRAINE WITHOUT AURA AND WITH STATUS MIGRAINOSUS, NOT INTRACTABLE: ICD-10-CM

## 2024-10-08 DIAGNOSIS — F41.0 PANIC ATTACKS: ICD-10-CM

## 2024-10-08 DIAGNOSIS — F41.1 GENERALIZED ANXIETY DISORDER: ICD-10-CM

## 2024-10-08 DIAGNOSIS — J30.9 ALLERGIC RHINITIS, UNSPECIFIED SEASONALITY, UNSPECIFIED TRIGGER: ICD-10-CM

## 2024-10-08 RX ORDER — DIAZEPAM 10 MG
10 TABLET ORAL 3 TIMES DAILY PRN
Qty: 45 TABLET | Refills: 0 | Status: SHIPPED | OUTPATIENT
Start: 2024-10-08 | End: 2024-10-14 | Stop reason: SDUPTHER

## 2024-10-08 RX ORDER — MONTELUKAST SODIUM 10 MG/1
10 TABLET ORAL NIGHTLY
Qty: 30 TABLET | Refills: 5 | OUTPATIENT
Start: 2024-10-08

## 2024-10-08 NOTE — TELEPHONE ENCOUNTER
Patient called and wants to see if we can add another medication to help with her bipolar (since we took her off of wellbutrin). She saw a commercial about it on tv, and is called Rexulti (she thinks). She says she is struggling really bad. She says she has been in panic mode lately. She requested a sooner appointment but there was none available.

## 2024-10-08 NOTE — TELEPHONE ENCOUNTER
Per call from Mckenna at [facility], she states that patient did not keep her appointment scheduled on 10/1/2024. States that she has called and rescheduled - and is currently scheduled for 12/16/2024.  
Good

## 2024-10-08 NOTE — TELEPHONE ENCOUNTER
Caller: Lindsay Amezquita    Relationship: Self    Best call back number:     Requested Prescriptions:   Requested Prescriptions     Pending Prescriptions Disp Refills    montelukast (SINGULAIR) 10 MG tablet 30 tablet 5     Sig: Take 1 tablet by mouth Every Night.        [facility] be sent[id number] [place] [address] AT [facility] [address] - [phone]  - [phone]          Last office visit with prescribing clinician: 4/3/2024   Last telemedicine visit with prescribing clinician: Visit date not found   Next office visit with prescribing clinician: 2/6/2025     Additional details provided by patient:     Does the patient have less than a 3 day supply:  [] Yes  [] No    Would you like a call back once the refill request has been completed: [] Yes [] No    If the office needs to give you a call back, can they leave a voicemail: [] Yes [] No    Kim Fragoso Rep   10/08/24 08:47 EDT

## 2024-10-08 NOTE — TELEPHONE ENCOUNTER
Caller: Lindsay Amezquita    Relationship: Self    Best call back number:   Telephone Information:   Mobile [phone]         Requested Prescriptions:   Requested Prescriptions     Pending Prescriptions Disp Refills    butalbital-acetaminophen-caffeine (FIORICET, ESGIC) -40 MG per tablet 20 tablet 0     Sig: Take 1 tablet by mouth 2 (Two) Times a Day As Needed for Headache.    Rimegepant Sulfate (Nurtec) 75 MG tablet dispersible tablet 16 tablet 0     Sig: Take 1 tablet by mouth Every Other Day. Take Monday,Wednesday,Friday, and Saturday.        Pharmacy where request should be sent: [id number] - [place], [place] - [address] AT [facility] [address] - [phone] PH - [phone] FX     Last office visit with prescribing clinician: 2/22/2024   Last telemedicine visit with prescribing clinician: Visit date not found   Next office visit with prescribing clinician: 10/30/2024     Additional details provided by patient: PT IS ALSO REQUESTING Zavegepant HCl 10 MG/ACT solution , SHE STATES SHE WAS IN ED THE DAY OF LAST NO SHOWED APPOINTMENT, APPOINTMENT HAS BEEN R/S AGAIN.     Does the patient have less than a 3 day supply:  [] Yes  [x] No    Would you like a call back once the refill request has been completed: [] Yes [x] No    If the office needs to give you a call back, can they leave a voicemail: [] Yes [x] No    Kim Niño Rep   10/08/24 08:35 EDT         DELETE AFTER READING TO PATIENT: “Thank you for sharing this information with me. I will send a message to the clinical team. Please allow 48 hours for the clinical staff to follow up on this request.”

## 2024-10-09 ENCOUNTER — TELEPHONE (OUTPATIENT)
Dept: OBSTETRICS AND GYNECOLOGY | Facility: CLINIC | Age: 39
End: 2024-10-09

## 2024-10-09 ENCOUNTER — PRIOR AUTHORIZATION (OUTPATIENT)
Dept: FAMILY MEDICINE CLINIC | Facility: CLINIC | Age: 39
End: 2024-10-09
Payer: MEDICAID

## 2024-10-09 NOTE — TELEPHONE ENCOUNTER
A prior auth has been started through cover my meds for tizanidine.    Per the insurance: Member should be able to get the drug/product without a PA at this time.    Key: [id number]

## 2024-10-09 NOTE — TELEPHONE ENCOUNTER
PROVIDER: DR ROMERO     CALLER: LEORA COTA     PHONE NUMBER: [phone]     REASON FOR CALL: SAME DAY CANCELLATION - PT AT ER    Did the patient call AFTER the start time of their scheduled appointment?  NO    RESCHEDULED: NO - PT SAID SHE WOULD CALL BACK TO R/S

## 2024-10-10 ENCOUNTER — TELEMEDICINE (OUTPATIENT)
Dept: FAMILY MEDICINE CLINIC | Facility: TELEHEALTH | Age: 39
End: 2024-10-10
Payer: MEDICAID

## 2024-10-10 DIAGNOSIS — N89.8 VAGINAL ITCHING: ICD-10-CM

## 2024-10-10 DIAGNOSIS — N89.8 VAGINAL DISCHARGE: Primary | ICD-10-CM

## 2024-10-10 PROCEDURE — 1159F MED LIST DOCD IN RCRD: CPT | Performed by: NURSE PRACTITIONER

## 2024-10-10 PROCEDURE — 1160F RVW MEDS BY RX/DR IN RCRD: CPT | Performed by: NURSE PRACTITIONER

## 2024-10-10 PROCEDURE — 99212 OFFICE O/P EST SF 10 MIN: CPT | Performed by: NURSE PRACTITIONER

## 2024-10-10 RX ORDER — FEXOFENADINE HYDROCHLORIDE AND PSEUDOEPHEDRINE HYDROCHLORIDE 60; 120 MG/1; MG/1
TABLET, FILM COATED, EXTENDED RELEASE ORAL
COMMUNITY
Start: 2024-10-08

## 2024-10-10 RX ORDER — FLUTICASONE PROPIONATE 50 UG/1
SPRAY, METERED NASAL
COMMUNITY
Start: 2024-09-22

## 2024-10-10 NOTE — PROGRESS NOTES
"You have chosen to receive care through a telehealth visit.  Do you consent to use a video/audio connection for your medical care today? Yes     CHIEF COMPLAINT  No chief complaint on file.        HPI  Lindsay Amezquita is a 39 y.o. female  presents with complaint of swelling, itching and having some brown discharge. Reports she is having some white flaky \"stuff\" from her vaginal area. Reports she had an ablation this year. Reports no fever or chills. Reports she has been sweating. No nausea or vomiting. Denies any abdominal pain at this time. Reports she has been recently hospitalized with pancreatitis. Reports she has not taken any medication for her symptoms.     Review of Systems   Constitutional:  Positive for diaphoresis. Negative for chills, fatigue and fever.   HENT:  Negative for congestion, ear discharge, ear pain, sinus pressure, sinus pain and sore throat.    Respiratory:  Negative for cough, chest tightness, shortness of breath and wheezing.    Cardiovascular:  Negative for chest pain.   Gastrointestinal:  Negative for abdominal pain, diarrhea, nausea and vomiting.   Genitourinary:         Swelling, itching and brown vaginal d/c   Musculoskeletal:  Negative for back pain and myalgias.   Neurological:  Negative for dizziness and headaches.   Psychiatric/Behavioral: Negative.         Past Medical History:   Diagnosis Date    Allergic     Anxiety     Asthma Beings of copd with asthma    Back pain     Bell palsy 07/06/2021    Bell's palsy     Bipolar 2 disorder     Blood clot in vein     Behind left knee cap    COPD (chronic obstructive pulmonary disease) Six months ago    Deep vein thrombosis Last year    Depression     Diabetes mellitus November    Pre diabete    Frequent headaches     GERD (gastroesophageal reflux disease)     Hypertension     Every time i go into the [facility] room    Irritable bowel syndrome Two years ago    Kidney stone Two years ago    Migraine     Nausea, vomiting and diarrhea 09/17/2018 "    Obesity All of my life    Ovarian cyst Two years ago    Pancreatitis     Panic     PTSD (post-traumatic stress disorder)     Pulmonary embolism Not in lungs    Recurrent pregnancy loss, antepartum condition or complication Every since i have been 14 yars old    Restless leg syndrome     Sinus problem     Snores     Tattoos     Urinary tract infection Have them on and off    Varicella Little    Visual impairment Since i was little    Vitamin B12 deficiency     Wears glasses        Family History   Problem Relation Age of Onset    Irritable bowel syndrome Mother     Heart disease Mother     Miscarriages / Stillbirths Mother     Arthritis Mother     COPD Mother     Learning disabilities Mother     Mental illness Mother     Asthma Mother     Irritable bowel syndrome Father     Alcohol abuse Father     Diabetes Father     Hyperlipidemia Father     Anxiety disorder Father     Learning disabilities Father     Colon cancer Maternal Grandfather     Stroke Paternal Grandfather     Stroke Paternal Grandmother        Social History     Socioeconomic History    Marital status: Single   Tobacco Use    Smoking status: Every Day     Current packs/day: 0.50     Average packs/day: 2.0 packs/day for 32.3 years (63.5 ttl pk-yrs)     Types: Cigarettes     Start date: 7/17/1992     Passive exposure: Current    Smokeless tobacco: Never    Tobacco comments:      Around 2.5 packs per day- 7/5/24   Vaping Use    Vaping status: Former    Passive vaping exposure: Yes   Substance and Sexual Activity    Alcohol use: Not Currently     Comment: rarely    Drug use: Not Currently    Sexual activity: Not Currently     Partners: Female     Birth control/protection: Other, Partner of same sex       Lindsay Gamezney  reports that she has been smoking cigarettes. She started smoking about 32 years ago. She has a 63.5 pack-year smoking history. She has been exposed to tobacco smoke. She has never used smokeless tobacco. I have educated her on the risk of  diseases from using tobacco products such as cancer, COPD, and heart disease.     I advised her to quit     I spent  1  minutes counseling the patient.              There were no vitals taken for this visit.    PHYSICAL EXAM  Physical Exam   Constitutional: She is oriented to person, place, and time. She appears well-developed and well-nourished. No distress.   HENT:   Head: Normocephalic and atraumatic.   Right Ear: Hearing normal.   Left Ear: Hearing normal.   Mouth/Throat: Mouth/Lips are normal.  Eyes: Conjunctivae and lids are normal.   Pulmonary/Chest: Effort normal.  No respiratory distress.  Abdominal: There is no abdominal tenderness.   Patient directed exam    Neurological: She is alert and oriented to person, place, and time.   Psychiatric: She has a normal mood and affect. Her speech is normal and behavior is normal.       Results for orders placed or performed during the hospital encounter of 10/02/24   Comprehensive Metabolic Panel    Specimen: Blood   Result Value Ref Range    Glucose 114 (H) 65 - 99 mg/dL    BUN 7 6 - 20 mg/dL    Creatinine 0.57 0.57 - 1.00 mg/dL    Sodium 141 136 - 145 mmol/L    Potassium 3.9 3.5 - 5.2 mmol/L    Chloride 102 98 - 107 mmol/L    CO2 24.1 22.0 - 29.0 mmol/L    Calcium 9.8 8.6 - 10.5 mg/dL    Total Protein 7.4 6.0 - 8.5 g/dL    Albumin 4.6 3.5 - 5.2 g/dL    ALT (SGPT) 39 (H) 1 - 33 U/L    AST (SGOT) 35 (H) 1 - 32 U/L    Alkaline Phosphatase 94 39 - 117 U/L    Total Bilirubin 0.4 0.0 - 1.2 mg/dL    Globulin 2.8 gm/dL    A/G Ratio 1.6 g/dL    BUN/Creatinine Ratio 12.3 7.0 - 25.0    Anion Gap 14.9 5.0 - 15.0 mmol/L    eGFR 118.0 >60.0 mL/min/1.73   Lipase    Specimen: Blood   Result Value Ref Range    Lipase 55 13 - 60 U/L   Urinalysis With Microscopic If Indicated (No Culture) - Urine, Clean Catch    Specimen: Urine, Clean Catch   Result Value Ref Range    Color, UA Yellow Yellow, Straw    Appearance, UA Clear Clear    pH, UA 6.5 5.0 - 8.0    Specific Gravity, UA 1.018  1.005 - 1.030    Glucose, UA >=1000 mg/dL (3+) (A) Negative    Ketones, UA Negative Negative    Bilirubin, UA Negative Negative    Blood, UA Negative Negative    Protein, UA 30 mg/dL (1+) (A) Negative    Leuk Esterase, UA Negative Negative    Nitrite, UA Negative Negative    Urobilinogen, UA 0.2 E.U./dL 0.2 - 1.0 E.U./dL   CBC Auto Differential    Specimen: Blood   Result Value Ref Range    WBC 11.16 (H) 3.40 - 10.80 10*3/mm3    RBC 5.38 (H) 3.77 - 5.28 10*6/mm3    Hemoglobin 16.1 (H) 12.0 - 15.9 g/dL    Hematocrit 48.1 (H) 34.0 - 46.6 %    MCV 89.4 79.0 - 97.0 fL    MCH 29.9 26.6 - 33.0 pg    MCHC 33.5 31.5 - 35.7 g/dL    RDW 13.9 12.3 - 15.4 %    RDW-SD 45.1 37.0 - 54.0 fl    MPV 9.2 6.0 - 12.0 fL    Platelets 512 (H) 140 - 450 10*3/mm3    Neutrophil % 59.7 42.7 - 76.0 %    Lymphocyte % 33.4 19.6 - 45.3 %    Monocyte % 5.4 5.0 - 12.0 %    Eosinophil % 0.8 0.3 - 6.2 %    Basophil % 0.4 0.0 - 1.5 %    Immature Grans % 0.3 0.0 - 0.5 %    Neutrophils, Absolute 6.67 1.70 - 7.00 10*3/mm3    Lymphocytes, Absolute 3.73 (H) 0.70 - 3.10 10*3/mm3    Monocytes, Absolute 0.60 0.10 - 0.90 10*3/mm3    Eosinophils, Absolute 0.09 0.00 - 0.40 10*3/mm3    Basophils, Absolute 0.04 0.00 - 0.20 10*3/mm3    Immature Grans, Absolute 0.03 0.00 - 0.05 10*3/mm3    nRBC 0.0 0.0 - 0.2 /100 WBC   Pregnancy, Urine - Urine, Clean Catch    Specimen: Urine, Clean Catch   Result Value Ref Range    HCG, Urine QL Negative Negative   Urinalysis, Microscopic Only - Urine, Clean Catch    Specimen: Urine, Clean Catch   Result Value Ref Range    RBC, UA None Seen None Seen, 0-2 /HPF    WBC, UA 0-2 None Seen, 0-2 /HPF    Bacteria, UA None Seen None Seen /HPF    Squamous Epithelial Cells, UA 0-2 None Seen, 0-2 /HPF    Hyaline Casts, UA None Seen None Seen /LPF    Methodology Manual Light Microscopy    Green Top (Gel)   Result Value Ref Range    Extra Tube Hold for add-ons.    Lavender Top   Result Value Ref Range    Extra Tube hold for add-on    Gold Top -  SST   Result Value Ref Range    Extra Tube Hold for add-ons.    Light Blue Top   Result Value Ref Range    Extra Tube Hold for add-ons.      *Note: Due to a large number of results and/or encounters for the requested time period, some results have not been displayed. A complete set of results can be found in Results Review.       Diagnoses and all orders for this visit:    1. Vaginal discharge (Primary)    2. Vaginal itching    Advised patient to follow up with her PCP, GYN or urgent care in person this am for a in person evaluation. Patient understands she needs an exam and possible vaginal swab to diagnose her symptoms. Reports she will call her PCP or gyn this am. Understands the risk of not following up.           Court Felipe, APRN  10/10/2024  05:02 EDT    The use of a video visit has been reviewed with the patient and verbal informed consent has been obtained. Myself and Lindsay Amezquita participated in this visit. The patient is located in [address] [place] [address].    I am located in [place], [place]. Mychart and Twilio were utilized. I spent 5 minutes in the patient's chart for this visit.      Note Disclaimer: At [facility], we believe that sharing information builds trust and better   relationships. You are receiving this note because you recently visited [facility]. It is possible you   will see health information before a provider has talked with you about it. This kind of information can   be easy to misunderstand. To help you fully understand what it means for your health, we urge you to   discuss this note with your provider.

## 2024-10-14 ENCOUNTER — TELEMEDICINE (OUTPATIENT)
Dept: BEHAVIORAL HEALTH | Facility: CLINIC | Age: 39
End: 2024-10-14
Payer: MEDICAID

## 2024-10-14 DIAGNOSIS — F41.0 PANIC ATTACKS: ICD-10-CM

## 2024-10-14 DIAGNOSIS — F41.1 GENERALIZED ANXIETY DISORDER: ICD-10-CM

## 2024-10-14 DIAGNOSIS — F43.10 POST TRAUMATIC STRESS DISORDER (PTSD): ICD-10-CM

## 2024-10-14 DIAGNOSIS — F51.04 PSYCHOPHYSIOLOGICAL INSOMNIA: ICD-10-CM

## 2024-10-14 DIAGNOSIS — F31.30 BIPOLAR I DISORDER, MOST RECENT EPISODE DEPRESSED: ICD-10-CM

## 2024-10-14 DIAGNOSIS — F31.30 BIPOLAR I DISORDER, MOST RECENT EPISODE DEPRESSED: Primary | ICD-10-CM

## 2024-10-14 PROCEDURE — 1160F RVW MEDS BY RX/DR IN RCRD: CPT

## 2024-10-14 PROCEDURE — 99214 OFFICE O/P EST MOD 30 MIN: CPT

## 2024-10-14 PROCEDURE — 1159F MED LIST DOCD IN RCRD: CPT

## 2024-10-14 RX ORDER — LURASIDONE HYDROCHLORIDE 120 MG/1
120 TABLET, FILM COATED ORAL DAILY
Qty: 30 TABLET | Refills: 1 | OUTPATIENT
Start: 2024-10-14

## 2024-10-14 RX ORDER — AMITRIPTYLINE HYDROCHLORIDE 150 MG/1
150 TABLET ORAL NIGHTLY
Qty: 30 TABLET | Refills: 1 | Status: SHIPPED | OUTPATIENT
Start: 2024-10-14

## 2024-10-14 RX ORDER — OXCARBAZEPINE 300 MG/1
300 TABLET, FILM COATED ORAL 2 TIMES DAILY
Qty: 180 TABLET | OUTPATIENT
Start: 2024-10-14

## 2024-10-14 RX ORDER — DESVENLAFAXINE 100 MG/1
100 TABLET, EXTENDED RELEASE ORAL DAILY
Qty: 30 TABLET | Refills: 1 | Status: SHIPPED | OUTPATIENT
Start: 2024-10-14

## 2024-10-14 RX ORDER — LURASIDONE HYDROCHLORIDE 120 MG/1
120 TABLET, FILM COATED ORAL DAILY
Qty: 30 TABLET | Refills: 1 | Status: SHIPPED | OUTPATIENT
Start: 2024-10-14

## 2024-10-14 RX ORDER — DIAZEPAM 10 MG
10 TABLET ORAL 3 TIMES DAILY PRN
Qty: 45 TABLET | Refills: 0 | Status: SHIPPED | OUTPATIENT
Start: 2024-10-14 | End: 2024-10-21 | Stop reason: SDUPTHER

## 2024-10-14 RX ORDER — OXCARBAZEPINE 300 MG/1
300 TABLET, FILM COATED ORAL 2 TIMES DAILY
Qty: 60 TABLET | Refills: 1 | Status: SHIPPED | OUTPATIENT
Start: 2024-10-14

## 2024-10-14 NOTE — PROGRESS NOTES
"This provider is located at [facility], [facility], [address] [place] [address], [id number],using a secure MyChart Video Visit through [email or web address]. Patient is being seen remotely via telehealth at their home address in [place], and stated they are in a secure environment for this session. The patient's condition being diagnosed/treated is appropriate for telemedicine. The provider identified herself as well as her credentials.   The patient, and/or patients guardian, consent to be seen remotely, and when consent is given they understand that the consent allows for patient identifiable information to be sent to a third party as needed.   They may refuse to be seen remotely at any time. The electronic data is encrypted and password protected, and the patient and/or guardian has been advised of the potential risks to privacy not withstanding such measures.    Video Visit    Patient Name: Lindsay Amezquita  : 1985   MRN: [id number]   Care Team: Patient Care Team:  Hunter Winkler MD as PCP - General (Family Medicine)  Donna Mcqueen APRN as Nurse Practitioner (Behavioral Health)         Chief Complaint:    Chief Complaint   Patient presents with    PTSD    Anxiety    Panic Attack    Bipolar    Sleeping Problem    Med Management       History of Present Illness: Lindsay Amezquita is a 39 y.o. female who presents via video visit for a medication management follow up. Patient reports that she feels like she is \"just existing\". She states she has had a lot of situational stressors and was hospitalized for pancreatitis which has been very stressful for her. While hospitalized she states she was taken off all of her medications which made her \"feel like she was going crazy\". She has re-started some but did not re-start the Vraylar or Wellbutrin as she was told that could have been what caused her pancreatitis. She feels like she is adjusting to being back on her medication. She is talking to her " therapist daily and states that is going well. She denies SI/HI today.     The following portion of the patient's history were reviewed and updated appropriately: allergies, current and past medications, family history, medical history and social history. MITCH reviewed and appropriate.   Subjective   Review of Systems:    Review of Systems   Respiratory: Negative.     Cardiovascular: Negative.  Negative for chest pain and palpitations.   Neurological: Negative.    Psychiatric/Behavioral:  Positive for depressed mood and stress. The patient is nervous/anxious.    All other systems reviewed and are negative.      Current Medications:   Current Outpatient Medications   Medication Sig Dispense Refill    amitriptyline (ELAVIL) 150 MG tablet Take 1 tablet by mouth Every Night. 30 tablet 1    desvenlafaxine (PRISTIQ) 100 MG 24 hr tablet Take 1 tablet by mouth Daily. 30 tablet 1    diazePAM (VALIUM) 10 MG tablet Take 1 tablet by mouth 3 (Three) Times a Day As Needed for Anxiety. 45 tablet 0    Lurasidone HCl (Latuda) 120 MG tablet tablet Take 1 tablet by mouth Daily. 30 tablet 1    OXcarbazepine (TRILEPTAL) 300 MG tablet Take 1 tablet by mouth 2 (Two) Times a Day. 60 tablet 1    albuterol sulfate HFA (Ventolin HFA) 108 (90 Base) MCG/ACT inhaler Inhale 2 puffs Every 4 (Four) Hours As Needed for Wheezing. 6.7 g 0    Allegra-D Allergy & Congestion  MG per 12 hr tablet       atorvastatin (LIPITOR) 40 MG tablet Take 1 tablet by mouth every night at bedtime. 90 tablet 3    azelastine (ASTELIN) 0.1 % nasal spray 2 sprays into the nostril(s) as directed by provider 2 (Two) Times a Day. Use in each nostril as directed (Patient taking differently: Administer 2 sprays into the nostril(s) as directed by provider Every 4 (Four) Hours As Needed.) 30 mL 1    butalbital-acetaminophen-caffeine (FIORICET, ESGIC) -40 MG per tablet Take 1 tablet by mouth 2 (Two) Times a Day As Needed for Headache. 20 tablet 0    Chlorhexidine  "Gluconate 4 % solution       fluticasone (FLONASE) 50 MCG/ACT nasal spray       FREESTYLE LITE test strip See Admin Instructions.      gabapentin (Neurontin) 800 MG tablet Take 1 tablet by mouth 3 (Three) Times a Day. 90 tablet 0    guaiFENesin-dextromethorphan (ROBITUSSIN DM) 100-10 MG/5ML syrup Take 5 mL by mouth 3 (Three) Times a Day As Needed for Cough. 80 mL 0    montelukast (SINGULAIR) 10 MG tablet Take 1 tablet by mouth Every Night. 30 tablet 5    naloxone (NARCAN) 4 MG/0.1ML nasal spray Call 911. Don't prime. [place] in 1 nostril for overdose. Repeat in 2-3 minutes in other nostril if no or minimal breathing/responsiveness. 2 each 0    norethindrone (AYGESTIN) 5 MG tablet Take 1 tablet by mouth 2 (Two) Times a Day.      ondansetron ODT (ZOFRAN-ODT) 4 MG disintegrating tablet Place 1 tablet on the tongue Every 8 (Eight) Hours As Needed for Nausea or Vomiting. 20 tablet 1    oxyCODONE-acetaminophen (PERCOCET) 5-325 MG per tablet Take 1 tablet by mouth Every 8 (Eight) Hours As Needed for Moderate Pain or Severe Pain. 18 tablet 0    pantoprazole (Protonix) 40 MG EC tablet Take 1 tablet by mouth Daily. 90 tablet 1    polyethylene glycol (MIRALAX) 17 g packet Take 17 g by mouth Daily. (Patient taking differently: Take 17 g by mouth As Needed.) 30 each 1    promethazine (PHENERGAN) 25 MG tablet Take 1 tablet by mouth Every 6 (Six) Hours As Needed for Nausea or Vomiting. 20 tablet 0    Rimegepant Sulfate (Nurtec) 75 MG tablet dispersible tablet Take 1 tablet by mouth Every Other Day. Take Monday,Wednesday,Friday, and Saturday. 16 tablet 0    tiZANidine (ZANAFLEX) 4 MG tablet Take 1 tablet by mouth Every 6 (Six) Hours As Needed for Muscle Spasms. 120 tablet 0    Trelegy Ellipta 200-62.5-25 MCG/ACT inhaler Inhale 1 puff Daily. Patient states she uses Trelegy Ellipta inhaler 1 inhalation a day.      vitamin E 100 UNIT capsule Take 2 capsules by mouth Daily. Patient unsure of dose, takes \"2 tablets every night at bedtime\" "      Zavegepant HCl 10 MG/ACT solution 10 mg into the nostril(s) as directed by provider Daily As Needed (migraine). (1 spray into 1 nostril every 24 hrs prn) 6 each 5     No current facility-administered medications for this visit.       Mental Status Exam:   Hygiene:    unable to assess   Cooperation:  Cooperative  Eye Contact:  Good  Psychomotor Behavior:   appears to be WNL   Affect:  Appropriate  Mood: sad, depressed, anxious, irritable, and fluctates  Speech:  Normal  Thought Process:  Linear  Thought Content:  Mood congruent  Suicidal:  None  Homicidal:  None  Hallucinations:  None  Delusion:  None  Memory:  Intact  Orientation:  Person, Place, Time, and Situation  Reliability:  fair  Insight:  Fair  Judgement:  Fair  Impulse Control:  Fair  Physical/Medical Issues:  Yes see chart       Objective   Vital Signs:   There were no vitals taken for this visit.      On 10/2/2024  BP: 147/95  P: 106    Assessment / Plan    Diagnoses and all orders for this visit:    1. Bipolar I disorder, most recent episode depressed (Primary)  -     OXcarbazepine (TRILEPTAL) 300 MG tablet; Take 1 tablet by mouth 2 (Two) Times a Day.  Dispense: 60 tablet; Refill: 1  -     Lurasidone HCl (Latuda) 120 MG tablet tablet; Take 1 tablet by mouth Daily.  Dispense: 30 tablet; Refill: 1    2. Psychophysiological insomnia  -     amitriptyline (ELAVIL) 150 MG tablet; Take 1 tablet by mouth Every Night.  Dispense: 30 tablet; Refill: 1    3. Post traumatic stress disorder (PTSD)  -     desvenlafaxine (PRISTIQ) 100 MG 24 hr tablet; Take 1 tablet by mouth Daily.  Dispense: 30 tablet; Refill: 1  -     diazePAM (VALIUM) 10 MG tablet; Take 1 tablet by mouth 3 (Three) Times a Day As Needed for Anxiety.  Dispense: 45 tablet; Refill: 0    4. Generalized anxiety disorder  -     desvenlafaxine (PRISTIQ) 100 MG 24 hr tablet; Take 1 tablet by mouth Daily.  Dispense: 30 tablet; Refill: 1  -     diazePAM (VALIUM) 10 MG tablet; Take 1 tablet by mouth 3  (Three) Times a Day As Needed for Anxiety.  Dispense: 45 tablet; Refill: 0    5. Panic attacks  -     diazePAM (VALIUM) 10 MG tablet; Take 1 tablet by mouth 3 (Three) Times a Day As Needed for Anxiety.  Dispense: 45 tablet; Refill: 0    Will discontinue Vraylar and Wellbutrin. Overall, patient appears to be tolerating all other medications well. Will let her continue to adjust since re-starting them.    As part of patient's treatment plan I am prescribing a controlled substance.  The patient has been made aware of the appropriate use of such medications, including potential risk of somnolence, limited ability to drive and/or work safely, and potential for dependence and/or overdose.  It has also been made clear that these medications are for use by this patient only, without concomitant use of alcohol or other substances, unless prescribed.    Patient has completed a prescribing agreement detailing terms of continued prescribing of controlled substances, including monitoring MITCH reports, urine drug screening, and pill counts if necessary.  Patient is aware that inappropriate use will result in cessation of prescribing such medications.    AIMS  Facial and Oral Movements  Muscles of Facial Expression: None, normal  Lips and Perioral Area: None, normal  Jaw: None, normal  Tongue: None, normal  Extremity Movements  Upper (arms, wrists, hands, fingers): None, normal  Lower (legs, knees, ankles, toes): None, normal  Trunk Movements  Neck, shoulders, hips: None, normal  Overall Severity  Severity of abnormal movements (max 4): 0  Incapacitation due to abnormal movements: None, normal  Patient's awareness of abnormal movements (rate only patient's report): Aware, no distress  Dental Status  Current problems with teeth and/or dentures?: No  Does patient usually wear dentures?: No         A psychological evaluation was conducted in order to assess past and current level of functioning. Areas assessed included, but were  not limited to: perception of social support, perception of ability to face and deal with challenges in life (positive functioning), anxiety symptoms, depressive symptoms, perspective on beliefs/belief system, coping skills for stress, intelligence level,  Therapeutic rapport was established. Interventions conducted today were geared towards incorporating medication management along with support for continued therapy. Education was also provided as to the med management with this provider and what to expect in subsequent sessions.      We discussed risks, benefits, goals and side effects of the above medication and the patient was agreeable with the plan. Patient was educated on the importance of compliance with treatment and follow-up appointments. Patient is aware to contact the [facility] with any worsening of symptoms. To call for questions or concerns and return early if necessary. Patent is agreeable to go to the Emergency Department or call 911 should they begin SI/HI.        MEDS ORDERED DURING VISIT:  New Medications Ordered This Visit   Medications    amitriptyline (ELAVIL) 150 MG tablet     Sig: Take 1 tablet by mouth Every Night.     Dispense:  30 tablet     Refill:  1    desvenlafaxine (PRISTIQ) 100 MG 24 hr tablet     Sig: Take 1 tablet by mouth Daily.     Dispense:  30 tablet     Refill:  1    diazePAM (VALIUM) 10 MG tablet     Sig: Take 1 tablet by mouth 3 (Three) Times a Day As Needed for Anxiety.     Dispense:  45 tablet     Refill:  0    OXcarbazepine (TRILEPTAL) 300 MG tablet     Sig: Take 1 tablet by mouth 2 (Two) Times a Day.     Dispense:  60 tablet     Refill:  1    Lurasidone HCl (Latuda) 120 MG tablet tablet     Sig: Take 1 tablet by mouth Daily.     Dispense:  30 tablet     Refill:  1         Follow Up   Return in about 6 weeks (around 11/25/2024).  Patient was given instructions and counseling regarding her condition or for health maintenance advice. Please see specific information  pulled into the AVS if appropriate.     TREATMENT PLAN/GOALS: Continue supportive psychotherapy efforts and medications as indicated. Treatment and medication options discussed during today's visit. Patient acknowledged and verbally consented to continue with current treatment plan and was educated on the importance of compliance with treatment and follow-up appointments.    MEDICATION ISSUES:  Discussed medication options and treatment plan of prescribed medication as well as the risks, benefits, and side effects including potential falls, possible impaired driving and metabolic adversities among others. Patient is agreeable to call the office with any worsening of symptoms or onset of side effects. Patient is agreeable to call 911 or go to the nearest ER should he/she begin having SI/HI.        [facility]  [address] [place] [address] [phone]    October 14, 2024 12:00 EDT

## 2024-10-15 ENCOUNTER — TELEPHONE (OUTPATIENT)
Dept: FAMILY MEDICINE CLINIC | Facility: CLINIC | Age: 39
End: 2024-10-15
Payer: MEDICAID

## 2024-10-15 NOTE — TELEPHONE ENCOUNTER
Pt called and said she needs a refill on her Jardance 10mg that babar prescribe and also something for a UTI and yeast infection. Please advise and call the patient back. Thanks!

## 2024-10-18 ENCOUNTER — TELEPHONE (OUTPATIENT)
Dept: FAMILY MEDICINE CLINIC | Facility: CLINIC | Age: 39
End: 2024-10-18
Payer: MEDICAID

## 2024-10-18 ENCOUNTER — LAB (OUTPATIENT)
Dept: LAB | Facility: HOSPITAL | Age: 39
End: 2024-10-18
Payer: MEDICAID

## 2024-10-18 ENCOUNTER — OFFICE VISIT (OUTPATIENT)
Dept: FAMILY MEDICINE CLINIC | Facility: CLINIC | Age: 39
End: 2024-10-18
Payer: MEDICAID

## 2024-10-18 ENCOUNTER — HOSPITAL ENCOUNTER (OUTPATIENT)
Dept: CT IMAGING | Facility: HOSPITAL | Age: 39
Discharge: HOME | End: 2024-10-18
Payer: MEDICAID

## 2024-10-18 VITALS
RESPIRATION RATE: 16 BRPM | SYSTOLIC BLOOD PRESSURE: 116 MMHG | HEART RATE: 101 BPM | HEIGHT: 62 IN | WEIGHT: 252 LBS | OXYGEN SATURATION: 95 % | DIASTOLIC BLOOD PRESSURE: 84 MMHG | BODY MASS INDEX: 46.38 KG/M2

## 2024-10-18 DIAGNOSIS — K85.90 ACUTE PANCREATITIS, UNSPECIFIED COMPLICATION STATUS, UNSPECIFIED PANCREATITIS TYPE: Primary | ICD-10-CM

## 2024-10-18 DIAGNOSIS — B37.31 VAGINAL CANDIDIASIS: ICD-10-CM

## 2024-10-18 DIAGNOSIS — R09.89 SYMPTOMS OF UPPER RESPIRATORY INFECTION (URI): ICD-10-CM

## 2024-10-18 DIAGNOSIS — N89.8 VAGINAL DISCHARGE: ICD-10-CM

## 2024-10-18 DIAGNOSIS — R11.2 NAUSEA AND VOMITING, UNSPECIFIED VOMITING TYPE: ICD-10-CM

## 2024-10-18 DIAGNOSIS — E11.65 TYPE 2 DIABETES MELLITUS WITH HYPERGLYCEMIA, WITHOUT LONG-TERM CURRENT USE OF INSULIN: ICD-10-CM

## 2024-10-18 DIAGNOSIS — K21.9 GASTROESOPHAGEAL REFLUX DISEASE, UNSPECIFIED WHETHER ESOPHAGITIS PRESENT: ICD-10-CM

## 2024-10-18 DIAGNOSIS — M54.16 LUMBAR RADICULOPATHY: ICD-10-CM

## 2024-10-18 LAB
B-HCG UR QL: NEGATIVE
BASOPHILS # BLD AUTO: 0.05 10*3/MM3 (ref 0–0.2)
BASOPHILS NFR BLD AUTO: 0.6 % (ref 0–1.5)
BILIRUB BLD-MCNC: NEGATIVE MG/DL
CLARITY, POC: ABNORMAL
COLOR UR: ABNORMAL
DEPRECATED RDW RBC AUTO: 43.4 FL (ref 37–54)
EOSINOPHIL # BLD AUTO: 0.2 10*3/MM3 (ref 0–0.4)
EOSINOPHIL NFR BLD AUTO: 2.4 % (ref 0.3–6.2)
ERYTHROCYTE [DISTWIDTH] IN BLOOD BY AUTOMATED COUNT: 13 % (ref 12.3–15.4)
EXPIRATION DATE: NORMAL
EXPIRATION DATE: NORMAL
FLUAV AG UPPER RESP QL IA.RAPID: NOT DETECTED
FLUBV AG UPPER RESP QL IA.RAPID: NOT DETECTED
GLUCOSE BLDC GLUCOMTR-MCNC: 187 MG/DL (ref 70–130)
GLUCOSE UR STRIP-MCNC: NEGATIVE MG/DL
HBA1C MFR BLD: 7 % (ref 4.8–5.6)
HCT VFR BLD AUTO: 45 % (ref 34–46.6)
HGB BLD-MCNC: 14.7 G/DL (ref 12–15.9)
IMM GRANULOCYTES # BLD AUTO: 0.01 10*3/MM3 (ref 0–0.05)
IMM GRANULOCYTES NFR BLD AUTO: 0.1 % (ref 0–0.5)
INTERNAL CONTROL: NORMAL
INTERNAL NEGATIVE CONTROL: NORMAL
INTERNAL POSITIVE CONTROL: NORMAL
KETONES UR QL: NEGATIVE
LEUKOCYTE EST, POC: NEGATIVE
LYMPHOCYTES # BLD AUTO: 3.85 10*3/MM3 (ref 0.7–3.1)
LYMPHOCYTES NFR BLD AUTO: 46.1 % (ref 19.6–45.3)
Lab: NORMAL
Lab: NORMAL
MCH RBC QN AUTO: 29.8 PG (ref 26.6–33)
MCHC RBC AUTO-ENTMCNC: 32.7 G/DL (ref 31.5–35.7)
MCV RBC AUTO: 91.1 FL (ref 79–97)
MONOCYTES # BLD AUTO: 0.43 10*3/MM3 (ref 0.1–0.9)
MONOCYTES NFR BLD AUTO: 5.1 % (ref 5–12)
NEUTROPHILS NFR BLD AUTO: 3.81 10*3/MM3 (ref 1.7–7)
NEUTROPHILS NFR BLD AUTO: 45.7 % (ref 42.7–76)
NITRITE UR-MCNC: NEGATIVE MG/ML
NRBC BLD AUTO-RTO: 0 /100 WBC (ref 0–0.2)
PH UR: 6 [PH] (ref 5–8)
PLATELET # BLD AUTO: 261 10*3/MM3 (ref 140–450)
PMV BLD AUTO: 11 FL (ref 6–12)
PROT UR STRIP-MCNC: ABNORMAL MG/DL
RBC # BLD AUTO: 4.94 10*6/MM3 (ref 3.77–5.28)
RBC # UR STRIP: NEGATIVE /UL
SARS-COV-2 AG UPPER RESP QL IA.RAPID: NOT DETECTED
SP GR UR: 1.03 (ref 1–1.03)
UROBILINOGEN UR QL: ABNORMAL
WBC NRBC COR # BLD AUTO: 8.35 10*3/MM3 (ref 3.4–10.8)

## 2024-10-18 PROCEDURE — 87428 SARSCOV & INF VIR A&B AG IA: CPT | Performed by: STUDENT IN AN ORGANIZED HEALTH CARE EDUCATION/TRAINING PROGRAM

## 2024-10-18 PROCEDURE — 99214 OFFICE O/P EST MOD 30 MIN: CPT | Performed by: STUDENT IN AN ORGANIZED HEALTH CARE EDUCATION/TRAINING PROGRAM

## 2024-10-18 PROCEDURE — 1125F AMNT PAIN NOTED PAIN PRSNT: CPT | Performed by: STUDENT IN AN ORGANIZED HEALTH CARE EDUCATION/TRAINING PROGRAM

## 2024-10-18 PROCEDURE — 3044F HG A1C LEVEL LT 7.0%: CPT | Performed by: STUDENT IN AN ORGANIZED HEALTH CARE EDUCATION/TRAINING PROGRAM

## 2024-10-18 PROCEDURE — 1159F MED LIST DOCD IN RCRD: CPT | Performed by: STUDENT IN AN ORGANIZED HEALTH CARE EDUCATION/TRAINING PROGRAM

## 2024-10-18 PROCEDURE — 87086 URINE CULTURE/COLONY COUNT: CPT

## 2024-10-18 PROCEDURE — 83036 HEMOGLOBIN GLYCOSYLATED A1C: CPT | Performed by: STUDENT IN AN ORGANIZED HEALTH CARE EDUCATION/TRAINING PROGRAM

## 2024-10-18 PROCEDURE — 1160F RVW MEDS BY RX/DR IN RCRD: CPT | Performed by: STUDENT IN AN ORGANIZED HEALTH CARE EDUCATION/TRAINING PROGRAM

## 2024-10-18 PROCEDURE — 80053 COMPREHEN METABOLIC PANEL: CPT | Performed by: STUDENT IN AN ORGANIZED HEALTH CARE EDUCATION/TRAINING PROGRAM

## 2024-10-18 PROCEDURE — 86140 C-REACTIVE PROTEIN: CPT | Performed by: STUDENT IN AN ORGANIZED HEALTH CARE EDUCATION/TRAINING PROGRAM

## 2024-10-18 PROCEDURE — 82948 REAGENT STRIP/BLOOD GLUCOSE: CPT | Performed by: STUDENT IN AN ORGANIZED HEALTH CARE EDUCATION/TRAINING PROGRAM

## 2024-10-18 PROCEDURE — 85025 COMPLETE CBC W/AUTO DIFF WBC: CPT | Performed by: STUDENT IN AN ORGANIZED HEALTH CARE EDUCATION/TRAINING PROGRAM

## 2024-10-18 PROCEDURE — 74177 CT ABD & PELVIS W/CONTRAST: CPT

## 2024-10-18 PROCEDURE — 25510000001 IOPAMIDOL 61 % SOLUTION: Performed by: STUDENT IN AN ORGANIZED HEALTH CARE EDUCATION/TRAINING PROGRAM

## 2024-10-18 PROCEDURE — 36415 COLL VENOUS BLD VENIPUNCTURE: CPT | Performed by: STUDENT IN AN ORGANIZED HEALTH CARE EDUCATION/TRAINING PROGRAM

## 2024-10-18 PROCEDURE — 81025 URINE PREGNANCY TEST: CPT | Performed by: STUDENT IN AN ORGANIZED HEALTH CARE EDUCATION/TRAINING PROGRAM

## 2024-10-18 PROCEDURE — 83690 ASSAY OF LIPASE: CPT | Performed by: STUDENT IN AN ORGANIZED HEALTH CARE EDUCATION/TRAINING PROGRAM

## 2024-10-18 RX ORDER — PROMETHAZINE HYDROCHLORIDE 25 MG/1
25 TABLET ORAL EVERY 6 HOURS PRN
Qty: 20 TABLET | Refills: 0 | Status: SHIPPED | OUTPATIENT
Start: 2024-10-18 | End: 2024-10-21

## 2024-10-18 RX ORDER — FLUCONAZOLE 150 MG/1
150 TABLET ORAL ONCE
Qty: 1 TABLET | Refills: 0 | Status: SHIPPED | OUTPATIENT
Start: 2024-10-18 | End: 2024-10-18

## 2024-10-18 RX ORDER — IOPAMIDOL 612 MG/ML
100 INJECTION, SOLUTION INTRAVASCULAR
Status: COMPLETED | OUTPATIENT
Start: 2024-10-18 | End: 2024-10-18

## 2024-10-18 RX ORDER — ONDANSETRON 4 MG/1
4 TABLET, ORALLY DISINTEGRATING ORAL EVERY 8 HOURS PRN
Qty: 20 TABLET | Refills: 0 | Status: SHIPPED | OUTPATIENT
Start: 2024-10-18 | End: 2024-10-21

## 2024-10-18 RX ADMIN — IOPAMIDOL 100 ML: 612 INJECTION, SOLUTION INTRAVENOUS at 17:57

## 2024-10-18 NOTE — PROGRESS NOTES
Same Day Office Visit      Date: 10/18/2024   Patient Name: Lindsay Amezquita  : 1985   MRN: [id number]     Chief Complaint:    Chief Complaint   Patient presents with    URI     Nasal and chest congestion, brown/green mucuws, HA, started 3 days ago    Vaginal Discharge     Thick/milky discharge, itchy, red, swollen, 3 weeks ago, otc meds not helped       History of Present Illness: Lindsay Amezquita is a 39 y.o. female who is here today for several week history of abdominal complaints related to pancreatitis, new onset upper respiratory symptoms, vaginal discharge, and for type 2 diabetes.    She reports that she was diagnosed with pancreatitis roughly 1 month ago.  Patient reports that she was told not to consume any food until her pain and nausea resolved.  Patient reports continuing abdominal pain.  Patient reports that she is consuming some broth and was able to tolerate fried fish and tater tots this morning.    Patient reports that she does have significant heartburn that she takes Protonix for.  Patient reports that she is seeing gastroenterology in January.  She reports that Protonix does help.    Patient reports that she does need refills on her Jardiance as this was discontinued following pancreatitis.    Patient reports that she has developed vaginal discharge very itchy. Patient denies painful urination.  Patient reports that vaginal discharge appears like little wads of toilet paper.    Patient reports that she has developed nasal congestion and green mucus over the past couple of days.      Subjective     I have reviewed the patients family history, social history, past medical history, past surgical history and have updated it as appropriate.     Medications:     Current Outpatient Medications:     Allegra-D Allergy & Congestion  MG per 12 hr tablet, , Disp: , Rfl:     amitriptyline (ELAVIL) 150 MG tablet, Take 1 tablet by mouth Every Night., Disp: 30 tablet, Rfl: 1    atorvastatin  (LIPITOR) 40 MG tablet, Take 1 tablet by mouth every night at bedtime., Disp: 90 tablet, Rfl: 3    butalbital-acetaminophen-caffeine (FIORICET, ESGIC) -40 MG per tablet, Take 1 tablet by mouth 2 (Two) Times a Day As Needed for Headache., Disp: 20 tablet, Rfl: 0    desvenlafaxine (PRISTIQ) 100 MG 24 hr tablet, Take 1 tablet by mouth Daily., Disp: 30 tablet, Rfl: 1    diazePAM (VALIUM) 10 MG tablet, Take 1 tablet by mouth 3 (Three) Times a Day As Needed for Anxiety., Disp: 45 tablet, Rfl: 0    gabapentin (Neurontin) 800 MG tablet, Take 1 tablet by mouth 3 (Three) Times a Day., Disp: 90 tablet, Rfl: 0    Lurasidone HCl (Latuda) 120 MG tablet tablet, Take 1 tablet by mouth Daily., Disp: 30 tablet, Rfl: 1    norethindrone (AYGESTIN) 5 MG tablet, Take 1 tablet by mouth 2 (Two) Times a Day., Disp: , Rfl:     ondansetron ODT (ZOFRAN-ODT) 4 MG disintegrating tablet, Place 1 tablet on the tongue Every 8 (Eight) Hours As Needed for Nausea or Vomiting., Disp: 20 tablet, Rfl: 0    OXcarbazepine (TRILEPTAL) 300 MG tablet, Take 1 tablet by mouth 2 (Two) Times a Day., Disp: 60 tablet, Rfl: 1    pantoprazole (Protonix) 40 MG EC tablet, Take 1 tablet by mouth Daily., Disp: 90 tablet, Rfl: 1    polyethylene glycol (MIRALAX) 17 g packet, Take 17 g by mouth Daily. (Patient taking differently: Take 17 g by mouth As Needed.), Disp: 30 each, Rfl: 1    promethazine (PHENERGAN) 25 MG tablet, Take 1 tablet by mouth Every 6 (Six) Hours As Needed for Nausea or Vomiting., Disp: 20 tablet, Rfl: 0    Rimegepant Sulfate (Nurtec) 75 MG tablet dispersible tablet, Take 1 tablet by mouth Every Other Day. Take Monday,Wednesday,Friday, and Saturday., Disp: 16 tablet, Rfl: 0    tiZANidine (ZANAFLEX) 4 MG tablet, Take 1 tablet by mouth Every 6 (Six) Hours As Needed for Muscle Spasms., Disp: 120 tablet, Rfl: 0    Zavegepant HCl 10 MG/ACT solution, 10 mg into the nostril(s) as directed by provider Daily As Needed (migraine). (1 spray into 1 nostril  "every 24 hrs prn), Disp: 6 each, Rfl: 5    Chlorhexidine Gluconate 4 % solution, , Disp: , Rfl:     fluconazole (Diflucan) 150 MG tablet, Take 1 tablet by mouth 1 (One) Time for 1 dose., Disp: 1 tablet, Rfl: 0    fluticasone (FLONASE) 50 MCG/ACT nasal spray, , Disp: , Rfl:     FREESTYLE LITE test strip, See Admin Instructions., Disp: , Rfl:     guaiFENesin-dextromethorphan (ROBITUSSIN DM) 100-10 MG/5ML syrup, Take 5 mL by mouth 3 (Three) Times a Day As Needed for Cough., Disp: 80 mL, Rfl: 0    Trelegy Ellipta 200-62.5-25 MCG/ACT inhaler, Inhale 1 puff Daily. Patient states she uses Trelegy Ellipta inhaler 1 inhalation a day., Disp: , Rfl:     Allergies:   Allergies   Allergen Reactions    Aspirin Anaphylaxis and Hives     Wheezing         Codeine Anaphylaxis     Tolerates oxycodone-acetaminophen with no allergic rxn    Cyclobenzaprine Other (See Comments)     \"gives me chest pain\"    Other reaction(s): Other (See Comments)    Haldol [Haloperidol] Rash    Imitrex [Sumatriptan] Anaphylaxis and Rash    Latex Hives and Rash    Penicillins Hives and Anaphylaxis     Vomiting    Metformin Nausea And Vomiting    Zofran [Ondansetron] GI Intolerance     Constipation    Lamictal [Lamotrigine] Itching     Itching and hives    Metoclopramide Headache, Hives and Other (See Comments)    Morphine Itching    Oxycontin [Oxycodone] GI Intolerance    Prochlorperazine Nausea And Vomiting and Unknown (See Comments)    Tramadol Hives and Nausea And Vomiting       Objective     Physical Exam:     Vital Signs:   Vitals:    10/18/24 1430   BP: 116/84   Pulse: 101   Resp: 16   SpO2: 95%   Weight: 114 kg (252 lb)   Height: 157.5 cm (62\")     Body mass index is 46.09 kg/m².          Physical Exam     General:  Nontoxic appearing female in no acute distress. Alert and oriented. Vitals reviewed and are within normal limits.  Head/ENT: Atraumatic.   Neck: Anatomy appears symmetrical.   Cardiac: Regular rate and rhythm to auscultation.   Pulmonary: " Lungs are clear to auscultation bilaterally.  Abdomen: Normal appearing abdomen. Tenderness appreciated to palpation of epigastric area.  Integumentary/Skin: Skin appears unremarkable from observable skin surfaces.   Neurological: Normal gait and speech.  Behavioral/Psych: Patient is pleasant with normal affect today.      Procedures      Assessment / Plan      1. Acute pancreatitis, unspecified complication status, unspecified pancreatitis type  Patient was diagnosed with pancreatitis roughly 1 month ago.  Repeat CT scan on 9/19/2024 showed interstitial pancreatitis with increased surrounding inflammation though no appreciable pancreatic devascularization or fluid collection was noted at that time. Third CT of abdomen and pelvis on 9/22/2024 showed stable appearance of pancreatitis.  Patient reports continuing abdominal pain and nausea.  Patient is tolerating broth and did tolerate some fish sticks this morning though nausea and pain persist.  Physical examination of patient showed tenderness in the epigastric area.  Given previous findings roughly 4 weeks ago I think it would be beneficial to assess patient for ongoing process of pancreatitis including possible pancreatic necrosis and/or pseudocyst.  Have ordered CT abdomen/pelvis with contrast.  Have also ordered CBC, CMP, CRP, and lipase.  Have given strict return precautions: instructed patient that if she felt fever, continues to have worsening pain, or inability to tolerate food or water that she needs to present to the emergency setting.  - CT Abdomen Pelvis With Contrast  - CBC w AUTO Differential  - Comprehensive metabolic panel  - Lipase  - C-reactive protein; Future    2. Nausea and vomiting, unspecified vomiting type  See pancreatitis. Pregnancy test negative.  - promethazine (PHENERGAN) 25 MG tablet; Take 1 tablet by mouth Every 6 (Six) Hours As Needed for Nausea or Vomiting.  Dispense: 20 tablet; Refill: 0  - ondansetron ODT (ZOFRAN-ODT) 4 MG  disintegrating tablet; Place 1 tablet on the tongue Every 8 (Eight) Hours As Needed for Nausea or Vomiting.  Dispense: 20 tablet; Refill: 0  - CT Abdomen Pelvis With Contrast  - POC Pregnancy, Urine    3. Type 2 diabetes mellitus with hyperglycemia, without long-term current use of insulin  Patient does have type 2 diabetes.  Fingerstick glucose is 187 in office today.  Patient reports that she is only prediabetic though multiple A1c's do show A1c of greater than 6.5.  Patient has been on SGLT2 inhibitor, however, this was discontinued recently from further refills given pancreatitis.  I am not overly convinced that pancreatitis was secondary to this and given chance of SGLT2 inhibitor causing pancreatitis is 0.1%.  Having said this I do feel patient's current yeast infection and possible urinary tract infection could be related to this.  I have discussed with patient benefit of switching to metformin.  Patient has reported that she developed nausea and vomiting to this.  I discussed with patient that this is not uncommon with metformin and that switching to an extended release of this medication would likely solve this issue.  Patient is agreeable to trying this.  I do discuss with patient that given current symptoms including abdominal pain and nausea I would like to hold off on this until early next week.  I have ordered A1c to be completed with other labs patient will be obtaining at hospital with CT scan.  - POCT Glucose  - Hemoglobin A1c    4. Vaginal candidiasis  Patient's description of vaginal discharge is consistent with vaginal candidiasis.  Risk factor for this is also patient's SGLT 2 inhibitor.  Obtained vaginal swab in office today.  Will go ahead and treat with single dose of fluconazole.  Urine is not concerning for urinary traction on urine dip, however, will order urine culture.  - fluconazole (Diflucan) 150 MG tablet; Take 1 tablet by mouth 1 (One) Time for 1 dose.  Dispense: 1 tablet; Refill:  0    5. Vaginal discharge  See vaginal candidiasis.  - POC Urinalysis Dipstick  - POC Pregnancy, Urine  - NuSwab VG+ - Swab, Vagina  - fluconazole (Diflucan) 150 MG tablet; Take 1 tablet by mouth 1 (One) Time for 1 dose.  Dispense: 1 tablet; Refill: 0  - Urine Culture - Urine, Urine, Clean Catch; Future    6. Gastroesophageal reflux disease, unspecified whether esophagitis present  Patient reports longstanding history of GERD.  Patient is currently on Protonix.  Patient does report that this helps.  Patient's recent CT scan does show cirrhotic appearing liver on radiologist impression.  Patient is scheduled to see neurology in January.  Patient would likely benefit from EGD.    7. Symptoms of upper respiratory infection (URI)  Patient does report developing nasal congestion and green mucus discharge.  Patient is COVID and flu negative in office today.  Have discussed with patient that this still could be related to viral upper respiratory infection.  Have recommended symptomatic care for this.  - POCT SARS-CoV-2 Antigen CANDACE + Flu       Follow Up:   Return in about 5 days (around 10/23/2024).      [facility]  [address] [place] [address] [address] [place] [address]  Fax [phone]  Phone [phone]

## 2024-10-18 NOTE — TELEPHONE ENCOUNTER
PATIENT SAYS THE PHARMACY RECEIVED THE TIZANIDINE DATED 10/2 THAT WAS WROTE AS EVERY 8 HOURS, BUT DIDN'T GET THE ONE SENT ON 10/3 FOR EVERY 6 HOURS. CAN WE PLEASE CALL PHARMACY AND THEN LET PATIENT KNOW.

## 2024-10-19 DIAGNOSIS — M54.16 LUMBAR RADICULOPATHY: ICD-10-CM

## 2024-10-19 LAB
ALBUMIN SERPL-MCNC: 3.9 G/DL (ref 3.5–5.2)
ALBUMIN/GLOB SERPL: 1.4 G/DL
ALP SERPL-CCNC: 57 U/L (ref 39–117)
ALT SERPL W P-5'-P-CCNC: 37 U/L (ref 1–33)
ANION GAP SERPL CALCULATED.3IONS-SCNC: 13.7 MMOL/L (ref 5–15)
AST SERPL-CCNC: 29 U/L (ref 1–32)
BACTERIA SPEC AEROBE CULT: NORMAL
BILIRUB SERPL-MCNC: 0.4 MG/DL (ref 0–1.2)
BUN SERPL-MCNC: 6 MG/DL (ref 6–20)
BUN/CREAT SERPL: 9.4 (ref 7–25)
CALCIUM SPEC-SCNC: 9 MG/DL (ref 8.6–10.5)
CHLORIDE SERPL-SCNC: 101 MMOL/L (ref 98–107)
CO2 SERPL-SCNC: 21.3 MMOL/L (ref 22–29)
CREAT SERPL-MCNC: 0.64 MG/DL (ref 0.57–1)
CRP SERPL-MCNC: 0.34 MG/DL (ref 0–0.5)
EGFRCR SERPLBLD CKD-EPI 2021: 114.7 ML/MIN/1.73
GLOBULIN UR ELPH-MCNC: 2.7 GM/DL
GLUCOSE SERPL-MCNC: 151 MG/DL (ref 65–99)
LIPASE SERPL-CCNC: 43 U/L (ref 13–60)
POTASSIUM SERPL-SCNC: 3.5 MMOL/L (ref 3.5–5.2)
PROT SERPL-MCNC: 6.6 G/DL (ref 6–8.5)
SODIUM SERPL-SCNC: 136 MMOL/L (ref 136–145)

## 2024-10-21 ENCOUNTER — HOSPITAL ENCOUNTER (EMERGENCY)
Facility: HOSPITAL | Age: 39
Discharge: HOME | End: 2024-10-21
Attending: EMERGENCY MEDICINE | Admitting: EMERGENCY MEDICINE
Payer: MEDICAID

## 2024-10-21 ENCOUNTER — TELEPHONE (OUTPATIENT)
Dept: BEHAVIORAL HEALTH | Facility: CLINIC | Age: 39
End: 2024-10-21
Payer: MEDICAID

## 2024-10-21 ENCOUNTER — APPOINTMENT (OUTPATIENT)
Dept: CT IMAGING | Facility: HOSPITAL | Age: 39
End: 2024-10-21
Payer: MEDICAID

## 2024-10-21 VITALS
WEIGHT: 251.32 LBS | BODY MASS INDEX: 46.25 KG/M2 | SYSTOLIC BLOOD PRESSURE: 104 MMHG | TEMPERATURE: 98.4 F | RESPIRATION RATE: 18 BRPM | DIASTOLIC BLOOD PRESSURE: 58 MMHG | HEIGHT: 62 IN | HEART RATE: 87 BPM | OXYGEN SATURATION: 97 %

## 2024-10-21 DIAGNOSIS — R51.9 ACUTE NONINTRACTABLE HEADACHE, UNSPECIFIED HEADACHE TYPE: ICD-10-CM

## 2024-10-21 DIAGNOSIS — F41.0 PANIC ATTACKS: ICD-10-CM

## 2024-10-21 DIAGNOSIS — E86.0 DEHYDRATION: ICD-10-CM

## 2024-10-21 DIAGNOSIS — F43.10 POST TRAUMATIC STRESS DISORDER (PTSD): ICD-10-CM

## 2024-10-21 DIAGNOSIS — R11.2 NAUSEA AND VOMITING, UNSPECIFIED VOMITING TYPE: Primary | ICD-10-CM

## 2024-10-21 DIAGNOSIS — F41.1 GENERALIZED ANXIETY DISORDER: ICD-10-CM

## 2024-10-21 LAB
A VAGINAE DNA VAG QL NAA+PROBE: ABNORMAL SCORE
ALBUMIN SERPL-MCNC: 3.9 G/DL (ref 3.5–5.2)
ALBUMIN/GLOB SERPL: 1.8 G/DL
ALP SERPL-CCNC: 66 U/L (ref 39–117)
ALT SERPL W P-5'-P-CCNC: 25 U/L (ref 1–33)
AMMONIA BLD-SCNC: 24 UMOL/L (ref 11–51)
AMPHET+METHAMPHET UR QL: NEGATIVE
AMPHETAMINES UR QL: NEGATIVE
ANION GAP SERPL CALCULATED.3IONS-SCNC: 14.2 MMOL/L (ref 5–15)
AST SERPL-CCNC: 18 U/L (ref 1–32)
ATMOSPHERIC PRESS: 741 MMHG
BACTERIA UR CULT: NORMAL
BACTERIA UR CULT: NORMAL
BARBITURATES UR QL SCN: NEGATIVE
BASE EXCESS BLDV CALC-SCNC: -1.6 MMOL/L (ref 0–2)
BASOPHILS # BLD AUTO: 0.04 10*3/MM3 (ref 0–0.2)
BASOPHILS NFR BLD AUTO: 0.5 % (ref 0–1.5)
BDY SITE: ABNORMAL
BENZODIAZ UR QL SCN: POSITIVE
BILIRUB SERPL-MCNC: 0.2 MG/DL (ref 0–1.2)
BILIRUB UR QL STRIP: NEGATIVE
BUN SERPL-MCNC: 5 MG/DL (ref 6–20)
BUN/CREAT SERPL: 6.7 (ref 7–25)
BUPRENORPHINE SERPL-MCNC: NEGATIVE NG/ML
BVAB2 DNA VAG QL NAA+PROBE: ABNORMAL SCORE
C ALBICANS DNA VAG QL NAA+PROBE: POSITIVE
C GLABRATA DNA VAG QL NAA+PROBE: POSITIVE
C TRACH DNA SPEC QL NAA+PROBE: NEGATIVE
CALCIUM SPEC-SCNC: 8.7 MG/DL (ref 8.6–10.5)
CANNABINOIDS SERPL QL: NEGATIVE
CHLORIDE SERPL-SCNC: 101 MMOL/L (ref 98–107)
CK SERPL-CCNC: 48 U/L (ref 20–180)
CLARITY UR: CLEAR
CO2 SERPL-SCNC: 18.8 MMOL/L (ref 22–29)
COCAINE UR QL: NEGATIVE
COHGB MFR BLD: 4.8 % (ref 0–5)
COLOR UR: YELLOW
CREAT SERPL-MCNC: 0.75 MG/DL (ref 0.57–1)
D-LACTATE SERPL-SCNC: 2.2 MMOL/L (ref 0.5–2)
D-LACTATE SERPL-SCNC: 4.3 MMOL/L (ref 0.5–2)
DEPRECATED RDW RBC AUTO: 46.8 FL (ref 37–54)
EGFRCR SERPLBLD CKD-EPI 2021: 103.4 ML/MIN/1.73
EOSINOPHIL # BLD AUTO: 0.25 10*3/MM3 (ref 0–0.4)
EOSINOPHIL NFR BLD AUTO: 2.9 % (ref 0.3–6.2)
ERYTHROCYTE [DISTWIDTH] IN BLOOD BY AUTOMATED COUNT: 13.8 % (ref 12.3–15.4)
ETHANOL BLD-MCNC: <10 MG/DL (ref 0–10)
ETHANOL UR QL: <0.01 %
FENTANYL UR-MCNC: NEGATIVE NG/ML
GLOBULIN UR ELPH-MCNC: 2.2 GM/DL
GLUCOSE BLDC GLUCOMTR-MCNC: 316 MG/DL (ref 70–130)
GLUCOSE SERPL-MCNC: 321 MG/DL (ref 65–99)
GLUCOSE UR STRIP-MCNC: ABNORMAL MG/DL
HCO3 BLDV-SCNC: 24.7 MMOL/L (ref 22–28)
HCT VFR BLD AUTO: 41.9 % (ref 34–46.6)
HGB BLD-MCNC: 13.6 G/DL (ref 12–15.9)
HGB UR QL STRIP.AUTO: NEGATIVE
IMM GRANULOCYTES # BLD AUTO: 0.01 10*3/MM3 (ref 0–0.05)
IMM GRANULOCYTES NFR BLD AUTO: 0.1 % (ref 0–0.5)
KETONES UR QL STRIP: NEGATIVE
LEUKOCYTE ESTERASE UR QL STRIP.AUTO: NEGATIVE
LIPASE SERPL-CCNC: 41 U/L (ref 13–60)
LYMPHOCYTES # BLD AUTO: 2.81 10*3/MM3 (ref 0.7–3.1)
LYMPHOCYTES NFR BLD AUTO: 32.9 % (ref 19.6–45.3)
Lab: ABNORMAL
MAGNESIUM SERPL-MCNC: 1.9 MG/DL (ref 1.6–2.6)
MCH RBC QN AUTO: 29.7 PG (ref 26.6–33)
MCHC RBC AUTO-ENTMCNC: 32.5 G/DL (ref 31.5–35.7)
MCV RBC AUTO: 91.5 FL (ref 79–97)
MEGA1 DNA VAG QL NAA+PROBE: ABNORMAL SCORE
METHADONE UR QL SCN: NEGATIVE
METHGB BLD QL: 0.7 % (ref 0–3)
MODALITY: ABNORMAL
MONOCYTES # BLD AUTO: 0.49 10*3/MM3 (ref 0.1–0.9)
MONOCYTES NFR BLD AUTO: 5.7 % (ref 5–12)
N GONORRHOEA DNA VAG QL NAA+PROBE: NEGATIVE
NEUTROPHILS NFR BLD AUTO: 4.94 10*3/MM3 (ref 1.7–7)
NEUTROPHILS NFR BLD AUTO: 57.9 % (ref 42.7–76)
NITRITE UR QL STRIP: NEGATIVE
NRBC BLD AUTO-RTO: 0 /100 WBC (ref 0–0.2)
OPIATES UR QL: NEGATIVE
OXYCODONE UR QL SCN: NEGATIVE
OXYHGB MFR BLDV: 57.5 % (ref 40–70)
PCO2 BLDV: 46.9 MM HG (ref 40–50)
PCP UR QL SCN: NEGATIVE
PH BLDV: 7.33 PH UNITS (ref 7.32–7.42)
PH UR STRIP.AUTO: 6 [PH] (ref 5–8)
PLATELET # BLD AUTO: 222 10*3/MM3 (ref 140–450)
PMV BLD AUTO: 10.2 FL (ref 6–12)
PO2 BLDV: 32.5 MM HG (ref 30–50)
POTASSIUM SERPL-SCNC: 3.8 MMOL/L (ref 3.5–5.2)
PROT SERPL-MCNC: 6.1 G/DL (ref 6–8.5)
PROT UR QL STRIP: ABNORMAL
RBC # BLD AUTO: 4.58 10*6/MM3 (ref 3.77–5.28)
SAO2 % BLDCOV: 60.8 % (ref 45–75)
SODIUM SERPL-SCNC: 134 MMOL/L (ref 136–145)
SP GR UR STRIP: 1.03 (ref 1–1.03)
T VAGINALIS DNA VAG QL NAA+PROBE: NEGATIVE
TRICYCLICS UR QL SCN: POSITIVE
UROBILINOGEN UR QL STRIP: ABNORMAL
VENTILATOR MODE: ABNORMAL
WBC NRBC COR # BLD AUTO: 8.54 10*3/MM3 (ref 3.4–10.8)

## 2024-10-21 PROCEDURE — 80307 DRUG TEST PRSMV CHEM ANLYZR: CPT | Performed by: EMERGENCY MEDICINE

## 2024-10-21 PROCEDURE — 25010000002 KETOROLAC TROMETHAMINE PER 15 MG: Performed by: EMERGENCY MEDICINE

## 2024-10-21 PROCEDURE — 82550 ASSAY OF CK (CPK): CPT | Performed by: EMERGENCY MEDICINE

## 2024-10-21 PROCEDURE — 93005 ELECTROCARDIOGRAM TRACING: CPT | Performed by: EMERGENCY MEDICINE

## 2024-10-21 PROCEDURE — 82948 REAGENT STRIP/BLOOD GLUCOSE: CPT

## 2024-10-21 PROCEDURE — 81003 URINALYSIS AUTO W/O SCOPE: CPT | Performed by: EMERGENCY MEDICINE

## 2024-10-21 PROCEDURE — 25810000003 SODIUM CHLORIDE 0.9 % SOLUTION: Performed by: EMERGENCY MEDICINE

## 2024-10-21 PROCEDURE — 36415 COLL VENOUS BLD VENIPUNCTURE: CPT

## 2024-10-21 PROCEDURE — 83735 ASSAY OF MAGNESIUM: CPT | Performed by: EMERGENCY MEDICINE

## 2024-10-21 PROCEDURE — 83690 ASSAY OF LIPASE: CPT | Performed by: EMERGENCY MEDICINE

## 2024-10-21 PROCEDURE — 82077 ASSAY SPEC XCP UR&BREATH IA: CPT | Performed by: EMERGENCY MEDICINE

## 2024-10-21 PROCEDURE — 25010000002 ONDANSETRON PER 1 MG: Performed by: EMERGENCY MEDICINE

## 2024-10-21 PROCEDURE — 99284 EMERGENCY DEPT VISIT MOD MDM: CPT

## 2024-10-21 PROCEDURE — 96375 TX/PRO/DX INJ NEW DRUG ADDON: CPT

## 2024-10-21 PROCEDURE — 82820 HEMOGLOBIN-OXYGEN AFFINITY: CPT

## 2024-10-21 PROCEDURE — 85025 COMPLETE CBC W/AUTO DIFF WBC: CPT | Performed by: EMERGENCY MEDICINE

## 2024-10-21 PROCEDURE — 96376 TX/PRO/DX INJ SAME DRUG ADON: CPT

## 2024-10-21 PROCEDURE — 96374 THER/PROPH/DIAG INJ IV PUSH: CPT

## 2024-10-21 PROCEDURE — 82140 ASSAY OF AMMONIA: CPT | Performed by: EMERGENCY MEDICINE

## 2024-10-21 PROCEDURE — 83605 ASSAY OF LACTIC ACID: CPT | Performed by: EMERGENCY MEDICINE

## 2024-10-21 PROCEDURE — 80053 COMPREHEN METABOLIC PANEL: CPT | Performed by: EMERGENCY MEDICINE

## 2024-10-21 PROCEDURE — 82805 BLOOD GASES W/O2 SATURATION: CPT

## 2024-10-21 PROCEDURE — 70450 CT HEAD/BRAIN W/O DYE: CPT

## 2024-10-21 RX ORDER — BUTALBITAL, ACETAMINOPHEN AND CAFFEINE 50; 325; 40 MG/1; MG/1; MG/1
1 TABLET ORAL ONCE
Status: COMPLETED | OUTPATIENT
Start: 2024-10-21 | End: 2024-10-21

## 2024-10-21 RX ORDER — SODIUM CHLORIDE 0.9 % (FLUSH) 0.9 %
10 SYRINGE (ML) INJECTION AS NEEDED
Status: DISCONTINUED | OUTPATIENT
Start: 2024-10-21 | End: 2024-10-21 | Stop reason: HOSPADM

## 2024-10-21 RX ORDER — ONDANSETRON 2 MG/ML
4 INJECTION INTRAMUSCULAR; INTRAVENOUS ONCE
Status: COMPLETED | OUTPATIENT
Start: 2024-10-21 | End: 2024-10-21

## 2024-10-21 RX ORDER — BUTALBITAL, ACETAMINOPHEN AND CAFFEINE 50; 325; 40 MG/1; MG/1; MG/1
1 TABLET ORAL EVERY 6 HOURS PRN
Qty: 15 TABLET | Refills: 0 | Status: SHIPPED | OUTPATIENT
Start: 2024-10-21

## 2024-10-21 RX ORDER — PROMETHAZINE HYDROCHLORIDE 25 MG/1
25 TABLET ORAL EVERY 6 HOURS PRN
Qty: 20 TABLET | Refills: 0 | Status: SHIPPED | OUTPATIENT
Start: 2024-10-21 | End: 2024-10-25

## 2024-10-21 RX ORDER — KETOROLAC TROMETHAMINE 30 MG/ML
15 INJECTION, SOLUTION INTRAMUSCULAR; INTRAVENOUS ONCE
Status: COMPLETED | OUTPATIENT
Start: 2024-10-21 | End: 2024-10-21

## 2024-10-21 RX ORDER — ONDANSETRON 4 MG/1
4 TABLET, ORALLY DISINTEGRATING ORAL EVERY 8 HOURS PRN
Qty: 20 TABLET | Refills: 0 | Status: SHIPPED | OUTPATIENT
Start: 2024-10-21

## 2024-10-21 RX ORDER — DIAZEPAM 10 MG
10 TABLET ORAL 3 TIMES DAILY PRN
Qty: 90 TABLET | Refills: 0 | Status: SHIPPED | OUTPATIENT
Start: 2024-10-21 | End: 2025-10-21

## 2024-10-21 RX ORDER — FAMOTIDINE 10 MG/ML
20 INJECTION, SOLUTION INTRAVENOUS ONCE
Status: COMPLETED | OUTPATIENT
Start: 2024-10-21 | End: 2024-10-21

## 2024-10-21 RX ADMIN — ONDANSETRON 4 MG: 2 INJECTION INTRAMUSCULAR; INTRAVENOUS at 02:57

## 2024-10-21 RX ADMIN — SODIUM CHLORIDE 1000 ML: 9 INJECTION, SOLUTION INTRAVENOUS at 04:34

## 2024-10-21 RX ADMIN — FAMOTIDINE 20 MG: 10 INJECTION, SOLUTION INTRAVENOUS at 02:58

## 2024-10-21 RX ADMIN — BUTALBITAL, ACETAMINOPHEN, AND CAFFEINE 1 TABLET: 50; 325; 40 TABLET ORAL at 04:54

## 2024-10-21 RX ADMIN — SODIUM CHLORIDE 1000 ML: 9 INJECTION, SOLUTION INTRAVENOUS at 02:56

## 2024-10-21 RX ADMIN — ONDANSETRON 4 MG: 2 INJECTION INTRAMUSCULAR; INTRAVENOUS at 04:33

## 2024-10-21 RX ADMIN — KETOROLAC TROMETHAMINE 15 MG: 30 INJECTION, SOLUTION INTRAMUSCULAR; INTRAVENOUS at 04:33

## 2024-10-21 NOTE — ED NOTES
In to medicate pt, pt had removed all monitoring, educated on need to have monitoring in place, verbalized understanding

## 2024-10-21 NOTE — ED PROVIDER NOTES
EMERGENCY DEPARTMENT ENCOUNTER    Pt Name: Lnidsay Amezquita  MRN: [id number]  Pt :   1985  Room Number:  19SF/19  Date of encounter:  10/21/2024  PCP: Hunter Winkler MD  ED Provider: Rajeev Horowitz MD    Historian: Patient      HPI:  Chief Complaint   Patient presents with    Nausea    Vomiting    Headache          Context: Lindsay Amezquita is a 39 y.o. female who presents to the ED c/o nausea, vomiting, headache.  Also reports of numbness to the top of her head, concerned she is having a Bell's palsy flare.  Reports the nausea and vomiting got worse this evening.  Denies abdominal pain.  Reports 1 episode of blood in her vomit      PAST MEDICAL HISTORY  Past Medical History:   Diagnosis Date    Allergic     Anxiety     Asthma Beings of copd with asthma    Back pain     Bell palsy 2021    Bell's palsy     Bipolar 2 disorder     Blood clot in vein     Behind left knee cap    COPD (chronic obstructive pulmonary disease) Six months ago    Deep vein thrombosis Last year    Depression     Diabetes mellitus November    Pre diabete    Frequent headaches     GERD (gastroesophageal reflux disease)     Hypertension     Every time i go into the emergacy room    Irritable bowel syndrome Two years ago    Kidney stone Two years ago    Migraine     Nausea, vomiting and diarrhea 2018    Obesity All of my life    Ovarian cyst Two years ago    Pancreatitis     Panic     PTSD (post-traumatic stress disorder)     Pulmonary embolism Not in lungs    Recurrent pregnancy loss, antepartum condition or complication Every since i have been 14 yars old    Restless leg syndrome     Sinus problem     Snores     Tattoos     Urinary tract infection Have them on and off    Varicella Little    Visual impairment Since i was little    Vitamin B12 deficiency     Wears glasses          PAST SURGICAL HISTORY  Past Surgical History:   Procedure Laterality Date    CHOLECYSTECTOMY      HYSTEROSCOPY N/A 3/14/2024    Procedure:  HYSTEROSCOPY WITH MYOSURE, DILATION AND CURETTAGE WITH CERENE ABLATION;  Surgeon: David Ribeiro MD;  Location: [facility];  Service: Obstetrics/Gynecology;  Laterality: N/A;    MULTIPLE TOOTH EXTRACTIONS      All my teeth    WISDOM TOOTH EXTRACTION  All my teeth         FAMILY HISTORY  Family History   Problem Relation Age of Onset    Irritable bowel syndrome Mother     Heart disease Mother     Miscarriages / Stillbirths Mother     Arthritis Mother     COPD Mother     Learning disabilities Mother     Mental illness Mother     Asthma Mother     Irritable bowel syndrome Father     Alcohol abuse Father     Diabetes Father     Hyperlipidemia Father     Anxiety disorder Father     Learning disabilities Father     Colon cancer Maternal Grandfather     Stroke Paternal Grandfather     Stroke Paternal Grandmother          SOCIAL HISTORY  Social History     Socioeconomic History    Marital status: Single   Tobacco Use    Smoking status: Every Day     Current packs/day: 0.50     Average packs/day: 2.0 packs/day for 32.4 years (63.5 ttl pk-yrs)     Types: Cigarettes     Start date: 7/17/1992     Passive exposure: Current    Smokeless tobacco: Never    Tobacco comments:      Around 2.5 packs per day- 7/5/24   Vaping Use    Vaping status: Former    Passive vaping exposure: Yes   Substance and Sexual Activity    Alcohol use: Not Currently     Comment: rarely    Drug use: Not Currently    Sexual activity: Not Currently     Partners: Female     Birth control/protection: Other, Partner of same sex         ALLERGIES  Aspirin, Codeine, Cyclobenzaprine, Haldol [haloperidol], Imitrex [sumatriptan], Latex, Penicillins, Metformin, Zofran [ondansetron], Lamictal [lamotrigine], Metoclopramide, Morphine, Oxycontin [oxycodone], Prochlorperazine, and Tramadol        REVIEW OF SYSTEMS  Review of Systems   Constitutional:  Negative for chills and fever.   HENT:  Negative for sore throat and trouble swallowing.    Eyes:  Negative for pain  and redness.   Respiratory:  Negative for cough and shortness of breath.    Cardiovascular:  Negative for chest pain and leg swelling.   Gastrointestinal:  Positive for nausea. Negative for abdominal pain and vomiting.   Genitourinary:  Negative for dysuria and urgency.   Musculoskeletal:  Negative for back pain and neck pain.   Skin:  Negative for rash and wound.   Neurological:  Positive for headaches. Negative for dizziness and weakness.        All systems reviewed and negative except for those discussed in HPI.       PHYSICAL EXAM    I have reviewed the triage vital signs and nursing notes.    ED Triage Vitals [10/21/24 0240]   Temp Heart Rate Resp BP SpO2   98.4 °F (36.9 °C) 89 18 113/64 96 %      Temp src Heart Rate Source Patient Position BP Location FiO2 (%)   Oral Monitor Lying Left arm --       Physical Exam  Constitutional:       Appearance: She is not ill-appearing.      Comments: Drowsy, middle-aged female, no respiratory distress   HENT:      Head: Normocephalic and atraumatic.      Right Ear: External ear normal.      Left Ear: External ear normal.      Nose: Nose normal.      Mouth/Throat:      Mouth: Mucous membranes are dry.      Pharynx: Oropharynx is clear.   Eyes:      Extraocular Movements: Extraocular movements intact.      Conjunctiva/sclera: Conjunctivae normal.      Pupils: Pupils are equal, round, and reactive to light.   Cardiovascular:      Rate and Rhythm: Normal rate and regular rhythm.      Pulses:           Radial pulses are 2+ on the right side and 2+ on the left side.   Pulmonary:      Effort: Pulmonary effort is normal.      Breath sounds: Normal breath sounds.   Abdominal:      General: There is no distension.      Palpations: Abdomen is soft.      Tenderness: There is no abdominal tenderness.   Musculoskeletal:         General: No tenderness or deformity. Normal range of motion.      Cervical back: Normal range of motion and neck supple.      Right lower leg: No edema.      Left  lower leg: No edema.   Skin:     General: Skin is warm and dry.      Capillary Refill: Capillary refill takes less than 2 seconds.   Neurological:      General: No focal deficit present.      Mental Status: She is lethargic.      Comments: Patient is somnolent, quite slowed            LAB RESULTS  Recent Results (from the past 24 hours)   POC Glucose Once    Collection Time: 10/21/24  2:39 AM    Specimen: Blood   Result Value Ref Range    Glucose 316 (H) 70 - 130 mg/dL   Comprehensive Metabolic Panel    Collection Time: 10/21/24  2:56 AM    Specimen: Blood   Result Value Ref Range    Glucose 321 (H) 65 - 99 mg/dL    BUN 5 (L) 6 - 20 mg/dL    Creatinine 0.75 0.57 - 1.00 mg/dL    Sodium 134 (L) 136 - 145 mmol/L    Potassium 3.8 3.5 - 5.2 mmol/L    Chloride 101 98 - 107 mmol/L    CO2 18.8 (L) 22.0 - 29.0 mmol/L    Calcium 8.7 8.6 - 10.5 mg/dL    Total Protein 6.1 6.0 - 8.5 g/dL    Albumin 3.9 3.5 - 5.2 g/dL    ALT (SGPT) 25 1 - 33 U/L    AST (SGOT) 18 1 - 32 U/L    Alkaline Phosphatase 66 39 - 117 U/L    Total Bilirubin 0.2 0.0 - 1.2 mg/dL    Globulin 2.2 gm/dL    A/G Ratio 1.8 g/dL    BUN/Creatinine Ratio 6.7 (L) 7.0 - 25.0    Anion Gap 14.2 5.0 - 15.0 mmol/L    eGFR 103.4 >60.0 mL/min/1.73   Lipase    Collection Time: 10/21/24  2:56 AM    Specimen: Blood   Result Value Ref Range    Lipase 41 13 - 60 U/L   Lactic Acid, Plasma    Collection Time: 10/21/24  2:56 AM    Specimen: Blood   Result Value Ref Range    Lactate 4.3 (C) 0.5 - 2.0 mmol/L   Ethanol    Collection Time: 10/21/24  2:56 AM    Specimen: Blood   Result Value Ref Range    Ethanol <10 0 - 10 mg/dL    Ethanol % <0.010 %   Ammonia    Collection Time: 10/21/24  2:56 AM    Specimen: Blood   Result Value Ref Range    Ammonia 24 11 - 51 umol/L   CK    Collection Time: 10/21/24  2:56 AM    Specimen: Blood   Result Value Ref Range    Creatine Kinase 48 20 - 180 U/L   Magnesium    Collection Time: 10/21/24  2:56 AM    Specimen: Blood   Result Value Ref Range     Magnesium 1.9 1.6 - 2.6 mg/dL   CBC Auto Differential    Collection Time: 10/21/24  2:56 AM    Specimen: Blood   Result Value Ref Range    WBC 8.54 3.40 - 10.80 10*3/mm3    RBC 4.58 3.77 - 5.28 10*6/mm3    Hemoglobin 13.6 12.0 - 15.9 g/dL    Hematocrit 41.9 34.0 - 46.6 %    MCV 91.5 79.0 - 97.0 fL    MCH 29.7 26.6 - 33.0 pg    MCHC 32.5 31.5 - 35.7 g/dL    RDW 13.8 12.3 - 15.4 %    RDW-SD 46.8 37.0 - 54.0 fl    MPV 10.2 6.0 - 12.0 fL    Platelets 222 140 - 450 10*3/mm3    Neutrophil % 57.9 42.7 - 76.0 %    Lymphocyte % 32.9 19.6 - 45.3 %    Monocyte % 5.7 5.0 - 12.0 %    Eosinophil % 2.9 0.3 - 6.2 %    Basophil % 0.5 0.0 - 1.5 %    Immature Grans % 0.1 0.0 - 0.5 %    Neutrophils, Absolute 4.94 1.70 - 7.00 10*3/mm3    Lymphocytes, Absolute 2.81 0.70 - 3.10 10*3/mm3    Monocytes, Absolute 0.49 0.10 - 0.90 10*3/mm3    Eosinophils, Absolute 0.25 0.00 - 0.40 10*3/mm3    Basophils, Absolute 0.04 0.00 - 0.20 10*3/mm3    Immature Grans, Absolute 0.01 0.00 - 0.05 10*3/mm3    nRBC 0.0 0.0 - 0.2 /100 WBC   Urinalysis With Microscopic If Indicated (No Culture) - Urine, Clean Catch    Collection Time: 10/21/24  3:06 AM    Specimen: Urine, Clean Catch   Result Value Ref Range    Color, UA Yellow Yellow, Straw    Appearance, UA Clear Clear    pH, UA 6.0 5.0 - 8.0    Specific Gravity, UA 1.026 1.005 - 1.030    Glucose, UA >=1000 mg/dL (3+) (A) Negative    Ketones, UA Negative Negative    Bilirubin, UA Negative Negative    Blood, UA Negative Negative    Protein, UA Trace (A) Negative    Leuk Esterase, UA Negative Negative    Nitrite, UA Negative Negative    Urobilinogen, UA 0.2 E.U./dL 0.2 - 1.0 E.U./dL   Urine Drug Screen - Urine, Clean Catch    Collection Time: 10/21/24  3:06 AM    Specimen: Urine, Clean Catch   Result Value Ref Range    THC, Screen, Urine Negative Negative    Phencyclidine (PCP), Urine Negative Negative    Cocaine Screen, Urine Negative Negative    Methamphetamine, Ur Negative Negative    Opiate Screen Negative  Negative    Amphetamine Screen, Urine Negative Negative    Benzodiazepine Screen, Urine Positive (A) Negative    Tricyclic Antidepressants Screen Positive (A) Negative    Methadone Screen, Urine Negative Negative    Barbiturates Screen, Urine Negative Negative    Oxycodone Screen, Urine Negative Negative    Buprenorphine, Screen, Urine Negative Negative   Fentanyl, Urine - Urine, Clean Catch    Collection Time: 10/21/24  3:06 AM    Specimen: Urine, Clean Catch   Result Value Ref Range    Fentanyl, Urine Negative Negative   Blood Gas, Venous With Co-Ox    Collection Time: 10/21/24  3:41 AM    Specimen: Venous Blood   Result Value Ref Range    Site OTHER     pH, Venous 7.330 7.320 - 7.420 pH Units    pCO2, Venous 46.9 40.0 - 50.0 mm Hg    pO2, Venous 32.5 30.0 - 50.0 mm Hg    HCO3, Venous 24.7 22.0 - 28.0 mmol/L    Base Excess, Venous -1.6 (L) 0.0 - 2.0 mmol/L    O2 Saturation, Venous 60.8 45.0 - 75.0 %    Oxyhemoglobin Venous 57.5 40.0 - 70.0 %    Methemoglobin Venous 0.7 0.0 - 3.0 %    Carboxyhemoglobin Venous 4.8 0.0 - 5.0 %    Barometric Pressure for Blood Gas 741 mmHg    Modality Room Air     Ventilator Mode NA     Collected by RN    STAT Lactic Acid, Reflex    Collection Time: 10/21/24  5:26 AM    Specimen: Blood   Result Value Ref Range    Lactate 2.2 (C) 0.5 - 2.0 mmol/L       If labs were ordered, I independently reviewed the results and considered them in treating the patient.        RADIOLOGY  CT Head Without Contrast    Result Date: 10/21/2024  PROCEDURE: CT HEAD WO CONTRAST-  HISTORY: numbness, nausea and vomiting  COMPARISON: 8/12/2024  TECHNIQUE: Noncontrast exam  FINDINGS: Brain parenchyma is homogeneous without evidence of hemorrhage, mass effect or edema. No extra-axial abnormality is noted. Ventricles and cisterns appear normal.  The visualized sinuses, orbits and petrous temporal bones appear unremarkable.      Unremarkable unenhanced CT of the brain.   This study was performed with techniques to  keep radiation doses as low as reasonably achievable (ALARA). Individualized dose reduction techniques using automated exposure control or adjustment of vA and/or kV according to the patient size were employed.    This report was signed and finalized on 10/21/2024 3:17 AM by Gurvinder Naylor MD.           PROCEDURES    Procedures    Interpretations    O2 Sat: The patients oxygen saturation was 97% on Room Air.  This was independently interpreted by me as Normal    EKG: I reviewed and independently interpreted the EKG as sinus rhythm rate of 90 bpm, normal axis, normal intervals, no ST elevation, no T wave inversions    Cardiac Monitoring: I reviewed and independently interpreted the Rhythm Strip as Normal Sinus rhythm rate of 85    Radiology: I ordered and independently reviewed the above noted radiographic studies.  I viewed images of CT Head which showed No intracranial hemorrhage per my independent interpretation. See radiologist's dictation for official interpretation.         MEDICATIONS GIVEN IN ER    Medications   sodium chloride 0.9 % flush 10 mL (has no administration in time range)   ondansetron (ZOFRAN) injection 4 mg (4 mg Intravenous Given 10/21/24 0257)   famotidine (PEPCID) injection 20 mg (20 mg Intravenous Given 10/21/24 0258)   sodium chloride 0.9 % bolus 1,000 mL (0 mL Intravenous Stopped 10/21/24 0326)   sodium chloride 0.9 % bolus 1,000 mL (1,000 mL Intravenous New Bag 10/21/24 0434)   ketorolac (TORADOL) injection 15 mg (15 mg Intravenous Given 10/21/24 0433)   ondansetron (ZOFRAN) injection 4 mg (4 mg Intravenous Given 10/21/24 0433)   butalbital-acetaminophen-caffeine (FIORICET, ESGIC) -40 MG per tablet 1 tablet (1 tablet Oral Given 10/21/24 0454)         MEDICAL DECISION MAKING, PROGRESS, and CONSULTS    All labs, if obtained, have been independently reviewed by me.  All radiology studies, if obtained, have been reviewed by me and the radiologist dictating the report.  All EKG's, if  obtained, have been independently viewed and interpreted by me      Discussion below represents my analysis of pertinent findings related to patient's condition, differential diagnosis, treatment plan and final disposition.      Differential diagnosis:    39-year-old female presented ED with nausea, vomiting, headache.  She also reports numbness on her head.  She thinks she having a Bell's palsy flare, she has normal facial symmetry, moves all 4 extremities, she is quite slowed and lethargic but I think this is likely medication side effect she took her night medications prior to coming.  She is dry appearing, I suspect from vomiting.  Reviewing her chart she was recently worked up for pancreatitis but had a normal CT scan a couple days ago.  She has no abdominal tenderness I will think we need to obtain another CT scan.  Will obtain laboratory workup, including lactic acid, urinalysis, urine drug screen, ethanol.  Will obtain a CT scan of the head given his reported numbness and her lethargy.  She has no focal findings that would suggest a stroke at this point.    Additional Sources:  External (non-ED) record review:  Abdominal CT scan 10/18/2024 which is negative       Orders placed during this visit:  Orders Placed This Encounter   Procedures    CT Head Without Contrast    Comprehensive Metabolic Panel    Lipase    Urinalysis With Microscopic If Indicated (No Culture) - Urine, Clean Catch    Lactic Acid, Plasma    Ethanol    Urine Drug Screen - Urine, Clean Catch    Ammonia    CK    Magnesium    CBC Auto Differential    Fentanyl, Urine - Urine, Clean Catch    Blood Gas, Venous -With Co-Ox Panel: Yes    Blood Gas, Venous With Co-Ox    STAT Lactic Acid, Reflex    STAT Lactic Acid, Reflex    POC Glucose Once    ECG 12 Lead Chest Pain    Insert Peripheral IV    CBC & Differential         Additional orders considered but not ordered:  None    ED Course:    Consultants:  None    ED Course as of 10/21/24 0556   Mon Oct  21, 2024   0320 CBC & Differential  CBC is within normal limits [CS]   0320 Urinalysis With Microscopic If Indicated (No Culture) - Urine, Clean Catch(!)  UA showing significant glucosuria [CS]   0326 Urine Drug Screen - Urine, Clean Catch(!)  Urine drug screen positive for benzodiazepines and tricyclic antidepressants which I think is certainly contribute to the patient's mental state [CS]   0335 Comprehensive Metabolic Panel(!)  Chemistries reveal hyperglycemia without elevated anion gap [CS]   0342 Blood Gas, Venous With Co-Ox(!)  Venous blood gas without significant acidosis or hypercarbia [CS]   0342 Lipase  Lipase within normal limits [CS]   0342 CK  CK is unremarkable [CS]   0347 Lactic Acid, Plasma(!!)  Lactic significantly elevated, will add additional IV fluids [CS]   0556 Repeat lactic is improved, patient tolerating oral intake, will discharge the patient home with antiemetics [CS]      ED Course User Index  [CS] Rajeev Horowitz MD           After my consideration of clinical presentation and any laboratory/radiology studies obtained, I discussed the findings with the patient/patient representative who is in agreement with the treatment plan and the final disposition. Risks and benefits of discharge were discussed.     AS OF 05:56 EDT VITALS:    BP - 104/58  HR - 87  TEMP - 98.4 °F (36.9 °C) (Oral)  O2 SATS - 97%    I reviewed the patients prescription monitoring report if available prior to discharge    DIAGNOSIS  Final diagnoses:   Nausea and vomiting, unspecified vomiting type   Dehydration   Acute nonintractable headache, unspecified headache type         DISPOSITION  ED Disposition       ED Disposition   Discharge    Condition   Stable    Comment   --                   Please note that portions of this document were completed with voice recognition software.        Rajeev Horowitz MD  10/21/24 0538

## 2024-10-21 NOTE — ED NOTES
Patient called the ED at this time via her personal cell phone to ask for a nurse to come into her room. Informed the patient that she has a call light that she didn't have to call the ED on her phone if she needed us. Call IZABELLA Freedman at this time to inform her of this.

## 2024-10-21 NOTE — TELEPHONE ENCOUNTER
PATIENT IS WANTING TO KNOW WHY HR DIAZEPAM WAS SENT IN WITH A QUANTITY OF 45 SHE IS RUNNING SHORT AGAIN ON HER MED. SHE WANTS TO SPEAK WITH NURSE

## 2024-10-21 NOTE — ED NOTES
"Upon entering the room, pt on phone with family member. Pt stated \"they have not checked on me in 5 hours and I am filing a complaint.\" This nurse reminded patient that she had been checked on recently. Pt did not respond.  "

## 2024-10-21 NOTE — ED NOTES
"As orderly for radiology brought pt back from CT to room, pt asked radiology to wheel her into another patient's room. This nurse educated patient on the importance to stay in her own room and her signing the \"no wandering policy.\" The patient turned her head to the radiology orderly and stated \"I want a different nurse or I am signing out AMA..I'm tired of people being nasty to me here.\" This nurse replied \"Lindsay I understand its frustrating but you are a patient right now so you have to stay in your on room.\" Radiology orderly witnessed this event.   ---------------------------------------------------------------------------  Pt was taken back to her room, placed on monitor and connected to fluids. Call light within reach, family in room.  "

## 2024-10-22 ENCOUNTER — OFFICE VISIT (OUTPATIENT)
Dept: PULMONOLOGY | Facility: CLINIC | Age: 39
End: 2024-10-22
Payer: MEDICAID

## 2024-10-22 ENCOUNTER — LAB (OUTPATIENT)
Dept: LAB | Facility: HOSPITAL | Age: 39
End: 2024-10-22
Payer: MEDICAID

## 2024-10-22 VITALS
HEART RATE: 101 BPM | SYSTOLIC BLOOD PRESSURE: 130 MMHG | RESPIRATION RATE: 18 BRPM | HEIGHT: 62 IN | DIASTOLIC BLOOD PRESSURE: 72 MMHG | BODY MASS INDEX: 46.19 KG/M2 | OXYGEN SATURATION: 95 % | WEIGHT: 251 LBS

## 2024-10-22 DIAGNOSIS — F51.5 NIGHTMARES: ICD-10-CM

## 2024-10-22 DIAGNOSIS — G47.19 EXCESSIVE DAYTIME SLEEPINESS: ICD-10-CM

## 2024-10-22 DIAGNOSIS — J45.50 SEVERE PERSISTENT ASTHMA WITHOUT COMPLICATION: Primary | ICD-10-CM

## 2024-10-22 DIAGNOSIS — F17.210 NICOTINE DEPENDENCE, CIGARETTES, UNCOMPLICATED: ICD-10-CM

## 2024-10-22 DIAGNOSIS — G47.52 DREAM ENACTMENT BEHAVIOR: ICD-10-CM

## 2024-10-22 DIAGNOSIS — G47.8 SLEEP TALKING: ICD-10-CM

## 2024-10-22 DIAGNOSIS — J30.9 ALLERGIC RHINITIS, UNSPECIFIED SEASONALITY, UNSPECIFIED TRIGGER: ICD-10-CM

## 2024-10-22 DIAGNOSIS — G47.34 NOCTURNAL HYPOXIA: ICD-10-CM

## 2024-10-22 DIAGNOSIS — R06.83 SNORING: ICD-10-CM

## 2024-10-22 PROCEDURE — 86003 ALLG SPEC IGE CRUDE XTRC EA: CPT

## 2024-10-22 PROCEDURE — 90471 IMMUNIZATION ADMIN: CPT | Performed by: INTERNAL MEDICINE

## 2024-10-22 PROCEDURE — 82785 ASSAY OF IGE: CPT

## 2024-10-22 PROCEDURE — 90656 IIV3 VACC NO PRSV 0.5 ML IM: CPT | Performed by: INTERNAL MEDICINE

## 2024-10-22 PROCEDURE — 99214 OFFICE O/P EST MOD 30 MIN: CPT | Performed by: INTERNAL MEDICINE

## 2024-10-22 RX ORDER — IPRATROPIUM BROMIDE AND ALBUTEROL SULFATE 2.5; .5 MG/3ML; MG/3ML
3 SOLUTION RESPIRATORY (INHALATION) 4 TIMES DAILY PRN
Qty: 360 ML | Refills: 5 | Status: SHIPPED | OUTPATIENT
Start: 2024-10-22

## 2024-10-22 RX ORDER — FLUTICASONE FUROATE, UMECLIDINIUM BROMIDE AND VILANTEROL TRIFENATATE 200; 62.5; 25 UG/1; UG/1; UG/1
1 POWDER RESPIRATORY (INHALATION)
Qty: 60 EACH | Refills: 5 | Status: SHIPPED | OUTPATIENT
Start: 2024-10-22

## 2024-10-22 RX ORDER — FLUTICASONE PROPIONATE 50 UG/1
1 SPRAY, METERED NASAL 2 TIMES DAILY
Qty: 16 G | Refills: 5 | Status: SHIPPED | OUTPATIENT
Start: 2024-10-22

## 2024-10-22 NOTE — PROGRESS NOTES
"  Chief Complaint   Patient presents with    Breathing Problem    Follow-up         Subjective   Lindsay Amezquita is a 39 y.o. female.   Patient was evaluated today for follow up of asthma, and sleep disturbance.     Patient does not report any recent exacerbations requiring emergency room visits or hospitalizations. She was, however, in the ER 2 nights in a row due to vague symptoms of being dizzy.     Patient is compliant with pulmonary medicines, as prescribed.     she is currently on Trelegy. she is using the rescue inhalers 3-5 times per week.     She continues to be tired \"all the time\". She does snore. She also reports nightmares and does talk in her sleep. She reports Headaches in the morning.     she was supposed to be on oxygen at night, but her [employer] company took away her concentrator.      The following portions of the patient's history were reviewed and updated as appropriate: allergies, current medications, past family history, past medical history, past social history, and past surgical history.    Review of Systems   HENT:  Positive for sinus pressure.    Respiratory:  Positive for cough, shortness of breath and wheezing.    Psychiatric/Behavioral:  Positive for sleep disturbance.        Objective   Visit Vitals  /72   Pulse 101   Resp 18   Ht 157.5 cm (62\")   Wt 114 kg (251 lb)   SpO2 95%   BMI 45.91 kg/m²       BMI Readings from Last 8 Encounters:   10/22/24 45.91 kg/m²   10/21/24 45.97 kg/m²   10/18/24 46.09 kg/m²   10/02/24 46.09 kg/m²   09/30/24 49.38 kg/m²   09/22/24 49.38 kg/m²   09/19/24 49.38 kg/m²   09/19/24 49.38 kg/m²       Physical Exam  Vitals reviewed.   Constitutional:       Appearance: She is well-developed.   HENT:      Head: Normocephalic and atraumatic.      Mouth/Throat:      Comments: Oropharynx was crowded.  Eyes:      Extraocular Movements: Extraocular movements intact.   Cardiovascular:      Rate and Rhythm: Normal rate.   Pulmonary:      Comments: Somewhat hyperresonant to " percussion.  Somewhat decreased air entry.  No obvious wheezing noted.   Musculoskeletal:      Cervical back: Neck supple.   Neurological:      Mental Status: She is alert and oriented to person, place, and time.           Assessment & Plan   Diagnoses and all orders for this visit:    1. Severe persistent asthma without complication (Primary)  -     Home Nebulizer  -     Nitric Oxide  -     Peak Flow    2. Excessive daytime sleepiness  -     Home Sleep Study; Future    3. Snoring  -     Home Sleep Study; Future    4. Dream enactment behavior  -     Home Sleep Study; Future    5. Sleep talking  -     Home Sleep Study; Future    6. Nightmares  -     Home Sleep Study; Future    7. Nicotine dependence, cigarettes, uncomplicated  -     Home Sleep Study; Future    8. Nocturnal hypoxia    9. Allergic rhinitis, unspecified seasonality, unspecified trigger  -     IgE; Future  -     Allergens, Zone 8; Future    Other orders  -     Trelegy Ellipta 200-62.5-25 MCG/ACT inhaler; Inhale 1 puff Daily. Rinse mouth with water after use  Dispense: 60 each; Refill: 5  -     fluticasone (FLONASE) 50 MCG/ACT nasal spray; Administer 1 spray into the nostril(s) as directed by provider 2 (Two) Times a Day.  Dispense: 16 g; Refill: 5  -     ipratropium-albuterol (DUO-NEB) 0.5-2.5 mg/3 ml nebulizer; Take 3 mL by nebulization 4 (Four) Times a Day As Needed for Wheezing or Shortness of Air.  Dispense: 360 mL; Refill: 5  -     Fluzone >6mos           Return in about 4 months (around 2/6/2025) for Recheck, Sleep study, For Pack ([place]).    DISCUSSION (if any):  FeNO level was <5 today.    Peak flow was 300 LPM today.    Last PFTs were reviewed.  No significant obstruction or restriction noted.    Laboratory workup was also reviewed which showed   Lab Results   Component Value Date    EOSABS 0.25 10/21/2024    EOSABS 0.20 10/18/2024    EOSABS 0.09 10/02/2024    & Laboratory workup also showed   Lab Results   Component Value Date    CO2 18.8  (L) 10/21/2024     ===========================  ===========================    Patient was advised to continue her nasal spray, especially given improvement in symptoms overall.    I have ordered IgE/RAST panel.    It appears that her symptoms of asthma are under fair control with the current regimen.    Patient's medications for underlying asthma were reviewed in great detail.    Patient was informed about the black box warning for [employer] regarding suicidal thoughts and tendencies.  she denies any ongoing issues for now.     Any needed adjustments to her pulmonary medications, either for clinical or insurance coverage reasons, have been made and are reflected in the orders.    Compliance with medications stressed.     Side effects of prescribed medications discussed with the patient.    The need to continue to be aware of triggers that may cause asthma exacerbation versus progression of disease, was also discussed.    The pathophysiology of sleep apnea was discussed, with the patient.     We will encourage her to schedule the sleep study soon.     The patient was made aware of the limitation of the home sleep study, whereby it may underestimate the true AHI and also carries a low sensitivity.  I have informed her that even if the home sleep study is negative, we may suggest an in lab sleep study to completely and definitively rule out/in sleep apnea.  The patient has understood.  This was communicated to the patient, in case home study is to be requested.    The patient is agreeable to try CPAP/BiPAP, if needed.     Patient was educated on good sleep hygiene measures and voiced understanding of the same.     Smoking cessation was advised and discussed.     Vaccination status addressed.    Up-to-date with influenza vaccinations (today).     Up-to-date with pneumonia vaccinations.         Dictated utilizing Dragon dictation.    This document was electronically signed by Anna Roger MD on 10/22/24 at 15:36  EDT

## 2024-10-23 ENCOUNTER — TELEPHONE (OUTPATIENT)
Dept: FAMILY MEDICINE CLINIC | Facility: CLINIC | Age: 39
End: 2024-10-23

## 2024-10-23 NOTE — TELEPHONE ENCOUNTER
----- Message from Cory Malcolm sent at 10/23/2024 10:26 AM EDT -----  Patient was to follow-up in the office today to discuss results from office visit on 10/18/2024.    CT of abdomen and pelvis showed no evidence of acute pancreatitis, however, there was still fluid present though this was decreased from previous imaging.  Nausea the patient reported at office visit last week could be related to this persistent fluid.  We had planned to discuss possible general surgery versus interventional radiology referral for drainage of fluid.  We had also planned to discuss getting her gastroenterology referral moved up from January as patient may benefit from ERCP even though she has had gallbladder removed.    Patient's vaginal swab showed yeast which was is what we suspected last week.  The swab specifically showed 2 different species.  The medication that was prescribed should cover this but having said that one of the species that was present does occasionally have resistance.  Are patients vaginal symptoms improved or resolved?    Urine culture looked to be contaminated.  If patient is still having symptoms it would be beneficial to repeat urine culture.     Complete blood count was unremarkable with no elevated white count to suggest bacterial infection.  CRP was also normal not suggestive of inflammation or infection.  Metabolic panel was unremarkable other than borderline elevated liver enzyme ALT which was actually improved from 3 weeks earlier.  Lipase has returned to normal which again would be consistent with no acute pancreatitis.    Patient's A1c is elevated to 7.  Glucose from CMP was 151.  We do need to see patient in office so that we can start extended release metformin and have her follow-up with her regular PCP in the next month for recheck of A1c.    Is patient agreeable to rescheduling appointment this week?

## 2024-10-23 NOTE — TELEPHONE ENCOUNTER
Attempted to call patient regarding provider message. Neither number in chart is working. No HIPAA on file.

## 2024-10-24 RX ORDER — RIMEGEPANT SULFATE 75 MG/75MG
75 TABLET, ORALLY DISINTEGRATING ORAL EVERY OTHER DAY
Qty: 16 TABLET | Refills: 0 | Status: SHIPPED | OUTPATIENT
Start: 2024-10-24

## 2024-10-24 RX ORDER — BUTALBITAL, ACETAMINOPHEN AND CAFFEINE 50; 325; 40 MG/1; MG/1; MG/1
1 TABLET ORAL 2 TIMES DAILY PRN
Qty: 20 TABLET | Refills: 0 | Status: SHIPPED | OUTPATIENT
Start: 2024-10-24

## 2024-10-25 ENCOUNTER — OFFICE VISIT (OUTPATIENT)
Dept: FAMILY MEDICINE CLINIC | Facility: CLINIC | Age: 39
End: 2024-10-25
Payer: MEDICAID

## 2024-10-25 VITALS
SYSTOLIC BLOOD PRESSURE: 132 MMHG | OXYGEN SATURATION: 96 % | BODY MASS INDEX: 49.98 KG/M2 | HEIGHT: 62 IN | RESPIRATION RATE: 18 BRPM | HEART RATE: 98 BPM | WEIGHT: 271.6 LBS | DIASTOLIC BLOOD PRESSURE: 76 MMHG

## 2024-10-25 DIAGNOSIS — J45.909 SEVERE ASTHMA, UNSPECIFIED WHETHER COMPLICATED, UNSPECIFIED WHETHER PERSISTENT: ICD-10-CM

## 2024-10-25 DIAGNOSIS — B37.31 VAGINAL CANDIDIASIS: ICD-10-CM

## 2024-10-25 DIAGNOSIS — K86.3 PANCREATIC PSEUDOCYST: Primary | ICD-10-CM

## 2024-10-25 DIAGNOSIS — M25.561 CHRONIC PAIN OF RIGHT KNEE: ICD-10-CM

## 2024-10-25 DIAGNOSIS — Z91.89 AT RISK FOR POLYPHARMACY: ICD-10-CM

## 2024-10-25 DIAGNOSIS — G89.29 CHRONIC PAIN OF RIGHT KNEE: ICD-10-CM

## 2024-10-25 DIAGNOSIS — E11.9 TYPE 2 DIABETES MELLITUS WITHOUT COMPLICATION, WITHOUT LONG-TERM CURRENT USE OF INSULIN: ICD-10-CM

## 2024-10-25 LAB — GLUCOSE BLDC GLUCOMTR-MCNC: 141 MG/DL (ref 70–130)

## 2024-10-25 PROCEDURE — 1125F AMNT PAIN NOTED PAIN PRSNT: CPT | Performed by: STUDENT IN AN ORGANIZED HEALTH CARE EDUCATION/TRAINING PROGRAM

## 2024-10-25 PROCEDURE — 1160F RVW MEDS BY RX/DR IN RCRD: CPT | Performed by: STUDENT IN AN ORGANIZED HEALTH CARE EDUCATION/TRAINING PROGRAM

## 2024-10-25 PROCEDURE — 82948 REAGENT STRIP/BLOOD GLUCOSE: CPT | Performed by: STUDENT IN AN ORGANIZED HEALTH CARE EDUCATION/TRAINING PROGRAM

## 2024-10-25 PROCEDURE — 3051F HG A1C>EQUAL 7.0%<8.0%: CPT | Performed by: STUDENT IN AN ORGANIZED HEALTH CARE EDUCATION/TRAINING PROGRAM

## 2024-10-25 PROCEDURE — 99214 OFFICE O/P EST MOD 30 MIN: CPT | Performed by: STUDENT IN AN ORGANIZED HEALTH CARE EDUCATION/TRAINING PROGRAM

## 2024-10-25 PROCEDURE — 1159F MED LIST DOCD IN RCRD: CPT | Performed by: STUDENT IN AN ORGANIZED HEALTH CARE EDUCATION/TRAINING PROGRAM

## 2024-10-25 RX ORDER — FLUCONAZOLE 150 MG/1
150 TABLET ORAL ONCE
Qty: 1 TABLET | Refills: 0 | Status: SHIPPED | OUTPATIENT
Start: 2024-10-25 | End: 2024-10-25

## 2024-10-25 NOTE — PROGRESS NOTES
Follow Up Office Visit      Date: 10/25/2024   Patient Name: Lindsay Amezquita  : 1985   MRN: [id number]     Chief Complaint:    Chief Complaint   Patient presents with   • Hospital Follow Up Visit     ER f/u x2,    • Cough     Still productive, saw pulm since last visit- started on Trelegy, will have updated sleep study done thru their office       History of Present Illness: Lindsay Amezquita is a 39 y.o. female who is here today for follow-up regarding continuing nausea, epigastric pain, recent pancreatitis, type 2 diabetes, and vaginal yeast infection.    Patient reports continuing mild epigastric pain related to pancreatitis.  Patient reports nausea is somewhat improved and she has has not needed to take Phenergan with her other nausea medication.  Patient does appear to be tolerating oral intake at least somewhat better than she was when seen last, however, she reports continuing decrease from normal.    Regarding patient's diabetes patient does wish to be restarted on Jardiance.     Patient reports almost complete resolution of vaginal yeast infection.  Patient does report feeling somewhat itchy still.    Patient does report chronic right knee pain for over 1 month duration.  Patient believes that right knee is swelling at times.  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    Patient reports that she is currently being seen by pulmonology for severe asthma.  Patient reports also having chronic cough. Patient denies that this is productive. Patient reports that she has restarted her inhaler after seeing Pulmonology earlier this week.      Subjective     I have reviewed the patients family history, social history, past medical history, past surgical history and have updated it as appropriate.     Medications:     Current Outpatient Medications:   •  Allegra-D Allergy & Congestion  MG per 12 hr tablet, ,  Disp: , Rfl:   •  amitriptyline (ELAVIL) 150 MG tablet, Take 1 tablet by mouth Every Night., Disp: 30 tablet, Rfl: 1  •  atorvastatin (LIPITOR) 40 MG tablet, Take 1 tablet by mouth every night at bedtime., Disp: 90 tablet, Rfl: 3  •  butalbital-acetaminophen-caffeine (FIORICET, ESGIC) -40 MG per tablet, Take 1 tablet by mouth 2 (Two) Times a Day As Needed for Headache., Disp: 20 tablet, Rfl: 0  •  Chlorhexidine Gluconate 4 % solution, , Disp: , Rfl:   •  desvenlafaxine (PRISTIQ) 100 MG 24 hr tablet, Take 1 tablet by mouth Daily., Disp: 30 tablet, Rfl: 1  •  diazePAM (VALIUM) 10 MG tablet, Take 1 tablet by mouth 3 (Three) Times a Day As Needed for Anxiety., Disp: 90 tablet, Rfl: 0  •  fluticasone (FLONASE) 50 MCG/ACT nasal spray, Administer 1 spray into the nostril(s) as directed by provider 2 (Two) Times a Day., Disp: 16 g, Rfl: 5  •  gabapentin (Neurontin) 800 MG tablet, Take 1 tablet by mouth 3 (Three) Times a Day., Disp: 90 tablet, Rfl: 0  •  ipratropium-albuterol (DUO-NEB) 0.5-2.5 mg/3 ml nebulizer, Take 3 mL by nebulization 4 (Four) Times a Day As Needed for Wheezing or Shortness of Air., Disp: 360 mL, Rfl: 5  •  Lurasidone HCl (Latuda) 120 MG tablet tablet, Take 1 tablet by mouth Daily., Disp: 30 tablet, Rfl: 1  •  norethindrone (AYGESTIN) 5 MG tablet, Take 1 tablet by mouth 2 (Two) Times a Day., Disp: , Rfl:   •  ondansetron ODT (ZOFRAN-ODT) 4 MG disintegrating tablet, Place 1 tablet on the tongue Every 8 (Eight) Hours As Needed for Nausea or Vomiting., Disp: 20 tablet, Rfl: 0  •  OXcarbazepine (TRILEPTAL) 300 MG tablet, Take 1 tablet by mouth 2 (Two) Times a Day., Disp: 60 tablet, Rfl: 1  •  pantoprazole (Protonix) 40 MG EC tablet, Take 1 tablet by mouth Daily., Disp: 90 tablet, Rfl: 1  •  polyethylene glycol (MIRALAX) 17 g packet, Take 17 g by mouth Daily. (Patient taking differently: Take 17 g by mouth As Needed.), Disp: 30 each, Rfl: 1  •  Rimegepant Sulfate (Nurtec) 75 MG tablet dispersible tablet,  "Take 1 tablet by mouth Every Other Day. Take Monday,Wednesday,Friday, and Saturday., Disp: 16 tablet, Rfl: 0  •  tiZANidine (ZANAFLEX) 4 MG tablet, Take 1 tablet by mouth Every 6 (Six) Hours As Needed for Muscle Spasms., Disp: 120 tablet, Rfl: 0  •  Trelegy Ellipta 200-62.5-25 MCG/ACT inhaler, Inhale 1 puff Daily. Rinse mouth with water after use, Disp: 60 each, Rfl: 5  •  Zavegepant HCl 10 MG/ACT solution, 10 mg into the nostril(s) as directed by provider Daily As Needed (migraine). (1 spray into 1 nostril every 24 hrs prn), Disp: 6 each, Rfl: 5  •  acetaminophen (TYLENOL) 500 MG tablet, Take 2 tablets by mouth Every 8 (Eight) Hours As Needed for Mild Pain., Disp: 60 tablet, Rfl: 10  •  butalbital-acetaminophen-caffeine (FIORICET, ESGIC) -40 MG per tablet, Take 1 tablet by mouth Every 6 (Six) Hours As Needed for Headache., Disp: 15 tablet, Rfl: 0  •  FREESTYLE LITE test strip, See Admin Instructions., Disp: , Rfl:   •  guaiFENesin-dextromethorphan (ROBITUSSIN DM) 100-10 MG/5ML syrup, Take 5 mL by mouth 3 (Three) Times a Day As Needed for Cough., Disp: 80 mL, Rfl: 0  •  lidocaine (LIDODERM) 5 %, Place 1 patch on the skin as directed by provider Daily. Remove & Discard patch within 12 hours or as directed by MD, Disp: 30 each, Rfl: 0  No current facility-administered medications for this visit.    Allergies:   Allergies   Allergen Reactions   • Aspirin Anaphylaxis and Hives     Wheezing        • Codeine Anaphylaxis     Tolerates oxycodone-acetaminophen with no allergic rxn   • Cyclobenzaprine Other (See Comments)     \"gives me chest pain\"    Other reaction(s): Other (See Comments)   • Haldol [Haloperidol] Rash   • Imitrex [Sumatriptan] Anaphylaxis and Rash   • Latex Hives and Rash   • Penicillins Hives and Anaphylaxis     Vomiting   • Metformin Nausea And Vomiting   • Zofran [Ondansetron] GI Intolerance     Constipation   • Lamictal [Lamotrigine] Itching     Itching and hives   • Metoclopramide Headache, Hives " "and Other (See Comments)   • Morphine Itching   • Oxycontin [Oxycodone] GI Intolerance   • Prochlorperazine Nausea And Vomiting and Unknown (See Comments)   • Tramadol Hives and Nausea And Vomiting       Objective     Physical Exam:     Vital Signs:   Vitals:    10/25/24 1017 10/25/24 1039   BP: 132/76    Pulse: 103 98   Resp: 18    SpO2: 95% 96%   Weight: 123 kg (271 lb 9.6 oz)    Height: 157.5 cm (62\")      Body mass index is 49.68 kg/m².          Physical Exam     General:  Non-toxic appearing adult female in no acute distress. Alert and oriented. Vitals reviewed and are within normal limits.  Head/ENT: Atraumatic. Mucous membranes appear somewhat dry.  Neck: Anatomy appears symmetrical.   Cardiac: Regular rate and rhythm to auscultation.   Pulmonary: Lungs are clear to auscultation bilaterally.  Abdomen: Normal appearing abdomen.  Mild epigastric tenderness to palpation.  Extremities: Knees show no evidence of acute injury or deformity. Both knees are symmetrical without evidence of swelling. There is no appreciated lower extremity edema bilaterally to palpation.  Integumentary/Skin: Skin appears unremarkable from observable skin surfaces.   Neurological: Normal gait and speech.  Behavioral/Psych: Patient behavior/demeanor appears consistent with reported age. Patient is pleasant with normal affect today.      Procedures    Assessment / Plan      1. Pancreatic pseudocyst  Patient is here for follow-up regarding recent pancreatitis with continuing mild epigastric pain and nausea.  Nausea appears to be improved compared to previous visit.  CT of abdomen and pelvis performed on 10/18/2024 showed diminished but persistent  areas of fluid in the retroperitoneum that appear to communicate consistent with pseudocyst.  There was no evidence of acute pancreatitis at that time.  I do discuss with patient that with continued pain/nausea that it may be beneficial for evaluation by general surgery/interventional radiology to " see if drainage of pseudocyst is warranted.  Patient does wish for referral.  Will place referral to general surgery.  I discussed with patient that it may also be beneficial for closer follow-up with gastroenterology as ERCP may be needed to assess possible duct abnormality even though patient's gallbladder has been removed.  Patient does have distant outside referral to GI.  Will place new referral today and try and get this expedited.  - Ambulatory Referral to General Surgery  - Ambulatory Referral to Gastroenterology    2. Type 2 diabetes mellitus without complication, without long-term current use of insulin  Regarding patient's diabetes patient was discontinued from SGLT2 inhibitor following development of pancreatitis and yeast infection. Patient's A1c has increased on labs from 10/18 to 7. Patient's glucose is 141 in office today.  Initial plans were to start patient on an extended release of metformin given patient has not tolerated regular metformin secondary to nausea/vomiting. Patient does wish to be restarted on Jardiance for diabetes.  I did discuss with patient that it would likely not be in her best interest to restart Jardiance given she has had pancreatitis and even though this is less likely to cause pancreatitis it has been associated with pancreatitis.  Additionally I do discuss with patient that given the significant vaginal yeast infection that she had this would likely not be a good choice.  I do discuss trying metformin again in extended dose formulation as this often solves the issue of nausea/vomiting in individuals who have had those symptoms on metformin previously.  Patient does not wish to be started on metformin.  I did discuss concern for starting medication such as Ozempic or sulfonylureas as these can also contribute to pancreatitis. I have also discussed insulin. Patient does refuse this as well. Another option could potentially be pioglitazone. Patient does ultimately wish for  endocrinology referral for diabetes. Will place this today per patient preference.  - Ambulatory Referral to Endocrinology  - POC Glucose    3. Vaginal candidiasis  Patient's vaginal culture from previous visit did grow Candida with 2 different species identified.  One species identified does have higher likelihood of resistance.  Patient yeast infection appears almost completely resolved other than some residual itching.  Patient was only treated with 1 day of fluconazole.  I suspect that patient's yeast infection is covered by the fluconazole and that residual itching is simply related to the severity/extent of her yeast infection.  Will cover with 1 additional day of fluconazole.  Have instructed patient to let us know if any symptoms remain following second dose.  - fluconazole (Diflucan) 150 MG tablet; Take 1 tablet by mouth 1 (One) Time for 1 dose.  Dispense: 1 tablet; Refill: 0    4. Chronic pain of right knee  Patient reports chronic right knee pain of over one month duration.  There appears to be no recent trauma per patient.  Physical assessment in office is unremarkable.  Both knees appear symmetrical without evidence of trauma or swelling.  This could be related to osteoarthritic changes given patient does state working on her knees many years ago in gardening/[employer].  Will obtain XR in office today and await official radiological interpretation.  - XR Knee 1 or 2 View Right (In Office)    5. Severe asthma, unspecified whether complicated, unspecified whether persistent  Patient reports that she is currently being seen by pulmonology for severe asthma. Patient reports also having chronic cough. Patient denies that this is productive.  CXR from 10/22/2024 shows no evidence of infiltrate, edema/effusion.   I suspect this could be related to patient not using Trelegy inhaler and this could be contributing to chronic cough. Patient reports that she has had Trelegy inhaler for over a year though has not  used this.  Patient reports that she restarted Trelegy following pulmonology visit. I do encourage patient to use her Trelegy inhaler as pulmonology has prescribed as chronic cough can be related to asthma.  Patient is to be set up with sleep study as well.    6. At risk for polypharmacy  Additionally I do discuss with patient that she is on a significant amount of medications that can alter mental status.  I did discuss possibly discontinuing Phenergan given this can contribute to this especially in combination with many of patients other medications. Patient is agreeable. Will discontinue today. Patient would likely benefit from further adjustments/discontinuation of medications that could affect mental status.     Additional General Wellness: I do inquire with patient how much water she has been consuming since pancreatitis as she does appear dry on physical examination.  Patient reports that she has not been consuming much water or fluid.  I do encourage patient to be drinking at least 5-6 bottles of water daily.  Have also encouraged patient to return if unable to tolerate oral fluids.    Follow Up:   Return in about 1 month (around 11/25/2024).      MD CINDY [facility]  [address] [place] [address] [address] [place] [address]  Fax [phone]  Phone [phone]

## 2024-10-27 LAB
A ALTERNATA IGE QN: <0.1 KU/L
A FUMIGATUS IGE QN: <0.1 KU/L
AMER ROACH IGE QN: <0.1 KU/L
BAHIA GRASS IGE QN: <0.1 KU/L
BERMUDA GRASS IGE QN: <0.1 KU/L
BOXELDER IGE QN: <0.1 KU/L
C HERBARUM IGE QN: <0.1 KU/L
CAT DANDER IGE QN: <0.1 KU/L
CMN PIGWEED IGE QN: <0.1 KU/L
COMMON RAGWEED IGE QN: <0.1 KU/L
CONV CLASS DESCRIPTION: ABNORMAL
D FARINAE IGE QN: 0.11 KU/L
D PTERONYSS IGE QN: 0.24 KU/L
DOG DANDER IGE QN: <0.1 KU/L
ENGL PLANTAIN IGE QN: <0.1 KU/L
HAZELNUT POLN IGE QN: <0.1 KU/L
JOHNSON GRASS IGE QN: <0.1 KU/L
KENT BLUE GRASS IGE QN: <0.1 KU/L
LONDON PLANE IGE QN: <0.1 KU/L
M RACEMOSUS IGE QN: <0.1 KU/L
MT JUNIPER IGE QN: <0.1 KU/L
MUGWORT IGE QN: <0.1 KU/L
NETTLE IGE QN: <0.1 KU/L
P NOTATUM IGE QN: <0.1 KU/L
S BOTRYOSUM IGE QN: <0.1 KU/L
SHEEP SORREL IGE QN: <0.1 KU/L
SWEET GUM IGE QN: <0.1 KU/L
WHITE ELM IGE QN: <0.1 KU/L
WHITE HICKORY IGE QN: <0.1 KU/L
WHITE MULBERRY IGE QN: <0.1 KU/L
WHITE OAK IGE QN: <0.1 KU/L

## 2024-10-28 ENCOUNTER — HOSPITAL ENCOUNTER (EMERGENCY)
Facility: HOSPITAL | Age: 39
Discharge: HOME | End: 2024-10-28
Attending: STUDENT IN AN ORGANIZED HEALTH CARE EDUCATION/TRAINING PROGRAM | Admitting: STUDENT IN AN ORGANIZED HEALTH CARE EDUCATION/TRAINING PROGRAM
Payer: MEDICAID

## 2024-10-28 ENCOUNTER — APPOINTMENT (OUTPATIENT)
Dept: ULTRASOUND IMAGING | Facility: HOSPITAL | Age: 39
End: 2024-10-28
Payer: MEDICAID

## 2024-10-28 ENCOUNTER — TELEPHONE (OUTPATIENT)
Dept: OBSTETRICS AND GYNECOLOGY | Facility: CLINIC | Age: 39
End: 2024-10-28
Payer: MEDICAID

## 2024-10-28 ENCOUNTER — TELEPHONE (OUTPATIENT)
Dept: FAMILY MEDICINE CLINIC | Facility: CLINIC | Age: 39
End: 2024-10-28

## 2024-10-28 VITALS
SYSTOLIC BLOOD PRESSURE: 137 MMHG | RESPIRATION RATE: 18 BRPM | WEIGHT: 252 LBS | BODY MASS INDEX: 46.38 KG/M2 | HEIGHT: 62 IN | TEMPERATURE: 98 F | HEART RATE: 107 BPM | OXYGEN SATURATION: 95 % | DIASTOLIC BLOOD PRESSURE: 91 MMHG

## 2024-10-28 DIAGNOSIS — M79.604 RIGHT LEG PAIN: Primary | ICD-10-CM

## 2024-10-28 LAB — IGE SERPL-ACNC: 34 IU/ML (ref 6–495)

## 2024-10-28 PROCEDURE — 93971 EXTREMITY STUDY: CPT

## 2024-10-28 PROCEDURE — 99284 EMERGENCY DEPT VISIT MOD MDM: CPT

## 2024-10-28 RX ORDER — LIDOCAINE 50 MG/G
1 PATCH TOPICAL EVERY 24 HOURS
Qty: 30 EACH | Refills: 0 | Status: SHIPPED | OUTPATIENT
Start: 2024-10-28

## 2024-10-28 RX ORDER — LIDOCAINE 4 G/G
1 PATCH TOPICAL
Status: DISCONTINUED | OUTPATIENT
Start: 2024-10-28 | End: 2024-10-28 | Stop reason: HOSPADM

## 2024-10-28 RX ADMIN — LIDOCAINE 1 PATCH: 4 PATCH TOPICAL at 08:25

## 2024-10-28 NOTE — TELEPHONE ENCOUNTER
PT CALLED IN ANS SAID SHE WANTS TO BE PUT BACK ON JARDIANCE BC SHE'S NOT TAKING THE METFORMIN I LOOKED AT HER MED LIST AND DIDN'T SEE EITHER MEDICATION ON THERE BUT TOLD HER I WOULD SEND A MESSAGE

## 2024-10-28 NOTE — TELEPHONE ENCOUNTER
Caller: Lindsay Amezquita    Relationship: Self    Best call back number: [phone]        Requested Prescriptions: TyLENOL 500MG  Ibprophen 800MG    Pharmacy where request should be sent: [id number]  [place], [place] - [facility] [address] AT [facility] [address] - [phone]  - [phone] FX     Last office visit with prescribing clinician: 6/18/2024   Next office visit with prescribing clinician: 10/30/2024         Does the patient have less than a 3 day supply:  [x] Yes  [] No    Would you like a call back once the refill request has been completed: [x] Yes [] No    If the office needs to give you a call back, can they leave a voicemail: [x] Yes [] No    Kim Cardoza Rep   10/28/24 11:19 EDT

## 2024-10-28 NOTE — DISCHARGE INSTRUCTIONS
Continue taking Tylenol and Motrin as needed per directions on the package.  Use Lidoderm patches as prescribed as needed.  Rest, ice, elevate the extremity at home.  Follow-up with your PCP for further outpatient evaluation if symptoms persist.  Return to the ER for new or worsening symptoms or acute concerns.

## 2024-10-28 NOTE — ED PROVIDER NOTES
Subjective:  Chief Complaint:  Leg pain    History of Present Illness:  Patient is a 39-year-old female with history of anxiety, asthma, Bell's palsy, bipolar disorder, DVT, COPD, diabetes, among others presenting to the ER with complaints of right lower extremity pain.  Patient states she feels like she may have a blood clot.  States that the pain feels similar to when she had a blood clot in her left leg.  She states she does use hormones.  States she uses something that starts with an EN prescribed by her OB/GYN and also something over-the-counter.  Denies any recent surgery or travel.  Patient states she took Tylenol and Motrin prior to arrival and it has not helped.      Nurses Notes reviewed and agree, including vitals, allergies, social history and prior medical history.     REVIEW OF SYSTEMS: All systems reviewed and not pertinent unless noted.  Review of Systems   Musculoskeletal:         Right leg pain   All other systems reviewed and are negative.      Past Medical History:   Diagnosis Date    Allergic     Anxiety     Asthma Beings of copd with asthma    Back pain     Bell palsy 07/06/2021    Bell's palsy     Bipolar 2 disorder     Blood clot in vein     Behind left knee cap    COPD (chronic obstructive pulmonary disease) Six months ago    Deep vein thrombosis Last year    Depression     Diabetes mellitus November    Pre diabete    Frequent headaches     GERD (gastroesophageal reflux disease)     Hypertension     Every time i go into the [facility] room    Irritable bowel syndrome Two years ago    Kidney stone Two years ago    Migraine     Nausea, vomiting and diarrhea 09/17/2018    Obesity All of my life    Ovarian cyst Two years ago    Pancreatitis     Panic     PTSD (post-traumatic stress disorder)     Pulmonary embolism Not in lungs    Recurrent pregnancy loss, antepartum condition or complication Every since i have been 14 yars old    Restless leg syndrome     Sinus problem     Snores     Tattoos      Urinary tract infection Have them on and off    Varicella Little    Visual impairment Since i was little    Vitamin B12 deficiency     Wears glasses        Allergies:    Aspirin, Codeine, Cyclobenzaprine, Haldol [haloperidol], Imitrex [sumatriptan], Latex, Penicillins, Metformin, Zofran [ondansetron], Lamictal [lamotrigine], Metoclopramide, Morphine, Oxycontin [oxycodone], Prochlorperazine, and Tramadol      Past Surgical History:   Procedure Laterality Date    CHOLECYSTECTOMY      HYSTEROSCOPY N/A 3/14/2024    Procedure: HYSTEROSCOPY WITH MYOSURE, DILATION AND CURETTAGE WITH CERENE ABLATION;  Surgeon: David Ribeiro MD;  Location: [facility];  Service: Obstetrics/Gynecology;  Laterality: N/A;    MULTIPLE TOOTH EXTRACTIONS      All my teeth    WISDOM TOOTH EXTRACTION  All my teeth         Social History     Socioeconomic History    Marital status: Single   Tobacco Use    Smoking status: Every Day     Current packs/day: 0.50     Average packs/day: 2.0 packs/day for 32.4 years (63.5 ttl pk-yrs)     Types: Cigarettes     Start date: 7/17/1992     Passive exposure: Current    Smokeless tobacco: Never    Tobacco comments:      Around 2.5 packs per day- 7/5/24   Vaping Use    Vaping status: Former    Passive vaping exposure: Yes   Substance and Sexual Activity    Alcohol use: Not Currently     Comment: rarely    Drug use: Not Currently    Sexual activity: Not Currently     Partners: Female     Birth control/protection: Other, Partner of same sex         Family History   Problem Relation Age of Onset    Irritable bowel syndrome Mother     Heart disease Mother     Miscarriages / Stillbirths Mother     Arthritis Mother     COPD Mother     Learning disabilities Mother     Mental illness Mother     Asthma Mother     Irritable bowel syndrome Father     Alcohol abuse Father     Diabetes Father     Hyperlipidemia Father     Anxiety disorder Father     Learning disabilities Father     Colon cancer Maternal Grandfather      "Stroke Paternal Grandfather     Stroke Paternal Grandmother        Objective  Physical Exam:  /90   Pulse 107   Temp 98 °F (36.7 °C) (Oral)   Resp 18   Ht 157.5 cm (62\")   Wt 114 kg (252 lb)   SpO2 97%   BMI 46.09 kg/m²      Physical Exam  Vitals and nursing note reviewed.   Constitutional:       General: She is not in acute distress.     Appearance: She is not toxic-appearing.   HENT:      Head: Normocephalic and atraumatic.      Right Ear: External ear normal.      Left Ear: External ear normal.      Nose: Nose normal.   Eyes:      Extraocular Movements: Extraocular movements intact.      Conjunctiva/sclera: Conjunctivae normal.   Cardiovascular:      Rate and Rhythm: Tachycardia present.   Pulmonary:      Effort: Pulmonary effort is normal. No respiratory distress.   Abdominal:      General: There is no distension.      Palpations: Abdomen is soft.   Musculoskeletal:         General: Normal range of motion.      Cervical back: Normal range of motion and neck supple.      Comments: 2+ pulses, no obvious swelling, no overlying skin changes  Calf tenderness noted as well as pain with dorsiflexion   Skin:     General: Skin is warm and dry.   Neurological:      General: No focal deficit present.      Mental Status: She is alert and oriented to person, place, and time.   Psychiatric:         Mood and Affect: Mood normal.         Behavior: Behavior normal.         Procedures    ED Course:         Lab Results (last 24 hours)       ** No results found for the last 24 hours. **             US Venous Doppler Lower Extremity Right (duplex)    Result Date: 10/28/2024  RIGHT LOWER EXTREMITY VENOUS DUPLEX DOPPLER EXAMINATION  HISTORY: Right Lower extremity swelling, severe calf pain, previous blood clot in other leg..  COMPARISON:   None  PROCEDURE: Multiple transverse and longitudinal scans were performed of the femoropopliteal deep venous system, with augmentation and compression maneuvers.  FINDINGS: Proper " phasic flow was noted in the visualized deep venous system. No intraluminal increased echogenicity is noted to suggest thrombus. There is proper compression and augmentation of the venous structures. No abnormal venous collaterals are seen.      Impression: No evidence of right lower extremity deep venous thrombosis.    This report was signed and finalized on 10/28/2024 9:22 AM by Rosalba Falcon MD.          MDM  Patient evaluated in the ER for right calf and leg pain which she states feels similar to previous blood clot in the other leg.  Patient is hypertensive, heart rate of 107, hemodynamically stable and nontoxic-appearing on exam.  Right lower extremity is neurovascularly intact with no obvious significant swelling or overlying skin changes.  There is tenderness to palpation of the calf and pain with dorsiflexion.  Patient reports history of blood clot and hormone use.  Differential diagnosis includes but is not limited to musculoskeletal pain, DVT, among others.  Initial plan includes ultrasound of right lower extremity and treatment with Lidoderm patch.  Patient states she took Tylenol and Motrin just prior to arrival.    Ultrasound negative for DVT.  Patient agreeable with plan for discharge.  Prescription sent for Lidoderm patches.  Recommended she continue Tylenol and Motrin per directions on the package as needed.  Leg was wrapped with an Ace bandage.  Recommended follow-up with PCP for further outpatient evaluation if symptoms persist.  Precautions were given for return to the ER for any new or worsening symptoms.    Final diagnoses:   Right leg pain          Peace Kelly PA-C  10/28/24 0929

## 2024-10-29 DIAGNOSIS — R10.2 PELVIC PAIN: Primary | ICD-10-CM

## 2024-10-29 RX ORDER — ACETAMINOPHEN 500 MG
1000 TABLET ORAL EVERY 8 HOURS PRN
Qty: 60 TABLET | Refills: 10 | Status: SHIPPED | OUTPATIENT
Start: 2024-10-29 | End: 2025-10-29

## 2024-10-30 ENCOUNTER — OFFICE VISIT (OUTPATIENT)
Dept: NEUROLOGY | Facility: CLINIC | Age: 39
End: 2024-10-30
Payer: MEDICAID

## 2024-10-30 ENCOUNTER — OFFICE VISIT (OUTPATIENT)
Dept: OBSTETRICS AND GYNECOLOGY | Facility: CLINIC | Age: 39
End: 2024-10-30
Payer: MEDICAID

## 2024-10-30 VITALS
BODY MASS INDEX: 48.76 KG/M2 | HEIGHT: 62 IN | HEART RATE: 104 BPM | DIASTOLIC BLOOD PRESSURE: 80 MMHG | TEMPERATURE: 98 F | SYSTOLIC BLOOD PRESSURE: 128 MMHG | WEIGHT: 265 LBS | OXYGEN SATURATION: 96 %

## 2024-10-30 VITALS
DIASTOLIC BLOOD PRESSURE: 80 MMHG | BODY MASS INDEX: 48.95 KG/M2 | SYSTOLIC BLOOD PRESSURE: 130 MMHG | WEIGHT: 266 LBS | HEIGHT: 62 IN

## 2024-10-30 DIAGNOSIS — R10.2 CHRONIC PELVIC PAIN IN FEMALE: Primary | ICD-10-CM

## 2024-10-30 DIAGNOSIS — G43.001 MIGRAINE WITHOUT AURA AND WITH STATUS MIGRAINOSUS, NOT INTRACTABLE: ICD-10-CM

## 2024-10-30 DIAGNOSIS — K21.00 GASTROESOPHAGEAL REFLUX DISEASE WITH ESOPHAGITIS WITHOUT HEMORRHAGE: ICD-10-CM

## 2024-10-30 DIAGNOSIS — R61 NIGHT SWEATS: ICD-10-CM

## 2024-10-30 DIAGNOSIS — R51.9 ACUTE NONINTRACTABLE HEADACHE, UNSPECIFIED HEADACHE TYPE: ICD-10-CM

## 2024-10-30 DIAGNOSIS — G89.29 CHRONIC PELVIC PAIN IN FEMALE: Primary | ICD-10-CM

## 2024-10-30 DIAGNOSIS — M54.16 LUMBAR RADICULOPATHY: ICD-10-CM

## 2024-10-30 DIAGNOSIS — N93.9 ABNORMAL UTERINE BLEEDING (AUB): ICD-10-CM

## 2024-10-30 DIAGNOSIS — J45.50 SEVERE PERSISTENT ASTHMA WITHOUT COMPLICATION: Primary | ICD-10-CM

## 2024-10-30 DIAGNOSIS — G89.4 CHRONIC PAIN SYNDROME: ICD-10-CM

## 2024-10-30 DIAGNOSIS — Z98.890 S/P ENDOMETRIAL ABLATION: ICD-10-CM

## 2024-10-30 PROCEDURE — 99214 OFFICE O/P EST MOD 30 MIN: CPT | Performed by: OBSTETRICS & GYNECOLOGY

## 2024-10-30 RX ORDER — FEXOFENADINE HYDROCHLORIDE AND PSEUDOEPHEDRINE HYDROCHLORIDE 60; 120 MG/1; MG/1
TABLET, FILM COATED, EXTENDED RELEASE ORAL
OUTPATIENT
Start: 2024-10-30

## 2024-10-30 RX ORDER — RIMEGEPANT SULFATE 75 MG/75MG
75 TABLET, ORALLY DISINTEGRATING ORAL EVERY OTHER DAY
Qty: 16 TABLET | Refills: 5 | Status: SHIPPED | OUTPATIENT
Start: 2024-10-30

## 2024-10-30 RX ORDER — ZAVEGEPANT 10 MG/.1ML
10 SPRAY NASAL AS NEEDED
Qty: 1 EACH | Refills: 0 | COMMUNITY
Start: 2024-10-30

## 2024-10-30 RX ORDER — FLUCONAZOLE 150 MG/1
150 TABLET ORAL
COMMUNITY
Start: 2024-10-25 | End: 2024-11-02

## 2024-10-30 RX ORDER — BUTALBITAL, ACETAMINOPHEN AND CAFFEINE 50; 325; 40 MG/1; MG/1; MG/1
1 TABLET ORAL EVERY 6 HOURS PRN
Qty: 30 TABLET | Refills: 0 | Status: SHIPPED | OUTPATIENT
Start: 2024-10-30

## 2024-10-30 RX ORDER — PANTOPRAZOLE SODIUM 40 MG/1
40 TABLET, DELAYED RELEASE ORAL DAILY
Qty: 90 TABLET | Refills: 1 | Status: SHIPPED | OUTPATIENT
Start: 2024-10-30

## 2024-10-30 RX ORDER — GABAPENTIN 800 MG/1
800 TABLET ORAL 3 TIMES DAILY
Qty: 90 TABLET | Refills: 0 | Status: SHIPPED | OUTPATIENT
Start: 2024-10-30

## 2024-10-30 NOTE — TELEPHONE ENCOUNTER
Caller: LEORA COTA    Relationship: PATIENT     Best call back number: [phone]    Requested Prescriptions:   Requested Prescriptions     Pending Prescriptions Disp Refills    gabapentin (Neurontin) 800 MG tablet 90 tablet 0     Sig: Take 1 tablet by mouth 3 (Three) Times a Day.    pantoprazole (Protonix) 40 MG EC tablet 90 tablet 1     Sig: Take 1 tablet by mouth Daily.    tiZANidine (ZANAFLEX) 4 MG tablet 120 tablet 0     Sig: Take 1 tablet by mouth Every 6 (Six) Hours As Needed for Muscle Spasms.    Allegra-D Allergy & Congestion  MG per 12 hr tablet          Pharmacy where request should be sent: [id number] - [place], [place] - [facility] [address] AT [facility] [address] - [phone]  - [phone] FX     Last office visit with prescribing clinician: 7/3/2024   Last telemedicine visit with prescribing clinician: 10/2/2024   Next office visit with prescribing clinician: 11/26/2024     Additional details provided by patient: PATIENT NEEDS A REFILL.   PATIENT STATES ON THE TIZANIDINE SHE ONLY RECEIVED 90 TABLETS .     Does the patient have less than a 3 day supply:  [x] Yes  [] No    Would you like a call back once the refill request has been completed: [] Yes [x] No    If the office needs to give you a call back, can they leave a voicemail: [] Yes [x] No    Kim Fitch Rep   10/30/24 10:56 EDT

## 2024-10-30 NOTE — PATIENT INSTRUCTIONS
General Headache Without Cause  A headache is pain or discomfort you feel around the head or neck area. There are many causes and types of headaches. In some cases, the cause may not be found.  Follow these instructions at home:  Watch your condition for any changes. Let your doctor know about them. Take these steps to help with your condition:  Managing pain     Take over-the-counter and prescription medicines only as told by your doctor. This includes medicines for pain that are taken by mouth or put on the skin.  Lie down in a dark, quiet room when you have a headache.  If told, put ice on your head and neck area:  Put ice in a plastic bag.  Place a towel between your skin and the bag.  Leave the ice on for 20 minutes, 2-3 times per day.  Take off the ice if your skin turns bright red. This is very important. If you cannot feel pain, heat, or cold, you have a greater risk of damage to the area.  If told, put heat on the affected area. Use the heat source that your doctor recommends, such as a moist heat pack or a heating pad.  Place a towel between your skin and the heat source.  Leave the heat on for 20-30 minutes.  Take off the heat if your skin turns bright red. This is very important. If you cannot feel pain, heat, or cold, you have a greater risk of getting burned.  Keep lights dim if bright lights bother you or make your headaches worse.  Eating and drinking  Eat meals on a regular schedule.  If you drink alcohol:  Limit how much you have to:  0-1 drink a day for women who are not pregnant.  0-2 drinks a day for men.  Know how much alcohol is in a drink. In the [place] one drink equals one 12 oz bottle of beer (355 mL), one 5 oz glass of wine (148 mL), or one 1½ oz glass of hard liquor (44 mL).  Stop drinking caffeine, or drink less caffeine.  General instructions    Keep a journal to find out if certain things bring on headaches. For example, write down:  What you eat and drink.  How much sleep you get.  Any  [FreeTextEntry1] : continue metformin increase atorvastatin to 80 mg po qhs to get to goal  get 2 d echo  f/u in 4 months/bloodwork  [EKG obtained to assist in diagnosis and management of assessed problem(s)] : EKG obtained to assist in diagnosis and management of assessed problem(s) change to your diet or medicines.  Get a massage or try other ways to relax.  Limit stress.  Sit up straight. Do not tighten (tense) your muscles.  Do not smoke or use any products that contain nicotine or tobacco. If you need help quitting, ask your doctor.  Exercise regularly as told by your doctor.  Get enough sleep. This often means 7-9 hours of sleep each night.  Keep all follow-up visits. This is important.  Contact a doctor if:  Medicine does not help your symptoms.  You have a headache that feels different than the other headaches.  You feel like you may vomit (nauseous) or you vomit.  You have a fever.  Get help right away if:  Your headache:  Gets very bad quickly.  Gets worse after a lot of physical activity.  You have any of these symptoms:  You continue to vomit.  A stiff neck.  Trouble seeing.  Your eye or ear hurts.  Trouble speaking.  Weak muscles or you lose muscle control.  You lose your balance or have trouble walking.  You feel like you will pass out (faint) or you pass out.  You are mixed up (confused).  You have a seizure.  These symptoms may be an emergency. Get help right away. Call your local emergency services (911 in the [place]  Do not wait to see if the symptoms will go away.  Do not drive yourself to the hospital.  Summary  A headache is pain or discomfort that is felt around the head or neck area.  There are many causes and types of headaches. In some cases, the cause may not be found.  Keep a journal to help find out what causes your headaches. Watch your condition for any changes. Let your doctor know about them.  Contact a doctor if you have a headache that is different from usual, or if medicine does not help your headache.  Get help right away if your headache gets very bad, you throw up, you have trouble seeing, you lose your balance, or you have a seizure.  This information is not intended to replace advice given to you by your health care provider. Make sure you discuss any  questions you have with your health care provider.  Document Revised: 05/18/2022 Document Reviewed: 05/18/2022  Elsevier Patient Education © 2022 [employer] Inc[employer]

## 2024-10-30 NOTE — PROGRESS NOTES
Follow Up Office Visit      Patient Name: Lindsay Amezquita  : 1985   MRN: [id number]     Chief Complaint:    Chief Complaint   Patient presents with    Follow-up     Migraine       History of Present Illness: Lindsay Amezquita is a 39 y.o. female who is here today to follow up with migraines. She says she has a migraine today that is 7/10 on pain scale. It is across the forehead and back the left top side of her head. She has associated photophobia and phonophobia.  With the more severe headaches she notes that she has some blurred vision and will see floaters.  She says she will lay in a quiet dark room when she gets home.  She is scheduled to have a partial hysterectomy in early December.      Hx of Bell's Palsy 2 years ago and still has some residual rt sided droop    Subjective      Review of Systems:   Review of Systems   Eyes:  Positive for photophobia.   Neurological:  Positive for headache.       I have reviewed and the following portions of the patient's history were updated as appropriate: past family history, past medical history, past social history, past surgical history and problem list.    Medications:     Current Outpatient Medications:     acetaminophen (TYLENOL) 500 MG tablet, Take 2 tablets by mouth Every 8 (Eight) Hours As Needed for Mild Pain., Disp: 60 tablet, Rfl: 10    Allegra-D Allergy & Congestion  MG per 12 hr tablet, , Disp: , Rfl:     amitriptyline (ELAVIL) 150 MG tablet, Take 1 tablet by mouth Every Night., Disp: 30 tablet, Rfl: 1    atorvastatin (LIPITOR) 40 MG tablet, Take 1 tablet by mouth every night at bedtime., Disp: 90 tablet, Rfl: 3    butalbital-acetaminophen-caffeine (FIORICET, ESGIC) -40 MG per tablet, Take 1 tablet by mouth 2 (Two) Times a Day As Needed for Headache., Disp: 20 tablet, Rfl: 0    butalbital-acetaminophen-caffeine (FIORICET, ESGIC) -40 MG per tablet, Take 1 tablet by mouth Every 6 (Six) Hours As Needed for Headache or Migraine., Disp: 30  tablet, Rfl: 0    Chlorhexidine Gluconate 4 % solution, , Disp: , Rfl:     desvenlafaxine (PRISTIQ) 100 MG 24 hr tablet, Take 1 tablet by mouth Daily., Disp: 30 tablet, Rfl: 1    diazePAM (VALIUM) 10 MG tablet, Take 1 tablet by mouth 3 (Three) Times a Day As Needed for Anxiety., Disp: 90 tablet, Rfl: 0    fluconazole (DIFLUCAN) 150 MG tablet, Take 1 tablet by mouth., Disp: , Rfl:     fluticasone (FLONASE) 50 MCG/ACT nasal spray, Administer 1 spray into the nostril(s) as directed by provider 2 (Two) Times a Day., Disp: 16 g, Rfl: 5    FREESTYLE LITE test strip, See Admin Instructions., Disp: , Rfl:     gabapentin (Neurontin) 800 MG tablet, Take 1 tablet by mouth 3 (Three) Times a Day., Disp: 90 tablet, Rfl: 0    guaiFENesin-dextromethorphan (ROBITUSSIN DM) 100-10 MG/5ML syrup, Take 5 mL by mouth 3 (Three) Times a Day As Needed for Cough., Disp: 80 mL, Rfl: 0    ipratropium-albuterol (DUO-NEB) 0.5-2.5 mg/3 ml nebulizer, Take 3 mL by nebulization 4 (Four) Times a Day As Needed for Wheezing or Shortness of Air., Disp: 360 mL, Rfl: 5    lidocaine (LIDODERM) 5 %, Place 1 patch on the skin as directed by provider Daily. Remove & Discard patch within 12 hours or as directed by MD, Disp: 30 each, Rfl: 0    Lurasidone HCl (Latuda) 120 MG tablet tablet, Take 1 tablet by mouth Daily., Disp: 30 tablet, Rfl: 1    norethindrone (AYGESTIN) 5 MG tablet, Take 1 tablet by mouth 2 (Two) Times a Day., Disp: , Rfl:     ondansetron ODT (ZOFRAN-ODT) 4 MG disintegrating tablet, Place 1 tablet on the tongue Every 8 (Eight) Hours As Needed for Nausea or Vomiting., Disp: 20 tablet, Rfl: 0    OXcarbazepine (TRILEPTAL) 300 MG tablet, Take 1 tablet by mouth 2 (Two) Times a Day., Disp: 60 tablet, Rfl: 1    pantoprazole (Protonix) 40 MG EC tablet, Take 1 tablet by mouth Daily., Disp: 90 tablet, Rfl: 1    polyethylene glycol (MIRALAX) 17 g packet, Take 17 g by mouth Daily. (Patient taking differently: Take 17 g by mouth As Needed.), Disp: 30 each,  "Rfl: 1    Rimegepant Sulfate (Nurtec) 75 MG tablet dispersible tablet, Take 1 tablet by mouth Every Other Day. Take Monday,Wednesday,Friday, and Saturday., Disp: 16 tablet, Rfl: 5    tiZANidine (ZANAFLEX) 4 MG tablet, Take 1 tablet by mouth Every 6 (Six) Hours As Needed for Muscle Spasms., Disp: 120 tablet, Rfl: 0    Trelegy Ellipta 200-62.5-25 MCG/ACT inhaler, Inhale 1 puff Daily. Rinse mouth with water after use, Disp: 60 each, Rfl: 5    Zavegepant HCl 10 MG/ACT solution, Administer 10 mg into the nostril(s) as directed by provider Daily As Needed (migraine). (1 spray into 1 nostril every 24 hrs prn), Disp: 6 each, Rfl: 5    Zavegepant HCl (Zavzpret) 10 MG/ACT solution, Administer 10 mg into the nostril(s) as directed by provider As Needed (HA)., Disp: 1 each, Rfl: 0    Allergies:   Allergies   Allergen Reactions    Aspirin Anaphylaxis and Hives     Wheezing         Codeine Anaphylaxis     Tolerates oxycodone-acetaminophen with no allergic rxn    Cyclobenzaprine Other (See Comments)     \"gives me chest pain\"    Other reaction(s): Other (See Comments)    Haldol [Haloperidol] Rash    Imitrex [Sumatriptan] Anaphylaxis and Rash    Latex Hives and Rash    Penicillins Hives and Anaphylaxis     Vomiting    Metformin Nausea And Vomiting    Zofran [Ondansetron] GI Intolerance     Constipation    Lamictal [Lamotrigine] Itching     Itching and hives    Metoclopramide Headache, Hives and Other (See Comments)    Morphine Itching    Oxycontin [Oxycodone] GI Intolerance    Prochlorperazine Nausea And Vomiting and Unknown (See Comments)    Tramadol Hives and Nausea And Vomiting       Objective     Physical Exam:  Vital Signs:   Vitals:    10/30/24 1359   BP: 128/80   Pulse: 104   Temp: 98 °F (36.7 °C)   SpO2: 96%   Weight: 120 kg (265 lb)   Height: 157.5 cm (62\")   PainSc: 0-No pain     Body mass index is 48.47 kg/m².    Physical Exam  Constitutional:       Appearance: Normal appearance.   HENT:      Head: Normocephalic.   Eyes: "      Conjunctiva/sclera: Conjunctivae normal.   Pulmonary:      Effort: Pulmonary effort is normal.   Musculoskeletal:         General: Normal range of motion.   Skin:     General: Skin is warm and dry.   Neurological:      General: No focal deficit present.      Mental Status: She is alert and oriented to person, place, and time.      Cranial Nerves: Cranial nerves 2-12 are intact. No dysarthria.      Motor: Motor function is intact.      Coordination: Coordination is intact.      Gait: Gait is intact.   Psychiatric:         Attention and Perception: Attention normal.         Mood and Affect: Mood and affect normal.         Speech: Speech normal.         Behavior: Behavior normal. Behavior is cooperative.         Thought Content: Thought content normal.         Cognition and Memory: Cognition normal.         Judgment: Judgment normal.         Neurological Exam  Mental Status  Alert. Oriented to person, place, and time. Speech is normal. no dysarthria present.    Coordination    Finger-to-nose, rapid alternating movements and heel-to-shin normal bilaterally without dysmetria.    Gait   Normal gait.      Assessment / Plan      Assessment/Plan:   Diagnoses and all orders for this visit:    1. Acute nonintractable headache, unspecified headache type  -     butalbital-acetaminophen-caffeine (FIORICET, ESGIC) -40 MG per tablet; Take 1 tablet by mouth Every 6 (Six) Hours As Needed for Headache or Migraine.  Dispense: 30 tablet; Refill: 0  -     Zavegepant HCl 10 MG/ACT solution; Administer 10 mg into the nostril(s) as directed by provider Daily As Needed (migraine). (1 spray into 1 nostril every 24 hrs prn)  Dispense: 6 each; Refill: 5    2. Migraine without aura and with status migrainosus, not intractable  -     Rimegepant Sulfate (Nurtec) 75 MG tablet dispersible tablet; Take 1 tablet by mouth Every Other Day. Take Monday,Wednesday,Friday, and Saturday.  Dispense: 16 tablet; Refill: 5         Medications refilled  without change.  Sample of Zavzpret was given in office today for her acute migraine.  Indications and side effects discussed with patient she verbalizes understanding.  Patient will call in the interim if she has any questions or concerns or changes.    Follow Up:   Return in about 6 months (around 4/30/2025).    CLAIR Gillespie, [facility]

## 2024-10-30 NOTE — TELEPHONE ENCOUNTER
Rx Refill Note  Requested Prescriptions     Pending Prescriptions Disp Refills    gabapentin (Neurontin) 800 MG tablet 90 tablet 0     Sig: Take 1 tablet by mouth 3 (Three) Times a Day.     Signed Prescriptions Disp Refills    pantoprazole (Protonix) 40 MG EC tablet 90 tablet 1     Sig: Take 1 tablet by mouth Daily.     Authorizing Provider: JOCELINE PULIDO     Ordering User: REYNA QUINTANA     Refused Prescriptions Disp Refills    tiZANidine (ZANAFLEX) 4 MG tablet 120 tablet 0     Sig: Take 1 tablet by mouth Every 6 (Six) Hours As Needed for Muscle Spasms.     Refused By: REYNA QUINTANA     Reason for Refusal: Patient has requested refill too soon    Allegra-D Allergy & Congestion  MG per 12 hr tablet       Refused By: REYNA QUINTANA     Reason for Refusal: Prescriber not at this practice      Last office visit with prescribing clinician: 7/3/2024   Last telemedicine visit with prescribing clinician: 10/2/2024   Next office visit with prescribing clinician: 11/26/2024                         Would you like a call back once the refill request has been completed: [] Yes [] No    If the office needs to give you a call back, can they leave a voicemail: [] Yes [] No    Reyna Quintana MA  10/30/24, 11:56 EDT

## 2024-10-31 ENCOUNTER — TELEPHONE (OUTPATIENT)
Dept: FAMILY MEDICINE CLINIC | Facility: CLINIC | Age: 39
End: 2024-10-31
Payer: MEDICAID

## 2024-10-31 NOTE — TELEPHONE ENCOUNTER
PATIENT IS WANTING REFERRAL FAXED TO [facility]. FAX NUMBER [phone]

## 2024-11-01 DIAGNOSIS — M54.16 LUMBAR RADICULOPATHY: ICD-10-CM

## 2024-11-02 ENCOUNTER — TELEMEDICINE (OUTPATIENT)
Dept: FAMILY MEDICINE CLINIC | Facility: TELEHEALTH | Age: 39
End: 2024-11-02
Payer: MEDICAID

## 2024-11-02 DIAGNOSIS — J20.9 ACUTE BRONCHITIS, UNSPECIFIED ORGANISM: ICD-10-CM

## 2024-11-02 DIAGNOSIS — J01.00 ACUTE MAXILLARY SINUSITIS, RECURRENCE NOT SPECIFIED: Primary | ICD-10-CM

## 2024-11-02 PROCEDURE — 1160F RVW MEDS BY RX/DR IN RCRD: CPT | Performed by: NURSE PRACTITIONER

## 2024-11-02 PROCEDURE — 1159F MED LIST DOCD IN RCRD: CPT | Performed by: NURSE PRACTITIONER

## 2024-11-02 PROCEDURE — 99213 OFFICE O/P EST LOW 20 MIN: CPT | Performed by: NURSE PRACTITIONER

## 2024-11-02 RX ORDER — GUAIFENESIN AND DEXTROMETHORPHAN HYDROBROMIDE 600; 30 MG/1; MG/1
1 TABLET, EXTENDED RELEASE ORAL 2 TIMES DAILY PRN
Qty: 30 TABLET | Refills: 0 | Status: SHIPPED | OUTPATIENT
Start: 2024-11-02

## 2024-11-02 RX ORDER — DOXYCYCLINE 100 MG/1
100 CAPSULE ORAL 2 TIMES DAILY
Qty: 14 CAPSULE | Refills: 0 | Status: SHIPPED | OUTPATIENT
Start: 2024-11-02 | End: 2024-11-09

## 2024-11-02 NOTE — PROGRESS NOTES
CHIEF COMPLAINT  Chief Complaint   Patient presents with    Sinusitis    Cough         HPI  Lindsay Amezquita is a 39 y.o. female  presents with complaint of been sick with cough and sinus pressure, but for the last 2 days her sinuses have become very tender and painful and her cough is worse. She is coughing up chunky and green mucus. She thinks it is from the sinus drainage. She does have a dry cough that burns her chest too.   At home COVID-19 test is negative.     Review of Systems   Constitutional:  Positive for fatigue and fever (low grade). Negative for chills and diaphoresis.   HENT:  Positive for congestion, postnasal drip, sinus pressure, sinus pain and sore throat. Negative for rhinorrhea and sneezing.    Respiratory:  Positive for cough and chest tightness. Negative for shortness of breath and wheezing.    Gastrointestinal:  Negative for diarrhea, nausea and vomiting.   Musculoskeletal:  Negative for myalgias.   Neurological:  Positive for headaches.       Past Medical History:   Diagnosis Date    Allergic     Anxiety     Asthma Beings of copd with asthma    Back pain     Bell palsy 07/06/2021    Bell's palsy     Bipolar 2 disorder     Blood clot in vein     Behind left knee cap    COPD (chronic obstructive pulmonary disease) Six months ago    Deep vein thrombosis Last year    Depression     Diabetes mellitus November    Pre diabete    Frequent headaches     GERD (gastroesophageal reflux disease)     Hypertension     [facility]    Irritable bowel syndrome Two years ago    Kidney stone Two years ago    Migraine     Nausea, vomiting and diarrhea 09/17/2018    Obesity All of my life    Ovarian cyst Two years ago    Pancreatitis     Panic     PTSD (post-traumatic stress disorder)     Pulmonary embolism Not in lungs    Recurrent pregnancy loss, antepartum condition or complication Every since i have been 14 yars old    Restless leg syndrome     Sinus problem     Snores     Tattoos      Urinary tract infection Have them on and off    Varicella Little    Visual impairment Since i was little    Vitamin B12 deficiency     Wears glasses        Family History   Problem Relation Age of Onset    Irritable bowel syndrome Mother     Heart disease Mother     Miscarriages / Stillbirths Mother     Arthritis Mother     COPD Mother     Learning disabilities Mother     Mental illness Mother     Asthma Mother     Irritable bowel syndrome Father     Alcohol abuse Father     Diabetes Father     Hyperlipidemia Father     Anxiety disorder Father     Learning disabilities Father     Colon cancer Maternal Grandfather     Stroke Paternal Grandfather     Stroke Paternal Grandmother        Social History     Socioeconomic History    Marital status: Single   Tobacco Use    Smoking status: Every Day     Current packs/day: 0.50     Average packs/day: 2.0 packs/day for 32.4 years (63.5 ttl pk-yrs)     Types: Cigarettes     Start date: 7/17/1992     Passive exposure: Current    Smokeless tobacco: Never    Tobacco comments:      Around 2.5 packs per day- 7/5/24   Vaping Use    Vaping status: Former    Passive vaping exposure: Yes   Substance and Sexual Activity    Alcohol use: Not Currently     Comment: rarely    Drug use: Not Currently    Sexual activity: Not Currently     Partners: Female     Birth control/protection: Other, Partner of same sex         There were no vitals taken for this visit.    PHYSICAL EXAM  Virtual Visit Physical Exam    Results for orders placed or performed in visit on 10/25/24   POC Glucose    Collection Time: 10/25/24 11:17 AM    Specimen: Blood   Result Value Ref Range    Glucose 141 (A) 70 - 130 mg/dL     *Note: Due to a large number of results and/or encounters for the requested time period, some results have not been displayed. A complete set of results can be found in Results Review.       Diagnoses and all orders for this visit:    1. Acute maxillary sinusitis, recurrence not specified  (Primary)    2. Acute bronchitis, unspecified organism    Other orders  -     doxycycline (VIBRAMYCIN) 100 MG capsule; Take 1 capsule by mouth 2 (Two) Times a Day for 7 days.  Dispense: 14 capsule; Refill: 0  -     guaifenesin-dextromethorphan 600-30 mg (MUCINEX DM)  MG tablet sustained-release 12 hour; Take 1 tablet by mouth 2 (Two) Times a Day As Needed (cough and congestion).  Dispense: 30 tablet; Refill: 0        Mode of visit: Video   Myself and Lindsay Amezquita participated in this visit. The patient is located in [address] [place] [address] DR ISAIAS [address] [place] [address]. I am located in [place], [place]. [employer] and [employer] were utilized.   You have chosen to receive care through a telehealth visit.    You have chosen to receive care through a telehealth visit.   Does the patient consent to use a video/audio connection for your medical care today? Yes       Note Disclaimer: At [facility], we believe that sharing information builds trust and better   relationships. You are receiving this note because you recently visited [facility]. It is possible you   will see health information before a provider has talked with you about it. This kind of information can   be easy to misunderstand. To help you fully understand what it means for your health, we urge you to   discuss this note with your provider.    Jessie Garcia, CLAIR  11/02/2024  15:58 EDT

## 2024-11-02 NOTE — PATIENT INSTRUCTIONS
Drink plenty of water  Over the counter pain relievers okay   If symptoms do not improve in 3-5 days follow up with your primary care provider or urgent care  If symptoms worsen follow up with urgent care or the emergency room      Sinus Infection, Adult  A sinus infection is soreness and swelling (inflammation) of your sinuses. Sinuses are hollow spaces in the bones around your face. They are located:  Around your eyes.  In the middle of your forehead.  Behind your nose.  In your cheekbones.  Your sinuses and nasal passages are lined with a fluid called mucus. Mucus drains out of your sinuses. Swelling can trap mucus in your sinuses. This lets germs (bacteria, virus, or fungus) grow, which leads to infection. Most of the time, this condition is caused by a virus.  What are the causes?  Allergies.  Asthma.  Germs.  Things that block your nose or sinuses.  Growths in the nose (nasal polyps).  Chemicals or irritants in the air.  A fungus. This is rare.  What increases the risk?  Having a weak body defense system (immune system).  Doing a lot of swimming or diving.  Using nasal sprays too much.  Smoking.  What are the signs or symptoms?  The main symptoms of this condition are pain and a feeling of pressure around the sinuses. Other symptoms include:  Stuffy nose (congestion). This may make it hard to breathe through your nose.  Runny nose (drainage).  Soreness, swelling, and warmth in the sinuses.  A cough that may get worse at night.  Being unable to smell and taste.  Mucus that collects in the throat or the back of the nose (postnasal drip). This may cause a sore throat or bad breath.  Being very tired (fatigued).  A fever.  How is this diagnosed?  Your symptoms.  Your medical history.  A physical exam.  Tests to find out if your condition is short-term (acute) or long-term (chronic). Your doctor may:  Check your nose for growths (polyps).  Check your sinuses using a tool that has a light on one end  (endoscope).  Check for allergies or germs.  Do imaging tests, such as an MRI or CT scan.  How is this treated?  Treatment for this condition depends on the cause and whether it is short-term or long-term.  If caused by a virus, your symptoms should go away on their own within 10 days. You may be given medicines to relieve symptoms. They include:  Medicines that shrink swollen tissue in the nose.  A spray that treats swelling of the nostrils.  Rinses that help get rid of thick mucus in your nose (nasal saline washes).  Medicines that treat allergies (antihistamines).  Over-the-counter pain relievers.  If caused by bacteria, your doctor may wait to see if you will get better without treatment. You may be given antibiotic medicine if you have:  A very bad infection.  A weak body defense system.  If caused by growths in the nose, surgery may be needed.  Follow these instructions at home:  Medicines  Take, use, or apply over-the-counter and prescription medicines only as told by your doctor. These may include nasal sprays.  If you were prescribed an antibiotic medicine, take it as told by your doctor. Do not stop taking it even if you start to feel better.  Hydrate and humidify    Drink enough water to keep your pee (urine) pale yellow.  Use a cool mist humidifier to keep the humidity level in your home above 50%.  Breathe in steam for 10-15 minutes, 3-4 times a day, or as told by your doctor. You can do this in the bathroom while a hot shower is running.  Try not to spend time in cool or dry air.  Rest  Rest as much as you can.  Sleep with your head raised (elevated).  Make sure you get enough sleep each night.  General instructions    Put a warm, moist washcloth on your face 3-4 times a day, or as often as told by your doctor.  Use nasal saline washes as often as told by your doctor.  Wash your hands often with soap and water. If you cannot use soap and water, use hand .  Do not smoke. Avoid being around  people who are smoking (secondhand smoke).  Keep all follow-up visits.  Contact a doctor if:  You have a fever.  Your symptoms get worse.  Your symptoms do not get better within 10 days.  Get help right away if:  You have a very bad headache.  You cannot stop vomiting.  You have very bad pain or swelling around your face or eyes.  You have trouble seeing.  You feel confused.  Your neck is stiff.  You have trouble breathing.  These symptoms may be an emergency. Get help right away. Call 911.  Do not wait to see if the symptoms will go away.  Do not drive yourself to the hospital.  Summary  A sinus infection is swelling of your sinuses. Sinuses are hollow spaces in the bones around your face.  This condition is caused by tissues in your nose that become inflamed or swollen. This traps germs. These can lead to infection.  If you were prescribed an antibiotic medicine, take it as told by your doctor. Do not stop taking it even if you start to feel better.  Keep all follow-up visits.  This information is not intended to replace advice given to you by your health care provider. Make sure you discuss any questions you have with your health care provider.  Document Revised: 11/22/2022 Document Reviewed: 11/22/2022  [employer] Patient Education © 2024 [employer]    Acute Bronchitis, Adult    Acute bronchitis is when air tubes in the lungs (bronchi) suddenly get swollen. The condition can make it hard for you to breathe. In adults, acute bronchitis usually goes away within 2 weeks. A cough caused by bronchitis may last up to 3 weeks. Smoking, allergies, and asthma can make the condition worse.  What are the causes?  Germs that cause cold and flu (viruses). The most common cause of this condition is the virus that causes the common cold.  Bacteria.  Substances that bother (irritate) the lungs, including:  Smoke from cigarettes and other types of tobacco.  Dust and pollen.  Fumes from chemicals, gases, or burned fuel.  Indoor  or outdoor air pollution.  What increases the risk?  A weak body's defense system. This is also called the immune system.  Any condition that affects your lungs and breathing, such as asthma.  What are the signs or symptoms?  A cough.  Coughing up clear, yellow, or green mucus.  Making high-pitched whistling sounds when you breathe, most often when you breathe out (wheezing).  Runny or stuffy nose.  Having too much mucus in your lungs (chest congestion).  Shortness of breath.  Body aches.  A sore throat.  How is this treated?  Acute bronchitis may go away over time without treatment. Your doctor may tell you to:  Drink more fluids. This will help thin your mucus so it is easier to cough up.  Use a device that gets medicine into your lungs (inhaler).  Use a vaporizer or a humidifier. These are machines that add water to the air. This helps with coughing and poor breathing.  Take a medicine that thins mucus and helps clear it from your lungs.  Take a medicine that prevents or stops coughing.  It is not common to take an antibiotic medicine for this condition.  Follow these instructions at home:    Take over-the-counter and prescription medicines only as told by your doctor.  Use an inhaler, vaporizer, or humidifier as told by your doctor.  Take two teaspoons (10 mL) of honey at bedtime. This helps lessen your coughing at night.  Drink enough fluid to keep your pee (urine) pale yellow.  Do not smoke or use any products that contain nicotine or tobacco. If you need help quitting, ask your doctor.  Get a lot of rest.  Return to your normal activities when your doctor says that it is safe.  Keep all follow-up visits.  How is this prevented?    Wash your hands often with soap and water for at least 20 seconds. If you cannot use soap and water, use hand .  Avoid contact with people who have cold symptoms.  Try not to touch your mouth, nose, or eyes with your hands.  Avoid breathing in smoke or chemical fumes.  Make  sure to get the flu shot every year.  Contact a doctor if:  Your symptoms do not get better in 2 weeks.  You have trouble coughing up the mucus.  Your cough keeps you awake at night.  You have a fever.  Get help right away if:  You cough up blood.  You have chest pain.  You have very bad shortness of breath.  You faint or keep feeling like you are going to faint.  You have a very bad headache.  Your fever or chills get worse.  These symptoms may be an emergency. Get help right away. Call your local emergency services (911 in the [place]  Do not wait to see if the symptoms will go away.  Do not drive yourself to the hospital.  Summary  Acute bronchitis is when air tubes in the lungs (bronchi) suddenly get swollen. In adults, acute bronchitis usually goes away within 2 weeks.  Drink more fluids. This will help thin your mucus so it is easier to cough up.  Take over-the-counter and prescription medicines only as told by your doctor.  Contact a doctor if your symptoms do not improve after 2 weeks of treatment.  This information is not intended to replace advice given to you by your health care provider. Make sure you discuss any questions you have with your health care provider.  Document Revised: 04/20/2022 Document Reviewed: 04/20/2022  [employer] Patient Education © 2024 [employer]

## 2024-11-04 ENCOUNTER — TELEPHONE (OUTPATIENT)
Dept: OBSTETRICS AND GYNECOLOGY | Facility: CLINIC | Age: 39
End: 2024-11-04
Payer: MEDICAID

## 2024-11-04 ENCOUNTER — TELEPHONE (OUTPATIENT)
Dept: BEHAVIORAL HEALTH | Facility: CLINIC | Age: 39
End: 2024-11-04

## 2024-11-04 NOTE — TELEPHONE ENCOUNTER
"Patient transferred to me. Patient informed me she is scared about having a hysterectomy (due to the possibility of \"perishing on the table\"). Patient having a hard time with possibly having to move to [place], and scared she will not find good providers there. States she is needing to vent to someone, I encouraged her to contact her therapist and that it sounds like she is needing to talk through some of these issues.     I asked her if she is having any medication issues, she told me her Latuda and Amitriptyline are no longer working and she cannot sleep.     Patient denied any thoughts or plans of hurting herself or others. Encouraged patient to call her therapist if she is seeking someone talk through these problems with.  "

## 2024-11-05 RX ORDER — IBUPROFEN 800 MG/1
800 TABLET, FILM COATED ORAL EVERY 8 HOURS PRN
Qty: 30 TABLET | Refills: 5 | Status: SHIPPED | OUTPATIENT
Start: 2024-11-05

## 2024-11-06 ENCOUNTER — OFFICE VISIT (OUTPATIENT)
Dept: FAMILY MEDICINE CLINIC | Facility: CLINIC | Age: 39
End: 2024-11-06
Payer: MEDICAID

## 2024-11-06 VITALS
WEIGHT: 264.2 LBS | HEIGHT: 62 IN | SYSTOLIC BLOOD PRESSURE: 126 MMHG | TEMPERATURE: 97.5 F | RESPIRATION RATE: 20 BRPM | BODY MASS INDEX: 48.62 KG/M2 | HEART RATE: 112 BPM | OXYGEN SATURATION: 95 % | DIASTOLIC BLOOD PRESSURE: 78 MMHG

## 2024-11-06 DIAGNOSIS — F17.210 CIGARETTE NICOTINE DEPENDENCE WITHOUT COMPLICATION: ICD-10-CM

## 2024-11-06 DIAGNOSIS — G47.34 NOCTURNAL HYPOXIA: ICD-10-CM

## 2024-11-06 DIAGNOSIS — F41.1 GENERALIZED ANXIETY DISORDER: ICD-10-CM

## 2024-11-06 DIAGNOSIS — G47.33 OSA (OBSTRUCTIVE SLEEP APNEA): ICD-10-CM

## 2024-11-06 DIAGNOSIS — G89.4 CHRONIC PAIN SYNDROME: ICD-10-CM

## 2024-11-06 DIAGNOSIS — F41.9 ANXIETY DUE TO INVASIVE PROCEDURE: Primary | ICD-10-CM

## 2024-11-06 DIAGNOSIS — F31.62 BIPOLAR DISORDER, CURRENT EPISODE MIXED, MODERATE: ICD-10-CM

## 2024-11-06 DIAGNOSIS — K85.90 ACUTE PANCREATITIS WITHOUT INFECTION OR NECROSIS, UNSPECIFIED PANCREATITIS TYPE: ICD-10-CM

## 2024-11-06 NOTE — PROGRESS NOTES
"    Office Note     Name: Lindsay Amezquita  : 1985   MRN: [id number]     Chief Complaint:  Abdominal Pain (Pt is having a hard time breathing and sleeping is interrupted by breathing. She is having surgery in December for partial hysterectomy and she is very suicidal about this.  )    Subjective     History of Present Illness:  Lindsay Amezquita is a 39 y.o. female who presents today for ***    History of Present Illness         I have reviewed the following portions of the patient's history and these were updated and discussed with the patient as appropriate: past family history, past medical history, past social history, past surgical history, and problem list.     Objective     Vital Signs  /78   Pulse 112   Temp 97.5 °F (36.4 °C) (Temporal)   Resp 20   Ht 157.5 cm (62\")   Wt 120 kg (264 lb 3.2 oz)   SpO2 95%   BMI 48.32 kg/m²   Estimated body mass index is 48.32 kg/m² as calculated from the following:    Height as of this encounter: 157.5 cm (62\").    Weight as of this encounter: 120 kg (264 lb 3.2 oz).    Physical Exam       Assessment and Plan     There are no diagnoses linked to this encounter.  Assessment & Plan         {Class 3 Severe Obesity (BMI >=40). (Optional):23822}         Discussion Summary:     Discussed plan of care in detail with patient today. Patient was encouraged to keep me informed of any acute changes, lack of improvement, or any new concerning symptoms.  Patient is also aware of reasons to seek emergent care. Patient verbalized understanding and agrees with plan of care.    This visit was billed based on {LBBillingstatment:45788}    Follow Up  No follow-ups on file.    {[facility] [employer] Provider Statement:88658}    Please note that portions of this note may have been completed with a voice recognition program.     Hunter Winkler MD, MPH  [facility]  "

## 2024-11-06 NOTE — TELEPHONE ENCOUNTER
Follow-up (5-6 month follow up)     HPI:    Prema Hair is a 58 y.o. 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    She is accompanied by her daughter who provides history and has questions.  She is doing relatively well and remains active.  Dyspnea on exertion lower extremity edema is relatively stable.  We reviewed and discussed her prior echocardiogram.  No exacerbating or relieving factors.  Patient denies chest pain and angina.  Pt denies orthopnea, and paroxysmal nocturnal dyspnea.   Pt denies palpitations or syncope.  No recent falls.  No fever or chills.  No cough.  No change in bowel or bladder habits.  No sick contacts.  No recent travel.    12 point review of systems including (Constitutional, Eyes, ENMT, Respiratory, Cardiac, Gastrointestinal, Neurological, Psychiatric, and Hematologic) was performed and is otherwise negative.    Past medical history reviewed:   has a past medical history of CATALINA positive, Arthritis, Asthma, Bell's palsy, Bilateral leg edema, Bipolar disorder, CHF (congestive heart failure), Chronic allergic rhinitis, Chronic constipation, Diabetes mellitus (Multi), Diabetic neuropathy (Multi), Dysphonia (07/05/2022),  Normal, continue to monitor, should improve in 24-48 hours.    Fibromyalgia, GERD (gastroesophageal reflux disease), High pulmonary arterial pressure (Multi), Hyperlipidemia, unspecified (01/03/2023), Hypertension, Hypokalemia, Hypothyroidism, Low back pain, unspecified (02/22/2021), Moderate tricuspid regurgitation, Obstructive sleep apnea (adult) (pediatric) (01/03/2023), Paralysis of vocal cords and larynx, unspecified (10/07/2022), Parkinsonism (Multi), Pseudoaneurysm of carotid artery (2020), PTSD (post-traumatic stress disorder), Thyroid cancer (Multi), Torticollis, and Vitamin D deficiency.    Past surgical history reviewed:   has a past surgical history that includes Total thyroidectomy (2017); US guided thyroid biopsy (11/19/2020); US guided thyroid biopsy (11/19/2020); Other surgical history (Right, 2020); Colonoscopy; and Knee Arthroplasty.    Social history reviewed:   reports that she has never smoked. She has never used smokeless tobacco. She reports that she does not currently use alcohol. She reports that she does not use drugs.     Family history reviewed:    Family History   Problem Relation Name Age of Onset    Heart failure Mother      Pancreatic cancer Mother      Heart failure Father      Parkinsonism Father      Breast cancer Sister         Allergies reviewed: Lisinopril     Medications reviewed:   Current Outpatient Medications   Medication Instructions    acetaminophen (Tylenol) 500 mg tablet 1 tablet, Every 4 hours PRN    albuterol 90 mcg/actuation inhaler 2 puffs, Every 4 hours PRN    amitriptyline (Elavil) 10 mg tablet 1 tablet, Nightly    ammonium lactate (Lac-Hydrin) 12 % lotion 1 Application, As needed    ascorbic acid (Vitamin C) 1,000 mg tablet 1 tablet, Daily    atorvastatin (LIPITOR) 20 mg, oral, Nightly    azelastine (Astelin) 137 mcg (0.1 %) nasal spray 1 spray, 2 times daily    baclofen (LIORESAL) 20 mg, oral, 3 times daily    budesonide-formoteroL (Symbicort) 160-4.5 mcg/actuation inhaler 2 puffs, 2 times daily RT    bumetanide (BUMEX) 2  mg, 2 times daily    calcium carbonate (Oscal) 500 mg calcium (1,250 mg) tablet 1 tablet, 3 times daily    carbidopa-levodopa (Sinemet)  mg tablet 1.5 tablets, oral, 3 times daily, 8am, 12 noon and 4 pm    carvedilol (COREG) 25 mg, oral, 2 times daily    cephalexin (KEFLEX) 250 mg, oral, Daily    cholecalciferol (Vitamin D-3) 50 MCG (2000 UT) tablet 1 tablet, Daily    clonazePAM (KlonoPIN) 1 mg tablet 1 tablet, Every morning    cyclobenzaprine (FLEXERIL) 10 mg, oral, Nightly PRN    diclofenac sodium (Voltaren) 1 % gel gel 4.5 inches, 3 times daily PRN    docusate sodium (COLACE) 100 mg, 3 times daily    estradiol (Estrace) 0.01 % (0.1 mg/gram) vaginal cream 1 Application, 3 times weekly    formoterol (Perforomist) 20 mcg/2 mL nebulizer solution 2 mL, Every 12 hours    gabapentin (NEURONTIN) 300 mg, 3 times daily    Glucagon Emergency Kit (human) 1 mg, intramuscular, Every 15 min PRN    ipratropium-albuteroL (Duo-Neb) 0.5-2.5 mg/3 mL nebulizer solution 3 mL, Every 4 hours PRN    Klor-Con M20 20 mEq ER tablet 40 mEq, oral, 2 times daily    lancets misc 1 each, 3 times daily    levothyroxine (Synthroid, Levoxyl) 100 mcg tablet Take 100mcg on Mon Wed Fri and Sun - 1 hour before breakfast    levothyroxine (SYNTHROID, LEVOXYL) 100 mcg, Daily before breakfast    lidocaine (Lidoderm) 5 % patch 1 patch, transdermal, Every 12 hours, Apply 1 patch and leave in place for 12 hours, then remove and leave off for 12 hours.    losartan (COZAAR) 50 mg, oral, Daily    metFORMIN (Glucophage) 500 mg tablet 1 tablet, 2 times daily (morning and late afternoon)    minocycline 100 mg, oral, Nightly, No stop date - proph for recurrent skin infections    omega-3 fatty acids-fish oil 360-1,200 mg capsule 1 capsule, Daily    omeprazole (PriLOSEC) 40 mg DR capsule 1 capsule, Daily    OXcarbazepine (Trileptal) 600 mg tablet 1 tablet, Every morning    OXcarbazepine (Trileptal) 600 mg tablet 2 tablets, Nightly    oxyCODONE-acetaminophen  (Percocet) 5-325 mg tablet 0.5 tablets, oral, Every 6 hours PRN    polyethylene glycol (GLYCOLAX, MIRALAX) 17 g, Daily    prazosin (Minipress) 5 mg capsule 1 capsule, Nightly    QUEtiapine (SEROQUEL) 800 mg, Nightly    sertraline (Zoloft) 100 mg tablet Take 2 tablets (200 mg) by mouth once daily. Do not start before January 2, 2024.    tiotropium (Spiriva Respimat) 2.5 mcg/actuation inhaler 2 puffs, Daily RT    topiramate (Topamax) 200 mg tablet TAKE ONE TABLET (200 MG) BY MOUTH TWICE DAILY    Trelegy Ellipta 200-62.5-25 mcg blister with device 1 puff, Daily PRN        Vitals reviewed: Visit Vitals  /68   Pulse 74       Physical Exam:   General:  Patient is awake, alert, and oriented.  Patient is in no acute distress.  HEENT:  Pupils equal and reactive.  Normocephalic.  Moist mucosa.    Neck:  No thyromegaly.  10 cm Jugular Venous Pressure.  Cardiovascular:  Regular rate and rhythm.  Normal S1 and S2.  1/6 LARY.  Pulmonary:  Clear to auscultation bilaterally.  Abdomen:  Soft. Non-tender.   Non-distended.  Positive bowel sounds.  Lower Extremities:  2+ pedal pulses. 1+ LE edema.  Neurologic:  Cranial nerves intact.  No focal deficit.   Skin: Skin warm and dry, normal skin turgor.   Psychiatric: Normal affect.    Last Labs:  CBC -      Lab Results   Component Value Date    WBC 5.0 08/27/2024    HGB 11.2 (L) 08/27/2024    HCT 36.9 08/27/2024     08/27/2024        CMP-  Lab Results   Component Value Date    GLUCOSE 80 08/27/2024     08/27/2024    K 3.9 08/27/2024     08/27/2024    CO2 30 08/27/2024    ANIONGAP 15 08/27/2024    BUN 19 08/27/2024    CREATININE 0.72 08/27/2024    EGFR >90 08/27/2024    CALCIUM 8.6 08/27/2024    PHOS 4.0 09/14/2023    PROT 6.1 (L) 08/27/2024    ALBUMIN 3.8 08/27/2024    AST 17 08/27/2024    ALT 25 08/27/2024    ALKPHOS 117 (H) 08/27/2024    BILITOT 0.2 08/27/2024        LIPIDS-  Lab Results   Component Value Date    CHOL 267 (H) 03/26/2024    TRIG 158 (H) 03/26/2024     HDL 60.9 03/26/2024    CHHDL 4.4 03/26/2024    VLDL 32 03/26/2024        OTHERS-  Lab Results   Component Value Date    HGBA1C 5.9 (H) 08/27/2024    BNP 43 09/14/2023        I personally reviewed the patient's recent vitals, labs, medications, orders, EKGs, pertinent cardiac imaging/ echocardiography and ischemic evaluations including stress testing/ cardiac catheterization.    Assessment and Plan:    Prema Hair is a 58 y.o. 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  She is accompanied by her daughter who provides history and has questions.  She is doing relatively well and remains active.  Dyspnea on exertion lower extremity edema is relatively stable.  We reviewed and discussed her prior echocardiogram.      Please continue your current cardiac medication including bumetanide 2 mg tablet twice daily, aspirin 81 mg, atorvastatin 20 mg, carvedilol 25 mg twice daily, losartan 50 mg daily.    We will obtain a transthoracic echocardiogram for structural evaluation including ejection fraction, assessment of regional wall motion abnormalities or valvular disease, and further evaluation of hemodynamics prior to your follow-up visit.    Please followup  with me in Cardiology clinic within the next 9 months.  Please return to clinic sooner or seek emergent care if your symptoms reoccur or worsen.    Thank you for allowing me to participate in their care.  Please feel free to call me with any further questions or concerns.      Sanjiv Madison MD, [facility], [facility]  Division of Cardiovascular Medicine  System Director, Nuclear Cardiology   Medical Director, [facility] [place], [facility]   Clinical , [facility] [employer] [facility] [employer]  [email or web address]   Office:  [phone]

## 2024-11-08 NOTE — PROGRESS NOTES
"    [facility] Cardiology     Outpatient New Patient / Consult Note    Lindsay Amezquita  [id number]  10/23/2024    Referred By: Juan Ramon Chavez DO    Chief Complaint   Patient presents with    Follow-up    Chest Pain    Edema    lrft arm numbness    Shortness of Breath     Subjective     History of Present Illness:   Lindsay Amezquita is a 39 y.o. female with diabetes, hyperlipidemia, history of DVT, obesity, chronic pancreatitis and fatty liver who presents to the Cardiology Clinic for evaluation of chest pain.  Patient is accompanied by her mother today.  Patient says that she has had chronic left-sided chest pain for quite some time, at least months.  Says that she has gone to the ER multiple times for this and they \"never do anything for her.\"  Says her blood pressure is always in the 180s when she goes to the ER with the symptoms but does not check it at home because she does not have a blood pressure cuff.  She has no personal history of heart disease but her family history does does have a strong history of heart disease.  She says that the chest pain is central to left-sided and is more of a sharp stabbing sensation that radiates up into her shoulder and left arm.  It can be brought on with exertion but can also happen when she is at rest, going to sleep.  Symptoms are also accompanied by significant dyspnea as well as palpitations.  She does cigarettes daily but does not use any drugs.  No longer uses alcohol.  She does have some problems with her pancreas and has had multiple CT scans this past year.  She feels like that a lot of her symptoms are related to her pancreas and anxiety.      Past Cardiac Testin. Last Coronary Angio: NA  2. Last Echo: NA   3. Prior Stress Testing: NA  4. Prior Holter Monitor: NA    Review of Systems:  Review of Systems   Constitutional: Negative.    Respiratory:  Positive for chest tightness and shortness of breath. Negative for cough.    Cardiovascular:  Positive " for chest pain and palpitations. Negative for leg swelling.   Gastrointestinal: Negative.    Musculoskeletal: Negative.    Neurological: Negative.        Past Medical History:   Diagnosis Date    Allergic     Anxiety     Asthma Beings of copd with asthma    Back pain     Bell palsy 07/06/2021    Bell's palsy     Bipolar 2 disorder     Blood clot in vein     Behind left knee cap    COPD (chronic obstructive pulmonary disease) Six months ago    Deep vein thrombosis Last year    Depression     Diabetes mellitus November    Pre diabete    Frequent headaches     GERD (gastroesophageal reflux disease)     Hypertension     Every time i go into the emergacy room    Irritable bowel syndrome Two years ago    Kidney stone Two years ago    Migraine     Nausea, vomiting and diarrhea 09/17/2018    Obesity All of my life    Ovarian cyst Two years ago    Pancreatitis     Panic     PTSD (post-traumatic stress disorder)     Pulmonary embolism Not in lungs    Recurrent pregnancy loss, antepartum condition or complication Every since i have been 14 yars old    Restless leg syndrome     Sinus problem     Snores     Tattoos     Urinary tract infection Have them on and off    Varicella Little    Visual impairment Since i was little    Vitamin B12 deficiency     Wears glasses        Past Surgical History:   Procedure Laterality Date    CHOLECYSTECTOMY      HYSTEROSCOPY N/A 3/14/2024    Procedure: HYSTEROSCOPY WITH MYOSURE, DILATION AND CURETTAGE WITH CERENE ABLATION;  Surgeon: David Ribeiro MD;  Location: [facility];  Service: Obstetrics/Gynecology;  Laterality: N/A;    MULTIPLE TOOTH EXTRACTIONS      All my teeth    WISDOM TOOTH EXTRACTION  All my teeth       Family History   Problem Relation Age of Onset    Irritable bowel syndrome Mother     Heart disease Mother     Miscarriages / Stillbirths Mother     Arthritis Mother     COPD Mother     Learning disabilities Mother     Mental illness Mother     Asthma Mother     Irritable bowel  syndrome Father     Alcohol abuse Father     Diabetes Father     Hyperlipidemia Father     Anxiety disorder Father     Learning disabilities Father     Colon cancer Maternal Grandfather     Stroke Paternal Grandfather     Stroke Paternal Grandmother        Social History     Socioeconomic History    Marital status: Single   Tobacco Use    Smoking status: Every Day     Current packs/day: 0.50     Average packs/day: 2.0 packs/day for 32.4 years (63.5 ttl pk-yrs)     Types: Cigarettes     Start date: 7/17/1992     Passive exposure: Current    Smokeless tobacco: Never    Tobacco comments:      Around 2.5 packs per day- 7/5/24   Vaping Use    Vaping status: Former    Passive vaping exposure: Yes   Substance and Sexual Activity    Alcohol use: Not Currently     Comment: rarely    Drug use: Not Currently    Sexual activity: Not Currently     Partners: Female     Birth control/protection: Other, Partner of same sex         Current Outpatient Medications:     acetaminophen (TYLENOL) 500 MG tablet, Take 2 tablets by mouth Every 8 (Eight) Hours As Needed for Mild Pain., Disp: 60 tablet, Rfl: 10    Allegra-D Allergy & Congestion  MG per 12 hr tablet, , Disp: , Rfl:     amitriptyline (ELAVIL) 150 MG tablet, Take 1 tablet by mouth Every Night., Disp: 30 tablet, Rfl: 1    atorvastatin (LIPITOR) 40 MG tablet, Take 1 tablet by mouth every night at bedtime., Disp: 90 tablet, Rfl: 3    butalbital-acetaminophen-caffeine (FIORICET, ESGIC) -40 MG per tablet, Take 1 tablet by mouth 2 (Two) Times a Day As Needed for Headache., Disp: 20 tablet, Rfl: 0    butalbital-acetaminophen-caffeine (FIORICET, ESGIC) -40 MG per tablet, Take 1 tablet by mouth Every 6 (Six) Hours As Needed for Headache or Migraine., Disp: 30 tablet, Rfl: 0    Chlorhexidine Gluconate 4 % solution, , Disp: , Rfl:     desvenlafaxine (PRISTIQ) 100 MG 24 hr tablet, Take 1 tablet by mouth Daily., Disp: 30 tablet, Rfl: 1    diazePAM (VALIUM) 10 MG tablet, Take  1 tablet by mouth 3 (Three) Times a Day As Needed for Anxiety., Disp: 90 tablet, Rfl: 0    fluticasone (FLONASE) 50 MCG/ACT nasal spray, Administer 1 spray into the nostril(s) as directed by provider 2 (Two) Times a Day., Disp: 16 g, Rfl: 5    FREESTYLE LITE test strip, See Admin Instructions., Disp: , Rfl:     gabapentin (Neurontin) 800 MG tablet, Take 1 tablet by mouth 3 (Three) Times a Day., Disp: 90 tablet, Rfl: 0    guaifenesin-dextromethorphan 600-30 mg (MUCINEX DM)  MG tablet sustained-release 12 hour, Take 1 tablet by mouth 2 (Two) Times a Day As Needed (cough and congestion)., Disp: 30 tablet, Rfl: 0    ibuprofen (ADVIL,MOTRIN) 800 MG tablet, Take 1 tablet by mouth Every 8 (Eight) Hours As Needed for Moderate Pain for up to 30 doses., Disp: 30 tablet, Rfl: 5    ipratropium-albuterol (DUO-NEB) 0.5-2.5 mg/3 ml nebulizer, Take 3 mL by nebulization 4 (Four) Times a Day As Needed for Wheezing or Shortness of Air., Disp: 360 mL, Rfl: 5    lidocaine (LIDODERM) 5 %, Place 1 patch on the skin as directed by provider Daily. Remove & Discard patch within 12 hours or as directed by MD, Disp: 30 each, Rfl: 0    Lurasidone HCl (Latuda) 120 MG tablet tablet, Take 1 tablet by mouth Daily., Disp: 30 tablet, Rfl: 1    norethindrone (AYGESTIN) 5 MG tablet, Take 1 tablet by mouth 2 (Two) Times a Day., Disp: , Rfl:     OXcarbazepine (TRILEPTAL) 300 MG tablet, Take 1 tablet by mouth 2 (Two) Times a Day., Disp: 60 tablet, Rfl: 1    pantoprazole (Protonix) 40 MG EC tablet, Take 1 tablet by mouth Daily., Disp: 90 tablet, Rfl: 1    polyethylene glycol (MIRALAX) 17 g packet, Take 17 g by mouth Daily. (Patient taking differently: Take 17 g by mouth As Needed.), Disp: 30 each, Rfl: 1    Rimegepant Sulfate (Nurtec) 75 MG tablet dispersible tablet, Take 1 tablet by mouth Every Other Day. Take Monday,Wednesday,Friday, and Saturday., Disp: 16 tablet, Rfl: 5    tiZANidine (ZANAFLEX) 4 MG tablet, TAKE 1 TABLET BY MOUTH EVERY 8 HOURS AS  "NEEDED FOR MUSCLE SPASMS., Disp: 270 tablet, Rfl: 0    Trelegy Ellipta 200-62.5-25 MCG/ACT inhaler, Inhale 1 puff Daily. Rinse mouth with water after use, Disp: 60 each, Rfl: 5    Zavegepant HCl (Zavzpret) 10 MG/ACT solution, Administer 10 mg into the nostril(s) as directed by provider As Needed (HA)., Disp: 1 each, Rfl: 0    Zavegepant HCl 10 MG/ACT solution, Administer 10 mg into the nostril(s) as directed by provider Daily As Needed (migraine). (1 spray into 1 nostril every 24 hrs prn), Disp: 6 each, Rfl: 5    Allergies   Allergen Reactions    Aspirin Anaphylaxis and Hives     Wheezing         Codeine Anaphylaxis     Tolerates oxycodone-acetaminophen with no allergic rxn    Cyclobenzaprine Other (See Comments)     \"gives me chest pain\"    Other reaction(s): Other (See Comments)    Haldol [Haloperidol] Rash    Imitrex [Sumatriptan] Anaphylaxis and Rash    Latex Hives and Rash    Penicillins Hives and Anaphylaxis     Vomiting    Metformin Nausea And Vomiting    Zofran [Ondansetron] GI Intolerance     Constipation    Lamictal [Lamotrigine] Itching     Itching and hives    Metoclopramide Headache, Hives and Other (See Comments)    Morphine Itching    Oxycontin [Oxycodone] GI Intolerance    Prochlorperazine Nausea And Vomiting and Unknown (See Comments)    Tramadol Hives and Nausea And Vomiting          Objective     Vitals:  Vitals:    11/12/24 1421   BP: 142/86   BP Location: Right arm   Patient Position: Sitting   Pulse: 100   SpO2: 97%   Weight: 125 kg (275 lb)   Height: 157.5 cm (62.01\")       Physical Exam:  Vitals reviewed.   Constitutional:       Appearance: Healthy appearance. Not in distress.   Neck:      Vascular: No JVD.   Pulmonary:      Effort: Pulmonary effort is normal.      Breath sounds: Normal breath sounds.   Cardiovascular:      Normal rate. Regular rhythm. Normal S1. Normal S2.       Murmurs: There is no murmur.      No gallop.  No click. No rub.   Pulses:     Intact distal pulses.   Edema:     " Pretibial: bilateral trace edema of the pretibial area.     Ankle: bilateral trace edema of the ankle.  Abdominal:      General: There is no distension.      Palpations: Abdomen is soft.      Tenderness: There is no abdominal tenderness.   Skin:     General: Skin is warm and dry.         Results Review:   I reviewed the patient's new clinical results.  I reviewed the patient's new imaging results and agree with the interpretation.  I reviewed the patient's other test results and agree with the interpretation  I personally viewed and interpreted the patient's EKG/Telemetry data    CBC with Auto Diff (10/22/2024 05:19)  COMPREHENSIVE METABOLIC PANEL (10/22/2024 05:19)  MAGNESIUM (10/22/2024 05:19)  PROBNP (10/22/2024 05:19)  High Sensitivity Troponin I (10/22/2024 05:19)  SARS-COV2/RT-PCR (10/22/2024 05:19)  Hemoglobin A1c (10/18/2024 16:14)  Urine Drug Screen - Urine, Clean Catch (09/22/2024 18:20)  Lipid Panel (09/17/2024 07:20)  TSH Rfx On Abnormal To Free T4 (03/27/2024 12:47)   High Sensitivity Troponin T (09/15/2024 11:31)       ECG 12 Lead    Date/Time: 11/12/2024 2:58 PM  Performed by: Sidney Brooks MD    Authorized by: Sidney Brooks MD  Comparison: compared with previous ECG from 10/21/2024  Similar to previous ECG  Rhythm: sinus tachycardia  Rate: tachycardic  BPM: 100  Conduction: incomplete right bundle branch block  ST Segments: ST segments normal  T Waves: T waves normal  QRS axis: normal  Other: no other findings  Clinical impression comment: Borderline ECG             Assessment & Plan   Diagnoses and all orders for this visit:    1. Precordial pain (Primary)  2. Elevated blood pressure reading without diagnosis of hypertension  3. Dyspnea on exertion  4. Palpitations  Some of her chest symptoms do have typical anginal components.  At times associated with high blood pressure readings.  Unclear which comes first, however.  She definitely has risk factors for heart disease including smoking,  "diabetes, hyperlipidemia and questionable hypertension.  Baseline ECG is relatively benign with only an incomplete right bundle branch block and underlying sinus tachycardia.  No evidence of old MI or acute ischemia.  Exam is relatively unremarkable with no signs of heart failure.  She does have some trace pitting edema of lower extremities which is normal for her.  I do feel that there is probably an overlap in some of her symptoms with her chronic pancreatitis and known cysts.  Personally reviewed her CT chest from June of last year and there does not appear to be heavy coronary calcification.  Recent blood work was relatively normal other than for mild hypomagnesemia.  Troponins have been normal on multiple occasions.  -- Will proceed with a GXT and echo for further risk stratification  -- 1 week monitor to screen for arrhythmias  -- Advised that she keep a blood pressure log.  Suspect she will need something for blood pressure.  Would probably opt for a diuretic in her case.    5. History of DVT (deep vein thrombosis)  History of unprovoked LLE DVT some years ago.  Was on Eliquis for about 2 years and then this was discontinued.  Current exam does not suggest any new DVT.  She actually recently had her right leg scanned because it \"felt like she had another clot\" but that was negative.  -- No indication for anticoagulation at this time                  Plan   Orders Placed This Encounter   Procedures    Holter Monitor - 72 Hour Up To 15 Days     7 Number of Days     Standing Status:   Future     Number of Occurrences:   1     Standing Expiration Date:   11/12/2025     Scheduling Instructions:      7 Number of Days     Order Specific Question:   Reason for exam?     Answer:   Palpitations     Order Specific Question:   Release to patient     Answer:   Routine Release [[id number]]    Treadmill Stress Test     Standing Status:   Future     Standing Expiration Date:   11/12/2025     Order Specific Question:   Reason " for exam?     Answer:   Chest Pain     Order Specific Question:   Chest pain specification?     Answer:   Atypical     Order Specific Question:   Release to patient     Answer:   Routine Release [[id number]]    ECG 12 Lead     This order was created via procedure documentation     Order Specific Question:   Release to patient     Answer:   Routine Release [[id number]]    Adult Transthoracic Echo Complete W/ Cont if Necessary Per Protocol     Standing Status:   Future     Standing Expiration Date:   11/12/2025     Order Specific Question:   Reason for exam?     Answer:   Chest Pain     Order Specific Question:   Chest pain specification?     Answer:   Acute Chest Pain     Order Specific Question:   Release to patient     Answer:   Routine Release [[id number]]     Medications:    Preventative Cardiology:   Tobacco Cessation:  Deferred  Obstructive Sleep Apnea Screening: Deffered  AAA Screening: Deffered  Peripheral Arterial Disease Screening: Deffered        Follow Up:   Return in about 6 weeks (around 12/24/2024).    Thank you for allowing me to participate in the care of your patient. Please to not hesitate to contact me with additional questions or concerns.    Sidney Brooks MD  11/12/2024   13:21 EDT

## 2024-11-11 ENCOUNTER — PREP FOR SURGERY (OUTPATIENT)
Dept: OTHER | Facility: HOSPITAL | Age: 39
End: 2024-11-11
Payer: MEDICAID

## 2024-11-11 DIAGNOSIS — N93.9 ABNORMAL UTERINE BLEEDING (AUB): Primary | ICD-10-CM

## 2024-11-11 DIAGNOSIS — G89.29 CHRONIC PELVIC PAIN IN FEMALE: ICD-10-CM

## 2024-11-11 DIAGNOSIS — R10.2 CHRONIC PELVIC PAIN IN FEMALE: ICD-10-CM

## 2024-11-11 RX ORDER — PHENAZOPYRIDINE HYDROCHLORIDE 100 MG/1
200 TABLET, FILM COATED ORAL ONCE
OUTPATIENT
Start: 2024-11-11 | End: 2024-11-11

## 2024-11-11 RX ORDER — SODIUM CHLORIDE 0.9 % (FLUSH) 0.9 %
10 SYRINGE (ML) INJECTION AS NEEDED
OUTPATIENT
Start: 2024-11-11

## 2024-11-11 RX ORDER — SODIUM CHLORIDE 0.9 % (FLUSH) 0.9 %
10 SYRINGE (ML) INJECTION EVERY 12 HOURS SCHEDULED
OUTPATIENT
Start: 2024-11-11

## 2024-11-11 RX ORDER — SODIUM CHLORIDE 9 MG/ML
40 INJECTION, SOLUTION INTRAVENOUS AS NEEDED
OUTPATIENT
Start: 2024-11-11

## 2024-11-11 RX ORDER — CLINDAMYCIN PHOSPHATE 900 MG/50ML
900 INJECTION, SOLUTION INTRAVENOUS ONCE
OUTPATIENT
Start: 2024-11-11 | End: 2024-11-11

## 2024-11-12 ENCOUNTER — OFFICE VISIT (OUTPATIENT)
Dept: CARDIOLOGY | Facility: CLINIC | Age: 39
End: 2024-11-12
Payer: MEDICAID

## 2024-11-12 ENCOUNTER — TELEPHONE (OUTPATIENT)
Dept: FAMILY MEDICINE CLINIC | Facility: CLINIC | Age: 39
End: 2024-11-12

## 2024-11-12 VITALS
WEIGHT: 275 LBS | HEIGHT: 62 IN | DIASTOLIC BLOOD PRESSURE: 86 MMHG | HEART RATE: 100 BPM | OXYGEN SATURATION: 97 % | SYSTOLIC BLOOD PRESSURE: 142 MMHG | BODY MASS INDEX: 50.61 KG/M2

## 2024-11-12 DIAGNOSIS — R03.0 ELEVATED BLOOD PRESSURE READING WITHOUT DIAGNOSIS OF HYPERTENSION: ICD-10-CM

## 2024-11-12 DIAGNOSIS — R07.2 PRECORDIAL PAIN: Primary | ICD-10-CM

## 2024-11-12 DIAGNOSIS — R06.09 DYSPNEA ON EXERTION: ICD-10-CM

## 2024-11-12 DIAGNOSIS — Z86.718 HISTORY OF DVT (DEEP VEIN THROMBOSIS): Chronic | ICD-10-CM

## 2024-11-12 DIAGNOSIS — R00.2 PALPITATIONS: ICD-10-CM

## 2024-11-12 PROCEDURE — 99204 OFFICE O/P NEW MOD 45 MIN: CPT | Performed by: INTERNAL MEDICINE

## 2024-11-12 PROCEDURE — 93000 ELECTROCARDIOGRAM COMPLETE: CPT | Performed by: INTERNAL MEDICINE

## 2024-11-12 NOTE — PROGRESS NOTES
GYN Office Visit    Subjective   Chief Complaint   Patient presents with    Consult     Patient is requesting a TL.      Lindsay Amezquita is a 39 y.o. year old  presenting to be seen for ongoing pain and bleeding.    She reports longstanding issues with heavy vaginal bleeding and pelvic pain.  She passes large clots and has significant pain with menses.  She does soil clothing and bed sheets with bleeding.  Bleeding is irregular and can be prolonged each month. Her bleeding and pain symptoms are debilitating and affect her ability to participate in activities of daily life. She strongly desires hysterectomy after undergoing a Cerene ablation in March of this year.    History of DVT roughly 5 years ago requiring Eliquis x 1 year.  Clotting workup was normal.  No current anticoagulation.    History of COPD and type 2 diabetes.  Most recent hemoglobin A1c was 7% earlier this month.    OB Hx:  at 31 weeks, SAB x 2  Pap smear: Neg cotesting     Past Medical History:   Diagnosis Date    Allergic     Anxiety     Asthma Beings of copd with asthma    Back pain     Bell palsy 2021    Bell's palsy     Bipolar 2 disorder     Blood clot in vein     Behind left knee cap    COPD (chronic obstructive pulmonary disease) Six months ago    Deep vein thrombosis Last year    Depression     Diabetes mellitus November    Pre diabete    Frequent headaches     GERD (gastroesophageal reflux disease)     Hypertension     Every time i go into the [facility] room    Irritable bowel syndrome Two years ago    Kidney stone Two years ago    Migraine     Nausea, vomiting and diarrhea 2018    Obesity All of my life    Ovarian cyst Two years ago    Pancreatitis     Panic     PTSD (post-traumatic stress disorder)     Pulmonary embolism Not in lungs    Recurrent pregnancy loss, antepartum condition or complication Every since i have been 14 yars old    Restless leg syndrome     Sinus problem     Snores     Tattoos     Urinary tract  infection Have them on and off    Varicella Little    Visual impairment Since i was little    Vitamin B12 deficiency     Wears glasses        Past Surgical History:   Procedure Laterality Date    CHOLECYSTECTOMY      HYSTEROSCOPY N/A 3/14/2024    Procedure: HYSTEROSCOPY WITH MYOSURE, DILATION AND CURETTAGE WITH CERENE ABLATION;  Surgeon: David Ribeiro MD;  Location: [facility];  Service: Obstetrics/Gynecology;  Laterality: N/A;    MULTIPLE TOOTH EXTRACTIONS      All my teeth    WISDOM TOOTH EXTRACTION  All my teeth       Family History   Problem Relation Age of Onset    Irritable bowel syndrome Mother     Heart disease Mother     Miscarriages / Stillbirths Mother     Arthritis Mother     COPD Mother     Learning disabilities Mother     Mental illness Mother     Asthma Mother     Irritable bowel syndrome Father     Alcohol abuse Father     Diabetes Father     Hyperlipidemia Father     Anxiety disorder Father     Learning disabilities Father     Colon cancer Maternal Grandfather     Stroke Paternal Grandfather     Stroke Paternal Grandmother         Social History     Tobacco Use    Smoking status: Every Day     Current packs/day: 0.50     Average packs/day: 2.0 packs/day for 32.4 years (63.5 ttl pk-yrs)     Types: Cigarettes     Start date: 7/17/1992     Passive exposure: Current    Smokeless tobacco: Never    Tobacco comments:      Around 2.5 packs per day- 7/5/24   Vaping Use    Vaping status: Former    Passive vaping exposure: Yes   Substance Use Topics    Alcohol use: Not Currently     Comment: rarely    Drug use: Not Currently       (Not in a hospital admission)      Aspirin, Codeine, Cyclobenzaprine, Haldol [haloperidol], Imitrex [sumatriptan], Latex, Penicillins, Metformin, Zofran [ondansetron], Lamictal [lamotrigine], Metoclopramide, Morphine, Oxycontin [oxycodone], Prochlorperazine, and Tramadol    Current Outpatient Medications on File Prior to Visit   Medication Sig Dispense Refill    acetaminophen  (TYLENOL) 500 MG tablet Take 2 tablets by mouth Every 8 (Eight) Hours As Needed for Mild Pain. 60 tablet 10    Allegra-D Allergy & Congestion  MG per 12 hr tablet       amitriptyline (ELAVIL) 150 MG tablet Take 1 tablet by mouth Every Night. 30 tablet 1    atorvastatin (LIPITOR) 40 MG tablet Take 1 tablet by mouth every night at bedtime. 90 tablet 3    butalbital-acetaminophen-caffeine (FIORICET, ESGIC) -40 MG per tablet Take 1 tablet by mouth 2 (Two) Times a Day As Needed for Headache. 20 tablet 0    butalbital-acetaminophen-caffeine (FIORICET, ESGIC) -40 MG per tablet Take 1 tablet by mouth Every 6 (Six) Hours As Needed for Headache or Migraine. 30 tablet 0    Chlorhexidine Gluconate 4 % solution       desvenlafaxine (PRISTIQ) 100 MG 24 hr tablet Take 1 tablet by mouth Daily. 30 tablet 1    diazePAM (VALIUM) 10 MG tablet Take 1 tablet by mouth 3 (Three) Times a Day As Needed for Anxiety. 90 tablet 0    fluticasone (FLONASE) 50 MCG/ACT nasal spray Administer 1 spray into the nostril(s) as directed by provider 2 (Two) Times a Day. 16 g 5    FREESTYLE LITE test strip See Admin Instructions.      ipratropium-albuterol (DUO-NEB) 0.5-2.5 mg/3 ml nebulizer Take 3 mL by nebulization 4 (Four) Times a Day As Needed for Wheezing or Shortness of Air. 360 mL 5    lidocaine (LIDODERM) 5 % Place 1 patch on the skin as directed by provider Daily. Remove & Discard patch within 12 hours or as directed by MD 30 each 0    Lurasidone HCl (Latuda) 120 MG tablet tablet Take 1 tablet by mouth Daily. 30 tablet 1    norethindrone (AYGESTIN) 5 MG tablet Take 1 tablet by mouth 2 (Two) Times a Day.      OXcarbazepine (TRILEPTAL) 300 MG tablet Take 1 tablet by mouth 2 (Two) Times a Day. 60 tablet 1    polyethylene glycol (MIRALAX) 17 g packet Take 17 g by mouth Daily. (Patient taking differently: Take 17 g by mouth As Needed.) 30 each 1    Rimegepant Sulfate (Nurtec) 75 MG tablet dispersible tablet Take 1 tablet by mouth  "Every Other Day. Take Monday,Wednesday,Friday, and Saturday. 16 tablet 5    Trelegy Ellipta 200-62.5-25 MCG/ACT inhaler Inhale 1 puff Daily. Rinse mouth with water after use 60 each 5    Zavegepant HCl 10 MG/ACT solution Administer 10 mg into the nostril(s) as directed by provider Daily As Needed (migraine). (1 spray into 1 nostril every 24 hrs prn) 6 each 5     No current facility-administered medications on file prior to visit.       Social History    Tobacco Use      Smoking status: Every Day        Packs/day: 0.50        Years: 2.0 packs/day for 32.4 years (63.5 ttl pk-yrs)        Types: Cigarettes        Start date: 7/17/1992        Passive exposure: Current      Smokeless tobacco: Never      Tobacco comments:  Around 2.5 packs per day- 7/5/24         Objective   /80   Ht 157.5 cm (62\")   Wt 121 kg (266 lb)   BMI 48.65 kg/m²     Physical Exam:  General Appearance: alert, pleasant, appears stated age, interactive and cooperative         Medical Decision Making:    Assessment   Abnormal uterine bleeding  Menorrhagia with irregular cycle  Dysmenorrhea  Chronic pelvic pain  Previous endometrial ablation with Cerene     Plan    No orders of the defined types were placed in this encounter.      Medication Management: None    Procedures Performed: None    We reviewed her situation in detail today.  We discussed her pain and bleeding symptoms at length.  Her pain and bleeding symptoms are debilitating and affect her ability to participate in activities of daily life.  She desires definitive surgical management.  Plan for total laparoscopic hysterectomy, bilateral salpingectomy and cystoscopy. We discussed the importance of ovarian preservation given her age. We discussed the potential need for conversion to an open procedure as well as the potential for a supracervical hysterectomy.  Risks for the procedure were reviewed in detail today.  All questions answered.    David Ribeiro MD  Obstetrics and " Gynecology  [facility]

## 2024-11-12 NOTE — TELEPHONE ENCOUNTER
Caller: Lindsay Amezquita    Relationship: Self    Best call back number: [phone]    What medication are you requesting: BLOOD PRESSURE MEDICATION WITH WATER PILL    What are your current symptoms: HIGH BLOOD PRESSURE WITH WATER CONTAINING    If a prescription is needed, what is your preferred pharmacy and phone number:  [id number] [place], [place] - [address] AT [facility] [address] - [phone]  - [phone] FX     Additional notes: PATIENT STATES THAT THEY WERE JUST DIAGNOSED BY THEIR CARDIOLOGIST WITH HIGH BLOOD PRESSURE WITH WATER CONTAINING    THEIR CARDIOLOGIST SUGGESTED REACHING OUT TO THEIR PCP AND NOTIFYING THEM OF THIS TO SEE IF ANY MEDICATION COULD BE PRESCRIBED    PLEASE ADVISE

## 2024-11-13 ENCOUNTER — TELEPHONE (OUTPATIENT)
Dept: FAMILY MEDICINE CLINIC | Facility: CLINIC | Age: 39
End: 2024-11-13
Payer: MEDICAID

## 2024-11-13 NOTE — TELEPHONE ENCOUNTER
PATIENT SAW THE  CARDIOLOGIST YESTERDAY. THEY WANT HER TO BE ON LASIX AND SHE SAID THEY NEED US TO PRESCRIBE IT.

## 2024-11-14 ENCOUNTER — TELEPHONE (OUTPATIENT)
Dept: CARDIOLOGY | Facility: CLINIC | Age: 39
End: 2024-11-14
Payer: MEDICAID

## 2024-11-14 DIAGNOSIS — I10 ESSENTIAL HYPERTENSION: Primary | ICD-10-CM

## 2024-11-14 DIAGNOSIS — M54.16 LUMBAR RADICULOPATHY: ICD-10-CM

## 2024-11-14 PROBLEM — F41.9 ANXIETY DUE TO INVASIVE PROCEDURE: Status: ACTIVE | Noted: 2024-11-14

## 2024-11-14 PROBLEM — G47.33 OSA (OBSTRUCTIVE SLEEP APNEA): Status: ACTIVE | Noted: 2024-11-14

## 2024-11-14 RX ORDER — SPIRONOLACTONE 25 MG/1
25 TABLET ORAL DAILY
Qty: 30 TABLET | Refills: 11 | Status: SHIPPED | OUTPATIENT
Start: 2024-11-14

## 2024-11-14 NOTE — TELEPHONE ENCOUNTER
Spoke with patient and she has advised her swelling has increased x 2 days and would like to know if she can be placed on Lasix for this issue. She also advised she does not have a blood pressure cuff at home to keep a record of her readings. Advised patient to call the number in the lid of her box to have different patches mailed to her.

## 2024-11-14 NOTE — TELEPHONE ENCOUNTER
I CALLED PATIENT BACK AND GAVE HER THE INFO THAT DR. PULIDO STATED. I REMINDED HER TO TAKE NOTES AT [facility], ASK QUESTIONS IF NOT CLEAR, ETC. ADVISED THAT SHE KEEP LOG OF BP READINGS AND TO TAKE IT TO HER APPT WITH THE CARDIOLOGIST AND ALSO NEXT SATISH WITH US IF SHE IS HAVING HIGH READINGS ETC. SHE UNDERSTOOD.     *I ACTUALLY ASKED HER IN THE ORIGINAL CALL A COUPLE TIMES ABOUT THE OTHER DOCTOR NOT WANTING TO PRESCRIBE THE LASIX AND THE FACT THAT THEY WANT ANOTHER PROVIDER TO PRESCRIBE IT. I THOUGHT THAT DIDN'T SOUND CORRECT*

## 2024-11-14 NOTE — TELEPHONE ENCOUNTER
I spoke with pt and advised her that we will send in the Spironolactone 25mg to her pharmacy and that she will need to have lab work done in 2 weeks. I also advised her if she has any questions to contact our office. Pt understood verbal understanding.

## 2024-11-14 NOTE — PROGRESS NOTES
"    Office Note     Name: Lindsay Amezquita  : 1985   MRN: [id number]     Chief Complaint:  Abdominal Pain (Pt is having a hard time breathing and sleeping is interrupted by breathing. She is having surgery in December for partial hysterectomy and she is very suicidal about this.  )    Subjective     History of Present Illness:  Lindsay Amezquita is a 39 y.o. female who presents today for multiple medical problems.    Anxiety/suicidal ideation -patient has upcoming surgery in December for hysterectomy.  She is very nervous about this because she will have general anesthesia and states that she is \"very suicidal regarding this\".  Currently tearful when talking about this.  Previous Gyne surgery was under conscious sedation.  However, patient has had surgery in the past including cholecystectomy in which she was under general.  Patient does follow with behavioral health but has not been able to get in for urgent appointment.  She states that she did not find suicide hotline helpful.    SOB/Insomnia -patient is having a hard time breathing and sleeping.  She states that her sleeping is interrupted by her breathing. She has referral to sleep medicine for suspected PING and nocturnal hypoxemia.    Abdominal Pain/Chronic pain -patient has chronic pain and most recently had bout of pancreatitis.  She states that she continues to have abdominal pain after she eats.  She is following up with pain management at [facility] but wanted to know about reestablishing with pain management locally.  Previously seen with [facility] pain management but reportedly she self dismissed and she has called and they states she is eligible for a review to reestablish.    I have reviewed the following portions of the patient's history and these were updated and discussed with the patient as appropriate: past family history, past medical history, past social history, past surgical history, and problem list.     Objective     Vital Signs  /78   " "Pulse 112   Temp 97.5 °F (36.4 °C) (Temporal)   Resp 20   Ht 157.5 cm (62\")   Wt 120 kg (264 lb 3.2 oz)   SpO2 95%   BMI 48.32 kg/m²   Estimated body mass index is 48.32 kg/m² as calculated from the following:    Height as of this encounter: 157.5 cm (62\").    Weight as of this encounter: 120 kg (264 lb 3.2 oz).    Physical Exam  Constitutional:       General: She is in acute distress.      Appearance: Normal appearance. She is obese. She is not ill-appearing, toxic-appearing or diaphoretic.   HENT:      Head: Normocephalic and atraumatic.      Right Ear: External ear normal.      Left Ear: External ear normal.      Nose: Nose normal.   Eyes:      Extraocular Movements: Extraocular movements intact.      Conjunctiva/sclera: Conjunctivae normal.   Pulmonary:      Effort: Pulmonary effort is normal.   Musculoskeletal:      Cervical back: Normal range of motion.   Neurological:      General: No focal deficit present.      Mental Status: She is alert and oriented to person, place, and time.   Psychiatric:         Attention and Perception: Attention normal.         Mood and Affect: Mood is anxious. Affect is labile and tearful.         Speech: Speech normal.         Behavior: Behavior normal. Behavior is cooperative.         Thought Content: Thought content normal.         Judgment: Judgment normal.            Assessment and Plan     Diagnoses and all orders for this visit:    1. Anxiety due to invasive procedure (Primary)    2. Generalized anxiety disorder    3. Bipolar disorder, current episode mixed, moderate    4. Cigarette nicotine dependence without complication    5. Chronic pain syndrome    6. Acute pancreatitis without infection or necrosis, unspecified pancreatitis type    7. PING (obstructive sleep apnea)    8. Nocturnal hypoxia    Long discussion with patient regarding anxiety \"suicidal ideation\".  Patient does later report that she does not actually want to hurt or kill herself.  She has significant " anxiety regarding upcoming procedure.  Discussed that patient needed to talk to her OB/GYN who will be doing the surgery to get more specifics on the procedure and have him answer any questions that she would have.  Reassured patient that she has undergone general anesthesia before with her cholecystectomy and that she did very well with that.  Her main concern is that her mother will not be with her through the surgery.  Discussed that with anesthesia she would be going under prior to going into the OR and she would see her mother prior to the surgery and also after recovery.  No changes to anxiety or depression meds at this time.  We did discuss utilizing suicide hotline if patient does feel suicidal.  Advised patient to follow-up with behavioral health as well  Long discussion regarding patient's expectation for her chronic pain especially in light of her recent pancreatitis.  We also discussed utilizing not on medication methodologies to prevent pain.  Patient's recent abdominal pain came after eating fast food at [employer].  Discussed staying away from fatty foods and greasy foods that may exacerbate abdominal pain  Advised patient to follow-up with current pain management clinic in [place]  Advised patient that she needed to follow-up with gastro  Patient advised to follow-up with sleep medicine regarding PING and nocturnal hypoxia  Smoking cessation advised      Discussion Summary:     Discussed plan of care in detail with patient today. Patient was encouraged to keep me informed of any acute changes, lack of improvement, or any new concerning symptoms.  Patient is also aware of reasons to seek emergent care. Patient verbalized understanding and agrees with plan of care.    This visit was billed based on time.  I spent 40 minutes caring for Lindsay Amezquita on this date of service. This time includes time spent by me in the following activities:preparing for the visit, reviewing tests, obtaining and/or  reviewing a separately obtained history, performing a medically appropriate examination and/or evaluation , counseling and educating the patient/family/caregiver, ordering medications, tests, or procedures, referring and communicating with other health care professionals , documenting information in the medical record, independently interpreting results and communicating that information with the patient/family/caregiver, and care coordination.    Follow Up  No follow-ups on file.      Please note that portions of this note may have been completed with a voice recognition program.     Hunter Winkler MD, MPH  [facility] [place] [facility] [place]

## 2024-11-14 NOTE — TELEPHONE ENCOUNTER
Caller: Lindsay Amezquita    Relationship: Self    Best call back number: [phone]     What is the best time to reach you: ANYTIME    Who are you requesting to speak with (clinical staff, provider,  specific staff member): PROVIDER    Do you know the name of the person who called: NA    What was the call regarding: THE ADHESIVE FROM [facility] IS IRRITATING PT'S SKIN EVEN WITH SENSITIVE PATCHES. PT EXPRESSES CONCERNS WITH SWELLING IN LOWER EXTREMITIES AND WANTS TO KNOW IF DR. BRAUN WANTS TO PUT HER ON LASIX. PLEASE ADVISE.     Is it okay if the provider responds through MyChart: NO

## 2024-11-15 ENCOUNTER — OFFICE VISIT (OUTPATIENT)
Dept: OBSTETRICS AND GYNECOLOGY | Facility: CLINIC | Age: 39
End: 2024-11-15
Payer: MEDICAID

## 2024-11-15 VITALS
SYSTOLIC BLOOD PRESSURE: 130 MMHG | BODY MASS INDEX: 49.87 KG/M2 | DIASTOLIC BLOOD PRESSURE: 74 MMHG | HEIGHT: 62 IN | WEIGHT: 271 LBS

## 2024-11-15 DIAGNOSIS — N93.9 ABNORMAL UTERINE BLEEDING (AUB): ICD-10-CM

## 2024-11-15 DIAGNOSIS — G89.29 CHRONIC PELVIC PAIN IN FEMALE: Primary | ICD-10-CM

## 2024-11-15 DIAGNOSIS — R10.2 CHRONIC PELVIC PAIN IN FEMALE: Primary | ICD-10-CM

## 2024-11-15 PROCEDURE — 99213 OFFICE O/P EST LOW 20 MIN: CPT | Performed by: OBSTETRICS & GYNECOLOGY

## 2024-11-18 ENCOUNTER — PRE-ADMISSION TESTING (OUTPATIENT)
Dept: PREADMISSION TESTING | Facility: HOSPITAL | Age: 39
End: 2024-11-18
Payer: MEDICAID

## 2024-11-18 ENCOUNTER — TELEPHONE (OUTPATIENT)
Dept: OBSTETRICS AND GYNECOLOGY | Facility: CLINIC | Age: 39
End: 2024-11-18
Payer: MEDICAID

## 2024-11-18 DIAGNOSIS — R10.2 CHRONIC PELVIC PAIN IN FEMALE: ICD-10-CM

## 2024-11-18 DIAGNOSIS — G89.29 CHRONIC PELVIC PAIN IN FEMALE: ICD-10-CM

## 2024-11-18 DIAGNOSIS — N93.9 ABNORMAL UTERINE BLEEDING (AUB): ICD-10-CM

## 2024-11-18 LAB
ABO GROUP BLD: NORMAL
ANION GAP SERPL CALCULATED.3IONS-SCNC: 17 MMOL/L (ref 5–15)
BASOPHILS # BLD AUTO: 0.02 10*3/MM3 (ref 0–0.2)
BASOPHILS NFR BLD AUTO: 0.2 % (ref 0–1.5)
BILIRUB UR QL STRIP: NEGATIVE
BUN SERPL-MCNC: 10 MG/DL (ref 6–20)
BUN/CREAT SERPL: 16.7 (ref 7–25)
CALCIUM SPEC-SCNC: 9 MG/DL (ref 8.6–10.5)
CHLORIDE SERPL-SCNC: 101 MMOL/L (ref 98–107)
CLARITY UR: ABNORMAL
CO2 SERPL-SCNC: 21 MMOL/L (ref 22–29)
COLOR UR: YELLOW
CREAT SERPL-MCNC: 0.6 MG/DL (ref 0.57–1)
DEPRECATED RDW RBC AUTO: 44 FL (ref 37–54)
EGFRCR SERPLBLD CKD-EPI 2021: 116.5 ML/MIN/1.73
EOSINOPHIL # BLD AUTO: 0.16 10*3/MM3 (ref 0–0.4)
EOSINOPHIL NFR BLD AUTO: 2 % (ref 0.3–6.2)
ERYTHROCYTE [DISTWIDTH] IN BLOOD BY AUTOMATED COUNT: 13.4 % (ref 12.3–15.4)
GLUCOSE SERPL-MCNC: 128 MG/DL (ref 65–99)
GLUCOSE UR STRIP-MCNC: NEGATIVE MG/DL
HCT VFR BLD AUTO: 39.4 % (ref 34–46.6)
HGB BLD-MCNC: 13.1 G/DL (ref 12–15.9)
HGB UR QL STRIP.AUTO: NEGATIVE
IMM GRANULOCYTES # BLD AUTO: 0.02 10*3/MM3 (ref 0–0.05)
IMM GRANULOCYTES NFR BLD AUTO: 0.2 % (ref 0–0.5)
KETONES UR QL STRIP: ABNORMAL
LEUKOCYTE ESTERASE UR QL STRIP.AUTO: NEGATIVE
LYMPHOCYTES # BLD AUTO: 3.17 10*3/MM3 (ref 0.7–3.1)
LYMPHOCYTES NFR BLD AUTO: 39.2 % (ref 19.6–45.3)
MCH RBC QN AUTO: 29.8 PG (ref 26.6–33)
MCHC RBC AUTO-ENTMCNC: 33.2 G/DL (ref 31.5–35.7)
MCV RBC AUTO: 89.5 FL (ref 79–97)
MONOCYTES # BLD AUTO: 0.45 10*3/MM3 (ref 0.1–0.9)
MONOCYTES NFR BLD AUTO: 5.6 % (ref 5–12)
NEUTROPHILS NFR BLD AUTO: 4.27 10*3/MM3 (ref 1.7–7)
NEUTROPHILS NFR BLD AUTO: 52.8 % (ref 42.7–76)
NITRITE UR QL STRIP: NEGATIVE
NRBC BLD AUTO-RTO: 0 /100 WBC (ref 0–0.2)
PH UR STRIP.AUTO: 5.5 [PH] (ref 5–8)
PLATELET # BLD AUTO: 288 10*3/MM3 (ref 140–450)
PMV BLD AUTO: 10.4 FL (ref 6–12)
POTASSIUM SERPL-SCNC: 3.9 MMOL/L (ref 3.5–5.2)
PROT UR QL STRIP: ABNORMAL
RBC # BLD AUTO: 4.4 10*6/MM3 (ref 3.77–5.28)
RH BLD: POSITIVE
SODIUM SERPL-SCNC: 139 MMOL/L (ref 136–145)
SP GR UR STRIP: >=1.03 (ref 1–1.03)
UROBILINOGEN UR QL STRIP: ABNORMAL
WBC NRBC COR # BLD AUTO: 8.09 10*3/MM3 (ref 3.4–10.8)

## 2024-11-18 PROCEDURE — 36415 COLL VENOUS BLD VENIPUNCTURE: CPT

## 2024-11-18 PROCEDURE — 86901 BLOOD TYPING SEROLOGIC RH(D): CPT

## 2024-11-18 PROCEDURE — 80048 BASIC METABOLIC PNL TOTAL CA: CPT

## 2024-11-18 PROCEDURE — 85025 COMPLETE CBC W/AUTO DIFF WBC: CPT

## 2024-11-18 PROCEDURE — 81003 URINALYSIS AUTO W/O SCOPE: CPT

## 2024-11-18 PROCEDURE — 86900 BLOOD TYPING SEROLOGIC ABO: CPT

## 2024-11-18 RX ORDER — NORETHINDRONE ACETATE 5 MG/1
2 TABLET ORAL DAILY
COMMUNITY
Start: 2024-11-12

## 2024-11-18 RX ORDER — PROMETHAZINE HYDROCHLORIDE 12.5 MG/1
12.5 TABLET ORAL EVERY 6 HOURS PRN
Qty: 30 TABLET | Refills: 5 | Status: SHIPPED | OUTPATIENT
Start: 2024-11-18

## 2024-11-18 NOTE — DISCHARGE INSTRUCTIONS
Pre-Admission testing appointment completed today for patient's upcoming procedure here at [facility].    PAT PASS reviewed with patient and they verbalize understanding of the following:     Do not eat or drink anything after midnight the night before procedure unless otherwise instructed by physician/surgeon's office, this includes no gum, candy, mints, tobacco products or e-cigarettes.  Do not shave the area to be operated on at least 48 hours prior to procedure.  Do not wear makeup, lotion, hair products, or nail polish.  Do not wear any jewelry and remove all piercings.  Do not wear any adhesive if you wear dentures.  Do not wear contacts; bring in glasses if needed.  Only take medications on the morning of procedure as instructed by PAT nurse per anesthesia guidelines or as instructed by physician's office.  If you are on any blood thinners reach out to the physician/surgeon's office for instructions on when/if they will need to be stopped prior to procedure.  Bring in picture ID and insurance card, advanced directive copies if applicable, CPAP/BIPAP/Inhalers if indicated morning of procedure, leave any other valuables at home.  Ensure you have arranged for someone to drive you home the day of your procedure and someone to care for you at home afterwards. It is recommended that you do not drive, drink alcohol, or make any major legal decisions for at least 24 hours after your procedure is complete.  Chlorhexadine sponge along with instruction/information sheet given to patient. Readybath wipes given in lieu of CHG if patient has CHG allergy. Instructed patient to date, time, and initial the verification sheet once skin prep has been completed, and to return to [facility] the day of the procedure.  ERAS instructions given to patient unless otherwise instructed per surgeon's orders.     Introduction to anesthesia video watched by patient during appointment and anesthesia FAQ tip sheet given to  patient.  Instructions and information given to patient about parking, hospital entrance, and registration location.

## 2024-11-18 NOTE — TELEPHONE ENCOUNTER
Patient called stating she was having a lot of pain, and is extremely hot. States she went outside without a jacket and felt better. Patient is also out of her Phenergan and Zoloft.

## 2024-11-20 ENCOUNTER — HOSPITAL ENCOUNTER (EMERGENCY)
Facility: HOSPITAL | Age: 39
Discharge: HOME | End: 2024-11-20
Attending: STUDENT IN AN ORGANIZED HEALTH CARE EDUCATION/TRAINING PROGRAM | Admitting: STUDENT IN AN ORGANIZED HEALTH CARE EDUCATION/TRAINING PROGRAM
Payer: MEDICAID

## 2024-11-20 ENCOUNTER — APPOINTMENT (OUTPATIENT)
Dept: ULTRASOUND IMAGING | Facility: HOSPITAL | Age: 39
End: 2024-11-20
Payer: MEDICAID

## 2024-11-20 VITALS
WEIGHT: 275 LBS | DIASTOLIC BLOOD PRESSURE: 107 MMHG | SYSTOLIC BLOOD PRESSURE: 149 MMHG | TEMPERATURE: 97.9 F | HEART RATE: 119 BPM | HEIGHT: 62 IN | BODY MASS INDEX: 50.61 KG/M2 | RESPIRATION RATE: 22 BRPM | OXYGEN SATURATION: 91 %

## 2024-11-20 DIAGNOSIS — M79.604 RIGHT LEG PAIN: Primary | ICD-10-CM

## 2024-11-20 PROCEDURE — 93971 EXTREMITY STUDY: CPT

## 2024-11-20 PROCEDURE — 99284 EMERGENCY DEPT VISIT MOD MDM: CPT | Performed by: STUDENT IN AN ORGANIZED HEALTH CARE EDUCATION/TRAINING PROGRAM

## 2024-11-20 PROCEDURE — 63710000001 ONDANSETRON ODT 4 MG TABLET DISPERSIBLE: Performed by: STUDENT IN AN ORGANIZED HEALTH CARE EDUCATION/TRAINING PROGRAM

## 2024-11-20 RX ORDER — ONDANSETRON 4 MG/1
4 TABLET, ORALLY DISINTEGRATING ORAL ONCE
Status: COMPLETED | OUTPATIENT
Start: 2024-11-20 | End: 2024-11-20

## 2024-11-20 RX ORDER — OXYCODONE AND ACETAMINOPHEN 5; 325 MG/1; MG/1
1 TABLET ORAL ONCE
Status: COMPLETED | OUTPATIENT
Start: 2024-11-20 | End: 2024-11-20

## 2024-11-20 RX ADMIN — ONDANSETRON 4 MG: 4 TABLET, ORALLY DISINTEGRATING ORAL at 17:03

## 2024-11-20 RX ADMIN — OXYCODONE HYDROCHLORIDE AND ACETAMINOPHEN 1 TABLET: 5; 325 TABLET ORAL at 17:03

## 2024-11-20 NOTE — DISCHARGE INSTRUCTIONS
Return to the ER for any acute changes worsening of your condition otherwise follow-up with orthopedic surgery soon as possible for further evaluation.

## 2024-11-22 ENCOUNTER — TELEPHONE (OUTPATIENT)
Dept: OBSTETRICS AND GYNECOLOGY | Facility: CLINIC | Age: 39
End: 2024-11-22
Payer: MEDICAID

## 2024-11-22 ENCOUNTER — TELEPHONE (OUTPATIENT)
Dept: CARDIOLOGY | Facility: CLINIC | Age: 39
End: 2024-11-22
Payer: MEDICAID

## 2024-11-22 DIAGNOSIS — R07.9 CHEST PAIN, UNSPECIFIED TYPE: Primary | ICD-10-CM

## 2024-11-22 NOTE — TELEPHONE ENCOUNTER
I spoke with pt she understood verbal results and will wait for central scheduling to set that up.

## 2024-11-22 NOTE — TELEPHONE ENCOUNTER
----- Message from Sidney Brooks sent at 11/21/2024  5:58 PM EST -----  Stress test was nondiagnostic because she did not achieve target heart rate.  Very poor exercise capacity.  We need to get a stress PET scan at [place] due to her weight.  Please arrange that before her next visit.

## 2024-11-22 NOTE — TELEPHONE ENCOUNTER
----- Message from Tanya CASTRO sent at 11/22/2024  3:14 PM EST -----  Regarding: Upcoming surgery  Patient has upcoming hysterectomy with Gema on 12/2/24. She is seeing cardiology for chest pain currently and just had an exercise stress test but it was nondiagnostic due to her poor exercise tolerance. They want her to have a MPS stress test instead. Anesthesia would like her to have this done before her surgery on 12/2/24. I don't think it is currently scheduled yet from what I can see.

## 2024-11-25 ENCOUNTER — TELEPHONE (OUTPATIENT)
Dept: OBSTETRICS AND GYNECOLOGY | Facility: CLINIC | Age: 39
End: 2024-11-25
Payer: MEDICAID

## 2024-11-25 ENCOUNTER — OFFICE VISIT (OUTPATIENT)
Dept: BEHAVIORAL HEALTH | Facility: CLINIC | Age: 39
End: 2024-11-25
Payer: MEDICAID

## 2024-11-25 ENCOUNTER — TELEPHONE (OUTPATIENT)
Dept: NEUROLOGY | Facility: CLINIC | Age: 39
End: 2024-11-25

## 2024-11-25 VITALS
BODY MASS INDEX: 49.32 KG/M2 | DIASTOLIC BLOOD PRESSURE: 100 MMHG | SYSTOLIC BLOOD PRESSURE: 132 MMHG | WEIGHT: 268 LBS | HEART RATE: 112 BPM | OXYGEN SATURATION: 97 % | HEIGHT: 62 IN

## 2024-11-25 DIAGNOSIS — F41.0 PANIC ATTACKS: ICD-10-CM

## 2024-11-25 DIAGNOSIS — R10.2 PELVIC PAIN: ICD-10-CM

## 2024-11-25 DIAGNOSIS — Z51.81 ENCOUNTER FOR THERAPEUTIC DRUG MONITORING: ICD-10-CM

## 2024-11-25 DIAGNOSIS — F51.04 PSYCHOPHYSIOLOGICAL INSOMNIA: ICD-10-CM

## 2024-11-25 DIAGNOSIS — F31.30 BIPOLAR I DISORDER, MOST RECENT EPISODE DEPRESSED: Primary | ICD-10-CM

## 2024-11-25 DIAGNOSIS — F41.1 GENERALIZED ANXIETY DISORDER: ICD-10-CM

## 2024-11-25 DIAGNOSIS — F43.10 POST TRAUMATIC STRESS DISORDER (PTSD): ICD-10-CM

## 2024-11-25 RX ORDER — TRAMADOL HYDROCHLORIDE 50 MG/1
50 TABLET ORAL EVERY 8 HOURS PRN
Qty: 10 TABLET | Refills: 0 | Status: SHIPPED | OUTPATIENT
Start: 2024-11-25 | End: 2025-11-25

## 2024-11-25 RX ORDER — AMITRIPTYLINE HYDROCHLORIDE 150 MG/1
150 TABLET ORAL NIGHTLY
Qty: 30 TABLET | Refills: 1 | Status: SHIPPED | OUTPATIENT
Start: 2024-11-25

## 2024-11-25 RX ORDER — OXCARBAZEPINE 300 MG/1
300 TABLET, FILM COATED ORAL 2 TIMES DAILY
Qty: 60 TABLET | Refills: 1 | Status: SHIPPED | OUTPATIENT
Start: 2024-11-25

## 2024-11-25 RX ORDER — OXCARBAZEPINE 300 MG/1
300 TABLET, FILM COATED ORAL 2 TIMES DAILY
Qty: 180 TABLET | OUTPATIENT
Start: 2024-11-25

## 2024-11-25 RX ORDER — DIAZEPAM 10 MG/1
10 TABLET ORAL 3 TIMES DAILY PRN
Qty: 90 TABLET | Refills: 1 | Status: SHIPPED | OUTPATIENT
Start: 2024-11-25 | End: 2025-11-25

## 2024-11-25 RX ORDER — LURASIDONE HYDROCHLORIDE 120 MG/1
120 TABLET, FILM COATED ORAL DAILY
Qty: 30 TABLET | Refills: 1 | Status: SHIPPED | OUTPATIENT
Start: 2024-11-25

## 2024-11-25 RX ORDER — DESVENLAFAXINE 100 MG/1
100 TABLET, EXTENDED RELEASE ORAL DAILY
Qty: 30 TABLET | Refills: 1 | Status: SHIPPED | OUTPATIENT
Start: 2024-11-25

## 2024-11-25 RX ORDER — ACETAMINOPHEN 500 MG
1000 TABLET ORAL EVERY 8 HOURS PRN
Qty: 60 TABLET | Refills: 10 | Status: SHIPPED | OUTPATIENT
Start: 2024-11-25 | End: 2025-11-25

## 2024-11-25 NOTE — PROGRESS NOTES
Follow Up Office Visit    Patient Name: Lindsay Amezquita  : 1985   MRN: [id number]   Care Team: Patient Care Team:  Hunter Winkler MD as PCP - General (Family Medicine)  Donna Mcqueen APRN as Nurse Practitioner (Behavioral Health)  Sidney Brooks MD as Cardiologist (Cardiology)         Chief Complaint:    Chief Complaint   Patient presents with    Anxiety    Depression    Sleeping Problem    Panic Attack    PTSD    Bipolar    Med Management       History of Present Illness: Lindsay Amezquita is a 39 y.o. female who is here today for a medication management follow up. Patient reports that overall medication continues to be effective. She feels that for the most part her mood and anxiety/panic are managed well. She also reports that she is sleeping well. She does endorse quite a bit of situational stressors especially with her physical sara and some upcoming surgeries, however, she feels that she is managing those well with her medications. She did not see the need to make any changes and did not have any medication concerns at today's visit. She denies SI/HI today.     The following portion of the patient's history were reviewed and updated appropriately: allergies, current and past medications, family history, medical history and social history. MITCH reviewed and appropriate.   Subjective   Review of Systems:    Review of Systems   Respiratory: Negative.  Negative for shortness of breath.    Cardiovascular:  Positive for chest pain. Negative for palpitations.   Gastrointestinal:  Positive for nausea.   Neurological:  Positive for confusion. Negative for dizziness.   Psychiatric/Behavioral:  Positive for depressed mood and stress. The patient is nervous/anxious.    All other systems reviewed and are negative.      Current Medications:   Current Outpatient Medications   Medication Sig Dispense Refill    acetaminophen (TYLENOL) 500 MG tablet Take 2 tablets by mouth Every 8 (Eight) Hours As Needed for  Mild Pain. 60 tablet 10    Allegra-D Allergy & Congestion  MG per 12 hr tablet Take 1 tablet by mouth 2 (Two) Times a Day.      amitriptyline (ELAVIL) 150 MG tablet Take 1 tablet by mouth Every Night. 30 tablet 1    atorvastatin (LIPITOR) 40 MG tablet Take 1 tablet by mouth every night at bedtime. 90 tablet 3    butalbital-acetaminophen-caffeine (FIORICET, ESGIC) -40 MG per tablet Take 1 tablet by mouth 2 (Two) Times a Day As Needed for Headache. 20 tablet 0    desvenlafaxine (PRISTIQ) 100 MG 24 hr tablet Take 1 tablet by mouth Daily. 30 tablet 1    diazePAM (VALIUM) 10 MG tablet Take 1 tablet by mouth 3 (Three) Times a Day As Needed for Anxiety. 90 tablet 1    fluticasone (FLONASE) 50 MCG/ACT nasal spray Administer 1 spray into the nostril(s) as directed by provider 2 (Two) Times a Day. 16 g 5    FREESTYLE LITE test strip See Admin Instructions.      gabapentin (Neurontin) 800 MG tablet Take 1 tablet by mouth 3 (Three) Times a Day. 90 tablet 0    Gallifrey 5 MG tablet Take 2 tablets by mouth Daily.      ibuprofen (ADVIL,MOTRIN) 800 MG tablet Take 1 tablet by mouth Every 8 (Eight) Hours As Needed for Moderate Pain for up to 30 doses. 30 tablet 5    ipratropium-albuterol (DUO-NEB) 0.5-2.5 mg/3 ml nebulizer Take 3 mL by nebulization 4 (Four) Times a Day As Needed for Wheezing or Shortness of Air. 360 mL 5    Lurasidone HCl (Latuda) 120 MG tablet tablet Take 1 tablet by mouth Daily. 30 tablet 1    OXcarbazepine (TRILEPTAL) 300 MG tablet Take 1 tablet by mouth 2 (Two) Times a Day. 60 tablet 1    pantoprazole (Protonix) 40 MG EC tablet Take 1 tablet by mouth Daily. 90 tablet 1    polyethylene glycol (MIRALAX) 17 g packet Take 17 g by mouth Daily. (Patient taking differently: Take 17 g by mouth As Needed.) 30 each 1    promethazine (PHENERGAN) 12.5 MG tablet Take 1 tablet by mouth Every 6 (Six) Hours As Needed for Nausea or Vomiting. 30 tablet 5    Rimegepant Sulfate (Nurtec) 75 MG tablet dispersible tablet  "Take 1 tablet by mouth Every Other Day. Take Monday,Wednesday,Friday, and Saturday. 16 tablet 5    tiZANidine (ZANAFLEX) 4 MG tablet TAKE 1 TABLET BY MOUTH EVERY 8 HOURS AS NEEDED FOR MUSCLE SPASMS. 270 tablet 0    Trelegy Ellipta 200-62.5-25 MCG/ACT inhaler Inhale 1 puff Daily. Rinse mouth with water after use 60 each 5    lidocaine (LIDODERM) 5 % Place 1 patch on the skin as directed by provider Daily. Remove & Discard patch within 12 hours or as directed by MD (Patient not taking: Reported on 11/25/2024) 30 each 0    Zavegepant HCl 10 MG/ACT solution Administer 10 mg into the nostril(s) as directed by provider Daily As Needed (migraine). (1 spray into 1 nostril every 24 hrs prn) 6 each 5     No current facility-administered medications for this visit.       Mental Status Exam:   Hygiene:   good  Cooperation:  Cooperative  Eye Contact:  Good  Psychomotor Behavior:  Appropriate  Affect:  Appropriate  Mood: depressed and anxious  Speech:  Normal  Thought Process:  Goal directed and Linear  Thought Content:  Normal and Mood congruent  Suicidal:  None  Homicidal:  None  Hallucinations:  None  Delusion:  None  Memory:  Intact  Orientation:  Person, Place, Time, and Situation  Reliability:  good  Insight:  Fair  Judgement:  Fair  Impulse Control:  Fair  Physical/Medical Issues:  Yes see chart      Objective   Vital Signs:   /100   Pulse 112   Ht 157.5 cm (62\")   Wt 122 kg (268 lb)   SpO2 97%   BMI 49.02 kg/m²         Lab Results:   Ancillary Procedure on 11/21/2024   Component Date Value Ref Range Status    BH CV STRESS PROTOCOL 1 11/21/2024 Yogi   Final    Stage 1 11/21/2024 1.0   Final    HR Stage 1 11/21/2024 146   Final    O2 Stage 1 11/21/2024 95   Final    Duration Min Stage 1 11/21/2024 3   Final    Duration Sec Stage 1 11/21/2024 0   Final    Grade Stage 1 11/21/2024 10   Final    Speed Stage 1 11/21/2024 1.7   Final    BH CV STRESS METS STAGE 1 11/21/2024 5.0   Final    Baseline HR 11/21/2024 121  " bpm Final    Baseline BP 11/21/2024 142/90  mmHg Final    O2 sat rest 11/21/2024 93  % Final    Peak HR 11/21/2024 146  bpm Final    Peak BP 11/21/2024 142/90  mmHg Final    O2 sat peak 11/21/2024 95  % Final    Recovery HR 11/21/2024 110  bpm Final    Recovery BP 11/21/2024 144/90  mmHg Final    Recovery O2 11/21/2024 95  % Final    Target HR (85%) 11/21/2024 153  bpm Final    Max. Pred. HR (100%) 11/21/2024 180  bpm Final    Percent Max Pred HR 11/21/2024 81.11  % Final    Exercise duration (min) 11/21/2024 1  min Final    Exercise duration (sec) 11/21/2024 20  sec Final    Estimated workload 11/21/2024 4.6  METS Final    Percent Target HR 11/21/2024 95  % Final   Ancillary Procedure on 11/21/2024   Component Date Value Ref Range Status    LVIDd 11/21/2024 3.5  cm Final    LVIDs 11/21/2024 2.5  cm Final    IVSd 11/21/2024 1.05  cm Final    LVPWd 11/21/2024 1.64  cm Final    FS 11/21/2024 27.5  % Final    IVS/LVPW 11/21/2024 0.64  cm Final    ESV(cubed) 11/21/2024 15.6  ml Final    LV Sys Vol (BSA corrected) 11/21/2024 21.1  cm2 Final    EDV(cubed) 11/21/2024 41.1  ml Final    LV Castillo Vol (BSA corrected) 11/21/2024 39.3  cm2 Final    LV mass(C)d 11/21/2024 159.0  grams Final    LVOT area 11/21/2024 2.5  cm2 Final    LVOT diam 11/21/2024 1.80  cm Final    EDV(MOD-sp4) 11/21/2024 86.0  ml Final    ESV(MOD-sp4) 11/21/2024 46.2  ml Final    SV(MOD-sp4) 11/21/2024 39.8  ml Final    SVi(MOD-SP4) 11/21/2024 18.2  ml/m2 Final    SVi (LVOT) 11/21/2024 23.7  ml/m2 Final    EF(MOD-sp4) 11/21/2024 46.3  % Final    MV E max nayan 11/21/2024 107.5  cm/sec Final    MV dec time 11/21/2024 0.12  sec Final    Med Peak E' Nayan 11/21/2024 9.9  cm/sec Final    Lat Peak E' Nayan 11/21/2024 18.5  cm/sec Final    TR max nayan 11/21/2024 117.0  cm/sec Final    Avg E/e' ratio 11/21/2024 7.57   Final    SV(LVOT) 11/21/2024 51.9  ml Final    LA dimension (2D)  11/21/2024 3.0  cm Final    LV V1 max 11/21/2024 122.0  cm/sec Final    LV V1 max PG  11/21/2024 6.0  mmHg Final    LV V1 mean PG 11/21/2024 3.0  mmHg Final    LV V1 VTI 11/21/2024 20.4  cm Final    Ao pk junior 11/21/2024 114.0  cm/sec Final    Ao max PG 11/21/2024 5.2  mmHg Final    Ao mean PG 11/21/2024 3.0  mmHg Final    Ao V2 VTI 11/21/2024 15.5  cm Final    CELENA(I,D) 11/21/2024 3.3  cm2 Final    MV dec slope 11/21/2024 921.0  cm/sec2 Final    TR max PG 11/21/2024 5.5  mmHg Final    RVSP(TR) 11/21/2024 15.5  mmHg Final    RAP systole 11/21/2024 10.0  mmHg Final    PA V2 max 11/21/2024 86.5  cm/sec Final    Ao root diam 11/21/2024 1.80  cm Final   Pre-Admission Testing on 11/18/2024   Component Date Value Ref Range Status    Color, UA 11/18/2024 Yellow  Yellow, Straw Final    Appearance, UA 11/18/2024 Cloudy (A)  Clear Final    pH, UA 11/18/2024 5.5  5.0 - 8.0 Final    Specific Gravity, UA 11/18/2024 >=1.030  1.005 - 1.030 Final    Glucose, UA 11/18/2024 Negative  Negative Final    Ketones, UA 11/18/2024 Trace (A)  Negative Final    Bilirubin, UA 11/18/2024 Negative  Negative Final    Blood, UA 11/18/2024 Negative  Negative Final    Protein, UA 11/18/2024 Trace (A)  Negative Final    Leuk Esterase, UA 11/18/2024 Negative  Negative Final    Nitrite, UA 11/18/2024 Negative  Negative Final    Urobilinogen, UA 11/18/2024 0.2 E.U./dL  0.2 - 1.0 E.U./dL Final    Glucose 11/18/2024 128 (H)  65 - 99 mg/dL Final    BUN 11/18/2024 10  6 - 20 mg/dL Final    Creatinine 11/18/2024 0.60  0.57 - 1.00 mg/dL Final    Sodium 11/18/2024 139  136 - 145 mmol/L Final    Potassium 11/18/2024 3.9  3.5 - 5.2 mmol/L Final    Chloride 11/18/2024 101  98 - 107 mmol/L Final    CO2 11/18/2024 21.0 (L)  22.0 - 29.0 mmol/L Final    Calcium 11/18/2024 9.0  8.6 - 10.5 mg/dL Final    BUN/Creatinine Ratio 11/18/2024 16.7  7.0 - 25.0 Final    Anion Gap 11/18/2024 17.0 (H)  5.0 - 15.0 mmol/L Final    eGFR 11/18/2024 116.5  >60.0 mL/min/1.73 Final    ABO Type 11/18/2024 O   Final    RH type 11/18/2024 Positive   Final    WBC 11/18/2024  8.09  3.40 - 10.80 10*3/mm3 Final    RBC 11/18/2024 4.40  3.77 - 5.28 10*6/mm3 Final    Hemoglobin 11/18/2024 13.1  12.0 - 15.9 g/dL Final    Hematocrit 11/18/2024 39.4  34.0 - 46.6 % Final    MCV 11/18/2024 89.5  79.0 - 97.0 fL Final    MCH 11/18/2024 29.8  26.6 - 33.0 pg Final    MCHC 11/18/2024 33.2  31.5 - 35.7 g/dL Final    RDW 11/18/2024 13.4  12.3 - 15.4 % Final    RDW-SD 11/18/2024 44.0  37.0 - 54.0 fl Final    MPV 11/18/2024 10.4  6.0 - 12.0 fL Final    Platelets 11/18/2024 288  140 - 450 10*3/mm3 Final    Neutrophil % 11/18/2024 52.8  42.7 - 76.0 % Final    Lymphocyte % 11/18/2024 39.2  19.6 - 45.3 % Final    Monocyte % 11/18/2024 5.6  5.0 - 12.0 % Final    Eosinophil % 11/18/2024 2.0  0.3 - 6.2 % Final    Basophil % 11/18/2024 0.2  0.0 - 1.5 % Final    Immature Grans % 11/18/2024 0.2  0.0 - 0.5 % Final    Neutrophils, Absolute 11/18/2024 4.27  1.70 - 7.00 10*3/mm3 Final    Lymphocytes, Absolute 11/18/2024 3.17 (H)  0.70 - 3.10 10*3/mm3 Final    Monocytes, Absolute 11/18/2024 0.45  0.10 - 0.90 10*3/mm3 Final    Eosinophils, Absolute 11/18/2024 0.16  0.00 - 0.40 10*3/mm3 Final    Basophils, Absolute 11/18/2024 0.02  0.00 - 0.20 10*3/mm3 Final    Immature Grans, Absolute 11/18/2024 0.02  0.00 - 0.05 10*3/mm3 Final    nRBC 11/18/2024 0.0  0.0 - 0.2 /100 WBC Final   Office Visit on 10/25/2024   Component Date Value Ref Range Status    Glucose 10/25/2024 141 (A)  70 - 130 mg/dL Final   Lab on 10/22/2024   Component Date Value Ref Range Status    Class Description 10/22/2024 Comment   Final        Levels of Specific IgE       Class  Description of Class      ---------------------------  -----  --------------------                     < 0.10         0         Negative             0.10 -    0.31         0/I       Equivocal/Low             0.32 -    0.55         I         Low             0.56 -    1.40         II        Moderate             1.41 -    3.90         III       High             3.91 -   19.00         IV         Very High            19.01 -  100.00         V         Very High                    >100.00         VI        Very High    D. pteronyssinus (dust mite) 10/22/2024 0.24 (A)  Class 0/I kU/L Final    D. farinae (dust mite) 10/22/2024 0.11 (A)  Class 0/I kU/L Final    Cat Dander 10/22/2024 <0.10  Class 0 kU/L Final    Dog Dander, IgE 10/22/2024 <0.10  Class 0 kU/L Final    [place] Grass 10/22/2024 <0.10  Class 0 kU/L Final    [place] Bluegrass IgE 10/22/2024 <0.10  Class 0 kU/L Final    [place] Grass 10/22/2024 <0.10  Class 0 kU/L Final    [place] Grass 10/22/2024 <0.10  Class 0 kU/L Final    Cockroach, American 10/22/2024 <0.10  Class 0 kU/L Final    Penicillium chrysogen 10/22/2024 <0.10  Class 0 kU/L Final    Cladosporium herbarum 10/22/2024 <0.10  Class 0 kU/L Final    Aspergillus fumigatus 10/22/2024 <0.10  Class 0 kU/L Final    Mucor racemosus 10/22/2024 <0.10  Class 0 kU/L Final    Alternaria alternata 10/22/2024 <0.10  Class 0 kU/L Final    Stemphylium herbarum 10/22/2024 <0.10  Class 0 kU/L Final    [place], White 10/22/2024 <0.10  Class 0 kU/L Final    Elm, American 10/22/2024 <0.10  Class 0 kU/L Final    Maple/Parker 10/22/2024 <0.10  Class 0 kU/L Final    Hazelnut Tree 10/22/2024 <0.10  Class 0 kU/L Final    Garrard, White 10/22/2024 <0.10  Class 0 kU/L Final    Maple [place] 10/22/2024 <0.10  Class 0 kU/L Final    White [place] 10/22/2024 <0.10  Class 0 kU/L Final    Weber, Mountain 10/22/2024 <0.10  Class 0 kU/L Final    Sweet Gum 10/22/2024 <0.10  Class 0 kU/L Final    Ragweed, Common/Short 10/22/2024 <0.10  Class 0 kU/L Final    Mugwort 10/22/2024 <0.10  Class 0 kU/L Final    English Plantain 10/22/2024 <0.10  Class 0 kU/L Final    Pigweed, Rough/Common 10/22/2024 <0.10  Class 0 kU/L Final    Sheep Elsberry 10/22/2024 <0.10  Class 0 kU/L Final    Nettle 10/22/2024 <0.10  Class 0 kU/L Final    IgE 10/22/2024 34  6 - 495 IU/mL Final   Admission on 10/21/2024, Discharged on 10/21/2024   Component  Date Value Ref Range Status    Glucose 10/21/2024 321 (H)  65 - 99 mg/dL Final    Glucose >180, Hemoglobin A1C recommended.    BUN 10/21/2024 5 (L)  6 - 20 mg/dL Final    Creatinine 10/21/2024 0.75  0.57 - 1.00 mg/dL Final    Sodium 10/21/2024 134 (L)  136 - 145 mmol/L Final    Potassium 10/21/2024 3.8  3.5 - 5.2 mmol/L Final    Slight hemolysis detected by analyzer. Result may be falsely elevated.    Chloride 10/21/2024 101  98 - 107 mmol/L Final    CO2 10/21/2024 18.8 (L)  22.0 - 29.0 mmol/L Final    Calcium 10/21/2024 8.7  8.6 - 10.5 mg/dL Final    Total Protein 10/21/2024 6.1  6.0 - 8.5 g/dL Final    Albumin 10/21/2024 3.9  3.5 - 5.2 g/dL Final    ALT (SGPT) 10/21/2024 25  1 - 33 U/L Final    AST (SGOT) 10/21/2024 18  1 - 32 U/L Final    Alkaline Phosphatase 10/21/2024 66  39 - 117 U/L Final    Total Bilirubin 10/21/2024 0.2  0.0 - 1.2 mg/dL Final    Globulin 10/21/2024 2.2  gm/dL Final    A/G Ratio 10/21/2024 1.8  g/dL Final    BUN/Creatinine Ratio 10/21/2024 6.7 (L)  7.0 - 25.0 Final    Anion Gap 10/21/2024 14.2  5.0 - 15.0 mmol/L Final    eGFR 10/21/2024 103.4  >60.0 mL/min/1.73 Final    Lipase 10/21/2024 41  13 - 60 U/L Final    Color, UA 10/21/2024 Yellow  Yellow, Straw Final    Appearance, UA 10/21/2024 Clear  Clear Final    pH, UA 10/21/2024 6.0  5.0 - 8.0 Final    Specific Gravity, UA 10/21/2024 1.026  1.005 - 1.030 Final    Glucose, UA 10/21/2024 >=1000 mg/dL (3+) (A)  Negative Final    Ketones, UA 10/21/2024 Negative  Negative Final    Bilirubin, UA 10/21/2024 Negative  Negative Final    Blood, UA 10/21/2024 Negative  Negative Final    Protein, UA 10/21/2024 Trace (A)  Negative Final    Leuk Esterase, UA 10/21/2024 Negative  Negative Final    Nitrite, UA 10/21/2024 Negative  Negative Final    Urobilinogen, UA 10/21/2024 0.2 E.U./dL  0.2 - 1.0 E.U./dL Final    Lactate 10/21/2024 4.3 (C)  0.5 - 2.0 mmol/L Final    Ethanol 10/21/2024 <10  0 - 10 mg/dL Final    Ethanol % 10/21/2024 <0.010  % Final    THC,  Screen, Urine 10/21/2024 Negative  Negative Final    Phencyclidine (PCP), Urine 10/21/2024 Negative  Negative Final    Cocaine Screen, Urine 10/21/2024 Negative  Negative Final    Methamphetamine, Ur 10/21/2024 Negative  Negative Final    Opiate Screen 10/21/2024 Negative  Negative Final    Amphetamine Screen, Urine 10/21/2024 Negative  Negative Final    Benzodiazepine Screen, Urine 10/21/2024 Positive (A)  Negative Final    Tricyclic Antidepressants Screen 10/21/2024 Positive (A)  Negative Final    Methadone Screen, Urine 10/21/2024 Negative  Negative Final    Barbiturates Screen, Urine 10/21/2024 Negative  Negative Final    Oxycodone Screen, Urine 10/21/2024 Negative  Negative Final    Buprenorphine, Screen, Urine 10/21/2024 Negative  Negative Final    Ammonia 10/21/2024 24  11 - 51 umol/L Final    Creatine Kinase 10/21/2024 48  20 - 180 U/L Final    Magnesium 10/21/2024 1.9  1.6 - 2.6 mg/dL Final    WBC 10/21/2024 8.54  3.40 - 10.80 10*3/mm3 Final    RBC 10/21/2024 4.58  3.77 - 5.28 10*6/mm3 Final    Hemoglobin 10/21/2024 13.6  12.0 - 15.9 g/dL Final    Hematocrit 10/21/2024 41.9  34.0 - 46.6 % Final    MCV 10/21/2024 91.5  79.0 - 97.0 fL Final    MCH 10/21/2024 29.7  26.6 - 33.0 pg Final    MCHC 10/21/2024 32.5  31.5 - 35.7 g/dL Final    RDW 10/21/2024 13.8  12.3 - 15.4 % Final    RDW-SD 10/21/2024 46.8  37.0 - 54.0 fl Final    MPV 10/21/2024 10.2  6.0 - 12.0 fL Final    Platelets 10/21/2024 222  140 - 450 10*3/mm3 Final    Neutrophil % 10/21/2024 57.9  42.7 - 76.0 % Final    Lymphocyte % 10/21/2024 32.9  19.6 - 45.3 % Final    Monocyte % 10/21/2024 5.7  5.0 - 12.0 % Final    Eosinophil % 10/21/2024 2.9  0.3 - 6.2 % Final    Basophil % 10/21/2024 0.5  0.0 - 1.5 % Final    Immature Grans % 10/21/2024 0.1  0.0 - 0.5 % Final    Neutrophils, Absolute 10/21/2024 4.94  1.70 - 7.00 10*3/mm3 Final    Lymphocytes, Absolute 10/21/2024 2.81  0.70 - 3.10 10*3/mm3 Final    Monocytes, Absolute 10/21/2024 0.49  0.10 - 0.90  10*3/mm3 Final    Eosinophils, Absolute 10/21/2024 0.25  0.00 - 0.40 10*3/mm3 Final    Basophils, Absolute 10/21/2024 0.04  0.00 - 0.20 10*3/mm3 Final    Immature Grans, Absolute 10/21/2024 0.01  0.00 - 0.05 10*3/mm3 Final    nRBC 10/21/2024 0.0  0.0 - 0.2 /100 WBC Final    Glucose 10/21/2024 316 (H)  70 - 130 mg/dL Final    Serial Number: [id number]:  [id number]    Fentanyl, Urine 10/21/2024 Negative  Negative Final    Site 10/21/2024 OTHER   Final    pH, Venous 10/21/2024 7.330  7.320 - 7.420 pH Units Final    pCO2, Venous 10/21/2024 46.9  40.0 - 50.0 mm Hg Final    pO2, Venous 10/21/2024 32.5  30.0 - 50.0 mm Hg Final    HCO3, Venous 10/21/2024 24.7  22.0 - 28.0 mmol/L Final    Base Excess, Venous 10/21/2024 -1.6 (L)  0.0 - 2.0 mmol/L Final    84 Value below reference range    O2 Saturation, Venous 10/21/2024 60.8  45.0 - 75.0 % Final    Oxyhemoglobin Venous 10/21/2024 57.5  40.0 - 70.0 % Final    84 Value below reference range    Methemoglobin Venous 10/21/2024 0.7  0.0 - 3.0 % Final    Carboxyhemoglobin Venous 10/21/2024 4.8  0.0 - 5.0 % Final    Barometric Pressure for Blood Gas 10/21/2024 741  mmHg Final    Modality 10/21/2024 Room Air   Final    Ventilator Mode 10/21/2024 NA   Final    Collected by 10/21/2024 RN   Final    Meter: [id number]     :  [id number]    Lactate 10/21/2024 2.2 (C)  0.5 - 2.0 mmol/L Final   Orders Only on 10/18/2024   Component Date Value Ref Range Status    Atopobium Vaginae 10/18/2024 Moderate - 1  Score Final    BVAB 2 10/18/2024 Low - 0  Score Final    Megasphaera 1 10/18/2024 Low - 0  Score Final    Comment: Calculate total score by adding the 3 individual bacterial  vaginosis (BV) marker scores together.  Total score is  interpreted as follows:  Total score 0-1: Indicates the absence of BV.  Total score   2: Indeterminate for BV. Additional clinical                   data should be evaluated to establish a                   diagnosis.  Total score 3-6: Indicates  the presence of BV.      Candida Albicans, RUBY 10/18/2024 Positive (A)  Negative Final    Emilie Glabrata, RUBY 10/18/2024 Positive (A)  Negative Final    Comment: Published data demonstrate that up to 65% of Candida glabrata  identified in cases of vaginal candidiasis have decreased  susceptibility to fluconazole.      Trichomonas vaginosis 10/18/2024 Negative  Negative Final    Chlamydia trachomatis, RUBY 10/18/2024 Negative  Negative Final    Neisseria gonorrhoeae, RUBY 10/18/2024 Negative  Negative Final    Urine Culture 10/18/2024 Final report   Final    Result 1 10/18/2024 Comment   Final    Comment: Mixed urogenital kai  50,000-100,000 colony forming units per mL     Lab on 10/18/2024   Component Date Value Ref Range Status    Urine Culture 10/18/2024 <25,000 CFU/mL Mixed Kai Isolated   Final   Office Visit on 10/18/2024   Component Date Value Ref Range Status    Color 10/18/2024 Caty  Yellow, Straw, Dark Yellow, Caty Final    Clarity, UA 10/18/2024 Cloudy (A)  Clear Final    Glucose, UA 10/18/2024 Negative  Negative mg/dL Final    Bilirubin 10/18/2024 Negative  Negative Final    Ketones, UA 10/18/2024 Negative  Negative Final    Specific Gravity  10/18/2024 1.030  1.005 - 1.030 Final    Blood, UA 10/18/2024 Negative  Negative Final    pH, Urine 10/18/2024 6.0  5.0 - 8.0 Final    Protein, POC 10/18/2024 1+ (A)  Negative mg/dL Final    Urobilinogen, UA 10/18/2024 0.2 E.U./dL  Normal, 0.2 E.U./dL Final    Leukocytes 10/18/2024 Negative  Negative Final    Nitrite, UA 10/18/2024 Negative  Negative Final    Glucose 10/18/2024 187 (A)  70 - 130 mg/dL Final    HCG, Urine, QL 10/18/2024 Negative  Negative Final    Lot Number 10/18/2024 [id number]   Final    Internal Positive Control 10/18/2024 Passed  Positive, Passed Final    Internal Negative Control 10/18/2024 Passed  Negative, Passed Final    Expiration Date 10/18/2024 11/29/2024   Final    Glucose 10/18/2024 151 (H)  65 - 99 mg/dL Final    BUN 10/18/2024 6  6 -  20 mg/dL Final    Creatinine 10/18/2024 0.64  0.57 - 1.00 mg/dL Final    Sodium 10/18/2024 136  136 - 145 mmol/L Final    Potassium 10/18/2024 3.5  3.5 - 5.2 mmol/L Final    Slight hemolysis detected by analyzer. Result may be falsely elevated.    Chloride 10/18/2024 101  98 - 107 mmol/L Final    CO2 10/18/2024 21.3 (L)  22.0 - 29.0 mmol/L Final    Calcium 10/18/2024 9.0  8.6 - 10.5 mg/dL Final    Total Protein 10/18/2024 6.6  6.0 - 8.5 g/dL Final    Albumin 10/18/2024 3.9  3.5 - 5.2 g/dL Final    ALT (SGPT) 10/18/2024 37 (H)  1 - 33 U/L Final    AST (SGOT) 10/18/2024 29  1 - 32 U/L Final    Alkaline Phosphatase 10/18/2024 57  39 - 117 U/L Final    Total Bilirubin 10/18/2024 0.4  0.0 - 1.2 mg/dL Final    Globulin 10/18/2024 2.7  gm/dL Final    A/G Ratio 10/18/2024 1.4  g/dL Final    BUN/Creatinine Ratio 10/18/2024 9.4  7.0 - 25.0 Final    Anion Gap 10/18/2024 13.7  5.0 - 15.0 mmol/L Final    eGFR 10/18/2024 114.7  >60.0 mL/min/1.73 Final    Lipase 10/18/2024 43  13 - 60 U/L Final    Hemoglobin A1C 10/18/2024 7.00 (H)  4.80 - 5.60 % Final    SARS Antigen 10/18/2024 Not Detected  Not Detected, Presumptive Negative Final    Influenza A Antigen CANDACE 10/18/2024 Not Detected  Not Detected Final    Influenza B Antigen CANDACE 10/18/2024 Not Detected  Not Detected Final    Internal Control 10/18/2024 Passed  Passed Final    Lot Number 10/18/2024 [id number]   Final    Expiration Date 10/18/2024 10/23/2025   Final    C-Reactive Protein 10/18/2024 0.34  0.00 - 0.50 mg/dL Final    WBC 10/18/2024 8.35  3.40 - 10.80 10*3/mm3 Final    RBC 10/18/2024 4.94  3.77 - 5.28 10*6/mm3 Final    Hemoglobin 10/18/2024 14.7  12.0 - 15.9 g/dL Final    Hematocrit 10/18/2024 45.0  34.0 - 46.6 % Final    MCV 10/18/2024 91.1  79.0 - 97.0 fL Final    MCH 10/18/2024 29.8  26.6 - 33.0 pg Final    MCHC 10/18/2024 32.7  31.5 - 35.7 g/dL Final    RDW 10/18/2024 13.0  12.3 - 15.4 % Final    RDW-SD 10/18/2024 43.4  37.0 - 54.0 fl Final    MPV 10/18/2024 11.0   6.0 - 12.0 fL Final    Platelets 10/18/2024 261  140 - 450 10*3/mm3 Final    Neutrophil % 10/18/2024 45.7  42.7 - 76.0 % Final    Lymphocyte % 10/18/2024 46.1 (H)  19.6 - 45.3 % Final    Monocyte % 10/18/2024 5.1  5.0 - 12.0 % Final    Eosinophil % 10/18/2024 2.4  0.3 - 6.2 % Final    Basophil % 10/18/2024 0.6  0.0 - 1.5 % Final    Immature Grans % 10/18/2024 0.1  0.0 - 0.5 % Final    Neutrophils, Absolute 10/18/2024 3.81  1.70 - 7.00 10*3/mm3 Final    Lymphocytes, Absolute 10/18/2024 3.85 (H)  0.70 - 3.10 10*3/mm3 Final    Monocytes, Absolute 10/18/2024 0.43  0.10 - 0.90 10*3/mm3 Final    Eosinophils, Absolute 10/18/2024 0.20  0.00 - 0.40 10*3/mm3 Final    Basophils, Absolute 10/18/2024 0.05  0.00 - 0.20 10*3/mm3 Final    Immature Grans, Absolute 10/18/2024 0.01  0.00 - 0.05 10*3/mm3 Final    nRBC 10/18/2024 0.0  0.0 - 0.2 /100 WBC Final   Admission on 10/02/2024, Discharged on 10/02/2024   Component Date Value Ref Range Status    Glucose 10/02/2024 114 (H)  65 - 99 mg/dL Final    BUN 10/02/2024 7  6 - 20 mg/dL Final    Creatinine 10/02/2024 0.57  0.57 - 1.00 mg/dL Final    Sodium 10/02/2024 141  136 - 145 mmol/L Final    Potassium 10/02/2024 3.9  3.5 - 5.2 mmol/L Final    Chloride 10/02/2024 102  98 - 107 mmol/L Final    CO2 10/02/2024 24.1  22.0 - 29.0 mmol/L Final    Calcium 10/02/2024 9.8  8.6 - 10.5 mg/dL Final    Total Protein 10/02/2024 7.4  6.0 - 8.5 g/dL Final    Albumin 10/02/2024 4.6  3.5 - 5.2 g/dL Final    ALT (SGPT) 10/02/2024 39 (H)  1 - 33 U/L Final    AST (SGOT) 10/02/2024 35 (H)  1 - 32 U/L Final    Alkaline Phosphatase 10/02/2024 94  39 - 117 U/L Final    Total Bilirubin 10/02/2024 0.4  0.0 - 1.2 mg/dL Final    Globulin 10/02/2024 2.8  gm/dL Final    A/G Ratio 10/02/2024 1.6  g/dL Final    BUN/Creatinine Ratio 10/02/2024 12.3  7.0 - 25.0 Final    Anion Gap 10/02/2024 14.9  5.0 - 15.0 mmol/L Final    eGFR 10/02/2024 118.0  >60.0 mL/min/1.73 Final    Lipase 10/02/2024 55  13 - 60 U/L Final     Color, UA 10/02/2024 Yellow  Yellow, Straw Final    Appearance, UA 10/02/2024 Clear  Clear Final    pH, UA 10/02/2024 6.5  5.0 - 8.0 Final    Specific [place], UA 10/02/2024 1.018  1.005 - 1.030 Final    Glucose, UA 10/02/2024 >=1000 mg/dL (3+) (A)  Negative Final    Ketones, UA 10/02/2024 Negative  Negative Final    Bilirubin, UA 10/02/2024 Negative  Negative Final    Blood, UA 10/02/2024 Negative  Negative Final    Protein, UA 10/02/2024 30 mg/dL (1+) (A)  Negative Final    Leuk Esterase, UA 10/02/2024 Negative  Negative Final    Nitrite, UA 10/02/2024 Negative  Negative Final    Urobilinogen, UA 10/02/2024 0.2 E.U./dL  0.2 - 1.0 E.U./dL Final    Extra Tube 10/02/2024 Hold for add-ons.   Final    Auto resulted.    Extra Tube 10/02/2024 hold for add-on   Final    Auto resulted    Extra Tube 10/02/2024 Hold for add-ons.   Final    Auto resulted.    Extra Tube 10/02/2024 Hold for add-ons.   Final    Auto resulted    WBC 10/02/2024 11.16 (H)  3.40 - 10.80 10*3/mm3 Final    RBC 10/02/2024 5.38 (H)  3.77 - 5.28 10*6/mm3 Final    Hemoglobin 10/02/2024 16.1 (H)  12.0 - 15.9 g/dL Final    Hematocrit 10/02/2024 48.1 (H)  34.0 - 46.6 % Final    MCV 10/02/2024 89.4  79.0 - 97.0 fL Final    MCH 10/02/2024 29.9  26.6 - 33.0 pg Final    MCHC 10/02/2024 33.5  31.5 - 35.7 g/dL Final    RDW 10/02/2024 13.9  12.3 - 15.4 % Final    RDW-SD 10/02/2024 45.1  37.0 - 54.0 fl Final    MPV 10/02/2024 9.2  6.0 - 12.0 fL Final    Platelets 10/02/2024 512 (H)  140 - 450 10*3/mm3 Final    Neutrophil % 10/02/2024 59.7  42.7 - 76.0 % Final    Lymphocyte % 10/02/2024 33.4  19.6 - 45.3 % Final    Monocyte % 10/02/2024 5.4  5.0 - 12.0 % Final    Eosinophil % 10/02/2024 0.8  0.3 - 6.2 % Final    Basophil % 10/02/2024 0.4  0.0 - 1.5 % Final    Immature Grans % 10/02/2024 0.3  0.0 - 0.5 % Final    Neutrophils, Absolute 10/02/2024 6.67  1.70 - 7.00 10*3/mm3 Final    Lymphocytes, Absolute 10/02/2024 3.73 (H)  0.70 - 3.10 10*3/mm3 Final    Monocytes,  Absolute 10/02/2024 0.60  0.10 - 0.90 10*3/mm3 Final    Eosinophils, Absolute 10/02/2024 0.09  0.00 - 0.40 10*3/mm3 Final    Basophils, Absolute 10/02/2024 0.04  0.00 - 0.20 10*3/mm3 Final    Immature Grans, Absolute 10/02/2024 0.03  0.00 - 0.05 10*3/mm3 Final    nRBC 10/02/2024 0.0  0.0 - 0.2 /100 WBC Final    HCG, Urine QL 10/02/2024 Negative  Negative Final    RBC, UA 10/02/2024 None Seen  None Seen, 0-2 /HPF Final    WBC, UA 10/02/2024 0-2  None Seen, 0-2 /HPF Final    Bacteria, UA 10/02/2024 None Seen  None Seen /HPF Final    Squamous Epithelial Cells, UA 10/02/2024 0-2  None Seen, 0-2 /HPF Final    Hyaline Casts, UA 10/02/2024 None Seen  None Seen /LPF Final    Methodology 10/02/2024 Manual Light Microscopy   Final   There may be more visits with results that are not included.       Assessment / Plan    Diagnoses and all orders for this visit:    1. Bipolar I disorder, most recent episode depressed (Primary)  -     Lurasidone HCl (Latuda) 120 MG tablet tablet; Take 1 tablet by mouth Daily.  Dispense: 30 tablet; Refill: 1  -     OXcarbazepine (TRILEPTAL) 300 MG tablet; Take 1 tablet by mouth 2 (Two) Times a Day.  Dispense: 60 tablet; Refill: 1    2. Encounter for therapeutic drug monitoring  -     Compliance Drug Analysis, Ur - Urine, Clean Catch    3. Psychophysiological insomnia  -     amitriptyline (ELAVIL) 150 MG tablet; Take 1 tablet by mouth Every Night.  Dispense: 30 tablet; Refill: 1    4. Post traumatic stress disorder (PTSD)  -     desvenlafaxine (PRISTIQ) 100 MG 24 hr tablet; Take 1 tablet by mouth Daily.  Dispense: 30 tablet; Refill: 1  -     diazePAM (VALIUM) 10 MG tablet; Take 1 tablet by mouth 3 (Three) Times a Day As Needed for Anxiety.  Dispense: 90 tablet; Refill: 1    5. Generalized anxiety disorder  -     desvenlafaxine (PRISTIQ) 100 MG 24 hr tablet; Take 1 tablet by mouth Daily.  Dispense: 30 tablet; Refill: 1  -     diazePAM (VALIUM) 10 MG tablet; Take 1 tablet by mouth 3 (Three) Times a  Day As Needed for Anxiety.  Dispense: 90 tablet; Refill: 1    6. Panic attacks  -     diazePAM (VALIUM) 10 MG tablet; Take 1 tablet by mouth 3 (Three) Times a Day As Needed for Anxiety.  Dispense: 90 tablet; Refill: 1       Patient appears to be doing well on current medication. No issues reported and no indication for change. Will continue medication as ordered.  Obtained yearly urine drug screen and controlled substance agreement signed.     History and physical exam exhibit continued safe and appropriate use of controlled substance.    As part of patient's treatment plan I am prescribing a controlled substance.  The patient has been made aware of the appropriate use of such medications, including potential risk of somnolence, limited ability to drive and/or work safely, and potential for dependence and/or overdose.  It has also been made clear that these medications are for use by this patient only, without concomitant use of alcohol or other substances, unless prescribed.    Patient has completed a prescribing agreement detailing terms of continued prescribing of controlled substances, including monitoring MITCH reports, urine drug screening, and pill counts if necessary.  Patient is aware that inappropriate use will result in cessation of prescribing such medications.    AIMS    Facial and Oral Movements  Muscles of Facial Expression: None, normal  Lips and Perioral Area: None, normal  Jaw: None, normal  Tongue: None, normal  Extremity Movements  Upper (arms, wrists, hands, fingers): None, normal  Lower (legs, knees, ankles, toes): None, normal  Trunk Movements  Neck, shoulders, hips: None, normal  Overall Severity  Severity of abnormal movements (max 4): 0  Incapacitation due to abnormal movements: None, normal  Patient's awareness of abnormal movements (rate only patient's report): No Awareness  Dental Status  Current problems with teeth and/or dentures?: No  Does patient usually wear dentures?: No    PHQ-9  Depression Screening  Little interest or pleasure in doing things? Several days   Feeling down, depressed, or hopeless? Several days   Trouble falling or staying asleep, or sleeping too much? Several days   Feeling tired or having little energy? Several days   Poor appetite or overeating? Several days   Feeling bad about yourself - or that you are a failure or have let yourself or your family down? Several days   Trouble concentrating on things, such as reading the newspaper or watching television? Several days   Moving or speaking so slowly that other people could have noticed? Or the opposite - being so fidgety or restless that you have been moving around a lot more than usual? Several days   Thoughts that you would be better off dead, or of hurting yourself in some way? Several days   PHQ-9 Total Score 9   If you checked off any problems, how difficult have these problems made it for you to do your work, take care of things at home, or get along with other people?       PHQ-9 Score:   PHQ-9 Total Score: 9    Depression Screening:  Patient screened positive for depression based on a PHQ-9 score of 9 on 11/25/2024. Follow-up recommendations include: Prescribed antidepressant medication treatment and Suicide Risk Assessment performed.        KENY-7  Over the last two weeks, how often have you been bothered by the following problems?  Feeling nervous, anxious or on edge: Several days  Not being able to stop or control worrying: Several days  Worrying too much about different things: Several days  Trouble Relaxing: Several days  Being so restless that it is hard to sit still: Several days  Becoming easily annoyed or irritable: Several days  Feeling afraid as if something awful might happen: Several days  KENY 7 Total Score: 7  If you checked any problems, how difficult have these problems made it for you to do your work, take care of things at home, or get along with other people: Very difficult      ADHD  Screening for  Adults With ADHD - (1-6)  1. How often do you have trouble wrapping up the final details of a project, once the challenging parts have been done?: Often  2. How often do you have difficulty getting things in order when you have to do a task that requires organization?: Often  3. How often do you have problems remembering appointments or obligations : Often  4. When you have a task that requires a lot of thought, how often do you avoid or delay getting started ?: Often  5. How often do you fidget or squirm with your hands or feet when you have to sit down for a long time?: Often  6. How often do you feel overly active and compelled to do things, like you were driven by a motor?: Often  7. How often do you make careless mistakes when you have to work on a boring or difficult project?: Often  8. How often do have difficulty keeping your attention when you are doing boring or repetitive work?: Often  9. How often do you have difficulty concentrating on what people say to you, even when they are speaking to you: Often  10.How often do you misplace or have difficulty finding things at home or at work?: Often  11.How often are you distracted by activity or noise around you?: Often  12.How often do you leave your seat in meetings or other situations in which you are expected to remain seated?: Often  13.How often do you feel restless or fidgety?: Often  14.How often do you have difficulty unwinding and relaxing when you have time to yourself?: Often  15.How often do you find yourself talking too much when you are in social situations?: Often  16.When you’re in a conversation, how often do you find yourself finishing the sentences of the people you are talking to, before they can finish them themselves?: Often  17.How often do you have difficulty waiting your turn in situations when turn taking is required?: Often  18.How often do you interrupt others when they are busy?: Often    A psychological evaluation was conducted in  order to assess past and current level of functioning. Areas assessed included, but were not limited to: perception of social support, perception of ability to face and deal with challenges in life (positive functioning), anxiety symptoms, depressive symptoms, perspective on beliefs/belief system, coping skills for stress, intelligence level,  Therapeutic rapport was established. Interventions conducted today were geared towards incorporating medication management along with support for continued therapy. Education was also provided as to the med management with this provider and what to expect in subsequent sessions.      We discussed risks, benefits, goals and side effects of the above medication and the patient was agreeable with the plan. Patient was educated on the importance of compliance with treatment and follow-up appointments. Patient is aware to contact the [facility] with any worsening of symptoms. To call for questions or concerns and return early if necessary. Patent is agreeable to go to the Emergency Department or call 911 should they begin SI/HI.    MEDS ORDERED DURING VISIT:  New Medications Ordered This Visit   Medications    amitriptyline (ELAVIL) 150 MG tablet     Sig: Take 1 tablet by mouth Every Night.     Dispense:  30 tablet     Refill:  1    desvenlafaxine (PRISTIQ) 100 MG 24 hr tablet     Sig: Take 1 tablet by mouth Daily.     Dispense:  30 tablet     Refill:  1    diazePAM (VALIUM) 10 MG tablet     Sig: Take 1 tablet by mouth 3 (Three) Times a Day As Needed for Anxiety.     Dispense:  90 tablet     Refill:  1    Lurasidone HCl (Latuda) 120 MG tablet tablet     Sig: Take 1 tablet by mouth Daily.     Dispense:  30 tablet     Refill:  1    OXcarbazepine (TRILEPTAL) 300 MG tablet     Sig: Take 1 tablet by mouth 2 (Two) Times a Day.     Dispense:  60 tablet     Refill:  1         Follow Up   Return in about 8 weeks (around 1/20/2025).  Patient was given instructions and counseling  regarding her condition or for health maintenance advice. Please see specific information pulled into the AVS if appropriate.     TREATMENT PLAN/GOALS: Continue supportive psychotherapy efforts and medications as indicated. Treatment and medication options discussed during today's visit. Patient acknowledged and verbally consented to continue with current treatment plan and was educated on the importance of compliance with treatment and follow-up appointments.    MEDICATION ISSUES:  Discussed medication options and treatment plan of prescribed medication as well as the risks, benefits, and side effects including potential falls, possible impaired driving and metabolic adversities among others. Patient is agreeable to call the office with any worsening of symptoms or onset of side effects. Patient is agreeable to call 911 or go to the nearest ER should he/she begin having SI/HI.        [facility]  [address] [place] [address]  [phone]    November 25, 2024 12:21 EST

## 2024-11-25 NOTE — TELEPHONE ENCOUNTER
Left message with pharmacy to run 20 for 30 days.  (Was on hold for several minutes before the call dropped) spoke with patient.  Notified her that PA is not required.  She will let me know if there are any issues filling medication.

## 2024-11-25 NOTE — TELEPHONE ENCOUNTER
Patient states she is having a lot of abdominal pain and is requesting more medication. She states she ibuprofen is not helping her anymore.

## 2024-11-25 NOTE — TELEPHONE ENCOUNTER
Caller: Lindsay Aemzquita    Relationship: Self    Best call back number:   Telephone Information:   Mobile [phone]         Which medication are you concerned about: butalbital-acetaminophen-caffeine (FIORICET, ESGIC) -40 MG per tablet       What are your concerns: PT STATES SHE HAS BEEN OUT OF THIS MEDICATION FOR THE PAST TWO MONTHS AND PHARMACY WILL NOT RELEASE WITHOUT A PRIOR AUTH

## 2024-11-26 ENCOUNTER — TELEPHONE (OUTPATIENT)
Dept: OBSTETRICS AND GYNECOLOGY | Facility: CLINIC | Age: 39
End: 2024-11-26
Payer: MEDICAID

## 2024-11-26 ENCOUNTER — TELEPHONE (OUTPATIENT)
Dept: CARDIOLOGY | Facility: CLINIC | Age: 39
End: 2024-11-26
Payer: MEDICAID

## 2024-11-26 NOTE — TELEPHONE ENCOUNTER
Patient called stating that she is very allergic to Tramadol and can't take that is there anything else she can have. I advised her for now to just use the Tylenol.

## 2024-11-26 NOTE — TELEPHONE ENCOUNTER
PATIENT CALLED SHE IS HAVING A HYSTERECTOMY ON MONDAY AND NEEDS A RISK ASSESSMENT IN ORDER TO HAVE IT. PLEASE ADVISE

## 2024-11-27 DIAGNOSIS — R10.2 PELVIC PAIN: Primary | ICD-10-CM

## 2024-11-27 RX ORDER — OXYCODONE AND ACETAMINOPHEN 5; 325 MG/1; MG/1
1 TABLET ORAL EVERY 8 HOURS PRN
Qty: 10 TABLET | Refills: 0 | Status: SHIPPED | OUTPATIENT
Start: 2024-11-27

## 2024-11-28 NOTE — PROGRESS NOTES
"GYN Office Visit  Subjective   Chief Complaint   Patient presents with    Consult     Discuss surgery        Lindsay Amezquita is a 39 y.o. year old  presenting for discussion regarding ovarian preservation with her upcoming hysterectomy.    She would like to have further discussion regarding whether or not to remove her ovaries during hysterectomy.       Objective   /74   Ht 157.5 cm (62\")   Wt 123 kg (271 lb)   BMI 49.57 kg/m²     Physical Exam:  None     Assessment   Abnormal uterine bleeding  Menorrhagia with irregular cycle  Dysmenorrhea  Chronic pelvic pain  Previous endometrial ablation with Cerene    We reviewed her situation in detail today.  We discussed the pros/cons of ovarian preservation and BSO.  We discussed surgical menopause and HRT.  She will continue to consider the matter for now.  Plan to proceed with TL in the near future.  All questions answered.    Coding/billing based on time: 20 total minutes were required for this encounter.    David Ribeiro MD  Obstetrics and Gynecology  [facility]   "

## 2024-12-02 ENCOUNTER — TELEPHONE (OUTPATIENT)
Dept: NEUROLOGY | Facility: CLINIC | Age: 39
End: 2024-12-02

## 2024-12-02 RX ORDER — RIMEGEPANT SULFATE 75 MG/75MG
75 TABLET, ORALLY DISINTEGRATING ORAL AS NEEDED
Qty: 2 TABLET | Refills: 0 | COMMUNITY
Start: 2024-12-02

## 2024-12-02 NOTE — TELEPHONE ENCOUNTER
Lindsay is visiting a patient in the hospital.  She said she has a migraine and would like to  a Nurtec sample (patient is supposed to take Nurtec QOD).  Ok to give sample?

## 2024-12-02 NOTE — TELEPHONE ENCOUNTER
Caller: LEORA     Relationship:    Callback number: [phone]    Is it ok to leave a message: [x] Yes [] No    Requested medication for samples: NUTREC    How much medication does the patient currently have left:     Who will be picking up the samples: PT     Do you need information about patient financial assistance for this medication: [] Yes [x] No    Additional details provided: PT IS AT HOSPITAL WITH FAMILY MEMBER SHE HAS MIGRAINE AND WOULD LIKE TO RUN BY TO GET RX ? IF YOU HAVE A SAMPLE SHE CAN

## 2024-12-03 ENCOUNTER — TELEPHONE (OUTPATIENT)
Dept: CARDIOLOGY | Facility: CLINIC | Age: 39
End: 2024-12-03
Payer: MEDICAID

## 2024-12-03 ENCOUNTER — TELEPHONE (OUTPATIENT)
Dept: OBSTETRICS AND GYNECOLOGY | Facility: CLINIC | Age: 39
End: 2024-12-03
Payer: MEDICAID

## 2024-12-03 ENCOUNTER — TELEMEDICINE (OUTPATIENT)
Dept: FAMILY MEDICINE CLINIC | Facility: CLINIC | Age: 39
End: 2024-12-03
Payer: MEDICAID

## 2024-12-03 DIAGNOSIS — R25.2 MUSCLE CRAMPING: ICD-10-CM

## 2024-12-03 DIAGNOSIS — F17.210 CIGARETTE NICOTINE DEPENDENCE WITHOUT COMPLICATION: ICD-10-CM

## 2024-12-03 DIAGNOSIS — E11.9 TYPE 2 DIABETES MELLITUS WITHOUT COMPLICATION, WITHOUT LONG-TERM CURRENT USE OF INSULIN: ICD-10-CM

## 2024-12-03 DIAGNOSIS — J32.9 RHINOSINUSITIS: ICD-10-CM

## 2024-12-03 DIAGNOSIS — J20.9 ACUTE BRONCHITIS, UNSPECIFIED ORGANISM: Primary | ICD-10-CM

## 2024-12-03 PROCEDURE — 3051F HG A1C>EQUAL 7.0%<8.0%: CPT | Performed by: FAMILY MEDICINE

## 2024-12-03 PROCEDURE — 1126F AMNT PAIN NOTED NONE PRSNT: CPT | Performed by: FAMILY MEDICINE

## 2024-12-03 PROCEDURE — 99214 OFFICE O/P EST MOD 30 MIN: CPT | Performed by: FAMILY MEDICINE

## 2024-12-03 PROCEDURE — 1160F RVW MEDS BY RX/DR IN RCRD: CPT | Performed by: FAMILY MEDICINE

## 2024-12-03 PROCEDURE — 1159F MED LIST DOCD IN RCRD: CPT | Performed by: FAMILY MEDICINE

## 2024-12-03 RX ORDER — BROMPHENIRAMINE MALEATE, PSEUDOEPHEDRINE HYDROCHLORIDE, AND DEXTROMETHORPHAN HYDROBROMIDE 2; 30; 10 MG/5ML; MG/5ML; MG/5ML
10 SYRUP ORAL 4 TIMES DAILY PRN
Qty: 473 ML | Refills: 0 | Status: SHIPPED | OUTPATIENT
Start: 2024-12-03

## 2024-12-03 RX ORDER — AZITHROMYCIN 250 MG/1
TABLET, FILM COATED ORAL
Qty: 6 TABLET | Refills: 0 | Status: SHIPPED | OUTPATIENT
Start: 2024-12-03

## 2024-12-03 NOTE — PROGRESS NOTES
Telehealth E-Visit      Date: 2024   Patient Name: Lindsay Amezquita  : 1985   MRN: [id number]     Chief Complaint:    Chief Complaint   Patient presents with    Fever    Muscle Pain       I have reviewed the E-Visit questionnaire and the patient's answers, my assessment and plan are listed below.     This provider is located at the [facility] (through [facility]), [address] [place] [address] using a secure [facility] Video Visit through [employer]. Patient is being seen remotely via telehealth at their home address in [place], and stated they are in a secure environment for this session. The patient's condition being diagnosed/treated is appropriate for telemedicine. The provider identified herself as well as her credentials. The patient, and/or patients guardian, consent to be seen remotely, and when consent is given they understand that the consent allows for patient identifiable information to be sent to a third party as needed. They may refuse to be seen remotely at any time. The electronic data is encrypted and password protected, and the patient and/or guardian has been advised of the potential risks to privacy not withstanding such measures.    You have chosen to receive care through a telehealth visit. Do you consent to use a video/audio connection for your medical care today? Yes    History of Present Illness: Lindsay Amezquita is a 39 y.o. female with concerns of fever and muscle cramping.  She states that symptoms have been ongoing for the last several days.  Over the last couple of days cough and congestion has worsened and she is now coughing up yellow-green phlegm.  She endorses nausea without vomiting, headaches, tactile fever with measured Tmax 99.4, tiredness, and fatigue.  Recently her mother was admitted to the hospital after Thanksgiving as well as her stepfather and she reports that she has been caring for them and has also been in the hospital staying  with them through their admission.  She states that she is drinking 1 Gatorade per day at least with a few bottles of water since she had noticed the muscle cramping.      Subjective      I have reviewed and the following portions of the patient's history were updated as appropriate: past family history, past medical history, past social history, past surgical history and problem list.    Medications:     Current Outpatient Medications:     acetaminophen (TYLENOL) 500 MG tablet, Take 2 tablets by mouth Every 8 (Eight) Hours As Needed for Mild Pain., Disp: 60 tablet, Rfl: 10    Allegra-D Allergy & Congestion  MG per 12 hr tablet, Take 1 tablet by mouth 2 (Two) Times a Day., Disp: , Rfl:     amitriptyline (ELAVIL) 150 MG tablet, Take 1 tablet by mouth Every Night., Disp: 30 tablet, Rfl: 1    atorvastatin (LIPITOR) 40 MG tablet, Take 1 tablet by mouth every night at bedtime., Disp: 90 tablet, Rfl: 3    azithromycin (Zithromax Z-Janusz) 250 MG tablet, Take 2 tablets by mouth on day 1, then 1 tablet daily on days 2-5, Disp: 6 tablet, Rfl: 0    brompheniramine-pseudoephedrine-DM 30-2-10 MG/5ML syrup, Take 10 mL by mouth 4 (Four) Times a Day As Needed for Allergies., Disp: 473 mL, Rfl: 0    butalbital-acetaminophen-caffeine (FIORICET, ESGIC) -40 MG per tablet, Take 1 tablet by mouth 2 (Two) Times a Day As Needed for Headache., Disp: 20 tablet, Rfl: 0    desvenlafaxine (PRISTIQ) 100 MG 24 hr tablet, Take 1 tablet by mouth Daily., Disp: 30 tablet, Rfl: 1    diazePAM (VALIUM) 10 MG tablet, Take 1 tablet by mouth 3 (Three) Times a Day As Needed for Anxiety., Disp: 90 tablet, Rfl: 1    fluticasone (FLONASE) 50 MCG/ACT nasal spray, Administer 1 spray into the nostril(s) as directed by provider 2 (Two) Times a Day., Disp: 16 g, Rfl: 5    FREESTYLE LITE test strip, See Admin Instructions., Disp: , Rfl:     gabapentin (Neurontin) 800 MG tablet, Take 1 tablet by mouth 3 (Three) Times a Day., Disp: 90 tablet, Rfl: 0     Gallifrey 5 MG tablet, Take 2 tablets by mouth Daily., Disp: , Rfl:     ibuprofen (ADVIL,MOTRIN) 800 MG tablet, Take 1 tablet by mouth Every 8 (Eight) Hours As Needed for Moderate Pain for up to 30 doses., Disp: 30 tablet, Rfl: 5    ipratropium-albuterol (DUO-NEB) 0.5-2.5 mg/3 ml nebulizer, Take 3 mL by nebulization 4 (Four) Times a Day As Needed for Wheezing or Shortness of Air., Disp: 360 mL, Rfl: 5    Lurasidone HCl (Latuda) 120 MG tablet tablet, Take 1 tablet by mouth Daily., Disp: 30 tablet, Rfl: 1    OXcarbazepine (TRILEPTAL) 300 MG tablet, Take 1 tablet by mouth 2 (Two) Times a Day., Disp: 60 tablet, Rfl: 1    oxyCODONE-acetaminophen (Percocet) 5-325 MG per tablet, Take 1 tablet by mouth Every 8 (Eight) Hours As Needed for Severe Pain., Disp: 10 tablet, Rfl: 0    pantoprazole (Protonix) 40 MG EC tablet, Take 1 tablet by mouth Daily., Disp: 90 tablet, Rfl: 1    polyethylene glycol (MIRALAX) 17 g packet, Take 17 g by mouth Daily. (Patient taking differently: Take 17 g by mouth As Needed.), Disp: 30 each, Rfl: 1    promethazine (PHENERGAN) 12.5 MG tablet, Take 1 tablet by mouth Every 6 (Six) Hours As Needed for Nausea or Vomiting., Disp: 30 tablet, Rfl: 5    Rimegepant Sulfate (Nurtec) 75 MG tablet dispersible tablet, Take 1 tablet by mouth Every Other Day. Take Monday,Wednesday,Friday, and Saturday., Disp: 16 tablet, Rfl: 5    Rimegepant Sulfate (Nurtec) 75 MG tablet dispersible tablet, Take 1 tablet by mouth As Needed (HA)., Disp: 2 tablet, Rfl: 0    tiZANidine (ZANAFLEX) 4 MG tablet, TAKE 1 TABLET BY MOUTH EVERY 8 HOURS AS NEEDED FOR MUSCLE SPASMS., Disp: 270 tablet, Rfl: 0    traMADol (Ultram) 50 MG tablet, Take 1 tablet by mouth Every 8 (Eight) Hours As Needed for Severe Pain., Disp: 10 tablet, Rfl: 0    Trelegy Ellipta 200-62.5-25 MCG/ACT inhaler, Inhale 1 puff Daily. Rinse mouth with water after use, Disp: 60 each, Rfl: 5    Zavegepant HCl 10 MG/ACT solution, Administer 10 mg into the nostril(s) as  "directed by provider Daily As Needed (migraine). (1 spray into 1 nostril every 24 hrs prn), Disp: 6 each, Rfl: 5    Allergies:   Allergies   Allergen Reactions    Aspirin Anaphylaxis and Hives     Wheezing         Codeine Anaphylaxis     Tolerates oxycodone-acetaminophen with no allergic rxn    Cyclobenzaprine Other (See Comments)     \"gives me chest pain\"    Other reaction(s): Other (See Comments)    Haldol [Haloperidol] Rash    Imitrex [Sumatriptan] Anaphylaxis and Rash    Latex Hives and Rash    Penicillins Hives and Anaphylaxis     Vomiting    Metformin Nausea And Vomiting    Zofran [Ondansetron] GI Intolerance     Constipation    Lamictal [Lamotrigine] Itching     Itching and hives    Metoclopramide Headache, Hives and Other (See Comments)    Milk-Related Compounds GI Intolerance    Morphine Itching    Oxycontin [Oxycodone] GI Intolerance    Prochlorperazine Nausea And Vomiting and Unknown (See Comments)    Tramadol Hives and Nausea And Vomiting       Objective     Physical Exam:  Vital Signs: There were no vitals filed for this visit.         Physical Exam  Constitutional:       General: She is not in acute distress.     Appearance: Normal appearance. She is obese. She is not ill-appearing, toxic-appearing or diaphoretic.   HENT:      Head: Normocephalic and atraumatic.      Right Ear: External ear normal.      Left Ear: External ear normal.      Nose: Nose normal.   Eyes:      Extraocular Movements: Extraocular movements intact.      Conjunctiva/sclera: Conjunctivae normal.   Pulmonary:      Effort: Pulmonary effort is normal.   Musculoskeletal:      Cervical back: Normal range of motion.   Neurological:      General: No focal deficit present.      Mental Status: She is alert and oriented to person, place, and time.   Psychiatric:         Mood and Affect: Mood normal.         Behavior: Behavior normal.         Thought Content: Thought content normal.         Judgment: Judgment normal.           Assessment / " Plan      Assessment/Plan:   Diagnoses and all orders for this visit:    1. Acute bronchitis, unspecified organism (Primary)  -     azithromycin (Zithromax Z-Janusz) 250 MG tablet; Take 2 tablets by mouth on day 1, then 1 tablet daily on days 2-5  Dispense: 6 tablet; Refill: 0  -     brompheniramine-pseudoephedrine-DM 30-2-10 MG/5ML syrup; Take 10 mL by mouth 4 (Four) Times a Day As Needed for Allergies.  Dispense: 473 mL; Refill: 0    2. Rhinosinusitis  -     azithromycin (Zithromax Z-Janusz) 250 MG tablet; Take 2 tablets by mouth on day 1, then 1 tablet daily on days 2-5  Dispense: 6 tablet; Refill: 0  -     brompheniramine-pseudoephedrine-DM 30-2-10 MG/5ML syrup; Take 10 mL by mouth 4 (Four) Times a Day As Needed for Allergies.  Dispense: 473 mL; Refill: 0    3. Cigarette nicotine dependence without complication  -     CBC (No Diff)    4. Muscle cramping  -     TSH Rfx On Abnormal To Free T4  -     Basic Metabolic Panel  -     CK  -     Urinalysis With Culture If Indicated - Urine, Clean Catch  -     Magnesium    5. Type 2 diabetes mellitus without complication, without long-term current use of insulin  -     Microalbumin / Creatinine Urine Ratio - Urine, Clean Catch  -     CBC (No Diff)      Sxs likely 2/2 viral infection, however given changes in symptoms there is concern for possible secondary bacterial infection.   Unfortunately d/t VV cannot obtain labs.  Will start short course of antibiotics, Z-Janusz.  Reviewed patient's recent antibiotic usage given her multiple stated antibiotic allergies.  Last antibiotic that patient was prescribed was doxycycline approximately 1 month ago.  It seems that the last time azithromycin was prescribed was in July  Will start Bromfed cough syrup for patient's cough symptoms  Continue fluticasone nasal spray and antihistamine use  Symptomatic treatment  Stressed to patient that she needed to increase fluid intake  Will order labs d/t muscle cramping. However, patient has  transportation issues and labs may not be completed promptly. This will unfortunately decrease the diagnostic yield of the labs. Encouraged patient to come in as soon as she can to have labs drawn.  Smoking cessation advised       Follow Up:   Return for Next scheduled follow up.    Any medications prescribed have been sent electronically to   [id number] - [place], [place] - [address] AT [facility] [address] - [phone] PH - [phone] FX  [address] [place] [address]  Phone: [phone] Fax: [phone]      24 minutes were spent reviewing the patient's questionnaire, formulating a treatment plan, and relaying information to the patient via [employer].    This visit was billed based on MDM.      Hunter Winkler MD  [facility]  12/03/24  10:24 EST

## 2024-12-03 NOTE — TELEPHONE ENCOUNTER
Patient called requesting a refill on Ibuprofen,Tylenol, and a few more Percocet, sent in to her pharmacy. Also she wanted you to know that she had some bleeding yesterday but it is under control now.

## 2024-12-03 NOTE — TELEPHONE ENCOUNTER
Caller: Lindsay Amezquita    Relationship: Self    Best call back number: [phone]     What is the best time to reach you: AFTER 11:00AM     What was the call regarding: PT REPORTS THAT THEY WERE GIVEN A CALL BUT THERE IS NO DOCUMENTATION IN THE CHART ABOUT THIS. IF THIS WAS NOT A MISTAKE, PLEASE CALL PT BACK.     Is it okay if the provider responds through MyChart: CALL

## 2024-12-04 ENCOUNTER — TELEPHONE (OUTPATIENT)
Dept: FAMILY MEDICINE CLINIC | Facility: CLINIC | Age: 39
End: 2024-12-04
Payer: MEDICAID

## 2024-12-04 DIAGNOSIS — R10.2 PELVIC PAIN: ICD-10-CM

## 2024-12-04 LAB — DRUGS UR: NORMAL

## 2024-12-04 RX ORDER — IBUPROFEN 800 MG/1
800 TABLET, FILM COATED ORAL EVERY 8 HOURS PRN
Qty: 30 TABLET | Refills: 0 | Status: SHIPPED | OUTPATIENT
Start: 2024-12-04

## 2024-12-04 RX ORDER — OXYCODONE AND ACETAMINOPHEN 5; 325 MG/1; MG/1
1 TABLET ORAL EVERY 8 HOURS PRN
Qty: 10 TABLET | Refills: 0 | Status: SHIPPED | OUTPATIENT
Start: 2024-12-04

## 2024-12-04 RX ORDER — ACETAMINOPHEN 500 MG
1000 TABLET ORAL EVERY 8 HOURS PRN
Qty: 60 TABLET | Refills: 10 | Status: SHIPPED | OUTPATIENT
Start: 2024-12-04 | End: 2025-12-04

## 2024-12-05 ENCOUNTER — TELEPHONE (OUTPATIENT)
Dept: BEHAVIORAL HEALTH | Facility: CLINIC | Age: 39
End: 2024-12-05
Payer: MEDICAID

## 2024-12-05 DIAGNOSIS — F41.0 PANIC ATTACKS: ICD-10-CM

## 2024-12-05 DIAGNOSIS — F43.10 POST TRAUMATIC STRESS DISORDER (PTSD): ICD-10-CM

## 2024-12-05 DIAGNOSIS — F41.1 GENERALIZED ANXIETY DISORDER: ICD-10-CM

## 2024-12-05 RX ORDER — DIAZEPAM 10 MG/1
10 TABLET ORAL 3 TIMES DAILY PRN
Qty: 90 TABLET | Refills: 1 | OUTPATIENT
Start: 2024-12-05 | End: 2025-12-05

## 2024-12-05 NOTE — TELEPHONE ENCOUNTER
Contacted patient and let her know her UDS from 11/25 was normal. Patient states she was having some issues going to the bathroom, advised to see Dr. Winkler her PCP about the issue.

## 2024-12-05 NOTE — TELEPHONE ENCOUNTER
PATIENT CALLED IN REGARDS TO HER UDS DONE ON 11/25, SAID SHE SEEN RESULTS ON HER MYCHART BUT DIDN'T KNOW WHAT THE RESULTS WERE OR WHAT THE RESULTS MEANT. SHE WOULD LIKE FOR NURSE OR MAO TO GIVE HER A CALL BACK.

## 2024-12-08 ENCOUNTER — TELEMEDICINE (OUTPATIENT)
Dept: FAMILY MEDICINE CLINIC | Facility: TELEHEALTH | Age: 39
End: 2024-12-08
Payer: MEDICAID

## 2024-12-08 DIAGNOSIS — J40 BRONCHITIS: Primary | ICD-10-CM

## 2024-12-08 RX ORDER — AZITHROMYCIN 250 MG/1
TABLET, FILM COATED ORAL
Qty: 6 TABLET | Refills: 0 | Status: SHIPPED | OUTPATIENT
Start: 2024-12-08

## 2024-12-08 RX ORDER — PREDNISONE 10 MG/1
TABLET ORAL
Qty: 30 TABLET | Refills: 0 | Status: SHIPPED | OUTPATIENT
Start: 2024-12-08

## 2024-12-08 NOTE — PROGRESS NOTES
You have chosen to receive care through a telehealth visit.  Do you consent to use a video/audio connection for your medical care today? Yes     CHIEF COMPLAINT  No chief complaint on file.        HPI  Lindsay Amezquita is a 39 y.o. female  presents with complaint of cough, nasal congestion.  She states that her cough is productive with green/bown sputum that started a couple of days ago.    Review of Systems   HENT:  Positive for congestion, postnasal drip and rhinorrhea.    Respiratory:  Positive for cough.    All other systems reviewed and are negative.      Past Medical History:   Diagnosis Date    Allergic     Anxiety     Asthma Beings of copd with asthma    Back pain     Bell palsy 07/06/2021    Bell's palsy     Bipolar 2 disorder     Blood clot in vein     Behind left knee cap    Deep vein thrombosis Last year    Depression     Diabetes mellitus November    Pre diabete    Fatty liver     Frequent headaches     GERD (gastroesophageal reflux disease)     Hypertension     Every time i go into the emergacy room    Irritable bowel syndrome Two years ago    Kidney stone Two years ago    Migraine     Nausea, vomiting and diarrhea 09/17/2018    Obesity All of my life    Ovarian cyst Two years ago    Pancreatitis     Panic     PTSD (post-traumatic stress disorder)     Pulmonary embolism Not in lungs    Recurrent pregnancy loss, antepartum condition or complication Every since i have been 14 yars old    Restless leg syndrome     Sinus problem     Snores     Tattoos     Urinary tract infection Have them on and off    Varicella Little    Visual impairment Since i was little    Vitamin B12 deficiency     Wears glasses        Family History   Problem Relation Age of Onset    Irritable bowel syndrome Mother     Heart disease Mother     Miscarriages / Stillbirths Mother     Arthritis Mother     COPD Mother     Learning disabilities Mother     Mental illness Mother     Asthma Mother     Irritable bowel syndrome Father     Alcohol  abuse Father     Diabetes Father     Hyperlipidemia Father     Anxiety disorder Father     Learning disabilities Father     Colon cancer Maternal Grandfather     Stroke Paternal Grandfather     Stroke Paternal Grandmother        Social History     Socioeconomic History    Marital status: Single   Tobacco Use    Smoking status: Every Day     Current packs/day: 0.50     Average packs/day: 2.0 packs/day for 32.5 years (63.6 ttl pk-yrs)     Types: Cigarettes     Start date: 7/17/1992     Passive exposure: Current    Smokeless tobacco: Never    Tobacco comments:      Around 2.5 packs per day- 7/5/24   Vaping Use    Vaping status: Former    Passive vaping exposure: Yes   Substance and Sexual Activity    Alcohol use: Not Currently     Comment: rarely    Drug use: Never    Sexual activity: Not Currently     Partners: Female     Birth control/protection: Other, Partner of same sex       Lindsay Amezquita  reports that she has been smoking cigarettes. She started smoking about 32 years ago. She has a 63.6 pack-year smoking history. She has been exposed to tobacco smoke. She has never used smokeless tobacco. I have educated her on the risk of diseases from using tobacco products such as cancer, COPD, and heart disease.     I advised her to quit and she is not willing to quit.    I spent  1  minutes counseling the patient.              There were no vitals taken for this visit.    PHYSICAL EXAM  Physical Exam   Constitutional: She appears well-developed and well-nourished.   HENT:   Head: Normocephalic.   Eyes: Pupils are equal, round, and reactive to light.   Pulmonary/Chest: Effort normal.   Musculoskeletal: Normal range of motion.   Neurological: She is alert.   Psychiatric: She has a normal mood and affect.       Results for orders placed or performed in visit on 11/25/24   Compliance Drug Analysis, Ur - Urine, Clean Catch    Collection Time: 11/25/24  5:09 PM    Specimen: Urine, Clean Catch   Result Value Ref Range    Report  Summary FINAL      *Note: Due to a large number of results and/or encounters for the requested time period, some results have not been displayed. A complete set of results can be found in Results Review.       Diagnoses and all orders for this visit:    1. Bronchitis (Primary)  -     predniSONE (DELTASONE) 10 MG tablet; 5 po for 2 days, 4 po for 2 days, 3 po for 2 days, 2 po for 2 days, 1 po for 2 days  Dispense: 30 tablet; Refill: 0  -     azithromycin (Zithromax Z-Janusz) 250 MG tablet; Take 2 tablets by mouth on day 1, then 1 tablet daily on days 2-5  Dispense: 6 tablet; Refill: 0          FOLLOW-UP  As discussed during visit with PCP/[facility] if no improvement or Urgent Care/Emergency Department if worsening of symptoms    Patient verbalizes understanding of medication dosage, comfort measures, instructions for treatment and follow-up.    Suzannadawson Wills, APRN  12/08/2024  00:24 EST    Mode of Visit: Video  Location of patient: -HOME-  Location of provider: +HOME+  You have chosen to receive care through a telehealth visit.  The patient has signed the video visit consent form.  The visit included audio and video interaction. No technical issues occurred during this visit.      Note Disclaimer: At [facility], we believe that sharing information builds trust and better   relationships. You are receiving this note because you recently visited [facility]. It is possible you   will see health information before a provider has talked with you about it. This kind of information can   be easy to misunderstand. To help you fully understand what it means for your health, we urge you to   discuss this note with your provider.

## 2024-12-09 DIAGNOSIS — F41.0 PANIC ATTACKS: ICD-10-CM

## 2024-12-09 DIAGNOSIS — F41.1 GENERALIZED ANXIETY DISORDER: ICD-10-CM

## 2024-12-09 DIAGNOSIS — F43.10 POST TRAUMATIC STRESS DISORDER (PTSD): ICD-10-CM

## 2024-12-09 RX ORDER — DIAZEPAM 10 MG/1
10 TABLET ORAL 3 TIMES DAILY PRN
Qty: 90 TABLET | Refills: 1 | OUTPATIENT
Start: 2024-12-09 | End: 2025-12-09

## 2024-12-11 ENCOUNTER — TELEPHONE (OUTPATIENT)
Dept: OBSTETRICS AND GYNECOLOGY | Facility: CLINIC | Age: 39
End: 2024-12-11

## 2024-12-11 NOTE — TELEPHONE ENCOUNTER
Patient wanted to know when she is going to able to have her surgery. States she is in extreme pain and is out of pain medication     Thank  You

## 2024-12-16 ENCOUNTER — TELEPHONE (OUTPATIENT)
Dept: FAMILY MEDICINE CLINIC | Facility: CLINIC | Age: 39
End: 2024-12-16
Payer: MEDICAID

## 2024-12-17 DIAGNOSIS — R61 NIGHT SWEATS: ICD-10-CM

## 2024-12-17 DIAGNOSIS — G89.4 CHRONIC PAIN SYNDROME: ICD-10-CM

## 2024-12-17 RX ORDER — FEXOFENADINE HYDROCHLORIDE AND PSEUDOEPHEDRINE HYDROCHLORIDE 60; 120 MG/1; MG/1
1 TABLET, FILM COATED, EXTENDED RELEASE ORAL 2 TIMES DAILY
Qty: 20 TABLET | Refills: 1 | Status: SHIPPED | OUTPATIENT
Start: 2024-12-17 | End: 2024-12-17 | Stop reason: SDUPTHER

## 2024-12-17 RX ORDER — GABAPENTIN 800 MG/1
800 TABLET ORAL 3 TIMES DAILY
Qty: 90 TABLET | Refills: 0 | Status: SHIPPED | OUTPATIENT
Start: 2024-12-17

## 2024-12-17 RX ORDER — FEXOFENADINE HYDROCHLORIDE AND PSEUDOEPHEDRINE HYDROCHLORIDE 60; 120 MG/1; MG/1
1 TABLET, FILM COATED, EXTENDED RELEASE ORAL 2 TIMES DAILY
Qty: 20 TABLET | Refills: 1 | Status: SHIPPED | OUTPATIENT
Start: 2024-12-17

## 2024-12-17 NOTE — TELEPHONE ENCOUNTER
If patient has concerns about a medication or not being on a medication, please offer her an appointment to discuss further.

## 2024-12-17 NOTE — TELEPHONE ENCOUNTER
Med refill request received from pharmacy.      Rx Refill Note  Requested Prescriptions     Pending Prescriptions Disp Refills    Allegra-D Allergy & Congestion  MG per 12 hr tablet 20 tablet 1     Sig: Take 1 tablet by mouth 2 (Two) Times a Day.      Last office visit with prescribing clinician: 11/6/2024   Last telemedicine visit with prescribing clinician: 12/3/2024   Next office visit with prescribing clinician: Visit date not found                         Would you like a call back once the refill request has been completed: [] Yes [] No    If the office needs to give you a call back, can they leave a voicemail: [] Yes [] No    Ale Olsen MA  12/17/24, 15:54 EST

## 2024-12-17 NOTE — TELEPHONE ENCOUNTER
PATIENT SAID SHE NEEDS SOMETHING FOR HER SUGAR. SHE CANNOT STOMACH THE METFORMIN AND VOMITS EVERYTHING UP. (PATIENT SAID SHE WAS TAKEN OFF OF JARDIANCE DUE TO PANCREATITIS, BUT SHE SAYS THAT IS ACTUALLY DUE TO HER DRINKING NOT THE MEDICATION). PLEASE ADVISE.

## 2024-12-17 NOTE — TELEPHONE ENCOUNTER
Rx Refill Note  Requested Prescriptions     Pending Prescriptions Disp Refills    Allegra-D Allergy & Congestion  MG per 12 hr tablet       Sig: Take 1 tablet by mouth 2 (Two) Times a Day.    gabapentin (Neurontin) 800 MG tablet 90 tablet 0     Sig: Take 1 tablet by mouth 3 (Three) Times a Day.      Last office visit with prescribing clinician: 11/6/2024   Last telemedicine visit with prescribing clinician: 12/3/2024   Next office visit with prescribing clinician: Visit date not found     Hilary Hart MA  12/17/24, 10:39 EST

## 2024-12-18 ENCOUNTER — TELEPHONE (OUTPATIENT)
Dept: OBSTETRICS AND GYNECOLOGY | Facility: CLINIC | Age: 39
End: 2024-12-18

## 2024-12-18 NOTE — TELEPHONE ENCOUNTER
Caller: Lindsay Amezquita    Relationship: Self    Best call back number: [phone]      Who are you requesting to speak with (clinical staff, DR. ROMERO    What was the call regarding: PATIENT ADVISED THAT SHE TOOK AN  800 MG IBUPROFEN, SHE STARTED BLEEDING HEAVILY LAST NIGHT, WENT THROUGH 2 PADS WITHIN 4 HOURS, HER STOMACH HURTS REALLY BAD, PATIENT ADVISED THAT SHE NEEDS SURGERY, PLEASE ASSIST.

## 2024-12-19 ENCOUNTER — TELEMEDICINE (OUTPATIENT)
Dept: FAMILY MEDICINE CLINIC | Facility: TELEHEALTH | Age: 39
End: 2024-12-19
Payer: MEDICAID

## 2024-12-19 ENCOUNTER — TELEPHONE (OUTPATIENT)
Dept: CARDIOLOGY | Facility: CLINIC | Age: 39
End: 2024-12-19
Payer: MEDICAID

## 2024-12-19 DIAGNOSIS — R11.2 NAUSEA VOMITING AND DIARRHEA: ICD-10-CM

## 2024-12-19 DIAGNOSIS — R42 DIZZINESS: Primary | ICD-10-CM

## 2024-12-19 DIAGNOSIS — R07.9 CHEST PAIN, UNSPECIFIED TYPE: ICD-10-CM

## 2024-12-19 DIAGNOSIS — R19.7 NAUSEA VOMITING AND DIARRHEA: ICD-10-CM

## 2024-12-19 DIAGNOSIS — R10.9 ABDOMINAL PAIN, UNSPECIFIED ABDOMINAL LOCATION: ICD-10-CM

## 2024-12-19 PROCEDURE — 99213 OFFICE O/P EST LOW 20 MIN: CPT | Performed by: NURSE PRACTITIONER

## 2024-12-19 PROCEDURE — 1160F RVW MEDS BY RX/DR IN RCRD: CPT | Performed by: NURSE PRACTITIONER

## 2024-12-19 PROCEDURE — 1159F MED LIST DOCD IN RCRD: CPT | Performed by: NURSE PRACTITIONER

## 2024-12-19 NOTE — TELEPHONE ENCOUNTER
"Pt called demanding cardiac clearance for her procedure. Pt states she \"did not have chest pain and that if she did it was because of bronchitis\" Pt was advised that the stress test is a different type of test compared to the Echo and per Dr Chu office (through documentation of our office speaking with his office) they wanted her to have the stress test completed. Pt then stated \"Well I will call over and talk to them myself and if they don't give me the clearance I will go to  and wont tell them Carmen been having chest pain so I can have this surgery.\"   Pt then hung up the phone.   "

## 2024-12-19 NOTE — TELEPHONE ENCOUNTER
Pt was scheduled to have a Total Laparoscopic Hysterectomy, Bilateral Salpingectomy, cystoscopy with R OBGYN

## 2024-12-20 NOTE — PROGRESS NOTES
HPI  Lindsay Amezquita is a 39 y.o. female  presents with complaint of N/V/D starting today. Has also been having dizziness and blurred vision since waking up at 3:30am today. Has tried drinking water. She denies fever. Ribs hurt due to vomiting. Has been having lower abd pain that is going up into her chest. Plans to have hysterectomy but that is not scheduled yet.    Review of Systems    Past Medical History:   Diagnosis Date    Allergic     Anxiety     Asthma Beings of copd with asthma    Back pain     Bell palsy 07/06/2021    Bell's palsy     Bipolar 2 disorder     Blood clot in vein     Behind left knee cap    Deep vein thrombosis Last year    Depression     Diabetes mellitus November    Pre diabete    Fatty liver     Frequent headaches     GERD (gastroesophageal reflux disease)     Hypertension     Every time i go into the emergacy room    Irritable bowel syndrome Two years ago    Kidney stone Two years ago    Migraine     Nausea, vomiting and diarrhea 09/17/2018    Obesity All of my life    Ovarian cyst Two years ago    Pancreatitis     Panic     PTSD (post-traumatic stress disorder)     Pulmonary embolism Not in lungs    Recurrent pregnancy loss, antepartum condition or complication Every since i have been 14 yars old    Restless leg syndrome     Sinus problem     Snores     Tattoos     Urinary tract infection Have them on and off    Varicella Little    Visual impairment Since i was little    Vitamin B12 deficiency     Wears glasses        Family History   Problem Relation Age of Onset    Irritable bowel syndrome Mother     Heart disease Mother     Miscarriages / Stillbirths Mother     Arthritis Mother     COPD Mother     Learning disabilities Mother     Mental illness Mother     Asthma Mother     Irritable bowel syndrome Father     Alcohol abuse Father     Diabetes Father     Hyperlipidemia Father     Anxiety disorder Father     Learning disabilities Father     Colon cancer Maternal Grandfather     Stroke  Paternal Grandfather     Stroke Paternal Grandmother        Social History     Socioeconomic History    Marital status: Single   Tobacco Use    Smoking status: Every Day     Current packs/day: 0.50     Average packs/day: 2.0 packs/day for 32.5 years (63.6 ttl pk-yrs)     Types: Cigarettes     Start date: 7/17/1992     Passive exposure: Current    Smokeless tobacco: Never    Tobacco comments:      Around 2.5 packs per day- 7/5/24   Vaping Use    Vaping status: Former    Passive vaping exposure: Yes   Substance and Sexual Activity    Alcohol use: Not Currently     Comment: rarely    Drug use: Never    Sexual activity: Not Currently     Partners: Female     Birth control/protection: Other, Partner of same sex         There were no vitals taken for this visit.    PHYSICAL EXAM  Physical Exam   Constitutional: She appears well-developed and well-nourished.   HENT:   Head: Normocephalic.   Nose: Nose normal.   Neck: Neck normal appearance.  Pulmonary/Chest: Effort normal.   Neurological: She is alert.   Psychiatric: Her speech is normal. She mood appears anxious.       Diagnoses and all orders for this visit:    1. Dizziness (Primary)    2. Abdominal pain, unspecified abdominal location    3. Chest pain, unspecified type    4. Nausea vomiting and diarrhea        Instructed patient that due to multiple complaints she needs to be seen in person now for an evaluation. Advised to go to ER now. She verbalized understanding.       Adelaida Willams, APRN  12/19/2024  20:35 EST      Mode of Visit: Video  Location of patient: -HOME-  Location of provider: Home  You have chosen to receive care through a telehealth visit.  The patient has signed the video visit consent form.  The visit included audio and video interaction. No technical issues occurred during this visit.

## 2024-12-21 ENCOUNTER — HOSPITAL ENCOUNTER (EMERGENCY)
Facility: HOSPITAL | Age: 39
Discharge: HOME | End: 2024-12-21
Attending: EMERGENCY MEDICINE
Payer: MEDICAID

## 2024-12-21 ENCOUNTER — APPOINTMENT (OUTPATIENT)
Dept: CT IMAGING | Facility: HOSPITAL | Age: 39
End: 2024-12-21
Payer: MEDICAID

## 2024-12-21 VITALS
DIASTOLIC BLOOD PRESSURE: 90 MMHG | WEIGHT: 263.8 LBS | HEART RATE: 107 BPM | SYSTOLIC BLOOD PRESSURE: 136 MMHG | TEMPERATURE: 98.4 F | BODY MASS INDEX: 48.55 KG/M2 | RESPIRATION RATE: 18 BRPM | OXYGEN SATURATION: 95 % | HEIGHT: 62 IN

## 2024-12-21 DIAGNOSIS — K85.90 ACUTE PANCREATITIS WITHOUT INFECTION OR NECROSIS, UNSPECIFIED PANCREATITIS TYPE: Primary | ICD-10-CM

## 2024-12-21 LAB
ALBUMIN SERPL-MCNC: 4.2 G/DL (ref 3.5–5.2)
ALBUMIN/GLOB SERPL: 1.4 G/DL
ALP SERPL-CCNC: 90 U/L (ref 39–117)
ALT SERPL W P-5'-P-CCNC: 26 U/L (ref 1–33)
ANION GAP SERPL CALCULATED.3IONS-SCNC: 16.8 MMOL/L (ref 5–15)
APTT PPP: 24.7 SECONDS (ref 23–35)
AST SERPL-CCNC: 16 U/L (ref 1–32)
B-HCG UR QL: NEGATIVE
BACTERIA UR QL AUTO: ABNORMAL /HPF
BASOPHILS # BLD AUTO: 0.05 10*3/MM3 (ref 0–0.2)
BASOPHILS NFR BLD AUTO: 0.4 % (ref 0–1.5)
BILIRUB SERPL-MCNC: 0.6 MG/DL (ref 0–1.2)
BILIRUB UR QL STRIP: NEGATIVE
BUN SERPL-MCNC: 3 MG/DL (ref 6–20)
BUN/CREAT SERPL: 5.2 (ref 7–25)
CALCIUM SPEC-SCNC: 10.5 MG/DL (ref 8.6–10.5)
CHLORIDE SERPL-SCNC: 99 MMOL/L (ref 98–107)
CLARITY UR: CLEAR
CO2 SERPL-SCNC: 23.2 MMOL/L (ref 22–29)
COLOR UR: YELLOW
CREAT SERPL-MCNC: 0.58 MG/DL (ref 0.57–1)
D-LACTATE SERPL-SCNC: 3.1 MMOL/L (ref 0.5–2)
D-LACTATE SERPL-SCNC: 3.8 MMOL/L (ref 0.5–2)
DEPRECATED RDW RBC AUTO: 41.3 FL (ref 37–54)
EGFRCR SERPLBLD CKD-EPI 2021: 117.5 ML/MIN/1.73
EOSINOPHIL # BLD AUTO: 0.15 10*3/MM3 (ref 0–0.4)
EOSINOPHIL NFR BLD AUTO: 1.3 % (ref 0.3–6.2)
ERYTHROCYTE [DISTWIDTH] IN BLOOD BY AUTOMATED COUNT: 13.5 % (ref 12.3–15.4)
GLOBULIN UR ELPH-MCNC: 3 GM/DL
GLUCOSE SERPL-MCNC: 154 MG/DL (ref 65–99)
GLUCOSE UR STRIP-MCNC: NEGATIVE MG/DL
HCT VFR BLD AUTO: 44.5 % (ref 34–46.6)
HGB BLD-MCNC: 15.6 G/DL (ref 12–15.9)
HGB UR QL STRIP.AUTO: NEGATIVE
HOLD SPECIMEN: NORMAL
HOLD SPECIMEN: NORMAL
HYALINE CASTS UR QL AUTO: ABNORMAL /LPF
IMM GRANULOCYTES # BLD AUTO: 0.03 10*3/MM3 (ref 0–0.05)
IMM GRANULOCYTES NFR BLD AUTO: 0.3 % (ref 0–0.5)
INR PPP: 1.03 (ref 0.9–1.1)
KETONES UR QL STRIP: NEGATIVE
LEUKOCYTE ESTERASE UR QL STRIP.AUTO: NEGATIVE
LIPASE SERPL-CCNC: 88 U/L (ref 13–60)
LYMPHOCYTES # BLD AUTO: 4.59 10*3/MM3 (ref 0.7–3.1)
LYMPHOCYTES NFR BLD AUTO: 40.7 % (ref 19.6–45.3)
MCH RBC QN AUTO: 29.7 PG (ref 26.6–33)
MCHC RBC AUTO-ENTMCNC: 35.1 G/DL (ref 31.5–35.7)
MCV RBC AUTO: 84.8 FL (ref 79–97)
MONOCYTES # BLD AUTO: 0.8 10*3/MM3 (ref 0.1–0.9)
MONOCYTES NFR BLD AUTO: 7.1 % (ref 5–12)
NEUTROPHILS NFR BLD AUTO: 5.66 10*3/MM3 (ref 1.7–7)
NEUTROPHILS NFR BLD AUTO: 50.2 % (ref 42.7–76)
NITRITE UR QL STRIP: NEGATIVE
NRBC BLD AUTO-RTO: 0 /100 WBC (ref 0–0.2)
PH UR STRIP.AUTO: 6.5 [PH] (ref 5–8)
PLATELET # BLD AUTO: 311 10*3/MM3 (ref 140–450)
PMV BLD AUTO: 10.2 FL (ref 6–12)
POTASSIUM SERPL-SCNC: 3 MMOL/L (ref 3.5–5.2)
PROT SERPL-MCNC: 7.2 G/DL (ref 6–8.5)
PROT UR QL STRIP: ABNORMAL
PROTHROMBIN TIME: 14 SECONDS (ref 12.3–15.1)
RBC # BLD AUTO: 5.25 10*6/MM3 (ref 3.77–5.28)
RBC # UR STRIP: ABNORMAL /HPF
REF LAB TEST METHOD: ABNORMAL
SODIUM SERPL-SCNC: 139 MMOL/L (ref 136–145)
SP GR UR STRIP: <=1.005 (ref 1–1.03)
SQUAMOUS #/AREA URNS HPF: ABNORMAL /HPF
UROBILINOGEN UR QL STRIP: ABNORMAL
WBC # UR STRIP: ABNORMAL /HPF
WBC NRBC COR # BLD AUTO: 11.28 10*3/MM3 (ref 3.4–10.8)
WHOLE BLOOD HOLD COAG: NORMAL
WHOLE BLOOD HOLD SPECIMEN: NORMAL

## 2024-12-21 PROCEDURE — 80053 COMPREHEN METABOLIC PANEL: CPT | Performed by: EMERGENCY MEDICINE

## 2024-12-21 PROCEDURE — 74177 CT ABD & PELVIS W/CONTRAST: CPT

## 2024-12-21 PROCEDURE — 85025 COMPLETE CBC W/AUTO DIFF WBC: CPT | Performed by: EMERGENCY MEDICINE

## 2024-12-21 PROCEDURE — 83605 ASSAY OF LACTIC ACID: CPT | Performed by: EMERGENCY MEDICINE

## 2024-12-21 PROCEDURE — 85730 THROMBOPLASTIN TIME PARTIAL: CPT | Performed by: EMERGENCY MEDICINE

## 2024-12-21 PROCEDURE — 99285 EMERGENCY DEPT VISIT HI MDM: CPT | Performed by: EMERGENCY MEDICINE

## 2024-12-21 PROCEDURE — 87040 BLOOD CULTURE FOR BACTERIA: CPT | Performed by: EMERGENCY MEDICINE

## 2024-12-21 PROCEDURE — 25510000001 IOPAMIDOL 61 % SOLUTION: Performed by: EMERGENCY MEDICINE

## 2024-12-21 PROCEDURE — 96375 TX/PRO/DX INJ NEW DRUG ADDON: CPT

## 2024-12-21 PROCEDURE — 96376 TX/PRO/DX INJ SAME DRUG ADON: CPT

## 2024-12-21 PROCEDURE — 85610 PROTHROMBIN TIME: CPT | Performed by: EMERGENCY MEDICINE

## 2024-12-21 PROCEDURE — 83690 ASSAY OF LIPASE: CPT | Performed by: EMERGENCY MEDICINE

## 2024-12-21 PROCEDURE — 25010000002 ONDANSETRON PER 1 MG: Performed by: EMERGENCY MEDICINE

## 2024-12-21 PROCEDURE — 81025 URINE PREGNANCY TEST: CPT | Performed by: EMERGENCY MEDICINE

## 2024-12-21 PROCEDURE — 81001 URINALYSIS AUTO W/SCOPE: CPT | Performed by: EMERGENCY MEDICINE

## 2024-12-21 PROCEDURE — 36415 COLL VENOUS BLD VENIPUNCTURE: CPT

## 2024-12-21 PROCEDURE — 25810000003 SODIUM CHLORIDE 0.9 % SOLUTION: Performed by: EMERGENCY MEDICINE

## 2024-12-21 PROCEDURE — 96374 THER/PROPH/DIAG INJ IV PUSH: CPT

## 2024-12-21 PROCEDURE — 25010000002 HYDROMORPHONE PER 4 MG: Performed by: EMERGENCY MEDICINE

## 2024-12-21 RX ORDER — HYDROCODONE BITARTRATE AND ACETAMINOPHEN 5; 325 MG/1; MG/1
1 TABLET ORAL ONCE
Status: COMPLETED | OUTPATIENT
Start: 2024-12-21 | End: 2024-12-21

## 2024-12-21 RX ORDER — ONDANSETRON 2 MG/ML
4 INJECTION INTRAMUSCULAR; INTRAVENOUS ONCE
Status: COMPLETED | OUTPATIENT
Start: 2024-12-21 | End: 2024-12-21

## 2024-12-21 RX ORDER — HYDROMORPHONE HYDROCHLORIDE 2 MG/ML
0.5 INJECTION, SOLUTION INTRAMUSCULAR; INTRAVENOUS; SUBCUTANEOUS ONCE
Status: COMPLETED | OUTPATIENT
Start: 2024-12-21 | End: 2024-12-21

## 2024-12-21 RX ORDER — SODIUM CHLORIDE 0.9 % (FLUSH) 0.9 %
10 SYRINGE (ML) INJECTION AS NEEDED
Status: DISCONTINUED | OUTPATIENT
Start: 2024-12-21 | End: 2024-12-21 | Stop reason: HOSPADM

## 2024-12-21 RX ORDER — ONDANSETRON 4 MG/1
4 TABLET, ORALLY DISINTEGRATING ORAL EVERY 8 HOURS PRN
Qty: 10 TABLET | Refills: 0 | Status: SHIPPED | OUTPATIENT
Start: 2024-12-21

## 2024-12-21 RX ORDER — HYDROCODONE BITARTRATE AND ACETAMINOPHEN 5; 325 MG/1; MG/1
1 TABLET ORAL EVERY 6 HOURS PRN
Qty: 8 TABLET | Refills: 0 | Status: SHIPPED | OUTPATIENT
Start: 2024-12-21

## 2024-12-21 RX ORDER — POTASSIUM CHLORIDE 750 MG/1
40 CAPSULE, EXTENDED RELEASE ORAL ONCE
Status: COMPLETED | OUTPATIENT
Start: 2024-12-21 | End: 2024-12-21

## 2024-12-21 RX ORDER — IOPAMIDOL 612 MG/ML
100 INJECTION, SOLUTION INTRAVASCULAR
Status: COMPLETED | OUTPATIENT
Start: 2024-12-21 | End: 2024-12-21

## 2024-12-21 RX ADMIN — SODIUM CHLORIDE 1000 ML: 9 INJECTION, SOLUTION INTRAVENOUS at 07:26

## 2024-12-21 RX ADMIN — HYDROCODONE BITARTRATE AND ACETAMINOPHEN 1 TABLET: 5; 325 TABLET ORAL at 09:14

## 2024-12-21 RX ADMIN — HYDROMORPHONE HYDROCHLORIDE 0.5 MG: 2 INJECTION INTRAMUSCULAR; INTRAVENOUS; SUBCUTANEOUS at 07:28

## 2024-12-21 RX ADMIN — SODIUM CHLORIDE 500 ML: 9 INJECTION, SOLUTION INTRAVENOUS at 09:14

## 2024-12-21 RX ADMIN — ONDANSETRON 4 MG: 2 INJECTION INTRAMUSCULAR; INTRAVENOUS at 07:28

## 2024-12-21 RX ADMIN — HYDROCODONE BITARTRATE AND ACETAMINOPHEN 1 TABLET: 5; 325 TABLET ORAL at 12:05

## 2024-12-21 RX ADMIN — ONDANSETRON 4 MG: 2 INJECTION INTRAMUSCULAR; INTRAVENOUS at 09:14

## 2024-12-21 RX ADMIN — POTASSIUM CHLORIDE 40 MEQ: 750 CAPSULE, EXTENDED RELEASE ORAL at 09:14

## 2024-12-21 RX ADMIN — IOPAMIDOL 100 ML: 612 INJECTION, SOLUTION INTRAVENOUS at 08:07

## 2024-12-21 NOTE — ED PROVIDER NOTES
[facility] EMERGENCY DEPARTMENT  Emergency Department Encounter  Emergency Medicine Physician Note     Pt Name:Lindsay Amezquita  MRN: [id number]  Birthdate 1985  Date of evaluation: 12/21/2024  PCP:  Hunter Winkler MD  Note Started: 7:21 AM EST      CHIEF COMPLAINT       Chief Complaint   Patient presents with    Abdominal Pain       HISTORY OF PRESENT ILLNESS  (Location/Symptom, Timing/Onset, Context/Setting, Quality, Duration, Modifying Factors, Severity.)      Lindsay Amezquita is a 39 y.o. female who presents with severe abdominal pain throughout her abdomen.  Patient describes it in both lower quadrants and in the suprapubic area, patient does state the pain radiates into her back.  Patient does have a history of pancreatitis in the past.  Patient describes abdominal pain going on for multiple days. Patient is scheduled for hysterectomy due to having chronic pelvic pain.  Does state that this pain is similar to her chronic pelvic pain.  Patient does describe vaginal bleeding a couple days ago where she filled 2 pads.  Patient denies any vaginal bleeding or discharge at this time.  Patient not sexually active with men.  Patient denies any concerns for STDs.  Patient rates the pain as severe, radiating to all throughout abdomen.  Patient nonspecific with her location of complaint.  Patient has a cold cholecystectomy history, still has her appendix.    PAST MEDICAL / SURGICAL / SOCIAL / FAMILY HISTORY     Past Medical History:   Diagnosis Date    Allergic     Anxiety     Asthma Beings of copd with asthma    Back pain     Bell palsy 07/06/2021    Bell's palsy     Bipolar 2 disorder     Blood clot in vein     Behind left knee cap    Deep vein thrombosis Last year    Depression     Diabetes mellitus November    Pre diabete    Fatty liver     Frequent headaches     GERD (gastroesophageal reflux disease)     Hypertension     Every time i go into the emergacy room    Irritable bowel syndrome Two years  ago    Kidney stone Two years ago    Migraine     Nausea, vomiting and diarrhea 09/17/2018    Obesity All of my life    Ovarian cyst Two years ago    Pancreatitis     Panic     PTSD (post-traumatic stress disorder)     Pulmonary embolism Not in lungs    Recurrent pregnancy loss, antepartum condition or complication Every since i have been 14 yars old    Restless leg syndrome     Sinus problem     Snores     Tattoos     Urinary tract infection Have them on and off    Varicella Little    Visual impairment Since i was little    Vitamin B12 deficiency     Wears glasses      No additional pertinent       Past Surgical History:   Procedure Laterality Date    CHOLECYSTECTOMY      HYSTEROSCOPY N/A 3/14/2024    Procedure: HYSTEROSCOPY WITH MYOSURE, DILATION AND CURETTAGE WITH CERENE ABLATION;  Surgeon: David Ribeiro MD;  Location: [facility];  Service: Obstetrics/Gynecology;  Laterality: N/A;    MULTIPLE TOOTH EXTRACTIONS      All my teeth    WISDOM TOOTH EXTRACTION  All my teeth     No additional pertinent       Social History     Socioeconomic History    Marital status: Single   Tobacco Use    Smoking status: Every Day     Current packs/day: 0.50     Average packs/day: 2.0 packs/day for 32.5 years (63.6 ttl pk-yrs)     Types: Cigarettes     Start date: 7/17/1992     Passive exposure: Current    Smokeless tobacco: Never    Tobacco comments:      Around 2.5 packs per day- 7/5/24   Vaping Use    Vaping status: Former    Passive vaping exposure: Yes   Substance and Sexual Activity    Alcohol use: Not Currently     Comment: rarely    Drug use: Never    Sexual activity: Not Currently     Partners: Female     Birth control/protection: Other, Partner of same sex       Family History   Problem Relation Age of Onset    Irritable bowel syndrome Mother     Heart disease Mother     Miscarriages / Stillbirths Mother     Arthritis Mother     COPD Mother     Learning disabilities Mother     Mental illness Mother     Asthma Mother      Irritable bowel syndrome Father     Alcohol abuse Father     Diabetes Father     Hyperlipidemia Father     Anxiety disorder Father     Learning disabilities Father     Colon cancer Maternal Grandfather     Stroke Paternal Grandfather     Stroke Paternal Grandmother        Allergies:  Aspirin, Codeine, Cyclobenzaprine, Haldol [haloperidol], Imitrex [sumatriptan], Latex, Penicillins, Metformin, Zofran [ondansetron], Lamictal [lamotrigine], Metoclopramide, Milk-related compounds, Morphine, Oxycontin [oxycodone], Prochlorperazine, and Tramadol    Home Medications:  Prior to Admission medications    Medication Sig Start Date End Date Taking? Authorizing Provider   acetaminophen (TYLENOL) 500 MG tablet Take 2 tablets by mouth Every 8 (Eight) Hours As Needed for Mild Pain. 12/4/24 12/4/25  David Ribeiro MD   Allegra-D Allergy & Congestion  MG per 12 hr tablet Take 1 tablet by mouth 2 (Two) Times a Day. 12/17/24   Hunter Winkler MD   amitriptyline (ELAVIL) 150 MG tablet Take 1 tablet by mouth Every Night. 11/25/24   Donna Mcqueen APRN   atorvastatin (LIPITOR) 40 MG tablet Take 1 tablet by mouth every night at bedtime. 11/28/23   Charley Robles APRN   azithromycin (Zithromax Z-Janusz) 250 MG tablet Take 2 tablets by mouth on day 1, then 1 tablet daily on days 2-5 12/3/24   Hunter Winkler MD   azithromycin (Zithromax Z-Janusz) 250 MG tablet Take 2 tablets by mouth on day 1, then 1 tablet daily on days 2-5 12/8/24   Suzanna Wills APRN   brompheniramine-pseudoephedrine-DM 30-2-10 MG/5ML syrup Take 10 mL by mouth 4 (Four) Times a Day As Needed for Allergies. 12/3/24   Hunter Winkler MD   butalbital-acetaminophen-caffeine (FIORICET, ESGIC) -40 MG per tablet Take 1 tablet by mouth 2 (Two) Times a Day As Needed for Headache. 10/24/24   Jeannine Askew APRN   desvenlafaxine (PRISTIQ) 100 MG 24 hr tablet Take 1 tablet by mouth Daily. 11/25/24   Donna Mcqueen APRN   diazePAM (VALIUM)  10 MG tablet Take 1 tablet by mouth 3 (Three) Times a Day As Needed for Anxiety. 11/25/24 11/25/25  Donna Mcqueen APRN   fluticasone (FLONASE) 50 MCG/ACT nasal spray Administer 1 spray into the nostril(s) as directed by provider 2 (Two) Times a Day. 10/22/24   Anna Roger MD   FREESTYLE LITE test strip See Admin Instructions. 4/26/24   Michael Armijo MD   gabapentin (Neurontin) 800 MG tablet Take 1 tablet by mouth 3 (Three) Times a Day. 12/17/24   Hunter Winkler MD   Gallifrey 5 MG tablet Take 2 tablets by mouth Daily. 11/12/24   Micahel Armijo MD   ibuprofen (ADVIL,MOTRIN) 800 MG tablet Take 1 tablet by mouth Every 8 (Eight) Hours As Needed for Moderate Pain for up to 30 doses. 12/4/24   David Ribeiro MD   ipratropium-albuterol (DUO-NEB) 0.5-2.5 mg/3 ml nebulizer Take 3 mL by nebulization 4 (Four) Times a Day As Needed for Wheezing or Shortness of Air. 10/22/24   Anna Roger MD   Lurasidone HCl (Latuda) 120 MG tablet tablet Take 1 tablet by mouth Daily. 11/25/24   Donna Mcqueen APRN   OXcarbazepine (TRILEPTAL) 300 MG tablet Take 1 tablet by mouth 2 (Two) Times a Day. 11/25/24   Donna Mcqueen APRN   oxyCODONE-acetaminophen (Percocet) 5-325 MG per tablet Take 1 tablet by mouth Every 8 (Eight) Hours As Needed for Severe Pain. 12/4/24   David Ribeiro MD   pantoprazole (Protonix) 40 MG EC tablet Take 1 tablet by mouth Daily. 10/30/24   Hunter Winkler MD   polyethylene glycol (MIRALAX) 17 g packet Take 17 g by mouth Daily.  Patient taking differently: Take 17 g by mouth As Needed. 8/29/24   Blanca Louis APRN   predniSONE (DELTASONE) 10 MG tablet 5 po for 2 days, 4 po for 2 days, 3 po for 2 days, 2 po for 2 days, 1 po for 2 days 12/8/24   Suzanna Wills APRN   promethazine (PHENERGAN) 12.5 MG tablet Take 1 tablet by mouth Every 6 (Six) Hours As Needed for Nausea or Vomiting. 11/18/24   David Ribeiro MD   Rimegepant Sulfate  "(Nurtec) 75 MG tablet dispersible tablet Take 1 tablet by mouth Every Other Day. Take Monday,Wednesday,Friday, and Saturday. 10/30/24   Jeannine Askew APRN   Rimegepant Sulfate (Nurtec) 75 MG tablet dispersible tablet Take 1 tablet by mouth As Needed (HA). 12/2/24   Jeannine Askew APRN   tiZANidine (ZANAFLEX) 4 MG tablet TAKE 1 TABLET BY MOUTH EVERY 8 HOURS AS NEEDED FOR MUSCLE SPASMS. 11/1/24   Hunter Winkler MD   traMADol (Ultram) 50 MG tablet Take 1 tablet by mouth Every 8 (Eight) Hours As Needed for Severe Pain. 11/25/24 11/25/25  David Ribeiro MD   Trelegy Ellipta 200-62.5-25 MCG/ACT inhaler Inhale 1 puff Daily. Rinse mouth with water after use 10/22/24   Anna Roger MD   Zavegepant HCl 10 MG/ACT solution Administer 10 mg into the nostril(s) as directed by provider Daily As Needed (migraine). (1 spray into 1 nostril every 24 hrs prn) 10/30/24   Jeannine Askew APRN         REVIEW OF SYSTEMS       Review of Systems   Constitutional:  Negative for diaphoresis and fever.   Respiratory:  Negative for chest tightness and shortness of breath.    Cardiovascular:  Negative for chest pain.   Gastrointestinal:  Positive for abdominal pain, nausea and vomiting.   Endocrine: Negative for polyuria.   Genitourinary:  Negative for difficulty urinating and frequency.       PHYSICAL EXAM      INITIAL VITALS:   /90   Pulse 107   Temp 98.4 °F (36.9 °C) (Oral)   Resp 18   Ht 157.5 cm (62\")   Wt 120 kg (263 lb 12.8 oz)   SpO2 95%   BMI 48.25 kg/m²     Physical Exam  Constitutional:       Appearance: Normal appearance.   HENT:      Head: Normocephalic and atraumatic.      Mouth/Throat:      Mouth: Mucous membranes are moist.      Pharynx: Oropharynx is clear.   Cardiovascular:      Rate and Rhythm: Normal rate and regular rhythm.      Pulses: Normal pulses.   Pulmonary:      Effort: Pulmonary effort is normal.      Breath sounds: Normal breath sounds.   Abdominal:      General: Abdomen is flat. " There is no distension.      Palpations: Abdomen is soft.      Tenderness: There is abdominal tenderness in the epigastric area. There is no guarding.   Musculoskeletal:         General: Normal range of motion.   Skin:     General: Skin is warm and dry.   Neurological:      General: No focal deficit present.      Mental Status: She is alert and oriented to person, place, and time.   Psychiatric:         Mood and Affect: Mood normal.         Behavior: Behavior normal.           DDX/DIAGNOSTIC RESULTS / EMERGENCY DEPARTMENT COURSE / MDM     Differential Diagnosis included but not limited: Pancreatitis, diverticulitis, appendicitis, exacerbation of chronic pelvic pain, acute cystitis    Diagnoses Considered but Do Not Suspect: Sepsis or severe infectious processes, peritoneal abdomen.  Dissection or AAA, ovarian torsion  Decision Rules/Scores utilized: N/A     Tests considered but not ordered and why:  N/A     MIPS: N/A     Code Status Discussion:  Not Discussed    Additional Patient Education Provided: None     Medical Decision Making    Medical Decision Making  Patient presents with lower abdominal pain/pelvic pain. Abdominal exam without peritoneal signs.  Patient does describe severe pain, concern for appendicitis.  Plan to get CT imaging.  Patient denying any vaginal bleeding or discharge, low concern for PID or TOA.  Pregnancy test noted to be negative.  considered ovarian torsion but doubt given history and presentation. Given work up low suspicion for acute hepatobiliary disease (including acute cholecystitis), acute pancreatitis (neg lipase), PUD and gastric perforation, acute infectious processes (pneumonia, hepatitis, pyelonephritis), acute appendicitis, vascular catastrophe, bowel obstruction or viscus perforation, diverticulitis.  CT imaging demonstrated concerns for pancreatitis, lipase is mildly elevated from previous evaluation.  Lipase is not 3 times upper limit of normal, do feel the patient may be  experiencing pancreatitis based upon symptoms and evaluation.  Presentation not consistent with other acute, emergent causes of abdominal pain at this time.  Patient tolerating p.o. here in the emergency department, was able to take potassium replenishment and pain medication without any difficulty.  Patient had improvement of pain after Norco.  Patient instructed to return for any worsening abdominal pain, vaginal bleeding or discharge, or any other concerns.     Problems Addressed:  Acute pancreatitis without infection or necrosis, unspecified pancreatitis type: complicated acute illness or injury    Amount and/or Complexity of Data Reviewed  External Data Reviewed: labs, radiology and notes.  Labs: ordered. Decision-making details documented in ED Course.  Radiology: ordered.    Risk  Prescription drug management.        See ED COURSE for additional MDM statements    EKG  None Performed     All EKG's are interpreted by the Emergency Department Physician who either signs or Co-signs this chart in the absence of a cardiologist.    Additional Scores                   EMERGENCY DEPARTMENT COURSE:    ED Course as of 12/21/24 1525   Sat Dec 21, 2024   0745 BP(!): 152/102  Blood pressure noted to improve with pain control. [CR]   0905 Patient noted to have concerns for pancreatitis [CR]   0906 Discussed outpatient versus inpatient management of pancreatitis with the patient at this time.  Will plan to give IV fluids at this time.  Heart rate is improving.  Plan to give potassium.  Do feel patient if able to tolerate mild amount of water will be able to go home with Zofran and pain control.  Patient agreeable with this plan.  Plan to attempt oral medication management for p.o. challenge [CR]   1005 Patient reevaluated, does describe that her abdominal pain is greatly improved with the Norco.  Plan to recheck lactate.  Patient replenished with potassium.  Do feel patient stable at this time for discharge with improved  vital signs. [CR]   1136 Lactate(!!): 3.1  Patient with only mild lactate improvement on reassessment. [CR]   1156 Patient with minimal lactate improvement, and has improved.  Did discuss discharge versus admission with the patient.  Did offer the patient admission for pancreatitis management as patient has mildly elevated lipase with concerns of pancreatitis on CT imaging.  Patient describes epigastric pain and would fit her pancreatitis picture.  Patient does have a history of elevated triglycerides in the past and do feel this may be a triglyceride induced pancreatitis.  I did educate her on weight loss, diet, and p.o. intake.  Patient does want a be discharged, does state that she will call 911 and return by ambulance if she has worsening pain, dehydration, inability to hold down fluids.  I do feel that this is appropriate management through shared decision making at this time with close follow-up with primary care doctor. [CR]      ED Course User Index  [CR] Rajeev Singh DO       PROCEDURES:  None Performed   Procedures    DATA FOR LAB AND RADIOLOGY TESTS ORDERED BELOW ARE REVIEWED BY THE ED CLINICIAN:    RADIOLOGY: All x-rays, CT, MRI, and formal ultrasound images (except ED bedside ultrasound) are read by the radiologist, see reports below, unless otherwise noted in MDM or here.  Reports below are reviewed by myself.  CT Abdomen Pelvis With Contrast   Final Result   Some questionable mild stranding adjacent to the pancreas,   correlate clinically for possible pancreatitis. Previously described   pseudocysts are less conspicuous.        2.8 cm left adrenal nodule, this was previously present.           This study was performed with techniques to keep radiation doses as low   as reasonably achievable (ALARA). Individualized dose reduction   techniques using automated exposure control or adjustment of mA and/or   kV according to the patient size were employed.           CTDI: 18.07 mGy   DLP:919.45  mGyjaime           This report was signed and finalized on 12/21/2024 8:39 AM by Javad Rader DO.              LABS: Lab orders shown below, the results are reviewed by myself, and all abnormals are listed below.  Labs Reviewed   COMPREHENSIVE METABOLIC PANEL - Abnormal; Notable for the following components:       Result Value    Glucose 154 (*)     BUN 3 (*)     Potassium 3.0 (*)     BUN/Creatinine Ratio 5.2 (*)     Anion Gap 16.8 (*)     All other components within normal limits    Narrative:     GFR Categories in Chronic Kidney Disease (CKD)      GFR Category          GFR (mL/min/1.73)    Interpretation  G1                     90 or greater         Normal or high (1)  G2                      60-89                Mild decrease (1)  G3a                   45-59                Mild to moderate decrease  G3b                   30-44                Moderate to severe decrease  G4                    15-29                Severe decrease  G5                    14 or less           Kidney failure          (1)In the absence of evidence of kidney disease, neither GFR category G1 or G2 fulfill the criteria for CKD.    eGFR calculation 2021 CKD-EPI creatinine equation, which does not include race as a factor   LACTIC ACID, PLASMA - Abnormal; Notable for the following components:    Lactate 3.8 (*)     All other components within normal limits   URINALYSIS W/ MICROSCOPIC IF INDICATED (NO CULTURE) - Abnormal; Notable for the following components:    Protein,  mg/dL (2+) (*)     All other components within normal limits   CBC WITH AUTO DIFFERENTIAL - Abnormal; Notable for the following components:    WBC 11.28 (*)     Lymphocytes, Absolute 4.59 (*)     All other components within normal limits   LIPASE - Abnormal; Notable for the following components:    Lipase 88 (*)     All other components within normal limits   URINALYSIS, MICROSCOPIC ONLY - Abnormal; Notable for the following components:    Bacteria, UA Trace (*)      Squamous Epithelial Cells, UA 3-6 (*)     All other components within normal limits   LACTIC ACID, REFLEX - Abnormal; Notable for the following components:    Lactate 3.1 (*)     All other components within normal limits   PREGNANCY, URINE - Normal   PROTIME-INR - Normal    Narrative:     Suggested INR therapeutic range for stable oral anticoagulant therapy:    Low Intensity therapy:   1.5-2.0  Moderate Intensity therapy:   2.0-3.0  High Intensity therapy:   2.5-4.0   APTT - Normal   BLOOD CULTURE WITH ARI   BLOOD CULTURE WITH ARI   RAINBOW DRAW    Narrative:     The following orders were created for panel order [place] Draw.  Procedure                               Abnormality         Status                     ---------                               -----------         ------                     Green Top (Gel)[[id number]]                                  Final result               Lavender Top[[id number]]                                     Final result               Gold Top - SST[[id number]]                                   Final result               Light Blue Top[[id number]]                                   Final result                 Please view results for these tests on the individual orders.   CBC AND DIFFERENTIAL    Narrative:     The following orders were created for panel order CBC & Differential.  Procedure                               Abnormality         Status                     ---------                               -----------         ------                     CBC Auto Differential[[id number]]        Abnormal            Final result                 Please view results for these tests on the individual orders.   GREEN TOP   LAVENDER TOP   GOLD TOP - SST   LIGHT BLUE TOP       Vitals Reviewed:    Vitals:    12/21/24 1006 12/21/24 1007 12/21/24 1104 12/21/24 1209   BP:   134/86 136/90   BP Location:       Patient Position:       Pulse:   108 107   Resp:   18 18   Temp:   98.2 °F (36.8 °C) 98.4 °F  (36.9 °C)   TempSrc:   Oral Oral   SpO2: 95% 94% 95% 95%   Weight:       Height:           MEDICATIONS GIVEN TO PATIENT THIS ENCOUNTER:  Medications   HYDROmorphone (DILAUDID) injection 0.5 mg (0.5 mg Intravenous Given 12/21/24 0728)   ondansetron (ZOFRAN) injection 4 mg (4 mg Intravenous Given 12/21/24 0728)   sodium chloride 0.9 % bolus 1,000 mL (0 mL Intravenous Stopped 12/21/24 0824)   iopamidol (ISOVUE-300) 61 % injection 100 mL (100 mL Intravenous Given 12/21/24 0807)   potassium chloride (MICRO-K/KLOR-CON) CR capsule (40 mEq Oral Given 12/21/24 0914)   ondansetron (ZOFRAN) injection 4 mg (4 mg Intravenous Given 12/21/24 0914)   HYDROcodone-acetaminophen (NORCO) 5-325 MG per tablet 1 tablet (1 tablet Oral Given 12/21/24 0914)   sodium chloride 0.9 % bolus 500 mL (0 mL Intravenous Stopped 12/21/24 1001)   HYDROcodone-acetaminophen (NORCO) 5-325 MG per tablet 1 tablet (1 tablet Oral Given 12/21/24 1205)       CONSULTS:  None    CRITICAL CARE:  There was significant risk of life threatening deterioration of patient's condition requiring my direct management. Critical care time 0 minutes, excluding any documented procedures.    FINAL IMPRESSION      1. Acute pancreatitis without infection or necrosis, unspecified pancreatitis type          DISPOSITION / PLAN     ED Disposition       ED Disposition   Discharge    Condition   Stable    Comment   --               PATIENT REFERRED TO:  Hunter Winkler MD  [address] [facility] [address]  [place] [id number] [phone]    Schedule an appointment as soon as possible for a visit in 3 days      David Ribeiro MD  [address] [facility]  [facility] [address] [phone]    Schedule an appointment as soon as possible for a visit   Schedule appointment for further evaluation of your requested hysterectomy.      DISCHARGE MEDICATIONS:     Medication List        START taking these medications      HYDROcodone-acetaminophen 5-325 MG per tablet  Commonly known as: NORCO  Take 1  tablet by mouth Every 6 (Six) Hours As Needed for Severe Pain.     ondansetron ODT 4 MG disintegrating tablet  Commonly known as: ZOFRAN-ODT  Place 1 tablet on the tongue Every 8 (Eight) Hours As Needed for Nausea or Vomiting.            CHANGE how you take these medications      polyethylene glycol 17 g packet  Commonly known as: MIRALAX  Take 17 g by mouth Daily.  What changed:   when to take this  reasons to take this            CONTINUE taking these medications      acetaminophen 500 MG tablet  Commonly known as: TYLENOL  Take 2 tablets by mouth Every 8 (Eight) Hours As Needed for Mild Pain.     Allegra-D Allergy & Congestion  MG per 12 hr tablet  Generic drug: fexofenadine-pseudoephedrine  Take 1 tablet by mouth 2 (Two) Times a Day.     amitriptyline 150 MG tablet  Commonly known as: ELAVIL  Take 1 tablet by mouth Every Night.     atorvastatin 40 MG tablet  Commonly known as: LIPITOR  Take 1 tablet by mouth every night at bedtime.     * azithromycin 250 MG tablet  Commonly known as: Zithromax Z-Janusz  Take 2 tablets by mouth on day 1, then 1 tablet daily on days 2-5     * azithromycin 250 MG tablet  Commonly known as: Zithromax Z-Janusz  Take 2 tablets by mouth on day 1, then 1 tablet daily on days 2-5     brompheniramine-pseudoephedrine-DM 30-2-10 MG/5ML syrup  Take 10 mL by mouth 4 (Four) Times a Day As Needed for Allergies.     butalbital-acetaminophen-caffeine -40 MG per tablet  Commonly known as: FIORICET, ESGIC  Take 1 tablet by mouth 2 (Two) Times a Day As Needed for Headache.     desvenlafaxine 100 MG 24 hr tablet  Commonly known as: PRISTIQ  Take 1 tablet by mouth Daily.     diazePAM 10 MG tablet  Commonly known as: VALIUM  Take 1 tablet by mouth 3 (Three) Times a Day As Needed for Anxiety.     fluticasone 50 MCG/ACT nasal spray  Commonly known as: FLONASE  Administer 1 spray into the nostril(s) as directed by provider 2 (Two) Times a Day.     FREESTYLE LITE test strip  Generic drug: glucose  blood     gabapentin 800 MG tablet  Commonly known as: Neurontin  Take 1 tablet by mouth 3 (Three) Times a Day.     Gallifrey 5 MG tablet  Generic drug: norethindrone     ibuprofen 800 MG tablet  Commonly known as: ADVIL,MOTRIN  Take 1 tablet by mouth Every 8 (Eight) Hours As Needed for Moderate Pain for up to 30 doses.     ipratropium-albuterol 0.5-2.5 mg/3 ml nebulizer  Commonly known as: DUO-NEB  Take 3 mL by nebulization 4 (Four) Times a Day As Needed for Wheezing or Shortness of Air.     Lurasidone HCl 120 MG tablet tablet  Commonly known as: Latuda  Take 1 tablet by mouth Daily.     * Nurtec 75 MG dispersible tablet  Generic drug: rimegepant sulfate  Take 1 tablet by mouth Every Other Day. Take Monday,Wednesday,Friday, and Saturday.     * Nurtec 75 MG dispersible tablet  Generic drug: rimegepant sulfate  Take 1 tablet by mouth As Needed (HA).     OXcarbazepine 300 MG tablet  Commonly known as: TRILEPTAL  Take 1 tablet by mouth 2 (Two) Times a Day.     oxyCODONE-acetaminophen 5-325 MG per tablet  Commonly known as: Percocet  Take 1 tablet by mouth Every 8 (Eight) Hours As Needed for Severe Pain.     pantoprazole 40 MG EC tablet  Commonly known as: Protonix  Take 1 tablet by mouth Daily.     predniSONE 10 MG tablet  Commonly known as: DELTASONE  5 po for 2 days, 4 po for 2 days, 3 po for 2 days, 2 po for 2 days, 1 po for 2 days     promethazine 12.5 MG tablet  Commonly known as: PHENERGAN  Take 1 tablet by mouth Every 6 (Six) Hours As Needed for Nausea or Vomiting.     tiZANidine 4 MG tablet  Commonly known as: ZANAFLEX  TAKE 1 TABLET BY MOUTH EVERY 8 HOURS AS NEEDED FOR MUSCLE SPASMS.     traMADol 50 MG tablet  Commonly known as: Ultram  Take 1 tablet by mouth Every 8 (Eight) Hours As Needed for Severe Pain.     Trelegy Ellipta 200-62.5-25 MCG/ACT inhaler  Generic drug: Fluticasone-Umeclidin-Vilant  Inhale 1 puff Daily. Rinse mouth with water after use     Zavegepant HCl 10 MG/ACT solution  Administer 10 mg  into the nostril(s) as directed by provider Daily As Needed (migraine). (1 spray into 1 nostril every 24 hrs prn)           * This list has 4 medication(s) that are the same as other medications prescribed for you. Read the directions carefully, and ask your doctor or other care provider to review them with you.                   Where to Get Your Medications        These medications were sent to [id number] - [place], [place] - [address] AT [facility] BY-[address] - [phone] PH - [phone] FX  [address], [place] [address]      Phone: [phone]   HYDROcodone-acetaminophen 5-325 MG per tablet  ondansetron ODT 4 MG disintegrating tablet         Electronically signed by Rajeev Singh DO, 12/21/24, 7:21 AM EST.    Emergency Medicine Physician  [facility]  (Please note that portions of thisnote were completed with a voice recognition program.  Efforts were made to edit the dictations but occasionally words are mis-transcribed.)       Rajeev Singh DO  12/21/24 [id number]

## 2024-12-21 NOTE — DISCHARGE INSTRUCTIONS
If you notice any concerning symptoms, please return to the ER immediately. These can include but are not limited to: worsening of you condition, fevers, chills, shortness of breath, vomiting, weakness of the extremities, changes in your mental status, numbness, pale extremities, or chest pain.     Take medications as prescribed, your pharmacist may have additional recommendations concerning these medications.    For pain use ibuprofen (Motrin) or acetaminophen (Tylenol), unless prescribed medications that also contain these medications.  You can take over the counter acetaminophen or ibuprofen, please follow the directions as dosages differ. Do not take ibuprofen if you have a history of peptic ulcers, kidney disease, bariatric surgery, or are currently pregnant.  Do not take Tylenol if you have a history of liver disease or alcohol use disorder.        THANK YOU!!! From [facility] Emergency Department    On behalf of the Emergency Department staff at [facility], I would like to thank you for giving us the opportunity to address your health care needs and concerns. We hope that during your visit, our service was delivered in a professional and caring manner. Please keep [facility] in mind as we walk with you down the path to your own personal wellness. Please expect follow-up phone calls concerning additional care and questions about your experience.      You have received additional information specific to your diagnosis in these discharge instructions, please read these fully.  Anytime you have been seen in the emergency department we recommend close follow up with your primary care provider or specialist, please follow these directions as indicated.    Please return to the emergency department if you have any worsening nausea vomiting, inability to tolerate p.o. intake or worsening abdominal pain.  Please slowly advance your diet.

## 2024-12-23 ENCOUNTER — TELEPHONE (OUTPATIENT)
Dept: OBSTETRICS AND GYNECOLOGY | Facility: CLINIC | Age: 39
End: 2024-12-23

## 2024-12-23 ENCOUNTER — TELEPHONE (OUTPATIENT)
Dept: CARDIOLOGY | Facility: CLINIC | Age: 39
End: 2024-12-23
Payer: MEDICAID

## 2024-12-23 NOTE — TELEPHONE ENCOUNTER
Pt called states she does not want to do the nuclear stress test that Dr. Brooks had ordered after the information needed was not obtained in the treadmill stress test. She wants to try the previously ordered treadmill stress again. Please advise.

## 2024-12-23 NOTE — TELEPHONE ENCOUNTER
Patient wanted to let you know she went to ER over the weekend , she is in severe pain. They gave her pain medication but states nothing is helping.

## 2024-12-24 ENCOUNTER — TELEPHONE (OUTPATIENT)
Dept: FAMILY MEDICINE CLINIC | Facility: CLINIC | Age: 39
End: 2024-12-24

## 2024-12-24 NOTE — TELEPHONE ENCOUNTER
Caller: Lindsay Amezquita    Relationship: Self    Best call back number:   Telephone Information:   Mobile [phone]        What medication are you requesting: PAIN MEDICATION    What are your current symptoms: PAIN IN UPPER ABDOMEN     How long have you been experiencing symptoms: A FEW MONTHS    Have you had these symptoms before:    [x] Yes  [] No    Have you been treated for these symptoms before:   [x] Yes  [] No    If a prescription is needed, what is your preferred pharmacy and phone number: [id number] [place], [place] - [facility] [address] AT [facility] [address] - [phone]  - [phone] FX     Additional notes: PATIENT STATES THE PAIN IS FROM PANCREATITIS.

## 2024-12-26 LAB
BACTERIA SPEC AEROBE CULT: NORMAL
BACTERIA SPEC AEROBE CULT: NORMAL

## 2024-12-30 ENCOUNTER — PRIOR AUTHORIZATION (OUTPATIENT)
Dept: FAMILY MEDICINE CLINIC | Facility: CLINIC | Age: 39
End: 2024-12-30
Payer: MEDICAID

## 2024-12-30 ENCOUNTER — TELEMEDICINE (OUTPATIENT)
Dept: FAMILY MEDICINE CLINIC | Facility: CLINIC | Age: 39
End: 2024-12-30
Payer: MEDICAID

## 2024-12-30 DIAGNOSIS — J40 BRONCHITIS: ICD-10-CM

## 2024-12-30 DIAGNOSIS — J30.9 CHRONIC ALLERGIC RHINITIS: ICD-10-CM

## 2024-12-30 DIAGNOSIS — J01.91 ACUTE RECURRENT SINUSITIS, UNSPECIFIED LOCATION: Primary | ICD-10-CM

## 2024-12-30 DIAGNOSIS — G43.909 EPISODIC MIGRAINE: ICD-10-CM

## 2024-12-30 DIAGNOSIS — E11.65 TYPE 2 DIABETES MELLITUS WITH HYPERGLYCEMIA, WITHOUT LONG-TERM CURRENT USE OF INSULIN: ICD-10-CM

## 2024-12-30 DIAGNOSIS — E66.813 CLASS 3 SEVERE OBESITY DUE TO EXCESS CALORIES WITH SERIOUS COMORBIDITY AND BODY MASS INDEX (BMI) OF 45.0 TO 49.9 IN ADULT: ICD-10-CM

## 2024-12-30 DIAGNOSIS — E66.01 CLASS 3 SEVERE OBESITY DUE TO EXCESS CALORIES WITH SERIOUS COMORBIDITY AND BODY MASS INDEX (BMI) OF 45.0 TO 49.9 IN ADULT: ICD-10-CM

## 2024-12-30 PROCEDURE — 1126F AMNT PAIN NOTED NONE PRSNT: CPT | Performed by: FAMILY MEDICINE

## 2024-12-30 PROCEDURE — 99214 OFFICE O/P EST MOD 30 MIN: CPT | Performed by: FAMILY MEDICINE

## 2024-12-30 PROCEDURE — 1159F MED LIST DOCD IN RCRD: CPT | Performed by: FAMILY MEDICINE

## 2024-12-30 PROCEDURE — 3051F HG A1C>EQUAL 7.0%<8.0%: CPT | Performed by: FAMILY MEDICINE

## 2024-12-30 PROCEDURE — 1160F RVW MEDS BY RX/DR IN RCRD: CPT | Performed by: FAMILY MEDICINE

## 2024-12-30 RX ORDER — FLUTICASONE PROPIONATE 50 MCG
1 SPRAY, SUSPENSION (ML) NASAL 2 TIMES DAILY
Qty: 16 G | Refills: 5 | Status: SHIPPED | OUTPATIENT
Start: 2024-12-30

## 2024-12-30 RX ORDER — AZELASTINE 1 MG/ML
2 SPRAY, METERED NASAL 2 TIMES DAILY
Qty: 30 ML | Refills: 12 | Status: SHIPPED | OUTPATIENT
Start: 2024-12-30

## 2024-12-30 RX ORDER — FEXOFENADINE HCL 60 MG/1
60 TABLET, FILM COATED ORAL 2 TIMES DAILY
Qty: 60 TABLET | Refills: 2 | Status: SHIPPED | OUTPATIENT
Start: 2024-12-30

## 2024-12-30 RX ORDER — PREDNISONE 10 MG/1
TABLET ORAL
Qty: 30 TABLET | Refills: 0 | Status: SHIPPED | OUTPATIENT
Start: 2024-12-30

## 2024-12-30 NOTE — PROGRESS NOTES
Telehealth E-Visit      Date: 2024   Patient Name: Lindsay Amezquita  : 1985   MRN: [id number]     Chief Complaint:    Chief Complaint   Patient presents with    Cough    Nasal Congestion    Headache    Diabetes       I have reviewed the E-Visit questionnaire and the patient's answers, my assessment and plan are listed below.     This provider is located at the [facility] (through [facility]), [address] [place] [address] using a secure "[employer] Video Visit through [employer]. Patient is being seen remotely via telehealth at their home address in [place], and stated they are in a secure environment for this session. The patient's condition being diagnosed/treated is appropriate for telemedicine. The provider identified herself as well as her credentials. The patient, and/or patients guardian, consent to be seen remotely, and when consent is given they understand that the consent allows for patient identifiable information to be sent to a third party as needed. They may refuse to be seen remotely at any time. The electronic data is encrypted and password protected, and the patient and/or guardian has been advised of the potential risks to privacy not withstanding such measures.    You have chosen to receive care through a telehealth visit. Do you consent to use a video/audio connection for your medical care today? Yes    History of Present Illness: Lindsay Amezquita is a 39 y.o. female who is here today to follow up with the above problems.  She reports that for the last 7 days she has had increasing migraines accompanied by productive cough with green and yellow sputum, ear fullness, dizziness.  No noted fever with Tmax 99.2.  She has had multiple instances of bronchitis in the last few months and was recently on prednisone 2 weeks ago for similar symptoms and has also been on 2 rounds of azithromycin for this.  She states that the prednisone did help but then symptoms  restarted.  Currently not taking antihistamine or nasal steroid.    Patient has concerns about diabetes.  Last A1c in October is 7.0.  At that time patient was on Jardiance.  Since then patient has had multiple complaints of abdominal pain and has been found to have pancreatitis.  She wanted to restart Jardiance but due to risk of pancreatitis this was determined to not be the best medication for her to restart at this time.  She does have follow-up with endocrinology in March.  Previously was not able to tolerate metformin.  She has not tried GLP-1 agonists either.      Subjective      I have reviewed and the following portions of the patient's history were updated as appropriate: past family history, past medical history, past social history, past surgical history and problem list.    Medications:     Current Outpatient Medications:     fluticasone (FLONASE) 50 MCG/ACT nasal spray, Administer 1 spray into the nostril(s) as directed by provider 2 (Two) Times a Day., Disp: 16 g, Rfl: 5    predniSONE (DELTASONE) 10 MG tablet, 5 po for 2 days, 4 po for 2 days, 3 po for 2 days, 2 po for 2 days, 1 po for 2 days, Disp: 30 tablet, Rfl: 0    acetaminophen (TYLENOL) 500 MG tablet, Take 2 tablets by mouth Every 8 (Eight) Hours As Needed for Mild Pain., Disp: 60 tablet, Rfl: 10    amitriptyline (ELAVIL) 150 MG tablet, Take 1 tablet by mouth Every Night., Disp: 30 tablet, Rfl: 1    atorvastatin (LIPITOR) 40 MG tablet, Take 1 tablet by mouth every night at bedtime., Disp: 90 tablet, Rfl: 3    azelastine (ASTELIN) 0.1 % nasal spray, Administer 2 sprays into the nostril(s) as directed by provider 2 (Two) Times a Day. Use in each nostril as directed, Disp: 30 mL, Rfl: 12    brompheniramine-pseudoephedrine-DM 30-2-10 MG/5ML syrup, Take 10 mL by mouth 4 (Four) Times a Day As Needed for Allergies., Disp: 473 mL, Rfl: 0    butalbital-acetaminophen-caffeine (FIORICET, ESGIC) -40 MG per tablet, Take 1 tablet by mouth 2 (Two) Times  a Day As Needed for Headache., Disp: 20 tablet, Rfl: 0    desvenlafaxine (PRISTIQ) 100 MG 24 hr tablet, Take 1 tablet by mouth Daily., Disp: 30 tablet, Rfl: 1    diazePAM (VALIUM) 10 MG tablet, Take 1 tablet by mouth 3 (Three) Times a Day As Needed for Anxiety., Disp: 90 tablet, Rfl: 1    fexofenadine (Allegra Allergy) 60 MG tablet, Take 1 tablet by mouth 2 (Two) Times a Day., Disp: 60 tablet, Rfl: 2    FREESTYLE LITE test strip, See Admin Instructions., Disp: , Rfl:     gabapentin (Neurontin) 800 MG tablet, Take 1 tablet by mouth 3 (Three) Times a Day., Disp: 90 tablet, Rfl: 0    Gallifrey 5 MG tablet, Take 2 tablets by mouth Daily., Disp: , Rfl:     HYDROcodone-acetaminophen (NORCO) 5-325 MG per tablet, Take 1 tablet by mouth Every 6 (Six) Hours As Needed for Severe Pain., Disp: 8 tablet, Rfl: 0    ibuprofen (ADVIL,MOTRIN) 800 MG tablet, Take 1 tablet by mouth Every 8 (Eight) Hours As Needed for Moderate Pain for up to 30 doses., Disp: 30 tablet, Rfl: 0    ipratropium-albuterol (DUO-NEB) 0.5-2.5 mg/3 ml nebulizer, Take 3 mL by nebulization 4 (Four) Times a Day As Needed for Wheezing or Shortness of Air., Disp: 360 mL, Rfl: 5    Lurasidone HCl (Latuda) 120 MG tablet tablet, Take 1 tablet by mouth Daily., Disp: 30 tablet, Rfl: 1    ondansetron ODT (ZOFRAN-ODT) 4 MG disintegrating tablet, Place 1 tablet on the tongue Every 8 (Eight) Hours As Needed for Nausea or Vomiting., Disp: 10 tablet, Rfl: 0    OXcarbazepine (TRILEPTAL) 300 MG tablet, Take 1 tablet by mouth 2 (Two) Times a Day., Disp: 60 tablet, Rfl: 1    oxyCODONE-acetaminophen (Percocet) 5-325 MG per tablet, Take 1 tablet by mouth Every 8 (Eight) Hours As Needed for Severe Pain., Disp: 10 tablet, Rfl: 0    pantoprazole (Protonix) 40 MG EC tablet, Take 1 tablet by mouth Daily., Disp: 90 tablet, Rfl: 1    polyethylene glycol (MIRALAX) 17 g packet, Take 17 g by mouth Daily. (Patient taking differently: Take 17 g by mouth As Needed.), Disp: 30 each, Rfl: 1     "promethazine (PHENERGAN) 12.5 MG tablet, Take 1 tablet by mouth Every 6 (Six) Hours As Needed for Nausea or Vomiting., Disp: 30 tablet, Rfl: 5    Rimegepant Sulfate (Nurtec) 75 MG tablet dispersible tablet, Take 1 tablet by mouth Every Other Day. Take Monday,Wednesday,Friday, and Saturday., Disp: 16 tablet, Rfl: 5    Rimegepant Sulfate (Nurtec) 75 MG tablet dispersible tablet, Take 1 tablet by mouth As Needed (HA)., Disp: 2 tablet, Rfl: 0    SITagliptin (Januvia) 100 MG tablet, Take 1 tablet by mouth Daily., Disp: 30 tablet, Rfl: 11    tiZANidine (ZANAFLEX) 4 MG tablet, TAKE 1 TABLET BY MOUTH EVERY 8 HOURS AS NEEDED FOR MUSCLE SPASMS., Disp: 270 tablet, Rfl: 0    traMADol (Ultram) 50 MG tablet, Take 1 tablet by mouth Every 8 (Eight) Hours As Needed for Severe Pain., Disp: 10 tablet, Rfl: 0    Trelegy Ellipta 200-62.5-25 MCG/ACT inhaler, Inhale 1 puff Daily. Rinse mouth with water after use, Disp: 60 each, Rfl: 5    Zavegepant HCl 10 MG/ACT solution, Administer 10 mg into the nostril(s) as directed by provider Daily As Needed (migraine). (1 spray into 1 nostril every 24 hrs prn), Disp: 6 each, Rfl: 5    Allergies:   Allergies   Allergen Reactions    Aspirin Anaphylaxis and Hives     Wheezing         Codeine Anaphylaxis     Tolerates oxycodone-acetaminophen with no allergic rxn    Cyclobenzaprine Other (See Comments)     \"gives me chest pain\"    Other reaction(s): Other (See Comments)    Haldol [Haloperidol] Rash    Imitrex [Sumatriptan] Anaphylaxis and Rash    Latex Hives and Rash    Penicillins Hives and Anaphylaxis     Vomiting    Metformin Nausea And Vomiting    Zofran [Ondansetron] GI Intolerance     Constipation    Lamictal [Lamotrigine] Itching     Itching and hives    Metoclopramide Headache, Hives and Other (See Comments)    Milk-Related Compounds GI Intolerance    Morphine Itching    Oxycontin [Oxycodone] GI Intolerance    Prochlorperazine Nausea And Vomiting and Unknown (See Comments)    Tramadol Hives and " Nausea And Vomiting       Objective     Physical Exam:  Vital Signs: There were no vitals filed for this visit.         Physical Exam  Constitutional:       General: She is not in acute distress.     Appearance: Normal appearance. She is obese. She is not ill-appearing, toxic-appearing or diaphoretic.   HENT:      Head: Normocephalic and atraumatic.      Right Ear: External ear normal.      Left Ear: External ear normal.      Nose: Nose normal.   Eyes:      Extraocular Movements: Extraocular movements intact.      Conjunctiva/sclera: Conjunctivae normal.   Pulmonary:      Effort: Pulmonary effort is normal.   Musculoskeletal:      Cervical back: Normal range of motion.   Neurological:      General: No focal deficit present.      Mental Status: She is alert and oriented to person, place, and time.   Psychiatric:         Mood and Affect: Mood normal.         Behavior: Behavior normal.         Thought Content: Thought content normal.         Judgment: Judgment normal.           Assessment / Plan      Assessment/Plan:   Diagnoses and all orders for this visit:    1. Acute recurrent sinusitis, unspecified location (Primary)  -     azelastine (ASTELIN) 0.1 % nasal spray; Administer 2 sprays into the nostril(s) as directed by provider 2 (Two) Times a Day. Use in each nostril as directed  Dispense: 30 mL; Refill: 12  -     predniSONE (DELTASONE) 10 MG tablet; 5 po for 2 days, 4 po for 2 days, 3 po for 2 days, 2 po for 2 days, 1 po for 2 days  Dispense: 30 tablet; Refill: 0    2. Bronchitis  -     azelastine (ASTELIN) 0.1 % nasal spray; Administer 2 sprays into the nostril(s) as directed by provider 2 (Two) Times a Day. Use in each nostril as directed  Dispense: 30 mL; Refill: 12  -     predniSONE (DELTASONE) 10 MG tablet; 5 po for 2 days, 4 po for 2 days, 3 po for 2 days, 2 po for 2 days, 1 po for 2 days  Dispense: 30 tablet; Refill: 0    3. Chronic allergic rhinitis  -     fexofenadine (Allegra Allergy) 60 MG tablet; Take  1 tablet by mouth 2 (Two) Times a Day.  Dispense: 60 tablet; Refill: 2  -     fluticasone (FLONASE) 50 MCG/ACT nasal spray; Administer 1 spray into the nostril(s) as directed by provider 2 (Two) Times a Day.  Dispense: 16 g; Refill: 5    4. Type 2 diabetes mellitus with hyperglycemia, without long-term current use of insulin  -     SITagliptin (Januvia) 100 MG tablet; Take 1 tablet by mouth Daily.  Dispense: 30 tablet; Refill: 11    5. Class 3 severe obesity due to excess calories with serious comorbidity and body mass index (BMI) of 45.0 to 49.9 in adult    Headache and nasal congestion likely secondary to recurrent sinusitis.  Patient not on any controlling medication for allergic rhinitis which may be contributing factor to her sinusitis.  Will start fluticasone nasal spray and azelastine nasal spray.  Will also start antihistamine.  Patient counseled to stop antihistamine with pseudoephedrine.  Previously patient's diabetes was for the most part controlled on Jardiance but has since had bouts of pancreatitis and most recently went to the ER for pancreatitis 12/21/2024.  At that time she was offered admission but declined.  Significant concern for pancreatitis that may be related to hypertriglyceridemia.  Patient has been counseled regarding dietary changes and losing weight.  Will start Januvia for patient's diabetes. This will likely require PA.  She may be a good candidate for GLP-1 agonist as this may help to decrease risk of pancreatitis.  However, given patient's persistent GI symptoms this may also not be a good medication for her and would at the very least require further counseling regarding the medication and possible side effects. I would prefer to do this in person.  Will need to recheck A1c and lipid panel next time patient is in clinic.    Follow Up:   Return in about 1 month (around 1/30/2025) for Recheck Dm2, med change.    Any medications prescribed have been sent electronically to   [id number] - [place] - [address] AT [facility] [address] - [phone] PH - [phone] FX  [address] [place] [address]  Phone: [phone] Fax: [phone]      30 minutes were spent reviewing the patient's questionnaire, formulating a treatment plan, and relaying information to the patient via [email or web address].    Hunter Winkler MD  [facility]  12/30/24  08:34 EST

## 2024-12-30 NOTE — TELEPHONE ENCOUNTER
Prior Authorization has been started on Cover My Meds for Januvia 100MG tablets    Waiting on response from insurance       Key:[id number]

## 2024-12-30 NOTE — TELEPHONE ENCOUNTER
Provider: ROYA COVARRUBIAS    Caller: Lindsay Amezquita    Relationship to Patient: Self    Pharmacy: [facility]    Phone Number: [phone]    Reason for Call: HAS HAD A MIGRAINE FOR 5 DAYS. STATES SHE HAS A FEVER OF AROUND 102.3 AND NAUSEA WITH IT AS WELL. STATES THE MIGRAINE IS JUMPING FROM SIDE TO SIDE. HAS TAKEN NURTEC, BUTALBITAL & HER NASAL SPRAY AND NOTHING TAKES IT AWAY FULLY. WOULD LIKE PROVIDER'S RECOMMENDATION. PLEASE REVIEW & ADVISE, THANK YOU.

## 2024-12-30 NOTE — TELEPHONE ENCOUNTER
Caller: Lindsay Amezquita    Relationship: Self    Best call back number: [phone]    Requested Prescriptions:   Requested Prescriptions     Pending Prescriptions Disp Refills    butalbital-acetaminophen-caffeine (FIORICET, ESGIC) -40 MG per tablet 20 tablet 0     Sig: Take 1 tablet by mouth 2 (Two) Times a Day As Needed for Headache.        Pharmacy where request should be sent: [id number] [place], [place] - [facility] [address] AT [facility] [address] - [phone]  - [phone] FX     Last office visit with prescribing clinician: 10/30/2024   Last telemedicine visit with prescribing clinician: Visit date not found   Next office visit with prescribing clinician: 4/30/2025     Additional details provided by patient: HAS 2 PILLS LEFT.    Does the patient have less than a 3 day supply:  [x] Yes  [] No    Would you like a call back once the refill request has been completed: [] Yes [x] No    If the office needs to give you a call back, can they leave a voicemail: [] Yes [x] No    Kim Lee Rep   12/30/24 10:31 EST

## 2024-12-31 ENCOUNTER — TELEPHONE (OUTPATIENT)
Dept: CARDIOLOGY | Facility: CLINIC | Age: 39
End: 2024-12-31
Payer: MEDICAID

## 2024-12-31 RX ORDER — BUTALBITAL, ACETAMINOPHEN AND CAFFEINE 50; 325; 40 MG/1; MG/1; MG/1
1 TABLET ORAL 2 TIMES DAILY PRN
Qty: 20 TABLET | Refills: 0 | Status: SHIPPED | OUTPATIENT
Start: 2024-12-31

## 2025-01-01 ENCOUNTER — HOSPITAL ENCOUNTER (OUTPATIENT)
Dept: SLEEP MEDICINE | Facility: HOSPITAL | Age: 40
Discharge: HOME | End: 2025-01-01
Admitting: INTERNAL MEDICINE
Payer: MEDICAID

## 2025-01-01 ENCOUNTER — OFFICE VISIT (OUTPATIENT)
Dept: CARDIOLOGY | Facility: CLINIC | Age: 40
End: 2025-01-01
Payer: MEDICAID

## 2025-01-01 ENCOUNTER — TELEPHONE (OUTPATIENT)
Dept: OBSTETRICS AND GYNECOLOGY | Facility: CLINIC | Age: 40
End: 2025-01-01
Payer: MEDICAID

## 2025-01-01 VITALS
HEART RATE: 117 BPM | HEIGHT: 62 IN | WEIGHT: 277.4 LBS | BODY MASS INDEX: 51.05 KG/M2 | SYSTOLIC BLOOD PRESSURE: 138 MMHG | OXYGEN SATURATION: 93 % | DIASTOLIC BLOOD PRESSURE: 84 MMHG

## 2025-01-01 DIAGNOSIS — F17.210 NICOTINE DEPENDENCE, CIGARETTES, UNCOMPLICATED: ICD-10-CM

## 2025-01-01 DIAGNOSIS — Z01.810 PREOP CARDIOVASCULAR EXAM: Primary | ICD-10-CM

## 2025-01-01 DIAGNOSIS — G43.909 EPISODIC MIGRAINE: ICD-10-CM

## 2025-01-01 DIAGNOSIS — G47.52 DREAM ENACTMENT BEHAVIOR: ICD-10-CM

## 2025-01-01 DIAGNOSIS — F51.5 NIGHTMARES: ICD-10-CM

## 2025-01-01 DIAGNOSIS — R06.83 SNORING: ICD-10-CM

## 2025-01-01 DIAGNOSIS — G47.8 SLEEP TALKING: ICD-10-CM

## 2025-01-01 DIAGNOSIS — G47.19 EXCESSIVE DAYTIME SLEEPINESS: ICD-10-CM

## 2025-01-01 DIAGNOSIS — G43.001 MIGRAINE WITHOUT AURA AND WITH STATUS MIGRAINOSUS, NOT INTRACTABLE: ICD-10-CM

## 2025-01-01 PROCEDURE — 99213 OFFICE O/P EST LOW 20 MIN: CPT | Performed by: INTERNAL MEDICINE

## 2025-01-01 PROCEDURE — 95800 SLP STDY UNATTENDED: CPT

## 2025-01-01 RX ORDER — IBUPROFEN 800 MG/1
800 TABLET, FILM COATED ORAL EVERY 8 HOURS PRN
Qty: 30 TABLET | Refills: 0 | Status: SHIPPED | OUTPATIENT
Start: 2025-01-01 | End: 2025-02-01

## 2025-01-01 RX ORDER — ONDANSETRON 4 MG/1
4 TABLET, ORALLY DISINTEGRATING ORAL EVERY 8 HOURS PRN
Qty: 10 TABLET | Refills: 0 | OUTPATIENT
Start: 2025-01-01

## 2025-01-01 RX ORDER — ONDANSETRON 4 MG/1
4 TABLET, ORALLY DISINTEGRATING ORAL EVERY 8 HOURS PRN
Qty: 10 TABLET | Refills: 3 | Status: SHIPPED | OUTPATIENT
Start: 2025-01-01 | End: 2025-02-01

## 2025-01-01 RX ORDER — BUTALBITAL, ACETAMINOPHEN AND CAFFEINE 50; 325; 40 MG/1; MG/1; MG/1
1 TABLET ORAL 2 TIMES DAILY PRN
Qty: 20 TABLET | Refills: 0 | Status: SHIPPED | OUTPATIENT
Start: 2025-01-01 | End: 2025-02-01

## 2025-01-03 ENCOUNTER — TELEPHONE (OUTPATIENT)
Dept: GASTROENTEROLOGY | Facility: CLINIC | Age: 40
End: 2025-01-03
Payer: MEDICAID

## 2025-01-03 ENCOUNTER — TELEPHONE (OUTPATIENT)
Dept: OBSTETRICS AND GYNECOLOGY | Facility: CLINIC | Age: 40
End: 2025-01-03
Payer: MEDICAID

## 2025-01-03 DIAGNOSIS — R10.2 PELVIC PAIN: Primary | ICD-10-CM

## 2025-01-03 DIAGNOSIS — F41.1 GENERALIZED ANXIETY DISORDER: ICD-10-CM

## 2025-01-03 DIAGNOSIS — F41.0 PANIC ATTACKS: ICD-10-CM

## 2025-01-03 DIAGNOSIS — F43.10 POST TRAUMATIC STRESS DISORDER (PTSD): ICD-10-CM

## 2025-01-03 RX ORDER — OXYCODONE AND ACETAMINOPHEN 5; 325 MG/1; MG/1
1 TABLET ORAL EVERY 6 HOURS PRN
Qty: 10 TABLET | Refills: 0 | Status: SHIPPED | OUTPATIENT
Start: 2025-01-03

## 2025-01-03 NOTE — TELEPHONE ENCOUNTER
PATIENT CALLED BACK SAYING SHE IS STOPPING ALL OF THE MEDS PRESCRIBED BY PCP. SHE SAID HER PANCREATITIS IS ACTING UP, AND THE GASTRO APPT SHE WENT TO YESTERDAY DID NOT HELP AT ALL. SHE DOESN'T WANT TO TAKE HER PRESNISONE, OR ANYTHING UNLESS IT'S SOMETHING SHE ABSOLUTELY NEEDS. SHE WILL KEEP TAKING HER PSYCH MEDS BY EB. SHE THOUGHT SHE WAS HAVIG SEIZURES EARLIER, BUT IT'S JUST A SEVERE PANIC ATTACK. SHE IS GETTING READY TO GO TO THE [facility]. I RELAYED THAT PCP WAS OUT OF THE OFFICE TODAY, BUT I WOULD LET HIM KNOW WHAT SHE TOLD ME.

## 2025-01-03 NOTE — TELEPHONE ENCOUNTER
Rx Refill Note  Requested Prescriptions     Pending Prescriptions Disp Refills    diazePAM (VALIUM) 10 MG tablet 90 tablet 1     Sig: Take 1 tablet by mouth 3 (Three) Times a Day As Needed for Anxiety.      Last office visit with prescribing clinician: 11/25/2024   Last telemedicine visit with prescribing clinician: 10/14/2024   Next office visit with prescribing clinician: 2/4/2025                         Would you like a call back once the refill request has been completed: [] Yes [] No    If the office needs to give you a call back, can they leave a voicemail: [] Yes [] No    Yeimy Valenzuela, [facility]  01/03/25, 10:56 EST

## 2025-01-03 NOTE — TELEPHONE ENCOUNTER
CALLED PT REGARDING NO SHOW APPT PERSON THAT ANSWERED THE PHONE HUNG UP.  PATIENT RETURNED CALL AND STATED THAT SHE DID NOT WANT TO RESCHEDULE APPT SHE WAS STOPPING ALL MEDS EXCEPT HER PSYCHOTIC MEDS AND REQUESTED THAT SHE NOT BE CALLED AGAIN.

## 2025-01-03 NOTE — TELEPHONE ENCOUNTER
Patient called this morning stating she is having alot of stomach pain and wanted me to let you know she was gong to the ER due to her severe pain.

## 2025-01-04 ENCOUNTER — APPOINTMENT (OUTPATIENT)
Dept: CT IMAGING | Facility: HOSPITAL | Age: 40
End: 2025-01-04
Payer: MEDICAID

## 2025-01-04 ENCOUNTER — HOSPITAL ENCOUNTER (EMERGENCY)
Facility: HOSPITAL | Age: 40
Discharge: HOME | End: 2025-01-04
Attending: EMERGENCY MEDICINE
Payer: MEDICAID

## 2025-01-04 VITALS
HEIGHT: 62 IN | RESPIRATION RATE: 18 BRPM | BODY MASS INDEX: 53.37 KG/M2 | OXYGEN SATURATION: 95 % | SYSTOLIC BLOOD PRESSURE: 142 MMHG | TEMPERATURE: 98.9 F | HEART RATE: 112 BPM | DIASTOLIC BLOOD PRESSURE: 111 MMHG | WEIGHT: 290 LBS

## 2025-01-04 DIAGNOSIS — M54.9 DORSALGIA: ICD-10-CM

## 2025-01-04 DIAGNOSIS — S09.90XA INJURY OF HEAD, INITIAL ENCOUNTER: Primary | ICD-10-CM

## 2025-01-04 LAB
ALBUMIN SERPL-MCNC: 3.9 G/DL (ref 3.5–5.2)
ALBUMIN/GLOB SERPL: 1.6 G/DL
ALP SERPL-CCNC: 75 U/L (ref 39–117)
ALT SERPL W P-5'-P-CCNC: 33 U/L (ref 1–33)
ANION GAP SERPL CALCULATED.3IONS-SCNC: 10.4 MMOL/L (ref 5–15)
AST SERPL-CCNC: 37 U/L (ref 1–32)
BASOPHILS # BLD AUTO: 0.04 10*3/MM3 (ref 0–0.2)
BASOPHILS NFR BLD AUTO: 0.5 % (ref 0–1.5)
BILIRUB SERPL-MCNC: 0.4 MG/DL (ref 0–1.2)
BUN SERPL-MCNC: 5 MG/DL (ref 6–20)
BUN/CREAT SERPL: 8.2 (ref 7–25)
CALCIUM SPEC-SCNC: 8.6 MG/DL (ref 8.6–10.5)
CHLORIDE SERPL-SCNC: 102 MMOL/L (ref 98–107)
CO2 SERPL-SCNC: 25.6 MMOL/L (ref 22–29)
CREAT SERPL-MCNC: 0.61 MG/DL (ref 0.57–1)
DEPRECATED RDW RBC AUTO: 50.9 FL (ref 37–54)
EGFRCR SERPLBLD CKD-EPI 2021: 116.1 ML/MIN/1.73
EOSINOPHIL # BLD AUTO: 0.22 10*3/MM3 (ref 0–0.4)
EOSINOPHIL NFR BLD AUTO: 2.7 % (ref 0.3–6.2)
ERYTHROCYTE [DISTWIDTH] IN BLOOD BY AUTOMATED COUNT: 15.6 % (ref 12.3–15.4)
GLOBULIN UR ELPH-MCNC: 2.4 GM/DL
GLUCOSE SERPL-MCNC: 115 MG/DL (ref 65–99)
HCT VFR BLD AUTO: 42.7 % (ref 34–46.6)
HGB BLD-MCNC: 14.1 G/DL (ref 12–15.9)
IMM GRANULOCYTES # BLD AUTO: 0.04 10*3/MM3 (ref 0–0.05)
IMM GRANULOCYTES NFR BLD AUTO: 0.5 % (ref 0–0.5)
LYMPHOCYTES # BLD AUTO: 2.84 10*3/MM3 (ref 0.7–3.1)
LYMPHOCYTES NFR BLD AUTO: 34.5 % (ref 19.6–45.3)
MCH RBC QN AUTO: 29.8 PG (ref 26.6–33)
MCHC RBC AUTO-ENTMCNC: 33 G/DL (ref 31.5–35.7)
MCV RBC AUTO: 90.3 FL (ref 79–97)
MONOCYTES # BLD AUTO: 0.49 10*3/MM3 (ref 0.1–0.9)
MONOCYTES NFR BLD AUTO: 5.9 % (ref 5–12)
NEUTROPHILS NFR BLD AUTO: 4.61 10*3/MM3 (ref 1.7–7)
NEUTROPHILS NFR BLD AUTO: 55.9 % (ref 42.7–76)
NRBC BLD AUTO-RTO: 0 /100 WBC (ref 0–0.2)
PLATELET # BLD AUTO: 294 10*3/MM3 (ref 140–450)
PMV BLD AUTO: 10.3 FL (ref 6–12)
POTASSIUM SERPL-SCNC: 4 MMOL/L (ref 3.5–5.2)
PROT SERPL-MCNC: 6.3 G/DL (ref 6–8.5)
RBC # BLD AUTO: 4.73 10*6/MM3 (ref 3.77–5.28)
SODIUM SERPL-SCNC: 138 MMOL/L (ref 136–145)
TROPONIN T SERPL HS-MCNC: 7 NG/L
WBC NRBC COR # BLD AUTO: 8.24 10*3/MM3 (ref 3.4–10.8)

## 2025-01-04 PROCEDURE — 25010000002 HYDROMORPHONE PER 4 MG: Performed by: STUDENT IN AN ORGANIZED HEALTH CARE EDUCATION/TRAINING PROGRAM

## 2025-01-04 PROCEDURE — 96374 THER/PROPH/DIAG INJ IV PUSH: CPT

## 2025-01-04 PROCEDURE — 72125 CT NECK SPINE W/O DYE: CPT

## 2025-01-04 PROCEDURE — 93005 ELECTROCARDIOGRAM TRACING: CPT | Performed by: NURSE PRACTITIONER

## 2025-01-04 PROCEDURE — 85025 COMPLETE CBC W/AUTO DIFF WBC: CPT | Performed by: NURSE PRACTITIONER

## 2025-01-04 PROCEDURE — 80053 COMPREHEN METABOLIC PANEL: CPT | Performed by: NURSE PRACTITIONER

## 2025-01-04 PROCEDURE — 63710000001 ONDANSETRON ODT 4 MG TABLET DISPERSIBLE: Performed by: NURSE PRACTITIONER

## 2025-01-04 PROCEDURE — 72128 CT CHEST SPINE W/O DYE: CPT

## 2025-01-04 PROCEDURE — 96375 TX/PRO/DX INJ NEW DRUG ADDON: CPT

## 2025-01-04 PROCEDURE — 70450 CT HEAD/BRAIN W/O DYE: CPT

## 2025-01-04 PROCEDURE — 99284 EMERGENCY DEPT VISIT MOD MDM: CPT | Performed by: EMERGENCY MEDICINE

## 2025-01-04 PROCEDURE — 72131 CT LUMBAR SPINE W/O DYE: CPT

## 2025-01-04 PROCEDURE — 25010000002 ONDANSETRON PER 1 MG: Performed by: NURSE PRACTITIONER

## 2025-01-04 PROCEDURE — 84484 ASSAY OF TROPONIN QUANT: CPT | Performed by: NURSE PRACTITIONER

## 2025-01-04 PROCEDURE — 71250 CT THORAX DX C-: CPT

## 2025-01-04 RX ORDER — DIAZEPAM 10 MG/1
10 TABLET ORAL 3 TIMES DAILY PRN
Qty: 90 TABLET | Refills: 1 | Status: SHIPPED | OUTPATIENT
Start: 2025-01-04 | End: 2026-01-04

## 2025-01-04 RX ORDER — ONDANSETRON 2 MG/ML
4 INJECTION INTRAMUSCULAR; INTRAVENOUS ONCE
Status: COMPLETED | OUTPATIENT
Start: 2025-01-04 | End: 2025-01-04

## 2025-01-04 RX ORDER — HYDROMORPHONE HYDROCHLORIDE 2 MG/ML
0.25 INJECTION, SOLUTION INTRAMUSCULAR; INTRAVENOUS; SUBCUTANEOUS ONCE
Status: COMPLETED | OUTPATIENT
Start: 2025-01-04 | End: 2025-01-04

## 2025-01-04 RX ORDER — ONDANSETRON 4 MG/1
4 TABLET, ORALLY DISINTEGRATING ORAL ONCE
Status: COMPLETED | OUTPATIENT
Start: 2025-01-04 | End: 2025-01-04

## 2025-01-04 RX ORDER — ACETAMINOPHEN 325 MG/1
975 TABLET ORAL ONCE
Status: COMPLETED | OUTPATIENT
Start: 2025-01-04 | End: 2025-01-04

## 2025-01-04 RX ADMIN — ONDANSETRON 4 MG: 2 INJECTION INTRAMUSCULAR; INTRAVENOUS at 17:19

## 2025-01-04 RX ADMIN — ACETAMINOPHEN 975 MG: 325 TABLET, FILM COATED ORAL at 16:45

## 2025-01-04 RX ADMIN — ONDANSETRON 4 MG: 4 TABLET, ORALLY DISINTEGRATING ORAL at 16:49

## 2025-01-04 RX ADMIN — HYDROMORPHONE HYDROCHLORIDE 0.25 MG: 2 INJECTION INTRAMUSCULAR; INTRAVENOUS; SUBCUTANEOUS at 19:02

## 2025-01-05 NOTE — ED PROVIDER NOTES
Subjective:  History of Present Illness:    Patient is a 39-year-old female without contributing health history.  Presents to the ER today with head and back pain status post fall.  Patient reports she had mechanical fall in her bathroom hit her head on the corner of her tub.  She denies LOC.  Denies blood thinner.  Denies OTC medication home remedy.  Denies alleviating or exacerbating factors.    Nurses Notes reviewed and agree, including vitals, allergies, social history and prior medical history.     REVIEW OF SYSTEMS: All systems reviewed and not pertinent unless noted.  Review of Systems   Musculoskeletal:  Positive for back pain.   Neurological:  Positive for headaches.   All other systems reviewed and are negative.      Past Medical History:   Diagnosis Date    Allergic     Anxiety     Asthma Beings of copd with asthma    Back pain     Bell palsy 07/06/2021    Bell's palsy     Bipolar 2 disorder     Blood clot in vein     Behind left knee cap    Deep vein thrombosis Last year    Depression     Diabetes mellitus November    Pre diabete    Fatty liver     Frequent headaches     GERD (gastroesophageal reflux disease)     Hypertension     Every time i go into the [facility] room    Irritable bowel syndrome Two years ago    Kidney stone Two years ago    Migraine     Nausea, vomiting and diarrhea 09/17/2018    Obesity All of my life    Ovarian cyst Two years ago    Pancreatitis     Panic     PTSD (post-traumatic stress disorder)     Pulmonary embolism Not in lungs    Recurrent pregnancy loss, antepartum condition or complication Every since i have been 14 yars old    Restless leg syndrome     Sinus problem     Snores     Tattoos     Urinary tract infection Have them on and off    Varicella Little    Visual impairment Since i was little    Vitamin B12 deficiency     Wears glasses        Allergies:    Aspirin, Codeine, Cyclobenzaprine, Haldol [haloperidol], Imitrex [sumatriptan], Latex, Penicillins, Metformin, Zofran  "[ondansetron], Lamictal [lamotrigine], Metoclopramide, Milk-related compounds, Morphine, Oxycontin [oxycodone], Prochlorperazine, and Tramadol      Past Surgical History:   Procedure Laterality Date    CHOLECYSTECTOMY      HYSTEROSCOPY N/A 3/14/2024    Procedure: HYSTEROSCOPY WITH MYOSURE, DILATION AND CURETTAGE WITH CERENE ABLATION;  Surgeon: David Ribeiro MD;  Location: [facility];  Service: Obstetrics/Gynecology;  Laterality: N/A;    MULTIPLE TOOTH EXTRACTIONS      All my teeth    WISDOM TOOTH EXTRACTION  All my teeth         Social History     Socioeconomic History    Marital status: Single   Tobacco Use    Smoking status: Every Day     Current packs/day: 0.50     Average packs/day: 2.0 packs/day for 32.6 years (63.6 ttl pk-yrs)     Types: Cigarettes     Start date: 7/17/1992     Passive exposure: Current    Smokeless tobacco: Never    Tobacco comments:      Around 2.5 packs per day- 7/5/24   Vaping Use    Vaping status: Former    Passive vaping exposure: Yes   Substance and Sexual Activity    Alcohol use: Not Currently     Comment: rarely    Drug use: Never    Sexual activity: Not Currently     Partners: Female     Birth control/protection: Other, Partner of same sex         Family History   Problem Relation Age of Onset    Irritable bowel syndrome Mother     Heart disease Mother     Miscarriages / Stillbirths Mother     Arthritis Mother     COPD Mother     Learning disabilities Mother     Mental illness Mother     Asthma Mother     Irritable bowel syndrome Father     Alcohol abuse Father     Diabetes Father     Hyperlipidemia Father     Anxiety disorder Father     Learning disabilities Father     Colon cancer Maternal Grandfather     Stroke Paternal Grandfather     Stroke Paternal Grandmother        Objective  Physical Exam:  BP (!) 157/113   Pulse 108   Temp 98.9 °F (37.2 °C) (Oral)   Resp 18   Ht 157.5 cm (62\")   Wt 132 kg (290 lb)   SpO2 94%   BMI 53.04 kg/m²      Physical Exam  Vitals and " nursing note reviewed.   Constitutional:       Appearance: Normal appearance. She is normal weight.   HENT:      Head: Normocephalic and atraumatic.      Nose: Nose normal.      Mouth/Throat:      Mouth: Mucous membranes are moist.      Pharynx: Oropharynx is clear.   Eyes:      Extraocular Movements: Extraocular movements intact.      Conjunctiva/sclera: Conjunctivae normal.      Pupils: Pupils are equal, round, and reactive to light.   Cardiovascular:      Rate and Rhythm: Normal rate and regular rhythm.      Heart sounds: Normal heart sounds.   Pulmonary:      Effort: Pulmonary effort is normal.      Breath sounds: Normal breath sounds.   Abdominal:      General: Abdomen is flat. Bowel sounds are normal.      Palpations: Abdomen is soft.   Musculoskeletal:         General: Normal range of motion.      Cervical back: Normal range of motion and neck supple.   Skin:     General: Skin is warm and dry.      Capillary Refill: Capillary refill takes less than 2 seconds.   Neurological:      General: No focal deficit present.      Mental Status: She is alert and oriented to person, place, and time. Mental status is at baseline.   Psychiatric:         Mood and Affect: Mood normal.         Behavior: Behavior normal.         Thought Content: Thought content normal.         Judgment: Judgment normal.         Procedures    ED Course:         Lab Results (last 24 hours)       Procedure Component Value Units Date/Time    CBC Auto Differential [[id number]]  (Abnormal) Collected: 01/04/25 1633    Specimen: Blood Updated: 01/04/25 1653     WBC 8.24 10*3/mm3      RBC 4.73 10*6/mm3      Hemoglobin 14.1 g/dL      Hematocrit 42.7 %      MCV 90.3 fL      MCH 29.8 pg      MCHC 33.0 g/dL      RDW 15.6 %      RDW-SD 50.9 fl      MPV 10.3 fL      Platelets 294 10*3/mm3      Neutrophil % 55.9 %      Lymphocyte % 34.5 %      Monocyte % 5.9 %      Eosinophil % 2.7 %      Basophil % 0.5 %      Immature Grans % 0.5 %      Neutrophils, Absolute  4.61 10*3/mm3      Lymphocytes, Absolute 2.84 10*3/mm3      Monocytes, Absolute 0.49 10*3/mm3      Eosinophils, Absolute 0.22 10*3/mm3      Basophils, Absolute 0.04 10*3/mm3      Immature Grans, Absolute 0.04 10*3/mm3      nRBC 0.0 /100 WBC     Comprehensive Metabolic Panel [[id number]]  (Abnormal) Collected: 01/04/25 1812    Specimen: Blood Updated: 01/04/25 1837     Glucose 115 mg/dL      BUN 5 mg/dL      Creatinine 0.61 mg/dL      Sodium 138 mmol/L      Potassium 4.0 mmol/L      Chloride 102 mmol/L      CO2 25.6 mmol/L      Calcium 8.6 mg/dL      Total Protein 6.3 g/dL      Albumin 3.9 g/dL      ALT (SGPT) 33 U/L      AST (SGOT) 37 U/L      Alkaline Phosphatase 75 U/L      Total Bilirubin 0.4 mg/dL      Globulin 2.4 gm/dL      A/G Ratio 1.6 g/dL      BUN/Creatinine Ratio 8.2     Anion Gap 10.4 mmol/L      eGFR 116.1 mL/min/1.73     Narrative:      GFR Categories in Chronic Kidney Disease (CKD)      GFR Category          GFR (mL/min/1.73)    Interpretation  G1                     90 or greater         Normal or high (1)  G2                      60-89                Mild decrease (1)  G3a                   45-59                Mild to moderate decrease  G3b                   30-44                Moderate to severe decrease  G4                    15-29                Severe decrease  G5                    14 or less           Kidney failure          (1)In the absence of evidence of kidney disease, neither GFR category G1 or G2 fulfill the criteria for CKD.    eGFR calculation 2021 CKD-EPI creatinine equation, which does not include race as a factor    High Sensitivity Troponin T [[id number]]  (Normal) Collected: 01/04/25 1812    Specimen: Blood Updated: 01/04/25 1839     HS Troponin T 7 ng/L     Narrative:      High Sensitive Troponin T Reference Range:  <14.0 ng/L- Negative Female for AMI  <22.0 ng/L- Negative Male for AMI  >=14 - Abnormal Female indicating possible myocardial injury.  >=22 - Abnormal Male indicating  possible myocardial injury.   Clinicians would have to utilize clinical acumen, EKG, Troponin, and serial changes to determine if it is an Acute Myocardial Infarction or myocardial injury due to an underlying chronic condition.                  CT Chest Without Contrast Diagnostic    Result Date: 1/4/2025  PROCEDURE: CT CHEST WO CONTRAST DIAGNOSTIC-  HISTORY: Chest pain status post fall  COMPARISON: September 14, 2024.  PROCEDURE: Axial images were obtained from the lung apex to the mid abdomen by computed tomography. This study was performed with techniques to keep radiation doses as low as reasonably achievable, (ALARA). Individualized dose reduction techniques using automated exposure control or adjustment of mA and/or kV according to the patient size were employed.  FINDINGS:  CHEST: There is no suspicious axillary adenopathy. There is evidence of old calcified granulomatous disease. There is no suspicious hilar or mediastinal adenopathy. The heart is proper size. There is no pericardial or pleural effusion.No suspicious infiltrate or nodule identified. Limited images of the upper abdomen demonstrate cholecystectomy clips. There is a 25 mm circumscribed hypodense left adrenal lesion measuring -9 Hounsfield units consistent with a benign adrenal adenoma. No pneumothorax identified. No displaced rib fracture seen.      Impression: No acute cardiopulmonary process.      CTDI: 31.11 mGy,40.37 mGy DLP:2817.81 mGy.cm  This report was signed and finalized on 1/4/2025 4:18 PM by Rosalba Falcon MD.      CT Lumbar Spine Without Contrast    Result Date: 1/4/2025  PROCEDURE: CT LUMBAR SPINE WO CONTRAST-  HISTORY: Lumbar pain status post fall, compared January 17, 2024.  PROCEDURE: Axial images were obtained through the lumbar spine by computed tomography. Reconstruction images were also performed. This study was performed with techniques to keep radiation doses as low as reasonably achievable, (ALARA). Individualized dose  reduction techniques using automated exposure control or adjustment of mA and/or kV according to the patient size were employed.  FINDINGS: There is minimal anterolisthesis L4 on L5, stable from prior. Minimal osteophyte formation is noted throughout the lumbar spine. The vertebral bodies are of proper height. The facets overlap at all levels.   L1-L2: No significant disc disease is present. There is no stenosis.  L2-L3: No significant disc disease is present. There is no stenosis.  L3-L4: No significant disc disease is present. There is no stenosis.  L4-L5: There is mild annular bulge without spinal stenosis. There is mild, bilateral neuroforaminal narrowing.  L5-S1: No significant disc disease is present. There is no stenosis.      Impression: No acute bony abnormality.      CTDI: 31.11 mGy,40.37 mGy DLP:2817.81 mGy.cm  This report was signed and finalized on 1/4/2025 4:15 PM by Rosalba Falcon MD.      CT Thoracic Spine Without Contrast    Result Date: 1/4/2025  PROCEDURE: CT THORACIC SPINE WO CONTRAST-  HISTORY: Thoracic pain status post fall, compare to March 14, 2022.  PROCEDURE: Axial images were obtained through the thoracic spine by computed tomography. Reconstruction images were also performed.  FINDINGS: Sagittal images demonstrate normal disc heights. There is no subluxation. Axial imaging demonstrates no evidence of fracture.      Impression: No acute process.      CTDI: 31.11 mGy,40.37 mGy DLP:2817.81 mGy.cm  This report was signed and finalized on 1/4/2025 4:13 PM by Rosalba Falcon MD.      CT Head Without Contrast    Result Date: 1/4/2025  PROCEDURE: CT HEAD WO CONTRAST-  HISTORY: Head injury, headache  COMPARISON: October 21, 2024..  TECHNIQUE: Multiple axial CT images were performed from the foramen magnum to the vertex. Individualized dose reduction techniques using automated exposure control or adjustment of mA and/or kV according to the patient size were employed.  FINDINGS: The brain parenchyma is  unremarkable.  The ventricles are proper size. There is no evidence of hemorrhage. No masses are identified. No abnormal extra-axial fluid is seen. The paranasal sinuses and mastoid air cells are unremarkable.      Impression: No acute intracranial process.     This report was signed and finalized on 1/4/2025 4:11 PM by Rosalba Falcon MD.      CT Cervical Spine Without Contrast    Result Date: 1/4/2025  PROCEDURE: CT CERVICAL SPINE WO CONTRAST-  HISTORY: Cervical pain status post fall  COMPARISON: None.  PROCEDURE: Axial images were obtained from the skull base to the thoracic inlet by computed tomography. 3 D reconstruction images were performed. This study was performed with techniques to keep radiation doses as low as reasonably achievable, (ALARA). Individualized dose reduction techniques using automated exposure control or adjustment of mA and/or kV according to the patient size were employed.  FINDINGS: There is no acute fracture or subluxation. No significant spinal or neural foraminal canal stenosis is seen. The disk spaces are preserved. The facets are normally aligned. The soft tissues are unremarkable. Limited images of the lung apices are unremarkable. Motion artifact noted decreasing the sensitivity of this exam.      Impression: No acute fracture.     CTDI: 21.23 mGy DLP:682.57 mGy.cm  This report was signed and finalized on 1/4/2025 4:09 PM by Rosalba Falcon MD.          MDM      Initial impression of presenting illness: Patient is a 39-year-old female without contributing health history.  Presents to the ER today with head and back pain status post fall.  Patient reports she had mechanical fall in her bathroom hit her head on the corner of her tub.  She denies LOC.  Denies blood thinner.  Denies OTC medication home remedy.  Denies alleviating or exacerbating factors.    DDX: includes but is not limited to: Concussion, abrasion, contusion, subdural bleed or other    Patient arrives stable with vitals  interpreted by myself.     Pertinent features from physical exam: Lung sounds are clear bilaterally throughout.  Ab soft nontender.  Bowel sounds normal.  Heart sounds normal.  There is tenderness along cervical, thoracic and lumbar spine.  Eyes are PERRL, EOM's intact.    Initial diagnostic plan: CBC, CMP, troponin, EKG, chest CT without contrast, CT lumbar spine without contrast, thoracic spine without contrast head CT without contrast.  Cervical spine without contrast    Results from initial plan were reviewed and interpreted by me revealing CBC is within appropriate range.  CMP is within appropriate range.  Troponin is negative.  EKG sinus tachycardia rate of 112.  Head CT no acute intracranial process chest CT no acute cardiopulmonary process CT lumbar spine no acute bony abnormality.  CT thoracic spine no acute process CT cervical spine without acute process    Diagnostic information from other sources: Chart review    Interventions / Re-evaluation: Patient's vital signs are stable throughout encounter.  Patient received 0.25 mg of Dilaudid while here in the ER.    Results/clinical rationale were discussed with patient    Consultations/Discussion of results with other physicians: N/A    Disposition plan: Patient is hemodynamically stable nontoxic-appearing appropriate discharge.  Will have patient follow-up PCP within the week.  Follow-up ER for new or worse symptoms.  -----        Final diagnoses:   Injury of head, initial encounter   Dorsalgia          Cristian Lerner, APRN  01/04/25 [id number]

## 2025-01-07 RX ORDER — IBUPROFEN 800 MG/1
800 TABLET, FILM COATED ORAL EVERY 8 HOURS PRN
Qty: 30 TABLET | Refills: 0 | Status: SHIPPED | OUTPATIENT
Start: 2025-01-07

## 2025-01-07 NOTE — TELEPHONE ENCOUNTER
LM FOR PATIENT. NEED TO SEE IF SHE CAN COME IN DIFFERENT TIME SLOT TOMORROW, 1/8. PROVIDER WOULD LIKE HER TO BE IN A 30 MIN SLOT.

## 2025-01-11 ENCOUNTER — TELEMEDICINE (OUTPATIENT)
Dept: FAMILY MEDICINE CLINIC | Facility: TELEHEALTH | Age: 40
End: 2025-01-11
Payer: MEDICAID

## 2025-01-11 DIAGNOSIS — R60.0 LIP EDEMA: ICD-10-CM

## 2025-01-11 DIAGNOSIS — H53.8 BLURRED VISION, RIGHT EYE: ICD-10-CM

## 2025-01-11 DIAGNOSIS — H02.843 SWOLLEN EYELID, RIGHT: Primary | ICD-10-CM

## 2025-01-11 PROCEDURE — 1160F RVW MEDS BY RX/DR IN RCRD: CPT | Performed by: NURSE PRACTITIONER

## 2025-01-11 PROCEDURE — 99212 OFFICE O/P EST SF 10 MIN: CPT | Performed by: NURSE PRACTITIONER

## 2025-01-11 PROCEDURE — 1159F MED LIST DOCD IN RCRD: CPT | Performed by: NURSE PRACTITIONER

## 2025-01-12 NOTE — PROGRESS NOTES
"You have chosen to receive care through a telehealth visit.  Do you consent to use a video/audio connection for your medical care today? Yes     CHIEF COMPLAINT  No chief complaint on file.        HPI  Lindsay Amezquita is a 39 y.o. female  presents with complaint of right eye swollen shut. Reports her lips are swollen also. Reports the right eye is \"very blurry\". Reports this happened this afternoon. Reports her right upper lid is painful. Denies any eyeball pain. No fever or chills. No nausea or vomiting. No CP, SOA or trouble breathing. Reports she has not changed anything such as foods, medications or lotions. Reports she has not taken any medication for her symptoms. Reports she is getting ready to take some benadryl.     Review of Systems   Constitutional:  Negative for chills, fatigue and fever.   HENT:  Positive for facial swelling. Negative for congestion, ear discharge, ear pain, sinus pressure, sinus pain and sore throat.         Right eyelid and lips swollen    Respiratory:  Negative for cough, chest tightness, shortness of breath and wheezing.    Cardiovascular:  Negative for chest pain.   Gastrointestinal:  Negative for abdominal pain, diarrhea, nausea and vomiting.   Musculoskeletal:  Negative for back pain and myalgias.   Neurological:  Negative for dizziness and headaches.   Psychiatric/Behavioral: Negative.         Past Medical History:   Diagnosis Date    Allergic     Anxiety     Asthma Beings of copd with asthma    Back pain     Bell palsy 07/06/2021    Bell's palsy     Bipolar 2 disorder     Blood clot in vein     Behind left knee cap    Deep vein thrombosis Last year    Depression     Diabetes mellitus November    Pre diabete    Fatty liver     Frequent headaches     GERD (gastroesophageal reflux disease)     Hypertension     Every time i go into the [facility] room    Irritable bowel syndrome Two years ago    Kidney stone Two years ago    Migraine     Nausea, vomiting and diarrhea 09/17/2018    " Obesity All of my life    Ovarian cyst Two years ago    Pancreatitis     Panic     PTSD (post-traumatic stress disorder)     Pulmonary embolism Not in lungs    Recurrent pregnancy loss, antepartum condition or complication Every since i have been 14 yars old    Restless leg syndrome     Sinus problem     Snores     Tattoos     Urinary tract infection Have them on and off    Varicella Little    Visual impairment Since i was little    Vitamin B12 deficiency     Wears glasses        Family History   Problem Relation Age of Onset    Irritable bowel syndrome Mother     Heart disease Mother     Miscarriages / Stillbirths Mother     Arthritis Mother     COPD Mother     Learning disabilities Mother     Mental illness Mother     Asthma Mother     Irritable bowel syndrome Father     Alcohol abuse Father     Diabetes Father     Hyperlipidemia Father     Anxiety disorder Father     Learning disabilities Father     Colon cancer Maternal Grandfather     Stroke Paternal Grandfather     Stroke Paternal Grandmother        Social History     Socioeconomic History    Marital status: Single   Tobacco Use    Smoking status: Every Day     Current packs/day: 0.50     Average packs/day: 2.0 packs/day for 32.6 years (63.6 ttl pk-yrs)     Types: Cigarettes     Start date: 7/17/1992     Passive exposure: Current    Smokeless tobacco: Never    Tobacco comments:      Around 2.5 packs per day- 7/5/24   Vaping Use    Vaping status: Former    Passive vaping exposure: Yes   Substance and Sexual Activity    Alcohol use: Not Currently     Comment: rarely    Drug use: Never    Sexual activity: Not Currently     Partners: Female     Birth control/protection: Other, Partner of same sex       Lindsay Gamezney  reports that she has been smoking cigarettes. She started smoking about 32 years ago. She has a 63.6 pack-year smoking history. She has been exposed to tobacco smoke. She has never used smokeless tobacco. I have educated her on the risk of diseases  from using tobacco products such as cancer, COPD, and heart disease.         I spent  1  minutes counseling the patient.              There were no vitals taken for this visit.    PHYSICAL EXAM  Physical Exam   Constitutional: She is oriented to person, place, and time. She appears well-developed and well-nourished. No distress.   HENT:   Head: Normocephalic and atraumatic.   Right Ear: Hearing normal.   Left Ear: Hearing normal.   Nose: No congestion.   Mouth/Throat: Mouth/Lips are normal.  Patient directed exam  Note lips are mildly-moderately swollen   Eyes: Conjunctivae and lids are normal. Right eye exhibits edema. Left eye exhibits no edema.   Patient directed exam   Right upper eyelid is swollen shut     Pulmonary/Chest: Effort normal.  No respiratory distress.  Neurological: She is alert and oriented to person, place, and time.   Psychiatric: She has a normal mood and affect. Her speech is normal and behavior is normal.       Results for orders placed or performed during the hospital encounter of 01/04/25   CBC Auto Differential    Collection Time: 01/04/25  4:33 PM    Specimen: Blood   Result Value Ref Range    WBC 8.24 3.40 - 10.80 10*3/mm3    RBC 4.73 3.77 - 5.28 10*6/mm3    Hemoglobin 14.1 12.0 - 15.9 g/dL    Hematocrit 42.7 34.0 - 46.6 %    MCV 90.3 79.0 - 97.0 fL    MCH 29.8 26.6 - 33.0 pg    MCHC 33.0 31.5 - 35.7 g/dL    RDW 15.6 (H) 12.3 - 15.4 %    RDW-SD 50.9 37.0 - 54.0 fl    MPV 10.3 6.0 - 12.0 fL    Platelets 294 140 - 450 10*3/mm3    Neutrophil % 55.9 42.7 - 76.0 %    Lymphocyte % 34.5 19.6 - 45.3 %    Monocyte % 5.9 5.0 - 12.0 %    Eosinophil % 2.7 0.3 - 6.2 %    Basophil % 0.5 0.0 - 1.5 %    Immature Grans % 0.5 0.0 - 0.5 %    Neutrophils, Absolute 4.61 1.70 - 7.00 10*3/mm3    Lymphocytes, Absolute 2.84 0.70 - 3.10 10*3/mm3    Monocytes, Absolute 0.49 0.10 - 0.90 10*3/mm3    Eosinophils, Absolute 0.22 0.00 - 0.40 10*3/mm3    Basophils, Absolute 0.04 0.00 - 0.20 10*3/mm3    Immature Grans,  Absolute 0.04 0.00 - 0.05 10*3/mm3    nRBC 0.0 0.0 - 0.2 /100 WBC   Comprehensive Metabolic Panel    Collection Time: 01/04/25  6:12 PM    Specimen: Blood   Result Value Ref Range    Glucose 115 (H) 65 - 99 mg/dL    BUN 5 (L) 6 - 20 mg/dL    Creatinine 0.61 0.57 - 1.00 mg/dL    Sodium 138 136 - 145 mmol/L    Potassium 4.0 3.5 - 5.2 mmol/L    Chloride 102 98 - 107 mmol/L    CO2 25.6 22.0 - 29.0 mmol/L    Calcium 8.6 8.6 - 10.5 mg/dL    Total Protein 6.3 6.0 - 8.5 g/dL    Albumin 3.9 3.5 - 5.2 g/dL    ALT (SGPT) 33 1 - 33 U/L    AST (SGOT) 37 (H) 1 - 32 U/L    Alkaline Phosphatase 75 39 - 117 U/L    Total Bilirubin 0.4 0.0 - 1.2 mg/dL    Globulin 2.4 gm/dL    A/G Ratio 1.6 g/dL    BUN/Creatinine Ratio 8.2 7.0 - 25.0    Anion Gap 10.4 5.0 - 15.0 mmol/L    eGFR 116.1 >60.0 mL/min/1.73   High Sensitivity Troponin T    Collection Time: 01/04/25  6:12 PM    Specimen: Blood   Result Value Ref Range    HS Troponin T 7 <14 ng/L     *Note: Due to a large number of results and/or encounters for the requested time period, some results have not been displayed. A complete set of results can be found in Results Review.       Diagnoses and all orders for this visit:    1. Swollen eyelid, right (Primary)    2. Blurred vision, right eye    3. Lip edema    Advised patient to take Benadryl.  Advised patient to go to the ER for in person evaluation of her symptoms.  Patient declines EMS transport to the ER.  Patient reports her mom is with her and she lives 2 minutes from the ER. Reports she will go now.  Understands if symptoms worsen to call EMS        Court Serrano [place], APRN  01/11/2025  20:50 EST    Mode of Visit: Video  Location of patient: -HOME-  Location of provider: +HOME+  You have chosen to receive care through a telehealth visit.  The patient has signed the video visit consent form.  The visit included audio and video interaction. No technical issues occurred during this visit.      The use of a video visit has been reviewed  with the patient and verbal informed consent has been obtained. Myself and Lindsay Amezquita participated in this visit. The patient is located in [address] [place] [address] DR ESPARZA [address] [place], [place] [id number].   I am located in [place], [place]. [employer] and [employer] Video Client were utilized. I spent 5 minutes in the patient's chart for this visit.         Note Disclaimer: At [facility], we believe that sharing information builds trust and better   relationships. You are receiving this note because you recently visited [facility]. It is possible you   will see health information before a provider has talked with you about it. This kind of information can   be easy to misunderstand. To help you fully understand what it means for your health, we urge you to   discuss this note with your provider.

## 2025-01-13 DIAGNOSIS — M54.16 LUMBAR RADICULOPATHY: ICD-10-CM

## 2025-01-14 ENCOUNTER — TELEPHONE (OUTPATIENT)
Dept: OBSTETRICS AND GYNECOLOGY | Facility: CLINIC | Age: 40
End: 2025-01-14
Payer: MEDICAID

## 2025-01-14 DIAGNOSIS — M54.16 LUMBAR RADICULOPATHY: ICD-10-CM

## 2025-01-14 DIAGNOSIS — R10.2 PELVIC PAIN: ICD-10-CM

## 2025-01-14 RX ORDER — OXYCODONE AND ACETAMINOPHEN 5; 325 MG/1; MG/1
2 TABLET ORAL EVERY 8 HOURS PRN
Qty: 10 TABLET | Refills: 0 | Status: SHIPPED | OUTPATIENT
Start: 2025-01-14

## 2025-01-14 NOTE — TELEPHONE ENCOUNTER
Caller: Lindsay Amezquita    Relationship: Self    Best call back number: [phone]     Requested Prescriptions:   Requested Prescriptions     Pending Prescriptions Disp Refills    Trelegy Ellipta 200-62.5-25 MCG/ACT inhaler 60 each 5     Sig: Inhale 1 puff Daily. Rinse mouth with water after use        Pharmacy where request should be sent: [id number] [place] [id number] [address] AT [facility] - [phone]  - [phone]     Last office visit with prescribing clinician: 11/6/2024   Last telemedicine visit with prescribing clinician: 12/30/2024   Next office visit with prescribing clinician: Visit date not found     Additional details provided by patient: PATIENT STATED THAT SHE HAS A LINGERING COUGH AND IS ASKING FOR THIS INHALER TO BE INCREASE TO HELP WITH THE COUGH     Does the patient have less than a 3 day supply:  [x] Yes  [] No    Would you like a call back once the refill request has been completed: [] Yes [x] No    If the office needs to give you a call back, can they leave a voicemail: [] Yes [x] No    Kim Corcoran Rep   01/14/25 08:42 EST

## 2025-01-14 NOTE — TELEPHONE ENCOUNTER
STATES HER TIZANIDINE IS WAITING PROVIDER APPROVAL PER RICKY, WANTS IT TO GET APPROVED. PLEASE ADVISE.

## 2025-01-14 NOTE — TELEPHONE ENCOUNTER
Patient called this morning sating she fell out of bed on 1/13/25 and had severe vaginal bleeding. She informed me she had bled through 17 pantie liners. She also informed me she went to our ER last night and she asked for something stronger than percocet's. The ER said they was unavailable to provide her that, so they gave her ibuprofen and a Toradol shot and she went home.  She is asking if you would be able to provide something stronger.

## 2025-01-15 ENCOUNTER — TELEPHONE (OUTPATIENT)
Dept: CARDIOLOGY | Facility: CLINIC | Age: 40
End: 2025-01-15
Payer: MEDICAID
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
## 2025-01-16 ENCOUNTER — TELEPHONE (OUTPATIENT)
Dept: FAMILY MEDICINE CLINIC | Facility: CLINIC | Age: 40
End: 2025-01-16
Payer: MEDICAID
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
## 2025-01-20 ENCOUNTER — TELEMEDICINE (OUTPATIENT)
Dept: BEHAVIORAL HEALTH | Facility: CLINIC | Age: 40
End: 2025-01-20
Payer: MEDICAID

## 2025-01-20 DIAGNOSIS — F51.04 PSYCHOPHYSIOLOGICAL INSOMNIA: ICD-10-CM

## 2025-01-20 DIAGNOSIS — F43.10 POST-TRAUMATIC STRESS DISORDER, UNSPECIFIED: ICD-10-CM

## 2025-01-20 DIAGNOSIS — F41.0 PANIC ATTACKS: ICD-10-CM

## 2025-01-20 DIAGNOSIS — F31.30 BIPOLAR I DISORDER, MOST RECENT EPISODE DEPRESSED: Primary | ICD-10-CM

## 2025-01-20 DIAGNOSIS — F41.1 GENERALIZED ANXIETY DISORDER: ICD-10-CM

## 2025-01-20 PROCEDURE — 99214 OFFICE O/P EST MOD 30 MIN: CPT

## 2025-01-20 PROCEDURE — 1160F RVW MEDS BY RX/DR IN RCRD: CPT

## 2025-01-20 PROCEDURE — 1159F MED LIST DOCD IN RCRD: CPT

## 2025-01-20 RX ORDER — OXCARBAZEPINE 600 MG/1
600 TABLET, FILM COATED ORAL 2 TIMES DAILY
Qty: 60 TABLET | Refills: 1 | Status: SHIPPED | OUTPATIENT
Start: 2025-01-20

## 2025-01-20 RX ORDER — AMITRIPTYLINE HYDROCHLORIDE 150 MG/1
150 TABLET ORAL NIGHTLY
Qty: 30 TABLET | Refills: 1 | Status: SHIPPED | OUTPATIENT
Start: 2025-01-20

## 2025-01-20 RX ORDER — LURASIDONE HYDROCHLORIDE 120 MG/1
120 TABLET, FILM COATED ORAL DAILY
Qty: 30 TABLET | Refills: 1 | Status: SHIPPED | OUTPATIENT
Start: 2025-01-20

## 2025-01-20 RX ORDER — DESVENLAFAXINE 50 MG/1
TABLET, FILM COATED, EXTENDED RELEASE ORAL
Qty: 21 TABLET | Refills: 0 | Status: SHIPPED | OUTPATIENT
Start: 2025-01-20 | End: 2025-02-17

## 2025-01-20 NOTE — PROGRESS NOTES
This provider is located at [facility], Behavioral Health, [address] [place] [address], [facility],using a secure MyChart Video Visit through [employer]. Patient is being seen remotely via telehealth at their home address in [place], and stated they are in a secure environment for this session. The patient's condition being diagnosed/treated is appropriate for telemedicine. The provider identified herself as well as her credentials.   The patient, and/or patients guardian, consent to be seen remotely, and when consent is given they understand that the consent allows for patient identifiable information to be sent to a third party as needed.   They may refuse to be seen remotely at any time. The electronic data is encrypted and password protected, and the patient and/or guardian has been advised of the potential risks to privacy not withstanding such measures.    Video Visit    Patient Name: Lindsay Amezquita  : 1985   MRN: [id number]   Care Team: Patient Care Team:  Hunter Winkler MD as PCP - General (Family Medicine)  Donna Mcqueen APRN as Nurse Practitioner (Behavioral Health)  Sidney Brooks MD as Cardiologist (Cardiology)         Chief Complaint:    Chief Complaint   Patient presents with    Bipolar    Anxiety    Panic Attack    PTSD    Sleeping Problem    Med Management       History of Present Illness: Lindsay Amezquita is a 39 y.o. female who presents via video visit for a medication management follow up. Patient reports that she has been having a hard time lately. She states her mood and anxiety have not been managed well at all. She has also been having a lot of nightmares/vivid dreams which has been impacting her sleep. She reports feeling that none of her medications are working. She also states she has not seen or heard from her therapist since October and that has impacted her mental health severely. She endorses suicidal thoughts in the past but denies any today and denies  any plan or intent to hurt herself. She does feel that she needs to get back into a therapist ASAP.    The following portion of the patient's history were reviewed and updated appropriately: allergies, current and past medications, family history, medical history and social history. MITCH reviewed and appropriate.   Subjective   Review of Systems:    Review of Systems   Respiratory: Negative.     Cardiovascular: Negative.  Negative for chest pain and palpitations.   Neurological: Negative.    Psychiatric/Behavioral:  Positive for agitation, sleep disturbance, depressed mood and stress. The patient is nervous/anxious (panic).    All other systems reviewed and are negative.      Current Medications:   Current Outpatient Medications   Medication Sig Dispense Refill    amitriptyline (ELAVIL) 150 MG tablet Take 1 tablet by mouth Every Night. 30 tablet 1    Lurasidone HCl (Latuda) 120 MG tablet tablet Take 1 tablet by mouth Daily. 30 tablet 1    acetaminophen (TYLENOL) 500 MG tablet Take 2 tablets by mouth Every 8 (Eight) Hours As Needed for Mild Pain. 60 tablet 10    atorvastatin (LIPITOR) 40 MG tablet Take 1 tablet by mouth every night at bedtime. 90 tablet 3    azelastine (ASTELIN) 0.1 % nasal spray Administer 2 sprays into the nostril(s) as directed by provider 2 (Two) Times a Day. Use in each nostril as directed 30 mL 12    brompheniramine-pseudoephedrine-DM 30-2-10 MG/5ML syrup Take 10 mL by mouth 4 (Four) Times a Day As Needed for Allergies. 473 mL 0    butalbital-acetaminophen-caffeine (FIORICET, ESGIC) -40 MG per tablet Take 1 tablet by mouth 2 (Two) Times a Day As Needed for Headache. 20 tablet 0    desvenlafaxine (Pristiq) 50 MG 24 hr tablet Take 1 tablet by mouth Daily for 14 days, THEN 1 tablet Every Other Day for 14 days. 21 tablet 0    diazePAM (VALIUM) 10 MG tablet Take 1 tablet by mouth 3 (Three) Times a Day As Needed for Anxiety. 90 tablet 1    fexofenadine (Allegra Allergy) 60 MG tablet Take 1  tablet by mouth 2 (Two) Times a Day. 60 tablet 2    fluticasone (FLONASE) 50 MCG/ACT nasal spray Administer 1 spray into the nostril(s) as directed by provider 2 (Two) Times a Day. 16 g 5    FREESTYLE LITE test strip See Admin Instructions.      gabapentin (Neurontin) 800 MG tablet Take 1 tablet by mouth 3 (Three) Times a Day. 90 tablet 0    [place] 5 MG tablet Take 2 tablets by mouth Daily.      HYDROcodone-acetaminophen (NORCO) 5-325 MG per tablet Take 1 tablet by mouth Every 6 (Six) Hours As Needed for Severe Pain. 8 tablet 0    ibuprofen (ADVIL,MOTRIN) 800 MG tablet Take 1 tablet by mouth Every 8 (Eight) Hours As Needed for Moderate Pain for up to 30 doses. 30 tablet 0    ipratropium-albuterol (DUO-NEB) 0.5-2.5 mg/3 ml nebulizer Take 3 mL by nebulization 4 (Four) Times a Day As Needed for Wheezing or Shortness of Air. 360 mL 5    naloxone (NARCAN) 4 MG/0.1ML nasal spray Call 911. Don't prime. [place] in 1 nostril for overdose. Repeat in 2-3 minutes in other nostril if no or minimal breathing/responsiveness. 2 each 0    ondansetron ODT (ZOFRAN-ODT) 4 MG disintegrating tablet Place 1 tablet on the tongue Every 8 (Eight) Hours As Needed for Nausea or Vomiting. 10 tablet 0    OXcarbazepine (Trileptal) 600 MG tablet Take 1 tablet by mouth 2 (Two) Times a Day. 60 tablet 1    oxyCODONE-acetaminophen (Percocet) 5-325 MG per tablet Take 2 tablets by mouth Every 8 (Eight) Hours As Needed for Severe Pain. 10 tablet 0    pantoprazole (Protonix) 40 MG EC tablet Take 1 tablet by mouth Daily. 90 tablet 1    polyethylene glycol (MIRALAX) 17 g packet Take 17 g by mouth Daily. (Patient taking differently: Take 17 g by mouth As Needed.) 30 each 1    predniSONE (DELTASONE) 10 MG tablet 5 po for 2 days, 4 po for 2 days, 3 po for 2 days, 2 po for 2 days, 1 po for 2 days 30 tablet 0    promethazine (PHENERGAN) 12.5 MG tablet Take 1 tablet by mouth Every 6 (Six) Hours As Needed for Nausea or Vomiting. 30 tablet 5    Rimegepant Sulfate  (Nurtec) 75 MG tablet dispersible tablet Take 1 tablet by mouth Every Other Day. Take Monday,Wednesday,Friday, and Saturday. 16 tablet 5    Rimegepant Sulfate (Nurtec) 75 MG tablet dispersible tablet Take 1 tablet by mouth As Needed (HA). 2 tablet 0    SITagliptin (Januvia) 100 MG tablet Take 1 tablet by mouth Daily. 30 tablet 11    tiZANidine (ZANAFLEX) 4 MG tablet Take 1 tablet by mouth Every 8 (Eight) Hours As Needed for Muscle Spasms. 90 tablet 0    traMADol (Ultram) 50 MG tablet Take 1 tablet by mouth Every 8 (Eight) Hours As Needed for Severe Pain. 10 tablet 0    Trelegy Ellipta 200-62.5-25 MCG/ACT inhaler Inhale 1 puff Daily. Rinse mouth with water after use 60 each 5    Zavegepant HCl 10 MG/ACT solution Administer 10 mg into the nostril(s) as directed by provider Daily As Needed (migraine). (1 spray into 1 nostril every 24 hrs prn) 6 each 5     No current facility-administered medications for this visit.       Mental Status Exam:   Hygiene:    appears to be WNL   Cooperation:  Cooperative  Eye Contact:  Good  Psychomotor Behavior:   appears to be WNL   Affect:  Appropriate  Mood: depressed, anxious, panicky, terrified, irritable, and fluctates  Speech:  Pressured, Rapid, and Rambling  Thought Process:  Linear  Thought Content:  Mood congruent  Suicidal:  None  Homicidal:  None  Hallucinations:  None  Delusion:  None  Memory:  Intact  Orientation:  Person, Place, Time, and Situation  Reliability:  fair  Insight:  Fair  Judgement:  Fair  Impulse Control:  Fair  Physical/Medical Issues:  Yes see chart       Objective   Vital Signs:   There were no vitals taken for this visit.         BP Readings from Last 3 Encounters:   01/04/25 (!) 142/111   12/21/24 136/90   11/25/24 132/100        Pulse Readings from Last 3 Encounters:   01/04/25 112   12/21/24 107   11/25/24 112        Lab Results:     Lab Results   Component Value Date    WBC 8.24 01/04/2025    HGB 14.1 01/04/2025    HCT 42.7 01/04/2025    MCV 90.3  01/04/2025     01/04/2025         Lab Results   Component Value Date    GLUCOSE 115 (H) 01/04/2025    BUN 5 (L) 01/04/2025    CREATININE 0.61 01/04/2025     01/04/2025    K 4.0 01/04/2025     01/04/2025    CALCIUM 8.6 01/04/2025    PROTEINTOT 6.3 01/04/2025    ALBUMIN 3.9 01/04/2025    ALT 33 01/04/2025    AST 37 (H) 01/04/2025    ALKPHOS 75 01/04/2025    BILITOT 0.4 01/04/2025    GLOB 2.4 01/04/2025    AGRATIO 1.6 01/04/2025    BCR 8.2 01/04/2025    ANIONGAP 10.4 01/04/2025    EGFR 116.1 01/04/2025         Lab Results   Component Value Date    CHOL 108 09/17/2024    CHLPL 112 01/05/2024    TRIG 187 (H) 09/17/2024    HDL 32 (L) 09/17/2024    LDL 45 09/17/2024         Lab Results   Component Value Date    HGBA1C 7.00 (H) 10/18/2024          Lab Results   Component Value Date    TSH 1.700 03/27/2024         Assessment / Plan    Diagnoses and all orders for this visit:    1. Bipolar I disorder, most recent episode depressed (Primary)  -     OXcarbazepine (Trileptal) 600 MG tablet; Take 1 tablet by mouth 2 (Two) Times a Day.  Dispense: 60 tablet; Refill: 1  -     Lurasidone HCl (Latuda) 120 MG tablet tablet; Take 1 tablet by mouth Daily.  Dispense: 30 tablet; Refill: 1  -     Ambulatory Referral to Behavioral Health    2. Psychophysiological insomnia  -     amitriptyline (ELAVIL) 150 MG tablet; Take 1 tablet by mouth Every Night.  Dispense: 30 tablet; Refill: 1  -     Ambulatory Referral to Behavioral Health    3. Panic attacks  -     Ambulatory Referral to Behavioral Health    4. Generalized anxiety disorder  -     desvenlafaxine (Pristiq) 50 MG 24 hr tablet; Take 1 tablet by mouth Daily for 14 days, THEN 1 tablet Every Other Day for 14 days.  Dispense: 21 tablet; Refill: 0  -     Ambulatory Referral to Behavioral Health    5. Post-traumatic stress disorder, unspecified  -     OXcarbazepine (Trileptal) 600 MG tablet; Take 1 tablet by mouth 2 (Two) Times a Day.  Dispense: 60 tablet; Refill: 1  -      Lurasidone HCl (Latuda) 120 MG tablet tablet; Take 1 tablet by mouth Daily.  Dispense: 30 tablet; Refill: 1  -     Ambulatory Referral to Behavioral Health    Will wean off Pristiq and increase Trileptal. Continue all other medication as ordered. Urgent referral sent for therapy.     As part of patient's treatment plan I am prescribing a controlled substance.  The patient has been made aware of the appropriate use of such medications, including potential risk of somnolence, limited ability to drive and/or work safely, and potential for dependence and/or overdose.  It has also been made clear that these medications are for use by this patient only, without concomitant use of alcohol or other substances, unless prescribed.    Patient has completed a prescribing agreement detailing terms of continued prescribing of controlled substances, including monitoring MITCH reports, urine drug screening, and pill counts if necessary.  Patient is aware that inappropriate use will result in cessation of prescribing such medications.    AIMS  Facial and Oral Movements  Muscles of Facial Expression: None, normal  Lips and Perioral Area: None, normal  Jaw: None, normal  Tongue: None, normal  Extremity Movements  Upper (arms, wrists, hands, fingers): None, normal  Lower (legs, knees, ankles, toes): None, normal  Trunk Movements  Neck, shoulders, hips: None, normal  Overall Severity  Severity of abnormal movements (max 4): 0  Incapacitation due to abnormal movements: None, normal  Patient's awareness of abnormal movements (rate only patient's report): Aware, no distress  Dental Status  Current problems with teeth and/or dentures?: No  Does patient usually wear dentures?: No        A psychological evaluation was conducted in order to assess past and current level of functioning. Areas assessed included, but were not limited to: perception of social support, perception of ability to face and deal with challenges in life (positive  functioning), anxiety symptoms, depressive symptoms, perspective on beliefs/belief system, coping skills for stress, intelligence level,  Therapeutic rapport was established. Interventions conducted today were geared towards incorporating medication management along with support for continued therapy. Education was also provided as to the med management with this provider and what to expect in subsequent sessions.      We discussed risks, benefits, goals and side effects of the above medication and the patient was agreeable with the plan. Patient was educated on the importance of compliance with treatment and follow-up appointments. Patient is aware to contact the [facility] with any worsening of symptoms. To call for questions or concerns and return early if necessary. Patent is agreeable to go to the Emergency Department or call 911 should they begin SI/HI.        MEDS ORDERED DURING VISIT:  New Medications Ordered This Visit   Medications    desvenlafaxine (Pristiq) 50 MG 24 hr tablet     Sig: Take 1 tablet by mouth Daily for 14 days, THEN 1 tablet Every Other Day for 14 days.     Dispense:  21 tablet     Refill:  0    OXcarbazepine (Trileptal) 600 MG tablet     Sig: Take 1 tablet by mouth 2 (Two) Times a Day.     Dispense:  60 tablet     Refill:  1    amitriptyline (ELAVIL) 150 MG tablet     Sig: Take 1 tablet by mouth Every Night.     Dispense:  30 tablet     Refill:  1    Lurasidone HCl (Latuda) 120 MG tablet tablet     Sig: Take 1 tablet by mouth Daily.     Dispense:  30 tablet     Refill:  1         Follow Up   Return in about 6 weeks (around 3/3/2025).  Patient was given instructions and counseling regarding her condition or for health maintenance advice. Please see specific information pulled into the AVS if appropriate.     TREATMENT PLAN/GOALS: Continue supportive psychotherapy efforts and medications as indicated. Treatment and medication options discussed during today's visit. Patient acknowledged  and verbally consented to continue with current treatment plan and was educated on the importance of compliance with treatment and follow-up appointments.    MEDICATION ISSUES:  Discussed medication options and treatment plan of prescribed medication as well as the risks, benefits, and side effects including potential falls, possible impaired driving and metabolic adversities among others. Patient is agreeable to call the office with any worsening of symptoms or onset of side effects. Patient is agreeable to call 911 or go to the nearest ER should he/she begin having SI/HI.        [facility]  [address] [place] [address] DR NANCY SORIA [id number] [phone]    January 20, 2025 11:25 EST

## 2025-01-23 ENCOUNTER — TELEPHONE (OUTPATIENT)
Dept: BEHAVIORAL HEALTH | Facility: CLINIC | Age: 40
End: 2025-01-23
Payer: MEDICAID

## 2025-01-23 NOTE — TELEPHONE ENCOUNTER
PATIENT CALLED UPSET AND CRYING AND WANTED TO TALK WITH PROVIDER @ HOW SHE WAS FEELING. SHE SAID SHE WAS TOLD TO CALL WHEN SHE FELT LIKE THAT. SHE WAS HAPPY, BUT THEN HEARD A CERTAIN SONG THAT SET OFF HER DEPRESSION/ANXIETY. I COULDN'T UNDERSTAND SOME OF WHAT SHE WAS SAYING IT REMINDED HER OF. PATIENT TOOK A DIAZEPAM AT 6:30AM, THEN HAD TO TAKE ANOTHER ONE @ 1:00PM

## 2025-01-23 NOTE — TELEPHONE ENCOUNTER
Patient called this morning stating she has been in severe pain for two days, as well as vomiting and vaginal bleeding. She stated if you was not able to do the Hysterectomy soon she would have to proceed to going to . I advised she needed clearance from her cardiologist to move onto the [facility]. She stated she had nothing wrong with her heart so she thought it was useless. I informed her this was for her safety.

## 2025-01-24 RX ORDER — FLUTICASONE FUROATE, UMECLIDINIUM BROMIDE AND VILANTEROL TRIFENATATE 200; 62.5; 25 UG/1; UG/1; UG/1
1 POWDER RESPIRATORY (INHALATION)
Qty: 60 EACH | Refills: 5 | OUTPATIENT
Start: 2025-01-24

## 2025-01-27 NOTE — TELEPHONE ENCOUNTER
Caller: Lindsay Amezquita    Relationship: Self    Best call back number:   Telephone Information:   Mobile [phone]     Requested Prescriptions:   Requested Prescriptions     Pending Prescriptions Disp Refills    butalbital-acetaminophen-caffeine (FIORICET, ESGIC) -40 MG per tablet 20 tablet 0     Sig: Take 1 tablet by mouth 2 (Two) Times a Day As Needed for Headache.    rimegepant sulfate (Nurtec) 75 MG tablet 16 tablet 5     Sig: Take 1 tablet by mouth Every Other Day. Take Monday,Wednesday,Friday, and Saturday.    ondansetron ODT (ZOFRAN-ODT) 4 MG disintegrating tablet 10 tablet 0     Sig: Place 1 tablet on the tongue Every 8 (Eight) Hours As Needed for Nausea or Vomiting.        Pharmacy where request should be sent: [id number] [place], [place] - [address] AT [facility] [address] - [phone]  - [phone] FX     Last office visit with prescribing clinician: 10/30/2024   Last telemedicine visit with prescribing clinician: Visit date not found   Next office visit with prescribing clinician: 4/30/2025     Additional details provided by patient: PT IS OUT OF TWO OF THESE RX. PT IS HAVING INCREASED MIGRAINES AND WOULD LIKE TO KNOW IF SHE CAN SEE A CHIROPRACTOR? IS SHE PHYSICALLY CAPABLE OF SEEING ONE? PLEASE CALL PT BACK TO DISCUSS HER QUESTION ABOUT SEEING CHIROPRACTOR.    Does the patient have less than a 3 day supply:  [x] Yes  [] No    Would you like a call back once the refill request has been completed: [] Yes [] No    If the office needs to give you a call back, can they leave a voicemail: [] Yes [] No    Kim Aranda Rep   01/27/25 08:44 EST

## 2025-01-27 NOTE — TELEPHONE ENCOUNTER
PATIENT SAYS SHE DIDN'T MAKE IT TO THE [facility] APPT DUE TO NO TRANSPORTATION. SHE SAYS SHE IS SUPPOSE TO TAKE 2 PERCOCETS EVERY 8 HOURS AND SAYS SHE IS RUNNING OUT TOO FAST & STAYS IN PAIN. SHE ASKED IF SOMEONE CAN CALL HER BACK TO DISCUSS.

## 2025-01-28 NOTE — PROGRESS NOTES
[facility] Cardiology Office Follow Up Note    Lindsay Amezquita  [id number]  2025    Primary Care Provider: Hutner Winkler MD    Chief Complaint: Routine follow-up    History of Present Illness:   Mrs. Lindsay Amezquita is a 39 y.o. female who presents to the Cardiology Clinic for follow up of cardiac testing.  The patient has a past medical history significant for type 2 diabetes mellitus, prior DVT, hyperlipidemia, chronic pancreatitis, and obesity with a BMI 50.  She does not have any significant past cardiac history.  She initially presented to the cardiology clinic for evaluation of multiple complaints including palpitations.  She underwent outpatient cardiac monitor in  which was generally unremarkable.  She also had an echocardiogram showing normal LV systolic function with no significant structural valvular abnormalities.  Finally, she did undergo a GXT in  which was nondiagnostic due to inability to achieve target heart rate.  There was no significant ischemic changes at the stress level achieved.  Today, the patient reports she is planning to undergo hysterectomy, which has been delayed due to her stress test.  Currently, she denies any significant palpitations.  No significant chest pain or exertional chest discomfort.  She does report 1 isolated episode of mild chest discomfort which appears to be in the setting of a panic attack.  Currently she denies any exertional anginal symptoms.  No other specific complaints today.    Past Cardiac Testin. Last Coronary Angio: None  2. Prior Stress Testing: GXT    1.  Nondiagnostic study due to poor exercise capacity   2.  No evidence of ischemia at stress level achieved  3. Last Echo:    1.  Normal left ventricular size and systolic function, LVEF 55-60%   2.  No significant valvular abnormalities  4. Prior Holter Monitor:   1.  Overall, low risk cardiac monitor.  2.  There were 4 patient triggered/symptomatic  events which correlated with sinus tachycardia with and without PVCs.  3.  No malignant arrhythmias or blocks detected.    4.  [place] of ectopy low.  5.  Normal heart rate variability.  Average heart rate 104 bpm.    Review of Systems:   Review of Systems   Constitutional:  Negative for activity change, appetite change, chills, diaphoresis, fatigue, fever, unexpected weight gain and unexpected weight loss.   Eyes:  Negative for blurred vision and double vision.   Respiratory:  Negative for cough, chest tightness, shortness of breath and wheezing.    Cardiovascular:  Negative for chest pain, palpitations and leg swelling.   Gastrointestinal:  Negative for abdominal pain, anal bleeding, blood in stool and GERD.   Endocrine: Negative for cold intolerance and heat intolerance.   Genitourinary:  Negative for hematuria.   Neurological:  Negative for dizziness, syncope, weakness and light-headedness.   Hematological:  Does not bruise/bleed easily.   Psychiatric/Behavioral:  Negative for depressed mood and stress. The patient is not nervous/anxious.        I have reviewed and/or updated the patient's past medical, past surgical, family, social history, problem list and allergies as appropriate.     Medications:     Current Outpatient Medications:     acetaminophen (TYLENOL) 500 MG tablet, Take 2 tablets by mouth Every 8 (Eight) Hours As Needed for Mild Pain., Disp: 60 tablet, Rfl: 10    amitriptyline (ELAVIL) 150 MG tablet, Take 1 tablet by mouth Every Night., Disp: 30 tablet, Rfl: 1    atorvastatin (LIPITOR) 40 MG tablet, Take 1 tablet by mouth every night at bedtime., Disp: 90 tablet, Rfl: 3    azelastine (ASTELIN) 0.1 % nasal spray, Administer 2 sprays into the nostril(s) as directed by provider 2 (Two) Times a Day. Use in each nostril as directed, Disp: 30 mL, Rfl: 12    brompheniramine-pseudoephedrine-DM 30-2-10 MG/5ML syrup, Take 10 mL by mouth 4 (Four) Times a Day As Needed for Allergies., Disp: 473 mL, Rfl: 0     butalbital-acetaminophen-caffeine (FIORICET, ESGIC) -40 MG per tablet, Take 1 tablet by mouth 2 (Two) Times a Day As Needed for Headache., Disp: 20 tablet, Rfl: 0    desvenlafaxine (Pristiq) 50 MG 24 hr tablet, Take 1 tablet by mouth Daily for 14 days, THEN 1 tablet Every Other Day for 14 days., Disp: 21 tablet, Rfl: 0    diazePAM (VALIUM) 10 MG tablet, Take 1 tablet by mouth 3 (Three) Times a Day As Needed for Anxiety., Disp: 90 tablet, Rfl: 1    fexofenadine (Allegra Allergy) 60 MG tablet, Take 1 tablet by mouth 2 (Two) Times a Day., Disp: 60 tablet, Rfl: 2    fluticasone (FLONASE) 50 MCG/ACT nasal spray, Administer 1 spray into the nostril(s) as directed by provider 2 (Two) Times a Day., Disp: 16 g, Rfl: 5    FREESTYLE LITE test strip, See Admin Instructions., Disp: , Rfl:     gabapentin (Neurontin) 800 MG tablet, Take 1 tablet by mouth 3 (Three) Times a Day., Disp: 90 tablet, Rfl: 0    [place] 5 MG tablet, Take 2 tablets by mouth Daily., Disp: , Rfl:     HYDROcodone-acetaminophen (NORCO) 5-325 MG per tablet, Take 1 tablet by mouth Every 6 (Six) Hours As Needed for Severe Pain., Disp: 8 tablet, Rfl: 0    ibuprofen (ADVIL,MOTRIN) 800 MG tablet, Take 1 tablet by mouth Every 8 (Eight) Hours As Needed for Moderate Pain for up to 30 doses., Disp: 30 tablet, Rfl: 0    ipratropium-albuterol (DUO-NEB) 0.5-2.5 mg/3 ml nebulizer, Take 3 mL by nebulization 4 (Four) Times a Day As Needed for Wheezing or Shortness of Air., Disp: 360 mL, Rfl: 5    Lurasidone HCl (Latuda) 120 MG tablet tablet, Take 1 tablet by mouth Daily., Disp: 30 tablet, Rfl: 1    naloxone (NARCAN) 4 MG/0.1ML nasal spray, Call 911. Don't prime. [place] in 1 nostril for overdose. Repeat in 2-3 minutes in other nostril if no or minimal breathing/responsiveness., Disp: 2 each, Rfl: 0    ondansetron ODT (ZOFRAN-ODT) 4 MG disintegrating tablet, Place 1 tablet on the tongue Every 8 (Eight) Hours As Needed for Nausea or Vomiting., Disp: 10 tablet, Rfl: 3     OXcarbazepine (Trileptal) 600 MG tablet, Take 1 tablet by mouth 2 (Two) Times a Day., Disp: 60 tablet, Rfl: 1    oxyCODONE-acetaminophen (Percocet) 5-325 MG per tablet, Take 2 tablets by mouth Every 8 (Eight) Hours As Needed for Severe Pain., Disp: 10 tablet, Rfl: 0    pantoprazole (Protonix) 40 MG EC tablet, Take 1 tablet by mouth Daily., Disp: 90 tablet, Rfl: 1    polyethylene glycol (MIRALAX) 17 g packet, Take 17 g by mouth Daily. (Patient taking differently: Take 17 g by mouth As Needed.), Disp: 30 each, Rfl: 1    predniSONE (DELTASONE) 10 MG tablet, 5 po for 2 days, 4 po for 2 days, 3 po for 2 days, 2 po for 2 days, 1 po for 2 days, Disp: 30 tablet, Rfl: 0    promethazine (PHENERGAN) 12.5 MG tablet, Take 1 tablet by mouth Every 6 (Six) Hours As Needed for Nausea or Vomiting., Disp: 30 tablet, Rfl: 5    Rimegepant Sulfate (Nurtec) 75 MG tablet dispersible tablet, Take 1 tablet by mouth As Needed (HA)., Disp: 2 tablet, Rfl: 0    rimegepant sulfate (Nurtec) 75 MG tablet, Take 1 tablet by mouth Every Other Day. Take Monday,Wednesday,Friday, and Saturday., Disp: 16 tablet, Rfl: 3    SITagliptin (Januvia) 100 MG tablet, Take 1 tablet by mouth Daily., Disp: 30 tablet, Rfl: 11    tiZANidine (ZANAFLEX) 4 MG tablet, Take 1 tablet by mouth Every 8 (Eight) Hours As Needed for Muscle Spasms., Disp: 90 tablet, Rfl: 0    traMADol (Ultram) 50 MG tablet, Take 1 tablet by mouth Every 8 (Eight) Hours As Needed for Severe Pain., Disp: 10 tablet, Rfl: 0    Trelegy Ellipta 200-62.5-25 MCG/ACT inhaler, Inhale 1 puff Daily. Rinse mouth with water after use, Disp: 60 each, Rfl: 5    Zavegepant HCl 10 MG/ACT solution, Administer 10 mg into the nostril(s) as directed by provider Daily As Needed (migraine). (1 spray into 1 nostril every 24 hrs prn), Disp: 6 each, Rfl: 5    Physical Exam:  Vital Signs:   Vitals:    01/28/25 0847   BP: 138/84   BP Location: Left arm   Patient Position: Sitting   Cuff Size: Adult   Pulse: 117   SpO2: 93%  "  Weight: 126 kg (277 lb 6.4 oz)   Height: 157.5 cm (62\")       Physical Exam  Constitutional:       General: She is not in acute distress.     Appearance: She is well-developed. She is obese. She is not diaphoretic.   HENT:      Head: Normocephalic and atraumatic.   Eyes:      General: No scleral icterus.     Pupils: Pupils are equal, round, and reactive to light.   Neck:      Trachea: No tracheal deviation.   Cardiovascular:      Rate and Rhythm: Normal rate and regular rhythm.      Heart sounds: Normal heart sounds. No murmur heard.     No friction rub. No gallop.      Comments: Normal JVD.    Pulmonary:      Effort: Pulmonary effort is normal. No respiratory distress.      Breath sounds: Normal breath sounds. No stridor. No wheezing or rales.   Chest:      Chest wall: No tenderness.   Abdominal:      General: Bowel sounds are normal. There is no distension.      Palpations: Abdomen is soft.      Tenderness: There is no abdominal tenderness. There is no guarding or rebound.   Musculoskeletal:         General: No swelling. Normal range of motion.      Cervical back: Neck supple. No tenderness.   Lymphadenopathy:      Cervical: No cervical adenopathy.   Skin:     General: Skin is warm and dry.      Findings: No erythema.   Neurological:      General: No focal deficit present.      Mental Status: She is alert and oriented to person, place, and time.   Psychiatric:         Mood and Affect: Mood normal.         Behavior: Behavior normal.         Results Review:   I reviewed the patient's new clinical results.        Assessment / Plan:     1.  Preoperative cardiac risk assessment  --Presents today for preoperative cardiac risk assessment in consideration of undergoing hysterectomy  --No significant prior cardiac history  -- Prior ECG showed NSR with no significant abnormalities  --No current chest pain or exertional anginal symptoms  --No evidence of decompensated CHF, valvulopathy, or arrhythmia  --Prior " echocardiogram showed normal LV systolic function with no significant structural valvular abnormalities  --The patient is currently at low risk for adverse perioperative cardiac events with a low risk revised cardiac risk index of 0.4%.  Given functional status is > 4 METS without anginal symptoms it is reasonable to proceed with the proposed procedure without further cardiac testing or interventions  --Will follow on a as needed basis    Follow Up:   Return if symptoms worsen or fail to improve.      Thank you for allowing me to participate in the care of your patient. Please to not hesitate to contact me with additional questions or concerns.     ANGELA Weslye MD  Interventional Cardiology   01/28/2025  08:56 EST

## 2025-01-30 ENCOUNTER — TELEPHONE (OUTPATIENT)
Dept: FAMILY MEDICINE CLINIC | Facility: CLINIC | Age: 40
End: 2025-01-30
Payer: MEDICAID

## 2025-02-04 NOTE — TELEPHONE ENCOUNTER
Left message for patient to let her know her US has been reordered. Let patient know she can call back with any further questions or concerns.         Candice Li, Medical Assistant   [facility]   Ph: [phone]  Fx: [phone]     Pt called re: refill on Valium to [facility] pharmacy in [place].  Sts she is out of meds.

## 2025-02-09 DIAGNOSIS — M54.16 LUMBAR RADICULOPATHY: ICD-10-CM
